# Patient Record
Sex: FEMALE | Race: WHITE | NOT HISPANIC OR LATINO | Employment: OTHER | URBAN - METROPOLITAN AREA
[De-identification: names, ages, dates, MRNs, and addresses within clinical notes are randomized per-mention and may not be internally consistent; named-entity substitution may affect disease eponyms.]

---

## 2017-01-05 ENCOUNTER — OFFICE VISIT (OUTPATIENT)
Dept: FAMILY MEDICINE CLINIC | Facility: CLINIC | Age: 77
End: 2017-01-05

## 2017-01-05 VITALS
DIASTOLIC BLOOD PRESSURE: 60 MMHG | TEMPERATURE: 97.6 F | HEIGHT: 65 IN | WEIGHT: 170 LBS | SYSTOLIC BLOOD PRESSURE: 120 MMHG | OXYGEN SATURATION: 100 % | HEART RATE: 79 BPM | BODY MASS INDEX: 28.32 KG/M2 | RESPIRATION RATE: 16 BRPM

## 2017-01-05 DIAGNOSIS — D63.1 ANEMIA IN STAGE 3 CHRONIC KIDNEY DISEASE (HCC): ICD-10-CM

## 2017-01-05 DIAGNOSIS — N18.30 ANEMIA IN STAGE 3 CHRONIC KIDNEY DISEASE (HCC): ICD-10-CM

## 2017-01-05 DIAGNOSIS — D69.6 THROMBOCYTOPENIA (HCC): ICD-10-CM

## 2017-01-05 DIAGNOSIS — I25.810 CORONARY ARTERY DISEASE INVOLVING CORONARY BYPASS GRAFT OF NATIVE HEART WITHOUT ANGINA PECTORIS: Primary | ICD-10-CM

## 2017-01-05 PROCEDURE — 99214 OFFICE O/P EST MOD 30 MIN: CPT | Performed by: INTERNAL MEDICINE

## 2017-01-05 PROCEDURE — G0008 ADMIN INFLUENZA VIRUS VAC: HCPCS | Performed by: INTERNAL MEDICINE

## 2017-01-05 PROCEDURE — 90662 IIV NO PRSV INCREASED AG IM: CPT | Performed by: INTERNAL MEDICINE

## 2017-01-05 NOTE — MR AVS SNAPSHOT
Janel Mahoney   1/5/2017 1:00 PM   Office Visit    Dept Phone:  [phone]   Encounter #:  [id number]    Provider:  Ariel Matute MD   Department:  [facility]                Your Full Care Plan              Your Updated Medication List          This list is accurate as of: 1/5/17  2:22 PM.  Always use your most recent med list.                ACETAMINOPHEN PO       aspirin 81 MG tablet       calcitonin (salmon) 200 UNIT/ACT nasal spray   Commonly known as:  MIACALCIN   1 spray into each nostril daily.       calcitriol 0.25 MCG capsule   Commonly known as:  ROCALTROL   TAKE 1 CAPSULE BY MOUTH EVERY OTHER DAY.       carvedilol 25 MG tablet   Commonly known as:  COREG   TAKE 1 TABLET TWICE DAILY       epoetin adele 97444 UNIT/ML injection   Commonly known as:  EPOGEN,PROCRIT       ezetimibe 10 MG tablet   Commonly known as:  ZETIA       FERROUS SULFATE PO       furosemide 40 MG tablet   Commonly known as:  LASIX   TAKE 1 AND 1/2 TABLETS EVERY MORNING  AND  1  TABLET EVERY EVENING       HYDROcodone-acetaminophen 7.5-325 MG per tablet   Commonly known as:  NORCO   Take 1 tablet by mouth every 6 (six) hours as needed for moderate pain (4-6).       Magnesium 100 MG capsule       multivitamin capsule       ramipril 5 MG capsule   Commonly known as:  ALTACE       simvastatin 40 MG tablet   Commonly known as:  ZOCOR   TAKE 1 TABLET EVERY DAY (NEED MD APPOINTMENT)       traMADol 50 MG tablet   Commonly known as:  ULTRAM       VITAMIN B12 PO       Vitamin D 2000 UNITS tablet       WARFARIN SODIUM PO       zolpidem 10 MG tablet   Commonly known as:  AMBIEN   TAKE 1 TABLET AT BEDTIME               We Performed the Following     Pneumococcal Conjugate Vaccine 13-Valent All       You Were Diagnosed With        Codes Comments    Coronary artery disease involving coronary bypass graft of native heart without angina pectoris    -  Primary ICD-10-CM:  I25.810  ICD-9-CM: 414.05     Anemia in stage 3 chronic kidney disease     ICD-10-CM: N18.3, D63.1  ICD-9-CM: 285.21, 585.3     Thrombocytopenia     ICD-10-CM: D69.6  ICD-9-CM: 287.5       Instructions     None    Patient Instructions History      Upcoming Appointments     Visit Type Date Time Department    OFFICE VISIT 2017  1:00 PM [facility]    LAB 2017  2:00 PM [facility] ONC CBC LAB [facility]    INJECTION 2017  2:30 PM [facility] OP INFU CBC [facility]    PACEMAKER ICD - REMOTE 3/16/2017 12:[facility]    FOLLOW UP 1 UNIT 3/21/2017  3:00 PM [facility] ONC CBC [facility]    LAB 3/21/2017  2:30 PM [facility] ONC CBC LAB [facility]    INJECTION 3/21/2017  3:30 PM [facility] OP INFU CBC [facility]    LAB 2017 11:00 AM [facility]    OFFICE VISIT 2017  1:00 PM [facility]    FOLLOW UP 2017 11:[facility] AM [facility]    PACEMAKER ICD - IN OFFICE 2017 12:00 PM [facility] [place]    FOLLOW UP SLEEP CLINIC 2017 10:45 AM [facility] SLEEP LAB      [employer] Signup     [facility] [employer] allows you to send messages to your doctor, view your test results, renew your prescriptions, schedule appointments, and more. To sign up, go to [employer] and click on the Sign Up Now link in the New User? box. Enter your [employer] Activation Code exactly as it appears below along with the last four digits of your Social Security Number and your Date of Birth () to complete the sign-up process. If you do not sign up before the expiration date, you must request a new code.    [employer] Activation Code: [email or web address]  Expires: 1/10/2017 11:19 AM    If you have questions, you can email [email or web address] or call [phone] to talk to our [employer] staff. Remember, [employer] is NOT to be used for urgent needs. For medical emergencies, dial 911.               Other Info from Your Visit           Your Appointments     2017  2:00 PM EST   Lab with LAB CHAIR 1 [facility]  [facility]    [address]  [facility] [address]   [phone]            Jan 17, 2017  2:30 PM EST   Injection with INJECTION CHAIR [facility] INFUSION CBC ([place])    [address]  [place] [facility] [address]   [phone]            Mar 16, 2017 12:00 AM EDT   PACEMAKER - REMOTE with SYDNEY PURVIS   [facility] CARDIOLOGY (--)    [address]  [place] [facility] [address] [phone]            Mar 21, 2017  2:30 PM EDT   Lab with LAB CHAIR 6 [facility] ONCOLOGY CBC LAB ([place])    [address]  [place] [address]   [phone]            Mar 21, 2017  3:00 PM EDT   FOLLOW UP with Edward Vasquez MD   [facility] CBC GROUP: CONSULTANTS IN BLOOD DISORDERS AND CANCER (CBC [place])    [address]  [place] [address]   [phone]              Allergies     Dofetilide      Pt states not allergic      Reason for Visit     Annual Exam     Results go over last labs      Vital Signs     Blood Pressure Pulse Temperature Respirations Height Weight    120/60 (BP Location: Left arm, Patient Position: Sitting, Cuff Size: Adult) 79 97.6 °F (36.4 °C) (Oral) 16 65\" (165.1 cm) 170 lb (77.1 kg)    Oxygen Saturation Body Mass Index Smoking Status             100% 28.29 kg/m2 Former Smoker         Problems and Diagnoses Noted     Anemia in stage 3 chronic kidney disease    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

## 2017-01-05 NOTE — PROGRESS NOTES
Subjective   Janel Mahoney is a 76 y.o. female.     History of Present Illness   Patient was seen today for follow-up for coronary disease.  She's had no chest pain over the last several months his controlled medication.  She went over her lab work today blood sugars 105, , creatinine 1.4, triglycerides 64, HDL 71, LDL 79.  Patient has chronic normal flares been taking over-the-counter iron and ferritin levels 298.  She was advised to quit iron and follow-up with her nephrologist.    Much of this encounter note is an electronic transcription/translation of spoken language to printed text.  The electronic translation of spoken language may permit erroneous, or at times, nonsensical words or phrases to be inadvertently transcribed.  Although I have reviewed the note for such errors, some may still exist.  The following portions of the patient's history were reviewed and updated as appropriate: allergies, current medications, past family history, past medical history, past social history, past surgical history and problem list.    Review of Systems   Constitutional: Negative for fatigue and fever.   HENT: Positive for congestion. Negative for trouble swallowing.    Eyes: Negative for discharge and visual disturbance.   Respiratory: Negative for choking and shortness of breath.    Cardiovascular: Negative for chest pain and palpitations.   Gastrointestinal: Negative for abdominal pain and blood in stool.   Endocrine: Negative.    Genitourinary: Negative for genital sores and hematuria.   Musculoskeletal: Negative for gait problem and joint swelling.   Skin: Negative for color change, pallor, rash and wound.   Allergic/Immunologic: Positive for environmental allergies. Negative for immunocompromised state.   Neurological: Negative for facial asymmetry and speech difficulty.   Psychiatric/Behavioral: Negative for hallucinations and suicidal ideas.       Objective   Physical Exam   Constitutional: She is oriented  to person, place, and time. She appears well-developed and well-nourished.   HENT:   Head: Normocephalic.   Eyes: Conjunctivae are normal. Pupils are equal, round, and reactive to light.   Neck: Normal range of motion. Neck supple.   Cardiovascular: Normal rate, regular rhythm and normal heart sounds.    Pulmonary/Chest: Effort normal and breath sounds normal.   Abdominal: Soft. Bowel sounds are normal.   Musculoskeletal: Normal range of motion.   Neurological: She is alert and oriented to person, place, and time.   Skin: Skin is warm and dry.   Psychiatric: She has a normal mood and affect. Her behavior is normal. Judgment and thought content normal.   Nursing note and vitals reviewed.      Assessment/Plan   Problems Addressed this Visit        Cardiovascular and Mediastinum    Coronary artery disease involving coronary bypass graft of native heart without angina pectoris - Primary       Genitourinary    Anemia in stage 3 chronic kidney disease       Hematopoietic and Hemostatic    Thrombocytopenia

## 2017-01-06 ENCOUNTER — HOSPITAL ENCOUNTER (OUTPATIENT)
Dept: CARDIOLOGY | Facility: HOSPITAL | Age: 77
Setting detail: RECURRING SERIES
Discharge: HOME | End: 2017-01-06

## 2017-01-06 PROCEDURE — 85610 PROTHROMBIN TIME: CPT | Performed by: INTERNAL MEDICINE

## 2017-01-06 PROCEDURE — 36416 COLLJ CAPILLARY BLOOD SPEC: CPT | Performed by: INTERNAL MEDICINE

## 2017-01-17 ENCOUNTER — LAB (OUTPATIENT)
Dept: LAB | Facility: HOSPITAL | Age: 77
End: 2017-01-17

## 2017-01-17 ENCOUNTER — INFUSION (OUTPATIENT)
Dept: ONCOLOGY | Facility: HOSPITAL | Age: 77
End: 2017-01-17

## 2017-01-17 DIAGNOSIS — N18.30 ANEMIA IN STAGE 3 CHRONIC KIDNEY DISEASE (HCC): ICD-10-CM

## 2017-01-17 DIAGNOSIS — D63.1 ANEMIA IN STAGE 3 CHRONIC KIDNEY DISEASE (HCC): ICD-10-CM

## 2017-01-17 DIAGNOSIS — D69.6 THROMBOCYTOPENIA (HCC): ICD-10-CM

## 2017-01-17 LAB
BASOPHILS # BLD AUTO: 0.03 10*3/MM3 (ref 0–0.1)
BASOPHILS NFR BLD AUTO: 0.6 % (ref 0–1.1)
DEPRECATED RDW RBC AUTO: 49.6 FL (ref 37–49)
EOSINOPHIL # BLD AUTO: 0.2 10*3/MM3 (ref 0–0.36)
EOSINOPHIL NFR BLD AUTO: 4.3 % (ref 1–5)
ERYTHROCYTE [DISTWIDTH] IN BLOOD BY AUTOMATED COUNT: 14.9 % (ref 11.7–14.5)
HCT VFR BLD AUTO: 33.7 % (ref 34–45)
HGB BLD-MCNC: 10.9 G/DL (ref 11.5–14.9)
IMM GRANULOCYTES # BLD: 0.03 10*3/MM3 (ref 0–0.03)
IMM GRANULOCYTES NFR BLD: 0.6 % (ref 0–0.5)
LYMPHOCYTES # BLD AUTO: 1.14 10*3/MM3 (ref 1–3.5)
LYMPHOCYTES NFR BLD AUTO: 24.6 % (ref 20–49)
MCH RBC QN AUTO: 29.5 PG (ref 27–33)
MCHC RBC AUTO-ENTMCNC: 32.3 G/DL (ref 32–35)
MCV RBC AUTO: 91.3 FL (ref 83–97)
MONOCYTES # BLD AUTO: 0.33 10*3/MM3 (ref 0.25–0.8)
MONOCYTES NFR BLD AUTO: 7.1 % (ref 4–12)
NEUTROPHILS # BLD AUTO: 2.9 10*3/MM3 (ref 1.5–7)
NEUTROPHILS NFR BLD AUTO: 62.8 % (ref 39–75)
NRBC BLD MANUAL-RTO: 0 /100 WBC (ref 0–0)
PLATELET # BLD AUTO: 118 10*3/MM3 (ref 150–375)
PMV BLD AUTO: 10.4 FL (ref 8.9–12.1)
RBC # BLD AUTO: 3.69 10*6/MM3 (ref 3.9–5)
WBC NRBC COR # BLD: 4.63 10*3/MM3 (ref 4–10)

## 2017-01-17 PROCEDURE — 85025 COMPLETE CBC W/AUTO DIFF WBC: CPT

## 2017-01-17 PROCEDURE — 36416 COLLJ CAPILLARY BLOOD SPEC: CPT

## 2017-01-17 NOTE — PROGRESS NOTES
Reviewed labs.  Patients HGB is 10.9.  Pt states that Dr. Matute had instructed her to stop her iron tabs, as her ferritin level is >200,   Pt states she is taking one daily.  Pt states she is not comfortable doing this as she will require EPO shots again.      Reviewed with dr Vasquez patient's ferritin level and iron panel.  Per dr vasquez, iron sat is only 24%, therefore patient is not iron overloaded and the ferritin level could be elevated due to acute phase reactant.  Per dr Vasquez, patient should continue on iron once daily.     Reviewed with patient, she repeated instructions correctly.

## 2017-01-31 ENCOUNTER — HOSPITAL ENCOUNTER (OUTPATIENT)
Dept: CARDIOLOGY | Facility: HOSPITAL | Age: 77
Setting detail: RECURRING SERIES
Discharge: HOME | End: 2017-01-31

## 2017-01-31 PROCEDURE — 85610 PROTHROMBIN TIME: CPT | Performed by: INTERNAL MEDICINE

## 2017-01-31 PROCEDURE — 36416 COLLJ CAPILLARY BLOOD SPEC: CPT | Performed by: INTERNAL MEDICINE

## 2017-02-14 ENCOUNTER — HOSPITAL ENCOUNTER (OUTPATIENT)
Dept: CARDIOLOGY | Facility: HOSPITAL | Age: 77
Setting detail: RECURRING SERIES
Discharge: HOME | End: 2017-02-14

## 2017-02-14 PROCEDURE — 36416 COLLJ CAPILLARY BLOOD SPEC: CPT

## 2017-02-14 PROCEDURE — 85610 PROTHROMBIN TIME: CPT

## 2017-02-27 RX ORDER — CALCITRIOL 0.25 UG/1
CAPSULE, LIQUID FILLED ORAL
Qty: 45 CAPSULE | Refills: 1 | Status: SHIPPED | OUTPATIENT
Start: 2017-02-27 | End: 2017-08-23 | Stop reason: SDUPTHER

## 2017-02-28 ENCOUNTER — HOSPITAL ENCOUNTER (OUTPATIENT)
Dept: CARDIOLOGY | Facility: HOSPITAL | Age: 77
Setting detail: RECURRING SERIES
Discharge: HOME | End: 2017-02-28

## 2017-02-28 PROCEDURE — 36416 COLLJ CAPILLARY BLOOD SPEC: CPT

## 2017-02-28 PROCEDURE — 85610 PROTHROMBIN TIME: CPT

## 2017-03-14 ENCOUNTER — HOSPITAL ENCOUNTER (OUTPATIENT)
Dept: CARDIOLOGY | Facility: HOSPITAL | Age: 77
Setting detail: RECURRING SERIES
Discharge: HOME | End: 2017-03-14

## 2017-03-14 PROCEDURE — 85610 PROTHROMBIN TIME: CPT

## 2017-03-14 PROCEDURE — 36416 COLLJ CAPILLARY BLOOD SPEC: CPT

## 2017-03-16 ENCOUNTER — CLINICAL SUPPORT NO REQUIREMENTS (OUTPATIENT)
Dept: CARDIOLOGY | Facility: CLINIC | Age: 77
End: 2017-03-16

## 2017-03-16 DIAGNOSIS — I42.9 CARDIOMYOPATHY (HCC): Primary | ICD-10-CM

## 2017-03-16 PROCEDURE — 93296 REM INTERROG EVL PM/IDS: CPT | Performed by: INTERNAL MEDICINE

## 2017-03-16 PROCEDURE — 93295 DEV INTERROG REMOTE 1/2/MLT: CPT | Performed by: INTERNAL MEDICINE

## 2017-03-21 ENCOUNTER — APPOINTMENT (OUTPATIENT)
Dept: ONCOLOGY | Facility: HOSPITAL | Age: 77
End: 2017-03-21

## 2017-03-21 ENCOUNTER — OFFICE VISIT (OUTPATIENT)
Dept: ONCOLOGY | Facility: CLINIC | Age: 77
End: 2017-03-21

## 2017-03-21 ENCOUNTER — LAB (OUTPATIENT)
Dept: LAB | Facility: HOSPITAL | Age: 77
End: 2017-03-21

## 2017-03-21 VITALS
TEMPERATURE: 98 F | RESPIRATION RATE: 14 BRPM | HEIGHT: 63 IN | OXYGEN SATURATION: 97 % | HEART RATE: 73 BPM | DIASTOLIC BLOOD PRESSURE: 72 MMHG | WEIGHT: 174.4 LBS | BODY MASS INDEX: 30.9 KG/M2 | SYSTOLIC BLOOD PRESSURE: 124 MMHG

## 2017-03-21 DIAGNOSIS — D63.1 ANEMIA IN STAGE 3 CHRONIC KIDNEY DISEASE (HCC): Primary | ICD-10-CM

## 2017-03-21 DIAGNOSIS — D69.6 THROMBOCYTOPENIA (HCC): ICD-10-CM

## 2017-03-21 DIAGNOSIS — D63.1 ANEMIA IN STAGE 3 CHRONIC KIDNEY DISEASE (HCC): ICD-10-CM

## 2017-03-21 DIAGNOSIS — N18.30 ANEMIA IN STAGE 3 CHRONIC KIDNEY DISEASE (HCC): ICD-10-CM

## 2017-03-21 DIAGNOSIS — E53.8 B12 DEFICIENCY: ICD-10-CM

## 2017-03-21 DIAGNOSIS — N18.30 ANEMIA IN STAGE 3 CHRONIC KIDNEY DISEASE (HCC): Primary | ICD-10-CM

## 2017-03-21 LAB
BASOPHILS # BLD AUTO: 0.01 10*3/MM3 (ref 0–0.1)
BASOPHILS NFR BLD AUTO: 0.2 % (ref 0–1.1)
DEPRECATED RDW RBC AUTO: 47.6 FL (ref 37–49)
EOSINOPHIL # BLD AUTO: 0.14 10*3/MM3 (ref 0–0.36)
EOSINOPHIL NFR BLD AUTO: 2.3 % (ref 1–5)
ERYTHROCYTE [DISTWIDTH] IN BLOOD BY AUTOMATED COUNT: 14.2 % (ref 11.7–14.5)
HCT VFR BLD AUTO: 33.9 % (ref 34–45)
HGB BLD-MCNC: 11.3 G/DL (ref 11.5–14.9)
IMM GRANULOCYTES # BLD: 0.03 10*3/MM3 (ref 0–0.03)
IMM GRANULOCYTES NFR BLD: 0.5 % (ref 0–0.5)
LYMPHOCYTES # BLD AUTO: 1.14 10*3/MM3 (ref 1–3.5)
LYMPHOCYTES NFR BLD AUTO: 19.1 % (ref 20–49)
MCH RBC QN AUTO: 30.3 PG (ref 27–33)
MCHC RBC AUTO-ENTMCNC: 33.3 G/DL (ref 32–35)
MCV RBC AUTO: 90.9 FL (ref 83–97)
MONOCYTES # BLD AUTO: 0.39 10*3/MM3 (ref 0.25–0.8)
MONOCYTES NFR BLD AUTO: 6.5 % (ref 4–12)
NEUTROPHILS # BLD AUTO: 4.25 10*3/MM3 (ref 1.5–7)
NEUTROPHILS NFR BLD AUTO: 71.4 % (ref 39–75)
NRBC BLD MANUAL-RTO: 0 /100 WBC (ref 0–0)
PLATELET # BLD AUTO: 112 10*3/MM3 (ref 150–375)
PMV BLD AUTO: 10.3 FL (ref 8.9–12.1)
RBC # BLD AUTO: 3.73 10*6/MM3 (ref 3.9–5)
WBC NRBC COR # BLD: 5.96 10*3/MM3 (ref 4–10)

## 2017-03-21 PROCEDURE — 99213 OFFICE O/P EST LOW 20 MIN: CPT | Performed by: INTERNAL MEDICINE

## 2017-03-21 PROCEDURE — 85025 COMPLETE CBC W/AUTO DIFF WBC: CPT

## 2017-03-21 PROCEDURE — 36416 COLLJ CAPILLARY BLOOD SPEC: CPT

## 2017-03-21 NOTE — PROGRESS NOTES
Subjective   CHIEF COMPLAINT:    1. Anemia secondary to chronic kidney disease.   2. Vitamin B12 deficiency.   3. Thrombocytopenia    HISTORY OF PRESENT ILLNESS:     Janel Mahoney is a 76 y.o.  patient with anemia of chronic kidney disease.  She was previously on Procrit monthly but hemoglobin stayed above 10 for the past 6 months and she did not need Procrit since the last dose that she received on 4/19/16 at 4000 units.    Patient states that she has increased her fluid intake over the past few months.  She saw her nephrologist recently but was told that her kidney function has slightly worsened.  She is not reporting any fatigue.  She is not noticing blood in the stool or urine.      Past Medical History   Diagnosis Date   • Anemia    • Atrial fibrillation    • Carotid artery stenosis    • Chronic coronary artery disease      moderate to severe LV dysfunction.   • GERD (gastroesophageal reflux disease)    • Hyperlipidemia    • Hypertension    • Leukopenia    • Obesity    • Osteoarthritis    • Peptic ulcer    • Premature ventricular contractions    • Renal insufficiency syndrome    • Scoliosis    • Stroke syndrome    • Thrombocytopenia    • Ventricular tachycardia    • Vitamin B12 deficiency        Past Surgical History   Procedure Laterality Date   • Coronary artery bypass graft       single graft to the PDA   • Cardiac catheterization       Showed severe mitral insufficiency and borderline coronary artery disease   • Cardiac catheterization       Showed an ejection fraction of 35%. She had occlusive disease of the right posterior LV branch and no other significant disease, treated medically.   • Thromboendarterectomy Right      carotid thromboendarterectomy    • Av node ablation     • Cardioversion       multiple electrocardioversions.   • Hemorrhoidectomy     • Hysterectomy     • Cardiac defibrillator placement       Biventricular   • Total knee arthroplasty Left    • Mitral valve replacement  01/2010     #31  Epic porcine valve.   • Tonsillectomy     • Coronary stent placement         Cancer-related family history includes Breast cancer in her mother; Cancer in her mother.    SCHEDULED MEDS:    Current Outpatient Prescriptions:   •  ACETAMINOPHEN PO, Senior choice, Disp: , Rfl:   •  aspirin 81 MG tablet, Take 1 tablet by mouth daily., Disp: , Rfl:   •  calcitonin, salmon, (MIACALCIN) 200 UNIT/ACT nasal spray, 1 spray into each nostril daily., Disp: 3 each, Rfl: 1  •  calcitriol (ROCALTROL) 0.25 MCG capsule, TAKE 1 CAPSULE EVERY OTHER DAY (Patient taking differently: take 1 capsule daily), Disp: 45 capsule, Rfl: 1  •  carvedilol (COREG) 25 MG tablet, TAKE 1 TABLET TWICE DAILY, Disp: 180 tablet, Rfl: 3  •  Cholecalciferol (VITAMIN D) 2000 UNITS tablet, Take 1 tablet by mouth daily., Disp: , Rfl:   •  Cyanocobalamin (VITAMIN B12 PO), Take 1 tablet by mouth daily. As directed, Disp: , Rfl:   •  epoetin adele (EPOGEN,PROCRIT) 10373 UNIT/ML injection, as needed. Procrit 12187 UNIT/ML Injection Solution; Patient Sig: Procrit 76102 UNIT/ML Injection Solution INJECT SUBCUTANEOUSLY AS DIRECTED.; 0; 01-Apr-2014; Active, Disp: , Rfl:   •  ezetimibe (ZETIA) 10 MG tablet, Take 1 tablet by mouth daily., Disp: , Rfl:   •  FERROUS SULFATE PO, daily. Take as directed  , Disp: , Rfl:   •  furosemide (LASIX) 40 MG tablet, TAKE 1 AND 1/2 TABLETS EVERY MORNING  AND  1  TABLET EVERY EVENING, Disp: 225 tablet, Rfl: 3  •  HYDROcodone-acetaminophen (NORCO) 7.5-325 MG per tablet, Take 1 tablet by mouth every 6 (six) hours as needed for moderate pain (4-6)., Disp: 30 tablet, Rfl: 0  •  Multiple Vitamin (MULTIVITAMIN) capsule, Take 1 capsule by mouth daily., Disp: , Rfl:   •  ramipril (ALTACE) 5 MG capsule, Take 1 capsule by mouth daily., Disp: , Rfl:   •  simvastatin (ZOCOR) 40 MG tablet, TAKE 1 TABLET EVERY DAY (NEED MD APPOINTMENT) (Patient taking differently: TAKE 1/2 TABLET EVERY DAY (NEED MD APPOINTMENT)), Disp: 90 tablet, Rfl: 1  •  traMADol  "(ULTRAM) 50 MG tablet, Take  by mouth. 2 tablets every morning, 1 tablet nightly, Disp: , Rfl:   •  WARFARIN SODIUM PO, 2 mg daily., Disp: , Rfl:   •  zolpidem (AMBIEN) 10 MG tablet, TAKE 1 TABLET AT BEDTIME, Disp: 90 tablet, Rfl: 1  •  Magnesium 100 MG capsule, Take  by mouth Daily., Disp: , Rfl:     REVIEW OF SYSTEMS:  GENERAL:  No fever or chills. No weight change.  No fatigue.   SKIN:  History of cellulitis of the right leg.  HEME/LYMPH: Anemia.  RESPIRATORY:  No cough, shortness of breath, hemoptysis or wheezing.  CVS:  No chest pain, palpitations or lower extremity swelling.  GI:  No abdominal pain, nausea, vomiting, constipation, diarrhea, melena or hematochezia.  :  No dysuria, hematuria or increased frequency.       Objective   VITAL SIGNS:  Vitals:    03/21/17 1527   BP: 124/72   Pulse: 73   Resp: 14   Temp: 98 °F (36.7 °C)   TempSrc: Oral   SpO2: 97%   Weight: 174 lb 6.4 oz (79.1 kg)   Height: 62.99\" (160 cm)  Comment: new height   PainSc: 0-No pain       Wt Readings from Last 3 Encounters:   03/21/17 174 lb 6.4 oz (79.1 kg)   01/05/17 170 lb (77.1 kg)   12/09/16 173 lb (78.5 kg)       PHYSICAL EXAMINATION:  GENERAL:  The patient appears in good general condition, not in acute distress.  SKIN:  No skin rashes or lesions. No ecchymosis or petechiae.  HEAD:  Normocephalic.  EYES:  No jaundice or pallor.   CHEST:  Lungs clear to auscultation. No added sounds.  CARDIAC:  Normal S1 & S2. No murmurs.  EXTREMITIES:  Chronic hyperpigmentation of the legs with changes related to the area of cellulitis.     .  RESULT REVIEW:       Results from last 7 days  Lab Units 03/21/17  1433   WBC 10*3/mm3 5.96   NEUTROS ABS 10*3/mm3 4.25   HEMOGLOBIN g/dL 11.3*   HEMATOCRIT % 33.9*   PLATELETS 10*3/mm3 112*           Assessment/Plan   1.  Anemia of chronic kidney disease, stage 3.  The patient was on Procrit monthly at 4,000 units until April 2016.  Hemoglobin has stayed above 10 since that time and she did not need to " start back on Procrit.  Since hemoglobin has stayed in the 10 to 11 range, I would increase the interval between the lab tests in 3 months.     2.  Thrombocytopenia likely secondary to B12 deficiency. She is on B12 orally once daily. Platelet count is stable.     We will repeat a CBC with iron studies, methylmalonic acid level in 3 months and see in follow-up in 6 months with a CBC.        Edward Vasquez MD  03/21/17

## 2017-03-30 ENCOUNTER — TELEPHONE (OUTPATIENT)
Dept: FAMILY MEDICINE CLINIC | Facility: CLINIC | Age: 77
End: 2017-03-30

## 2017-03-30 RX ORDER — HYDROCODONE BITARTRATE AND ACETAMINOPHEN 7.5; 325 MG/1; MG/1
1 TABLET ORAL EVERY 6 HOURS PRN
Qty: 30 TABLET | Refills: 0 | Status: SHIPPED | OUTPATIENT
Start: 2017-03-30 | End: 2017-07-19 | Stop reason: SDUPTHER

## 2017-04-11 ENCOUNTER — HOSPITAL ENCOUNTER (OUTPATIENT)
Dept: CARDIOLOGY | Facility: HOSPITAL | Age: 77
Setting detail: RECURRING SERIES
Discharge: HOME | End: 2017-04-11

## 2017-04-11 PROCEDURE — 36416 COLLJ CAPILLARY BLOOD SPEC: CPT

## 2017-04-11 PROCEDURE — 85610 PROTHROMBIN TIME: CPT

## 2017-04-11 RX ORDER — WARFARIN SODIUM 1 MG/1
TABLET ORAL
Qty: 180 TABLET | Refills: 3 | Status: SHIPPED | OUTPATIENT
Start: 2017-04-11 | End: 2017-09-29 | Stop reason: HOSPADM

## 2017-04-28 DIAGNOSIS — E78.5 DYSLIPIDEMIA: Primary | ICD-10-CM

## 2017-04-28 DIAGNOSIS — R79.89 LOW VITAMIN D LEVEL: ICD-10-CM

## 2017-05-02 ENCOUNTER — OFFICE VISIT (OUTPATIENT)
Dept: FAMILY MEDICINE CLINIC | Facility: CLINIC | Age: 77
End: 2017-05-02

## 2017-05-02 VITALS
HEART RATE: 76 BPM | DIASTOLIC BLOOD PRESSURE: 80 MMHG | OXYGEN SATURATION: 97 % | HEIGHT: 63 IN | SYSTOLIC BLOOD PRESSURE: 144 MMHG | TEMPERATURE: 97.5 F | WEIGHT: 168 LBS | BODY MASS INDEX: 29.77 KG/M2

## 2017-05-02 DIAGNOSIS — J06.9 ACUTE URI: Primary | ICD-10-CM

## 2017-05-02 DIAGNOSIS — Z13.9 SCREENING: ICD-10-CM

## 2017-05-02 DIAGNOSIS — Z12.31 ENCOUNTER FOR SCREENING MAMMOGRAM FOR MALIGNANT NEOPLASM OF BREAST: ICD-10-CM

## 2017-05-02 DIAGNOSIS — R79.89 LOW VITAMIN D LEVEL: ICD-10-CM

## 2017-05-02 DIAGNOSIS — E78.5 DYSLIPIDEMIA: ICD-10-CM

## 2017-05-02 LAB
25(OH)D3 SERPL-MCNC: 39.8 NG/ML (ref 30–100)
ALBUMIN SERPL-MCNC: 4 G/DL (ref 3.5–5.2)
ALBUMIN/GLOB SERPL: 1.3 G/DL
ALP SERPL-CCNC: 46 U/L (ref 39–117)
ALT SERPL W P-5'-P-CCNC: 15 U/L (ref 1–33)
ANION GAP SERPL CALCULATED.3IONS-SCNC: 13.8 MMOL/L
AST SERPL-CCNC: 23 U/L (ref 1–32)
BILIRUB SERPL-MCNC: 0.6 MG/DL (ref 0.1–1.2)
BUN BLD-MCNC: 47 MG/DL (ref 8–23)
BUN/CREAT SERPL: 29.9 (ref 7–25)
CALCIUM SPEC-SCNC: 10 MG/DL (ref 8.6–10.5)
CHLORIDE SERPL-SCNC: 101 MMOL/L (ref 98–107)
CHOLEST SERPL-MCNC: 139 MG/DL (ref 0–200)
CO2 SERPL-SCNC: 28.2 MMOL/L (ref 22–29)
CREAT BLD-MCNC: 1.57 MG/DL (ref 0.57–1)
ERYTHROCYTE [DISTWIDTH] IN BLOOD BY AUTOMATED COUNT: 14.2 % (ref 4.5–15)
GFR SERPL CREATININE-BSD FRML MDRD: 32 ML/MIN/1.73
GLOBULIN UR ELPH-MCNC: 3.1 GM/DL
GLUCOSE BLD-MCNC: 98 MG/DL (ref 65–99)
HCT VFR BLD AUTO: 34.6 % (ref 31–42)
HDLC SERPL-MCNC: 54 MG/DL (ref 40–60)
HGB BLD-MCNC: 11 G/DL (ref 12–18)
LDLC SERPL CALC-MCNC: 65 MG/DL (ref 0–100)
LDLC/HDLC SERPL: 1.2 {RATIO}
LYMPHOCYTES # BLD AUTO: 0.8 10*3/MM3 (ref 1.2–3.4)
LYMPHOCYTES NFR BLD AUTO: 14.7 % (ref 21–51)
MCH RBC QN AUTO: 28.3 PG (ref 26.1–33.1)
MCHC RBC AUTO-ENTMCNC: 31.8 G/DL (ref 33–37)
MCV RBC AUTO: 89 FL (ref 80–99)
MONOCYTES # BLD AUTO: 0.3 10*3/MM3 (ref 0.1–0.6)
MONOCYTES NFR BLD AUTO: 6.1 % (ref 2–9)
NEUTROPHILS # BLD AUTO: 4.3 10*3/MM3 (ref 1.4–6.5)
NEUTROPHILS NFR BLD AUTO: 79.2 % (ref 42–75)
PLATELET # BLD AUTO: 140 10*3/MM3 (ref 150–450)
PMV BLD AUTO: 7.2 FL (ref 7.1–10.5)
POTASSIUM BLD-SCNC: 4.7 MMOL/L (ref 3.5–5.2)
PROT SERPL-MCNC: 7.1 G/DL (ref 6–8.5)
RBC # BLD AUTO: 3.88 10*6/MM3 (ref 4–6)
SODIUM BLD-SCNC: 143 MMOL/L (ref 136–145)
TRIGL SERPL-MCNC: 102 MG/DL (ref 0–150)
VLDLC SERPL-MCNC: 20.4 MG/DL (ref 5–40)
WBC NRBC COR # BLD: 5.4 10*3/MM3 (ref 4.5–10)

## 2017-05-02 PROCEDURE — 80053 COMPREHEN METABOLIC PANEL: CPT | Performed by: INTERNAL MEDICINE

## 2017-05-02 PROCEDURE — 99213 OFFICE O/P EST LOW 20 MIN: CPT | Performed by: INTERNAL MEDICINE

## 2017-05-02 PROCEDURE — 80061 LIPID PANEL: CPT | Performed by: INTERNAL MEDICINE

## 2017-05-02 PROCEDURE — 85025 COMPLETE CBC W/AUTO DIFF WBC: CPT | Performed by: INTERNAL MEDICINE

## 2017-05-02 PROCEDURE — 82306 VITAMIN D 25 HYDROXY: CPT | Performed by: INTERNAL MEDICINE

## 2017-05-02 RX ORDER — AZITHROMYCIN 250 MG/1
TABLET, FILM COATED ORAL
Qty: 6 TABLET | Refills: 0 | Status: SHIPPED | OUTPATIENT
Start: 2017-05-02 | End: 2017-06-09

## 2017-05-02 RX ORDER — TRAMADOL HYDROCHLORIDE 50 MG/1
50 TABLET ORAL EVERY 6 HOURS PRN
COMMUNITY
End: 2017-05-04 | Stop reason: SDUPTHER

## 2017-05-02 RX ORDER — PROMETHAZINE HYDROCHLORIDE AND CODEINE PHOSPHATE 6.25; 1 MG/5ML; MG/5ML
5 SYRUP ORAL EVERY 4 HOURS PRN
Qty: 118 ML | Refills: 0 | Status: SHIPPED | OUTPATIENT
Start: 2017-05-02 | End: 2017-06-09

## 2017-05-04 ENCOUNTER — TELEPHONE (OUTPATIENT)
Dept: FAMILY MEDICINE CLINIC | Facility: CLINIC | Age: 77
End: 2017-05-04

## 2017-05-04 RX ORDER — TRAMADOL HYDROCHLORIDE 50 MG/1
TABLET ORAL
Qty: 360 TABLET | Refills: 1 | Status: SHIPPED | OUTPATIENT
Start: 2017-05-04 | End: 2018-01-05 | Stop reason: SDUPTHER

## 2017-05-04 RX ORDER — CARVEDILOL 25 MG/1
TABLET ORAL
Qty: 180 TABLET | Refills: 3 | Status: SHIPPED | OUTPATIENT
Start: 2017-05-04 | End: 2018-01-11 | Stop reason: SDUPTHER

## 2017-05-09 ENCOUNTER — HOSPITAL ENCOUNTER (OUTPATIENT)
Dept: CARDIOLOGY | Facility: HOSPITAL | Age: 77
Setting detail: RECURRING SERIES
Discharge: HOME | End: 2017-05-09

## 2017-05-09 ENCOUNTER — HOSPITAL ENCOUNTER (OUTPATIENT)
Dept: MAMMOGRAPHY | Facility: HOSPITAL | Age: 77
Discharge: HOME | End: 2017-05-09
Admitting: INTERNAL MEDICINE

## 2017-05-09 DIAGNOSIS — Z13.9 SCREENING: ICD-10-CM

## 2017-05-09 DIAGNOSIS — Z12.31 ENCOUNTER FOR SCREENING MAMMOGRAM FOR MALIGNANT NEOPLASM OF BREAST: ICD-10-CM

## 2017-05-09 PROCEDURE — G0202 SCR MAMMO BI INCL CAD: HCPCS

## 2017-05-09 PROCEDURE — 36416 COLLJ CAPILLARY BLOOD SPEC: CPT

## 2017-05-09 PROCEDURE — 85610 PROTHROMBIN TIME: CPT

## 2017-05-15 RX ORDER — ZOLPIDEM TARTRATE 10 MG/1
TABLET ORAL
Qty: 90 TABLET | Refills: 1 | Status: SHIPPED | OUTPATIENT
Start: 2017-05-15 | End: 2017-11-22 | Stop reason: SDUPTHER

## 2017-05-16 ENCOUNTER — TELEPHONE (OUTPATIENT)
Dept: FAMILY MEDICINE CLINIC | Facility: CLINIC | Age: 77
End: 2017-05-16

## 2017-05-16 ENCOUNTER — HOSPITAL ENCOUNTER (OUTPATIENT)
Dept: CARDIOLOGY | Facility: HOSPITAL | Age: 77
Setting detail: RECURRING SERIES
Discharge: HOME | End: 2017-05-16

## 2017-05-16 PROCEDURE — 36416 COLLJ CAPILLARY BLOOD SPEC: CPT

## 2017-05-16 PROCEDURE — 85610 PROTHROMBIN TIME: CPT

## 2017-05-16 RX ORDER — SIMVASTATIN 40 MG
TABLET ORAL
Qty: 90 TABLET | Refills: 1 | Status: SHIPPED | OUTPATIENT
Start: 2017-05-16 | End: 2017-10-03 | Stop reason: SDUPTHER

## 2017-05-23 ENCOUNTER — HOSPITAL ENCOUNTER (OUTPATIENT)
Dept: CARDIOLOGY | Facility: HOSPITAL | Age: 77
Setting detail: RECURRING SERIES
Discharge: HOME | End: 2017-05-23

## 2017-05-23 PROCEDURE — 36416 COLLJ CAPILLARY BLOOD SPEC: CPT

## 2017-05-23 PROCEDURE — 85610 PROTHROMBIN TIME: CPT

## 2017-05-31 RX ORDER — FUROSEMIDE 40 MG/1
TABLET ORAL
Qty: 225 TABLET | Refills: 3 | Status: SHIPPED | OUTPATIENT
Start: 2017-05-31 | End: 2018-01-11 | Stop reason: SDUPTHER

## 2017-06-06 ENCOUNTER — HOSPITAL ENCOUNTER (OUTPATIENT)
Dept: CARDIOLOGY | Facility: HOSPITAL | Age: 77
Setting detail: RECURRING SERIES
Discharge: HOME | End: 2017-06-06

## 2017-06-06 PROCEDURE — 85610 PROTHROMBIN TIME: CPT

## 2017-06-06 PROCEDURE — 36416 COLLJ CAPILLARY BLOOD SPEC: CPT

## 2017-06-09 ENCOUNTER — RESEARCH ENCOUNTER (OUTPATIENT)
Dept: CARDIOLOGY | Facility: CLINIC | Age: 77
End: 2017-06-09

## 2017-06-09 ENCOUNTER — CLINICAL SUPPORT NO REQUIREMENTS (OUTPATIENT)
Dept: CARDIOLOGY | Facility: CLINIC | Age: 77
End: 2017-06-09

## 2017-06-09 ENCOUNTER — OFFICE VISIT (OUTPATIENT)
Dept: CARDIOLOGY | Facility: CLINIC | Age: 77
End: 2017-06-09

## 2017-06-09 VITALS
DIASTOLIC BLOOD PRESSURE: 78 MMHG | BODY MASS INDEX: 27.82 KG/M2 | WEIGHT: 167 LBS | SYSTOLIC BLOOD PRESSURE: 126 MMHG | HEIGHT: 65 IN | HEART RATE: 63 BPM

## 2017-06-09 DIAGNOSIS — I25.810 CORONARY ARTERY DISEASE INVOLVING CORONARY BYPASS GRAFT OF NATIVE HEART WITHOUT ANGINA PECTORIS: ICD-10-CM

## 2017-06-09 DIAGNOSIS — I48.20 CHRONIC ATRIAL FIBRILLATION (HCC): ICD-10-CM

## 2017-06-09 DIAGNOSIS — I77.9 BILATERAL CAROTID ARTERY DISEASE (HCC): ICD-10-CM

## 2017-06-09 DIAGNOSIS — Z95.2 S/P MVR (MITRAL VALVE REPLACEMENT): Primary | ICD-10-CM

## 2017-06-09 DIAGNOSIS — I42.9 CARDIOMYOPATHY (HCC): ICD-10-CM

## 2017-06-09 DIAGNOSIS — I42.9 CARDIOMYOPATHY (HCC): Primary | ICD-10-CM

## 2017-06-09 PROCEDURE — 93283 PRGRMG EVAL IMPLANTABLE DFB: CPT | Performed by: INTERNAL MEDICINE

## 2017-06-09 PROCEDURE — 99214 OFFICE O/P EST MOD 30 MIN: CPT | Performed by: INTERNAL MEDICINE

## 2017-06-09 PROCEDURE — 93000 ELECTROCARDIOGRAM COMPLETE: CPT | Performed by: INTERNAL MEDICINE

## 2017-06-09 NOTE — PROGRESS NOTES
Date of Office Visit: 17  Encounter Provider: Nik Galarza MD  Place of Service: [facility] CARDIOLOGY  Patient Name: Janel Mahoney  :1940      Chief Complaint   Patient presents with   • Coronary Artery Disease   • Congestive Heart Failure   • Atrial Fibrillation   • Cardiac Valve Problem     History of Present Illness  HPI Comments:  The patient is a pleasant, 76-year-old white female with history of severe ischemic heart  disease, mild disease of the left anterior descending, angioplasty, and stent placement of  the right coronary artery. She also had premature ventricular contractions and severe  vascular disease, and left ventricular ejection fraction of 50%. In 2007, she had  an acute infarct. Catheterization showed a left ventricular ejection fraction of 35%. She  had occlusive disease of the right posterior left ventricular branch and no other  significant disease. She was treated medically. She had a transesophageal echocardiogram  that confirmed a left ventricular ejection fraction of 40% and just moderate mitral  insufficiency. She had atrial fibrillation and had electrocardioversion back to sinus  rhythm in May 2007 and then presented in atrial fibrillation and was admitted in 2007 and placed on Tikosyn. We titrated it up to 500 mcg daily but developed marked QT  prolongation even on 250 mcg twice daily. We then discussed ablation versus warfarin and  rate control. We opted for warfarin and rate control. We continued to have symptoms and  went to Dr. Harish Iverson in [place]. She had atrial fibrillation and flutter with  pulmonary vein isolation. She went back into atrial fibrillation and was started on  sotalol. She converted back to sinus rhythm. She then went back into atrial fibrillation.  Again, ultimately, she had a repeat catheterization that showed severe mitral  insufficiency, borderline coronary disease, and in 2010  had mitral valve  replacement with a #31 Epic porcine valve and single bypass graft to the posterior  descending artery. She continued to have episodes of rapid atrial fibrillation and left  ventricular dysfunction. She underwent AV node ablation and upgrade of her device to a  bi-V.   She then presented in 09/2013 with complaints of a lot of fatigue, tiredness and weakness.   As part of the evaluation she had an echocardiogram which showed her left ventricular  ejection fraction to be 45%, moderate pulmonary hypertension at 62 mmHg.  A perfusion  stress test showed no evidence of ischemia and she had a sleep study which was positive so  she was started on CPAP.  She had some volume overload and did much better on twice a day diuretic.   She was in the hospital around August 2016 with multiple compression fractures of her back.    She comes in for follow-up.  Again her biggest problem is her back and no scoliosis.  When she walks it would tend to limit her achieve and fixed with aches her out of breath is her back pain.  She's had some issues with her balance but hasn't had any further falling.  No stroke type symptoms.  No blood in her stool or black tarry stools.  No chest pain or pressure.  No orthopnea or PND.  Overall there has not been any dramatic change in her symptoms.  Her  is helpful at home and her daughter helps with her housework and laundry.    Coronary Artery Disease   Symptoms include shortness of breath. Pertinent negatives include no dizziness, muscle weakness or weight gain. Her past medical history is significant for CHF and past myocardial infarction.   Congestive Heart Failure   Associated symptoms include shortness of breath. Pertinent negatives include no abdominal pain, muscle weakness or nocturia. Her past medical history is significant for CAD.   Atrial Fibrillation   Symptoms include shortness of breath. Symptoms are negative for dizziness and weakness. Past medical history  includes atrial fibrillation, AICD, CAD and CHF.         Past Medical History:   Diagnosis Date   • Anemia    • Atrial fibrillation    • Carotid artery stenosis    • Chronic coronary artery disease     moderate to severe LV dysfunction.   • GERD (gastroesophageal reflux disease)    • Hyperlipidemia    • Hypertension    • Leukopenia    • Obesity    • Osteoarthritis    • Peptic ulcer    • Premature ventricular contractions    • Renal insufficiency syndrome    • Scoliosis    • Stroke syndrome    • Thrombocytopenia    • Ventricular tachycardia    • Vitamin B12 deficiency          Past Surgical History:   Procedure Laterality Date   • AV NODE ABLATION     • BREAST BIOPSY     • CARDIAC CATHETERIZATION      Showed severe mitral insufficiency and borderline coronary artery disease   • CARDIAC CATHETERIZATION      Showed an ejection fraction of 35%. She had occlusive disease of the right posterior LV branch and no other significant disease, treated medically.   • CARDIAC DEFIBRILLATOR PLACEMENT      Biventricular   • CARDIOVERSION      multiple electrocardioversions.   • CORONARY ARTERY BYPASS GRAFT      single graft to the PDA   • CORONARY STENT PLACEMENT     • HEMORRHOIDECTOMY     • HYSTERECTOMY     • MITRAL VALVE REPLACEMENT  01/2010    #31 Epic porcine valve.   • THROMBOENDARTERECTOMY Right     carotid thromboendarterectomy    • TONSILLECTOMY     • TOTAL KNEE ARTHROPLASTY Left          Current Outpatient Prescriptions on File Prior to Visit   Medication Sig Dispense Refill   • ACETAMINOPHEN PO Senior choice     • aspirin 81 MG tablet Take 1 tablet by mouth daily.     • calcitonin, salmon, (MIACALCIN) 200 UNIT/ACT nasal spray 1 spray into each nostril daily. 3 each 1   • calcitriol (ROCALTROL) 0.25 MCG capsule TAKE 1 CAPSULE EVERY OTHER DAY (Patient taking differently: take 1 capsule daily) 45 capsule 1   • carvedilol (COREG) 25 MG tablet TAKE 1 TABLET TWICE DAILY 180 tablet 3   • Cholecalciferol (VITAMIN D) 2000 UNITS  tablet Take 1 tablet by mouth daily.     • Cyanocobalamin (VITAMIN B12 PO) Take 1 tablet by mouth daily. As directed     • epoetin adele (EPOGEN,PROCRIT) 23297 UNIT/ML injection as needed. Procrit 43676 UNIT/ML Injection Solution; Patient Sig: Procrit 02377 UNIT/ML Injection Solution INJECT SUBCUTANEOUSLY AS DIRECTED.; 0; 01-Apr-2014; Active     • ezetimibe (ZETIA) 10 MG tablet Take 1 tablet by mouth daily.     • FERROUS SULFATE PO daily. Take as directed       • furosemide (LASIX) 40 MG tablet TAKE 1 AND 1/2 TABLETS EVERY MORNING AND 1 TABLET EVERY EVENING 225 tablet 3   • HYDROcodone-acetaminophen (NORCO) 7.5-325 MG per tablet Take 1 tablet by mouth Every 6 (Six) Hours As Needed for Moderate Pain (4-6). 30 tablet 0   • Multiple Vitamin (MULTIVITAMIN) capsule Take 1 capsule by mouth daily.     • ramipril (ALTACE) 5 MG capsule Take 1 capsule by mouth daily.     • traMADol (ULTRAM) 50 MG tablet TAKE 2 TABLETS EVERY MORNING  AND TAKE 2 TABLETS AT BEDTIME 360 tablet 1   • warfarin (COUMADIN) 1 MG tablet TAKE 1 TO 2 TABLETS EVERY DAY AS DIRECTED 180 tablet 3   • WARFARIN SODIUM PO 2 mg daily.     • zolpidem (AMBIEN) 10 MG tablet TAKE 1 TABLET AT BEDTIME 90 tablet 1   • simvastatin (ZOCOR) 40 MG tablet TAKE 1 TABLET EVERY DAY (NEED MD APPOINTMENT) (Patient taking differently: Take 1/2 tablet daily) 90 tablet 1   • [DISCONTINUED] azithromycin (ZITHROMAX Z-EVONNE) 250 MG tablet Take 2 tablets the first day, then 1 tablet daily for 4 days. 6 tablet 0   • [DISCONTINUED] Magnesium 100 MG capsule Take  by mouth Daily.     • [DISCONTINUED] promethazine-codeine (PHENERGAN with CODEINE) 6.25-10 MG/5ML syrup Take 5 mL by mouth Every 4 (Four) Hours As Needed for Cough. 118 mL 0     No current facility-administered medications on file prior to visit.          Social History     Social History   • Marital status:      Spouse name: Russ   • Number of children: N/A   • Years of education: N/A     Occupational History   • [employer] Retired     Social History Main Topics   • Smoking status: Former Smoker     Types: Cigarettes   • Smokeless tobacco: Not on file   • Alcohol use Yes      Comment: rare   • Drug use: No   • Sexual activity: Not on file     Other Topics Concern   • Not on file     Social History Narrative       Family History   Problem Relation Age of Onset   • Cancer Mother    • Breast cancer Mother    • Heart disease Mother    • Hypertension Mother    • Coronary artery disease Father    • Stroke Father    • Heart disease Father    • Hypertension Father    • Other Daughter      thiamin deficiency    • Hypertension Son          Review of Systems   Constitution: Negative for decreased appetite, diaphoresis, fever, weakness, malaise/fatigue, weight gain and weight loss.   HENT: Positive for hearing loss. Negative for congestion, headaches, nosebleeds and tinnitus.    Eyes: Negative for blurred vision, double vision, vision loss in left eye, vision loss in right eye and visual disturbance.   Cardiovascular: Negative for orthopnea.        As noted in HPI   Respiratory: Positive for shortness of breath.         As noted HPI   Endocrine: Negative for cold intolerance and heat intolerance.   Hematologic/Lymphatic: Negative for bleeding problem. Does not bruise/bleed easily.   Skin: Negative for color change, flushing, itching and rash.   Musculoskeletal: Negative for arthritis, back pain, joint pain, joint swelling, muscle weakness and myalgias.   Gastrointestinal: Positive for diarrhea. Negative for bloating, abdominal pain, constipation, dysphagia, heartburn, hematemesis, hematochezia, melena, nausea and vomiting.   Genitourinary: Negative for bladder incontinence, dysuria, frequency, nocturia and urgency.   Neurological: Negative for dizziness, focal weakness, light-headedness, loss of balance, numbness, paresthesias and vertigo.   Psychiatric/Behavioral: Negative for depression, memory loss and substance abuse.  "      Procedures      ECG 12 Lead  Date/Time: 6/9/2017 11:30 AM  Performed by: RISHI LAWRENCE  Authorized by: RISHI LAWRENCE   Comparison: compared with previous ECG   Similar to previous ECG  Rhythm: atrial fibrillation  Rhythm comments: Ventricular paced                 Objective:    /78 (BP Location: Left arm)  Pulse 63  Ht 65\" (165.1 cm)  Wt 167 lb (75.8 kg)  BMI 27.79 kg/m2       Physical Exam  Physical Exam   Constitutional: She is oriented to person, place, and time. She appears well-developed and well-nourished. No distress.   HENT:   Head: Normocephalic.   Eyes: Conjunctivae are normal. Pupils are equal, round, and reactive to light. No scleral icterus.   Neck: Normal carotid pulses, no hepatojugular reflux and no JVD present. Carotid bruit is not present. No tracheal deviation, no edema and no erythema present. No thyromegaly present.   Cardiovascular: Normal rate, regular rhythm, S1 normal, S2 normal and intact distal pulses.   No extrasystoles are present. PMI is not displaced.  Exam reveals no distant heart sounds and no friction rub.    Murmur heard.   Systolic murmur is present with a grade of 2/6  at the lower left sternal border  Pulses:       Carotid pulses are 2+ on the right side, and 2+ on the left side.       Radial pulses are 2+ on the right side, and 2+ on the left side.        Femoral pulses are 2+ on the right side, and 2+ on the left side.       Dorsalis pedis pulses are 2+ on the right side, and 2+ on the left side.        Posterior tibial pulses are 2+ on the right side, and 2+ on the left side.   Pulmonary/Chest: Effort normal and breath sounds normal. No respiratory distress. She has no decreased breath sounds. She has no wheezes. She has no rhonchi. She has no rales. She exhibits no tenderness.   Abdominal: Soft. Bowel sounds are normal. She exhibits no distension and no mass. There is no hepatosplenomegaly. There is no tenderness. There is no rebound and no guarding. "   Musculoskeletal: She exhibits edema (1 + bilateral pretibial with chronic discoloration.). She exhibits no tenderness or deformity.   Neurological: She is alert and oriented to person, place, and time.   Skin: Skin is warm and dry. No rash noted. She is not diaphoretic. No cyanosis or erythema. No pallor. Nails show no clubbing.   Psychiatric: She has a normal mood and affect. Her speech is normal and behavior is normal. Judgment and thought content normal.           Assessment:   1. This is a 76-year-old female with history of severe ischemic heart disease, previous  angioplasty of the right coronary artery, and then inferior infarct in February 2007.  Ultimately, she had single-vessel bypass grafting to the posterior descending artery at  the time of her mitral valve replacement, moderate left ventricular dysfunction.  Echocardiogram in 11/15 showed left ventricular ejection fraction of 36%.   Coronary Artery Disease  Assessment  • The patient has no angina    Plan  • Lifestyle modifications discussed include adhering to a heart healthy diet, avoidance of tobacco products, maintenance of a healthy weight, medication compliance, regular exercise and regular monitoring of cholesterol and blood pressure    Subjective - Objective  • There is a history of past MI  • There is a history of previous coronary artery bypass graft  • There has been a previous POBA  • Current antiplatelet therapy includes aspirin 81 mg      Heart Failure  Assessment  • NYHA class II - There is slight limitation of physical activity. The patient is comfortable at rest, but physical activity results in fatigue, palpitations or shortness of breath.  • ACE inhibitor prescribed  • Beta blocker prescribed  • Diuretics prescribed  • Left ventricular function is moderately reduced by qualitative assessment    Plan  • The patient has received heart failure education on the following topics: dietary sodium restriction, minimizing or avoiding NSAID  use, symptom management, physical activity and weight monitoring  • The heart failure care plan was discussed with the patient today including: continuing the current program  •  The patient has been counseled about ICD or CRT-D implantation    Subjective/Objective  • The patient reports dyspnea    • Physical exam findings negative for rales and elevated JVP.  • The patient has an ICD implant  • The patient has a CRT-D implant  • The patient has a CRT implant       Now doing well. She is to return for a follow up in six months.      2. History of mitral insufficiency status post mitral valve replacement with a tissue valve as above. Echocardiogram today showed left ventricular ejection fraction of approximate 40% with segmental wall motion abnormality. Normal functioning prosthetic mitral valve moderate tricuspid insufficiency with moderate pulmonary hypertension. She's to continue the same will see us in follow-up in 6 months.  3. Atrial fibrillation/sick sinus syndrome. Failed multiple cardioversions, multiple medications. Failed pulmonary vein isolation. Now status post AV node ablation and BiV  Defibrillator.  Atrial Fibrillation and Atrial Flutter  Assessment  • The patient has permanent atrial fibrillation  • This is valvular in etiology  • The patient's CHADS2-VASc score is 6  • A [id number]-JCAh score of 2 or more is considered a high risk for a thromboembolic event  • Warfarin prescribed    Plan  • Continue in atrial fibrillation with rate control  • Continue warfarin for antithrombotic therapy, bleeding issues discussed  • Continue beta blocker for rate control    Subjective - Objective  • The patient underwent cardioversion   • The patient had atrial fibrillation ablation   • The patient had a recurrence of atrial fibrillation since ablation           4. Cerebral vascular disease status post carotid endarterectomy.  Follow up carotid ultrasound today showed mild disease on the right moderate on the left  continue follow periodic carotid ultrasounds.   5. Hypertension. Blood pressure adequately controlled.  6. Renal insufficiency. Followed by nephrology, Dr. Cruz.  7. Hyperlipidemia, on therapy. Followed in your office.  8. Nonsustained ventricular tachycardia. treated with antitachycardia pacing. Asymptomatic.   Will continue the same if she has increased episodes will need to treat.  9. Pulmonary hypertension. This is most likely secondary to sleep apnea and atrial fibrillation.  Echocardiogram today unchanged RV systolic pressure of 55 mm or mercury.  10. Sleep Apnea. Now on CPAP.    11. Compression fracture of her spine and scoliosis. And seems to be limiting her fair amount. But tolerating it.          Plan:

## 2017-06-13 ENCOUNTER — CLINICAL SUPPORT (OUTPATIENT)
Dept: ONCOLOGY | Facility: HOSPITAL | Age: 77
End: 2017-06-13

## 2017-06-13 ENCOUNTER — LAB (OUTPATIENT)
Dept: LAB | Facility: HOSPITAL | Age: 77
End: 2017-06-13

## 2017-06-13 DIAGNOSIS — D69.6 THROMBOCYTOPENIA (HCC): ICD-10-CM

## 2017-06-13 DIAGNOSIS — N18.30 ANEMIA IN STAGE 3 CHRONIC KIDNEY DISEASE (HCC): ICD-10-CM

## 2017-06-13 DIAGNOSIS — D63.1 ANEMIA IN STAGE 3 CHRONIC KIDNEY DISEASE (HCC): ICD-10-CM

## 2017-06-13 DIAGNOSIS — E53.8 B12 DEFICIENCY: ICD-10-CM

## 2017-06-13 LAB
BASOPHILS # BLD AUTO: 0.02 10*3/MM3 (ref 0–0.1)
BASOPHILS NFR BLD AUTO: 0.4 % (ref 0–1.1)
DEPRECATED RDW RBC AUTO: 48.9 FL (ref 37–49)
EOSINOPHIL # BLD AUTO: 0.22 10*3/MM3 (ref 0–0.36)
EOSINOPHIL NFR BLD AUTO: 4.8 % (ref 1–5)
ERYTHROCYTE [DISTWIDTH] IN BLOOD BY AUTOMATED COUNT: 14 % (ref 11.7–14.5)
FERRITIN SERPL-MCNC: 246.9 NG/ML
HCT VFR BLD AUTO: 34.8 % (ref 34–45)
HGB BLD-MCNC: 11.1 G/DL (ref 11.5–14.9)
IMM GRANULOCYTES # BLD: 0.01 10*3/MM3 (ref 0–0.03)
IMM GRANULOCYTES NFR BLD: 0.2 % (ref 0–0.5)
IRON 24H UR-MRATE: 88 MCG/DL (ref 37–145)
IRON SATN MFR SERPL: 27 % (ref 14–48)
LYMPHOCYTES # BLD AUTO: 1.05 10*3/MM3 (ref 1–3.5)
LYMPHOCYTES NFR BLD AUTO: 23 % (ref 20–49)
MCH RBC QN AUTO: 30.2 PG (ref 27–33)
MCHC RBC AUTO-ENTMCNC: 31.9 G/DL (ref 32–35)
MCV RBC AUTO: 94.8 FL (ref 83–97)
MONOCYTES # BLD AUTO: 0.3 10*3/MM3 (ref 0.25–0.8)
MONOCYTES NFR BLD AUTO: 6.6 % (ref 4–12)
NEUTROPHILS # BLD AUTO: 2.97 10*3/MM3 (ref 1.5–7)
NEUTROPHILS NFR BLD AUTO: 65 % (ref 39–75)
NRBC BLD MANUAL-RTO: 0 /100 WBC (ref 0–0)
PLATELET # BLD AUTO: 119 10*3/MM3 (ref 150–375)
PMV BLD AUTO: 9.2 FL (ref 8.9–12.1)
RBC # BLD AUTO: 3.67 10*6/MM3 (ref 3.9–5)
TIBC SERPL-MCNC: 328 MCG/DL (ref 249–505)
TRANSFERRIN SERPL-MCNC: 234 MG/DL (ref 200–360)
WBC NRBC COR # BLD: 4.57 10*3/MM3 (ref 4–10)

## 2017-06-13 PROCEDURE — 84466 ASSAY OF TRANSFERRIN: CPT

## 2017-06-13 PROCEDURE — 85025 COMPLETE CBC W/AUTO DIFF WBC: CPT

## 2017-06-13 PROCEDURE — 36415 COLL VENOUS BLD VENIPUNCTURE: CPT

## 2017-06-13 PROCEDURE — 82728 ASSAY OF FERRITIN: CPT

## 2017-06-13 PROCEDURE — 83540 ASSAY OF IRON: CPT

## 2017-06-13 NOTE — RESEARCH
[facility]  [address] [place] [address]  [phone]  Patient Information:  Janel Mahoney  : 1940    Research Note:  PSR Study    HPI:  NORMA  [id number]  [id number]    NORMA was in the office today,   for her regularly scheduled device check. KIRT Billy of the pacemaker department completed her device interrogation. There were no lead events or new health issues to report..  An electrical worksheet was completed for the PSR registry and entered into the database.   We will see NORMA at her next scheduled device check.  In the meantime, she will call us with any questions or concerns.          Lizy Díaz RN  Research RN Coordinator

## 2017-06-13 NOTE — PROGRESS NOTES
CBC reviewed with patient.  Hgb 11.1.  Patient has no c/o and no questions or concerns.  Will see MD in Sept.  V/u to call if she needs anything prior to next visit.

## 2017-06-15 LAB — METHYLMALONATE SERPL-SCNC: 431 NMOL/L (ref 0–378)

## 2017-06-27 ENCOUNTER — HOSPITAL ENCOUNTER (OUTPATIENT)
Dept: CARDIOLOGY | Facility: HOSPITAL | Age: 77
Setting detail: RECURRING SERIES
Discharge: HOME | End: 2017-06-27

## 2017-06-27 PROCEDURE — 36416 COLLJ CAPILLARY BLOOD SPEC: CPT

## 2017-06-27 PROCEDURE — 85610 PROTHROMBIN TIME: CPT

## 2017-07-05 ENCOUNTER — HOSPITAL ENCOUNTER (OUTPATIENT)
Dept: CARDIOLOGY | Facility: HOSPITAL | Age: 77
Setting detail: RECURRING SERIES
Discharge: HOME | End: 2017-07-05

## 2017-07-05 PROCEDURE — 85610 PROTHROMBIN TIME: CPT

## 2017-07-05 PROCEDURE — 36416 COLLJ CAPILLARY BLOOD SPEC: CPT

## 2017-07-19 ENCOUNTER — TELEPHONE (OUTPATIENT)
Dept: FAMILY MEDICINE CLINIC | Facility: CLINIC | Age: 77
End: 2017-07-19

## 2017-07-19 ENCOUNTER — HOSPITAL ENCOUNTER (OUTPATIENT)
Dept: CARDIOLOGY | Facility: HOSPITAL | Age: 77
Setting detail: RECURRING SERIES
Discharge: HOME | End: 2017-07-19

## 2017-07-19 PROCEDURE — 36416 COLLJ CAPILLARY BLOOD SPEC: CPT

## 2017-07-19 PROCEDURE — 85610 PROTHROMBIN TIME: CPT

## 2017-07-19 RX ORDER — HYDROCODONE BITARTRATE AND ACETAMINOPHEN 7.5; 325 MG/1; MG/1
1 TABLET ORAL EVERY 6 HOURS PRN
Qty: 30 TABLET | Refills: 0 | Status: SHIPPED | OUTPATIENT
Start: 2017-07-19 | End: 2017-09-26 | Stop reason: SDUPTHER

## 2017-08-02 ENCOUNTER — HOSPITAL ENCOUNTER (OUTPATIENT)
Dept: CARDIOLOGY | Facility: HOSPITAL | Age: 77
Setting detail: RECURRING SERIES
Discharge: HOME | End: 2017-08-02

## 2017-08-02 PROCEDURE — 85610 PROTHROMBIN TIME: CPT

## 2017-08-02 PROCEDURE — 36416 COLLJ CAPILLARY BLOOD SPEC: CPT

## 2017-08-23 RX ORDER — CALCITRIOL 0.25 UG/1
CAPSULE, LIQUID FILLED ORAL
Qty: 45 CAPSULE | Refills: 1 | Status: SHIPPED | OUTPATIENT
Start: 2017-08-23 | End: 2018-01-02 | Stop reason: SDUPTHER

## 2017-08-30 ENCOUNTER — HOSPITAL ENCOUNTER (OUTPATIENT)
Dept: CARDIOLOGY | Facility: HOSPITAL | Age: 77
Setting detail: RECURRING SERIES
Discharge: HOME | End: 2017-08-30

## 2017-08-30 PROCEDURE — 85610 PROTHROMBIN TIME: CPT

## 2017-08-30 PROCEDURE — 36416 COLLJ CAPILLARY BLOOD SPEC: CPT

## 2017-09-05 ENCOUNTER — APPOINTMENT (OUTPATIENT)
Dept: LAB | Facility: HOSPITAL | Age: 77
End: 2017-09-05

## 2017-09-05 ENCOUNTER — APPOINTMENT (OUTPATIENT)
Dept: ONCOLOGY | Facility: CLINIC | Age: 77
End: 2017-09-05

## 2017-09-06 ENCOUNTER — OFFICE VISIT (OUTPATIENT)
Dept: FAMILY MEDICINE CLINIC | Facility: CLINIC | Age: 77
End: 2017-09-06

## 2017-09-06 VITALS
SYSTOLIC BLOOD PRESSURE: 120 MMHG | TEMPERATURE: 98.8 F | HEART RATE: 64 BPM | RESPIRATION RATE: 15 BRPM | WEIGHT: 161 LBS | BODY MASS INDEX: 26.82 KG/M2 | DIASTOLIC BLOOD PRESSURE: 70 MMHG | HEIGHT: 65 IN | OXYGEN SATURATION: 99 %

## 2017-09-06 DIAGNOSIS — M81.0 OSTEOPOROSIS: ICD-10-CM

## 2017-09-06 DIAGNOSIS — K22.2 ESOPHAGEAL STRICTURE: ICD-10-CM

## 2017-09-06 DIAGNOSIS — E55.9 VITAMIN D DEFICIENCY: ICD-10-CM

## 2017-09-06 DIAGNOSIS — R22.1 NODULE OF NECK: ICD-10-CM

## 2017-09-06 DIAGNOSIS — I48.20 CHRONIC ATRIAL FIBRILLATION (HCC): ICD-10-CM

## 2017-09-06 DIAGNOSIS — I25.810 CORONARY ARTERY DISEASE INVOLVING CORONARY BYPASS GRAFT OF NATIVE HEART WITHOUT ANGINA PECTORIS: ICD-10-CM

## 2017-09-06 DIAGNOSIS — M25.519 CHRONIC SHOULDER PAIN, UNSPECIFIED LATERALITY: ICD-10-CM

## 2017-09-06 DIAGNOSIS — R22.31 NODULE OF FINGER OF RIGHT HAND: ICD-10-CM

## 2017-09-06 DIAGNOSIS — G89.29 CHRONIC SHOULDER PAIN, UNSPECIFIED LATERALITY: ICD-10-CM

## 2017-09-06 DIAGNOSIS — Z00.00 MEDICARE ANNUAL WELLNESS VISIT, INITIAL: Primary | ICD-10-CM

## 2017-09-06 PROCEDURE — G0438 PPPS, INITIAL VISIT: HCPCS | Performed by: INTERNAL MEDICINE

## 2017-09-06 PROCEDURE — 99214 OFFICE O/P EST MOD 30 MIN: CPT | Performed by: INTERNAL MEDICINE

## 2017-09-06 PROCEDURE — 73030 X-RAY EXAM OF SHOULDER: CPT | Performed by: INTERNAL MEDICINE

## 2017-09-06 NOTE — PROGRESS NOTES
Subjective   Janel Mahoney is a 76 y.o. female.     History of Present Illness   Patient was here for Medicare wellness exam.  He also had a nodule in her neck and on her right leg ultrasounds were obtained she will follow-up after ultrasound.  He also has esophageal strictures and will contact her GI doctor for possible dilation.  Coronary artery disease been stable last several months.  Regular follow-up with her cardiologist.  Her Coumadin levels are monitored by her cardiologist and her atrial fibrillation is also been well-controlled with medication.  Over the last several days she is been stretching her left shoulder and note is moderate to severe pain radiating down her arm.  The pain is controlled with medication and x-ray indicated severe degenerative disease in the joint.  Patient at this time does not want see a orthopedic surgeon.  She also does not want to go to physical therapy.    X-Ray  Interpretation report in house X-rays that I personally viewed    Relevant Clinical Issues/Diagnoses/Indications:  Shoulder pain shoulder x-ray        Clinical Findings:  Severe degenerative changes          Comparative Data:  No previous x-ray          Date of Previous X-ray:  Change on current X-ray    Much of this encounter note is an electronic transcription/translation of spoken language to printed text.  The electronic translation of spoken language may permit erroneous, or at times, nonsensical words or phrases to be inadvertently transcribed.  Although I have reviewed the note for such errors, some may still exist.    The following portions of the patient's history were reviewed and updated as appropriate: allergies, current medications, past family history, past medical history, past social history, past surgical history and problem list.    Review of Systems   Constitutional: Negative for fatigue and fever.   HENT: Positive for congestion. Negative for trouble swallowing.    Eyes: Negative for discharge  and visual disturbance.   Respiratory: Negative for choking and shortness of breath.    Cardiovascular: Negative for chest pain and palpitations.   Gastrointestinal: Negative for abdominal pain and blood in stool.   Endocrine: Negative.    Genitourinary: Negative for genital sores and hematuria.   Musculoskeletal: Negative for gait problem and joint swelling.        Left shoulder pain   Skin: Negative for color change, pallor, rash and wound.   Allergic/Immunologic: Positive for environmental allergies. Negative for immunocompromised state.   Neurological: Negative for facial asymmetry and speech difficulty.   Psychiatric/Behavioral: Negative for hallucinations and suicidal ideas.       Objective   Physical Exam   Constitutional: She is oriented to person, place, and time. She appears well-developed and well-nourished.   HENT:   Head: Normocephalic.   Eyes: Conjunctivae are normal. Pupils are equal, round, and reactive to light.   Neck: Normal range of motion. Neck supple.   Cardiovascular: Normal rate and normal heart sounds.  Exam reveals no gallop and no friction rub.    No murmur heard.  Pulmonary/Chest: Effort normal and breath sounds normal. No respiratory distress. She has no wheezes. She has no rales. She exhibits no tenderness.   Abdominal: Soft. Bowel sounds are normal.   Musculoskeletal: She exhibits edema, tenderness and deformity.   Severe pain flexion extension of left shoulder   Neurological: She is alert and oriented to person, place, and time.   Skin: Skin is warm and dry.   Psychiatric: She has a normal mood and affect. Her behavior is normal. Judgment and thought content normal.   Nursing note and vitals reviewed.      Assessment/Plan   Problems Addressed this Visit        Cardiovascular and Mediastinum    Coronary artery disease involving coronary bypass graft of native heart without angina pectoris    Relevant Orders    CBC & Differential    Comprehensive Metabolic Panel    Lipid Panel     Vitamin D 25 Hydroxy    Chronic atrial fibrillation    Relevant Orders    CBC & Differential    Comprehensive Metabolic Panel    Lipid Panel    Vitamin D 25 Hydroxy       Digestive    Esophageal stricture    Relevant Orders    CBC & Differential    Comprehensive Metabolic Panel    Lipid Panel    Vitamin D 25 Hydroxy       Musculoskeletal and Integument    Osteoporosis    Relevant Orders    DEXA Bone Density Axial      Other Visit Diagnoses     Medicare annual wellness visit, initial    -  Primary    Nodule of neck        Relevant Orders    US Head Neck Soft Tissue    CBC & Differential    Comprehensive Metabolic Panel    Lipid Panel    Vitamin D 25 Hydroxy    Chronic shoulder pain, unspecified laterality        Relevant Orders    XR Shoulder 2+ View Left (Completed)    CBC & Differential    Comprehensive Metabolic Panel    Lipid Panel    Vitamin D 25 Hydroxy    Vitamin D deficiency         Relevant Orders    Vitamin D 25 Hydroxy    Nodule of finger of right hand        Relevant Orders    US nonvascular extremity limited

## 2017-09-06 NOTE — PATIENT INSTRUCTIONS
Medicare Wellness  Personal Prevention Plan of Service     Date of Office Visit:  2017  Encounter Provider:  Ariel Matute MD  Place of Service:  [facility]  Patient Name: Janel Mahoney  :  1940    As part of the Medicare Wellness portion of your visit today, we are providing you with this personalized preventive plan of services (PPPS). This plan is based upon recommendations of the [place] Preventive Services Task Force (USPSTF) and the Advisory Committee on Immunization Practices (ACIP).    This lists the preventive care services that should be considered, and provides dates of when you are due. Items listed as completed are up-to-date and do not require any further intervention.    Health Maintenance   Topic Date Due   • MEDICARE ANNUAL WELLNESS  2016   • INFLUENZA VACCINE  2017   • PNEUMOCOCCAL VACCINES (65+ LOW/MEDIUM RISK) (2 of 2 - PPSV23) 2018   • LIPID PANEL  2018   • DXA SCAN  2019   • MAMMOGRAM  2019   • COLONOSCOPY  2027   • TDAP/TD VACCINES (2 - Td) 2027   • ZOSTER VACCINE  Completed       Orders Placed This Encounter   Procedures   • US Head Neck Soft Tissue     Standing Status:   Future     Standing Expiration Date:   2018     Order Specific Question:   Reason for Exam:     Answer:   neck nodule   • XR Shoulder 2+ View Left     Order Specific Question:   Reason for Exam:     Answer:   shoulder pain   • DEXA Bone Density Axial     Standing Status:   Future     Standing Expiration Date:   2018     Order Specific Question:   Reason for Exam:     Answer:   osteoporsis   • Comprehensive Metabolic Panel     Standing Status:   Future   • Lipid Panel     Standing Status:   Future   • Vitamin D 25 Hydroxy     Standing Status:   Future   • CBC & Differential     Standing Status:   Future     Order Specific Question:   Manual Differential     Answer:   No       No Follow-up on  file.

## 2017-09-06 NOTE — PROGRESS NOTES
QUICK REFERENCE INFORMATION:  The ABCs of the Annual Wellness Visit    Initial Medicare Wellness Visit    HEALTH RISK ASSESSMENT    1940    Recent Hospitalizations:  Recently treated at the following:  [facility].        Current Medical Providers:  Patient Care Team:  Ariel Matute MD as PCP - General (Internal Medicine)  Ariel Matute MD as PCP - Claims Attributed  Edward Vasquez MD as Consulting Physician (Hematology and Oncology)  Milagros Cruz MD as Referring Physician (Nephrology)        Smoking Status:  History   Smoking Status   • Former Smoker   • Types: Cigarettes   Smokeless Tobacco   • Not on file       Alcohol Consumption:  History   Alcohol Use   • Yes     Comment: rare       Depression Screen:   PHQ-2/PHQ-9 Depression Screening 9/6/2017   Little interest or pleasure in doing things 0   Feeling down, depressed, or hopeless 0   Trouble falling or staying asleep, or sleeping too much 0   Feeling tired or having little energy 0   Poor appetite or overeating 0   Feeling bad about yourself - or that you are a failure or have let yourself or your family down 0   Trouble concentrating on things, such as reading the newspaper or watching television 0   Moving or speaking so slowly that other people could have noticed. Or the opposite - being so fidgety or restless that you have been moving around a lot more than usual 0   Thoughts that you would be better off dead, or of hurting yourself in some way 0   Total Score 0   If you checked off any problems, how difficult have these problems made it for you to do your work, take care of things at home, or get along with other people? Not difficult at all       Health Habits and Functional and Cognitive Screening:  Functional & Cognitive Status 9/6/2017   Do you have difficulty preparing food and eating? No   Do you have difficulty bathing yourself? No   Do you have difficulty getting dressed? No   Do you have difficulty using  the toilet? No   Do you have difficulty moving around from place to place? No   In the past year have you fallen or experienced a near fall? No   Do you need help using the phone?  No   Are you deaf or do you have serious difficulty hearing?  No   Do you need help with transportation? No   Do you need help shopping? No   Do you need help preparing meals?  No   Do you need help with housework?  No   Do you need help with laundry? No   Do you need help taking your medications? No   Do you need help managing money? No   Do you have difficulty concentrating, remembering or making decisions? No       Health Habits  Current Diet: Well Balanced Diet  Dental Exam: Up to date  Eye Exam: Up to date  Exercise (times per week): 0 times per week  Current Exercise Activities Include: None          Does the patient have evidence of cognitive impairment? Yes    Asiprin use counseling: Start ASA 81 mg daily       Recent Lab Results:    Visual Acuity:  No exam data present    Age-appropriate Screening Schedule:  Refer to the list below for future screening recommendations based on patient's age, sex and/or medical conditions. Orders for these recommended tests are listed in the plan section. The patient has been provided with a written plan.    Health Maintenance   Topic Date Due   • INFLUENZA VACCINE  08/01/2017   • PNEUMOCOCCAL VACCINES (65+ LOW/MEDIUM RISK) (2 of 2 - PPSV23) 05/02/2018   • LIPID PANEL  05/02/2018   • DXA SCAN  01/05/2019   • MAMMOGRAM  05/09/2019   • COLONOSCOPY  05/02/2027   • TDAP/TD VACCINES (2 - Td) 05/02/2027   • ZOSTER VACCINE  Completed        Subjective   History of Present Illness    Janel Mahoney is a 76 y.o. female who presents for an Annual Wellness Visit.    The following portions of the patient's history were reviewed and updated as appropriate: allergies, current medications, past family history, past medical history, past social history, past surgical history and problem list.    Outpatient  Medications Prior to Visit   Medication Sig Dispense Refill   • ACETAMINOPHEN PO Senior choice     • aspirin 81 MG tablet Take 1 tablet by mouth daily.     • calcitonin, salmon, (MIACALCIN) 200 UNIT/ACT nasal spray 1 spray into each nostril daily. 3 each 1   • calcitriol (ROCALTROL) 0.25 MCG capsule TAKE 1 CAPSULE EVERY OTHER DAY 45 capsule 1   • carvedilol (COREG) 25 MG tablet TAKE 1 TABLET TWICE DAILY 180 tablet 3   • Cholecalciferol (VITAMIN D) 2000 UNITS tablet Take 1 tablet by mouth daily.     • Cyanocobalamin (VITAMIN B12 PO) Take 1 tablet by mouth daily. As directed     • epoetin adele (EPOGEN,PROCRIT) 77796 UNIT/ML injection as needed. Procrit 30582 UNIT/ML Injection Solution; Patient Sig: Procrit 80958 UNIT/ML Injection Solution INJECT SUBCUTANEOUSLY AS DIRECTED.; 0; 01-Apr-2014; Active     • ezetimibe (ZETIA) 10 MG tablet Take 1 tablet by mouth daily.     • FERROUS SULFATE PO daily. Take as directed       • furosemide (LASIX) 40 MG tablet TAKE 1 AND 1/2 TABLETS EVERY MORNING AND 1 TABLET EVERY EVENING 225 tablet 3   • HYDROcodone-acetaminophen (NORCO) 7.5-325 MG per tablet Take 1 tablet by mouth Every 6 (Six) Hours As Needed for Moderate Pain . Chronic pain medicine for low back pain 30 tablet 0   • Multiple Vitamin (MULTIVITAMIN) capsule Take 1 capsule by mouth daily.     • ramipril (ALTACE) 5 MG capsule Take 1 capsule by mouth daily.     • simvastatin (ZOCOR) 40 MG tablet TAKE 1 TABLET EVERY DAY (NEED MD APPOINTMENT) (Patient taking differently: Take 1/2 tablet daily) 90 tablet 1   • traMADol (ULTRAM) 50 MG tablet TAKE 2 TABLETS EVERY MORNING  AND TAKE 2 TABLETS AT BEDTIME 360 tablet 1   • warfarin (COUMADIN) 1 MG tablet TAKE 1 TO 2 TABLETS EVERY DAY AS DIRECTED 180 tablet 3   • WARFARIN SODIUM PO 2 mg daily.     • zolpidem (AMBIEN) 10 MG tablet TAKE 1 TABLET AT BEDTIME 90 tablet 1     No facility-administered medications prior to visit.        Patient Active Problem List   Diagnosis   • Anemia in stage 3  "chronic kidney disease   • Thrombocytopenia   • B12 deficiency   • Coronary artery disease involving coronary bypass graft of native heart without angina pectoris   • S/P MVR (mitral valve replacement)   • Chronic atrial fibrillation   • Bilateral carotid artery disease   • Intractable low back pain   • Long-term (current) use of anticoagulants   • Low back pain   • Osteoporosis   • Murmur, heart   • GEORGINA on CPAP - Dr Cardona   • Hypersomnia due to medical condition - GEORGINA   • Esophageal stricture       Advance Care Planning:  has an advance directive - a copy HAS NOT been provided. Have asked the patient to send this to us to add to record.    Identification of Risk Factors:  Risk factors include: NA.    Review of Systems    Compared to one year ago, the patient feels her physical health is worse.  Compared to one year ago, the patient feels her mental health is the same.    Objective     Physical Exam    Vitals:    09/06/17 1304   BP: 120/70   BP Location: Left arm   Patient Position: Sitting   Pulse: 64   Resp: 15   Temp: 98.8 °F (37.1 °C)   TempSrc: Oral   SpO2: 99%   Weight: 161 lb (73 kg)   Height: 65\" (165.1 cm)   PainSc: 10-Worst pain ever   PainLoc: Foot       Body mass index is 26.79 kg/(m^2).  Discussed the patient's BMI with her. The BMI is in the acceptable range.    Assessment/Plan   Patient Self-Management and Personalized Health Advice  The patient has been provided with information about: NA and preventive services including:   · NA.    Visit Diagnoses:    ICD-10-CM ICD-9-CM   1. Coronary artery disease involving coronary bypass graft of native heart without angina pectoris I25.810 414.05   2. Chronic atrial fibrillation I48.2 427.31   3. Esophageal stricture K22.2 530.3   4. Nodule of neck R22.1 784.2   5. Chronic shoulder pain, unspecified laterality M25.519 719.41    G89.29 338.29   6. Vitamin D deficiency  E55.9 268.9   7. Osteoporosis M81.0 733.00       Orders Placed This Encounter   Procedures "   • US Head Neck Soft Tissue     Standing Status:   Future     Standing Expiration Date:   9/6/2018     Order Specific Question:   Reason for Exam:     Answer:   neck nodule   • XR Shoulder 2+ View Left     Order Specific Question:   Reason for Exam:     Answer:   shoulder pain   • DEXA Bone Density Axial     Standing Status:   Future     Standing Expiration Date:   9/6/2018     Order Specific Question:   Reason for Exam:     Answer:   osteoporsis   • Comprehensive Metabolic Panel     Standing Status:   Future   • Lipid Panel     Standing Status:   Future   • Vitamin D 25 Hydroxy     Standing Status:   Future   • CBC & Differential     Standing Status:   Future     Order Specific Question:   Manual Differential     Answer:   No       Outpatient Encounter Prescriptions as of 9/6/2017   Medication Sig Dispense Refill   • ACETAMINOPHEN PO Senior choice     • aspirin 81 MG tablet Take 1 tablet by mouth daily.     • calcitonin, salmon, (MIACALCIN) 200 UNIT/ACT nasal spray 1 spray into each nostril daily. 3 each 1   • calcitriol (ROCALTROL) 0.25 MCG capsule TAKE 1 CAPSULE EVERY OTHER DAY 45 capsule 1   • carvedilol (COREG) 25 MG tablet TAKE 1 TABLET TWICE DAILY 180 tablet 3   • Cholecalciferol (VITAMIN D) 2000 UNITS tablet Take 1 tablet by mouth daily.     • Cyanocobalamin (VITAMIN B12 PO) Take 1 tablet by mouth daily. As directed     • epoetin adele (EPOGEN,PROCRIT) 45640 UNIT/ML injection as needed. Procrit 82378 UNIT/ML Injection Solution; Patient Sig: Procrit 18207 UNIT/ML Injection Solution INJECT SUBCUTANEOUSLY AS DIRECTED.; 0; 01-Apr-2014; Active     • ezetimibe (ZETIA) 10 MG tablet Take 1 tablet by mouth daily.     • FERROUS SULFATE PO daily. Take as directed       • furosemide (LASIX) 40 MG tablet TAKE 1 AND 1/2 TABLETS EVERY MORNING AND 1 TABLET EVERY EVENING 225 tablet 3   • HYDROcodone-acetaminophen (NORCO) 7.5-325 MG per tablet Take 1 tablet by mouth Every 6 (Six) Hours As Needed for Moderate Pain . Chronic  pain medicine for low back pain 30 tablet 0   • Multiple Vitamin (MULTIVITAMIN) capsule Take 1 capsule by mouth daily.     • ramipril (ALTACE) 5 MG capsule Take 1 capsule by mouth daily.     • simvastatin (ZOCOR) 40 MG tablet TAKE 1 TABLET EVERY DAY (NEED MD APPOINTMENT) (Patient taking differently: Take 1/2 tablet daily) 90 tablet 1   • traMADol (ULTRAM) 50 MG tablet TAKE 2 TABLETS EVERY MORNING  AND TAKE 2 TABLETS AT BEDTIME 360 tablet 1   • warfarin (COUMADIN) 1 MG tablet TAKE 1 TO 2 TABLETS EVERY DAY AS DIRECTED 180 tablet 3   • WARFARIN SODIUM PO 2 mg daily.     • zolpidem (AMBIEN) 10 MG tablet TAKE 1 TABLET AT BEDTIME 90 tablet 1     No facility-administered encounter medications on file as of 9/6/2017.        Reviewed use of high risk medication in the elderly: not applicable  Reviewed for potential of harmful drug interactions in the elderly: not applicable    Follow Up:  No Follow-up on file.     An After Visit Summary and PPPS with all of these plans were given to the patient.

## 2017-09-07 ENCOUNTER — HOSPITAL ENCOUNTER (OUTPATIENT)
Dept: SLEEP MEDICINE | Facility: HOSPITAL | Age: 77
Discharge: HOME | End: 2017-09-07
Admitting: INTERNAL MEDICINE

## 2017-09-07 PROCEDURE — G0463 HOSPITAL OUTPT CLINIC VISIT: HCPCS

## 2017-09-07 PROCEDURE — 99213 OFFICE O/P EST LOW 20 MIN: CPT | Performed by: INTERNAL MEDICINE

## 2017-09-09 NOTE — PROGRESS NOTES
[facility] Note       Patient Care Team:  Ariel Matute MD as PCP - General (Internal Medicine)  Ariel Matute MD as PCP - Claims Attributed  Edward Vasquez MD as Consulting Physician (Hematology and Oncology)  Milagros Cruz MD as Referring Physician (Nephrology)  Anthony Cardona MD as Consulting Physician (Sleep Medicine)    Chief Complaint:  GEORGINA     Interval History:   The patient was last seen by me in September 2016.  She has had several issues.  Recently she lost teeth.  She also been having trouble with her feet and her foot Dr. recently put her on antibiotics which have helped her quite a bit.    She goes to bed around 2 AM and awakens between 8 and 9 AM.  She stays in bed during that entire time.  [place] Sleepiness Scale is normal at 5.    Review of Systems:  Recorded on the Sleep Questionnaire.  Unremarkable except for that listed above.    Social History:  She does not smoke cigarettes.  No alcohol.  2 sodas a day.    Allergies:  Reviewed.     Medication Review:  Her list was reviewed.  She takes Ambien 10 mg nightly.    Vital Signs:  Height 63 inches and weight 169 and she is obese with a body mass index of 30.    Physical Exam:    Constitutional:  Well developed white female and appears in no apparent distress.  She is using a walker presently.  Awake & oriented times 3.  Normal mood with normal recent and remote memory and normal judgement.  Eyes:  Conjunctivae normal.  Oropharynx:  moist mucous membranes without exudate and a large tongue and class III-IV MP airway and posterior pharyngeal region not well seen.      Results Review:  DME is [facility] and she uses a fullface mask.  Downloads between March 11 and September 6, 2017 compliances 79% and average usage is 4 hours and 6 minutes and average AHI is mildly abnormal at 6.9 without leak and average auto CPAP pressure is 10 and her auto CPAP is 6-16.       Impression:   Obstructive sleep apnea nearly  adequately treated with auto titrating CPAP with borderline except for usage and compliance and no complaints of hypersomnolence.      Plan:  Good sleep hygiene measures should be maintained.  Weight loss would be beneficial in this patient who is obese by BMI.  The patient is benefiting from the treatment being provided.     The patient will continue auto CPAP.  Her auto CPAP pressures will be changed to 7-16.  A new prescription sent to her DME for all needed supplies.    The patient will call for any problems and will follow up in one year.      Anthony Cardona MD  09/09/17  8:16 AM

## 2017-09-13 DIAGNOSIS — M85.80 OSTEOPENIA: Primary | ICD-10-CM

## 2017-09-13 DIAGNOSIS — M85.9 DISORDER OF BONE DENSITY AND STRUCTURE, UNSPECIFIED: ICD-10-CM

## 2017-09-20 ENCOUNTER — HOSPITAL ENCOUNTER (OUTPATIENT)
Dept: ULTRASOUND IMAGING | Facility: HOSPITAL | Age: 77
Discharge: HOME | End: 2017-09-20
Admitting: INTERNAL MEDICINE

## 2017-09-20 ENCOUNTER — HOSPITAL ENCOUNTER (OUTPATIENT)
Dept: BONE DENSITY | Facility: HOSPITAL | Age: 77
Discharge: HOME | End: 2017-09-20

## 2017-09-20 DIAGNOSIS — R22.1 NODULE OF NECK: ICD-10-CM

## 2017-09-20 DIAGNOSIS — M81.0 OSTEOPOROSIS: ICD-10-CM

## 2017-09-20 DIAGNOSIS — M85.9 DISORDER OF BONE DENSITY AND STRUCTURE, UNSPECIFIED: ICD-10-CM

## 2017-09-20 DIAGNOSIS — M85.80 OSTEOPENIA: ICD-10-CM

## 2017-09-20 PROCEDURE — 77080 DXA BONE DENSITY AXIAL: CPT

## 2017-09-20 PROCEDURE — 76536 US EXAM OF HEAD AND NECK: CPT

## 2017-09-26 ENCOUNTER — HOSPITAL ENCOUNTER (INPATIENT)
Facility: HOSPITAL | Age: 77
LOS: 3 days | Discharge: HOME | End: 2017-09-29
Attending: INTERNAL MEDICINE | Admitting: INTERNAL MEDICINE

## 2017-09-26 ENCOUNTER — TELEPHONE (OUTPATIENT)
Dept: CARDIOLOGY | Facility: CLINIC | Age: 77
End: 2017-09-26

## 2017-09-26 ENCOUNTER — LAB (OUTPATIENT)
Dept: LAB | Facility: HOSPITAL | Age: 77
End: 2017-09-26

## 2017-09-26 ENCOUNTER — TELEPHONE (OUTPATIENT)
Dept: FAMILY MEDICINE CLINIC | Facility: CLINIC | Age: 77
End: 2017-09-26

## 2017-09-26 ENCOUNTER — OFFICE VISIT (OUTPATIENT)
Dept: ONCOLOGY | Facility: CLINIC | Age: 77
End: 2017-09-26

## 2017-09-26 VITALS
HEART RATE: 74 BPM | RESPIRATION RATE: 18 BRPM | WEIGHT: 168.8 LBS | OXYGEN SATURATION: 100 % | SYSTOLIC BLOOD PRESSURE: 142 MMHG | BODY MASS INDEX: 28.12 KG/M2 | DIASTOLIC BLOOD PRESSURE: 84 MMHG | HEIGHT: 65 IN | TEMPERATURE: 98 F

## 2017-09-26 DIAGNOSIS — D69.6 THROMBOCYTOPENIA (HCC): ICD-10-CM

## 2017-09-26 DIAGNOSIS — D62 ACUTE BLOOD LOSS ANEMIA: ICD-10-CM

## 2017-09-26 DIAGNOSIS — N18.30 ANEMIA IN STAGE 3 CHRONIC KIDNEY DISEASE (HCC): ICD-10-CM

## 2017-09-26 DIAGNOSIS — R13.10 DYSPHAGIA, UNSPECIFIED TYPE: Primary | ICD-10-CM

## 2017-09-26 DIAGNOSIS — D62 ACUTE BLOOD LOSS ANEMIA: Primary | ICD-10-CM

## 2017-09-26 DIAGNOSIS — I65.29 STENOSIS OF CAROTID ARTERY, UNSPECIFIED LATERALITY: Primary | ICD-10-CM

## 2017-09-26 DIAGNOSIS — D63.1 ANEMIA IN STAGE 3 CHRONIC KIDNEY DISEASE (HCC): ICD-10-CM

## 2017-09-26 LAB
ABO GROUP BLD: NORMAL
ABO GROUP BLD: NORMAL
ALBUMIN SERPL-MCNC: 3.4 G/DL (ref 3.5–5.2)
ALBUMIN/GLOB SERPL: 1.4 G/DL
ALP SERPL-CCNC: 35 U/L (ref 39–117)
ALT SERPL W P-5'-P-CCNC: 15 U/L (ref 1–33)
ANION GAP SERPL CALCULATED.3IONS-SCNC: 12 MMOL/L
APTT PPP: 56.1 SECONDS (ref 22.7–35.4)
AST SERPL-CCNC: 18 U/L (ref 1–32)
BASOPHILS # BLD AUTO: 0.02 10*3/MM3 (ref 0–0.1)
BASOPHILS NFR BLD AUTO: 0.2 % (ref 0–1.1)
BILIRUB SERPL-MCNC: 0.2 MG/DL (ref 0.1–1.2)
BLD GP AB SCN SERPL QL: NEGATIVE
BUN BLD-MCNC: 91 MG/DL (ref 8–23)
BUN/CREAT SERPL: 48.1 (ref 7–25)
CALCIUM SPEC-SCNC: 9.4 MG/DL (ref 8.6–10.5)
CHLORIDE SERPL-SCNC: 101 MMOL/L (ref 98–107)
CO2 SERPL-SCNC: 27 MMOL/L (ref 22–29)
CREAT BLD-MCNC: 1.89 MG/DL (ref 0.57–1)
DEPRECATED RDW RBC AUTO: 52.3 FL (ref 37–49)
EOSINOPHIL # BLD AUTO: 0.18 10*3/MM3 (ref 0–0.36)
EOSINOPHIL NFR BLD AUTO: 1.9 % (ref 1–5)
ERYTHROCYTE [DISTWIDTH] IN BLOOD BY AUTOMATED COUNT: 15.2 % (ref 11.7–14.5)
FERRITIN SERPL-MCNC: 259.4 NG/ML (ref 13–150)
GFR SERPL CREATININE-BSD FRML MDRD: 26 ML/MIN/1.73
GLOBULIN UR ELPH-MCNC: 2.5 GM/DL
GLUCOSE BLD-MCNC: 104 MG/DL (ref 65–99)
HCT VFR BLD AUTO: 18.4 % (ref 34–45)
HGB BLD-MCNC: 5.5 G/DL (ref 11.5–14.9)
IMM GRANULOCYTES # BLD: 0.12 10*3/MM3 (ref 0–0.03)
IMM GRANULOCYTES NFR BLD: 1.3 % (ref 0–0.5)
INR PPP: 7.73 (ref 0.9–1.1)
IRON 24H UR-MRATE: 58 MCG/DL (ref 37–145)
IRON SATN MFR SERPL: 18 % (ref 20–50)
LYMPHOCYTES # BLD AUTO: 1.07 10*3/MM3 (ref 1–3.5)
LYMPHOCYTES NFR BLD AUTO: 11.3 % (ref 20–49)
MCH RBC QN AUTO: 30.4 PG (ref 27–33)
MCHC RBC AUTO-ENTMCNC: 29.9 G/DL (ref 32–35)
MCV RBC AUTO: 101.7 FL (ref 83–97)
MONOCYTES # BLD AUTO: 0.3 10*3/MM3 (ref 0.25–0.8)
MONOCYTES NFR BLD AUTO: 3.2 % (ref 4–12)
NEUTROPHILS # BLD AUTO: 7.82 10*3/MM3 (ref 1.5–7)
NEUTROPHILS NFR BLD AUTO: 82.1 % (ref 39–75)
NRBC BLD MANUAL-RTO: 0 /100 WBC (ref 0–0)
PLATELET # BLD AUTO: 179 10*3/MM3 (ref 150–375)
PMV BLD AUTO: 9.3 FL (ref 8.9–12.1)
POTASSIUM BLD-SCNC: 4.9 MMOL/L (ref 3.5–5.2)
PROT SERPL-MCNC: 5.9 G/DL (ref 6–8.5)
PROTHROMBIN TIME: 63.3 SECONDS (ref 11.7–14.2)
RBC # BLD AUTO: 1.81 10*6/MM3 (ref 3.9–5)
RH BLD: POSITIVE
RH BLD: POSITIVE
SODIUM BLD-SCNC: 140 MMOL/L (ref 136–145)
TIBC SERPL-MCNC: 317 MCG/DL (ref 298–536)
TRANSFERRIN SERPL-MCNC: 213 MG/DL (ref 200–360)
WBC NRBC COR # BLD: 9.51 10*3/MM3 (ref 4–10)

## 2017-09-26 PROCEDURE — 82728 ASSAY OF FERRITIN: CPT | Performed by: INTERNAL MEDICINE

## 2017-09-26 PROCEDURE — 36415 COLL VENOUS BLD VENIPUNCTURE: CPT | Performed by: INTERNAL MEDICINE

## 2017-09-26 PROCEDURE — 86900 BLOOD TYPING SEROLOGIC ABO: CPT | Performed by: INTERNAL MEDICINE

## 2017-09-26 PROCEDURE — 80053 COMPREHEN METABOLIC PANEL: CPT | Performed by: INTERNAL MEDICINE

## 2017-09-26 PROCEDURE — 83540 ASSAY OF IRON: CPT | Performed by: INTERNAL MEDICINE

## 2017-09-26 PROCEDURE — P9016 RBC LEUKOCYTES REDUCED: HCPCS

## 2017-09-26 PROCEDURE — 86900 BLOOD TYPING SEROLOGIC ABO: CPT

## 2017-09-26 PROCEDURE — 86901 BLOOD TYPING SEROLOGIC RH(D): CPT | Performed by: INTERNAL MEDICINE

## 2017-09-26 PROCEDURE — 25010000002 VITAMIN K1 PER 1 MG: Performed by: INTERNAL MEDICINE

## 2017-09-26 PROCEDURE — 86850 RBC ANTIBODY SCREEN: CPT | Performed by: INTERNAL MEDICINE

## 2017-09-26 PROCEDURE — 85610 PROTHROMBIN TIME: CPT | Performed by: INTERNAL MEDICINE

## 2017-09-26 PROCEDURE — 86920 COMPATIBILITY TEST SPIN: CPT

## 2017-09-26 PROCEDURE — 36430 TRANSFUSION BLD/BLD COMPNT: CPT

## 2017-09-26 PROCEDURE — 85025 COMPLETE CBC W/AUTO DIFF WBC: CPT

## 2017-09-26 PROCEDURE — 86923 COMPATIBILITY TEST ELECTRIC: CPT

## 2017-09-26 PROCEDURE — 85730 THROMBOPLASTIN TIME PARTIAL: CPT | Performed by: INTERNAL MEDICINE

## 2017-09-26 PROCEDURE — 86901 BLOOD TYPING SEROLOGIC RH(D): CPT

## 2017-09-26 PROCEDURE — 99223 1ST HOSP IP/OBS HIGH 75: CPT | Performed by: INTERNAL MEDICINE

## 2017-09-26 PROCEDURE — 99222 1ST HOSP IP/OBS MODERATE 55: CPT | Performed by: INTERNAL MEDICINE

## 2017-09-26 PROCEDURE — 84466 ASSAY OF TRANSFERRIN: CPT | Performed by: INTERNAL MEDICINE

## 2017-09-26 RX ORDER — ACETAMINOPHEN 325 MG/1
650 TABLET ORAL EVERY 4 HOURS PRN
Status: DISCONTINUED | OUTPATIENT
Start: 2017-09-26 | End: 2017-09-29 | Stop reason: HOSPADM

## 2017-09-26 RX ORDER — PANTOPRAZOLE SODIUM 40 MG/1
40 TABLET, DELAYED RELEASE ORAL
Status: DISCONTINUED | OUTPATIENT
Start: 2017-09-27 | End: 2017-09-26

## 2017-09-26 RX ORDER — ATORVASTATIN CALCIUM 20 MG/1
20 TABLET, FILM COATED ORAL DAILY
Status: DISCONTINUED | OUTPATIENT
Start: 2017-09-26 | End: 2017-09-29 | Stop reason: HOSPADM

## 2017-09-26 RX ORDER — TRAMADOL HYDROCHLORIDE 50 MG/1
100 TABLET ORAL 2 TIMES DAILY
COMMUNITY
End: 2017-09-29 | Stop reason: HOSPADM

## 2017-09-26 RX ORDER — HYDROCODONE BITARTRATE AND ACETAMINOPHEN 7.5; 325 MG/1; MG/1
1 TABLET ORAL EVERY 6 HOURS PRN
Status: DISCONTINUED | OUTPATIENT
Start: 2017-09-26 | End: 2017-09-27 | Stop reason: SDUPTHER

## 2017-09-26 RX ORDER — SIMVASTATIN 20 MG
20 TABLET ORAL DAILY
COMMUNITY
End: 2017-09-29 | Stop reason: HOSPADM

## 2017-09-26 RX ORDER — HYDROCODONE BITARTRATE AND ACETAMINOPHEN 7.5; 325 MG/1; MG/1
1 TABLET ORAL EVERY 6 HOURS PRN
Qty: 30 TABLET | Refills: 0 | Status: SHIPPED | OUTPATIENT
Start: 2017-09-26 | End: 2017-11-27 | Stop reason: SDUPTHER

## 2017-09-26 RX ORDER — FUROSEMIDE 40 MG/1
60 TABLET ORAL DAILY
COMMUNITY
End: 2017-09-29 | Stop reason: HOSPADM

## 2017-09-26 RX ORDER — CARVEDILOL 25 MG/1
25 TABLET ORAL 2 TIMES DAILY WITH MEALS
Status: DISCONTINUED | OUTPATIENT
Start: 2017-09-26 | End: 2017-09-29 | Stop reason: HOSPADM

## 2017-09-26 RX ORDER — PANTOPRAZOLE SODIUM 40 MG/1
40 TABLET, DELAYED RELEASE ORAL
Status: DISCONTINUED | OUTPATIENT
Start: 2017-09-27 | End: 2017-09-27

## 2017-09-26 RX ORDER — SODIUM CHLORIDE 9 MG/ML
9 INJECTION, SOLUTION INTRAVENOUS CONTINUOUS
Status: DISCONTINUED | OUTPATIENT
Start: 2017-09-26 | End: 2017-09-29 | Stop reason: HOSPADM

## 2017-09-26 RX ORDER — ZOLPIDEM TARTRATE 5 MG/1
5 TABLET ORAL NIGHTLY PRN
Status: DISCONTINUED | OUTPATIENT
Start: 2017-09-26 | End: 2017-09-29 | Stop reason: HOSPADM

## 2017-09-26 RX ORDER — FUROSEMIDE 40 MG/1
40 TABLET ORAL
COMMUNITY
End: 2017-09-29 | Stop reason: HOSPADM

## 2017-09-26 RX ORDER — ACETAMINOPHEN 325 MG/1
650 TABLET ORAL ONCE
Status: DISCONTINUED | OUTPATIENT
Start: 2017-09-26 | End: 2017-09-29

## 2017-09-26 RX ORDER — DICLOFENAC POTASSIUM 50 MG/1
50 TABLET, FILM COATED ORAL 2 TIMES DAILY
COMMUNITY
Start: 2017-09-13 | End: 2017-09-29 | Stop reason: HOSPADM

## 2017-09-26 RX ORDER — DIPHENHYDRAMINE HCL 25 MG
25 CAPSULE ORAL ONCE
Status: DISCONTINUED | OUTPATIENT
Start: 2017-09-26 | End: 2017-09-29

## 2017-09-26 RX ADMIN — SODIUM CHLORIDE 125 ML/HR: 9 INJECTION, SOLUTION INTRAVENOUS at 17:21

## 2017-09-26 RX ADMIN — PHYTONADIONE 5 MG: 10 INJECTION, EMULSION INTRAMUSCULAR; INTRAVENOUS; SUBCUTANEOUS at 20:28

## 2017-09-26 NOTE — TELEPHONE ENCOUNTER
Patient called to report she had a bone density test on 9/17/17 and an ultrasound on 9/6/17.  Dr. Matute ordered the testing and results are in EPIC. He told her she needs to see a vascular surgeon.  She is scared and crying on the phone.  She states she has to talk with you before she will do anything.    Patient can be reached at [phone]  Or on her 's cell [phone].   / GHASSAN

## 2017-09-26 NOTE — TELEPHONE ENCOUNTER
DR LAWRENCE LOOKED AT HER U/S RESULTS AND SAID THAT SHE DID NOT NEED TO SEE A VASCULAR DR. SHE HAS AN APPOINTMENT IN DEC TO FOLLOW UP WITH HIM.

## 2017-09-27 ENCOUNTER — EPISODE CHANGES (OUTPATIENT)
Dept: CASE MANAGEMENT | Facility: OTHER | Age: 77
End: 2017-09-27

## 2017-09-27 PROBLEM — R13.10 DYSPHAGIA: Status: ACTIVE | Noted: 2017-09-26

## 2017-09-27 LAB
ALBUMIN SERPL-MCNC: 3 G/DL (ref 3.5–5.2)
ALBUMIN/GLOB SERPL: 1.4 G/DL
ALP SERPL-CCNC: 31 U/L (ref 39–117)
ALT SERPL W P-5'-P-CCNC: 14 U/L (ref 1–33)
ANION GAP SERPL CALCULATED.3IONS-SCNC: 12.3 MMOL/L
AST SERPL-CCNC: 18 U/L (ref 1–32)
BASOPHILS # BLD AUTO: 0.02 10*3/MM3 (ref 0–0.2)
BASOPHILS NFR BLD AUTO: 0.3 % (ref 0–1.5)
BILIRUB SERPL-MCNC: 0.7 MG/DL (ref 0.1–1.2)
BUN BLD-MCNC: 85 MG/DL (ref 8–23)
BUN/CREAT SERPL: 51.8 (ref 7–25)
CALCIUM SPEC-SCNC: 8.8 MG/DL (ref 8.6–10.5)
CHLORIDE SERPL-SCNC: 106 MMOL/L (ref 98–107)
CO2 SERPL-SCNC: 25.7 MMOL/L (ref 22–29)
CREAT BLD-MCNC: 1.64 MG/DL (ref 0.57–1)
DEPRECATED RDW RBC AUTO: 49.7 FL (ref 37–54)
EOSINOPHIL # BLD AUTO: 0.27 10*3/MM3 (ref 0–0.7)
EOSINOPHIL NFR BLD AUTO: 3.6 % (ref 0.3–6.2)
ERYTHROCYTE [DISTWIDTH] IN BLOOD BY AUTOMATED COUNT: 15.2 % (ref 11.7–13)
GFR SERPL CREATININE-BSD FRML MDRD: 30 ML/MIN/1.73
GLOBULIN UR ELPH-MCNC: 2.2 GM/DL
GLUCOSE BLD-MCNC: 122 MG/DL (ref 65–99)
HCT VFR BLD AUTO: 22.3 % (ref 35.6–45.5)
HCT VFR BLD AUTO: 24.6 % (ref 35.6–45.5)
HEMOCCULT STL QL: POSITIVE
HGB BLD-MCNC: 7.2 G/DL (ref 11.9–15.5)
HGB BLD-MCNC: 8.1 G/DL (ref 11.9–15.5)
IMM GRANULOCYTES # BLD: 0.06 10*3/MM3 (ref 0–0.03)
IMM GRANULOCYTES NFR BLD: 0.8 % (ref 0–0.5)
INR PPP: 2 (ref 0.9–1.1)
LYMPHOCYTES # BLD AUTO: 1 10*3/MM3 (ref 0.9–4.8)
LYMPHOCYTES NFR BLD AUTO: 13.4 % (ref 19.6–45.3)
MCH RBC QN AUTO: 30.8 PG (ref 26.9–32)
MCHC RBC AUTO-ENTMCNC: 32.3 G/DL (ref 32.4–36.3)
MCV RBC AUTO: 95.3 FL (ref 80.5–98.2)
MONOCYTES # BLD AUTO: 0.34 10*3/MM3 (ref 0.2–1.2)
MONOCYTES NFR BLD AUTO: 4.6 % (ref 5–12)
NEUTROPHILS # BLD AUTO: 5.76 10*3/MM3 (ref 1.9–8.1)
NEUTROPHILS NFR BLD AUTO: 77.3 % (ref 42.7–76)
PLATELET # BLD AUTO: 133 10*3/MM3 (ref 140–500)
PMV BLD AUTO: 9.6 FL (ref 6–12)
POTASSIUM BLD-SCNC: 4.3 MMOL/L (ref 3.5–5.2)
PROT SERPL-MCNC: 5.2 G/DL (ref 6–8.5)
PROTHROMBIN TIME: 22 SECONDS (ref 11.7–14.2)
RBC # BLD AUTO: 2.34 10*6/MM3 (ref 3.9–5.2)
SODIUM BLD-SCNC: 144 MMOL/L (ref 136–145)
WBC NRBC COR # BLD: 7.45 10*3/MM3 (ref 4.5–10.7)

## 2017-09-27 PROCEDURE — 82272 OCCULT BLD FECES 1-3 TESTS: CPT | Performed by: INTERNAL MEDICINE

## 2017-09-27 PROCEDURE — 86900 BLOOD TYPING SEROLOGIC ABO: CPT

## 2017-09-27 PROCEDURE — 25010000002 VITAMIN K1 PER 1 MG: Performed by: INTERNAL MEDICINE

## 2017-09-27 PROCEDURE — P9016 RBC LEUKOCYTES REDUCED: HCPCS

## 2017-09-27 PROCEDURE — 80053 COMPREHEN METABOLIC PANEL: CPT | Performed by: INTERNAL MEDICINE

## 2017-09-27 PROCEDURE — 93010 ELECTROCARDIOGRAM REPORT: CPT | Performed by: INTERNAL MEDICINE

## 2017-09-27 PROCEDURE — 99223 1ST HOSP IP/OBS HIGH 75: CPT | Performed by: INTERNAL MEDICINE

## 2017-09-27 PROCEDURE — 85014 HEMATOCRIT: CPT | Performed by: INTERNAL MEDICINE

## 2017-09-27 PROCEDURE — 85610 PROTHROMBIN TIME: CPT | Performed by: INTERNAL MEDICINE

## 2017-09-27 PROCEDURE — 85018 HEMOGLOBIN: CPT | Performed by: INTERNAL MEDICINE

## 2017-09-27 PROCEDURE — 85025 COMPLETE CBC W/AUTO DIFF WBC: CPT | Performed by: INTERNAL MEDICINE

## 2017-09-27 PROCEDURE — 36430 TRANSFUSION BLD/BLD COMPNT: CPT

## 2017-09-27 PROCEDURE — 93005 ELECTROCARDIOGRAM TRACING: CPT | Performed by: INTERNAL MEDICINE

## 2017-09-27 PROCEDURE — 99232 SBSQ HOSP IP/OBS MODERATE 35: CPT | Performed by: INTERNAL MEDICINE

## 2017-09-27 RX ORDER — POLYETHYLENE GLYCOL 3350, SODIUM CHLORIDE, POTASSIUM CHLORIDE, SODIUM BICARBONATE, AND SODIUM SULFATE 240; 5.84; 2.98; 6.72; 22.72 G/4L; G/4L; G/4L; G/4L; G/4L
2000 POWDER, FOR SOLUTION ORAL EVERY 8 HOURS SCHEDULED
Status: COMPLETED | OUTPATIENT
Start: 2017-09-27 | End: 2017-09-28

## 2017-09-27 RX ORDER — POLYETHYLENE GLYCOL 3350, SODIUM CHLORIDE, POTASSIUM CHLORIDE, SODIUM BICARBONATE, AND SODIUM SULFATE 240; 5.84; 2.98; 6.72; 22.72 G/4L; G/4L; G/4L; G/4L; G/4L
4000 POWDER, FOR SOLUTION ORAL EVERY 8 HOURS SCHEDULED
Status: DISCONTINUED | OUTPATIENT
Start: 2017-09-27 | End: 2017-09-27 | Stop reason: SDUPTHER

## 2017-09-27 RX ORDER — HYDROCODONE BITARTRATE AND ACETAMINOPHEN 7.5; 325 MG/1; MG/1
1 TABLET ORAL EVERY 6 HOURS PRN
Status: DISCONTINUED | OUTPATIENT
Start: 2017-09-27 | End: 2017-09-29 | Stop reason: HOSPADM

## 2017-09-27 RX ORDER — PANTOPRAZOLE SODIUM 40 MG/10ML
40 INJECTION, POWDER, LYOPHILIZED, FOR SOLUTION INTRAVENOUS EVERY 12 HOURS SCHEDULED
Status: DISCONTINUED | OUTPATIENT
Start: 2017-09-27 | End: 2017-09-29 | Stop reason: HOSPADM

## 2017-09-27 RX ORDER — TRAMADOL HYDROCHLORIDE 50 MG/1
100 TABLET ORAL EVERY 12 HOURS PRN
Status: DISCONTINUED | OUTPATIENT
Start: 2017-09-27 | End: 2017-09-29 | Stop reason: HOSPADM

## 2017-09-27 RX ORDER — TRAMADOL HYDROCHLORIDE 50 MG/1
50 TABLET ORAL EVERY 6 HOURS PRN
Status: DISCONTINUED | OUTPATIENT
Start: 2017-09-27 | End: 2017-09-27 | Stop reason: DRUGHIGH

## 2017-09-27 RX ADMIN — POLYETHYLENE GLYCOL 3350, SODIUM CHLORIDE, POTASSIUM CHLORIDE, SODIUM BICARBONATE, AND SODIUM SULFATE 2000 ML: 240; 5.84; 2.98; 6.72; 22.72 POWDER, FOR SOLUTION ORAL at 21:39

## 2017-09-27 RX ADMIN — PANTOPRAZOLE SODIUM 40 MG: 40 INJECTION, POWDER, FOR SOLUTION INTRAVENOUS at 23:56

## 2017-09-27 RX ADMIN — HYDROCODONE BITARTRATE AND ACETAMINOPHEN 1 TABLET: 7.5; 325 TABLET ORAL at 09:16

## 2017-09-27 RX ADMIN — CARVEDILOL 25 MG: 25 TABLET, FILM COATED ORAL at 08:23

## 2017-09-27 RX ADMIN — ATORVASTATIN CALCIUM 20 MG: 20 TABLET, FILM COATED ORAL at 08:23

## 2017-09-27 RX ADMIN — PANTOPRAZOLE SODIUM 40 MG: 40 INJECTION, POWDER, FOR SOLUTION INTRAVENOUS at 13:43

## 2017-09-27 RX ADMIN — CARVEDILOL 25 MG: 25 TABLET, FILM COATED ORAL at 17:43

## 2017-09-27 RX ADMIN — ACETAMINOPHEN 650 MG: 325 TABLET ORAL at 22:29

## 2017-09-27 RX ADMIN — PANTOPRAZOLE SODIUM 40 MG: 40 TABLET, DELAYED RELEASE ORAL at 05:43

## 2017-09-27 RX ADMIN — PHYTONADIONE 2.5 MG: 10 INJECTION, EMULSION INTRAMUSCULAR; INTRAVENOUS; SUBCUTANEOUS at 11:26

## 2017-09-27 RX ADMIN — SODIUM CHLORIDE 125 ML/HR: 9 INJECTION, SOLUTION INTRAVENOUS at 17:48

## 2017-09-28 ENCOUNTER — ANESTHESIA (OUTPATIENT)
Dept: GASTROENTEROLOGY | Facility: HOSPITAL | Age: 77
End: 2017-09-28

## 2017-09-28 ENCOUNTER — ANESTHESIA EVENT (OUTPATIENT)
Dept: GASTROENTEROLOGY | Facility: HOSPITAL | Age: 77
End: 2017-09-28

## 2017-09-28 LAB
ANION GAP SERPL CALCULATED.3IONS-SCNC: 14.1 MMOL/L
BASOPHILS # BLD AUTO: 0.02 10*3/MM3 (ref 0–0.2)
BASOPHILS NFR BLD AUTO: 0.2 % (ref 0–1.5)
BUN BLD-MCNC: 56 MG/DL (ref 8–23)
BUN/CREAT SERPL: 40.3 (ref 7–25)
CALCIUM SPEC-SCNC: 8.9 MG/DL (ref 8.6–10.5)
CHLORIDE SERPL-SCNC: 108 MMOL/L (ref 98–107)
CO2 SERPL-SCNC: 21.9 MMOL/L (ref 22–29)
CREAT BLD-MCNC: 1.39 MG/DL (ref 0.57–1)
DEPRECATED RDW RBC AUTO: 53.2 FL (ref 37–54)
EOSINOPHIL # BLD AUTO: 0.38 10*3/MM3 (ref 0–0.7)
EOSINOPHIL NFR BLD AUTO: 4.1 % (ref 0.3–6.2)
ERYTHROCYTE [DISTWIDTH] IN BLOOD BY AUTOMATED COUNT: 16.2 % (ref 11.7–13)
GFR SERPL CREATININE-BSD FRML MDRD: 37 ML/MIN/1.73
GLUCOSE BLD-MCNC: 107 MG/DL (ref 65–99)
HCT VFR BLD AUTO: 24.6 % (ref 35.6–45.5)
HCT VFR BLD AUTO: 25.1 % (ref 35.6–45.5)
HCT VFR BLD AUTO: 26.8 % (ref 35.6–45.5)
HGB BLD-MCNC: 8.1 G/DL (ref 11.9–15.5)
HGB BLD-MCNC: 8.1 G/DL (ref 11.9–15.5)
HGB BLD-MCNC: 8.6 G/DL (ref 11.9–15.5)
IMM GRANULOCYTES # BLD: 0.07 10*3/MM3 (ref 0–0.03)
IMM GRANULOCYTES NFR BLD: 0.8 % (ref 0–0.5)
INR PPP: 1.38 (ref 0.9–1.1)
LYMPHOCYTES # BLD AUTO: 1.14 10*3/MM3 (ref 0.9–4.8)
LYMPHOCYTES NFR BLD AUTO: 12.4 % (ref 19.6–45.3)
MCH RBC QN AUTO: 30.3 PG (ref 26.9–32)
MCHC RBC AUTO-ENTMCNC: 32.1 G/DL (ref 32.4–36.3)
MCV RBC AUTO: 94.4 FL (ref 80.5–98.2)
MONOCYTES # BLD AUTO: 0.36 10*3/MM3 (ref 0.2–1.2)
MONOCYTES NFR BLD AUTO: 3.9 % (ref 5–12)
NEUTROPHILS # BLD AUTO: 7.2 10*3/MM3 (ref 1.9–8.1)
NEUTROPHILS NFR BLD AUTO: 78.6 % (ref 42.7–76)
PLATELET # BLD AUTO: 141 10*3/MM3 (ref 140–500)
PMV BLD AUTO: 9.4 FL (ref 6–12)
POTASSIUM BLD-SCNC: 4.7 MMOL/L (ref 3.5–5.2)
PROTHROMBIN TIME: 16.5 SECONDS (ref 11.7–14.2)
RBC # BLD AUTO: 2.84 10*6/MM3 (ref 3.9–5.2)
SODIUM BLD-SCNC: 144 MMOL/L (ref 136–145)
WBC NRBC COR # BLD: 9.17 10*3/MM3 (ref 4.5–10.7)

## 2017-09-28 PROCEDURE — 25010000002 PROPOFOL 10 MG/ML EMULSION: Performed by: ANESTHESIOLOGY

## 2017-09-28 PROCEDURE — 80048 BASIC METABOLIC PNL TOTAL CA: CPT | Performed by: NURSE PRACTITIONER

## 2017-09-28 PROCEDURE — 85025 COMPLETE CBC W/AUTO DIFF WBC: CPT | Performed by: NURSE PRACTITIONER

## 2017-09-28 PROCEDURE — 0DJ08ZZ INSPECTION OF UPPER INTESTINAL TRACT, VIA NATURAL OR ARTIFICIAL OPENING ENDOSCOPIC: ICD-10-PCS | Performed by: INTERNAL MEDICINE

## 2017-09-28 PROCEDURE — 0DJD8ZZ INSPECTION OF LOWER INTESTINAL TRACT, VIA NATURAL OR ARTIFICIAL OPENING ENDOSCOPIC: ICD-10-PCS | Performed by: INTERNAL MEDICINE

## 2017-09-28 PROCEDURE — 43235 EGD DIAGNOSTIC BRUSH WASH: CPT | Performed by: INTERNAL MEDICINE

## 2017-09-28 PROCEDURE — 99232 SBSQ HOSP IP/OBS MODERATE 35: CPT | Performed by: INTERNAL MEDICINE

## 2017-09-28 PROCEDURE — 85014 HEMATOCRIT: CPT | Performed by: INTERNAL MEDICINE

## 2017-09-28 PROCEDURE — 85610 PROTHROMBIN TIME: CPT | Performed by: INTERNAL MEDICINE

## 2017-09-28 PROCEDURE — 45378 DIAGNOSTIC COLONOSCOPY: CPT | Performed by: INTERNAL MEDICINE

## 2017-09-28 PROCEDURE — 85018 HEMOGLOBIN: CPT | Performed by: INTERNAL MEDICINE

## 2017-09-28 PROCEDURE — 99233 SBSQ HOSP IP/OBS HIGH 50: CPT | Performed by: INTERNAL MEDICINE

## 2017-09-28 RX ORDER — LIDOCAINE HYDROCHLORIDE 20 MG/ML
INJECTION, SOLUTION INFILTRATION; PERINEURAL AS NEEDED
Status: DISCONTINUED | OUTPATIENT
Start: 2017-09-28 | End: 2017-09-28 | Stop reason: SURG

## 2017-09-28 RX ORDER — SODIUM CHLORIDE, SODIUM LACTATE, POTASSIUM CHLORIDE, CALCIUM CHLORIDE 600; 310; 30; 20 MG/100ML; MG/100ML; MG/100ML; MG/100ML
30 INJECTION, SOLUTION INTRAVENOUS CONTINUOUS PRN
Status: DISCONTINUED | OUTPATIENT
Start: 2017-09-28 | End: 2017-09-29 | Stop reason: HOSPADM

## 2017-09-28 RX ORDER — PROPOFOL 10 MG/ML
VIAL (ML) INTRAVENOUS CONTINUOUS PRN
Status: DISCONTINUED | OUTPATIENT
Start: 2017-09-28 | End: 2017-09-28 | Stop reason: SURG

## 2017-09-28 RX ORDER — PROPOFOL 10 MG/ML
VIAL (ML) INTRAVENOUS AS NEEDED
Status: DISCONTINUED | OUTPATIENT
Start: 2017-09-28 | End: 2017-09-28 | Stop reason: SURG

## 2017-09-28 RX ORDER — RAMIPRIL 5 MG/1
5 CAPSULE ORAL
Status: DISCONTINUED | OUTPATIENT
Start: 2017-09-28 | End: 2017-09-29 | Stop reason: HOSPADM

## 2017-09-28 RX ADMIN — RAMIPRIL 5 MG: 5 CAPSULE ORAL at 10:55

## 2017-09-28 RX ADMIN — PROPOFOL 50 MG: 10 INJECTION, EMULSION INTRAVENOUS at 15:35

## 2017-09-28 RX ADMIN — HYDROCODONE BITARTRATE AND ACETAMINOPHEN 1 TABLET: 7.5; 325 TABLET ORAL at 16:50

## 2017-09-28 RX ADMIN — HYDROCODONE BITARTRATE AND ACETAMINOPHEN 1 TABLET: 7.5; 325 TABLET ORAL at 08:45

## 2017-09-28 RX ADMIN — PROPOFOL 125 MCG/KG/MIN: 10 INJECTION, EMULSION INTRAVENOUS at 15:29

## 2017-09-28 RX ADMIN — SODIUM CHLORIDE, POTASSIUM CHLORIDE, SODIUM LACTATE AND CALCIUM CHLORIDE 30 ML/HR: 600; 310; 30; 20 INJECTION, SOLUTION INTRAVENOUS at 14:13

## 2017-09-28 RX ADMIN — LIDOCAINE HYDROCHLORIDE 50 MG: 20 INJECTION, SOLUTION INFILTRATION; PERINEURAL at 15:27

## 2017-09-28 RX ADMIN — POLYETHYLENE GLYCOL 3350, SODIUM CHLORIDE, POTASSIUM CHLORIDE, SODIUM BICARBONATE, AND SODIUM SULFATE 2000 ML: 240; 5.84; 2.98; 6.72; 22.72 POWDER, FOR SOLUTION ORAL at 06:36

## 2017-09-28 RX ADMIN — CARVEDILOL 25 MG: 25 TABLET, FILM COATED ORAL at 08:45

## 2017-09-28 RX ADMIN — CARVEDILOL 25 MG: 25 TABLET, FILM COATED ORAL at 20:42

## 2017-09-28 RX ADMIN — PANTOPRAZOLE SODIUM 40 MG: 40 INJECTION, POWDER, FOR SOLUTION INTRAVENOUS at 20:42

## 2017-09-28 RX ADMIN — PANTOPRAZOLE SODIUM 40 MG: 40 INJECTION, POWDER, FOR SOLUTION INTRAVENOUS at 08:45

## 2017-09-28 RX ADMIN — PROPOFOL 75 MG: 10 INJECTION, EMULSION INTRAVENOUS at 15:29

## 2017-09-28 NOTE — ANESTHESIA PREPROCEDURE EVALUATION
Anesthesia Evaluation     Patient summary reviewed          Airway   Mallampati: II  no difficulty expected  Dental      Pulmonary    (+) sleep apnea,   Cardiovascular     Rhythm: irregular    (+) hypertension, CAD, CABG, dysrhythmias Atrial Fib,       Neuro/Psych  (+) CVA,    GI/Hepatic/Renal/Endo    (+)  PUD,     Musculoskeletal     Abdominal    Substance History      OB/GYN          Other                                        Anesthesia Plan    ASA 3     MAC     Anesthetic plan and risks discussed with patient.

## 2017-09-28 NOTE — ANESTHESIA POSTPROCEDURE EVALUATION
"Patient: Janel Mahoney    Procedure Summary     Date Anesthesia Start Anesthesia Stop Room / Location    09/28/17 1523 1601  [facility] /  [facility]       Procedure Diagnosis Surgeon Provider    ESOPHAGOGASTRODUODENOSCOPY  (N/A Esophagus); COLONOSCOPY TO CECUM (N/A ) Acute blood loss anemia; Dysphagia, unspecified type  (Acute blood loss anemia [D62]; Dysphagia, unspecified type [R13.10]) MD Isaiah Hernandez MD          Anesthesia Type: MAC  Last vitals  BP   131/44 (09/28/17 1600)    Temp   36.8 °C (98.3 °F) (09/28/17 1556)    Pulse   59 (09/28/17 1600)   Resp   14 (09/28/17 1600)    SpO2   100 % (09/28/17 1600)      Post Anesthesia Care and Evaluation    Patient location during evaluation: PACU  Patient participation: complete - patient participated  Level of consciousness: awake and alert  Pain management: adequate  Airway patency: patent  Anesthetic complications: No anesthetic complications    Cardiovascular status: acceptable  Respiratory status: acceptable  Hydration status: acceptable    Comments: /44 (BP Location: Right arm, Patient Position: Lying)  Pulse 59  Temp 36.8 °C (98.3 °F) (Oral)   Resp 14  Ht 65\" (165.1 cm)  Wt 168 lb (76.2 kg)  SpO2 100%  BMI 27.96 kg/m2      "

## 2017-09-29 VITALS
HEIGHT: 65 IN | DIASTOLIC BLOOD PRESSURE: 60 MMHG | BODY MASS INDEX: 27.99 KG/M2 | OXYGEN SATURATION: 99 % | TEMPERATURE: 97.7 F | HEART RATE: 59 BPM | WEIGHT: 168 LBS | RESPIRATION RATE: 18 BRPM | SYSTOLIC BLOOD PRESSURE: 166 MMHG

## 2017-09-29 LAB
HCT VFR BLD AUTO: 24.8 % (ref 35.6–45.5)
HCT VFR BLD AUTO: 25.9 % (ref 35.6–45.5)
HGB BLD-MCNC: 7.8 G/DL (ref 11.9–15.5)
HGB BLD-MCNC: 8.2 G/DL (ref 11.9–15.5)

## 2017-09-29 PROCEDURE — 99233 SBSQ HOSP IP/OBS HIGH 50: CPT | Performed by: INTERNAL MEDICINE

## 2017-09-29 PROCEDURE — 99232 SBSQ HOSP IP/OBS MODERATE 35: CPT | Performed by: INTERNAL MEDICINE

## 2017-09-29 PROCEDURE — 90661 CCIIV3 VAC ABX FR 0.5 ML IM: CPT | Performed by: INTERNAL MEDICINE

## 2017-09-29 PROCEDURE — G0008 ADMIN INFLUENZA VIRUS VAC: HCPCS | Performed by: INTERNAL MEDICINE

## 2017-09-29 PROCEDURE — 25010000002 INFLUENZA VAC SUBUNIT QUAD 0.5 ML SUSPENSION PREFILLED SYRINGE: Performed by: INTERNAL MEDICINE

## 2017-09-29 PROCEDURE — 99238 HOSP IP/OBS DSCHRG MGMT 30/<: CPT | Performed by: INTERNAL MEDICINE

## 2017-09-29 PROCEDURE — 85014 HEMATOCRIT: CPT | Performed by: INTERNAL MEDICINE

## 2017-09-29 PROCEDURE — 85018 HEMOGLOBIN: CPT | Performed by: INTERNAL MEDICINE

## 2017-09-29 RX ORDER — PANTOPRAZOLE SODIUM 40 MG/1
40 TABLET, DELAYED RELEASE ORAL DAILY
Qty: 30 TABLET | Refills: 1 | Status: SHIPPED | OUTPATIENT
Start: 2017-09-29 | End: 2017-09-29 | Stop reason: SDUPTHER

## 2017-09-29 RX ORDER — PANTOPRAZOLE SODIUM 40 MG/1
40 TABLET, DELAYED RELEASE ORAL 2 TIMES DAILY
Qty: 60 TABLET | Refills: 1 | Status: SHIPPED | OUTPATIENT
Start: 2017-09-29 | End: 2017-12-11 | Stop reason: SDUPTHER

## 2017-09-29 RX ADMIN — A/SINGAPORE/GP1908/2015 IVR-180 (H1N1) (AN A/MICHIGAN/45/2015-LIKE VIRUS), A/SINGAPORE/GP2050/2015 (H3N2) (AN A/HONG KONG/4801/2014 - LIKE VIRUS), B/UTAH/9/2014 (A B/PHUKET/3073/2013-LIKE VIRUS), B/HONG KONG/259/2010 (A B/BRISBANE/60/08-LIKE VIRUS) 0.5 ML: 15; 15; 15; 15 INJECTION, SUSPENSION INTRAMUSCULAR at 12:51

## 2017-09-29 RX ADMIN — CARVEDILOL 25 MG: 25 TABLET, FILM COATED ORAL at 09:44

## 2017-09-29 RX ADMIN — RAMIPRIL 5 MG: 5 CAPSULE ORAL at 09:44

## 2017-09-29 RX ADMIN — TRAMADOL HYDROCHLORIDE 100 MG: 50 TABLET, FILM COATED ORAL at 09:44

## 2017-09-29 RX ADMIN — ATORVASTATIN CALCIUM 20 MG: 20 TABLET, FILM COATED ORAL at 09:45

## 2017-09-30 LAB
ABO + RH BLD: NORMAL
BH BB BLOOD EXPIRATION DATE: NORMAL
BH BB BLOOD TYPE BARCODE: 5100
BH BB DISPENSE STATUS: NORMAL
BH BB PRODUCT CODE: NORMAL
BH BB UNIT NUMBER: NORMAL
CROSSMATCH INTERPRETATION: NORMAL
CROSSMATCH INTERPRETATION: NORMAL
UNIT  ABO: NORMAL
UNIT  RH: NORMAL

## 2017-10-02 ENCOUNTER — HOSPITAL ENCOUNTER (OUTPATIENT)
Dept: CARDIOLOGY | Facility: HOSPITAL | Age: 77
Setting detail: RECURRING SERIES
Discharge: HOME | End: 2017-10-02

## 2017-10-02 ENCOUNTER — OFFICE VISIT (OUTPATIENT)
Dept: ONCOLOGY | Facility: CLINIC | Age: 77
End: 2017-10-02

## 2017-10-02 ENCOUNTER — LAB (OUTPATIENT)
Dept: LAB | Facility: HOSPITAL | Age: 77
End: 2017-10-02

## 2017-10-02 VITALS
OXYGEN SATURATION: 100 % | RESPIRATION RATE: 18 BRPM | SYSTOLIC BLOOD PRESSURE: 158 MMHG | HEART RATE: 63 BPM | BODY MASS INDEX: 28.39 KG/M2 | TEMPERATURE: 98.3 F | HEIGHT: 65 IN | DIASTOLIC BLOOD PRESSURE: 72 MMHG | WEIGHT: 170.4 LBS

## 2017-10-02 DIAGNOSIS — D63.1 ANEMIA IN STAGE 3 CHRONIC KIDNEY DISEASE (HCC): ICD-10-CM

## 2017-10-02 DIAGNOSIS — D69.6 THROMBOCYTOPENIA (HCC): ICD-10-CM

## 2017-10-02 DIAGNOSIS — D63.1 ANEMIA IN STAGE 3 CHRONIC KIDNEY DISEASE (HCC): Primary | ICD-10-CM

## 2017-10-02 DIAGNOSIS — N18.30 ANEMIA IN STAGE 3 CHRONIC KIDNEY DISEASE (HCC): Primary | ICD-10-CM

## 2017-10-02 DIAGNOSIS — D50.9 IRON DEFICIENCY ANEMIA, UNSPECIFIED IRON DEFICIENCY ANEMIA TYPE: ICD-10-CM

## 2017-10-02 DIAGNOSIS — N18.30 ANEMIA IN STAGE 3 CHRONIC KIDNEY DISEASE (HCC): ICD-10-CM

## 2017-10-02 LAB
BASOPHILS # BLD AUTO: 0.02 10*3/MM3 (ref 0–0.1)
BASOPHILS NFR BLD AUTO: 0.3 % (ref 0–1.1)
DEPRECATED RDW RBC AUTO: 54.9 FL (ref 37–49)
EOSINOPHIL # BLD AUTO: 0.4 10*3/MM3 (ref 0–0.36)
EOSINOPHIL NFR BLD AUTO: 6.7 % (ref 1–5)
ERYTHROCYTE [DISTWIDTH] IN BLOOD BY AUTOMATED COUNT: 15.9 % (ref 11.7–14.5)
HCT VFR BLD AUTO: 27.8 % (ref 34–45)
HGB BLD-MCNC: 8.8 G/DL (ref 11.5–14.9)
IMM GRANULOCYTES # BLD: 0.04 10*3/MM3 (ref 0–0.03)
IMM GRANULOCYTES NFR BLD: 0.7 % (ref 0–0.5)
LYMPHOCYTES # BLD AUTO: 1 10*3/MM3 (ref 1–3.5)
LYMPHOCYTES NFR BLD AUTO: 16.7 % (ref 20–49)
MCH RBC QN AUTO: 30.7 PG (ref 27–33)
MCHC RBC AUTO-ENTMCNC: 31.7 G/DL (ref 32–35)
MCV RBC AUTO: 96.9 FL (ref 83–97)
MONOCYTES # BLD AUTO: 0.4 10*3/MM3 (ref 0.25–0.8)
MONOCYTES NFR BLD AUTO: 6.7 % (ref 4–12)
NEUTROPHILS # BLD AUTO: 4.12 10*3/MM3 (ref 1.5–7)
NEUTROPHILS NFR BLD AUTO: 68.9 % (ref 39–75)
NRBC BLD MANUAL-RTO: 0 /100 WBC (ref 0–0)
PLATELET # BLD AUTO: 136 10*3/MM3 (ref 150–375)
PMV BLD AUTO: 9.9 FL (ref 8.9–12.1)
RBC # BLD AUTO: 2.87 10*6/MM3 (ref 3.9–5)
WBC NRBC COR # BLD: 5.98 10*3/MM3 (ref 4–10)

## 2017-10-02 PROCEDURE — 85025 COMPLETE CBC W/AUTO DIFF WBC: CPT

## 2017-10-02 PROCEDURE — 99212-NC PR NO CHARGE CBC OFFICE OUTPATIENT VISIT 10 MINUTES: Performed by: NURSE PRACTITIONER

## 2017-10-02 PROCEDURE — 36415 COLL VENOUS BLD VENIPUNCTURE: CPT | Performed by: INTERNAL MEDICINE

## 2017-10-02 PROCEDURE — 36416 COLLJ CAPILLARY BLOOD SPEC: CPT

## 2017-10-02 PROCEDURE — 85610 PROTHROMBIN TIME: CPT

## 2017-10-02 NOTE — PROGRESS NOTES
Patient here for lab and RN review today after hospitalization for suspected GI bleed and iron deficiency anemia.  She has resumed oral ferrous sulfate and hemoglobin today has recovered to 8.8 from 8.2 on September 29, 2017.  She has been more active since she was discharged from the hospital and therefore is fatigued today.  I have encouraged her to rest and take frequent breaks in activity.  She denies any shortness of breath, chest pain, or associated dark, tarry stools.      She is followed by Dr. Galarza for management of anticoagulation.  She did mention that her INR today was 2.3 and she will have this rechecked in one week.  She has no other concerns today.  I have encouraged her to call our office with any issues prior to her next office visit on October 25, 2017, at which time repeat iron studies will be performed.  She will see Dr. Vasquez, thereafter.     Vitals:    10/02/17 1301   BP: 158/72   Pulse: 63   Resp: 18   Temp: 98.3 °F (36.8 °C)   SpO2: 100%     Lab Results   Component Value Date    WBC 5.98 10/02/2017    HGB 8.8 (L) 10/02/2017    HCT 27.8 (L) 10/02/2017    MCV 96.9 10/02/2017     (L) 10/02/2017     NICKY Keenan

## 2017-10-03 RX ORDER — SIMVASTATIN 40 MG
TABLET ORAL
Qty: 90 TABLET | Refills: 1 | Status: SHIPPED | OUTPATIENT
Start: 2017-10-03 | End: 2018-02-15 | Stop reason: SDUPTHER

## 2017-10-09 ENCOUNTER — HOSPITAL ENCOUNTER (OUTPATIENT)
Dept: CARDIOLOGY | Facility: HOSPITAL | Age: 77
Setting detail: RECURRING SERIES
Discharge: HOME | End: 2017-10-09

## 2017-10-09 PROCEDURE — 85610 PROTHROMBIN TIME: CPT

## 2017-10-09 PROCEDURE — 36416 COLLJ CAPILLARY BLOOD SPEC: CPT

## 2017-10-11 ENCOUNTER — HOSPITAL ENCOUNTER (OUTPATIENT)
Dept: CARDIOLOGY | Facility: HOSPITAL | Age: 77
Setting detail: RECURRING SERIES
Discharge: HOME | End: 2017-10-11

## 2017-10-11 PROCEDURE — 85610 PROTHROMBIN TIME: CPT

## 2017-10-11 PROCEDURE — 36416 COLLJ CAPILLARY BLOOD SPEC: CPT

## 2017-10-17 ENCOUNTER — TELEPHONE (OUTPATIENT)
Dept: ONCOLOGY | Facility: HOSPITAL | Age: 77
End: 2017-10-17

## 2017-10-17 ENCOUNTER — TELEPHONE (OUTPATIENT)
Dept: FAMILY MEDICINE CLINIC | Facility: CLINIC | Age: 77
End: 2017-10-17

## 2017-10-17 NOTE — TELEPHONE ENCOUNTER
Patient calling saying the pharmacy did not have her prescription for the protonix BID. This was sent on 9/29 and received by pharmacy. Contacted pharmacy. They said anything over one a day requires a PA, they will resend the PA request to our office.

## 2017-10-18 ENCOUNTER — HOSPITAL ENCOUNTER (OUTPATIENT)
Dept: CARDIOLOGY | Facility: HOSPITAL | Age: 77
Setting detail: RECURRING SERIES
Discharge: HOME | End: 2017-10-18

## 2017-10-18 PROCEDURE — 36416 COLLJ CAPILLARY BLOOD SPEC: CPT

## 2017-10-18 PROCEDURE — 85610 PROTHROMBIN TIME: CPT

## 2017-10-19 ENCOUNTER — CLINICAL SUPPORT NO REQUIREMENTS (OUTPATIENT)
Dept: CARDIOLOGY | Facility: CLINIC | Age: 77
End: 2017-10-19

## 2017-10-19 DIAGNOSIS — I25.5 ISCHEMIC CARDIOMYOPATHY: Primary | ICD-10-CM

## 2017-10-19 PROCEDURE — 93296 REM INTERROG EVL PM/IDS: CPT | Performed by: INTERNAL MEDICINE

## 2017-10-19 PROCEDURE — 93295 DEV INTERROG REMOTE 1/2/MLT: CPT | Performed by: INTERNAL MEDICINE

## 2017-10-20 ENCOUNTER — TELEPHONE (OUTPATIENT)
Dept: CARDIOLOGY | Facility: CLINIC | Age: 77
End: 2017-10-20

## 2017-10-20 NOTE — TELEPHONE ENCOUNTER
Her biv icd was checked remotely yesterday and she has had 4 treated vt episodes.  All were vt with atp x 1 (ramp) with return to af with biv pacing.  The vt episodes were on:  10/18  9/18  9/13  6/24  Optivol is elevated since mid September, but she denies any unusual symptoms.  She has an apt to see you with an icd check in December.

## 2017-10-23 ENCOUNTER — HOSPITAL ENCOUNTER (OUTPATIENT)
Dept: CARDIOLOGY | Facility: HOSPITAL | Age: 77
Setting detail: RECURRING SERIES
Discharge: HOME | End: 2017-10-23

## 2017-10-23 PROCEDURE — 85610 PROTHROMBIN TIME: CPT

## 2017-10-23 PROCEDURE — 36416 COLLJ CAPILLARY BLOOD SPEC: CPT

## 2017-10-23 NOTE — TELEPHONE ENCOUNTER
Ana with Dr. Denton's office ([facility])  Called to see if patient can have an MRI of lumbar spine with her biv device if not can she have an lumbar CT myelogram?  They are asking for clearance for this, can you please dictate a letter?  Thank you/ GHASSAN Perez's phone number is [phone]

## 2017-10-24 ENCOUNTER — TRANSCRIBE ORDERS (OUTPATIENT)
Dept: ADMINISTRATIVE | Facility: HOSPITAL | Age: 77
End: 2017-10-24

## 2017-10-24 DIAGNOSIS — M54.16 CHRONIC RADICULAR LUMBAR PAIN: Primary | ICD-10-CM

## 2017-10-24 DIAGNOSIS — G89.29 CHRONIC RADICULAR LUMBAR PAIN: Primary | ICD-10-CM

## 2017-10-24 NOTE — TELEPHONE ENCOUNTER
Ryan in the pacemaker dept confirmed patient could have a CT done but not an MRI.  I called Ana with Dr. Denton's office and informed her of this. She verbalized understanding. / GHASSAN

## 2017-10-25 ENCOUNTER — LAB (OUTPATIENT)
Dept: LAB | Facility: HOSPITAL | Age: 77
End: 2017-10-25

## 2017-10-25 ENCOUNTER — OFFICE VISIT (OUTPATIENT)
Dept: ONCOLOGY | Facility: CLINIC | Age: 77
End: 2017-10-25

## 2017-10-25 VITALS
TEMPERATURE: 98.1 F | WEIGHT: 164 LBS | HEART RATE: 79 BPM | OXYGEN SATURATION: 99 % | BODY MASS INDEX: 27.32 KG/M2 | RESPIRATION RATE: 16 BRPM | HEIGHT: 65 IN | SYSTOLIC BLOOD PRESSURE: 154 MMHG | DIASTOLIC BLOOD PRESSURE: 68 MMHG

## 2017-10-25 DIAGNOSIS — E53.8 B12 DEFICIENCY: ICD-10-CM

## 2017-10-25 DIAGNOSIS — D63.1 ANEMIA IN STAGE 3 CHRONIC KIDNEY DISEASE (HCC): ICD-10-CM

## 2017-10-25 DIAGNOSIS — N18.30 ANEMIA IN STAGE 3 CHRONIC KIDNEY DISEASE (HCC): ICD-10-CM

## 2017-10-25 DIAGNOSIS — D62 ACUTE BLOOD LOSS ANEMIA: ICD-10-CM

## 2017-10-25 DIAGNOSIS — N18.30 ANEMIA IN STAGE 3 CHRONIC KIDNEY DISEASE (HCC): Primary | ICD-10-CM

## 2017-10-25 DIAGNOSIS — D50.9 IRON DEFICIENCY ANEMIA, UNSPECIFIED IRON DEFICIENCY ANEMIA TYPE: ICD-10-CM

## 2017-10-25 DIAGNOSIS — D63.1 ANEMIA IN STAGE 3 CHRONIC KIDNEY DISEASE (HCC): Primary | ICD-10-CM

## 2017-10-25 DIAGNOSIS — D69.6 THROMBOCYTOPENIA (HCC): ICD-10-CM

## 2017-10-25 LAB
BASOPHILS # BLD AUTO: 0.03 10*3/MM3 (ref 0–0.1)
BASOPHILS NFR BLD AUTO: 0.7 % (ref 0–1.1)
DEPRECATED RDW RBC AUTO: 49.3 FL (ref 37–49)
EOSINOPHIL # BLD AUTO: 0.09 10*3/MM3 (ref 0–0.36)
EOSINOPHIL NFR BLD AUTO: 2.1 % (ref 1–5)
ERYTHROCYTE [DISTWIDTH] IN BLOOD BY AUTOMATED COUNT: 13.9 % (ref 11.7–14.5)
FERRITIN SERPL-MCNC: 330.8 NG/ML
HCT VFR BLD AUTO: 30.2 % (ref 34–45)
HGB BLD-MCNC: 9.3 G/DL (ref 11.5–14.9)
IMM GRANULOCYTES # BLD: 0.03 10*3/MM3 (ref 0–0.03)
IMM GRANULOCYTES NFR BLD: 0.7 % (ref 0–0.5)
IRON 24H UR-MRATE: 68 MCG/DL (ref 37–145)
IRON SATN MFR SERPL: 25 % (ref 14–48)
LYMPHOCYTES # BLD AUTO: 0.74 10*3/MM3 (ref 1–3.5)
LYMPHOCYTES NFR BLD AUTO: 17.6 % (ref 20–49)
MCH RBC QN AUTO: 30.1 PG (ref 27–33)
MCHC RBC AUTO-ENTMCNC: 30.8 G/DL (ref 32–35)
MCV RBC AUTO: 97.7 FL (ref 83–97)
MONOCYTES # BLD AUTO: 0.3 10*3/MM3 (ref 0.25–0.8)
MONOCYTES NFR BLD AUTO: 7.1 % (ref 4–12)
NEUTROPHILS # BLD AUTO: 3.01 10*3/MM3 (ref 1.5–7)
NEUTROPHILS NFR BLD AUTO: 71.8 % (ref 39–75)
NRBC BLD MANUAL-RTO: 0 /100 WBC (ref 0–0)
PLATELET # BLD AUTO: 202 10*3/MM3 (ref 150–375)
PMV BLD AUTO: 9.3 FL (ref 8.9–12.1)
RBC # BLD AUTO: 3.09 10*6/MM3 (ref 3.9–5)
TIBC SERPL-MCNC: 272 MCG/DL (ref 249–505)
TRANSFERRIN SERPL-MCNC: 194 MG/DL (ref 200–360)
WBC NRBC COR # BLD: 4.2 10*3/MM3 (ref 4–10)

## 2017-10-25 PROCEDURE — 83540 ASSAY OF IRON: CPT | Performed by: INTERNAL MEDICINE

## 2017-10-25 PROCEDURE — 36415 COLL VENOUS BLD VENIPUNCTURE: CPT | Performed by: INTERNAL MEDICINE

## 2017-10-25 PROCEDURE — 85025 COMPLETE CBC W/AUTO DIFF WBC: CPT | Performed by: INTERNAL MEDICINE

## 2017-10-25 PROCEDURE — 82728 ASSAY OF FERRITIN: CPT | Performed by: INTERNAL MEDICINE

## 2017-10-25 PROCEDURE — 84466 ASSAY OF TRANSFERRIN: CPT | Performed by: INTERNAL MEDICINE

## 2017-10-25 PROCEDURE — 99214 OFFICE O/P EST MOD 30 MIN: CPT | Performed by: INTERNAL MEDICINE

## 2017-10-25 RX ORDER — SILVER SULFADIAZINE 1 %
CREAM (GRAM) TOPICAL
COMMUNITY
Start: 2017-10-11 | End: 2017-12-12

## 2017-10-25 NOTE — PROGRESS NOTES
Subjective   CHIEF COMPLAINT:    1. Anemia secondary to chronic kidney disease.   2. Vitamin B12 deficiency.   3. Thrombocytopenia    HISTORY OF PRESENT ILLNESS:     Janel Mahoney is a 76 y.o.  patient with anemia of chronic kidney disease.  She was previously on Procrit monthly but hemoglobin stayed above 10 for the past 6 months and she did not need Procrit since the last dose that she received on 4/19/16 at 4000 units.    Patient was seen at our office on 9/26/17.  She was noted to have a significant drop in her hemoglobin down to 5.5.  She was hospitalized at [facility].  She was transfused with PRBCs.  She was seen by gastroenterology and had endoscopies done that revealed presence of blood and no ulcers.  She was taken off of the diclofenac.  It was not clear if she was supposed to stop her aspirin.  She continues to aspirin and continues to take Coumadin under the care of Dr. Galarza.    Patient denies abdominal pain.  She is feeling better with improvement in her energy.  She is taking oral iron once a day.  Stool color is black since she started oral iron.  No blood per rectum.      Past Medical History:   Diagnosis Date   • Anemia    • Atrial fibrillation    • Carotid artery stenosis    • Chronic coronary artery disease     moderate to severe LV dysfunction.   • GERD (gastroesophageal reflux disease)    • Hyperlipidemia    • Hypertension    • Leukopenia    • Obesity    • Osteoarthritis    • Peptic ulcer    • Premature ventricular contractions    • Renal insufficiency syndrome    • Scoliosis    • Stroke syndrome    • Thrombocytopenia    • Ventricular tachycardia    • Vitamin B12 deficiency        Past Surgical History:   Procedure Laterality Date   • AV NODE ABLATION     • BREAST BIOPSY     • CARDIAC CATHETERIZATION      Showed severe mitral insufficiency and borderline coronary artery disease   • CARDIAC CATHETERIZATION      Showed an ejection fraction of 35%. She had occlusive disease of  the right posterior LV branch and no other significant disease, treated medically.   • CARDIAC DEFIBRILLATOR PLACEMENT      Biventricular   • CARDIOVERSION      multiple electrocardioversions.   • COLONOSCOPY N/A 9/28/2017    Procedure: COLONOSCOPY TO CECUM;  Surgeon: Kevin Davis MD;  Location: [facility] ENDOSCOPY;  Service:    • CORONARY ARTERY BYPASS GRAFT      single graft to the PDA   • CORONARY STENT PLACEMENT     • ENDOSCOPY N/A 9/28/2017    Procedure: ESOPHAGOGASTRODUODENOSCOPY ;  Surgeon: Kevin Davis MD;  Location: [facility] ENDOSCOPY;  Service:    • HEMORRHOIDECTOMY     • HYSTERECTOMY     • MITRAL VALVE REPLACEMENT  01/2010    #31 Epic porcine valve.   • THROMBOENDARTERECTOMY Right     carotid thromboendarterectomy    • TONSILLECTOMY     • TOTAL KNEE ARTHROPLASTY Left        Cancer-related family history includes Breast cancer in her mother; Cancer in her mother.    SCHEDULED MEDS:    Current Outpatient Prescriptions:   •  ACETAMINOPHEN PO, Take 325 mg by mouth 4 (Four) Times a Day As Needed. Senior choice , Disp: , Rfl:   •  aspirin 81 MG tablet, Take 1 tablet by mouth daily., Disp: , Rfl:   •  calcitonin, salmon, (MIACALCIN) 200 UNIT/ACT nasal spray, 1 spray into each nostril daily., Disp: 3 each, Rfl: 1  •  calcitriol (ROCALTROL) 0.25 MCG capsule, TAKE 1 CAPSULE EVERY OTHER DAY (Patient taking differently: daily), Disp: 45 capsule, Rfl: 1  •  carvedilol (COREG) 25 MG tablet, TAKE 1 TABLET TWICE DAILY, Disp: 180 tablet, Rfl: 3  •  Cholecalciferol (VITAMIN D) 2000 UNITS tablet, Take 1 tablet by mouth daily., Disp: , Rfl:   •  Cyanocobalamin (VITAMIN B12 PO), Take 1 tablet by mouth daily. As directed, Disp: , Rfl:   •  epoetin adele (EPOGEN,PROCRIT) 55362 UNIT/ML injection, as needed. Procrit 93955 UNIT/ML Injection Solution; Patient Sig: Procrit [id number] UNIT/ML Injection Solution INJECT SUBCUTANEOUSLY AS DIRECTED.; 0; 01-Apr-2014; Active, Disp: , Rfl:   •  ezetimibe (ZETIA) 10 MG tablet, Take 1 tablet by  "mouth daily., Disp: , Rfl:   •  FERROUS SULFATE PO, Take 324 mg by mouth Daily. Take as directed  , Disp: , Rfl:   •  furosemide (LASIX) 40 MG tablet, TAKE 1 AND 1/2 TABLETS EVERY MORNING AND 1 TABLET EVERY EVENING, Disp: 225 tablet, Rfl: 3  •  HYDROcodone-acetaminophen (NORCO) 7.5-325 MG per tablet, Take 1 tablet by mouth Every 6 (Six) Hours As Needed for Moderate Pain . Chronic pain medicine for low back pain, Disp: 30 tablet, Rfl: 0  •  Multiple Vitamin (MULTIVITAMIN) capsule, Take 1 capsule by mouth daily., Disp: , Rfl:   •  pantoprazole (PROTONIX) 40 MG EC tablet, Take 1 tablet by mouth 2 (Two) Times a Day., Disp: 60 tablet, Rfl: 1  •  ramipril (ALTACE) 5 MG capsule, Take 1 capsule by mouth daily., Disp: , Rfl:   •  simvastatin (ZOCOR) 40 MG tablet, TAKE 1 TABLET EVERY DAY, Disp: 90 tablet, Rfl: 1  •  SSD 1 % cream, , Disp: , Rfl:   •  traMADol (ULTRAM) 50 MG tablet, TAKE 2 TABLETS EVERY MORNING  AND TAKE 2 TABLETS AT BEDTIME, Disp: 360 tablet, Rfl: 1  •  WARFARIN SODIUM PO, 2 mg Daily With Dinner., Disp: , Rfl:   •  zolpidem (AMBIEN) 10 MG tablet, TAKE 1 TABLET AT BEDTIME, Disp: 90 tablet, Rfl: 1    REVIEW OF SYSTEMS:  GENERAL:  Improvement in the fatigue.   SKIN:  No skin rashes.  HEME/LYMPH: Anemia improved.  RESPIRATORY:  No cough, shortness of breath, hemoptysis or wheezing.  CVS:  No chest pain, palpitations or lower extremity swelling.  GI:  See history of present illness.  No abdominal pain, nausea, vomiting, constipation, diarrhea, melena or hematochezia.      Objective   VITAL SIGNS:  Vitals:    10/25/17 1444   BP: 154/68   Pulse: 79   Resp: 16   Temp: 98.1 °F (36.7 °C)   TempSrc: Oral   SpO2: 99%   Weight: 164 lb (74.4 kg)   Height: 65\" (165.1 cm)   PainSc: 5  Comment: legs and feet       Wt Readings from Last 3 Encounters:   10/25/17 164 lb (74.4 kg)   10/02/17 170 lb 6.4 oz (77.3 kg)   09/28/17 168 lb (76.2 kg)       PHYSICAL EXAMINATION:  GENERAL:  The patient appears in good general condition, " not in acute distress.  SKIN:  No skin rashes or lesions. No ecchymosis or petechiae.  HEAD:  Normocephalic.  EYES:  No jaundice or pallor.   CHEST:  Lungs clear to auscultation. No added sounds.  CARDIAC:  Normal S1 & S2. No murmurs.  ABD: Soft and nontender.  No masses.  EXTREMITIES:  Chronic hyperpigmentation of the legs.     .  RESULT REVIEW:       Results from last 7 days  Lab Units 10/25/17  1405   WBC 10*3/mm3 4.20   NEUTROS ABS 10*3/mm3 3.01   HEMOGLOBIN g/dL 9.3*   HEMATOCRIT % 30.2*   PLATELETS 10*3/mm3 202       Results from last 7 days  Lab Units 10/25/17  1405   FERRITIN ng/mL 330.80   IRON mcg/dL 68   TIBC mcg/dL 272       Assessment/Plan   1.  Anemia of chronic kidney disease, stage 3.  The patient was on Procrit monthly at 4,000 units until April 2016.      2.  Acute blood loss anemia secondary to GI bleeding from multiple duodenal ulcers.  She was hospitalized on 9/26/17 with a hemoglobin of 5.5 and was transfused with 4 units of PRBCs.  Hemoglobin has been improving since that time and it is now 9.3.  She is on oral iron once a day.    Patient was taken off the diclofenac.  It is not clear that she is supposed to continue or stop her aspirin.  We will ask Dr. Galarza regarding this.    3.  Thrombocytopenia likely secondary to B12 deficiency. She is on B12 orally once daily. Platelet count is now normal.     PLAN:    1.  Continue oral iron once a day.    2.  Return monthly for CBC monitoring.  If hemoglobin stays below 10, we will restart Procrit 4,000 units monthly.    3.  Return for follow-up in 3 months with CBC and iron studies.        Edward Vasquez MD  10/25/17

## 2017-10-27 ENCOUNTER — TELEPHONE (OUTPATIENT)
Dept: FAMILY MEDICINE CLINIC | Facility: CLINIC | Age: 77
End: 2017-10-27

## 2017-10-30 ENCOUNTER — TELEPHONE (OUTPATIENT)
Dept: CARDIOLOGY | Facility: HOSPITAL | Age: 77
End: 2017-10-30

## 2017-10-30 ENCOUNTER — HOSPITAL ENCOUNTER (OUTPATIENT)
Dept: GENERAL RADIOLOGY | Facility: HOSPITAL | Age: 77
Discharge: HOME | End: 2017-10-30
Attending: ORTHOPAEDIC SURGERY

## 2017-10-30 ENCOUNTER — HOSPITAL ENCOUNTER (OUTPATIENT)
Dept: CARDIOLOGY | Facility: HOSPITAL | Age: 77
Setting detail: RECURRING SERIES
Discharge: HOME | End: 2017-10-30

## 2017-10-30 ENCOUNTER — HOSPITAL ENCOUNTER (OUTPATIENT)
Dept: CT IMAGING | Facility: HOSPITAL | Age: 77
Discharge: HOME | End: 2017-10-30
Attending: ORTHOPAEDIC SURGERY

## 2017-10-30 VITALS
BODY MASS INDEX: 29.06 KG/M2 | HEART RATE: 75 BPM | OXYGEN SATURATION: 100 % | RESPIRATION RATE: 18 BRPM | SYSTOLIC BLOOD PRESSURE: 134 MMHG | WEIGHT: 164 LBS | HEIGHT: 63 IN | TEMPERATURE: 97.1 F | DIASTOLIC BLOOD PRESSURE: 61 MMHG

## 2017-10-30 DIAGNOSIS — G89.29 CHRONIC RADICULAR LUMBAR PAIN: ICD-10-CM

## 2017-10-30 DIAGNOSIS — M54.16 CHRONIC RADICULAR LUMBAR PAIN: ICD-10-CM

## 2017-10-30 LAB
INR PPP: 2.8 (ref 0.8–1.2)
PROTHROMBIN TIME: 32.4 SECONDS (ref 12.8–15.2)

## 2017-10-30 PROCEDURE — 85610 PROTHROMBIN TIME: CPT

## 2017-10-30 PROCEDURE — 36416 COLLJ CAPILLARY BLOOD SPEC: CPT

## 2017-10-30 RX ORDER — LIDOCAINE HYDROCHLORIDE 10 MG/ML
10 INJECTION, SOLUTION INFILTRATION; PERINEURAL ONCE
Status: DISCONTINUED | OUTPATIENT
Start: 2017-10-30 | End: 2017-10-30

## 2017-10-30 RX ORDER — ALENDRONATE SODIUM 70 MG/1
70 TABLET ORAL
Qty: 4 TABLET | Refills: 3 | Status: SHIPPED | OUTPATIENT
Start: 2017-10-30 | End: 2017-10-31 | Stop reason: SDUPTHER

## 2017-10-30 NOTE — TELEPHONE ENCOUNTER
Fosamax was sent in to replace the calcitonin.  Take the Fosamax 30 minutes before being set up for 30 minutes with 8 ounces water

## 2017-10-30 NOTE — TELEPHONE ENCOUNTER
Pt informed of Bone Density results. Pt wanted to know does she need to continue to take the Calcitonin,[place] (Miacalcin) or can you give her something else because her insurance doesn't pay for it and it's $100 something for a 3 mos supply. Please advise

## 2017-10-30 NOTE — DISCHARGE INSTRUCTIONS
EDUCATION /DISCHARGE INSTRUCTIONS:  A myelogram is a special radiology procedure of the spinal cord, spinal nerves and other related structures.  You will be awake during the examination.  An area of your lower back will be cleansed with an antiseptic solution.  The physician will inject a numbing medication in your lower back.  While your back is numb, a needle will be placed in the lower back area.  A small amount of spinal fluid may be withdrawn and sent to the lab if ordered by your physician. While the needle is in the back, an injection of a contrast material (xray dye) will be given through the needle.  The contrast material will allow the physician to see the spinal cord and spinal nerves.  Once injected, the needle will be removed and a band aid will be placed over the injection site.  The table will be tilted during the process to allow the contrast material to flow to particular areas in the spine.  Following the injection and xrays, you will be taken to the CT scan where more pictures will be taken. After the procedure is finished, the contrast material will be absorbed by your body and eliminated through your kidneys.  The radiologist will study and interpret your myelogram and send the results to your physician.    Procedure risks of a myelogram include, but are not limited to:  *  Bleeding   *  seizure  *  Infection   *  Headache, possibly severe requiring  *  Contrast reaction      a blood patch  *  Nerve or cord injury  *  Paralysis and death    Benefits of the procedure:  *  Best examination for delineating pathology related to spinal cord compression from a disc and/or nerve root compression    Alternatives to the procedure:  MRI - a non invasive procedure requiring intravenous contrast injection.  Cannot be done on patients with certain pacemakers or metal in the body.  MRI risks include possible reaction to the contrast material, movement of metal located in the body.  Benefit to MRI:   Non-invasive and usually painless procedure.  THIS EDUCATION INFORMATION WAS REVIEWED PRIOR TO THE PROCEDURE AND CONSENT. Patient initials __________________Time_________________    Important information following your myelogram:  *  Lie down with your head elevated no more than 2 pillows high today & tonight  *  Tomorrow, lie down with 1 pillow all day and all night  *  Sit up to eat meals and use the bathroom, otherwise, lie down  *  Do not drive for 48 hours following a myelogram  *  You may remove the bandage and shower in the morning  *  Increase your fluids for the next 24 hours.  Caffeinated drinks are encouraged.     Resume taking your blood thinner or Aspirin on _____________________________    Resume taking your diabetic oral medication on_____________________________    Resume taking antipsychotics/antidepressant on ____________________________    If you have problems with a headache that is not relieved with rest and medication, please call the Radiology Triage Nurse desk  [phone]

## 2017-10-31 ENCOUNTER — TRANSCRIBE ORDERS (OUTPATIENT)
Dept: ADMINISTRATIVE | Facility: HOSPITAL | Age: 77
End: 2017-10-31

## 2017-10-31 DIAGNOSIS — G89.29 CHRONIC RADICULAR LUMBAR PAIN: Primary | ICD-10-CM

## 2017-10-31 DIAGNOSIS — M54.16 CHRONIC RADICULAR LUMBAR PAIN: Primary | ICD-10-CM

## 2017-10-31 RX ORDER — ALENDRONATE SODIUM 70 MG/1
70 TABLET ORAL
Qty: 12 TABLET | Refills: 3 | Status: SHIPPED | OUTPATIENT
Start: 2017-10-31 | End: 2018-07-27 | Stop reason: SDUPTHER

## 2017-11-01 ENCOUNTER — HOSPITAL ENCOUNTER (OUTPATIENT)
Dept: CARDIOLOGY | Facility: HOSPITAL | Age: 77
Setting detail: RECURRING SERIES
Discharge: HOME | End: 2017-11-01

## 2017-11-01 ENCOUNTER — HOSPITAL ENCOUNTER (OUTPATIENT)
Dept: GENERAL RADIOLOGY | Facility: HOSPITAL | Age: 77
Discharge: HOME | End: 2017-11-01
Attending: ORTHOPAEDIC SURGERY

## 2017-11-01 ENCOUNTER — HOSPITAL ENCOUNTER (OUTPATIENT)
Dept: CT IMAGING | Facility: HOSPITAL | Age: 77
Discharge: HOME | End: 2017-11-01
Attending: ORTHOPAEDIC SURGERY

## 2017-11-01 ENCOUNTER — HOSPITAL ENCOUNTER (OUTPATIENT)
Dept: GENERAL RADIOLOGY | Facility: HOSPITAL | Age: 77
End: 2017-11-01
Attending: ORTHOPAEDIC SURGERY

## 2017-11-01 DIAGNOSIS — M54.16 CHRONIC RADICULAR LUMBAR PAIN: ICD-10-CM

## 2017-11-01 DIAGNOSIS — G89.29 CHRONIC RADICULAR LUMBAR PAIN: ICD-10-CM

## 2017-11-01 LAB
INR PPP: 1.9 (ref 0.8–1.2)
PROTHROMBIN TIME: 22.3 SECONDS (ref 12.8–15.2)

## 2017-11-01 PROCEDURE — 85610 PROTHROMBIN TIME: CPT

## 2017-11-01 PROCEDURE — 36416 COLLJ CAPILLARY BLOOD SPEC: CPT

## 2017-11-01 RX ORDER — LIDOCAINE HYDROCHLORIDE 10 MG/ML
10 INJECTION, SOLUTION INFILTRATION; PERINEURAL ONCE
Status: DISCONTINUED | OUTPATIENT
Start: 2017-11-01 | End: 2017-11-01

## 2017-11-03 ENCOUNTER — HOSPITAL ENCOUNTER (OUTPATIENT)
Dept: GENERAL RADIOLOGY | Facility: HOSPITAL | Age: 77
Discharge: HOME | End: 2017-11-03
Attending: ORTHOPAEDIC SURGERY

## 2017-11-03 ENCOUNTER — HOSPITAL ENCOUNTER (OUTPATIENT)
Dept: CT IMAGING | Facility: HOSPITAL | Age: 77
Discharge: HOME | End: 2017-11-03
Attending: ORTHOPAEDIC SURGERY | Admitting: ORTHOPAEDIC SURGERY

## 2017-11-03 VITALS
SYSTOLIC BLOOD PRESSURE: 152 MMHG | RESPIRATION RATE: 16 BRPM | HEART RATE: 61 BPM | HEIGHT: 63 IN | TEMPERATURE: 97.1 F | WEIGHT: 161 LBS | DIASTOLIC BLOOD PRESSURE: 61 MMHG | BODY MASS INDEX: 28.53 KG/M2 | OXYGEN SATURATION: 99 %

## 2017-11-03 DIAGNOSIS — G89.29 CHRONIC RADICULAR LUMBAR PAIN: ICD-10-CM

## 2017-11-03 DIAGNOSIS — M54.16 CHRONIC RADICULAR LUMBAR PAIN: ICD-10-CM

## 2017-11-03 LAB
INR PPP: 1.4 (ref 0.8–1.2)
PROTHROMBIN TIME: 16.8 SECONDS (ref 12.8–15.2)

## 2017-11-03 PROCEDURE — 72240 MYELOGRAPHY NECK SPINE: CPT

## 2017-11-03 PROCEDURE — 0 IOPAMIDOL 41 % SOLUTION: Performed by: RADIOLOGY

## 2017-11-03 PROCEDURE — 72131 CT LUMBAR SPINE W/O DYE: CPT

## 2017-11-03 RX ORDER — LIDOCAINE HYDROCHLORIDE 10 MG/ML
10 INJECTION, SOLUTION INFILTRATION; PERINEURAL ONCE
Status: COMPLETED | OUTPATIENT
Start: 2017-11-03 | End: 2017-11-03

## 2017-11-03 RX ADMIN — LIDOCAINE HYDROCHLORIDE 10 ML: 10 INJECTION, SOLUTION INFILTRATION; PERINEURAL at 13:29

## 2017-11-03 RX ADMIN — IOPAMIDOL 20 ML: 408 INJECTION, SOLUTION INTRATHECAL at 13:30

## 2017-11-03 NOTE — DISCHARGE INSTRUCTIONS
EDUCATION /DISCHARGE INSTRUCTIONS:  A myelogram is a special radiology procedure of the spinal cord, spinal nerves and other related structures.  You will be awake during the examination.  An area of your lower back will be cleansed with an antiseptic solution.  The physician will inject a numbing medication in your lower back.  While your back is numb, a needle will be placed in the lower back area.  A small amount of spinal fluid may be withdrawn and sent to the lab if ordered by your physician. While the needle is in the back, an injection of a contrast material (xray dye) will be given through the needle.  The contrast material will allow the physician to see the spinal cord and spinal nerves.  Once injected, the needle will be removed and a band aid will be placed over the injection site.  The table will be tilted during the process to allow the contrast material to flow to particular areas in the spine.  Following the injection and xrays, you will be taken to the CT scan where more pictures will be taken. After the procedure is finished, the contrast material will be absorbed by your body and eliminated through your kidneys.  The radiologist will study and interpret your myelogram and send the results to your physician.    Procedure risks of a myelogram include, but are not limited to:  *  Bleeding   *  seizure  *  Infection   *  Headache, possibly severe requiring  *  Contrast reaction      a blood patch  *  Nerve or cord injury  *  Paralysis and death    Benefits of the procedure:  *  Best examination for delineating pathology related to spinal cord compression from a disc and/or nerve root compression    Alternatives to the procedure:  MRI - a non invasive procedure requiring intravenous contrast injection.  Cannot be done on patients with certain pacemakers or metal in the body.  MRI risks include possible reaction to the contrast material, movement of metal located in the body.  Benefit to MRI:   Non-invasive and usually painless procedure.  THIS EDUCATION INFORMATION WAS REVIEWED PRIOR TO THE PROCEDURE AND CONSENT. Patient initials __________________Time  1240    Important information following your myelogram:  *  Lie down with your head elevated no more than 2 pillows high today & tonight  *  Tomorrow, lie down with 1 pillow all day and all night  *  Sit up to eat meals and use the bathroom, otherwise, lie down  *  Do not drive for 48 hours following a myelogram  *  You may remove the bandage and shower in the morning  *  Increase your fluids for the next 24 hours.  Caffeinated drinks are encouraged.     Resume taking your Coumadin on 11/3/17 per the ordering physician      If you have problems with a headache that is not relieved with rest and medication, please call the Radiology Triage Nurse desk  [phone]

## 2017-11-10 ENCOUNTER — HOSPITAL ENCOUNTER (OUTPATIENT)
Dept: CARDIOLOGY | Facility: HOSPITAL | Age: 77
Setting detail: RECURRING SERIES
Discharge: HOME | End: 2017-11-10

## 2017-11-10 ENCOUNTER — TELEPHONE (OUTPATIENT)
Dept: CARDIOLOGY | Facility: HOSPITAL | Age: 77
End: 2017-11-10

## 2017-11-10 PROCEDURE — 85610 PROTHROMBIN TIME: CPT

## 2017-11-10 PROCEDURE — 36416 COLLJ CAPILLARY BLOOD SPEC: CPT

## 2017-11-17 ENCOUNTER — HOSPITAL ENCOUNTER (OUTPATIENT)
Dept: CARDIOLOGY | Facility: HOSPITAL | Age: 77
Setting detail: RECURRING SERIES
Discharge: HOME | End: 2017-11-17

## 2017-11-17 PROCEDURE — 36416 COLLJ CAPILLARY BLOOD SPEC: CPT

## 2017-11-17 PROCEDURE — 85610 PROTHROMBIN TIME: CPT

## 2017-11-22 ENCOUNTER — TELEPHONE (OUTPATIENT)
Dept: FAMILY MEDICINE CLINIC | Facility: CLINIC | Age: 77
End: 2017-11-22

## 2017-11-22 ENCOUNTER — APPOINTMENT (OUTPATIENT)
Dept: ONCOLOGY | Facility: HOSPITAL | Age: 77
End: 2017-11-22

## 2017-11-22 ENCOUNTER — INFUSION (OUTPATIENT)
Dept: ONCOLOGY | Facility: HOSPITAL | Age: 77
End: 2017-11-22

## 2017-11-22 ENCOUNTER — HOSPITAL ENCOUNTER (OUTPATIENT)
Dept: CARDIOLOGY | Facility: HOSPITAL | Age: 77
Setting detail: RECURRING SERIES
Discharge: HOME | End: 2017-11-22

## 2017-11-22 ENCOUNTER — LAB (OUTPATIENT)
Dept: LAB | Facility: HOSPITAL | Age: 77
End: 2017-11-22

## 2017-11-22 ENCOUNTER — APPOINTMENT (OUTPATIENT)
Dept: LAB | Facility: HOSPITAL | Age: 77
End: 2017-11-22

## 2017-11-22 DIAGNOSIS — N18.30 ANEMIA IN STAGE 3 CHRONIC KIDNEY DISEASE (HCC): Primary | ICD-10-CM

## 2017-11-22 DIAGNOSIS — N18.30 ANEMIA IN STAGE 3 CHRONIC KIDNEY DISEASE (HCC): ICD-10-CM

## 2017-11-22 DIAGNOSIS — D69.6 THROMBOCYTOPENIA (HCC): ICD-10-CM

## 2017-11-22 DIAGNOSIS — D63.1 ANEMIA IN STAGE 3 CHRONIC KIDNEY DISEASE (HCC): ICD-10-CM

## 2017-11-22 DIAGNOSIS — E53.8 B12 DEFICIENCY: ICD-10-CM

## 2017-11-22 DIAGNOSIS — D63.1 ANEMIA IN STAGE 3 CHRONIC KIDNEY DISEASE (HCC): Primary | ICD-10-CM

## 2017-11-22 LAB
BASOPHILS # BLD AUTO: 0.04 10*3/MM3 (ref 0–0.1)
BASOPHILS NFR BLD AUTO: 0.5 % (ref 0–1.1)
DEPRECATED RDW RBC AUTO: 49.2 FL (ref 37–49)
EOSINOPHIL # BLD AUTO: 0.09 10*3/MM3 (ref 0–0.36)
EOSINOPHIL NFR BLD AUTO: 1 % (ref 1–5)
ERYTHROCYTE [DISTWIDTH] IN BLOOD BY AUTOMATED COUNT: 14.4 % (ref 11.7–14.5)
HCT VFR BLD AUTO: 30 % (ref 34–45)
HGB BLD-MCNC: 9.4 G/DL (ref 11.5–14.9)
IMM GRANULOCYTES # BLD: 0.09 10*3/MM3 (ref 0–0.03)
IMM GRANULOCYTES NFR BLD: 1 % (ref 0–0.5)
LYMPHOCYTES # BLD AUTO: 0.58 10*3/MM3 (ref 1–3.5)
LYMPHOCYTES NFR BLD AUTO: 6.6 % (ref 20–49)
MCH RBC QN AUTO: 29.2 PG (ref 27–33)
MCHC RBC AUTO-ENTMCNC: 31.3 G/DL (ref 32–35)
MCV RBC AUTO: 93.2 FL (ref 83–97)
MONOCYTES # BLD AUTO: 0.62 10*3/MM3 (ref 0.25–0.8)
MONOCYTES NFR BLD AUTO: 7 % (ref 4–12)
NEUTROPHILS # BLD AUTO: 7.42 10*3/MM3 (ref 1.5–7)
NEUTROPHILS NFR BLD AUTO: 83.9 % (ref 39–75)
NRBC BLD MANUAL-RTO: 0.7 /100 WBC (ref 0–0)
PLATELET # BLD AUTO: 102 10*3/MM3 (ref 150–375)
PMV BLD AUTO: 11.1 FL (ref 8.9–12.1)
RBC # BLD AUTO: 3.22 10*6/MM3 (ref 3.9–5)
WBC NRBC COR # BLD: 8.84 10*3/MM3 (ref 4–10)

## 2017-11-22 PROCEDURE — 85025 COMPLETE CBC W/AUTO DIFF WBC: CPT | Performed by: INTERNAL MEDICINE

## 2017-11-22 PROCEDURE — 96372 THER/PROPH/DIAG INJ SC/IM: CPT

## 2017-11-22 PROCEDURE — 63510000001 EPOETIN ALFA PER 1000 UNITS: Performed by: INTERNAL MEDICINE

## 2017-11-22 PROCEDURE — 85610 PROTHROMBIN TIME: CPT

## 2017-11-22 PROCEDURE — 36416 COLLJ CAPILLARY BLOOD SPEC: CPT

## 2017-11-22 PROCEDURE — 36416 COLLJ CAPILLARY BLOOD SPEC: CPT | Performed by: INTERNAL MEDICINE

## 2017-11-22 RX ADMIN — ERYTHROPOIETIN 4000 UNITS: 20000 INJECTION, SOLUTION INTRAVENOUS; SUBCUTANEOUS at 12:22

## 2017-11-27 ENCOUNTER — TELEPHONE (OUTPATIENT)
Dept: FAMILY MEDICINE CLINIC | Facility: CLINIC | Age: 77
End: 2017-11-27

## 2017-11-27 RX ORDER — HYDROCODONE BITARTRATE AND ACETAMINOPHEN 7.5; 325 MG/1; MG/1
1 TABLET ORAL EVERY 6 HOURS PRN
Qty: 30 TABLET | Refills: 0 | Status: ON HOLD | OUTPATIENT
Start: 2017-11-27 | End: 2017-12-27

## 2017-11-27 RX ORDER — ZOLPIDEM TARTRATE 10 MG/1
10 TABLET ORAL NIGHTLY PRN
Qty: 90 TABLET | Refills: 1 | Status: SHIPPED | OUTPATIENT
Start: 2017-11-27 | End: 2018-06-20 | Stop reason: SDUPTHER

## 2017-11-27 RX ORDER — ZOLPIDEM TARTRATE 10 MG/1
TABLET ORAL
Qty: 90 TABLET | Refills: 1 | Status: SHIPPED | OUTPATIENT
Start: 2017-11-27 | End: 2017-11-27 | Stop reason: SDUPTHER

## 2017-11-28 ENCOUNTER — TELEPHONE (OUTPATIENT)
Dept: FAMILY MEDICINE CLINIC | Facility: CLINIC | Age: 77
End: 2017-11-28

## 2017-11-29 ENCOUNTER — HOSPITAL ENCOUNTER (OUTPATIENT)
Dept: CARDIOLOGY | Facility: HOSPITAL | Age: 77
Setting detail: RECURRING SERIES
Discharge: HOME | End: 2017-11-29

## 2017-11-29 PROCEDURE — 36416 COLLJ CAPILLARY BLOOD SPEC: CPT

## 2017-11-29 PROCEDURE — 85610 PROTHROMBIN TIME: CPT

## 2017-12-06 ENCOUNTER — HOSPITAL ENCOUNTER (OUTPATIENT)
Dept: CARDIOLOGY | Facility: HOSPITAL | Age: 77
Setting detail: RECURRING SERIES
Discharge: HOME | End: 2017-12-06

## 2017-12-06 PROCEDURE — 36416 COLLJ CAPILLARY BLOOD SPEC: CPT

## 2017-12-06 PROCEDURE — 85610 PROTHROMBIN TIME: CPT

## 2017-12-11 RX ORDER — PANTOPRAZOLE SODIUM 40 MG/1
TABLET, DELAYED RELEASE ORAL
Qty: 60 TABLET | Refills: 0 | Status: SHIPPED | OUTPATIENT
Start: 2017-12-11 | End: 2018-01-22 | Stop reason: SDUPTHER

## 2017-12-12 ENCOUNTER — TELEPHONE (OUTPATIENT)
Dept: CARDIOLOGY | Facility: CLINIC | Age: 77
End: 2017-12-12

## 2017-12-12 ENCOUNTER — OFFICE VISIT (OUTPATIENT)
Dept: CARDIOLOGY | Facility: CLINIC | Age: 77
End: 2017-12-12

## 2017-12-12 ENCOUNTER — HOSPITAL ENCOUNTER (OUTPATIENT)
Dept: CARDIOLOGY | Facility: HOSPITAL | Age: 77
Setting detail: RECURRING SERIES
Discharge: HOME | End: 2017-12-12

## 2017-12-12 ENCOUNTER — CLINICAL SUPPORT NO REQUIREMENTS (OUTPATIENT)
Dept: CARDIOLOGY | Facility: CLINIC | Age: 77
End: 2017-12-12

## 2017-12-12 VITALS
HEART RATE: 77 BPM | HEIGHT: 65 IN | SYSTOLIC BLOOD PRESSURE: 120 MMHG | BODY MASS INDEX: 27.07 KG/M2 | DIASTOLIC BLOOD PRESSURE: 70 MMHG | WEIGHT: 162.5 LBS

## 2017-12-12 DIAGNOSIS — I42.9 CARDIOMYOPATHY, UNSPECIFIED TYPE (HCC): Primary | ICD-10-CM

## 2017-12-12 DIAGNOSIS — I25.5 ISCHEMIC CARDIOMYOPATHY: Primary | ICD-10-CM

## 2017-12-12 DIAGNOSIS — Z95.0 PACEMAKER: ICD-10-CM

## 2017-12-12 DIAGNOSIS — I25.810 CORONARY ARTERY DISEASE INVOLVING CORONARY BYPASS GRAFT OF NATIVE HEART WITHOUT ANGINA PECTORIS: ICD-10-CM

## 2017-12-12 DIAGNOSIS — Z95.2 S/P MVR (MITRAL VALVE REPLACEMENT): ICD-10-CM

## 2017-12-12 DIAGNOSIS — I77.9 BILATERAL CAROTID ARTERY DISEASE (HCC): ICD-10-CM

## 2017-12-12 DIAGNOSIS — I48.20 CHRONIC ATRIAL FIBRILLATION (HCC): ICD-10-CM

## 2017-12-12 PROCEDURE — 93000 ELECTROCARDIOGRAM COMPLETE: CPT | Performed by: INTERNAL MEDICINE

## 2017-12-12 PROCEDURE — 99214 OFFICE O/P EST MOD 30 MIN: CPT | Performed by: INTERNAL MEDICINE

## 2017-12-12 PROCEDURE — 93283 PRGRMG EVAL IMPLANTABLE DFB: CPT | Performed by: INTERNAL MEDICINE

## 2017-12-12 PROCEDURE — 85610 PROTHROMBIN TIME: CPT

## 2017-12-12 PROCEDURE — 93290 INTERROG DEV EVAL ICPMS IP: CPT | Performed by: INTERNAL MEDICINE

## 2017-12-12 PROCEDURE — 36416 COLLJ CAPILLARY BLOOD SPEC: CPT

## 2017-12-12 NOTE — TELEPHONE ENCOUNTER
CRT-D check today. Patient had 2 VT episodes treated with ATP x1. 10/22 and 11/29. This is not new for the patient. See task from 10/20.

## 2017-12-12 NOTE — PROGRESS NOTES
Date of Office Visit: 17  Encounter Provider: Nik Galarza MD  Place of Service: [facility] CARDIOLOGY  Patient Name: Janel Mahoney  :1940      Chief Complaint   Patient presents with   • Coronary Artery Disease   • Atrial Fibrillation   • Congestive Heart Failure     History of Present Illness  HPI Comments:  The patient is a pleasant, 77-year-old white female with history of severe ischemic heart  disease, mild disease of the left anterior descending, angioplasty, and stent placement of  the right coronary artery. She also had premature ventricular contractions and severe  vascular disease, and left ventricular ejection fraction of 50%. In 2007, she had  an acute infarct. Catheterization showed a left ventricular ejection fraction of 35%. She  had occlusive disease of the right posterior left ventricular branch and no other  significant disease. She was treated medically. She had a transesophageal echocardiogram  that confirmed a left ventricular ejection fraction of 40% and just moderate mitral  insufficiency. She had atrial fibrillation and had electrocardioversion back to sinus  rhythm in May 2007 and then presented in atrial fibrillation and was admitted in 2007 and placed on Tikosyn. We titrated it up to 500 mcg daily but developed marked QT  prolongation even on 250 mcg twice daily. We then discussed ablation versus warfarin and  rate control. We opted for warfarin and rate control. We continued to have symptoms and  went to Dr. Harish Iverson in [place]. She had atrial fibrillation and flutter with  pulmonary vein isolation. She went back into atrial fibrillation and was started on  sotalol. She converted back to sinus rhythm. She then went back into atrial fibrillation.  Again, ultimately, she had a repeat catheterization that showed severe mitral  insufficiency, borderline coronary disease, and in 2010 had mitral  valve  replacement with a #31 Epic porcine valve and single bypass graft to the posterior  descending artery. She continued to have episodes of rapid atrial fibrillation and left  ventricular dysfunction. She underwent AV node ablation and upgrade of her device to a  bi-V.   She then presented in 09/2013 with complaints of a lot of fatigue, tiredness and weakness.   As part of the evaluation she had an echocardiogram which showed her left ventricular  ejection fraction to be 45%, moderate pulmonary hypertension at 62 mmHg.  A perfusion  stress test showed no evidence of ischemia and she had a sleep study which was positive so  she was started on CPAP.  She had some volume overload and did much better on twice a day diuretic.   She was in the hospital around August 2016 with multiple compression fractures of her back.    She then presented to the hospital in September 2017 with GI bleeding was found to have esophageal ulcers but her INR was also supratherapeutic.  There was treated resolved her hemoglobin stabilized she now comes in for follow-up she's been doing reasonably well.  From a heart standpoint she's had no chest pain or pressure.  She does get short of breath going up steps and walking any incline is worse but she also has severe back pain which makes it difficult for her to walk she denies orthopnea or PND.  She has chronic black stools due to iron therapy but that's been unchanged denies any stroke type symptoms.  Denies any falling spells.  Denies any orthopnea or PND.    Coronary Artery Disease   Symptoms include shortness of breath. Pertinent negatives include no dizziness, muscle weakness or weight gain. Her past medical history is significant for CHF and past myocardial infarction.   Atrial Fibrillation   Symptoms include shortness of breath. Symptoms are negative for dizziness and weakness. Past medical history includes atrial fibrillation, AICD, CAD and CHF.   Congestive Heart Failure   Associated  symptoms include shortness of breath. Pertinent negatives include no abdominal pain, muscle weakness or nocturia. Her past medical history is significant for CAD.         Past Medical History:   Diagnosis Date   • Anemia    • Atrial fibrillation    • Carotid artery stenosis    • Chronic back pain    • Chronic coronary artery disease     moderate to severe LV dysfunction.   • GERD (gastroesophageal reflux disease)    • [place] (hard of hearing)     wears hearing aids   • Hyperlipidemia    • Hypertension    • Leukopenia    • Obesity    • Osteoarthritis    • Peptic ulcer    • Premature ventricular contractions    • Renal insufficiency syndrome    • Scoliosis    • Stroke syndrome    • Thrombocytopenia    • Ventricular tachycardia    • Vitamin B12 deficiency          Past Surgical History:   Procedure Laterality Date   • AV NODE ABLATION     • BREAST BIOPSY     • CARDIAC CATHETERIZATION      Showed severe mitral insufficiency and borderline coronary artery disease   • CARDIAC CATHETERIZATION      Showed an ejection fraction of 35%. She had occlusive disease of the right posterior LV branch and no other significant disease, treated medically.   • CARDIAC DEFIBRILLATOR PLACEMENT      Biventricular   • CARDIOVERSION      multiple electrocardioversions.   • COLONOSCOPY N/A 9/28/2017    Procedure: COLONOSCOPY TO CECUM;  Surgeon: Kevin Davis MD;  Location: [facility] ENDOSCOPY;  Service:    • CORONARY ARTERY BYPASS GRAFT      single graft to the PDA   • CORONARY STENT PLACEMENT     • ENDOSCOPY N/A 9/28/2017    Procedure: ESOPHAGOGASTRODUODENOSCOPY ;  Surgeon: Kevin Davis MD;  Location: [facility] ENDOSCOPY;  Service:    • HEMORRHOIDECTOMY     • HYSTERECTOMY     • MITRAL VALVE REPLACEMENT  01/2010    #31 Epic porcine valve.   • THROMBOENDARTERECTOMY Right     carotid thromboendarterectomy    • TONSILLECTOMY     • TOTAL KNEE ARTHROPLASTY Left          Current Outpatient Prescriptions on File Prior to Visit   Medication Sig Dispense  Refill   • ACETAMINOPHEN PO Take 325 mg by mouth 4 (Four) Times a Day As Needed. Senior choice      • alendronate (FOSAMAX) 70 MG tablet Take 1 tablet by mouth Every 7 (Seven) Days. Set up for 30 minutes drink 8 ounces of water and do not eat 30 minutes 12 tablet 3   • aspirin 81 MG tablet Take 1 tablet by mouth daily.     • calcitriol (ROCALTROL) 0.25 MCG capsule TAKE 1 CAPSULE EVERY OTHER DAY (Patient taking differently: night) 45 capsule 1   • carvedilol (COREG) 25 MG tablet TAKE 1 TABLET TWICE DAILY 180 tablet 3   • Cholecalciferol (VITAMIN D) 2000 UNITS tablet Take 1 tablet by mouth daily.     • Cyanocobalamin (VITAMIN B12 PO) Take 1 tablet by mouth daily. As directed     • epoetin adele (EPOGEN,PROCRIT) 13716 UNIT/ML injection as needed. Procrit 44755 UNIT/ML Injection Solution; Patient Sig: Procrit 27292 UNIT/ML Injection Solution INJECT SUBCUTANEOUSLY AS DIRECTED.; 0; 01-Apr-2014; Active     • FERROUS SULFATE PO Take 324 mg by mouth Daily. Take as directed       • furosemide (LASIX) 40 MG tablet TAKE 1 AND 1/2 TABLETS EVERY MORNING AND 1 TABLET EVERY EVENING (Patient taking differently: 1.5 bid) 225 tablet 3   • HYDROcodone-acetaminophen (NORCO) 7.5-325 MG per tablet Take 1 tablet by mouth Every 6 (Six) Hours As Needed for Moderate Pain . Chronic pain medicine for low back pain 30 tablet 0   • Multiple Vitamin (MULTIVITAMIN) capsule Take 1 capsule by mouth daily.     • pantoprazole (PROTONIX) 40 MG EC tablet TAKE ONE TABLET BY MOUTH TWICE A DAY 60 tablet 0   • ramipril (ALTACE) 5 MG capsule Take 1 capsule by mouth daily.     • simvastatin (ZOCOR) 40 MG tablet TAKE 1 TABLET EVERY DAY (Patient taking differently: TAKE 1/2 TABLET EVERY DAY) 90 tablet 1   • traMADol (ULTRAM) 50 MG tablet TAKE 2 TABLETS EVERY MORNING  AND TAKE 2 TABLETS AT BEDTIME 360 tablet 1   • WARFARIN SODIUM PO 2 mg Daily With Dinner.     • zolpidem (AMBIEN) 10 MG tablet Take 1 tablet by mouth At Night As Needed for Sleep. 90 tablet 1   •  [DISCONTINUED] SSD 1 % cream        No current facility-administered medications on file prior to visit.          Social History     Social History   • Marital status:      Spouse name: Russ   • Number of children: N/A   • Years of education: N/A     Occupational History   • [employer] Retired     Social History Main Topics   • Smoking status: Former Smoker     Types: Cigarettes   • Smokeless tobacco: Never Used   • Alcohol use Yes      Comment: rare   • Drug use: No   • Sexual activity: Defer     Other Topics Concern   • Not on file     Social History Narrative       Family History   Problem Relation Age of Onset   • Cancer Mother    • Breast cancer Mother    • Heart disease Mother    • Hypertension Mother    • Coronary artery disease Father    • Stroke Father    • Heart disease Father    • Hypertension Father    • Other Daughter      thiamin deficiency    • Hypertension Son          Review of Systems   Constitution: Positive for malaise/fatigue. Negative for decreased appetite, diaphoresis, fever, weakness, weight gain and weight loss.   HENT: Positive for hearing loss. Negative for congestion, nosebleeds and tinnitus.    Eyes: Negative for blurred vision, double vision, vision loss in left eye, vision loss in right eye and visual disturbance.   Cardiovascular: Negative for orthopnea.        As noted in HPI   Respiratory: Positive for shortness of breath.         As noted HPI   Endocrine: Negative for cold intolerance and heat intolerance.   Hematologic/Lymphatic: Negative for bleeding problem. Does not bruise/bleed easily.   Skin: Negative for color change, flushing, itching and rash.   Musculoskeletal: Negative for arthritis, back pain, joint pain, joint swelling, muscle weakness and myalgias.   Gastrointestinal: Positive for diarrhea. Negative for bloating, abdominal pain, constipation, dysphagia, heartburn, hematemesis, hematochezia, melena, nausea and vomiting.   Genitourinary: Negative for  "bladder incontinence, dysuria, frequency, nocturia and urgency.   Neurological: Negative for dizziness, focal weakness, headaches, light-headedness, loss of balance, numbness, paresthesias and vertigo.   Psychiatric/Behavioral: Negative for depression, memory loss and substance abuse.       Procedures      ECG 12 Lead  Date/Time: 12/12/2017 12:20 PM  Performed by: RISHI LAWRENCE  Authorized by: RISHI LAWRENCE   Comparison: compared with previous ECG   Similar to previous ECG  Rhythm: atrial fibrillation  Rhythm comments: Ventricular paced  Rate: normal               Objective:    /70 (BP Location: Right arm)  Pulse 77  Ht 165.1 cm (65\")  Wt 73.7 kg (162 lb 8 oz)  BMI 27.04 kg/m2       Physical Exam  Physical Exam   Constitutional: She is oriented to person, place, and time. She appears well-developed and well-nourished. No distress.   HENT:   Head: Normocephalic.   Eyes: Conjunctivae are normal. Pupils are equal, round, and reactive to light. No scleral icterus.   Neck: Normal carotid pulses, no hepatojugular reflux and no JVD present. Carotid bruit is not present. No tracheal deviation, no edema and no erythema present. No thyromegaly present.   Cardiovascular: Normal rate, regular rhythm, S1 normal, S2 normal, normal heart sounds and intact distal pulses.   No extrasystoles are present. PMI is not displaced.  Exam reveals no distant heart sounds and no friction rub.    No murmur heard.  Pulses:       Carotid pulses are 2+ on the right side with bruit, and 2+ on the left side with bruit.       Radial pulses are 2+ on the right side, and 2+ on the left side.        Femoral pulses are 2+ on the right side, and 2+ on the left side.       Dorsalis pedis pulses are 2+ on the right side, and 2+ on the left side.        Posterior tibial pulses are 2+ on the right side, and 2+ on the left side.   Pulmonary/Chest: Effort normal and breath sounds normal. No respiratory distress. She has no decreased breath " sounds. She has no wheezes. She has no rhonchi. She has no rales. She exhibits no tenderness.   Abdominal: Soft. Bowel sounds are normal. She exhibits no distension and no mass. There is no hepatosplenomegaly. There is no tenderness. There is no rebound and no guarding.   Musculoskeletal: She exhibits edema (1+ bilateral tibial chronic discoloration). She exhibits no tenderness or deformity.   Neurological: She is alert and oriented to person, place, and time.   Skin: Skin is warm and dry. No rash noted. She is not diaphoretic. No cyanosis or erythema. No pallor. Nails show no clubbing.   Psychiatric: She has a normal mood and affect. Her speech is normal and behavior is normal. Judgment and thought content normal.           Assessment:   1. This is a 77-year-old female with history of severe ischemic heart disease, previous angioplasty of the right coronary artery, and then inferior infarct in February 2007.  Ultimately, she had single-vessel bypass grafting to the posterior descending artery at the time of her mitral valve replacement, moderate left ventricular dysfunction.  Echocardiogram in 11/15 showed left ventricular ejection fraction of 36%.   Coronary Artery Disease  Assessment  • The patient has no angina    Plan  • Lifestyle modifications discussed include adhering to a heart healthy diet, avoidance of tobacco products, maintenance of a healthy weight, medication compliance, regular exercise and regular monitoring of cholesterol and blood pressure    Subjective - Objective  • There is a history of past MI  • There is a history of previous coronary artery bypass graft  • There has been a previous POBA  • Current antiplatelet therapy includes aspirin 81 mg    Heart Failure  Assessment  • NYHA class II - There is slight limitation of physical activity. The patient is comfortable at rest, but physical activity results in fatigue, palpitations or shortness of breath.  • ACE inhibitor prescribed  • Beta blocker  prescribed  • Diuretics prescribed  • Left ventricular function is moderately reduced by qualitative assessment    Plan  • The patient has received heart failure education on the following topics: dietary sodium restriction, minimizing or avoiding NSAID use, symptom management, physical activity and weight monitoring  • The heart failure care plan was discussed with the patient today including: continuing the current program  •  The patient has been counseled about ICD or CRT-D implantation    Subjective/Objective  • The patient reports dyspnea    • Physical exam findings negative for rales and elevated JVP.  • The patient has an ICD implant  • The patient has a CRT-D implant  • The patient has a CRT implant       Now doing well. She is to return for a follow up in six months.      2. History of mitral insufficiency status post mitral valve replacement with a tissue valve as above. Echocardiogram today showed left ventricular ejection fraction of approximate 40% with segmental wall motion abnormality. Normal functioning prosthetic mitral valve moderate tricuspid insufficiency with moderate pulmonary hypertension. She's to continue the same will see us in follow-up in 6 months.  3. Atrial fibrillation/sick sinus syndrome. Failed multiple cardioversions, multiple medications. Failed pulmonary vein isolation. Now status post AV node ablation and BiV  Defibrillator.  Atrial Fibrillation and Atrial Flutter  Assessment  • The patient has permanent atrial fibrillation  • This is valvular in etiology  • The patient's CHADS2-VASc score is 6  • A [id number] score of 2 or more is considered a high risk for a thromboembolic event  • Warfarin prescribed    Plan  • Continue in atrial fibrillation with rate control  • Continue warfarin for antithrombotic therapy, bleeding issues discussed  • Continue beta blocker for rate control    Subjective - Objective  • The patient underwent cardioversion   • The patient had atrial  fibrillation ablation   • The patient had a recurrence of atrial fibrillation since ablation           4. Cerebral vascular disease status post carotid endarterectomy.  Follow up carotid ultrasound 6/17 showed mild disease on the right moderate on the left continue follow periodic carotid ultrasounds.   5. Hypertension. Blood pressure adequately controlled.  6. Renal insufficiency. Followed by nephrology, Dr. Cruz.  7. Hyperlipidemia, on therapy. Followed in your office.  8. Nonsustained ventricular tachycardia. treated with antitachycardia pacing. Asymptomatic.   Will continue the same if she has increased episodes will need to treat.  9. Pulmonary hypertension. This is most likely secondary to sleep apnea and atrial fibrillation.  Echocardiogram today unchanged RV systolic pressure of 55 mm or mercury.  10. Sleep Apnea. Now on CPAP.    11. Compression fracture of her spine and scoliosis.          Plan:

## 2017-12-20 ENCOUNTER — LAB (OUTPATIENT)
Dept: LAB | Facility: HOSPITAL | Age: 77
End: 2017-12-20

## 2017-12-20 ENCOUNTER — TELEPHONE (OUTPATIENT)
Dept: ONCOLOGY | Facility: CLINIC | Age: 77
End: 2017-12-20

## 2017-12-20 ENCOUNTER — HOSPITAL ENCOUNTER (OUTPATIENT)
Dept: CARDIOLOGY | Facility: HOSPITAL | Age: 77
Setting detail: RECURRING SERIES
Discharge: HOME | End: 2017-12-20

## 2017-12-20 ENCOUNTER — INFUSION (OUTPATIENT)
Dept: ONCOLOGY | Facility: HOSPITAL | Age: 77
End: 2017-12-20

## 2017-12-20 DIAGNOSIS — D63.1 ANEMIA IN STAGE 3 CHRONIC KIDNEY DISEASE (HCC): Primary | ICD-10-CM

## 2017-12-20 DIAGNOSIS — N18.30 ANEMIA IN STAGE 3 CHRONIC KIDNEY DISEASE (HCC): ICD-10-CM

## 2017-12-20 DIAGNOSIS — E53.8 B12 DEFICIENCY: ICD-10-CM

## 2017-12-20 DIAGNOSIS — D69.6 THROMBOCYTOPENIA (HCC): ICD-10-CM

## 2017-12-20 DIAGNOSIS — D63.1 ANEMIA IN STAGE 3 CHRONIC KIDNEY DISEASE (HCC): ICD-10-CM

## 2017-12-20 DIAGNOSIS — N18.30 ANEMIA IN STAGE 3 CHRONIC KIDNEY DISEASE (HCC): Primary | ICD-10-CM

## 2017-12-20 LAB
BASOPHILS # BLD AUTO: 0.01 10*3/MM3 (ref 0–0.1)
BASOPHILS NFR BLD AUTO: 0.2 % (ref 0–1.1)
DEPRECATED RDW RBC AUTO: 47.7 FL (ref 37–49)
EOSINOPHIL # BLD AUTO: 0.06 10*3/MM3 (ref 0–0.36)
EOSINOPHIL NFR BLD AUTO: 0.9 % (ref 1–5)
ERYTHROCYTE [DISTWIDTH] IN BLOOD BY AUTOMATED COUNT: 14.4 % (ref 11.7–14.5)
HCT VFR BLD AUTO: 27.7 % (ref 34–45)
HGB BLD-MCNC: 8.7 G/DL (ref 11.5–14.9)
IMM GRANULOCYTES # BLD: 0.03 10*3/MM3 (ref 0–0.03)
IMM GRANULOCYTES NFR BLD: 0.5 % (ref 0–0.5)
LYMPHOCYTES # BLD AUTO: 0.5 10*3/MM3 (ref 1–3.5)
LYMPHOCYTES NFR BLD AUTO: 7.7 % (ref 20–49)
MCH RBC QN AUTO: 28.5 PG (ref 27–33)
MCHC RBC AUTO-ENTMCNC: 31.4 G/DL (ref 32–35)
MCV RBC AUTO: 90.8 FL (ref 83–97)
MONOCYTES # BLD AUTO: 0.57 10*3/MM3 (ref 0.25–0.8)
MONOCYTES NFR BLD AUTO: 8.8 % (ref 4–12)
NEUTROPHILS # BLD AUTO: 5.3 10*3/MM3 (ref 1.5–7)
NEUTROPHILS NFR BLD AUTO: 81.9 % (ref 39–75)
NRBC BLD MANUAL-RTO: 0 /100 WBC (ref 0–0)
PLATELET # BLD AUTO: 145 10*3/MM3 (ref 150–375)
PMV BLD AUTO: 10.3 FL (ref 8.9–12.1)
RBC # BLD AUTO: 3.05 10*6/MM3 (ref 3.9–5)
WBC NRBC COR # BLD: 6.47 10*3/MM3 (ref 4–10)

## 2017-12-20 PROCEDURE — 85025 COMPLETE CBC W/AUTO DIFF WBC: CPT | Performed by: INTERNAL MEDICINE

## 2017-12-20 PROCEDURE — 85610 PROTHROMBIN TIME: CPT

## 2017-12-20 PROCEDURE — 96372 THER/PROPH/DIAG INJ SC/IM: CPT

## 2017-12-20 PROCEDURE — 63510000001 EPOETIN ALFA PER 1000 UNITS: Performed by: INTERNAL MEDICINE

## 2017-12-20 PROCEDURE — 36416 COLLJ CAPILLARY BLOOD SPEC: CPT

## 2017-12-20 PROCEDURE — 36416 COLLJ CAPILLARY BLOOD SPEC: CPT | Performed by: INTERNAL MEDICINE

## 2017-12-20 RX ADMIN — ERYTHROPOIETIN 4000 UNITS: 20000 INJECTION, SOLUTION INTRAVENOUS; SUBCUTANEOUS at 12:59

## 2017-12-20 NOTE — TELEPHONE ENCOUNTER
----- Message from Edward Vasquez MD sent at 12/20/2017  1:50 PM EST -----  The patient's hemoglobin has decreased on today's CBC.  Please schedule her to return in 2 weeks for CBC, ferritin, iron panel and Procrit.    Thank you

## 2017-12-22 ENCOUNTER — TELEPHONE (OUTPATIENT)
Dept: FAMILY MEDICINE CLINIC | Facility: CLINIC | Age: 77
End: 2017-12-22

## 2017-12-22 NOTE — TELEPHONE ENCOUNTER
WOULD LIKE KAMARI TO CALL BACK, [facility] WIFE IS IN SEVER BACK AND STOMACH PAIN AND NO APPT. AVAILABLE UNTIL JAN. 10TH.

## 2017-12-24 ENCOUNTER — APPOINTMENT (OUTPATIENT)
Dept: GENERAL RADIOLOGY | Facility: HOSPITAL | Age: 77
End: 2017-12-24

## 2017-12-24 ENCOUNTER — APPOINTMENT (OUTPATIENT)
Dept: CT IMAGING | Facility: HOSPITAL | Age: 77
End: 2017-12-24

## 2017-12-24 ENCOUNTER — HOSPITAL ENCOUNTER (OUTPATIENT)
Facility: HOSPITAL | Age: 77
Setting detail: OBSERVATION
Discharge: HOME HEALTH | End: 2017-12-27
Attending: EMERGENCY MEDICINE | Admitting: HOSPITALIST

## 2017-12-24 DIAGNOSIS — R79.1 SUPRATHERAPEUTIC INR: ICD-10-CM

## 2017-12-24 DIAGNOSIS — S42.292A OTHER CLOSED DISPLACED FRACTURE OF PROXIMAL END OF LEFT HUMERUS, INITIAL ENCOUNTER: Primary | ICD-10-CM

## 2017-12-24 DIAGNOSIS — R26.89 DECREASED MOBILITY: ICD-10-CM

## 2017-12-24 DIAGNOSIS — S09.90XA INJURY OF HEAD, INITIAL ENCOUNTER: ICD-10-CM

## 2017-12-24 DIAGNOSIS — R79.1 ELEVATED INR: ICD-10-CM

## 2017-12-24 DIAGNOSIS — Z91.81 RISK FOR FALLS: ICD-10-CM

## 2017-12-24 PROBLEM — S42.202A CLOSED FRACTURE OF LEFT PROXIMAL HUMERUS: Status: ACTIVE | Noted: 2017-12-24

## 2017-12-24 PROBLEM — I50.42 CHRONIC COMBINED SYSTOLIC AND DIASTOLIC CONGESTIVE HEART FAILURE: Status: ACTIVE | Noted: 2017-12-24

## 2017-12-24 PROBLEM — M80.00XA OSTEOPOROSIS WITH PATHOLOGICAL FRACTURE: Status: ACTIVE | Noted: 2017-12-24

## 2017-12-24 PROBLEM — I10 HYPERTENSION: Status: ACTIVE | Noted: 2017-12-24

## 2017-12-24 LAB
ALBUMIN SERPL-MCNC: 3.3 G/DL (ref 3.5–5.2)
ALBUMIN/GLOB SERPL: 0.9 G/DL
ALP SERPL-CCNC: 72 U/L (ref 39–117)
ALT SERPL W P-5'-P-CCNC: 56 U/L (ref 1–33)
ANION GAP SERPL CALCULATED.3IONS-SCNC: 14.1 MMOL/L
AST SERPL-CCNC: 69 U/L (ref 1–32)
BASOPHILS # BLD AUTO: 0.01 10*3/MM3 (ref 0–0.2)
BASOPHILS NFR BLD AUTO: 0.2 % (ref 0–1.5)
BILIRUB SERPL-MCNC: 0.4 MG/DL (ref 0.1–1.2)
BUN BLD-MCNC: 35 MG/DL (ref 8–23)
BUN/CREAT SERPL: 25.2 (ref 7–25)
CALCIUM SPEC-SCNC: 9.4 MG/DL (ref 8.6–10.5)
CHLORIDE SERPL-SCNC: 101 MMOL/L (ref 98–107)
CO2 SERPL-SCNC: 28.9 MMOL/L (ref 22–29)
CREAT BLD-MCNC: 1.39 MG/DL (ref 0.57–1)
DEPRECATED RDW RBC AUTO: 50.3 FL (ref 37–54)
EOSINOPHIL # BLD AUTO: 0.18 10*3/MM3 (ref 0–0.7)
EOSINOPHIL NFR BLD AUTO: 3.4 % (ref 0.3–6.2)
ERYTHROCYTE [DISTWIDTH] IN BLOOD BY AUTOMATED COUNT: 14.7 % (ref 11.7–13)
GFR SERPL CREATININE-BSD FRML MDRD: 37 ML/MIN/1.73
GLOBULIN UR ELPH-MCNC: 3.5 GM/DL
GLUCOSE BLD-MCNC: 108 MG/DL (ref 65–99)
HCT VFR BLD AUTO: 30.1 % (ref 35.6–45.5)
HGB BLD-MCNC: 9.2 G/DL (ref 11.9–15.5)
IMM GRANULOCYTES # BLD: 0.02 10*3/MM3 (ref 0–0.03)
IMM GRANULOCYTES NFR BLD: 0.4 % (ref 0–0.5)
INR PPP: 4.44 (ref 0.9–1.1)
LYMPHOCYTES # BLD AUTO: 0.73 10*3/MM3 (ref 0.9–4.8)
LYMPHOCYTES NFR BLD AUTO: 13.6 % (ref 19.6–45.3)
MCH RBC QN AUTO: 28.8 PG (ref 26.9–32)
MCHC RBC AUTO-ENTMCNC: 30.6 G/DL (ref 32.4–36.3)
MCV RBC AUTO: 94.1 FL (ref 80.5–98.2)
MONOCYTES # BLD AUTO: 0.59 10*3/MM3 (ref 0.2–1.2)
MONOCYTES NFR BLD AUTO: 11 % (ref 5–12)
NEUTROPHILS # BLD AUTO: 3.84 10*3/MM3 (ref 1.9–8.1)
NEUTROPHILS NFR BLD AUTO: 71.4 % (ref 42.7–76)
PLATELET # BLD AUTO: 228 10*3/MM3 (ref 140–500)
PMV BLD AUTO: 9.9 FL (ref 6–12)
POTASSIUM BLD-SCNC: 3.5 MMOL/L (ref 3.5–5.2)
PROT SERPL-MCNC: 6.8 G/DL (ref 6–8.5)
PROTHROMBIN TIME: 41 SECONDS (ref 11.7–14.2)
RBC # BLD AUTO: 3.2 10*6/MM3 (ref 3.9–5.2)
SODIUM BLD-SCNC: 144 MMOL/L (ref 136–145)
WBC NRBC COR # BLD: 5.37 10*3/MM3 (ref 4.5–10.7)

## 2017-12-24 PROCEDURE — 25010000002 MORPHINE PER 10 MG: Performed by: EMERGENCY MEDICINE

## 2017-12-24 PROCEDURE — 85610 PROTHROMBIN TIME: CPT | Performed by: PHYSICIAN ASSISTANT

## 2017-12-24 PROCEDURE — 70450 CT HEAD/BRAIN W/O DYE: CPT

## 2017-12-24 PROCEDURE — 85025 COMPLETE CBC W/AUTO DIFF WBC: CPT | Performed by: PHYSICIAN ASSISTANT

## 2017-12-24 PROCEDURE — G0378 HOSPITAL OBSERVATION PER HR: HCPCS

## 2017-12-24 PROCEDURE — 80053 COMPREHEN METABOLIC PANEL: CPT | Performed by: PHYSICIAN ASSISTANT

## 2017-12-24 PROCEDURE — 96374 THER/PROPH/DIAG INJ IV PUSH: CPT

## 2017-12-24 PROCEDURE — 73030 X-RAY EXAM OF SHOULDER: CPT

## 2017-12-24 PROCEDURE — 96376 TX/PRO/DX INJ SAME DRUG ADON: CPT

## 2017-12-24 PROCEDURE — 99284 EMERGENCY DEPT VISIT MOD MDM: CPT

## 2017-12-24 PROCEDURE — 72125 CT NECK SPINE W/O DYE: CPT

## 2017-12-24 PROCEDURE — 25010000002 MORPHINE PER 10 MG: Performed by: HOSPITALIST

## 2017-12-24 RX ORDER — ONDANSETRON 4 MG/1
4 TABLET, FILM COATED ORAL EVERY 6 HOURS PRN
Status: DISCONTINUED | OUTPATIENT
Start: 2017-12-24 | End: 2017-12-27 | Stop reason: HOSPADM

## 2017-12-24 RX ORDER — MORPHINE SULFATE 2 MG/ML
2 INJECTION, SOLUTION INTRAMUSCULAR; INTRAVENOUS ONCE
Status: COMPLETED | OUTPATIENT
Start: 2017-12-24 | End: 2017-12-24

## 2017-12-24 RX ORDER — MORPHINE SULFATE 2 MG/ML
2 INJECTION, SOLUTION INTRAMUSCULAR; INTRAVENOUS EVERY 4 HOURS PRN
Status: DISCONTINUED | OUTPATIENT
Start: 2017-12-24 | End: 2017-12-27 | Stop reason: HOSPADM

## 2017-12-24 RX ORDER — OXYCODONE HYDROCHLORIDE AND ACETAMINOPHEN 5; 325 MG/1; MG/1
1 TABLET ORAL ONCE
Status: COMPLETED | OUTPATIENT
Start: 2017-12-24 | End: 2017-12-24

## 2017-12-24 RX ORDER — SENNA AND DOCUSATE SODIUM 50; 8.6 MG/1; MG/1
2 TABLET, FILM COATED ORAL NIGHTLY
Status: DISCONTINUED | OUTPATIENT
Start: 2017-12-24 | End: 2017-12-27 | Stop reason: HOSPADM

## 2017-12-24 RX ORDER — ONDANSETRON 4 MG/1
4 TABLET, ORALLY DISINTEGRATING ORAL ONCE
Status: DISCONTINUED | OUTPATIENT
Start: 2017-12-24 | End: 2017-12-24

## 2017-12-24 RX ORDER — ONDANSETRON 4 MG/1
4 TABLET, ORALLY DISINTEGRATING ORAL ONCE
Status: COMPLETED | OUTPATIENT
Start: 2017-12-24 | End: 2017-12-24

## 2017-12-24 RX ORDER — ACETAMINOPHEN 325 MG/1
650 TABLET ORAL EVERY 6 HOURS PRN
Status: DISCONTINUED | OUTPATIENT
Start: 2017-12-24 | End: 2017-12-27 | Stop reason: HOSPADM

## 2017-12-24 RX ORDER — ONDANSETRON 4 MG/1
4 TABLET, ORALLY DISINTEGRATING ORAL EVERY 6 HOURS PRN
Status: DISCONTINUED | OUTPATIENT
Start: 2017-12-24 | End: 2017-12-27 | Stop reason: HOSPADM

## 2017-12-24 RX ORDER — ONDANSETRON 2 MG/ML
4 INJECTION INTRAMUSCULAR; INTRAVENOUS EVERY 6 HOURS PRN
Status: DISCONTINUED | OUTPATIENT
Start: 2017-12-24 | End: 2017-12-27 | Stop reason: HOSPADM

## 2017-12-24 RX ORDER — OXYCODONE HYDROCHLORIDE AND ACETAMINOPHEN 5; 325 MG/1; MG/1
2 TABLET ORAL EVERY 4 HOURS PRN
Status: DISCONTINUED | OUTPATIENT
Start: 2017-12-24 | End: 2017-12-27 | Stop reason: HOSPADM

## 2017-12-24 RX ORDER — OXYCODONE HYDROCHLORIDE AND ACETAMINOPHEN 5; 325 MG/1; MG/1
1 TABLET ORAL ONCE
Status: DISCONTINUED | OUTPATIENT
Start: 2017-12-24 | End: 2017-12-24

## 2017-12-24 RX ORDER — OXYCODONE HYDROCHLORIDE AND ACETAMINOPHEN 5; 325 MG/1; MG/1
1 TABLET ORAL EVERY 4 HOURS PRN
Status: DISCONTINUED | OUTPATIENT
Start: 2017-12-24 | End: 2017-12-26

## 2017-12-24 RX ADMIN — OXYCODONE HYDROCHLORIDE AND ACETAMINOPHEN 2 TABLET: 5; 325 TABLET ORAL at 23:20

## 2017-12-24 RX ADMIN — OXYCODONE HYDROCHLORIDE AND ACETAMINOPHEN 2 TABLET: 5; 325 TABLET ORAL at 19:06

## 2017-12-24 RX ADMIN — MORPHINE SULFATE 2 MG: 2 INJECTION, SOLUTION INTRAMUSCULAR; INTRAVENOUS at 15:01

## 2017-12-24 RX ADMIN — ONDANSETRON 4 MG: 4 TABLET, ORALLY DISINTEGRATING ORAL at 04:47

## 2017-12-24 RX ADMIN — OXYCODONE HYDROCHLORIDE AND ACETAMINOPHEN 1 TABLET: 5; 325 TABLET ORAL at 04:47

## 2017-12-24 RX ADMIN — MORPHINE SULFATE 2 MG: 2 INJECTION, SOLUTION INTRAMUSCULAR; INTRAVENOUS at 05:57

## 2017-12-25 LAB
25(OH)D3 SERPL-MCNC: 36.2 NG/ML (ref 30–100)
ALBUMIN SERPL-MCNC: 2.9 G/DL (ref 3.5–5.2)
ALBUMIN/GLOB SERPL: 0.9 G/DL
ALP SERPL-CCNC: 63 U/L (ref 39–117)
ALT SERPL W P-5'-P-CCNC: 40 U/L (ref 1–33)
ANION GAP SERPL CALCULATED.3IONS-SCNC: 12.6 MMOL/L
AST SERPL-CCNC: 33 U/L (ref 1–32)
BILIRUB SERPL-MCNC: 0.4 MG/DL (ref 0.1–1.2)
BUN BLD-MCNC: 32 MG/DL (ref 8–23)
BUN/CREAT SERPL: 23.7 (ref 7–25)
CALCIUM SPEC-SCNC: 9.3 MG/DL (ref 8.6–10.5)
CHLORIDE SERPL-SCNC: 101 MMOL/L (ref 98–107)
CO2 SERPL-SCNC: 29.4 MMOL/L (ref 22–29)
CREAT BLD-MCNC: 1.35 MG/DL (ref 0.57–1)
DEPRECATED RDW RBC AUTO: 50.5 FL (ref 37–54)
ERYTHROCYTE [DISTWIDTH] IN BLOOD BY AUTOMATED COUNT: 14.5 % (ref 11.7–13)
GFR SERPL CREATININE-BSD FRML MDRD: 38 ML/MIN/1.73
GLOBULIN UR ELPH-MCNC: 3.3 GM/DL
GLUCOSE BLD-MCNC: 101 MG/DL (ref 65–99)
HCT VFR BLD AUTO: 28 % (ref 35.6–45.5)
HGB BLD-MCNC: 8.4 G/DL (ref 11.9–15.5)
INR PPP: 5.37 (ref 0.9–1.1)
MCH RBC QN AUTO: 28.5 PG (ref 26.9–32)
MCHC RBC AUTO-ENTMCNC: 30 G/DL (ref 32.4–36.3)
MCV RBC AUTO: 94.9 FL (ref 80.5–98.2)
PLATELET # BLD AUTO: 210 10*3/MM3 (ref 140–500)
PMV BLD AUTO: 9.6 FL (ref 6–12)
POTASSIUM BLD-SCNC: 3.7 MMOL/L (ref 3.5–5.2)
PROT SERPL-MCNC: 6.2 G/DL (ref 6–8.5)
PROTHROMBIN TIME: 47.6 SECONDS (ref 11.7–14.2)
RBC # BLD AUTO: 2.95 10*6/MM3 (ref 3.9–5.2)
SODIUM BLD-SCNC: 143 MMOL/L (ref 136–145)
WBC NRBC COR # BLD: 5.57 10*3/MM3 (ref 4.5–10.7)

## 2017-12-25 PROCEDURE — G0378 HOSPITAL OBSERVATION PER HR: HCPCS

## 2017-12-25 PROCEDURE — 82306 VITAMIN D 25 HYDROXY: CPT | Performed by: HOSPITALIST

## 2017-12-25 PROCEDURE — 85610 PROTHROMBIN TIME: CPT | Performed by: HOSPITALIST

## 2017-12-25 PROCEDURE — 25010000002 MORPHINE PER 10 MG: Performed by: HOSPITALIST

## 2017-12-25 PROCEDURE — 85027 COMPLETE CBC AUTOMATED: CPT | Performed by: HOSPITALIST

## 2017-12-25 PROCEDURE — 96376 TX/PRO/DX INJ SAME DRUG ADON: CPT

## 2017-12-25 PROCEDURE — 80053 COMPREHEN METABOLIC PANEL: CPT | Performed by: HOSPITALIST

## 2017-12-25 RX ORDER — CARVEDILOL 25 MG/1
25 TABLET ORAL EVERY 12 HOURS SCHEDULED
Status: DISCONTINUED | OUTPATIENT
Start: 2017-12-25 | End: 2017-12-27 | Stop reason: HOSPADM

## 2017-12-25 RX ORDER — PANTOPRAZOLE SODIUM 40 MG/1
40 TABLET, DELAYED RELEASE ORAL EVERY MORNING
Status: DISCONTINUED | OUTPATIENT
Start: 2017-12-25 | End: 2017-12-27 | Stop reason: HOSPADM

## 2017-12-25 RX ORDER — ZOLPIDEM TARTRATE 5 MG/1
5 TABLET ORAL NIGHTLY PRN
Status: DISCONTINUED | OUTPATIENT
Start: 2017-12-25 | End: 2017-12-27 | Stop reason: HOSPADM

## 2017-12-25 RX ORDER — CALCITRIOL 0.25 UG/1
0.25 CAPSULE, LIQUID FILLED ORAL EVERY OTHER DAY
Status: DISCONTINUED | OUTPATIENT
Start: 2017-12-25 | End: 2017-12-27 | Stop reason: HOSPADM

## 2017-12-25 RX ORDER — RAMIPRIL 5 MG/1
5 CAPSULE ORAL DAILY
Status: DISCONTINUED | OUTPATIENT
Start: 2017-12-25 | End: 2017-12-27 | Stop reason: HOSPADM

## 2017-12-25 RX ORDER — ATORVASTATIN CALCIUM 20 MG/1
20 TABLET, FILM COATED ORAL DAILY
Status: DISCONTINUED | OUTPATIENT
Start: 2017-12-25 | End: 2017-12-27 | Stop reason: HOSPADM

## 2017-12-25 RX ORDER — ASPIRIN 81 MG/1
81 TABLET ORAL DAILY
Status: DISCONTINUED | OUTPATIENT
Start: 2017-12-25 | End: 2017-12-27 | Stop reason: HOSPADM

## 2017-12-25 RX ADMIN — OXYCODONE HYDROCHLORIDE AND ACETAMINOPHEN 2 TABLET: 5; 325 TABLET ORAL at 21:47

## 2017-12-25 RX ADMIN — ASPIRIN 81 MG: 81 TABLET ORAL at 13:14

## 2017-12-25 RX ADMIN — CARVEDILOL 25 MG: 25 TABLET, FILM COATED ORAL at 13:13

## 2017-12-25 RX ADMIN — RAMIPRIL 5 MG: 5 CAPSULE ORAL at 13:12

## 2017-12-25 RX ADMIN — DOCUSATE SODIUM -SENNOSIDES 2 TABLET: 50; 8.6 TABLET, COATED ORAL at 21:47

## 2017-12-25 RX ADMIN — OXYCODONE HYDROCHLORIDE AND ACETAMINOPHEN 2 TABLET: 5; 325 TABLET ORAL at 17:45

## 2017-12-25 RX ADMIN — OXYCODONE HYDROCHLORIDE AND ACETAMINOPHEN 2 TABLET: 5; 325 TABLET ORAL at 07:21

## 2017-12-25 RX ADMIN — ATORVASTATIN CALCIUM 20 MG: 20 TABLET, FILM COATED ORAL at 13:14

## 2017-12-25 RX ADMIN — OXYCODONE HYDROCHLORIDE AND ACETAMINOPHEN 2 TABLET: 5; 325 TABLET ORAL at 03:20

## 2017-12-25 RX ADMIN — OXYCODONE HYDROCHLORIDE AND ACETAMINOPHEN 2 TABLET: 5; 325 TABLET ORAL at 12:03

## 2017-12-25 RX ADMIN — FUROSEMIDE 60 MG: 40 TABLET ORAL at 13:13

## 2017-12-25 RX ADMIN — PANTOPRAZOLE SODIUM 40 MG: 40 TABLET, DELAYED RELEASE ORAL at 13:15

## 2017-12-25 RX ADMIN — MORPHINE SULFATE 2 MG: 2 INJECTION, SOLUTION INTRAMUSCULAR; INTRAVENOUS at 02:27

## 2017-12-25 RX ADMIN — CARVEDILOL 25 MG: 25 TABLET, FILM COATED ORAL at 21:47

## 2017-12-26 ENCOUNTER — DOCUMENTATION (OUTPATIENT)
Dept: ORTHOPEDIC SURGERY | Facility: CLINIC | Age: 77
End: 2017-12-26

## 2017-12-26 LAB
INR PPP: 5.51 (ref 0.9–1.1)
PROTHROMBIN TIME: 48.6 SECONDS (ref 11.7–14.2)

## 2017-12-26 PROCEDURE — G8979 MOBILITY GOAL STATUS: HCPCS

## 2017-12-26 PROCEDURE — G8978 MOBILITY CURRENT STATUS: HCPCS

## 2017-12-26 PROCEDURE — 85610 PROTHROMBIN TIME: CPT | Performed by: HOSPITALIST

## 2017-12-26 PROCEDURE — 25010000002 VITAMIN K1 1 MG/1 ML SOLUTION: Performed by: INTERNAL MEDICINE

## 2017-12-26 PROCEDURE — G0378 HOSPITAL OBSERVATION PER HR: HCPCS

## 2017-12-26 PROCEDURE — 97162 PT EVAL MOD COMPLEX 30 MIN: CPT

## 2017-12-26 RX ORDER — IBUPROFEN 200 MG
TABLET ORAL DAILY
Status: DISCONTINUED | OUTPATIENT
Start: 2017-12-26 | End: 2017-12-27 | Stop reason: HOSPADM

## 2017-12-26 RX ORDER — PHYTONADIONE 2 MG/ML
5 INJECTION, EMULSION INTRAMUSCULAR; INTRAVENOUS; SUBCUTANEOUS ONCE
Status: COMPLETED | OUTPATIENT
Start: 2017-12-26 | End: 2017-12-26

## 2017-12-26 RX ORDER — OXYCODONE HYDROCHLORIDE AND ACETAMINOPHEN 5; 325 MG/1; MG/1
1 TABLET ORAL EVERY 4 HOURS PRN
Status: DISCONTINUED | OUTPATIENT
Start: 2017-12-24 | End: 2017-12-27 | Stop reason: HOSPADM

## 2017-12-26 RX ADMIN — ONDANSETRON 4 MG: 4 TABLET, ORALLY DISINTEGRATING ORAL at 08:37

## 2017-12-26 RX ADMIN — PHYTONADIONE 5 MG: 1 INJECTION, EMULSION INTRAMUSCULAR; INTRAVENOUS; SUBCUTANEOUS at 22:45

## 2017-12-26 RX ADMIN — OXYCODONE HYDROCHLORIDE AND ACETAMINOPHEN 2 TABLET: 5; 325 TABLET ORAL at 16:39

## 2017-12-26 RX ADMIN — DOCUSATE SODIUM -SENNOSIDES 2 TABLET: 50; 8.6 TABLET, COATED ORAL at 22:41

## 2017-12-26 RX ADMIN — CARVEDILOL 25 MG: 25 TABLET, FILM COATED ORAL at 10:24

## 2017-12-26 RX ADMIN — POLYETHYLENE GLYCOL (3350) 17 G: 17 POWDER, FOR SOLUTION ORAL at 10:32

## 2017-12-26 RX ADMIN — OXYCODONE HYDROCHLORIDE AND ACETAMINOPHEN 2 TABLET: 5; 325 TABLET ORAL at 12:43

## 2017-12-26 RX ADMIN — RAMIPRIL 5 MG: 5 CAPSULE ORAL at 10:24

## 2017-12-26 RX ADMIN — PANTOPRAZOLE SODIUM 40 MG: 40 TABLET, DELAYED RELEASE ORAL at 03:13

## 2017-12-26 RX ADMIN — FUROSEMIDE 60 MG: 40 TABLET ORAL at 10:24

## 2017-12-26 RX ADMIN — OXYCODONE HYDROCHLORIDE AND ACETAMINOPHEN 2 TABLET: 5; 325 TABLET ORAL at 03:13

## 2017-12-26 RX ADMIN — OXYCODONE HYDROCHLORIDE AND ACETAMINOPHEN 2 TABLET: 5; 325 TABLET ORAL at 22:40

## 2017-12-26 RX ADMIN — ATORVASTATIN CALCIUM 20 MG: 20 TABLET, FILM COATED ORAL at 22:40

## 2017-12-26 RX ADMIN — OXYCODONE HYDROCHLORIDE AND ACETAMINOPHEN 2 TABLET: 5; 325 TABLET ORAL at 08:44

## 2017-12-26 RX ADMIN — CARVEDILOL 25 MG: 25 TABLET, FILM COATED ORAL at 22:40

## 2017-12-26 RX ADMIN — BACITRACIN ZINC NEOMYCIN SULFATE POLYMYXIN B SULFATE: 400; 3.5; 5 OINTMENT TOPICAL at 16:43

## 2017-12-26 RX ADMIN — ASPIRIN 81 MG: 81 TABLET ORAL at 10:24

## 2017-12-26 NOTE — PROGRESS NOTES
Orthopedic Consult      Patient: Janel Mahoney    Date of Admission: No admission date for patient encounter.    YOB: 1940    Medical Record Number: [id number]    Attending Physician: Estelita att. providers found  Consulting Physician:  Kennedy Austin M.D.      Chief Complaints: Fall with left proximal humerus fracture and pain on the shoulder with any attempted movement      History of Present Illness: 77 y.o. female [facility] [place] to services of the hospitalist with acute onset of pain following an injury. I was consulted for further evaluation and treatment. Onset of symptoms was sudden in onset after a fall.  Symptoms are associated with pain in the shoulder associated with bruising and difficulty in abducting the arm.  Symptoms are aggravated by any attempted movement at the shoulder.   Symptoms improve with using his sling for the upper extremity and resting the arm.     Allergies   Allergen Reactions   • Diclofenac      She had adverse effects from the medications that required hospitalization. Pt got stomach ulcers from this medication prescribed by Dr. Arango - pediatrist.   • Dofetilide      Pt states not allergic.        Medications:   Home Medications:    (Not in a hospital admission)    Current Medications:  Scheduled Meds:  Continuous Infusions:  No current facility-administered medications for this visit.   PRN Meds:.       Past Medical History:   Diagnosis Date   • Anemia    • Atrial fibrillation    • Carotid artery stenosis    • Chronic back pain    • Chronic coronary artery disease     moderate to severe LV dysfunction.   • GERD (gastroesophageal reflux disease)    • [place] (hard of hearing)     wears hearing aids   • Hyperlipidemia    • Hypertension    • Leukopenia    • Obesity    • Osteoarthritis    • Peptic ulcer    • Premature ventricular contractions    • Renal insufficiency syndrome    • Scoliosis    • Stroke syndrome    • Thrombocytopenia    • Ventricular tachycardia     • Vitamin B12 deficiency         Past Surgical History:   Procedure Laterality Date   • AV NODE ABLATION     • BREAST BIOPSY     • CARDIAC CATHETERIZATION      Showed severe mitral insufficiency and borderline coronary artery disease   • CARDIAC CATHETERIZATION      Showed an ejection fraction of 35%. She had occlusive disease of the right posterior LV branch and no other significant disease, treated medically.   • CARDIAC DEFIBRILLATOR PLACEMENT      Biventricular   • CARDIOVERSION      multiple electrocardioversions.   • COLONOSCOPY N/A 9/28/2017    Procedure: COLONOSCOPY TO CECUM;  Surgeon: Kevin Davis MD;  Location: [facility] ENDOSCOPY;  Service:    • CORONARY ARTERY BYPASS GRAFT      single graft to the PDA   • CORONARY STENT PLACEMENT     • ENDOSCOPY N/A 9/28/2017    Procedure: ESOPHAGOGASTRODUODENOSCOPY ;  Surgeon: Kevin Davis MD;  Location: [facility] ENDOSCOPY;  Service:    • HEMORRHOIDECTOMY     • HYSTERECTOMY     • MITRAL VALVE REPLACEMENT  01/2010    #31 Epic porcine valve.   • THROMBOENDARTERECTOMY Right     carotid thromboendarterectomy    • TONSILLECTOMY     • TOTAL KNEE ARTHROPLASTY Left         Social History     Occupational History   • [employer] Retired     Social History Main Topics   • Smoking status: Former Smoker     Types: Cigarettes   • Smokeless tobacco: Never Used   • Alcohol use Yes      Comment: rare   • Drug use: No   • Sexual activity: Defer    Social History     Social History Narrative        Family History   Problem Relation Age of Onset   • Cancer Mother    • Breast cancer Mother    • Heart disease Mother    • Hypertension Mother    • Coronary artery disease Father    • Stroke Father    • Heart disease Father    • Hypertension Father    • Other Daughter      thiamin deficiency    • Hypertension Son          Review of Systems:   HEENT: Patient denies any headaches, vision changes, change in hearing, or tinnitus, Patient denies any rhinorrhea,epistaxis, sinus pain, mouth or  dental problems, sore throat or hoarseness, or dysphagia  Pulmonary: Patient denies any cough, congestion, SOA, or wheezing  Cardiovascular: Patient denies any chest pain, dyspnea, palpitations, weakness, intolerance of exercise, varicosities, swelling of extremities, known murmur  Gastrointestinal:  Patient denies nausea, vomiting, diarrhea, constipation, loss  of appetite, change in appetite, dysphagia, gas, heartburn, melena, change in bowel habits, use of laxatives or other drugs to alter the function of the gastrointestinal tract.  Genital/Urinary: Patient denies dysuria, change in color of urine, change in frequency of urination, pain with urgency, incontinence, retention, or nocturia.  Musculoskeletal: Patient denies increased warmth; redness; or swelling of joints; limitation of function; deformity; crepitation: pain in a joint or an extremity, the neck, or the back, especially with movement.  Neurological: Patient denies dizziness, tremor, ataxia, difficulty in speaking, change in speech, paresthesia, loss of sensation, seizures, syncope, changes in memory.  Endocrine system: Patient denies tremors, palpitations, intolerance of heat or cold, polyuria, polydipsia, polyphagia, diaphoresis, exophthalmos, or goiter.  Psychological: Patient denies thoughts/plans or harming self or other; depression,  insomnia, night terrors, jaqueline, memory loss, disorientation.  Skin: Patient denies any bruising, rashes, discoloration, pruritus, wounds, ulcers, decubiti, changes in the hair or nails  Hematopoietic: Patient denies history of spontaneous or excessive bleeding, epistaxis, hematuria, melena, fatigue, enlarged or tender lymph nodes, pallor, history of anemia.    Physical Exam: 77 y.o. female  There were no vitals filed for this visit.  General Appearance:    Alert, cooperative, in no acute distress                      Head:    Normocephalic, without obvious abnormality, atraumatic   Eyes:            Lids and lashes  normal, conjunctivae and sclerae normal, no   icterus, no pallor, corneas clear, PERRLA   Ears:    Ears appear intact with no abnormalities noted   Throat:   No oral lesions, no thrush, oral mucosa moist   Neck:   No adenopathy, supple, trachea midline, no thyromegaly, no   carotid bruit, no JVD   Back:     No kyphosis present, no scoliosis present, no skin lesions,      erythema or scars, no tenderness to percussion or                   palpation,   range of motion normal   Lungs:     Clear to auscultation,respirations regular, even and                  unlabored    Heart:    Regular rhythm and normal rate, normal S1 and S2, no            murmur, no gallop, no rub, no click   Chest Wall:    No abnormalities observed   Abdomen:     Normal bowel sounds, no masses, no organomegaly, soft        non-tender, non-distended, no guarding, no rebound                tenderness   Rectal:     Deferred   Extremities:   Tenderness noted at The anterior aspect of the left shoulder.  Moves all extremities well, no       edema, no cyanosis, no redness   Pulses:   Pulses palpable and equal bilaterally   Skin:   No bleeding, bruising or rash   Lymph nodes:   No palpable adenopathy   Neurologic:   Cranial nerves 2 - 12 grossly intact, sensation intact, DTR       present and equal bilaterally   left shoulder. The patient has extreme amounts of tenderness over the greater tuberosity of the humerus. There is some tenderness over the lesser tuberosity as well. External rotation is extremely limited and painful. Rotator cuff function is inhibited because of pain and tenderness at the insertion of the rotator cuff on the greater tuberosity of the humerus. Axillary nerve function is well preserved. Biceps function is intact. Radial artey pulses are palpable. Apprehension sign is negative. Both active and passive ranges of motion are extremely limited and painful for the patient. There is no clinical deformity. There does appear to be some  abnormal mobility at the fracture site. The pain level is 5.        Diagnostic Tests:  Admission on 12/24/2017   Component Date Value Ref Range Status   • Glucose 12/24/2017 108* 65 - 99 mg/dL Final   • BUN 12/24/2017 35* 8 - 23 mg/dL Final   • Creatinine 12/24/2017 1.39* 0.57 - 1.00 mg/dL Final   • Sodium 12/24/2017 144  136 - 145 mmol/L Final   • Potassium 12/24/2017 3.5  3.5 - 5.2 mmol/L Final   • Chloride 12/24/2017 101  98 - 107 mmol/L Final   • CO2 12/24/2017 28.9  22.0 - 29.0 mmol/L Final   • Calcium 12/24/2017 9.4  8.6 - 10.5 mg/dL Final   • Total Protein 12/24/2017 6.8  6.0 - 8.5 g/dL Final   • Albumin 12/24/2017 3.30* 3.50 - 5.20 g/dL Final   • ALT (SGPT) 12/24/2017 56* 1 - 33 U/L Final   • AST (SGOT) 12/24/2017 69* 1 - 32 U/L Final   • Alkaline Phosphatase 12/24/2017 72  39 - 117 U/L Final   • Total Bilirubin 12/24/2017 0.4  0.1 - 1.2 mg/dL Final   • eGFR Non  Amer 12/24/2017 37* >60 mL/min/1.73 Final   • Globulin 12/24/2017 3.5  gm/dL Final   • A/G Ratio 12/24/2017 0.9  g/dL Final   • BUN/Creatinine Ratio 12/24/2017 25.2* 7.0 - 25.0 Final   • Anion Gap 12/24/2017 14.1  mmol/L Final   • Protime 12/24/2017 41.0* 11.7 - 14.2 Seconds Final   • INR 12/24/2017 4.44* 0.90 - 1.10 Final   • WBC 12/24/2017 5.37  4.50 - 10.70 10*3/mm3 Final   • RBC 12/24/2017 3.20* 3.90 - 5.20 10*6/mm3 Final   • Hemoglobin 12/24/2017 9.2* 11.9 - 15.5 g/dL Final   • Hematocrit 12/24/2017 30.1* 35.6 - 45.5 % Final   • MCV 12/24/2017 94.1  80.5 - 98.2 fL Final   • MCH 12/24/2017 28.8  26.9 - 32.0 pg Final   • MCHC 12/24/2017 30.6* 32.4 - 36.3 g/dL Final   • RDW 12/24/2017 14.7* 11.7 - 13.0 % Final   • RDW-SD 12/24/2017 50.3  37.0 - 54.0 fl Final   • MPV 12/24/2017 9.9  6.0 - 12.0 fL Final   • Platelets 12/24/2017 228  140 - 500 10*3/mm3 Final   • Neutrophil % 12/24/2017 71.4  42.7 - 76.0 % Final   • Lymphocyte % 12/24/2017 13.6* 19.6 - 45.3 % Final   • Monocyte % 12/24/2017 11.0  5.0 - 12.0 % Final   • Eosinophil % 12/24/2017  3.4  0.3 - 6.2 % Final   • Basophil % 12/24/2017 0.2  0.0 - 1.5 % Final   • Immature Grans % 12/24/2017 0.4  0.0 - 0.5 % Final   • Neutrophils, Absolute 12/24/2017 3.84  1.90 - 8.10 10*3/mm3 Final   • Lymphocytes, Absolute 12/24/2017 0.73* 0.90 - 4.80 10*3/mm3 Final   • Monocytes, Absolute 12/24/2017 0.59  0.20 - 1.20 10*3/mm3 Final   • Eosinophils, Absolute 12/24/2017 0.18  0.00 - 0.70 10*3/mm3 Final   • Basophils, Absolute 12/24/2017 0.01  0.00 - 0.20 10*3/mm3 Final   • Immature Grans, Absolute 12/24/2017 0.02  0.00 - 0.03 10*3/mm3 Final   • WBC 12/25/2017 5.57  4.50 - 10.70 10*3/mm3 Final   • RBC 12/25/2017 2.95* 3.90 - 5.20 10*6/mm3 Final   • Hemoglobin 12/25/2017 8.4* 11.9 - 15.5 g/dL Final   • Hematocrit 12/25/2017 28.0* 35.6 - 45.5 % Final   • MCV 12/25/2017 94.9  80.5 - 98.2 fL Final   • MCH 12/25/2017 28.5  26.9 - 32.0 pg Final   • MCHC 12/25/2017 30.0* 32.4 - 36.3 g/dL Final   • RDW 12/25/2017 14.5* 11.7 - 13.0 % Final   • RDW-SD 12/25/2017 50.5  37.0 - 54.0 fl Final   • MPV 12/25/2017 9.6  6.0 - 12.0 fL Final   • Platelets 12/25/2017 210  140 - 500 10*3/mm3 Final   • Protime 12/25/2017 47.6* 11.7 - 14.2 Seconds Final   • INR 12/25/2017 5.37* 0.90 - 1.10 Final   • 25 Hydroxy, Vitamin D 12/25/2017 36.2  30.0 - 100.0 ng/ml Final   • Glucose 12/25/2017 101* 65 - 99 mg/dL Final   • BUN 12/25/2017 32* 8 - 23 mg/dL Final   • Creatinine 12/25/2017 1.35* 0.57 - 1.00 mg/dL Final   • Sodium 12/25/2017 143  136 - 145 mmol/L Final   • Potassium 12/25/2017 3.7  3.5 - 5.2 mmol/L Final   • Chloride 12/25/2017 101  98 - 107 mmol/L Final   • CO2 12/25/2017 29.4* 22.0 - 29.0 mmol/L Final   • Calcium 12/25/2017 9.3  8.6 - 10.5 mg/dL Final   • Total Protein 12/25/2017 6.2  6.0 - 8.5 g/dL Final   • Albumin 12/25/2017 2.90* 3.50 - 5.20 g/dL Final   • ALT (SGPT) 12/25/2017 40* 1 - 33 U/L Final   • AST (SGOT) 12/25/2017 33* 1 - 32 U/L Final   • Alkaline Phosphatase 12/25/2017 63  39 - 117 U/L Final   • Total Bilirubin 12/25/2017  0.4  0.1 - 1.2 mg/dL Final   • eGFR Non  Amer 12/25/2017 38* >60 mL/min/1.73 Final   • Globulin 12/25/2017 3.3  gm/dL Final   • A/G Ratio 12/25/2017 0.9  g/dL Final   • BUN/Creatinine Ratio 12/25/2017 23.7  7.0 - 25.0 Final   • Anion Gap 12/25/2017 12.6  mmol/L Final   • Protime 12/26/2017 48.6* 11.7 - 14.2 Seconds Final   • INR 12/26/2017 5.51* 0.90 - 1.10 Final     Xr Shoulder 2+ View Left    Result Date: 12/24/2017  Narrative: THREE-VIEW LEFT SHOULDER  HISTORY: LEFT Shoulder Injury.  FINDINGS: Three views of the left shoulder were submitted. There is a slightly obliquely oriented fracture of the proximal humerus at the level of the surgical neck. The primary distal fracture fragment is displaced approximately 30% medially and 50% anteriorly. The humeral head articulates normally with the glenoid process of the scapula without dislocation. There is mild impaction at the fracture site. Arthritic changes are present along the articular surfaces of the glenohumeral joint. There are several small subchondral cysts present. Soft tissues appear unremarkable.      Impression: 1. Proximal humerus fracture without dislocation.  This report was finalized on 12/24/2017 5:10 AM by Hoang Marshall MD.      Ct Head Without Contrast    Result Date: 12/24/2017  Narrative: CRANIAL CT SCAN WITHOUT CONTRAST  CLINICAL HISTORY: Trauma/fall  COMPARISON: None.  TECHNIQUE: Radiation dose reduction techniques were utilized, including automated exposure control and exposure modulation based on body size. Multiple axial images of the head were obtained without contrast.  FINDINGS:  There are no abnormal areas of increased density or mass effect. There is diffuse cortical atrophy. There are mild scattered areas of decreased density in the white matter likely related to chronic ischemic gliotic changes.   Ventricles, sulci, and cisterns appear normal. Bone window images are unremarkable.         Impression: 1. No acute intracranial  abnormality.         This study was performed with techniques to keep radiation doses as low as reasonably achievable (ALARA). Individualized dose reduction techniques using automated exposure control or adjustment of mA and/or kV according to the patient size were employed.  This report was finalized on 12/24/2017 5:52 AM by Hoang Marshall MD.      Ct Cervical Spine Without Contrast    Result Date: 12/24/2017  Narrative: NONCONTRAST CT SCAN CERVICAL SPINE  CLINICAL HISTORY: Trauma  COMPARISON: None.  TECHNIQUE: Radiation dose reduction techniques were utilized, including automated exposure control and exposure modulation based on body size. Axial noncontrast images of the cervical spine were obtained without contrast. Sagittal reformatted images were supplemented.  FINDINGS:  No acute vertebral fracture identified on the axial series. There is cervicothoracic levoscoliosis on the coronal reformatted images.  The vertebrae are well-maintained in height on the sagittal reformatted images. There is 2-3 mm of anterolisthesis at C4-5. There is 2 mm of anterolisthesis at C5-6 and C6-7 also. There is 2-3 mm of anterolisthesis at C7-T1.  No significant compression deformity or retropulsion.  There is a 6 mm sclerotic lesion in the C6 vertebral body just to the right of midline. A small bone island is favored, particularly if there is no malignancy history. (If there is pain specific to this level or any malignancy history which may indicate alternate etiology, bone scan or MRI may be indicated.) The facets are well aligned. Advanced multilevel facet arthropathy is present throughout.  Multilevel degenerative changes are present. No significant central canal narrowing appreciated by noncontrast technique. Multilevel facet arthropathy contributes to multilevel foraminal stenosis.           Impression: 1. No acute cervical spine fracture. 2. 6 mm sclerotic lesion of the C6 vertebra as discussed  This report was finalized on  12/24/2017 5:50 AM by Hoang Marshall MD.        Assessment:  Patient Active Problem List   Diagnosis   • Anemia in stage 3 chronic kidney disease   • Thrombocytopenia   • B12 deficiency   • Coronary artery disease involving coronary bypass graft of native heart without angina pectoris   • S/P MVR (mitral valve replacement)   • Chronic atrial fibrillation   • Bilateral carotid artery disease   • Intractable low back pain   • Long-term (current) use of anticoagulants   • Low back pain   • Osteoporosis   • Murmur, heart   • GEORGINA on auto CPAP - Dr Cardona   • Hypersomnia due to medical condition - GEORGINA   • Esophageal stricture   • Acute blood loss anemia   • Dysphagia   • Closed fracture of left proximal humerus   • Hypertension   • Supratherapeutic INR   • Chronic combined systolic and diastolic congestive heart failure   • Osteoporosis with pathological fracture           Plan:  The patient voiced understanding of the risks, benefits, and alternative forms of treatment that were discussed and the patient consents to proceed with Nonoperative care of the left proximal humerus fracture.   Sling to the left upper extremity.    Ice to the shoulder.    Tablet Norco 5/325 mg tab 1 by mouth every 6-8 hours when necessary pain.    Sleep in a recliner for comfort.    Immobilize the proximal humerus for 3 weeks.    Follow-up in my office in 3 weeks.    At that point we will repeat x-rays and make sure that the fracture is healing adequately.    She might need physical therapy at that point.    If her fracture fails to heal or she develops avascular necrosis or posterior medical arthrosis then we will proceed with a reverse total shoulder arthroplasty and the patient understand and accepts that.    Discharge Plan: today to home and home health      Date: 12/26/2017    Kennedy Austin MD      Time: Critical care 25 min     DICTATED UTILIZING DRAGON DICTATION

## 2017-12-27 ENCOUNTER — EPISODE CHANGES (OUTPATIENT)
Dept: CASE MANAGEMENT | Facility: OTHER | Age: 77
End: 2017-12-27

## 2017-12-27 VITALS
OXYGEN SATURATION: 98 % | HEIGHT: 66 IN | RESPIRATION RATE: 16 BRPM | BODY MASS INDEX: 27.48 KG/M2 | WEIGHT: 171 LBS | DIASTOLIC BLOOD PRESSURE: 68 MMHG | SYSTOLIC BLOOD PRESSURE: 142 MMHG | HEART RATE: 60 BPM | TEMPERATURE: 97.6 F

## 2017-12-27 LAB
INR PPP: 3.64 (ref 0.9–1.1)
PROTHROMBIN TIME: 35.1 SECONDS (ref 11.7–14.2)

## 2017-12-27 PROCEDURE — G0378 HOSPITAL OBSERVATION PER HR: HCPCS

## 2017-12-27 PROCEDURE — 85610 PROTHROMBIN TIME: CPT | Performed by: HOSPITALIST

## 2017-12-27 RX ORDER — SENNA AND DOCUSATE SODIUM 50; 8.6 MG/1; MG/1
2 TABLET, FILM COATED ORAL NIGHTLY
Start: 2017-12-27 | End: 2018-06-12

## 2017-12-27 RX ORDER — HYDROCODONE BITARTRATE AND ACETAMINOPHEN 7.5; 325 MG/1; MG/1
1 TABLET ORAL EVERY 6 HOURS PRN
Qty: 60 TABLET | Refills: 0 | Status: SHIPPED | OUTPATIENT
Start: 2017-12-27 | End: 2018-01-30 | Stop reason: SDUPTHER

## 2017-12-27 RX ORDER — IBUPROFEN 200 MG
TABLET ORAL DAILY
Qty: 3.5 G | Refills: 0 | Status: SHIPPED | OUTPATIENT
Start: 2017-12-28 | End: 2018-01-19 | Stop reason: HOSPADM

## 2017-12-27 RX ADMIN — CALCITRIOL 0.25 MCG: 0.25 CAPSULE, LIQUID FILLED ORAL at 08:13

## 2017-12-27 RX ADMIN — OXYCODONE HYDROCHLORIDE AND ACETAMINOPHEN 2 TABLET: 5; 325 TABLET ORAL at 08:14

## 2017-12-27 RX ADMIN — OXYCODONE HYDROCHLORIDE AND ACETAMINOPHEN 2 TABLET: 5; 325 TABLET ORAL at 03:27

## 2017-12-27 RX ADMIN — FUROSEMIDE 60 MG: 40 TABLET ORAL at 08:13

## 2017-12-27 RX ADMIN — BACITRACIN ZINC NEOMYCIN SULFATE POLYMYXIN B SULFATE: 400; 3.5; 5 OINTMENT TOPICAL at 08:15

## 2017-12-27 RX ADMIN — OXYCODONE HYDROCHLORIDE AND ACETAMINOPHEN 2 TABLET: 5; 325 TABLET ORAL at 12:30

## 2017-12-27 RX ADMIN — PANTOPRAZOLE SODIUM 40 MG: 40 TABLET, DELAYED RELEASE ORAL at 07:26

## 2017-12-27 RX ADMIN — ASPIRIN 81 MG: 81 TABLET ORAL at 08:14

## 2017-12-27 RX ADMIN — CARVEDILOL 25 MG: 25 TABLET, FILM COATED ORAL at 08:14

## 2017-12-27 RX ADMIN — RAMIPRIL 5 MG: 5 CAPSULE ORAL at 08:14

## 2017-12-29 ENCOUNTER — TELEPHONE (OUTPATIENT)
Dept: FAMILY MEDICINE CLINIC | Facility: CLINIC | Age: 77
End: 2017-12-29

## 2017-12-29 NOTE — TELEPHONE ENCOUNTER
Kimberly from [facility] called with patients Pt/INR which was 1.3 she said patient just got out of the hospital and has been on hold for her coumadin and does not know for how long she has been on hold.

## 2018-01-02 ENCOUNTER — HOSPITAL ENCOUNTER (OUTPATIENT)
Dept: CARDIOLOGY | Facility: HOSPITAL | Age: 78
Setting detail: RECURRING SERIES
Discharge: HOME | End: 2018-01-02

## 2018-01-02 ENCOUNTER — TELEPHONE (OUTPATIENT)
Dept: ONCOLOGY | Facility: HOSPITAL | Age: 78
End: 2018-01-02

## 2018-01-02 PROCEDURE — 85610 PROTHROMBIN TIME: CPT

## 2018-01-02 PROCEDURE — 36416 COLLJ CAPILLARY BLOOD SPEC: CPT

## 2018-01-02 NOTE — TELEPHONE ENCOUNTER
[facility] calling to see if they can draw patients labs for her prior to visit on the 17th.  Orders given for cbc, ferritin, iron panel.

## 2018-01-03 ENCOUNTER — APPOINTMENT (OUTPATIENT)
Dept: ONCOLOGY | Facility: HOSPITAL | Age: 78
End: 2018-01-03

## 2018-01-03 ENCOUNTER — TELEPHONE (OUTPATIENT)
Dept: ORTHOPEDIC SURGERY | Facility: CLINIC | Age: 78
End: 2018-01-03

## 2018-01-03 ENCOUNTER — APPOINTMENT (OUTPATIENT)
Dept: LAB | Facility: HOSPITAL | Age: 78
End: 2018-01-03

## 2018-01-03 RX ORDER — CALCITRIOL 0.25 UG/1
CAPSULE, LIQUID FILLED ORAL
Qty: 45 CAPSULE | Refills: 1 | Status: SHIPPED | OUTPATIENT
Start: 2018-01-03 | End: 2018-01-11 | Stop reason: SDUPTHER

## 2018-01-03 NOTE — TELEPHONE ENCOUNTER
Called to ask if patient should be doing pendulums or elbow ROM. However, new onset today of some swelling in the right arm and hand with soreness of 1st finger. Pain level resting of 6-7/10 and 10/10 with touch. No warmth or redness in extremity. Please advise.

## 2018-01-03 NOTE — TELEPHONE ENCOUNTER
I would recommend limiting her range of motion at this point.  She has a very significant fracture of the proximal humerus and actively moving it at this early stage would be a reason for pain.

## 2018-01-03 NOTE — TELEPHONE ENCOUNTER
Called and informed (Helen) with home health of reply from [facility]. Advised to call with any worsening symptoms.

## 2018-01-05 ENCOUNTER — TELEPHONE (OUTPATIENT)
Dept: FAMILY MEDICINE CLINIC | Facility: CLINIC | Age: 78
End: 2018-01-05

## 2018-01-05 RX ORDER — TRAMADOL HYDROCHLORIDE 50 MG/1
50 TABLET ORAL EVERY 8 HOURS PRN
Qty: 90 TABLET | Refills: 1 | Status: SHIPPED | OUTPATIENT
Start: 2018-01-05 | End: 2018-05-01 | Stop reason: SDUPTHER

## 2018-01-05 NOTE — TELEPHONE ENCOUNTER
Pt said fell and broke her shoulder and its hard for her to get in and out she wants to know if she has to come in for office visit?    Called in tramadol to [facility], Made an appt for next wed

## 2018-01-05 NOTE — TELEPHONE ENCOUNTER
PATIENT IS HAVING A LOT OF PAIN. PATIENT IS TAKING HYDROCODONE 7.5 MG EVERY 6 HOURS ITS NOT CONTROLLING HER PAIN. PAIN ISN'T HER  FRACTURED   ARM  PAIN IS IN HER BACK. PATIENT HAD BEEN TAKING TRAMADOL BEFORE AND THAT HAD WORKED.     PATIENT IS TAKING AMBIEN AT NIGHT. IS ONLY SLEEPING ABOUT 3 HOURS CAN'T GET COMFORTABLE WITH THE PAIN TO SLEEP

## 2018-01-10 ENCOUNTER — OFFICE VISIT (OUTPATIENT)
Dept: FAMILY MEDICINE CLINIC | Facility: CLINIC | Age: 78
End: 2018-01-10

## 2018-01-10 ENCOUNTER — HOSPITAL ENCOUNTER (OUTPATIENT)
Facility: HOSPITAL | Age: 78
Setting detail: SURGERY ADMIT
End: 2018-01-10
Attending: ORTHOPAEDIC SURGERY | Admitting: ORTHOPAEDIC SURGERY

## 2018-01-10 ENCOUNTER — PREP FOR SURGERY (OUTPATIENT)
Dept: OTHER | Facility: HOSPITAL | Age: 78
End: 2018-01-10

## 2018-01-10 VITALS
HEIGHT: 66 IN | OXYGEN SATURATION: 99 % | SYSTOLIC BLOOD PRESSURE: 120 MMHG | DIASTOLIC BLOOD PRESSURE: 66 MMHG | HEART RATE: 62 BPM | TEMPERATURE: 98.7 F

## 2018-01-10 DIAGNOSIS — M80.00XS OSTEOPOROSIS WITH PATHOLOGICAL FRACTURE, SEQUELA: Primary | ICD-10-CM

## 2018-01-10 DIAGNOSIS — K22.2 ESOPHAGEAL STRICTURE: ICD-10-CM

## 2018-01-10 DIAGNOSIS — S42.292A CLOSED 3-PART FRACTURE OF PROXIMAL END OF LEFT HUMERUS, INITIAL ENCOUNTER: Primary | ICD-10-CM

## 2018-01-10 PROCEDURE — 99213 OFFICE O/P EST LOW 20 MIN: CPT | Performed by: INTERNAL MEDICINE

## 2018-01-10 RX ORDER — WARFARIN SODIUM 1 MG/1
1 TABLET ORAL
COMMUNITY
Start: 2017-11-17 | End: 2018-01-23 | Stop reason: SDUPTHER

## 2018-01-11 ENCOUNTER — HOSPITAL ENCOUNTER (OUTPATIENT)
Dept: GENERAL RADIOLOGY | Facility: HOSPITAL | Age: 78
Discharge: HOME | End: 2018-01-11
Admitting: ORTHOPAEDIC SURGERY

## 2018-01-11 ENCOUNTER — APPOINTMENT (OUTPATIENT)
Dept: PREADMISSION TESTING | Facility: HOSPITAL | Age: 78
End: 2018-01-11

## 2018-01-11 ENCOUNTER — HOSPITAL ENCOUNTER (OUTPATIENT)
Dept: GENERAL RADIOLOGY | Facility: HOSPITAL | Age: 78
Discharge: HOME | End: 2018-01-11

## 2018-01-11 VITALS
DIASTOLIC BLOOD PRESSURE: 72 MMHG | WEIGHT: 160 LBS | SYSTOLIC BLOOD PRESSURE: 126 MMHG | TEMPERATURE: 97.3 F | OXYGEN SATURATION: 97 % | HEART RATE: 61 BPM | RESPIRATION RATE: 16 BRPM | BODY MASS INDEX: 27.31 KG/M2 | HEIGHT: 64 IN

## 2018-01-11 LAB
ABO GROUP BLD: NORMAL
ALBUMIN SERPL-MCNC: 3.5 G/DL (ref 3.5–5.2)
ALBUMIN/GLOB SERPL: 1 G/DL
ALP SERPL-CCNC: 81 U/L (ref 39–117)
ALT SERPL W P-5'-P-CCNC: 12 U/L (ref 1–33)
ANION GAP SERPL CALCULATED.3IONS-SCNC: 11.2 MMOL/L
APTT PPP: 45.1 SECONDS (ref 22.7–35.4)
AST SERPL-CCNC: 15 U/L (ref 1–32)
BASOPHILS # BLD AUTO: 0.01 10*3/MM3 (ref 0–0.2)
BASOPHILS NFR BLD AUTO: 0.2 % (ref 0–1.5)
BILIRUB SERPL-MCNC: 0.5 MG/DL (ref 0.1–1.2)
BLD GP AB SCN SERPL QL: NEGATIVE
BUN BLD-MCNC: 41 MG/DL (ref 8–23)
BUN/CREAT SERPL: 24 (ref 7–25)
CALCIUM SPEC-SCNC: 9.4 MG/DL (ref 8.6–10.5)
CHLORIDE SERPL-SCNC: 100 MMOL/L (ref 98–107)
CO2 SERPL-SCNC: 31.8 MMOL/L (ref 22–29)
CREAT BLD-MCNC: 1.71 MG/DL (ref 0.57–1)
DEPRECATED RDW RBC AUTO: 55.1 FL (ref 37–54)
EOSINOPHIL # BLD AUTO: 0.35 10*3/MM3 (ref 0–0.7)
EOSINOPHIL NFR BLD AUTO: 7.5 % (ref 0.3–6.2)
ERYTHROCYTE [DISTWIDTH] IN BLOOD BY AUTOMATED COUNT: 15.8 % (ref 11.7–13)
GFR SERPL CREATININE-BSD FRML MDRD: 29 ML/MIN/1.73
GLOBULIN UR ELPH-MCNC: 3.4 GM/DL
GLUCOSE BLD-MCNC: 101 MG/DL (ref 65–99)
HCT VFR BLD AUTO: 28.9 % (ref 35.6–45.5)
HGB BLD-MCNC: 8.4 G/DL (ref 11.9–15.5)
IMM GRANULOCYTES # BLD: 0 10*3/MM3 (ref 0–0.03)
IMM GRANULOCYTES NFR BLD: 0 % (ref 0–0.5)
INR PPP: 2.94 (ref 0.9–1.1)
LYMPHOCYTES # BLD AUTO: 0.76 10*3/MM3 (ref 0.9–4.8)
LYMPHOCYTES NFR BLD AUTO: 16.4 % (ref 19.6–45.3)
MCH RBC QN AUTO: 27.6 PG (ref 26.9–32)
MCHC RBC AUTO-ENTMCNC: 29.1 G/DL (ref 32.4–36.3)
MCV RBC AUTO: 95.1 FL (ref 80.5–98.2)
MONOCYTES # BLD AUTO: 0.39 10*3/MM3 (ref 0.2–1.2)
MONOCYTES NFR BLD AUTO: 8.4 % (ref 5–12)
NEUTROPHILS # BLD AUTO: 3.13 10*3/MM3 (ref 1.9–8.1)
NEUTROPHILS NFR BLD AUTO: 67.5 % (ref 42.7–76)
PLATELET # BLD AUTO: 179 10*3/MM3 (ref 140–500)
PMV BLD AUTO: 10.2 FL (ref 6–12)
POTASSIUM BLD-SCNC: 4.3 MMOL/L (ref 3.5–5.2)
PROT SERPL-MCNC: 6.9 G/DL (ref 6–8.5)
PROTHROMBIN TIME: 29.7 SECONDS (ref 11.7–14.2)
RBC # BLD AUTO: 3.04 10*6/MM3 (ref 3.9–5.2)
RH BLD: POSITIVE
SODIUM BLD-SCNC: 143 MMOL/L (ref 136–145)
WBC NRBC COR # BLD: 4.64 10*3/MM3 (ref 4.5–10.7)

## 2018-01-11 PROCEDURE — 86901 BLOOD TYPING SEROLOGIC RH(D): CPT | Performed by: ORTHOPAEDIC SURGERY

## 2018-01-11 PROCEDURE — 80053 COMPREHEN METABOLIC PANEL: CPT | Performed by: ORTHOPAEDIC SURGERY

## 2018-01-11 PROCEDURE — 36415 COLL VENOUS BLD VENIPUNCTURE: CPT

## 2018-01-11 PROCEDURE — 85730 THROMBOPLASTIN TIME PARTIAL: CPT | Performed by: ORTHOPAEDIC SURGERY

## 2018-01-11 PROCEDURE — 71046 X-RAY EXAM CHEST 2 VIEWS: CPT

## 2018-01-11 PROCEDURE — 73030 X-RAY EXAM OF SHOULDER: CPT

## 2018-01-11 PROCEDURE — 85025 COMPLETE CBC W/AUTO DIFF WBC: CPT | Performed by: ORTHOPAEDIC SURGERY

## 2018-01-11 PROCEDURE — 86850 RBC ANTIBODY SCREEN: CPT | Performed by: ORTHOPAEDIC SURGERY

## 2018-01-11 PROCEDURE — 86900 BLOOD TYPING SEROLOGIC ABO: CPT | Performed by: ORTHOPAEDIC SURGERY

## 2018-01-11 PROCEDURE — 85610 PROTHROMBIN TIME: CPT | Performed by: ORTHOPAEDIC SURGERY

## 2018-01-11 RX ORDER — SIMVASTATIN 40 MG
40 TABLET ORAL NIGHTLY
COMMUNITY
End: 2018-01-19 | Stop reason: HOSPADM

## 2018-01-11 RX ORDER — PANTOPRAZOLE SODIUM 40 MG/1
40 TABLET, DELAYED RELEASE ORAL 2 TIMES DAILY
COMMUNITY
End: 2018-03-19 | Stop reason: SDUPTHER

## 2018-01-11 RX ORDER — FUROSEMIDE 40 MG/1
40 TABLET ORAL 2 TIMES DAILY
COMMUNITY
End: 2018-01-19 | Stop reason: HOSPADM

## 2018-01-11 RX ORDER — CHLORHEXIDINE GLUCONATE 500 MG/1
CLOTH TOPICAL
COMMUNITY
End: 2018-03-19

## 2018-01-11 RX ORDER — CARVEDILOL 25 MG/1
25 TABLET ORAL 2 TIMES DAILY WITH MEALS
COMMUNITY
End: 2018-02-15 | Stop reason: SDUPTHER

## 2018-01-11 RX ORDER — CALCITRIOL 0.25 UG/1
0.25 CAPSULE, LIQUID FILLED ORAL DAILY
COMMUNITY
End: 2018-03-26 | Stop reason: SDUPTHER

## 2018-01-11 NOTE — PROGRESS NOTES
Subjective   Janel Mahoney is a 77 y.o. female.     History of Present Illness   Patient was seen for a preoperative evaluation for his shoulder surgery.  She has a fractured left humerus and should be stable for repair    Much of this encounter note is an electronic transcription/translation of spoken language to printed text.  The electronic translation of spoken language may permit erroneous, or at times, nonsensical words or phrases to be inadvertently transcribed.  Although I have reviewed the note for such errors, some may still exist...  The following portions of the patient's history were reviewed and updated as appropriate: allergies, current medications, past family history, past medical history, past social history, past surgical history and problem list.    Review of Systems   Constitutional: Negative for fatigue and fever.   HENT: Positive for congestion. Negative for trouble swallowing.    Eyes: Negative for discharge and visual disturbance.   Respiratory: Negative for choking and shortness of breath.    Cardiovascular: Negative for chest pain and palpitations.   Gastrointestinal: Negative for abdominal pain and blood in stool.   Endocrine: Negative.    Genitourinary: Negative for genital sores and hematuria.   Musculoskeletal: Negative for gait problem and joint swelling.        Left shoulder pain   Skin: Negative for color change, pallor, rash and wound.   Allergic/Immunologic: Positive for environmental allergies. Negative for immunocompromised state.   Neurological: Negative for facial asymmetry and speech difficulty.   Psychiatric/Behavioral: Negative for hallucinations and suicidal ideas.       Objective   Physical Exam   Constitutional: She is oriented to person, place, and time. She appears well-developed and well-nourished.   HENT:   Head: Normocephalic.   Eyes: Conjunctivae are normal. Pupils are equal, round, and reactive to light.   Neck: Normal range of motion. Neck supple.    Cardiovascular: Normal rate, regular rhythm and normal heart sounds.  Exam reveals no gallop and no friction rub.    No murmur heard.  Pulmonary/Chest: Effort normal and breath sounds normal. No respiratory distress. She has no wheezes. She has no rales. She exhibits no tenderness.   Abdominal: Soft. Bowel sounds are normal.   Musculoskeletal: She exhibits edema, tenderness and deformity.   Neurological: She is alert and oriented to person, place, and time.   Skin: Skin is warm and dry.   Psychiatric: She has a normal mood and affect. Her behavior is normal. Judgment and thought content normal.   Nursing note and vitals reviewed.      Assessment/Plan   Problems Addressed this Visit        Cardiovascular and Mediastinum    S/P MVR (mitral valve replacement) - Primary    Chronic combined systolic and diastolic congestive heart failure       Digestive    Esophageal stricture

## 2018-01-11 NOTE — DISCHARGE INSTRUCTIONS
Take the following medications the morning of surgery with a small sip of water:    CARVEDILOL, PROTONIX, PAIN MED  General Instructions:  • Do not eat solid food after midnight the night before surgery.  • You may drink clear liquids day of surgery but must stop at least one hour before your hospital arrival time.  • It is beneficial for you to have a clear drink that contains carbohydrates the day of surgery.  We suggest a 12 to 20 ounce bottle of Gatorade or Powerade for non-diabetic patients or a 12 to 20 ounce bottle of G2 or Powerade Zero for diabetic patients. (Pediatric patients, are not advised to drink a 12 to 20 ounce carbohydrate drink)    Clear liquids are liquids you can see through.  Nothing red in color.     Plain water                               Sports drinks  Sodas                                   Gelatin (Jell-O)  Fruit juices without pulp such as white grape juice and apple juice  Popsicles that contain no fruit or yogurt  Tea or coffee (no cream or milk added)  Gatorade / Powerade  G2 / Powerade Zero    • Infants may have breast milk up to four hours before surgery.  • Infants drinking formula may drink formula up to six hours before surgery.   • Patients who avoid smoking, chewing tobacco and alcohol for 4 weeks prior to surgery have a reduced risk of post-operative complications.  Quit smoking as many days before surgery as you can.  • Do not smoke, use chewing tobacco or drink alcohol the day of surgery.   • If applicable bring your C-PAP/ BI-PAP machine.  • Bring any papers given to you in the doctor’s office.  • Wear clean comfortable clothes and socks.  • Do not wear contact lenses or make-up.  Bring a case for your glasses.   • Bring crutches or walker if applicable.  • Remove all piercings.  Leave jewelry and any other valuables at home.  • Hair extensions with metal clips must be removed prior to surgery.  • The Pre-Admission Testing nurse will instruct you to bring medications if  unable to obtain an accurate list in Pre-Admission Testing.        If you were given a blood bank ID arm band remember to bring it with you the day of surgery.    Preventing a Surgical Site Infection:  • For 2 to 3 days before surgery, avoid shaving with a razor because the razor can irritate skin and make it easier to develop an infection.  • The night prior to surgery sleep in a clean bed with clean clothing.  Do not allow pets to sleep with you.  • Shower on the morning of surgery using a fresh bar of anti-bacterial soap (such as Dial) and clean washcloth.  Dry with a clean towel and dress in clean clothing.  • Ask your surgeon if you will be receiving antibiotics prior to surgery.  • Make sure you, your family, and all healthcare providers clean their hands with soap and water or an alcohol based hand  before caring for you or your wound.    Day of surgery:  Upon arrival, a Pre-op nurse and Anesthesiologist will review your health history, obtain vital signs, and answer questions you may have.  The only belongings needed at this time will be your home medications and if applicable your C-PAP/BI-PAP machine.  If you are staying overnight your family can leave the rest of your belongings in the car and bring them to your room later.  A Pre-op nurse will start an IV and you may receive medication in preparation for surgery, including something to help you relax.  Your family will be able to see you in the Pre-op area.  While you are in surgery your family should notify the waiting room  if they leave the waiting room area and provide a contact phone number.    Please be aware that surgery does come with discomfort.  We want to make every effort to control your discomfort so please discuss any uncontrolled symptoms with your nurse.   Your doctor will most likely have prescribed pain medications.      If you are going home after surgery you will receive individualized written care instructions  before being discharged.  A responsible adult must drive you to and from the hospital on the day of your surgery and stay with you for 24 hours.    If you are staying overnight following surgery, you will be transported to your hospital room following the recovery period.  [facility] has all private rooms.    If you have any questions please call Pre-Admission Testing at [phone].  Deductibles and co-payments are collected on the day of service. Please be prepared to pay the required co-pay, deductible or deposit on the day of service as defined by your plan.    2% CHLORAHEXIDINE GLUCONATE* CLOTH  Preparing or “prepping” skin before surgery can reduce the risk of infection at the surgical site. To make the process easier, [facility] has chosen disposable cloths moistened with a rinse-free, 2% Chlorhexidine Gluconate (CHG) antiseptic solution. The steps below outline the prepping process and should be carefully followed.        Use the prep cloth on the area that is circled in the diagram             Directions Night before Surgery  1) Shower using a fresh bar of anti-bacterial soap (such as Dial) and clean washcloth.  Use a clean towel to completely dry your skin.  2) Do not use any lotions, oils or creams on your skin.  3) Open the package and remove 1 cloth, wipe your skin for 30 seconds in a circular motion.  Allow to dry for 3 minutes.  4) Repeat #3 with second cloth.  5) Do not touch your eyes, ears, or mouth with the prep cloth.  6) Allow the wet prep solution to air dry.  7) Discard the prep cloth and wash your hands with soap and water.   8) Dress in clean bed clothes and sleep on fresh clean bed sheets.   9) You may experience some temporary itching after the prep.    Directions Day of Surgery  1) Repeat steps 1,2,3,4,5,6,7, and 9.   2) Dress in clean clothes before coming to the hospital.    BACTROBAN NASAL OINTMENT  There are many germs normally in your nose. Bactroban is an  ointment that will help reduce these germs. Please follow these instructions for Bactroban use:    ____Two days before surgery in the evening Date________    ____The day before surgery in the morning  Date________    ____The day before surgery in the evening              Date________    ____The day of surgery in the morning    Date________    **Squirt ½ package of Bactroban Ointment onto a cotton applicator and apply to inside of 1st nostril.  Squirt the remaining Bactroban and apply to the inside of the other nostril.    .

## 2018-01-12 ENCOUNTER — EPISODE CHANGES (OUTPATIENT)
Dept: CASE MANAGEMENT | Facility: OTHER | Age: 78
End: 2018-01-12

## 2018-01-12 ENCOUNTER — TELEPHONE (OUTPATIENT)
Dept: CARDIOLOGY | Facility: CLINIC | Age: 78
End: 2018-01-12

## 2018-01-12 LAB
BILIRUB UR QL STRIP: NEGATIVE
CLARITY UR: ABNORMAL
COLOR UR: YELLOW
GLUCOSE UR STRIP-MCNC: NEGATIVE MG/DL
HGB UR QL STRIP.AUTO: NEGATIVE
KETONES UR QL STRIP: NEGATIVE
LEUKOCYTE ESTERASE UR QL STRIP.AUTO: NEGATIVE
NITRITE UR QL STRIP: NEGATIVE
PH UR STRIP.AUTO: 6 [PH] (ref 5–8)
PROT UR QL STRIP: NEGATIVE
SP GR UR STRIP: 1.01 (ref 1–1.03)
UROBILINOGEN UR QL STRIP: ABNORMAL

## 2018-01-12 PROCEDURE — 81003 URINALYSIS AUTO W/O SCOPE: CPT | Performed by: ORTHOPAEDIC SURGERY

## 2018-01-12 NOTE — TELEPHONE ENCOUNTER
Patient's  Russ called to report patient fell and broke her shoulder.  He asks if patient can be cleared for surgery by Dr. Torres on 1/16/18.  Please advise re holding Coumadin.  Thank you/ GHASSAN

## 2018-01-15 ENCOUNTER — HOSPITAL ENCOUNTER (INPATIENT)
Facility: HOSPITAL | Age: 78
LOS: 4 days | Discharge: HOME HEALTH | End: 2018-01-19
Attending: HOSPITALIST | Admitting: ORTHOPAEDIC SURGERY

## 2018-01-15 ENCOUNTER — APPOINTMENT (OUTPATIENT)
Dept: GENERAL RADIOLOGY | Facility: HOSPITAL | Age: 78
End: 2018-01-15
Attending: HOSPITALIST

## 2018-01-15 DIAGNOSIS — S42.292A CLOSED 3-PART FRACTURE OF PROXIMAL END OF LEFT HUMERUS, INITIAL ENCOUNTER: ICD-10-CM

## 2018-01-15 LAB
ABO GROUP BLD: NORMAL
BASOPHILS # BLD AUTO: 0.02 10*3/MM3 (ref 0–0.2)
BASOPHILS NFR BLD AUTO: 0.4 % (ref 0–1.5)
BLD GP AB SCN SERPL QL: NEGATIVE
DEPRECATED RDW RBC AUTO: 55.7 FL (ref 37–54)
EOSINOPHIL # BLD AUTO: 0.21 10*3/MM3 (ref 0–0.7)
EOSINOPHIL NFR BLD AUTO: 4.5 % (ref 0.3–6.2)
ERYTHROCYTE [DISTWIDTH] IN BLOOD BY AUTOMATED COUNT: 16.4 % (ref 11.7–13)
HCT VFR BLD AUTO: 28.4 % (ref 35.6–45.5)
HGB BLD-MCNC: 8.6 G/DL (ref 11.9–15.5)
IMM GRANULOCYTES # BLD: 0 10*3/MM3 (ref 0–0.03)
IMM GRANULOCYTES NFR BLD: 0 % (ref 0–0.5)
LYMPHOCYTES # BLD AUTO: 0.84 10*3/MM3 (ref 0.9–4.8)
LYMPHOCYTES NFR BLD AUTO: 18.1 % (ref 19.6–45.3)
MCH RBC QN AUTO: 28.4 PG (ref 26.9–32)
MCHC RBC AUTO-ENTMCNC: 30.3 G/DL (ref 32.4–36.3)
MCV RBC AUTO: 93.7 FL (ref 80.5–98.2)
MONOCYTES # BLD AUTO: 0.3 10*3/MM3 (ref 0.2–1.2)
MONOCYTES NFR BLD AUTO: 6.5 % (ref 5–12)
NEUTROPHILS # BLD AUTO: 3.27 10*3/MM3 (ref 1.9–8.1)
NEUTROPHILS NFR BLD AUTO: 70.5 % (ref 42.7–76)
PLATELET # BLD AUTO: 163 10*3/MM3 (ref 140–500)
PMV BLD AUTO: 9.8 FL (ref 6–12)
RBC # BLD AUTO: 3.03 10*6/MM3 (ref 3.9–5.2)
RH BLD: POSITIVE
WBC NRBC COR # BLD: 4.64 10*3/MM3 (ref 4.5–10.7)

## 2018-01-15 PROCEDURE — 93005 ELECTROCARDIOGRAM TRACING: CPT | Performed by: HOSPITALIST

## 2018-01-15 PROCEDURE — 36430 TRANSFUSION BLD/BLD COMPNT: CPT

## 2018-01-15 PROCEDURE — 99223 1ST HOSP IP/OBS HIGH 75: CPT | Performed by: INTERNAL MEDICINE

## 2018-01-15 PROCEDURE — 85025 COMPLETE CBC W/AUTO DIFF WBC: CPT | Performed by: HOSPITALIST

## 2018-01-15 PROCEDURE — 86923 COMPATIBILITY TEST ELECTRIC: CPT

## 2018-01-15 PROCEDURE — 86900 BLOOD TYPING SEROLOGIC ABO: CPT

## 2018-01-15 PROCEDURE — 86900 BLOOD TYPING SEROLOGIC ABO: CPT | Performed by: HOSPITALIST

## 2018-01-15 PROCEDURE — 86901 BLOOD TYPING SEROLOGIC RH(D): CPT | Performed by: HOSPITALIST

## 2018-01-15 PROCEDURE — P9016 RBC LEUKOCYTES REDUCED: HCPCS

## 2018-01-15 PROCEDURE — 93010 ELECTROCARDIOGRAM REPORT: CPT | Performed by: INTERNAL MEDICINE

## 2018-01-15 PROCEDURE — 86850 RBC ANTIBODY SCREEN: CPT | Performed by: HOSPITALIST

## 2018-01-15 PROCEDURE — 71045 X-RAY EXAM CHEST 1 VIEW: CPT

## 2018-01-15 RX ORDER — FERROUS SULFATE 325(65) MG
324 TABLET ORAL DAILY
Status: DISCONTINUED | OUTPATIENT
Start: 2018-01-16 | End: 2018-01-19 | Stop reason: HOSPADM

## 2018-01-15 RX ORDER — CHOLECALCIFEROL (VITAMIN D3) 125 MCG
1000 CAPSULE ORAL DAILY
Status: DISCONTINUED | OUTPATIENT
Start: 2018-01-16 | End: 2018-01-19 | Stop reason: HOSPADM

## 2018-01-15 RX ORDER — CALCITRIOL 0.25 UG/1
0.25 CAPSULE, LIQUID FILLED ORAL EVERY OTHER DAY
Status: DISCONTINUED | OUTPATIENT
Start: 2018-01-17 | End: 2018-01-19 | Stop reason: HOSPADM

## 2018-01-15 RX ORDER — ACETAMINOPHEN 500 MG
1000 TABLET ORAL ONCE
Status: CANCELLED | OUTPATIENT
Start: 2018-01-16 | End: 2018-01-15

## 2018-01-15 RX ORDER — ONDANSETRON 2 MG/ML
4 INJECTION INTRAMUSCULAR; INTRAVENOUS EVERY 4 HOURS PRN
Status: DISCONTINUED | OUTPATIENT
Start: 2018-01-15 | End: 2018-01-19

## 2018-01-15 RX ORDER — SENNA AND DOCUSATE SODIUM 50; 8.6 MG/1; MG/1
2 TABLET, FILM COATED ORAL NIGHTLY
Status: DISCONTINUED | OUTPATIENT
Start: 2018-01-15 | End: 2018-01-19 | Stop reason: HOSPADM

## 2018-01-15 RX ORDER — CARVEDILOL 25 MG/1
25 TABLET ORAL 2 TIMES DAILY WITH MEALS
Status: DISCONTINUED | OUTPATIENT
Start: 2018-01-15 | End: 2018-01-19 | Stop reason: HOSPADM

## 2018-01-15 RX ORDER — RAMIPRIL 5 MG/1
5 CAPSULE ORAL DAILY
Status: DISCONTINUED | OUTPATIENT
Start: 2018-01-16 | End: 2018-01-19 | Stop reason: HOSPADM

## 2018-01-15 RX ORDER — PANTOPRAZOLE SODIUM 40 MG/1
40 TABLET, DELAYED RELEASE ORAL
Status: DISCONTINUED | OUTPATIENT
Start: 2018-01-16 | End: 2018-01-15

## 2018-01-15 RX ORDER — HYDROCODONE BITARTRATE AND ACETAMINOPHEN 7.5; 325 MG/1; MG/1
1 TABLET ORAL EVERY 6 HOURS PRN
Status: DISCONTINUED | OUTPATIENT
Start: 2018-01-15 | End: 2018-01-17

## 2018-01-15 RX ORDER — ASPIRIN 81 MG/1
81 TABLET, CHEWABLE ORAL DAILY
Status: DISCONTINUED | OUTPATIENT
Start: 2018-01-16 | End: 2018-01-19 | Stop reason: HOSPADM

## 2018-01-15 RX ORDER — ATORVASTATIN CALCIUM 10 MG/1
10 TABLET, FILM COATED ORAL NIGHTLY
Status: DISCONTINUED | OUTPATIENT
Start: 2018-01-15 | End: 2018-01-19 | Stop reason: HOSPADM

## 2018-01-15 RX ORDER — MELATONIN
1000 DAILY
Status: DISCONTINUED | OUTPATIENT
Start: 2018-01-16 | End: 2018-01-19 | Stop reason: HOSPADM

## 2018-01-15 RX ORDER — ZOLPIDEM TARTRATE 5 MG/1
5 TABLET ORAL NIGHTLY PRN
Status: DISCONTINUED | OUTPATIENT
Start: 2018-01-15 | End: 2018-01-19

## 2018-01-15 RX ORDER — FUROSEMIDE 40 MG/1
40 TABLET ORAL DAILY
Status: DISCONTINUED | OUTPATIENT
Start: 2018-01-15 | End: 2018-01-18

## 2018-01-15 RX ORDER — PANTOPRAZOLE SODIUM 40 MG/1
40 TABLET, DELAYED RELEASE ORAL
Status: DISCONTINUED | OUTPATIENT
Start: 2018-01-15 | End: 2018-01-19 | Stop reason: HOSPADM

## 2018-01-15 RX ORDER — DIPHENOXYLATE HYDROCHLORIDE AND ATROPINE SULFATE 2.5; .025 MG/1; MG/1
1 TABLET ORAL DAILY
Status: DISCONTINUED | OUTPATIENT
Start: 2018-01-16 | End: 2018-01-19 | Stop reason: HOSPADM

## 2018-01-15 RX ADMIN — ATORVASTATIN CALCIUM 10 MG: 10 TABLET, FILM COATED ORAL at 22:12

## 2018-01-15 RX ADMIN — HYDROCODONE BITARTRATE AND ACETAMINOPHEN 1 TABLET: 7.5; 325 TABLET ORAL at 22:13

## 2018-01-15 RX ADMIN — ZOLPIDEM TARTRATE 5 MG: 5 TABLET ORAL at 22:13

## 2018-01-15 RX ADMIN — CARVEDILOL 25 MG: 25 TABLET, FILM COATED ORAL at 17:52

## 2018-01-15 RX ADMIN — PANTOPRAZOLE SODIUM 40 MG: 40 TABLET, DELAYED RELEASE ORAL at 17:52

## 2018-01-15 NOTE — CONSULTS
Subjective     REASON FOR CONSULTATION:  1.  Multifactorial anemia with anemia of chronic kidney disease, iron deficiency in the past, and GI bleeding in the past on Coumadin.  2.  Recent fall with fracture of the left shoulder.  Patient scheduled for left shoulder replacement surgery 1/16/2018.  Provide an opinion on any further workup or treatment                             REQUESTING PHYSICIAN:  Addison Choi MD     RECORDS OBTAINED:  Records of the patients history including those obtained from the referring provider were reviewed and summarized in detail.    HISTORY OF PRESENT ILLNESS:  The patient is a 77 y.o. year old female who is here for an opinion about the above issue.  She has been followed in our office in the past by Dr. Vasquez for multifactorial anemia.  As noted above, she has required Procrit in the past for anemia of chronic kidney disease with low erythropoietin and also has been iron deficient in the past.  She takes Coumadin chronically due to heart disease and is also had GI bleeding in the past.    She had a fall on [place] and fractured her left shoulder.  She initially was undergoing nonoperative management but due to continued pain and lack of improvement with nonoperative management she now is scheduled to undergo a left shoulder replacement surgery on 1/16/2018.  Her last dose of Coumadin was on Friday, 1/12/2018.  Her Coumadin is usually managed by her cardiology office.    Her last hemoglobin prior to admission was on 1/11/2018 with a hemoglobin of 8.4.  She currently is scheduled for transfusion of 2 units packed red blood cells preoperatively.  She does not appear to be in any acute distress and we certainly agree with the plans for transfusion prior to surgery.    History of Present Illness     Past Medical History:   Diagnosis Date   • Anemia    • Atrial fibrillation    • Bruises easily    • Carotid artery stenosis    • Chronic back pain    • Chronic coronary artery  disease     moderate to severe LV dysfunction.   • GERD (gastroesophageal reflux disease)    • [place] (hard of hearing)     wears hearing aids   • Hyperlipidemia    • Hypertension    • Leukopenia    • Obesity    • Osteoarthritis    • Peptic ulcer    • Premature ventricular contractions    • Renal insufficiency syndrome    • Scoliosis    • Shoulder fracture, left    • Stroke syndrome    • Thrombocytopenia    • Ventricular tachycardia    • Vitamin B12 deficiency         Past Surgical History:   Procedure Laterality Date   • AV NODE ABLATION     • BREAST BIOPSY     • CARDIAC CATHETERIZATION      Showed severe mitral insufficiency and borderline coronary artery disease   • CARDIAC CATHETERIZATION      Showed an ejection fraction of 35%. She had occlusive disease of the right posterior LV branch and no other significant disease, treated medically.   • CARDIAC DEFIBRILLATOR PLACEMENT      Biventricular   • CARDIOVERSION      multiple electrocardioversions.   • COLONOSCOPY N/A 9/28/2017    Procedure: COLONOSCOPY TO CECUM;  Surgeon: Kevin Davis MD;  Location: [facility] ENDOSCOPY;  Service:    • CORONARY ARTERY BYPASS GRAFT      single graft to the PDA   • CORONARY STENT PLACEMENT     • ENDOSCOPY N/A 9/28/2017    Procedure: ESOPHAGOGASTRODUODENOSCOPY ;  Surgeon: Kevin Davis MD;  Location: [facility] ENDOSCOPY;  Service:    • HEMORRHOIDECTOMY     • HYSTERECTOMY     • MITRAL VALVE REPLACEMENT  01/2010    #31 Epic porcine valve.   • THROMBOENDARTERECTOMY Right     carotid thromboendarterectomy    • TONSILLECTOMY     • TOTAL KNEE ARTHROPLASTY Left         No current facility-administered medications on file prior to encounter.      Current Outpatient Prescriptions on File Prior to Encounter   Medication Sig Dispense Refill   • alendronate (FOSAMAX) 70 MG tablet Take 1 tablet by mouth Every 7 (Seven) Days. Set up for 30 minutes drink 8 ounces of water and do not eat 30 minutes 12 tablet 3   • calcitriol (ROCALTROL) 0.25 MCG capsule  Take 0.25 mcg by mouth Every Other Day.     • carvedilol (COREG) 25 MG tablet Take 25 mg by mouth 2 (Two) Times a Day With Meals.     • Chlorhexidine Gluconate Cloth 2 % pads Apply  topically. PER MD INSTRUCTIONS     • Cholecalciferol (VITAMIN D) 2000 UNITS tablet Take 1 tablet by mouth daily.     • Cyanocobalamin (VITAMIN B12 PO) Take 1 tablet by mouth daily. As directed     • FERROUS SULFATE PO Take 324 mg by mouth Daily. Take as directed       • furosemide (LASIX) 40 MG tablet Take 40 mg by mouth 2 (Two) Times a Day. Patient takes 1.5 tabs every morning and 1 tab every evening     • HYDROcodone-acetaminophen (NORCO) 7.5-325 MG per tablet Take 1 tablet by mouth Every 6 (Six) Hours As Needed for Moderate Pain . Chronic pain medicine for low back pain 60 tablet 0   • Multiple Vitamin (MULTIVITAMIN) capsule Take 1 capsule by mouth daily.     • mupirocin (BACTROBAN) 2 % nasal ointment into each nostril. PER MD ORDERS     • neomycin-bacitracin-polymyxin (NEOSPORIN) 5-400-5000 ointment Apply  topically Daily. Apply to abrasion on left lower back (Patient taking differently: Apply 1 application topically Daily. Apply to abrasion on left lower back) 3.5 g 0   • pantoprazole (PROTONIX) 40 MG EC tablet TAKE ONE TABLET BY MOUTH TWICE A DAY 60 tablet 0   • pantoprazole (PROTONIX) 40 MG EC tablet Take 40 mg by mouth 2 (Two) Times a Day.     • ramipril (ALTACE) 5 MG capsule Take 5 mg by mouth Daily.     • simvastatin (ZOCOR) 40 MG tablet TAKE 1 TABLET EVERY DAY (Patient taking differently: TAKE 1/2 TABLET EVERY DAY) 90 tablet 1   • traMADol (ULTRAM) 50 MG tablet Take 1 tablet by mouth Every 8 (Eight) Hours As Needed for Moderate Pain . Chronic pain meds for low back 90 tablet 1   • zolpidem (AMBIEN) 10 MG tablet Take 1 tablet by mouth At Night As Needed for Sleep. 90 tablet 1   • ACETAMINOPHEN PO Take 325 mg by mouth 4 (Four) Times a Day As Needed. Senior choice      • aspirin 81 MG tablet Take 81 mg by mouth Daily.     •  epoetin adele (EPOGEN,PROCRIT) 49325 UNIT/ML injection as needed. Procrit 13620 UNIT/ML Injection Solution; Patient Sig: Procrit 56752 UNIT/ML Injection Solution INJECT SUBCUTANEOUSLY AS DIRECTED.; 0; 01-Apr-2014; Active     • polyethylene glycol (MIRALAX) pack packet Take 17 g by mouth Daily. 30 each 0   • sennosides-docusate sodium (SENOKOT-S) 8.6-50 MG tablet Take 2 tablets by mouth Every Night.     • simvastatin (ZOCOR) 40 MG tablet Take 40 mg by mouth Every Night. TAKES 1/2 TAB DAILY     • warfarin (COUMADIN) 1 MG tablet Take 1 mg by mouth Daily. EXCEPT Friday AND TAKES 2 MG ON Friday PATIENT TO CALL DR LAWRENCE ABOUT STOPPING WARFARIN          ALLERGIES:    Allergies   Allergen Reactions   • Diclofenac      She had adverse effects from the medications that required hospitalization. Pt got stomach ulcers from this medication prescribed by Dr. Arango - pediatrist.   • Dofetilide      Pt states not allergic.         Social History     Social History   • Marital status:      Spouse name: Russ   • Number of children: N/A   • Years of education: N/A     Occupational History   • [employer] Retired     Social History Main Topics   • Smoking status: Former Smoker     Packs/day: 1.00     Years: 50.00     Types: Cigarettes     Quit date: 1/11/2009   • Smokeless tobacco: Never Used   • Alcohol use Yes      Comment: rare   • Drug use: No   • Sexual activity: Defer     Other Topics Concern   • Not on file     Social History Narrative        Family History   Problem Relation Age of Onset   • Cancer Mother    • Breast cancer Mother    • Heart disease Mother    • Hypertension Mother    • Coronary artery disease Father    • Stroke Father    • Heart disease Father    • Hypertension Father    • Other Daughter      thiamin deficiency    • Hypertension Son    • Malig Hyperthermia Neg Hx         Review of Systems   HENT: Positive for hearing loss.    Cardiovascular: Positive for palpitations.   Musculoskeletal: Positive  for back pain.        Left shoulder pain and immobility.  Left arm is currently in a sling immobilizer.        Objective     Vitals:    01/15/18 1241 01/15/18 1500   BP: 147/60 141/62   BP Location: Right arm Right arm   Patient Position: Lying Lying   Pulse: 59 70   Resp: 16 16   Temp: 97 °F (36.1 °C) 97.4 °F (36.3 °C)   TempSrc: Oral Oral   SpO2: 98% 97%     Current Status 10/25/2017   ECOG score 1       Physical Exam   Constitutional: She is oriented to person, place, and time. She appears well-developed and well-nourished. No distress.   HENT:   Head: Normocephalic.   Eyes: Conjunctivae and EOM are normal. Pupils are equal, round, and reactive to light. No scleral icterus.   Neck: Normal range of motion. Neck supple. No JVD present. No thyromegaly present.   Cardiovascular: Normal rate.  An irregular rhythm present. Exam reveals no gallop and no friction rub.    No murmur heard.  Pacemaker defibrillator in the left anterior chest wall   Pulmonary/Chest: Effort normal and breath sounds normal. She has no wheezes. She has no rales.   Abdominal: Soft. She exhibits no distension and no mass. There is no tenderness.   Musculoskeletal: Normal range of motion. She exhibits no edema or deformity.   Lymphadenopathy:     She has no cervical adenopathy.   Neurological: She is alert and oriented to person, place, and time. She has normal reflexes. No cranial nerve deficit.   Skin: Skin is warm and dry. No rash noted. No erythema.   Purpura and ecchymoses on the extremities.   Psychiatric: She has a normal mood and affect. Her behavior is normal. Judgment normal.         RECENT LABS:  Hematology No results found for: WBC, RBC, HGB, HCT, PLT     Lab Results   Component Value Date    WBC 4.64 01/11/2018    HGB 8.4 (L) 01/11/2018    HCT 28.9 (L) 01/11/2018    MCV 95.1 01/11/2018     01/11/2018     Lab Results   Component Value Date    INR 2.94 (H) 01/11/2018    INR 3.64 (H) 12/27/2017    INR 5.51 (C) 12/26/2017     PROTIME 29.7 (H) 01/11/2018    PROTIME 35.1 (H) 12/27/2017    PROTIME 48.6 (C) 12/26/2017     Lab Results   Component Value Date    GLUCOSE 101 (H) 01/11/2018    CALCIUM 9.4 01/11/2018     01/11/2018    K 4.3 01/11/2018    CO2 31.8 (H) 01/11/2018     01/11/2018    BUN 41 (H) 01/11/2018    CREATININE 1.71 (H) 01/11/2018    EGFRIFAFRI  08/25/2016      Comment:      <15 Indicative of kidney failure.    EGFRIFNONA 29 (L) 01/11/2018    BCR 24.0 01/11/2018    ANIONGAP 11.2 01/11/2018       TWO-VIEW LEFT SHOULDER 1/11/2018      HISTORY: Recent fracture. Follow-up evaluation.      There is a fracture through the neck of the humerus with medial  displacement of the humeral shaft relative to the humeral head and this  appearance is similar to the study of 12/24/2017.      TWO-VIEW CHEST  1/11/2018      HISTORY: Preop for shoulder surgery. Hypertension. Anemia.      There is moderate cardiomegaly with a pacemaker in place and this is  unchanged from 01/26/2010. The lungs are clear except for a calcified  granuloma in the upper left lung. No acute abnormality is seen.    Assessment/Plan     1.  Multifactorial anemia with a component of anemia of chronic kidney disease with low Erythropoietin requiring Procrit in the past.  Patient also has past history of iron deficiency and GI blood loss anemia on Coumadin.  As noted above, her hemoglobin was around 8.4 g/dL last week and she is scheduled to receive 2 units packed red blood cells today in preparation for surgery tomorrow on the left shoulder.  2.  Traumatic fracture of the left shoulder.  Patient is scheduled for left shoulder replacement surgery 1/16/2018.  3.  Chronic Coumadin anticoagulation due to heart disease.  Her last dose of Coumadin was Friday, 1/12/2018.    Recommendations  1.  We certainly agree with plans to transfuse 2 units packed red blood cells prior to surgery.  2.  We will follow along postoperatively and help manage her hematologic issues in the  postop setting.  We also will need to schedule a follow-up appointment at [facility] office with Dr. Vasquez upon discharge.  She had originally been scheduled to see him for routine follow-up on 1/17/2018.  3.  We will defer to cardiology regarding the need to resume full dose anticoagulation for her heart disease postoperatively.  4.  She can resume her oral iron supplementation once she is postop and resumes her by mouth meds.

## 2018-01-15 NOTE — CONSULTS
LE is a 77 year old right-hand dominant female who has been admitted for surgery for  fracture in the left shoulder which has been present for 3 weeks.  The injury occurred on 12/24/2017.  She fell at home.  Subsequently she presented to [facility] emergency room.  She was evaluated with x-rays.  Dr. Austin was on call and the patient was advised nonoperative management.  The patient has seen our office in the past for her back.  The patient called our office and made this appointment.  The patient is on pain meds(HYDROCODONE-ACETAMINOPHEN 7.5-325 MG ORAL TABS (HYDROCODONE-ACETAMINOPHEN)).  The patient states that she is having a sharp pain which she rates at a 8 on a scale from 1-10.  The pain occurs intermittently.  The pain occurs on the right side only.  The patient states that activity and elevating makes it worse.  The patient also had worsening pain since that she has been home.  She was having some home health.  She is accompanied by her  to the office visit.  She has a history of defibrillator and is on Coumadin.  Her cardiologist is .        Review of Systems:  Positive for: Easy Bruisability, Joint Pain, Skin Problems and Urinary Retention.     Patient denies: Abdominal Pain, Bleeding, Chest Pain, Convulsions/Seizure, Decreased Motion, Depression, Difficulty Swallowing, Emotional Disturbances, Eyes or Vision Problems, Fecal Incontinence, Fever/Chills, Headaches, Increased Thirst, Increased Hunger, Insomnia, Nausea/Vomiting, Night Sweats, Poor Balance, Persistent Cough, Rash, Shortness of Breath, Shortness of Breath While Lying down and Weakness.  Allergies:  Diclofenac potassium (diclofenac potassium tabs) (severe)  Medications:  fosamax 70 mg oral tablet (alendronate sodium) 1 time per week  alendronate sodium 70 mg oral tablet (alendronate sodium) q 7 days  protonix pack (pantoprazole sodium pack)   ferrous gluconate 324 (37.5 fe) mg oral tabs (ferrous gluconate)   aspirin 81  mg oral tabs (aspirin)   b-12 tabs (cyanocobalamin tabs)   senior tabs oral tabs (multiple vitamins-minerals)   vitamin d3 2000 unit oral caps (cholecalciferol)   hydrocodone-acetaminophen 7.5-325 mg oral tabs (hydrocodone-acetaminophen)   calcitriol caps (calcitriol caps) every other day  zolpidem tartrate 10 mg oral tabs (zolpidem tartrate)   simvastatin 40 mg oral tabs (simvastatin)   carvedilol 25 mg oral tabs (carvedilol)   tramadol hcl 50 mg oral tabs (tramadol hcl)   furosemide 40 mg oral tabs (furosemide)   ramipril 5 mg oral caps (ramipril)   warfarin sodium 1 mg oral tabs (warfarin sodium) 1 qd, friday take two pills  Patient History of:  SLEEP APNEA/CPAP/BIPAP  CAB  GERD  INTESTINAL ULCERS  HIGH CHOLESTEROL  BLOOD CLOTS/EMBOLISM - NEGATIVE  PACEMAKER/DEFIBRILLATOR  HEART DISEASE -  HEART ATTACK  Surgical History:  left Total Knee Arthroplasty-[CPT-09803]   Heart Bypass-[CPT-88062]   HEMMROIDECTOMY-   Tonsillectomy*-[CPT-55546]   Hysterectomy-Total-[CPT-70893]   Known Family History of:  stroke-father  stroke-mother  cancer-mother  Past medical, social, family histories and ROS reviewed today with the patient and changes documented in the chart (01/10/2018).  PCP Dr. SHANE SAHU, RAYA      Physical Exam  Height:  64 in.    Weight:  160 lbs.     BMI:  27.56  Pulse:  60  Blood pressure:  124 / 76 mm Hg  Mental/HEENT/Cardio/Skin  The patient's general appearance is well nourished, well hydrated, no acute distress.  Orientation is alert and oriented x 3.  The patient's mood is normal.  Pulmonary exam shows normal air exchange, no labored breathing, or shortness of breath.  A skin exam shows normal temperature and color in the area of examination.       Left Shoulder  Skin is normal.  Left shoulder active ROM today was not done secondary to severe pain.  Shoulder strength was not assessed today.  Pulses are normal.  Normal sensation.  Capillary refill is normal.          Imaging/Diagnostic Studies  X-rays of  the Left Shoulder [AP;Axillary;Y] were ordered and reviewed today.       X-rays of the left shoulder show fracture of the greater tuberosity and surgical neck.  With  displacement  Impression  Left humerus 3 part fracture of surgical neck ([id number])     Plan  The patient has reached the point of disability and failed nonoperative management.  She has a displaced proximal humerus fracture.  The patient is indicated for a reverse left shoulder replacement . Options and alternatives have been discussed in detail with the patient and her .  The likely Risks and benefits of the procedure including but not limited to infection, DVT, pulmonary embolism, leg length discrepancy, recurrent dislocation, periprosthetic fractures, possibility of injury to nerves or vessels have been discussed in detail.   Despite the risks involved the patient would like to proceed.  Surgery is being scheduled for a reverse left shoulder arthroplasty by   at [facility] on January 16, 2018.  She has been cleared to the operating room with risk by  Dr. Galarza,  cardiologist.  She has been holding her Coumadin prior to surgery.    Her hemoglobin on pre op lab work was 8.4 , she has been ordered two units of PRBC for transfusion.   Patient does not use tobacco.       We discussed the benefits of surgical intervention, as well as alternative treatments.  Potential surgical risks and complications include but are not limited to DVT, infection, neurovascular injury, fracture, implant wear, failure, possible need for revision surgery, loss of motion, dislocation, limb length changes.  Sufficient opportunity was given to discuss the condition and treatment plan with the doctor, and all questions were answered for the patient.  Nonsurgical measures such as injections, medications, or physical therapy may not offer significant relief to this patient.  The discussion lasted 30 minutes.       Janel should follow up with SHANNA SEAMAN  DREA SAHU post op.

## 2018-01-15 NOTE — H&P
History and physical    Primary care physician  Dr. Matute    Chief complaint  Left shoulder pain    History of present illness  77-year-old white female with very complex past medical history who fell on the December 24 support from left proximal humerus fracture of the medical problems hypertension congestive heart failure, osteoarthritis osteoporosis obstructive sleep apnea chronic atrial fibrillation on anticoagulation coronary artery disease status post mitral valve replacement chronic anemia required multiple blood transfusion chronic kidney disease who was directly admitted to our service for blood transfusion and then have surgery scheduled on the left humerus with orthopedic surgery.  Patient has no new complaints and has been hurting at this fracture site and very weak.  Denies any recent headache dizziness chest pain shortness of breath abdominal pain nausea vomiting diarrhea patient did get multiple blood transfusion few months ago.       Past Medical History:   Diagnosis Date   • Anemia     • Atrial fibrillation     • Carotid artery stenosis     • Chronic back pain     • Chronic coronary artery disease       moderate to severe LV dysfunction.   • GERD (gastroesophageal reflux disease)     • [place] (hard of hearing)       wears hearing aids   • Hyperlipidemia     • Hypertension     • Leukopenia     • Obesity     • Osteoarthritis     • Peptic ulcer     • Premature ventricular contractions     • Renal insufficiency syndrome     • Scoliosis     • Stroke syndrome     • Thrombocytopenia     • Ventricular tachycardia     • Vitamin B12 deficiency            Surgical History          Past Surgical History:   Procedure Laterality Date   • AV NODE ABLATION       • BREAST BIOPSY       • CARDIAC CATHETERIZATION         Showed severe mitral insufficiency and borderline coronary artery disease   • CARDIAC CATHETERIZATION         Showed an ejection fraction of 35%. She had occlusive disease of the right posterior LV  branch and no other significant disease, treated medically.   • CARDIAC DEFIBRILLATOR PLACEMENT         Biventricular   • CARDIOVERSION         multiple electrocardioversions.   • COLONOSCOPY N/A 9/28/2017     Procedure: COLONOSCOPY TO CECUM;  Surgeon: Kevin Davis MD;  Location: [facility] ENDOSCOPY;  Service:    • CORONARY ARTERY BYPASS GRAFT         single graft to the PDA   • CORONARY STENT PLACEMENT       • ENDOSCOPY N/A 9/28/2017     Procedure: ESOPHAGOGASTRODUODENOSCOPY ;  Surgeon: Kevin Davis MD;  Location: [facility] ENDOSCOPY;  Service:    • HEMORRHOIDECTOMY       • HYSTERECTOMY       • MITRAL VALVE REPLACEMENT   01/2010     #31 Epic porcine valve.   • THROMBOENDARTERECTOMY Right       carotid thromboendarterectomy    • TONSILLECTOMY       • TOTAL KNEE ARTHROPLASTY Left                  Family History   Problem Relation Age of Onset   • Cancer Mother     • Breast cancer Mother     • Heart disease Mother     • Hypertension Mother     • Coronary artery disease Father     • Stroke Father     • Heart disease Father     • Hypertension Father     • Other Daughter         thiamin deficiency    • Hypertension Son                Social History   Substance Use Topics   • Smoking status: Former Smoker       Types: Cigarettes   • Smokeless tobacco: Never Used   • Alcohol use Yes          Comment: rare       Prescriptions Prior to Admission     Allergies:          Allergies   Allergen Reactions   • Diclofenac         She had adverse effects from the medications that required hospitalization. Pt got stomach ulcers from this medication prescribed by Dr. Arango - pediatrist.   • Dofetilide         Pt states not allergic.        Home medications reviewed    Review of Systems   Constitutional: Negative for chills and fever.   HENT: Negative.    Eyes: Negative.    Respiratory: Negative.    Cardiovascular: Positive for leg swelling. Negative for chest pain (occasionally).   Gastrointestinal: Positive for constipation and  diarrhea.        Black stools due to iron   Endocrine: Negative.    Genitourinary: Negative.    Musculoskeletal: Positive for arthralgias, back pain (chronic), gait problem (uses walker), neck pain and neck stiffness.   Skin: Negative.    Neurological: Positive for weakness. Negative for dizziness and light-headedness.   Hematological: Negative.    Psychiatric/Behavioral: Negative.      Physical Exam Blood pressure 147/60, pulse 59, temperature 97 °F (36.1 °C), temperature source Oral, resp. rate 16, SpO2 98 %.    Constitutional: She is oriented to person, place, and time. She appears well-developed and well-nourished. No distress.   Head: Normocephalic and atraumatic.   Eyes: Conjunctivae and EOM are normal. No scleral icterus.   Neck: Normal range of motion. Neck supple. No tracheal deviation present.   Cardiovascular: Normal rate.  An irregularly irregular rhythm present.   Pulmonary/Chest: Effort normal. No respiratory distress. She has decreased breath sounds. She has no wheezes. She has no rales.   Abdominal: Soft. Bowel sounds are normal. She exhibits no distension and no mass. There is no tenderness.   Musculoskeletal: She exhibits deformity (LUE in sling). She exhibits no edema.   Neurological: She is alert and oriented to person, place, and time. No cranial nerve deficit.   Skin: Skin is warm and dry. Ecchymosis (scattered) noted. No rash noted. No erythema.   Psychiatric: She has a normal mood and affect. Her behavior is normal. Judgment and thought content normal.      LABS  Lab Results (last 24 hours)     ** No results found for the last 24 hours. **        Imaging Results (last 24 hours)     ** No results found for the last 24 hours. **          Current Facility-Administered Medications:   •  [START ON 1/16/2018] aspirin chewable tablet 81 mg, 81 mg, Oral, Daily, Addison Choi MD  •  atorvastatin (LIPITOR) tablet 10 mg, 10 mg, Oral, Nightly, Addison Choi MD  •  [START ON 1/17/2018] calcitriol  (ROCALTROL) capsule 0.25 mcg, 0.25 mcg, Oral, Every Other Day, Addison Choi MD  •  carvedilol (COREG) tablet 25 mg, 25 mg, Oral, BID With Meals, Addison Choi MD  •  [START ON 1/16/2018] cholecalciferol (VITAMIN D3) tablet 1,000 Units, 1,000 Units, Oral, Daily, Addison Choi MD  •  [START ON 1/16/2018] ferrous sulfate tablet 324 mg, 324 mg, Oral, Daily, Addison Choi MD  •  furosemide (LASIX) tablet 40 mg, 40 mg, Oral, Daily, Addison Choi MD  •  HYDROcodone-acetaminophen (NORCO) 7.5-325 MG per tablet 1 tablet, 1 tablet, Oral, Q6H PRN, Addison Choi MD  •  [START ON 1/16/2018] multivitamin (THERAGRAN) tablet 1 tablet, 1 tablet, Oral, Daily, Addison Choi MD  •  [START ON 1/16/2018] pantoprazole (PROTONIX) EC tablet 40 mg, 40 mg, Oral, Q AM, Addison Choi MD  •  polyethylene glycol 3350 powder (packet), 17 g, Oral, Daily, Addison Choi MD  •  [START ON 1/16/2018] ramipril (ALTACE) capsule 5 mg, 5 mg, Oral, Daily, Addison Choi MD  •  sennosides-docusate sodium (SENOKOT-S) 8.6-50 MG tablet 2 tablet, 2 tablet, Oral, Nightly, Addison Choi MD  •  [START ON 1/16/2018] vitamin B-12 (CYANOCOBALAMIN) tablet 1,000 mcg, 1,000 mcg, Oral, Daily, Addison Choi MD  •  zolpidem (AMBIEN) tablet 5 mg, 5 mg, Oral, Nightly PRN, Addison Choi MD     ASSESSMENT  Chronic anemia required blood transfusion  Closed fracture of left proximal humerus after fall on December 24   Hypertension  Congestive heart failure combined both systolic diastolic  Chronic atrial fibrillation on anticoagulation  Status post mitral valve replacement  Chronic kidney disease stage III  Obstructive sleep apnea  Osteoarthritis  Osteoporosis  Gastroesophageal reflux disease  Coronary artery disease status post CABG    PLAN  Admit  Transfuse 2 units packed RBC  Hold Coumadin  Continue home medication except Coumadin  Cardiology consult for surgery clearance  Patient wants to see her hematology for blood transfusion  Stress ulcer DVT prophylaxis  Check labs chest x-ray  EKG  Supportive care  Follow closely further recommendation according to hospital course    LAURA DYE MD

## 2018-01-15 NOTE — PROGRESS NOTES
Please see Dr Galarza's letter under the letter tab in Epic for his assessment of her cardiac risk.

## 2018-01-16 ENCOUNTER — ANESTHESIA (OUTPATIENT)
Dept: PERIOP | Facility: HOSPITAL | Age: 78
End: 2018-01-16

## 2018-01-16 ENCOUNTER — APPOINTMENT (OUTPATIENT)
Dept: GENERAL RADIOLOGY | Facility: HOSPITAL | Age: 78
End: 2018-01-16

## 2018-01-16 ENCOUNTER — ANESTHESIA EVENT (OUTPATIENT)
Dept: PERIOP | Facility: HOSPITAL | Age: 78
End: 2018-01-16

## 2018-01-16 PROBLEM — S42.209G: Status: ACTIVE | Noted: 2018-01-16

## 2018-01-16 LAB
ABO + RH BLD: NORMAL
ABO + RH BLD: NORMAL
ALBUMIN SERPL-MCNC: 3.2 G/DL (ref 3.5–5.2)
ALBUMIN/GLOB SERPL: 1 G/DL
ALP SERPL-CCNC: 75 U/L (ref 39–117)
ALT SERPL W P-5'-P-CCNC: 7 U/L (ref 1–33)
ANION GAP SERPL CALCULATED.3IONS-SCNC: 13 MMOL/L
AST SERPL-CCNC: 15 U/L (ref 1–32)
BH BB BLOOD EXPIRATION DATE: NORMAL
BH BB BLOOD EXPIRATION DATE: NORMAL
BH BB BLOOD TYPE BARCODE: 5100
BH BB BLOOD TYPE BARCODE: 5100
BH BB DISPENSE STATUS: NORMAL
BH BB DISPENSE STATUS: NORMAL
BH BB PRODUCT CODE: NORMAL
BH BB PRODUCT CODE: NORMAL
BH BB UNIT NUMBER: NORMAL
BH BB UNIT NUMBER: NORMAL
BILIRUB SERPL-MCNC: 0.8 MG/DL (ref 0.1–1.2)
BUN BLD-MCNC: 33 MG/DL (ref 8–23)
BUN/CREAT SERPL: 22 (ref 7–25)
CALCIUM SPEC-SCNC: 9 MG/DL (ref 8.6–10.5)
CHLORIDE SERPL-SCNC: 101 MMOL/L (ref 98–107)
CHOLEST SERPL-MCNC: 126 MG/DL (ref 0–200)
CO2 SERPL-SCNC: 28 MMOL/L (ref 22–29)
CREAT BLD-MCNC: 1.5 MG/DL (ref 0.57–1)
GFR SERPL CREATININE-BSD FRML MDRD: 34 ML/MIN/1.73
GLOBULIN UR ELPH-MCNC: 3.1 GM/DL
GLUCOSE BLD-MCNC: 78 MG/DL (ref 65–99)
HBA1C MFR BLD: 5.2 % (ref 4.8–5.6)
HCT VFR BLD AUTO: 33.2 % (ref 35.6–45.5)
HDLC SERPL-MCNC: 43 MG/DL (ref 40–60)
HEMOCCULT STL QL: NEGATIVE
HGB BLD-MCNC: 10.4 G/DL (ref 11.9–15.5)
INR PPP: 1.96 (ref 0.9–1.1)
INR PPP: 2.03 (ref 0.9–1.1)
LDLC SERPL CALC-MCNC: 55 MG/DL (ref 0–100)
LDLC/HDLC SERPL: 1.29 {RATIO}
NT-PROBNP SERPL-MCNC: 4671 PG/ML (ref 0–1800)
POTASSIUM BLD-SCNC: 3.5 MMOL/L (ref 3.5–5.2)
PROT SERPL-MCNC: 6.3 G/DL (ref 6–8.5)
PROTHROMBIN TIME: 21.7 SECONDS (ref 11.7–14.2)
PROTHROMBIN TIME: 22.3 SECONDS (ref 11.7–14.2)
SODIUM BLD-SCNC: 142 MMOL/L (ref 136–145)
TRIGL SERPL-MCNC: 138 MG/DL (ref 0–150)
TSH SERPL DL<=0.05 MIU/L-ACNC: 1.11 MIU/ML (ref 0.27–4.2)
UNIT  ABO: NORMAL
UNIT  ABO: NORMAL
UNIT  RH: NORMAL
UNIT  RH: NORMAL
VLDLC SERPL-MCNC: 27.6 MG/DL (ref 5–40)

## 2018-01-16 PROCEDURE — 25010000002 MIDAZOLAM PER 1 MG: Performed by: ANESTHESIOLOGY

## 2018-01-16 PROCEDURE — 25010000002 METHYLPREDNISOLONE PER 125 MG: Performed by: ANESTHESIOLOGY

## 2018-01-16 PROCEDURE — C1713 ANCHOR/SCREW BN/BN,TIS/BN: HCPCS | Performed by: ORTHOPAEDIC SURGERY

## 2018-01-16 PROCEDURE — 85014 HEMATOCRIT: CPT | Performed by: ORTHOPAEDIC SURGERY

## 2018-01-16 PROCEDURE — 0RRK00Z REPLACEMENT OF LEFT SHOULDER JOINT WITH REVERSE BALL AND SOCKET SYNTHETIC SUBSTITUTE, OPEN APPROACH: ICD-10-PCS | Performed by: ORTHOPAEDIC SURGERY

## 2018-01-16 PROCEDURE — 25010000002 DEXAMETHASONE PER 1 MG: Performed by: NURSE ANESTHETIST, CERTIFIED REGISTERED

## 2018-01-16 PROCEDURE — 85610 PROTHROMBIN TIME: CPT | Performed by: HOSPITALIST

## 2018-01-16 PROCEDURE — 86927 PLASMA FRESH FROZEN: CPT

## 2018-01-16 PROCEDURE — 83880 ASSAY OF NATRIURETIC PEPTIDE: CPT | Performed by: HOSPITALIST

## 2018-01-16 PROCEDURE — 85018 HEMOGLOBIN: CPT | Performed by: ORTHOPAEDIC SURGERY

## 2018-01-16 PROCEDURE — 25010000002 ONDANSETRON PER 1 MG: Performed by: NURSE ANESTHETIST, CERTIFIED REGISTERED

## 2018-01-16 PROCEDURE — 25010000002 PHENYLEPHRINE PER 1 ML: Performed by: NURSE ANESTHETIST, CERTIFIED REGISTERED

## 2018-01-16 PROCEDURE — 82272 OCCULT BLD FECES 1-3 TESTS: CPT | Performed by: HOSPITALIST

## 2018-01-16 PROCEDURE — 80061 LIPID PANEL: CPT | Performed by: HOSPITALIST

## 2018-01-16 PROCEDURE — 83036 HEMOGLOBIN GLYCOSYLATED A1C: CPT | Performed by: HOSPITALIST

## 2018-01-16 PROCEDURE — 85610 PROTHROMBIN TIME: CPT | Performed by: ORTHOPAEDIC SURGERY

## 2018-01-16 PROCEDURE — 36430 TRANSFUSION BLD/BLD COMPNT: CPT

## 2018-01-16 PROCEDURE — 99223 1ST HOSP IP/OBS HIGH 75: CPT | Performed by: INTERNAL MEDICINE

## 2018-01-16 PROCEDURE — 25010000002 PROPOFOL 10 MG/ML EMULSION: Performed by: NURSE ANESTHETIST, CERTIFIED REGISTERED

## 2018-01-16 PROCEDURE — 25010000002 MEPIVACAINE PF 2 % SOLUTION 20 ML VIAL: Performed by: ANESTHESIOLOGY

## 2018-01-16 PROCEDURE — 25010000002 FENTANYL CITRATE (PF) 100 MCG/2ML SOLUTION: Performed by: ANESTHESIOLOGY

## 2018-01-16 PROCEDURE — 73020 X-RAY EXAM OF SHOULDER: CPT

## 2018-01-16 PROCEDURE — P9017 PLASMA 1 DONOR FRZ W/IN 8 HR: HCPCS

## 2018-01-16 PROCEDURE — 25010000002 CEFAZOLIN PER 500 MG: Performed by: ORTHOPAEDIC SURGERY

## 2018-01-16 PROCEDURE — 84443 ASSAY THYROID STIM HORMONE: CPT | Performed by: HOSPITALIST

## 2018-01-16 PROCEDURE — 80053 COMPREHEN METABOLIC PANEL: CPT | Performed by: HOSPITALIST

## 2018-01-16 PROCEDURE — 25010000002 ROPIVACAINE PER 1 MG: Performed by: ANESTHESIOLOGY

## 2018-01-16 PROCEDURE — C1776 JOINT DEVICE (IMPLANTABLE): HCPCS | Performed by: ORTHOPAEDIC SURGERY

## 2018-01-16 DEVICE — TRY HUM STD COBLT 44MM: Type: IMPLANTABLE DEVICE | Site: SHOULDER | Status: FUNCTIONAL

## 2018-01-16 DEVICE — BEAR HUM COMPRNSV ARCOMXL STD 44 36: Type: IMPLANTABLE DEVICE | Site: SHOULDER | Status: FUNCTIONAL

## 2018-01-16 DEVICE — SCRW FIX LK HEX 4.75X15MM: Type: IMPLANTABLE DEVICE | Site: SHOULDER | Status: FUNCTIONAL

## 2018-01-16 DEVICE — GLENOSPHERE VERSA DIAL FXD OFFST36 PLS3: Type: IMPLANTABLE DEVICE | Site: SHOULDER | Status: FUNCTIONAL

## 2018-01-16 DEVICE — PLUG IM CANAL 8TO10MM SM: Type: IMPLANTABLE DEVICE | Site: SHOULDER | Status: FUNCTIONAL

## 2018-01-16 DEVICE — SCRW FIX LK HEX 4.75X30MM: Type: IMPLANTABLE DEVICE | Site: SHOULDER | Status: FUNCTIONAL

## 2018-01-16 DEVICE — CMT BONE SIMPLEX/P TMYCIN FDOS SGL: Type: IMPLANTABLE DEVICE | Site: SHOULDER | Status: FUNCTIONAL

## 2018-01-16 DEVICE — BASEPLT GLEN COMPR MINI W TPR ADAPTR 25: Type: IMPLANTABLE DEVICE | Site: SHOULDER | Status: FUNCTIONAL

## 2018-01-16 DEVICE — SCRW FIX LK HEX 4.75X25MM: Type: IMPLANTABLE DEVICE | Site: SHOULDER | Status: FUNCTIONAL

## 2018-01-16 DEVICE — IMPLANTABLE DEVICE: Type: IMPLANTABLE DEVICE | Site: SHOULDER | Status: FUNCTIONAL

## 2018-01-16 DEVICE — SCRW COMPRNSV CNTRL 6.5X30MM REUS: Type: IMPLANTABLE DEVICE | Site: SHOULDER | Status: FUNCTIONAL

## 2018-01-16 DEVICE — STEM SHLDR PPS 8X122MM: Type: IMPLANTABLE DEVICE | Site: SHOULDER | Status: FUNCTIONAL

## 2018-01-16 DEVICE — PIN STNMAN 3.2MM 9IN: Type: IMPLANTABLE DEVICE | Site: SHOULDER | Status: FUNCTIONAL

## 2018-01-16 RX ORDER — PROMETHAZINE HYDROCHLORIDE 25 MG/ML
12.5 INJECTION, SOLUTION INTRAMUSCULAR; INTRAVENOUS ONCE AS NEEDED
Status: DISCONTINUED | OUTPATIENT
Start: 2018-01-16 | End: 2018-01-16 | Stop reason: SDUPTHER

## 2018-01-16 RX ORDER — FENTANYL CITRATE 50 UG/ML
50 INJECTION, SOLUTION INTRAMUSCULAR; INTRAVENOUS
Status: DISCONTINUED | OUTPATIENT
Start: 2018-01-16 | End: 2018-01-16 | Stop reason: HOSPADM

## 2018-01-16 RX ORDER — PROMETHAZINE HYDROCHLORIDE 25 MG/1
12.5 TABLET ORAL ONCE AS NEEDED
Status: DISCONTINUED | OUTPATIENT
Start: 2018-01-16 | End: 2018-01-16 | Stop reason: HOSPADM

## 2018-01-16 RX ORDER — DIPHENHYDRAMINE HYDROCHLORIDE 50 MG/ML
12.5 INJECTION INTRAMUSCULAR; INTRAVENOUS
Status: DISCONTINUED | OUTPATIENT
Start: 2018-01-16 | End: 2018-01-16 | Stop reason: SDUPTHER

## 2018-01-16 RX ORDER — PROPOFOL 10 MG/ML
VIAL (ML) INTRAVENOUS AS NEEDED
Status: DISCONTINUED | OUTPATIENT
Start: 2018-01-16 | End: 2018-01-16 | Stop reason: SURG

## 2018-01-16 RX ORDER — HYDROMORPHONE HYDROCHLORIDE 1 MG/ML
0.5 INJECTION, SOLUTION INTRAMUSCULAR; INTRAVENOUS; SUBCUTANEOUS
Status: DISCONTINUED | OUTPATIENT
Start: 2018-01-16 | End: 2018-01-19

## 2018-01-16 RX ORDER — BISACODYL 5 MG/1
10 TABLET, DELAYED RELEASE ORAL DAILY PRN
Status: DISCONTINUED | OUTPATIENT
Start: 2018-01-16 | End: 2018-01-19

## 2018-01-16 RX ORDER — LIDOCAINE HYDROCHLORIDE 20 MG/ML
INJECTION, SOLUTION INFILTRATION; PERINEURAL AS NEEDED
Status: DISCONTINUED | OUTPATIENT
Start: 2018-01-16 | End: 2018-01-16 | Stop reason: SURG

## 2018-01-16 RX ORDER — HYDROCODONE BITARTRATE AND ACETAMINOPHEN 7.5; 325 MG/1; MG/1
1 TABLET ORAL ONCE AS NEEDED
Status: DISCONTINUED | OUTPATIENT
Start: 2018-01-16 | End: 2018-01-16 | Stop reason: HOSPADM

## 2018-01-16 RX ORDER — FLUMAZENIL 0.1 MG/ML
0.2 INJECTION INTRAVENOUS AS NEEDED
Status: DISCONTINUED | OUTPATIENT
Start: 2018-01-16 | End: 2018-01-16 | Stop reason: SDUPTHER

## 2018-01-16 RX ORDER — FAMOTIDINE 10 MG/ML
20 INJECTION, SOLUTION INTRAVENOUS ONCE
Status: COMPLETED | OUTPATIENT
Start: 2018-01-16 | End: 2018-01-16

## 2018-01-16 RX ORDER — MIDAZOLAM HYDROCHLORIDE 1 MG/ML
1 INJECTION INTRAMUSCULAR; INTRAVENOUS
Status: DISCONTINUED | OUTPATIENT
Start: 2018-01-16 | End: 2018-01-16 | Stop reason: HOSPADM

## 2018-01-16 RX ORDER — DOCUSATE SODIUM 100 MG/1
100 CAPSULE, LIQUID FILLED ORAL 2 TIMES DAILY PRN
Status: DISCONTINUED | OUTPATIENT
Start: 2018-01-16 | End: 2018-01-19

## 2018-01-16 RX ORDER — LIDOCAINE HYDROCHLORIDE 40 MG/ML
SOLUTION TOPICAL AS NEEDED
Status: DISCONTINUED | OUTPATIENT
Start: 2018-01-16 | End: 2018-01-16 | Stop reason: SURG

## 2018-01-16 RX ORDER — WARFARIN SODIUM 1 MG/1
1 TABLET ORAL
Status: COMPLETED | OUTPATIENT
Start: 2018-01-16 | End: 2018-01-16

## 2018-01-16 RX ORDER — ROCURONIUM BROMIDE 10 MG/ML
INJECTION, SOLUTION INTRAVENOUS AS NEEDED
Status: DISCONTINUED | OUTPATIENT
Start: 2018-01-16 | End: 2018-01-16 | Stop reason: SURG

## 2018-01-16 RX ORDER — METHYLPREDNISOLONE SODIUM SUCCINATE 125 MG/2ML
INJECTION, POWDER, LYOPHILIZED, FOR SOLUTION INTRAMUSCULAR; INTRAVENOUS AS NEEDED
Status: DISCONTINUED | OUTPATIENT
Start: 2018-01-16 | End: 2018-01-16 | Stop reason: SURG

## 2018-01-16 RX ORDER — ONDANSETRON 2 MG/ML
INJECTION INTRAMUSCULAR; INTRAVENOUS AS NEEDED
Status: DISCONTINUED | OUTPATIENT
Start: 2018-01-16 | End: 2018-01-16 | Stop reason: SURG

## 2018-01-16 RX ORDER — EPHEDRINE SULFATE 50 MG/ML
5 INJECTION, SOLUTION INTRAVENOUS ONCE AS NEEDED
Status: DISCONTINUED | OUTPATIENT
Start: 2018-01-16 | End: 2018-01-16 | Stop reason: HOSPADM

## 2018-01-16 RX ORDER — OXYCODONE AND ACETAMINOPHEN 7.5; 325 MG/1; MG/1
1 TABLET ORAL ONCE AS NEEDED
Status: DISCONTINUED | OUTPATIENT
Start: 2018-01-16 | End: 2018-01-16 | Stop reason: HOSPADM

## 2018-01-16 RX ORDER — HYDRALAZINE HYDROCHLORIDE 20 MG/ML
5 INJECTION INTRAMUSCULAR; INTRAVENOUS
Status: DISCONTINUED | OUTPATIENT
Start: 2018-01-16 | End: 2018-01-16 | Stop reason: HOSPADM

## 2018-01-16 RX ORDER — FLUMAZENIL 0.1 MG/ML
0.2 INJECTION INTRAVENOUS AS NEEDED
Status: DISCONTINUED | OUTPATIENT
Start: 2018-01-16 | End: 2018-01-16 | Stop reason: HOSPADM

## 2018-01-16 RX ORDER — LABETALOL HYDROCHLORIDE 5 MG/ML
5 INJECTION, SOLUTION INTRAVENOUS
Status: DISCONTINUED | OUTPATIENT
Start: 2018-01-16 | End: 2018-01-16 | Stop reason: HOSPADM

## 2018-01-16 RX ORDER — LIDOCAINE HYDROCHLORIDE 10 MG/ML
0.5 INJECTION, SOLUTION EPIDURAL; INFILTRATION; INTRACAUDAL; PERINEURAL ONCE AS NEEDED
Status: DISCONTINUED | OUTPATIENT
Start: 2018-01-16 | End: 2018-01-16 | Stop reason: HOSPADM

## 2018-01-16 RX ORDER — PROMETHAZINE HYDROCHLORIDE 25 MG/ML
12.5 INJECTION, SOLUTION INTRAMUSCULAR; INTRAVENOUS ONCE AS NEEDED
Status: DISCONTINUED | OUTPATIENT
Start: 2018-01-16 | End: 2018-01-16 | Stop reason: HOSPADM

## 2018-01-16 RX ORDER — SODIUM CHLORIDE, SODIUM LACTATE, POTASSIUM CHLORIDE, CALCIUM CHLORIDE 600; 310; 30; 20 MG/100ML; MG/100ML; MG/100ML; MG/100ML
9 INJECTION, SOLUTION INTRAVENOUS CONTINUOUS
Status: DISCONTINUED | OUTPATIENT
Start: 2018-01-16 | End: 2018-01-17

## 2018-01-16 RX ORDER — ONDANSETRON 2 MG/ML
4 INJECTION INTRAMUSCULAR; INTRAVENOUS ONCE AS NEEDED
Status: DISCONTINUED | OUTPATIENT
Start: 2018-01-16 | End: 2018-01-16 | Stop reason: SDUPTHER

## 2018-01-16 RX ORDER — SODIUM CHLORIDE, SODIUM LACTATE, POTASSIUM CHLORIDE, CALCIUM CHLORIDE 600; 310; 30; 20 MG/100ML; MG/100ML; MG/100ML; MG/100ML
9 INJECTION, SOLUTION INTRAVENOUS CONTINUOUS
Status: DISCONTINUED | OUTPATIENT
Start: 2018-01-16 | End: 2018-01-19

## 2018-01-16 RX ORDER — HYDROCODONE BITARTRATE AND ACETAMINOPHEN 7.5; 325 MG/1; MG/1
1 TABLET ORAL ONCE AS NEEDED
Status: DISCONTINUED | OUTPATIENT
Start: 2018-01-16 | End: 2018-01-16 | Stop reason: SDUPTHER

## 2018-01-16 RX ORDER — PROMETHAZINE HYDROCHLORIDE 25 MG/1
25 SUPPOSITORY RECTAL ONCE AS NEEDED
Status: DISCONTINUED | OUTPATIENT
Start: 2018-01-16 | End: 2018-01-16 | Stop reason: SDUPTHER

## 2018-01-16 RX ORDER — SODIUM CHLORIDE 0.9 % (FLUSH) 0.9 %
1-10 SYRINGE (ML) INJECTION AS NEEDED
Status: DISCONTINUED | OUTPATIENT
Start: 2018-01-16 | End: 2018-01-16 | Stop reason: HOSPADM

## 2018-01-16 RX ORDER — NALOXONE HCL 0.4 MG/ML
0.2 VIAL (ML) INJECTION AS NEEDED
Status: DISCONTINUED | OUTPATIENT
Start: 2018-01-16 | End: 2018-01-16 | Stop reason: SDUPTHER

## 2018-01-16 RX ORDER — PROMETHAZINE HYDROCHLORIDE 25 MG/1
25 TABLET ORAL ONCE AS NEEDED
Status: DISCONTINUED | OUTPATIENT
Start: 2018-01-16 | End: 2018-01-16 | Stop reason: SDUPTHER

## 2018-01-16 RX ORDER — MAGNESIUM HYDROXIDE 1200 MG/15ML
LIQUID ORAL AS NEEDED
Status: DISCONTINUED | OUTPATIENT
Start: 2018-01-16 | End: 2018-01-16 | Stop reason: HOSPADM

## 2018-01-16 RX ORDER — ACETAMINOPHEN 500 MG
1000 TABLET ORAL ONCE
Status: COMPLETED | OUTPATIENT
Start: 2018-01-16 | End: 2018-01-16

## 2018-01-16 RX ORDER — ONDANSETRON 2 MG/ML
4 INJECTION INTRAMUSCULAR; INTRAVENOUS ONCE AS NEEDED
Status: DISCONTINUED | OUTPATIENT
Start: 2018-01-16 | End: 2018-01-16 | Stop reason: HOSPADM

## 2018-01-16 RX ORDER — PROMETHAZINE HYDROCHLORIDE 25 MG/1
25 SUPPOSITORY RECTAL ONCE AS NEEDED
Status: DISCONTINUED | OUTPATIENT
Start: 2018-01-16 | End: 2018-01-16 | Stop reason: HOSPADM

## 2018-01-16 RX ORDER — MIDAZOLAM HYDROCHLORIDE 1 MG/ML
2 INJECTION INTRAMUSCULAR; INTRAVENOUS
Status: DISCONTINUED | OUTPATIENT
Start: 2018-01-16 | End: 2018-01-16 | Stop reason: HOSPADM

## 2018-01-16 RX ORDER — FENTANYL CITRATE 50 UG/ML
50 INJECTION, SOLUTION INTRAMUSCULAR; INTRAVENOUS
Status: DISCONTINUED | OUTPATIENT
Start: 2018-01-16 | End: 2018-01-16 | Stop reason: SDUPTHER

## 2018-01-16 RX ORDER — HYDRALAZINE HYDROCHLORIDE 20 MG/ML
5 INJECTION INTRAMUSCULAR; INTRAVENOUS
Status: DISCONTINUED | OUTPATIENT
Start: 2018-01-16 | End: 2018-01-16 | Stop reason: SDUPTHER

## 2018-01-16 RX ORDER — PROMETHAZINE HYDROCHLORIDE 25 MG/1
25 TABLET ORAL ONCE AS NEEDED
Status: DISCONTINUED | OUTPATIENT
Start: 2018-01-16 | End: 2018-01-16 | Stop reason: HOSPADM

## 2018-01-16 RX ORDER — HYDROMORPHONE HCL 110MG/55ML
0.5 PATIENT CONTROLLED ANALGESIA SYRINGE INTRAVENOUS
Status: DISCONTINUED | OUTPATIENT
Start: 2018-01-16 | End: 2018-01-16 | Stop reason: HOSPADM

## 2018-01-16 RX ORDER — DEXAMETHASONE SODIUM PHOSPHATE 10 MG/ML
INJECTION INTRAMUSCULAR; INTRAVENOUS AS NEEDED
Status: DISCONTINUED | OUTPATIENT
Start: 2018-01-16 | End: 2018-01-16 | Stop reason: SURG

## 2018-01-16 RX ORDER — NALOXONE HCL 0.4 MG/ML
0.1 VIAL (ML) INJECTION
Status: DISCONTINUED | OUTPATIENT
Start: 2018-01-16 | End: 2018-01-19

## 2018-01-16 RX ORDER — EPHEDRINE SULFATE 50 MG/ML
INJECTION, SOLUTION INTRAVENOUS AS NEEDED
Status: DISCONTINUED | OUTPATIENT
Start: 2018-01-16 | End: 2018-01-16 | Stop reason: SURG

## 2018-01-16 RX ORDER — NALOXONE HCL 0.4 MG/ML
0.2 VIAL (ML) INJECTION AS NEEDED
Status: DISCONTINUED | OUTPATIENT
Start: 2018-01-16 | End: 2018-01-16 | Stop reason: HOSPADM

## 2018-01-16 RX ORDER — SODIUM CHLORIDE 9 MG/ML
100 INJECTION, SOLUTION INTRAVENOUS CONTINUOUS
Status: DISCONTINUED | OUTPATIENT
Start: 2018-01-16 | End: 2018-01-17

## 2018-01-16 RX ORDER — PROMETHAZINE HYDROCHLORIDE 25 MG/1
12.5 TABLET ORAL ONCE AS NEEDED
Status: DISCONTINUED | OUTPATIENT
Start: 2018-01-16 | End: 2018-01-16 | Stop reason: SDUPTHER

## 2018-01-16 RX ORDER — ROPIVACAINE HYDROCHLORIDE 5 MG/ML
INJECTION, SOLUTION EPIDURAL; INFILTRATION; PERINEURAL AS NEEDED
Status: DISCONTINUED | OUTPATIENT
Start: 2018-01-16 | End: 2018-01-16 | Stop reason: SURG

## 2018-01-16 RX ORDER — LABETALOL HYDROCHLORIDE 5 MG/ML
5 INJECTION, SOLUTION INTRAVENOUS
Status: DISCONTINUED | OUTPATIENT
Start: 2018-01-16 | End: 2018-01-16 | Stop reason: SDUPTHER

## 2018-01-16 RX ORDER — EPHEDRINE SULFATE 50 MG/ML
5 INJECTION, SOLUTION INTRAVENOUS ONCE AS NEEDED
Status: DISCONTINUED | OUTPATIENT
Start: 2018-01-16 | End: 2018-01-16 | Stop reason: SDUPTHER

## 2018-01-16 RX ORDER — OXYCODONE AND ACETAMINOPHEN 7.5; 325 MG/1; MG/1
1 TABLET ORAL ONCE AS NEEDED
Status: DISCONTINUED | OUTPATIENT
Start: 2018-01-16 | End: 2018-01-16 | Stop reason: SDUPTHER

## 2018-01-16 RX ORDER — HYDROMORPHONE HCL 110MG/55ML
0.5 PATIENT CONTROLLED ANALGESIA SYRINGE INTRAVENOUS
Status: DISCONTINUED | OUTPATIENT
Start: 2018-01-16 | End: 2018-01-16 | Stop reason: SDUPTHER

## 2018-01-16 RX ORDER — DIPHENHYDRAMINE HYDROCHLORIDE 50 MG/ML
12.5 INJECTION INTRAMUSCULAR; INTRAVENOUS
Status: DISCONTINUED | OUTPATIENT
Start: 2018-01-16 | End: 2018-01-16 | Stop reason: HOSPADM

## 2018-01-16 RX ADMIN — WARFARIN SODIUM 1 MG: 1 TABLET ORAL at 22:02

## 2018-01-16 RX ADMIN — ROPIVACAINE HYDROCHLORIDE 20 ML: 5 INJECTION, SOLUTION EPIDURAL; INFILTRATION; PERINEURAL at 12:20

## 2018-01-16 RX ADMIN — LIDOCAINE HYDROCHLORIDE 1 EACH: 40 SPRAY LARYNGEAL; TRANSTRACHEAL at 12:54

## 2018-01-16 RX ADMIN — FAMOTIDINE 20 MG: 10 INJECTION, SOLUTION INTRAVENOUS at 12:27

## 2018-01-16 RX ADMIN — ACETAMINOPHEN 1000 MG: 500 TABLET ORAL at 11:58

## 2018-01-16 RX ADMIN — DEXAMETHASONE SODIUM PHOSPHATE 8 MG: 10 INJECTION INTRAMUSCULAR; INTRAVENOUS at 14:49

## 2018-01-16 RX ADMIN — Medication 2 MG: at 12:25

## 2018-01-16 RX ADMIN — PANTOPRAZOLE SODIUM 40 MG: 40 TABLET, DELAYED RELEASE ORAL at 17:36

## 2018-01-16 RX ADMIN — CARVEDILOL 25 MG: 25 TABLET, FILM COATED ORAL at 08:28

## 2018-01-16 RX ADMIN — SODIUM CHLORIDE, POTASSIUM CHLORIDE, SODIUM LACTATE AND CALCIUM CHLORIDE: 600; 310; 30; 20 INJECTION, SOLUTION INTRAVENOUS at 14:00

## 2018-01-16 RX ADMIN — MEPIVACAINE HYDROCHLORIDE 10 ML: 20 INJECTION, SOLUTION EPIDURAL; INFILTRATION at 12:20

## 2018-01-16 RX ADMIN — PHENYLEPHRINE HYDROCHLORIDE 100 MCG: 10 INJECTION INTRAVENOUS at 14:00

## 2018-01-16 RX ADMIN — FENTANYL CITRATE 50 MCG: 50 INJECTION INTRAMUSCULAR; INTRAVENOUS at 12:25

## 2018-01-16 RX ADMIN — PROPOFOL 100 MG: 10 INJECTION, EMULSION INTRAVENOUS at 12:52

## 2018-01-16 RX ADMIN — ONDANSETRON 4 MG: 2 INJECTION INTRAMUSCULAR; INTRAVENOUS at 14:46

## 2018-01-16 RX ADMIN — FAMOTIDINE 20 MG: 10 INJECTION, SOLUTION INTRAVENOUS at 12:26

## 2018-01-16 RX ADMIN — CEFAZOLIN SODIUM 2 G: 10 INJECTION, POWDER, FOR SOLUTION INTRAVENOUS at 22:02

## 2018-01-16 RX ADMIN — CARVEDILOL 25 MG: 25 TABLET, FILM COATED ORAL at 17:36

## 2018-01-16 RX ADMIN — METHYLPREDNISOLONE SODIUM SUCCINATE 40 MG: 125 INJECTION, POWDER, FOR SOLUTION INTRAMUSCULAR; INTRAVENOUS at 12:20

## 2018-01-16 RX ADMIN — ROCURONIUM BROMIDE 20 MG: 10 INJECTION INTRAVENOUS at 12:52

## 2018-01-16 RX ADMIN — SODIUM CHLORIDE, POTASSIUM CHLORIDE, SODIUM LACTATE AND CALCIUM CHLORIDE: 600; 310; 30; 20 INJECTION, SOLUTION INTRAVENOUS at 12:41

## 2018-01-16 RX ADMIN — PHENYLEPHRINE HYDROCHLORIDE 200 MCG: 10 INJECTION INTRAVENOUS at 13:30

## 2018-01-16 RX ADMIN — PHENYLEPHRINE HYDROCHLORIDE 100 MCG: 10 INJECTION INTRAVENOUS at 13:19

## 2018-01-16 RX ADMIN — EPHEDRINE SULFATE 10 MG: 50 INJECTION INTRAMUSCULAR; INTRAVENOUS; SUBCUTANEOUS at 14:20

## 2018-01-16 RX ADMIN — FUROSEMIDE 40 MG: 40 TABLET ORAL at 16:46

## 2018-01-16 RX ADMIN — EPHEDRINE SULFATE 10 MG: 50 INJECTION INTRAMUSCULAR; INTRAVENOUS; SUBCUTANEOUS at 14:00

## 2018-01-16 RX ADMIN — LIDOCAINE HYDROCHLORIDE 60 MG: 20 INJECTION, SOLUTION INFILTRATION; PERINEURAL at 12:52

## 2018-01-16 RX ADMIN — PHENYLEPHRINE HYDROCHLORIDE 200 MCG: 10 INJECTION INTRAVENOUS at 13:42

## 2018-01-16 RX ADMIN — RAMIPRIL 5 MG: 5 CAPSULE ORAL at 16:46

## 2018-01-16 RX ADMIN — EPHEDRINE SULFATE 10 MG: 50 INJECTION INTRAMUSCULAR; INTRAVENOUS; SUBCUTANEOUS at 13:51

## 2018-01-16 RX ADMIN — CEFAZOLIN SODIUM 2 G: 1 INJECTION, POWDER, FOR SOLUTION INTRAMUSCULAR; INTRAVENOUS at 13:00

## 2018-01-16 RX ADMIN — ATORVASTATIN CALCIUM 10 MG: 10 TABLET, FILM COATED ORAL at 22:02

## 2018-01-16 NOTE — ANESTHESIA POSTPROCEDURE EVALUATION
"Patient: Janel Mahoney    Procedure Summary     Date Anesthesia Start Anesthesia Stop Room / Location    01/16/18 1241 1508  [facility] / [facility]       Procedure Diagnosis Surgeon Provider    TOTAL SHOULDER REVERSE ARTHROPLASTY (Left Shoulder) Closed 3-part fracture of proximal end of left humerus, initial encounter  (Closed 3-part fracture of proximal end of left humerus, initial encounter [S42.292A]) MD Kahlil Hernandez MD          Anesthesia Type: general, regional  Last vitals  BP   162/69 (01/16/18 1545)   Temp   36.5 °C (97.7 °F) (01/16/18 1506)   Pulse   59 (01/16/18 1545)   Resp   16 (01/16/18 1545)     SpO2   100 % (01/16/18 1545)     Post Anesthesia Care and Evaluation    Patient location during evaluation: PACU  Patient participation: complete - patient participated  Level of consciousness: awake and alert  Pain score: 1  Pain management: adequate  Airway patency: patent  Anesthetic complications: No anesthetic complications  PONV Status: none  Cardiovascular status: acceptable  Respiratory status: acceptable  Hydration status: acceptable    Comments: /69  Pulse 59  Temp 36.5 °C (97.7 °F) (Oral)   Resp 16  Ht 162.6 cm (64\")  Wt 72.6 kg (160 lb)  SpO2 100%  BMI 27.46 kg/m2        "

## 2018-01-16 NOTE — CONSULTS
Date of Hospital Visit:18  Encounter Provider: Nik Galarza MD  Place of Service: [facility] CARDIOLOGY  Patient Name: Janel Mahoney  :1940  Referral Provider: Addison Choi MD    Chief complaint: Afib/CAD    History of Present Illness:   The patient is a pleasant, 77-year-old white female with history of severe ischemic heart  disease, mild disease of the left anterior descending, angioplasty, and stent placement of  the right coronary artery. She also had premature ventricular contractions and severe  vascular disease, and left ventricular ejection fraction of 50%. In 2007, she had  an acute infarct. Catheterization showed a left ventricular ejection fraction of 35%. She  had occlusive disease of the right posterior left ventricular branch and no other  significant disease. She was treated medically. She had a transesophageal echocardiogram  that confirmed a left ventricular ejection fraction of 40% and just moderate mitral  insufficiency. She had atrial fibrillation and had electrocardioversion back to sinus  rhythm in May 2007 and then presented in atrial fibrillation and was admitted in 2007 and placed on Tikosyn. We titrated it up to 500 mcg daily but developed marked QT  prolongation even on 250 mcg twice daily. We then discussed ablation versus warfarin and  rate control. We opted for warfarin and rate control. We continued to have symptoms and  went to Dr. Harish Iverson in [place]. She had atrial fibrillation and flutter with  pulmonary vein isolation. She went back into atrial fibrillation and was started on  sotalol. She converted back to sinus rhythm. She then went back into atrial fibrillation.  Again, ultimately, she had a repeat catheterization that showed severe mitral  insufficiency, borderline coronary disease, and in 2010 had mitral valve  replacement with a #31 Epic porcine valve and single bypass graft to the  posterior  descending artery. She continued to have episodes of rapid atrial fibrillation and left  ventricular dysfunction. She underwent AV node ablation and upgrade of her device to a  bi-V.   She then presented in 09/2013 with complaints of a lot of fatigue, tiredness and weakness.   As part of the evaluation she had an echocardiogram which showed her left ventricular  ejection fraction to be 45%, moderate pulmonary hypertension at 62 mmHg.  A perfusion  stress test showed no evidence of ischemia and she had a sleep study which was positive so  she was started on CPAP.  She had some volume overload and did much better on twice a day diuretic.   She was in the hospital around August 2016 with multiple compression fractures of her back.    She then presented to the hospital in September 2017 with GI bleeding was found to have esophageal ulcers but her INR was also supratherapeutic.  There was treated resolved her hemoglobin stabilized  She then apparently fell and hurt her left shoulder now requiring surgery.  In she's done well from a heart standpoint she's had no chest pain or pressure.  No real shortness of breath orthopnea or PND and no palpitations near syncope or syncope.        Past Medical History:   Diagnosis Date   • Anemia    • Atrial fibrillation    • Bruises easily    • Carotid artery stenosis    • Chronic back pain    • Chronic coronary artery disease     moderate to severe LV dysfunction.   • GERD (gastroesophageal reflux disease)    • [place] (hard of hearing)     wears hearing aids   • Hyperlipidemia    • Hypertension    • Leukopenia    • Obesity    • Osteoarthritis    • Peptic ulcer    • Premature ventricular contractions    • Renal insufficiency syndrome    • Scoliosis    • Shoulder fracture, left    • Stroke syndrome    • Thrombocytopenia    • Ventricular tachycardia    • Vitamin B12 deficiency        Past Surgical History:   Procedure Laterality Date   • AV NODE ABLATION     • BREAST BIOPSY     •  CARDIAC CATHETERIZATION      Showed severe mitral insufficiency and borderline coronary artery disease   • CARDIAC CATHETERIZATION      Showed an ejection fraction of 35%. She had occlusive disease of the right posterior LV branch and no other significant disease, treated medically.   • CARDIAC DEFIBRILLATOR PLACEMENT      Biventricular   • CARDIOVERSION      multiple electrocardioversions.   • COLONOSCOPY N/A 9/28/2017    Procedure: COLONOSCOPY TO CECUM;  Surgeon: Kevin Davis MD;  Location: [facility] ENDOSCOPY;  Service:    • CORONARY ARTERY BYPASS GRAFT      single graft to the PDA   • CORONARY STENT PLACEMENT     • ENDOSCOPY N/A 9/28/2017    Procedure: ESOPHAGOGASTRODUODENOSCOPY ;  Surgeon: Kevin Davis MD;  Location: [facility] ENDOSCOPY;  Service:    • HEMORRHOIDECTOMY     • HYSTERECTOMY     • MITRAL VALVE REPLACEMENT  01/2010    #31 Epic porcine valve.   • THROMBOENDARTERECTOMY Right     carotid thromboendarterectomy    • TONSILLECTOMY     • TOTAL KNEE ARTHROPLASTY Left        No current facility-administered medications on file prior to encounter.      Current Outpatient Prescriptions on File Prior to Encounter   Medication Sig Dispense Refill   • alendronate (FOSAMAX) 70 MG tablet Take 1 tablet by mouth Every 7 (Seven) Days. Set up for 30 minutes drink 8 ounces of water and do not eat 30 minutes 12 tablet 3   • calcitriol (ROCALTROL) 0.25 MCG capsule Take 0.25 mcg by mouth Every Other Day.     • carvedilol (COREG) 25 MG tablet Take 25 mg by mouth 2 (Two) Times a Day With Meals.     • Chlorhexidine Gluconate Cloth 2 % pads Apply  topically. PER MD INSTRUCTIONS     • Cholecalciferol (VITAMIN D) 2000 UNITS tablet Take 1 tablet by mouth daily.     • Cyanocobalamin (VITAMIN B12 PO) Take 1 tablet by mouth daily. As directed     • FERROUS SULFATE PO Take 324 mg by mouth Daily. Take as directed       • furosemide (LASIX) 40 MG tablet Take 40 mg by mouth 2 (Two) Times a Day. Patient takes 1.5 tabs every morning and 1  tab every evening     • HYDROcodone-acetaminophen (NORCO) 7.5-325 MG per tablet Take 1 tablet by mouth Every 6 (Six) Hours As Needed for Moderate Pain . Chronic pain medicine for low back pain 60 tablet 0   • Multiple Vitamin (MULTIVITAMIN) capsule Take 1 capsule by mouth daily.     • mupirocin (BACTROBAN) 2 % nasal ointment into each nostril. PER MD ORDERS     • neomycin-bacitracin-polymyxin (NEOSPORIN) 5-400-5000 ointment Apply  topically Daily. Apply to abrasion on left lower back (Patient taking differently: Apply 1 application topically Daily. Apply to abrasion on left lower back) 3.5 g 0   • pantoprazole (PROTONIX) 40 MG EC tablet TAKE ONE TABLET BY MOUTH TWICE A DAY 60 tablet 0   • pantoprazole (PROTONIX) 40 MG EC tablet Take 40 mg by mouth 2 (Two) Times a Day.     • ramipril (ALTACE) 5 MG capsule Take 5 mg by mouth Daily.     • simvastatin (ZOCOR) 40 MG tablet TAKE 1 TABLET EVERY DAY (Patient taking differently: TAKE 1/2 TABLET EVERY DAY) 90 tablet 1   • traMADol (ULTRAM) 50 MG tablet Take 1 tablet by mouth Every 8 (Eight) Hours As Needed for Moderate Pain . Chronic pain meds for low back 90 tablet 1   • zolpidem (AMBIEN) 10 MG tablet Take 1 tablet by mouth At Night As Needed for Sleep. 90 tablet 1   • ACETAMINOPHEN PO Take 325 mg by mouth 4 (Four) Times a Day As Needed. Senior choice      • aspirin 81 MG tablet Take 81 mg by mouth Daily.     • epoetin adele (EPOGEN,PROCRIT) 12869 UNIT/ML injection as needed. Procrit 16414 UNIT/ML Injection Solution; Patient Sig: Procrit 20166 UNIT/ML Injection Solution INJECT SUBCUTANEOUSLY AS DIRECTED.; 0; 01-Apr-2014; Active     • polyethylene glycol (MIRALAX) pack packet Take 17 g by mouth Daily. 30 each 0   • sennosides-docusate sodium (SENOKOT-S) 8.6-50 MG tablet Take 2 tablets by mouth Every Night.     • simvastatin (ZOCOR) 40 MG tablet Take 40 mg by mouth Every Night. TAKES 1/2 TAB DAILY     • warfarin (COUMADIN) 1 MG tablet Take 1 mg by mouth Daily. EXCEPT Friday AND  "TAKES 2 MG ON Friday PATIENT TO CALL DR LAWRENCE ABOUT STOPPING WARFARIN         Social History     Social History   • Marital status:      Spouse name: Russ   • Number of children: N/A   • Years of education: N/A     Occupational History   • [employer] Retired     Social History Main Topics   • Smoking status: Former Smoker     Packs/day: 1.00     Years: 50.00     Types: Cigarettes     Quit date: 1/11/2009   • Smokeless tobacco: Never Used   • Alcohol use Yes      Comment: rare   • Drug use: No   • Sexual activity: Defer     Other Topics Concern   • Not on file     Social History Narrative       Family History   Problem Relation Age of Onset   • Cancer Mother    • Breast cancer Mother    • Heart disease Mother    • Hypertension Mother    • Coronary artery disease Father    • Stroke Father    • Heart disease Father    • Hypertension Father    • Other Daughter      thiamin deficiency    • Hypertension Son    • Malig Hyperthermia Neg Hx        REVIEW OF SYSTEMS:   All ROS was performed and is Negative except as outlined in HPI    Objective:     Vitals:    01/15/18 2336 01/16/18 0005 01/16/18 0031 01/16/18 0430   BP: 140/69 158/75 139/66 156/70   BP Location:  Right arm Right arm    Patient Position:  Lying Lying    Pulse: 60 73 65 62   Resp: 16 16 16 16   Temp: 97 °F (36.1 °C) 96.4 °F (35.8 °C) 97 °F (36.1 °C) 97.2 °F (36.2 °C)   TempSrc:  Oral Oral Oral   SpO2: 97% 97% 99% 97%   Weight:       Height:         Body mass index is 27.46 kg/(m^2).  Flowsheet Rows         First Filed Value    Admission Height  162.6 cm (64\") Documented at 01/15/2018 2038    Admission Weight  72.6 kg (160 lb) Documented at 01/15/2018 2038          Physical Exam   Physical Exam   Constitutional: She is oriented to person, place, and time. She appears well-developed and well-nourished. No distress.   HENT:   Head: Normocephalic.   Eyes: Conjunctivae are normal. Pupils are equal, round, and reactive to light. No scleral icterus. "   Neck: Normal carotid pulses, no hepatojugular reflux and no JVD present. Carotid bruit is not present. No tracheal deviation, no edema and no erythema present. No thyromegaly present.   Cardiovascular: Normal rate, regular rhythm, S1 normal, S2 normal and intact distal pulses.   No extrasystoles are present. PMI is not displaced.  Exam reveals no gallop, no distant heart sounds and no friction rub.    Murmur heard.   Systolic murmur is present with a grade of 2/6  at the lower left sternal border  Pulses:       Carotid pulses are 2+ on the right side, and 2+ on the left side with bruit.       Radial pulses are 2+ on the right side, and 2+ on the left side.        Femoral pulses are 2+ on the right side, and 2+ on the left side.       Dorsalis pedis pulses are 2+ on the right side, and 2+ on the left side.        Posterior tibial pulses are 2+ on the right side, and 2+ on the left side.   Pulmonary/Chest: Effort normal and breath sounds normal. No respiratory distress. She has no decreased breath sounds. She has no wheezes. She has no rhonchi. She has no rales. She exhibits no tenderness.   Abdominal: Soft. Bowel sounds are normal. She exhibits no distension and no mass. There is no hepatosplenomegaly. There is no tenderness. There is no rebound and no guarding.   Musculoskeletal: She exhibits no edema, tenderness or deformity.   Neurological: She is alert and oriented to person, place, and time.   Skin: Skin is warm and dry. No rash noted. She is not diaphoretic. No cyanosis or erythema. No pallor. Nails show no clubbing.   Psychiatric: She has a normal mood and affect. Her speech is normal and behavior is normal. Judgment and thought content normal.       Lab Review:                  Results from last 7 days  Lab Units 01/16/18  0412   SODIUM mmol/L 142   POTASSIUM mmol/L 3.5   CHLORIDE mmol/L 101   CO2 mmol/L 28.0   BUN mg/dL 33*   CREATININE mg/dL 1.50*   GLUCOSE mg/dL 78   CALCIUM mg/dL 9.0           Results  from last 7 days  Lab Units 01/16/18  0412 01/15/18  1608   WBC 10*3/mm3  --  4.64   HEMOGLOBIN g/dL 10.4* 8.6*   HEMATOCRIT % 33.2* 28.4*   PLATELETS 10*3/mm3  --  163       Results from last 7 days  Lab Units 01/16/18  0412 01/11/18  1642   INR  2.03* 2.94*   APTT seconds  --  45.1*       Results from last 7 days  Lab Units 01/16/18  0412   CHOLESTEROL mg/dL 126           Results from last 7 days  Lab Units 01/16/18  0412   CHOLESTEROL mg/dL 126   TRIGLYCERIDES mg/dL 138   HDL CHOL mg/dL 43     @LABRCNT(bnp)@    I personally viewed and interpreted the patient's EKG/Telemetry data    Assessment:   1.  Need for left shoulder surgery.  She is Srikanth's revised high risk.  None of those modifiable.  No further cardiovascular evaluation or treatment needed.  She will need to be bridged on either heparin or Lovenox postoperatively until her INR is therapeutic.  2.  Coronary artery disease.  Previous and depressive-like artery and inferior infarct and ultimate single-vessel bypass grafting the posterior descending artery at the time of her mitral replacement echocardiogram 2016 left ventricular ejection fraction of 40% with inferior inferolateral wall akinesis.  No angina or heart failure continue home therapy.  3.  History of mitral insufficiency status post mitral replacement with tissue valve.  Echo cardiac and 2016 valve functioning normally moderate tricuspid insufficiency with moderate pulmonary hypertension.  4.  Atrial fibrillation/sick sinus syndrome failed multiple cardioversions multiple medications failed pulmonary vein isolation then status post AV node ablation and by V defibrillator.  As above high risk for embolic event with a chads 2 Vascor of 6 need to be transitioned and bridged back to warfarin.  5. Cerebral vascular disease status post carotid endarterectomy.  Carotid ultrasound June 2017 mild disease on the right moderate on the left.  6.  Hypertension follow blood pressure continue all medication  7.   Renal insufficiency.  8.  Hyperlipidemia.  Continue home therapy  9.  History of nonsustained ventricular tachycardia asymptomatic continue same.  10.  History of sleep apnea on CPAP.          Plan:

## 2018-01-16 NOTE — ANESTHESIA PROCEDURE NOTES
Airway  Urgency: elective    Airway not difficult    General Information and Staff    Patient location during procedure: OR  Anesthesiologist: CORA MARINO  CRNA: MASOOD CANCHOLA    Indications and Patient Condition  Indications for airway management: airway protection    Preoxygenated: yes  MILS maintained throughout  Mask difficulty assessment: 1 - vent by mask    Final Airway Details  Final airway type: endotracheal airway      Successful airway: ETT  Cuffed: yes   Successful intubation technique: direct laryngoscopy  Endotracheal tube insertion site: oral  Blade: Peralta  Blade size: #2  ETT size: 7.0 mm  Cormack-Lehane Classification: grade I - full view of glottis  Placement verified by: chest auscultation and capnometry   Cuff volume (mL): 8  Measured from: lips  ETT to lips (cm): 21  Number of attempts at approach: 1    Additional Comments  Pt preoxygenated, SIVI, bag mask vent, ATETI, dentition as before

## 2018-01-16 NOTE — ANESTHESIA PREPROCEDURE EVALUATION
Anesthesia Evaluation     Patient summary reviewed and Nursing notes reviewed          Airway   Mallampati: II  Dental    (+) upper dentures and lower dentures    Pulmonary - negative pulmonary ROS   Cardiovascular     ECG reviewed  Rhythm: irregular    (+) pacemaker ICD, pacemaker, CABG,       Neuro/Psych- negative ROS  GI/Hepatic/Renal/Endo - negative ROS     Musculoskeletal (-) negative ROS    Abdominal    Substance History - negative use     OB/GYN negative ob/gyn ROS         Other                                                Anesthesia Plan    ASA 3     general and regional   (Left ISB x)  intravenous induction   Anesthetic plan and risks discussed with patient.

## 2018-01-16 NOTE — PLAN OF CARE
Problem: Patient Care Overview (Adult)  Goal: Plan of Care Review  Outcome: Ongoing (interventions implemented as appropriate)   01/15/18 1800   Coping/Psychosocial Response Interventions   Plan Of Care Reviewed With patient   Patient Care Overview   Progress progress toward functional goals as expected   Outcome Evaluation   Outcome Summary/Follow up Plan Admit for low HgB - 8.4. Sx with Dr. Quesada in am for LTRSA. recieving 2 units of PRBC today for surgery tomorrow. Had to get lab to draw new CBC due to time lapse between last CBC, requested by Blood Bank. Hgb came up to 8.6. surgery scheduled for 2 pm tomorrw.      Goal: Adult Individualization and Mutuality  Outcome: Ongoing (interventions implemented as appropriate)    Goal: Discharge Needs Assessment  Outcome: Ongoing (interventions implemented as appropriate)      Problem: Fall Risk (Adult)  Goal: Identify Related Risk Factors and Signs and Symptoms  Outcome: Ongoing (interventions implemented as appropriate)    Goal: Absence of Falls  Outcome: Ongoing (interventions implemented as appropriate)      Problem: Pain, Acute (Adult)  Goal: Identify Related Risk Factors and Signs and Symptoms  Outcome: Ongoing (interventions implemented as appropriate)    Goal: Acceptable Pain Control/Comfort Level  Outcome: Ongoing (interventions implemented as appropriate)

## 2018-01-16 NOTE — PROGRESS NOTES
Orthopedic Progress Note      Patient: Janel Mahoney  YOB: 1940     Date of Admission: 1/15/2018 12:28 PM Medical Record Number: [id number]     Attending Physician: Addison Choi MD    Planned Procedure:  Left prox humerus fracture (Awaiting Surgery)    Subjective : No new orthopaedic complaints     Pain Relief: some relief with present medication.     Systemic Complaints: No Complaints  Vitals:    01/16/18 0005 01/16/18 0031 01/16/18 0430 01/16/18 0700   BP: 158/75 139/66 156/70 153/69   BP Location: Right arm Right arm  Right arm   Patient Position: Lying Lying  Lying   Pulse: 73 65 62 60   Resp: 16 16 16 16   Temp: 96.4 °F (35.8 °C) 97 °F (36.1 °C) 97.2 °F (36.2 °C) 97.1 °F (36.2 °C)   TempSrc: Oral Oral Oral Oral   SpO2: 97% 99% 97% 98%   Weight:       Height:           Physical Exam: 77 y.o. female    General Appearance:       Alert, cooperative, in no acute distress                  Extremities:   Left shoulder swelling, pain          No clinical sign of DVT        Able to do good movements of digits    Pulses:   Pulses palpable and equal bilaterally           Diagnostic Tests:      Results from last 7 days  Lab Units 01/16/18  0412 01/15/18  1608 01/11/18  1642   WBC 10*3/mm3  --  4.64 4.64   HEMOGLOBIN g/dL 10.4* 8.6* 8.4*   HEMATOCRIT % 33.2* 28.4* 28.9*   PLATELETS 10*3/mm3  --  163 179       Results from last 7 days  Lab Units 01/16/18  0412 01/11/18  1642   SODIUM mmol/L 142 143   POTASSIUM mmol/L 3.5 4.3   CHLORIDE mmol/L 101 100   CO2 mmol/L 28.0 31.8*   BUN mg/dL 33* 41*   CREATININE mg/dL 1.50* 1.71*   GLUCOSE mg/dL 78 101*   CALCIUM mg/dL 9.0 9.4       Results from last 7 days  Lab Units 01/16/18  0412 01/11/18  1642   INR  2.03* 2.94*   APTT seconds  --  45.1*     No results found for: CRYSTAL]  No results found for: CULTURE]  No results found for: URICACID]  Xr Shoulder 2+ View Left    Result Date: 1/12/2018  Narrative: TWO-VIEW LEFT SHOULDER  HISTORY: Recent fracture. Follow-up  evaluation.  There is a fracture through the neck of the humerus with medial displacement of the humeral shaft relative to the humeral head and this appearance is similar to the study of 12/24/2017.  TWO-VIEW CHEST  HISTORY: Preop for shoulder surgery. Hypertension. Anemia.  There is moderate cardiomegaly with a pacemaker in place and this is unchanged from 01/26/2010. The lungs are clear except for a calcified granuloma in the upper left lung. No acute abnormality is seen.  This report was finalized on 1/12/2018 10:57 AM by Dr. Jesse Bautista MD.      Xr Shoulder 2+ View Left    Result Date: 12/24/2017  Narrative: THREE-VIEW LEFT SHOULDER  HISTORY: LEFT Shoulder Injury.  FINDINGS: Three views of the left shoulder were submitted. There is a slightly obliquely oriented fracture of the proximal humerus at the level of the surgical neck. The primary distal fracture fragment is displaced approximately 30% medially and 50% anteriorly. The humeral head articulates normally with the glenoid process of the scapula without dislocation. There is mild impaction at the fracture site. Arthritic changes are present along the articular surfaces of the glenohumeral joint. There are several small subchondral cysts present. Soft tissues appear unremarkable.      Impression: 1. Proximal humerus fracture without dislocation.  This report was finalized on 12/24/2017 5:10 AM by Hoang Marshall MD.      Ct Head Without Contrast    Result Date: 12/24/2017  Narrative: CRANIAL CT SCAN WITHOUT CONTRAST  CLINICAL HISTORY: Trauma/fall  COMPARISON: None.  TECHNIQUE: Radiation dose reduction techniques were utilized, including automated exposure control and exposure modulation based on body size. Multiple axial images of the head were obtained without contrast.  FINDINGS:  There are no abnormal areas of increased density or mass effect. There is diffuse cortical atrophy. There are mild scattered areas of decreased density in the white matter  likely related to chronic ischemic gliotic changes.   Ventricles, sulci, and cisterns appear normal. Bone window images are unremarkable.         Impression: 1. No acute intracranial abnormality.         This study was performed with techniques to keep radiation doses as low as reasonably achievable (ALARA). Individualized dose reduction techniques using automated exposure control or adjustment of mA and/or kV according to the patient size were employed.  This report was finalized on 12/24/2017 5:52 AM by Hoang Marshall MD.      Ct Cervical Spine Without Contrast    Result Date: 12/24/2017  Narrative: NONCONTRAST CT SCAN CERVICAL SPINE  CLINICAL HISTORY: Trauma  COMPARISON: None.  TECHNIQUE: Radiation dose reduction techniques were utilized, including automated exposure control and exposure modulation based on body size. Axial noncontrast images of the cervical spine were obtained without contrast. Sagittal reformatted images were supplemented.  FINDINGS:  No acute vertebral fracture identified on the axial series. There is cervicothoracic levoscoliosis on the coronal reformatted images.  The vertebrae are well-maintained in height on the sagittal reformatted images. There is 2-3 mm of anterolisthesis at C4-5. There is 2 mm of anterolisthesis at C5-6 and C6-7 also. There is 2-3 mm of anterolisthesis at C7-T1.  No significant compression deformity or retropulsion.  There is a 6 mm sclerotic lesion in the C6 vertebral body just to the right of midline. A small bone island is favored, particularly if there is no malignancy history. (If there is pain specific to this level or any malignancy history which may indicate alternate etiology, bone scan or MRI may be indicated.) The facets are well aligned. Advanced multilevel facet arthropathy is present throughout.  Multilevel degenerative changes are present. No significant central canal narrowing appreciated by noncontrast technique. Multilevel facet arthropathy  contributes to multilevel foraminal stenosis.           Impression: 1. No acute cervical spine fracture. 2. 6 mm sclerotic lesion of the C6 vertebra as discussed  This report was finalized on 12/24/2017 5:50 AM by Hoang Marshall MD.      Xr Chest 1 View    Result Date: 1/16/2018  Narrative: PORTABLE CHEST  HISTORY: Hypertension. Preoperative exam.  COMPARISON: PA and lateral chest 01/11/2018.  FINDINGS: Heart size is enlarged. Sternotomy wires, left subclavian cardiopacer/defibrillator are present. There are chronic increased interstitial markings without evidence for focal airspace disease or pulmonary edema or pleural effusion. The pacer/defibrillator device superimposes the left lung base. There is a calcified granuloma in the left midlung. A proximal humeral metaphyseal fracture is noted.      Impression: 1.  Cardiomegaly. No evidence for active disease in the chest. 2.  Proximal humeral metaphyseal fracture.  This report was finalized on 1/16/2018 8:51 AM by Dr. Ty Santoro MD.      Xr Chest Pa & Lateral    Result Date: 1/12/2018  Narrative: TWO-VIEW LEFT SHOULDER  HISTORY: Recent fracture. Follow-up evaluation.  There is a fracture through the neck of the humerus with medial displacement of the humeral shaft relative to the humeral head and this appearance is similar to the study of 12/24/2017.  TWO-VIEW CHEST  HISTORY: Preop for shoulder surgery. Hypertension. Anemia.  There is moderate cardiomegaly with a pacemaker in place and this is unchanged from 01/26/2010. The lungs are clear except for a calcified granuloma in the upper left lung. No acute abnormality is seen.  This report was finalized on 1/12/2018 10:57 AM by Dr. Jesse Bautista MD.          Current Medications:  Scheduled Meds:  aspirin 81 mg Oral Daily   atorvastatin 10 mg Oral Nightly   [START ON 1/17/2018] calcitriol 0.25 mcg Oral Every Other Day   carvedilol 25 mg Oral BID With Meals   cholecalciferol 1,000 Units Oral Daily   ferrous  sulfate 324 mg Oral Daily   furosemide 40 mg Oral Daily   multivitamin 1 tablet Oral Daily   pantoprazole 40 mg Oral BID AC   polyethylene glycol 17 g Oral Daily   ramipril 5 mg Oral Daily   sennosides-docusate sodium 2 tablet Oral Nightly   vitamin B-12 1,000 mcg Oral Daily     Continuous Infusions:   PRN Meds:.HYDROcodone-acetaminophen  •  ondansetron  •  zolpidem    Assessment:   Left prox humerus fracture (Awaiting Surgery)    Patient Active Problem List   Diagnosis   • Anemia in stage 3 chronic kidney disease   • Thrombocytopenia   • B12 deficiency   • Coronary artery disease involving coronary bypass graft of native heart without angina pectoris   • S/P MVR (mitral valve replacement)   • Chronic atrial fibrillation   • Bilateral carotid artery disease   • Intractable low back pain   • Long-term (current) use of anticoagulants   • Low back pain   • Osteoporosis   • Murmur, heart   • GEORGINA on auto CPAP - Dr Cardona   • Hypersomnia due to medical condition - GEORGINA   • Esophageal stricture   • Acute blood loss anemia   • Dysphagia   • Closed fracture of left proximal humerus   • Hypertension   • Supratherapeutic INR   • Chronic combined systolic and diastolic congestive heart failure   • Osteoporosis with pathological fracture   • Closed 3-part fracture of proximal end of left humerus       PLAN:   Hb better,   INR 2.0 today, will give one unit of FFP before surgery  To OR on call    Bianka Quesada MD    Date: 1/16/2018    Time: 9:32 AM

## 2018-01-16 NOTE — ANESTHESIA PROCEDURE NOTES
Peripheral Block    Patient location during procedure: pre-op  Start time: 1/16/2018 12:20 PM  Stop time: 1/16/2018 12:35 PM  Reason for block: at surgeon's request and post-op pain management  Performed by  Anesthesiologist: ADRIANA CORRAL  Preanesthetic Checklist  Completed: patient identified, site marked, surgical consent, pre-op evaluation, timeout performed, IV checked, risks and benefits discussed and monitors and equipment checked  Prep:  Sterile barriers:cap, gloves, gown, mask and sterile barriers  Prep: ChloraPrep  Patient monitoring: blood pressure monitoring, continuous pulse oximetry and EKG  Procedure  Sedation:yes    Guidance:ultrasound guided  ULTRASOUND INTERPRETATION.  Using ultrasound guidance a 21 G gauge needle was placed in close proximity to the brachial plexus nerve, at which point, under ultrasound guidance anesthetic was injected in the area of the nerve and spread of the anesthesia was seen on ultrasound in close proximity thereto.  There were no abnormalities seen on ultrasound; a digital image was taken; and the patient tolerated the procedure with no complications. Images:still images obtained    Laterality:left  Block Type:interscalene  Injection Technique:single-shot  Needle Type:echogenic  Needle Gauge:21 G    Medications  Depomedrol:40 mg  Local Injected:ropivacaine 0.5% and 2% Mepivacaine  Post Assessment  Injection Assessment: negative aspiration for heme, no paresthesia on injection and incremental injection  Patient Tolerance:comfortable throughout block  Complications:no

## 2018-01-16 NOTE — PROGRESS NOTES
"Daily progress note    Chief complaint  Doing same  No new complaints    History of present illness  77-year-old white female with very complex past medical history who fell on the December 24 support from left proximal humerus fracture of the medical problems hypertension congestive heart failure, osteoarthritis osteoporosis obstructive sleep apnea chronic atrial fibrillation on anticoagulation coronary artery disease status post mitral valve replacement chronic anemia required multiple blood transfusion chronic kidney disease who was directly admitted to our service for blood transfusion and then have surgery scheduled on the left humerus with orthopedic surgery.  Patient has no new complaints and has been hurting at this fracture site and very weak.  Denies any recent headache dizziness chest pain shortness of breath abdominal pain nausea vomiting diarrhea patient did get multiple blood transfusion few months ago.    Review of Systems   Constitutional: Negative for chills and fever.   HENT: Negative.    Eyes: Negative.    Respiratory: Negative.    Cardiovascular: Positive for leg swelling. Negative for chest pain (occasionally).   Gastrointestinal: Positive for constipation and diarrhea.        Black stools due to iron   Endocrine: Negative.    Genitourinary: Negative.    Musculoskeletal: Positive for arthralgias, back pain (chronic), gait problem (uses walker), neck pain and neck stiffness.   Skin: Negative.    Neurological: Positive for weakness. Negative for dizziness and light-headedness.   Hematological: Negative.    Psychiatric/Behavioral: Negative.      Physical Exam Blood pressure 163/71, pulse 60, temperature 98 °F (36.7 °C), temperature source Oral, resp. rate 16, height 162.6 cm (64\"), weight 72.6 kg (160 lb), SpO2 100 %.    Constitutional: She is oriented to person, place, and time. She appears well-developed and well-nourished. No distress.   Head: Normocephalic and atraumatic.   Eyes: Conjunctivae " and EOM are normal. No scleral icterus.   Neck: Normal range of motion. Neck supple. No tracheal deviation present.   Cardiovascular: Normal rate.  An irregularly irregular rhythm present.   Pulmonary/Chest: Effort normal. No respiratory distress. She has decreased breath sounds. She has no wheezes. She has no rales.   Abdominal: Soft. Bowel sounds are normal. She exhibits no distension and no mass. There is no tenderness.   Musculoskeletal: She exhibits deformity (LUE in sling). She exhibits no edema.   Neurological: She is alert and oriented to person, place, and time. No cranial nerve deficit.   Skin: Skin is warm and dry. Ecchymosis (scattered) noted. No rash noted. No erythema.   Psychiatric: She has a normal mood and affect. Her behavior is normal. Judgment and thought content normal.      LABS  Lab Results (last 24 hours)     Procedure Component Value Units Date/Time    CBC & Differential [[id number]] Collected:  01/15/18 1608    Specimen:  Blood Updated:  01/15/18 1640    Narrative:       The following orders were created for panel order CBC & Differential.  Procedure                               Abnormality         Status                     ---------                               -----------         ------                     CBC Auto Differential[[id number]]        Abnormal            Final result                 Please view results for these tests on the individual orders.    CBC Auto Differential [[id number]]  (Abnormal) Collected:  01/15/18 1608    Specimen:  Blood Updated:  01/15/18 1640     WBC 4.64 10*3/mm3      RBC 3.03 (L) 10*6/mm3      Hemoglobin 8.6 (L) g/dL      Hematocrit 28.4 (L) %      MCV 93.7 fL      MCH 28.4 pg      MCHC 30.3 (L) g/dL      RDW 16.4 (H) %      RDW-SD 55.7 (H) fl      MPV 9.8 fL      Platelets 163 10*3/mm3      Neutrophil % 70.5 %      Lymphocyte % 18.1 (L) %      Monocyte % 6.5 %      Eosinophil % 4.5 %      Basophil % 0.4 %      Immature Grans % 0.0 %      Neutrophils,  Absolute 3.27 10*3/mm3      Lymphocytes, Absolute 0.84 (L) 10*3/mm3      Monocytes, Absolute 0.30 10*3/mm3      Eosinophils, Absolute 0.21 10*3/mm3      Basophils, Absolute 0.02 10*3/mm3      Immature Grans, Absolute 0.00 10*3/mm3     Hemoglobin A1c [[id number]]  (Normal) Collected:  01/16/18 0412    Specimen:  Blood Updated:  01/16/18 0518     Hemoglobin A1C 5.20 %     Narrative:       Hemoglobin A1C Ranges:    Increased Risk for Diabetes  5.7% to 6.4%  Diabetes                     >= 6.5%  Diabetic Goal                < 7.0%    Protime-INR [[id number]]  (Abnormal) Collected:  01/16/18 0412    Specimen:  Blood Updated:  01/16/18 0520     Protime 22.3 (H) Seconds      INR 2.03 (H)    Comprehensive Metabolic Panel [[id number]]  (Abnormal) Collected:  01/16/18 0412    Specimen:  Blood Updated:  01/16/18 0528     Glucose 78 mg/dL      BUN 33 (H) mg/dL      Creatinine 1.50 (H) mg/dL      Sodium 142 mmol/L      Potassium 3.5 mmol/L      Chloride 101 mmol/L      CO2 28.0 mmol/L      Calcium 9.0 mg/dL      Total Protein 6.3 g/dL      Albumin 3.20 (L) g/dL      ALT (SGPT) 7 U/L      AST (SGOT) 15 U/L      Alkaline Phosphatase 75 U/L      Total Bilirubin 0.8 mg/dL      eGFR Non African Amer 34 (L) mL/min/1.73      Globulin 3.1 gm/dL      A/G Ratio 1.0 g/dL      BUN/Creatinine Ratio 22.0     Anion Gap 13.0 mmol/L     Narrative:       The MDRD GFR formula is only valid for adults with stable renal function between ages 18 and 70.    Lipid Panel [[id number]] Collected:  01/16/18 0412    Specimen:  Blood Updated:  01/16/18 0528     Total Cholesterol 126 mg/dL      Triglycerides 138 mg/dL      HDL Cholesterol 43 mg/dL      LDL Cholesterol  55 mg/dL      VLDL Cholesterol 27.6 mg/dL      LDL/HDL Ratio 1.29    Narrative:       Cholesterol Reference Ranges  (U.S. Department of Health and Human Services ATP III Classifications)    Desirable          <200 mg/dL  Borderline High    200-239 mg/dL  High Risk          >240  mg/dL      Triglyceride Reference Ranges  (U.S. Department of Health and Human Services ATP III Classifications)    Normal           <150 mg/dL  Borderline High  150-199 mg/dL  High             200-499 mg/dL  Very High        >500 mg/dL    HDL Reference Ranges  (U.S. Department of Health and Human Services ATP III Classifcations)    Low     <40 mg/dl (major risk factor for CHD)  High    >60 mg/dl ('negative' risk factor for CHD)        LDL Reference Ranges  (U.S. Department of Health and Human Services ATP III Classifcations)    Optimal          <100 mg/dL  Near Optimal     100-129 mg/dL  Borderline High  130-159 mg/dL  High             160-189 mg/dL  Very High        >189 mg/dL    TSH [[id number]]  (Normal) Collected:  01/16/18 0412    Specimen:  Blood Updated:  01/16/18 0541     TSH 1.110 mIU/mL     BNP [[id number]]  (Abnormal) Collected:  01/16/18 0412    Specimen:  Blood Updated:  01/16/18 0541     proBNP 4671.0 (H) pg/mL     Narrative:       Among patients with dyspnea, NT-proBNP is highly sensitive for the detection of acute congestive heart failure. In addition NT-proBNP of <300 pg/ml effectively rules out acute congestive heart failure with 99% negative predictive value.    Hemoglobin & Hematocrit, Blood [[id number]]  (Abnormal) Collected:  01/16/18 0412    Specimen:  Blood Updated:  01/16/18 0631     Hemoglobin 10.4 (L) g/dL      Hematocrit 33.2 (L) %     Occult Blood X 1, Stool - Stool, Per Rectum [[id number]]  (Normal) Collected:  01/16/18 1058    Specimen:  Stool from Per Rectum Updated:  01/16/18 1226     Fecal Occult Blood Negative    Protime-INR [[id number]]  (Abnormal) Collected:  01/16/18 1234    Specimen:  Blood Updated:  01/16/18 1256     Protime 21.7 (H) Seconds      INR 1.96 (H)        Imaging Results (last 24 hours)     Procedure Component Value Units Date/Time    XR Chest 1 View [[id number]] Collected:  01/15/18 1620     Updated:  01/16/18 0854    Narrative:       PORTABLE CHEST     HISTORY:  Hypertension. Preoperative exam.     COMPARISON: PA and lateral chest 01/11/2018.     FINDINGS: Heart size is enlarged. Sternotomy wires, left subclavian  cardiopacer/defibrillator are present. There are chronic increased  interstitial markings without evidence for focal airspace disease or  pulmonary edema or pleural effusion. The pacer/defibrillator device  superimposes the left lung base. There is a calcified granuloma in the  left midlung. A proximal humeral metaphyseal fracture is noted.       Impression:       1.  Cardiomegaly. No evidence for active disease in the chest.  2.  Proximal humeral metaphyseal fracture.     This report was finalized on 1/16/2018 8:51 AM by Dr. Ty Santoro MD.             Current Facility-Administered Medications:   •  [MAR Hold] aspirin chewable tablet 81 mg, 81 mg, Oral, Daily, Addison Choi MD  •  [MAR Hold] atorvastatin (LIPITOR) tablet 10 mg, 10 mg, Oral, Nightly, Addison Choi MD, 10 mg at 01/15/18 2212  •  [MAR Hold] calcitriol (ROCALTROL) capsule 0.25 mcg, 0.25 mcg, Oral, Every Other Day, Addison Choi MD  •  carvedilol (COREG) tablet 25 mg, 25 mg, Oral, BID With Meals, Addison Choi MD, 25 mg at 01/16/18 0828  •  ceFAZolin (ANCEF) in SWFI 2 g/20ml IV PUSH syringe, 2 g, Intravenous, Once, Tejas Panchal MD  •  ceFAZolin (ANCEF) in SWFI 2 g/20ml IV PUSH syringe, 2 g, Intravenous, Once, Bianka Quesada MD  •  [MAR Hold] cholecalciferol (VITAMIN D3) tablet 1,000 Units, 1,000 Units, Oral, Daily, Addison Choi MD  •  fentaNYL citrate (PF) (SUBLIMAZE) injection 50 mcg, 50 mcg, Intravenous, Q10 Min PRN, Aries Pappas MD  •  fentaNYL citrate (PF) (SUBLIMAZE) injection 50 mcg, 50 mcg, Intravenous, Q10 Min PRN, Aries Pappas MD, 50 mcg at 01/16/18 1225  •  [MAR Hold] ferrous sulfate tablet 324 mg, 324 mg, Oral, Daily, Addison Choi MD  •  [MAR Hold] furosemide (LASIX) tablet 40 mg, 40 mg, Oral, Daily, Addison Choi MD  •  [MAR Hold]  HYDROcodone-acetaminophen (NORCO) 7.5-325 MG per tablet 1 tablet, 1 tablet, Oral, Q6H PRN, Addison Choi MD, 1 tablet at 01/15/18 2213  •  lactated ringers infusion, 9 mL/hr, Intravenous, Continuous, Aries Pappas MD  •  lactated ringers infusion, 9 mL/hr, Intravenous, Continuous, Aries Pappas MD  •  lidocaine PF 1% (XYLOCAINE) injection 0.5 mL, 0.5 mL, Injection, Once PRN, Aries Pappas MD  •  lidocaine PF 1% (XYLOCAINE) injection 0.5 mL, 0.5 mL, Injection, Once PRN, Aries Pappas MD  •  midazolam (VERSED) injection 1 mg, 1 mg, Intravenous, Q5 Min PRN **OR** midazolam (VERSED) injection 2 mg, 2 mg, Intravenous, Q5 Min PRN, Aries Pappas MD  •  midazolam (VERSED) injection 1 mg, 1 mg, Intravenous, Q5 Min PRN **OR** midazolam (VERSED) injection 2 mg, 2 mg, Intravenous, Q5 Min PRN, Aries Pappas MD, 2 mg at 01/16/18 1225  •  [MAR Hold] multivitamin (THERAGRAN) tablet 1 tablet, 1 tablet, Oral, Daily, Addison Choi MD  •  [MAR Hold] ondansetron (ZOFRAN) injection 4 mg, 4 mg, Intravenous, Q4H PRN, Addison Choi MD  •  [MAR Hold] pantoprazole (PROTONIX) EC tablet 40 mg, 40 mg, Oral, BID AC, Addison Choi MD, 40 mg at 01/15/18 1752  •  [MAR Hold] polyethylene glycol 3350 powder (packet), 17 g, Oral, Daily, Addison Choi MD  •  ramipril (ALTACE) capsule 5 mg, 5 mg, Oral, Daily, Addison Choi MD  •  [MAR Hold] sennosides-docusate sodium (SENOKOT-S) 8.6-50 MG tablet 2 tablet, 2 tablet, Oral, Nightly, Addison Choi MD  •  sodium chloride (NS) irrigation solution, , , PRN, Bianka Quesada MD, 3,000 mL at 01/16/18 1423  •  sodium chloride 0.9 % flush 1-10 mL, 1-10 mL, Intravenous, PRN, Aries Pappas MD  •  sodium chloride 0.9 % flush 1-10 mL, 1-10 mL, Intravenous, PRN, Aries Pappas MD  •  sodium chloride 1,000 mL with bacitracin 50,000 Units irrigation, , , PRN, Bianka Quesada MD, 1,000 mL at 01/16/18 1422  •  [MAR Hold] vitamin B-12  (CYANOCOBALAMIN) tablet 1,000 mcg, 1,000 mcg, Oral, Daily, Addison Choi MD  •  [MAR Hold] zolpidem (AMBIEN) tablet 5 mg, 5 mg, Oral, Nightly PRN, Addison Choi MD, 5 mg at 01/15/18 2213    Facility-Administered Medications Ordered in Other Encounters:   •  dexamethasone (DECADRON) injection, , Intravenous, PRN, Vikki Bui CRNA, 8 mg at 01/16/18 1449  •  ePHEDrine injection, , Intravenous, PRN, Mary Burnette CRNA, 10 mg at 01/16/18 1420  •  lidocaine (LTA KIT) 4 % laryngotracheal solution, , , PRN, Mary Burnette CRNA, 1 each at 01/16/18 1254  •  lidocaine (XYLOCAINE) 2% injection, , Injection, PRN, Mary Burnette CRNA, 60 mg at 01/16/18 1252  •  mepivacaine PF (CARBOCAINE) 2 % injection, , , PRN, Aries Pappas MD, 10 mL at 01/16/18 1220  •  methylPREDNISolone sodium succinate (SOLU-Medrol) injection, , , PRN, Aries Pappas MD, 40 mg at 01/16/18 1220  •  ondansetron (ZOFRAN) injection, , Intravenous, PRN, Vikki Bui CRNA, 4 mg at 01/16/18 1446  •  phenylephrine (DAMON-SYNEPHRINE) injection, , Intravenous, PRN, Mary Burnette CRNA, 100 mcg at 01/16/18 1400  •  Propofol (DIPRIVAN) injection, , Intravenous, PRN, Mary Burnette CRNA, 100 mg at 01/16/18 1252  •  rocuronium (ZEMURON) injection, , Intravenous, PRN, Mary Burnette CRNA, 20 mg at 01/16/18 1252  •  ropivacaine (NAROPIN) 0.5 % injection, , , PRN, Aries Pappas MD, 20 mL at 01/16/18 1220     ASSESSMENT  Chronic anemia required blood transfusion  Closed fracture of left proximal humerus after fall on December 24   Hypertension  Congestive heart failure combined both systolic diastolic  Chronic atrial fibrillation on anticoagulation  Status post mitral valve replacement  Chronic kidney disease stage III  Obstructive sleep apnea  Osteoarthritis  Osteoporosis  Gastroesophageal reflux disease  Coronary artery disease status post CABG    PLAN  CPM  Transfuse PRN  Hold Coumadin and receiving  FFP  Going for surgery today  Continue home medication except Coumadin  Cardiology consult for surgery clearance  Stress ulcer DVT prophylaxis  Supportive care  Follow closely further recommendation according to hospital course    LAURA DYE MD

## 2018-01-16 NOTE — PLAN OF CARE
Problem: Patient Care Overview (Adult)  Goal: Plan of Care Review  Outcome: Ongoing (interventions implemented as appropriate)   01/16/18 1721   Coping/Psychosocial Response Interventions   Plan Of Care Reviewed With patient   Patient Care Overview   Progress improving   Outcome Evaluation   Outcome Summary/Follow up Plan POSTOP, NUMBNESS AND TINGLING IN L ARM, NVI, VSS- MILD ELEVATED BP- MEDICATED, PAIN CONTROLLED, DTV, DSG CDI     Goal: Adult Individualization and Mutuality  Outcome: Ongoing (interventions implemented as appropriate)    Goal: Discharge Needs Assessment  Outcome: Ongoing (interventions implemented as appropriate)      Problem: Fall Risk (Adult)  Goal: Absence of Falls  Outcome: Ongoing (interventions implemented as appropriate)      Problem: Pain, Acute (Adult)  Goal: Acceptable Pain Control/Comfort Level  Outcome: Ongoing (interventions implemented as appropriate)      Problem: Self-Care Deficit (Adult,Obstetrics,Pediatric)  Goal: Improved Ability to Perform BADL and IADL  Outcome: Ongoing (interventions implemented as appropriate)

## 2018-01-16 NOTE — BRIEF OP NOTE
TOTAL SHOULDER REVERSE ARTHROPLASTY  Progress Note    Janel Mahoney  1/15/2018 - 1/16/2018    Pre-op Diagnosis:   Closed 3-part fracture of proximal end of left humerus, initial encounter [S42.292A]       Post-Op Diagnosis Codes:     * Closed 3-part fracture of proximal end of left humerus, initial encounter [S42.292A]    Procedure/CPT® Codes:      Procedure(s):  TOTAL SHOULDER REVERSE ARTHROPLASTY    Surgeon(s):  Bianka Quesada MD    Anesthesia: General with Block    Staff:   Circulator: Tia Herrera RN; Milagros Joe RN  Scrub Person: Garcia Orozco  Vendor Representative: Andres Thomas  Assistant: Tejas Panchal MD    Estimated Blood Loss: 200 mL    Urine Voided: * No values recorded between 1/16/2018 12:41 PM and 1/16/2018  2:49 PM *    Specimens:                None      Drains:           Findings: see dict     Complications: otoniel Quesada MD     Date: 1/16/2018  Time: 2:49 PM

## 2018-01-17 ENCOUNTER — APPOINTMENT (OUTPATIENT)
Dept: ONCOLOGY | Facility: HOSPITAL | Age: 78
End: 2018-01-17

## 2018-01-17 ENCOUNTER — APPOINTMENT (OUTPATIENT)
Dept: LAB | Facility: HOSPITAL | Age: 78
End: 2018-01-17

## 2018-01-17 ENCOUNTER — APPOINTMENT (OUTPATIENT)
Dept: ONCOLOGY | Facility: CLINIC | Age: 78
End: 2018-01-17

## 2018-01-17 LAB
ABO + RH BLD: NORMAL
ANION GAP SERPL CALCULATED.3IONS-SCNC: 15.5 MMOL/L
BASOPHILS # BLD AUTO: 0 10*3/MM3 (ref 0–0.2)
BASOPHILS NFR BLD AUTO: 0 % (ref 0–1.5)
BH BB BLOOD EXPIRATION DATE: NORMAL
BH BB BLOOD TYPE BARCODE: 5100
BH BB DISPENSE STATUS: NORMAL
BH BB PRODUCT CODE: NORMAL
BH BB UNIT NUMBER: NORMAL
BUN BLD-MCNC: 32 MG/DL (ref 8–23)
BUN/CREAT SERPL: 22.5 (ref 7–25)
CALCIUM SPEC-SCNC: 9 MG/DL (ref 8.6–10.5)
CHLORIDE SERPL-SCNC: 97 MMOL/L (ref 98–107)
CO2 SERPL-SCNC: 25.5 MMOL/L (ref 22–29)
CREAT BLD-MCNC: 1.42 MG/DL (ref 0.57–1)
DEPRECATED RDW RBC AUTO: 55.2 FL (ref 37–54)
EOSINOPHIL # BLD AUTO: 0 10*3/MM3 (ref 0–0.7)
EOSINOPHIL NFR BLD AUTO: 0 % (ref 0.3–6.2)
ERYTHROCYTE [DISTWIDTH] IN BLOOD BY AUTOMATED COUNT: 16.1 % (ref 11.7–13)
GFR SERPL CREATININE-BSD FRML MDRD: 36 ML/MIN/1.73
GLUCOSE BLD-MCNC: 171 MG/DL (ref 65–99)
HCT VFR BLD AUTO: 30.3 % (ref 35.6–45.5)
HGB BLD-MCNC: 9.3 G/DL (ref 11.9–15.5)
IMM GRANULOCYTES # BLD: 0.02 10*3/MM3 (ref 0–0.03)
IMM GRANULOCYTES NFR BLD: 0.3 % (ref 0–0.5)
INR PPP: 2.16 (ref 0.9–1.1)
INR PPP: 2.18 (ref 0.9–1.1)
LYMPHOCYTES # BLD AUTO: 0.53 10*3/MM3 (ref 0.9–4.8)
LYMPHOCYTES NFR BLD AUTO: 6.9 % (ref 19.6–45.3)
MCH RBC QN AUTO: 29.1 PG (ref 26.9–32)
MCHC RBC AUTO-ENTMCNC: 30.7 G/DL (ref 32.4–36.3)
MCV RBC AUTO: 94.7 FL (ref 80.5–98.2)
MONOCYTES # BLD AUTO: 0.5 10*3/MM3 (ref 0.2–1.2)
MONOCYTES NFR BLD AUTO: 6.5 % (ref 5–12)
NEUTROPHILS # BLD AUTO: 6.63 10*3/MM3 (ref 1.9–8.1)
NEUTROPHILS NFR BLD AUTO: 86.3 % (ref 42.7–76)
PLATELET # BLD AUTO: 133 10*3/MM3 (ref 140–500)
PMV BLD AUTO: 10.3 FL (ref 6–12)
POTASSIUM BLD-SCNC: 3.9 MMOL/L (ref 3.5–5.2)
PROTHROMBIN TIME: 23.4 SECONDS (ref 11.7–14.2)
PROTHROMBIN TIME: 23.6 SECONDS (ref 11.7–14.2)
RBC # BLD AUTO: 3.2 10*6/MM3 (ref 3.9–5.2)
SODIUM BLD-SCNC: 138 MMOL/L (ref 136–145)
UNIT  ABO: NORMAL
UNIT  RH: NORMAL
WBC NRBC COR # BLD: 7.68 10*3/MM3 (ref 4.5–10.7)

## 2018-01-17 PROCEDURE — 25010000002 CEFAZOLIN PER 500 MG: Performed by: ORTHOPAEDIC SURGERY

## 2018-01-17 PROCEDURE — 99233 SBSQ HOSP IP/OBS HIGH 50: CPT | Performed by: INTERNAL MEDICINE

## 2018-01-17 PROCEDURE — 85025 COMPLETE CBC W/AUTO DIFF WBC: CPT | Performed by: HOSPITALIST

## 2018-01-17 PROCEDURE — 25010000002 HYDROMORPHONE PER 4 MG: Performed by: ORTHOPAEDIC SURGERY

## 2018-01-17 PROCEDURE — 99232 SBSQ HOSP IP/OBS MODERATE 35: CPT | Performed by: INTERNAL MEDICINE

## 2018-01-17 PROCEDURE — 85610 PROTHROMBIN TIME: CPT | Performed by: INTERNAL MEDICINE

## 2018-01-17 PROCEDURE — 94799 UNLISTED PULMONARY SVC/PX: CPT

## 2018-01-17 PROCEDURE — 85610 PROTHROMBIN TIME: CPT | Performed by: HOSPITALIST

## 2018-01-17 PROCEDURE — 80048 BASIC METABOLIC PNL TOTAL CA: CPT | Performed by: HOSPITALIST

## 2018-01-17 RX ORDER — HYDROCODONE BITARTRATE AND ACETAMINOPHEN 7.5; 325 MG/1; MG/1
1 TABLET ORAL EVERY 4 HOURS PRN
Status: DISCONTINUED | OUTPATIENT
Start: 2018-01-17 | End: 2018-01-17

## 2018-01-17 RX ORDER — WARFARIN SODIUM 1 MG/1
1 TABLET ORAL
Status: DISCONTINUED | OUTPATIENT
Start: 2018-01-17 | End: 2018-01-17

## 2018-01-17 RX ORDER — HYDROCODONE BITARTRATE AND ACETAMINOPHEN 7.5; 325 MG/1; MG/1
2 TABLET ORAL EVERY 4 HOURS PRN
Status: DISCONTINUED | OUTPATIENT
Start: 2018-01-17 | End: 2018-01-19

## 2018-01-17 RX ORDER — WARFARIN SODIUM 1 MG/1
1 TABLET ORAL
Status: COMPLETED | OUTPATIENT
Start: 2018-01-17 | End: 2018-01-17

## 2018-01-17 RX ORDER — HYDROCODONE BITARTRATE AND ACETAMINOPHEN 7.5; 325 MG/1; MG/1
1 TABLET ORAL EVERY 4 HOURS PRN
Status: DISCONTINUED | OUTPATIENT
Start: 2018-01-17 | End: 2018-01-19

## 2018-01-17 RX ADMIN — CARVEDILOL 25 MG: 25 TABLET, FILM COATED ORAL at 09:42

## 2018-01-17 RX ADMIN — HYDROCODONE BITARTRATE AND ACETAMINOPHEN 2 TABLET: 7.5; 325 TABLET ORAL at 21:20

## 2018-01-17 RX ADMIN — ATORVASTATIN CALCIUM 10 MG: 10 TABLET, FILM COATED ORAL at 21:20

## 2018-01-17 RX ADMIN — HYDROCODONE BITARTRATE AND ACETAMINOPHEN 1 TABLET: 7.5; 325 TABLET ORAL at 06:42

## 2018-01-17 RX ADMIN — CEFAZOLIN SODIUM 2 G: 10 INJECTION, POWDER, FOR SOLUTION INTRAVENOUS at 06:41

## 2018-01-17 RX ADMIN — ASPIRIN 81 MG: 81 TABLET, CHEWABLE ORAL at 09:42

## 2018-01-17 RX ADMIN — WARFARIN SODIUM 1 MG: 1 TABLET ORAL at 17:13

## 2018-01-17 RX ADMIN — HYDROMORPHONE HYDROCHLORIDE 0.5 MG: 1 INJECTION, SOLUTION INTRAMUSCULAR; INTRAVENOUS; SUBCUTANEOUS at 09:41

## 2018-01-17 RX ADMIN — VITAMIN D, TAB 1000IU (100/BT) 1000 UNITS: 25 TAB at 09:42

## 2018-01-17 RX ADMIN — HYDROCODONE BITARTRATE AND ACETAMINOPHEN 2 TABLET: 7.5; 325 TABLET ORAL at 17:06

## 2018-01-17 RX ADMIN — CALCITRIOL 0.25 MCG: 0.25 CAPSULE, LIQUID FILLED ORAL at 09:42

## 2018-01-17 RX ADMIN — HYDROCODONE BITARTRATE AND ACETAMINOPHEN 2 TABLET: 7.5; 325 TABLET ORAL at 12:56

## 2018-01-17 RX ADMIN — FUROSEMIDE 40 MG: 40 TABLET ORAL at 17:06

## 2018-01-17 RX ADMIN — PANTOPRAZOLE SODIUM 40 MG: 40 TABLET, DELAYED RELEASE ORAL at 06:41

## 2018-01-17 RX ADMIN — CARVEDILOL 25 MG: 25 TABLET, FILM COATED ORAL at 21:20

## 2018-01-17 RX ADMIN — PANTOPRAZOLE SODIUM 40 MG: 40 TABLET, DELAYED RELEASE ORAL at 17:06

## 2018-01-17 RX ADMIN — Medication 1 TABLET: at 09:42

## 2018-01-17 RX ADMIN — HYDROCODONE BITARTRATE AND ACETAMINOPHEN 1 TABLET: 7.5; 325 TABLET ORAL at 00:52

## 2018-01-17 RX ADMIN — CYANOCOBALAMIN TAB 500 MCG 1000 MCG: 500 TAB at 09:41

## 2018-01-17 RX ADMIN — FERROUS SULFATE TAB 325 MG (65 MG ELEMENTAL FE) 324 MG: 325 (65 FE) TAB at 09:42

## 2018-01-17 NOTE — PLAN OF CARE
Problem: Patient Care Overview (Adult)  Goal: Plan of Care Review   01/17/18 1115   Coping/Psychosocial Response Interventions   Plan Of Care Reviewed With patient;spouse   Outcome Evaluation   Outcome Summary/Follow up Plan Provided pt with written HEP and reviewed with pt and spouse. No questions re HEP. Encouraged pt to amb more with [facility] staff as pt has not been ambulating much since arriving in hospital. PTA, pt was indep amb.

## 2018-01-17 NOTE — SIGNIFICANT NOTE
01/17/18 1121   Rehab Treatment   Discipline occupational therapist   Rehab Evaluation   Evaluation Not Performed other (see comments)  (OT ordered for pendulum exercises, PT already addressed and gave HEP. Will sign off at this time.)   Pleas f/u with any changes or ADL needs.

## 2018-01-17 NOTE — SIGNIFICANT NOTE
01/17/18 1116   Rehab Treatment   Discipline physical therapist   Rehab Evaluation   Evaluation Not Performed (provided written HEP.  No indication for acute PT eval present.  Encouraged inc amb with ns staff.)

## 2018-01-17 NOTE — PLAN OF CARE
Problem: Patient Care Overview (Adult)  Goal: Plan of Care Review  Outcome: Ongoing (interventions implemented as appropriate)   01/17/18 0552   Coping/Psychosocial Response Interventions   Plan Of Care Reviewed With patient   Patient Care Overview   Progress improving   Outcome Evaluation   Outcome Summary/Follow up Plan Pt has been stable through the night. Sensation returning in operative extremity. Minimal pain with PO pain meds available. Voiding adequately per BSC. Sling in place. Education provided on HTN management with BP monitoring and meds.      Goal: Adult Individualization and Mutuality  Outcome: Ongoing (interventions implemented as appropriate)    Goal: Discharge Needs Assessment  Outcome: Ongoing (interventions implemented as appropriate)      Problem: Fall Risk (Adult)  Goal: Absence of Falls  Outcome: Ongoing (interventions implemented as appropriate)   01/17/18 0552   Fall Risk (Adult)   Absence of Falls achieves outcome       Problem: Orthopaedic Fracture (Adult)  Goal: Signs and Symptoms of Listed Potential Problems Will be Absent or Manageable (Orthopaedic Fracture)  Outcome: Ongoing (interventions implemented as appropriate)   01/17/18 0552   Orthopaedic Fracture   Problems Assessed (Orthopaedic Fracture) functional deficit/ self-care deficit;pain;neurovascular impairment/injury   Problems Present (Orthopaedic Fracture) functional deficit/ self-care deficit;pain       Problem: Self-Care Deficit (Adult,Obstetrics,Pediatric)  Goal: Improved Ability to Perform BADL and IADL  Outcome: Ongoing (interventions implemented as appropriate)   01/17/18 0552   Self-Care Deficit (Adult,Obstetrics,Pediatric)   Improved Ability to Perform BADL and IADL achieves outcome       Problem: Perioperative Period (Adult)  Goal: Signs and Symptoms of Listed Potential Problems Will be Absent or Manageable (Perioperative Period)  Outcome: Ongoing (interventions implemented as appropriate)   01/17/18 0552   Perioperative  Period   Problems Assessed (Perioperative Period) pain;wound complications;hypoxia/hypoxemia   Problems Present (Perioperative Period) pain

## 2018-01-17 NOTE — DISCHARGE INSTRUCTIONS
Discharge and Follow up Instructions:     Total Shoulder Replacement Discharge Instructions:    I. ACTIVITIES:  1. Exercises:  ? Complete exercise program as taught by the hospital physical therapist 2 times per day  ? Wear sling when in bed and when out of bed (except when doing exercises)  ? Exercise program will be advanced by the physical therapist  ? During the day be up ambulating every 2 hours (while awake) for short distances  ? Complete the ankle pump exercises at least 10 times per hour (while awake)  ? Elevate operative arm when in bed and for at least 30 minutes during the day.   ? Use cold packs 20-30 minutes approximately 5 times per day. This should be done before and after completing your exercises and at any time you are experiencing pain/ stiffness in your operative extremity.    2. Activities of Daily Living:  ? No tub baths, hot tubs, or swimming pools for 4 weeks  ? May shower and let water run over the incision on post-operative day #5 if no drainage. Do not scrub or rub the incision. Simply let the water run over the incision and pat dry.    II. Restrictions  ? No pushing, pulling, lifting, or weight bearing on operative extremity.  ? Continue to follow shoulder precautions as instructed by hospital physical therapist  ? Your surgeon will discuss with you when you will be able to drive again. Usual guidelines are you are to be off pain medications prior to driving.  ? First week stay inside on even terrain. May go up and down stairs one stair at a time utilizing the hand rail once cleared by physical therapy to do so.  ? After one week, you may venture outside (if cleared to do so by physical therapist).    III. Precautions:  ? Everyone that comes near you should wash their hands  ? No elective dental, genital-urinary, or colon procedures or surgical procedures for 12 weeks after surgery unless absolutely necessary.  ?  If dental work or surgical procedure is deemed absolutely necessary, you  will need to contact your surgeon as you will need to take antibiotics 1 hour prior to any dental work (including teeth cleanings).  ? Please discuss with your surgeon prophylactic antibiotics as the length of time this intervention will be necessary for you varies with each patient’s health history and situation.  ? Avoid sick people. If you must be around someone who is ill, they should wear a mask.  ? Avoid visits to the Emergency Room or Urgent Care. If you feel you need to go to the Emergency Room, please notify your Surgeon's office.  ? Stockings are to be worn for one week after surgery and are to be placed on in the morning and removed at night. Observe your skin when stocking is removed for any problems. Monitor the stockings to ensure that any swelling is not causing the stockings to become too tight. In this case, remove stockings immediately.      IV. INCISION CARE:  ? Wash your hands prior to dressing changes  ? Change the dressing as needed to keep incision clean and dry. Utilize dry gauze and paper tape. Avoid touching the side of the gauze that goes against the incision with your hands.  ? No creams or ointments to the incision  ? May remove dressing once the incision is free of drainage  ? Do not touch or pick at the incision  ? Check incision every day and notify surgeon immediately if any of the following signs or symptoms are noted:  o Increase in redness  o Increase in swelling around the incision and of the entire extremity  o Increase in pain  o Drainage oozing from the incision  o Pulling apart of the edges of the incision  o Increase in overall body temperature (greater than 100.5 degrees)  ? Your Staples will be removed between 10-14 days postoperation.  This may be done by either the home health nurse, rehabilitation nurse or during your return visit to Dr. Quesada's office.  You will then be instructed on how to care for the incision.  (Please call the office if your staples have not  been removed within 14 days after surgery).    V. Medications:     1. Stool Softeners: You will be at greater risk of constipation after surgery due to being less mobile and the pain medications.   ? Take stool softeners as instructed by your surgeon while on pain medications. Over the counter Colace 100 mg 1-2 capsules twice daily.   ? If stools become too loose or too frequent, please decreases the dosage or stop the stool softener.  ? If constipation occurs despite use of stool softeners, you are to continue the stool softeners and add a laxative (Milk of Magnesia 1 ounce daily as needed).  ? Dulcolax oral tabs or suppository, or a fleets enema can also be utilized for constipation and can be obtained over the counter.   ? If above interventions are unsuccessful in inducing bowel movements, please contact your surgeon's office / family physician's office.  ? Drink plenty of fluids, and eat fruits and vegetables during your recovery time    2. Pain Medications utilized after surgery are narcotics and the law requires that the following information be given to all patients that are prescribed narcotics:  ? CLASSIFICATION: Pain medications are called Opioids and are narcotics  ? LEGALITIES: It is illegal to share narcotics with others and to drive within 24 hours of taking narcotics  ? POTENTIAL SIDE EFFECTS: Potential side effects of opioids include: nausea, vomiting, itching, dizziness, drowsiness, dry mouth, constipation, and difficulty urinating.  ? POTENTIAL ADVERSE EFFECTS:   o Opioid tolerance can develop with use of pain medications and this simply means that it requires more and more of the medication to control pain; however, this is seen more in patients that use opioids for longer periods of time.  o Opioid dependence can develop with use of Opioids and this simply means that to stop the medication can cause withdrawal symptoms; however, this is seen with patients that use Opioids for longer periods of  time.  o Opioid addiction can develop with use of Opioids and the incidence of this is very unlikely in patients who take the medications as ordered and stop the medications as instructed.  o Opioid overdose can be dangerous, but is unlikely when the medication is taken as ordered and stopped when ordered. It is important not to mix opioids with alcohol or with and type of sedative such as Benadryl as this can lead to over sedation and respiratory difficulty.  ? DOSAGE:   o Pain medications will need to be taken consistently for the first week to decrease pain and promote adequate pain relief and participation in physical therapy.  o After the initial surgical pain begins to resolve, you may begin to decrease the pain medication. By the end of 6 weeks, you should be off of pain medications.  o Refills will not be given by the office during evening hours, on weekends, or after 6 weeks post-op.  o To seek refills on pain medications during the initial 6 week post-operative period, you must call the office 48 hours in advance to request the refill. The office will then notify you when to  the prescription. DO NOT wait until you are out of the medication to request a refill.    V. FOLLOW-UP VISITS:  ? You will need to follow up in the office with your surgeon in  2 weeks Feb 7, 2018. Please call this number [phone]  to schedule this appointment.  If you have any concerns or suspected complications prior to your follow up visit, please call your surgeons office. Do not wait until your appointment time if you suspect complications. These will need to be addressed in the office promptly.

## 2018-01-17 NOTE — PROGRESS NOTES
"Daily progress note    Chief complaint  Doing better  No new complaints    History of present illness  77-year-old white female with very complex past medical history who fell on the December 24 support from left proximal humerus fracture of the medical problems hypertension congestive heart failure, osteoarthritis osteoporosis obstructive sleep apnea chronic atrial fibrillation on anticoagulation coronary artery disease status post mitral valve replacement chronic anemia required multiple blood transfusion chronic kidney disease who was directly admitted to our service for blood transfusion and then have surgery scheduled on the left humerus with orthopedic surgery.  Patient has no new complaints and has been hurting at this fracture site and very weak.  Denies any recent headache dizziness chest pain shortness of breath abdominal pain nausea vomiting diarrhea patient did get multiple blood transfusion few months ago.    Review of Systems   Constitutional: Negative for chills and fever.   HENT: Negative.    Eyes: Negative.    Respiratory: Negative.    Cardiovascular: Positive for leg swelling. Negative for chest pain (occasionally).   Gastrointestinal: Positive for constipation and diarrhea.        Black stools due to iron   Endocrine: Negative.    Genitourinary: Negative.    Musculoskeletal: Positive for arthralgias, back pain (chronic), gait problem (uses walker), neck pain and neck stiffness.   Skin: Negative.    Neurological: Positive for weakness. Negative for dizziness and light-headedness.   Hematological: Negative.    Psychiatric/Behavioral: Negative.      Physical Exam Blood pressure 108/57, pulse 58, temperature 97.5 °F (36.4 °C), temperature source Oral, resp. rate 16, height 162.6 cm (64\"), weight 72.6 kg (160 lb), SpO2 97 %.    Constitutional: She is oriented to person, place, and time. She appears well-developed and well-nourished. No distress.   Head: Normocephalic and atraumatic.   Eyes: " Conjunctivae and EOM are normal. No scleral icterus.   Neck: Normal range of motion. Neck supple. No tracheal deviation present.   Cardiovascular: Normal rate.  An irregularly irregular rhythm present.   Pulmonary/Chest: Effort normal. No respiratory distress. She has decreased breath sounds. She has no wheezes. She has no rales.   Abdominal: Soft. Bowel sounds are normal. She exhibits no distension and no mass. There is no tenderness.   Musculoskeletal: She exhibits deformity (LUE in sling). She exhibits no edema.   Neurological: She is alert and oriented to person, place, and time. No cranial nerve deficit.   Skin: Skin is warm and dry. Ecchymosis (scattered) noted. No rash noted. No erythema.   Psychiatric: She has a normal mood and affect. Her behavior is normal. Judgment and thought content normal.      LABS  Lab Results (last 24 hours)     Procedure Component Value Units Date/Time    Protime-INR [[id number]]  (Abnormal) Collected:  01/17/18 0539    Specimen:  Blood Updated:  01/17/18 0613     Protime 23.4 (H) Seconds      INR 2.16 (H)    CBC & Differential [[id number]] Collected:  01/17/18 0539    Specimen:  Blood Updated:  01/17/18 0616    Narrative:       The following orders were created for panel order CBC & Differential.  Procedure                               Abnormality         Status                     ---------                               -----------         ------                     CBC Auto Differential[[id number]]        Abnormal            Final result                 Please view results for these tests on the individual orders.    CBC Auto Differential [[id number]]  (Abnormal) Collected:  01/17/18 0539    Specimen:  Blood Updated:  01/17/18 0616     WBC 7.68 10*3/mm3      RBC 3.20 (L) 10*6/mm3      Hemoglobin 9.3 (L) g/dL      Hematocrit 30.3 (L) %      MCV 94.7 fL      MCH 29.1 pg      MCHC 30.7 (L) g/dL      RDW 16.1 (H) %      RDW-SD 55.2 (H) fl      MPV 10.3 fL      Platelets 133 (L)  10*3/mm3      Neutrophil % 86.3 (H) %      Lymphocyte % 6.9 (L) %      Monocyte % 6.5 %      Eosinophil % 0.0 (L) %      Basophil % 0.0 %      Immature Grans % 0.3 %      Neutrophils, Absolute 6.63 10*3/mm3      Lymphocytes, Absolute 0.53 (L) 10*3/mm3      Monocytes, Absolute 0.50 10*3/mm3      Eosinophils, Absolute 0.00 10*3/mm3      Basophils, Absolute 0.00 10*3/mm3      Immature Grans, Absolute 0.02 10*3/mm3     Basic Metabolic Panel [[id number]]  (Abnormal) Collected:  01/17/18 0540    Specimen:  Blood Updated:  01/17/18 0624     Glucose 171 (H) mg/dL      BUN 32 (H) mg/dL      Creatinine 1.42 (H) mg/dL      Sodium 138 mmol/L      Potassium 3.9 mmol/L      Chloride 97 (L) mmol/L      CO2 25.5 mmol/L      Calcium 9.0 mg/dL      eGFR Non African Amer 36 (L) mL/min/1.73      BUN/Creatinine Ratio 22.5     Anion Gap 15.5 mmol/L     Narrative:       The MDRD GFR formula is only valid for adults with stable renal function between ages 18 and 70.    Protime-INR [[id number]]  (Abnormal) Collected:  01/17/18 0809    Specimen:  Blood Updated:  01/17/18 0907     Protime 23.6 (H) Seconds      INR 2.18 (H)        Imaging Results (last 24 hours)     Procedure Component Value Units Date/Time    XR Shoulder 1 View Left [[id number]] Collected:  01/16/18 1547     Updated:  01/16/18 1603    Narrative:       LEFT SHOULDER     HISTORY: Left shoulder arthroplasty.     FINDINGS:  Single AP view of the left shoulder demonstrates a reverse  left shoulder arthroplasty. Recent surgical changes include overlying  surgical skin staples and soft tissue gas. A left subclavian cardiac  pacing device is also evident.     This report was finalized on 1/16/2018 3:50 PM by Dr. Ty Santoro MD.             Current Facility-Administered Medications:   •  aspirin chewable tablet 81 mg, 81 mg, Oral, Daily, Addison Choi MD, 81 mg at 01/17/18 0942  •  atorvastatin (LIPITOR) tablet 10 mg, 10 mg, Oral, Nightly, Addison Choi MD, 10 mg at 01/16/18 4597  •   bisacodyl (DULCOLAX) EC tablet 10 mg, 10 mg, Oral, Daily PRN, Bianka Quesada MD  •  calcitriol (ROCALTROL) capsule 0.25 mcg, 0.25 mcg, Oral, Every Other Day, Addison Choi MD, 0.25 mcg at 01/17/18 0942  •  carvedilol (COREG) tablet 25 mg, 25 mg, Oral, BID With Meals, Addison Choi MD, 25 mg at 01/17/18 0942  •  cholecalciferol (VITAMIN D3) tablet 1,000 Units, 1,000 Units, Oral, Daily, Addison Choi MD, 1,000 Units at 01/17/18 0942  •  docusate sodium (COLACE) capsule 100 mg, 100 mg, Oral, BID PRN, Bianka Quesada MD  •  ferrous sulfate tablet 324 mg, 324 mg, Oral, Daily, Addison Choi MD, 324 mg at 01/17/18 0942  •  furosemide (LASIX) tablet 40 mg, 40 mg, Oral, Daily, Addison Choi MD, 40 mg at 01/16/18 1646  •  HYDROcodone-acetaminophen (NORCO) 7.5-325 MG per tablet 1 tablet, 1 tablet, Oral, Q4H PRN **OR** HYDROcodone-acetaminophen (NORCO) 7.5-325 MG per tablet 2 tablet, 2 tablet, Oral, Q4H PRN, Addison Choi MD, 2 tablet at 01/17/18 1256  •  HYDROmorphone (DILAUDID) injection 0.5 mg, 0.5 mg, Intravenous, Q2H PRN, 0.5 mg at 01/17/18 0941 **AND** naloxone (NARCAN) injection 0.1 mg, 0.1 mg, Intravenous, Q5 Min PRN, Bianka Quesada MD  •  lactated ringers infusion, 9 mL/hr, Intravenous, Continuous, Aries Pappas MD  •  lactated ringers infusion, 9 mL/hr, Intravenous, Continuous, Aries Pappas MD  •  multivitamin (THERAGRAN) tablet 1 tablet, 1 tablet, Oral, Daily, Addison Choi MD, 1 tablet at 01/17/18 0942  •  ondansetron (ZOFRAN) injection 4 mg, 4 mg, Intravenous, Q4H PRN, Addison Choi MD  •  pantoprazole (PROTONIX) EC tablet 40 mg, 40 mg, Oral, BID AC, Addison Choi MD, 40 mg at 01/17/18 0641  •  polyethylene glycol 3350 powder (packet), 17 g, Oral, Daily, Addison Choi MD  •  ramipril (ALTACE) capsule 5 mg, 5 mg, Oral, Daily, Addison Choi MD, 5 mg at 01/16/18 1646  •  sennosides-docusate sodium (SENOKOT-S) 8.6-50 MG tablet 2 tablet, 2 tablet, Oral, Nightly, Addison Choi MD  •   vitamin B-12 (CYANOCOBALAMIN) tablet 1,000 mcg, 1,000 mcg, Oral, Daily, Laura Choi MD, 1,000 mcg at 01/17/18 0941  •  warfarin (COUMADIN) tablet 1 mg, 1 mg, Oral, Daily, Nik Galarza MD  •  warfarin (COUMADIN) tablet 1 mg, 1 mg, Oral, Daily, Bianka Quesada MD  •  zolpidem (AMBIEN) tablet 5 mg, 5 mg, Oral, Nightly PRN, Laura Choi MD, 5 mg at 01/15/18 2215     ASSESSMENT  Chronic anemia required blood transfusion  Closed fracture of left proximal humerus  status post left shoulder reverse arthroplasty  Hypertension  Congestive heart failure combined both systolic diastolic  Chronic atrial fibrillation on anticoagulation  Status post mitral valve replacement  Chronic kidney disease stage III  Obstructive sleep apnea  Osteoarthritis  Osteoporosis  Gastroesophageal reflux disease  Coronary artery disease status post CABG    PLAN  CPM  Postop care  Transfuse PRN  Resume Coumadin  Continue home medication   Stress ulcer DVT prophylaxis  Supportive care  PT/OT  Follow closely further recommendation according to hospital course    LAURA CHOI MD

## 2018-01-17 NOTE — PROGRESS NOTES
Discharge Planning Assessment  [facility]     Patient Name: Janel Mahoney  MRN: [id number]  Today's Date: 1/17/2018    Admit Date: 1/15/2018          Discharge Needs Assessment       01/17/18 1713    Living Environment    Lives With spouse    Living Arrangements house    Home Accessibility no concerns    Stair Railings at Home outside, present at both sides    Type of Financial/Environmental Concern none    Transportation Available car;family or friend will provide    Living Environment    Quality Of Family Relationships involved;helpful;supportive    Discharge Needs Assessment    Concerns To Be Addressed no discharge needs identified;denies needs/concerns at this time    Readmission Within The Last 30 Days no previous admission in last 30 days    Equipment Currently Used at Home wheelchair    Equipment Needed After Discharge none    Discharge Facility/Level Of Care Needs home with home health            Discharge Plan       01/17/18 1715    Case Management/Social Work Plan    Additional Comments Facesheet and d/c plan verified. Pt is current w/ [facility], she plans to return home w/ her spouse and [facility]/ [facility] following.      01/17/18 1713    Case Management/Social Work Plan    Plan Home w/ spouse and [facility]         Discharge Placement     Facility/Agency Request Status Selected? Address Phone Number Fax Number    [facility] Accepted    [address] [address] [phone]        Amparo Le RN 1/17/2018 17:16    1/17/2018  Current w/ [facility] following.                            Demographic Summary     None            Functional Status       01/17/18 1713    Functional Status Current    Ambulation 3-->assistive equipment and person    Transferring 3-->assistive equipment and person    Toileting 3-->assistive equipment and person    Bathing 2-->assistive person    Dressing 2-->assistive person    Eating 2-->assistive person     Communication 0-->understands/communicates without difficulty    Swallowing (if score 2 or more for any item, consult Rehab Services) 0-->swallows foods/liquids without difficulty    Change in Functional Status Since Onset of Current Illness/Injury no    Functional Status Prior    Ambulation 3-->assistive equipment and person    Transferring 3-->assistive equipment and person    Toileting 3-->assistive equipment and person    Bathing 2-->assistive person    Dressing 2-->assistive person    Eating 2-->assistive person    Communication 0-->understands/communicates without difficulty    Swallowing 0-->swallows foods/liquids without difficulty    IADL    Medications independent    Meal Preparation assistive person    Housekeeping assistive equipment and person    Laundry assistive person    Shopping assistive equipment and person    Oral Care independent    Activity Tolerance    Usual Activity Tolerance fair    Current Activity Tolerance fair    Cognitive/Perceptual/Developmental    Current Mental Status/Cognitive Functioning no deficits noted    Recent Changes in Mental Status/Cognitive Functioning no changes            Psychosocial     None            Abuse/Neglect     None            Legal     None            Substance Abuse     None            Patient Forms       01/17/18 7609    Patient Forms    Provider Choice List Delivered    Delivered to Patient    Method of delivery In person          Amparo Le RN

## 2018-01-17 NOTE — PROGRESS NOTES
Orthopedic Progress Note      Patient: Janel Mahoney  YOB: 1940     Date of Admission: 1/15/2018 12:28 PM Medical Record Number: [id number]     Attending Physician: Addison Choi MD    Status Post:  Left  TOTAL SHOULDER REVERSE ARTHROPLASTY Post Operative Day Number: 1    Subjective : No new orthopaedic complaints     Pain Relief: some relief with present medication.     Systemic Complaints: No Complaints  Vitals:    01/16/18 1645 01/16/18 1900 01/16/18 2255 01/17/18 0250   BP: 169/68 151/67 132/68 152/65   BP Location: Right arm Right arm Right arm Right arm   Patient Position: Lying Lying Sitting Lying   Pulse: 60 59 60 60   Resp: 14 14 16 16   Temp:  97 °F (36.1 °C) 97.1 °F (36.2 °C) 97 °F (36.1 °C)   TempSrc:  Oral Oral Oral   SpO2: 96% 96% 96% 97%   Weight:       Height:           Physical Exam: 77 y.o. female    General Appearance:       Alert, cooperative, in no acute distress                  Extremities:    Dressing Clean, Dry and Intact         Incision healthy without signs or symptoms of infections         No clinical sign of DVT        Able to do good movements of digits    Pulses:   Pulses palpable and equal bilaterally           Diagnostic Tests:       Results from last 7 days  Lab Units 01/17/18  0539 01/16/18  0412 01/15/18  1608 01/11/18  1642   WBC 10*3/mm3 7.68  --  4.64 4.64   HEMOGLOBIN g/dL 9.3* 10.4* 8.6* 8.4*   HEMATOCRIT % 30.3* 33.2* 28.4* 28.9*   PLATELETS 10*3/mm3 133*  --  163 179       Results from last 7 days  Lab Units 01/17/18  0540 01/16/18  0412 01/11/18  1642   SODIUM mmol/L 138 142 143   POTASSIUM mmol/L 3.9 3.5 4.3   CHLORIDE mmol/L 97* 101 100   CO2 mmol/L 25.5 28.0 31.8*   BUN mg/dL 32* 33* 41*   CREATININE mg/dL 1.42* 1.50* 1.71*   GLUCOSE mg/dL 171* 78 101*   CALCIUM mg/dL 9.0 9.0 9.4       Results from last 7 days  Lab Units 01/17/18  0539 01/16/18  1234 01/16/18  0412 01/11/18  1642   INR  2.16* 1.96* 2.03* 2.94*   APTT seconds  --   --   --  45.1*     No  results found for: CRYSTAL]  No results found for: CULTURE]  No results found for: URICACID]  Xr Shoulder 1 View Left    Result Date: 1/16/2018  Narrative: LEFT SHOULDER  HISTORY: Left shoulder arthroplasty.  FINDINGS:  Single AP view of the left shoulder demonstrates a reverse left shoulder arthroplasty. Recent surgical changes include overlying surgical skin staples and soft tissue gas. A left subclavian cardiac pacing device is also evident.  This report was finalized on 1/16/2018 3:50 PM by Dr. Ty Santoro MD.      Xr Shoulder 2+ View Left    Result Date: 1/12/2018  Narrative: TWO-VIEW LEFT SHOULDER  HISTORY: Recent fracture. Follow-up evaluation.  There is a fracture through the neck of the humerus with medial displacement of the humeral shaft relative to the humeral head and this appearance is similar to the study of 12/24/2017.  TWO-VIEW CHEST  HISTORY: Preop for shoulder surgery. Hypertension. Anemia.  There is moderate cardiomegaly with a pacemaker in place and this is unchanged from 01/26/2010. The lungs are clear except for a calcified granuloma in the upper left lung. No acute abnormality is seen.  This report was finalized on 1/12/2018 10:57 AM by Dr. Jesse Bautista MD.      Xr Shoulder 2+ View Left    Result Date: 12/24/2017  Narrative: THREE-VIEW LEFT SHOULDER  HISTORY: LEFT Shoulder Injury.  FINDINGS: Three views of the left shoulder were submitted. There is a slightly obliquely oriented fracture of the proximal humerus at the level of the surgical neck. The primary distal fracture fragment is displaced approximately 30% medially and 50% anteriorly. The humeral head articulates normally with the glenoid process of the scapula without dislocation. There is mild impaction at the fracture site. Arthritic changes are present along the articular surfaces of the glenohumeral joint. There are several small subchondral cysts present. Soft tissues appear unremarkable.      Impression: 1. Proximal humerus  fracture without dislocation.  This report was finalized on 12/24/2017 5:10 AM by Hoang Marshall MD.      Ct Head Without Contrast    Result Date: 12/24/2017  Narrative: CRANIAL CT SCAN WITHOUT CONTRAST  CLINICAL HISTORY: Trauma/fall  COMPARISON: None.  TECHNIQUE: Radiation dose reduction techniques were utilized, including automated exposure control and exposure modulation based on body size. Multiple axial images of the head were obtained without contrast.  FINDINGS:  There are no abnormal areas of increased density or mass effect. There is diffuse cortical atrophy. There are mild scattered areas of decreased density in the white matter likely related to chronic ischemic gliotic changes.   Ventricles, sulci, and cisterns appear normal. Bone window images are unremarkable.         Impression: 1. No acute intracranial abnormality.         This study was performed with techniques to keep radiation doses as low as reasonably achievable (ALARA). Individualized dose reduction techniques using automated exposure control or adjustment of mA and/or kV according to the patient size were employed.  This report was finalized on 12/24/2017 5:52 AM by Hoang Marshall MD.      Ct Cervical Spine Without Contrast    Result Date: 12/24/2017  Narrative: NONCONTRAST CT SCAN CERVICAL SPINE  CLINICAL HISTORY: Trauma  COMPARISON: None.  TECHNIQUE: Radiation dose reduction techniques were utilized, including automated exposure control and exposure modulation based on body size. Axial noncontrast images of the cervical spine were obtained without contrast. Sagittal reformatted images were supplemented.  FINDINGS:  No acute vertebral fracture identified on the axial series. There is cervicothoracic levoscoliosis on the coronal reformatted images.  The vertebrae are well-maintained in height on the sagittal reformatted images. There is 2-3 mm of anterolisthesis at C4-5. There is 2 mm of anterolisthesis at C5-6 and C6-7 also. There is 2-3 mm  of anterolisthesis at C7-T1.  No significant compression deformity or retropulsion.  There is a 6 mm sclerotic lesion in the C6 vertebral body just to the right of midline. A small bone island is favored, particularly if there is no malignancy history. (If there is pain specific to this level or any malignancy history which may indicate alternate etiology, bone scan or MRI may be indicated.) The facets are well aligned. Advanced multilevel facet arthropathy is present throughout.  Multilevel degenerative changes are present. No significant central canal narrowing appreciated by noncontrast technique. Multilevel facet arthropathy contributes to multilevel foraminal stenosis.           Impression: 1. No acute cervical spine fracture. 2. 6 mm sclerotic lesion of the C6 vertebra as discussed  This report was finalized on 12/24/2017 5:50 AM by Hoang Marshall MD.      Xr Chest 1 View    Result Date: 1/16/2018  Narrative: PORTABLE CHEST  HISTORY: Hypertension. Preoperative exam.  COMPARISON: PA and lateral chest 01/11/2018.  FINDINGS: Heart size is enlarged. Sternotomy wires, left subclavian cardiopacer/defibrillator are present. There are chronic increased interstitial markings without evidence for focal airspace disease or pulmonary edema or pleural effusion. The pacer/defibrillator device superimposes the left lung base. There is a calcified granuloma in the left midlung. A proximal humeral metaphyseal fracture is noted.      Impression: 1.  Cardiomegaly. No evidence for active disease in the chest. 2.  Proximal humeral metaphyseal fracture.  This report was finalized on 1/16/2018 8:51 AM by Dr. Ty Santoro MD.      Xr Chest Pa & Lateral    Result Date: 1/12/2018  Narrative: TWO-VIEW LEFT SHOULDER  HISTORY: Recent fracture. Follow-up evaluation.  There is a fracture through the neck of the humerus with medial displacement of the humeral shaft relative to the humeral head and this appearance is similar to the study  of 12/24/2017.  TWO-VIEW CHEST  HISTORY: Preop for shoulder surgery. Hypertension. Anemia.  There is moderate cardiomegaly with a pacemaker in place and this is unchanged from 01/26/2010. The lungs are clear except for a calcified granuloma in the upper left lung. No acute abnormality is seen.  This report was finalized on 1/12/2018 10:57 AM by Dr. Jesse Bautista MD.          Current Medications:  Scheduled Meds:  aspirin 81 mg Oral Daily   atorvastatin 10 mg Oral Nightly   calcitriol 0.25 mcg Oral Every Other Day   carvedilol 25 mg Oral BID With Meals   cholecalciferol 1,000 Units Oral Daily   ferrous sulfate 324 mg Oral Daily   furosemide 40 mg Oral Daily   multivitamin 1 tablet Oral Daily   pantoprazole 40 mg Oral BID AC   polyethylene glycol 17 g Oral Daily   ramipril 5 mg Oral Daily   sennosides-docusate sodium 2 tablet Oral Nightly   vitamin B-12 1,000 mcg Oral Daily   warfarin 1 mg Oral Daily     Continuous Infusions:  lactated ringers 9 mL/hr   lactated ringers 9 mL/hr     PRN Meds:.bisacodyl  •  docusate sodium  •  HYDROcodone-acetaminophen  •  HYDROmorphone **AND** naloxone  •  ondansetron  •  zolpidem    Assessment: Status post  TOTAL SHOULDER REVERSE ARTHROPLASTY    Patient Active Problem List   Diagnosis   • Anemia in stage 3 chronic kidney disease   • Thrombocytopenia   • B12 deficiency   • Coronary artery disease involving coronary bypass graft of native heart without angina pectoris   • S/P MVR (mitral valve replacement)   • Chronic atrial fibrillation   • Bilateral carotid artery disease   • Intractable low back pain   • Long-term (current) use of anticoagulants   • Low back pain   • Osteoporosis   • Murmur, heart   • GEORGINA on auto CPAP - Dr Cardona   • Hypersomnia due to medical condition - GEORGINA   • Esophageal stricture   • Acute blood loss anemia   • Dysphagia   • Closed fracture of left proximal humerus   • Hypertension   • Supratherapeutic INR   • Chronic combined systolic and diastolic congestive  heart failure   • Osteoporosis with pathological fracture   • Closed 3-part fracture of proximal end of left humerus   • Closed fracture of proximal end of humerus with delayed healing       PLAN:   Continues current post-op course  Anticoagulation: INR 2.16, OK to resume home dose of coumadin from today  Dressing Change in am  Mobilize with PT as tolerated per protocol    Weight Bearing: NWB  Discharge Plan: OK to plan for discharge in  today /  tomorrow to home and home health  from orthopadic perspective.    Bianka Quesada MD    Date: 1/17/2018    Time: 7:52 AM

## 2018-01-17 NOTE — PROGRESS NOTES
Subjective   1.  Multifactorial anemia with anemia of chronic kidney disease, iron deficiency in the past, and GI bleeding in the past on Coumadin.  2.  Recent fall with fracture of the left shoulder.  Patient underwent left shoulder replacement surgery 1/16/2018.  HISTORY OF PRESENT ILLNESS:    The patient is a 77 y.o. year old female who  has been followed in our office in the past by Dr. Vasquez for multifactorial anemia.  As noted above, she has required Procrit in the past for anemia of chronic kidney disease with low erythropoietin and also has been iron deficient in the past.  She takes Coumadin chronically due to heart disease and is also had GI bleeding in the past.     She had a fall on [employer] and fractured her left shoulder.  She initially was undergoing nonoperative management but due to continued pain and lack of improvement with nonoperative management she required a left shoulder replacement surgery on 1/16/2018.    Prior to surgery, she received 2 units PRBCs on 1/15/2018. She also received 2U FFP prior to OR 1/16/2018.    Having appropriate post-op pain. No blood on surgical dressing.     History of Present Illness      Past Medical History, Past Surgical History, Social History, Family History have been reviewed and are without significant changes except as mentioned.    Review of Systems   A comprehensive 14 point review of systems was performed and was negative except as mentioned.    Medications:  The current medication list was reviewed in the EMR    ALLERGIES:    Allergies   Allergen Reactions   • Diclofenac      She had adverse effects from the medications that required hospitalization. Pt got stomach ulcers from this medication prescribed by Dr. Arango - pediatrist.   • Dofetilide      Pt states not allergic.        Objective      Vitals:    01/16/18 2255 01/17/18 0250 01/17/18 0811 01/17/18 0941   BP: 132/68 152/65 110/49 149/68   BP Location: Right arm Right arm Right arm     Patient Position: Sitting Lying Sitting    Pulse: 60 60 60 60   Resp: 16 16 16    Temp: 97.1 °F (36.2 °C) 97 °F (36.1 °C) 96.8 °F (36 °C)    TempSrc: Oral Oral Oral    SpO2: 96% 97% 96%    Weight:       Height:         Current Status 10/25/2017   ECOG score 1       Physical Exam      Cardiovascular: Normal rate.  An irregular rhythm present. Exam reveals no gallop and no friction rub.    No murmur heard.  Pacemaker defibrillator in the left anterior chest wall   Pulmonary/Chest: Effort normal and breath sounds normal. She has no wheezes. She has no rales.   Abdominal: Soft. She exhibits no distension and no mass. There is no tenderness.   Musculoskeletal: Normal range of motion. She exhibits no edema or deformity.   Purpura and ecchymoses on the extremities.   Psychiatric: She has a normal mood and affect. Her behavior is normal. Judgment normal.     RECENT LABS:  Hematology WBC   Date Value Ref Range Status   01/17/2018 7.68 4.50 - 10.70 10*3/mm3 Final     RBC   Date Value Ref Range Status   01/17/2018 3.20 (L) 3.90 - 5.20 10*6/mm3 Final     Hemoglobin   Date Value Ref Range Status   01/17/2018 9.3 (L) 11.9 - 15.5 g/dL Final     Hematocrit   Date Value Ref Range Status   01/17/2018 30.3 (L) 35.6 - 45.5 % Final     Platelets   Date Value Ref Range Status   01/17/2018 133 (L) 140 - 500 10*3/mm3 Final            Fecal Occult Blood Negative Negative       Lab Results   Component Value Date    INR 2.18 (H) 01/17/2018    INR 2.16 (H) 01/17/2018    INR 1.96 (H) 01/16/2018    PROTIME 23.6 (H) 01/17/2018    PROTIME 23.4 (H) 01/17/2018    PROTIME 21.7 (H) 01/16/2018         Assessment/Plan   1.  Multifactorial anemia with a component of anemia of chronic kidney disease with low erythropoietin requiring Procrit in the past.  Patient also has past history of iron deficiency and GI blood loss anemia on Coumadin.  As noted above, her hemoglobin was around 8.4 g/dL last week and she received 2 units packed red blood cells  1/15/2018  in preparation for surgery on the left shoulder.  2.  Traumatic fracture of the left shoulder.  Patient successfully underwent left shoulder replacement surgery 1/16/2018.  3.  Chronic Coumadin anticoagulation due to heart disease. She received FFP prior to surgery 1/16/2018.    Plan  1. Continue to monitor CBC and INR post op.  2. Patient has follow up appointment @ [facility] office with Dr Vasquez on 1/29/2018.                  1/17/2018      CC:

## 2018-01-17 NOTE — PLAN OF CARE
Problem: Patient Care Overview (Adult)  Goal: Plan of Care Review  Outcome: Ongoing (interventions implemented as appropriate)   01/17/18 1555   Coping/Psychosocial Response Interventions   Plan Of Care Reviewed With patient   Patient Care Overview   Progress improving   Outcome Evaluation   Outcome Summary/Follow up Plan pain tolerable on PO pain meds since increase dose, ambulates in room with 1 assist, VSS educated pt on bp monitoring r/t htn, pt verblized understanding. possible dc home or rehab tomorrow.       Problem: Fall Risk (Adult)  Goal: Absence of Falls  Outcome: Ongoing (interventions implemented as appropriate)      Problem: Pain, Acute (Adult)  Goal: Acceptable Pain Control/Comfort Level  Outcome: Ongoing (interventions implemented as appropriate)      Problem: Orthopaedic Fracture (Adult)  Goal: Signs and Symptoms of Listed Potential Problems Will be Absent or Manageable (Orthopaedic Fracture)  Outcome: Ongoing (interventions implemented as appropriate)      Problem: Self-Care Deficit (Adult,Obstetrics,Pediatric)  Goal: Improved Ability to Perform BADL and IADL  Outcome: Ongoing (interventions implemented as appropriate)      Problem: Perioperative Period (Adult)  Goal: Signs and Symptoms of Listed Potential Problems Will be Absent or Manageable (Perioperative Period)  Outcome: Ongoing (interventions implemented as appropriate)   01/17/18 1555   Perioperative Period   Problems Assessed (Perioperative Period) all   Problems Present (Perioperative Period) pain

## 2018-01-17 NOTE — PROGRESS NOTES
"CC: CAD/MVR    Interval History:   Complains of headache. No CP or dyspnea    Vital Signs  Temp:  [97 °F (36.1 °C)-98.2 °F (36.8 °C)] 97 °F (36.1 °C)  Heart Rate:  [59-69] 60  Resp:  [13-16] 16  BP: (132-179)/(62-73) 152/65    Intake/Output Summary (Last 24 hours) at 01/17/18 0700  Last data filed at 01/17/18 0044   Gross per 24 hour   Intake             2260 ml   Output             1400 ml   Net              860 ml     Flowsheet Rows         First Filed Value    Admission Height  162.6 cm (64\") Documented at 01/15/2018 2038    Admission Weight  72.6 kg (160 lb) Documented at 01/15/2018 2038          PHYSICAL EXAM:  General: No acute distress  Resp:NL Rate, unlabored, clear  CV:NL rate and rhythm, NL PMI, Nl S1 and S2, 2/6 sys Mumur, no gallop, no rub, No JVD. Normal pedal pulses  ABD:Nl sounds, no masses or tenderness, nondistended, no quarding or rebound  Neuro: alert,cooperative and oriented  Extr: No edema or cyanosis, moves all extremities      Results Review:      Results from last 7 days  Lab Units 01/17/18  0540   SODIUM mmol/L 138   POTASSIUM mmol/L 3.9   CHLORIDE mmol/L 97*   CO2 mmol/L 25.5   BUN mg/dL 32*   CREATININE mg/dL 1.42*   GLUCOSE mg/dL 171*   CALCIUM mg/dL 9.0           Results from last 7 days  Lab Units 01/17/18  0539   WBC 10*3/mm3 7.68   HEMOGLOBIN g/dL 9.3*   HEMATOCRIT % 30.3*   PLATELETS 10*3/mm3 133*       Results from last 7 days  Lab Units 01/17/18  0539 01/16/18  1234 01/16/18  0412 01/11/18  1642   INR  2.16* 1.96* 2.03* 2.94*   APTT seconds  --   --   --  45.1*       Results from last 7 days  Lab Units 01/16/18  0412   CHOLESTEROL mg/dL 126           Results from last 7 days  Lab Units 01/16/18  0412   CHOLESTEROL mg/dL 126   TRIGLYCERIDES mg/dL 138   HDL CHOL mg/dL 43     @LABRCNT(bnp)@  I reviewed the patient's new clinical results.  I personally viewed and interpreted the patient's EKG/Telemetry data        Medication Review:   Meds reviewed      lactated ringers 9 mL/hr "   lactated ringers 9 mL/hr       Assessment/Plan  1.  S/P leftshoulder reverse athroplasty.    2.  Coronary artery disease.  Previous and depressive-like artery and inferior infarct and ultimate single-vessel bypass grafting the posterior descending artery at the time of her mitral replacement echocardiogram 2016 left ventricular ejection fraction of 40% with inferior inferolateral wall akinesis.  No angina or heart failure continue home therapy. Decrease IV fluids.  3.  History of mitral insufficiency status post mitral replacement with tissue valve.  Echo cardiac and 2016 valve functioning normally moderate tricuspid insufficiency with moderate pulmonary hypertension.  4.  Atrial fibrillation/sick sinus syndrome failed multiple cardioversions multiple medications failed pulmonary vein isolation then status post AV node ablation and by V defibrillator.  As above high risk for embolic event with a chads 2 Vascor of 6 INR 2.1 continue warfarin  5. Cerebral vascular disease status post carotid endarterectomy.  Carotid ultrasound June 2017 mild disease on the right moderate on the left.  6.  Hypertension follow blood pressure continue all medication  7.  Renal insufficiency.  8.  Hyperlipidemia.  Continue home therapy  9.  History of nonsustained ventricular tachycardia asymptomatic continue same.  10.  History of sleep apnea on CPAP.        Nik Galarza MD  01/17/18  7:00 AM

## 2018-01-18 ENCOUNTER — TELEPHONE (OUTPATIENT)
Dept: FAMILY MEDICINE CLINIC | Facility: CLINIC | Age: 78
End: 2018-01-18

## 2018-01-18 LAB
ANION GAP SERPL CALCULATED.3IONS-SCNC: 13.6 MMOL/L
BUN BLD-MCNC: 29 MG/DL (ref 8–23)
BUN/CREAT SERPL: 18.5 (ref 7–25)
CALCIUM SPEC-SCNC: 8.8 MG/DL (ref 8.6–10.5)
CHLORIDE SERPL-SCNC: 99 MMOL/L (ref 98–107)
CO2 SERPL-SCNC: 27.4 MMOL/L (ref 22–29)
CREAT BLD-MCNC: 1.57 MG/DL (ref 0.57–1)
DEPRECATED RDW RBC AUTO: 57.9 FL (ref 37–54)
ERYTHROCYTE [DISTWIDTH] IN BLOOD BY AUTOMATED COUNT: 16.7 % (ref 11.7–13)
GFR SERPL CREATININE-BSD FRML MDRD: 32 ML/MIN/1.73
GLUCOSE BLD-MCNC: 97 MG/DL (ref 65–99)
HCT VFR BLD AUTO: 28.3 % (ref 35.6–45.5)
HGB BLD-MCNC: 8.7 G/DL (ref 11.9–15.5)
INR PPP: 2.62 (ref 0.9–1.1)
MCH RBC QN AUTO: 29.6 PG (ref 26.9–32)
MCHC RBC AUTO-ENTMCNC: 30.7 G/DL (ref 32.4–36.3)
MCV RBC AUTO: 96.3 FL (ref 80.5–98.2)
PLATELET # BLD AUTO: 112 10*3/MM3 (ref 140–500)
PMV BLD AUTO: 10.2 FL (ref 6–12)
POTASSIUM BLD-SCNC: 3.4 MMOL/L (ref 3.5–5.2)
PROTHROMBIN TIME: 27.1 SECONDS (ref 11.7–14.2)
RBC # BLD AUTO: 2.94 10*6/MM3 (ref 3.9–5.2)
SODIUM BLD-SCNC: 140 MMOL/L (ref 136–145)
WBC NRBC COR # BLD: 6.51 10*3/MM3 (ref 4.5–10.7)

## 2018-01-18 PROCEDURE — 99231 SBSQ HOSP IP/OBS SF/LOW 25: CPT | Performed by: INTERNAL MEDICINE

## 2018-01-18 PROCEDURE — 85027 COMPLETE CBC AUTOMATED: CPT | Performed by: INTERNAL MEDICINE

## 2018-01-18 PROCEDURE — 99232 SBSQ HOSP IP/OBS MODERATE 35: CPT | Performed by: INTERNAL MEDICINE

## 2018-01-18 PROCEDURE — 80048 BASIC METABOLIC PNL TOTAL CA: CPT | Performed by: HOSPITALIST

## 2018-01-18 PROCEDURE — 85610 PROTHROMBIN TIME: CPT | Performed by: INTERNAL MEDICINE

## 2018-01-18 RX ORDER — WARFARIN SODIUM 1 MG/1
1 TABLET ORAL
Status: DISCONTINUED | OUTPATIENT
Start: 2018-01-18 | End: 2018-01-18

## 2018-01-18 RX ORDER — POTASSIUM CHLORIDE 1.5 G/1.77G
20 POWDER, FOR SOLUTION ORAL ONCE
Status: COMPLETED | OUTPATIENT
Start: 2018-01-18 | End: 2018-01-18

## 2018-01-18 RX ORDER — FUROSEMIDE 20 MG/1
20 TABLET ORAL DAILY
Status: DISCONTINUED | OUTPATIENT
Start: 2018-01-19 | End: 2018-01-19 | Stop reason: HOSPADM

## 2018-01-18 RX ADMIN — HYDROCODONE BITARTRATE AND ACETAMINOPHEN 2 TABLET: 7.5; 325 TABLET ORAL at 20:44

## 2018-01-18 RX ADMIN — Medication 1 TABLET: at 07:36

## 2018-01-18 RX ADMIN — VITAMIN D, TAB 1000IU (100/BT) 1000 UNITS: 25 TAB at 07:36

## 2018-01-18 RX ADMIN — ASPIRIN 81 MG: 81 TABLET, CHEWABLE ORAL at 07:36

## 2018-01-18 RX ADMIN — CARVEDILOL 25 MG: 25 TABLET, FILM COATED ORAL at 17:02

## 2018-01-18 RX ADMIN — HYDROCODONE BITARTRATE AND ACETAMINOPHEN 2 TABLET: 7.5; 325 TABLET ORAL at 16:30

## 2018-01-18 RX ADMIN — HYDROCODONE BITARTRATE AND ACETAMINOPHEN 2 TABLET: 7.5; 325 TABLET ORAL at 01:33

## 2018-01-18 RX ADMIN — HYDROCODONE BITARTRATE AND ACETAMINOPHEN 2 TABLET: 7.5; 325 TABLET ORAL at 11:53

## 2018-01-18 RX ADMIN — ZOLPIDEM TARTRATE 5 MG: 5 TABLET ORAL at 01:33

## 2018-01-18 RX ADMIN — FERROUS SULFATE TAB 325 MG (65 MG ELEMENTAL FE) 324 MG: 325 (65 FE) TAB at 07:35

## 2018-01-18 RX ADMIN — ATORVASTATIN CALCIUM 10 MG: 10 TABLET, FILM COATED ORAL at 20:44

## 2018-01-18 RX ADMIN — CYANOCOBALAMIN TAB 500 MCG 1000 MCG: 500 TAB at 07:36

## 2018-01-18 RX ADMIN — PANTOPRAZOLE SODIUM 40 MG: 40 TABLET, DELAYED RELEASE ORAL at 17:02

## 2018-01-18 RX ADMIN — PANTOPRAZOLE SODIUM 40 MG: 40 TABLET, DELAYED RELEASE ORAL at 05:44

## 2018-01-18 RX ADMIN — POTASSIUM CHLORIDE 20 MEQ: 1.5 POWDER, FOR SOLUTION ORAL at 17:02

## 2018-01-18 RX ADMIN — HYDROCODONE BITARTRATE AND ACETAMINOPHEN 2 TABLET: 7.5; 325 TABLET ORAL at 05:44

## 2018-01-18 RX ADMIN — RAMIPRIL 5 MG: 5 CAPSULE ORAL at 07:36

## 2018-01-18 RX ADMIN — FUROSEMIDE 40 MG: 40 TABLET ORAL at 07:35

## 2018-01-18 RX ADMIN — CARVEDILOL 25 MG: 25 TABLET, FILM COATED ORAL at 07:35

## 2018-01-18 NOTE — SIGNIFICANT NOTE
01/18/18 1534   Rehab Treatment   Discipline physical therapist   Rehab Evaluation   Evaluation Not Performed (ambulated pt on unit CGA/HHA, pt at baseline mobility, has all equipment needs. spouse provides assist at home baseline, sling in place, adheres to NWB. re-ed on HEP, will sign off, KIRT wadsworth. )

## 2018-01-18 NOTE — PROGRESS NOTES
Subjective   1.  Multifactorial anemia with anemia of chronic kidney disease, iron deficiency in the past, and GI bleeding in the past on Coumadin.  2.  Recent fall with fracture of the left shoulder.  Patient underwent left shoulder replacement surgery 1/16/2018.  HISTORY OF PRESENT ILLNESS:    The patient is a 77 y.o. year old female who  has been followed in our office in the past by Dr. Vasquez for multifactorial anemia.  As noted above, she has required Procrit in the past for anemia of chronic kidney disease with low erythropoietin and also has been iron deficient in the past.  She takes Coumadin chronically due to heart disease and is also had GI bleeding in the past.     She had a fall on [employer] and fractured her left shoulder.  She initially was undergoing nonoperative management but due to continued pain and lack of improvement with nonoperative management she required a left shoulder replacement surgery on 1/16/2018.    Prior to surgery, she received 2 units PRBCs on 1/15/2018. She also received 2U FFP prior to OR 1/16/2018.    HGB down today at 8.7 but this is essentially her baseline.     History of Present Illness      Past Medical History, Past Surgical History, Social History, Family History have been reviewed and are without significant changes except as mentioned.    Review of Systems   A comprehensive 14 point review of systems was performed and was negative except as mentioned.    Medications:  The current medication list was reviewed in the EMR    ALLERGIES:    Allergies   Allergen Reactions   • Diclofenac      She had adverse effects from the medications that required hospitalization. Pt got stomach ulcers from this medication prescribed by Dr. Arango - pediatrist.   • Dofetilide      Pt states not allergic.        Objective      Vitals:    01/17/18 2311 01/18/18 0325 01/18/18 0725 01/18/18 1145   BP: 138/56 146/59 156/58 109/52   BP Location: Right arm Right arm Right arm Right arm    Patient Position: Lying Lying Lying Lying   Pulse: 89 60 60 60   Resp: 16 16     Temp: 97 °F (36.1 °C) 96.9 °F (36.1 °C) 98.1 °F (36.7 °C) 97.8 °F (36.6 °C)   TempSrc: Oral Oral Oral Oral   SpO2: 98% 99% 99% 97%   Weight:       Height:         Current Status 10/25/2017   ECOG score 1       Physical Exam      Cardiovascular: Normal rate.  An irregular rhythm present. Exam reveals no gallop and no friction rub.    No murmur heard.  Pacemaker defibrillator in the left anterior chest wall   Pulmonary/Chest: Effort normal and breath sounds normal. She has no wheezes. She has no rales.   Abdominal: Soft. She exhibits no distension and no mass. There is no tenderness.   Musculoskeletal: Normal range of motion. She exhibits no edema or deformity.   Purpura and ecchymoses on the extremities.   Psychiatric: She has a normal mood and affect. Her behavior is normal. Judgment normal.     RECENT LABS:  Hematology WBC   Date Value Ref Range Status   01/18/2018 6.51 4.50 - 10.70 10*3/mm3 Final     RBC   Date Value Ref Range Status   01/18/2018 2.94 (L) 3.90 - 5.20 10*6/mm3 Final     Hemoglobin   Date Value Ref Range Status   01/18/2018 8.7 (L) 11.9 - 15.5 g/dL Final     Hematocrit   Date Value Ref Range Status   01/18/2018 28.3 (L) 35.6 - 45.5 % Final     Platelets   Date Value Ref Range Status   01/18/2018 112 (L) 140 - 500 10*3/mm3 Final            Fecal Occult Blood Negative Negative       Lab Results   Component Value Date    INR 2.62 (H) 01/18/2018    INR 2.18 (H) 01/17/2018    INR 2.16 (H) 01/17/2018    PROTIME 27.1 (H) 01/18/2018    PROTIME 23.6 (H) 01/17/2018    PROTIME 23.4 (H) 01/17/2018         Assessment/Plan   1.  Multifactorial anemia with a component of anemia of chronic kidney disease with low erythropoietin requiring Procrit in the past.  Patient also has past history of iron deficiency and GI blood loss anemia on Coumadin.  As noted above, her hemoglobin was around 8.4 g/dL last week and she received 2 units  packed red blood cells 1/15/2018  in preparation for surgery on the left shoulder.  2.  Traumatic fracture of the left shoulder.  Patient successfully underwent left shoulder replacement surgery 1/16/2018.  3.  Chronic Coumadin anticoagulation due to heart disease. She received FFP prior to surgery 1/16/2018.    Plan  1. Patient has follow up appointment @ [facility] office with Dr Vasquez on 1/29/2018.  2.  INR followed and managed by cardiology.  3. Will sign off. Thanks                  1/18/2018      CC:

## 2018-01-18 NOTE — PROGRESS NOTES
"CC: CAD/MVR    Interval History:   States she feels better less pain.    Vital Signs  Temp:  [96.8 °F (36 °C)-97.5 °F (36.4 °C)] 96.9 °F (36.1 °C)  Heart Rate:  [58-89] 60  Resp:  [16-18] 16  BP: (108-149)/(49-68) 146/59    Intake/Output Summary (Last 24 hours) at 01/18/18 0705  Last data filed at 01/17/18 1229   Gross per 24 hour   Intake              480 ml   Output                0 ml   Net              480 ml     Flowsheet Rows         First Filed Value    Admission Height  162.6 cm (64\") Documented at 01/15/2018 2038    Admission Weight  72.6 kg (160 lb) Documented at 01/15/2018 2038          PHYSICAL EXAM:  General: No acute distress  Resp:NL Rate, unlabored, clear  CV:NL rate and rhythm, NL PMI, Nl S1 and S2, 2/6 sys Mumur, no gallop, no rub, No JVD. Normal pedal pulses  ABD:Nl sounds, no masses or tenderness, nondistended, no quarding or rebound  Neuro: alert,cooperative and oriented  Extr: No edema or cyanosis, moves all extremities    Results Review:      Results from last 7 days  Lab Units 01/18/18  0322   SODIUM mmol/L 140   POTASSIUM mmol/L 3.4*   CHLORIDE mmol/L 99   CO2 mmol/L 27.4   BUN mg/dL 29*   CREATININE mg/dL 1.57*   GLUCOSE mg/dL 97   CALCIUM mg/dL 8.8           Results from last 7 days  Lab Units 01/18/18  0322   WBC 10*3/mm3 6.51   HEMOGLOBIN g/dL 8.7*   HEMATOCRIT % 28.3*   PLATELETS 10*3/mm3 112*       Results from last 7 days  Lab Units 01/18/18  0317 01/17/18  0809 01/17/18  0539  01/11/18  1642   INR  2.62* 2.18* 2.16*  < > 2.94*   APTT seconds  --   --   --   --  45.1*   < > = values in this interval not displayed.    Results from last 7 days  Lab Units 01/16/18  0412   CHOLESTEROL mg/dL 126           Results from last 7 days  Lab Units 01/16/18  0412   CHOLESTEROL mg/dL 126   TRIGLYCERIDES mg/dL 138   HDL CHOL mg/dL 43     @LABRCNT(bnp)@  I reviewed the patient's new clinical results.  I personally viewed and interpreted the patient's EKG/Telemetry data        Medication Review: "   Meds reviewed      lactated ringers 9 mL/hr       Assessment/Plan  1.  S/P left shoulder reverse athroplasty.    2.  Coronary artery disease.  Previous and depressive-like artery and inferior infarct and ultimate single-vessel bypass grafting the posterior descending artery at the time of her mitral replacement echocardiogram 2016 left ventricular ejection fraction of 40% with inferior inferolateral wall akinesis.  No angina or heart failure continue home therapy.   3.  History of mitral insufficiency status post mitral replacement with tissue valve.  Echo cardiac and 2016 valve functioning normally moderate tricuspid insufficiency with moderate pulmonary hypertension.  4.  Atrial fibrillation/sick sinus syndrome failed multiple cardioversions multiple medications failed pulmonary vein isolation then status post AV node ablation and by V defibrillator.  As above high risk for embolic event with a chads 2 Vascor of 6 INR 2.6 continue warfarin  5. Cerebral vascular disease status post carotid endarterectomy.  Carotid ultrasound June 2017 mild disease on the right moderate on the left.  6.  Hypertension follow blood pressure continue all medication  7.  Renal insufficiency.  8.  Hyperlipidemia.  Continue home therapy  9.  History of nonsustained ventricular tachycardia asymptomatic continue same.  10.  History of sleep apnea on CPAP.    We will see when necessary.  She is to keep her regular problem with us as an outpatient.    Nik Galarza MD  01/18/18  7:05 AM

## 2018-01-18 NOTE — SIGNIFICANT NOTE
01/18/18 0746   Rehab Treatment   Discipline occupational therapist   Rehab Evaluation   Evaluation Not Performed other (see comments)  (OT has already signed off, see note from 1/17. Pt planning to d/c home with HH, PT has already issued pendulum HEP. Will sign off again.)

## 2018-01-18 NOTE — PROGRESS NOTES
Orthopedic Progress Note      Patient: Janel Mahoney  YOB: 1940     Date of Admission: 1/15/2018 12:28 PM Medical Record Number: [id number]     Attending Physician: Addison Choi MD    Status Post:  Left  TOTAL SHOULDER REVERSE ARTHROPLASTY Post Operative Day Number: 2    Subjective : No new orthopaedic complaints     Pain Relief: some relief with present medication.     Systemic Complaints: No Complaints  Vitals:    01/17/18 1900 01/17/18 2311 01/18/18 0325 01/18/18 0725   BP: 131/51 138/56 146/59 156/58   BP Location: Right arm Right arm Right arm Right arm   Patient Position: Lying Lying Lying Lying   Pulse: 61 89 60 60   Resp: 16 16 16    Temp: 97.1 °F (36.2 °C) 97 °F (36.1 °C) 96.9 °F (36.1 °C) 98.1 °F (36.7 °C)   TempSrc: Oral Oral Oral Oral   SpO2: 100% 98% 99% 99%   Weight:       Height:           Physical Exam: 77 y.o. female    General Appearance:       Alert, cooperative, in no acute distress                  Extremities:    Dressing Clean, Dry and Intact         Incision healthy without signs or symptoms of infections         No clinical sign of DVT        Able to do good movements of digits    Pulses:   Pulses palpable and equal bilaterally           Diagnostic Tests:       Results from last 7 days  Lab Units 01/18/18  0322 01/17/18  0539 01/16/18  0412 01/15/18  1608   WBC 10*3/mm3 6.51 7.68  --  4.64   HEMOGLOBIN g/dL 8.7* 9.3* 10.4* 8.6*   HEMATOCRIT % 28.3* 30.3* 33.2* 28.4*   PLATELETS 10*3/mm3 112* 133*  --  163       Results from last 7 days  Lab Units 01/18/18  0322 01/17/18  0540 01/16/18  0412   SODIUM mmol/L 140 138 142   POTASSIUM mmol/L 3.4* 3.9 3.5   CHLORIDE mmol/L 99 97* 101   CO2 mmol/L 27.4 25.5 28.0   BUN mg/dL 29* 32* 33*   CREATININE mg/dL 1.57* 1.42* 1.50*   GLUCOSE mg/dL 97 171* 78   CALCIUM mg/dL 8.8 9.0 9.0       Results from last 7 days  Lab Units 01/18/18  0317 01/17/18  0809 01/17/18  0539  01/11/18  1642   INR  2.62* 2.18* 2.16*  < > 2.94*   APTT seconds  --    --   --   --  45.1*   < > = values in this interval not displayed.  No results found for: CRYSTAL]  No results found for: CULTURE]  No results found for: URICACID]  Xr Shoulder 1 View Left    Result Date: 1/16/2018  Narrative: LEFT SHOULDER  HISTORY: Left shoulder arthroplasty.  FINDINGS:  Single AP view of the left shoulder demonstrates a reverse left shoulder arthroplasty. Recent surgical changes include overlying surgical skin staples and soft tissue gas. A left subclavian cardiac pacing device is also evident.  This report was finalized on 1/16/2018 3:50 PM by Dr. Ty Santoro MD.      Xr Shoulder 2+ View Left    Result Date: 1/12/2018  Narrative: TWO-VIEW LEFT SHOULDER  HISTORY: Recent fracture. Follow-up evaluation.  There is a fracture through the neck of the humerus with medial displacement of the humeral shaft relative to the humeral head and this appearance is similar to the study of 12/24/2017.  TWO-VIEW CHEST  HISTORY: Preop for shoulder surgery. Hypertension. Anemia.  There is moderate cardiomegaly with a pacemaker in place and this is unchanged from 01/26/2010. The lungs are clear except for a calcified granuloma in the upper left lung. No acute abnormality is seen.  This report was finalized on 1/12/2018 10:57 AM by Dr. Jesse Bautista MD.      Xr Shoulder 2+ View Left    Result Date: 12/24/2017  Narrative: THREE-VIEW LEFT SHOULDER  HISTORY: LEFT Shoulder Injury.  FINDINGS: Three views of the left shoulder were submitted. There is a slightly obliquely oriented fracture of the proximal humerus at the level of the surgical neck. The primary distal fracture fragment is displaced approximately 30% medially and 50% anteriorly. The humeral head articulates normally with the glenoid process of the scapula without dislocation. There is mild impaction at the fracture site. Arthritic changes are present along the articular surfaces of the glenohumeral joint. There are several small subchondral cysts  present. Soft tissues appear unremarkable.      Impression: 1. Proximal humerus fracture without dislocation.  This report was finalized on 12/24/2017 5:10 AM by Hoang Marshall MD.      Ct Head Without Contrast    Result Date: 12/24/2017  Narrative: CRANIAL CT SCAN WITHOUT CONTRAST  CLINICAL HISTORY: Trauma/fall  COMPARISON: None.  TECHNIQUE: Radiation dose reduction techniques were utilized, including automated exposure control and exposure modulation based on body size. Multiple axial images of the head were obtained without contrast.  FINDINGS:  There are no abnormal areas of increased density or mass effect. There is diffuse cortical atrophy. There are mild scattered areas of decreased density in the white matter likely related to chronic ischemic gliotic changes.   Ventricles, sulci, and cisterns appear normal. Bone window images are unremarkable.         Impression: 1. No acute intracranial abnormality.         This study was performed with techniques to keep radiation doses as low as reasonably achievable (ALARA). Individualized dose reduction techniques using automated exposure control or adjustment of mA and/or kV according to the patient size were employed.  This report was finalized on 12/24/2017 5:52 AM by Hoang Marshall MD.      Ct Cervical Spine Without Contrast    Result Date: 12/24/2017  Narrative: NONCONTRAST CT SCAN CERVICAL SPINE  CLINICAL HISTORY: Trauma  COMPARISON: None.  TECHNIQUE: Radiation dose reduction techniques were utilized, including automated exposure control and exposure modulation based on body size. Axial noncontrast images of the cervical spine were obtained without contrast. Sagittal reformatted images were supplemented.  FINDINGS:  No acute vertebral fracture identified on the axial series. There is cervicothoracic levoscoliosis on the coronal reformatted images.  The vertebrae are well-maintained in height on the sagittal reformatted images. There is 2-3 mm of anterolisthesis  at C4-5. There is 2 mm of anterolisthesis at C5-6 and C6-7 also. There is 2-3 mm of anterolisthesis at C7-T1.  No significant compression deformity or retropulsion.  There is a 6 mm sclerotic lesion in the C6 vertebral body just to the right of midline. A small bone island is favored, particularly if there is no malignancy history. (If there is pain specific to this level or any malignancy history which may indicate alternate etiology, bone scan or MRI may be indicated.) The facets are well aligned. Advanced multilevel facet arthropathy is present throughout.  Multilevel degenerative changes are present. No significant central canal narrowing appreciated by noncontrast technique. Multilevel facet arthropathy contributes to multilevel foraminal stenosis.           Impression: 1. No acute cervical spine fracture. 2. 6 mm sclerotic lesion of the C6 vertebra as discussed  This report was finalized on 12/24/2017 5:50 AM by Hoang Marshall MD.      Xr Chest 1 View    Result Date: 1/16/2018  Narrative: PORTABLE CHEST  HISTORY: Hypertension. Preoperative exam.  COMPARISON: PA and lateral chest 01/11/2018.  FINDINGS: Heart size is enlarged. Sternotomy wires, left subclavian cardiopacer/defibrillator are present. There are chronic increased interstitial markings without evidence for focal airspace disease or pulmonary edema or pleural effusion. The pacer/defibrillator device superimposes the left lung base. There is a calcified granuloma in the left midlung. A proximal humeral metaphyseal fracture is noted.      Impression: 1.  Cardiomegaly. No evidence for active disease in the chest. 2.  Proximal humeral metaphyseal fracture.  This report was finalized on 1/16/2018 8:51 AM by Dr. Ty Santoro MD.      Xr Chest Pa & Lateral    Result Date: 1/12/2018  Narrative: TWO-VIEW LEFT SHOULDER  HISTORY: Recent fracture. Follow-up evaluation.  There is a fracture through the neck of the humerus with medial displacement of the  humeral shaft relative to the humeral head and this appearance is similar to the study of 12/24/2017.  TWO-VIEW CHEST  HISTORY: Preop for shoulder surgery. Hypertension. Anemia.  There is moderate cardiomegaly with a pacemaker in place and this is unchanged from 01/26/2010. The lungs are clear except for a calcified granuloma in the upper left lung. No acute abnormality is seen.  This report was finalized on 1/12/2018 10:57 AM by Dr. Jesse Bautista MD.          Current Medications:  Scheduled Meds:    aspirin 81 mg Oral Daily   atorvastatin 10 mg Oral Nightly   calcitriol 0.25 mcg Oral Every Other Day   carvedilol 25 mg Oral BID With Meals   cholecalciferol 1,000 Units Oral Daily   ferrous sulfate 324 mg Oral Daily   furosemide 40 mg Oral Daily   multivitamin 1 tablet Oral Daily   pantoprazole 40 mg Oral BID AC   polyethylene glycol 17 g Oral Daily   ramipril 5 mg Oral Daily   sennosides-docusate sodium 2 tablet Oral Nightly   vitamin B-12 1,000 mcg Oral Daily     Continuous Infusions:    lactated ringers 9 mL/hr     PRN Meds:.bisacodyl  •  docusate sodium  •  HYDROcodone-acetaminophen **OR** HYDROcodone-acetaminophen  •  HYDROmorphone **AND** naloxone  •  ondansetron  •  zolpidem    Assessment: Status post  TOTAL SHOULDER REVERSE ARTHROPLASTY    Patient Active Problem List   Diagnosis   • Anemia in stage 3 chronic kidney disease   • Thrombocytopenia   • B12 deficiency   • Coronary artery disease involving coronary bypass graft of native heart without angina pectoris   • S/P MVR (mitral valve replacement)   • Chronic atrial fibrillation   • Bilateral carotid artery disease   • Intractable low back pain   • Long-term (current) use of anticoagulants   • Low back pain   • Osteoporosis   • Murmur, heart   • GEORGINA on auto CPAP - Dr Cardona   • Hypersomnia due to medical condition - GEORGINA   • Esophageal stricture   • Acute blood loss anemia   • Dysphagia   • Closed fracture of left proximal humerus   • Hypertension   •  Supratherapeutic INR   • Chronic combined systolic and diastolic congestive heart failure   • Osteoporosis with pathological fracture   • Closed 3-part fracture of proximal end of left humerus   • Closed fracture of proximal end of humerus with delayed healing       PLAN:   Continues current post-op course  Anticoagulation: INR 2.62  Dressing Change in am  Mobilize with PT as tolerated per protocol    Weight Bearing: NWB  Discharge Plan: OK to plan for discharge in  today  to home and home health  from orthopadic perspective.    Tejas Snyder MD    Date: 1/18/2018    Time: 11:19 AM

## 2018-01-18 NOTE — PLAN OF CARE
Problem: Patient Care Overview (Adult)  Goal: Plan of Care Review  Outcome: Ongoing (interventions implemented as appropriate)   01/18/18 0411   Coping/Psychosocial Response Interventions   Plan Of Care Reviewed With patient   Patient Care Overview   Progress improving   Outcome Evaluation   Outcome Summary/Follow up Plan Pt has been stable through the night. Voiding per BSC. Has generalized weakness, ambulating with staff assistance. Pain well controlled with PO pain meds. Sling in place with NWB restriction. Plan to d/c home with HH. Education provided on HTN management with BP monitoring.      Goal: Adult Individualization and Mutuality  Outcome: Ongoing (interventions implemented as appropriate)    Goal: Discharge Needs Assessment  Outcome: Ongoing (interventions implemented as appropriate)      Problem: Fall Risk (Adult)  Goal: Absence of Falls  Outcome: Ongoing (interventions implemented as appropriate)   01/18/18 0411   Fall Risk (Adult)   Absence of Falls achieves outcome       Problem: Pain, Acute (Adult)  Goal: Acceptable Pain Control/Comfort Level  Outcome: Ongoing (interventions implemented as appropriate)   01/18/18 0411   Pain, Acute (Adult)   Acceptable Pain Control/Comfort Level achieves outcome       Problem: Orthopaedic Fracture (Adult)  Goal: Signs and Symptoms of Listed Potential Problems Will be Absent or Manageable (Orthopaedic Fracture)  Outcome: Ongoing (interventions implemented as appropriate)   01/18/18 0411   Orthopaedic Fracture   Problems Assessed (Orthopaedic Fracture) functional deficit/ self-care deficit;pain;neurovascular impairment/injury   Problems Present (Orthopaedic Fracture) pain;functional deficit/ self-care deficit       Problem: Self-Care Deficit (Adult,Obstetrics,Pediatric)  Goal: Improved Ability to Perform BADL and IADL  Outcome: Ongoing (interventions implemented as appropriate)   01/18/18 0411   Self-Care Deficit (Adult,Obstetrics,Pediatric)   Improved Ability to  Perform BADL and IADL achieves outcome

## 2018-01-18 NOTE — PLAN OF CARE
Problem: Patient Care Overview (Adult)  Goal: Plan of Care Review  Outcome: Ongoing (interventions implemented as appropriate)   01/18/18 2959   Coping/Psychosocial Response Interventions   Plan Of Care Reviewed With patient   Patient Care Overview   Progress improving   Outcome Evaluation   Outcome Summary/Follow up Plan pain well controlled with PO pain medication. ambulates with assist x1. possible d/c tomorrow with home health. dressing c/d/i. VSS. voiding without dificulty. educated about BP monitoring.      Goal: Adult Individualization and Mutuality  Outcome: Ongoing (interventions implemented as appropriate)    Goal: Discharge Needs Assessment  Outcome: Ongoing (interventions implemented as appropriate)      Problem: Fall Risk (Adult)  Goal: Absence of Falls  Outcome: Ongoing (interventions implemented as appropriate)      Problem: Pain, Acute (Adult)  Goal: Acceptable Pain Control/Comfort Level  Outcome: Ongoing (interventions implemented as appropriate)      Problem: Orthopaedic Fracture (Adult)  Goal: Signs and Symptoms of Listed Potential Problems Will be Absent or Manageable (Orthopaedic Fracture)  Outcome: Ongoing (interventions implemented as appropriate)      Problem: Self-Care Deficit (Adult,Obstetrics,Pediatric)  Goal: Improved Ability to Perform BADL and IADL  Outcome: Ongoing (interventions implemented as appropriate)

## 2018-01-18 NOTE — SIGNIFICANT NOTE
01/18/18 1528   Rehab Treatment   Discipline occupational therapist   Rehab Evaluation   Evaluation Not Performed other (see comments)  (Spoke with RN, pt is at baseline with mobility and function at this time. Although pt is weaker pt is still safe with mobility and ADLs with assist from staff or spouse. For the third time, no skilled OT is warranted in the acute setting.)   Pendulum exercises have been issued. OT will sign off at this time.

## 2018-01-18 NOTE — PROGRESS NOTES
"Daily progress note    Chief complaint  Doing better  No new complaints    History of present illness  77-year-old white female with very complex past medical history who fell on the December 24 support from left proximal humerus fracture of the medical problems hypertension congestive heart failure, osteoarthritis osteoporosis obstructive sleep apnea chronic atrial fibrillation on anticoagulation coronary artery disease status post mitral valve replacement chronic anemia required multiple blood transfusion chronic kidney disease who was directly admitted to our service for blood transfusion and then have surgery scheduled on the left humerus with orthopedic surgery.  Patient has no new complaints and has been hurting at this fracture site and very weak.  Denies any recent headache dizziness chest pain shortness of breath abdominal pain nausea vomiting diarrhea patient did get multiple blood transfusion few months ago.    Review of Systems   Constitutional: Negative for chills and fever.   HENT: Negative.    Eyes: Negative.    Respiratory: Negative.    Cardiovascular: Positive for leg swelling. Negative for chest pain (occasionally).   Gastrointestinal: Positive for constipation and diarrhea.        Black stools due to iron   Endocrine: Negative.    Genitourinary: Negative.    Musculoskeletal: Positive for arthralgias, back pain (chronic), gait problem (uses walker), neck pain and neck stiffness.   Skin: Negative.    Neurological: Positive for weakness. Negative for dizziness and light-headedness.   Hematological: Negative.    Psychiatric/Behavioral: Negative.      Physical Exam Blood pressure 109/52, pulse 60, temperature 97.8 °F (36.6 °C), temperature source Oral, resp. rate 16, height 162.6 cm (64\"), weight 72.6 kg (160 lb), SpO2 97 %.    Constitutional: She is oriented to person, place, and time. She appears well-developed and well-nourished. No distress.   Head: Normocephalic and atraumatic.   Eyes: " Conjunctivae and EOM are normal. No scleral icterus.   Neck: Normal range of motion. Neck supple. No tracheal deviation present.   Cardiovascular: Normal rate.  An irregularly irregular rhythm present.   Pulmonary/Chest: Effort normal. No respiratory distress. She has decreased breath sounds. She has no wheezes. She has no rales.   Abdominal: Soft. Bowel sounds are normal. She exhibits no distension and no mass. There is no tenderness.   Musculoskeletal: She exhibits deformity (LUE in sling). She exhibits no edema.   Neurological: She is alert and oriented to person, place, and time. No cranial nerve deficit.   Skin: Skin is warm and dry. Ecchymosis (scattered) noted. No rash noted. No erythema.   Psychiatric: She has a normal mood and affect. Her behavior is normal. Judgment and thought content normal.      LABS  Lab Results (last 24 hours)     Procedure Component Value Units Date/Time    CBC (No Diff) [[id number]]  (Abnormal) Collected:  01/18/18 0322    Specimen:  Blood Updated:  01/18/18 0414     WBC 6.51 10*3/mm3      RBC 2.94 (L) 10*6/mm3      Hemoglobin 8.7 (L) g/dL      Hematocrit 28.3 (L) %      MCV 96.3 fL      MCH 29.6 pg      MCHC 30.7 (L) g/dL      RDW 16.7 (H) %      RDW-SD 57.9 (H) fl      MPV 10.2 fL      Platelets 112 (L) 10*3/mm3     Protime-INR [[id number]]  (Abnormal) Collected:  01/18/18 0317    Specimen:  Blood Updated:  01/18/18 0417     Protime 27.1 (H) Seconds      INR 2.62 (H)    Basic Metabolic Panel [[id number]]  (Abnormal) Collected:  01/18/18 0322    Specimen:  Blood Updated:  01/18/18 0457     Glucose 97 mg/dL      BUN 29 (H) mg/dL      Creatinine 1.57 (H) mg/dL      Sodium 140 mmol/L      Potassium 3.4 (L) mmol/L      Chloride 99 mmol/L      CO2 27.4 mmol/L      Calcium 8.8 mg/dL      eGFR Non African Amer 32 (L) mL/min/1.73      BUN/Creatinine Ratio 18.5     Anion Gap 13.6 mmol/L     Narrative:       The MDRD GFR formula is only valid for adults with stable renal function between ages  18 and 70.        Imaging Results (last 24 hours)     ** No results found for the last 24 hours. **          Current Facility-Administered Medications:   •  aspirin chewable tablet 81 mg, 81 mg, Oral, Daily, Addison Choi MD, 81 mg at 01/18/18 0736  •  atorvastatin (LIPITOR) tablet 10 mg, 10 mg, Oral, Nightly, Addison Choi MD, 10 mg at 01/17/18 2120  •  bisacodyl (DULCOLAX) EC tablet 10 mg, 10 mg, Oral, Daily PRN, Bianka Quesada MD  •  calcitriol (ROCALTROL) capsule 0.25 mcg, 0.25 mcg, Oral, Every Other Day, Addison Choi MD, 0.25 mcg at 01/17/18 0942  •  carvedilol (COREG) tablet 25 mg, 25 mg, Oral, BID With Meals, Addison Choi MD, 25 mg at 01/18/18 0735  •  cholecalciferol (VITAMIN D3) tablet 1,000 Units, 1,000 Units, Oral, Daily, Addison Choi MD, 1,000 Units at 01/18/18 0736  •  docusate sodium (COLACE) capsule 100 mg, 100 mg, Oral, BID PRN, Bianka Quesada MD  •  ferrous sulfate tablet 324 mg, 324 mg, Oral, Daily, Addison Choi MD, 324 mg at 01/18/18 0735  •  furosemide (LASIX) tablet 40 mg, 40 mg, Oral, Daily, Addison Choi MD, 40 mg at 01/18/18 0735  •  HYDROcodone-acetaminophen (NORCO) 7.5-325 MG per tablet 1 tablet, 1 tablet, Oral, Q4H PRN **OR** HYDROcodone-acetaminophen (NORCO) 7.5-325 MG per tablet 2 tablet, 2 tablet, Oral, Q4H PRN, Addison Choi MD, 2 tablet at 01/18/18 1153  •  HYDROmorphone (DILAUDID) injection 0.5 mg, 0.5 mg, Intravenous, Q2H PRN, 0.5 mg at 01/17/18 0941 **AND** naloxone (NARCAN) injection 0.1 mg, 0.1 mg, Intravenous, Q5 Min PRN, Bianka Quesada MD  •  lactated ringers infusion, 9 mL/hr, Intravenous, Continuous, Aries Pappas MD  •  multivitamin (THERAGRAN) tablet 1 tablet, 1 tablet, Oral, Daily, Addison Choi MD, 1 tablet at 01/18/18 0736  •  ondansetron (ZOFRAN) injection 4 mg, 4 mg, Intravenous, Q4H PRN, Addison Choi MD  •  pantoprazole (PROTONIX) EC tablet 40 mg, 40 mg, Oral, BID AC, Addison Choi MD, 40 mg at 01/18/18 7295  •  polyethylene glycol 3350  powder (packet), 17 g, Oral, Daily, Laura Choi MD  •  ramipril (ALTACE) capsule 5 mg, 5 mg, Oral, Daily, Laura Choi MD, 5 mg at 01/18/18 0736  •  sennosides-docusate sodium (SENOKOT-S) 8.6-50 MG tablet 2 tablet, 2 tablet, Oral, Nightly, Laura Choi MD  •  vitamin B-12 (CYANOCOBALAMIN) tablet 1,000 mcg, 1,000 mcg, Oral, Daily, Laura Choi MD, 1,000 mcg at 01/18/18 0736  •  zolpidem (AMBIEN) tablet 5 mg, 5 mg, Oral, Nightly PRN, Laura Choi MD, 5 mg at 01/18/18 0133     ASSESSMENT  Chronic anemia required blood transfusion  Closed fracture of left proximal humerus  status post left shoulder reverse arthroplasty  Hypertension  Congestive heart failure combined both systolic diastolic  Chronic atrial fibrillation on anticoagulation  Status post mitral valve replacement  Chronic kidney disease stage III  Obstructive sleep apnea  Osteoarthritis  Osteoporosis  Gastroesophageal reflux disease  Coronary artery disease status post CABG    PLAN  CPM  Postop care  Transfuse PRN  Resume Coumadin  Continue home medication   Stress ulcer DVT prophylaxis  Supportive care  PT/OT  Discharge in a.m. if okay with all    LAURA COHI MD

## 2018-01-18 NOTE — PROGRESS NOTES
Continued Stay Note  [facility]     Patient Name: Janel Mahoney  MRN: [id number]  Today's Date: 1/18/2018    Admit Date: 1/15/2018          Discharge Plan       01/18/18 2097    Case Management/Social Work Plan    Plan Home w/ spouse and [facility].     Patient/Family In Agreement With Plan yes    Additional Comments F/u w/ pt's spouse. Plan remains for pt to d/c home w/ the help of her spouse and Sal[facility] MURDOCK/MATHIEU following.               Discharge Codes     None        Expected Discharge Date and Time     Expected Discharge Date Expected Discharge Time    Jan 18, 2018             Amparo Le RN

## 2018-01-19 ENCOUNTER — EPISODE CHANGES (OUTPATIENT)
Dept: CASE MANAGEMENT | Facility: OTHER | Age: 78
End: 2018-01-19

## 2018-01-19 VITALS
BODY MASS INDEX: 27.31 KG/M2 | SYSTOLIC BLOOD PRESSURE: 111 MMHG | OXYGEN SATURATION: 98 % | DIASTOLIC BLOOD PRESSURE: 54 MMHG | WEIGHT: 160 LBS | HEIGHT: 64 IN | TEMPERATURE: 97.1 F | HEART RATE: 61 BPM | RESPIRATION RATE: 16 BRPM

## 2018-01-19 LAB
ANION GAP SERPL CALCULATED.3IONS-SCNC: 10.9 MMOL/L
BASOPHILS # BLD AUTO: 0.03 10*3/MM3 (ref 0–0.2)
BASOPHILS NFR BLD AUTO: 0.5 % (ref 0–1.5)
BUN BLD-MCNC: 33 MG/DL (ref 8–23)
BUN/CREAT SERPL: 20.5 (ref 7–25)
CALCIUM SPEC-SCNC: 8.3 MG/DL (ref 8.6–10.5)
CHLORIDE SERPL-SCNC: 99 MMOL/L (ref 98–107)
CO2 SERPL-SCNC: 27.1 MMOL/L (ref 22–29)
CREAT BLD-MCNC: 1.61 MG/DL (ref 0.57–1)
DEPRECATED RDW RBC AUTO: 59.3 FL (ref 37–54)
EOSINOPHIL # BLD AUTO: 0.23 10*3/MM3 (ref 0–0.7)
EOSINOPHIL NFR BLD AUTO: 3.7 % (ref 0.3–6.2)
ERYTHROCYTE [DISTWIDTH] IN BLOOD BY AUTOMATED COUNT: 16.7 % (ref 11.7–13)
FERRITIN SERPL-MCNC: 335.8 NG/ML (ref 13–150)
GFR SERPL CREATININE-BSD FRML MDRD: 31 ML/MIN/1.73
GLUCOSE BLD-MCNC: 108 MG/DL (ref 65–99)
HCT VFR BLD AUTO: 29.8 % (ref 35.6–45.5)
HGB BLD-MCNC: 8.9 G/DL (ref 11.9–15.5)
IMM GRANULOCYTES # BLD: 0.07 10*3/MM3 (ref 0–0.03)
IMM GRANULOCYTES NFR BLD: 1.1 % (ref 0–0.5)
INR PPP: 2.73 (ref 0.9–1.1)
IRON 24H UR-MRATE: 20 MCG/DL (ref 37–145)
IRON SATN MFR SERPL: 9 % (ref 20–50)
LYMPHOCYTES # BLD AUTO: 1.22 10*3/MM3 (ref 0.9–4.8)
LYMPHOCYTES NFR BLD AUTO: 19.5 % (ref 19.6–45.3)
MCH RBC QN AUTO: 29 PG (ref 26.9–32)
MCHC RBC AUTO-ENTMCNC: 29.9 G/DL (ref 32.4–36.3)
MCV RBC AUTO: 97.1 FL (ref 80.5–98.2)
MONOCYTES # BLD AUTO: 0.44 10*3/MM3 (ref 0.2–1.2)
MONOCYTES NFR BLD AUTO: 7 % (ref 5–12)
NEUTROPHILS # BLD AUTO: 4.28 10*3/MM3 (ref 1.9–8.1)
NEUTROPHILS NFR BLD AUTO: 68.2 % (ref 42.7–76)
NRBC BLD MANUAL-RTO: 0 /100 WBC (ref 0–0)
PLATELET # BLD AUTO: 121 10*3/MM3 (ref 140–500)
PMV BLD AUTO: 10.3 FL (ref 6–12)
POTASSIUM BLD-SCNC: 3.8 MMOL/L (ref 3.5–5.2)
PROTHROMBIN TIME: 28 SECONDS (ref 11.7–14.2)
RBC # BLD AUTO: 3.07 10*6/MM3 (ref 3.9–5.2)
SODIUM BLD-SCNC: 137 MMOL/L (ref 136–145)
TIBC SERPL-MCNC: 229 MCG/DL (ref 298–536)
TRANSFERRIN SERPL-MCNC: 154 MG/DL (ref 200–360)
WBC NRBC COR # BLD: 6.27 10*3/MM3 (ref 4.5–10.7)

## 2018-01-19 PROCEDURE — 83540 ASSAY OF IRON: CPT | Performed by: INTERNAL MEDICINE

## 2018-01-19 PROCEDURE — 85610 PROTHROMBIN TIME: CPT | Performed by: INTERNAL MEDICINE

## 2018-01-19 PROCEDURE — 82728 ASSAY OF FERRITIN: CPT | Performed by: INTERNAL MEDICINE

## 2018-01-19 PROCEDURE — 80048 BASIC METABOLIC PNL TOTAL CA: CPT | Performed by: HOSPITALIST

## 2018-01-19 PROCEDURE — 63510000001 EPOETIN ALFA PER 1000 UNITS: Performed by: INTERNAL MEDICINE

## 2018-01-19 PROCEDURE — 99231 SBSQ HOSP IP/OBS SF/LOW 25: CPT | Performed by: INTERNAL MEDICINE

## 2018-01-19 PROCEDURE — 85025 COMPLETE CBC W/AUTO DIFF WBC: CPT | Performed by: INTERNAL MEDICINE

## 2018-01-19 PROCEDURE — 84466 ASSAY OF TRANSFERRIN: CPT | Performed by: INTERNAL MEDICINE

## 2018-01-19 RX ORDER — FUROSEMIDE 20 MG/1
20 TABLET ORAL DAILY
Qty: 30 TABLET | Refills: 0 | Status: SHIPPED | OUTPATIENT
Start: 2018-01-20 | End: 2018-06-12 | Stop reason: DRUGHIGH

## 2018-01-19 RX ADMIN — VITAMIN D, TAB 1000IU (100/BT) 1000 UNITS: 25 TAB at 07:59

## 2018-01-19 RX ADMIN — CYANOCOBALAMIN TAB 500 MCG 1000 MCG: 500 TAB at 07:59

## 2018-01-19 RX ADMIN — Medication 1 TABLET: at 07:59

## 2018-01-19 RX ADMIN — ZOLPIDEM TARTRATE 5 MG: 5 TABLET ORAL at 00:45

## 2018-01-19 RX ADMIN — ERYTHROPOIETIN 20000 UNITS: 20000 INJECTION, SOLUTION INTRAVENOUS; SUBCUTANEOUS at 13:00

## 2018-01-19 RX ADMIN — HYDROCODONE BITARTRATE AND ACETAMINOPHEN 2 TABLET: 7.5; 325 TABLET ORAL at 06:31

## 2018-01-19 RX ADMIN — FERROUS SULFATE TAB 325 MG (65 MG ELEMENTAL FE) 324 MG: 325 (65 FE) TAB at 07:59

## 2018-01-19 RX ADMIN — ASPIRIN 81 MG: 81 TABLET, CHEWABLE ORAL at 08:00

## 2018-01-19 RX ADMIN — HYDROCODONE BITARTRATE AND ACETAMINOPHEN 2 TABLET: 7.5; 325 TABLET ORAL at 13:08

## 2018-01-19 RX ADMIN — PANTOPRAZOLE SODIUM 40 MG: 40 TABLET, DELAYED RELEASE ORAL at 06:31

## 2018-01-19 RX ADMIN — CALCITRIOL 0.25 MCG: 0.25 CAPSULE, LIQUID FILLED ORAL at 07:59

## 2018-01-19 RX ADMIN — HYDROCODONE BITARTRATE AND ACETAMINOPHEN 2 TABLET: 7.5; 325 TABLET ORAL at 00:45

## 2018-01-19 NOTE — DISCHARGE SUMMARY
Discharge summary    Date of admission 1/15/2018  Date of discharge 1/19/2018    Final diagnosis  Chronic anemia required blood transfusion  Closed fracture of left proximal humerus  status post left shoulder reverse arthroplasty  Hypertension  Congestive heart failure combined both systolic diastolic  Chronic atrial fibrillation on anticoagulation  Status post mitral valve replacement  Chronic kidney disease stage III  Obstructive sleep apnea  Osteoarthritis  Osteoporosis  Gastroesophageal reflux disease  Coronary artery disease status post CABG    Discharge medications    Current Facility-Administered Medications:   •  aspirin chewable tablet 81 mg, 81 mg, Oral, Daily, Addison Choi MD, 81 mg at 01/19/18 0800  •  atorvastatin (LIPITOR) tablet 10 mg, 10 mg, Oral, Nightly, Addison Choi MD, 10 mg at 01/18/18 2044  •  calcitriol (ROCALTROL) capsule 0.25 mcg, 0.25 mcg, Oral, Every Other Day, Addison Choi MD, 0.25 mcg at 01/19/18 0759  •  carvedilol (COREG) tablet 25 mg, 25 mg, Oral, BID With Meals, Addison Choi MD, Stopped at 01/19/18 0918  •  cholecalciferol (VITAMIN D3) tablet 1,000 Units, 1,000 Units, Oral, Daily, Addison Choi MD, 1,000 Units at 01/19/18 0759  •  ferrous sulfate tablet 324 mg, 324 mg, Oral, Daily, Addison Choi MD, 324 mg at 01/19/18 0759  •  furosemide (LASIX) tablet 20 mg, 20 mg, Oral, Daily, Addison Choi MD, Stopped at 01/19/18 0919  •  multivitamin (THERAGRAN) tablet 1 tablet, 1 tablet, Oral, Daily, Addison Choi MD, 1 tablet at 01/19/18 0759  •  pantoprazole (PROTONIX) EC tablet 40 mg, 40 mg, Oral, BID AC, Addison Choi MD, 40 mg at 01/19/18 0631  •  polyethylene glycol 3350 powder (packet), 17 g, Oral, Daily, Addison Choi MD  •  ramipril (ALTACE) capsule 5 mg, 5 mg, Oral, Daily, Addison Choi MD, Stopped at 01/19/18 0920  •  sennosides-docusate sodium (SENOKOT-S) 8.6-50 MG tablet 2 tablet, 2 tablet, Oral, Nightly, Addison Choi MD  •  vitamin B-12 (CYANOCOBALAMIN) tablet 1,000 mcg, 1,000 mcg, Oral,  Daily, Laura Choi MD, 1,000 mcg at 01/19/18 3564     Consult obtained  Cardiology  Orthopedic surgery  Hematology oncology    Procedures  Status post left shoulder reverse arthroplasty    Hospital course  77-year-old white female with very complex past medical history including chronic atrial fibrillation on anticoagulation and chronic anemia followed by hematology to pathology and cardiology directly admitted to our service for blood transfusion followed by orthopedic surgery as she has closed fracture of left proximal humerus.  Patient admitted received blood transfusion and after medical and cardiac clearance underwent surgery left shoulder reverse arthroplasty.  Patient anticoagulation was held and restarted after surgery.  Patient clear with cardiology and hematology oncology and orthopedic to be discharged home on above medication.  At the time of discharge her hemoglobin is 8.7 and her INR is 2.73.    Discharge diet regular    Activity as tolerated.  Nonweightbearing on left upper extremity    Medication as above    Follow-up with primary doctor in 1 week follow-up with cardiology hematology oncology and orthopedic surgery per the instruction and take medication as diet    LAURA CHOI MD

## 2018-01-19 NOTE — PROGRESS NOTES
Subjective   1.  Multifactorial anemia with anemia of chronic kidney disease, iron deficiency in the past, and GI bleeding in the past on Coumadin.  2.  Recent fall with fracture of the left shoulder.  Patient underwent left shoulder replacement surgery 1/16/2018.  HISTORY OF PRESENT ILLNESS:    The patient is a 77 y.o. year old female who  has been followed in our office in the past by Dr. Vasquez for multifactorial anemia.  As noted above, she has required Procrit in the past for anemia of chronic kidney disease with low erythropoietin and also has been iron deficient in the past.  She takes Coumadin chronically due to heart disease and is also had GI bleeding in the past.     She had a fall on [place] and fractured her left shoulder.  She initially was undergoing nonoperative management but due to continued pain and lack of improvement with nonoperative management she required a left shoulder replacement surgery on 1/16/2018.    Prior to surgery, she received 2 units PRBCs on 1/15/2018. She also received 2U FFP prior to OR 1/16/2018.    HGB stable today at 8.9 but this is essentially her baseline. Will order one dose of Procrit 20K today.     History of Present Illness      Past Medical History, Past Surgical History, Social History, Family History have been reviewed and are without significant changes except as mentioned.    Review of Systems   A comprehensive 14 point review of systems was performed and was negative except as mentioned.    Medications:  The current medication list was reviewed in the EMR    ALLERGIES:    Allergies   Allergen Reactions   • Diclofenac      She had adverse effects from the medications that required hospitalization. Pt got stomach ulcers from this medication prescribed by Dr. Arango - pediatrist.   • Dofetilide      Pt states not allergic.        Objective      Vitals:    01/18/18 2300 01/19/18 0300 01/19/18 0716 01/19/18 1115   BP: 130/59 144/67 123/56 111/54   BP  Location: Right arm Right arm Right arm Right arm   Patient Position: Lying Lying Lying Lying   Pulse: 58 59 60 61   Resp: 16 16 16 16   Temp: 97.1 °F (36.2 °C) 97.9 °F (36.6 °C) 99.7 °F (37.6 °C) 97.1 °F (36.2 °C)   TempSrc: Oral Oral Oral Oral   SpO2: 98% 99% 100% 98%   Weight:       Height:         Current Status 10/25/2017   ECOG score 1       Physical Exam      Cardiovascular: Normal rate.  An irregular rhythm present. Exam reveals no gallop and no friction rub.    No murmur heard.  Pacemaker defibrillator in the left anterior chest wall   Pulmonary/Chest: Effort normal and breath sounds normal. She has no wheezes. She has no rales.   Abdominal: Soft. She exhibits no distension and no mass. There is no tenderness.   Musculoskeletal: Normal range of motion. She exhibits no edema or deformity.   Purpura and ecchymoses on the extremities.   Psychiatric: She has a normal mood and affect. Her behavior is normal. Judgment normal.     RECENT LABS:  Hematology WBC   Date Value Ref Range Status   01/19/2018 6.27 4.50 - 10.70 10*3/mm3 Final     RBC   Date Value Ref Range Status   01/19/2018 3.07 (L) 3.90 - 5.20 10*6/mm3 Final     Hemoglobin   Date Value Ref Range Status   01/19/2018 8.9 (L) 11.9 - 15.5 g/dL Final     Hematocrit   Date Value Ref Range Status   01/19/2018 29.8 (L) 35.6 - 45.5 % Final     Platelets   Date Value Ref Range Status   01/19/2018 121 (L) 140 - 500 10*3/mm3 Final            Fecal Occult Blood Negative Negative       Lab Results   Component Value Date    INR 2.73 (H) 01/19/2018    INR 2.62 (H) 01/18/2018    INR 2.18 (H) 01/17/2018    PROTIME 28.0 (H) 01/19/2018    PROTIME 27.1 (H) 01/18/2018    PROTIME 23.6 (H) 01/17/2018         Assessment/Plan   1.  Multifactorial anemia with a component of anemia of chronic kidney disease with low erythropoietin requiring Procrit in the past.  Patient also has past history of iron deficiency and GI blood loss anemia on Coumadin.  As noted above, her hemoglobin  was around 8.4 g/dL last week and she received 2 units packed red blood cells 1/15/2018  in preparation for surgery on the left shoulder.  2.  Traumatic fracture of the left shoulder.  Patient successfully underwent left shoulder replacement surgery 1/16/2018.  3.  Chronic Coumadin anticoagulation due to heart disease. She received FFP prior to surgery 1/16/2018. Now back on coumadin with therapeutic INR.    Plan  1. Patient has follow up appointment @ [facility] office with Dr Vasquez on 1/29/2018.  2.  INR followed and managed by cardiology.  3.  Will give one dose PROCRIT  20,000U SQ 1/19/2018 1/19/2018      CC:

## 2018-01-19 NOTE — PROGRESS NOTES
"Daily progress note    Chief complaint  Doing better  No new complaints    History of present illness  77-year-old white female with very complex past medical history who fell on the December 24 support from left proximal humerus fracture of the medical problems hypertension congestive heart failure, osteoarthritis osteoporosis obstructive sleep apnea chronic atrial fibrillation on anticoagulation coronary artery disease status post mitral valve replacement chronic anemia required multiple blood transfusion chronic kidney disease who was directly admitted to our service for blood transfusion and then have surgery scheduled on the left humerus with orthopedic surgery.  Patient has no new complaints and has been hurting at this fracture site and very weak.  Denies any recent headache dizziness chest pain shortness of breath abdominal pain nausea vomiting diarrhea patient did get multiple blood transfusion few months ago.    Review of Systems   Constitutional: Negative for chills and fever.   HENT: Negative.    Eyes: Negative.    Respiratory: Negative.    Cardiovascular: Positive for leg swelling. Negative for chest pain (occasionally).   Gastrointestinal: Positive for constipation and diarrhea.        Black stools due to iron   Endocrine: Negative.    Genitourinary: Negative.    Musculoskeletal: Positive for arthralgias, back pain (chronic), gait problem (uses walker), neck pain and neck stiffness.   Skin: Negative.    Neurological: Positive for weakness. Negative for dizziness and light-headedness.   Hematological: Negative.    Psychiatric/Behavioral: Negative.      Physical Exam Blood pressure 111/54, pulse 61, temperature 97.1 °F (36.2 °C), temperature source Oral, resp. rate 16, height 162.6 cm (64\"), weight 72.6 kg (160 lb), SpO2 98 %.    Constitutional: She is oriented to person, place, and time. She appears well-developed and well-nourished. No distress.   Head: Normocephalic and atraumatic.   Eyes: " Conjunctivae and EOM are normal. No scleral icterus.   Neck: Normal range of motion. Neck supple. No tracheal deviation present.   Cardiovascular: Normal rate.  An irregularly irregular rhythm present.   Pulmonary/Chest: Effort normal. No respiratory distress. She has decreased breath sounds. She has no wheezes. She has no rales.   Abdominal: Soft. Bowel sounds are normal. She exhibits no distension and no mass. There is no tenderness.   Musculoskeletal: She exhibits deformity (LUE in sling). She exhibits no edema.   Neurological: She is alert and oriented to person, place, and time. No cranial nerve deficit.   Skin: Skin is warm and dry. Ecchymosis (scattered) noted. No rash noted. No erythema.   Psychiatric: She has a normal mood and affect. Her behavior is normal. Judgment and thought content normal.      LABS  Lab Results (last 24 hours)     Procedure Component Value Units Date/Time    Protime-INR [[id number]]  (Abnormal) Collected:  01/19/18 0407    Specimen:  Blood Updated:  01/19/18 0454     Protime 28.0 (H) Seconds      INR 2.73 (H)    Basic Metabolic Panel [[id number]]  (Abnormal) Collected:  01/19/18 0407    Specimen:  Blood Updated:  01/19/18 0456     Glucose 108 (H) mg/dL      BUN 33 (H) mg/dL      Creatinine 1.61 (H) mg/dL      Sodium 137 mmol/L      Potassium 3.8 mmol/L      Chloride 99 mmol/L      CO2 27.1 mmol/L      Calcium 8.3 (L) mg/dL      eGFR Non African Amer 31 (L) mL/min/1.73      BUN/Creatinine Ratio 20.5     Anion Gap 10.9 mmol/L     Narrative:       The MDRD GFR formula is only valid for adults with stable renal function between ages 18 and 70.    CBC & Differential [[id number]] Collected:  01/19/18 1135    Specimen:  Blood Updated:  01/19/18 1206    Narrative:       The following orders were created for panel order CBC & Differential.  Procedure                               Abnormality         Status                     ---------                               -----------         ------                      CBC Auto Differential[[id number]]        Abnormal            Final result                 Please view results for these tests on the individual orders.    CBC Auto Differential [[id number]]  (Abnormal) Collected:  01/19/18 1135    Specimen:  Blood Updated:  01/19/18 1206     WBC 6.27 10*3/mm3      RBC 3.07 (L) 10*6/mm3      Hemoglobin 8.9 (L) g/dL      Hematocrit 29.8 (L) %      MCV 97.1 fL      MCH 29.0 pg      MCHC 29.9 (L) g/dL      RDW 16.7 (H) %      RDW-SD 59.3 (H) fl      MPV 10.3 fL      Platelets 121 (L) 10*3/mm3      Neutrophil % 68.2 %      Lymphocyte % 19.5 (L) %      Monocyte % 7.0 %      Eosinophil % 3.7 %      Basophil % 0.5 %      Immature Grans % 1.1 (H) %      Neutrophils, Absolute 4.28 10*3/mm3      Lymphocytes, Absolute 1.22 10*3/mm3      Monocytes, Absolute 0.44 10*3/mm3      Eosinophils, Absolute 0.23 10*3/mm3      Basophils, Absolute 0.03 10*3/mm3      Immature Grans, Absolute 0.07 (H) 10*3/mm3      nRBC 0.0 /100 WBC     Ferritin [[id number]] Collected:  01/19/18 0407    Specimen:  Blood Updated:  01/19/18 1220    Iron Profile [[id number]]  (Abnormal) Collected:  01/19/18 0407    Specimen:  Blood Updated:  01/19/18 1437     Iron 20 (L) mcg/dL      Iron Saturation 9 (L) %      Transferrin 154 (L) mg/dL      TIBC 229 mcg/dL         Imaging Results (last 24 hours)     ** No results found for the last 24 hours. **          Current Facility-Administered Medications:   •  aspirin chewable tablet 81 mg, 81 mg, Oral, Daily, Addison Choi MD, 81 mg at 01/19/18 0800  •  atorvastatin (LIPITOR) tablet 10 mg, 10 mg, Oral, Nightly, Addison Choi MD, 10 mg at 01/18/18 2044  •  bisacodyl (DULCOLAX) EC tablet 10 mg, 10 mg, Oral, Daily PRN, Bianka Quesada MD  •  calcitriol (ROCALTROL) capsule 0.25 mcg, 0.25 mcg, Oral, Every Other Day, Addison Choi MD, 0.25 mcg at 01/19/18 1712  •  carvedilol (COREG) tablet 25 mg, 25 mg, Oral, BID With Meals, Addison Choi MD, Stopped at 01/19/18 2222  •   cholecalciferol (VITAMIN D3) tablet 1,000 Units, 1,000 Units, Oral, Daily, Addison Choi MD, 1,000 Units at 01/19/18 0759  •  docusate sodium (COLACE) capsule 100 mg, 100 mg, Oral, BID PRN, Bianka Quesada MD  •  epoetin adele (EPOGEN,PROCRIT) injection 20,000 Units, 20,000 Units, Subcutaneous, Once, Frankie Mendes MD  •  ferrous sulfate tablet 324 mg, 324 mg, Oral, Daily, Addison Choi MD, 324 mg at 01/19/18 0759  •  furosemide (LASIX) tablet 20 mg, 20 mg, Oral, Daily, Addison Choi MD, Stopped at 01/19/18 0919  •  HYDROcodone-acetaminophen (NORCO) 7.5-325 MG per tablet 1 tablet, 1 tablet, Oral, Q4H PRN **OR** HYDROcodone-acetaminophen (NORCO) 7.5-325 MG per tablet 2 tablet, 2 tablet, Oral, Q4H PRN, Addison Choi MD, 2 tablet at 01/19/18 1308  •  HYDROmorphone (DILAUDID) injection 0.5 mg, 0.5 mg, Intravenous, Q2H PRN, 0.5 mg at 01/17/18 0941 **AND** naloxone (NARCAN) injection 0.1 mg, 0.1 mg, Intravenous, Q5 Min PRN, Bianka Quesada MD  •  lactated ringers infusion, 9 mL/hr, Intravenous, Continuous, Aries Pappas MD  •  multivitamin (THERAGRAN) tablet 1 tablet, 1 tablet, Oral, Daily, Addison Choi MD, 1 tablet at 01/19/18 0759  •  ondansetron (ZOFRAN) injection 4 mg, 4 mg, Intravenous, Q4H PRN, Addison Choi MD  •  pantoprazole (PROTONIX) EC tablet 40 mg, 40 mg, Oral, BID AC, Addison Choi MD, 40 mg at 01/19/18 0631  •  polyethylene glycol 3350 powder (packet), 17 g, Oral, Daily, Addison Choi MD  •  ramipril (ALTACE) capsule 5 mg, 5 mg, Oral, Daily, Addison Choi MD, Stopped at 01/19/18 0920  •  sennosides-docusate sodium (SENOKOT-S) 8.6-50 MG tablet 2 tablet, 2 tablet, Oral, Nightly, Addison Choi MD  •  vitamin B-12 (CYANOCOBALAMIN) tablet 1,000 mcg, 1,000 mcg, Oral, Daily, Addison Choi MD, 1,000 mcg at 01/19/18 0759  •  zolpidem (AMBIEN) tablet 5 mg, 5 mg, Oral, Nightly PRN, Addison Choi MD, 5 mg at 01/19/18 0045     ASSESSMENT  Chronic anemia required blood transfusion  Closed fracture of left  proximal humerus  status post left shoulder reverse arthroplasty  Hypertension  Congestive heart failure combined both systolic diastolic  Chronic atrial fibrillation on anticoagulation  Status post mitral valve replacement  Chronic kidney disease stage III  Obstructive sleep apnea  Osteoarthritis  Osteoporosis  Gastroesophageal reflux disease  Coronary artery disease status post CABG    PLAN  Discharge home  Discharge summary dictated    LAURA DYE MD

## 2018-01-19 NOTE — PLAN OF CARE
Problem: Patient Care Overview (Adult)  Goal: Plan of Care Review  Outcome: Ongoing (interventions implemented as appropriate)   01/19/18 1443   Coping/Psychosocial Response Interventions   Plan Of Care Reviewed With patient   Patient Care Overview   Progress improving   Outcome Evaluation   Outcome Summary/Follow up Plan pain well controlled with PO pain medication. ambulates with assist x1. VSS. voiding without difficulty. d/c today with home health. educated about BP monitoring.      Goal: Adult Individualization and Mutuality  Outcome: Ongoing (interventions implemented as appropriate)    Goal: Discharge Needs Assessment  Outcome: Ongoing (interventions implemented as appropriate)      Problem: Fall Risk (Adult)  Goal: Absence of Falls  Outcome: Ongoing (interventions implemented as appropriate)      Problem: Pain, Acute (Adult)  Goal: Acceptable Pain Control/Comfort Level  Outcome: Ongoing (interventions implemented as appropriate)      Problem: Orthopaedic Fracture (Adult)  Goal: Signs and Symptoms of Listed Potential Problems Will be Absent or Manageable (Orthopaedic Fracture)  Outcome: Ongoing (interventions implemented as appropriate)      Problem: Self-Care Deficit (Adult,Obstetrics,Pediatric)  Goal: Improved Ability to Perform BADL and IADL  Outcome: Ongoing (interventions implemented as appropriate)

## 2018-01-19 NOTE — PLAN OF CARE
Problem: Patient Care Overview (Adult)  Goal: Plan of Care Review  Outcome: Ongoing (interventions implemented as appropriate)   01/19/18 0504   Coping/Psychosocial Response Interventions   Plan Of Care Reviewed With patient   Patient Care Overview   Progress improving   Outcome Evaluation   Outcome Summary/Follow up Plan HTN well controlled. pain well controlled. pt amb with assisst.       Problem: Fall Risk (Adult)  Goal: Absence of Falls  Outcome: Ongoing (interventions implemented as appropriate)   01/19/18 0504   Fall Risk (Adult)   Absence of Falls making progress toward outcome

## 2018-01-22 ENCOUNTER — TELEPHONE (OUTPATIENT)
Dept: FAMILY MEDICINE CLINIC | Facility: CLINIC | Age: 78
End: 2018-01-22

## 2018-01-22 RX ORDER — PANTOPRAZOLE SODIUM 40 MG/1
TABLET, DELAYED RELEASE ORAL
Qty: 60 TABLET | Refills: 4 | Status: SHIPPED | OUTPATIENT
Start: 2018-01-22 | End: 2018-03-19 | Stop reason: SDUPTHER

## 2018-01-23 RX ORDER — WARFARIN SODIUM 1 MG/1
TABLET ORAL
Qty: 180 TABLET | Refills: 3 | Status: SHIPPED | OUTPATIENT
Start: 2018-01-23 | End: 2018-11-19 | Stop reason: SDUPTHER

## 2018-01-25 ENCOUNTER — TELEPHONE (OUTPATIENT)
Dept: FAMILY MEDICINE CLINIC | Facility: CLINIC | Age: 78
End: 2018-01-25

## 2018-01-25 NOTE — TELEPHONE ENCOUNTER
PATIENT HAS A SORE ON HER LEFT FOREARM AND HAD AN ACE WRAP ON IT AND A PAD ON HER FOREARM. WANTS TO KNOW IF ANTIBIOTIC OINTMENT CAN BE USED SO PAD ISN'T STICKING TO SORE

## 2018-01-29 ENCOUNTER — INFUSION (OUTPATIENT)
Dept: ONCOLOGY | Facility: HOSPITAL | Age: 78
End: 2018-01-29

## 2018-01-29 ENCOUNTER — OFFICE VISIT (OUTPATIENT)
Dept: ONCOLOGY | Facility: CLINIC | Age: 78
End: 2018-01-29

## 2018-01-29 ENCOUNTER — ANTICOAGULATION VISIT (OUTPATIENT)
Dept: LAB | Facility: HOSPITAL | Age: 78
End: 2018-01-29

## 2018-01-29 VITALS
HEIGHT: 65 IN | HEART RATE: 63 BPM | WEIGHT: 154.6 LBS | OXYGEN SATURATION: 100 % | TEMPERATURE: 98.1 F | DIASTOLIC BLOOD PRESSURE: 74 MMHG | SYSTOLIC BLOOD PRESSURE: 164 MMHG | BODY MASS INDEX: 25.76 KG/M2 | RESPIRATION RATE: 16 BRPM

## 2018-01-29 DIAGNOSIS — N18.30 ANEMIA IN STAGE 3 CHRONIC KIDNEY DISEASE (HCC): Primary | ICD-10-CM

## 2018-01-29 DIAGNOSIS — E53.8 B12 DEFICIENCY: ICD-10-CM

## 2018-01-29 DIAGNOSIS — D63.1 ANEMIA IN STAGE 3 CHRONIC KIDNEY DISEASE (HCC): ICD-10-CM

## 2018-01-29 DIAGNOSIS — D62 ACUTE BLOOD LOSS ANEMIA: ICD-10-CM

## 2018-01-29 DIAGNOSIS — D69.6 THROMBOCYTOPENIA (HCC): Primary | ICD-10-CM

## 2018-01-29 DIAGNOSIS — D63.1 ANEMIA IN STAGE 3 CHRONIC KIDNEY DISEASE (HCC): Primary | ICD-10-CM

## 2018-01-29 DIAGNOSIS — N18.30 ANEMIA IN STAGE 3 CHRONIC KIDNEY DISEASE (HCC): ICD-10-CM

## 2018-01-29 DIAGNOSIS — D69.6 THROMBOCYTOPENIA (HCC): ICD-10-CM

## 2018-01-29 LAB
ALBUMIN SERPL-MCNC: 3.9 G/DL (ref 3.5–5.2)
ALBUMIN/GLOB SERPL: 1.3 G/DL (ref 1.1–2.4)
ALP SERPL-CCNC: 69 U/L (ref 38–116)
ALT SERPL W P-5'-P-CCNC: 9 U/L (ref 0–33)
ANION GAP SERPL CALCULATED.3IONS-SCNC: 11.9 MMOL/L
AST SERPL-CCNC: 18 U/L (ref 0–32)
BASOPHILS # BLD AUTO: 0.02 10*3/MM3 (ref 0–0.1)
BASOPHILS NFR BLD AUTO: 0.3 % (ref 0–1.1)
BILIRUB SERPL-MCNC: 0.7 MG/DL (ref 0.1–1.2)
BUN BLD-MCNC: 30 MG/DL (ref 6–20)
BUN/CREAT SERPL: 21.4 (ref 7.3–30)
CALCIUM SPEC-SCNC: 9.6 MG/DL (ref 8.5–10.2)
CHLORIDE SERPL-SCNC: 98 MMOL/L (ref 98–107)
CO2 SERPL-SCNC: 31.1 MMOL/L (ref 22–29)
CREAT BLD-MCNC: 1.4 MG/DL (ref 0.6–1.1)
DEPRECATED RDW RBC AUTO: 55.6 FL (ref 37–49)
EOSINOPHIL # BLD AUTO: 0.15 10*3/MM3 (ref 0–0.36)
EOSINOPHIL NFR BLD AUTO: 2.5 % (ref 1–5)
ERYTHROCYTE [DISTWIDTH] IN BLOOD BY AUTOMATED COUNT: 15.9 % (ref 11.7–14.5)
FERRITIN SERPL-MCNC: 313.6 NG/ML
GFR SERPL CREATININE-BSD FRML MDRD: 36 ML/MIN/1.73
GLOBULIN UR ELPH-MCNC: 2.9 GM/DL (ref 1.8–3.5)
GLUCOSE BLD-MCNC: 113 MG/DL (ref 74–124)
HCT VFR BLD AUTO: 30.6 % (ref 34–45)
HGB BLD-MCNC: 9.2 G/DL (ref 11.5–14.9)
IMM GRANULOCYTES # BLD: 0.03 10*3/MM3 (ref 0–0.03)
IMM GRANULOCYTES NFR BLD: 0.5 % (ref 0–0.5)
INR PPP: 1.9 (ref 0.9–1.1)
IRON 24H UR-MRATE: 56 MCG/DL (ref 37–145)
IRON SATN MFR SERPL: 20 % (ref 14–48)
LYMPHOCYTES # BLD AUTO: 0.63 10*3/MM3 (ref 1–3.5)
LYMPHOCYTES NFR BLD AUTO: 10.4 % (ref 20–49)
MCH RBC QN AUTO: 28.8 PG (ref 27–33)
MCHC RBC AUTO-ENTMCNC: 30.1 G/DL (ref 32–35)
MCV RBC AUTO: 95.9 FL (ref 83–97)
MONOCYTES # BLD AUTO: 0.26 10*3/MM3 (ref 0.25–0.8)
MONOCYTES NFR BLD AUTO: 4.3 % (ref 4–12)
NEUTROPHILS # BLD AUTO: 4.96 10*3/MM3 (ref 1.5–7)
NEUTROPHILS NFR BLD AUTO: 82 % (ref 39–75)
NRBC BLD MANUAL-RTO: 0 /100 WBC (ref 0–0)
PLATELET # BLD AUTO: 195 10*3/MM3 (ref 150–375)
PMV BLD AUTO: 8.6 FL (ref 8.9–12.1)
POTASSIUM BLD-SCNC: 4.1 MMOL/L (ref 3.5–4.7)
PROT SERPL-MCNC: 6.8 G/DL (ref 6.3–8)
PROTHROMBIN TIME: 22.3 SECONDS (ref 11–13.5)
RBC # BLD AUTO: 3.19 10*6/MM3 (ref 3.9–5)
SODIUM BLD-SCNC: 141 MMOL/L (ref 134–145)
TIBC SERPL-MCNC: 286 MCG/DL (ref 249–505)
TRANSFERRIN SERPL-MCNC: 204 MG/DL (ref 200–360)
WBC NRBC COR # BLD: 6.05 10*3/MM3 (ref 4–10)

## 2018-01-29 PROCEDURE — 85610 PROTHROMBIN TIME: CPT | Performed by: INTERNAL MEDICINE

## 2018-01-29 PROCEDURE — 80053 COMPREHEN METABOLIC PANEL: CPT

## 2018-01-29 PROCEDURE — 63510000001 EPOETIN ALFA PER 1000 UNITS: Performed by: INTERNAL MEDICINE

## 2018-01-29 PROCEDURE — 36415 COLL VENOUS BLD VENIPUNCTURE: CPT | Performed by: INTERNAL MEDICINE

## 2018-01-29 PROCEDURE — 85025 COMPLETE CBC W/AUTO DIFF WBC: CPT | Performed by: INTERNAL MEDICINE

## 2018-01-29 PROCEDURE — 84466 ASSAY OF TRANSFERRIN: CPT

## 2018-01-29 PROCEDURE — 99214 OFFICE O/P EST MOD 30 MIN: CPT | Performed by: INTERNAL MEDICINE

## 2018-01-29 PROCEDURE — 82728 ASSAY OF FERRITIN: CPT

## 2018-01-29 PROCEDURE — 96372 THER/PROPH/DIAG INJ SC/IM: CPT

## 2018-01-29 PROCEDURE — 83540 ASSAY OF IRON: CPT

## 2018-01-29 RX ADMIN — ERYTHROPOIETIN 20000 UNITS: 20000 INJECTION, SOLUTION INTRAVENOUS; SUBCUTANEOUS at 13:41

## 2018-01-29 NOTE — PROGRESS NOTES
Subjective   CHIEF COMPLAINT:    1. Anemia secondary to chronic kidney disease.   2. Vitamin B12 deficiency.   3. Thrombocytopenia    HISTORY OF PRESENT ILLNESS:     Janel Mahoney is a 77 y.o.  patient with anemia of chronic kidney disease.  She had a traumatic fracture of the left shoulder and was hospitalized at [facility] earlier this month.  She had a comminuted proximal humerus fracture with displacement.  She had to have her Coumadin reversed with FFP.  She had surgery with total shoulder reverse arthroplasty on 1/16/18.  She had 2 units packed blood cell transfusion.  She received Procrit 20,000 units on 1/19/18.  Hemoglobin at discharge on 1/19/18 was 8.7.    Patient reports swelling of the left upper extremity.  She has her left arm in a shoulder sling.  Pain in the left shoulder area is gradually improving.  She is undergoing physical therapy.  She is back on her Coumadin.  Visiting nurses are obtaining PT/INR and the results are being forwarded to her cardiologist.  Last reading was an INR of 1.9.      Past Medical History:   Diagnosis Date   • Anemia    • Atrial fibrillation    • Bruises easily    • Carotid artery stenosis    • Chronic back pain    • Chronic coronary artery disease     moderate to severe LV dysfunction.   • GERD (gastroesophageal reflux disease)    • [place] (hard of hearing)     wears hearing aids   • Hyperlipidemia    • Hypertension    • Leukopenia    • Obesity    • Osteoarthritis    • Peptic ulcer    • Premature ventricular contractions    • Renal insufficiency syndrome    • Scoliosis    • Shoulder fracture, left    • Stroke syndrome    • Thrombocytopenia    • Ventricular tachycardia    • Vitamin B12 deficiency        Past Surgical History:   Procedure Laterality Date   • AV NODE ABLATION     • BREAST BIOPSY     • CARDIAC CATHETERIZATION      Showed severe mitral insufficiency and borderline coronary artery disease   • CARDIAC CATHETERIZATION      Showed an ejection  fraction of 35%. She had occlusive disease of the right posterior LV branch and no other significant disease, treated medically.   • CARDIAC DEFIBRILLATOR PLACEMENT      Biventricular   • CARDIOVERSION      multiple electrocardioversions.   • COLONOSCOPY N/A 9/28/2017    Procedure: COLONOSCOPY TO CECUM;  Surgeon: Kevin Davis MD;  Location: [facility] ENDOSCOPY;  Service:    • CORONARY ARTERY BYPASS GRAFT      single graft to the PDA   • CORONARY STENT PLACEMENT     • ENDOSCOPY N/A 9/28/2017    Procedure: ESOPHAGOGASTRODUODENOSCOPY ;  Surgeon: Kevin Davis MD;  Location: [facility] ENDOSCOPY;  Service:    • HEMORRHOIDECTOMY     • HYSTERECTOMY     • MITRAL VALVE REPLACEMENT  01/2010    #31 Epic porcine valve.   • THROMBOENDARTERECTOMY Right     carotid thromboendarterectomy    • TONSILLECTOMY     • TOTAL KNEE ARTHROPLASTY Left    • TOTAL SHOULDER ARTHROPLASTY W/ DISTAL CLAVICLE EXCISION Left 1/16/2018    Procedure: TOTAL SHOULDER REVERSE ARTHROPLASTY;  Surgeon: Bianka Quesada MD;  Location: [facility] MAIN OR;  Service:        Cancer-related family history includes Breast cancer in her mother; Cancer in her mother.    SCHEDULED MEDS:    Current Outpatient Prescriptions:   •  ACETAMINOPHEN PO, Take 325 mg by mouth 4 (Four) Times a Day As Needed. Senior choice , Disp: , Rfl:   •  alendronate (FOSAMAX) 70 MG tablet, Take 1 tablet by mouth Every 7 (Seven) Days. Set up for 30 minutes drink 8 ounces of water and do not eat 30 minutes, Disp: 12 tablet, Rfl: 3  •  aspirin 81 MG tablet, Take 81 mg by mouth Daily., Disp: , Rfl:   •  calcitriol (ROCALTROL) 0.25 MCG capsule, Take 0.25 mcg by mouth Every Other Day., Disp: , Rfl:   •  carvedilol (COREG) 25 MG tablet, Take 25 mg by mouth 2 (Two) Times a Day With Meals., Disp: , Rfl:   •  Chlorhexidine Gluconate Cloth 2 % pads, Apply  topically. PER MD INSTRUCTIONS, Disp: , Rfl:   •  Cholecalciferol (VITAMIN D) 2000 UNITS tablet, Take 1 tablet by mouth daily., Disp: , Rfl:   •   Cyanocobalamin (VITAMIN B12 PO), Take 1 tablet by mouth daily. As directed, Disp: , Rfl:   •  epoetin adele (EPOGEN,PROCRIT) 80522 UNIT/ML injection, as needed. Procrit 17891 UNIT/ML Injection Solution; Patient Sig: Procrit 28876 UNIT/ML Injection Solution INJECT SUBCUTANEOUSLY AS DIRECTED.; 0; 01-Apr-2014; Active, Disp: , Rfl:   •  FERROUS SULFATE PO, Take 324 mg by mouth Daily. Take as directed  , Disp: , Rfl:   •  furosemide (LASIX) 20 MG tablet, Take 1 tablet by mouth Daily for 30 days., Disp: 30 tablet, Rfl: 0  •  HYDROcodone-acetaminophen (NORCO) 7.5-325 MG per tablet, Take 1 tablet by mouth Every 6 (Six) Hours As Needed for Moderate Pain . Chronic pain medicine for low back pain, Disp: 60 tablet, Rfl: 0  •  Multiple Vitamin (MULTIVITAMIN) capsule, Take 1 capsule by mouth daily., Disp: , Rfl:   •  mupirocin (BACTROBAN) 2 % nasal ointment, into each nostril. PER MD ORDERS, Disp: , Rfl:   •  pantoprazole (PROTONIX) 40 MG EC tablet, Take 40 mg by mouth 2 (Two) Times a Day., Disp: , Rfl:   •  pantoprazole (PROTONIX) 40 MG EC tablet, TAKE ONE TABLET BY MOUTH TWICE A DAY, Disp: 60 tablet, Rfl: 4  •  polyethylene glycol (MIRALAX) pack packet, Take 17 g by mouth Daily., Disp: 30 each, Rfl: 0  •  ramipril (ALTACE) 5 MG capsule, Take 5 mg by mouth Daily., Disp: , Rfl:   •  sennosides-docusate sodium (SENOKOT-S) 8.6-50 MG tablet, Take 2 tablets by mouth Every Night., Disp: , Rfl:   •  simvastatin (ZOCOR) 40 MG tablet, TAKE 1 TABLET EVERY DAY (Patient taking differently: TAKE 1/2 TABLET EVERY DAY), Disp: 90 tablet, Rfl: 1  •  traMADol (ULTRAM) 50 MG tablet, Take 1 tablet by mouth Every 8 (Eight) Hours As Needed for Moderate Pain . Chronic pain meds for low back, Disp: 90 tablet, Rfl: 1  •  warfarin (COUMADIN) 1 MG tablet, TAKE 1 TO 2 TABLETS EVERY DAY AS DIRECTED, Disp: 180 tablet, Rfl: 3  •  zolpidem (AMBIEN) 10 MG tablet, Take 1 tablet by mouth At Night As Needed for Sleep., Disp: 90 tablet, Rfl: 1    REVIEW OF  "SYSTEMS:  GENERAL:  Fatigue.   SKIN:  No skin rashes.  HEME/LYMPH: Anemia has worsened.  GI:  No hematochezia or melena  MUSCULOSKELETAL:  See history of present illness.      Objective   VITAL SIGNS:  Vitals:    01/29/18 1242   BP: 164/74   Pulse: 63   Resp: 16   Temp: 98.1 °F (36.7 °C)   TempSrc: Oral   SpO2: 100%   Weight: 70.1 kg (154 lb 9.6 oz)   Height: 165.1 cm (65\")   PainSc: 6  Comment: back and left shoulder       Wt Readings from Last 3 Encounters:   01/29/18 70.1 kg (154 lb 9.6 oz)   01/15/18 72.6 kg (160 lb)   01/11/18 72.6 kg (160 lb)       PHYSICAL EXAMINATION:  GENERAL:  The patient appears in good general condition, not in acute distress.  SKIN:  No skin rashes or lesions. No ecchymosis or petechiae.  HEAD:  Normocephalic.  EYES:  Pallor. No Jaundice.   EXTREMITIES:  Left arm is in the shoulders.  There is edema of the left forearm.    .  RESULT REVIEW:       Results from last 7 days  Lab Units 01/29/18  1217   WBC 10*3/mm3 6.05   NEUTROS ABS 10*3/mm3 4.96   HEMOGLOBIN g/dL 9.2*   HEMATOCRIT % 30.6*   PLATELETS 10*3/mm3 195     Lab Results   Component Value Date    FERRITIN 313.60 01/29/2018    FERRITIN 335.80 (H) 01/19/2018    FERRITIN 330.80 10/25/2017    IRON 56 01/29/2018    IRON 20 (L) 01/19/2018    IRON 68 10/25/2017    TIBC 286 01/29/2018    TIBC 229 01/19/2018    TIBC 272 10/25/2017     Lab Results   Component Value Date    FOLATE 12.21 08/04/2016     Lab Results   Component Value Date    ROZZDIFA05 2000 (H) 11/29/2016    YCOBMBSH55 1913 (H) 08/04/2016       Assessment/Plan   1.  Anemia of chronic kidney disease, stage 3.  The patient Is on Procrit weekly 20,000 units a week.  Patient is on oral iron once a day.  Her hemoglobin dropped after the recent traumatic fracture of the left shoulder.  She had surgery on 1/16/18.  This resulted in acute blood loss anemia.  Hemoglobin is up to 9.2 today    2.  Thrombocytopenia likely secondary to B12 deficiency, resolved.    3.  Swelling of the left " upper extremity.  Physical exam revealed edema and it does not show findings to suggest a DVT or phlebitis.  I asked the patient to elevate the left upper extremity.    PLAN:    1.  Continue Procrit 20,000 units weekly.  If hemoglobin increases to 10 g/dL or above, we will reduce the dose to 15,000 units weekly.    2.  Continue oral iron once a day.    3.  Return for follow-up in 2 months with CBC.         Edward Vasquez MD  01/29/18

## 2018-01-30 ENCOUNTER — TELEPHONE (OUTPATIENT)
Dept: FAMILY MEDICINE CLINIC | Facility: CLINIC | Age: 78
End: 2018-01-30

## 2018-01-30 RX ORDER — HYDROCODONE BITARTRATE AND ACETAMINOPHEN 7.5; 325 MG/1; MG/1
1 TABLET ORAL EVERY 6 HOURS PRN
Qty: 60 TABLET | Refills: 0 | Status: SHIPPED | OUTPATIENT
Start: 2018-01-30 | End: 2018-03-19

## 2018-01-31 ENCOUNTER — TELEPHONE (OUTPATIENT)
Dept: FAMILY MEDICINE CLINIC | Facility: CLINIC | Age: 78
End: 2018-01-31

## 2018-02-05 ENCOUNTER — INFUSION (OUTPATIENT)
Dept: ONCOLOGY | Facility: HOSPITAL | Age: 78
End: 2018-02-05

## 2018-02-05 ENCOUNTER — LAB (OUTPATIENT)
Dept: LAB | Facility: HOSPITAL | Age: 78
End: 2018-02-05

## 2018-02-05 VITALS
DIASTOLIC BLOOD PRESSURE: 74 MMHG | HEART RATE: 60 BPM | SYSTOLIC BLOOD PRESSURE: 167 MMHG | RESPIRATION RATE: 16 BRPM | OXYGEN SATURATION: 100 % | TEMPERATURE: 98.1 F

## 2018-02-05 DIAGNOSIS — D63.1 ANEMIA IN STAGE 3 CHRONIC KIDNEY DISEASE (HCC): Primary | ICD-10-CM

## 2018-02-05 DIAGNOSIS — N18.30 ANEMIA IN STAGE 3 CHRONIC KIDNEY DISEASE (HCC): Primary | ICD-10-CM

## 2018-02-05 DIAGNOSIS — D63.1 ANEMIA IN STAGE 3 CHRONIC KIDNEY DISEASE (HCC): ICD-10-CM

## 2018-02-05 DIAGNOSIS — N18.30 ANEMIA IN STAGE 3 CHRONIC KIDNEY DISEASE (HCC): ICD-10-CM

## 2018-02-05 LAB
DEPRECATED RDW RBC AUTO: 51.9 FL (ref 37–49)
ERYTHROCYTE [DISTWIDTH] IN BLOOD BY AUTOMATED COUNT: 15.6 % (ref 11.7–14.5)
HCT VFR BLD AUTO: 33 % (ref 34–45)
HGB BLD-MCNC: 10.5 G/DL (ref 11.5–14.9)
MCH RBC QN AUTO: 28.9 PG (ref 27–33)
MCHC RBC AUTO-ENTMCNC: 31.8 G/DL (ref 32–35)
MCV RBC AUTO: 90.9 FL (ref 83–97)
PLATELET # BLD AUTO: 123 10*3/MM3 (ref 150–375)
PMV BLD AUTO: 9.8 FL (ref 8.9–12.1)
RBC # BLD AUTO: 3.63 10*6/MM3 (ref 3.9–5)
WBC NRBC COR # BLD: 8.18 10*3/MM3 (ref 4–10)

## 2018-02-05 PROCEDURE — 63510000001 EPOETIN ALFA PER 1000 UNITS: Performed by: INTERNAL MEDICINE

## 2018-02-05 PROCEDURE — 85027 COMPLETE CBC AUTOMATED: CPT | Performed by: INTERNAL MEDICINE

## 2018-02-05 PROCEDURE — 96372 THER/PROPH/DIAG INJ SC/IM: CPT

## 2018-02-05 PROCEDURE — 36416 COLLJ CAPILLARY BLOOD SPEC: CPT | Performed by: INTERNAL MEDICINE

## 2018-02-05 RX ADMIN — ERYTHROPOIETIN 15000 UNITS: 20000 INJECTION, SOLUTION INTRAVENOUS; SUBCUTANEOUS at 12:09

## 2018-02-07 ENCOUNTER — EPISODE CHANGES (OUTPATIENT)
Dept: CASE MANAGEMENT | Facility: OTHER | Age: 78
End: 2018-02-07

## 2018-02-08 ENCOUNTER — LAB REQUISITION (OUTPATIENT)
Dept: LAB | Facility: HOSPITAL | Age: 78
End: 2018-02-08

## 2018-02-08 ENCOUNTER — PATIENT OUTREACH (OUTPATIENT)
Dept: CASE MANAGEMENT | Facility: OTHER | Age: 78
End: 2018-02-08

## 2018-02-08 ENCOUNTER — TELEPHONE (OUTPATIENT)
Dept: FAMILY MEDICINE CLINIC | Facility: CLINIC | Age: 78
End: 2018-02-08

## 2018-02-08 DIAGNOSIS — Z00.00 ENCOUNTER FOR GENERAL ADULT MEDICAL EXAMINATION WITHOUT ABNORMAL FINDINGS: ICD-10-CM

## 2018-02-08 LAB
BILIRUB UR QL STRIP: NEGATIVE
CLARITY UR: ABNORMAL
COLOR UR: ABNORMAL
GLUCOSE UR STRIP-MCNC: NEGATIVE MG/DL
HGB UR QL STRIP.AUTO: NEGATIVE
KETONES UR QL STRIP: NEGATIVE
LEUKOCYTE ESTERASE UR QL STRIP.AUTO: NEGATIVE
NITRITE UR QL STRIP: NEGATIVE
PH UR STRIP.AUTO: <=5 [PH] (ref 5–8)
PROT UR QL STRIP: NEGATIVE
SP GR UR STRIP: 1.01 (ref 1–1.03)
UROBILINOGEN UR QL STRIP: ABNORMAL

## 2018-02-08 PROCEDURE — 87077 CULTURE AEROBIC IDENTIFY: CPT | Performed by: INTERNAL MEDICINE

## 2018-02-08 PROCEDURE — 81003 URINALYSIS AUTO W/O SCOPE: CPT | Performed by: INTERNAL MEDICINE

## 2018-02-08 PROCEDURE — 87186 SC STD MICRODIL/AGAR DIL: CPT | Performed by: INTERNAL MEDICINE

## 2018-02-08 PROCEDURE — 87086 URINE CULTURE/COLONY COUNT: CPT | Performed by: INTERNAL MEDICINE

## 2018-02-08 NOTE — TELEPHONE ENCOUNTER
HOME HEALTH NURSE SAID PT  TOLD HER THAT WIFE HAS HAD INCREASED CONFUSION AND SHE COLLECTED A U\A  AND SHE WANTS TO KNOW IF SHE CAN DROP IT OFF AT THE LAB

## 2018-02-09 ENCOUNTER — PATIENT OUTREACH (OUTPATIENT)
Dept: CASE MANAGEMENT | Facility: OTHER | Age: 78
End: 2018-02-09

## 2018-02-09 NOTE — OUTREACH NOTE
Home Health Nurse assisting with admission to [facility] for skilled rehab.  Mr. Mahoney is expecting a call today from the facility regarding patient's acceptance.

## 2018-02-10 LAB
BACTERIA SPEC AEROBE CULT: ABNORMAL
BACTERIA SPEC AEROBE CULT: ABNORMAL

## 2018-02-12 ENCOUNTER — INFUSION (OUTPATIENT)
Dept: ONCOLOGY | Facility: HOSPITAL | Age: 78
End: 2018-02-12

## 2018-02-12 ENCOUNTER — LAB (OUTPATIENT)
Dept: LAB | Facility: HOSPITAL | Age: 78
End: 2018-02-12

## 2018-02-12 DIAGNOSIS — D63.1 ANEMIA IN STAGE 3 CHRONIC KIDNEY DISEASE (HCC): Primary | ICD-10-CM

## 2018-02-12 DIAGNOSIS — N18.30 ANEMIA IN STAGE 3 CHRONIC KIDNEY DISEASE (HCC): ICD-10-CM

## 2018-02-12 DIAGNOSIS — D63.1 ANEMIA IN STAGE 3 CHRONIC KIDNEY DISEASE (HCC): ICD-10-CM

## 2018-02-12 DIAGNOSIS — N18.30 ANEMIA IN STAGE 3 CHRONIC KIDNEY DISEASE (HCC): Primary | ICD-10-CM

## 2018-02-12 LAB
DEPRECATED RDW RBC AUTO: 51.3 FL (ref 37–49)
ERYTHROCYTE [DISTWIDTH] IN BLOOD BY AUTOMATED COUNT: 15.8 % (ref 11.7–14.5)
HCT VFR BLD AUTO: 29.8 % (ref 34–45)
HGB BLD-MCNC: 9.5 G/DL (ref 11.5–14.9)
MCH RBC QN AUTO: 28.4 PG (ref 27–33)
MCHC RBC AUTO-ENTMCNC: 31.9 G/DL (ref 32–35)
MCV RBC AUTO: 89.2 FL (ref 83–97)
PLATELET # BLD AUTO: 172 10*3/MM3 (ref 150–375)
PMV BLD AUTO: 10 FL (ref 8.9–12.1)
RBC # BLD AUTO: 3.34 10*6/MM3 (ref 3.9–5)
WBC NRBC COR # BLD: 5.21 10*3/MM3 (ref 4–10)

## 2018-02-12 PROCEDURE — 85027 COMPLETE CBC AUTOMATED: CPT | Performed by: INTERNAL MEDICINE

## 2018-02-12 PROCEDURE — 96372 THER/PROPH/DIAG INJ SC/IM: CPT

## 2018-02-12 PROCEDURE — 36416 COLLJ CAPILLARY BLOOD SPEC: CPT | Performed by: INTERNAL MEDICINE

## 2018-02-12 PROCEDURE — 63510000001 EPOETIN ALFA PER 1000 UNITS: Performed by: INTERNAL MEDICINE

## 2018-02-12 RX ADMIN — ERYTHROPOIETIN 15000 UNITS: 20000 INJECTION, SOLUTION INTRAVENOUS; SUBCUTANEOUS at 12:02

## 2018-02-13 ENCOUNTER — TELEPHONE (OUTPATIENT)
Dept: FAMILY MEDICINE CLINIC | Facility: CLINIC | Age: 78
End: 2018-02-13

## 2018-02-13 RX ORDER — NITROFURANTOIN MACROCRYSTALS 100 MG/1
CAPSULE ORAL
Qty: 14 CAPSULE | Refills: 0 | Status: SHIPPED | OUTPATIENT
Start: 2018-02-13 | End: 2018-06-12

## 2018-02-16 RX ORDER — CARVEDILOL 25 MG/1
TABLET ORAL
Qty: 180 TABLET | Refills: 3 | Status: SHIPPED | OUTPATIENT
Start: 2018-02-16 | End: 2018-11-23

## 2018-02-16 RX ORDER — SIMVASTATIN 40 MG
TABLET ORAL
Qty: 90 TABLET | Refills: 1 | Status: SHIPPED | OUTPATIENT
Start: 2018-02-16 | End: 2018-07-25 | Stop reason: SDUPTHER

## 2018-02-19 ENCOUNTER — LAB (OUTPATIENT)
Dept: LAB | Facility: HOSPITAL | Age: 78
End: 2018-02-19

## 2018-02-19 ENCOUNTER — INFUSION (OUTPATIENT)
Dept: ONCOLOGY | Facility: HOSPITAL | Age: 78
End: 2018-02-19

## 2018-02-19 VITALS — TEMPERATURE: 97.6 F

## 2018-02-19 DIAGNOSIS — D63.1 ANEMIA IN STAGE 3 CHRONIC KIDNEY DISEASE (HCC): ICD-10-CM

## 2018-02-19 DIAGNOSIS — N18.30 ANEMIA IN STAGE 3 CHRONIC KIDNEY DISEASE (HCC): Primary | ICD-10-CM

## 2018-02-19 DIAGNOSIS — N18.30 ANEMIA IN STAGE 3 CHRONIC KIDNEY DISEASE (HCC): ICD-10-CM

## 2018-02-19 DIAGNOSIS — D63.1 ANEMIA IN STAGE 3 CHRONIC KIDNEY DISEASE (HCC): Primary | ICD-10-CM

## 2018-02-19 LAB
DEPRECATED RDW RBC AUTO: 52.3 FL (ref 37–49)
ERYTHROCYTE [DISTWIDTH] IN BLOOD BY AUTOMATED COUNT: 15.8 % (ref 11.7–14.5)
HCT VFR BLD AUTO: 31.8 % (ref 34–45)
HGB BLD-MCNC: 9.9 G/DL (ref 11.5–14.9)
MCH RBC QN AUTO: 27.9 PG (ref 27–33)
MCHC RBC AUTO-ENTMCNC: 31.1 G/DL (ref 32–35)
MCV RBC AUTO: 89.6 FL (ref 83–97)
PLATELET # BLD AUTO: 203 10*3/MM3 (ref 150–375)
PMV BLD AUTO: 9.8 FL (ref 8.9–12.1)
RBC # BLD AUTO: 3.55 10*6/MM3 (ref 3.9–5)
WBC NRBC COR # BLD: 5.42 10*3/MM3 (ref 4–10)

## 2018-02-19 PROCEDURE — 96372 THER/PROPH/DIAG INJ SC/IM: CPT

## 2018-02-19 PROCEDURE — 85027 COMPLETE CBC AUTOMATED: CPT | Performed by: INTERNAL MEDICINE

## 2018-02-19 PROCEDURE — 36415 COLL VENOUS BLD VENIPUNCTURE: CPT | Performed by: INTERNAL MEDICINE

## 2018-02-19 PROCEDURE — 63510000001 EPOETIN ALFA PER 1000 UNITS: Performed by: INTERNAL MEDICINE

## 2018-02-19 RX ADMIN — ERYTHROPOIETIN 15000 UNITS: 20000 INJECTION, SOLUTION INTRAVENOUS; SUBCUTANEOUS at 11:35

## 2018-02-26 ENCOUNTER — INFUSION (OUTPATIENT)
Dept: ONCOLOGY | Facility: HOSPITAL | Age: 78
End: 2018-02-26

## 2018-02-26 ENCOUNTER — TELEPHONE (OUTPATIENT)
Dept: FAMILY MEDICINE CLINIC | Facility: CLINIC | Age: 78
End: 2018-02-26

## 2018-02-26 ENCOUNTER — LAB (OUTPATIENT)
Dept: LAB | Facility: HOSPITAL | Age: 78
End: 2018-02-26

## 2018-02-26 DIAGNOSIS — N18.30 ANEMIA IN STAGE 3 CHRONIC KIDNEY DISEASE (HCC): Primary | ICD-10-CM

## 2018-02-26 DIAGNOSIS — D63.1 ANEMIA IN STAGE 3 CHRONIC KIDNEY DISEASE (HCC): Primary | ICD-10-CM

## 2018-02-26 DIAGNOSIS — N18.30 ANEMIA IN STAGE 3 CHRONIC KIDNEY DISEASE (HCC): ICD-10-CM

## 2018-02-26 DIAGNOSIS — D63.1 ANEMIA IN STAGE 3 CHRONIC KIDNEY DISEASE (HCC): ICD-10-CM

## 2018-02-26 LAB
DEPRECATED RDW RBC AUTO: 57.6 FL (ref 37–49)
ERYTHROCYTE [DISTWIDTH] IN BLOOD BY AUTOMATED COUNT: 16.9 % (ref 11.7–14.5)
HCT VFR BLD AUTO: 29.5 % (ref 34–45)
HGB BLD-MCNC: 9.2 G/DL (ref 11.5–14.9)
MCH RBC QN AUTO: 28.9 PG (ref 27–33)
MCHC RBC AUTO-ENTMCNC: 31.2 G/DL (ref 32–35)
MCV RBC AUTO: 92.8 FL (ref 83–97)
PLATELET # BLD AUTO: 128 10*3/MM3 (ref 150–375)
PMV BLD AUTO: 10.6 FL (ref 8.9–12.1)
RBC # BLD AUTO: 3.18 10*6/MM3 (ref 3.9–5)
WBC NRBC COR # BLD: 4.87 10*3/MM3 (ref 4–10)

## 2018-02-26 PROCEDURE — 96372 THER/PROPH/DIAG INJ SC/IM: CPT | Performed by: NURSE PRACTITIONER

## 2018-02-26 PROCEDURE — 63510000001 EPOETIN ALFA PER 1000 UNITS: Performed by: NURSE PRACTITIONER

## 2018-02-26 PROCEDURE — 36416 COLLJ CAPILLARY BLOOD SPEC: CPT | Performed by: INTERNAL MEDICINE

## 2018-02-26 PROCEDURE — 85027 COMPLETE CBC AUTOMATED: CPT | Performed by: INTERNAL MEDICINE

## 2018-02-26 RX ADMIN — ERYTHROPOIETIN 15000 UNITS: 20000 INJECTION, SOLUTION INTRAVENOUS; SUBCUTANEOUS at 12:01

## 2018-03-05 ENCOUNTER — LAB (OUTPATIENT)
Dept: LAB | Facility: HOSPITAL | Age: 78
End: 2018-03-05

## 2018-03-05 ENCOUNTER — INFUSION (OUTPATIENT)
Dept: ONCOLOGY | Facility: HOSPITAL | Age: 78
End: 2018-03-05

## 2018-03-05 DIAGNOSIS — D63.1 ANEMIA IN STAGE 3 CHRONIC KIDNEY DISEASE (HCC): ICD-10-CM

## 2018-03-05 DIAGNOSIS — N18.30 ANEMIA IN STAGE 3 CHRONIC KIDNEY DISEASE (HCC): ICD-10-CM

## 2018-03-05 LAB
DEPRECATED RDW RBC AUTO: 57.9 FL (ref 37–49)
ERYTHROCYTE [DISTWIDTH] IN BLOOD BY AUTOMATED COUNT: 17.3 % (ref 11.7–14.5)
HCT VFR BLD AUTO: 35.8 % (ref 34–45)
HGB BLD-MCNC: 11.2 G/DL (ref 11.5–14.9)
MCH RBC QN AUTO: 28.5 PG (ref 27–33)
MCHC RBC AUTO-ENTMCNC: 31.3 G/DL (ref 32–35)
MCV RBC AUTO: 91.1 FL (ref 83–97)
PLATELET # BLD AUTO: 126 10*3/MM3 (ref 150–375)
PMV BLD AUTO: 10.5 FL (ref 8.9–12.1)
RBC # BLD AUTO: 3.93 10*6/MM3 (ref 3.9–5)
WBC NRBC COR # BLD: 4.32 10*3/MM3 (ref 4–10)

## 2018-03-05 PROCEDURE — 85027 COMPLETE CBC AUTOMATED: CPT | Performed by: INTERNAL MEDICINE

## 2018-03-05 PROCEDURE — 36416 COLLJ CAPILLARY BLOOD SPEC: CPT | Performed by: INTERNAL MEDICINE

## 2018-03-05 NOTE — PROGRESS NOTES
Hgb 11.2.  No procrit given today.  Plt 126K.  Denies any complaints or bleeding concerns, but pt has been below normal range with plt count.  Copy of labs given and pt/family member v/u.  She will return next week for CBC.   She is aware that hgb has to drop below 10 next week in order to resume procrit, since procrit was held today.

## 2018-03-12 ENCOUNTER — INFUSION (OUTPATIENT)
Dept: ONCOLOGY | Facility: HOSPITAL | Age: 78
End: 2018-03-12

## 2018-03-12 ENCOUNTER — LAB (OUTPATIENT)
Dept: LAB | Facility: HOSPITAL | Age: 78
End: 2018-03-12

## 2018-03-12 DIAGNOSIS — D63.1 ANEMIA IN STAGE 3 CHRONIC KIDNEY DISEASE (HCC): ICD-10-CM

## 2018-03-12 DIAGNOSIS — N18.30 ANEMIA IN STAGE 3 CHRONIC KIDNEY DISEASE (HCC): ICD-10-CM

## 2018-03-12 LAB
DEPRECATED RDW RBC AUTO: 55.8 FL (ref 37–49)
ERYTHROCYTE [DISTWIDTH] IN BLOOD BY AUTOMATED COUNT: 16.9 % (ref 11.7–14.5)
HCT VFR BLD AUTO: 36.9 % (ref 34–45)
HGB BLD-MCNC: 11.6 G/DL (ref 11.5–14.9)
MCH RBC QN AUTO: 28.2 PG (ref 27–33)
MCHC RBC AUTO-ENTMCNC: 31.4 G/DL (ref 32–35)
MCV RBC AUTO: 89.8 FL (ref 83–97)
PLATELET # BLD AUTO: 114 10*3/MM3 (ref 150–375)
PMV BLD AUTO: 10.7 FL (ref 8.9–12.1)
RBC # BLD AUTO: 4.11 10*6/MM3 (ref 3.9–5)
WBC NRBC COR # BLD: 3.94 10*3/MM3 (ref 4–10)

## 2018-03-12 PROCEDURE — 36415 COLL VENOUS BLD VENIPUNCTURE: CPT | Performed by: INTERNAL MEDICINE

## 2018-03-12 PROCEDURE — 85027 COMPLETE CBC AUTOMATED: CPT | Performed by: INTERNAL MEDICINE

## 2018-03-12 NOTE — PROGRESS NOTES
Reviewed cbc with patient. Hgb today is 11.6.   Procrit held today per guidelines.   Patient given copy of labs.     Lab Results   Component Value Date    WBC 3.94 (L) 03/12/2018    HGB 11.6 03/12/2018    HCT 36.9 03/12/2018    MCV 89.8 03/12/2018     (L) 03/12/2018

## 2018-03-15 ENCOUNTER — CLINICAL SUPPORT NO REQUIREMENTS (OUTPATIENT)
Dept: CARDIOLOGY | Facility: CLINIC | Age: 78
End: 2018-03-15

## 2018-03-15 ENCOUNTER — TELEPHONE (OUTPATIENT)
Dept: CARDIOLOGY | Facility: CLINIC | Age: 78
End: 2018-03-15

## 2018-03-15 DIAGNOSIS — I47.20 V-TACH (HCC): Primary | ICD-10-CM

## 2018-03-15 DIAGNOSIS — I25.5 ISCHEMIC CARDIOMYOPATHY: Primary | ICD-10-CM

## 2018-03-15 PROCEDURE — 93295 DEV INTERROG REMOTE 1/2/MLT: CPT | Performed by: INTERNAL MEDICINE

## 2018-03-15 PROCEDURE — 93296 REM INTERROG EVL PM/IDS: CPT | Performed by: INTERNAL MEDICINE

## 2018-03-15 NOTE — TELEPHONE ENCOUNTER
This a pt of Dr. Galarza's and device followed by Dr. Hwang.  Dr. Galarza is out, so I wanted to let you know that since she was checked last she has had 3 vt episodes successfully treated with atp x1.    The treated vt episodes were on 12/20, 3/1 and 3/4.  This is the 3rd icd check in a row that she has had treated vt.  She has an apt to see Dr. Galarza in June and we will check icd at that time.  I called pt, no answer, lm for her to call.

## 2018-03-16 ENCOUNTER — RESEARCH ENCOUNTER (OUTPATIENT)
Dept: CARDIOLOGY | Facility: CLINIC | Age: 78
End: 2018-03-16

## 2018-03-16 NOTE — RESEARCH
[facility]  [address] [place] [address]  [phone]  Patient Information:  Janel Mahoney  : 1940    Research Note:  PSR Study    HPI:  NORMA  [id number]  PSR/4195    Per notification of the electronic PSR system, ARIE has not had an assessment entered into the registry system since 17.  A medical record review was performed per study protocol and it was noted that she was in the clinic on 17 for her regularly scheduled device interrogation. There were no events to report in follow-up at this time.  An electrical worksheet was completed for this visit and entered into the EDC today.  We will see NORMA at her next scheduled device check.  In the meantime, she will call us, as needed, with any questions or concerns.          Lizy Díaz RN  Research RN Coordinator

## 2018-03-19 ENCOUNTER — CLINICAL SUPPORT (OUTPATIENT)
Dept: CARDIOLOGY | Facility: CLINIC | Age: 78
End: 2018-03-19

## 2018-03-19 ENCOUNTER — OFFICE VISIT (OUTPATIENT)
Dept: ONCOLOGY | Facility: CLINIC | Age: 78
End: 2018-03-19

## 2018-03-19 ENCOUNTER — APPOINTMENT (OUTPATIENT)
Dept: ONCOLOGY | Facility: CLINIC | Age: 78
End: 2018-03-19

## 2018-03-19 ENCOUNTER — LAB (OUTPATIENT)
Dept: LAB | Facility: HOSPITAL | Age: 78
End: 2018-03-19

## 2018-03-19 ENCOUNTER — APPOINTMENT (OUTPATIENT)
Dept: ONCOLOGY | Facility: HOSPITAL | Age: 78
End: 2018-03-19

## 2018-03-19 ENCOUNTER — APPOINTMENT (OUTPATIENT)
Dept: LAB | Facility: HOSPITAL | Age: 78
End: 2018-03-19

## 2018-03-19 ENCOUNTER — INFUSION (OUTPATIENT)
Dept: ONCOLOGY | Facility: HOSPITAL | Age: 78
End: 2018-03-19

## 2018-03-19 VITALS
OXYGEN SATURATION: 100 % | HEIGHT: 65 IN | HEART RATE: 61 BPM | TEMPERATURE: 99 F | BODY MASS INDEX: 24.76 KG/M2 | DIASTOLIC BLOOD PRESSURE: 74 MMHG | WEIGHT: 148.6 LBS | SYSTOLIC BLOOD PRESSURE: 142 MMHG | RESPIRATION RATE: 12 BRPM

## 2018-03-19 VITALS — SYSTOLIC BLOOD PRESSURE: 108 MMHG | DIASTOLIC BLOOD PRESSURE: 60 MMHG | HEART RATE: 74 BPM

## 2018-03-19 DIAGNOSIS — N18.30 ANEMIA IN STAGE 3 CHRONIC KIDNEY DISEASE (HCC): ICD-10-CM

## 2018-03-19 DIAGNOSIS — N18.30 ANEMIA IN STAGE 3 CHRONIC KIDNEY DISEASE (HCC): Primary | ICD-10-CM

## 2018-03-19 DIAGNOSIS — I47.20 V-TACH (HCC): ICD-10-CM

## 2018-03-19 DIAGNOSIS — D63.1 ANEMIA IN STAGE 3 CHRONIC KIDNEY DISEASE (HCC): ICD-10-CM

## 2018-03-19 DIAGNOSIS — D63.1 ANEMIA IN STAGE 3 CHRONIC KIDNEY DISEASE (HCC): Primary | ICD-10-CM

## 2018-03-19 DIAGNOSIS — E53.8 B12 DEFICIENCY: ICD-10-CM

## 2018-03-19 LAB
ANION GAP SERPL CALCULATED.3IONS-SCNC: 12.9 MMOL/L
BUN BLD-MCNC: 39 MG/DL (ref 8–23)
BUN/CREAT SERPL: 25.7 (ref 7–25)
CALCIUM SPEC-SCNC: 10 MG/DL (ref 8.6–10.5)
CHLORIDE SERPL-SCNC: 98 MMOL/L (ref 98–107)
CO2 SERPL-SCNC: 28.1 MMOL/L (ref 22–29)
CREAT BLD-MCNC: 1.52 MG/DL (ref 0.57–1)
DEPRECATED RDW RBC AUTO: 56.3 FL (ref 37–49)
ERYTHROCYTE [DISTWIDTH] IN BLOOD BY AUTOMATED COUNT: 16.4 % (ref 11.7–14.5)
GFR SERPL CREATININE-BSD FRML MDRD: 33 ML/MIN/1.73
GLUCOSE BLD-MCNC: 111 MG/DL (ref 65–99)
HCT VFR BLD AUTO: 36.7 % (ref 34–45)
HGB BLD-MCNC: 11 G/DL (ref 11.5–14.9)
MAGNESIUM SERPL-MCNC: 2.5 MG/DL (ref 1.6–2.4)
MCH RBC QN AUTO: 27.9 PG (ref 27–33)
MCHC RBC AUTO-ENTMCNC: 30 G/DL (ref 32–35)
MCV RBC AUTO: 93.1 FL (ref 83–97)
PLATELET # BLD AUTO: 136 10*3/MM3 (ref 150–375)
PMV BLD AUTO: 10.2 FL (ref 8.9–12.1)
POTASSIUM BLD-SCNC: 4.4 MMOL/L (ref 3.5–5.2)
RBC # BLD AUTO: 3.94 10*6/MM3 (ref 3.9–5)
SODIUM BLD-SCNC: 139 MMOL/L (ref 136–145)
WBC NRBC COR # BLD: 5.96 10*3/MM3 (ref 4–10)

## 2018-03-19 PROCEDURE — 80048 BASIC METABOLIC PNL TOTAL CA: CPT | Performed by: INTERNAL MEDICINE

## 2018-03-19 PROCEDURE — 85027 COMPLETE CBC AUTOMATED: CPT | Performed by: INTERNAL MEDICINE

## 2018-03-19 PROCEDURE — 83735 ASSAY OF MAGNESIUM: CPT

## 2018-03-19 PROCEDURE — 99213 OFFICE O/P EST LOW 20 MIN: CPT | Performed by: INTERNAL MEDICINE

## 2018-03-19 PROCEDURE — 93000 ELECTROCARDIOGRAM COMPLETE: CPT | Performed by: INTERNAL MEDICINE

## 2018-03-19 PROCEDURE — 36415 COLL VENOUS BLD VENIPUNCTURE: CPT | Performed by: INTERNAL MEDICINE

## 2018-03-19 RX ORDER — PANTOPRAZOLE SODIUM 40 MG/1
40 TABLET, DELAYED RELEASE ORAL 2 TIMES DAILY
Qty: 180 TABLET | Refills: 1 | Status: SHIPPED | OUTPATIENT
Start: 2018-03-19 | End: 2018-04-05 | Stop reason: SDUPTHER

## 2018-03-19 RX ORDER — FUROSEMIDE 40 MG/1
TABLET ORAL
Qty: 225 TABLET | Refills: 3 | Status: SHIPPED | OUTPATIENT
Start: 2018-03-19 | End: 2018-11-23

## 2018-03-19 NOTE — PROGRESS NOTES
Subjective   CHIEF COMPLAINT:      · Anemia secondary to chronic kidney disease    HISTORY OF PRESENT ILLNESS:     Janel Mahoney is a 77 y.o.  patient with anemia of chronic kidney disease.  She returns today for follow-up accompanied by her .  She is not having fatigue.  No problem with bleeding or bruising.  He continues to have some pain in the left shoulder.  Range of motion is gradually improving.      Past Medical History:   Diagnosis Date   • Anemia    • Atrial fibrillation    • Bruises easily    • Carotid artery stenosis    • Chronic back pain    • Chronic coronary artery disease     moderate to severe LV dysfunction.   • GERD (gastroesophageal reflux disease)    • [place] (hard of hearing)     wears hearing aids   • Hyperlipidemia    • Hypertension    • Leukopenia    • Obesity    • Osteoarthritis    • Peptic ulcer    • Premature ventricular contractions    • Renal insufficiency syndrome    • Scoliosis    • Shoulder fracture, left    • Stroke syndrome    • Thrombocytopenia    • Ventricular tachycardia    • Vitamin B12 deficiency        Past Surgical History:   Procedure Laterality Date   • AV NODE ABLATION     • BREAST BIOPSY     • CARDIAC CATHETERIZATION      Showed severe mitral insufficiency and borderline coronary artery disease   • CARDIAC CATHETERIZATION      Showed an ejection fraction of 35%. She had occlusive disease of the right posterior LV branch and no other significant disease, treated medically.   • CARDIAC DEFIBRILLATOR PLACEMENT      Biventricular   • CARDIOVERSION      multiple electrocardioversions.   • COLONOSCOPY N/A 9/28/2017    Procedure: COLONOSCOPY TO CECUM;  Surgeon: Kevin Davis MD;  Location: [facility] ENDOSCOPY;  Service:    • CORONARY ARTERY BYPASS GRAFT      single graft to the PDA   • CORONARY STENT PLACEMENT     • ENDOSCOPY N/A 9/28/2017    Procedure: ESOPHAGOGASTRODUODENOSCOPY ;  Surgeon: Kevin Davis MD;  Location: [facility] ENDOSCOPY;  Service:    • HEMORRHOIDECTOMY      • HYSTERECTOMY     • MITRAL VALVE REPLACEMENT  01/2010    #31 Epic porcine valve.   • THROMBOENDARTERECTOMY Right     carotid thromboendarterectomy    • TONSILLECTOMY     • TOTAL KNEE ARTHROPLASTY Left    • TOTAL SHOULDER ARTHROPLASTY W/ DISTAL CLAVICLE EXCISION Left 1/16/2018    Procedure: TOTAL SHOULDER REVERSE ARTHROPLASTY;  Surgeon: Bianka Quesada MD;  Location: [facility];  Service:        Cancer-related family history includes Breast cancer in her mother; Cancer in her mother.    SCHEDULED MEDS:    Current Outpatient Prescriptions:   •  ACETAMINOPHEN PO, Take 325 mg by mouth 4 (Four) Times a Day As Needed. Senior choice , Disp: , Rfl:   •  alendronate (FOSAMAX) 70 MG tablet, Take 1 tablet by mouth Every 7 (Seven) Days. Set up for 30 minutes drink 8 ounces of water and do not eat 30 minutes, Disp: 12 tablet, Rfl: 3  •  aspirin 81 MG tablet, Take 81 mg by mouth Daily., Disp: , Rfl:   •  calcitriol (ROCALTROL) 0.25 MCG capsule, Take 0.25 mcg by mouth Daily., Disp: , Rfl:   •  carvedilol (COREG) 25 MG tablet, TAKE 1 TABLET TWICE DAILY, Disp: 180 tablet, Rfl: 3  •  Cholecalciferol (VITAMIN D) 2000 UNITS tablet, Take 1 tablet by mouth daily., Disp: , Rfl:   •  Cyanocobalamin (VITAMIN B12 PO), Take 1 tablet by mouth daily. As directed, Disp: , Rfl:   •  epoetin adele (EPOGEN,PROCRIT) 05202 UNIT/ML injection, as needed. Procrit 95876 UNIT/ML Injection Solution; Patient Sig: Procrit 79729 UNIT/ML Injection Solution INJECT SUBCUTANEOUSLY AS DIRECTED.; 0; 01-Apr-2014; Active, Disp: , Rfl:   •  FERROUS SULFATE PO, Take 324 mg by mouth Daily. Take as directed  , Disp: , Rfl:   •  Multiple Vitamin (MULTIVITAMIN) capsule, Take 1 capsule by mouth daily., Disp: , Rfl:   •  mupirocin (BACTROBAN) 2 % nasal ointment, into each nostril. PER MD ORDERS, Disp: , Rfl:   •  nitrofurantoin (MACRODANTIN) 100 MG capsule, 1 by mouth twice a day 7 days, Disp: 14 capsule, Rfl: 0  •  pantoprazole (PROTONIX) 40 MG EC tablet, Take  "1 tablet by mouth 2 (Two) Times a Day., Disp: 180 tablet, Rfl: 1  •  polyethylene glycol (MIRALAX) pack packet, Take 17 g by mouth Daily., Disp: 30 each, Rfl: 0  •  ramipril (ALTACE) 5 MG capsule, Take 5 mg by mouth Daily., Disp: , Rfl:   •  sennosides-docusate sodium (SENOKOT-S) 8.6-50 MG tablet, Take 2 tablets by mouth Every Night., Disp: , Rfl:   •  simvastatin (ZOCOR) 40 MG tablet, TAKE 1 TABLET EVERY DAY, Disp: 90 tablet, Rfl: 1  •  traMADol (ULTRAM) 50 MG tablet, Take 1 tablet by mouth Every 8 (Eight) Hours As Needed for Moderate Pain . Chronic pain meds for low back, Disp: 90 tablet, Rfl: 1  •  warfarin (COUMADIN) 1 MG tablet, TAKE 1 TO 2 TABLETS EVERY DAY AS DIRECTED, Disp: 180 tablet, Rfl: 3  •  zolpidem (AMBIEN) 10 MG tablet, Take 1 tablet by mouth At Night As Needed for Sleep., Disp: 90 tablet, Rfl: 1  •  furosemide (LASIX) 20 MG tablet, Take 1 tablet by mouth Daily for 30 days., Disp: 30 tablet, Rfl: 0    REVIEW OF SYSTEMS:  GENERAL:   No Fatigue.   SKIN:  No skin rashes or lesions.  HEME/LYMPH: Anemia.   RESPIRATORY:  No Shortness of breath.   CVS:  No Chest pain.   GI:  No Abdominal pain. No Melena. No Hematochezia.  MUSCULOSKELETAL:  Left shoulder pain.      Objective   VITAL SIGNS:  Vitals:    03/19/18 1311   BP: 142/74   Pulse: 61   Resp: 12   Temp: 99 °F (37.2 °C)   TempSrc: Oral   SpO2: 100%   Weight: 67.4 kg (148 lb 9.6 oz)   Height: 165.1 cm (65\")   PainSc: 7  Comment: back and leg pain       Wt Readings from Last 3 Encounters:   03/19/18 67.4 kg (148 lb 9.6 oz)   01/29/18 70.1 kg (154 lb 9.6 oz)   01/15/18 72.6 kg (160 lb)       PHYSICAL EXAMINATION:  GENERAL:  The patient appears in good general condition, not in acute distress.  SKIN:  No skin rashes or lesions. No Ecchymosis or Petechiae.  EXTREMITIES:  No Edema.   NEUROLOGICAL:  No Focal neurological deficits.     .  RESULT REVIEW:       Results from last 7 days  Lab Units 03/19/18  1229   WBC 10*3/mm3 5.96   HEMOGLOBIN g/dL 11.0* "   HEMATOCRIT % 36.7   PLATELETS 10*3/mm3 136*     Lab Results   Component Value Date    FERRITIN 313.60 01/29/2018    FERRITIN 335.80 (H) 01/19/2018    FERRITIN 330.80 10/25/2017    IRON 56 01/29/2018    IRON 20 (L) 01/19/2018    IRON 68 10/25/2017    TIBC 286 01/29/2018    TIBC 229 01/19/2018    TIBC 272 10/25/2017     Lab Results   Component Value Date    FOLATE 12.21 08/04/2016     Lab Results   Component Value Date    MGEAFYMU44 2,000 (H) 11/29/2016    FERBIOFS12 1,913 (H) 08/04/2016       Assessment/Plan    1.  Anemia of chronic kidney disease, stage III.  She received Procrit 20,000 units weekly and the dose was reduced on 2/5/18 to 15,000 units weekly.  Hemoglobin improved to 11.2 on 3/5/18 and Procrit was held.  Hemoglobin is today at 11.      2.  Thrombocytopenia due to Vitamin B12 deficiency.  She is on vitamin B12 1000 µg daily.  Platelet count is mildly low.  She is asymptomatic.      PLAN:    1.  We will hold Procrit today.    2.  We will schedule the patient to return every 2 weeks for CBC and possible Procrit.  Procrit will be restarted when hemoglobin is below 10.  It will be given at 15,000 units every 2 weeks.    3.  We'll repeat iron studies in 12 weeks and see in follow-up in 14 weeks.      Edward Vasquez MD  03/19/18

## 2018-03-19 NOTE — PROGRESS NOTES
Procedure     ECG 12 Lead  Date/Time: 3/19/2018 11:47 AM  Performed by: RISHI LAWRENCE  Authorized by: RISHI LAWRENCE   Comparison: compared with previous ECG   Similar to previous ECG  Rhythm: atrial fibrillation  Rhythm comments: Ventricular paced

## 2018-03-22 ENCOUNTER — HOSPITAL ENCOUNTER (OUTPATIENT)
Dept: CARDIOLOGY | Facility: HOSPITAL | Age: 78
Setting detail: RECURRING SERIES
Discharge: HOME | End: 2018-03-22

## 2018-03-22 PROCEDURE — 85610 PROTHROMBIN TIME: CPT

## 2018-03-22 PROCEDURE — 36416 COLLJ CAPILLARY BLOOD SPEC: CPT

## 2018-03-26 RX ORDER — CALCITRIOL 0.25 UG/1
CAPSULE, LIQUID FILLED ORAL
Qty: 45 CAPSULE | Refills: 2 | Status: SHIPPED | OUTPATIENT
Start: 2018-03-26 | End: 2018-11-23

## 2018-03-28 ENCOUNTER — TELEPHONE (OUTPATIENT)
Dept: FAMILY MEDICINE CLINIC | Facility: CLINIC | Age: 78
End: 2018-03-28

## 2018-03-28 DIAGNOSIS — M54.40 BILATERAL LOW BACK PAIN WITH SCIATICA, SCIATICA LATERALITY UNSPECIFIED, UNSPECIFIED CHRONICITY: Primary | ICD-10-CM

## 2018-04-05 ENCOUNTER — LAB (OUTPATIENT)
Dept: LAB | Facility: HOSPITAL | Age: 78
End: 2018-04-05

## 2018-04-05 ENCOUNTER — HOSPITAL ENCOUNTER (OUTPATIENT)
Dept: CARDIOLOGY | Facility: HOSPITAL | Age: 78
Setting detail: RECURRING SERIES
Discharge: HOME | End: 2018-04-05

## 2018-04-05 ENCOUNTER — INFUSION (OUTPATIENT)
Dept: ONCOLOGY | Facility: HOSPITAL | Age: 78
End: 2018-04-05

## 2018-04-05 DIAGNOSIS — N18.30 ANEMIA IN STAGE 3 CHRONIC KIDNEY DISEASE (HCC): ICD-10-CM

## 2018-04-05 DIAGNOSIS — D63.1 ANEMIA IN STAGE 3 CHRONIC KIDNEY DISEASE (HCC): ICD-10-CM

## 2018-04-05 LAB
DEPRECATED RDW RBC AUTO: 53.1 FL (ref 37–49)
ERYTHROCYTE [DISTWIDTH] IN BLOOD BY AUTOMATED COUNT: 16.2 % (ref 11.7–14.5)
HCT VFR BLD AUTO: 32 % (ref 34–45)
HGB BLD-MCNC: 10.1 G/DL (ref 11.5–14.9)
MCH RBC QN AUTO: 28.1 PG (ref 27–33)
MCHC RBC AUTO-ENTMCNC: 31.6 G/DL (ref 32–35)
MCV RBC AUTO: 88.9 FL (ref 83–97)
PLATELET # BLD AUTO: 106 10*3/MM3 (ref 150–375)
PMV BLD AUTO: 10.2 FL (ref 8.9–12.1)
RBC # BLD AUTO: 3.6 10*6/MM3 (ref 3.9–5)
WBC NRBC COR # BLD: 4.7 10*3/MM3 (ref 4–10)

## 2018-04-05 PROCEDURE — 85610 PROTHROMBIN TIME: CPT

## 2018-04-05 PROCEDURE — 36415 COLL VENOUS BLD VENIPUNCTURE: CPT | Performed by: INTERNAL MEDICINE

## 2018-04-05 PROCEDURE — 36416 COLLJ CAPILLARY BLOOD SPEC: CPT

## 2018-04-05 PROCEDURE — 85027 COMPLETE CBC AUTOMATED: CPT | Performed by: INTERNAL MEDICINE

## 2018-04-05 RX ORDER — PANTOPRAZOLE SODIUM 40 MG/1
40 TABLET, DELAYED RELEASE ORAL 2 TIMES DAILY
Qty: 180 TABLET | Refills: 1 | Status: SHIPPED | OUTPATIENT
Start: 2018-04-05 | End: 2018-10-09 | Stop reason: SDUPTHER

## 2018-04-05 NOTE — PROGRESS NOTES
Hgb today is 10.1.  No procrit today per guidelines.   Patient given copy of labs.   Patient states that she has not received her protonx that Dr Vasquez ordered 2 weeks ago. Script  was resent.

## 2018-04-09 ENCOUNTER — HOSPITAL ENCOUNTER (OUTPATIENT)
Dept: CARDIOLOGY | Facility: HOSPITAL | Age: 78
Setting detail: RECURRING SERIES
Discharge: HOME | End: 2018-04-09

## 2018-04-09 PROCEDURE — 36416 COLLJ CAPILLARY BLOOD SPEC: CPT

## 2018-04-09 PROCEDURE — 85610 PROTHROMBIN TIME: CPT

## 2018-04-16 ENCOUNTER — HOSPITAL ENCOUNTER (OUTPATIENT)
Dept: CARDIOLOGY | Facility: HOSPITAL | Age: 78
Setting detail: RECURRING SERIES
Discharge: HOME | End: 2018-04-16

## 2018-04-16 PROCEDURE — 36416 COLLJ CAPILLARY BLOOD SPEC: CPT

## 2018-04-16 PROCEDURE — 85610 PROTHROMBIN TIME: CPT

## 2018-04-19 ENCOUNTER — INFUSION (OUTPATIENT)
Dept: ONCOLOGY | Facility: HOSPITAL | Age: 78
End: 2018-04-19

## 2018-04-19 ENCOUNTER — LAB (OUTPATIENT)
Dept: LAB | Facility: HOSPITAL | Age: 78
End: 2018-04-19

## 2018-04-19 DIAGNOSIS — N18.30 ANEMIA IN STAGE 3 CHRONIC KIDNEY DISEASE (HCC): ICD-10-CM

## 2018-04-19 DIAGNOSIS — N18.30 ANEMIA IN STAGE 3 CHRONIC KIDNEY DISEASE (HCC): Primary | ICD-10-CM

## 2018-04-19 DIAGNOSIS — D63.1 ANEMIA IN STAGE 3 CHRONIC KIDNEY DISEASE (HCC): ICD-10-CM

## 2018-04-19 DIAGNOSIS — E53.8 B12 DEFICIENCY: ICD-10-CM

## 2018-04-19 DIAGNOSIS — D63.1 ANEMIA IN STAGE 3 CHRONIC KIDNEY DISEASE (HCC): Primary | ICD-10-CM

## 2018-04-19 LAB
DEPRECATED RDW RBC AUTO: 56.5 FL (ref 37–49)
ERYTHROCYTE [DISTWIDTH] IN BLOOD BY AUTOMATED COUNT: 16.7 % (ref 11.7–14.5)
FERRITIN SERPL-MCNC: 245.7 NG/ML
HCT VFR BLD AUTO: 32.6 % (ref 34–45)
HGB BLD-MCNC: 9.8 G/DL (ref 11.5–14.9)
IRON 24H UR-MRATE: 81 MCG/DL (ref 37–145)
IRON SATN MFR SERPL: 28 % (ref 14–48)
MCH RBC QN AUTO: 27.8 PG (ref 27–33)
MCHC RBC AUTO-ENTMCNC: 30.1 G/DL (ref 32–35)
MCV RBC AUTO: 92.4 FL (ref 83–97)
PLATELET # BLD AUTO: 114 10*3/MM3 (ref 150–375)
PMV BLD AUTO: 9.7 FL (ref 8.9–12.1)
RBC # BLD AUTO: 3.53 10*6/MM3 (ref 3.9–5)
TIBC SERPL-MCNC: 287 MCG/DL (ref 249–505)
TRANSFERRIN SERPL-MCNC: 205 MG/DL (ref 200–360)
WBC NRBC COR # BLD: 3.83 10*3/MM3 (ref 4–10)

## 2018-04-19 PROCEDURE — 82728 ASSAY OF FERRITIN: CPT

## 2018-04-19 PROCEDURE — 63510000001 EPOETIN ALFA PER 1000 UNITS: Performed by: NURSE PRACTITIONER

## 2018-04-19 PROCEDURE — 96372 THER/PROPH/DIAG INJ SC/IM: CPT | Performed by: NURSE PRACTITIONER

## 2018-04-19 PROCEDURE — 83540 ASSAY OF IRON: CPT

## 2018-04-19 PROCEDURE — 84466 ASSAY OF TRANSFERRIN: CPT

## 2018-04-19 PROCEDURE — 36415 COLL VENOUS BLD VENIPUNCTURE: CPT | Performed by: INTERNAL MEDICINE

## 2018-04-19 PROCEDURE — 85027 COMPLETE CBC AUTOMATED: CPT | Performed by: INTERNAL MEDICINE

## 2018-04-19 RX ADMIN — ERYTHROPOIETIN 11000 UNITS: 20000 INJECTION, SOLUTION INTRAVENOUS; SUBCUTANEOUS at 12:59

## 2018-04-19 NOTE — PROGRESS NOTES
HGb today is 9.8.   Procrit was held last visit.   Per guidelines, dose decreased today by 25%.   Patient received 86546 units today.

## 2018-04-23 ENCOUNTER — HOSPITAL ENCOUNTER (OUTPATIENT)
Dept: CARDIOLOGY | Facility: HOSPITAL | Age: 78
Setting detail: RECURRING SERIES
Discharge: HOME | End: 2018-04-23

## 2018-04-23 PROCEDURE — 36416 COLLJ CAPILLARY BLOOD SPEC: CPT

## 2018-04-23 PROCEDURE — 85610 PROTHROMBIN TIME: CPT

## 2018-05-01 ENCOUNTER — TRANSCRIBE ORDERS (OUTPATIENT)
Dept: ADMINISTRATIVE | Facility: HOSPITAL | Age: 78
End: 2018-05-01

## 2018-05-01 DIAGNOSIS — Z12.39 SCREENING BREAST EXAMINATION: Primary | ICD-10-CM

## 2018-05-01 RX ORDER — TRAMADOL HYDROCHLORIDE 50 MG/1
TABLET ORAL
Qty: 360 TABLET | Refills: 1 | Status: SHIPPED | OUTPATIENT
Start: 2018-05-01 | End: 2018-11-23

## 2018-05-03 ENCOUNTER — INFUSION (OUTPATIENT)
Dept: ONCOLOGY | Facility: HOSPITAL | Age: 78
End: 2018-05-03

## 2018-05-03 ENCOUNTER — LAB (OUTPATIENT)
Dept: LAB | Facility: HOSPITAL | Age: 78
End: 2018-05-03
Attending: INTERNAL MEDICINE

## 2018-05-03 DIAGNOSIS — D63.1 ANEMIA IN STAGE 3 CHRONIC KIDNEY DISEASE (HCC): Primary | ICD-10-CM

## 2018-05-03 DIAGNOSIS — N18.30 ANEMIA IN STAGE 3 CHRONIC KIDNEY DISEASE (HCC): Primary | ICD-10-CM

## 2018-05-03 DIAGNOSIS — D63.1 ANEMIA IN STAGE 3 CHRONIC KIDNEY DISEASE (HCC): ICD-10-CM

## 2018-05-03 DIAGNOSIS — N18.30 ANEMIA IN STAGE 3 CHRONIC KIDNEY DISEASE (HCC): ICD-10-CM

## 2018-05-03 LAB
DEPRECATED RDW RBC AUTO: 56 FL (ref 37–49)
ERYTHROCYTE [DISTWIDTH] IN BLOOD BY AUTOMATED COUNT: 16.9 % (ref 11.7–14.5)
HCT VFR BLD AUTO: 31.3 % (ref 34–45)
HGB BLD-MCNC: 9.8 G/DL (ref 11.5–14.9)
MCH RBC QN AUTO: 28.2 PG (ref 27–33)
MCHC RBC AUTO-ENTMCNC: 31.3 G/DL (ref 32–35)
MCV RBC AUTO: 90.2 FL (ref 83–97)
PLATELET # BLD AUTO: 106 10*3/MM3 (ref 150–375)
PMV BLD AUTO: 10.2 FL (ref 8.9–12.1)
RBC # BLD AUTO: 3.47 10*6/MM3 (ref 3.9–5)
WBC NRBC COR # BLD: 4.62 10*3/MM3 (ref 4–10)

## 2018-05-03 PROCEDURE — 85027 COMPLETE CBC AUTOMATED: CPT

## 2018-05-03 PROCEDURE — 96372 THER/PROPH/DIAG INJ SC/IM: CPT | Performed by: NURSE PRACTITIONER

## 2018-05-03 PROCEDURE — 63510000001 EPOETIN ALFA PER 1000 UNITS: Performed by: NURSE PRACTITIONER

## 2018-05-03 PROCEDURE — 36415 COLL VENOUS BLD VENIPUNCTURE: CPT | Performed by: INTERNAL MEDICINE

## 2018-05-03 RX ADMIN — ERYTHROPOIETIN 11000 UNITS: 20000 INJECTION, SOLUTION INTRAVENOUS; SUBCUTANEOUS at 12:28

## 2018-05-07 ENCOUNTER — TELEPHONE (OUTPATIENT)
Dept: CARDIOLOGY | Facility: CLINIC | Age: 78
End: 2018-05-07

## 2018-05-07 ENCOUNTER — HOSPITAL ENCOUNTER (OUTPATIENT)
Dept: CARDIOLOGY | Facility: HOSPITAL | Age: 78
Setting detail: RECURRING SERIES
Discharge: HOME | End: 2018-05-07

## 2018-05-07 PROCEDURE — 36416 COLLJ CAPILLARY BLOOD SPEC: CPT

## 2018-05-07 PROCEDURE — 85610 PROTHROMBIN TIME: CPT

## 2018-05-07 NOTE — TELEPHONE ENCOUNTER
Pt was in the office to get her INR and would like to know if she can get the Hepatitis A shot    Please Advise

## 2018-05-10 ENCOUNTER — APPOINTMENT (OUTPATIENT)
Dept: GENERAL RADIOLOGY | Facility: HOSPITAL | Age: 78
End: 2018-05-10

## 2018-05-10 ENCOUNTER — HOSPITAL ENCOUNTER (OUTPATIENT)
Dept: CARDIOLOGY | Facility: HOSPITAL | Age: 78
Setting detail: RECURRING SERIES
Discharge: HOME | End: 2018-05-10

## 2018-05-10 ENCOUNTER — APPOINTMENT (OUTPATIENT)
Dept: CT IMAGING | Facility: HOSPITAL | Age: 78
End: 2018-05-10

## 2018-05-10 ENCOUNTER — TELEPHONE (OUTPATIENT)
Dept: FAMILY MEDICINE CLINIC | Facility: CLINIC | Age: 78
End: 2018-05-10

## 2018-05-10 ENCOUNTER — HOSPITAL ENCOUNTER (EMERGENCY)
Facility: HOSPITAL | Age: 78
Discharge: HOME | End: 2018-05-11
Attending: EMERGENCY MEDICINE | Admitting: EMERGENCY MEDICINE

## 2018-05-10 DIAGNOSIS — R19.7 DIARRHEA, UNSPECIFIED TYPE: Primary | ICD-10-CM

## 2018-05-10 DIAGNOSIS — N28.9 KIDNEY LESION: ICD-10-CM

## 2018-05-10 DIAGNOSIS — K80.20 CALCULUS OF GALLBLADDER WITHOUT CHOLECYSTITIS WITHOUT OBSTRUCTION: ICD-10-CM

## 2018-05-10 LAB
ALBUMIN SERPL-MCNC: 3.7 G/DL (ref 3.5–5.2)
ALBUMIN/GLOB SERPL: 1.2 G/DL
ALP SERPL-CCNC: 46 U/L (ref 39–117)
ALT SERPL W P-5'-P-CCNC: 10 U/L (ref 1–33)
ANION GAP SERPL CALCULATED.3IONS-SCNC: 13 MMOL/L
AST SERPL-CCNC: 17 U/L (ref 1–32)
BACTERIA UR QL AUTO: NORMAL /HPF
BASOPHILS # BLD AUTO: 0.01 10*3/MM3 (ref 0–0.2)
BASOPHILS NFR BLD AUTO: 0.2 % (ref 0–1.5)
BILIRUB SERPL-MCNC: 0.7 MG/DL (ref 0.1–1.2)
BILIRUB UR QL STRIP: NEGATIVE
BUN BLD-MCNC: 35 MG/DL (ref 8–23)
BUN/CREAT SERPL: 25.9 (ref 7–25)
CALCIUM SPEC-SCNC: 9.6 MG/DL (ref 8.6–10.5)
CHLORIDE SERPL-SCNC: 106 MMOL/L (ref 98–107)
CLARITY UR: CLEAR
CO2 SERPL-SCNC: 29 MMOL/L (ref 22–29)
COLOR UR: YELLOW
CREAT BLD-MCNC: 1.35 MG/DL (ref 0.57–1)
DEPRECATED RDW RBC AUTO: 58.4 FL (ref 37–54)
EOSINOPHIL # BLD AUTO: 0.03 10*3/MM3 (ref 0–0.7)
EOSINOPHIL NFR BLD AUTO: 0.7 % (ref 0.3–6.2)
ERYTHROCYTE [DISTWIDTH] IN BLOOD BY AUTOMATED COUNT: 17.2 % (ref 11.7–13)
GFR SERPL CREATININE-BSD FRML MDRD: 38 ML/MIN/1.73
GLOBULIN UR ELPH-MCNC: 3.2 GM/DL
GLUCOSE BLD-MCNC: 104 MG/DL (ref 65–99)
GLUCOSE UR STRIP-MCNC: NEGATIVE MG/DL
HCT VFR BLD AUTO: 32.8 % (ref 35.6–45.5)
HGB BLD-MCNC: 9.9 G/DL (ref 11.9–15.5)
HGB UR QL STRIP.AUTO: ABNORMAL
HOLD SPECIMEN: NORMAL
HYALINE CASTS UR QL AUTO: NORMAL /LPF
IMM GRANULOCYTES # BLD: 0 10*3/MM3 (ref 0–0.03)
IMM GRANULOCYTES NFR BLD: 0 % (ref 0–0.5)
INR PPP: 2.54 (ref 0.9–1.1)
KETONES UR QL STRIP: NEGATIVE
LEUKOCYTE ESTERASE UR QL STRIP.AUTO: NEGATIVE
LYMPHOCYTES # BLD AUTO: 0.72 10*3/MM3 (ref 0.9–4.8)
LYMPHOCYTES NFR BLD AUTO: 16.3 % (ref 19.6–45.3)
MAGNESIUM SERPL-MCNC: 2.2 MG/DL (ref 1.6–2.4)
MCH RBC QN AUTO: 28 PG (ref 26.9–32)
MCHC RBC AUTO-ENTMCNC: 30.2 G/DL (ref 32.4–36.3)
MCV RBC AUTO: 92.9 FL (ref 80.5–98.2)
MONOCYTES # BLD AUTO: 0.3 10*3/MM3 (ref 0.2–1.2)
MONOCYTES NFR BLD AUTO: 6.8 % (ref 5–12)
NEUTROPHILS # BLD AUTO: 3.35 10*3/MM3 (ref 1.9–8.1)
NEUTROPHILS NFR BLD AUTO: 76 % (ref 42.7–76)
NITRITE UR QL STRIP: NEGATIVE
PH UR STRIP.AUTO: 5.5 [PH] (ref 5–8)
PLATELET # BLD AUTO: 140 10*3/MM3 (ref 140–500)
PMV BLD AUTO: 9.7 FL (ref 6–12)
POTASSIUM BLD-SCNC: 3.4 MMOL/L (ref 3.5–5.2)
PROT SERPL-MCNC: 6.9 G/DL (ref 6–8.5)
PROT UR QL STRIP: NEGATIVE
PROTHROMBIN TIME: 27 SECONDS (ref 11.7–14.2)
RBC # BLD AUTO: 3.53 10*6/MM3 (ref 3.9–5.2)
RBC # UR: NORMAL /HPF
REF LAB TEST METHOD: NORMAL
SODIUM BLD-SCNC: 148 MMOL/L (ref 136–145)
SP GR UR STRIP: 1.01 (ref 1–1.03)
SQUAMOUS #/AREA URNS HPF: NORMAL /HPF
TROPONIN T SERPL-MCNC: <0.01 NG/ML (ref 0–0.03)
UROBILINOGEN UR QL STRIP: ABNORMAL
WBC NRBC COR # BLD: 4.41 10*3/MM3 (ref 4.5–10.7)
WBC UR QL AUTO: NORMAL /HPF

## 2018-05-10 PROCEDURE — 80053 COMPREHEN METABOLIC PANEL: CPT

## 2018-05-10 PROCEDURE — 93005 ELECTROCARDIOGRAM TRACING: CPT | Performed by: EMERGENCY MEDICINE

## 2018-05-10 PROCEDURE — 74176 CT ABD & PELVIS W/O CONTRAST: CPT

## 2018-05-10 PROCEDURE — 99284 EMERGENCY DEPT VISIT MOD MDM: CPT

## 2018-05-10 PROCEDURE — 85610 PROTHROMBIN TIME: CPT

## 2018-05-10 PROCEDURE — 85025 COMPLETE CBC W/AUTO DIFF WBC: CPT

## 2018-05-10 PROCEDURE — 36416 COLLJ CAPILLARY BLOOD SPEC: CPT

## 2018-05-10 PROCEDURE — 83735 ASSAY OF MAGNESIUM: CPT

## 2018-05-10 PROCEDURE — 36415 COLL VENOUS BLD VENIPUNCTURE: CPT

## 2018-05-10 PROCEDURE — 81001 URINALYSIS AUTO W/SCOPE: CPT | Performed by: EMERGENCY MEDICINE

## 2018-05-10 PROCEDURE — 84484 ASSAY OF TROPONIN QUANT: CPT

## 2018-05-10 PROCEDURE — 93010 ELECTROCARDIOGRAM REPORT: CPT | Performed by: INTERNAL MEDICINE

## 2018-05-10 PROCEDURE — 96360 HYDRATION IV INFUSION INIT: CPT

## 2018-05-10 PROCEDURE — 71046 X-RAY EXAM CHEST 2 VIEWS: CPT

## 2018-05-10 RX ORDER — SODIUM CHLORIDE 0.9 % (FLUSH) 0.9 %
10 SYRINGE (ML) INJECTION AS NEEDED
Status: DISCONTINUED | OUTPATIENT
Start: 2018-05-10 | End: 2018-05-11 | Stop reason: HOSPADM

## 2018-05-10 RX ADMIN — SODIUM CHLORIDE 500 ML: 9 INJECTION, SOLUTION INTRAVENOUS at 21:25

## 2018-05-10 RX ADMIN — SODIUM CHLORIDE 500 ML: 9 INJECTION, SOLUTION INTRAVENOUS at 23:00

## 2018-05-10 NOTE — ED TRIAGE NOTES
Pt states that she has had increased weakness and fatigue that started yesterday. Pt complains of constant diarrhea since yesterday. States that her stool is black but that she takes iron and it is always black. Pt appears to be in NAD at this time, skin is PWD, and breathing is even and unlabored.

## 2018-05-10 NOTE — TELEPHONE ENCOUNTER
PT'S  CALLING IN REGARDS TO WIFE NEEDING BLOOD WORK DONE, BUT WANTED TO TALK TO KAMARI ABOUT WHAT THE REASONING IS

## 2018-05-11 VITALS
OXYGEN SATURATION: 97 % | DIASTOLIC BLOOD PRESSURE: 57 MMHG | RESPIRATION RATE: 18 BRPM | BODY MASS INDEX: 24.99 KG/M2 | TEMPERATURE: 97.4 F | SYSTOLIC BLOOD PRESSURE: 153 MMHG | HEART RATE: 65 BPM | HEIGHT: 65 IN | WEIGHT: 150 LBS

## 2018-05-11 NOTE — ED PROVIDER NOTES
The patient presents complaining of diarrhea that began yesterday. The pt states hse diarrhea has improved, but she now has fatigue. Pt reports blood on a vaginal pad prior to urinating. Pt denies pain, nausea, and vomiting.    Physical Exam:  The pt is resting comfortably, in no distress, and without gross neurological deficit.  Lungs/cardiovascular: The pt's heart is RRR without murmur. The pt's lungs are clear to auscultation.  Abdomen: The pt's abd is soft and nontender.    Discussed the plan to treat the pt with fluids to improve her symptoms. The pt understands and agrees with the plan.    MD ATTESTATION NOTE    The JF and I have discussed this patient's history, physical exam, and treatment plan.  I have reviewed the documentation and personally had a face to face interaction with the patient. I affirm the documentation and agree with the treatment and plan.  The attached note describes my personal findings.    Documentation assistance provided by foreign Peralta for Dr. Peralta. Information recorded by the scribe was done at my direction and has been verified and validated by me.                 Oral Peralta  05/10/18 9947       Cj Peralta MD  05/13/18 [id number]

## 2018-05-11 NOTE — DISCHARGE INSTRUCTIONS
Rest and drink plenty of fluids. Progress your diet slowly as tolerated.  Recheck with your PCP in 1-2 days.  Imodium may help, take it as needed.  Buy Aquaphor or another emollient barrier cream to apply to the irritated areas of your bottom.  Return to the ER for severe pain, intractable vomiting, fever >100.4, new or worsening symptoms, any concerns.

## 2018-05-11 NOTE — ED PROVIDER NOTES
EMERGENCY DEPARTMENT ENCOUNTER    Room Number:  04/04  Date seen:  5/10/2018  Time seen: 8:14 PM  PCP: Ariel Matute MD    HPI:  Chief complaint:DIarrhea  Context:Janel Mahoney is a 77 y.o. female who presents to the ED with c/o diarrhea (x8 episodes today, too numerous to count yesterday) that began yesterday. Denies abd pain, N/V, or fevers. Denies recent abx, recent travel, or illness exposure, does not drink well or cistern water. Patient has had worsening chronic generalized weakness that began after the diarrhea started. She has not had any diarrhea during her unfortunately extended ER waiting time.    Onset: gradual  Location:GI  Radiation: none  Duration: yesterday  Timing: constant  Character:diarrhea  Aggravating Factors: none  Alleviating Factors: none  Severity: moderate    ALLERGIES  Diclofenac and Dofetilide    PAST MEDICAL HISTORY  Active Ambulatory Problems     Diagnosis Date Noted   • Anemia in stage 3 chronic kidney disease 02/12/2016   • Thrombocytopenia 05/17/2016   • B12 deficiency 05/17/2016   • Coronary artery disease involving coronary bypass graft of native heart without angina pectoris 06/08/2016   • S/P MVR (mitral valve replacement) 06/08/2016   • Chronic atrial fibrillation 06/08/2016   • Bilateral carotid artery disease 06/08/2016   • Intractable low back pain 08/02/2016   • Long-term (current) use of anticoagulants 08/16/2016   • Low back pain 08/18/2016   • Osteoporosis 09/01/2016   • Murmur, heart 09/01/2016   • GEORGIAN on auto CPAP - Dr Cardona 09/08/2016   • Hypersomnia due to medical condition - GEORGINA 09/08/2016   • Esophageal stricture 09/06/2017   • Acute blood loss anemia 09/26/2017   • Dysphagia 09/26/2017   • Closed fracture of left proximal humerus 12/24/2017   • Hypertension 12/24/2017   • Supratherapeutic INR 12/24/2017   • Chronic combined systolic and diastolic congestive heart failure 12/24/2017   • Osteoporosis with pathological fracture 12/24/2017   • Closed 3-part  fracture of proximal end of left humerus 01/10/2018   • Closed fracture of proximal end of humerus with delayed healing 01/16/2018     Resolved Ambulatory Problems     Diagnosis Date Noted   • No Resolved Ambulatory Problems     Past Medical History:   Diagnosis Date   • Anemia    • Atrial fibrillation    • Bruises easily    • Carotid artery stenosis    • Chronic back pain    • Chronic coronary artery disease    • GERD (gastroesophageal reflux disease)    • Inupiat (hard of hearing)    • Hyperlipidemia    • Hypertension    • Leukopenia    • Obesity    • Osteoarthritis    • Peptic ulcer    • Premature ventricular contractions    • Renal insufficiency syndrome    • Scoliosis    • Shoulder fracture, left    • Stroke syndrome    • Thrombocytopenia    • Ventricular tachycardia    • Vitamin B12 deficiency        PAST SURGICAL HISTORY  Past Surgical History:   Procedure Laterality Date   • AV NODE ABLATION     • BREAST BIOPSY     • CARDIAC CATHETERIZATION      Showed severe mitral insufficiency and borderline coronary artery disease   • CARDIAC CATHETERIZATION      Showed an ejection fraction of 35%. She had occlusive disease of the right posterior LV branch and no other significant disease, treated medically.   • CARDIAC DEFIBRILLATOR PLACEMENT      Biventricular   • CARDIOVERSION      multiple electrocardioversions.   • COLONOSCOPY N/A 9/28/2017    Procedure: COLONOSCOPY TO CECUM;  Surgeon: Kevin Davis MD;  Location: [facility] ENDOSCOPY;  Service:    • CORONARY ARTERY BYPASS GRAFT      single graft to the PDA   • CORONARY STENT PLACEMENT     • ENDOSCOPY N/A 9/28/2017    Procedure: ESOPHAGOGASTRODUODENOSCOPY ;  Surgeon: Kevin Davis MD;  Location: [facility] ENDOSCOPY;  Service:    • HEMORRHOIDECTOMY     • HYSTERECTOMY     • MITRAL VALVE REPLACEMENT  01/2010    #31 Epic porcine valve.   • THROMBOENDARTERECTOMY Right     carotid thromboendarterectomy    • TONSILLECTOMY     • TOTAL KNEE ARTHROPLASTY Left    • TOTAL SHOULDER  ARTHROPLASTY W/ DISTAL CLAVICLE EXCISION Left 1/16/2018    Procedure: TOTAL SHOULDER REVERSE ARTHROPLASTY;  Surgeon: Bianka Quesada MD;  Location: [facility] MAIN OR;  Service:        FAMILY HISTORY  Family History   Problem Relation Age of Onset   • Cancer Mother    • Breast cancer Mother    • Heart disease Mother    • Hypertension Mother    • Coronary artery disease Father    • Stroke Father    • Heart disease Father    • Hypertension Father    • Other Daughter      thiamin deficiency    • Hypertension Son    • Malig Hyperthermia Neg Hx        SOCIAL HISTORY  Social History     Social History   • Marital status:      Spouse name: Russ   • Number of children: N/A   • Years of education: N/A     Occupational History   • [employer] Retired     Social History Main Topics   • Smoking status: Former Smoker     Packs/day: 1.00     Years: 50.00     Types: Cigarettes     Quit date: 1/11/2009   • Smokeless tobacco: Never Used   • Alcohol use Yes      Comment: rare   • Drug use: No   • Sexual activity: Defer     Other Topics Concern   • Not on file     Social History Narrative   • No narrative on file       REVIEW OF SYSTEMS  Review of Systems   Constitutional: Negative for chills and fever.   HENT: Negative.    Eyes: Negative.    Respiratory: Negative for shortness of breath.    Cardiovascular: Negative for chest pain.   Gastrointestinal: Positive for diarrhea. Negative for abdominal pain, nausea and vomiting.   Genitourinary: Negative.    Musculoskeletal: Negative.    Skin: Negative.    Neurological: Positive for weakness (generalized).   Psychiatric/Behavioral: Negative.        PHYSICAL EXAM  ED Triage Vitals [05/10/18 1432]   Temp Heart Rate Resp BP SpO2   98.1 °F (36.7 °C) 65 18 156/48 98 %      Temp src Heart Rate Source Patient Position BP Location FiO2 (%)   Oral Monitor Lying Right arm --     Physical Exam   Constitutional: She is oriented to person, place, and time and well-developed,  well-nourished, and in no distress.   HENT:   Head: Normocephalic and atraumatic.   Right Ear: External ear normal.   Left Ear: External ear normal.   Nose: Nose normal.   Mouth/Throat: Oropharynx is clear and moist.   Eyes: Conjunctivae are normal.   Neck: Normal range of motion.   Cardiovascular: Normal rate and regular rhythm.    Pulmonary/Chest: Effort normal and breath sounds normal.   Abdominal:   Normal bowel sounds  Soft  Mild LLQ abd tenderness  Nondistended  No rebound, guarding, or rigidity  No appreciable organomegaly  No CVA tenderness   Musculoskeletal: Normal range of motion.   Neurological: She is alert and oriented to person, place, and time.   Skin: Skin is warm and dry.   Psychiatric: Affect normal.   Nursing note and vitals reviewed.      LAB RESULTS  Recent Results (from the past 24 hour(s))   Comprehensive Metabolic Panel    Collection Time: 05/10/18  2:43 PM   Result Value Ref Range    Glucose 104 (H) 65 - 99 mg/dL    BUN 35 (H) 8 - 23 mg/dL    Creatinine 1.35 (H) 0.57 - 1.00 mg/dL    Sodium 148 (H) 136 - 145 mmol/L    Potassium 3.4 (L) 3.5 - 5.2 mmol/L    Chloride 106 98 - 107 mmol/L    CO2 29.0 22.0 - 29.0 mmol/L    Calcium 9.6 8.6 - 10.5 mg/dL    Total Protein 6.9 6.0 - 8.5 g/dL    Albumin 3.70 3.50 - 5.20 g/dL    ALT (SGPT) 10 1 - 33 U/L    AST (SGOT) 17 1 - 32 U/L    Alkaline Phosphatase 46 39 - 117 U/L    Total Bilirubin 0.7 0.1 - 1.2 mg/dL    eGFR Non African Amer 38 (L) >60 mL/min/1.73    Globulin 3.2 gm/dL    A/G Ratio 1.2 g/dL    BUN/Creatinine Ratio 25.9 (H) 7.0 - 25.0    Anion Gap 13.0 mmol/L   Troponin    Collection Time: 05/10/18  2:43 PM   Result Value Ref Range    Troponin T <0.010 0.000 - 0.030 ng/mL   Magnesium    Collection Time: 05/10/18  2:43 PM   Result Value Ref Range    Magnesium 2.2 1.6 - 2.4 mg/dL   Protime-INR    Collection Time: 05/10/18  2:43 PM   Result Value Ref Range    Protime 27.0 (H) 11.7 - 14.2 Seconds    INR 2.54 (H) 0.90 - 1.10   Gold Top - SST     Collection Time: 05/10/18  2:43 PM   Result Value Ref Range    Extra Tube Hold for add-ons.    CBC Auto Differential    Collection Time: 05/10/18  2:43 PM   Result Value Ref Range    WBC 4.41 (L) 4.50 - 10.70 10*3/mm3    RBC 3.53 (L) 3.90 - 5.20 10*6/mm3    Hemoglobin 9.9 (L) 11.9 - 15.5 g/dL    Hematocrit 32.8 (L) 35.6 - 45.5 %    MCV 92.9 80.5 - 98.2 fL    MCH 28.0 26.9 - 32.0 pg    MCHC 30.2 (L) 32.4 - 36.3 g/dL    RDW 17.2 (H) 11.7 - 13.0 %    RDW-SD 58.4 (H) 37.0 - 54.0 fl    MPV 9.7 6.0 - 12.0 fL    Platelets 140 140 - 500 10*3/mm3    Neutrophil % 76.0 42.7 - 76.0 %    Lymphocyte % 16.3 (L) 19.6 - 45.3 %    Monocyte % 6.8 5.0 - 12.0 %    Eosinophil % 0.7 0.3 - 6.2 %    Basophil % 0.2 0.0 - 1.5 %    Immature Grans % 0.0 0.0 - 0.5 %    Neutrophils, Absolute 3.35 1.90 - 8.10 10*3/mm3    Lymphocytes, Absolute 0.72 (L) 0.90 - 4.80 10*3/mm3    Monocytes, Absolute 0.30 0.20 - 1.20 10*3/mm3    Eosinophils, Absolute 0.03 0.00 - 0.70 10*3/mm3    Basophils, Absolute 0.01 0.00 - 0.20 10*3/mm3    Immature Grans, Absolute 0.00 0.00 - 0.03 10*3/mm3   Urinalysis With / Culture If Indicated - Urine, Clean Catch    Collection Time: 05/10/18 10:31 PM   Result Value Ref Range    Color, UA Yellow Yellow, Straw    Appearance, UA Clear Clear    pH, UA 5.5 5.0 - 8.0    Specific Gravity, UA 1.013 1.005 - 1.030    Glucose, UA Negative Negative    Ketones, UA Negative Negative    Bilirubin, UA Negative Negative    Blood, UA Moderate (2+) (A) Negative    Protein, UA Negative Negative    Leuk Esterase, UA Negative Negative    Nitrite, UA Negative Negative    Urobilinogen, UA 0.2 E.U./dL 0.2 - 1.0 E.U./dL   Urinalysis, Microscopic Only - Urine, Clean Catch    Collection Time: 05/10/18 10:31 PM   Result Value Ref Range    RBC, UA 0-2 None Seen, 0-2 /HPF    WBC, UA 0-2 None Seen, 0-2 /HPF    Bacteria, UA None Seen None Seen /HPF    Squamous Epithelial Cells, UA 0-2 None Seen, 0-2 /HPF    Hyaline Casts, UA 0-2 None Seen /LPF    Methodology  Automated Microscopy        I ordered the above labs and reviewed the results    RADIOLOGY  CT Abdomen Pelvis Without Contrast   Final Result   1.  Tiny bilateral pleural effusions.   2. Renal lesions as discussed, these will require follow-up.   3. Trace abdominal and pelvic ascites.   4. Uncomplicated cholelithiasis                   This study was performed with techniques to keep radiation doses as low   as reasonably achievable (ALARA). Individualized dose reduction   techniques using automated exposure control or adjustment of mA and/or   kV according to the patient size were employed.        This report was finalized on 5/10/2018 9:07 PM by Hoang Marshall M.D.          XR Chest 2 View   Final Result   No focal pulmonary consolidation. Cardiomegaly. Tortuous   aorta. Focal kyphosis at the upper lumbar spine appears more   conspicuous, as described above.       This report was finalized on 5/10/2018 3:41 PM by Dr. Nghia Cao M.D.              I ordered the above noted radiological studies and reviewed the images on the PACS system.     MEDICATIONS GIVEN IN ER  Medications   sodium chloride 0.9 % flush 10 mL (not administered)   sodium chloride 0.9 % bolus 500 mL (0 mL Intravenous Stopped 5/10/18 2214)   sodium chloride 0.9 % bolus 500 mL (0 mL Intravenous Stopped 5/10/18 2324)     PROCEDURES  Procedures      PROGRESS AND CONSULTS    Progress Notes:    ED Course     2020: I discussed lab workup, EKG, and Chest Xray findings with the patient and plan for abd CT to be performed and fluids to be given. Pt understands and agrees with the plan, all questions answered.    2125: Reviewed pt's history and workup with Dr. Cj Peralta.  After a bedside evaluation; Dr Peralta agrees with the plan of care    2250: Rechecked pt. I discussed lab results and CT findings with the patient and need for rectal exam at this time. Nurse at bedside during the exam. Patient was Hemoccult negative with black stool upon exam.  "Patient has finished 500cc fluids at this time. Additional fluids will be given in the ED. Patient has had no diarrhea in the ED. Discussed no indication for admission at this time. Pt understands and agrees with the plan, all questions answered.    0000: Rechecked pt. I discussed abd CT incidental findings including renal lesions and cholelithiasis. The acuity and census in the ER prolonged the patient's ER visit and despite that, she has had no bowel movements at all while here.  Hopefully that indicates this diarrheal illness has nearly passed. I discussed need for possible follow up imaging due to the incidentally noted renal lesions and need for follow up with PCP. Pt understands and agrees with the plan, all questions answered.    Disposition vitals:  BP (!) 185/79 (BP Location: Right arm, Patient Position: Lying)   Pulse 62   Temp 98.1 °F (36.7 °C) (Oral)   Resp 18   Ht 165.1 cm (65\")   Wt 68 kg (150 lb)   SpO2 98%   BMI 24.96 kg/m²       DIAGNOSIS  Final diagnoses:   Diarrhea, unspecified type   Kidney lesion   Calculus of gallbladder without cholecystitis without obstruction       FOLLOW UP   Ariel Matute MD  [address] [place], [place] [id number] [phone]    In 1 day      Documentation assistance provided by foreign [employer] for Melody Shah PA-C.  Information recorded by the scribmello was done at my direction and has been verified and validated by me.       Ellie Bullard  05/10/18 [id number]       Ellie Bullard  05/11/18 0003       FRNANIE Castillo  05/12/18 2023    "

## 2018-05-15 ENCOUNTER — TELEPHONE (OUTPATIENT)
Dept: SOCIAL WORK | Facility: HOSPITAL | Age: 78
End: 2018-05-15

## 2018-05-15 NOTE — TELEPHONE ENCOUNTER
Spoke with pt today in f/u and she states she is just fine. She also states that she has not made her f/u with her PCP but will do it soon. No other questions or concerns voiced by pt at this time. Jackie UMANZOR

## 2018-05-16 ENCOUNTER — HOSPITAL ENCOUNTER (OUTPATIENT)
Dept: MAMMOGRAPHY | Facility: HOSPITAL | Age: 78
Discharge: HOME | End: 2018-05-16
Admitting: INTERNAL MEDICINE

## 2018-05-16 DIAGNOSIS — Z12.39 SCREENING BREAST EXAMINATION: ICD-10-CM

## 2018-05-16 PROCEDURE — 77067 SCR MAMMO BI INCL CAD: CPT

## 2018-05-17 ENCOUNTER — INFUSION (OUTPATIENT)
Dept: ONCOLOGY | Facility: HOSPITAL | Age: 78
End: 2018-05-17

## 2018-05-17 ENCOUNTER — HOSPITAL ENCOUNTER (OUTPATIENT)
Dept: CARDIOLOGY | Facility: HOSPITAL | Age: 78
Setting detail: RECURRING SERIES
Discharge: HOME | End: 2018-05-17

## 2018-05-17 ENCOUNTER — LAB (OUTPATIENT)
Dept: LAB | Facility: HOSPITAL | Age: 78
End: 2018-05-17

## 2018-05-17 DIAGNOSIS — N18.30 ANEMIA IN STAGE 3 CHRONIC KIDNEY DISEASE (HCC): Primary | ICD-10-CM

## 2018-05-17 DIAGNOSIS — D63.1 ANEMIA IN STAGE 3 CHRONIC KIDNEY DISEASE (HCC): Primary | ICD-10-CM

## 2018-05-17 DIAGNOSIS — D63.1 ANEMIA IN STAGE 3 CHRONIC KIDNEY DISEASE (HCC): ICD-10-CM

## 2018-05-17 DIAGNOSIS — N18.30 ANEMIA IN STAGE 3 CHRONIC KIDNEY DISEASE (HCC): ICD-10-CM

## 2018-05-17 LAB
DEPRECATED RDW RBC AUTO: 53.7 FL (ref 37–49)
ERYTHROCYTE [DISTWIDTH] IN BLOOD BY AUTOMATED COUNT: 16.3 % (ref 11.7–14.5)
HCT VFR BLD AUTO: 31.3 % (ref 34–45)
HGB BLD-MCNC: 9.7 G/DL (ref 11.5–14.9)
MCH RBC QN AUTO: 27.9 PG (ref 27–33)
MCHC RBC AUTO-ENTMCNC: 31 G/DL (ref 32–35)
MCV RBC AUTO: 89.9 FL (ref 83–97)
PLATELET # BLD AUTO: 130 10*3/MM3 (ref 150–375)
PMV BLD AUTO: 9.9 FL (ref 8.9–12.1)
RBC # BLD AUTO: 3.48 10*6/MM3 (ref 3.9–5)
WBC NRBC COR # BLD: 4.13 10*3/MM3 (ref 4–10)

## 2018-05-17 PROCEDURE — 85027 COMPLETE CBC AUTOMATED: CPT | Performed by: INTERNAL MEDICINE

## 2018-05-17 PROCEDURE — 36415 COLL VENOUS BLD VENIPUNCTURE: CPT | Performed by: INTERNAL MEDICINE

## 2018-05-17 PROCEDURE — 63510000001 EPOETIN ALFA PER 1000 UNITS: Performed by: INTERNAL MEDICINE

## 2018-05-17 PROCEDURE — 96372 THER/PROPH/DIAG INJ SC/IM: CPT | Performed by: INTERNAL MEDICINE

## 2018-05-17 PROCEDURE — 85610 PROTHROMBIN TIME: CPT

## 2018-05-17 PROCEDURE — 36416 COLLJ CAPILLARY BLOOD SPEC: CPT

## 2018-05-17 RX ADMIN — ERYTHROPOIETIN 11000 UNITS: 20000 INJECTION, SOLUTION INTRAVENOUS; SUBCUTANEOUS at 12:51

## 2018-05-30 ENCOUNTER — LAB (OUTPATIENT)
Dept: LAB | Facility: HOSPITAL | Age: 78
End: 2018-05-30

## 2018-05-30 ENCOUNTER — HOSPITAL ENCOUNTER (OUTPATIENT)
Dept: CARDIOLOGY | Facility: HOSPITAL | Age: 78
Setting detail: RECURRING SERIES
Discharge: HOME | End: 2018-05-30

## 2018-05-30 ENCOUNTER — INFUSION (OUTPATIENT)
Dept: ONCOLOGY | Facility: HOSPITAL | Age: 78
End: 2018-05-30

## 2018-05-30 DIAGNOSIS — N18.30 ANEMIA IN STAGE 3 CHRONIC KIDNEY DISEASE (HCC): ICD-10-CM

## 2018-05-30 DIAGNOSIS — N18.30 ANEMIA IN STAGE 3 CHRONIC KIDNEY DISEASE (HCC): Primary | ICD-10-CM

## 2018-05-30 DIAGNOSIS — D63.1 ANEMIA IN STAGE 3 CHRONIC KIDNEY DISEASE (HCC): Primary | ICD-10-CM

## 2018-05-30 DIAGNOSIS — D63.1 ANEMIA IN STAGE 3 CHRONIC KIDNEY DISEASE (HCC): ICD-10-CM

## 2018-05-30 LAB
DEPRECATED RDW RBC AUTO: 53.9 FL (ref 37–49)
ERYTHROCYTE [DISTWIDTH] IN BLOOD BY AUTOMATED COUNT: 16.3 % (ref 11.7–14.5)
HCT VFR BLD AUTO: 33.9 % (ref 34–45)
HGB BLD-MCNC: 10.5 G/DL (ref 11.5–14.9)
MCH RBC QN AUTO: 28.1 PG (ref 27–33)
MCHC RBC AUTO-ENTMCNC: 31 G/DL (ref 32–35)
MCV RBC AUTO: 90.6 FL (ref 83–97)
PLATELET # BLD AUTO: 105 10*3/MM3 (ref 150–375)
PMV BLD AUTO: 10.1 FL (ref 8.9–12.1)
RBC # BLD AUTO: 3.74 10*6/MM3 (ref 3.9–5)
WBC NRBC COR # BLD: 4.23 10*3/MM3 (ref 4–10)

## 2018-05-30 PROCEDURE — 96372 THER/PROPH/DIAG INJ SC/IM: CPT

## 2018-05-30 PROCEDURE — 85610 PROTHROMBIN TIME: CPT

## 2018-05-30 PROCEDURE — 63510000001 EPOETIN ALFA PER 1000 UNITS: Performed by: INTERNAL MEDICINE

## 2018-05-30 PROCEDURE — 36416 COLLJ CAPILLARY BLOOD SPEC: CPT

## 2018-05-30 PROCEDURE — 36416 COLLJ CAPILLARY BLOOD SPEC: CPT | Performed by: INTERNAL MEDICINE

## 2018-05-30 PROCEDURE — 85027 COMPLETE CBC AUTOMATED: CPT | Performed by: INTERNAL MEDICINE

## 2018-05-30 RX ADMIN — ERYTHROPOIETIN 8000 UNITS: 20000 INJECTION, SOLUTION INTRAVENOUS; SUBCUTANEOUS at 12:11

## 2018-05-31 ENCOUNTER — APPOINTMENT (OUTPATIENT)
Dept: ONCOLOGY | Facility: HOSPITAL | Age: 78
End: 2018-05-31

## 2018-05-31 ENCOUNTER — APPOINTMENT (OUTPATIENT)
Dept: LAB | Facility: HOSPITAL | Age: 78
End: 2018-05-31

## 2018-06-12 ENCOUNTER — CLINICAL SUPPORT NO REQUIREMENTS (OUTPATIENT)
Dept: CARDIOLOGY | Facility: CLINIC | Age: 78
End: 2018-06-12

## 2018-06-12 ENCOUNTER — OFFICE VISIT (OUTPATIENT)
Dept: CARDIOLOGY | Facility: CLINIC | Age: 78
End: 2018-06-12

## 2018-06-12 ENCOUNTER — TELEPHONE (OUTPATIENT)
Dept: CARDIOLOGY | Facility: CLINIC | Age: 78
End: 2018-06-12

## 2018-06-12 ENCOUNTER — HOSPITAL ENCOUNTER (OUTPATIENT)
Dept: CARDIOLOGY | Facility: HOSPITAL | Age: 78
Setting detail: RECURRING SERIES
Discharge: HOME | End: 2018-06-12

## 2018-06-12 VITALS
HEART RATE: 64 BPM | HEIGHT: 65 IN | WEIGHT: 152.6 LBS | SYSTOLIC BLOOD PRESSURE: 128 MMHG | DIASTOLIC BLOOD PRESSURE: 72 MMHG | BODY MASS INDEX: 25.43 KG/M2

## 2018-06-12 DIAGNOSIS — Z95.2 S/P MVR (MITRAL VALVE REPLACEMENT): ICD-10-CM

## 2018-06-12 DIAGNOSIS — I48.20 CHRONIC ATRIAL FIBRILLATION (HCC): ICD-10-CM

## 2018-06-12 DIAGNOSIS — I25.810 CORONARY ARTERY DISEASE INVOLVING CORONARY BYPASS GRAFT OF NATIVE HEART WITHOUT ANGINA PECTORIS: ICD-10-CM

## 2018-06-12 DIAGNOSIS — I42.9 CARDIOMYOPATHY, UNSPECIFIED TYPE (HCC): Primary | ICD-10-CM

## 2018-06-12 DIAGNOSIS — I25.5 ISCHEMIC CARDIOMYOPATHY: Primary | ICD-10-CM

## 2018-06-12 DIAGNOSIS — I77.9 BILATERAL CAROTID ARTERY DISEASE (HCC): ICD-10-CM

## 2018-06-12 DIAGNOSIS — Z95.0 PACEMAKER: ICD-10-CM

## 2018-06-12 PROCEDURE — 93283 PRGRMG EVAL IMPLANTABLE DFB: CPT | Performed by: INTERNAL MEDICINE

## 2018-06-12 PROCEDURE — 36416 COLLJ CAPILLARY BLOOD SPEC: CPT

## 2018-06-12 PROCEDURE — 85610 PROTHROMBIN TIME: CPT

## 2018-06-12 PROCEDURE — 99214 OFFICE O/P EST MOD 30 MIN: CPT | Performed by: INTERNAL MEDICINE

## 2018-06-12 PROCEDURE — 93000 ELECTROCARDIOGRAM COMPLETE: CPT | Performed by: INTERNAL MEDICINE

## 2018-06-12 PROCEDURE — 93290 INTERROG DEV EVAL ICPMS IP: CPT | Performed by: INTERNAL MEDICINE

## 2018-06-12 NOTE — PROGRESS NOTES
Date of Office Visit: 18  Encounter Provider: Nik Galarza MD  Place of Service: [facility] CARDIOLOGY  Patient Name: Janel Mahoney  :1940  Referral Provider:No ref. provider found      Chief Complaint   Patient presents with   • Coronary Artery Disease   • Congestive Heart Failure   • Atrial Fibrillation     History of Present Illness   The patient is a pleasant, 77-year-old white female with history of severe ischemic heart  disease, mild disease of the left anterior descending, angioplasty, and stent placement of  the right coronary artery. She also had premature ventricular contractions and severe  vascular disease, and left ventricular ejection fraction of 50%. In 2007, she had  an acute infarct. Catheterization showed a left ventricular ejection fraction of 35%. She  had occlusive disease of the right posterior left ventricular branch and no other  significant disease. She was treated medically. She had a transesophageal echocardiogram  that confirmed a left ventricular ejection fraction of 40% and just moderate mitral  insufficiency. She had atrial fibrillation and had electrocardioversion back to sinus  rhythm in May 2007 and then presented in atrial fibrillation and was admitted in 2007 and placed on Tikosyn. We titrated it up to 500 mcg daily but developed marked QT  prolongation even on 250 mcg twice daily. We then discussed ablation versus warfarin and  rate control. We opted for warfarin and rate control. We continued to have symptoms and  went to Dr. Harish Iverson in [place]. She had atrial fibrillation and flutter with  pulmonary vein isolation. She went back into atrial fibrillation and was started on  sotalol. She converted back to sinus rhythm. She then went back into atrial fibrillation.  Again, ultimately, she had a repeat catheterization that showed severe mitral  insufficiency, borderline coronary disease, and in January  2010 had mitral valve  replacement with a #31 Epic porcine valve and single bypass graft to the posterior  descending artery. She continued to have episodes of rapid atrial fibrillation and left  ventricular dysfunction. She underwent AV node ablation and upgrade of her device to a  bi-V.   She then presented in 09/2013 with complaints of a lot of fatigue, tiredness and weakness.   As part of the evaluation she had an echocardiogram which showed her left ventricular  ejection fraction to be 45%, moderate pulmonary hypertension at 62 mmHg.  A perfusion  stress test showed no evidence of ischemia and she had a sleep study which was positive so  she was started on CPAP.  She had some volume overload and did much better on twice a day diuretic.   She was in the hospital around August 2016 with multiple compression fractures of her back.    She then presented to the hospital in September 2017 with GI bleeding was found to have esophageal ulcers but her INR was also supratherapeutic.  There was treated resolved her hemoglobin stabilized.   The end of December 2017 she fell broke her left shoulder in 1 January she was admitted was anemic was given a transfusion and then underwent shoulder repair with left shoulder reverse arthroplasty did reasonably well with that had no real cardiac complications.  She now comes in for follow-up.  She had Of That Shoulder While in [place] in the Day and Is Back in a Sling for A Few Days.  She does have shortness of breath with activity but is now walking with a walker with one arm, no othopnea or PND.  No chest pain or pressure.  No palpitations, near syncope or syncope. No stroke type symptoms like paralysis, paresthesia, abrupt vision loss and dysarthria. No bleeding like blood in the stool or dark stools.  No falling,      Coronary Artery Disease   Symptoms include shortness of breath. Pertinent negatives include no dizziness, muscle weakness or weight gain. Her past medical history is  significant for CHF and past myocardial infarction.   Atrial Fibrillation   Symptoms include shortness of breath. Symptoms are negative for dizziness and weakness. Past medical history includes atrial fibrillation, AICD, CAD and CHF.   Congestive Heart Failure   Associated symptoms include shortness of breath. Pertinent negatives include no abdominal pain, muscle weakness or nocturia. Her past medical history is significant for CAD.         Past Medical History:   Diagnosis Date   • Anemia    • Atrial fibrillation    • Bruises easily    • Carotid artery stenosis    • Chronic back pain    • Chronic coronary artery disease     moderate to severe LV dysfunction.   • GERD (gastroesophageal reflux disease)    • [place] (hard of hearing)     wears hearing aids   • Hyperlipidemia    • Hypertension    • Leukopenia    • Obesity    • Osteoarthritis    • Peptic ulcer    • Premature ventricular contractions    • Renal insufficiency syndrome    • Scoliosis    • Shoulder fracture, left    • Stroke syndrome    • Thrombocytopenia    • Ventricular tachycardia    • Vitamin B12 deficiency          Past Surgical History:   Procedure Laterality Date   • AV NODE ABLATION     • BREAST BIOPSY     • CARDIAC CATHETERIZATION      Showed severe mitral insufficiency and borderline coronary artery disease   • CARDIAC CATHETERIZATION      Showed an ejection fraction of 35%. She had occlusive disease of the right posterior LV branch and no other significant disease, treated medically.   • CARDIAC DEFIBRILLATOR PLACEMENT      Biventricular   • CARDIOVERSION      multiple electrocardioversions.   • COLONOSCOPY N/A 9/28/2017    Procedure: COLONOSCOPY TO CECUM;  Surgeon: Kevin Davis MD;  Location: [facility] ENDOSCOPY;  Service:    • CORONARY ARTERY BYPASS GRAFT      single graft to the PDA   • CORONARY STENT PLACEMENT     • ENDOSCOPY N/A 9/28/2017    Procedure: ESOPHAGOGASTRODUODENOSCOPY ;  Surgeon: Kevin Davis MD;  Location: [facility];   Service:    • HEMORRHOIDECTOMY     • HYSTERECTOMY     • MITRAL VALVE REPLACEMENT  01/2010    #31 Epic porcine valve.   • THROMBOENDARTERECTOMY Right     carotid thromboendarterectomy    • TONSILLECTOMY     • TOTAL KNEE ARTHROPLASTY Left    • TOTAL SHOULDER ARTHROPLASTY W/ DISTAL CLAVICLE EXCISION Left 1/16/2018    Procedure: TOTAL SHOULDER REVERSE ARTHROPLASTY;  Surgeon: Bianka Quesada MD;  Location: [facility];  Service:          Current Outpatient Prescriptions on File Prior to Visit   Medication Sig Dispense Refill   • ACETAMINOPHEN PO Take 325 mg by mouth 4 (Four) Times a Day As Needed. Senior choice      • alendronate (FOSAMAX) 70 MG tablet Take 1 tablet by mouth Every 7 (Seven) Days. Set up for 30 minutes drink 8 ounces of water and do not eat 30 minutes 12 tablet 3   • aspirin 81 MG tablet Take 81 mg by mouth Daily.     • calcitriol (ROCALTROL) 0.25 MCG capsule TAKE 1 CAPSULE EVERY OTHER DAY 45 capsule 2   • carvedilol (COREG) 25 MG tablet TAKE 1 TABLET TWICE DAILY 180 tablet 3   • Cholecalciferol (VITAMIN D) 2000 UNITS tablet Take 1 tablet by mouth daily.     • Cyanocobalamin (VITAMIN B12 PO) Take 1 tablet by mouth daily. As directed     • epoetin adele (EPOGEN,PROCRIT) 68635 UNIT/ML injection as needed. Procrit 09426 UNIT/ML Injection Solution; Patient Sig: Procrit 58026 UNIT/ML Injection Solution INJECT SUBCUTANEOUSLY AS DIRECTED.; 0; 01-Apr-2014; Active     • FERROUS SULFATE PO Take 324 mg by mouth Daily. Take as directed       • furosemide (LASIX) 40 MG tablet TAKE 1 AND 1/2 TABLETS EVERY MORNING AND 1 TABLET EVERY EVENING 225 tablet 3   • Multiple Vitamin (MULTIVITAMIN) capsule Take 1 capsule by mouth daily.     • mupirocin (BACTROBAN) 2 % nasal ointment into each nostril. PER MD ORDERS     • pantoprazole (PROTONIX) 40 MG EC tablet Take 1 tablet by mouth 2 (Two) Times a Day. 180 tablet 1   • ramipril (ALTACE) 5 MG capsule Take 5 mg by mouth Daily.     • simvastatin (ZOCOR) 40 MG tablet TAKE  1 TABLET EVERY DAY (Patient taking differently: TAKE 1/2 TABLET EVERY DAY) 90 tablet 1   • traMADol (ULTRAM) 50 MG tablet TAKE 2 TABLETS EVERY MORNING  AND TAKE 2 TABLETS AT BEDTIME 360 tablet 1   • warfarin (COUMADIN) 1 MG tablet TAKE 1 TO 2 TABLETS EVERY DAY AS DIRECTED 180 tablet 3   • zolpidem (AMBIEN) 10 MG tablet Take 1 tablet by mouth At Night As Needed for Sleep. 90 tablet 1   • [DISCONTINUED] furosemide (LASIX) 20 MG tablet Take 1 tablet by mouth Daily for 30 days. 30 tablet 0   • [DISCONTINUED] nitrofurantoin (MACRODANTIN) 100 MG capsule 1 by mouth twice a day 7 days 14 capsule 0   • [DISCONTINUED] polyethylene glycol (MIRALAX) pack packet Take 17 g by mouth Daily. 30 each 0   • [DISCONTINUED] sennosides-docusate sodium (SENOKOT-S) 8.6-50 MG tablet Take 2 tablets by mouth Every Night.       No current facility-administered medications on file prior to visit.          Social History     Social History   • Marital status:      Spouse name: Russ   • Number of children: N/A   • Years of education: N/A     Occupational History   • [employer] Retired     Social History Main Topics   • Smoking status: Former Smoker     Packs/day: 1.00     Years: 50.00     Types: Cigarettes     Quit date: 1/11/2009   • Smokeless tobacco: Never Used   • Alcohol use Yes      Comment: rare   • Drug use: No   • Sexual activity: Defer     Other Topics Concern   • Not on file     Social History Narrative   • No narrative on file       Family History   Problem Relation Age of Onset   • Cancer Mother    • Breast cancer Mother    • Heart disease Mother    • Hypertension Mother    • Coronary artery disease Father    • Stroke Father    • Heart disease Father    • Hypertension Father    • Other Daughter         thiamin deficiency    • Hypertension Son    • Malig Hyperthermia Neg Hx          Review of Systems   Constitution: Positive for malaise/fatigue. Negative for decreased appetite, diaphoresis, fever, weakness, weight gain  "and weight loss.   HENT: Positive for hearing loss. Negative for congestion, nosebleeds and tinnitus.    Eyes: Negative for blurred vision, double vision, vision loss in left eye, vision loss in right eye and visual disturbance.   Cardiovascular: Negative for orthopnea.        As noted in HPI   Respiratory: Positive for shortness of breath.         As noted HPI   Endocrine: Negative for cold intolerance and heat intolerance.   Hematologic/Lymphatic: Negative for bleeding problem. Does not bruise/bleed easily.   Skin: Negative for color change, flushing, itching and rash.   Musculoskeletal: Positive for joint pain. Negative for arthritis, back pain, joint swelling, muscle weakness and myalgias.   Gastrointestinal: Positive for diarrhea. Negative for bloating, abdominal pain, constipation, dysphagia, heartburn, hematemesis, hematochezia, melena, nausea and vomiting.   Genitourinary: Negative for bladder incontinence, dysuria, frequency, nocturia and urgency.   Neurological: Negative for dizziness, focal weakness, headaches, light-headedness, loss of balance, numbness, paresthesias and vertigo.   Psychiatric/Behavioral: Negative for depression, memory loss and substance abuse.       Procedures      ECG 12 Lead  Date/Time: 6/12/2018 12:12 PM  Performed by: RISHI LAWRENCE  Authorized by: RISHI LAWRENCE   Comparison: compared with previous ECG   Similar to previous ECG  Rhythm: atrial fibrillation  Rhythm comments: Ventricular paced                  Objective:    /72 (BP Location: Right arm)   Pulse 64   Ht 165.1 cm (65\")   Wt 69.2 kg (152 lb 9.6 oz)   BMI 25.39 kg/m²        Physical Exam  Physical Exam   Constitutional: She is oriented to person, place, and time. She appears well-developed and well-nourished. No distress.   HENT:   Head: Normocephalic.   Eyes: Conjunctivae are normal. Pupils are equal, round, and reactive to light. No scleral icterus.   Neck: Normal carotid pulses, no hepatojugular reflux " and no JVD present. Carotid bruit is not present. No tracheal deviation, no edema and no erythema present. No thyromegaly present.   Cardiovascular: Normal rate, regular rhythm, S1 normal, S2 normal and intact distal pulses.   No extrasystoles are present. PMI is not displaced.  Exam reveals no distant heart sounds and no friction rub.    Murmur heard.   Systolic murmur is present with a grade of 2/6  at the upper right sternal border  Pulses:       Carotid pulses are 2+ on the right side with bruit, and 2+ on the left side with bruit.       Radial pulses are 2+ on the right side, and 2+ on the left side.        Femoral pulses are 2+ on the right side, and 2+ on the left side.       Dorsalis pedis pulses are 2+ on the right side, and 2+ on the left side.        Posterior tibial pulses are 2+ on the right side, and 2+ on the left side.   Pulmonary/Chest: Effort normal and breath sounds normal. No respiratory distress. She has no decreased breath sounds. She has no wheezes. She has no rhonchi. She has no rales. She exhibits no tenderness.   Abdominal: Soft. Bowel sounds are normal. She exhibits no distension and no mass. There is no hepatosplenomegaly. There is no tenderness. There is no rebound and no guarding.   Musculoskeletal: She exhibits edema (1+ bilateral tibial  with chronic discoloration). She exhibits no tenderness or deformity.   Neurological: She is alert and oriented to person, place, and time.   Skin: Skin is warm and dry. No rash noted. She is not diaphoretic. No cyanosis or erythema. No pallor. Nails show no clubbing.   Psychiatric: She has a normal mood and affect. Her speech is normal and behavior is normal. Judgment and thought content normal.           Assessment:   1. This is a 77-year-old female with history of severe ischemic heart disease, previous angioplasty of the right coronary artery, and then inferior infarct in February 2007.  Ultimately, she had single-vessel bypass grafting to the  posterior descending artery at the time of her mitral valve replacement, moderate left ventricular dysfunction.  Echocardiogram in 11/15 showed left ventricular ejection fraction of 36%.   Coronary Artery Disease  Assessment  • The patient has no angina    Plan  • Lifestyle modifications discussed include adhering to a heart healthy diet, avoidance of tobacco products, maintenance of a healthy weight, medication compliance, regular exercise and regular monitoring of cholesterol and blood pressure    Subjective - Objective  • There is a history of past MI  • There is a history of previous coronary artery bypass graft  • There has been a previous POBA  • Current antiplatelet therapy includes aspirin 81 mg    Heart Failure  Assessment  • NYHA class II - There is slight limitation of physical activity. The patient is comfortable at rest, but physical activity results in fatigue, palpitations or shortness of breath.  • ACE inhibitor prescribed  • Beta blocker prescribed  • Diuretics prescribed  • Left ventricular function is moderately reduced by qualitative assessment    Plan  • The patient has received heart failure education on the following topics: dietary sodium restriction, minimizing or avoiding NSAID use, symptom management, physical activity and weight monitoring  • The heart failure care plan was discussed with the patient today including: continuing the current program  •  The patient has been counseled about ICD or CRT-D implantation    Subjective/Objective  • The patient reports dyspnea    • Physical exam findings negative for rales and elevated JVP.  • The patient has an ICD implant  • The patient has a CRT-D implant  • The patient has a CRT implant      She stable from this standpoint she's to continue the same will see us skin in follow-up in 6 months.      2. History of mitral insufficiency status post mitral valve replacement with a tissue valve as above. Echocardiogram today showed left ventricular  ejection fraction of approximate 40% with segmental wall motion abnormality. Normal functioning prosthetic mitral valve moderate tricuspid insufficiency with moderate pulmonary hypertension. She's to continue the same will see us in follow-up in 6 months.  Last echocardiogram was in December 2016 consider follow-up when she returns.  3. Atrial fibrillation/sick sinus syndrome. Failed multiple cardioversions, multiple medications. Failed pulmonary vein isolation. Now status post AV node ablation and BiV  Defibrillator.  Atrial Fibrillation and Atrial Flutter  Assessment  • The patient has permanent atrial fibrillation  • This is valvular in etiology  • The patient's CHADS2-VASc score is 6  • A [id number] score of 2 or more is considered a high risk for a thromboembolic event  • Warfarin prescribed    Plan  • Continue in atrial fibrillation with rate control  • Continue warfarin for antithrombotic therapy, bleeding issues discussed  • Continue beta blocker for rate control    Subjective - Objective  • The patient underwent cardioversion   • The patient had atrial fibrillation ablation   • The patient had a recurrence of atrial fibrillation since ablation       She is to get her INR checked today.         4. Cerebral vascular disease status post carotid endarterectomy.  Follow up carotid ultrasound 6/17 showed mild disease on the right moderate on the left continue follow periodic carotid ultrasounds.  Consider follow-up carotid ultrasound when she returns.  5. Hypertension. Blood pressure adequately controlled.  6. Renal insufficiency. Followed by nephrology, Dr. Cruz.  7. Hyperlipidemia, on therapy. Followed in your office.  8. Nonsustained ventricular tachycardia. treated with antitachycardia pacing. Asymptomatic. Will continue the same if she has increased episodes will need to treat.  9. Pulmonary hypertension. This is most likely secondary to sleep apnea and atrial fibrillation.  Echocardiogram today  unchanged RV systolic pressure of 55 mm or mercury.  10. Sleep Apnea. Now on CPAP.    11.  Status post left shoulder fracture and shoulder surgery slowly recovering.  12.  Anemia appears be relatively stable in fact may be improving.       Plan:

## 2018-06-14 ENCOUNTER — INFUSION (OUTPATIENT)
Dept: ONCOLOGY | Facility: HOSPITAL | Age: 78
End: 2018-06-14

## 2018-06-14 ENCOUNTER — LAB (OUTPATIENT)
Dept: LAB | Facility: HOSPITAL | Age: 78
End: 2018-06-14

## 2018-06-14 DIAGNOSIS — N18.30 ANEMIA IN STAGE 3 CHRONIC KIDNEY DISEASE (HCC): ICD-10-CM

## 2018-06-14 DIAGNOSIS — D63.1 ANEMIA IN STAGE 3 CHRONIC KIDNEY DISEASE (HCC): ICD-10-CM

## 2018-06-14 LAB
DEPRECATED RDW RBC AUTO: 50.3 FL (ref 37–49)
ERYTHROCYTE [DISTWIDTH] IN BLOOD BY AUTOMATED COUNT: 15.3 % (ref 11.7–14.5)
HCT VFR BLD AUTO: 34.5 % (ref 34–45)
HGB BLD-MCNC: 11 G/DL (ref 11.5–14.9)
MCH RBC QN AUTO: 28.7 PG (ref 27–33)
MCHC RBC AUTO-ENTMCNC: 31.9 G/DL (ref 32–35)
MCV RBC AUTO: 90.1 FL (ref 83–97)
PLATELET # BLD AUTO: 117 10*3/MM3 (ref 150–375)
PMV BLD AUTO: 10.1 FL (ref 8.9–12.1)
RBC # BLD AUTO: 3.83 10*6/MM3 (ref 3.9–5)
WBC NRBC COR # BLD: 4.5 10*3/MM3 (ref 4–10)

## 2018-06-14 PROCEDURE — 36416 COLLJ CAPILLARY BLOOD SPEC: CPT | Performed by: INTERNAL MEDICINE

## 2018-06-14 PROCEDURE — 85027 COMPLETE CBC AUTOMATED: CPT | Performed by: INTERNAL MEDICINE

## 2018-06-14 NOTE — PROGRESS NOTES
Reviewed CBC with patient.   Hgb today is 11.0.  No procrit today per guidelines.   Patient given copy of labs.   No concerns being voiced today.   Return appt in 2 weeks.

## 2018-06-20 RX ORDER — ZOLPIDEM TARTRATE 10 MG/1
10 TABLET ORAL NIGHTLY PRN
Qty: 90 TABLET | Refills: 1 | Status: ON HOLD | OUTPATIENT
Start: 2018-06-20 | End: 2019-02-21 | Stop reason: SDUPTHER

## 2018-06-20 RX ORDER — ZOLPIDEM TARTRATE 10 MG/1
TABLET ORAL
Qty: 90 TABLET | Refills: 1 | OUTPATIENT
Start: 2018-06-20

## 2018-06-22 DIAGNOSIS — D63.1 ANEMIA IN STAGE 3 CHRONIC KIDNEY DISEASE (HCC): Primary | ICD-10-CM

## 2018-06-22 DIAGNOSIS — N18.30 ANEMIA IN STAGE 3 CHRONIC KIDNEY DISEASE (HCC): Primary | ICD-10-CM

## 2018-06-26 ENCOUNTER — INFUSION (OUTPATIENT)
Dept: ONCOLOGY | Facility: HOSPITAL | Age: 78
End: 2018-06-26

## 2018-06-26 ENCOUNTER — LAB (OUTPATIENT)
Dept: LAB | Facility: HOSPITAL | Age: 78
End: 2018-06-26

## 2018-06-26 ENCOUNTER — OFFICE VISIT (OUTPATIENT)
Dept: ONCOLOGY | Facility: CLINIC | Age: 78
End: 2018-06-26

## 2018-06-26 VITALS
HEART RATE: 78 BPM | HEIGHT: 65 IN | BODY MASS INDEX: 25.52 KG/M2 | WEIGHT: 153.2 LBS | DIASTOLIC BLOOD PRESSURE: 78 MMHG | TEMPERATURE: 98.7 F | RESPIRATION RATE: 18 BRPM | OXYGEN SATURATION: 99 % | SYSTOLIC BLOOD PRESSURE: 136 MMHG

## 2018-06-26 DIAGNOSIS — D69.6 THROMBOCYTOPENIA (HCC): ICD-10-CM

## 2018-06-26 DIAGNOSIS — N18.30 ANEMIA IN STAGE 3 CHRONIC KIDNEY DISEASE (HCC): Primary | ICD-10-CM

## 2018-06-26 DIAGNOSIS — N18.30 ANEMIA IN STAGE 3 CHRONIC KIDNEY DISEASE (HCC): ICD-10-CM

## 2018-06-26 DIAGNOSIS — D63.1 ANEMIA IN STAGE 3 CHRONIC KIDNEY DISEASE (HCC): Primary | ICD-10-CM

## 2018-06-26 DIAGNOSIS — D63.1 ANEMIA IN STAGE 3 CHRONIC KIDNEY DISEASE (HCC): ICD-10-CM

## 2018-06-26 DIAGNOSIS — E53.8 B12 DEFICIENCY: ICD-10-CM

## 2018-06-26 LAB
BASOPHILS # BLD AUTO: 0.03 10*3/MM3 (ref 0–0.1)
BASOPHILS NFR BLD AUTO: 0.6 % (ref 0–1.1)
DEPRECATED RDW RBC AUTO: 51.7 FL (ref 37–49)
EOSINOPHIL # BLD AUTO: 0.14 10*3/MM3 (ref 0–0.36)
EOSINOPHIL NFR BLD AUTO: 3 % (ref 1–5)
ERYTHROCYTE [DISTWIDTH] IN BLOOD BY AUTOMATED COUNT: 14.9 % (ref 11.7–14.5)
FERRITIN SERPL-MCNC: 223 NG/ML
HCT VFR BLD AUTO: 32.1 % (ref 34–45)
HGB BLD-MCNC: 9.8 G/DL (ref 11.5–14.9)
IMM GRANULOCYTES # BLD: 0.02 10*3/MM3 (ref 0–0.03)
IMM GRANULOCYTES NFR BLD: 0.4 % (ref 0–0.5)
IRON 24H UR-MRATE: 73 MCG/DL (ref 37–145)
IRON SATN MFR SERPL: 25 % (ref 14–48)
LYMPHOCYTES # BLD AUTO: 1.17 10*3/MM3 (ref 1–3.5)
LYMPHOCYTES NFR BLD AUTO: 24.9 % (ref 20–49)
MCH RBC QN AUTO: 28.6 PG (ref 27–33)
MCHC RBC AUTO-ENTMCNC: 30.5 G/DL (ref 32–35)
MCV RBC AUTO: 93.6 FL (ref 83–97)
MONOCYTES # BLD AUTO: 0.38 10*3/MM3 (ref 0.25–0.8)
MONOCYTES NFR BLD AUTO: 8.1 % (ref 4–12)
NEUTROPHILS # BLD AUTO: 2.95 10*3/MM3 (ref 1.5–7)
NEUTROPHILS NFR BLD AUTO: 63 % (ref 39–75)
NRBC BLD MANUAL-RTO: 0 /100 WBC (ref 0–0)
PLATELET # BLD AUTO: 129 10*3/MM3 (ref 150–375)
PMV BLD AUTO: 9.2 FL (ref 8.9–12.1)
RBC # BLD AUTO: 3.43 10*6/MM3 (ref 3.9–5)
TIBC SERPL-MCNC: 291 MCG/DL (ref 249–505)
TRANSFERRIN SERPL-MCNC: 208 MG/DL (ref 200–360)
WBC NRBC COR # BLD: 4.69 10*3/MM3 (ref 4–10)

## 2018-06-26 PROCEDURE — 82728 ASSAY OF FERRITIN: CPT | Performed by: INTERNAL MEDICINE

## 2018-06-26 PROCEDURE — 83540 ASSAY OF IRON: CPT | Performed by: INTERNAL MEDICINE

## 2018-06-26 PROCEDURE — 63510000001 EPOETIN ALFA PER 1000 UNITS: Performed by: INTERNAL MEDICINE

## 2018-06-26 PROCEDURE — 84466 ASSAY OF TRANSFERRIN: CPT | Performed by: INTERNAL MEDICINE

## 2018-06-26 PROCEDURE — 36415 COLL VENOUS BLD VENIPUNCTURE: CPT | Performed by: INTERNAL MEDICINE

## 2018-06-26 PROCEDURE — 85025 COMPLETE CBC W/AUTO DIFF WBC: CPT | Performed by: INTERNAL MEDICINE

## 2018-06-26 PROCEDURE — 99214 OFFICE O/P EST MOD 30 MIN: CPT | Performed by: INTERNAL MEDICINE

## 2018-06-26 PROCEDURE — 96372 THER/PROPH/DIAG INJ SC/IM: CPT | Performed by: INTERNAL MEDICINE

## 2018-06-26 RX ADMIN — ERYTHROPOIETIN 6000 UNITS: 20000 INJECTION, SOLUTION INTRAVENOUS; SUBCUTANEOUS at 16:08

## 2018-06-26 NOTE — PROGRESS NOTES
Subjective      CHIEF COMPLAINT:      · Anemia secondary to chronic kidney disease    HISTORY OF PRESENT ILLNESS:     Janel Mahoney is a 77 y.o.  patient with anemia of chronic kidney disease.  She is receiving Procrit therapy.  She is reporting pain in the left shoulder.  She is not having fatigue.  No shortness breath.      Past Medical History:   Diagnosis Date   • Anemia    • Atrial fibrillation    • Bruises easily    • Carotid artery stenosis    • Chronic back pain    • Chronic coronary artery disease     moderate to severe LV dysfunction.   • GERD (gastroesophageal reflux disease)    • H/O cardiac murmur    • [place] (hard of hearing)     wears hearing aids   • Hyperlipidemia    • Hypertension    • Kyphoscoliosis    • Leukopenia    • Lumbar spondylosis    • Obesity    • GEORGINA (obstructive sleep apnea)    • Osteoarthritis    • Osteoporosis    • Peptic ulcer    • Premature ventricular contractions    • Renal insufficiency syndrome    • Scoliosis    • Shoulder fracture, left    • Stroke syndrome    • Thrombocytopenia    • Ventricular tachycardia    • Vitamin B12 deficiency        Past Surgical History:   Procedure Laterality Date   • AV NODE ABLATION     • BREAST BIOPSY     • CARDIAC CATHETERIZATION      Showed severe mitral insufficiency and borderline coronary artery disease   • CARDIAC CATHETERIZATION      Showed an ejection fraction of 35%. She had occlusive disease of the right posterior LV branch and no other significant disease, treated medically.   • CARDIAC DEFIBRILLATOR PLACEMENT      Biventricular   • CARDIAC VALVE REPLACEMENT  2009    Done with stent placement   • CARDIOVERSION      multiple electrocardioversions.   • CAROTID ARTERY ANGIOPLASTY Right    • COLONOSCOPY N/A 9/28/2017    Procedure: COLONOSCOPY TO CECUM;  Surgeon: Kevin Davis MD;  Location: [facility] ENDOSCOPY;  Service:    • CORONARY ANGIOPLASTY WITH STENT PLACEMENT  2009   • CORONARY ARTERY BYPASS GRAFT      single graft to the PDA   •  CORONARY STENT PLACEMENT     • ENDOSCOPY N/A 9/28/2017    Procedure: ESOPHAGOGASTRODUODENOSCOPY ;  Surgeon: Kevin Davis MD;  Location: [facility] ENDOSCOPY;  Service:    • HEMORRHOIDECTOMY     • HYSTERECTOMY     • MITRAL VALVE REPLACEMENT  01/2010    #31 Epic porcine valve.   • THROMBOENDARTERECTOMY Right     carotid thromboendarterectomy    • TONSILLECTOMY      age 32   • TOTAL KNEE ARTHROPLASTY Left    • TOTAL SHOULDER ARTHROPLASTY W/ DISTAL CLAVICLE EXCISION Left 1/16/2018    Procedure: TOTAL SHOULDER REVERSE ARTHROPLASTY;  Surgeon: Bianka Quesada MD;  Location: [facility] MAIN OR;  Service:        Cancer-related family history includes Breast cancer in her mother; Cancer in her mother.    SCHEDULED MEDS:    Current Outpatient Prescriptions:   •  ACETAMINOPHEN PO, Take 325 mg by mouth 4 (Four) Times a Day As Needed. Senior choice , Disp: , Rfl:   •  alendronate (FOSAMAX) 70 MG tablet, Take 1 tablet by mouth Every 7 (Seven) Days. Set up for 30 minutes drink 8 ounces of water and do not eat 30 minutes, Disp: 12 tablet, Rfl: 3  •  aspirin 81 MG tablet, Take 81 mg by mouth Daily., Disp: , Rfl:   •  calcitriol (ROCALTROL) 0.25 MCG capsule, TAKE 1 CAPSULE EVERY OTHER DAY, Disp: 45 capsule, Rfl: 2  •  carvedilol (COREG) 25 MG tablet, TAKE 1 TABLET TWICE DAILY, Disp: 180 tablet, Rfl: 3  •  Cholecalciferol (VITAMIN D) 2000 UNITS tablet, Take 1 tablet by mouth daily., Disp: , Rfl:   •  Cyanocobalamin (VITAMIN B12 PO), Take 1 tablet by mouth daily. As directed, Disp: , Rfl:   •  epoetin adele (EPOGEN,PROCRIT) 24821 UNIT/ML injection, as needed. Procrit 78712 UNIT/ML Injection Solution; Patient Sig: Procrit 72196 UNIT/ML Injection Solution INJECT SUBCUTANEOUSLY AS DIRECTED.; 0; 01-Apr-2014; Active, Disp: , Rfl:   •  FERROUS SULFATE PO, Take 324 mg by mouth Daily. Take as directed  , Disp: , Rfl:   •  furosemide (LASIX) 40 MG tablet, TAKE 1 AND 1/2 TABLETS EVERY MORNING AND 1 TABLET EVERY EVENING, Disp: 225 tablet, Rfl:  "3  •  Multiple Vitamin (MULTIVITAMIN) capsule, Take 1 capsule by mouth daily., Disp: , Rfl:   •  mupirocin (BACTROBAN) 2 % nasal ointment, into each nostril. PER MD ORDERS, Disp: , Rfl:   •  pantoprazole (PROTONIX) 40 MG EC tablet, Take 1 tablet by mouth 2 (Two) Times a Day., Disp: 180 tablet, Rfl: 1  •  ramipril (ALTACE) 5 MG capsule, Take 5 mg by mouth Daily., Disp: , Rfl:   •  simvastatin (ZOCOR) 40 MG tablet, TAKE 1 TABLET EVERY DAY (Patient taking differently: TAKE 1/2 TABLET EVERY DAY), Disp: 90 tablet, Rfl: 1  •  traMADol (ULTRAM) 50 MG tablet, TAKE 2 TABLETS EVERY MORNING  AND TAKE 2 TABLETS AT BEDTIME, Disp: 360 tablet, Rfl: 1  •  warfarin (COUMADIN) 1 MG tablet, TAKE 1 TO 2 TABLETS EVERY DAY AS DIRECTED, Disp: 180 tablet, Rfl: 3  •  zolpidem (AMBIEN) 10 MG tablet, Take 1 tablet by mouth At Night As Needed for Sleep. 3 months, Disp: 90 tablet, Rfl: 1  No current facility-administered medications for this visit.     Facility-Administered Medications Ordered in Other Visits:   •  epoetin adele (EPOGEN,PROCRIT) injection 6,000 Units, 6,000 Units, Subcutaneous, Once, Edward Vasquez MD    REVIEW OF SYSTEMS:  GENERAL:  No Fatigue.   SKIN:  No skin rashes or lesions.  HEME/LYMPH: Anemia. No Easy bruisability.   RESPIRATORY:  No Shortness of breath.    CVS:  No Chest pain. No Lower extremity swelling.    MUSCULOSKELETAL:  Left shoulder pain.        Objective   VITAL SIGNS:  Vitals:    06/26/18 1530   BP: 136/78   Pulse: 78   Resp: 18   Temp: 98.7 °F (37.1 °C)   TempSrc: Oral   SpO2: 99%   Weight: 69.5 kg (153 lb 3.2 oz)   Height: 165.1 cm (65\")   PainSc: 0-No pain       Wt Readings from Last 3 Encounters:   06/26/18 69.5 kg (153 lb 3.2 oz)   06/12/18 69.2 kg (152 lb 9.6 oz)   05/10/18 68 kg (150 lb)       PHYSICAL EXAMINATION:  GENERAL:  The patient appears in good general condition, not in acute distress.  SKIN:  No skin rashes or lesions. No Ecchymosis or Petechiae.  HEAD:  Normocephalic.  EYES:  No Pallor. No " Jaundice.   EXTREMITIES:  Decreased range of motion in the left upper extremity.  Trace leg Edema. No Calf tenderness. No Cyanosis.       RESULT REVIEW:       Results from last 7 days  Lab Units 06/26/18  1525   WBC 10*3/mm3 4.69   NEUTROS ABS 10*3/mm3 2.95   HEMOGLOBIN g/dL 9.8*   HEMATOCRIT % 32.1*   PLATELETS 10*3/mm3 129*     Lab Results   Component Value Date    FERRITIN 223.00 06/26/2018    FERRITIN 245.70 04/19/2018    FERRITIN 313.60 01/29/2018    IRON 73 06/26/2018    IRON 81 04/19/2018    IRON 56 01/29/2018    TIBC 291 06/26/2018    TIBC 287 04/19/2018    TIBC 286 01/29/2018     Lab Results   Component Value Date    FOLATE 12.21 08/04/2016     Lab Results   Component Value Date    KCWDLODO04 2,000 (H) 11/29/2016    LHEBYPGM02 1,913 (H) 08/04/2016       Assessment/Plan    1.  Anemia of chronic kidney disease, stage III.  She is on Procrit therapy.  The most recent dose was 8000 units every 2 weeks.  Hemoglobin increased from 10.5 on 5/30/2018 to 11.0 on 6/14/2018 but has decreased to 9.8 today.  Iron stores are adequate.  She is on oral iron once a day.      2.  Thrombocytopenia due to Vitamin B12 deficiency.  She is on vitamin B12 1000 mcg daily.  Platelet count stable      PLAN:    1.  Restart Procrit at 6000 units every 3 weeks.    2.  Continue oral iron once daily.    3.  Continue B12 1000 mcg daily.    4.  Return every 3 weeks for CBC and Procrit.  We will see in follow-up in 12 weeks.      Edward Vasquez MD  06/26/18

## 2018-06-28 ENCOUNTER — APPOINTMENT (OUTPATIENT)
Dept: LAB | Facility: HOSPITAL | Age: 78
End: 2018-06-28

## 2018-06-28 ENCOUNTER — APPOINTMENT (OUTPATIENT)
Dept: ONCOLOGY | Facility: HOSPITAL | Age: 78
End: 2018-06-28

## 2018-06-28 ENCOUNTER — APPOINTMENT (OUTPATIENT)
Dept: ONCOLOGY | Facility: CLINIC | Age: 78
End: 2018-06-28

## 2018-07-10 ENCOUNTER — TELEPHONE (OUTPATIENT)
Dept: CARDIOLOGY | Facility: CLINIC | Age: 78
End: 2018-07-10

## 2018-07-10 ENCOUNTER — HOSPITAL ENCOUNTER (OUTPATIENT)
Dept: CARDIOLOGY | Facility: HOSPITAL | Age: 78
Setting detail: RECURRING SERIES
Discharge: HOME | End: 2018-07-10

## 2018-07-10 PROCEDURE — 85610 PROTHROMBIN TIME: CPT

## 2018-07-10 PROCEDURE — 36416 COLLJ CAPILLARY BLOOD SPEC: CPT

## 2018-07-10 NOTE — TELEPHONE ENCOUNTER
Patient notified Dr. Galarza is OK with these changes but no others.  Patient verbalized understanding.  Medication list updated./ GHASSAN

## 2018-07-10 NOTE — TELEPHONE ENCOUNTER
Patient dropped off a note stating Dr. Milagros Cruz, Nephrologist ([phone]) instructed to make the following med changes.  Decrease Ramapril from 5 mg to 2.5 mg daily and increase  Calcitrol from 0.25 mg every other day to every day.      Patient's CRT drawn on 6/26/18 was 1.94.      Patient wants to be certain you are OK with these changes.     Please advise.       Patient's phone number is ([phone]) / GHASSAN

## 2018-07-17 ENCOUNTER — LAB (OUTPATIENT)
Dept: LAB | Facility: HOSPITAL | Age: 78
End: 2018-07-17

## 2018-07-17 ENCOUNTER — INFUSION (OUTPATIENT)
Dept: ONCOLOGY | Facility: HOSPITAL | Age: 78
End: 2018-07-17

## 2018-07-17 DIAGNOSIS — D63.1 ANEMIA IN STAGE 3 CHRONIC KIDNEY DISEASE (HCC): ICD-10-CM

## 2018-07-17 DIAGNOSIS — D63.1 ANEMIA IN STAGE 3 CHRONIC KIDNEY DISEASE (HCC): Primary | ICD-10-CM

## 2018-07-17 DIAGNOSIS — E53.8 B12 DEFICIENCY: ICD-10-CM

## 2018-07-17 DIAGNOSIS — N18.30 ANEMIA IN STAGE 3 CHRONIC KIDNEY DISEASE (HCC): Primary | ICD-10-CM

## 2018-07-17 DIAGNOSIS — N18.30 ANEMIA IN STAGE 3 CHRONIC KIDNEY DISEASE (HCC): ICD-10-CM

## 2018-07-17 DIAGNOSIS — D69.6 THROMBOCYTOPENIA (HCC): ICD-10-CM

## 2018-07-17 LAB
DEPRECATED RDW RBC AUTO: 49.2 FL (ref 37–49)
ERYTHROCYTE [DISTWIDTH] IN BLOOD BY AUTOMATED COUNT: 14.4 % (ref 11.7–14.5)
FERRITIN SERPL-MCNC: 201.5 NG/ML
HCT VFR BLD AUTO: 31.2 % (ref 34–45)
HGB BLD-MCNC: 9.8 G/DL (ref 11.5–14.9)
IRON 24H UR-MRATE: 85 MCG/DL (ref 37–145)
IRON SATN MFR SERPL: 28 % (ref 14–48)
MCH RBC QN AUTO: 29.3 PG (ref 27–33)
MCHC RBC AUTO-ENTMCNC: 31.4 G/DL (ref 32–35)
MCV RBC AUTO: 93.1 FL (ref 83–97)
PLATELET # BLD AUTO: 115 10*3/MM3 (ref 150–375)
PMV BLD AUTO: 10.4 FL (ref 8.9–12.1)
RBC # BLD AUTO: 3.35 10*6/MM3 (ref 3.9–5)
TIBC SERPL-MCNC: 307 MCG/DL (ref 249–505)
TRANSFERRIN SERPL-MCNC: 219 MG/DL (ref 200–360)
WBC NRBC COR # BLD: 4.29 10*3/MM3 (ref 4–10)

## 2018-07-17 PROCEDURE — 83540 ASSAY OF IRON: CPT | Performed by: INTERNAL MEDICINE

## 2018-07-17 PROCEDURE — 63510000001 EPOETIN ALFA PER 1000 UNITS: Performed by: INTERNAL MEDICINE

## 2018-07-17 PROCEDURE — 82728 ASSAY OF FERRITIN: CPT | Performed by: INTERNAL MEDICINE

## 2018-07-17 PROCEDURE — 96372 THER/PROPH/DIAG INJ SC/IM: CPT

## 2018-07-17 PROCEDURE — 36415 COLL VENOUS BLD VENIPUNCTURE: CPT | Performed by: INTERNAL MEDICINE

## 2018-07-17 PROCEDURE — 85027 COMPLETE CBC AUTOMATED: CPT | Performed by: INTERNAL MEDICINE

## 2018-07-17 PROCEDURE — 84466 ASSAY OF TRANSFERRIN: CPT | Performed by: INTERNAL MEDICINE

## 2018-07-17 RX ADMIN — ERYTHROPOIETIN 6000 UNITS: 20000 INJECTION, SOLUTION INTRAVENOUS; SUBCUTANEOUS at 14:08

## 2018-07-25 RX ORDER — SIMVASTATIN 40 MG
TABLET ORAL
Qty: 90 TABLET | Refills: 1 | Status: SHIPPED | OUTPATIENT
Start: 2018-07-25 | End: 2018-11-23

## 2018-07-27 RX ORDER — ALENDRONATE SODIUM 70 MG/1
TABLET ORAL
Qty: 12 TABLET | Refills: 3 | Status: SHIPPED | OUTPATIENT
Start: 2018-07-27 | End: 2018-11-23

## 2018-08-07 ENCOUNTER — HOSPITAL ENCOUNTER (OUTPATIENT)
Dept: CARDIOLOGY | Facility: HOSPITAL | Age: 78
Setting detail: RECURRING SERIES
Discharge: HOME | End: 2018-08-07

## 2018-08-07 ENCOUNTER — INFUSION (OUTPATIENT)
Dept: ONCOLOGY | Facility: HOSPITAL | Age: 78
End: 2018-08-07

## 2018-08-07 ENCOUNTER — LAB (OUTPATIENT)
Dept: LAB | Facility: HOSPITAL | Age: 78
End: 2018-08-07

## 2018-08-07 DIAGNOSIS — D63.1 ANEMIA IN STAGE 3 CHRONIC KIDNEY DISEASE (HCC): ICD-10-CM

## 2018-08-07 DIAGNOSIS — N18.30 ANEMIA IN STAGE 3 CHRONIC KIDNEY DISEASE (HCC): ICD-10-CM

## 2018-08-07 LAB
DEPRECATED RDW RBC AUTO: 47.9 FL (ref 37–49)
ERYTHROCYTE [DISTWIDTH] IN BLOOD BY AUTOMATED COUNT: 14.6 % (ref 11.7–14.5)
HCT VFR BLD AUTO: 33.1 % (ref 34–45)
HGB BLD-MCNC: 10.5 G/DL (ref 11.5–14.9)
MCH RBC QN AUTO: 28.6 PG (ref 27–33)
MCHC RBC AUTO-ENTMCNC: 31.7 G/DL (ref 32–35)
MCV RBC AUTO: 90.2 FL (ref 83–97)
PLATELET # BLD AUTO: 94 10*3/MM3 (ref 150–375)
PMV BLD AUTO: 10.7 FL (ref 8.9–12.1)
RBC # BLD AUTO: 3.67 10*6/MM3 (ref 3.9–5)
WBC NRBC COR # BLD: 23.73 10*3/MM3 (ref 4–10)

## 2018-08-07 PROCEDURE — 36416 COLLJ CAPILLARY BLOOD SPEC: CPT | Performed by: INTERNAL MEDICINE

## 2018-08-07 PROCEDURE — 85027 COMPLETE CBC AUTOMATED: CPT | Performed by: INTERNAL MEDICINE

## 2018-08-07 PROCEDURE — 85610 PROTHROMBIN TIME: CPT

## 2018-08-07 PROCEDURE — 36416 COLLJ CAPILLARY BLOOD SPEC: CPT

## 2018-08-07 NOTE — PROGRESS NOTES
Reviewed CBC with patient.   Hgb today is 10.5.   No procrit today per guidelines.  Patient has c/o of not feeling well.  states that she ate a hamburger last night at [employer] and she is nauseated and has been having some diarrhea.  Since we have not treated her here with anything to cause these symptoms, suggested that she call her PCP or go to immediate care.   v/u.  Given copy of labs    Lab Results   Component Value Date    WBC 23.73 (H) 08/07/2018    HGB 10.5 (L) 08/07/2018    HCT 33.1 (L) 08/07/2018    MCV 90.2 08/07/2018    PLT 94 (L) 08/07/2018

## 2018-08-09 ENCOUNTER — OFFICE VISIT (OUTPATIENT)
Dept: FAMILY MEDICINE CLINIC | Facility: CLINIC | Age: 78
End: 2018-08-09

## 2018-08-09 VITALS
HEIGHT: 65 IN | HEART RATE: 75 BPM | SYSTOLIC BLOOD PRESSURE: 122 MMHG | WEIGHT: 155 LBS | RESPIRATION RATE: 12 BRPM | OXYGEN SATURATION: 100 % | BODY MASS INDEX: 25.83 KG/M2 | TEMPERATURE: 98.3 F | DIASTOLIC BLOOD PRESSURE: 70 MMHG

## 2018-08-09 DIAGNOSIS — I25.810 CORONARY ARTERY DISEASE INVOLVING CORONARY BYPASS GRAFT OF NATIVE HEART WITHOUT ANGINA PECTORIS: ICD-10-CM

## 2018-08-09 DIAGNOSIS — I50.42 CHRONIC COMBINED SYSTOLIC AND DIASTOLIC CONGESTIVE HEART FAILURE (HCC): ICD-10-CM

## 2018-08-09 DIAGNOSIS — D69.6 THROMBOCYTOPENIA (HCC): ICD-10-CM

## 2018-08-09 DIAGNOSIS — R60.9 EDEMA, UNSPECIFIED TYPE: Primary | ICD-10-CM

## 2018-08-09 LAB
ERYTHROCYTE [DISTWIDTH] IN BLOOD BY AUTOMATED COUNT: 15.7 % (ref 4.5–15)
HCT VFR BLD AUTO: 29.7 % (ref 31–42)
HGB BLD-MCNC: 10 G/DL (ref 12–18)
LYMPHOCYTES # BLD AUTO: 0.7 10*3/MM3 (ref 1.2–3.4)
LYMPHOCYTES NFR BLD AUTO: 8.9 % (ref 21–51)
MCH RBC QN AUTO: 28.9 PG (ref 26.1–33.1)
MCHC RBC AUTO-ENTMCNC: 33.6 G/DL (ref 33–37)
MCV RBC AUTO: 86.1 FL (ref 80–99)
MONOCYTES # BLD AUTO: 0.3 10*3/MM3 (ref 0.1–0.6)
MONOCYTES NFR BLD AUTO: 3.5 % (ref 2–9)
NEUTROPHILS # BLD AUTO: 6.7 10*3/MM3 (ref 1.4–6.5)
NEUTROPHILS NFR BLD AUTO: 87.6 % (ref 42–75)
PLATELET # BLD AUTO: 133 10*3/MM3 (ref 150–450)
PMV BLD AUTO: 7.7 FL (ref 7.1–10.5)
RBC # BLD AUTO: 3.45 10*6/MM3 (ref 4–6)
WBC NRBC COR # BLD: 7.7 10*3/MM3 (ref 4.5–10)

## 2018-08-09 PROCEDURE — 99214 OFFICE O/P EST MOD 30 MIN: CPT | Performed by: INTERNAL MEDICINE

## 2018-08-09 PROCEDURE — 36415 COLL VENOUS BLD VENIPUNCTURE: CPT | Performed by: INTERNAL MEDICINE

## 2018-08-09 PROCEDURE — 85025 COMPLETE CBC W/AUTO DIFF WBC: CPT | Performed by: INTERNAL MEDICINE

## 2018-08-09 RX ORDER — HYDROCODONE BITARTRATE AND ACETAMINOPHEN 7.5; 325 MG/1; MG/1
1 TABLET ORAL EVERY 6 HOURS PRN
COMMUNITY
End: 2018-11-12 | Stop reason: SDUPTHER

## 2018-08-09 NOTE — PROGRESS NOTES
Subjective   Janel Mahoney is a 77 y.o. female.     History of Present Illness   Patient was seen for edema of lower extremities.  Patient's had prior venous Dopplers that were negative.  She was going to physical therapy and noticed fluid was getting on the therapy bench.  Her legs were swollen and she was referred back the office.  Patient does have marked swelling of the lower extremities due to venous insufficiency.  She was instructed to wear ALICIA stockings and elevate her legs.  Her coronary disease was stable this time congestive heart failure also was well controlled.  Patient did have lab work at her hematologist indicated a white count 25,000 platelet count of 90,000.  The lab was repeated white count was now 7000 with a platelet count of 133,000.  He was deemed the other lab was an error.    Dictated utilizing Dragon dictation. If there are questions or for further clarification, please contact me.   The following portions of the patient's history were reviewed and updated as appropriate: allergies, current medications, past family history, past medical history, past social history, past surgical history and problem list.    Review of Systems   Constitutional: Negative for fatigue and fever.   HENT: Positive for congestion. Negative for trouble swallowing.    Eyes: Negative for discharge and visual disturbance.   Respiratory: Negative for choking and shortness of breath.    Cardiovascular: Positive for leg swelling. Negative for chest pain and palpitations.   Gastrointestinal: Negative for abdominal pain and blood in stool.   Endocrine: Negative.    Genitourinary: Negative for genital sores and hematuria.   Musculoskeletal: Negative for gait problem and joint swelling.   Skin: Negative for color change, pallor, rash and wound.   Allergic/Immunologic: Positive for environmental allergies. Negative for immunocompromised state.   Neurological: Negative for facial asymmetry and speech difficulty.    Psychiatric/Behavioral: Negative for hallucinations and suicidal ideas.       Objective   Physical Exam   Constitutional: She is oriented to person, place, and time. She appears well-developed and well-nourished.   HENT:   Head: Normocephalic.   Eyes: Pupils are equal, round, and reactive to light. Conjunctivae are normal.   Neck: Normal range of motion. Neck supple.   Cardiovascular: Normal rate, regular rhythm and normal heart sounds.  Exam reveals no gallop and no friction rub.    No murmur heard.  Pulmonary/Chest: Effort normal and breath sounds normal. No respiratory distress. She has no wheezes. She has no rales. She exhibits no tenderness.   Abdominal: Soft. Bowel sounds are normal.   Musculoskeletal: Normal range of motion. She exhibits edema.   Neurological: She is alert and oriented to person, place, and time.   Skin: Skin is warm and dry.   Psychiatric: She has a normal mood and affect. Her behavior is normal. Judgment and thought content normal.   Nursing note and vitals reviewed.      Assessment/Plan   Problems Addressed this Visit        Cardiovascular and Mediastinum    Coronary artery disease involving coronary bypass graft of native heart without angina pectoris    Chronic combined systolic and diastolic congestive heart failure (CMS/HCC)       Hematopoietic and Hemostatic    Thrombocytopenia (CMS/HCC)    Relevant Orders    CBC & Differential (Completed)      Other Visit Diagnoses     Edema, unspecified type    -  Primary

## 2018-08-28 ENCOUNTER — LAB (OUTPATIENT)
Dept: LAB | Facility: HOSPITAL | Age: 78
End: 2018-08-28

## 2018-08-28 ENCOUNTER — INFUSION (OUTPATIENT)
Dept: ONCOLOGY | Facility: HOSPITAL | Age: 78
End: 2018-08-28

## 2018-08-28 DIAGNOSIS — D63.1 ANEMIA IN STAGE 3 CHRONIC KIDNEY DISEASE (HCC): Primary | ICD-10-CM

## 2018-08-28 DIAGNOSIS — D63.1 ANEMIA IN STAGE 3 CHRONIC KIDNEY DISEASE (HCC): ICD-10-CM

## 2018-08-28 DIAGNOSIS — N18.30 ANEMIA IN STAGE 3 CHRONIC KIDNEY DISEASE (HCC): Primary | ICD-10-CM

## 2018-08-28 DIAGNOSIS — N18.30 ANEMIA IN STAGE 3 CHRONIC KIDNEY DISEASE (HCC): ICD-10-CM

## 2018-08-28 LAB
DEPRECATED RDW RBC AUTO: 50.6 FL (ref 37–49)
ERYTHROCYTE [DISTWIDTH] IN BLOOD BY AUTOMATED COUNT: 15.3 % (ref 11.7–14.5)
HCT VFR BLD AUTO: 27.3 % (ref 34–45)
HGB BLD-MCNC: 8.4 G/DL (ref 11.5–14.9)
MCH RBC QN AUTO: 28.4 PG (ref 27–33)
MCHC RBC AUTO-ENTMCNC: 30.8 G/DL (ref 32–35)
MCV RBC AUTO: 92.2 FL (ref 83–97)
PLATELET # BLD AUTO: 112 10*3/MM3 (ref 150–375)
PMV BLD AUTO: 10.4 FL (ref 8.9–12.1)
RBC # BLD AUTO: 2.96 10*6/MM3 (ref 3.9–5)
WBC NRBC COR # BLD: 4.82 10*3/MM3 (ref 4–10)

## 2018-08-28 PROCEDURE — 85027 COMPLETE CBC AUTOMATED: CPT | Performed by: INTERNAL MEDICINE

## 2018-08-28 PROCEDURE — 36415 COLL VENOUS BLD VENIPUNCTURE: CPT | Performed by: INTERNAL MEDICINE

## 2018-08-28 PROCEDURE — 63510000001 EPOETIN ALFA PER 1000 UNITS: Performed by: INTERNAL MEDICINE

## 2018-08-28 PROCEDURE — 96372 THER/PROPH/DIAG INJ SC/IM: CPT

## 2018-08-28 RX ADMIN — ERYTHROPOIETIN 6000 UNITS: 20000 INJECTION, SOLUTION INTRAVENOUS; SUBCUTANEOUS at 13:49

## 2018-08-28 NOTE — PROGRESS NOTES
Hgb today is 8.4.  Patient denies any increase in fatigue, soa. Denies any h/a or lightheadedness.  Patient declines the need for transfusion at this time.  Patient states that she will call if any changes and come in early to check CBC.  Given copy of labs.

## 2018-09-04 ENCOUNTER — HOSPITAL ENCOUNTER (OUTPATIENT)
Dept: CARDIOLOGY | Facility: HOSPITAL | Age: 78
Setting detail: RECURRING SERIES
Discharge: HOME | End: 2018-09-04

## 2018-09-04 PROCEDURE — 36416 COLLJ CAPILLARY BLOOD SPEC: CPT

## 2018-09-04 PROCEDURE — 85610 PROTHROMBIN TIME: CPT

## 2018-09-06 ENCOUNTER — OFFICE VISIT (OUTPATIENT)
Dept: SLEEP MEDICINE | Facility: HOSPITAL | Age: 78
End: 2018-09-06
Attending: INTERNAL MEDICINE

## 2018-09-06 VITALS
SYSTOLIC BLOOD PRESSURE: 124 MMHG | WEIGHT: 149.6 LBS | DIASTOLIC BLOOD PRESSURE: 56 MMHG | HEIGHT: 60 IN | OXYGEN SATURATION: 99 % | BODY MASS INDEX: 29.37 KG/M2 | HEART RATE: 82 BPM

## 2018-09-06 DIAGNOSIS — G47.33 OSA ON CPAP: Primary | Chronic | ICD-10-CM

## 2018-09-06 DIAGNOSIS — Z99.89 OSA ON CPAP: Primary | Chronic | ICD-10-CM

## 2018-09-06 PROCEDURE — 99213 OFFICE O/P EST LOW 20 MIN: CPT | Performed by: INTERNAL MEDICINE

## 2018-09-06 PROCEDURE — G0463 HOSPITAL OUTPT CLINIC VISIT: HCPCS

## 2018-09-06 NOTE — PROGRESS NOTES
Pt fitted with [employer]erra DreamWear FFM size small frame, small cushion.  Pt is trying mask x1week.  Pt to return mask to sleep center after 7 days of use.  If appropriate fit, pt will have mask ordered. mariza

## 2018-09-06 NOTE — PROGRESS NOTES
"Follow Up Sleep Disorders Center Note     Chief Complaint:  GEORGINA     Primary Care Physician: Ariel Matute MD    Interval History:   The patient was last seen by me in September 2017.  She states she is stable and unchanged.  She does go to bed around 2 AM and awakens between 8 and 9 AM.  [place] Sleepiness Scale normal at 3.    The patient fell last Conner Rochelle fracturing her shoulder necessitating a replacement.    Review of Systems:  Recorded on the Sleep Questionnaire.  Unremarkable     Social History:    Social History     Social History   • Marital status:      Spouse name: Russ     Occupational History   • [employer] Retired     Social History Main Topics   • Smoking status: Former Smoker     Packs/day: 1.00     Years: 50.00     Types: Cigarettes     Quit date: 1/11/2009   • Smokeless tobacco: Never Used   • Alcohol use Yes      Comment: rare   • Drug use: No   • Sexual activity: Defer     Other Topics Concern   • Not on file       Allergies:  Diclofenac and Dofetilide     Medication Review:  Her list was reviewed.      Vital Signs:    Vitals:    09/06/18 1147   BP: 124/56   BP Location: Right arm   Patient Position: Sitting   Cuff Size: Large Adult   Pulse: 82   SpO2: 99%   Weight: 67.9 kg (149 lb 9.6 oz)   Height: 152.4 cm (60\")     Body mass index is 29.22 kg/m².    Physical Exam:    Constitutional:  Well developed white female and appears in no apparent distress.  Awake & oriented times 3.  Normal mood with normal recent and remote memory and normal judgement.  Eyes:  Conjunctivae normal.  Oropharynx:  moist mucous membranes without exudate and a large tongue and class III-IV MP airway      Results Review:  DME is [facility] and she uses a fullface mask.  No up-to-date downloads available.     Downloads between July 16 and September 10, 2018 obtained.  Compliance greater than 80%.  Average usage greater than 4 hours and 49 minutes.  AHI is normal at 5 without leak.  Average auto CPAP " pressure is 9.9 and her auto CPAP is 7-extreme.       Impression:   GEORGINA previously nearly adequately treated with auto titrating CPAP with good compliance and usage.  No up-to-date downloads presently available.  No complaints of hypersomnolence      Plan:  Good sleep hygiene measures should be maintained.  Weight loss would be beneficial in this patient who is borderline obese by BMI.  The patient is benefiting from the treatment being provided.     Patient will continue auto CPAP.  She was shown a Dreamwear fullface mask, small/small.  A new prescription will be sent to her DME.      The patient will call for any problems and will follow up in one year.      Anthony Cardona MD  Sleep Medicine  09/06/18  12:04 PM

## 2018-09-10 ENCOUNTER — TELEPHONE (OUTPATIENT)
Dept: SLEEP MEDICINE | Facility: HOSPITAL | Age: 78
End: 2018-09-10

## 2018-09-11 ENCOUNTER — TELEPHONE (OUTPATIENT)
Dept: SLEEP MEDICINE | Facility: HOSPITAL | Age: 78
End: 2018-09-11

## 2018-09-11 NOTE — TELEPHONE ENCOUNTER
Patient brought in sd card for download, and brought back dreamwear full face mask. She did not like mask, sending her to [facility] for mask fit

## 2018-09-13 ENCOUNTER — TELEPHONE (OUTPATIENT)
Dept: PHARMACY | Facility: HOSPITAL | Age: 78
End: 2018-09-13

## 2018-09-13 ENCOUNTER — TELEPHONE (OUTPATIENT)
Dept: CARDIOLOGY | Facility: CLINIC | Age: 78
End: 2018-09-13

## 2018-09-13 ENCOUNTER — ANTICOAGULATION VISIT (OUTPATIENT)
Dept: PHARMACY | Facility: HOSPITAL | Age: 78
End: 2018-09-13

## 2018-09-13 ENCOUNTER — OFFICE VISIT (OUTPATIENT)
Dept: FAMILY MEDICINE CLINIC | Facility: CLINIC | Age: 78
End: 2018-09-13

## 2018-09-13 VITALS
HEIGHT: 60 IN | TEMPERATURE: 98.6 F | SYSTOLIC BLOOD PRESSURE: 140 MMHG | BODY MASS INDEX: 31.02 KG/M2 | WEIGHT: 158 LBS | HEART RATE: 79 BPM | DIASTOLIC BLOOD PRESSURE: 60 MMHG | OXYGEN SATURATION: 100 % | RESPIRATION RATE: 12 BRPM

## 2018-09-13 DIAGNOSIS — I25.810 CORONARY ARTERY DISEASE INVOLVING CORONARY BYPASS GRAFT OF NATIVE HEART WITHOUT ANGINA PECTORIS: ICD-10-CM

## 2018-09-13 DIAGNOSIS — I50.42 CHRONIC COMBINED SYSTOLIC AND DIASTOLIC CONGESTIVE HEART FAILURE (HCC): Primary | ICD-10-CM

## 2018-09-13 DIAGNOSIS — I10 ESSENTIAL HYPERTENSION: ICD-10-CM

## 2018-09-13 DIAGNOSIS — I48.20 CHRONIC ATRIAL FIBRILLATION (HCC): ICD-10-CM

## 2018-09-13 LAB
ANION GAP SERPL CALCULATED.3IONS-SCNC: 12.6 MMOL/L
BUN BLD-MCNC: 38 MG/DL (ref 8–23)
BUN/CREAT SERPL: 20.3 (ref 7–25)
CALCIUM SPEC-SCNC: 9.3 MG/DL (ref 8.6–10.5)
CHLORIDE SERPL-SCNC: 101 MMOL/L (ref 98–107)
CO2 SERPL-SCNC: 26.4 MMOL/L (ref 22–29)
CREAT BLD-MCNC: 1.87 MG/DL (ref 0.57–1)
GFR SERPL CREATININE-BSD FRML MDRD: 26 ML/MIN/1.73
GLUCOSE BLD-MCNC: 93 MG/DL (ref 65–99)
INR PPP: 2.4 (ref 0.91–1.09)
NT-PROBNP SERPL-MCNC: 3217 PG/ML (ref 0–1800)
POTASSIUM BLD-SCNC: 4.7 MMOL/L (ref 3.5–5.2)
PROTHROMBIN TIME: 28.7 SECONDS (ref 10–13.8)
SODIUM BLD-SCNC: 140 MMOL/L (ref 136–145)

## 2018-09-13 PROCEDURE — 36415 COLL VENOUS BLD VENIPUNCTURE: CPT | Performed by: INTERNAL MEDICINE

## 2018-09-13 PROCEDURE — G0463 HOSPITAL OUTPT CLINIC VISIT: HCPCS | Performed by: PHARMACIST

## 2018-09-13 PROCEDURE — 99214 OFFICE O/P EST MOD 30 MIN: CPT | Performed by: INTERNAL MEDICINE

## 2018-09-13 PROCEDURE — 80048 BASIC METABOLIC PNL TOTAL CA: CPT | Performed by: INTERNAL MEDICINE

## 2018-09-13 PROCEDURE — 85610 PROTHROMBIN TIME: CPT

## 2018-09-13 PROCEDURE — 83880 ASSAY OF NATRIURETIC PEPTIDE: CPT | Performed by: INTERNAL MEDICINE

## 2018-09-13 PROCEDURE — 36416 COLLJ CAPILLARY BLOOD SPEC: CPT

## 2018-09-13 NOTE — PROGRESS NOTES
Subjective   Janel Mahoney is a 77 y.o. female.     History of Present Illness   Patient was seen for congestive heart failure.  She has gained 9 pounds in less than 7 days.  Patient was asked to take Lasix 40 mg 3 times a day 6 hours apart.  She was also informed she became short of breath worse immediately go to the hospital.  He did have a BNP I& BMP drawn today.  She does have coronary disease and regular sees a cardiologist.  Blood pressures been running 120s over 80s.  She is continue to monitor blood pressure and weight return to our clinic in 2 weeks.      Dictated utilizing Dragon dictation. If there are questions or for further clarification, please contact me.    The following portions of the patient's history were reviewed and updated as appropriate: allergies, current medications, past family history, past medical history, past social history, past surgical history and problem list.    Review of Systems   Constitutional: Positive for unexpected weight change. Negative for fatigue and fever.   HENT: Positive for congestion. Negative for trouble swallowing.    Eyes: Negative for discharge and visual disturbance.   Respiratory: Positive for shortness of breath. Negative for choking.    Cardiovascular: Positive for leg swelling. Negative for chest pain and palpitations.   Gastrointestinal: Negative for abdominal pain and blood in stool.   Endocrine: Negative.    Genitourinary: Negative for genital sores and hematuria.   Musculoskeletal: Negative for gait problem and joint swelling.   Skin: Negative for color change, pallor, rash and wound.   Allergic/Immunologic: Positive for environmental allergies. Negative for immunocompromised state.   Neurological: Negative for facial asymmetry and speech difficulty.   Psychiatric/Behavioral: Negative for hallucinations and suicidal ideas.       Objective   Physical Exam   Constitutional: She is oriented to person, place, and time. She appears well-developed and  well-nourished.   HENT:   Head: Normocephalic.   Eyes: Pupils are equal, round, and reactive to light. Conjunctivae are normal.   Neck: Normal range of motion. Neck supple.   Cardiovascular: Normal rate and regular rhythm.  Exam reveals no gallop and no friction rub.    Murmur heard.  Pulmonary/Chest: Effort normal. No respiratory distress. She has no wheezes. She has rales. She exhibits no tenderness.   Abdominal: Soft. Bowel sounds are normal.   Musculoskeletal: Normal range of motion. She exhibits edema.   Neurological: She is alert and oriented to person, place, and time.   Skin: Skin is warm and dry.   Psychiatric: She has a normal mood and affect. Her behavior is normal. Judgment and thought content normal.   Nursing note and vitals reviewed.      Assessment/Plan   Problems Addressed this Visit        Cardiovascular and Mediastinum    Coronary artery disease involving coronary bypass graft of native heart without angina pectoris    Hypertension    Chronic combined systolic and diastolic congestive heart failure (CMS/HCC) - Primary    Relevant Orders    Basic Metabolic Panel    proBNP

## 2018-09-13 NOTE — TELEPHONE ENCOUNTER
Called S/W daughter ok for her to be off warfarin 5 days before but needs Lovenox bridge after, needs BMP before starting that. .HERRERAI

## 2018-09-13 NOTE — TELEPHONE ENCOUNTER
[phone]    Pt called stating she is scheduled for a spinal epidural on 9/20 and they would like her to d'c her asa 81mg and her warfarin on the 15th, 5 days prior.  Please advise.  [facility] [place] [facility]

## 2018-09-13 NOTE — PROGRESS NOTES
Anticoagulation Clinic Progress Note  Anticoagulation Summary  As of 2018    INR goal:   2.0-3.0   TTR:   --   Today's INR:   2.4   Warfarin maintenance plan:   1 mg on Sun, e, Thu; 2 mg all other days   Weekly warfarin total:   11 mg   Plan last modified:   Roseanne Feng RPH (2018)   Next INR check:   2018   Priority:   High   Target end date:   Indefinite    Indications    Chronic atrial fibrillation (CMS/HCC) [I48.2]             Anticoagulation Episode Summary     INR check location:       Preferred lab:       Send INR reminders to:   [facility] CLIN[facility] [place]    Comments:         Anticoagulation Care Providers     Provider Role Specialty Phone number    Nik Galarza MD Referring Cardiology [phone]    Roseanne Feng RPH Responsible Pharmacy             Drug interactions: No changes in medications  Diet: Consistent    Clinic Interview:  Patient Findings     Positives:   Upcoming invasive procedure    Negatives:   Signs/symptoms of thrombosis, Signs/symptoms of bleeding,   Laboratory test error suspected, Change in health, Change in alcohol use,   Change in activity, Emergency department visit, Upcoming dental procedure,   Missed doses, Extra doses, Change in medications, Change in diet/appetite,   Hospital admission, Bruising, Other complaints    Comments:   Having epidural done on 18      Clinical Outcomes     Negatives:   Major bleeding event, Thromboembolic event,   Anticoagulation-related hospital admission, Anticoagulation-related ED   visit, Anticoagulation-related fatality    Comments:   Having epidural done on 18        Education:  Janel Mahoney is a new start in the Medication Management Clinic. We discussed the followin) Warfarin's indication, mechanism, and dosing  2) Enforced the importance of taking warfarin as instructed and at the same time every day, preferably in the evening so that we can make dose adjustments more easily following  subsequent clinic visits  3) What she should do about a missed dose; pts can take missed doses within about 12 hours of their usual scheduled dose, but she was instructed on the importance of not doubling up on doses unless told to do so by the [facility] Management Clinic  4) Explained possible side effects of warfarin therapy, including increased risk of bleeding, s/sx of bleeding and s/sx of any additional clots/PE/CVA.   5) Discussed monitoring of warfarin, the INR, goal INR range, and the frequency of monitoring  6) Reviewed drug/food/tobacco/EtOH interactions and provided written information covering these topics in more detail, explaining that green, leafy vegetables interact most heavily with warfarin  7) Instructed the pt not to take or discontinue any medications without informing her physician/pharmacist and reminded her to inform us of any dietary changes, as well  8) Explained that she would be coming into the clinic more frequently in these first few weeks of therapy as we try to adjust her dose and achieve a therapeutic INR x 2 consecutive readings. Once that is achieved, patient will follow up in clinic every 4 weeks, on average.    She stated no problems with transportation or scheduling clinic appts in this manner. she expressed understanding of the information provided and has no additional questions at this time.    Janel Mahoney was presented with a copy of the Patients Rights and Responsibilities. she expressed verbal consent and agreement to receive care in the [facility]ication Management Clinic under the current collaborative care agreement with [facility].       INR History:  Previous INR data from [facility] is recorded in [place] and scanned into the patient's chart in [facility]. These results have been analyzed and reviewed.      Plan:  1. INR is Therapeutic today- see above in Anticoagulation Summary.   Will instruct Janel Mahoney to Continue their warfarin regimen-  see above in Anticoagulation Summary.  2. Follow up in 1 week post epidural  3. Patient declines warfarin refills.  4. Verbal and written information provided. Patient expresses understanding and has no further questions at this time.    Roseanne Feng RP

## 2018-09-14 ENCOUNTER — CLINICAL SUPPORT NO REQUIREMENTS (OUTPATIENT)
Dept: CARDIOLOGY | Facility: CLINIC | Age: 78
End: 2018-09-14

## 2018-09-14 ENCOUNTER — TELEPHONE (OUTPATIENT)
Dept: CARDIOLOGY | Facility: CLINIC | Age: 78
End: 2018-09-14

## 2018-09-14 DIAGNOSIS — I25.5 ISCHEMIC CARDIOMYOPATHY: ICD-10-CM

## 2018-09-14 DIAGNOSIS — I50.42 CHRONIC COMBINED SYSTOLIC AND DIASTOLIC CONGESTIVE HEART FAILURE (HCC): Primary | ICD-10-CM

## 2018-09-14 PROCEDURE — 93295 DEV INTERROG REMOTE 1/2/MLT: CPT | Performed by: INTERNAL MEDICINE

## 2018-09-14 PROCEDURE — 93296 REM INTERROG EVL PM/IDS: CPT | Performed by: INTERNAL MEDICINE

## 2018-09-14 NOTE — TELEPHONE ENCOUNTER
D/w MI. Pt had a BMP yday. GFR-26. Change to Lovenox 1 mg/kg once a day only. S/w pt, advised. Current wgt 155 lbs    Orders signed & scanned-[facility]      Pharmacy [phone] Tried multiple x's to speak w/a pharmacy associate, message states leave prescription, or try back later. I asked them to call & confirm they received-[facility]

## 2018-09-14 NOTE — TELEPHONE ENCOUNTER
Called discussed risk of stroke. Will need Lovenox 1 mg/kg sub q bid to start day after epidural needs daily INR when therapeutic will stop loveonex.. Needs BMP now. XAVI

## 2018-09-14 NOTE — TELEPHONE ENCOUNTER
She had a treated vt on 8/15/18, successful atp (anti tachycardia pacing, no shock). That is the only event she has had since she saw you in June.  She has an apt to see you in Dec

## 2018-09-17 ENCOUNTER — TELEPHONE (OUTPATIENT)
Dept: SLEEP MEDICINE | Facility: HOSPITAL | Age: 78
End: 2018-09-17

## 2018-09-18 ENCOUNTER — INFUSION (OUTPATIENT)
Dept: ONCOLOGY | Facility: HOSPITAL | Age: 78
End: 2018-09-18

## 2018-09-18 ENCOUNTER — LAB (OUTPATIENT)
Dept: LAB | Facility: HOSPITAL | Age: 78
End: 2018-09-18

## 2018-09-18 ENCOUNTER — OFFICE VISIT (OUTPATIENT)
Dept: ONCOLOGY | Facility: CLINIC | Age: 78
End: 2018-09-18

## 2018-09-18 VITALS
TEMPERATURE: 98.6 F | HEART RATE: 84 BPM | HEIGHT: 65 IN | SYSTOLIC BLOOD PRESSURE: 134 MMHG | BODY MASS INDEX: 25.36 KG/M2 | OXYGEN SATURATION: 100 % | WEIGHT: 152.2 LBS | RESPIRATION RATE: 14 BRPM | DIASTOLIC BLOOD PRESSURE: 70 MMHG

## 2018-09-18 DIAGNOSIS — D63.1 ANEMIA ASSOCIATED WITH STAGE 3 CHRONIC RENAL FAILURE (HCC): Primary | ICD-10-CM

## 2018-09-18 DIAGNOSIS — D63.1 ANEMIA IN STAGE 3 CHRONIC KIDNEY DISEASE (HCC): Primary | ICD-10-CM

## 2018-09-18 DIAGNOSIS — D69.6 THROMBOCYTOPENIA (HCC): ICD-10-CM

## 2018-09-18 DIAGNOSIS — N18.30 ANEMIA ASSOCIATED WITH STAGE 3 CHRONIC RENAL FAILURE (HCC): Primary | ICD-10-CM

## 2018-09-18 DIAGNOSIS — N18.30 ANEMIA IN STAGE 3 CHRONIC KIDNEY DISEASE (HCC): Primary | ICD-10-CM

## 2018-09-18 LAB
ABO GROUP BLD: NORMAL
BLD GP AB SCN SERPL QL: NEGATIVE
DEPRECATED RDW RBC AUTO: 57.2 FL (ref 37–49)
ERYTHROCYTE [DISTWIDTH] IN BLOOD BY AUTOMATED COUNT: 15.9 % (ref 11.7–14.5)
FERRITIN SERPL-MCNC: 145.9 NG/ML
HCT VFR BLD AUTO: 25.6 % (ref 34–45)
HGB BLD-MCNC: 7.7 G/DL (ref 11.5–14.9)
IRON 24H UR-MRATE: 89 MCG/DL (ref 37–145)
IRON SATN MFR SERPL: 29 % (ref 14–48)
MCH RBC QN AUTO: 29.5 PG (ref 27–33)
MCHC RBC AUTO-ENTMCNC: 30.1 G/DL (ref 32–35)
MCV RBC AUTO: 98.1 FL (ref 83–97)
PLATELET # BLD AUTO: 119 10*3/MM3 (ref 150–375)
PMV BLD AUTO: 8.9 FL (ref 8.9–12.1)
RBC # BLD AUTO: 2.61 10*6/MM3 (ref 3.9–5)
RH BLD: POSITIVE
T&S EXPIRATION DATE: NORMAL
TIBC SERPL-MCNC: 307 MCG/DL (ref 249–505)
TRANSFERRIN SERPL-MCNC: 219 MG/DL (ref 200–360)
WBC NRBC COR # BLD: 3.97 10*3/MM3 (ref 4–10)

## 2018-09-18 PROCEDURE — 96372 THER/PROPH/DIAG INJ SC/IM: CPT | Performed by: INTERNAL MEDICINE

## 2018-09-18 PROCEDURE — 86923 COMPATIBILITY TEST ELECTRIC: CPT

## 2018-09-18 PROCEDURE — 86900 BLOOD TYPING SEROLOGIC ABO: CPT | Performed by: INTERNAL MEDICINE

## 2018-09-18 PROCEDURE — 84466 ASSAY OF TRANSFERRIN: CPT | Performed by: INTERNAL MEDICINE

## 2018-09-18 PROCEDURE — 82728 ASSAY OF FERRITIN: CPT | Performed by: INTERNAL MEDICINE

## 2018-09-18 PROCEDURE — 86901 BLOOD TYPING SEROLOGIC RH(D): CPT | Performed by: INTERNAL MEDICINE

## 2018-09-18 PROCEDURE — 99214 OFFICE O/P EST MOD 30 MIN: CPT | Performed by: INTERNAL MEDICINE

## 2018-09-18 PROCEDURE — 86850 RBC ANTIBODY SCREEN: CPT | Performed by: INTERNAL MEDICINE

## 2018-09-18 PROCEDURE — 63510000001 EPOETIN ALFA PER 1000 UNITS: Performed by: INTERNAL MEDICINE

## 2018-09-18 PROCEDURE — 85027 COMPLETE CBC AUTOMATED: CPT | Performed by: INTERNAL MEDICINE

## 2018-09-18 PROCEDURE — 36415 COLL VENOUS BLD VENIPUNCTURE: CPT | Performed by: INTERNAL MEDICINE

## 2018-09-18 PROCEDURE — 83540 ASSAY OF IRON: CPT | Performed by: INTERNAL MEDICINE

## 2018-09-18 RX ORDER — DIPHENHYDRAMINE HCL 25 MG
25 CAPSULE ORAL ONCE
Status: CANCELLED | OUTPATIENT
Start: 2018-09-18 | End: 2018-09-18

## 2018-09-18 RX ORDER — ACETAMINOPHEN 325 MG/1
650 TABLET ORAL ONCE
Status: CANCELLED | OUTPATIENT
Start: 2018-09-18 | End: 2018-09-18

## 2018-09-18 RX ORDER — SODIUM CHLORIDE 9 MG/ML
250 INJECTION, SOLUTION INTRAVENOUS AS NEEDED
Status: CANCELLED | OUTPATIENT
Start: 2018-09-18

## 2018-09-18 RX ADMIN — ERYTHROPOIETIN 8000 UNITS: 20000 INJECTION, SOLUTION INTRAVENOUS; SUBCUTANEOUS at 13:41

## 2018-09-18 NOTE — PROGRESS NOTES
Subjective     CHIEF COMPLAINT:      Chief Complaint   Patient presents with   • Follow-up     no concerns/back pain         HISTORY OF PRESENT ILLNESS:     Janel Mahoney is a 77 y.o. female patient who returns today for follow up on her anemia.  She is receiving Procrit at 6000 units.  Hemoglobin is down to 7.7 today but she is not noticing significant fatigue or shortness breath.  She is off of her Coumadin and aspirin so that she can have epidural injection on 9/20/2018.  She has instructions from her cardiologist about Lovenox bridging.    Patient is not noticing blood in the stool.  She states that the stool is black due to the oral iron.  No juvencio blood.  No abdominal pain.    Past Medical History:   Diagnosis Date   • Anemia    • Atrial fibrillation (CMS/HCC)    • Bruises easily    • Carotid artery stenosis    • Chronic back pain    • Chronic coronary artery disease     moderate to severe LV dysfunction.   • GERD (gastroesophageal reflux disease)    • H/O cardiac murmur    • [place]hard of hearing)     wears hearing aids   • Hyperlipidemia    • Hypertension    • Kyphoscoliosis    • Leukopenia    • Lumbar spondylosis    • Obesity    • GEORGINA (obstructive sleep apnea)    • Osteoarthritis    • Osteoporosis    • Peptic ulcer    • Premature ventricular contractions    • Renal insufficiency syndrome    • Scoliosis    • Shoulder fracture, left    • Stroke syndrome (CMS/HCC)    • Thrombocytopenia (CMS/HCC)    • Ventricular tachycardia (CMS/HCC)    • Vitamin B12 deficiency        Past Surgical History:   Procedure Laterality Date   • AV NODE ABLATION     • BREAST BIOPSY     • CARDIAC CATHETERIZATION      Showed severe mitral insufficiency and borderline coronary artery disease   • CARDIAC CATHETERIZATION      Showed an ejection fraction of 35%. She had occlusive disease of the right posterior LV branch and no other significant disease, treated medically.   • CARDIAC DEFIBRILLATOR PLACEMENT      Biventricular   • CARDIAC  VALVE REPLACEMENT  2009    Done with stent placement   • CARDIOVERSION      multiple electrocardioversions.   • CAROTID ARTERY ANGIOPLASTY Right    • COLONOSCOPY N/A 9/28/2017    Procedure: COLONOSCOPY TO CECUM;  Surgeon: Kevin Davis MD;  Location: [facility] END[facility];  Service:    • CORONARY ANGIOPLASTY WITH STENT PLACEMENT  2009   • CORONARY ARTERY BYPASS GRAFT      single graft to the PDA   • CORONARY STENT PLACEMENT     • ENDOSCOPY N/A 9/28/2017    Procedure: ESOPHAGOGASTRODUODENOSCOPY ;  Surgeon: Kevin Davis MD;  Location: [facility] END[facility]COPY;  Service:    • HEMORRHOIDECTOMY     • HYSTERECTOMY     • MITRAL VALVE REPLACEMENT  01/2010    #31 Epic porcine valve.   • THROMBOENDARTERECTOMY Right     carotid thromboendarterectomy    • TONSILLECTOMY      age 32   • TOTAL KNEE ARTHROPLASTY Left    • TOTAL SHOULDER ARTHROPLASTY W/ DISTAL CLAVICLE EXCISION Left 1/16/2018    Procedure: TOTAL SHOULDER REVERSE ARTHROPLASTY;  Surgeon: Bianka Quesada MD;  Location: [facility] OR;  Service:        Cancer-related family history includes Breast cancer in her mother; Cancer in her mother.  Social History   Substance Use Topics   • Smoking status: Former Smoker     Packs/day: 1.00     Years: 50.00     Types: Cigarettes     Quit date: 1/11/2009   • Smokeless tobacco: Never Used   • Alcohol use Yes      Comment: rare       MEDICATIONS:    Current Outpatient Prescriptions:   •  ACETAMINOPHEN PO, Take 325 mg by mouth 4 (Four) Times a Day As Needed. Senior choice , Disp: , Rfl:   •  alendronate (FOSAMAX) 70 MG tablet, TAKE 1 TABLET EVERY 7 (SEVEN) DAYS. STAY UPRIGHT FOR 30 MINUTES / DRINK 8 OUNCES OF WATER AND DO NOT EAT 30 MINUTES (Patient taking differently: TAKE 1 TABLET EVERY 14 DAYS. STAY UPRIGHT FOR 30 MINUTES / DRINK 8 OUNCES OF WATER AND DO NOT EAT 30 MINUTES), Disp: 12 tablet, Rfl: 3  •  calcitriol (ROCALTROL) 0.25 MCG capsule, TAKE 1 CAPSULE EVERY OTHER DAY (Patient taking differently: TAKE 1 CAPSULE EVERY  EVENING), Disp: 45 capsule, Rfl: 2  •  carvedilol (COREG) 25 MG tablet, TAKE 1 TABLET TWICE DAILY, Disp: 180 tablet, Rfl: 3  •  Cholecalciferol (VITAMIN D) 2000 UNITS tablet, Take 1 tablet by mouth daily., Disp: , Rfl:   •  Cyanocobalamin (VITAMIN B12 PO), Take 1 tablet by mouth daily. As directed, Disp: , Rfl:   •  epoetin adele (EPOGEN,PROCRIT) 84804 UNIT/ML injection, as needed. Procrit 95126 UNIT/ML Injection Solution; Patient Sig: Procrit 57607 UNIT/ML Injection Solution INJECT SUBCUTANEOUSLY AS DIRECTED.; 0; 01-Apr-2014; Active, Disp: , Rfl:   •  FERROUS SULFATE PO, Take 324 mg by mouth Daily. Take as directed  , Disp: , Rfl:   •  HYDROcodone-acetaminophen (NORCO) 7.5-325 MG per tablet, Take 1 tablet by mouth Every 6 (Six) Hours As Needed for Moderate Pain ., Disp: , Rfl:   •  Multiple Vitamin (MULTIVITAMIN) capsule, Take 1 capsule by mouth daily., Disp: , Rfl:   •  mupirocin (BACTROBAN) 2 % nasal ointment, into each nostril. PER MD ORDERS, Disp: , Rfl:   •  pantoprazole (PROTONIX) 40 MG EC tablet, Take 1 tablet by mouth 2 (Two) Times a Day., Disp: 180 tablet, Rfl: 1  •  ramipril (ALTACE) 2.5 MG capsule, Take 2.5 mg by mouth Daily., Disp: , Rfl:   •  simvastatin (ZOCOR) 40 MG tablet, TAKE 1 TABLET EVERY DAY, Disp: 90 tablet, Rfl: 1  •  traMADol (ULTRAM) 50 MG tablet, TAKE 2 TABLETS EVERY MORNING  AND TAKE 2 TABLETS AT BEDTIME, Disp: 360 tablet, Rfl: 1  •  zolpidem (AMBIEN) 10 MG tablet, Take 1 tablet by mouth At Night As Needed for Sleep. 3 months, Disp: 90 tablet, Rfl: 1  •  aspirin 81 MG tablet, Take 81 mg by mouth Daily., Disp: , Rfl:   •  furosemide (LASIX) 40 MG tablet, TAKE 1 AND 1/2 TABLETS EVERY MORNING AND 1 TABLET EVERY EVENING, Disp: 225 tablet, Rfl: 3  •  warfarin (COUMADIN) 1 MG tablet, TAKE 1 TO 2 TABLETS EVERY DAY AS DIRECTED, Disp: 180 tablet, Rfl: 3    ALLERGIES:  Allergies   Allergen Reactions   • Diclofenac      She had adverse effects from the medications that required hospitalization. Pt  "got stomach ulcers from this medication prescribed by Dr. Arango - pediatrist.   • Dofetilide      Pt states not allergic.        REVIEW OF SYSTEMS:  Review of Systems   Constitutional: Negative for chills, fever and unexpected weight change.   HENT: Negative for mouth sores, nosebleeds, sore throat and voice change.    Eyes: Negative for visual disturbance.   Respiratory: Negative for cough and shortness of breath.    Cardiovascular: Positive for leg swelling. Negative for chest pain.   Gastrointestinal: Negative for abdominal pain, blood in stool, constipation, diarrhea, nausea and vomiting.   Genitourinary: Negative for dysuria, frequency and hematuria.   Musculoskeletal: Positive for back pain. Negative for arthralgias and joint swelling.   Skin: Negative for rash.   Neurological: Negative for dizziness, numbness and headaches.   Hematological: Negative for adenopathy. Does not bruise/bleed easily.   Psychiatric/Behavioral: Negative for dysphoric mood. The patient is not nervous/anxious.          Objective   VITAL SIGNS:     Vitals:    09/18/18 1252   BP: 134/70   Pulse: 84   Resp: 14   Temp: 98.6 °F (37 °C)   TempSrc: Oral   SpO2: 100%   Weight: 69 kg (152 lb 3.2 oz)   Height: 165.1 cm (65\")   PainSc:   8   PainLoc: Back     Body mass index is 25.33 kg/m².     Wt Readings from Last 3 Encounters:   09/18/18 69 kg (152 lb 3.2 oz)   09/13/18 71.7 kg (158 lb)   09/06/18 67.9 kg (149 lb 9.6 oz)       PHYSICAL EXAMINATION:  GENERAL:  The patient appears in good general condition, not in acute distress.  SKIN: Warm and dry. No skin rashes, ecchymosis or petechiae.  HEAD:  Normocephalic.  EYES:  No Jaundice. Pallor. Pupils equal. EOMI.  NECK:  Supple with Good ROM. No Thyromegaly. No Masses.  LYMPHATICS:  No cervical or supraclavicular lymphadenopathy.  CHEST: Normal respiratory effort. Lungs clear to auscultation.   CARDIAC:  Normal S1 & S2.  Grade 2 systolic murmur.  ABDOMEN:  Soft. No tenderness.   EXTREMITIES:  No " arthritic changes. Trace edema and hyperpigmentation of the legs distally. No Calf tenderness.  NEUROLOGICAL:  No Focal neurological deficits.      DIAGNOSTIC DATA:       Results from last 7 days  Lab Units 09/18/18  1241   WBC 10*3/mm3 3.97*   HEMOGLOBIN g/dL 7.7*   HEMATOCRIT % 25.6*   PLATELETS 10*3/mm3 119*       Results from last 7 days  Lab Units 09/13/18  1148   SODIUM mmol/L 140   POTASSIUM mmol/L 4.7   CHLORIDE mmol/L 101   CO2 mmol/L 26.4   BUN mg/dL 38*   CREATININE mg/dL 1.87*   CALCIUM mg/dL 9.3   GLUCOSE mg/dL 93         Assessment/Plan   1.  Anemia of chronic kidney disease, stage III.  She is on Procrit. The last dose was 6,000 units on 8/28/18. Her iron stores are adequate. She is on oral iron once a day.  Hemoglobin has decreased to 7.7 today.  She is not having symptoms related to the anemia at this point.      2.  Thrombocytopenia due to Vitamin B12 deficiency.  She is on vitamin B12 1000 mcg daily.  Platelet count is staying in the 100,000 to 120,000.      PLAN:    1.  We will give Procrit today but we will increase the dose to 8000 units.    2.  Due to the patient's underlying cardiac disease, I recommended 1 unit PRBC transfusion to be given tomorrow.    3.  Continue oral iron once daily.    4.  Patient will return Every 2 weeks for CBC and Procrit.  I will see in follow-up in 12 weeks.  We will repeat iron studies at her return visit.        Edward Vasquez MD  09/18/18

## 2018-09-19 ENCOUNTER — HOSPITAL ENCOUNTER (OUTPATIENT)
Dept: INFUSION THERAPY | Facility: HOSPITAL | Age: 78
Discharge: HOME | End: 2018-09-19
Attending: INTERNAL MEDICINE | Admitting: INTERNAL MEDICINE

## 2018-09-19 VITALS
OXYGEN SATURATION: 98 % | RESPIRATION RATE: 16 BRPM | SYSTOLIC BLOOD PRESSURE: 154 MMHG | TEMPERATURE: 96.9 F | HEART RATE: 59 BPM | DIASTOLIC BLOOD PRESSURE: 67 MMHG

## 2018-09-19 DIAGNOSIS — D63.1 ANEMIA ASSOCIATED WITH STAGE 3 CHRONIC RENAL FAILURE (HCC): ICD-10-CM

## 2018-09-19 DIAGNOSIS — N18.30 ANEMIA ASSOCIATED WITH STAGE 3 CHRONIC RENAL FAILURE (HCC): ICD-10-CM

## 2018-09-19 PROCEDURE — 36430 TRANSFUSION BLD/BLD COMPNT: CPT

## 2018-09-19 PROCEDURE — A9270 NON-COVERED ITEM OR SERVICE: HCPCS | Performed by: INTERNAL MEDICINE

## 2018-09-19 PROCEDURE — P9016 RBC LEUKOCYTES REDUCED: HCPCS

## 2018-09-19 PROCEDURE — 63710000001 ACETAMINOPHEN 325 MG TABLET: Performed by: INTERNAL MEDICINE

## 2018-09-19 PROCEDURE — 63710000001 DIPHENHYDRAMINE PER 50 MG: Performed by: INTERNAL MEDICINE

## 2018-09-19 PROCEDURE — 86900 BLOOD TYPING SEROLOGIC ABO: CPT

## 2018-09-19 RX ORDER — DIPHENHYDRAMINE HCL 25 MG
25 CAPSULE ORAL ONCE
Status: COMPLETED | OUTPATIENT
Start: 2018-09-19 | End: 2018-09-19

## 2018-09-19 RX ORDER — SODIUM CHLORIDE 9 MG/ML
250 INJECTION, SOLUTION INTRAVENOUS AS NEEDED
Status: DISCONTINUED | OUTPATIENT
Start: 2018-09-19 | End: 2018-09-21 | Stop reason: HOSPADM

## 2018-09-19 RX ORDER — ACETAMINOPHEN 325 MG/1
650 TABLET ORAL ONCE
Status: COMPLETED | OUTPATIENT
Start: 2018-09-19 | End: 2018-09-19

## 2018-09-19 RX ADMIN — ACETAMINOPHEN 650 MG: 325 TABLET ORAL at 08:14

## 2018-09-19 RX ADMIN — DIPHENHYDRAMINE HYDROCHLORIDE 25 MG: 25 CAPSULE ORAL at 08:14

## 2018-09-19 NOTE — PATIENT INSTRUCTIONS
Blood Transfusion, Care After  This sheet gives you information about how to care for yourself after your procedure. Your doctor may also give you more specific instructions. If you have problems or questions, contact your doctor.  Follow these instructions at home:  · Take over-the-counter and prescription medicines only as told by your doctor.  · Go back to your normal activities as told by your doctor.  · Follow instructions from your doctor about how to take care of the area where an IV tube was put into your vein (insertion site). Make sure you:  ? Wash your hands with soap and water before you change your bandage (dressing). If there is no soap and water, use hand .  ? Change your bandage as told by your doctor.  · Check your IV insertion site every day for signs of infection. Check for:  ? More redness, swelling, or pain.  ? More fluid or blood.  ? Warmth.  ? Pus or a bad smell.  Contact a doctor if:  · You have more redness, swelling, or pain around the IV insertion site..  · You have more fluid or blood coming from the IV insertion site.  · Your IV insertion site feels warm to the touch.  · You have pus or a bad smell coming from the IV insertion site.  · Your pee (urine) turns pink, red, or brown.  · You feel weak after doing your normal activities.  Get help right away if:  · You have signs of a serious allergic or body defense (immune) system reaction, including:  ? Itchiness.  ? Hives.  ? Trouble breathing.  ? Anxiety.  ? Pain in your chest or lower back.  ? Fever, flushing, and chills.  ? Fast pulse.  ? Rash.  ? Watery poop (diarrhea).  ? Throwing up (vomiting).  ? Dark pee.  ? Serious headache.  ? Dizziness.  ? Stiff neck.  ? Yellow color in your face or the white parts of your eyes (jaundice).  Summary  · After a blood transfusion, return to your normal activities as told by your doctor.  · Every day, check for signs of infection where the IV tube was put into your vein.  · Some signs of  infection are warm skin, more redness and pain, more fluid or blood, and pus or a bad smell where the needle went in.  · Contact your doctor if you feel weak or have any unusual symptoms.  This information is not intended to replace advice given to you by your health care provider. Make sure you discuss any questions you have with your health care provider.  Document Released: 01/08/2016 Document Revised: 08/11/2017 Document Reviewed: 08/11/2017  [employer] Interactive Patient Education © 2017 [employer]

## 2018-09-19 NOTE — PROGRESS NOTES
Patient tolerated transfusion without complaints. VSS. Patient ambulatory with walker, D/C'd from ACU with spouse at 1154.

## 2018-09-20 LAB
ABO + RH BLD: NORMAL
BH BB BLOOD EXPIRATION DATE: NORMAL
BH BB BLOOD TYPE BARCODE: 5100
BH BB DISPENSE STATUS: NORMAL
BH BB PRODUCT CODE: NORMAL
BH BB UNIT NUMBER: NORMAL
UNIT  ABO: NORMAL
UNIT  RH: NORMAL

## 2018-09-21 ENCOUNTER — TELEPHONE (OUTPATIENT)
Dept: PHARMACY | Facility: HOSPITAL | Age: 78
End: 2018-09-21

## 2018-09-21 ENCOUNTER — ANTICOAGULATION VISIT (OUTPATIENT)
Dept: PHARMACY | Facility: HOSPITAL | Age: 78
End: 2018-09-21

## 2018-09-21 DIAGNOSIS — I48.20 CHRONIC ATRIAL FIBRILLATION (HCC): ICD-10-CM

## 2018-09-21 LAB
INR PPP: 1.2 (ref 0.91–1.09)
PROTHROMBIN TIME: 14.6 SECONDS (ref 10–13.8)

## 2018-09-21 PROCEDURE — 85610 PROTHROMBIN TIME: CPT

## 2018-09-21 PROCEDURE — G0463 HOSPITAL OUTPT CLINIC VISIT: HCPCS

## 2018-09-21 PROCEDURE — 36416 COLLJ CAPILLARY BLOOD SPEC: CPT

## 2018-09-21 NOTE — PROGRESS NOTES
Anticoagulation Clinic Progress Note    Anticoagulation Summary  As of 9/21/2018    INR goal:   2.0-3.0   TTR:   --   Today's INR:   1.2!   Warfarin maintenance plan:   1 mg on Sun, Tue, Thu; 2 mg all other days   Weekly warfarin total:   11 mg   Plan last modified:   Roseanne Feng RPH (9/13/2018)   Next INR check:   9/24/2018   Priority:   High   Target end date:   Indefinite    Indications    Chronic atrial fibrillation (CMS/HCC) [I48.2]             Anticoagulation Episode Summary     INR check location:       Preferred lab:       Send INR reminders to:   [facility] [place]    Comments:         Anticoagulation Care Providers     Provider Role Specialty Phone number    Nik Galarza MD Referring Cardiology [phone]    Roseanne Feng RPH Responsible Pharmacy           Drug interactions: has remained unchanged.  Diet: has remained unchanged.    Clinic Interview:      INR History:  Anticoagulation Monitoring 9/13/2018 9/21/2018   INR 2.4 1.2   INR Date 9/13/2018 9/21/2018   INR Goal 2.0-3.0 2.0-3.0   Trend - Same   Last Week Total 0 mg 3 mg   Next Week Total 6 mg 13 mg   Sun 1 mg 1 mg   Mon Hold (9/17); Otherwise 2 mg -   Tue Hold (9/18); Otherwise 1 mg -   Wed Hold (9/19); Otherwise 2 mg -   Thu 1 mg -   Fri 2 mg 4 mg (9/21)   Sat 2 mg 2 mg   Visit Report - -       Previous INR data from [facility] is recorded in [facility] and scanned into the patient's chart in [place]. These results have been analyzed and reviewed.      Plan:  1. INR is Subtherapeutic today- see above in Anticoagulation Summary.  Will instruct Janel Mahoney to take a booster dose of 4 mg today, then resume their warfarin regimen- see above in Anticoagulation Summary.  2. Follow up on Monday.  3. Patient declines warfarin refills.  4. Verbal and written information provided. Patient expresses understanding and has no further questions at this time.    Annette Lopez RPH   Yes

## 2018-09-24 ENCOUNTER — ANTICOAGULATION VISIT (OUTPATIENT)
Dept: PHARMACY | Facility: HOSPITAL | Age: 78
End: 2018-09-24

## 2018-09-24 DIAGNOSIS — I48.20 CHRONIC ATRIAL FIBRILLATION (HCC): ICD-10-CM

## 2018-09-24 LAB
INR PPP: 1.6 (ref 0.91–1.09)
PROTHROMBIN TIME: 19.3 SECONDS (ref 10–13.8)

## 2018-09-24 PROCEDURE — 36416 COLLJ CAPILLARY BLOOD SPEC: CPT

## 2018-09-24 PROCEDURE — 85610 PROTHROMBIN TIME: CPT

## 2018-09-24 NOTE — PROGRESS NOTES
Anticoagulation Clinic Progress Note    Anticoagulation Summary  As of 9/24/2018    INR goal:   2.0-3.0   TTR:   0.0 % (1 d)   Today's INR:   1.6!   Warfarin maintenance plan:   1 mg on Sun, Tue, Thu; 2 mg all other days   Weekly warfarin total:   11 mg   Plan last modified:   Roseanne Feng RPH (9/13/2018)   Next INR check:   9/25/2018   Priority:   High   Target end date:   Indefinite    Indications    Chronic atrial fibrillation (CMS/HCC) [I48.2]             Anticoagulation Episode Summary     INR check location:       Preferred lab:       Send INR reminders to:    [facility]    Comments:         Anticoagulation Care Providers     Provider Role Specialty Phone number    Nik Galarza MD Referring Cardiology [phone]    Roseanne Feng RPH Responsible Pharmacy           Drug interactions: has remained unchanged.  Diet: has remained unchanged.    Clinic Interview:  Patient Findings     Negatives:   Signs/symptoms of thrombosis, Signs/symptoms of bleeding,   Laboratory test error suspected, Change in health, Change in alcohol use,   Change in activity, Upcoming invasive procedure, Emergency department   visit, Upcoming dental procedure, Missed doses, Extra doses, Change in   medications, Change in diet/appetite, Hospital admission, Bruising, Other   complaints    Comments:   Epidural 10/4      Clinical Outcomes     Negatives:   Major bleeding event, Thromboembolic event,   Anticoagulation-related hospital admission, Anticoagulation-related ED   visit, Anticoagulation-related fatality    Comments:   Epidural 10/4        INR History:  Anticoagulation Monitoring 9/13/2018 9/21/2018 9/24/2018   INR 2.4 1.2 1.6   INR Date 9/13/2018 9/21/2018 9/24/2018   INR Goal 2.0-3.0 2.0-3.0 2.0-3.0   Trend - Same Same   Last Week Total 0 mg 3 mg 8 mg   Next Week Total 6 mg 13 mg 11 mg   Sun 1 mg 1 mg -   Mon Hold (9/17); Otherwise 2 mg - 2 mg   Tue Hold (9/18); Otherwise 1 mg - -   Wed Hold (9/19);  Otherwise 2 mg - -   Thu 1 mg - -   Fri 2 mg 4 mg (9/21) -   Sat 2 mg 2 mg -   Visit Report - - -   Some recent data might be hidden       Previous INR data from [facility] is recorded in [facility] and scanned into the patient's chart in [facility]. These results have been analyzed and reviewed.      Plan:  1. INR is Subtherapeutic today- see above in Anticoagulation Summary.  Will instruct Janel Mahoney to Continue their warfarin regimen- see above in Anticoagulation Summary.  2. Follow up tomorrow. Stop lovenox once inr in therapeutic range  3. Patient declines warfarin refills.  4. Verbal and written information provided. Patient expresses understanding and has no further questions at this time.    Annette Lopez RP

## 2018-09-25 ENCOUNTER — RESEARCH ENCOUNTER (OUTPATIENT)
Dept: CARDIOLOGY | Facility: CLINIC | Age: 78
End: 2018-09-25

## 2018-09-25 ENCOUNTER — ANTICOAGULATION VISIT (OUTPATIENT)
Dept: PHARMACY | Facility: HOSPITAL | Age: 78
End: 2018-09-25

## 2018-09-25 DIAGNOSIS — I48.20 CHRONIC ATRIAL FIBRILLATION (HCC): ICD-10-CM

## 2018-09-25 LAB
INR PPP: 1.7 (ref 0.91–1.09)
PROTHROMBIN TIME: 19.8 SECONDS (ref 10–13.8)

## 2018-09-25 PROCEDURE — 85610 PROTHROMBIN TIME: CPT

## 2018-09-25 PROCEDURE — 36416 COLLJ CAPILLARY BLOOD SPEC: CPT

## 2018-09-25 PROCEDURE — G0463 HOSPITAL OUTPT CLINIC VISIT: HCPCS | Performed by: PHARMACIST

## 2018-09-25 NOTE — PROGRESS NOTES
Anticoagulation Clinic Progress Note    Anticoagulation Summary  As of 9/25/2018    INR goal:   2.0-3.0   TTR:   0.0 % (2 d)   Today's INR:   1.7!   Warfarin maintenance plan:   1 mg on Sun, Tue, Thu; 2 mg all other days   Weekly warfarin total:   11 mg   Plan last modified:   Roseanne Feng RPH (9/13/2018)   Next INR check:   9/26/2018   Priority:   Critical   Target end date:   Indefinite    Indications    Chronic atrial fibrillation (CMS/HCC) [I48.2]             Anticoagulation Episode Summary     INR check location:       Preferred lab:       Send INR reminders to:   [facility]    Comments:         Anticoagulation Care Providers     Provider Role Specialty Phone number    Nik Galarza MD Referring Cardiology [phone]    Roseanne Feng RPH Responsible Pharmacy           Drug interactions: has remained unchanged.  Diet: has remained unchanged.    Clinic Interview:  Patient Findings     Positives:   Upcoming invasive procedure    Negatives:   Signs/symptoms of thrombosis, Signs/symptoms of bleeding,   Laboratory test error suspected, Change in health, Change in alcohol use,   Change in activity, Emergency department visit, Upcoming dental procedure,   Missed doses, Extra doses, Change in medications, Change in diet/appetite,   Hospital admission, Bruising, Other complaints    Comments:   Epidural 10/4- will hold 5 days      Clinical Outcomes     Negatives:   Major bleeding event, Thromboembolic event,   Anticoagulation-related hospital admission, Anticoagulation-related ED   visit, Anticoagulation-related fatality    Comments:   Epidural 10/4- will hold 5 days        INR History:  Anticoagulation Monitoring 9/21/2018 9/24/2018 9/25/2018   INR 1.2 1.6 1.7   INR Date 9/21/2018 9/24/2018 9/25/2018   INR Goal 2.0-3.0 2.0-3.0 2.0-3.0   Trend Same Same Same   Last Week Total 3 mg 8 mg 10 mg   Next Week Total 13 mg 11 mg 8 mg   Sun 1 mg - -   Mon - 2 mg -   Tue - - 3 mg (9/25)   Wed - -  -   Thu - - -   Fri 4 mg (9/21) - -   Sat 2 mg - -   Visit Report - - -   Some recent data might be hidden         Plan:  1. INR is Subtherapeutic today- see above in Anticoagulation Summary.  Will instruct Janel DORINA Covingtons to Increase their warfarin regimen- see above in Anticoagulation Summary. Gave booster of 3mg today.  2. Follow up in 1 days  3. Patient declines warfarin refills.  4. Verbal and written information provided. Patient expresses understanding and has no further questions at this time.    Roseanne Feng, [facility]

## 2018-09-25 NOTE — RESEARCH
[facility]  [facility] [place] [address]  [phone]  Patient Information:  Janel Mahoney  : 1940    Research Note:  PSR Study    HPI:  NORMA  [id number]  PSR/[id number]    NORMA completed her scheduled remote device check  On 18. Lisa Kaye RN of the pacemaker department completed her device interrogation. There were no lead or device events to report at this time.  An electrical worksheet was completed for the PSR registry and entered into the database.   We will see NORMA at her next scheduled device check.  In the meantime, she will call us with any questions or concerns.          Lizy Hook RN  Research RN Coordinator

## 2018-09-26 ENCOUNTER — ANTICOAGULATION VISIT (OUTPATIENT)
Dept: PHARMACY | Facility: HOSPITAL | Age: 78
End: 2018-09-26

## 2018-09-26 DIAGNOSIS — I48.20 CHRONIC ATRIAL FIBRILLATION (HCC): ICD-10-CM

## 2018-09-26 LAB
INR PPP: 1.7 (ref 0.91–1.09)
PROTHROMBIN TIME: 20.1 SECONDS (ref 10–13.8)

## 2018-09-26 PROCEDURE — G0463 HOSPITAL OUTPT CLINIC VISIT: HCPCS | Performed by: PHARMACIST

## 2018-09-26 PROCEDURE — 85610 PROTHROMBIN TIME: CPT

## 2018-09-26 PROCEDURE — 36416 COLLJ CAPILLARY BLOOD SPEC: CPT

## 2018-09-26 NOTE — PATIENT INSTRUCTIONS
Today (9/26) 3mg and lovenox injection  9/27 2mg  9/28 2mg  10/4 epidural- take 4mg  10/5 4mg and lovenox injection  10/6 2mg and lovenox injection  10/7 2mg and lovenox injection  10/8 come back to the clinic

## 2018-09-26 NOTE — PROGRESS NOTES
Anticoagulation Clinic Progress Note    Anticoagulation Summary  As of 9/26/2018    INR goal:   2.0-3.0   TTR:   0.0 % (3 d)   Today's INR:   1.7!   Warfarin maintenance plan:   1 mg on Sun, Tue, Thu; 2 mg all other days   Weekly warfarin total:   11 mg   Plan last modified:   Roseanne Feng RPH (9/13/2018)   Next INR check:   9/27/2018   Priority:   Critical   Target end date:   Indefinite    Indications    Chronic atrial fibrillation (CMS/HCC) [I48.2]             Anticoagulation Episode Summary     INR check location:       Preferred lab:       Send INR reminders to:    [facility]    Comments:         Anticoagulation Care Providers     Provider Role Specialty Phone number    Nik Galarza MD Referring Cardiology [phone]    Roseanne Feng RPH Responsible Pharmacy           Drug interactions: has remained unchanged.  Diet: has remained unchanged.    Clinic Interview:  Patient Findings     Positives:   Upcoming invasive procedure    Negatives:   Signs/symptoms of thrombosis, Signs/symptoms of bleeding,   Laboratory test error suspected, Change in health, Change in alcohol use,   Change in activity, Emergency department visit, Upcoming dental procedure,   Missed doses, Extra doses, Change in medications, Change in diet/appetite,   Hospital admission, Bruising, Other complaints    Comments:   10/4 pain epidural      Clinical Outcomes     Negatives:   Major bleeding event, Thromboembolic event,   Anticoagulation-related hospital admission, Anticoagulation-related ED   visit, Anticoagulation-related fatality    Comments:   10/4 pain epidural        INR History:  Anticoagulation Monitoring 9/24/2018 9/25/2018 9/26/2018   INR 1.6 1.7 1.7   INR Date 9/24/2018 9/25/2018 9/26/2018   INR Goal 2.0-3.0 2.0-3.0 2.0-3.0   Trend Same Same Same   Last Week Total 8 mg 10 mg 13 mg   Next Week Total 11 mg 8 mg 7 mg   Sun - - -   Mon 2 mg - -   Tue - 3 mg (9/25) -   Wed - - 3 mg (9/26)   Thu - - -   Fri  - - -   Sat - - -   Visit Report - - -   Some recent data might be hidden       Plan:  1. INR is Subtherapeutic today- see above in Anticoagulation Summary.  Will instruct Janel Covingtons to Increase their warfarin regimen- see above in Anticoagulation Summary. 3mg booster today and continue lovenox injections.  2. Follow up in 1 days  3. Patient declines warfarin refills.  4. Verbal and written information provided. Patient expresses understanding and has no further questions at this time.    Roseanne Feng, [facility]

## 2018-09-27 ENCOUNTER — OFFICE VISIT (OUTPATIENT)
Dept: FAMILY MEDICINE CLINIC | Facility: CLINIC | Age: 78
End: 2018-09-27

## 2018-09-27 ENCOUNTER — ANTICOAGULATION VISIT (OUTPATIENT)
Dept: PHARMACY | Facility: HOSPITAL | Age: 78
End: 2018-09-27

## 2018-09-27 VITALS
BODY MASS INDEX: 24.99 KG/M2 | DIASTOLIC BLOOD PRESSURE: 70 MMHG | WEIGHT: 150 LBS | SYSTOLIC BLOOD PRESSURE: 150 MMHG | OXYGEN SATURATION: 99 % | HEIGHT: 65 IN | TEMPERATURE: 98.8 F | HEART RATE: 82 BPM | RESPIRATION RATE: 16 BRPM

## 2018-09-27 DIAGNOSIS — I50.42 CHRONIC COMBINED SYSTOLIC AND DIASTOLIC CONGESTIVE HEART FAILURE (HCC): ICD-10-CM

## 2018-09-27 DIAGNOSIS — I48.20 CHRONIC ATRIAL FIBRILLATION (HCC): ICD-10-CM

## 2018-09-27 DIAGNOSIS — Z00.00 MEDICARE ANNUAL WELLNESS VISIT, SUBSEQUENT: Primary | ICD-10-CM

## 2018-09-27 DIAGNOSIS — I10 ESSENTIAL HYPERTENSION: ICD-10-CM

## 2018-09-27 LAB
INR PPP: 1.8 (ref 0.91–1.09)
PROTHROMBIN TIME: 22.1 SECONDS (ref 10–13.8)

## 2018-09-27 PROCEDURE — G0463 HOSPITAL OUTPT CLINIC VISIT: HCPCS | Performed by: PHARMACIST

## 2018-09-27 PROCEDURE — 99214 OFFICE O/P EST MOD 30 MIN: CPT | Performed by: INTERNAL MEDICINE

## 2018-09-27 PROCEDURE — 85610 PROTHROMBIN TIME: CPT

## 2018-09-27 PROCEDURE — 36416 COLLJ CAPILLARY BLOOD SPEC: CPT

## 2018-09-27 PROCEDURE — G0439 PPPS, SUBSEQ VISIT: HCPCS | Performed by: INTERNAL MEDICINE

## 2018-09-27 NOTE — PROGRESS NOTES
Subjective   Janel Mahoney is a 77 y.o. female.     History of Present Illness   Patient was seen for Medicare wellness exam.  Her blood pressures been running 120s over 80s.  Congestive heart failure is been stable with past several months.  He does have atrial fibrillation is being followed by cardiologist.    Dictated utilizing Dragon dictation. If there are questions or for further clarification, please contact me.   The following portions of the patient's history were reviewed and updated as appropriate: allergies, current medications, past family history, past medical history, past social history, past surgical history and problem list.    Review of Systems   Constitutional: Negative for fatigue and fever.   HENT: Positive for congestion. Negative for trouble swallowing.    Eyes: Negative for discharge and visual disturbance.   Respiratory: Negative for choking and shortness of breath.    Cardiovascular: Negative for chest pain and palpitations.   Gastrointestinal: Negative for abdominal pain and blood in stool.   Endocrine: Negative.    Genitourinary: Negative for genital sores and hematuria.   Musculoskeletal: Negative for gait problem and joint swelling.   Skin: Negative for color change, pallor, rash and wound.   Allergic/Immunologic: Positive for environmental allergies. Negative for immunocompromised state.   Neurological: Negative for facial asymmetry and speech difficulty.   Psychiatric/Behavioral: Negative for hallucinations and suicidal ideas.       Objective   Physical Exam   Constitutional: She is oriented to person, place, and time. She appears well-developed and well-nourished.   HENT:   Head: Normocephalic.   Eyes: Pupils are equal, round, and reactive to light. Conjunctivae are normal.   Neck: Normal range of motion. Neck supple.   Cardiovascular: Normal rate.  Exam reveals gallop. Exam reveals no friction rub.    No murmur heard.  Pulmonary/Chest: Effort normal and breath sounds normal. No  respiratory distress. She has no wheezes. She has no rales. She exhibits no tenderness.   Abdominal: Soft. Bowel sounds are normal.   Musculoskeletal: Normal range of motion.   Neurological: She is alert and oriented to person, place, and time.   Skin: Skin is warm and dry.   Psychiatric: She has a normal mood and affect. Her behavior is normal. Judgment and thought content normal.   Nursing note and vitals reviewed.      Assessment/Plan   Problems Addressed this Visit        Cardiovascular and Mediastinum    Chronic atrial fibrillation (CMS/HCC)    Hypertension    Chronic combined systolic and diastolic congestive heart failure (CMS/HCC)      Other Visit Diagnoses     Medicare annual wellness visit, subsequent    -  Primary

## 2018-09-27 NOTE — PROGRESS NOTES
QUICK REFERENCE INFORMATION:  The ABCs of the Annual Wellness Visit    Subsequent Medicare Wellness Visit    HEALTH RISK ASSESSMENT    1940    Recent Hospitalizations:  Recently treated at the following:  [facility].        Current Medical Providers:  Patient Care Team:  Ariel Matute MD as PCP - General (Internal Medicine)  Quoc Arango Jr., DPM as PCP - Claims Attributed  Edward Vasquez MD as Consulting Physician (Hematology and Oncology)  Milagros Cruz MD as Referring Physician (Nephrology)  Anthony Cardona MD as Consulting Physician (Sleep Medicine)  Gregorio Mendoza RN as Care Coordinator (Population Health)  Nik Galarza MD as Consulting Physician (Cardiology)  Roseanne Feng RPH as Pharmacist (Pharmacy)  Annette Lopez RPH as Pharmacist (Pharmacy)        Smoking Status:  History   Smoking Status   • Former Smoker   • Packs/day: 1.00   • Years: 50.00   • Types: Cigarettes   • Quit date: 1/11/2009   Smokeless Tobacco   • Never Used       Alcohol Consumption:  History   Alcohol Use   • Yes     Comment: rare       Depression Screen:   PHQ-2/PHQ-9 Depression Screening 9/27/2018   Little interest or pleasure in doing things 0   Feeling down, depressed, or hopeless 0   Trouble falling or staying asleep, or sleeping too much 0   Feeling tired or having little energy 0   Poor appetite or overeating 0   Feeling bad about yourself - or that you are a failure or have let yourself or your family down 0   Trouble concentrating on things, such as reading the newspaper or watching television 0   Moving or speaking so slowly that other people could have noticed. Or the opposite - being so fidgety or restless that you have been moving around a lot more than usual 0   Thoughts that you would be better off dead, or of hurting yourself in some way 0   Total Score 0   If you checked off any problems, how difficult have these problems made it for you to do your work, take  care of things at home, or get along with other people? Not difficult at all       Health Habits and Functional and Cognitive Screening:  Functional & Cognitive Status 9/6/2017   Do you have difficulty preparing food and eating? No   Do you have difficulty bathing yourself, getting dressed or grooming yourself? No   Do you have difficulty using the toilet? No   Do you have difficulty moving around from place to place? No   In the past year have you fallen or experienced a near fall? No   Current Diet Well Balanced Diet   Dental Exam Up to date   Eye Exam Up to date   Exercise (times per week) 0 times per week   Current Exercise Activities Include None   Do you need help using the phone?  No   Are you deaf or do you have serious difficulty hearing?  No   Do you need help with transportation? No   Do you need help shopping? No   Do you need help preparing meals?  No   Do you need help with housework?  No   Do you need help with laundry? No   Do you need help taking your medications? No   Do you need help managing money? No   Do you have difficulty concentrating, remembering or making decisions? No           Does the patient have evidence of cognitive impairment? Yes    Aspirin use counseling: Taking ASA appropriately as indicated      Recent Lab Results:  CMP:  Lab Results   Component Value Date    BUN 38 (H) 09/13/2018    CREATININE 1.87 (H) 09/13/2018    EGFRIFNONA 26 (L) 09/13/2018    EGFRIFAFRI  08/25/2016      Comment:      <15 Indicative of kidney failure.    BCR 20.3 09/13/2018     09/13/2018    K 4.7 09/13/2018    CO2 26.4 09/13/2018    CALCIUM 9.3 09/13/2018    ALBUMIN 3.70 05/10/2018    BILITOT 0.7 05/10/2018    ALKPHOS 46 05/10/2018    AST 17 05/10/2018    ALT 10 05/10/2018     Lipid Panel:  Lab Results   Component Value Date    CHOL 126 01/16/2018    TRIG 138 01/16/2018    HDL 43 01/16/2018    VLDL 27.6 01/16/2018    LDLHDL 1.29 01/16/2018     HbA1c:  Lab Results   Component Value Date    HGBA1C 5.20  01/16/2018       Visual Acuity:  No exam data present    Age-appropriate Screening Schedule:  Refer to the list below for future screening recommendations based on patient's age, sex and/or medical conditions. Orders for these recommended tests are listed in the plan section. The patient has been provided with a written plan.    Health Maintenance   Topic Date Due   • ZOSTER VACCINE (2 of 2) 03/02/2017   • LIPID PANEL  01/16/2019   • DXA SCAN  09/20/2019   • MAMMOGRAM  05/16/2020   • TDAP/TD VACCINES (2 - Td) 05/02/2027   • COLONOSCOPY  09/28/2027   • INFLUENZA VACCINE  Addressed   • PNEUMOCOCCAL VACCINES (65+ LOW/MEDIUM RISK)  Excluded        Subjective   History of Present Illness    Janel Mahoney is a 77 y.o. female who presents for an Subsequent Wellness Visit.    The following portions of the patient's history were reviewed and updated as appropriate: allergies, current medications, past family history, past medical history, past social history, past surgical history and problem list.    Outpatient Medications Prior to Visit   Medication Sig Dispense Refill   • ACETAMINOPHEN PO Take 325 mg by mouth 4 (Four) Times a Day As Needed. Senior choice      • alendronate (FOSAMAX) 70 MG tablet TAKE 1 TABLET EVERY 7 (SEVEN) DAYS. STAY UPRIGHT FOR 30 MINUTES / DRINK 8 OUNCES OF WATER AND DO NOT EAT 30 MINUTES (Patient taking differently: TAKE 1 TABLET EVERY 14 DAYS. STAY UPRIGHT FOR 30 MINUTES / DRINK 8 OUNCES OF WATER AND DO NOT EAT 30 MINUTES) 12 tablet 3   • aspirin 81 MG tablet Take 81 mg by mouth Daily.     • calcitriol (ROCALTROL) 0.25 MCG capsule TAKE 1 CAPSULE EVERY OTHER DAY (Patient taking differently: TAKE 1 CAPSULE EVERY EVENING) 45 capsule 2   • carvedilol (COREG) 25 MG tablet TAKE 1 TABLET TWICE DAILY 180 tablet 3   • Cholecalciferol (VITAMIN D) 2000 UNITS tablet Take 1 tablet by mouth daily.     • Cyanocobalamin (VITAMIN B12 PO) Take 1 tablet by mouth daily. As directed     • epoetin adele (EPOGEN,PROCRIT)  09355 UNIT/ML injection as needed. Procrit 83605 UNIT/ML Injection Solution; Patient Sig: Procrit 40882 UNIT/ML Injection Solution INJECT SUBCUTANEOUSLY AS DIRECTED.; 0; 01-Apr-2014; Active     • FERROUS SULFATE PO Take 324 mg by mouth Daily. Take as directed       • furosemide (LASIX) 40 MG tablet TAKE 1 AND 1/2 TABLETS EVERY MORNING AND 1 TABLET EVERY EVENING (Patient taking differently: Take every 6 hours/ TID) 225 tablet 3   • HYDROcodone-acetaminophen (NORCO) 7.5-325 MG per tablet Take 1 tablet by mouth Every 6 (Six) Hours As Needed for Moderate Pain .     • Multiple Vitamin (MULTIVITAMIN) capsule Take 1 capsule by mouth daily.     • mupirocin (BACTROBAN) 2 % nasal ointment into each nostril. PER MD ORDERS     • pantoprazole (PROTONIX) 40 MG EC tablet Take 1 tablet by mouth 2 (Two) Times a Day. 180 tablet 1   • ramipril (ALTACE) 2.5 MG capsule Take 2.5 mg by mouth Daily.     • simvastatin (ZOCOR) 40 MG tablet TAKE 1 TABLET EVERY DAY 90 tablet 1   • traMADol (ULTRAM) 50 MG tablet TAKE 2 TABLETS EVERY MORNING  AND TAKE 2 TABLETS AT BEDTIME 360 tablet 1   • warfarin (COUMADIN) 1 MG tablet TAKE 1 TO 2 TABLETS EVERY DAY AS DIRECTED 180 tablet 3   • zolpidem (AMBIEN) 10 MG tablet Take 1 tablet by mouth At Night As Needed for Sleep. 3 months 90 tablet 1     No facility-administered medications prior to visit.        Patient Active Problem List   Diagnosis   • Anemia in stage 3 chronic kidney disease   • Thrombocytopenia (CMS/HCC)   • B12 deficiency   • Coronary artery disease involving coronary bypass graft of native heart without angina pectoris   • S/P MVR (mitral valve replacement)   • Chronic atrial fibrillation (CMS/HCC)   • Bilateral carotid artery disease (CMS/HCC)   • Intractable low back pain   • Long-term (current) use of anticoagulants   • Low back pain   • Osteoporosis   • Murmur, heart   • GEORGINA on auto CPAP - Dr Cardona   • Hypersomnia due to medical condition - GEORGINA   • Esophageal stricture   • Acute  "blood loss anemia   • Dysphagia   • Closed fracture of left proximal humerus   • Hypertension   • Supratherapeutic INR   • Chronic combined systolic and diastolic congestive heart failure (CMS/HCC)   • Osteoporosis with pathological fracture   • Closed 3-part fracture of proximal end of left humerus   • Closed fracture of proximal end of humerus with delayed healing       Advance Care Planning:  has an advance directive - a copy HAS NOT been provided. Have asked the patient to send this to us to add to record.    Identification of Risk Factors:  Risk factors include: NA.    Review of Systems    Compared to one year ago, the patient feels her physical health is better.  Compared to one year ago, the patient feels her mental health is worse.    Objective     Physical Exam    Vitals:    09/27/18 1041   BP: 150/70   BP Location: Right arm   Patient Position: Sitting   Cuff Size: Adult   Pulse: 82   Resp: 16   Temp: 98.8 °F (37.1 °C)   TempSrc: Oral   SpO2: 99%   Weight: 68 kg (150 lb)   Height: 165.1 cm (65\")   PainSc: 0-No pain       Patient's Body mass index is 24.96 kg/m². BMI is within normal parameters. No follow-up required.      Assessment/Plan   Patient Self-Management and Personalized Health Advice  The patient has been provided with information about: diet and exercise and preventive services including:   · NA.    Visit Diagnoses:    ICD-10-CM ICD-9-CM   1. Chronic atrial fibrillation (CMS/HCC) I48.2 427.31   2. Chronic combined systolic and diastolic congestive heart failure (CMS/HCC) I50.42 428.42     428.0   3. Low back pain with sciatica, sciatica laterality unspecified, unspecified back pain laterality, unspecified chronicity M54.40 724.3       No orders of the defined types were placed in this encounter.      Outpatient Encounter Prescriptions as of 9/27/2018   Medication Sig Dispense Refill   • ACETAMINOPHEN PO Take 325 mg by mouth 4 (Four) Times a Day As Needed. Senior choice      • alendronate " (FOSAMAX) 70 MG tablet TAKE 1 TABLET EVERY 7 (SEVEN) DAYS. STAY UPRIGHT FOR 30 MINUTES / DRINK 8 OUNCES OF WATER AND DO NOT EAT 30 MINUTES (Patient taking differently: TAKE 1 TABLET EVERY 14 DAYS. STAY UPRIGHT FOR 30 MINUTES / DRINK 8 OUNCES OF WATER AND DO NOT EAT 30 MINUTES) 12 tablet 3   • aspirin 81 MG tablet Take 81 mg by mouth Daily.     • calcitriol (ROCALTROL) 0.25 MCG capsule TAKE 1 CAPSULE EVERY OTHER DAY (Patient taking differently: TAKE 1 CAPSULE EVERY EVENING) 45 capsule 2   • carvedilol (COREG) 25 MG tablet TAKE 1 TABLET TWICE DAILY 180 tablet 3   • Cholecalciferol (VITAMIN D) 2000 UNITS tablet Take 1 tablet by mouth daily.     • Cyanocobalamin (VITAMIN B12 PO) Take 1 tablet by mouth daily. As directed     • epoetin adele (EPOGEN,PROCRIT) 94632 UNIT/ML injection as needed. Procrit 71630 UNIT/ML Injection Solution; Patient Sig: Procrit 32172 UNIT/ML Injection Solution INJECT SUBCUTANEOUSLY AS DIRECTED.; 0; 01-Apr-2014; Active     • FERROUS SULFATE PO Take 324 mg by mouth Daily. Take as directed       • furosemide (LASIX) 40 MG tablet TAKE 1 AND 1/2 TABLETS EVERY MORNING AND 1 TABLET EVERY EVENING (Patient taking differently: Take every 6 hours/ TID) 225 tablet 3   • HYDROcodone-acetaminophen (NORCO) 7.5-325 MG per tablet Take 1 tablet by mouth Every 6 (Six) Hours As Needed for Moderate Pain .     • Multiple Vitamin (MULTIVITAMIN) capsule Take 1 capsule by mouth daily.     • mupirocin (BACTROBAN) 2 % nasal ointment into each nostril. PER MD ORDERS     • pantoprazole (PROTONIX) 40 MG EC tablet Take 1 tablet by mouth 2 (Two) Times a Day. 180 tablet 1   • ramipril (ALTACE) 2.5 MG capsule Take 2.5 mg by mouth Daily.     • simvastatin (ZOCOR) 40 MG tablet TAKE 1 TABLET EVERY DAY 90 tablet 1   • traMADol (ULTRAM) 50 MG tablet TAKE 2 TABLETS EVERY MORNING  AND TAKE 2 TABLETS AT BEDTIME 360 tablet 1   • warfarin (COUMADIN) 1 MG tablet TAKE 1 TO 2 TABLETS EVERY DAY AS DIRECTED 180 tablet 3   • zolpidem (AMBIEN) 10  MG tablet Take 1 tablet by mouth At Night As Needed for Sleep. 3 months 90 tablet 1     No facility-administered encounter medications on file as of 9/27/2018.        Reviewed use of high risk medication in the elderly: not applicable  Reviewed for potential of harmful drug interactions in the elderly: not applicable    Follow Up:  No Follow-up on file.     An After Visit Summary and PPPS with all of these plans were given to the patient.

## 2018-09-27 NOTE — PATIENT INSTRUCTIONS
Medicare Wellness  Personal Prevention Plan of Service     Date of Office Visit:  2018  Encounter Provider:  Ariel Matute MD  Place of Service:  [facility]  Patient Name: Janel Mahoney  :  1940    As part of the Medicare Wellness portion of your visit today, we are providing you with this personalized preventive plan of services (PPPS). This plan is based upon recommendations of the [place] Preventive Services Task Force (USPSTF) and the Advisory Committee on Immunization Practices (ACIP).    This lists the preventive care services that should be considered, and provides dates of when you are due. Items listed as completed are up-to-date and do not require any further intervention.    Health Maintenance   Topic Date Due   • LUNG CANCER SCREENING  10/28/1995   • ZOSTER VACCINE (2 of 2) 2017   • MEDICARE ANNUAL WELLNESS  2018   • LIPID PANEL  2019   • DXA SCAN  2019   • MAMMOGRAM  2020   • TDAP/TD VACCINES (2 - Td) 2027   • COLONOSCOPY  2027   • INFLUENZA VACCINE  Addressed   • PNEUMOCOCCAL VACCINES (65+ LOW/MEDIUM RISK)  Excluded       No orders of the defined types were placed in this encounter.      No Follow-up on file.

## 2018-09-27 NOTE — PROGRESS NOTES
Anticoagulation Clinic Progress Note    Anticoagulation Summary  As of 9/27/2018    INR goal:   2.0-3.0   TTR:   0.0 % (4 d)   Today's INR:   1.8!   Warfarin maintenance plan:   1 mg on Sun, Tue, Thu; 2 mg all other days   Weekly warfarin total:   11 mg   Plan last modified:   Roseanne Feng RPH (9/13/2018)   Next INR check:   10/8/2018   Priority:   Critical   Target end date:   Indefinite    Indications    Chronic atrial fibrillation (CMS/HCC) [I48.2]             Anticoagulation Episode Summary     INR check location:       Preferred lab:       Send INR reminders to:    [facility] [place]    Comments:         Anticoagulation Care Providers     Provider Role Specialty Phone number    Nik Galarza MD Referring Cardiology [phone]    Roseanne Feng RPH Responsible Pharmacy           Drug interactions: has remained unchanged.  Diet: has remained unchanged.    Clinic Interview:  Patient Findings     Positives:   Upcoming invasive procedure    Negatives:   Signs/symptoms of thrombosis, Signs/symptoms of bleeding,   Laboratory test error suspected, Change in health, Change in alcohol use,   Change in activity, Emergency department visit, Upcoming dental procedure,   Missed doses, Extra doses, Change in medications, Change in diet/appetite,   Hospital admission, Bruising, Other complaints    Comments:   Pain epidural on 10/4; will be holding 5 days prior and   bridging with lovenox after the procedure      Clinical Outcomes     Negatives:   Major bleeding event, Thromboembolic event,   Anticoagulation-related hospital admission, Anticoagulation-related ED   visit, Anticoagulation-related fatality    Comments:   Pain epidural on 10/4; will be holding 5 days prior and   bridging with lovenox after the procedure        INR History:  Anticoagulation Monitoring 9/25/2018 9/26/2018 9/27/2018   INR 1.7 1.7 1.8   INR Date 9/25/2018 9/26/2018 9/27/2018   INR Goal 2.0-3.0 2.0-3.0 2.0-3.0   Trend Same Same  Same   Last Week Total 10 mg 13 mg 16 mg   Next Week Total 8 mg 7 mg 5 mg   Sun - - Hold (9/30), 2 mg (10/7)   Mon - - Hold (10/1)   Tue 3 mg (9/25) - Hold (10/2)   Wed - 3 mg (9/26) Hold (10/3)   Thu - - 3 mg (9/27), 4 mg (10/4)   Fri - - 4 mg (10/5); Otherwise 2 mg   Sat - - Hold (9/29), 4 mg (10/6)   Visit Report - - -   Some recent data might be hidden       Plan:  1. INR is Subtherapeutic today- see above in Anticoagulation Summary.  Will instruct Janel Mahoney to Increase their warfarin regimen- see above in Anticoagulation Summary. Booster of 3 mg today, 2mg tomorrow, patient will then be holding for 5 days prior to her next epidural. Post epidural I have her taking booster doses, and bridging with Lovenox per Dr. Galarza, and will see her on Monday after her procedure.  2. Follow up in 3 days post procedure  3. Patient declines warfarin refills.  4. Verbal and written information provided. Patient expresses understanding and has no further questions at this time.    Roseanne Feng [facility]

## 2018-10-02 ENCOUNTER — LAB (OUTPATIENT)
Dept: LAB | Facility: HOSPITAL | Age: 78
End: 2018-10-02

## 2018-10-02 ENCOUNTER — INFUSION (OUTPATIENT)
Dept: ONCOLOGY | Facility: HOSPITAL | Age: 78
End: 2018-10-02

## 2018-10-02 DIAGNOSIS — D63.1 ANEMIA IN STAGE 3 CHRONIC KIDNEY DISEASE (HCC): ICD-10-CM

## 2018-10-02 DIAGNOSIS — N18.30 ANEMIA IN STAGE 3 CHRONIC KIDNEY DISEASE (HCC): ICD-10-CM

## 2018-10-02 LAB
DEPRECATED RDW RBC AUTO: 51.7 FL (ref 37–49)
ERYTHROCYTE [DISTWIDTH] IN BLOOD BY AUTOMATED COUNT: 14.9 % (ref 11.7–14.5)
HCT VFR BLD AUTO: 35 % (ref 34–45)
HGB BLD-MCNC: 10.8 G/DL (ref 11.5–14.9)
MCH RBC QN AUTO: 29.1 PG (ref 27–33)
MCHC RBC AUTO-ENTMCNC: 30.9 G/DL (ref 32–35)
MCV RBC AUTO: 94.3 FL (ref 83–97)
PLATELET # BLD AUTO: 129 10*3/MM3 (ref 150–375)
PMV BLD AUTO: 10.2 FL (ref 8.9–12.1)
RBC # BLD AUTO: 3.71 10*6/MM3 (ref 3.9–5)
WBC NRBC COR # BLD: 6.1 10*3/MM3 (ref 4–10)

## 2018-10-02 PROCEDURE — 85027 COMPLETE CBC AUTOMATED: CPT | Performed by: INTERNAL MEDICINE

## 2018-10-02 PROCEDURE — 36415 COLL VENOUS BLD VENIPUNCTURE: CPT | Performed by: INTERNAL MEDICINE

## 2018-10-08 ENCOUNTER — EPISODE CHANGES (OUTPATIENT)
Dept: CASE MANAGEMENT | Facility: OTHER | Age: 78
End: 2018-10-08

## 2018-10-08 ENCOUNTER — ANTICOAGULATION VISIT (OUTPATIENT)
Dept: PHARMACY | Facility: HOSPITAL | Age: 78
End: 2018-10-08

## 2018-10-08 DIAGNOSIS — I48.20 CHRONIC ATRIAL FIBRILLATION (HCC): ICD-10-CM

## 2018-10-08 LAB
INR PPP: 2.3 (ref 0.91–1.09)
PROTHROMBIN TIME: 27.3 SECONDS (ref 10–13.8)

## 2018-10-08 PROCEDURE — 85610 PROTHROMBIN TIME: CPT

## 2018-10-08 PROCEDURE — 36416 COLLJ CAPILLARY BLOOD SPEC: CPT

## 2018-10-08 PROCEDURE — G0463 HOSPITAL OUTPT CLINIC VISIT: HCPCS

## 2018-10-08 NOTE — PROGRESS NOTES
Anticoagulation Clinic Progress Note    Anticoagulation Summary  As of 10/8/2018    INR goal:   2.0-3.0   TTR:   42.5 % (2.1 wk)   Today's INR:   2.3   Warfarin maintenance plan:   1 mg on Sun, Tue, Thu; 2 mg all other days   Weekly warfarin total:   11 mg   No change documented:   Annette Lopez RPH   Plan last modified:   Roseanne Feng RPH (9/13/2018)   Next INR check:   10/15/2018   Priority:   Critical   Target end date:   Indefinite    Indications    Chronic atrial fibrillation (CMS/HCC) [I48.2]             Anticoagulation Episode Summary     INR check location:       Preferred lab:       [facility] [place]    Comments:         Anticoagulation Care Providers     Provider Role Specialty Phone number    Nik Galarza MD Referring Cardiology [phone]    Roseanne Feng RPH Responsible Pharmacy           Drug interactions: has remained unchanged.  Diet: has remained unchanged.    Clinic Interview:  Patient Findings     Negatives:   Signs/symptoms of thrombosis, Signs/symptoms of bleeding,   Laboratory test error suspected, Change in health, Change in alcohol use,   Change in activity, Upcoming invasive procedure, Emergency department   visit, Upcoming dental procedure, Missed doses, Extra doses, Change in   medications, Change in diet/appetite, Hospital admission, Bruising, Other   complaints      Clinical Outcomes     Negatives:   Major bleeding event, Thromboembolic event,   Anticoagulation-related hospital admission, Anticoagulation-related ED   visit, Anticoagulation-related fatality        INR History:  Anticoagulation Monitoring 9/26/2018 9/27/2018 10/8/2018   INR 1.7 1.8 2.3   INR Date 9/26/2018 9/27/2018 10/8/2018   INR Goal 2.0-3.0 2.0-3.0 2.0-3.0   Trend Same Same Same   Last Week Total 13 mg 16 mg 14 mg   Next Week Total 7 mg 5 mg 11 mg   Sun - Hold (9/30), 2 mg (10/7) 1 mg   Mon - Hold (10/1) 2 mg   Tue - Hold (10/2) 1 mg   Wed 3 mg (9/26) Hold (10/3) 2  mg   Thu - 3 mg (9/27), 4 mg (10/4) 1 mg   Fri - 4 mg (10/5); Otherwise 2 mg 2 mg   Sat - Hold (9/29), 4 mg (10/6) 2 mg   Visit Report - - -   Some recent data might be hidden     Previous INR data from [facility] is recorded in [facility] and scanned into the patient's chart in [facility]. These results have been analyzed and reviewed.      Plan:  1. INR is Therapeutic today- see above in Anticoagulation Summary.  Will instruct Janel Maohney to change their warfarin regimen- see above in Anticoagulation Summary. (11mg per week)  2. Follow up in 1 week  3. Patient declines warfarin refills.  4. Verbal and written information provided. Patient expresses understanding and has no further questions at this time.    Annette Lopez [facility]

## 2018-10-09 RX ORDER — PANTOPRAZOLE SODIUM 40 MG/1
TABLET, DELAYED RELEASE ORAL
Qty: 180 TABLET | Refills: 0 | Status: SHIPPED | OUTPATIENT
Start: 2018-10-09 | End: 2018-11-23

## 2018-10-15 ENCOUNTER — TELEPHONE (OUTPATIENT)
Dept: PHARMACY | Facility: HOSPITAL | Age: 78
End: 2018-10-15

## 2018-10-16 ENCOUNTER — ANTICOAGULATION VISIT (OUTPATIENT)
Dept: PHARMACY | Facility: HOSPITAL | Age: 78
End: 2018-10-16

## 2018-10-16 ENCOUNTER — LAB (OUTPATIENT)
Dept: LAB | Facility: HOSPITAL | Age: 78
End: 2018-10-16

## 2018-10-16 ENCOUNTER — INFUSION (OUTPATIENT)
Dept: ONCOLOGY | Facility: HOSPITAL | Age: 78
End: 2018-10-16

## 2018-10-16 DIAGNOSIS — D63.1 ANEMIA IN STAGE 3 CHRONIC KIDNEY DISEASE (HCC): ICD-10-CM

## 2018-10-16 DIAGNOSIS — I48.20 CHRONIC ATRIAL FIBRILLATION (HCC): ICD-10-CM

## 2018-10-16 DIAGNOSIS — N18.30 ANEMIA IN STAGE 3 CHRONIC KIDNEY DISEASE (HCC): ICD-10-CM

## 2018-10-16 LAB
DEPRECATED RDW RBC AUTO: 48.1 FL (ref 37–49)
ERYTHROCYTE [DISTWIDTH] IN BLOOD BY AUTOMATED COUNT: 14.5 % (ref 11.7–14.5)
HCT VFR BLD AUTO: 38.6 % (ref 34–45)
HGB BLD-MCNC: 12.4 G/DL (ref 11.5–14.9)
INR PPP: 1.4 (ref 0.91–1.09)
MCH RBC QN AUTO: 29.1 PG (ref 27–33)
MCHC RBC AUTO-ENTMCNC: 32.1 G/DL (ref 32–35)
MCV RBC AUTO: 90.6 FL (ref 83–97)
PLATELET # BLD AUTO: 100 10*3/MM3 (ref 150–375)
PMV BLD AUTO: 11 FL (ref 8.9–12.1)
PROTHROMBIN TIME: 16.6 SECONDS (ref 10–13.8)
RBC # BLD AUTO: 4.26 10*6/MM3 (ref 3.9–5)
WBC NRBC COR # BLD: 7.68 10*3/MM3 (ref 4–10)

## 2018-10-16 PROCEDURE — G0463 HOSPITAL OUTPT CLINIC VISIT: HCPCS

## 2018-10-16 PROCEDURE — 36416 COLLJ CAPILLARY BLOOD SPEC: CPT

## 2018-10-16 PROCEDURE — 85027 COMPLETE CBC AUTOMATED: CPT | Performed by: INTERNAL MEDICINE

## 2018-10-16 PROCEDURE — 36415 COLL VENOUS BLD VENIPUNCTURE: CPT | Performed by: INTERNAL MEDICINE

## 2018-10-16 PROCEDURE — 85610 PROTHROMBIN TIME: CPT

## 2018-10-16 NOTE — PROGRESS NOTES
Hgb today is 12.4.  No procrit today per guidelines.  Patient without questions or concerns at this time.   Given copy of labs.     Lab Results   Component Value Date    WBC 7.68 10/16/2018    HGB 12.4 10/16/2018    HCT 38.6 10/16/2018    MCV 90.6 10/16/2018     (L) 10/16/2018

## 2018-10-16 NOTE — PROGRESS NOTES
Anticoagulation Clinic Progress Note    Anticoagulation Summary  As of 10/16/2018    INR goal:   2.0-3.0   TTR:   39.4 % (3.3 wk)   Today's INR:   1.4!   Warfarin maintenance plan:   1 mg on Sun, Tue; 2 mg all other days   Weekly warfarin total:   12 mg   Plan last modified:   Caprice Carrasco [facility] (10/16/2018)   Next INR check:   10/23/2018   Priority:   Critical   Target end date:   Indefinite    Indications    Chronic atrial fibrillation (CMS/HCC) [I48.2]             Anticoagulation Episode Summary     INR check location:       Preferred lab:       Send INR reminders to:    [facility]    Comments:         Anticoagulation Care Providers     Provider Role Specialty Phone number    Nik Galarza MD Referring Cardiology [phone]    Roseanne Feng [facility] Responsible Pharmacy           Drug interactions: has remained unchanged.  Diet: has remained unchanged.    Clinic Interview:  Patient Findings     Negatives:   Signs/symptoms of thrombosis, Signs/symptoms of bleeding,   Laboratory test error suspected, Change in health, Change in alcohol use,   Change in activity, Upcoming invasive procedure, Emergency department   visit, Upcoming dental procedure, Missed doses, Extra doses, Change in   medications, Change in diet/appetite, Hospital admission, Bruising, Other   complaints      Clinical Outcomes     Negatives:   Major bleeding event, Thromboembolic event,   Anticoagulation-related hospital admission, Anticoagulation-related ED   visit, Anticoagulation-related fatality        INR History:  Anticoagulation Monitoring 9/27/2018 10/8/2018 10/16/2018   INR 1.8 2.3 1.4   INR Date 9/27/2018 10/8/2018 10/16/2018   INR Goal 2.0-3.0 2.0-3.0 2.0-3.0   Trend Same Same Up   Last Week Total 16 mg 14 mg 11 mg   Next Week Total 5 mg 11 mg 14 mg   Sun Hold (9/30), 2 mg (10/7) 1 mg 1 mg   Mon Hold (10/1) 2 mg 2 mg   Tue Hold (10/2) 1 mg 3 mg (10/16)   Wed Hold (10/3) 2 mg 2 mg   Thu 3 mg (9/27), 4 mg (10/4)  1 mg 2 mg   Fri 4 mg (10/5); Otherwise 2 mg 2 mg 2 mg   Sat Hold (9/29), 4 mg (10/6) 2 mg 2 mg   Visit Report - - -   Some recent data might be hidden       Previous INR data from [facility] is recorded in [facility] and scanned into the patient's chart in [facility]. These results have been analyzed and reviewed.      Plan:  1. INR is Subtherapeutic today- see above in Anticoagulation Summary.  Will instruct Janel Mahoney to Change their warfarin regimen- see above in Anticoagulation Summary.  2. Follow up in 1 weeks  3. Patient declines warfarin refills.  4. Verbal and written information provided. Patient expresses understanding and has no further questions at this time.    Caprice Carrasco [facility]

## 2018-10-23 ENCOUNTER — ANTICOAGULATION VISIT (OUTPATIENT)
Dept: PHARMACY | Facility: HOSPITAL | Age: 78
End: 2018-10-23

## 2018-10-23 DIAGNOSIS — I48.20 CHRONIC ATRIAL FIBRILLATION (HCC): ICD-10-CM

## 2018-10-23 LAB
INR PPP: 2.7 (ref 0.91–1.09)
PROTHROMBIN TIME: 32.6 SECONDS (ref 10–13.8)

## 2018-10-23 PROCEDURE — 36416 COLLJ CAPILLARY BLOOD SPEC: CPT

## 2018-10-23 PROCEDURE — 85610 PROTHROMBIN TIME: CPT

## 2018-10-23 NOTE — PROGRESS NOTES
Anticoagulation Clinic Progress Note    Anticoagulation Summary  As of 10/23/2018    INR goal:   2.0-3.0   TTR:   42.7 % (1 mo)   Today's INR:   2.7   Warfarin maintenance plan:   1 mg on Sun, Tue; 2 mg all other days   Weekly warfarin total:   12 mg   Plan last modified:   Caprice Carrasco [facility] (10/16/2018)   Next INR check:   11/6/2018   Priority:   High   Target end date:   Indefinite    Indications    Chronic atrial fibrillation (CMS/HCC) [I48.2]             Anticoagulation Episode Summary     INR check location:       Preferred lab:       Send INR reminders to:    [facility] CLINICAL POOL    Comments:         Anticoagulation Care Providers     Provider Role Specialty Phone number    Nik Galarza MD Referring Cardiology [phone]    Roseanne Feng [facility] Responsible Pharmacy           Drug interactions: has remained unchanged.  Diet: has remained unchanged.    Clinic Interview:  Patient Findings     Negatives:   Signs/symptoms of thrombosis, Signs/symptoms of bleeding,   Laboratory test error suspected, Change in health, Change in alcohol use,   Change in activity, Upcoming invasive procedure, Emergency department   visit, Upcoming dental procedure, Missed doses, Extra doses, Change in   medications, Change in diet/appetite, Hospital admission, Bruising, Other   complaints      Clinical Outcomes     Negatives:   Major bleeding event, Thromboembolic event,   Anticoagulation-related hospital admission, Anticoagulation-related ED   visit, Anticoagulation-related fatality        INR History:  Anticoagulation Monitoring 10/8/2018 10/16/2018 10/23/2018   INR 2.3 1.4 2.7   INR Date 10/8/2018 10/16/2018 10/23/2018   INR Goal 2.0-3.0 2.0-3.0 2.0-3.0   Trend Same Up Same   Last Week Total 14 mg 11 mg 14 mg   Next Week Total 11 mg 14 mg 12 mg   Sun 1 mg 1 mg 1 mg   Mon 2 mg 2 mg 2 mg   Tue 1 mg 3 mg (10/16) 1 mg   Wed 2 mg 2 mg 2 mg   Thu 1 mg 2 mg 2 mg   Fri 2 mg 2 mg 2 mg   Sat 2 mg 2 mg 2 mg   Visit Report  - - -   Some recent data might be hidden       Plan:  1. INR is therapeutic today- see above in Anticoagulation Summary.   Will instruct Janel Mahoney to continue their warfarin regimen- see above in Anticoagulation Summary.  2. Follow up in 2 weeks.  3. Patient declines warfarin refills.  4. Verbal and written information provided. Patient expresses understanding and has no further questions at this time.    Anna Marie Ndiaye

## 2018-10-25 ENCOUNTER — TELEPHONE (OUTPATIENT)
Dept: CARDIOLOGY | Facility: CLINIC | Age: 78
End: 2018-10-25

## 2018-10-30 ENCOUNTER — INFUSION (OUTPATIENT)
Dept: ONCOLOGY | Facility: HOSPITAL | Age: 78
End: 2018-10-30

## 2018-10-30 ENCOUNTER — LAB (OUTPATIENT)
Dept: LAB | Facility: HOSPITAL | Age: 78
End: 2018-10-30

## 2018-10-30 DIAGNOSIS — N18.30 ANEMIA IN STAGE 3 CHRONIC KIDNEY DISEASE (HCC): ICD-10-CM

## 2018-10-30 DIAGNOSIS — D63.1 ANEMIA IN STAGE 3 CHRONIC KIDNEY DISEASE (HCC): ICD-10-CM

## 2018-10-30 LAB
DEPRECATED RDW RBC AUTO: 49.1 FL (ref 37–49)
ERYTHROCYTE [DISTWIDTH] IN BLOOD BY AUTOMATED COUNT: 14.5 % (ref 11.7–14.5)
HCT VFR BLD AUTO: 31.5 % (ref 34–45)
HGB BLD-MCNC: 10 G/DL (ref 11.5–14.9)
MCH RBC QN AUTO: 29.3 PG (ref 27–33)
MCHC RBC AUTO-ENTMCNC: 31.7 G/DL (ref 32–35)
MCV RBC AUTO: 92.4 FL (ref 83–97)
PLATELET # BLD AUTO: 106 10*3/MM3 (ref 150–375)
PMV BLD AUTO: 10.5 FL (ref 8.9–12.1)
RBC # BLD AUTO: 3.41 10*6/MM3 (ref 3.9–5)
WBC NRBC COR # BLD: 5.52 10*3/MM3 (ref 4–10)

## 2018-10-30 PROCEDURE — 85027 COMPLETE CBC AUTOMATED: CPT | Performed by: INTERNAL MEDICINE

## 2018-10-30 PROCEDURE — 36415 COLL VENOUS BLD VENIPUNCTURE: CPT | Performed by: INTERNAL MEDICINE

## 2018-10-30 NOTE — PROGRESS NOTES
"Hbg today is 10.0.  No procrit today per guidelines.   Patient states that she is \"feeling good.\"  Given copy of labs.  "

## 2018-10-31 ENCOUNTER — ANTICOAGULATION VISIT (OUTPATIENT)
Dept: PHARMACY | Facility: HOSPITAL | Age: 78
End: 2018-10-31

## 2018-10-31 DIAGNOSIS — I48.20 CHRONIC ATRIAL FIBRILLATION (HCC): ICD-10-CM

## 2018-10-31 NOTE — PROGRESS NOTES
Anticoagulation Clinic Progress Note    Anticoagulation Summary  As of 10/31/2018    INR goal:   2.0-3.0   TTR:   42.7 % (1 mo)   Today's INR:   No new INR was available at the time of this encounter.   Warfarin maintenance plan:   1 mg on Sun, Tue; 2 mg all other days   Weekly warfarin total:   12 mg   Plan last modified:   Caprice Carrasco [facility] (10/16/2018)   Next INR check:   11/12/2018   Priority:   Critical   Target end date:   Indefinite    Indications    Chronic atrial fibrillation (CMS/HCC) [I48.2]             Anticoagulation Episode Summary     INR check location:       Preferred lab:       Send INR reminders to:    [facility] [address]    Comments:         Anticoagulation Care Providers     Provider Role Specialty Phone number    Nik Galarza MD Referring Cardiology [phone]    Roseanne Feng [facility] Responsible Pharmacy           Drug interactions: has remained unchanged.  Diet: has remained unchanged.    Clinic Interview:  Patient Findings     Positives:   Upcoming invasive procedure    Negatives:   Signs/symptoms of thrombosis, Signs/symptoms of bleeding,   Laboratory test error suspected, Change in health, Change in alcohol use,   Change in activity, Emergency department visit, Upcoming dental procedure,   Missed doses, Extra doses, Change in medications, Change in diet/appetite,   Hospital admission, Bruising, Other complaints    Comments:   11/8 back procedure; hold 7 days prior, hold aspirin 5 days   prior, start lovenox 5 days prior, then post restart lovenox and warfarin   4mg boosters      Clinical Outcomes     Negatives:   Major bleeding event, Thromboembolic event,   Anticoagulation-related hospital admission, Anticoagulation-related ED   visit, Anticoagulation-related fatality    Comments:   11/8 back procedure; hold 7 days prior, hold aspirin 5 days   prior, start lovenox 5 days prior, then post restart lovenox and warfarin   4mg boosters        INR History:  Anticoagulation  Monitoring 10/16/2018 10/23/2018 10/31/2018   INR 1.4 2.7 -   INR Date 10/16/2018 10/23/2018 -   INR Goal 2.0-3.0 2.0-3.0 2.0-3.0   Trend Up Same Same   Last Week Total 11 mg 14 mg 12 mg   Next Week Total 14 mg 12 mg 4 mg   Sun 1 mg 1 mg Hold (11/4), 4 mg (11/11)   Mon 2 mg 2 mg Hold (11/5)   Tue 3 mg (10/16) 1 mg Hold (11/6)   Wed 2 mg 2 mg Hold (11/7); Otherwise 2 mg   Thu 2 mg 2 mg Hold (11/8); Otherwise 2 mg   Fri 2 mg 2 mg Hold (11/2), 4 mg (11/9)   Sat 2 mg 2 mg Hold (11/3), 4 mg (11/10)   Visit Report - - -   Some recent data might be hidden       Plan:  1. Stop warfarin Thursday 11/1- 7 days prior to procedure  Stop Aspirin Saturday 11/3  Start Lovenox Saturday 11/3 every 12 hours  Stop Lovenox Wednesday morning 11/7 24 hours before your procedure  Procedure on Thursday 11/8  When instructed, restart warfarin 4mg daily and lovenox every 12 hours  Check INR 3 days later on Monday 11/12  2. Verbal and written information provided. Patient expresses understanding and has no further questions at this time.    Roseanne Feng [facility]

## 2018-10-31 NOTE — PATIENT INSTRUCTIONS
Stop warfarin Thursday 11/1- 7 days prior to procedure  Stop Aspirin Saturday 11/3  Start Lovenox Saturday 11/3 every 12 hours  Stop Lovenox Wednesday morning 11/7/18 24 hours before your procedure  Procedure on Thursday 11/8  When instructed, restart warfarin 4mg daily and lovenox every 12 hours  Check INR 3 days later on Monday 11/12

## 2018-11-01 ENCOUNTER — CLINICAL SUPPORT NO REQUIREMENTS (OUTPATIENT)
Dept: CARDIOLOGY | Facility: CLINIC | Age: 78
End: 2018-11-01

## 2018-11-01 ENCOUNTER — TELEPHONE (OUTPATIENT)
Dept: CARDIOLOGY | Facility: CLINIC | Age: 78
End: 2018-11-01

## 2018-11-01 DIAGNOSIS — I50.42 CHRONIC COMBINED SYSTOLIC AND DIASTOLIC CONGESTIVE HEART FAILURE (HCC): Primary | ICD-10-CM

## 2018-11-01 NOTE — TELEPHONE ENCOUNTER
Pt has been dosed on lovenox and instructed on warfarin hold. We have her scheduled to be seen 3 days post procedure. Thank you.

## 2018-11-01 NOTE — TELEPHONE ENCOUNTER
Yue, can you call pt and get her scheduled towards the end of November. She is slotted to have her lumbar spinal ablation 2 weeks after her epidural on 11/8.     Dr. Hwang, are you staying with [employer] or changing? Pt has a LV Starfix (4195) lead that is not MRI compatible. Her RA and RV lead are MRI compatible.

## 2018-11-01 NOTE — TELEPHONE ENCOUNTER
Pt's CRT-D device has reached RRT as of this morning. Normal device function noted. However, there were 2 VT episodes treated with ATP therapy on 9/16 and 10/29.     Pt has a LV Starfix (4195) lead that is not MRI compatible. Her RA and RV lead are MRI compatible.     Also, it is noted that pt is having an epidural block and then possible RFA procedure on her back?  We will need to be notified when RFA procedure is scheduled as rep may need to be present.       She is getting ready to modify her anticoagulation starting tomorrow, including lovenox bridge, for the said procedures.  Do you want to change out the device prior to the procedures or after?

## 2018-11-05 ENCOUNTER — ANTICOAGULATION VISIT (OUTPATIENT)
Dept: PHARMACY | Facility: HOSPITAL | Age: 78
End: 2018-11-05

## 2018-11-05 DIAGNOSIS — I48.20 CHRONIC ATRIAL FIBRILLATION (HCC): ICD-10-CM

## 2018-11-05 NOTE — PROGRESS NOTES
This visit is for documentation purposes only. Pt came into clinic confused on lovenox instructions. She has been taking lovenox once daily since Friday. Advised her to start lovenox Q12H starting this morning and then stop 24 hours before her procedure on Wednesday morning. There has been some confusion on whether her procedure is Thursday or Friday; she is verifying this. Post procedure we will begin warfarin 4mg daily and lovenox Q12H when instructed until her INR check on Monday. She verbally agreed and will call if any further questions or concerns.

## 2018-11-12 ENCOUNTER — ANTICOAGULATION VISIT (OUTPATIENT)
Dept: PHARMACY | Facility: HOSPITAL | Age: 78
End: 2018-11-12

## 2018-11-12 ENCOUNTER — OFFICE VISIT (OUTPATIENT)
Dept: ORTHOPEDIC SURGERY | Facility: CLINIC | Age: 78
End: 2018-11-12

## 2018-11-12 VITALS — WEIGHT: 146 LBS | HEIGHT: 64 IN | BODY MASS INDEX: 24.92 KG/M2 | TEMPERATURE: 97.9 F

## 2018-11-12 DIAGNOSIS — I42.0 DILATED CARDIOMYOPATHY (HCC): ICD-10-CM

## 2018-11-12 DIAGNOSIS — Z91.81 HISTORY OF FALL: Primary | ICD-10-CM

## 2018-11-12 DIAGNOSIS — I48.20 CHRONIC ATRIAL FIBRILLATION (HCC): ICD-10-CM

## 2018-11-12 DIAGNOSIS — S89.91XA INJURY OF RIGHT KNEE, INITIAL ENCOUNTER: ICD-10-CM

## 2018-11-12 DIAGNOSIS — I25.5 ISCHEMIC CARDIOMYOPATHY: Primary | ICD-10-CM

## 2018-11-12 DIAGNOSIS — Z96.652 S/P TKR (TOTAL KNEE REPLACEMENT) USING CEMENT, LEFT: ICD-10-CM

## 2018-11-12 DIAGNOSIS — S89.92XA KNEE INJURY, LEFT, INITIAL ENCOUNTER: ICD-10-CM

## 2018-11-12 LAB
INR PPP: 3.7 (ref 0.91–1.09)
PROTHROMBIN TIME: 44.8 SECONDS (ref 10–13.8)

## 2018-11-12 PROCEDURE — 73564 X-RAY EXAM KNEE 4 OR MORE: CPT | Performed by: ORTHOPAEDIC SURGERY

## 2018-11-12 PROCEDURE — G0463 HOSPITAL OUTPT CLINIC VISIT: HCPCS | Performed by: PHARMACIST

## 2018-11-12 PROCEDURE — 36416 COLLJ CAPILLARY BLOOD SPEC: CPT

## 2018-11-12 PROCEDURE — 85610 PROTHROMBIN TIME: CPT

## 2018-11-12 PROCEDURE — 99204 OFFICE O/P NEW MOD 45 MIN: CPT | Performed by: ORTHOPAEDIC SURGERY

## 2018-11-12 RX ORDER — HYDROCODONE BITARTRATE AND ACETAMINOPHEN 7.5; 325 MG/1; MG/1
1 TABLET ORAL EVERY 6 HOURS PRN
Qty: 40 TABLET | Refills: 0 | Status: SHIPPED | OUTPATIENT
Start: 2018-11-12 | End: 2018-12-13

## 2018-11-12 NOTE — PATIENT INSTRUCTIONS
Stop warfarin Monday 11/19- 7 days prior to procedure  Stop Aspirin Wednesday 11/21 - 5 days prior  Start Lovenox Wednesday 11/21 every 12 hours - 5 days prior  Stop Lovenox [place] morning 11/25/18 24 hours before your procedure  Procedure on Monday 11/26  When instructed, restart warfarin 4mg daily and lovenox every 12 hours  Check INR 3 days later on Thursday 11/29

## 2018-11-12 NOTE — PROGRESS NOTES
"Patient:  Janel Mahoney is a 78 y.o. female    Chief Complaint/ Reason for Visit:    Chief Complaint   Patient presents with   • Left Knee - Establish Care, Pain, Joint Swelling   • Right Knee - Establish Care, Pain, Joint Swelling   • Fall       HPI:  This pleasant lady presents today complaining of bilateral knee pain, to a fairly equal degree, since she stumbled and fell at home this past Thursday.  She has bruising on both legs especially the right leg would hard.  She had been at [facility] planning undergoing some sort of a spine testing procedure and said that her legs \"just didn't feel right\" even by the time she got home.  She typically walks with a walker, but lost her balance and her home and fell on the anterior aspects of her knees.  She says she is able to get up and walk using her walker.  Her pain seemed to escalate somewhat into Friday and then got a little bit worse even Saturday and seemed to stabilize Sunday.  The patient said she didn't want to go to the ER as she figured she would have to spend many hours there to be seen.  She called this morning and we worked her in promptly for evaluation.      PMH:    Past Medical History:   Diagnosis Date   • Anemia    • Atrial fibrillation (CMS/HCC)    • Bruises easily    • Carotid artery stenosis    • Chronic back pain    • Chronic coronary artery disease     moderate to severe LV dysfunction.   • GERD (gastroesophageal reflux disease)    • H/O cardiac murmur    • [place] (hard of hearing)     wears hearing aids   • Hyperlipidemia    • Hypertension    • Kyphoscoliosis    • Leukopenia    • Lumbar spondylosis    • Obesity    • GEORGINA (obstructive sleep apnea)    • Osteoarthritis    • Osteoporosis    • Peptic ulcer    • Premature ventricular contractions    • Renal insufficiency syndrome    • Scoliosis    • Shoulder fracture, left    • Stroke syndrome    • Thrombocytopenia (CMS/HCC)    • Ventricular tachycardia (CMS/HCC)    • Vitamin B12 deficiency  "       PSH:    Past Surgical History:   Procedure Laterality Date   • AV NODE ABLATION     • BREAST BIOPSY     • CARDIAC CATHETERIZATION      Showed severe mitral insufficiency and borderline coronary artery disease   • CARDIAC CATHETERIZATION      Showed an ejection fraction of 35%. She had occlusive disease of the right posterior LV branch and no other significant disease, treated medically.   • CARDIAC DEFIBRILLATOR PLACEMENT      Biventricular   • CARDIAC VALVE REPLACEMENT      Done with stent placement   • CARDIOVERSION      multiple electrocardioversions.   • CAROTID ARTERY ANGIOPLASTY Right    • CORONARY ANGIOPLASTY WITH STENT PLACEMENT     • CORONARY ARTERY BYPASS GRAFT      single graft to the PDA   • CORONARY STENT PLACEMENT     • HEMORRHOIDECTOMY     • HYSTERECTOMY     • MITRAL VALVE REPLACEMENT  2010    #31 Epic porcine valve.   • THROMBOENDARTERECTOMY Right     carotid thromboendarterectomy    • TONSILLECTOMY      age 32   • TOTAL KNEE ARTHROPLASTY Left        Social Hx:    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     Employer: RETIRED   Tobacco Use   • Smoking status: Former Smoker     Packs/day: 1.00     Years: 50.00     Pack years: 50.00     Types: Cigarettes     Last attempt to quit: 2009     Years since quittin.8   • Smokeless tobacco: Never Used   Substance and Sexual Activity   • Alcohol use: Yes     Comment: rare   • Drug use: No   • Sexual activity: Defer   Other Topics Concern   • Not on file   Social History Narrative    • Not on file       Family Hx:    Family History   Problem Relation Age of Onset   • Cancer Mother    • Breast cancer Mother    • Heart disease Mother    • Hypertension Mother    • Coronary artery disease Father    • Stroke Father    • Heart disease Father    • Hypertension Father    • Other Daughter         thiamin deficiency    • Hypertension Son    • Malig Hyperthermia Neg Hx        Meds:    Current Outpatient Medications:   •  ACETAMINOPHEN PO, Take 325 mg by mouth 4 (Four) Times a Day As Needed. Senior choice , Disp: , Rfl:   •  alendronate (FOSAMAX) 70 MG tablet, TAKE 1 TABLET EVERY 7 (SEVEN) DAYS. STAY UPRIGHT FOR 30 MINUTES / DRINK 8 OUNCES OF WATER AND DO NOT EAT 30 MINUTES (Patient taking differently: TAKE 1 TABLET EVERY 14 DAYS. STAY UPRIGHT FOR 30 MINUTES / DRINK 8 OUNCES OF WATER AND DO NOT EAT 30 MINUTES), Disp: 12 tablet, Rfl: 3  •  aspirin 81 MG tablet, Take 81 mg by mouth Daily., Disp: , Rfl:   •  calcitriol (ROCALTROL) 0.25 MCG capsule, TAKE 1 CAPSULE EVERY OTHER DAY (Patient taking differently: TAKE 1 CAPSULE EVERY EVENING), Disp: 45 capsule, Rfl: 2  •  carvedilol (COREG) 25 MG tablet, TAKE 1 TABLET TWICE DAILY, Disp: 180 tablet, Rfl: 3  •  Cholecalciferol (VITAMIN D) 2000 UNITS tablet, Take 1 tablet by mouth daily., Disp: , Rfl:   •  Cyanocobalamin (VITAMIN B12 PO), Take 1 tablet by mouth daily. As directed, Disp: , Rfl:   •  epoetin adele (EPOGEN,PROCRIT) 41755 UNIT/ML injection, as needed. Procrit [id number] UNIT/ML Injection Solution; Patient Sig: Procrit [id number] UNIT/ML Injection Solution INJECT SUBCUTANEOUSLY AS DIRECTED.; 0; 01-Apr-2014; Active, Disp: , Rfl:   •  FERROUS SULFATE PO, Take 324 mg by mouth Daily. Take as directed  , Disp: , Rfl:   •  furosemide (LASIX) 40 MG tablet, TAKE 1 AND 1/2 TABLETS EVERY MORNING AND 1 TABLET EVERY EVENING (Patient taking differently: Take every 6 hours/ TID), Disp: 225 tablet, Rfl: 3  •  HYDROcodone-acetaminophen (NORCO) 7.5-325 MG per tablet, Take 1 tablet  "by mouth Every 6 (Six) Hours As Needed for Moderate Pain ., Disp: 40 tablet, Rfl: 0  •  Multiple Vitamin (MULTIVITAMIN) capsule, Take 1 capsule by mouth daily., Disp: , Rfl:   •  mupirocin (BACTROBAN) 2 % nasal ointment, into each nostril. PER MD ORDERS, Disp: , Rfl:   •  pantoprazole (PROTONIX) 40 MG EC tablet, TAKE 1 TABLET TWICE DAILY, Disp: 180 tablet, Rfl: 0  •  ramipril (ALTACE) 2.5 MG capsule, Take 2.5 mg by mouth Daily., Disp: , Rfl:   •  simvastatin (ZOCOR) 40 MG tablet, TAKE 1 TABLET EVERY DAY, Disp: 90 tablet, Rfl: 1  •  traMADol (ULTRAM) 50 MG tablet, TAKE 2 TABLETS EVERY MORNING  AND TAKE 2 TABLETS AT BEDTIME, Disp: 360 tablet, Rfl: 1  •  warfarin (COUMADIN) 1 MG tablet, TAKE 1 TO 2 TABLETS EVERY DAY AS DIRECTED, Disp: 180 tablet, Rfl: 3  •  zolpidem (AMBIEN) 10 MG tablet, Take 1 tablet by mouth At Night As Needed for Sleep. 3 months, Disp: 90 tablet, Rfl: 1  •  enoxaparin (LOVENOX) 80 MG/0.8ML solution syringe, Inject 0.7 mL under the skin into the appropriate area as directed Every 12 (Twelve) Hours for 10 doses., Disp: 8 mL, Rfl: 1    Allergies:    Allergies   Allergen Reactions   • Diclofenac      She had adverse effects from the medications that required hospitalization. Pt got stomach ulcers from this medication prescribed by Dr. Arango - pediatrist.   • Dofetilide      Pt states not allergic.        ROS:  Review of Systems   Constitutional: Positive for activity change and fatigue. Negative for fever.   Musculoskeletal: Positive for arthralgias, back pain, gait problem, joint swelling and myalgias.   Neurological: Positive for weakness and numbness.   Hematological: Bruises/bleeds easily.   All other systems reviewed and are negative.      Vitals:    11/12/18 1143   Temp: 97.9 °F (36.6 °C)   TempSrc: Temporal   Weight: 66.2 kg (146 lb)   Height: 162.6 cm (64\")     Body mass index is 25.06 kg/m².    Physical Exam    The patient is awake, alert, and oriented ×3.  The patient is in no acute " distress.  Breathing is regular and unlabored with a respiratory rate of 12/m.  Extraocular movements and pupillary responses are symmetrically intact. Sclerae are anicteric.   Hearing is compromised, but I was able to communicate with the patient.  Her hearing aids are working somewhat well.  She is a very pleasant lady who is completely oriented.  Since of humor is intact.  Speech is within normal limits.  There is no jugular venous distention.    She has a contusion on the anterior aspect of the right knee and proximal tibia with a significant bruise noted.  She has a scrape with eschar and are midline right leg.  She has significant hyperpigmentation in both lower extremities consistent with chronic venous insufficiency.  However, her calves are both soft and nontender.  Distally, she has 1+ pedal pulses present symmetrically in both feet with a current heart rate of about 72 bpm.  Sensory exams grossly intact light touch.    Left knee: There is a well-healed anterior midline incision from previous total knee.  Active flexion and extension are intact though uncomfortable.  She has tenderness over the anterior aspect of the left knee but no cellulitis.  Mild swelling is noted.  There is no abnormal warmth.    Right knee: Patient has the bruising described above.  Active flexion and extension are intact.  There is tenderness all around the joint line and anteriorly.  There is no cellulitis or abnormal warmth.      Radiology: X-rays: A 4 view series of each knee was ordered and reviewed today to assess patient's complaints of bilateral knee pain after a fall she sustained at home last Thursday.  Comparison images were not available.  These images reveal well fixed Biomet left total knee with no sign of fracture, loosening, or other problems.  Right knee has moderate to advanced osteoarthritis primarily patellofemoral joint though there are degenerative changes in the form of osteophytes around all 3  compartments.          Assessment:     Diagnosis Plan   1. History of fall  XR Knee 4+ View Bilateral   2. Knee injury, left, initial encounter  XR Knee 4+ View Bilateral   3. Injury of right knee, initial encounter  XR Knee 4+ View Bilateral   4. S/P TKR (total knee replacement) using cement, left             Plan:  I discussed everything with the patient and her family.  I explained that I did not see any acute fractures, but did advise him that it was possible she had a small nondisplaced crack that just didn't show up.  Accordingly, I think we need to have her be cautious in her activities and use her walker for gait 100% of the time.  I want to see her back in 2 weeks for recheck and reexamination.  We may need to obtain new radiographs that she has not adequately improved.    The patient is chronically anticoagulated and therefore I do not think additional DVT prophylaxis would be safe.    The patient requested something stronger than her typical tramadol for the acute pain of injury.    Prescription medication management:    Narcotic counseling was performed in the usual fashion, and I emphasized the risks of narcotic use and the dangers. We discussed the potential for dependency and addiction, and we discussed things to avoid when taking these medications including specific activities to avoid, as well as avoidance of other sedating substances including alcohol or other medications.     A Dann report was checked and personally reviewed by me, and the patient confirmed that they had reviewed the house bill 1 warnings provided. Appropriate narcotic pain medication was issued.        Orders Placed This Encounter   Procedures   • XR Knee 4+ View Bilateral     Order Specific Question:   Reason for Exam:     Answer:   OPNC BILATERAL KNEES

## 2018-11-12 NOTE — PROGRESS NOTES
"Anticoagulation Clinic Progress Note    Anticoagulation Summary  As of 11/12/2018    INR goal:   2.0-3.0   TTR:   37.7 % (1.7 mo)   INR used for dosing:   3.7! (11/12/2018)   Warfarin maintenance plan:   1 mg on Sun, Tue; 2 mg all other days   Weekly warfarin total:   12 mg   Plan last modified:   Caprice Carrasco [facility] (10/16/2018)   Next INR check:   11/29/2018   Priority:   Critical   Target end date:   Indefinite    Indications    Chronic atrial fibrillation (CMS/HCC) [I48.2]             Anticoagulation Episode Summary     INR check location:       Preferred lab:       Send INR reminders to:    [facility] CLINICAL POOL    Comments:         Anticoagulation Care Providers     Provider Role Specialty Phone number    Nik Galarza MD Referring Cardiology [phone]    Roseanne Feng [facility] Responsible Pharmacy [phone]          Drug interactions: has remained unchanged.  Diet: has remained unchanged.    Clinic Interview:  Patient Findings     Positives:   Upcoming invasive procedure, Other complaints    Negatives:   Signs/symptoms of thrombosis, Signs/symptoms of bleeding,   Laboratory test error suspected, Change in health, Change in alcohol use,   Change in activity, Emergency department visit, Upcoming dental procedure,   Missed doses, Extra doses, Change in medications, Change in diet/appetite,   Hospital admission, Bruising    Comments:   Pt fell last Thursday after her procedure due to \"legs being   numb\" and states both of her legs are still very sore; she just met with   the orthopaedic surgeon regarding the fall and they said the xrays were   negative and they will continue to monitor. She is now scheduled for a   second back procedure on 11/26; hold 7 days prior, hold aspirin 5 days   prior, start lovenox 5 days prior, then post restart lovenox and warfarin   4mg boosters         Clinical Outcomes     Negatives:   Major bleeding event, Thromboembolic event,   Anticoagulation-related hospital " "admission, Anticoagulation-related ED   visit, Anticoagulation-related fatality    Comments:   Pt fell last Thursday after her procedure due to \"legs being   numb\" and states both of her legs are still very sore; she just met with   the orthopaedic surgeon regarding the fall and they said the xrays were   negative and they will continue to monitor. She is now scheduled for a   second back procedure on 11/26; hold 7 days prior, hold aspirin 5 days   prior, start lovenox 5 days prior, then post restart lovenox and warfarin   4mg boosters           INR History:  Anticoagulation Monitoring 10/31/2018 11/5/2018 11/12/2018   INR - - 3.7   INR Date - - 11/12/2018   INR Goal 2.0-3.0 2.0-3.0 2.0-3.0   Trend Same Same Same   Last Week Total 12 mg 7 mg 16 mg   Next Week Total 2 mg 16 mg 12 mg   Sun Hold (11/4), 4 mg (11/11) 4 mg (11/11) Hold (11/25); Otherwise 1 mg   Mon Hold (11/5) Hold (11/5) Hold (11/19), 4 mg (11/26); Otherwise 2 mg   Tue Hold (11/6) Hold (11/6) Hold (11/20), 4 mg (11/27); Otherwise 1 mg   Wed Hold (11/7); Otherwise 2 mg Hold (11/7) Hold (11/21), 4 mg (11/28); Otherwise 2 mg   Thu Hold (11/1), 4 mg (11/8) 4 mg (11/8) Hold (11/22); Otherwise 2 mg   Fri Hold (11/2), 4 mg (11/9) 4 mg (11/9) Hold (11/23); Otherwise 2 mg   Sat Hold (11/3), 4 mg (11/10) 4 mg (11/10) Hold (11/24); Otherwise 2 mg   Visit Report - - -   Some recent data might be hidden       Plan:  1. INR is Supratherapeutic today- see above in Anticoagulation Summary.  Will instruct Janel Mahoney to Change their warfarin regimen- see above in Anticoagulation Summary. Stop lovenox. Take warfarin as previously dosed for this week.  Stop warfarin Monday 11/19- 7 days prior to procedure  Stop Aspirin Wednesday 11/21 - 5 days prior  Start Lovenox Wednesday 11/21 every 12 hours - 5 days prior  Stop Lovenox [place] morning 11/25/18 24 hours before your procedure  Procedure on Monday 11/26  When instructed, restart warfarin 4mg daily and lovenox every 12 " hours  Check INR 3 days later on Thursday 11/29  2. Follow up in 3 days post procedure  3. Patient declines warfarin refills.  4. Verbal and written information provided. Patient expresses understanding and has no further questions at this time.    Roseanne Feng [facility]

## 2018-11-13 ENCOUNTER — LAB (OUTPATIENT)
Dept: LAB | Facility: HOSPITAL | Age: 78
End: 2018-11-13

## 2018-11-13 ENCOUNTER — INFUSION (OUTPATIENT)
Dept: ONCOLOGY | Facility: HOSPITAL | Age: 78
End: 2018-11-13

## 2018-11-13 DIAGNOSIS — D63.1 ANEMIA IN STAGE 3 CHRONIC KIDNEY DISEASE (HCC): ICD-10-CM

## 2018-11-13 DIAGNOSIS — N18.30 ANEMIA IN STAGE 3 CHRONIC KIDNEY DISEASE (HCC): ICD-10-CM

## 2018-11-13 DIAGNOSIS — N18.30 ANEMIA IN STAGE 3 CHRONIC KIDNEY DISEASE (HCC): Primary | ICD-10-CM

## 2018-11-13 DIAGNOSIS — D63.1 ANEMIA IN STAGE 3 CHRONIC KIDNEY DISEASE (HCC): Primary | ICD-10-CM

## 2018-11-13 PROBLEM — I25.5 ISCHEMIC CARDIOMYOPATHY: Status: ACTIVE | Noted: 2018-11-13

## 2018-11-13 LAB
DEPRECATED RDW RBC AUTO: 50 FL (ref 37–49)
ERYTHROCYTE [DISTWIDTH] IN BLOOD BY AUTOMATED COUNT: 14.7 % (ref 11.7–14.5)
HCT VFR BLD AUTO: 28 % (ref 34–45)
HGB BLD-MCNC: 8.8 G/DL (ref 11.5–14.9)
MCH RBC QN AUTO: 29.1 PG (ref 27–33)
MCHC RBC AUTO-ENTMCNC: 31.4 G/DL (ref 32–35)
MCV RBC AUTO: 92.7 FL (ref 83–97)
PLATELET # BLD AUTO: 125 10*3/MM3 (ref 150–375)
PMV BLD AUTO: 10.4 FL (ref 8.9–12.1)
RBC # BLD AUTO: 3.02 10*6/MM3 (ref 3.9–5)
WBC NRBC COR # BLD: 6.74 10*3/MM3 (ref 4–10)

## 2018-11-13 PROCEDURE — 96372 THER/PROPH/DIAG INJ SC/IM: CPT | Performed by: INTERNAL MEDICINE

## 2018-11-13 PROCEDURE — 63510000001 EPOETIN ALFA PER 1000 UNITS: Performed by: INTERNAL MEDICINE

## 2018-11-13 PROCEDURE — 85027 COMPLETE CBC AUTOMATED: CPT | Performed by: INTERNAL MEDICINE

## 2018-11-13 PROCEDURE — 36415 COLL VENOUS BLD VENIPUNCTURE: CPT | Performed by: INTERNAL MEDICINE

## 2018-11-13 RX ADMIN — ERYTHROPOIETIN 8000 UNITS: 20000 INJECTION, SOLUTION INTRAVENOUS; SUBCUTANEOUS at 12:25

## 2018-11-19 RX ORDER — WARFARIN SODIUM 1 MG/1
TABLET ORAL
Qty: 180 TABLET | Refills: 3 | Status: SHIPPED | OUTPATIENT
Start: 2018-11-19 | End: 2018-11-23

## 2018-11-23 ENCOUNTER — HOSPITAL ENCOUNTER (INPATIENT)
Facility: HOSPITAL | Age: 78
LOS: 7 days | Discharge: HOME HEALTH | End: 2018-11-30
Attending: EMERGENCY MEDICINE | Admitting: HOSPITALIST

## 2018-11-23 ENCOUNTER — APPOINTMENT (OUTPATIENT)
Dept: CT IMAGING | Facility: HOSPITAL | Age: 78
End: 2018-11-23

## 2018-11-23 DIAGNOSIS — D62 ACUTE BLOOD LOSS ANEMIA: ICD-10-CM

## 2018-11-23 DIAGNOSIS — T14.8XXA INTRAMUSCULAR HEMATOMA: ICD-10-CM

## 2018-11-23 DIAGNOSIS — R26.2 DIFFICULTY WALKING: ICD-10-CM

## 2018-11-23 DIAGNOSIS — I48.20 CHRONIC ATRIAL FIBRILLATION (HCC): ICD-10-CM

## 2018-11-23 DIAGNOSIS — Z79.01 ANTICOAGULATED: ICD-10-CM

## 2018-11-23 DIAGNOSIS — D62 ACUTE POSTHEMORRHAGIC ANEMIA: ICD-10-CM

## 2018-11-23 DIAGNOSIS — S20.211A HEMATOMA OF RIGHT CHEST WALL, INITIAL ENCOUNTER: Primary | ICD-10-CM

## 2018-11-23 PROBLEM — N18.30 CHRONIC KIDNEY DISEASE, STAGE 3 (HCC): Status: ACTIVE | Noted: 2018-11-23

## 2018-11-23 PROBLEM — D68.32 HEMORRHAGIC DISORDER DUE TO EXTRINSIC CIRCULATING ANTICOAGULANTS: Status: ACTIVE | Noted: 2018-11-23

## 2018-11-23 LAB
ABO GROUP BLD: NORMAL
ALBUMIN SERPL-MCNC: 2.9 G/DL (ref 3.5–5.2)
ALBUMIN/GLOB SERPL: 0.9 G/DL
ALP SERPL-CCNC: 56 U/L (ref 39–117)
ALT SERPL W P-5'-P-CCNC: 18 U/L (ref 1–33)
ANION GAP SERPL CALCULATED.3IONS-SCNC: 13 MMOL/L
AST SERPL-CCNC: 16 U/L (ref 1–32)
BASOPHILS # BLD AUTO: 0.02 10*3/MM3 (ref 0–0.2)
BASOPHILS NFR BLD AUTO: 0.3 % (ref 0–1.5)
BILIRUB SERPL-MCNC: 0.5 MG/DL (ref 0.1–1.2)
BLD GP AB SCN SERPL QL: NEGATIVE
BUN BLD-MCNC: 41 MG/DL (ref 8–23)
BUN/CREAT SERPL: 25.8 (ref 7–25)
CALCIUM SPEC-SCNC: 9 MG/DL (ref 8.6–10.5)
CHLORIDE SERPL-SCNC: 102 MMOL/L (ref 98–107)
CO2 SERPL-SCNC: 26 MMOL/L (ref 22–29)
CREAT BLD-MCNC: 1.59 MG/DL (ref 0.57–1)
DEPRECATED RDW RBC AUTO: 53.9 FL (ref 37–54)
EOSINOPHIL # BLD AUTO: 0.14 10*3/MM3 (ref 0–0.7)
EOSINOPHIL NFR BLD AUTO: 1.9 % (ref 0.3–6.2)
ERYTHROCYTE [DISTWIDTH] IN BLOOD BY AUTOMATED COUNT: 15.3 % (ref 11.7–13)
GFR SERPL CREATININE-BSD FRML MDRD: 31 ML/MIN/1.73
GLOBULIN UR ELPH-MCNC: 3.3 GM/DL
GLUCOSE BLD-MCNC: 126 MG/DL (ref 65–99)
HCT VFR BLD AUTO: 24.8 % (ref 35.6–45.5)
HGB BLD-MCNC: 7.4 G/DL (ref 11.9–15.5)
IMM GRANULOCYTES # BLD: 0.06 10*3/MM3 (ref 0–0.03)
IMM GRANULOCYTES NFR BLD: 0.8 % (ref 0–0.5)
INR PPP: 1.89 (ref 0.9–1.1)
LYMPHOCYTES # BLD AUTO: 0.83 10*3/MM3 (ref 0.9–4.8)
LYMPHOCYTES NFR BLD AUTO: 11.2 % (ref 19.6–45.3)
MCH RBC QN AUTO: 28.9 PG (ref 26.9–32)
MCHC RBC AUTO-ENTMCNC: 29.8 G/DL (ref 32.4–36.3)
MCV RBC AUTO: 96.9 FL (ref 80.5–98.2)
MONOCYTES # BLD AUTO: 0.47 10*3/MM3 (ref 0.2–1.2)
MONOCYTES NFR BLD AUTO: 6.4 % (ref 5–12)
NEUTROPHILS # BLD AUTO: 5.88 10*3/MM3 (ref 1.9–8.1)
NEUTROPHILS NFR BLD AUTO: 79.4 % (ref 42.7–76)
PLATELET # BLD AUTO: 251 10*3/MM3 (ref 140–500)
PMV BLD AUTO: 8.6 FL (ref 6–12)
POTASSIUM BLD-SCNC: 4 MMOL/L (ref 3.5–5.2)
PROT SERPL-MCNC: 6.2 G/DL (ref 6–8.5)
PROTHROMBIN TIME: 21.3 SECONDS (ref 11.7–14.2)
RBC # BLD AUTO: 2.56 10*6/MM3 (ref 3.9–5.2)
RH BLD: POSITIVE
SODIUM BLD-SCNC: 141 MMOL/L (ref 136–145)
T&S EXPIRATION DATE: NORMAL
TROPONIN T SERPL-MCNC: 0.02 NG/ML (ref 0–0.03)
WBC NRBC COR # BLD: 7.4 10*3/MM3 (ref 4.5–10.7)

## 2018-11-23 PROCEDURE — 25010000002 HYDROMORPHONE PER 4 MG: Performed by: INTERNAL MEDICINE

## 2018-11-23 PROCEDURE — 86923 COMPATIBILITY TEST ELECTRIC: CPT

## 2018-11-23 PROCEDURE — 85025 COMPLETE CBC W/AUTO DIFF WBC: CPT | Performed by: PHYSICIAN ASSISTANT

## 2018-11-23 PROCEDURE — 86850 RBC ANTIBODY SCREEN: CPT | Performed by: EMERGENCY MEDICINE

## 2018-11-23 PROCEDURE — 86900 BLOOD TYPING SEROLOGIC ABO: CPT

## 2018-11-23 PROCEDURE — P9016 RBC LEUKOCYTES REDUCED: HCPCS

## 2018-11-23 PROCEDURE — 71250 CT THORAX DX C-: CPT

## 2018-11-23 PROCEDURE — 85610 PROTHROMBIN TIME: CPT | Performed by: PHYSICIAN ASSISTANT

## 2018-11-23 PROCEDURE — 36430 TRANSFUSION BLD/BLD COMPNT: CPT

## 2018-11-23 PROCEDURE — 86901 BLOOD TYPING SEROLOGIC RH(D): CPT | Performed by: EMERGENCY MEDICINE

## 2018-11-23 PROCEDURE — 84484 ASSAY OF TROPONIN QUANT: CPT | Performed by: EMERGENCY MEDICINE

## 2018-11-23 PROCEDURE — 80053 COMPREHEN METABOLIC PANEL: CPT | Performed by: PHYSICIAN ASSISTANT

## 2018-11-23 PROCEDURE — 86900 BLOOD TYPING SEROLOGIC ABO: CPT | Performed by: EMERGENCY MEDICINE

## 2018-11-23 PROCEDURE — 99284 EMERGENCY DEPT VISIT MOD MDM: CPT

## 2018-11-23 RX ORDER — THIAMINE HCL 100 MG
2500 TABLET ORAL DAILY
COMMUNITY
End: 2019-11-12

## 2018-11-23 RX ORDER — FUROSEMIDE 40 MG/1
40 TABLET ORAL EVERY EVENING
Status: ON HOLD | COMMUNITY
End: 2018-11-30 | Stop reason: SDUPTHER

## 2018-11-23 RX ORDER — FERROUS GLUCONATE 324(37.5)
324 TABLET ORAL DAILY
COMMUNITY
End: 2018-12-11

## 2018-11-23 RX ORDER — SODIUM CHLORIDE 0.9 % (FLUSH) 0.9 %
10 SYRINGE (ML) INJECTION AS NEEDED
Status: DISCONTINUED | OUTPATIENT
Start: 2018-11-23 | End: 2018-11-26

## 2018-11-23 RX ORDER — ALENDRONATE SODIUM 70 MG/1
70 TABLET ORAL
COMMUNITY
End: 2019-04-22 | Stop reason: SDUPTHER

## 2018-11-23 RX ORDER — CARVEDILOL 25 MG/1
25 TABLET ORAL 2 TIMES DAILY WITH MEALS
COMMUNITY
End: 2018-12-24 | Stop reason: SDUPTHER

## 2018-11-23 RX ORDER — FERROUS SULFATE 325(65) MG
325 TABLET ORAL
Status: DISCONTINUED | OUTPATIENT
Start: 2018-11-24 | End: 2018-11-30 | Stop reason: HOSPADM

## 2018-11-23 RX ORDER — CARVEDILOL 25 MG/1
25 TABLET ORAL 2 TIMES DAILY WITH MEALS
Status: DISCONTINUED | OUTPATIENT
Start: 2018-11-23 | End: 2018-11-30 | Stop reason: HOSPADM

## 2018-11-23 RX ORDER — CALCITRIOL 0.25 UG/1
0.25 CAPSULE, LIQUID FILLED ORAL DAILY
Status: DISCONTINUED | OUTPATIENT
Start: 2018-11-23 | End: 2018-11-30 | Stop reason: HOSPADM

## 2018-11-23 RX ORDER — ATORVASTATIN CALCIUM 20 MG/1
20 TABLET, FILM COATED ORAL DAILY
Status: DISCONTINUED | OUTPATIENT
Start: 2018-11-23 | End: 2018-11-30 | Stop reason: HOSPADM

## 2018-11-23 RX ORDER — RAMIPRIL 2.5 MG/1
2.5 CAPSULE ORAL DAILY
Status: DISCONTINUED | OUTPATIENT
Start: 2018-11-23 | End: 2018-11-25

## 2018-11-23 RX ORDER — TRAMADOL HYDROCHLORIDE 50 MG/1
100 TABLET ORAL EVERY MORNING
COMMUNITY
End: 2018-12-31 | Stop reason: SDUPTHER

## 2018-11-23 RX ORDER — WARFARIN SODIUM 1 MG/1
1-2 TABLET ORAL DAILY
COMMUNITY
End: 2018-11-30 | Stop reason: HOSPADM

## 2018-11-23 RX ORDER — ACETAMINOPHEN 325 MG/1
650 TABLET ORAL EVERY 6 HOURS PRN
Status: DISCONTINUED | OUTPATIENT
Start: 2018-11-23 | End: 2018-11-30 | Stop reason: HOSPADM

## 2018-11-23 RX ORDER — ONDANSETRON 2 MG/ML
4 INJECTION INTRAMUSCULAR; INTRAVENOUS EVERY 4 HOURS PRN
Status: DISCONTINUED | OUTPATIENT
Start: 2018-11-23 | End: 2018-11-30 | Stop reason: HOSPADM

## 2018-11-23 RX ORDER — TRAMADOL HYDROCHLORIDE 50 MG/1
100 TABLET ORAL EVERY MORNING
Status: DISCONTINUED | OUTPATIENT
Start: 2018-11-24 | End: 2018-11-30 | Stop reason: HOSPADM

## 2018-11-23 RX ORDER — TRAMADOL HYDROCHLORIDE 50 MG/1
50 TABLET ORAL EVERY EVENING
Status: DISCONTINUED | OUTPATIENT
Start: 2018-11-23 | End: 2018-11-30 | Stop reason: HOSPADM

## 2018-11-23 RX ORDER — TRAMADOL HYDROCHLORIDE 50 MG/1
50 TABLET ORAL NIGHTLY PRN
COMMUNITY
End: 2019-02-22 | Stop reason: HOSPADM

## 2018-11-23 RX ORDER — HYDROMORPHONE HYDROCHLORIDE 1 MG/ML
0.5 INJECTION, SOLUTION INTRAMUSCULAR; INTRAVENOUS; SUBCUTANEOUS EVERY 4 HOURS PRN
Status: DISCONTINUED | OUTPATIENT
Start: 2018-11-23 | End: 2018-11-26

## 2018-11-23 RX ORDER — PANTOPRAZOLE SODIUM 40 MG/1
40 TABLET, DELAYED RELEASE ORAL 2 TIMES DAILY
COMMUNITY
End: 2018-12-10 | Stop reason: SDUPTHER

## 2018-11-23 RX ORDER — HYDROCODONE BITARTRATE AND ACETAMINOPHEN 7.5; 325 MG/1; MG/1
1 TABLET ORAL EVERY 6 HOURS PRN
Status: DISCONTINUED | OUTPATIENT
Start: 2018-11-23 | End: 2018-11-27

## 2018-11-23 RX ORDER — FUROSEMIDE 40 MG/1
40 TABLET ORAL EVERY EVENING
Status: DISCONTINUED | OUTPATIENT
Start: 2018-11-23 | End: 2018-11-25

## 2018-11-23 RX ORDER — CALCITRIOL 0.25 UG/1
0.25 CAPSULE, LIQUID FILLED ORAL 2 TIMES DAILY
COMMUNITY

## 2018-11-23 RX ORDER — SIMVASTATIN 40 MG
40 TABLET ORAL DAILY
COMMUNITY
End: 2018-12-03 | Stop reason: SDUPTHER

## 2018-11-23 RX ORDER — PANTOPRAZOLE SODIUM 40 MG/1
40 TABLET, DELAYED RELEASE ORAL 2 TIMES DAILY
Status: DISCONTINUED | OUTPATIENT
Start: 2018-11-23 | End: 2018-11-30 | Stop reason: HOSPADM

## 2018-11-23 RX ORDER — FUROSEMIDE 40 MG/1
60 TABLET ORAL EVERY MORNING
COMMUNITY
End: 2018-11-30 | Stop reason: HOSPADM

## 2018-11-23 RX ORDER — FERROUS GLUCONATE 324(37.5)
324 TABLET ORAL DAILY
Status: DISCONTINUED | OUTPATIENT
Start: 2018-11-23 | End: 2018-11-23 | Stop reason: CLARIF

## 2018-11-23 RX ORDER — NITROGLYCERIN 0.4 MG/1
0.4 TABLET SUBLINGUAL
Status: DISCONTINUED | OUTPATIENT
Start: 2018-11-23 | End: 2018-11-30 | Stop reason: HOSPADM

## 2018-11-23 RX ADMIN — TRAMADOL HYDROCHLORIDE 50 MG: 50 TABLET, FILM COATED ORAL at 20:34

## 2018-11-23 RX ADMIN — CALCITRIOL CAPSULES 0.25 MCG 0.25 MCG: 0.25 CAPSULE ORAL at 20:32

## 2018-11-23 RX ADMIN — PANTOPRAZOLE SODIUM 40 MG: 40 TABLET, DELAYED RELEASE ORAL at 20:32

## 2018-11-23 RX ADMIN — ATORVASTATIN CALCIUM 20 MG: 20 TABLET, FILM COATED ORAL at 20:33

## 2018-11-23 RX ADMIN — HYDROMORPHONE HYDROCHLORIDE 0.5 MG: 1 INJECTION, SOLUTION INTRAMUSCULAR; INTRAVENOUS; SUBCUTANEOUS at 21:25

## 2018-11-23 RX ADMIN — HYDROCODONE BITARTRATE AND ACETAMINOPHEN 1 TABLET: 7.5; 325 TABLET ORAL at 17:26

## 2018-11-24 LAB
ABO + RH BLD: NORMAL
ABO + RH BLD: NORMAL
ANION GAP SERPL CALCULATED.3IONS-SCNC: 12.8 MMOL/L
BH BB BLOOD EXPIRATION DATE: NORMAL
BH BB BLOOD EXPIRATION DATE: NORMAL
BH BB BLOOD TYPE BARCODE: 5100
BH BB BLOOD TYPE BARCODE: 5100
BH BB DISPENSE STATUS: NORMAL
BH BB DISPENSE STATUS: NORMAL
BH BB PRODUCT CODE: NORMAL
BH BB PRODUCT CODE: NORMAL
BH BB UNIT NUMBER: NORMAL
BH BB UNIT NUMBER: NORMAL
BUN BLD-MCNC: 40 MG/DL (ref 8–23)
BUN/CREAT SERPL: 29.4 (ref 7–25)
CALCIUM SPEC-SCNC: 8.5 MG/DL (ref 8.6–10.5)
CHLORIDE SERPL-SCNC: 104 MMOL/L (ref 98–107)
CO2 SERPL-SCNC: 24.2 MMOL/L (ref 22–29)
CREAT BLD-MCNC: 1.36 MG/DL (ref 0.57–1)
DEPRECATED RDW RBC AUTO: 50.9 FL (ref 37–54)
ERYTHROCYTE [DISTWIDTH] IN BLOOD BY AUTOMATED COUNT: 15.3 % (ref 11.7–13)
GFR SERPL CREATININE-BSD FRML MDRD: 38 ML/MIN/1.73
GLUCOSE BLD-MCNC: 145 MG/DL (ref 65–99)
HCT VFR BLD AUTO: 24.8 % (ref 35.6–45.5)
HCT VFR BLD AUTO: 27.3 % (ref 35.6–45.5)
HCT VFR BLD AUTO: 27.5 % (ref 35.6–45.5)
HGB BLD-MCNC: 7.7 G/DL (ref 11.9–15.5)
HGB BLD-MCNC: 8.3 G/DL (ref 11.9–15.5)
HGB BLD-MCNC: 8.7 G/DL (ref 11.9–15.5)
INR PPP: 1.51 (ref 0.9–1.1)
MCH RBC QN AUTO: 28.9 PG (ref 26.9–32)
MCHC RBC AUTO-ENTMCNC: 31.6 G/DL (ref 32.4–36.3)
MCV RBC AUTO: 91.4 FL (ref 80.5–98.2)
PLATELET # BLD AUTO: 249 10*3/MM3 (ref 140–500)
PMV BLD AUTO: 9 FL (ref 6–12)
POTASSIUM BLD-SCNC: 4.4 MMOL/L (ref 3.5–5.2)
PROTHROMBIN TIME: 17.9 SECONDS (ref 11.7–14.2)
RBC # BLD AUTO: 3.01 10*6/MM3 (ref 3.9–5.2)
SODIUM BLD-SCNC: 141 MMOL/L (ref 136–145)
UNIT  ABO: NORMAL
UNIT  ABO: NORMAL
UNIT  RH: NORMAL
UNIT  RH: NORMAL
WBC NRBC COR # BLD: 10.28 10*3/MM3 (ref 4.5–10.7)

## 2018-11-24 PROCEDURE — 36430 TRANSFUSION BLD/BLD COMPNT: CPT

## 2018-11-24 PROCEDURE — 85610 PROTHROMBIN TIME: CPT | Performed by: HOSPITALIST

## 2018-11-24 PROCEDURE — 85014 HEMATOCRIT: CPT | Performed by: SURGERY

## 2018-11-24 PROCEDURE — 99222 1ST HOSP IP/OBS MODERATE 55: CPT | Performed by: INTERNAL MEDICINE

## 2018-11-24 PROCEDURE — 85018 HEMOGLOBIN: CPT | Performed by: SURGERY

## 2018-11-24 PROCEDURE — 80048 BASIC METABOLIC PNL TOTAL CA: CPT | Performed by: HOSPITALIST

## 2018-11-24 PROCEDURE — 25010000002 HYDROMORPHONE PER 4 MG: Performed by: INTERNAL MEDICINE

## 2018-11-24 PROCEDURE — 85014 HEMATOCRIT: CPT | Performed by: HOSPITALIST

## 2018-11-24 PROCEDURE — 85018 HEMOGLOBIN: CPT | Performed by: HOSPITALIST

## 2018-11-24 PROCEDURE — P9016 RBC LEUKOCYTES REDUCED: HCPCS

## 2018-11-24 PROCEDURE — 97162 PT EVAL MOD COMPLEX 30 MIN: CPT

## 2018-11-24 PROCEDURE — 86900 BLOOD TYPING SEROLOGIC ABO: CPT

## 2018-11-24 PROCEDURE — 99222 1ST HOSP IP/OBS MODERATE 55: CPT | Performed by: SURGERY

## 2018-11-24 PROCEDURE — 85027 COMPLETE CBC AUTOMATED: CPT | Performed by: HOSPITALIST

## 2018-11-24 PROCEDURE — 97110 THERAPEUTIC EXERCISES: CPT

## 2018-11-24 RX ADMIN — TRAMADOL HYDROCHLORIDE 50 MG: 50 TABLET, FILM COATED ORAL at 17:35

## 2018-11-24 RX ADMIN — HYDROCODONE BITARTRATE AND ACETAMINOPHEN 1 TABLET: 7.5; 325 TABLET ORAL at 00:30

## 2018-11-24 RX ADMIN — TRAMADOL HYDROCHLORIDE 100 MG: 50 TABLET, FILM COATED ORAL at 05:46

## 2018-11-24 RX ADMIN — HYDROMORPHONE HYDROCHLORIDE 0.5 MG: 1 INJECTION, SOLUTION INTRAMUSCULAR; INTRAVENOUS; SUBCUTANEOUS at 21:44

## 2018-11-24 RX ADMIN — HYDROMORPHONE HYDROCHLORIDE 0.5 MG: 1 INJECTION, SOLUTION INTRAMUSCULAR; INTRAVENOUS; SUBCUTANEOUS at 01:42

## 2018-11-24 RX ADMIN — CARVEDILOL 25 MG: 25 TABLET, FILM COATED ORAL at 10:24

## 2018-11-24 RX ADMIN — FUROSEMIDE 40 MG: 40 TABLET ORAL at 17:34

## 2018-11-24 RX ADMIN — ATORVASTATIN CALCIUM 20 MG: 20 TABLET, FILM COATED ORAL at 10:24

## 2018-11-24 RX ADMIN — FERROUS SULFATE TAB 325 MG (65 MG ELEMENTAL FE) 325 MG: 325 (65 FE) TAB at 10:24

## 2018-11-24 RX ADMIN — FUROSEMIDE 60 MG: 20 TABLET ORAL at 05:45

## 2018-11-24 RX ADMIN — HYDROMORPHONE HYDROCHLORIDE 0.5 MG: 1 INJECTION, SOLUTION INTRAMUSCULAR; INTRAVENOUS; SUBCUTANEOUS at 05:46

## 2018-11-24 RX ADMIN — CARVEDILOL 25 MG: 25 TABLET, FILM COATED ORAL at 17:34

## 2018-11-24 RX ADMIN — CALCITRIOL CAPSULES 0.25 MCG 0.25 MCG: 0.25 CAPSULE ORAL at 10:24

## 2018-11-24 RX ADMIN — HYDROCODONE BITARTRATE AND ACETAMINOPHEN 1 TABLET: 7.5; 325 TABLET ORAL at 13:47

## 2018-11-24 RX ADMIN — PANTOPRAZOLE SODIUM 40 MG: 40 TABLET, DELAYED RELEASE ORAL at 10:30

## 2018-11-24 RX ADMIN — PANTOPRAZOLE SODIUM 40 MG: 40 TABLET, DELAYED RELEASE ORAL at 21:44

## 2018-11-24 RX ADMIN — HYDROMORPHONE HYDROCHLORIDE 0.5 MG: 1 INJECTION, SOLUTION INTRAMUSCULAR; INTRAVENOUS; SUBCUTANEOUS at 10:24

## 2018-11-24 RX ADMIN — RAMIPRIL 2.5 MG: 2.5 CAPSULE ORAL at 10:24

## 2018-11-25 PROBLEM — N17.9 ACUTE KIDNEY INJURY (HCC): Status: ACTIVE | Noted: 2018-11-25

## 2018-11-25 LAB
ANION GAP SERPL CALCULATED.3IONS-SCNC: 13.9 MMOL/L
BUN BLD-MCNC: 51 MG/DL (ref 8–23)
BUN/CREAT SERPL: 27.7 (ref 7–25)
CALCIUM SPEC-SCNC: 8.5 MG/DL (ref 8.6–10.5)
CHLORIDE SERPL-SCNC: 100 MMOL/L (ref 98–107)
CO2 SERPL-SCNC: 22.1 MMOL/L (ref 22–29)
CREAT BLD-MCNC: 1.84 MG/DL (ref 0.57–1)
DEPRECATED RDW RBC AUTO: 51.5 FL (ref 37–54)
ERYTHROCYTE [DISTWIDTH] IN BLOOD BY AUTOMATED COUNT: 15.4 % (ref 11.7–13)
GFR SERPL CREATININE-BSD FRML MDRD: 27 ML/MIN/1.73
GLUCOSE BLD-MCNC: 140 MG/DL (ref 65–99)
HCT VFR BLD AUTO: 22.1 % (ref 35.6–45.5)
HCT VFR BLD AUTO: 26.2 % (ref 35.6–45.5)
HGB BLD-MCNC: 7 G/DL (ref 11.9–15.5)
HGB BLD-MCNC: 8.3 G/DL (ref 11.9–15.5)
INR PPP: 1.6 (ref 0.9–1.1)
MCH RBC QN AUTO: 30 PG (ref 26.9–32)
MCHC RBC AUTO-ENTMCNC: 31.7 G/DL (ref 32.4–36.3)
MCV RBC AUTO: 94.8 FL (ref 80.5–98.2)
PLATELET # BLD AUTO: 203 10*3/MM3 (ref 140–500)
PMV BLD AUTO: 8.6 FL (ref 6–12)
POTASSIUM BLD-SCNC: 4.6 MMOL/L (ref 3.5–5.2)
PROTHROMBIN TIME: 18.8 SECONDS (ref 11.7–14.2)
RBC # BLD AUTO: 2.33 10*6/MM3 (ref 3.9–5.2)
SODIUM BLD-SCNC: 136 MMOL/L (ref 136–145)
WBC NRBC COR # BLD: 17.06 10*3/MM3 (ref 4.5–10.7)

## 2018-11-25 PROCEDURE — 97110 THERAPEUTIC EXERCISES: CPT

## 2018-11-25 PROCEDURE — 25010000002 ONDANSETRON PER 1 MG: Performed by: HOSPITALIST

## 2018-11-25 PROCEDURE — 85610 PROTHROMBIN TIME: CPT | Performed by: HOSPITALIST

## 2018-11-25 PROCEDURE — 85027 COMPLETE CBC AUTOMATED: CPT | Performed by: HOSPITALIST

## 2018-11-25 PROCEDURE — 85014 HEMATOCRIT: CPT | Performed by: SURGERY

## 2018-11-25 PROCEDURE — P9016 RBC LEUKOCYTES REDUCED: HCPCS

## 2018-11-25 PROCEDURE — 80048 BASIC METABOLIC PNL TOTAL CA: CPT | Performed by: HOSPITALIST

## 2018-11-25 PROCEDURE — 99231 SBSQ HOSP IP/OBS SF/LOW 25: CPT | Performed by: SURGERY

## 2018-11-25 PROCEDURE — 99232 SBSQ HOSP IP/OBS MODERATE 35: CPT | Performed by: INTERNAL MEDICINE

## 2018-11-25 PROCEDURE — 36430 TRANSFUSION BLD/BLD COMPNT: CPT

## 2018-11-25 PROCEDURE — 85018 HEMOGLOBIN: CPT | Performed by: SURGERY

## 2018-11-25 PROCEDURE — 86900 BLOOD TYPING SEROLOGIC ABO: CPT

## 2018-11-25 RX ORDER — SODIUM CHLORIDE 9 MG/ML
75 INJECTION, SOLUTION INTRAVENOUS CONTINUOUS
Status: DISCONTINUED | OUTPATIENT
Start: 2018-11-25 | End: 2018-11-27

## 2018-11-25 RX ADMIN — ATORVASTATIN CALCIUM 20 MG: 20 TABLET, FILM COATED ORAL at 09:37

## 2018-11-25 RX ADMIN — TRAMADOL HYDROCHLORIDE 100 MG: 50 TABLET, FILM COATED ORAL at 06:07

## 2018-11-25 RX ADMIN — ONDANSETRON 4 MG: 2 INJECTION INTRAMUSCULAR; INTRAVENOUS at 00:46

## 2018-11-25 RX ADMIN — PANTOPRAZOLE SODIUM 40 MG: 40 TABLET, DELAYED RELEASE ORAL at 20:43

## 2018-11-25 RX ADMIN — CARVEDILOL 25 MG: 25 TABLET, FILM COATED ORAL at 09:37

## 2018-11-25 RX ADMIN — SODIUM CHLORIDE 75 ML/HR: 9 INJECTION, SOLUTION INTRAVENOUS at 17:53

## 2018-11-25 RX ADMIN — RAMIPRIL 2.5 MG: 2.5 CAPSULE ORAL at 09:37

## 2018-11-25 RX ADMIN — CALCITRIOL CAPSULES 0.25 MCG 0.25 MCG: 0.25 CAPSULE ORAL at 09:37

## 2018-11-25 RX ADMIN — FUROSEMIDE 60 MG: 20 TABLET ORAL at 06:07

## 2018-11-25 RX ADMIN — PANTOPRAZOLE SODIUM 40 MG: 40 TABLET, DELAYED RELEASE ORAL at 09:37

## 2018-11-25 RX ADMIN — FERROUS SULFATE TAB 325 MG (65 MG ELEMENTAL FE) 325 MG: 325 (65 FE) TAB at 09:37

## 2018-11-25 RX ADMIN — CARVEDILOL 25 MG: 25 TABLET, FILM COATED ORAL at 17:53

## 2018-11-25 RX ADMIN — HYDROCODONE BITARTRATE AND ACETAMINOPHEN 1 TABLET: 7.5; 325 TABLET ORAL at 09:37

## 2018-11-25 RX ADMIN — TRAMADOL HYDROCHLORIDE 50 MG: 50 TABLET, FILM COATED ORAL at 17:52

## 2018-11-26 ENCOUNTER — APPOINTMENT (OUTPATIENT)
Dept: ULTRASOUND IMAGING | Facility: HOSPITAL | Age: 78
End: 2018-11-26
Attending: HOSPITALIST

## 2018-11-26 LAB
ABO + RH BLD: NORMAL
ANION GAP SERPL CALCULATED.3IONS-SCNC: 12.6 MMOL/L
ANION GAP SERPL CALCULATED.3IONS-SCNC: 13.9 MMOL/L
BH BB BLOOD EXPIRATION DATE: NORMAL
BH BB BLOOD TYPE BARCODE: 5100
BH BB DISPENSE STATUS: NORMAL
BH BB PRODUCT CODE: NORMAL
BH BB UNIT NUMBER: NORMAL
BILIRUB UR QL STRIP: NEGATIVE
BUN BLD-MCNC: 55 MG/DL (ref 8–23)
BUN BLD-MCNC: 57 MG/DL (ref 8–23)
BUN/CREAT SERPL: 27.1 (ref 7–25)
BUN/CREAT SERPL: 27.4 (ref 7–25)
CALCIUM SPEC-SCNC: 8.2 MG/DL (ref 8.6–10.5)
CALCIUM SPEC-SCNC: 8.3 MG/DL (ref 8.6–10.5)
CHLORIDE SERPL-SCNC: 100 MMOL/L (ref 98–107)
CHLORIDE SERPL-SCNC: 102 MMOL/L (ref 98–107)
CHLORIDE UR-SCNC: <20 MMOL/L
CLARITY UR: CLEAR
CO2 SERPL-SCNC: 20.1 MMOL/L (ref 22–29)
CO2 SERPL-SCNC: 22.4 MMOL/L (ref 22–29)
COLOR UR: YELLOW
CREAT BLD-MCNC: 2.03 MG/DL (ref 0.57–1)
CREAT BLD-MCNC: 2.08 MG/DL (ref 0.57–1)
CREAT UR-MCNC: 66.6 MG/DL
DEPRECATED RDW RBC AUTO: 50.5 FL (ref 37–54)
EOSINOPHIL SPEC QL MICRO: 0 % EOS/100 CELLS (ref 0–0)
ERYTHROCYTE [DISTWIDTH] IN BLOOD BY AUTOMATED COUNT: 15.5 % (ref 11.7–13)
GFR SERPL CREATININE-BSD FRML MDRD: 23 ML/MIN/1.73
GFR SERPL CREATININE-BSD FRML MDRD: 24 ML/MIN/1.73
GLUCOSE BLD-MCNC: 108 MG/DL (ref 65–99)
GLUCOSE BLD-MCNC: 109 MG/DL (ref 65–99)
GLUCOSE UR STRIP-MCNC: NEGATIVE MG/DL
HCT VFR BLD AUTO: 22.2 % (ref 35.6–45.5)
HCT VFR BLD AUTO: 22.6 % (ref 35.6–45.5)
HCT VFR BLD AUTO: 23.9 % (ref 35.6–45.5)
HGB BLD-MCNC: 7.3 G/DL (ref 11.9–15.5)
HGB BLD-MCNC: 7.5 G/DL (ref 11.9–15.5)
HGB BLD-MCNC: 7.6 G/DL (ref 11.9–15.5)
HGB UR QL STRIP.AUTO: NEGATIVE
KETONES UR QL STRIP: NEGATIVE
LEUKOCYTE ESTERASE UR QL STRIP.AUTO: NEGATIVE
MCH RBC QN AUTO: 30 PG (ref 26.9–32)
MCHC RBC AUTO-ENTMCNC: 33.2 G/DL (ref 32.4–36.3)
MCV RBC AUTO: 90.4 FL (ref 80.5–98.2)
NITRITE UR QL STRIP: NEGATIVE
OSMOLALITY UR: 358 MOSM/KG
PH UR STRIP.AUTO: <=5 [PH] (ref 5–8)
PLATELET # BLD AUTO: 161 10*3/MM3 (ref 140–500)
PMV BLD AUTO: 9.1 FL (ref 6–12)
POTASSIUM BLD-SCNC: 4.3 MMOL/L (ref 3.5–5.2)
POTASSIUM BLD-SCNC: 4.5 MMOL/L (ref 3.5–5.2)
PROT UR QL STRIP: NEGATIVE
RBC # BLD AUTO: 2.5 10*6/MM3 (ref 3.9–5.2)
SODIUM BLD-SCNC: 134 MMOL/L (ref 136–145)
SODIUM BLD-SCNC: 137 MMOL/L (ref 136–145)
SODIUM UR-SCNC: 22 MMOL/L
SP GR UR STRIP: 1.02 (ref 1–1.03)
UNIT  ABO: NORMAL
UNIT  RH: NORMAL
UROBILINOGEN UR QL STRIP: NORMAL
WBC NRBC COR # BLD: 11.14 10*3/MM3 (ref 4.5–10.7)

## 2018-11-26 PROCEDURE — 85014 HEMATOCRIT: CPT | Performed by: HOSPITALIST

## 2018-11-26 PROCEDURE — 85027 COMPLETE CBC AUTOMATED: CPT | Performed by: HOSPITALIST

## 2018-11-26 PROCEDURE — 87205 SMEAR GRAM STAIN: CPT | Performed by: HOSPITALIST

## 2018-11-26 PROCEDURE — 99232 SBSQ HOSP IP/OBS MODERATE 35: CPT | Performed by: NURSE PRACTITIONER

## 2018-11-26 PROCEDURE — 82570 ASSAY OF URINE CREATININE: CPT | Performed by: HOSPITALIST

## 2018-11-26 PROCEDURE — 76775 US EXAM ABDO BACK WALL LIM: CPT

## 2018-11-26 PROCEDURE — 83935 ASSAY OF URINE OSMOLALITY: CPT | Performed by: HOSPITALIST

## 2018-11-26 PROCEDURE — 97110 THERAPEUTIC EXERCISES: CPT

## 2018-11-26 PROCEDURE — 80048 BASIC METABOLIC PNL TOTAL CA: CPT | Performed by: HOSPITALIST

## 2018-11-26 PROCEDURE — 85018 HEMOGLOBIN: CPT | Performed by: HOSPITALIST

## 2018-11-26 PROCEDURE — 99231 SBSQ HOSP IP/OBS SF/LOW 25: CPT | Performed by: SURGERY

## 2018-11-26 PROCEDURE — 81003 URINALYSIS AUTO W/O SCOPE: CPT | Performed by: INTERNAL MEDICINE

## 2018-11-26 PROCEDURE — 85014 HEMATOCRIT: CPT | Performed by: SURGERY

## 2018-11-26 PROCEDURE — 85018 HEMOGLOBIN: CPT | Performed by: SURGERY

## 2018-11-26 PROCEDURE — 84300 ASSAY OF URINE SODIUM: CPT | Performed by: HOSPITALIST

## 2018-11-26 PROCEDURE — 82436 ASSAY OF URINE CHLORIDE: CPT | Performed by: HOSPITALIST

## 2018-11-26 RX ADMIN — SODIUM CHLORIDE 75 ML/HR: 9 INJECTION, SOLUTION INTRAVENOUS at 17:36

## 2018-11-26 RX ADMIN — CALCITRIOL CAPSULES 0.25 MCG 0.25 MCG: 0.25 CAPSULE ORAL at 08:46

## 2018-11-26 RX ADMIN — PANTOPRAZOLE SODIUM 40 MG: 40 TABLET, DELAYED RELEASE ORAL at 19:55

## 2018-11-26 RX ADMIN — ATORVASTATIN CALCIUM 20 MG: 20 TABLET, FILM COATED ORAL at 08:43

## 2018-11-26 RX ADMIN — TRAMADOL HYDROCHLORIDE 100 MG: 50 TABLET, FILM COATED ORAL at 08:42

## 2018-11-26 RX ADMIN — FERROUS SULFATE TAB 325 MG (65 MG ELEMENTAL FE) 325 MG: 325 (65 FE) TAB at 08:43

## 2018-11-26 RX ADMIN — CARVEDILOL 25 MG: 25 TABLET, FILM COATED ORAL at 17:19

## 2018-11-26 RX ADMIN — SODIUM CHLORIDE 500 ML: 9 INJECTION, SOLUTION INTRAVENOUS at 08:44

## 2018-11-26 RX ADMIN — CARVEDILOL 25 MG: 25 TABLET, FILM COATED ORAL at 08:43

## 2018-11-26 RX ADMIN — TRAMADOL HYDROCHLORIDE 50 MG: 50 TABLET, FILM COATED ORAL at 17:19

## 2018-11-26 RX ADMIN — PANTOPRAZOLE SODIUM 40 MG: 40 TABLET, DELAYED RELEASE ORAL at 08:42

## 2018-11-27 ENCOUNTER — ANESTHESIA (OUTPATIENT)
Dept: PERIOP | Facility: HOSPITAL | Age: 78
End: 2018-11-27

## 2018-11-27 ENCOUNTER — APPOINTMENT (OUTPATIENT)
Dept: ONCOLOGY | Facility: HOSPITAL | Age: 78
End: 2018-11-27

## 2018-11-27 ENCOUNTER — APPOINTMENT (OUTPATIENT)
Dept: LAB | Facility: HOSPITAL | Age: 78
End: 2018-11-27

## 2018-11-27 ENCOUNTER — ANESTHESIA EVENT (OUTPATIENT)
Dept: PERIOP | Facility: HOSPITAL | Age: 78
End: 2018-11-27

## 2018-11-27 LAB
ABO GROUP BLD: NORMAL
ALBUMIN SERPL-MCNC: 2.5 G/DL (ref 3.5–5.2)
ANION GAP SERPL CALCULATED.3IONS-SCNC: 10.9 MMOL/L
BLD GP AB SCN SERPL QL: NEGATIVE
BUN BLD-MCNC: 55 MG/DL (ref 8–23)
BUN/CREAT SERPL: 29.4 (ref 7–25)
CALCIUM SPEC-SCNC: 8.3 MG/DL (ref 8.6–10.5)
CHLORIDE SERPL-SCNC: 106 MMOL/L (ref 98–107)
CO2 SERPL-SCNC: 21.1 MMOL/L (ref 22–29)
CREAT BLD-MCNC: 1.87 MG/DL (ref 0.57–1)
DEPRECATED RDW RBC AUTO: 52 FL (ref 37–54)
ERYTHROCYTE [DISTWIDTH] IN BLOOD BY AUTOMATED COUNT: 16 % (ref 11.7–13)
GFR SERPL CREATININE-BSD FRML MDRD: 26 ML/MIN/1.73
GLUCOSE BLD-MCNC: 100 MG/DL (ref 65–99)
HCT VFR BLD AUTO: 20.3 % (ref 35.6–45.5)
HGB BLD-MCNC: 6.7 G/DL (ref 11.9–15.5)
MAGNESIUM SERPL-MCNC: 2.3 MG/DL (ref 1.6–2.4)
MCH RBC QN AUTO: 30.3 PG (ref 26.9–32)
MCHC RBC AUTO-ENTMCNC: 33 G/DL (ref 32.4–36.3)
MCV RBC AUTO: 91.9 FL (ref 80.5–98.2)
PHOSPHATE SERPL-MCNC: 3.6 MG/DL (ref 2.5–4.5)
PLATELET # BLD AUTO: 131 10*3/MM3 (ref 140–500)
PMV BLD AUTO: 9.2 FL (ref 6–12)
POTASSIUM BLD-SCNC: 4.6 MMOL/L (ref 3.5–5.2)
RBC # BLD AUTO: 2.21 10*6/MM3 (ref 3.9–5.2)
RH BLD: POSITIVE
SODIUM BLD-SCNC: 138 MMOL/L (ref 136–145)
T&S EXPIRATION DATE: NORMAL
URATE SERPL-MCNC: 11.3 MG/DL (ref 2.4–5.7)
WBC NRBC COR # BLD: 6.9 10*3/MM3 (ref 4.5–10.7)

## 2018-11-27 PROCEDURE — 99231 SBSQ HOSP IP/OBS SF/LOW 25: CPT | Performed by: SURGERY

## 2018-11-27 PROCEDURE — 25010000003 CEFAZOLIN IN DEXTROSE 2-4 GM/100ML-% SOLUTION: Performed by: SURGERY

## 2018-11-27 PROCEDURE — 86850 RBC ANTIBODY SCREEN: CPT | Performed by: NURSE PRACTITIONER

## 2018-11-27 PROCEDURE — 0KCH0ZZ EXTIRPATION OF MATTER FROM RIGHT THORAX MUSCLE, OPEN APPROACH: ICD-10-PCS | Performed by: SURGERY

## 2018-11-27 PROCEDURE — 99232 SBSQ HOSP IP/OBS MODERATE 35: CPT | Performed by: NURSE PRACTITIONER

## 2018-11-27 PROCEDURE — 85027 COMPLETE CBC AUTOMATED: CPT | Performed by: HOSPITALIST

## 2018-11-27 PROCEDURE — 36430 TRANSFUSION BLD/BLD COMPNT: CPT

## 2018-11-27 PROCEDURE — 84550 ASSAY OF BLOOD/URIC ACID: CPT | Performed by: INTERNAL MEDICINE

## 2018-11-27 PROCEDURE — 86900 BLOOD TYPING SEROLOGIC ABO: CPT | Performed by: NURSE PRACTITIONER

## 2018-11-27 PROCEDURE — 86901 BLOOD TYPING SEROLOGIC RH(D): CPT | Performed by: NURSE PRACTITIONER

## 2018-11-27 PROCEDURE — 83735 ASSAY OF MAGNESIUM: CPT | Performed by: INTERNAL MEDICINE

## 2018-11-27 PROCEDURE — 80069 RENAL FUNCTION PANEL: CPT | Performed by: INTERNAL MEDICINE

## 2018-11-27 PROCEDURE — 86923 COMPATIBILITY TEST ELECTRIC: CPT

## 2018-11-27 PROCEDURE — P9016 RBC LEUKOCYTES REDUCED: HCPCS

## 2018-11-27 PROCEDURE — 86900 BLOOD TYPING SEROLOGIC ABO: CPT

## 2018-11-27 PROCEDURE — 21501 I&D DP ABSC/HMTMA SFT TS NCK: CPT | Performed by: SURGERY

## 2018-11-27 PROCEDURE — 25010000002 PROPOFOL 10 MG/ML EMULSION: Performed by: NURSE ANESTHETIST, CERTIFIED REGISTERED

## 2018-11-27 DEVICE — SEALANT WND FIBRIN TISSEEL VAPOR/HEAT/PREFIL/SYR 10ML: Type: IMPLANTABLE DEVICE | Site: CHEST | Status: FUNCTIONAL

## 2018-11-27 RX ORDER — PROMETHAZINE HYDROCHLORIDE 25 MG/ML
12.5 INJECTION, SOLUTION INTRAMUSCULAR; INTRAVENOUS ONCE AS NEEDED
Status: DISCONTINUED | OUTPATIENT
Start: 2018-11-27 | End: 2018-11-27 | Stop reason: HOSPADM

## 2018-11-27 RX ORDER — EPHEDRINE SULFATE 50 MG/ML
5 INJECTION, SOLUTION INTRAVENOUS ONCE AS NEEDED
Status: DISCONTINUED | OUTPATIENT
Start: 2018-11-27 | End: 2018-11-27 | Stop reason: HOSPADM

## 2018-11-27 RX ORDER — MIDAZOLAM HYDROCHLORIDE 1 MG/ML
1 INJECTION INTRAMUSCULAR; INTRAVENOUS
Status: DISCONTINUED | OUTPATIENT
Start: 2018-11-27 | End: 2018-11-27 | Stop reason: HOSPADM

## 2018-11-27 RX ORDER — PROMETHAZINE HYDROCHLORIDE 25 MG/1
25 TABLET ORAL ONCE AS NEEDED
Status: DISCONTINUED | OUTPATIENT
Start: 2018-11-27 | End: 2018-11-27 | Stop reason: HOSPADM

## 2018-11-27 RX ORDER — LIDOCAINE HYDROCHLORIDE 10 MG/ML
0.5 INJECTION, SOLUTION EPIDURAL; INFILTRATION; INTRACAUDAL; PERINEURAL ONCE AS NEEDED
Status: DISCONTINUED | OUTPATIENT
Start: 2018-11-27 | End: 2018-11-27 | Stop reason: HOSPADM

## 2018-11-27 RX ORDER — DIPHENHYDRAMINE HCL 25 MG
25 CAPSULE ORAL EVERY 6 HOURS PRN
Status: DISCONTINUED | OUTPATIENT
Start: 2018-11-27 | End: 2018-11-27 | Stop reason: HOSPADM

## 2018-11-27 RX ORDER — ONDANSETRON 2 MG/ML
4 INJECTION INTRAMUSCULAR; INTRAVENOUS ONCE AS NEEDED
Status: DISCONTINUED | OUTPATIENT
Start: 2018-11-27 | End: 2018-11-27 | Stop reason: HOSPADM

## 2018-11-27 RX ORDER — LIDOCAINE HYDROCHLORIDE 20 MG/ML
INJECTION, SOLUTION INFILTRATION; PERINEURAL AS NEEDED
Status: DISCONTINUED | OUTPATIENT
Start: 2018-11-27 | End: 2018-11-27 | Stop reason: SURG

## 2018-11-27 RX ORDER — HYDROMORPHONE HYDROCHLORIDE 1 MG/ML
0.5 INJECTION, SOLUTION INTRAMUSCULAR; INTRAVENOUS; SUBCUTANEOUS
Status: DISCONTINUED | OUTPATIENT
Start: 2018-11-27 | End: 2018-11-27 | Stop reason: HOSPADM

## 2018-11-27 RX ORDER — FENTANYL CITRATE 50 UG/ML
50 INJECTION, SOLUTION INTRAMUSCULAR; INTRAVENOUS
Status: DISCONTINUED | OUTPATIENT
Start: 2018-11-27 | End: 2018-11-27 | Stop reason: HOSPADM

## 2018-11-27 RX ORDER — SODIUM CHLORIDE, SODIUM LACTATE, POTASSIUM CHLORIDE, CALCIUM CHLORIDE 600; 310; 30; 20 MG/100ML; MG/100ML; MG/100ML; MG/100ML
9 INJECTION, SOLUTION INTRAVENOUS CONTINUOUS
Status: DISCONTINUED | OUTPATIENT
Start: 2018-11-27 | End: 2018-11-27

## 2018-11-27 RX ORDER — FLUMAZENIL 0.1 MG/ML
0.2 INJECTION INTRAVENOUS AS NEEDED
Status: DISCONTINUED | OUTPATIENT
Start: 2018-11-27 | End: 2018-11-27 | Stop reason: HOSPADM

## 2018-11-27 RX ORDER — EPHEDRINE SULFATE 50 MG/ML
INJECTION, SOLUTION INTRAVENOUS AS NEEDED
Status: DISCONTINUED | OUTPATIENT
Start: 2018-11-27 | End: 2018-11-27 | Stop reason: SURG

## 2018-11-27 RX ORDER — NALOXONE HCL 0.4 MG/ML
0.2 VIAL (ML) INJECTION AS NEEDED
Status: DISCONTINUED | OUTPATIENT
Start: 2018-11-27 | End: 2018-11-27 | Stop reason: HOSPADM

## 2018-11-27 RX ORDER — PROMETHAZINE HYDROCHLORIDE 25 MG/1
12.5 TABLET ORAL ONCE AS NEEDED
Status: DISCONTINUED | OUTPATIENT
Start: 2018-11-27 | End: 2018-11-27 | Stop reason: HOSPADM

## 2018-11-27 RX ORDER — HYDROCODONE BITARTRATE AND ACETAMINOPHEN 5; 325 MG/1; MG/1
1 TABLET ORAL EVERY 4 HOURS PRN
Status: DISCONTINUED | OUTPATIENT
Start: 2018-11-27 | End: 2018-11-30 | Stop reason: HOSPADM

## 2018-11-27 RX ORDER — MIDAZOLAM HYDROCHLORIDE 1 MG/ML
2 INJECTION INTRAMUSCULAR; INTRAVENOUS
Status: DISCONTINUED | OUTPATIENT
Start: 2018-11-27 | End: 2018-11-27 | Stop reason: HOSPADM

## 2018-11-27 RX ORDER — OXYCODONE AND ACETAMINOPHEN 7.5; 325 MG/1; MG/1
1 TABLET ORAL ONCE AS NEEDED
Status: DISCONTINUED | OUTPATIENT
Start: 2018-11-27 | End: 2018-11-27 | Stop reason: HOSPADM

## 2018-11-27 RX ORDER — CEFAZOLIN SODIUM 2 G/100ML
2 INJECTION, SOLUTION INTRAVENOUS ONCE
Status: COMPLETED | OUTPATIENT
Start: 2018-11-27 | End: 2018-11-27

## 2018-11-27 RX ORDER — MAGNESIUM HYDROXIDE 1200 MG/15ML
LIQUID ORAL AS NEEDED
Status: DISCONTINUED | OUTPATIENT
Start: 2018-11-27 | End: 2018-11-27 | Stop reason: HOSPADM

## 2018-11-27 RX ORDER — BUPIVACAINE HYDROCHLORIDE AND EPINEPHRINE 5; 5 MG/ML; UG/ML
INJECTION, SOLUTION PERINEURAL AS NEEDED
Status: DISCONTINUED | OUTPATIENT
Start: 2018-11-27 | End: 2018-11-27 | Stop reason: HOSPADM

## 2018-11-27 RX ORDER — SODIUM CHLORIDE 0.9 % (FLUSH) 0.9 %
1-10 SYRINGE (ML) INJECTION AS NEEDED
Status: DISCONTINUED | OUTPATIENT
Start: 2018-11-27 | End: 2018-11-27 | Stop reason: HOSPADM

## 2018-11-27 RX ORDER — PROMETHAZINE HYDROCHLORIDE 25 MG/1
25 SUPPOSITORY RECTAL ONCE AS NEEDED
Status: DISCONTINUED | OUTPATIENT
Start: 2018-11-27 | End: 2018-11-27 | Stop reason: HOSPADM

## 2018-11-27 RX ORDER — HYDROCODONE BITARTRATE AND ACETAMINOPHEN 7.5; 325 MG/1; MG/1
1 TABLET ORAL ONCE AS NEEDED
Status: DISCONTINUED | OUTPATIENT
Start: 2018-11-27 | End: 2018-11-27 | Stop reason: HOSPADM

## 2018-11-27 RX ORDER — HYDROCODONE BITARTRATE AND ACETAMINOPHEN 5; 325 MG/1; MG/1
1 TABLET ORAL EVERY 6 HOURS PRN
Status: DISCONTINUED | OUTPATIENT
Start: 2018-11-27 | End: 2018-11-30 | Stop reason: HOSPADM

## 2018-11-27 RX ORDER — LABETALOL HYDROCHLORIDE 5 MG/ML
5 INJECTION, SOLUTION INTRAVENOUS
Status: DISCONTINUED | OUTPATIENT
Start: 2018-11-27 | End: 2018-11-27 | Stop reason: HOSPADM

## 2018-11-27 RX ORDER — FAMOTIDINE 10 MG/ML
20 INJECTION, SOLUTION INTRAVENOUS ONCE
Status: DISCONTINUED | OUTPATIENT
Start: 2018-11-27 | End: 2018-11-27

## 2018-11-27 RX ORDER — PROPOFOL 10 MG/ML
VIAL (ML) INTRAVENOUS AS NEEDED
Status: DISCONTINUED | OUTPATIENT
Start: 2018-11-27 | End: 2018-11-27 | Stop reason: SURG

## 2018-11-27 RX ADMIN — CARVEDILOL 25 MG: 25 TABLET, FILM COATED ORAL at 17:16

## 2018-11-27 RX ADMIN — FERROUS SULFATE TAB 325 MG (65 MG ELEMENTAL FE) 325 MG: 325 (65 FE) TAB at 08:26

## 2018-11-27 RX ADMIN — SODIUM CHLORIDE, POTASSIUM CHLORIDE, SODIUM LACTATE AND CALCIUM CHLORIDE: 600; 310; 30; 20 INJECTION, SOLUTION INTRAVENOUS at 10:56

## 2018-11-27 RX ADMIN — EPHEDRINE SULFATE 10 MG: 50 INJECTION INTRAMUSCULAR; INTRAVENOUS; SUBCUTANEOUS at 11:18

## 2018-11-27 RX ADMIN — SODIUM CHLORIDE, POTASSIUM CHLORIDE, SODIUM LACTATE AND CALCIUM CHLORIDE 9 ML/HR: 600; 310; 30; 20 INJECTION, SOLUTION INTRAVENOUS at 10:21

## 2018-11-27 RX ADMIN — SODIUM CHLORIDE 75 ML/HR: 9 INJECTION, SOLUTION INTRAVENOUS at 15:52

## 2018-11-27 RX ADMIN — PANTOPRAZOLE SODIUM 40 MG: 40 TABLET, DELAYED RELEASE ORAL at 21:00

## 2018-11-27 RX ADMIN — TRAMADOL HYDROCHLORIDE 50 MG: 50 TABLET, FILM COATED ORAL at 17:16

## 2018-11-27 RX ADMIN — LIDOCAINE HYDROCHLORIDE 100 MG: 20 INJECTION, SOLUTION INFILTRATION; PERINEURAL at 11:07

## 2018-11-27 RX ADMIN — PANTOPRAZOLE SODIUM 40 MG: 40 TABLET, DELAYED RELEASE ORAL at 08:26

## 2018-11-27 RX ADMIN — ATORVASTATIN CALCIUM 20 MG: 20 TABLET, FILM COATED ORAL at 08:26

## 2018-11-27 RX ADMIN — CARVEDILOL 25 MG: 25 TABLET, FILM COATED ORAL at 08:26

## 2018-11-27 RX ADMIN — CEFAZOLIN SODIUM 2 G: 2 INJECTION, SOLUTION INTRAVENOUS at 10:57

## 2018-11-27 RX ADMIN — PROPOFOL 150 MG: 10 INJECTION, EMULSION INTRAVENOUS at 11:07

## 2018-11-27 RX ADMIN — CALCITRIOL CAPSULES 0.25 MCG 0.25 MCG: 0.25 CAPSULE ORAL at 08:26

## 2018-11-27 NOTE — ANESTHESIA POSTPROCEDURE EVALUATION
Patient: Janel Mahoney    Procedure Summary     Date:  11/27/18 Room / Location:  [facility] OR [address] [place] [address] MAIN OR    Anesthesia Start:  1056 Anesthesia Stop:  1211    Procedure:  EVACUATION OF RIGHT CHEST WALL HEMATOMA (Right ) Diagnosis:      Surgeon:  Juvencio Rodriguez MD Provider:  Rafael Chisholm MD    Anesthesia Type:  general ASA Status:  4          Anesthesia Type: general  Last vitals  BP   130/53 (11/27/18 1345)   Temp   36.5 °C (97.7 °F) (11/27/18 1310)   Pulse   60 (11/27/18 1345)   Resp   16 (11/27/18 1345)     SpO2   100 % (11/27/18 1345)     Post Anesthesia Care and Evaluation    Patient location during evaluation: bedside  Patient participation: complete - patient participated  Level of consciousness: awake  Pain management: adequate  Airway patency: patent  Anesthetic complications: No anesthetic complications    Cardiovascular status: acceptable  Respiratory status: acceptable  Hydration status: acceptable

## 2018-11-27 NOTE — ANESTHESIA PREPROCEDURE EVALUATION
Anesthesia Evaluation     NPO Solid Status: > 8 hours             Airway   Mallampati: II  TM distance: >3 FB  Neck ROM: full  No difficulty expected  Dental    (+) partials    Pulmonary - normal exam   (+) sleep apnea,   Cardiovascular     Rhythm: irregular    (+) pacemaker ICD, hypertension, valvular problems/murmurs, CAD, CABG, dysrhythmias Atrial Fib, Tachycardia, CHF, PVD, hyperlipidemia,  carotid artery disease    ROS comment: MVR  AICD  Mod to severe LV dysfunction    Neuro/Psych  (+) CVA,     GI/Hepatic/Renal/Endo    (+)  GERD, PUD,  renal disease CRI,     Musculoskeletal     (+) back pain,   Abdominal    Substance History      OB/GYN          Other   (+) arthritis                   Anesthesia Plan    ASA 4     general     intravenous induction   Anesthetic plan, all risks, benefits, and alternatives have been provided, discussed and informed consent has been obtained with: patient.

## 2018-11-27 NOTE — ANESTHESIA PROCEDURE NOTES
ANESTHESIA INTUBATION  Urgency: elective    Date/Time: 11/27/2018 11:08 AM  Airway not difficult    General Information and Staff    Patient location during procedure: OR  Anesthesiologist: Rafael Chisholm MD  CRNA: Archana Melendez CRNA    Indications and Patient Condition  Indications for airway management: airway protection    Preoxygenated: yes  MILS not maintained throughout  Mask difficulty assessment: 0 - not attempted    Final Airway Details  Final airway type: supraglottic airway      Successful airway: unique  Size 4    Number of attempts at approach: 1    Additional Comments  LMA inserted with ease, assist to SV

## 2018-11-28 LAB
ABO + RH BLD: NORMAL
ABO + RH BLD: NORMAL
ALBUMIN SERPL-MCNC: 2.6 G/DL (ref 3.5–5.2)
ANION GAP SERPL CALCULATED.3IONS-SCNC: 12.4 MMOL/L
BH BB BLOOD EXPIRATION DATE: NORMAL
BH BB BLOOD EXPIRATION DATE: NORMAL
BH BB BLOOD TYPE BARCODE: 5100
BH BB BLOOD TYPE BARCODE: 5100
BH BB DISPENSE STATUS: NORMAL
BH BB DISPENSE STATUS: NORMAL
BH BB PRODUCT CODE: NORMAL
BH BB PRODUCT CODE: NORMAL
BH BB UNIT NUMBER: NORMAL
BH BB UNIT NUMBER: NORMAL
BUN BLD-MCNC: 47 MG/DL (ref 8–23)
BUN/CREAT SERPL: 31.5 (ref 7–25)
CALCIUM SPEC-SCNC: 8.3 MG/DL (ref 8.6–10.5)
CHLORIDE SERPL-SCNC: 109 MMOL/L (ref 98–107)
CO2 SERPL-SCNC: 20.6 MMOL/L (ref 22–29)
CREAT BLD-MCNC: 1.49 MG/DL (ref 0.57–1)
DEPRECATED RDW RBC AUTO: 56.4 FL (ref 37–54)
ERYTHROCYTE [DISTWIDTH] IN BLOOD BY AUTOMATED COUNT: 16.4 % (ref 11.7–13)
GFR SERPL CREATININE-BSD FRML MDRD: 34 ML/MIN/1.73
GLUCOSE BLD-MCNC: 88 MG/DL (ref 65–99)
HCT VFR BLD AUTO: 27.7 % (ref 35.6–45.5)
HGB BLD-MCNC: 8.6 G/DL (ref 11.9–15.5)
MCH RBC QN AUTO: 30.4 PG (ref 26.9–32)
MCHC RBC AUTO-ENTMCNC: 31 G/DL (ref 32.4–36.3)
MCV RBC AUTO: 97.9 FL (ref 80.5–98.2)
PHOSPHATE SERPL-MCNC: 3.2 MG/DL (ref 2.5–4.5)
PLATELET # BLD AUTO: 132 10*3/MM3 (ref 140–500)
PMV BLD AUTO: 8.9 FL (ref 6–12)
POTASSIUM BLD-SCNC: 4.4 MMOL/L (ref 3.5–5.2)
RBC # BLD AUTO: 2.83 10*6/MM3 (ref 3.9–5.2)
SODIUM BLD-SCNC: 142 MMOL/L (ref 136–145)
UNIT  ABO: NORMAL
UNIT  ABO: NORMAL
UNIT  RH: NORMAL
UNIT  RH: NORMAL
WBC NRBC COR # BLD: 6.77 10*3/MM3 (ref 4.5–10.7)

## 2018-11-28 PROCEDURE — 99233 SBSQ HOSP IP/OBS HIGH 50: CPT | Performed by: INTERNAL MEDICINE

## 2018-11-28 PROCEDURE — 85027 COMPLETE CBC AUTOMATED: CPT | Performed by: SURGERY

## 2018-11-28 PROCEDURE — 97110 THERAPEUTIC EXERCISES: CPT

## 2018-11-28 PROCEDURE — 80069 RENAL FUNCTION PANEL: CPT | Performed by: SURGERY

## 2018-11-28 RX ORDER — FUROSEMIDE 40 MG/1
40 TABLET ORAL
Status: DISCONTINUED | OUTPATIENT
Start: 2018-11-28 | End: 2018-11-30 | Stop reason: HOSPADM

## 2018-11-28 RX ORDER — FUROSEMIDE 40 MG/1
40 TABLET ORAL DAILY
Status: DISCONTINUED | OUTPATIENT
Start: 2018-11-28 | End: 2018-11-28

## 2018-11-28 RX ORDER — DOCUSATE SODIUM 100 MG/1
100 CAPSULE, LIQUID FILLED ORAL DAILY
Status: DISCONTINUED | OUTPATIENT
Start: 2018-11-28 | End: 2018-11-30 | Stop reason: HOSPADM

## 2018-11-28 RX ORDER — RAMIPRIL 2.5 MG/1
2.5 CAPSULE ORAL
Status: DISCONTINUED | OUTPATIENT
Start: 2018-11-28 | End: 2018-11-28

## 2018-11-28 RX ADMIN — CARVEDILOL 25 MG: 25 TABLET, FILM COATED ORAL at 08:33

## 2018-11-28 RX ADMIN — TRAMADOL HYDROCHLORIDE 100 MG: 50 TABLET, FILM COATED ORAL at 06:36

## 2018-11-28 RX ADMIN — CALCITRIOL CAPSULES 0.25 MCG 0.25 MCG: 0.25 CAPSULE ORAL at 08:34

## 2018-11-28 RX ADMIN — FUROSEMIDE 40 MG: 40 TABLET ORAL at 11:54

## 2018-11-28 RX ADMIN — ATORVASTATIN CALCIUM 20 MG: 20 TABLET, FILM COATED ORAL at 08:33

## 2018-11-28 RX ADMIN — FUROSEMIDE 40 MG: 40 TABLET ORAL at 17:18

## 2018-11-28 RX ADMIN — TRAMADOL HYDROCHLORIDE 50 MG: 50 TABLET, FILM COATED ORAL at 17:19

## 2018-11-28 RX ADMIN — FERROUS SULFATE TAB 325 MG (65 MG ELEMENTAL FE) 325 MG: 325 (65 FE) TAB at 08:33

## 2018-11-28 RX ADMIN — PANTOPRAZOLE SODIUM 40 MG: 40 TABLET, DELAYED RELEASE ORAL at 08:33

## 2018-11-28 RX ADMIN — CARVEDILOL 25 MG: 25 TABLET, FILM COATED ORAL at 17:17

## 2018-11-28 RX ADMIN — PANTOPRAZOLE SODIUM 40 MG: 40 TABLET, DELAYED RELEASE ORAL at 20:32

## 2018-11-28 RX ADMIN — HYDROCODONE BITARTRATE AND ACETAMINOPHEN 1 TABLET: 5; 325 TABLET ORAL at 11:55

## 2018-11-29 ENCOUNTER — APPOINTMENT (OUTPATIENT)
Dept: CARDIOLOGY | Facility: HOSPITAL | Age: 78
End: 2018-11-29
Attending: INTERNAL MEDICINE

## 2018-11-29 LAB
ANION GAP SERPL CALCULATED.3IONS-SCNC: 13.4 MMOL/L
BH CV UPPER VENOUS LEFT INTERNAL JUGULAR AUGMENT: NORMAL
BH CV UPPER VENOUS LEFT INTERNAL JUGULAR COMPETENT: NORMAL
BH CV UPPER VENOUS LEFT INTERNAL JUGULAR COMPRESS: NORMAL
BH CV UPPER VENOUS LEFT INTERNAL JUGULAR PHASIC: NORMAL
BH CV UPPER VENOUS LEFT INTERNAL JUGULAR SPONT: NORMAL
BH CV UPPER VENOUS LEFT SUBCLAVIAN AUGMENT: NORMAL
BH CV UPPER VENOUS LEFT SUBCLAVIAN COMPETENT: NORMAL
BH CV UPPER VENOUS LEFT SUBCLAVIAN COMPRESS: NORMAL
BH CV UPPER VENOUS LEFT SUBCLAVIAN PHASIC: NORMAL
BH CV UPPER VENOUS LEFT SUBCLAVIAN SPONT: NORMAL
BH CV UPPER VENOUS RIGHT AXILLARY AUGMENT: NORMAL
BH CV UPPER VENOUS RIGHT AXILLARY COMPETENT: NORMAL
BH CV UPPER VENOUS RIGHT AXILLARY COMPRESS: NORMAL
BH CV UPPER VENOUS RIGHT AXILLARY PHASIC: NORMAL
BH CV UPPER VENOUS RIGHT AXILLARY SPONT: NORMAL
BH CV UPPER VENOUS RIGHT BASILIC FOREARM COMPRESS: NORMAL
BH CV UPPER VENOUS RIGHT BASILIC UPPER COMPRESS: NORMAL
BH CV UPPER VENOUS RIGHT BRACHIAL COMPRESS: NORMAL
BH CV UPPER VENOUS RIGHT CEPHALIC FOREARM COMPRESS: NORMAL
BH CV UPPER VENOUS RIGHT CEPHALIC UPPER COMPRESS: NORMAL
BH CV UPPER VENOUS RIGHT INTERNAL JUGULAR AUGMENT: NORMAL
BH CV UPPER VENOUS RIGHT INTERNAL JUGULAR COMPETENT: NORMAL
BH CV UPPER VENOUS RIGHT INTERNAL JUGULAR COMPRESS: NORMAL
BH CV UPPER VENOUS RIGHT INTERNAL JUGULAR PHASIC: NORMAL
BH CV UPPER VENOUS RIGHT INTERNAL JUGULAR SPONT: NORMAL
BH CV UPPER VENOUS RIGHT RADIAL COMPRESS: NORMAL
BH CV UPPER VENOUS RIGHT SUBCLAVIAN AUGMENT: NORMAL
BH CV UPPER VENOUS RIGHT SUBCLAVIAN COMPETENT: NORMAL
BH CV UPPER VENOUS RIGHT SUBCLAVIAN COMPRESS: NORMAL
BH CV UPPER VENOUS RIGHT SUBCLAVIAN PHASIC: NORMAL
BH CV UPPER VENOUS RIGHT SUBCLAVIAN SPONT: NORMAL
BH CV UPPER VENOUS RIGHT ULNAR COMPRESS: NORMAL
BUN BLD-MCNC: 47 MG/DL (ref 8–23)
BUN/CREAT SERPL: 37.6 (ref 7–25)
CALCIUM SPEC-SCNC: 8.5 MG/DL (ref 8.6–10.5)
CHLORIDE SERPL-SCNC: 106 MMOL/L (ref 98–107)
CO2 SERPL-SCNC: 21.6 MMOL/L (ref 22–29)
CREAT BLD-MCNC: 1.25 MG/DL (ref 0.57–1)
DEPRECATED RDW RBC AUTO: 58.1 FL (ref 37–54)
ERYTHROCYTE [DISTWIDTH] IN BLOOD BY AUTOMATED COUNT: 16.6 % (ref 11.7–13)
GFR SERPL CREATININE-BSD FRML MDRD: 41 ML/MIN/1.73
GLUCOSE BLD-MCNC: 108 MG/DL (ref 65–99)
HCT VFR BLD AUTO: 30 % (ref 35.6–45.5)
HGB BLD-MCNC: 8.9 G/DL (ref 11.9–15.5)
INR PPP: 1.35 (ref 0.9–1.1)
MCH RBC QN AUTO: 29.9 PG (ref 26.9–32)
MCHC RBC AUTO-ENTMCNC: 29.7 G/DL (ref 32.4–36.3)
MCV RBC AUTO: 100.7 FL (ref 80.5–98.2)
PLATELET # BLD AUTO: 140 10*3/MM3 (ref 140–500)
PLATELET # BLD AUTO: 98 10*3/MM3 (ref 140–500)
PLATELETS.RETICULATED NFR BLD AUTO: 1.6 % (ref 0.9–6.5)
PMV BLD AUTO: 9.6 FL (ref 6–12)
POTASSIUM BLD-SCNC: 4.3 MMOL/L (ref 3.5–5.2)
PROTHROMBIN TIME: 16.5 SECONDS (ref 11.7–14.2)
RBC # BLD AUTO: 2.98 10*6/MM3 (ref 3.9–5.2)
SODIUM BLD-SCNC: 141 MMOL/L (ref 136–145)
WBC NRBC COR # BLD: 7.15 10*3/MM3 (ref 4.5–10.7)

## 2018-11-29 PROCEDURE — 93971 EXTREMITY STUDY: CPT

## 2018-11-29 PROCEDURE — 25010000002 ONDANSETRON PER 1 MG: Performed by: HOSPITALIST

## 2018-11-29 PROCEDURE — 99231 SBSQ HOSP IP/OBS SF/LOW 25: CPT | Performed by: NURSE PRACTITIONER

## 2018-11-29 PROCEDURE — 85055 RETICULATED PLATELET ASSAY: CPT | Performed by: INTERNAL MEDICINE

## 2018-11-29 PROCEDURE — 85027 COMPLETE CBC AUTOMATED: CPT | Performed by: HOSPITALIST

## 2018-11-29 PROCEDURE — 80048 BASIC METABOLIC PNL TOTAL CA: CPT | Performed by: HOSPITALIST

## 2018-11-29 PROCEDURE — 85610 PROTHROMBIN TIME: CPT | Performed by: HOSPITALIST

## 2018-11-29 RX ADMIN — PANTOPRAZOLE SODIUM 40 MG: 40 TABLET, DELAYED RELEASE ORAL at 20:51

## 2018-11-29 RX ADMIN — CALCITRIOL CAPSULES 0.25 MCG 0.25 MCG: 0.25 CAPSULE ORAL at 10:52

## 2018-11-29 RX ADMIN — FUROSEMIDE 40 MG: 40 TABLET ORAL at 08:27

## 2018-11-29 RX ADMIN — HYDROCODONE BITARTRATE AND ACETAMINOPHEN 1 TABLET: 5; 325 TABLET ORAL at 01:44

## 2018-11-29 RX ADMIN — PANTOPRAZOLE SODIUM 40 MG: 40 TABLET, DELAYED RELEASE ORAL at 08:27

## 2018-11-29 RX ADMIN — ONDANSETRON 4 MG: 2 INJECTION INTRAMUSCULAR; INTRAVENOUS at 18:28

## 2018-11-29 RX ADMIN — ATORVASTATIN CALCIUM 20 MG: 20 TABLET, FILM COATED ORAL at 08:27

## 2018-11-29 RX ADMIN — CARVEDILOL 25 MG: 25 TABLET, FILM COATED ORAL at 18:58

## 2018-11-29 RX ADMIN — FERROUS SULFATE TAB 325 MG (65 MG ELEMENTAL FE) 325 MG: 325 (65 FE) TAB at 08:27

## 2018-11-29 RX ADMIN — FUROSEMIDE 40 MG: 40 TABLET ORAL at 18:58

## 2018-11-29 RX ADMIN — CARVEDILOL 25 MG: 25 TABLET, FILM COATED ORAL at 08:27

## 2018-11-29 RX ADMIN — TRAMADOL HYDROCHLORIDE 100 MG: 50 TABLET, FILM COATED ORAL at 08:37

## 2018-11-29 RX ADMIN — TRAMADOL HYDROCHLORIDE 50 MG: 50 TABLET, FILM COATED ORAL at 18:58

## 2018-11-30 ENCOUNTER — TELEPHONE (OUTPATIENT)
Dept: SURGERY | Facility: CLINIC | Age: 78
End: 2018-11-30

## 2018-11-30 ENCOUNTER — TELEPHONE (OUTPATIENT)
Dept: FAMILY MEDICINE CLINIC | Facility: CLINIC | Age: 78
End: 2018-11-30

## 2018-11-30 VITALS
TEMPERATURE: 97.6 F | DIASTOLIC BLOOD PRESSURE: 54 MMHG | SYSTOLIC BLOOD PRESSURE: 143 MMHG | RESPIRATION RATE: 20 BRPM | BODY MASS INDEX: 25.83 KG/M2 | HEIGHT: 65 IN | WEIGHT: 155 LBS | OXYGEN SATURATION: 96 % | HEART RATE: 73 BPM

## 2018-11-30 LAB
ALBUMIN SERPL-MCNC: 2.8 G/DL (ref 3.5–5.2)
ANION GAP SERPL CALCULATED.3IONS-SCNC: 10.2 MMOL/L
BASOPHILS # BLD AUTO: 0.01 10*3/MM3 (ref 0–0.2)
BASOPHILS NFR BLD AUTO: 0.1 % (ref 0–1.5)
BUN BLD-MCNC: 44 MG/DL (ref 8–23)
BUN/CREAT SERPL: 36.4 (ref 7–25)
CALCIUM SPEC-SCNC: 8.6 MG/DL (ref 8.6–10.5)
CHLORIDE SERPL-SCNC: 105 MMOL/L (ref 98–107)
CO2 SERPL-SCNC: 23.8 MMOL/L (ref 22–29)
CREAT BLD-MCNC: 1.21 MG/DL (ref 0.57–1)
DEPRECATED RDW RBC AUTO: 55.6 FL (ref 37–54)
EOSINOPHIL # BLD AUTO: 0.06 10*3/MM3 (ref 0–0.7)
EOSINOPHIL NFR BLD AUTO: 0.9 % (ref 0.3–6.2)
ERYTHROCYTE [DISTWIDTH] IN BLOOD BY AUTOMATED COUNT: 16.2 % (ref 11.7–13)
GFR SERPL CREATININE-BSD FRML MDRD: 43 ML/MIN/1.73
GLUCOSE BLD-MCNC: 120 MG/DL (ref 65–99)
HCT VFR BLD AUTO: 27.8 % (ref 35.6–45.5)
HGB BLD-MCNC: 8.5 G/DL (ref 11.9–15.5)
IMM GRANULOCYTES # BLD: 0.05 10*3/MM3 (ref 0–0.03)
IMM GRANULOCYTES NFR BLD: 0.7 % (ref 0–0.5)
INR PPP: 1.33 (ref 0.9–1.1)
LYMPHOCYTES # BLD AUTO: 0.68 10*3/MM3 (ref 0.9–4.8)
LYMPHOCYTES NFR BLD AUTO: 10.1 % (ref 19.6–45.3)
MCH RBC QN AUTO: 29.8 PG (ref 26.9–32)
MCHC RBC AUTO-ENTMCNC: 30.6 G/DL (ref 32.4–36.3)
MCV RBC AUTO: 97.5 FL (ref 80.5–98.2)
MONOCYTES # BLD AUTO: 0.63 10*3/MM3 (ref 0.2–1.2)
MONOCYTES NFR BLD AUTO: 9.3 % (ref 5–12)
NEUTROPHILS # BLD AUTO: 5.33 10*3/MM3 (ref 1.9–8.1)
NEUTROPHILS NFR BLD AUTO: 78.9 % (ref 42.7–76)
PHOSPHATE SERPL-MCNC: 2.6 MG/DL (ref 2.5–4.5)
PLATELET # BLD AUTO: 130 10*3/MM3 (ref 140–500)
PMV BLD AUTO: 9.1 FL (ref 6–12)
POTASSIUM BLD-SCNC: 3.9 MMOL/L (ref 3.5–5.2)
PROTHROMBIN TIME: 16.2 SECONDS (ref 11.7–14.2)
RBC # BLD AUTO: 2.85 10*6/MM3 (ref 3.9–5.2)
SODIUM BLD-SCNC: 139 MMOL/L (ref 136–145)
WBC NRBC COR # BLD: 6.76 10*3/MM3 (ref 4.5–10.7)

## 2018-11-30 PROCEDURE — 80069 RENAL FUNCTION PANEL: CPT | Performed by: INTERNAL MEDICINE

## 2018-11-30 PROCEDURE — 85025 COMPLETE CBC W/AUTO DIFF WBC: CPT | Performed by: INTERNAL MEDICINE

## 2018-11-30 PROCEDURE — 97110 THERAPEUTIC EXERCISES: CPT

## 2018-11-30 PROCEDURE — 85610 PROTHROMBIN TIME: CPT | Performed by: NURSE PRACTITIONER

## 2018-11-30 PROCEDURE — 99232 SBSQ HOSP IP/OBS MODERATE 35: CPT | Performed by: NURSE PRACTITIONER

## 2018-11-30 RX ORDER — WARFARIN SODIUM 2 MG/1
2 TABLET ORAL NIGHTLY
Qty: 30 TABLET | Refills: 0 | Status: SHIPPED | OUTPATIENT
Start: 2018-11-30 | End: 2019-02-22 | Stop reason: HOSPADM

## 2018-11-30 RX ORDER — FERROUS SULFATE 325(65) MG
325 TABLET ORAL
Qty: 30 TABLET | Refills: 0 | Status: SHIPPED | OUTPATIENT
Start: 2018-12-01 | End: 2019-02-22 | Stop reason: HOSPADM

## 2018-11-30 RX ORDER — FUROSEMIDE 40 MG/1
40 TABLET ORAL 2 TIMES DAILY
Start: 2018-11-30 | End: 2019-01-09 | Stop reason: SDUPTHER

## 2018-11-30 RX ORDER — WARFARIN SODIUM 2 MG/1
2 TABLET ORAL
Status: DISCONTINUED | OUTPATIENT
Start: 2018-11-30 | End: 2018-11-30 | Stop reason: HOSPADM

## 2018-11-30 RX ADMIN — CALCITRIOL CAPSULES 0.25 MCG 0.25 MCG: 0.25 CAPSULE ORAL at 08:16

## 2018-11-30 RX ADMIN — HYDROCODONE BITARTRATE AND ACETAMINOPHEN 1 TABLET: 5; 325 TABLET ORAL at 03:58

## 2018-11-30 RX ADMIN — TRAMADOL HYDROCHLORIDE 100 MG: 50 TABLET, FILM COATED ORAL at 06:06

## 2018-11-30 RX ADMIN — ATORVASTATIN CALCIUM 20 MG: 20 TABLET, FILM COATED ORAL at 08:16

## 2018-11-30 RX ADMIN — DOCUSATE SODIUM 100 MG: 100 CAPSULE, LIQUID FILLED ORAL at 08:16

## 2018-11-30 RX ADMIN — FERROUS SULFATE TAB 325 MG (65 MG ELEMENTAL FE) 325 MG: 325 (65 FE) TAB at 08:16

## 2018-11-30 RX ADMIN — FUROSEMIDE 40 MG: 40 TABLET ORAL at 08:16

## 2018-11-30 RX ADMIN — PANTOPRAZOLE SODIUM 40 MG: 40 TABLET, DELAYED RELEASE ORAL at 08:16

## 2018-11-30 RX ADMIN — CARVEDILOL 25 MG: 25 TABLET, FILM COATED ORAL at 08:17

## 2018-12-01 ENCOUNTER — READMISSION MANAGEMENT (OUTPATIENT)
Dept: CALL CENTER | Facility: HOSPITAL | Age: 78
End: 2018-12-01

## 2018-12-01 NOTE — OUTREACH NOTE
Prep Survey      Responses   Facility patient discharged from?  [facility]   Is patient eligible?  Yes   Discharge diagnosis  Intramuscular hematoma right pectoralis:  s/p evacuation w [place] Marc drain.     Does the patient have one of the following disease processes/diagnoses(primary or secondary)?  General Surgery   Does the patient have Home health ordered?  Yes   What is the Home health agency?   [facility]   Is there a DME ordered?  No   Comments regarding appointments  Needs one week f/u scheduled.    Prep survey completed?  Yes          Pamella Calero RN

## 2018-12-02 ENCOUNTER — LAB REQUISITION (OUTPATIENT)
Dept: LAB | Facility: HOSPITAL | Age: 78
End: 2018-12-02

## 2018-12-02 DIAGNOSIS — Z79.01 LONG TERM CURRENT USE OF ANTICOAGULANT: ICD-10-CM

## 2018-12-02 LAB
INR PPP: 1.33 (ref 0.9–1.1)
PROTHROMBIN TIME: 16.2 SECONDS (ref 11.7–14.2)

## 2018-12-02 PROCEDURE — 85610 PROTHROMBIN TIME: CPT | Performed by: INTERNAL MEDICINE

## 2018-12-03 ENCOUNTER — ANTICOAGULATION VISIT (OUTPATIENT)
Dept: PHARMACY | Facility: HOSPITAL | Age: 78
End: 2018-12-03

## 2018-12-03 ENCOUNTER — TELEPHONE (OUTPATIENT)
Dept: PHARMACY | Facility: HOSPITAL | Age: 78
End: 2018-12-03

## 2018-12-03 DIAGNOSIS — I48.20 CHRONIC ATRIAL FIBRILLATION (HCC): ICD-10-CM

## 2018-12-03 RX ORDER — SIMVASTATIN 40 MG
TABLET ORAL
Qty: 90 TABLET | Refills: 1 | Status: SHIPPED | OUTPATIENT
Start: 2018-12-03 | End: 2019-11-12

## 2018-12-03 NOTE — PROGRESS NOTES
Anticoagulation Clinic Progress Note    Anticoagulation Summary  As of 12/3/2018    INR goal:   2.0-3.0   TTR:   40.8 % (2 mo)   INR used for dosin.33! (2018)   Warfarin maintenance plan:   1 mg on Sun, Tue; 2 mg all other days   Weekly warfarin total:   12 mg   Plan last modified:   Caprice Carrasco RP (10/16/2018)   Next INR check:   2018   Priority:   Critical   Target end date:   Indefinite    Indications    Chronic atrial fibrillation (CMS/HCC) [I48.2]             Anticoagulation Episode Summary     INR check location:       Preferred lab:       Send INR reminders to:    [facility] CLINICAL [place]    Comments:         Anticoagulation Care Providers     Provider Role Specialty Phone number    Nik Galarza MD Referring Cardiology [phone]    Roseanne Feng RP Responsible Pharmacy [phone]            INR History:  Anticoagulation Monitoring 2018 2018 12/3/2018   INR - 3.7 1.33   INR Date - 2018   INR Goal 2.0-3.0 2.0-3.0 2.0-3.0   Trend Same Same Same   Last Week Total 7 mg 16 mg 6 mg   Next Week Total 16 mg 12 mg 17 mg   Sun 4 mg () Hold (); Otherwise 1 mg -   Mon Hold () Hold (), 4 mg (); Otherwise 2 mg 4 mg (12/3)   Tue Hold () Hold (), 4 mg (); Otherwise 1 mg 4 mg ()   Wed Hold () Hold (), 4 mg (); Otherwise 2 mg 2 mg   Thu 4 mg () Hold (); Otherwise 2 mg -   Fri 4 mg () Hold (); Otherwise 2 mg -   Sat 4 mg (11/10) Hold (); Otherwise 2 mg -   Visit Report - - -   Some recent data might be hidden       Plan:  1. Contacted home health and instructed them to give 4mg today, 4mg tomorrow, 2mg Wednesday, recheck Thursday    Roseanne eFng [facility]

## 2018-12-03 NOTE — TELEPHONE ENCOUNTER
Prefer booster dose and rechecked INR. Surgeon did not want Lovenox bridge prior to discharge. Thank you

## 2018-12-04 ENCOUNTER — READMISSION MANAGEMENT (OUTPATIENT)
Dept: CALL CENTER | Facility: HOSPITAL | Age: 78
End: 2018-12-04

## 2018-12-04 NOTE — OUTREACH NOTE
General Surgery Week 1 Survey      Responses   Facility patient discharged from?  [facility]   Does the patient have one of the following disease processes/diagnoses(primary or secondary)?  General Surgery   Is there a successful TCM telephone encounter documented?  No   Week 1 attempt successful?  Yes   Call start time  1052   Call end time  1057   Discharge diagnosis  Intramuscular hematoma right pectoralis:  s/p evacuation w [place] Marc drain.     Meds reviewed with patient/caregiver?  Yes   Is the patient having any side effects they believe may be caused by any medication additions or changes?  No   Does the patient have all medications related to this admission filled (includes all antibiotics, pain medications, etc.)  Yes   Is the patient taking all medications as directed (includes completed medication regime)?  Yes   Does the patient have a follow up appointment scheduled with their surgeon?  Yes   Has the patient kept scheduled appointments due by today?  N/A   What is the Home health agency?   [facility]   Has home health visited the patient within 72 hours of discharge?  Yes   Psychosocial issues?  No   Did the patient receive a copy of their discharge instructions?  Yes   Nursing interventions  Reviewed instructions with patient   What is the patient's perception of their health status since discharge?  Improving   Is the patient /caregiver able to teach back basic post-op care?  Take showers only when approved by MD-sponge bathe until then, Keep incision areas clean,dry and protected, No tub bath, swimming, or hot tub until instructed by MD [Has drain as well]   Is the patient/caregiver able to teach back steps to recovery at home?  Set small, achievable goals for return to baseline health, Eat a well-balance diet   Is the patient/caregiver able to teach back the hierarchy of who to call/visit for symptoms/problems? PCP, Specialist, Home health nurse, Urgent Care, ED, 911  Yes   Week 1 call completed?  Yes           Caprice Leija RN

## 2018-12-06 ENCOUNTER — TELEPHONE (OUTPATIENT)
Dept: PHARMACY | Facility: HOSPITAL | Age: 78
End: 2018-12-06

## 2018-12-06 ENCOUNTER — ANTICOAGULATION VISIT (OUTPATIENT)
Dept: PHARMACY | Facility: HOSPITAL | Age: 78
End: 2018-12-06

## 2018-12-06 DIAGNOSIS — I48.20 CHRONIC ATRIAL FIBRILLATION (HCC): ICD-10-CM

## 2018-12-06 LAB — INR PPP: 3.7

## 2018-12-06 NOTE — PROGRESS NOTES
Anticoagulation Clinic Progress Note    Anticoagulation Summary  As of 12/6/2018    INR goal:   2.0-3.0   TTR:   40.9 % (2.2 mo)   INR used for dosing:   3.70! (12/6/2018)   Warfarin maintenance plan:   1 mg on Sun, Tue; 2 mg all other days   Weekly warfarin total:   12 mg   Plan last modified:   Caprice Carrasco [facility] (10/16/2018)   Next INR check:   12/10/2018   Priority:   Critical   Target end date:   Indefinite    Indications    Chronic atrial fibrillation (CMS/HCC) [I48.2]             Anticoagulation Episode Summary     INR check location:       Preferred lab:       [facility]ers [facility]:    [facility]    Comments:         Anticoagulation Care Providers     Provider Role Specialty Phone number    Nik Galarza MD Referring Cardiology [phone]    Roseanne Feng RP Responsible Pharmacy [phone]            INR History:  Anticoagulation Monitoring 11/12/2018 12/3/2018 12/6/2018   INR 3.7 1.33 3.70   INR Date 11/12/2018 12/2/2018 12/6/2018   INR Goal 2.0-3.0 2.0-3.0 2.0-3.0   Trend Same Same Same   Last Week Total 16 mg 6 mg 16 mg   Next Week Total 12 mg 17 mg 10 mg   Sun Hold (11/25); Otherwise 1 mg - 1 mg   Mon Hold (11/19), 4 mg (11/26); Otherwise 2 mg 4 mg (12/3) -   Tue Hold (11/20), 4 mg (11/27); Otherwise 1 mg 4 mg (12/4) -   Wed Hold (11/21), 4 mg (11/28); Otherwise 2 mg 2 mg -   Thu Hold (11/22); Otherwise 2 mg - 1 mg (12/6)   Fri Hold (11/23); Otherwise 2 mg - 1 mg (12/7)   Sat Hold (11/24); Otherwise 2 mg - 2 mg   Visit Report - - -   Some recent data might be hidden       Plan:  1. Contacted home health and instructed them to decrease to 1mg daily except 2mg Saturday, recheck Monday    Roseanne Feng [facility]

## 2018-12-07 ENCOUNTER — TELEPHONE (OUTPATIENT)
Dept: FAMILY MEDICINE CLINIC | Facility: CLINIC | Age: 78
End: 2018-12-07

## 2018-12-07 NOTE — TELEPHONE ENCOUNTER
Melissa with home health called and said they had an order for a cbc, but pt had declined because she said cbc group was going to do it but she said she didn't have it done. They will be there tues. And will do it if the pt wants to. Ok'd it.

## 2018-12-10 ENCOUNTER — LAB REQUISITION (OUTPATIENT)
Dept: LAB | Facility: HOSPITAL | Age: 78
End: 2018-12-10

## 2018-12-10 DIAGNOSIS — M79.81 NONTRAUMATIC HEMATOMA OF SOFT TISSUE: ICD-10-CM

## 2018-12-10 LAB
BASOPHILS # BLD AUTO: 0.01 10*3/MM3 (ref 0–0.2)
BASOPHILS NFR BLD AUTO: 0.2 % (ref 0–1.5)
DEPRECATED RDW RBC AUTO: 58 FL (ref 37–54)
EOSINOPHIL # BLD AUTO: 0.2 10*3/MM3 (ref 0–0.7)
EOSINOPHIL NFR BLD AUTO: 3.6 % (ref 0.3–6.2)
ERYTHROCYTE [DISTWIDTH] IN BLOOD BY AUTOMATED COUNT: 16 % (ref 11.7–13)
HCT VFR BLD AUTO: 31.7 % (ref 35.6–45.5)
HGB BLD-MCNC: 9.3 G/DL (ref 11.9–15.5)
IMM GRANULOCYTES # BLD: 0.05 10*3/MM3 (ref 0–0.03)
IMM GRANULOCYTES NFR BLD: 0.9 % (ref 0–0.5)
INR PPP: 2.67 (ref 0.9–1.1)
LYMPHOCYTES # BLD AUTO: 0.71 10*3/MM3 (ref 0.9–4.8)
LYMPHOCYTES NFR BLD AUTO: 12.8 % (ref 19.6–45.3)
MCH RBC QN AUTO: 29.2 PG (ref 26.9–32)
MCHC RBC AUTO-ENTMCNC: 29.3 G/DL (ref 32.4–36.3)
MCV RBC AUTO: 99.4 FL (ref 80.5–98.2)
MONOCYTES # BLD AUTO: 0.34 10*3/MM3 (ref 0.2–1.2)
MONOCYTES NFR BLD AUTO: 6.1 % (ref 5–12)
NEUTROPHILS # BLD AUTO: 4.22 10*3/MM3 (ref 1.9–8.1)
NEUTROPHILS NFR BLD AUTO: 76.4 % (ref 42.7–76)
PLATELET # BLD AUTO: 188 10*3/MM3 (ref 140–500)
PMV BLD AUTO: 9.3 FL (ref 6–12)
PROTHROMBIN TIME: 28 SECONDS (ref 11.7–14.2)
RBC # BLD AUTO: 3.19 10*6/MM3 (ref 3.9–5.2)
WBC NRBC COR # BLD: 5.53 10*3/MM3 (ref 4.5–10.7)

## 2018-12-10 PROCEDURE — 85025 COMPLETE CBC W/AUTO DIFF WBC: CPT

## 2018-12-10 PROCEDURE — 85610 PROTHROMBIN TIME: CPT

## 2018-12-11 ENCOUNTER — INFUSION (OUTPATIENT)
Dept: ONCOLOGY | Facility: HOSPITAL | Age: 78
End: 2018-12-11

## 2018-12-11 ENCOUNTER — LAB (OUTPATIENT)
Dept: LAB | Facility: HOSPITAL | Age: 78
End: 2018-12-11

## 2018-12-11 ENCOUNTER — ANTICOAGULATION VISIT (OUTPATIENT)
Dept: PHARMACY | Facility: HOSPITAL | Age: 78
End: 2018-12-11

## 2018-12-11 ENCOUNTER — OFFICE VISIT (OUTPATIENT)
Dept: ONCOLOGY | Facility: CLINIC | Age: 78
End: 2018-12-11

## 2018-12-11 VITALS
TEMPERATURE: 98.3 F | DIASTOLIC BLOOD PRESSURE: 40 MMHG | HEART RATE: 87 BPM | RESPIRATION RATE: 16 BRPM | HEIGHT: 65 IN | SYSTOLIC BLOOD PRESSURE: 110 MMHG | WEIGHT: 146.6 LBS | OXYGEN SATURATION: 95 % | BODY MASS INDEX: 24.43 KG/M2

## 2018-12-11 DIAGNOSIS — I48.20 CHRONIC ATRIAL FIBRILLATION (HCC): ICD-10-CM

## 2018-12-11 DIAGNOSIS — N18.30 ANEMIA IN STAGE 3 CHRONIC KIDNEY DISEASE (HCC): Primary | ICD-10-CM

## 2018-12-11 DIAGNOSIS — D63.1 ANEMIA IN STAGE 3 CHRONIC KIDNEY DISEASE (HCC): Primary | ICD-10-CM

## 2018-12-11 DIAGNOSIS — D69.6 THROMBOCYTOPENIA (HCC): ICD-10-CM

## 2018-12-11 DIAGNOSIS — D63.1 ANEMIA IN STAGE 3 CHRONIC KIDNEY DISEASE (HCC): ICD-10-CM

## 2018-12-11 DIAGNOSIS — N18.30 ANEMIA IN STAGE 3 CHRONIC KIDNEY DISEASE (HCC): ICD-10-CM

## 2018-12-11 LAB
DEPRECATED RDW RBC AUTO: 54.8 FL (ref 37–49)
ERYTHROCYTE [DISTWIDTH] IN BLOOD BY AUTOMATED COUNT: 15.7 % (ref 11.7–14.5)
FERRITIN SERPL-MCNC: 387.4 NG/ML
HCT VFR BLD AUTO: 30.7 % (ref 34–45)
HGB BLD-MCNC: 9.5 G/DL (ref 11.5–14.9)
IRON 24H UR-MRATE: 110 MCG/DL (ref 37–145)
IRON SATN MFR SERPL: 40 % (ref 14–48)
MCH RBC QN AUTO: 30.2 PG (ref 27–33)
MCHC RBC AUTO-ENTMCNC: 30.9 G/DL (ref 32–35)
MCV RBC AUTO: 97.5 FL (ref 83–97)
PLATELET # BLD AUTO: 157 10*3/MM3 (ref 150–375)
PMV BLD AUTO: 8.5 FL (ref 8.9–12.1)
RBC # BLD AUTO: 3.15 10*6/MM3 (ref 3.9–5)
TIBC SERPL-MCNC: 274 MCG/DL (ref 249–505)
TRANSFERRIN SERPL-MCNC: 196 MG/DL (ref 200–360)
WBC NRBC COR # BLD: 4.72 10*3/MM3 (ref 4–10)

## 2018-12-11 PROCEDURE — 96372 THER/PROPH/DIAG INJ SC/IM: CPT | Performed by: INTERNAL MEDICINE

## 2018-12-11 PROCEDURE — 82728 ASSAY OF FERRITIN: CPT | Performed by: INTERNAL MEDICINE

## 2018-12-11 PROCEDURE — 36415 COLL VENOUS BLD VENIPUNCTURE: CPT | Performed by: INTERNAL MEDICINE

## 2018-12-11 PROCEDURE — 85027 COMPLETE CBC AUTOMATED: CPT | Performed by: INTERNAL MEDICINE

## 2018-12-11 PROCEDURE — 99213 OFFICE O/P EST LOW 20 MIN: CPT | Performed by: INTERNAL MEDICINE

## 2018-12-11 PROCEDURE — 83540 ASSAY OF IRON: CPT | Performed by: INTERNAL MEDICINE

## 2018-12-11 PROCEDURE — 63510000001 EPOETIN ALFA PER 1000 UNITS: Performed by: INTERNAL MEDICINE

## 2018-12-11 PROCEDURE — 84466 ASSAY OF TRANSFERRIN: CPT | Performed by: INTERNAL MEDICINE

## 2018-12-11 RX ORDER — PANTOPRAZOLE SODIUM 40 MG/1
TABLET, DELAYED RELEASE ORAL
Qty: 180 TABLET | Refills: 0 | Status: SHIPPED | OUTPATIENT
Start: 2018-12-11 | End: 2019-02-11 | Stop reason: SDUPTHER

## 2018-12-11 RX ADMIN — ERYTHROPOIETIN 8000 UNITS: 20000 INJECTION, SOLUTION INTRAVENOUS; SUBCUTANEOUS at 12:34

## 2018-12-11 NOTE — PROGRESS NOTES
Subjective     CHIEF COMPLAINT:      Chief Complaint   Patient presents with   • Follow-up     general concerns       HISTORY OF PRESENT ILLNESS:     Janel Mahoney is a 78 y.o. female patient who returns today for follow up on her anemia. She was hospitalized at [facility] with a right chest wall hematoma. It developed spontaneously but rapidly increased in size. It was evacuated by Dr. Rodriguez. No recurrence. She still has the drain in place.        REVIEW OF SYSTEMS:  Review of Systems   Constitutional: Positive for fatigue. Negative for chills, fever and unexpected weight change.   HENT: Negative for mouth sores, nosebleeds, sore throat and voice change.    Eyes: Negative for visual disturbance.   Respiratory: Negative for cough and shortness of breath.    Cardiovascular: Negative for chest pain and leg swelling.   Gastrointestinal: Negative for abdominal pain, blood in stool, constipation, diarrhea, nausea and vomiting.   Genitourinary: Negative for dysuria, frequency and hematuria.   Musculoskeletal: Positive for back pain. Negative for arthralgias and joint swelling.   Skin: Negative for rash.   Neurological: Positive for weakness and light-headedness. Negative for dizziness, numbness and headaches.   Hematological: Negative for adenopathy. Bruises/bleeds easily.   Psychiatric/Behavioral: Negative for dysphoric mood. The patient is not nervous/anxious.      I verified the ROS obtained by the MA.        Past Medical History:   Diagnosis Date   • Anemia    • Atrial fibrillation (CMS/HCC)    • Bruises easily    • Carotid artery stenosis    • Chronic back pain    • Chronic coronary artery disease     moderate to severe LV dysfunction.   • GERD (gastroesophageal reflux disease)    • H/O cardiac murmur    • [place] (hard of hearing)     wears hearing aids   • Hyperlipidemia    • Hypertension    • Kyphoscoliosis    • Leukopenia    • Lumbar spondylosis    • Obesity    • GEORGINA (obstructive sleep apnea)    •  Osteoarthritis    • Osteoporosis    • Peptic ulcer    • Premature ventricular contractions    • Renal insufficiency syndrome    • Scoliosis    • Shoulder fracture, left    • Stroke syndrome    • Thrombocytopenia (CMS/HCC)    • Ventricular tachycardia (CMS/HCC)    • Vitamin B12 deficiency        Past Surgical History:   Procedure Laterality Date   • AV NODE ABLATION     • BREAST BIOPSY     • CARDIAC CATHETERIZATION      Showed severe mitral insufficiency and borderline coronary artery disease   • CARDIAC CATHETERIZATION      Showed an ejection fraction of 35%. She had occlusive disease of the right posterior LV branch and no other significant disease, treated medically.   • CARDIAC DEFIBRILLATOR PLACEMENT      Biventricular   • CARDIAC VALVE REPLACEMENT      Done with stent placement   • CARDIOVERSION      multiple electrocardioversions.   • CAROTID ARTERY ANGIOPLASTY Right    • CORONARY ANGIOPLASTY WITH STENT PLACEMENT     • CORONARY ARTERY BYPASS GRAFT      single graft to the PDA   • CORONARY STENT PLACEMENT     • HEMORRHOIDECTOMY     • HYSTERECTOMY     • MITRAL VALVE REPLACEMENT  2010    #31 Epic porcine valve.   • THROMBOENDARTERECTOMY Right     carotid thromboendarterectomy    • TONSILLECTOMY      age 32   • TOTAL KNEE ARTHROPLASTY Left        Cancer-related family history includes Breast cancer in her mother; Cancer in her mother.  Social History     Tobacco Use   • Smoking status: Former Smoker     Packs/day: 1.00     Years: 50.00     Pack years: 50.00     Types: Cigarettes     Last attempt to quit: 2009     Years since quittin.9   • Smokeless tobacco: Never Used   Substance Use Topics   • Alcohol use: Yes     Comment: rare       MEDICATIONS:    Current Outpatient Medications:   •  alendronate (FOSAMAX) 70 MG tablet, Take 70 mg by mouth Every 14 (Fourteen) Days., Disp: , Rfl:   •  aspirin 81 MG tablet, Take 81 mg by mouth Daily., Disp: , Rfl:   •  calcitriol (ROCALTROL) 0.25 MCG capsule,  Take 0.25 mcg by mouth Daily., Disp: , Rfl:   •  carvedilol (COREG) 25 MG tablet, Take 25 mg by mouth 2 (Two) Times a Day With Meals., Disp: , Rfl:   •  epoetin adele (EPOGEN,PROCRIT) 76077 UNIT/ML injection, Inject 10,000 Units under the skin into the appropriate area as directed As Needed., Disp: , Rfl:   •  ferrous sulfate 325 (65 FE) MG tablet, Take 1 tablet by mouth Daily With Breakfast., Disp: 30 tablet, Rfl: 0  •  furosemide (LASIX) 40 MG tablet, Take 1 tablet by mouth 2 (Two) Times a Day., Disp: , Rfl:   •  HYDROcodone-acetaminophen (NORCO) 7.5-325 MG per tablet, Take 1 tablet by mouth Every 6 (Six) Hours As Needed for Moderate Pain ., Disp: 40 tablet, Rfl: 0  •  Multiple Vitamins-Minerals (SENIOR MULTIVITAMIN PLUS) tablet, Take 1 tablet by mouth Daily., Disp: , Rfl:   •  pantoprazole (PROTONIX) 40 MG EC tablet, TAKE 1 TABLET TWICE DAILY, Disp: 180 tablet, Rfl: 0  •  ramipril (ALTACE) 2.5 MG capsule, Take 2.5 mg by mouth Daily., Disp: , Rfl:   •  simvastatin (ZOCOR) 40 MG tablet, TAKE 1 TABLET EVERY DAY, Disp: 90 tablet, Rfl: 1  •  traMADol (ULTRAM) 50 MG tablet, Take 100 mg by mouth Every Morning., Disp: , Rfl:   •  traMADol (ULTRAM) 50 MG tablet, Take 50 mg by mouth Every Evening., Disp: , Rfl:   •  vitamin B-12 (CYANOCOBALAMIN) 2500 MCG sublingual tablet tablet, Place 2,500 mcg under the tongue Daily., Disp: , Rfl:   •  warfarin (COUMADIN) 2 MG tablet, Take 1 tablet by mouth Every Night. (Patient taking differently: Take 2 mg by mouth Every Night. tablet), Disp: 30 tablet, Rfl: 0  •  zolpidem (AMBIEN) 10 MG tablet, Take 1 tablet by mouth At Night As Needed for Sleep. 3 months, Disp: 90 tablet, Rfl: 1  •  Cholecalciferol (VITAMIN D) 2000 UNITS tablet, Take 1 tablet by mouth daily., Disp: , Rfl:   •  ferrous gluconate 324 (37.5 Fe) MG tablet tablet, Take 324 mg by mouth Daily., Disp: , Rfl:     ALLERGIES:  Allergies   Allergen Reactions   • Diclofenac      She had adverse effects from the medications that  "required hospitalization. Pt got stomach ulcers from this medication prescribed by Dr. Arango - pediatrist.   • Dofetilide      Pt states not allergic.          Objective   VITAL SIGNS:     Vitals:    12/11/18 1158   BP: 110/40   Pulse: 87   Resp: 16   Temp: 98.3 °F (36.8 °C)   TempSrc: Oral   SpO2: 95%   Weight: 66.5 kg (146 lb 9.6 oz)   Height: 165.1 cm (65\")   PainSc:   5   PainLoc: Back     Body mass index is 24.4 kg/m².     Wt Readings from Last 3 Encounters:   12/11/18 66.5 kg (146 lb 9.6 oz)   11/30/18 70.3 kg (155 lb)   11/12/18 66.2 kg (146 lb)       PHYSICAL EXAMINATION:  GENERAL:  The patient appears weak general condition, not in acute distress.  SKIN: Warm and dry. No skin rashes, ecchymosis or petechiae.  HEAD:  Normocephalic.  EYES:  No Jaundice. Pallor. Pupils equal. EOMI.  CHEST: Normal respiratory effort. Chest wall with no residual hematoma.   CARDIAC:  Trace edema.  EXTREMITIES:  No clubbing. No joint swelling in the hands. No Calf tenderness.  NEUROLOGICAL:  No Focal neurological deficits.         DIAGNOSTIC DATA:     Results from last 7 days   Lab Units  12/11/18   1143  12/10/18   1530   WBC 10*3/mm3  4.72  5.53   NEUTROS ABS 10*3/mm3   --   4.22   HEMOGLOBIN g/dL  9.5*  9.3*   HEMATOCRIT %  30.7*  31.7*   PLATELETS 10*3/mm3  157  188     Results from last 7 days   Lab Units  12/11/18   1143   FERRITIN ng/mL  387.40   IRON mcg/dL  110   TIBC mcg/dL  274         Assessment/Plan   1.  Anemia of chronic kidney disease, stage III.  She responded to Procrit but has been off of Procrit due to recent hospital stay for a hematoma.  Iron stores are adequate.      2.  Thrombocytopenia due to Vitamin B12 deficiency.  Platelet count is normal today at 157,000.  She is on anticoagulation with Coumadin is being managed by her cardiologist.      PLAN:    1.  We will give Procrit 8,000 units today.  We will schedule her to return every 2 weeks for CBC and Procrit.     2.  I'll see in follow-up in 3 months " with iron studies done 2 weeks before her return visit.            Edward Vasquez MD  12/11/18

## 2018-12-11 NOTE — PROGRESS NOTES
Anticoagulation Clinic Progress Note    Anticoagulation Summary  As of 2018    INR goal:   2.0-3.0   TTR:   40.3 % (2.3 mo)   INR used for dosin.67 (12/10/2018)   Warfarin maintenance plan:   1 mg on Sun, Tue; 2 mg all other days   Weekly warfarin total:   12 mg   Plan last modified:   Caprice Carrasco RPH (10/16/2018)   Next INR check:   2018   Priority:   Critical   Target end date:   Indefinite    Indications    Chronic atrial fibrillation (CMS/HCC) [I48.2]             Anticoagulation Episode Summary     INR check location:       Preferred lab:       [facility]    [facility]    Comments:         Anticoagulation Care Providers     Provider Role Specialty Phone number    Nik Galarza MD Referring Cardiology [phone]    Roseanne Feng RP Responsible Pharmacy [phone]            INR History:  Anticoagulation Monitoring 12/3/2018 2018 2018   INR 1.33 3.70 2.67   INR Date 2018 2018 12/10/2018   INR Goal 2.0-3.0 2.0-3.0 2.0-3.0   Trend Same Same Same   Last Week Total 6 mg 16 mg 13 mg   Next Week Total 17 mg 10 mg 12 mg   Sun - 1 mg -   Mon 4 mg (12/3) - -   Tue 4 mg () - 1 mg   Wed 2 mg - 2 mg   Thu - 1 mg () 2 mg   Fri - 1 mg () -   Sat - 2 mg -   Visit Report - - -   Some recent data might be hidden       Plan:  1. Contacted home health and instructed them to give 1mg today, 2mg Wed/Thurs, recheck Friday    Roseanne Feng RPH

## 2018-12-12 ENCOUNTER — READMISSION MANAGEMENT (OUTPATIENT)
Dept: CALL CENTER | Facility: HOSPITAL | Age: 78
End: 2018-12-12

## 2018-12-12 NOTE — OUTREACH NOTE
General Surgery Week 2 Survey      Responses   Facility patient discharged from?  [facility]   Does the patient have one of the following disease processes/diagnoses(primary or secondary)?  General Surgery   Week 2 attempt successful?  Yes   Call start time  1537   Call end time  1538   Discharge diagnosis  Intramuscular hematoma right pectoralis:  s/p evacuation w [place] Marc drain.     Meds reviewed with patient/caregiver?  Yes   Is the patient having any side effects they believe may be caused by any medication additions or changes?  No   Does the patient have all medications related to this admission filled (includes all antibiotics, pain medications, etc.)  Yes   Is the patient taking all medications as directed (includes completed medication regime)?  Yes   Does the patient have a follow up appointment scheduled with their surgeon?  Yes   Has the patient kept scheduled appointments due by today?  N/A   What is the Home health agency?   [facility]   Has home health visited the patient within 72 hours of discharge?  Yes   Psychosocial issues?  No   Did the patient receive a copy of their discharge instructions?  Yes   Nursing interventions  Reviewed instructions with patient   What is the patient's perception of their health status since discharge?  Improving   Nursing interventions  Nurse provided patient education   Is the patient /caregiver able to teach back basic post-op care?  Take showers only when approved by MD-sponge bathe until then, Keep incision areas clean,dry and protected, No tub bath, swimming, or hot tub until instructed by MD   Is the patient/caregiver able to teach back signs and symptoms of incisional infection?  Increased redness, swelling or pain at the incisonal site, Increased drainage or bleeding, Incisional warmth, Pus or odor from incision, Fever   Is the patient/caregiver able to teach back steps to recovery at home?  Set small, achievable goals for return to baseline health, Eat a  well-balance diet   Is the patient/caregiver able to teach back the hierarchy of who to call/visit for symptoms/problems? PCP, Specialist, Home health nurse, Urgent Care, ED, 911  Yes   Week 2 call completed?  Yes          Telma Copeland RN

## 2018-12-13 ENCOUNTER — OFFICE VISIT (OUTPATIENT)
Dept: SURGERY | Facility: CLINIC | Age: 78
End: 2018-12-13

## 2018-12-13 VITALS — HEART RATE: 65 BPM | OXYGEN SATURATION: 95 %

## 2018-12-13 DIAGNOSIS — Z09 FOLLOW UP: Primary | ICD-10-CM

## 2018-12-13 PROCEDURE — 99024 POSTOP FOLLOW-UP VISIT: CPT | Performed by: SURGERY

## 2018-12-13 NOTE — PROGRESS NOTES
POSTOPERATIVE VISIT    Evacuation of large right chest wall intramuscular hematoma 11/27/2018    Office visit: Incision is healing well and BRANDIE drain is putting out minimal serous fluid.  Drain removed.  Staples removed.  Follow-up as needed.

## 2018-12-14 ENCOUNTER — ANTICOAGULATION VISIT (OUTPATIENT)
Dept: PHARMACY | Facility: HOSPITAL | Age: 78
End: 2018-12-14

## 2018-12-14 DIAGNOSIS — I48.20 CHRONIC ATRIAL FIBRILLATION (HCC): ICD-10-CM

## 2018-12-14 LAB — INR PPP: 2.2

## 2018-12-14 NOTE — PROGRESS NOTES
Anticoagulation Clinic Progress Note    Anticoagulation Summary  As of 2018    INR goal:   2.0-3.0   TTR:   43.0 % (2.4 mo)   INR used for dosin.20 (2018)   Warfarin maintenance plan:   1 mg on Sun, Tue; 2 mg all other days   Weekly warfarin total:   12 mg   Plan last modified:   Caprice Carrasco [facility] (10/16/2018)   Next INR check:   2018   Priority:   Critical   Target end date:   Indefinite    Indications    Chronic atrial fibrillation (CMS/HCC) [I48.2]             Anticoagulation Episode Summary     INR check location:       Preferred lab:       Send INR reminders to:    [facility]    Comments:         Anticoagulation Care Providers     Provider Role Specialty Phone number    Nik Galarza MD Referring Cardiology [phone]    Roseanne Feng [facility] Responsible Pharmacy [phone]          Drug interactions: has remained unchanged.  Diet: has remained unchanged.    Clinic Interview:  No pertinent clinical findings have been reported.    INR History:  Anticoagulation Monitoring 2018   INR 3.70 2.67 2.20   INR Date 2018 12/10/2018 2018   INR Goal 2.0-3.0 2.0-3.0 2.0-3.0   Trend Same Same Same   Last Week Total 16 mg 13 mg 11 mg   Next Week Total 10 mg 12 mg 12 mg   Sun 1 mg - 1 mg   Mon - - -   Tue - 1 mg -   Wed - 2 mg -   Thu 1 mg () 2 mg -   Fri 1 mg () - 2 mg   Sat 2 mg - 2 mg   Visit Report - - -   Some recent data might be hidden       Plan:  1. INR is Therapeutic today- see above in Anticoagulation Summary.   Will instruct Nurse Prerna/  Janel Mahoney to Continue their warfarin regimen- see above in Anticoagulation Summary.  2. Follow up on Monday  3. Pt has agreed to only be called if INR out of range. They have been instructed to call if any changes in medications, doses, concerns, etc. Patient expresses understanding and has no further questions at this time.    Annette Lopez [facility]

## 2018-12-17 LAB — INR PPP: 2.5

## 2018-12-18 ENCOUNTER — OFFICE VISIT (OUTPATIENT)
Dept: CARDIOLOGY | Facility: CLINIC | Age: 78
End: 2018-12-18

## 2018-12-18 ENCOUNTER — CLINICAL SUPPORT NO REQUIREMENTS (OUTPATIENT)
Dept: CARDIOLOGY | Facility: CLINIC | Age: 78
End: 2018-12-18

## 2018-12-18 ENCOUNTER — ANTICOAGULATION VISIT (OUTPATIENT)
Dept: PHARMACY | Facility: HOSPITAL | Age: 78
End: 2018-12-18

## 2018-12-18 VITALS
HEIGHT: 65 IN | DIASTOLIC BLOOD PRESSURE: 72 MMHG | SYSTOLIC BLOOD PRESSURE: 120 MMHG | BODY MASS INDEX: 24.69 KG/M2 | HEART RATE: 64 BPM | WEIGHT: 148.2 LBS

## 2018-12-18 DIAGNOSIS — I47.20 V-TACH (HCC): ICD-10-CM

## 2018-12-18 DIAGNOSIS — I50.42 CHRONIC COMBINED SYSTOLIC AND DIASTOLIC CONGESTIVE HEART FAILURE (HCC): ICD-10-CM

## 2018-12-18 DIAGNOSIS — I48.20 CHRONIC ATRIAL FIBRILLATION (HCC): ICD-10-CM

## 2018-12-18 DIAGNOSIS — I25.5 ISCHEMIC CARDIOMYOPATHY: Primary | ICD-10-CM

## 2018-12-18 DIAGNOSIS — I25.810 CORONARY ARTERY DISEASE INVOLVING CORONARY BYPASS GRAFT OF NATIVE HEART WITHOUT ANGINA PECTORIS: ICD-10-CM

## 2018-12-18 DIAGNOSIS — Z95.2 S/P MVR (MITRAL VALVE REPLACEMENT): ICD-10-CM

## 2018-12-18 PROCEDURE — 99214 OFFICE O/P EST MOD 30 MIN: CPT | Performed by: INTERNAL MEDICINE

## 2018-12-18 PROCEDURE — 93000 ELECTROCARDIOGRAM COMPLETE: CPT | Performed by: INTERNAL MEDICINE

## 2018-12-18 RX ORDER — HEPATITIS A VACCINE 1440 [IU]/ML
INJECTION, SUSPENSION INTRAMUSCULAR
COMMUNITY
Start: 2018-12-13 | End: 2019-11-12

## 2018-12-18 NOTE — PROGRESS NOTES
Date of Office Visit: 18  Encounter Provider: Nik Galarza MD  Place of Service: [facility] CARDIOLOGY  Patient Name: Janel Mahoney  :1940  Referral Provider:No ref. provider found      Chief Complaint   Patient presents with   • Atrial Fibrillation   • Coronary Artery Disease   • Congestive Heart Failure     History of Present Illness   The patient is a pleasant, 77-year-old white female with history of severe ischemic heart  disease, mild disease of the left anterior descending, angioplasty, and stent placement of  the right coronary artery. She also had premature ventricular contractions and severe  vascular disease, and left ventricular ejection fraction of 50%. In 2007, she had  an acute infarct. Catheterization showed a left ventricular ejection fraction of 35%. She  had occlusive disease of the right posterior left ventricular branch and no other  significant disease. She was treated medically. She had a transesophageal echocardiogram  that confirmed a left ventricular ejection fraction of 40% and just moderate mitral  insufficiency. She had atrial fibrillation and had electrocardioversion back to sinus  rhythm in May 2007 and then presented in atrial fibrillation and was admitted in 2007 and placed on Tikosyn. We titrated it up to 500 mcg daily but developed marked QT  prolongation even on 250 mcg twice daily. We then discussed ablation versus warfarin and  rate control. We opted for warfarin and rate control. We continued to have symptoms and  went to Dr. Harish Iverson in [place]. She had atrial fibrillation and flutter with  pulmonary vein isolation. She went back into atrial fibrillation and was started on  sotalol. She converted back to sinus rhythm. She then went back into atrial fibrillation.  Again, ultimately, she had a repeat catheterization that showed severe mitral  insufficiency, borderline coronary disease, and in January  2010 had mitral valve  replacement with a #31 Epic porcine valve and single bypass graft to the posterior  descending artery. She continued to have episodes of rapid atrial fibrillation and left  ventricular dysfunction. She underwent AV node ablation and upgrade of her device to a  bi-V.   She then presented in 09/2013 with complaints of a lot of fatigue, tiredness and weakness.   As part of the evaluation she had an echocardiogram which showed her left ventricular  ejection fraction to be 45%, moderate pulmonary hypertension at 62 mmHg.  A perfusion  stress test showed no evidence of ischemia and she had a sleep study which was positive so  she was started on CPAP.  She had some volume overload and did much better on twice a day diuretic.   She was in the hospital around August 2016 with multiple compression fractures of her back.    She then presented to the hospital in September 2017 with GI bleeding was found to have esophageal ulcers but her INR was also supratherapeutic.  There was treated resolved her hemoglobin stabilized.   She then most recently was in the hospital end of November 2018 with a spontaneous hematoma of her right pectoral muscle and had to be surgically drained and didn't appear to be traumatic however about 2-3 weeks prior to that she had stumbled but did not hit her chest she said since then she's actually done well she has some fatigue but no shortness of breath no orthopnea or PND no real chest pain or pressure.  No palpitations near syncope or syncope.  She is gradually getting stronger.  Unfortunate she's still having a lot of back problems and needs a couple more injections.  She needs replacement of her bi-V generator.  Her  is can indeed carotid endarterectomy        Coronary Artery Disease   Pertinent negatives include no dizziness, muscle weakness or weight gain. Her past medical history is significant for CHF and past myocardial infarction.   Atrial Fibrillation   Symptoms  are negative for dizziness and weakness. Past medical history includes atrial fibrillation, AICD, CAD and CHF.   Congestive Heart Failure   Pertinent negatives include no abdominal pain, muscle weakness or nocturia. Her past medical history is significant for CAD.         Past Medical History:   Diagnosis Date   • Acute kidney injury (CMS/HCC)    • Anemia    • Atrial fibrillation (CMS/HCC)    • Bruises easily    • Carotid artery stenosis    • Chronic back pain    • Chronic combined systolic and diastolic congestive heart failure (CMS/HCC)    • Chronic coronary artery disease     moderate to severe LV dysfunction.   • Chronic kidney disease, stage 3 (CMS/HCC)    • Dysphagia    • GERD (gastroesophageal reflux disease)    • H/O cardiac murmur    • [place] (hard of hearing)     wears hearing aids   • Hyperlipidemia    • Hypertension    • Hypotension    • Ischemic cardiomyopathy    • Kyphoscoliosis    • Leukopenia    • Lumbar spondylosis    • Obesity    • GEORGINA (obstructive sleep apnea)    • Osteoarthritis    • Osteoporosis    • Peptic ulcer    • Premature ventricular contractions    • Renal insufficiency syndrome    • Scoliosis    • Shoulder fracture, left    • Stroke syndrome    • Thrombocytopenia (CMS/HCC)    • Ventricular tachycardia (CMS/HCC)    • Vitamin B12 deficiency          Past Surgical History:   Procedure Laterality Date   • AV NODE ABLATION     • BREAST BIOPSY     • CARDIAC CATHETERIZATION      Showed severe mitral insufficiency and borderline coronary artery disease   • CARDIAC CATHETERIZATION      Showed an ejection fraction of 35%. She had occlusive disease of the right posterior LV branch and no other significant disease, treated medically.   • CARDIAC DEFIBRILLATOR PLACEMENT      Biventricular   • CARDIAC VALVE REPLACEMENT  2009    Done with stent placement   • CARDIOVERSION      multiple electrocardioversions.   • CAROTID ARTERY ANGIOPLASTY Right    • CORONARY ANGIOPLASTY WITH STENT PLACEMENT  2009   •  CORONARY ARTERY BYPASS GRAFT      single graft to the PDA   • CORONARY STENT PLACEMENT     • HEMORRHOIDECTOMY     • HYSTERECTOMY     • MITRAL VALVE REPLACEMENT  01/2010    #31 Epic porcine valve.   • THROMBOENDARTERECTOMY Right     carotid thromboendarterectomy    • TONSILLECTOMY      age 32   • TOTAL KNEE ARTHROPLASTY Left          Current Outpatient Medications on File Prior to Visit   Medication Sig Dispense Refill   • alendronate (FOSAMAX) 70 MG tablet Take 70 mg by mouth Every 14 (Fourteen) Days.     • aspirin 81 MG tablet Take 81 mg by mouth Daily.     • calcitriol (ROCALTROL) 0.25 MCG capsule Take 0.25 mcg by mouth Daily.     • carvedilol (COREG) 25 MG tablet Take 25 mg by mouth 2 (Two) Times a Day With Meals.     • Cholecalciferol (VITAMIN D) 2000 UNITS tablet Take 1 tablet by mouth daily.     • epoetin adele (EPOGEN,PROCRIT) 79196 UNIT/ML injection Inject 10,000 Units under the skin into the appropriate area as directed As Needed.     • ferrous sulfate 325 (65 FE) MG tablet Take 1 tablet by mouth Daily With Breakfast. 30 tablet 0   • furosemide (LASIX) 40 MG tablet Take 1 tablet by mouth 2 (Two) Times a Day.     • HAVRIX 1440 EL U/ML vaccine      • Multiple Vitamins-Minerals (SENIOR MULTIVITAMIN PLUS) tablet Take 1 tablet by mouth Daily.     • pantoprazole (PROTONIX) 40 MG EC tablet TAKE 1 TABLET TWICE DAILY 180 tablet 0   • ramipril (ALTACE) 2.5 MG capsule Take 2.5 mg by mouth Daily.     • simvastatin (ZOCOR) 40 MG tablet TAKE 1 TABLET EVERY DAY (Patient taking differently: pt reports taking 1/2 tablet nightly) 90 tablet 1   • traMADol (ULTRAM) 50 MG tablet Take 100 mg by mouth Every Morning.     • traMADol (ULTRAM) 50 MG tablet Take 50 mg by mouth At Night As Needed.     • vitamin B-12 (CYANOCOBALAMIN) 2500 MCG sublingual tablet tablet Place 2,500 mcg under the tongue Daily.     • warfarin (COUMADIN) 2 MG tablet Take 1 tablet by mouth Every Night. (Patient taking differently: Take 2 mg by mouth Every  Night. tablet) 30 tablet 0   • zolpidem (AMBIEN) 10 MG tablet Take 1 tablet by mouth At Night As Needed for Sleep. 3 months 90 tablet 1     No current facility-administered medications on file prior to visit.          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     Employer: RETIRED   Tobacco Use   • Smoking status: Former Smoker     Packs/day: 1.00     Years: 50.00     Pack years: 50.00     Types: Cigarettes     Last attempt to quit: 2009     Years since quittin.9   • Smokeless tobacco: Never Used   Substance and Sexual Activity   • Alcohol use: Yes     Comment: rare   • Drug use: No   • Sexual activity: Defer   Other Topics Concern   • Not on file   Social History Narrative   • Not on file       Family History   Problem Relation Age of Onset   • Cancer Mother    • Breast cancer Mother    • Heart disease Mother    • Hypertension Mother    • Coronary artery disease Father    • Stroke Father    • Heart disease Father    • Hypertension Father    • Other Daughter         thiamin deficiency    • Hypertension Son    • Malig Hyperthermia Neg Hx          Review of Systems   Constitution: Negative for decreased appetite, diaphoresis, fever, weakness, malaise/fatigue, weight gain and weight loss.   HENT: Negative for congestion, hearing loss, nosebleeds and tinnitus.    Eyes: Negative for blurred vision, double vision, vision loss in left eye, vision loss in right eye and visual disturbance.   Cardiovascular:        As noted in HPI   Respiratory:        As noted  "HPI   Endocrine: Negative for cold intolerance and heat intolerance.   Hematologic/Lymphatic: Negative for bleeding problem. Does not bruise/bleed easily.   Skin: Negative for color change, flushing, itching and rash.   Musculoskeletal: Negative for arthritis, back pain, joint pain, joint swelling, muscle weakness and myalgias.   Gastrointestinal: Negative for bloating, abdominal pain, constipation, diarrhea, dysphagia, heartburn, hematemesis, hematochezia, melena, nausea and vomiting.   Genitourinary: Negative for bladder incontinence, dysuria, frequency, nocturia and urgency.   Neurological: Negative for dizziness, focal weakness, headaches, light-headedness, loss of balance, numbness, paresthesias and vertigo.   Psychiatric/Behavioral: Negative for depression, memory loss and substance abuse.       Procedures      ECG 12 Lead  Date/Time: 12/18/2018 11:13 AM  Performed by: Nik Galarza MD  Authorized by: Nik Galarza MD   Comparison: compared with previous ECG   Similar to previous ECG  Rhythm: atrial fibrillation  Rhythm comments: Ventricular paced.                  Objective:    /72 (BP Location: Right arm)   Pulse 64   Ht 165.1 cm (65\")   Wt 67.2 kg (148 lb 3.2 oz)   BMI 24.66 kg/m²        Physical Exam  Physical Exam   Constitutional: She is oriented to person, place, and time. She appears well-developed and well-nourished. No distress.   HENT:   Head: Normocephalic.   Eyes: Conjunctivae are normal. Pupils are equal, round, and reactive to light. No scleral icterus.   Neck: Normal carotid pulses, no hepatojugular reflux and no JVD present. Carotid bruit is not present. No tracheal deviation, no edema and no erythema present. No thyromegaly present.   Cardiovascular: Normal rate, regular rhythm, S1 normal, S2 normal and intact distal pulses.  No extrasystoles are present. PMI is not displaced. Exam reveals no gallop, no distant heart sounds and no friction rub.   Murmur heard.   Systolic " murmur is present with a grade of 2/6 at the upper right sternal border.  Pulses:       Carotid pulses are 2+ on the right side with bruit, and 2+ on the left side with bruit.       Radial pulses are 2+ on the right side, and 2+ on the left side.        Femoral pulses are 2+ on the right side, and 2+ on the left side.       Dorsalis pedis pulses are 2+ on the right side, and 2+ on the left side.        Posterior tibial pulses are 2+ on the right side, and 2+ on the left side.   Pulmonary/Chest: Effort normal and breath sounds normal. No respiratory distress. She has no decreased breath sounds. She has no wheezes. She has no rhonchi. She has no rales. She exhibits no tenderness.   Abdominal: Soft. Bowel sounds are normal. She exhibits no distension and no mass. There is no hepatosplenomegaly. There is no tenderness. There is no rebound and no guarding.   Musculoskeletal: She exhibits no edema, tenderness or deformity.   Neurological: She is alert and oriented to person, place, and time.   Skin: Skin is warm and dry. No rash noted. She is not diaphoretic. No cyanosis or erythema. No pallor. Nails show no clubbing.   Psychiatric: She has a normal mood and affect. Her speech is normal and behavior is normal. Judgment and thought content normal.           Assessment:   1. This is a 78 -year-old female with history of severe ischemic heart disease, previous angioplasty of the right coronary artery, and then inferior infarct in February 2007.  Ultimately, she had single-vessel bypass grafting to the posterior descending artery at the time of her mitral valve replacement, moderate left ventricular dysfunction.  Echocardiogram in 11/15 showed left ventricular ejection fraction of 36%.   Coronary Artery Disease  Assessment  • The patient has no angina     Plan  • Lifestyle modifications discussed include adhering to a heart healthy diet, avoidance of tobacco products, maintenance of a healthy weight, medication compliance,  regular exercise and regular monitoring of cholesterol and blood pressure     Subjective - Objective  • There is a history of past MI  • There is a history of previous coronary artery bypass graft  • There has been a previous POBA  • Current antiplatelet therapy includes aspirin 81 mg   Heart Failure  Assessment  • NYHA class II - There is slight limitation of physical activity. The patient is comfortable at rest, but physical activity results in fatigue, palpitations or shortness of breath.  • ACE inhibitor prescribed  • Beta blocker prescribed  • Diuretics prescribed  • Left ventricular function is moderately reduced by qualitative assessment     Plan  • The patient has received heart failure education on the following topics: dietary sodium restriction, minimizing or avoiding NSAID use, symptom management, physical activity and weight monitoring  • The heart failure care plan was discussed with the patient today including: continuing the current program  •  The patient has been counseled about ICD or CRT-D implantation     Subjective/Objective  • The patient reports dyspnea    • Physical exam findings negative for rales and elevated JVP.  • The patient has an ICD implant  • The patient has a CRT-D implant  • The patient has a CRT implant   She stable from this standpoint she's to continue the same will see us skin in follow-up in 6 months.  She is at end of battery life needs generator change.      2. History of mitral insufficiency status post mitral valve replacement with a tissue valve as above. Echocardiogram today showed left ventricular ejection fraction of approximate 40% with segmental wall motion abnormality. Normal functioning prosthetic mitral valve moderate tricuspid insufficiency with moderate pulmonary hypertension. She's to continue the same will see us in follow-up in 6 months.  Last echocardiogram was in December 2016.  She will need a follow-up echocardiogram consider scheduling that when she  returns in 6 months.  3. Atrial fibrillation/sick sinus syndrome. Failed multiple cardioversions, multiple medications. Failed pulmonary vein isolation. Now status post AV node ablation and BiV  Defibrillator.  Atrial Fibrillation and Atrial Flutter  Assessment  • The patient has permanent atrial fibrillation  • This is valvular in etiology  • The patient's CHADS2-VASc score is 6  • A [id number] score of 2 or more is considered a high risk for a thromboembolic event  • Warfarin prescribed     Plan  • Continue in atrial fibrillation with rate control  • Continue warfarin for antithrombotic therapy, bleeding issues discussed  • Continue beta blocker for rate control     Subjective - Objective  • The patient underwent cardioversion   • The patient had atrial fibrillation ablation   • The patient had a recurrence of atrial fibrillation since ablation   Stable to continue the same.         4. Cerebral vascular disease status post carotid endarterectomy.  Follow up carotid ultrasound 6/17 showed mild disease on the right moderate on the left continue follow periodic carotid ultrasounds.  Consider follow-up carotid ultrasound when she returns.  5. Hypertension. Blood pressure adequately controlled.  6. Renal insufficiency. Followed by nephrology, Dr. Cruz.  7. Hyperlipidemia, on therapy. Followed by PCP.  8. Nonsustained ventricular tachycardia. treated with antitachycardia pacing. Asymptomatic. Will continue the same if she has increased episodes will need to treat.  9. Pulmonary hypertension. This is most likely secondary to sleep apnea and atrial fibrillation.  Echocardiogram today unchanged RV systolic pressure of 55 mm or mercury.  Reevaluate this at the time of her follow-up echocardiogram.  10. Sleep Apnea. Now on CPAP.    11.  Anemia appears be relatively stable in fact may be improving.  She continues follow-up with hematology.  12.  Need for back injections.  This is no different than before she'll need to be  bridged with Lovenox as she did before.         Plan:

## 2018-12-18 NOTE — PROGRESS NOTES
Anticoagulation Clinic Progress Note    Anticoagulation Summary  As of 2018    INR goal:   2.0-3.0   TTR:   45.3 % (2.5 mo)   INR used for dosin.50 (2018)   Warfarin maintenance plan:   1 mg on Sun, Tue; 2 mg all other days   Weekly warfarin total:   12 mg   No change documented:   Beau Krishnan RPH   Plan last modified:   Caprice Carrasco RPH (10/16/2018)   Next INR check:   2018   Priority:   Critical   Target end date:   Indefinite    Indications    Chronic atrial fibrillation (CMS/HCC) [I48.2]             Anticoagulation Episode Summary     INR check location:       Preferred lab:       Send INR reminders to:    [facility]    Comments:         Anticoagulation Care Providers     Provider Role Specialty Phone number    Nik Galarza MD Referring Cardiology [phone]          INR History:  Anticoagulation Monitoring 2018   INR 2.67 2.20 2.50   INR Date 12/10/2018 2018 2018   INR Goal 2.0-3.0 2.0-3.0 2.0-3.0   Trend Same Same Same   Last Week Total 13 mg 11 mg 12 mg   Next Week Total 12 mg 12 mg 12 mg   Sun - 1 mg -   Mon - - -   Tue 1 mg - 1 mg   Wed 2 mg - 2 mg   Thu 2 mg - 2 mg   Fri - 2 mg -   Sat - 2 mg -   Visit Report - - -   Some recent data might be hidden       Plan:  1. Faxed home health and instructed them to continue same dose and recheck INR on .    Beau Krishnan RPH

## 2018-12-19 ENCOUNTER — READMISSION MANAGEMENT (OUTPATIENT)
Dept: CALL CENTER | Facility: HOSPITAL | Age: 78
End: 2018-12-19

## 2018-12-19 NOTE — OUTREACH NOTE
General Surgery Week 3 Survey      Responses   Facility patient discharged from?  [facility]   Does the patient have one of the following disease processes/diagnoses(primary or secondary)?  General Surgery   Week 3 attempt successful?  Yes   Call start time  1716   Call end time  1718   Discharge diagnosis  Intramuscular hematoma right pectoralis:  s/p evacuation w [place] Marc drain.     Meds reviewed with patient/caregiver?  Yes   Is the patient having any side effects they believe may be caused by any medication additions or changes?  No   Does the patient have all medications related to this admission filled (includes all antibiotics, pain medications, etc.)  Yes   Is the patient taking all medications as directed (includes completed medication regime)?  Yes   Does the patient have a follow up appointment scheduled with their surgeon?  Yes   Has the patient kept scheduled appointments due by today?  N/A   Did the patient receive a copy of their discharge instructions?  Yes   Nursing interventions  Reviewed instructions with patient   What is the patient's perception of their health status since discharge?  Improving   Nursing interventions  Nurse provided patient education   Is the patient /caregiver able to teach back basic post-op care?  Take showers only when approved by MD-sponge bathe until then, Keep incision areas clean,dry and protected, No tub bath, swimming, or hot tub until instructed by MD   Is the patient/caregiver able to teach back signs and symptoms of incisional infection?  Increased redness, swelling or pain at the incisonal site, Increased drainage or bleeding, Incisional warmth, Pus or odor from incision, Fever   Is the patient/caregiver able to teach back steps to recovery at home?  Set small, achievable goals for return to baseline health, Eat a well-balance diet   Is the patient/caregiver able to teach back the hierarchy of who to call/visit for symptoms/problems? PCP, Specialist, Home  health nurse, Urgent Care, ED, 911  Yes   Week 3 call completed?  Yes          Marlen Veras RN

## 2018-12-21 ENCOUNTER — ANTICOAGULATION VISIT (OUTPATIENT)
Dept: PHARMACY | Facility: HOSPITAL | Age: 78
End: 2018-12-21

## 2018-12-21 DIAGNOSIS — I48.20 CHRONIC ATRIAL FIBRILLATION (HCC): ICD-10-CM

## 2018-12-21 LAB — INR PPP: 2.6

## 2018-12-21 NOTE — PROGRESS NOTES
Anticoagulation Clinic Progress Note    Anticoagulation Summary  As of 2018    INR goal:   2.0-3.0   TTR:   48.0 % (2.7 mo)   INR used for dosin.60 (2018)   Warfarin maintenance plan:   1 mg on Sun, Tue; 2 mg all other days   Weekly warfarin total:   12 mg   No change documented:   Beau Krishnan RPH   Plan last modified:   Caprice Carrasco RPH (10/16/2018)   Next INR check:   2018   Priority:   Critical   Target end date:   Indefinite    Indications    Chronic atrial fibrillation (CMS/HCC) [I48.2]             Anticoagulation Episode Summary     INR check location:       Preferred lab:       Send INR reminders to:    [facility] [place]    Comments:         Anticoagulation Care Providers     Provider Role Specialty Phone number    Nik Galarza MD Referring Cardiology [phone]          Drug interactions: has remained unchanged.  Diet: has remained unchanged.    Clinic Interview:  No pertinent clinical findings have been reported.    INR History:  Anticoagulation Monitoring 2018   INR 2.20 2.50 2.60   INR Date 2018   INR Goal 2.0-3.0 2.0-3.0 2.0-3.0   Trend Same Same Same   Last Week Total 11 mg 12 mg 12 mg   Next Week Total 12 mg 12 mg 12 mg   Sun 1 mg - 1 mg   Mon - - 2 mg   Tue - 1 mg 1 mg   Wed - 2 mg 2 mg   Thu - 2 mg 2 mg   Fri 2 mg - 2 mg   Sat 2 mg - 2 mg   Visit Report - - -   Some recent data might be hidden       Plan:  1. INR is Therapeutic today- see above in Anticoagulation Summary.   Will instruct Janel Mahoney to Continue their warfarin regimen- see above in Anticoagulation Summary.  2. Follow up in 1 week  3. Pt will be coming to clinic next week    Beau Krishnan RPH

## 2018-12-26 RX ORDER — CARVEDILOL 25 MG/1
TABLET ORAL
Qty: 180 TABLET | Refills: 3 | Status: SHIPPED | OUTPATIENT
Start: 2018-12-26 | End: 2019-11-12

## 2018-12-27 ENCOUNTER — INFUSION (OUTPATIENT)
Dept: ONCOLOGY | Facility: HOSPITAL | Age: 78
End: 2018-12-27

## 2018-12-27 ENCOUNTER — LAB (OUTPATIENT)
Dept: LAB | Facility: HOSPITAL | Age: 78
End: 2018-12-27

## 2018-12-27 ENCOUNTER — READMISSION MANAGEMENT (OUTPATIENT)
Dept: CALL CENTER | Facility: HOSPITAL | Age: 78
End: 2018-12-27

## 2018-12-27 DIAGNOSIS — N18.30 ANEMIA IN STAGE 3 CHRONIC KIDNEY DISEASE (HCC): ICD-10-CM

## 2018-12-27 DIAGNOSIS — D63.1 ANEMIA IN STAGE 3 CHRONIC KIDNEY DISEASE (HCC): ICD-10-CM

## 2018-12-27 LAB
DEPRECATED RDW RBC AUTO: 55.5 FL (ref 37–49)
ERYTHROCYTE [DISTWIDTH] IN BLOOD BY AUTOMATED COUNT: 15.7 % (ref 11.7–14.5)
HCT VFR BLD AUTO: 32 % (ref 34–45)
HGB BLD-MCNC: 10.1 G/DL (ref 11.5–14.9)
MCH RBC QN AUTO: 30.2 PG (ref 27–33)
MCHC RBC AUTO-ENTMCNC: 31.6 G/DL (ref 32–35)
MCV RBC AUTO: 95.8 FL (ref 83–97)
PLATELET # BLD AUTO: 90 10*3/MM3 (ref 150–375)
PMV BLD AUTO: 10.1 FL (ref 8.9–12.1)
RBC # BLD AUTO: 3.34 10*6/MM3 (ref 3.9–5)
WBC NRBC COR # BLD: 6.16 10*3/MM3 (ref 4–10)

## 2018-12-27 PROCEDURE — 36415 COLL VENOUS BLD VENIPUNCTURE: CPT | Performed by: INTERNAL MEDICINE

## 2018-12-27 PROCEDURE — 85027 COMPLETE CBC AUTOMATED: CPT | Performed by: INTERNAL MEDICINE

## 2018-12-27 NOTE — OUTREACH NOTE
General Surgery Week 4 Survey      Responses   Facility patient discharged from?  [facility]   Does the patient have one of the following disease processes/diagnoses(primary or secondary)?  General Surgery   Week 4 attempt successful?  Yes   Call start time  1720   Call end time  1723   Discharge diagnosis  Intramuscular hematoma right pectoralis:  s/p evacuation w [place] Marc drain.     Is the patient taking all medications as directed (includes completed medication regime)?  Yes   Has the patient kept scheduled appointments due by today?  Yes   Is the patient still receiving Home Health Services?  No   Home health comments  HH has been completed   Psychosocial issues?  No   What is the patient's perception of their health status since discharge?  Improving   Nursing interventions  Nurse provided patient education   Is the patient/caregiver able to teach back steps to recovery at home?  Eat a well-balance diet, Set small, achievable goals for return to baseline health, Rest and rebuild strength, gradually increase activity   Is the patient/caregiver able to teach back the hierarchy of who to call/visit for symptoms/problems? PCP, Specialist, Home health nurse, Urgent Care, ED, 911  Yes   Week 4 call completed?  Yes          Milagros Gagnon RN

## 2018-12-28 ENCOUNTER — ANTICOAGULATION VISIT (OUTPATIENT)
Dept: PHARMACY | Facility: HOSPITAL | Age: 78
End: 2018-12-28

## 2018-12-28 DIAGNOSIS — I48.20 CHRONIC ATRIAL FIBRILLATION (HCC): ICD-10-CM

## 2018-12-28 LAB
INR PPP: 3.1 (ref 0.91–1.09)
PROTHROMBIN TIME: 37.3 SECONDS (ref 10–13.8)

## 2018-12-28 PROCEDURE — 36416 COLLJ CAPILLARY BLOOD SPEC: CPT

## 2018-12-28 PROCEDURE — 85610 PROTHROMBIN TIME: CPT

## 2018-12-28 NOTE — PROGRESS NOTES
Anticoagulation Clinic Progress Note    Anticoagulation Summary  As of 12/28/2018    INR goal:   2.0-3.0   TTR:   50.8 % (2.9 mo)   INR used for dosing:   3.1! (12/28/2018)   Warfarin maintenance plan:   1 mg on Sun, Tue; 2 mg all other days   Weekly warfarin total:   12 mg   No change documented:   Taylor Shepherd   Plan last modified:   Caprice Carrasco, [facility] (10/16/2018)   Next INR check:   1/11/2019   Priority:   High   Target end date:   Indefinite    Indications    Chronic atrial fibrillation (CMS/HCC) [I48.2]             Anticoagulation Episode Summary     INR check location:       Preferred lab:       Send INR reminders to:   [facility] [place]    Comments:         Anticoagulation Care Providers     Provider Role Specialty Phone number    Nik aGlarza MD Referring Cardiology [phone]          Clinic Interview:      INR History:  Anticoagulation Monitoring 12/18/2018 12/21/2018 12/28/2018   INR 2.50 2.60 3.1   INR Date 12/17/2018 12/21/2018 12/28/2018   INR Goal 2.0-3.0 2.0-3.0 2.0-3.0   Trend Same Same Same   Last Week Total 12 mg 12 mg 12 mg   Next Week Total 12 mg 12 mg 12 mg   Sun - 1 mg 1 mg   Mon - 2 mg 2 mg   Tue 1 mg 1 mg 1 mg   Wed 2 mg 2 mg 2 mg   Thu 2 mg 2 mg 2 mg   Fri - 2 mg 2 mg   Sat - 2 mg 2 mg   Visit Report - - -   Some recent data might be hidden       Plan:  1. INR is therapeutic today- see above in Anticoagulation Summary.   Will instruct Janel Mahoney to continue their warfarin regimen- see above in Anticoagulation Summary.  2. Follow up in 4 weeks.  3. Patient declines warfarin refills.  4. Verbal and written information provided. Patient expresses understanding and has no further questions at this time.    Taylor Shepherd

## 2018-12-30 ENCOUNTER — APPOINTMENT (OUTPATIENT)
Dept: GENERAL RADIOLOGY | Facility: HOSPITAL | Age: 78
End: 2018-12-30

## 2018-12-30 ENCOUNTER — HOSPITAL ENCOUNTER (EMERGENCY)
Facility: HOSPITAL | Age: 78
Discharge: HOME | End: 2018-12-30
Attending: EMERGENCY MEDICINE | Admitting: EMERGENCY MEDICINE

## 2018-12-30 VITALS
WEIGHT: 148 LBS | TEMPERATURE: 98.8 F | OXYGEN SATURATION: 96 % | DIASTOLIC BLOOD PRESSURE: 65 MMHG | BODY MASS INDEX: 24.66 KG/M2 | HEIGHT: 65 IN | SYSTOLIC BLOOD PRESSURE: 170 MMHG | RESPIRATION RATE: 20 BRPM | HEART RATE: 70 BPM

## 2018-12-30 DIAGNOSIS — M19.032 ARTHRITIS OF LEFT WRIST: Primary | ICD-10-CM

## 2018-12-30 DIAGNOSIS — M19.071 ARTHRITIS OF RIGHT FOOT: ICD-10-CM

## 2018-12-30 LAB
ALBUMIN SERPL-MCNC: 3.3 G/DL (ref 3.5–5.2)
ALBUMIN/GLOB SERPL: 1 G/DL
ALP SERPL-CCNC: 52 U/L (ref 39–117)
ALT SERPL W P-5'-P-CCNC: 7 U/L (ref 1–33)
ANION GAP SERPL CALCULATED.3IONS-SCNC: 13.4 MMOL/L
AST SERPL-CCNC: 14 U/L (ref 1–32)
BASOPHILS # BLD AUTO: 0.01 10*3/MM3 (ref 0–0.2)
BASOPHILS NFR BLD AUTO: 0.2 % (ref 0–1.5)
BILIRUB SERPL-MCNC: 0.4 MG/DL (ref 0.1–1.2)
BUN BLD-MCNC: 44 MG/DL (ref 8–23)
BUN/CREAT SERPL: 27.3 (ref 7–25)
CALCIUM SPEC-SCNC: 9.4 MG/DL (ref 8.6–10.5)
CHLORIDE SERPL-SCNC: 101 MMOL/L (ref 98–107)
CO2 SERPL-SCNC: 27.6 MMOL/L (ref 22–29)
CREAT BLD-MCNC: 1.61 MG/DL (ref 0.57–1)
CRP SERPL-MCNC: 1.01 MG/DL (ref 0–0.5)
DEPRECATED RDW RBC AUTO: 54.9 FL (ref 37–54)
EOSINOPHIL # BLD AUTO: 0.1 10*3/MM3 (ref 0–0.7)
EOSINOPHIL NFR BLD AUTO: 1.7 % (ref 0.3–6.2)
ERYTHROCYTE [DISTWIDTH] IN BLOOD BY AUTOMATED COUNT: 15.9 % (ref 11.7–13)
ERYTHROCYTE [SEDIMENTATION RATE] IN BLOOD: 75 MM/HR (ref 0–30)
GFR SERPL CREATININE-BSD FRML MDRD: 31 ML/MIN/1.73
GLOBULIN UR ELPH-MCNC: 3.2 GM/DL
GLUCOSE BLD-MCNC: 108 MG/DL (ref 65–99)
HCT VFR BLD AUTO: 29.2 % (ref 35.6–45.5)
HGB BLD-MCNC: 9.1 G/DL (ref 11.9–15.5)
IMM GRANULOCYTES # BLD AUTO: 0.01 10*3/MM3 (ref 0–0.03)
IMM GRANULOCYTES NFR BLD AUTO: 0.2 % (ref 0–0.5)
INR PPP: 2.88 (ref 0.9–1.1)
LYMPHOCYTES # BLD AUTO: 0.65 10*3/MM3 (ref 0.9–4.8)
LYMPHOCYTES NFR BLD AUTO: 10.9 % (ref 19.6–45.3)
MCH RBC QN AUTO: 29.6 PG (ref 26.9–32)
MCHC RBC AUTO-ENTMCNC: 31.2 G/DL (ref 32.4–36.3)
MCV RBC AUTO: 95.1 FL (ref 80.5–98.2)
MONOCYTES # BLD AUTO: 0.54 10*3/MM3 (ref 0.2–1.2)
MONOCYTES NFR BLD AUTO: 9 % (ref 5–12)
NEUTROPHILS # BLD AUTO: 4.68 10*3/MM3 (ref 1.9–8.1)
NEUTROPHILS NFR BLD AUTO: 78.2 % (ref 42.7–76)
PLATELET # BLD AUTO: 110 10*3/MM3 (ref 140–500)
PMV BLD AUTO: 10.6 FL (ref 6–12)
POTASSIUM BLD-SCNC: 3.9 MMOL/L (ref 3.5–5.2)
PROT SERPL-MCNC: 6.5 G/DL (ref 6–8.5)
PROTHROMBIN TIME: 29.7 SECONDS (ref 11.7–14.2)
RBC # BLD AUTO: 3.07 10*6/MM3 (ref 3.9–5.2)
SODIUM BLD-SCNC: 142 MMOL/L (ref 136–145)
URATE SERPL-MCNC: 10.5 MG/DL (ref 2.4–5.7)
WBC NRBC COR # BLD: 5.98 10*3/MM3 (ref 4.5–10.7)

## 2018-12-30 PROCEDURE — 85025 COMPLETE CBC W/AUTO DIFF WBC: CPT | Performed by: PHYSICIAN ASSISTANT

## 2018-12-30 PROCEDURE — 86140 C-REACTIVE PROTEIN: CPT | Performed by: PHYSICIAN ASSISTANT

## 2018-12-30 PROCEDURE — 73630 X-RAY EXAM OF FOOT: CPT

## 2018-12-30 PROCEDURE — 85652 RBC SED RATE AUTOMATED: CPT | Performed by: PHYSICIAN ASSISTANT

## 2018-12-30 PROCEDURE — 85610 PROTHROMBIN TIME: CPT | Performed by: PHYSICIAN ASSISTANT

## 2018-12-30 PROCEDURE — 73110 X-RAY EXAM OF WRIST: CPT

## 2018-12-30 PROCEDURE — 80053 COMPREHEN METABOLIC PANEL: CPT | Performed by: PHYSICIAN ASSISTANT

## 2018-12-30 PROCEDURE — 84550 ASSAY OF BLOOD/URIC ACID: CPT | Performed by: PHYSICIAN ASSISTANT

## 2018-12-30 PROCEDURE — 99283 EMERGENCY DEPT VISIT LOW MDM: CPT

## 2018-12-30 RX ORDER — CEPHALEXIN 500 MG/1
500 CAPSULE ORAL 3 TIMES DAILY
Qty: 30 CAPSULE | Refills: 0 | Status: SHIPPED | OUTPATIENT
Start: 2018-12-30 | End: 2019-02-22 | Stop reason: HOSPADM

## 2018-12-30 RX ORDER — PREDNISONE 20 MG/1
20 TABLET ORAL 2 TIMES DAILY
Qty: 10 TABLET | Refills: 0 | Status: SHIPPED | OUTPATIENT
Start: 2018-12-30 | End: 2019-02-22 | Stop reason: HOSPADM

## 2019-01-02 ENCOUNTER — LAB (OUTPATIENT)
Dept: LAB | Facility: HOSPITAL | Age: 79
End: 2019-01-02

## 2019-01-02 ENCOUNTER — TRANSCRIBE ORDERS (OUTPATIENT)
Dept: CARDIOLOGY | Facility: CLINIC | Age: 79
End: 2019-01-02

## 2019-01-02 ENCOUNTER — TELEPHONE (OUTPATIENT)
Dept: CARDIOLOGY | Facility: CLINIC | Age: 79
End: 2019-01-02

## 2019-01-02 DIAGNOSIS — Z01.810 PRE-OPERATIVE CARDIOVASCULAR EXAMINATION: ICD-10-CM

## 2019-01-02 DIAGNOSIS — Z13.6 SCREENING FOR ISCHEMIC HEART DISEASE: ICD-10-CM

## 2019-01-02 DIAGNOSIS — I42.9 CARDIOMYOPATHY, UNSPECIFIED TYPE (HCC): ICD-10-CM

## 2019-01-02 DIAGNOSIS — Z01.810 PRE-OPERATIVE CARDIOVASCULAR EXAMINATION: Primary | ICD-10-CM

## 2019-01-02 LAB
ANION GAP SERPL CALCULATED.3IONS-SCNC: 12.4 MMOL/L
BASOPHILS # BLD AUTO: 0.01 10*3/MM3 (ref 0–0.2)
BASOPHILS NFR BLD AUTO: 0.1 % (ref 0–1.5)
BUN BLD-MCNC: 54 MG/DL (ref 8–23)
BUN/CREAT SERPL: 34 (ref 7–25)
CALCIUM SPEC-SCNC: 9.4 MG/DL (ref 8.6–10.5)
CHLORIDE SERPL-SCNC: 102 MMOL/L (ref 98–107)
CO2 SERPL-SCNC: 28.6 MMOL/L (ref 22–29)
CREAT BLD-MCNC: 1.59 MG/DL (ref 0.57–1)
DEPRECATED RDW RBC AUTO: 55.3 FL (ref 37–54)
EOSINOPHIL # BLD AUTO: 0.03 10*3/MM3 (ref 0–0.7)
EOSINOPHIL NFR BLD AUTO: 0.4 % (ref 0.3–6.2)
ERYTHROCYTE [DISTWIDTH] IN BLOOD BY AUTOMATED COUNT: 15.8 % (ref 11.7–13)
GFR SERPL CREATININE-BSD FRML MDRD: 31 ML/MIN/1.73
GLUCOSE BLD-MCNC: 93 MG/DL (ref 65–99)
HCT VFR BLD AUTO: 30.3 % (ref 35.6–45.5)
HGB BLD-MCNC: 9.5 G/DL (ref 11.9–15.5)
IMM GRANULOCYTES # BLD AUTO: 0.01 10*3/MM3 (ref 0–0.03)
IMM GRANULOCYTES NFR BLD AUTO: 0.1 % (ref 0–0.5)
LYMPHOCYTES # BLD AUTO: 0.53 10*3/MM3 (ref 0.9–4.8)
LYMPHOCYTES NFR BLD AUTO: 7.3 % (ref 19.6–45.3)
MCH RBC QN AUTO: 30 PG (ref 26.9–32)
MCHC RBC AUTO-ENTMCNC: 31.4 G/DL (ref 32.4–36.3)
MCV RBC AUTO: 95.6 FL (ref 80.5–98.2)
MONOCYTES # BLD AUTO: 0.19 10*3/MM3 (ref 0.2–1.2)
MONOCYTES NFR BLD AUTO: 2.6 % (ref 5–12)
NEUTROPHILS # BLD AUTO: 6.47 10*3/MM3 (ref 1.9–8.1)
NEUTROPHILS NFR BLD AUTO: 89.6 % (ref 42.7–76)
PLATELET # BLD AUTO: 147 10*3/MM3 (ref 140–500)
PMV BLD AUTO: 9.8 FL (ref 6–12)
POTASSIUM BLD-SCNC: 4.6 MMOL/L (ref 3.5–5.2)
RBC # BLD AUTO: 3.17 10*6/MM3 (ref 3.9–5.2)
SODIUM BLD-SCNC: 143 MMOL/L (ref 136–145)
WBC NRBC COR # BLD: 7.23 10*3/MM3 (ref 4.5–10.7)

## 2019-01-02 PROCEDURE — 85025 COMPLETE CBC W/AUTO DIFF WBC: CPT

## 2019-01-02 PROCEDURE — 80048 BASIC METABOLIC PNL TOTAL CA: CPT

## 2019-01-02 PROCEDURE — 36415 COLL VENOUS BLD VENIPUNCTURE: CPT

## 2019-01-02 RX ORDER — TRAMADOL HYDROCHLORIDE 50 MG/1
TABLET ORAL
Qty: 120 TABLET | Refills: 5 | Status: SHIPPED | OUTPATIENT
Start: 2019-01-02 | End: 2019-02-22 | Stop reason: HOSPADM

## 2019-01-02 NOTE — TELEPHONE ENCOUNTER
Janel Mahoney  Female, 78 y.o., 1940  PCP:   Ariel Matute MD  Language:   English  Need Interp:   No  Last Weight:   67.1 kg (148 lb)  Phone:   H: [phone]  Allergies  Diclofenac  Dofetilide  Health Maintenance:   Due  FYI:   None  Primary Ins.:   MEDICARE  MRN:   [id number]  MyChart:   Pending  [address]   [address] [place] [address] [facility] [address] - [phone]  - [phone] FX [[id number]] [facility] - [address] - [phone] [facility] [phone] FX [[id number]] [facility] [[id number]]  Preferred Lab:   None  Next Appt with Me:   06/19/2019  Next Appt Date by Dept:   01/08/2019        Contains abnormal data Basic Metabolic Panel   Order: [id number]   Status:  Final result   Visible to patient:  No (Not Released) Dx:  Pre-operative cardiovascular examinat...    Ref Range & Units 13:42 3d ago   Glucose 65 - 99 mg/dL 93  108 Abnormally high     BUN 8 - 23 mg/dL 54 Abnormally high   44 Abnormally high     Creatinine 0.57 - 1.00 mg/dL 1.59 Abnormally high   1.61 Abnormally high     Sodium 136 - 145 mmol/L 143  142    Potassium 3.5 - 5.2 mmol/L 4.6  3.9    Chloride 98 - 107 mmol/L 102  101    CO2 22.0 - 29.0 mmol/L 28.6  27.6    Calcium 8.6 - 10.5 mg/dL 9.4  9.4    eGFR Non African Amer >60 mL/min/1.73 31 Abnormally low   31 Abnormally low     BUN/Creatinine Ratio 7.0 - 25.0 34.0 Abnormally high   27.3 Abnormally high     Anion Gap mmol/L 12.4  13.4    Resulting Agency   [facility]  [facility]      Narrative   Performed by: [facility]   The MDRD GFR formula is only valid for adults with stable renal function between ages 18 and 70.      Specimen Collected: 01/02/19 13:42 Last Resulted: 01/02/19 14:32         Lab Flowsheet       Order Details       View Encounter       Lab and Collection Details       Routing       Result History               Previously Reviewed Results   Status of  Other Orders     Completed     CBC & Differential Abnormal 01/02/19                Routing History     Priority Sent On From To Message Type    1/2/2019  2:14 PM Lab, Background User Nik Galarza MD Results

## 2019-01-04 ENCOUNTER — HOSPITAL ENCOUNTER (OUTPATIENT)
Facility: HOSPITAL | Age: 79
Setting detail: HOSPITAL OUTPATIENT SURGERY
Discharge: HOME | End: 2019-01-04
Attending: INTERNAL MEDICINE | Admitting: INTERNAL MEDICINE

## 2019-01-04 VITALS
OXYGEN SATURATION: 99 % | TEMPERATURE: 97.8 F | BODY MASS INDEX: 25.49 KG/M2 | SYSTOLIC BLOOD PRESSURE: 146 MMHG | DIASTOLIC BLOOD PRESSURE: 61 MMHG | RESPIRATION RATE: 16 BRPM | HEIGHT: 65 IN | WEIGHT: 153 LBS | HEART RATE: 60 BPM

## 2019-01-04 DIAGNOSIS — I25.5 ISCHEMIC CARDIOMYOPATHY: ICD-10-CM

## 2019-01-04 LAB
INR PPP: 1.8 (ref 0.8–1.2)
INR PPP: 1.8 (ref 0.9–1.1)
PROTHROMBIN TIME: 21 SECONDS
PROTHROMBIN TIME: 21 SECONDS (ref 12.8–15.2)

## 2019-01-04 PROCEDURE — 25010000002 FENTANYL CITRATE (PF) 100 MCG/2ML SOLUTION: Performed by: INTERNAL MEDICINE

## 2019-01-04 PROCEDURE — 33264 RMVL & RPLCMT DFB GEN MLT LD: CPT | Performed by: INTERNAL MEDICINE

## 2019-01-04 PROCEDURE — 85610 PROTHROMBIN TIME: CPT

## 2019-01-04 PROCEDURE — C1882 AICD, OTHER THAN SING/DUAL: HCPCS | Performed by: INTERNAL MEDICINE

## 2019-01-04 PROCEDURE — 99152 MOD SED SAME PHYS/QHP 5/>YRS: CPT | Performed by: INTERNAL MEDICINE

## 2019-01-04 PROCEDURE — 25010000003 CEFAZOLIN 1-4 GM/50ML-% SOLUTION: Performed by: INTERNAL MEDICINE

## 2019-01-04 PROCEDURE — 25010000002 MIDAZOLAM PER 1 MG: Performed by: INTERNAL MEDICINE

## 2019-01-04 DEVICE — CARDIAC RESYNCHRONIZATION THERAPY DEFIBRILLATOR
Type: IMPLANTABLE DEVICE | Status: FUNCTIONAL
Brand: DYNAGEN™ CRT-D

## 2019-01-04 RX ORDER — MIDAZOLAM HYDROCHLORIDE 1 MG/ML
INJECTION INTRAMUSCULAR; INTRAVENOUS AS NEEDED
Status: DISCONTINUED | OUTPATIENT
Start: 2019-01-04 | End: 2019-01-04 | Stop reason: HOSPADM

## 2019-01-04 RX ORDER — SODIUM CHLORIDE 0.9 % (FLUSH) 0.9 %
3 SYRINGE (ML) INJECTION EVERY 12 HOURS SCHEDULED
Status: DISCONTINUED | OUTPATIENT
Start: 2019-01-04 | End: 2019-01-04 | Stop reason: HOSPADM

## 2019-01-04 RX ORDER — LIDOCAINE HYDROCHLORIDE 10 MG/ML
INJECTION, SOLUTION INFILTRATION; PERINEURAL AS NEEDED
Status: DISCONTINUED | OUTPATIENT
Start: 2019-01-04 | End: 2019-01-04 | Stop reason: HOSPADM

## 2019-01-04 RX ORDER — CEFAZOLIN SODIUM 1 G/50ML
INJECTION, SOLUTION INTRAVENOUS CONTINUOUS PRN
Status: COMPLETED | OUTPATIENT
Start: 2019-01-04 | End: 2019-01-04

## 2019-01-04 RX ORDER — SODIUM CHLORIDE 9 MG/ML
75 INJECTION, SOLUTION INTRAVENOUS CONTINUOUS
Status: DISCONTINUED | OUTPATIENT
Start: 2019-01-04 | End: 2019-01-04 | Stop reason: HOSPADM

## 2019-01-04 RX ORDER — FENTANYL CITRATE 50 UG/ML
INJECTION, SOLUTION INTRAMUSCULAR; INTRAVENOUS AS NEEDED
Status: DISCONTINUED | OUTPATIENT
Start: 2019-01-04 | End: 2019-01-04 | Stop reason: HOSPADM

## 2019-01-04 RX ORDER — SODIUM CHLORIDE 0.9 % (FLUSH) 0.9 %
3-10 SYRINGE (ML) INJECTION AS NEEDED
Status: DISCONTINUED | OUTPATIENT
Start: 2019-01-04 | End: 2019-01-04 | Stop reason: HOSPADM

## 2019-01-04 RX ORDER — LIDOCAINE HYDROCHLORIDE 10 MG/ML
0.1 INJECTION, SOLUTION EPIDURAL; INFILTRATION; INTRACAUDAL; PERINEURAL ONCE AS NEEDED
Status: DISCONTINUED | OUTPATIENT
Start: 2019-01-04 | End: 2019-01-04 | Stop reason: HOSPADM

## 2019-01-04 RX ADMIN — SODIUM CHLORIDE 75 ML/HR: 9 INJECTION, SOLUTION INTRAVENOUS at 08:12

## 2019-01-04 NOTE — DISCHARGE INSTRUCTIONS
Have your INR checked on Friday.    You may take tramadol or tylenol for pain.        Outpatient Surgery Guidelines, Adult  Outpatient procedures are those for which the person having the procedure is allowed to go home the same day as the procedure. Various procedures are done on an outpatient basis. You should follow some general guidelines if you will be having an outpatient procedure.  AFTER THE PROCEDURE  After surgery, you will be taken to a recovery area, where your progress will be monitored. If there are no complications, you will be allowed to go home when you are awake, stable, and taking fluids well. You may have numbness around the surgical site. Healing will take some time. You will have tenderness at the surgical site and may have some swelling and bruising. You may also have some nausea.  HOME CARE INSTRUCTIONS  · Do not drive for 24 hours, or as directed by your health care provider. Do not drive while taking prescription pain medicines.  · Do not drink alcohol for 24 hours.  · Do not make important decisions or sign legal documents for 24 hours.  · You may resume a normal diet and activities as directed.  · Do not lift anything heavier than 10 pounds (4.5 kg) or play contact sports until your health care provider says it is okay.  · Change your bandages (dressings) as directed.  · Only take over-the-counter or prescription medicines as directed by your health care provider.  · Follow up with your health care provider as directed.  SEEK MEDICAL CARE IF:  · You have increased bleeding (more than a small spot) from the surgical site.  · You have redness, swelling, or increasing pain in the wound.  · You see pus coming from the wound.  · You have a fever.  · You notice a bad smell coming from the wound or dressing.  · You feel lightheaded or faint.  · You develop a rash.  · You have trouble breathing.  · You develop allergies.  MAKE SURE YOU:  · Understand these instructions.  · Will watch your  condition.  · Will get help right away if you are not doing well or get worse.     This information is not intended to replace advice given to you by your health care provider. Make sure you discuss any questions you have with your health care provider.     Document Released: 09/12/2002 Document Revised: 05/03/2016 Document Reviewed: 05/22/2014

## 2019-01-08 ENCOUNTER — INFUSION (OUTPATIENT)
Dept: ONCOLOGY | Facility: HOSPITAL | Age: 79
End: 2019-01-08

## 2019-01-08 ENCOUNTER — LAB (OUTPATIENT)
Dept: LAB | Facility: HOSPITAL | Age: 79
End: 2019-01-08

## 2019-01-08 DIAGNOSIS — N18.30 ANEMIA IN STAGE 3 CHRONIC KIDNEY DISEASE (HCC): ICD-10-CM

## 2019-01-08 DIAGNOSIS — D63.1 ANEMIA IN STAGE 3 CHRONIC KIDNEY DISEASE (HCC): Primary | ICD-10-CM

## 2019-01-08 DIAGNOSIS — N18.30 ANEMIA IN STAGE 3 CHRONIC KIDNEY DISEASE (HCC): Primary | ICD-10-CM

## 2019-01-08 DIAGNOSIS — D63.1 ANEMIA IN STAGE 3 CHRONIC KIDNEY DISEASE (HCC): ICD-10-CM

## 2019-01-08 LAB
DEPRECATED RDW RBC AUTO: 54.1 FL (ref 37–49)
ERYTHROCYTE [DISTWIDTH] IN BLOOD BY AUTOMATED COUNT: 15.7 % (ref 11.7–14.5)
HCT VFR BLD AUTO: 31.2 % (ref 34–45)
HGB BLD-MCNC: 9.9 G/DL (ref 11.5–14.9)
MCH RBC QN AUTO: 30.2 PG (ref 27–33)
MCHC RBC AUTO-ENTMCNC: 31.7 G/DL (ref 32–35)
MCV RBC AUTO: 95.1 FL (ref 83–97)
PLATELET # BLD AUTO: 176 10*3/MM3 (ref 150–375)
PMV BLD AUTO: 9.8 FL (ref 8.9–12.1)
RBC # BLD AUTO: 3.28 10*6/MM3 (ref 3.9–5)
WBC NRBC COR # BLD: 7.97 10*3/MM3 (ref 4–10)

## 2019-01-08 PROCEDURE — 96372 THER/PROPH/DIAG INJ SC/IM: CPT

## 2019-01-08 PROCEDURE — 36415 COLL VENOUS BLD VENIPUNCTURE: CPT | Performed by: INTERNAL MEDICINE

## 2019-01-08 PROCEDURE — 63510000001 EPOETIN ALFA PER 1000 UNITS: Performed by: INTERNAL MEDICINE

## 2019-01-08 PROCEDURE — 85027 COMPLETE CBC AUTOMATED: CPT | Performed by: INTERNAL MEDICINE

## 2019-01-08 RX ADMIN — ERYTHROPOIETIN 6000 UNITS: 20000 INJECTION, SOLUTION INTRAVENOUS; SUBCUTANEOUS at 11:39

## 2019-01-09 RX ORDER — FUROSEMIDE 40 MG/1
TABLET ORAL
Qty: 225 TABLET | Refills: 3 | Status: SHIPPED | OUTPATIENT
Start: 2019-01-09 | End: 2019-02-13

## 2019-01-11 ENCOUNTER — ANTICOAGULATION VISIT (OUTPATIENT)
Dept: PHARMACY | Facility: HOSPITAL | Age: 79
End: 2019-01-11

## 2019-01-11 DIAGNOSIS — I48.20 CHRONIC ATRIAL FIBRILLATION (HCC): ICD-10-CM

## 2019-01-11 LAB
INR PPP: 1.3 (ref 0.91–1.09)
PROTHROMBIN TIME: 15 SECONDS (ref 10–13.8)

## 2019-01-11 PROCEDURE — 36416 COLLJ CAPILLARY BLOOD SPEC: CPT

## 2019-01-11 PROCEDURE — 85610 PROTHROMBIN TIME: CPT

## 2019-01-11 PROCEDURE — G0463 HOSPITAL OUTPT CLINIC VISIT: HCPCS

## 2019-01-11 NOTE — PROGRESS NOTES
Anticoagulation Clinic Progress Note    Anticoagulation Summary  As of 2019    INR goal:   2.0-3.0   TTR:   48.7 % (3.4 mo)   INR used for dosin.3! (2019)   Warfarin maintenance plan:   1 mg on Mon, Thu; 2 mg all other days   Weekly warfarin total:   12 mg   Plan last modified:   Shanna Major RPH (2019)   Next INR check:   2019   Priority:   High   Target end date:   Indefinite    Indications    Chronic atrial fibrillation (CMS/HCC) [I48.2]             Anticoagulation Episode Summary     INR check location:       Preferred lab:       Send INR reminders to:    [facility] [place]    Comments:         Anticoagulation Care Providers     Provider Role Specialty Phone number    Nik Galarza MD Referring Cardiology [phone]          Clinic Interview:      INR History:  Anticoagulation Monitoring 2018   INR 2.60 3.1 1.3   INR Date 2018   INR Goal 2.0-3.0 2.0-3.0 2.0-3.0   Trend Same Same Same   Last Week Total 12 mg 12 mg 8 mg   Next Week Total 12 mg 12 mg 13 mg   Sun 1 mg 1 mg 2 mg   Mon 2 mg 2 mg 1 mg   Tue 1 mg 1 mg 2 mg   Wed 2 mg 2 mg 2 mg   Thu 2 mg 2 mg -   Fri 2 mg 2 mg 3 mg ()   Sat 2 mg 2 mg 2 mg   Visit Report - - -   Some recent data might be hidden       Plan:  1. INR is Subtherapeutic today- see above in Anticoagulation Summary.  Resuming after hold for procedure.  Will instruct Janel Mahoney to Continue their warfarin regimen- see above in Anticoagulation Summary.  Take a booster dose of 3mg today only.    2. Follow up in 1 week  3. Patient declines warfarin refills.  4. Verbal and written information provided. Patient expresses understanding and has no further questions at this time.    Shanna Major RPH

## 2019-01-13 ENCOUNTER — CLINICAL SUPPORT NO REQUIREMENTS (OUTPATIENT)
Dept: CARDIOLOGY | Facility: CLINIC | Age: 79
End: 2019-01-13

## 2019-01-13 DIAGNOSIS — I25.5 ISCHEMIC CARDIOMYOPATHY: Primary | ICD-10-CM

## 2019-01-14 ENCOUNTER — TELEPHONE (OUTPATIENT)
Dept: CARDIOLOGY | Facility: CLINIC | Age: 79
End: 2019-01-14

## 2019-01-14 DIAGNOSIS — I47.20 V-TACH (HCC): Primary | ICD-10-CM

## 2019-01-14 NOTE — TELEPHONE ENCOUNTER
Pt had vt with successful atp on 1/12/19 at 1229.  There are an additional 7 nst vt episodes, 2 with egms are 15 and 22 beats.  All this since her gen change on 1/4/19.  She is coming in tomorrow for her incision check following gen change.  I have not called her yet to see if she was symptomatic.

## 2019-01-14 NOTE — TELEPHONE ENCOUNTER
Pt returned my call and she denies any symptoms of arrhythmia.  I let her know that Dr. Galarza ordered labs to be drawn tomorrow and she can go either before or after her apt with us.

## 2019-01-14 NOTE — TELEPHONE ENCOUNTER
I called pt, and lm with her  for her to call me.  She is still asleep.  I made note on tomorrow's appointment that she needs bmp and mag level drawn today.

## 2019-01-15 ENCOUNTER — LAB (OUTPATIENT)
Dept: LAB | Facility: HOSPITAL | Age: 79
End: 2019-01-15

## 2019-01-15 ENCOUNTER — CLINICAL SUPPORT NO REQUIREMENTS (OUTPATIENT)
Dept: CARDIOLOGY | Facility: CLINIC | Age: 79
End: 2019-01-15

## 2019-01-15 ENCOUNTER — TELEPHONE (OUTPATIENT)
Dept: CARDIOLOGY | Facility: CLINIC | Age: 79
End: 2019-01-15

## 2019-01-15 DIAGNOSIS — I42.9 CARDIOMYOPATHY, UNSPECIFIED TYPE (HCC): Primary | ICD-10-CM

## 2019-01-15 DIAGNOSIS — I47.20 V-TACH (HCC): ICD-10-CM

## 2019-01-15 LAB
ANION GAP SERPL CALCULATED.3IONS-SCNC: 12.4 MMOL/L
BUN BLD-MCNC: 53 MG/DL (ref 8–23)
BUN/CREAT SERPL: 25.6 (ref 7–25)
CALCIUM SPEC-SCNC: 9.3 MG/DL (ref 8.6–10.5)
CHLORIDE SERPL-SCNC: 103 MMOL/L (ref 98–107)
CO2 SERPL-SCNC: 28.6 MMOL/L (ref 22–29)
CREAT BLD-MCNC: 2.07 MG/DL (ref 0.57–1)
GFR SERPL CREATININE-BSD FRML MDRD: 23 ML/MIN/1.73
GLUCOSE BLD-MCNC: 90 MG/DL (ref 65–99)
MAGNESIUM SERPL-MCNC: 2.2 MG/DL (ref 1.6–2.4)
POTASSIUM BLD-SCNC: 4.5 MMOL/L (ref 3.5–5.2)
SODIUM BLD-SCNC: 144 MMOL/L (ref 136–145)

## 2019-01-15 PROCEDURE — 93283 PRGRMG EVAL IMPLANTABLE DFB: CPT | Performed by: INTERNAL MEDICINE

## 2019-01-15 PROCEDURE — 83735 ASSAY OF MAGNESIUM: CPT

## 2019-01-15 PROCEDURE — 80048 BASIC METABOLIC PNL TOTAL CA: CPT | Performed by: INTERNAL MEDICINE

## 2019-01-15 PROCEDURE — 36415 COLL VENOUS BLD VENIPUNCTURE: CPT | Performed by: INTERNAL MEDICINE

## 2019-01-15 NOTE — TELEPHONE ENCOUNTER
Ref Range & Units 10:19 13d ago   Glucose 65 - 99 mg/dL 90  93    BUN 8 - 23 mg/dL 53 Abnormally high   54 Abnormally high     Creatinine 0.57 - 1.00 mg/dL 2.07 Abnormally high   1.59 Abnormally high     Sodium 136 - 145 mmol/L 144  143    Potassium 3.5 - 5.2 mmol/L 4.5  4.6    Chloride 98 - 107 mmol/L 103  102    CO2 22.0 - 29.0 mmol/L 28.6  28.6    Calcium 8.6 - 10.5 mg/dL 9.3  9.4    eGFR Non African Amer >60 mL/min/1.73 23 Abnormally low   31 Abnormally low     BUN/Creatinine Ratio 7.0 - 25.0 25.6 Abnormally high   34.0 Abnormally high     Anion Gap mmol/L 12.4  12.4      Called renal fx yuliet estrada

## 2019-01-17 ENCOUNTER — ANTICOAGULATION VISIT (OUTPATIENT)
Dept: PHARMACY | Facility: HOSPITAL | Age: 79
End: 2019-01-17

## 2019-01-17 DIAGNOSIS — I48.20 CHRONIC ATRIAL FIBRILLATION (HCC): ICD-10-CM

## 2019-01-17 LAB
INR PPP: 2.5 (ref 0.91–1.09)
PROTHROMBIN TIME: 30.1 SECONDS (ref 10–13.8)

## 2019-01-17 PROCEDURE — 85610 PROTHROMBIN TIME: CPT

## 2019-01-17 PROCEDURE — 36416 COLLJ CAPILLARY BLOOD SPEC: CPT

## 2019-01-17 NOTE — PROGRESS NOTES
Anticoagulation Clinic Progress Note    Anticoagulation Summary  As of 2019    INR goal:   2.0-3.0   TTR:   48.3 % (3.6 mo)   INR used for dosin.5 (2019)   Warfarin maintenance plan:   1 mg on Mon, Thu; 2 mg all other days   Weekly warfarin total:   12 mg   No change documented:   Lee Ann Diamond   Plan last modified:   Shanna Major RP (2019)   Next INR check:   2019   Priority:   High   Target end date:   Indefinite    Indications    Chronic atrial fibrillation (CMS/[facility]) [I48.2]             Anticoagulation Episode Summary     INR check location:       Preferred lab:       Send INR reminders to:    [facility]    Comments:         Anticoagulation Care Providers     Provider Role Specialty Phone number    Nik Galarza MD Referring Cardiology [phone]          Clinic Interview:  Patient Findings     Negatives:   Signs/symptoms of thrombosis, Signs/symptoms of bleeding,   Laboratory test error suspected, Change in health, Change in alcohol use,   Change in activity, Upcoming invasive procedure, Emergency department   visit, Upcoming dental procedure, Missed doses, Extra doses, Change in   medications, Change in diet/appetite, Hospital admission, Bruising, Other   complaints      Clinical Outcomes     Negatives:   Major bleeding event, Thromboembolic event,   Anticoagulation-related hospital admission, Anticoagulation-related ED   visit, Anticoagulation-related fatality        INR History:  Anticoagulation Monitoring 2018   INR 3.1 1.3 2.5   INR Date 2018   INR Goal 2.0-3.0 2.0-3.0 2.0-3.0   Trend Same Same Same   Last Week Total 12 mg 8 mg 14 mg   Next Week Total 12 mg 13 mg 12 mg   Sun 1 mg 2 mg 2 mg   Mon 2 mg 1 mg 1 mg   Tue 1 mg 2 mg 2 mg   Wed 2 mg 2 mg 2 mg   Thu 2 mg - 1 mg   Fri 2 mg 3 mg () 2 mg   Sat 2 mg 2 mg 2 mg   Visit Report - - -   Some recent data might be hidden       Plan:  1. INR  is therapeutic today- see above in Anticoagulation Summary.   Will instruct Janel DORINA Mahoney to continue their warfarin regimen- see above in Anticoagulation Summary.  2. Follow up in 2 weeks.  3. Patient declines warfarin refills.  4. Verbal and written information provided. Patient expresses understanding and has no further questions at this time.    Lee Ann Diamond

## 2019-01-22 ENCOUNTER — LAB (OUTPATIENT)
Dept: LAB | Facility: HOSPITAL | Age: 79
End: 2019-01-22

## 2019-01-22 ENCOUNTER — INFUSION (OUTPATIENT)
Dept: ONCOLOGY | Facility: HOSPITAL | Age: 79
End: 2019-01-22

## 2019-01-22 VITALS — WEIGHT: 159.6 LBS | BODY MASS INDEX: 26.56 KG/M2

## 2019-01-22 DIAGNOSIS — N18.30 ANEMIA IN STAGE 3 CHRONIC KIDNEY DISEASE (HCC): Primary | ICD-10-CM

## 2019-01-22 DIAGNOSIS — N18.30 ANEMIA IN STAGE 3 CHRONIC KIDNEY DISEASE (HCC): ICD-10-CM

## 2019-01-22 DIAGNOSIS — D63.1 ANEMIA IN STAGE 3 CHRONIC KIDNEY DISEASE (HCC): ICD-10-CM

## 2019-01-22 DIAGNOSIS — D63.1 ANEMIA IN STAGE 3 CHRONIC KIDNEY DISEASE (HCC): Primary | ICD-10-CM

## 2019-01-22 LAB
DEPRECATED RDW RBC AUTO: 53.1 FL (ref 37–49)
ERYTHROCYTE [DISTWIDTH] IN BLOOD BY AUTOMATED COUNT: 15 % (ref 11.7–14.5)
HCT VFR BLD AUTO: 30.5 % (ref 34–45)
HGB BLD-MCNC: 9.8 G/DL (ref 11.5–14.9)
MCH RBC QN AUTO: 30.8 PG (ref 27–33)
MCHC RBC AUTO-ENTMCNC: 32.1 G/DL (ref 32–35)
MCV RBC AUTO: 95.9 FL (ref 83–97)
PLATELET # BLD AUTO: 117 10*3/MM3 (ref 150–375)
PMV BLD AUTO: 9.8 FL (ref 8.9–12.1)
RBC # BLD AUTO: 3.18 10*6/MM3 (ref 3.9–5)
WBC NRBC COR # BLD: 6.15 10*3/MM3 (ref 4–10)

## 2019-01-22 PROCEDURE — 96372 THER/PROPH/DIAG INJ SC/IM: CPT

## 2019-01-22 PROCEDURE — 85027 COMPLETE CBC AUTOMATED: CPT | Performed by: INTERNAL MEDICINE

## 2019-01-22 PROCEDURE — 63510000001 EPOETIN ALFA PER 1000 UNITS: Performed by: INTERNAL MEDICINE

## 2019-01-22 PROCEDURE — 36415 COLL VENOUS BLD VENIPUNCTURE: CPT | Performed by: INTERNAL MEDICINE

## 2019-01-22 RX ADMIN — ERYTHROPOIETIN 6000 UNITS: 20000 INJECTION, SOLUTION INTRAVENOUS; SUBCUTANEOUS at 11:57

## 2019-01-31 ENCOUNTER — ANTICOAGULATION VISIT (OUTPATIENT)
Dept: PHARMACY | Facility: HOSPITAL | Age: 79
End: 2019-01-31

## 2019-01-31 DIAGNOSIS — I48.20 CHRONIC ATRIAL FIBRILLATION (HCC): ICD-10-CM

## 2019-01-31 LAB
INR PPP: 3.1 (ref 0.91–1.09)
PROTHROMBIN TIME: 37.7 SECONDS (ref 10–13.8)

## 2019-01-31 PROCEDURE — 36416 COLLJ CAPILLARY BLOOD SPEC: CPT

## 2019-01-31 PROCEDURE — 85610 PROTHROMBIN TIME: CPT

## 2019-01-31 NOTE — PROGRESS NOTES
Anticoagulation Clinic Progress Note    Anticoagulation Summary  As of 1/31/2019    INR goal:   2.0-3.0   TTR:   52.3 % (4 mo)   INR used for dosing:   3.1! (1/31/2019)   Warfarin maintenance plan:   1 mg on Mon, Thu; 2 mg all other days   Weekly warfarin total:   12 mg   No change documented:   Beau Krishnan RPH   Plan last modified:   Shanna Major RPH (1/11/2019)   Next INR check:   2/28/2019   Priority:   High   Target end date:   Indefinite    Indications    Chronic atrial fibrillation (CMS/HCC) [I48.2]             Anticoagulation Episode Summary     INR check location:       Preferred lab:       Send INR reminders to:    [facility]    Comments:         Anticoagulation Care Providers     Provider Role Specialty Phone number    Nik Galarza MD Referring Cardiology [phone]          Clinic Interview:  Patient Findings     Negatives:   Signs/symptoms of thrombosis, Signs/symptoms of bleeding,   Laboratory test error suspected, Change in health, Change in alcohol use,   Change in activity, Upcoming invasive procedure, Emergency department   visit, Upcoming dental procedure, Missed doses, Extra doses, Change in   medications, Change in diet/appetite, Hospital admission, Bruising, Other   complaints      Clinical Outcomes     Negatives:   Major bleeding event, Thromboembolic event,   Anticoagulation-related hospital admission, Anticoagulation-related ED   visit, Anticoagulation-related fatality        INR History:  Anticoagulation Monitoring 1/11/2019 1/17/2019 1/31/2019   INR 1.3 2.5 3.1   INR Date 1/11/2019 1/17/2019 1/31/2019   INR Goal 2.0-3.0 2.0-3.0 2.0-3.0   Trend Same Same Same   Last Week Total 8 mg 14 mg 12 mg   Next Week Total 13 mg 12 mg 12 mg   Sun 2 mg 2 mg 2 mg   Mon 1 mg 1 mg 1 mg   Tue 2 mg 2 mg 2 mg   Wed 2 mg 2 mg 2 mg   Thu - 1 mg 1 mg   Fri 3 mg (1/11) 2 mg 2 mg   Sat 2 mg 2 mg 2 mg   Visit Report - - -   Some recent data might be hidden       Plan:  1. INR is  therapeutic today- see above in Anticoagulation Summary.   Will instruct Janel DORINA Mahoney to continue their warfarin regimen- see above in Anticoagulation Summary.  2. Follow up in 4 weeks.  3. Patient declines warfarin refills.  4. Verbal and written information provided. Patient expresses understanding and has no further questions at this time.    Lee Ann Diamond

## 2019-02-05 ENCOUNTER — INFUSION (OUTPATIENT)
Dept: ONCOLOGY | Facility: HOSPITAL | Age: 79
End: 2019-02-05

## 2019-02-05 ENCOUNTER — LAB (OUTPATIENT)
Dept: LAB | Facility: HOSPITAL | Age: 79
End: 2019-02-05

## 2019-02-05 DIAGNOSIS — N18.30 ANEMIA IN STAGE 3 CHRONIC KIDNEY DISEASE (HCC): Primary | ICD-10-CM

## 2019-02-05 DIAGNOSIS — E53.8 B12 DEFICIENCY: Primary | ICD-10-CM

## 2019-02-05 DIAGNOSIS — D63.1 ANEMIA IN STAGE 3 CHRONIC KIDNEY DISEASE (HCC): Primary | ICD-10-CM

## 2019-02-05 LAB
BASOPHILS # BLD AUTO: 0.02 10*3/MM3 (ref 0–0.1)
BASOPHILS NFR BLD AUTO: 0.3 % (ref 0–1.1)
DEPRECATED RDW RBC AUTO: 50.1 FL (ref 37–49)
EOSINOPHIL # BLD AUTO: 0.09 10*3/MM3 (ref 0–0.36)
EOSINOPHIL NFR BLD AUTO: 1.6 % (ref 1–5)
ERYTHROCYTE [DISTWIDTH] IN BLOOD BY AUTOMATED COUNT: 14.4 % (ref 11.7–14.5)
HCT VFR BLD AUTO: 30.9 % (ref 34–45)
HGB BLD-MCNC: 9.6 G/DL (ref 11.5–14.9)
IMM GRANULOCYTES # BLD AUTO: 0.18 10*3/MM3 (ref 0–0.03)
IMM GRANULOCYTES NFR BLD AUTO: 3.1 % (ref 0–0.5)
LYMPHOCYTES # BLD AUTO: 0.83 10*3/MM3 (ref 1–3.5)
LYMPHOCYTES NFR BLD AUTO: 14.3 % (ref 20–49)
MCH RBC QN AUTO: 29.6 PG (ref 27–33)
MCHC RBC AUTO-ENTMCNC: 31.1 G/DL (ref 32–35)
MCV RBC AUTO: 95.4 FL (ref 83–97)
MONOCYTES # BLD AUTO: 0.52 10*3/MM3 (ref 0.25–0.8)
MONOCYTES NFR BLD AUTO: 9 % (ref 4–12)
NEUTROPHILS # BLD AUTO: 4.16 10*3/MM3 (ref 1.5–7)
NEUTROPHILS NFR BLD AUTO: 71.7 % (ref 39–75)
NRBC BLD AUTO-RTO: 0.3 /100 WBC (ref 0–0)
PLATELET # BLD AUTO: 115 10*3/MM3 (ref 150–375)
PMV BLD AUTO: 10.4 FL (ref 8.9–12.1)
RBC # BLD AUTO: 3.24 10*6/MM3 (ref 3.9–5)
WBC NRBC COR # BLD: 5.8 10*3/MM3 (ref 4–10)

## 2019-02-05 PROCEDURE — 63510000001 EPOETIN ALFA PER 1000 UNITS: Performed by: INTERNAL MEDICINE

## 2019-02-05 PROCEDURE — 96372 THER/PROPH/DIAG INJ SC/IM: CPT | Performed by: INTERNAL MEDICINE

## 2019-02-05 PROCEDURE — 36415 COLL VENOUS BLD VENIPUNCTURE: CPT | Performed by: INTERNAL MEDICINE

## 2019-02-05 PROCEDURE — 85025 COMPLETE CBC W/AUTO DIFF WBC: CPT | Performed by: INTERNAL MEDICINE

## 2019-02-05 RX ADMIN — ERYTHROPOIETIN 6000 UNITS: 20000 INJECTION, SOLUTION INTRAVENOUS; SUBCUTANEOUS at 11:48

## 2019-02-05 NOTE — PROGRESS NOTES
Patient here for procrit.  C/o increased swelling in her feet.   Patient states that cardiologist took her off of her ramipril, then swelling got worse.  Encouraged patient to call cardiologist for f/u.  Patient v/u.

## 2019-02-07 ENCOUNTER — TELEPHONE (OUTPATIENT)
Dept: CARDIOLOGY | Facility: CLINIC | Age: 79
End: 2019-02-07

## 2019-02-07 NOTE — TELEPHONE ENCOUNTER
Patient's  (Russ) called to report Dr. Cruz, Nephrologist had decreased Ramipril from 5 mg to 2.5mg then had her to stop it all together 2 weeks ago.  Since stopping the medication, patient's feet and legs are swelling.  Russ would like to discuss this with you.      Please call him at [phone]. / GHASSAN

## 2019-02-11 RX ORDER — PANTOPRAZOLE SODIUM 40 MG/1
TABLET, DELAYED RELEASE ORAL
Qty: 180 TABLET | Refills: 0 | Status: SHIPPED | OUTPATIENT
Start: 2019-02-11 | End: 2019-04-15 | Stop reason: SDUPTHER

## 2019-02-13 ENCOUNTER — HOSPITAL ENCOUNTER (OUTPATIENT)
Dept: CARDIOLOGY | Facility: HOSPITAL | Age: 79
Discharge: HOME | End: 2019-02-13
Admitting: NURSE PRACTITIONER

## 2019-02-13 ENCOUNTER — OFFICE VISIT (OUTPATIENT)
Dept: CARDIOLOGY | Facility: CLINIC | Age: 79
End: 2019-02-13

## 2019-02-13 VITALS
BODY MASS INDEX: 26.56 KG/M2 | HEIGHT: 65 IN | HEART RATE: 136 BPM | SYSTOLIC BLOOD PRESSURE: 138 MMHG | DIASTOLIC BLOOD PRESSURE: 60 MMHG

## 2019-02-13 DIAGNOSIS — Z95.0 PACEMAKER: ICD-10-CM

## 2019-02-13 DIAGNOSIS — Z95.2 S/P MVR (MITRAL VALVE REPLACEMENT): ICD-10-CM

## 2019-02-13 DIAGNOSIS — I50.43 ACUTE ON CHRONIC COMBINED SYSTOLIC AND DIASTOLIC CONGESTIVE HEART FAILURE (HCC): Primary | ICD-10-CM

## 2019-02-13 DIAGNOSIS — R60.0 BILATERAL LOWER EXTREMITY EDEMA: ICD-10-CM

## 2019-02-13 DIAGNOSIS — I48.20 CHRONIC ATRIAL FIBRILLATION (HCC): ICD-10-CM

## 2019-02-13 DIAGNOSIS — Z51.81 THERAPEUTIC DRUG MONITORING: ICD-10-CM

## 2019-02-13 DIAGNOSIS — I50.43 ACUTE ON CHRONIC COMBINED SYSTOLIC AND DIASTOLIC CONGESTIVE HEART FAILURE (HCC): ICD-10-CM

## 2019-02-13 DIAGNOSIS — I25.5 ISCHEMIC CARDIOMYOPATHY: ICD-10-CM

## 2019-02-13 LAB
ANION GAP SERPL CALCULATED.3IONS-SCNC: 15.7 MMOL/L
BUN BLD-MCNC: 65 MG/DL (ref 8–23)
BUN/CREAT SERPL: 39.6 (ref 7–25)
CALCIUM SPEC-SCNC: 9.6 MG/DL (ref 8.6–10.5)
CHLORIDE SERPL-SCNC: 102 MMOL/L (ref 98–107)
CO2 SERPL-SCNC: 25.3 MMOL/L (ref 22–29)
CREAT BLD-MCNC: 1.64 MG/DL (ref 0.57–1)
GFR SERPL CREATININE-BSD FRML MDRD: 30 ML/MIN/1.73
GLUCOSE BLD-MCNC: 111 MG/DL (ref 65–99)
POTASSIUM BLD-SCNC: 3.9 MMOL/L (ref 3.5–5.2)
SODIUM BLD-SCNC: 143 MMOL/L (ref 136–145)

## 2019-02-13 PROCEDURE — 96374 THER/PROPH/DIAG INJ IV PUSH: CPT

## 2019-02-13 PROCEDURE — 25010000002 FUROSEMIDE PER 20 MG: Performed by: NURSE PRACTITIONER

## 2019-02-13 PROCEDURE — 99214 OFFICE O/P EST MOD 30 MIN: CPT | Performed by: NURSE PRACTITIONER

## 2019-02-13 PROCEDURE — 36415 COLL VENOUS BLD VENIPUNCTURE: CPT

## 2019-02-13 PROCEDURE — 80048 BASIC METABOLIC PNL TOTAL CA: CPT | Performed by: NURSE PRACTITIONER

## 2019-02-13 PROCEDURE — 93000 ELECTROCARDIOGRAM COMPLETE: CPT | Performed by: NURSE PRACTITIONER

## 2019-02-13 RX ORDER — FUROSEMIDE 10 MG/ML
80 INJECTION INTRAMUSCULAR; INTRAVENOUS ONCE
Status: COMPLETED | OUTPATIENT
Start: 2019-02-13 | End: 2019-02-13

## 2019-02-13 RX ORDER — BUMETANIDE 2 MG/1
2 TABLET ORAL 2 TIMES DAILY
Qty: 60 TABLET | Refills: 11 | Status: SHIPPED | OUTPATIENT
Start: 2019-02-13 | End: 2019-02-13 | Stop reason: SDUPTHER

## 2019-02-13 RX ORDER — BUMETANIDE 2 MG/1
2 TABLET ORAL 2 TIMES DAILY
Qty: 60 TABLET | Refills: 11 | Status: SHIPPED | OUTPATIENT
Start: 2019-02-13 | End: 2019-02-22 | Stop reason: HOSPADM

## 2019-02-13 RX ADMIN — FUROSEMIDE 80 MG: 10 INJECTION, SOLUTION INTRAMUSCULAR; INTRAVENOUS at 14:32

## 2019-02-13 NOTE — PROGRESS NOTES
Date of Office Visit: 2019  Encounter Provider: NICKY Roman  Place of Service: [facility] CARDIOLOGY  Patient Name: Janel Mahoney  :1940    Chief Complaint   Patient presents with   • Congestive Heart Failure   • Edema   • Atrial Fibrillation   • Follow-up   :     HPI: Janel Mahoney is a 78 y.o. female is a patient of Dr. Galarza. I am seeing her today and have reviewed her record.     Her past medical history is significant of coronary artery disease status post coronary artery bypass grafting, mitral insufficiency status post mitral valve replacement with porcine valve, atrial fibrillation, ischemic cardiomyopathy, chronic systolic congestive heart failure, nonsustained ventricular tachycardia status post biventricular ICD.    She has history of mild disease of the left anterior descending and then angioplasty and stent placement of the right coronary artery.  She later was found to have premature ventricular contractions as well as severe vascular disease.  In 2007 she had an acute infarct catheterization showed left ventricular ejection fraction reduced at 35%.  She had occlusive disease of the right posterior left ventricular branch and no other significant disease.  She was treated medically.  She had a JASON which confirmed left ventricular ejection fraction of 40% and moderate mitral insufficiency.  She had atrial fibrillation and electro cardioversion back to sinus rhythm in May 2007 and then presented with recurrent atrial fibrillation in 2007 and was placed on Tikosyn.  This was titrated up to 500 mcg  but she developed marked QT prolongation even on 250 mcg twice daily.  Then opted for warfarin and rate control however she continued to have symptoms and went to Dr. Harish Iverson in [place] to have pulmonary vein isolation.  She then had recurrent A. fib and was placed on sotalol converted then went back into atrial fibrillation.   She had a repeat catheterization in January 2010 which showed severe mitral insufficiency,  and she ultimately had mitral valve replacement with a #31 epic porcine valve and a single bypass graft to the posterior descending artery.  She continued to have episodes of rapid atrial fibrillation and left ventricular dysfunction.  She ultimately underwent AV node ablation and had upgrade of her device to a biventricular.  She presented in September 2013 with fatigue, tiredness and weakness.  Echocardiogram showed left ventricular ejection fraction to be 45%, moderate pulmonary hypertension at 62..  She had a perfusion stress test which showed no evidence of ischemia.  She had a positive sleep study and was started on CPAP.  She also had some volume overload improved with twice a day diuretic.  In August 2016, she presented with multiple compression fractures of her back.  In September 2017 she had some GI bleeding and was found to have esophageal ulcers but her INR was supratherapeutic.  The ulcers were treated and her hemoglobin stabilized.  She was readmitted in November 2018 with spontaneous hematoma of her right pectoral muscle which had to be surgically drained.  She had a fall after stumbling 2-3 weeks prior but did not recall hitting her chest.  She had been on a Lovenox bridge around that time for her back injections. She was due for biventricular generator replacement and had that completed 1/4/2019.    Patient presents today for reevaluation.  She called and reported that her nephrologist had decreased her ramipril and then stopped it altogether 2 weeks prior.  She then was having issues with lower extremity edema and was advised to get back on that on 2/7/2019. Today she reports that lower extremity edema has persisted and she has some pain in the bilateral feet.  She also reports increased shortness of breath and some mild orthopnea.  She denies chest pain tightness pressure, palpitation, dizziness,  lightheadedness, near-syncope, or syncope.  She stumbled last week but her  caught her and prevented her from falling.  Since then she has had some increased left wrist pain and increased left ankle pain.  She is able to move both joints but has some concern about the pain.  She has been taking Lasix 80 mg in the morning and 40 mg at night.      Allergies   Allergen Reactions   • Diclofenac      She had adverse effects from the medications that required hospitalization. Pt got stomach ulcers from this medication prescribed by Dr. Arango - pediatrist.   • Dofetilide      Pt states not allergic.        Past Medical History:   Diagnosis Date   • Acute kidney injury (CMS/HCC)    • Anemia    • Atrial fibrillation (CMS/HCC)    • Bruises easily    • Carotid artery stenosis    • Chronic back pain    • Chronic combined systolic and diastolic congestive heart failure (CMS/HCC)    • Chronic coronary artery disease     moderate to severe LV dysfunction.   • Chronic kidney disease, stage 3 (CMS/HCC)    • Dysphagia    • GERD (gastroesophageal reflux disease)    • H/O cardiac murmur    • [place] (hard of hearing)     wears hearing aids   • Hyperlipidemia    • Hypertension    • Hypotension    • Ischemic cardiomyopathy    • Kyphoscoliosis    • Leukopenia    • Lumbar spondylosis    • Obesity    • GEORGINA (obstructive sleep apnea)    • Osteoarthritis    • Osteoporosis    • Peptic ulcer    • Premature ventricular contractions    • Renal insufficiency syndrome    • Scoliosis    • Shoulder fracture, left    • Stroke syndrome    • Thrombocytopenia (CMS/HCC)    • Ventricular tachycardia (CMS/HCC)    • Vitamin B12 deficiency        Past Surgical History:   Procedure Laterality Date   • AV NODE ABLATION     • BREAST BIOPSY     • CARDIAC CATHETERIZATION      Showed severe mitral insufficiency and borderline coronary artery disease   • CARDIAC CATHETERIZATION      Showed an ejection fraction of 35%. She had occlusive disease of the right  posterior LV branch and no other significant disease, treated medically.   • CARDIAC DEFIBRILLATOR PLACEMENT      Biventricular   • CARDIAC ELECTROPHYSIOLOGY PROCEDURE N/A 1/4/2019    Procedure: GENERATOR CHANGE BI-V ICD   [place];  Surgeon: James Hwang MD;  Location: [facility] CATH INVASIVE LOCATION;  Service: Cardiology   • CARDIAC VALVE REPLACEMENT  2009    Done with stent placement   • CARDIOVERSION      multiple electrocardioversions.   • CAROTID ARTERY ANGIOPLASTY Right    • COLONOSCOPY N/A 9/28/2017    Procedure: COLONOSCOPY TO CECUM;  Surgeon: Kevin Davis MD;  Location: [facility] ENDOSCOPY;  Service:    • CORONARY ANGIOPLASTY WITH STENT PLACEMENT  2009   • CORONARY ARTERY BYPASS GRAFT      single graft to the PDA   • CORONARY STENT PLACEMENT     • ENDOSCOPY N/A 9/28/2017    Procedure: ESOPHAGOGASTRODUODENOSCOPY ;  Surgeon: Kevin Davis MD;  Location: [facility] ENDOSCOPY;  Service:    • HEMORRHOIDECTOMY     • HYSTERECTOMY     • INCISION AND DRAINAGE TRUNK Right 11/27/2018    Procedure: EVACUATION OF RIGHT CHEST WALL HEMATOMA;  Surgeon: Juvencio Rodriguez MD;  Location: [facility] OR;  Service: General   • MITRAL VALVE REPLACEMENT  01/2010    #31 Epic porcine valve.   • THROMBOENDARTERECTOMY Right     carotid thromboendarterectomy    • TONSILLECTOMY      age 32   • TOTAL KNEE ARTHROPLASTY Left    • TOTAL SHOULDER ARTHROPLASTY W/ DISTAL CLAVICLE EXCISION Left 1/16/2018    Procedure: TOTAL SHOULDER REVERSE ARTHROPLASTY;  Surgeon: Bianka Quesada MD;  Location: [facility] OR;  Service:          Family and social history reviewed.     Review of Systems   Constitution: Positive for malaise/fatigue.   Cardiovascular: Positive for dyspnea on exertion, leg swelling and orthopnea.   Respiratory: Positive for shortness of breath.    Musculoskeletal: Positive for joint pain.   Neurological: Positive for loss of balance.     All other systems were reviewed and are negative          Objective:     Vitals:     "02/13/19 1321   BP: 138/60   BP Location: Right arm   Patient Position: Sitting   Pulse: (!) 136   Height: 165.1 cm (65\")     Body mass index is 26.56 kg/m².    PHYSICAL EXAM:  Physical Exam   Constitutional: She is oriented to person, place, and time. She appears well-developed and well-nourished. No distress.   HENT:   Head: Normocephalic.   Eyes: Conjunctivae are normal.   Neck: Normal range of motion. No JVD present.   Cardiovascular: Normal rate, regular rhythm and intact distal pulses.   Murmur heard.  Pulses:       Carotid pulses are 2+ on the right side, and 2+ on the left side.       Radial pulses are 2+ on the right side, and 2+ on the left side.        Posterior tibial pulses are 2+ on the right side, and 2+ on the left side.   Pulmonary/Chest: Effort normal and breath sounds normal. No respiratory distress. She has no wheezes. She has no rhonchi. She has no rales. She exhibits no tenderness.   Abdominal: Soft. Bowel sounds are normal. She exhibits no distension.   Musculoskeletal: Normal range of motion. She exhibits edema (2+ L >R).   Neurological: She is alert and oriented to person, place, and time.   Skin: Skin is warm, dry and intact. No rash noted. She is not diaphoretic. No cyanosis.   Psychiatric: She has a normal mood and affect. Her behavior is normal. Judgment and thought content normal.         ECG 12 Lead  Date/Time: 2/13/2019 1:32 PM  Performed by: Francesca Manning APRN  Authorized by: Francesca Manning APRN   Comparison: compared with previous ECG from 12/18/2018  Similar to previous ECG  Rhythm: paced  Rate: normal    Clinical impression: abnormal EKG            Current Outpatient Medications   Medication Sig Dispense Refill   • alendronate (FOSAMAX) 70 MG tablet Take 70 mg by mouth Every 14 (Fourteen) Days.     • aspirin 81 MG tablet Take 81 mg by mouth Daily.     • calcitriol (ROCALTROL) 0.25 MCG capsule Take 0.25 mcg by mouth Daily.     • carvedilol (COREG) 25 MG tablet TAKE 1 TABLET TWICE " DAILY 180 tablet 3   • cephalexin (KEFLEX) 500 MG capsule Take 1 capsule by mouth 3 (Three) Times a Day. 30 capsule 0   • Cholecalciferol (VITAMIN D) 2000 UNITS tablet Take 1 tablet by mouth daily.     • epoetin adele (EPOGEN,PROCRIT) 09848 UNIT/ML injection Inject 10,000 Units under the skin into the appropriate area as directed As Needed.     • ferrous sulfate 325 (65 FE) MG tablet Take 1 tablet by mouth Daily With Breakfast. 30 tablet 0   • HAVRIX 1440 EL U/ML vaccine      • Multiple Vitamins-Minerals (SENIOR MULTIVITAMIN PLUS) tablet Take 1 tablet by mouth Daily.     • pantoprazole (PROTONIX) 40 MG EC tablet TAKE 1 TABLET TWICE DAILY 180 tablet 0   • predniSONE (DELTASONE) 20 MG tablet Take 1 tablet by mouth 2 (Two) Times a Day. 10 tablet 0   • ramipril (ALTACE) 2.5 MG capsule Take 5 mg by mouth Daily.     • simvastatin (ZOCOR) 40 MG tablet TAKE 1 TABLET EVERY DAY (Patient taking differently: pt reports taking 1/2 tablet nightly) 90 tablet 1   • traMADol (ULTRAM) 50 MG tablet Take 50 mg by mouth At Night As Needed.     • traMADol (ULTRAM) 50 MG tablet TAKE 2 TABLETS EVERY MORNING  AND TAKE 2 TABLETS AT BEDTIME 120 tablet 5   • vitamin B-12 (CYANOCOBALAMIN) 2500 MCG sublingual tablet tablet Place 2,500 mcg under the tongue Daily.     • warfarin (COUMADIN) 2 MG tablet Take 1 tablet by mouth Every Night. (Patient taking differently: Take 2 mg by mouth Every Night. tablet) 30 tablet 0   • zolpidem (AMBIEN) 10 MG tablet Take 1 tablet by mouth At Night As Needed for Sleep. 3 months 90 tablet 1   • bumetanide (BUMEX) 2 MG tablet Take 1 tablet by mouth 2 (Two) Times a Day. 60 tablet 11     No current facility-administered medications for this visit.      Assessment:       Diagnosis Plan   1. Acute on chronic combined systolic and diastolic congestive heart failure (CMS/HCC)  furosemide (LASIX) injection 80 mg    Basic Metabolic Panel    Basic Metabolic Panel   2. Therapeutic drug monitoring  Basic Metabolic Panel   3.  Ischemic cardiomyopathy     4. Chronic atrial fibrillation (CMS/HCC)     5. S/P MVR (mitral valve replacement)     6. Pacemaker          Orders Placed This Encounter   Procedures   • Basic Metabolic Panel   • Basic Metabolic Panel     Standing Status:   Future     Number of Occurrences:   1     Standing Expiration Date:   2/14/2020   • ECG 12 Lead     This order was created via procedure documentation         Plan:         1.  Coronary artery disease with history of mild disease of the LAD and angioplasty stent placement of the right coronary artery then single bypass graft January 2010 to the posterior descending artery.  Coronary Artery Disease  Assessment  • The patient has no angina    Plan  • Lifestyle modifications discussed include adhering to a heart healthy diet, avoidance of tobacco products, maintenance of a healthy weight, medication compliance, regular exercise and regular monitoring of cholesterol and blood pressure    Subjective - Objective  • There is a history of past MI  • There is a history of previous coronary artery bypass graft  • There has been a previous POBA  • Current antiplatelet therapy includes aspirin 81 mg        2.  History of mitral insufficiency status post mitral valve replacement with porcine valve  3.  Ischemic cardiomyopathy, see concha  4.  Chronic systolic and diastolic congestive heart failure   last ejection fraction mildly decreased at 41-45% January 2019.  I do suspect acute volume overload due to stop of ACE inhibitor x 3 weeks.  She will have a BMP drawn today and receive 80 mg IV Lasix.  Will use this opportunity to try her on Bumex 2 mg twice daily and stop home Lasix.  She is to return on Monday for follow-up BMP.  I will reassess her symptoms and if she is not improved we will consider admission to the hospital.  Heart Failure  Assessment  • NYHA class III-A - There is limitation of physical activity. The patient is comfortable at rest, but ordinary activity causes  fatigue, palpitations or shortness of breath.  • ACE inhibitor prescribed  • Beta blocker prescribed  • Diuretics prescribed  • The most recent ejection fraction is 43%  • Left ventricular function is mildly reduced by qualitative assessment  • The left ventricle was last assessed on 1/9/2019    Plan  • The patient has received heart failure education on the following topics: dietary sodium restriction, medication instructions, minimizing or avoiding NSAID use, physical activity and weight monitoring    Subjective/Objective  • The patient reports dyspnea and orthopnea    • Physical exam findings negative for rales and elevated JVP.      5.  Chronic atrial fibrillation then electrocardioversion to sinus rhythm May 2007 and readmitted in August with a recurrent episode placed on Tikosyn then marked QT prolongation on 250 mics twice daily.  Treated with warfarin then pulmonary vein isolation placed on sotalol recurrent A. fib and AV node ablation status post permanent pacemaker upgraded to biV ICD with generator upgrade 01/2019  Atrial Fibrillation and Atrial Flutter  Assessment  • The patient has permanent atrial fibrillation  • This is valvular in etiology  • The patient's CHADS2-VASc score is 6  • A [id number] score of 2 or more is considered a high risk for a thromboembolic event  • Warfarin prescribed    Plan  • Continue in atrial fibrillation with rate control  • Continue warfarin for antithrombotic therapy, bleeding issues discussed  • Continue beta blocker for rate control    Subjective - Objective  • The patient underwent cardioversion   • The patient had atrial fibrillation ablation   • The patient had a recurrence of atrial fibrillation since ablation         6.  Hypertension stable continue the same  7. Hyperlipidemia on simvastatin 40 mg  8. Obstructive sleep apnea on CPAP  9.  History of nonsustained ventricular tachycardia status post ICD with generator upgrade in January 2019  10.    11.Spinal disease  with history of compression fractures in serial back injection.  She requires Lovenox bridge for these  12.  History of anemia she follows with hematology  13.  History of renal insufficiency followed by nephrology with Dr. Cruz  14.  Left wrist pain also left ankle pain.  This has been worse since her near fall last week.  She is able to move both joints.  I advised if these do not improve in the next few weeks to follow-up with PCP for imaging.      Plan of care reviewed with Dr. Galarza  Follow up to be determined on Monday once her labs and symptoms are re-assessed.            It has been a pleasure to participate in this patient's care.      Thank you,  NICKY Roman      **Dianna Disclaimer:**  Much of this encounter note is an electronic transcription/translation of spoken language to printed text. The electronic translation of spoken language may permit erroneous, or at times, nonsensical words or phrases to be inadvertently transcribed. Although I have reviewed the note for such errors, some may still exist.

## 2019-02-15 ENCOUNTER — APPOINTMENT (OUTPATIENT)
Dept: CARDIOLOGY | Facility: HOSPITAL | Age: 79
End: 2019-02-15

## 2019-02-15 ENCOUNTER — HOSPITAL ENCOUNTER (INPATIENT)
Facility: HOSPITAL | Age: 79
LOS: 7 days | Discharge: NURSING, REHAB OR LONG-TERM CARE | End: 2019-02-22
Attending: EMERGENCY MEDICINE | Admitting: HOSPITALIST

## 2019-02-15 ENCOUNTER — APPOINTMENT (OUTPATIENT)
Dept: GENERAL RADIOLOGY | Facility: HOSPITAL | Age: 79
End: 2019-02-15

## 2019-02-15 DIAGNOSIS — N18.9 CHRONIC RENAL FAILURE, UNSPECIFIED CKD STAGE: ICD-10-CM

## 2019-02-15 DIAGNOSIS — I25.5 ISCHEMIC CARDIOMYOPATHY: ICD-10-CM

## 2019-02-15 DIAGNOSIS — D64.9 CHRONIC ANEMIA: ICD-10-CM

## 2019-02-15 DIAGNOSIS — D68.9 COAGULOPATHY (HCC): ICD-10-CM

## 2019-02-15 DIAGNOSIS — R53.1 GENERALIZED WEAKNESS: ICD-10-CM

## 2019-02-15 DIAGNOSIS — R60.9 PERIPHERAL EDEMA: Primary | ICD-10-CM

## 2019-02-15 DIAGNOSIS — S40.022A TRAUMATIC HEMATOMA OF LEFT UPPER ARM, INITIAL ENCOUNTER: ICD-10-CM

## 2019-02-15 LAB
ALBUMIN SERPL-MCNC: 3.2 G/DL (ref 3.5–5.2)
ALBUMIN/GLOB SERPL: 0.9 G/DL
ALP SERPL-CCNC: 112 U/L (ref 39–117)
ALT SERPL W P-5'-P-CCNC: 78 U/L (ref 1–33)
ANION GAP SERPL CALCULATED.3IONS-SCNC: 15 MMOL/L
APTT PPP: 79.1 SECONDS (ref 22.7–35.4)
AST SERPL-CCNC: 71 U/L (ref 1–32)
BACTERIA UR QL AUTO: NORMAL /HPF
BASOPHILS # BLD AUTO: 0.01 10*3/MM3 (ref 0–0.2)
BASOPHILS NFR BLD AUTO: 0.1 % (ref 0–1.5)
BH CV LOWER VASCULAR LEFT COMMON FEMORAL AUGMENT: NORMAL
BH CV LOWER VASCULAR LEFT COMMON FEMORAL COMPETENT: NORMAL
BH CV LOWER VASCULAR LEFT COMMON FEMORAL COMPRESS: NORMAL
BH CV LOWER VASCULAR LEFT COMMON FEMORAL PHASIC: NORMAL
BH CV LOWER VASCULAR LEFT COMMON FEMORAL SPONT: NORMAL
BH CV LOWER VASCULAR LEFT DISTAL FEMORAL COMPRESS: NORMAL
BH CV LOWER VASCULAR LEFT GASTRONEMIUS COMPRESS: NORMAL
BH CV LOWER VASCULAR LEFT GREATER SAPH AK COMPRESS: NORMAL
BH CV LOWER VASCULAR LEFT GREATER SAPH BK COMPRESS: NORMAL
BH CV LOWER VASCULAR LEFT LESSER SAPH COMPRESS: NORMAL
BH CV LOWER VASCULAR LEFT MID FEMORAL AUGMENT: NORMAL
BH CV LOWER VASCULAR LEFT MID FEMORAL COMPETENT: NORMAL
BH CV LOWER VASCULAR LEFT MID FEMORAL COMPRESS: NORMAL
BH CV LOWER VASCULAR LEFT MID FEMORAL PHASIC: NORMAL
BH CV LOWER VASCULAR LEFT MID FEMORAL SPONT: NORMAL
BH CV LOWER VASCULAR LEFT PERONEAL COMPRESS: NORMAL
BH CV LOWER VASCULAR LEFT POPLITEAL AUGMENT: NORMAL
BH CV LOWER VASCULAR LEFT POPLITEAL COMPETENT: NORMAL
BH CV LOWER VASCULAR LEFT POPLITEAL COMPRESS: NORMAL
BH CV LOWER VASCULAR LEFT POPLITEAL PHASIC: NORMAL
BH CV LOWER VASCULAR LEFT POPLITEAL SPONT: NORMAL
BH CV LOWER VASCULAR LEFT POSTERIOR TIBIAL COMPRESS: NORMAL
BH CV LOWER VASCULAR LEFT PROXIMAL FEMORAL COMPRESS: NORMAL
BH CV LOWER VASCULAR LEFT SAPHENOFEMORAL JUNCTION AUGMENT: NORMAL
BH CV LOWER VASCULAR LEFT SAPHENOFEMORAL JUNCTION COMPETENT: NORMAL
BH CV LOWER VASCULAR LEFT SAPHENOFEMORAL JUNCTION COMPRESS: NORMAL
BH CV LOWER VASCULAR LEFT SAPHENOFEMORAL JUNCTION PHASIC: NORMAL
BH CV LOWER VASCULAR LEFT SAPHENOFEMORAL JUNCTION SPONT: NORMAL
BH CV LOWER VASCULAR RIGHT COMMON FEMORAL AUGMENT: NORMAL
BH CV LOWER VASCULAR RIGHT COMMON FEMORAL COMPETENT: NORMAL
BH CV LOWER VASCULAR RIGHT COMMON FEMORAL COMPRESS: NORMAL
BH CV LOWER VASCULAR RIGHT COMMON FEMORAL PHASIC: NORMAL
BH CV LOWER VASCULAR RIGHT COMMON FEMORAL SPONT: NORMAL
BH CV LOWER VASCULAR RIGHT DISTAL FEMORAL COMPRESS: NORMAL
BH CV LOWER VASCULAR RIGHT GASTRONEMIUS COMPRESS: NORMAL
BH CV LOWER VASCULAR RIGHT GREATER SAPH AK COMPRESS: NORMAL
BH CV LOWER VASCULAR RIGHT GREATER SAPH BK COMPRESS: NORMAL
BH CV LOWER VASCULAR RIGHT LESSER SAPH COMPRESS: NORMAL
BH CV LOWER VASCULAR RIGHT MID FEMORAL AUGMENT: NORMAL
BH CV LOWER VASCULAR RIGHT MID FEMORAL COMPETENT: NORMAL
BH CV LOWER VASCULAR RIGHT MID FEMORAL COMPRESS: NORMAL
BH CV LOWER VASCULAR RIGHT MID FEMORAL PHASIC: NORMAL
BH CV LOWER VASCULAR RIGHT MID FEMORAL SPONT: NORMAL
BH CV LOWER VASCULAR RIGHT PERONEAL COMPRESS: NORMAL
BH CV LOWER VASCULAR RIGHT POPLITEAL AUGMENT: NORMAL
BH CV LOWER VASCULAR RIGHT POPLITEAL COMPETENT: NORMAL
BH CV LOWER VASCULAR RIGHT POPLITEAL COMPRESS: NORMAL
BH CV LOWER VASCULAR RIGHT POPLITEAL PHASIC: NORMAL
BH CV LOWER VASCULAR RIGHT POPLITEAL SPONT: NORMAL
BH CV LOWER VASCULAR RIGHT POSTERIOR TIBIAL COMPRESS: NORMAL
BH CV LOWER VASCULAR RIGHT PROXIMAL FEMORAL COMPRESS: NORMAL
BH CV LOWER VASCULAR RIGHT SAPHENOFEMORAL JUNCTION AUGMENT: NORMAL
BH CV LOWER VASCULAR RIGHT SAPHENOFEMORAL JUNCTION COMPETENT: NORMAL
BH CV LOWER VASCULAR RIGHT SAPHENOFEMORAL JUNCTION COMPRESS: NORMAL
BH CV LOWER VASCULAR RIGHT SAPHENOFEMORAL JUNCTION PHASIC: NORMAL
BH CV LOWER VASCULAR RIGHT SAPHENOFEMORAL JUNCTION SPONT: NORMAL
BH CV UPPER VENOUS LEFT AXILLARY AUGMENT: NORMAL
BH CV UPPER VENOUS LEFT AXILLARY COMPETENT: NORMAL
BH CV UPPER VENOUS LEFT AXILLARY COMPRESS: NORMAL
BH CV UPPER VENOUS LEFT AXILLARY PHASIC: NORMAL
BH CV UPPER VENOUS LEFT AXILLARY SPONT: NORMAL
BH CV UPPER VENOUS LEFT BASILIC FOREARM COMPRESS: NORMAL
BH CV UPPER VENOUS LEFT BASILIC UPPER COMPRESS: NORMAL
BH CV UPPER VENOUS LEFT BRACHIAL COMPRESS: NORMAL
BH CV UPPER VENOUS LEFT CEPHALIC FOREARM COMPRESS: NORMAL
BH CV UPPER VENOUS LEFT CEPHALIC UPPER COMPRESS: NORMAL
BH CV UPPER VENOUS LEFT INTERNAL JUGULAR AUGMENT: NORMAL
BH CV UPPER VENOUS LEFT INTERNAL JUGULAR COMPETENT: NORMAL
BH CV UPPER VENOUS LEFT INTERNAL JUGULAR COMPRESS: NORMAL
BH CV UPPER VENOUS LEFT INTERNAL JUGULAR PHASIC: NORMAL
BH CV UPPER VENOUS LEFT INTERNAL JUGULAR SPONT: NORMAL
BH CV UPPER VENOUS LEFT RADIAL COMPRESS: NORMAL
BH CV UPPER VENOUS LEFT SUBCLAVIAN AUGMENT: NORMAL
BH CV UPPER VENOUS LEFT SUBCLAVIAN COMPETENT: NORMAL
BH CV UPPER VENOUS LEFT SUBCLAVIAN COMPRESS: NORMAL
BH CV UPPER VENOUS LEFT SUBCLAVIAN PHASIC: NORMAL
BH CV UPPER VENOUS LEFT SUBCLAVIAN SPONT: NORMAL
BH CV UPPER VENOUS LEFT ULNAR COMPRESS: NORMAL
BH CV UPPER VENOUS RIGHT INTERNAL JUGULAR AUGMENT: NORMAL
BH CV UPPER VENOUS RIGHT INTERNAL JUGULAR COMPETENT: NORMAL
BH CV UPPER VENOUS RIGHT INTERNAL JUGULAR COMPRESS: NORMAL
BH CV UPPER VENOUS RIGHT INTERNAL JUGULAR PHASIC: NORMAL
BH CV UPPER VENOUS RIGHT INTERNAL JUGULAR SPONT: NORMAL
BH CV UPPER VENOUS RIGHT SUBCLAVIAN AUGMENT: NORMAL
BH CV UPPER VENOUS RIGHT SUBCLAVIAN COMPETENT: NORMAL
BH CV UPPER VENOUS RIGHT SUBCLAVIAN COMPRESS: NORMAL
BH CV UPPER VENOUS RIGHT SUBCLAVIAN PHASIC: NORMAL
BH CV UPPER VENOUS RIGHT SUBCLAVIAN SPONT: NORMAL
BILIRUB SERPL-MCNC: 0.7 MG/DL (ref 0.1–1.2)
BILIRUB UR QL STRIP: NEGATIVE
BUN BLD-MCNC: 63 MG/DL (ref 8–23)
BUN/CREAT SERPL: 37.5 (ref 7–25)
CALCIUM SPEC-SCNC: 9.3 MG/DL (ref 8.6–10.5)
CHLORIDE SERPL-SCNC: 98 MMOL/L (ref 98–107)
CLARITY UR: CLEAR
CO2 SERPL-SCNC: 26 MMOL/L (ref 22–29)
COLOR UR: YELLOW
CREAT BLD-MCNC: 1.68 MG/DL (ref 0.57–1)
DEPRECATED RDW RBC AUTO: 49.9 FL (ref 37–54)
EOSINOPHIL # BLD AUTO: 0.01 10*3/MM3 (ref 0–0.4)
EOSINOPHIL NFR BLD AUTO: 0.1 % (ref 0.3–6.2)
ERYTHROCYTE [DISTWIDTH] IN BLOOD BY AUTOMATED COUNT: 14.5 % (ref 12.3–15.4)
GFR SERPL CREATININE-BSD FRML MDRD: 29 ML/MIN/1.73
GLOBULIN UR ELPH-MCNC: 3.6 GM/DL
GLUCOSE BLD-MCNC: 128 MG/DL (ref 65–99)
GLUCOSE UR STRIP-MCNC: NEGATIVE MG/DL
HCT VFR BLD AUTO: 29.9 % (ref 34–46.6)
HGB BLD-MCNC: 9.2 G/DL (ref 12–15.9)
HGB UR QL STRIP.AUTO: ABNORMAL
HYALINE CASTS UR QL AUTO: NORMAL /LPF
IMM GRANULOCYTES # BLD AUTO: 0.07 10*3/MM3 (ref 0–0.05)
IMM GRANULOCYTES NFR BLD AUTO: 0.6 % (ref 0–0.5)
INR PPP: 9.97 (ref 0.9–1.1)
KETONES UR QL STRIP: NEGATIVE
LEUKOCYTE ESTERASE UR QL STRIP.AUTO: NEGATIVE
LYMPHOCYTES # BLD AUTO: 0.27 10*3/MM3 (ref 0.7–3.1)
LYMPHOCYTES NFR BLD AUTO: 2.4 % (ref 19.6–45.3)
MCH RBC QN AUTO: 29 PG (ref 26.6–33)
MCHC RBC AUTO-ENTMCNC: 30.8 G/DL (ref 31.5–35.7)
MCV RBC AUTO: 94.3 FL (ref 79–97)
MONOCYTES # BLD AUTO: 0.64 10*3/MM3 (ref 0.1–0.9)
MONOCYTES NFR BLD AUTO: 5.6 % (ref 5–12)
NEUTROPHILS # BLD AUTO: 10.34 10*3/MM3 (ref 1.4–7)
NEUTROPHILS NFR BLD AUTO: 91.2 % (ref 42.7–76)
NITRITE UR QL STRIP: NEGATIVE
NRBC BLD AUTO-RTO: 0 /100 WBC (ref 0–0)
NT-PROBNP SERPL-MCNC: 9545 PG/ML (ref 0–1800)
PH UR STRIP.AUTO: <=5 [PH] (ref 5–8)
PLATELET # BLD AUTO: 210 10*3/MM3 (ref 140–450)
PMV BLD AUTO: 10.3 FL (ref 6–12)
POTASSIUM BLD-SCNC: 4 MMOL/L (ref 3.5–5.2)
PROT SERPL-MCNC: 6.8 G/DL (ref 6–8.5)
PROT UR QL STRIP: NEGATIVE
PROTHROMBIN TIME: 78.4 SECONDS (ref 11.7–14.2)
RBC # BLD AUTO: 3.17 10*6/MM3 (ref 3.77–5.28)
RBC # UR: NORMAL /HPF
REF LAB TEST METHOD: NORMAL
SODIUM BLD-SCNC: 139 MMOL/L (ref 136–145)
SP GR UR STRIP: 1.02 (ref 1–1.03)
SQUAMOUS #/AREA URNS HPF: NORMAL /HPF
TROPONIN T SERPL-MCNC: 0.02 NG/ML (ref 0–0.03)
UROBILINOGEN UR QL STRIP: ABNORMAL
WBC NRBC COR # BLD: 11.34 10*3/MM3 (ref 3.4–10.8)
WBC UR QL AUTO: NORMAL /HPF

## 2019-02-15 PROCEDURE — 99222 1ST HOSP IP/OBS MODERATE 55: CPT | Performed by: INTERNAL MEDICINE

## 2019-02-15 PROCEDURE — 93971 EXTREMITY STUDY: CPT

## 2019-02-15 PROCEDURE — 85610 PROTHROMBIN TIME: CPT | Performed by: NURSE PRACTITIONER

## 2019-02-15 PROCEDURE — 25010000002 MORPHINE PER 10 MG: Performed by: NURSE PRACTITIONER

## 2019-02-15 PROCEDURE — 93970 EXTREMITY STUDY: CPT

## 2019-02-15 PROCEDURE — 93005 ELECTROCARDIOGRAM TRACING: CPT | Performed by: NURSE PRACTITIONER

## 2019-02-15 PROCEDURE — 99285 EMERGENCY DEPT VISIT HI MDM: CPT

## 2019-02-15 PROCEDURE — 25010000002 ONDANSETRON PER 1 MG: Performed by: NURSE PRACTITIONER

## 2019-02-15 PROCEDURE — 83880 ASSAY OF NATRIURETIC PEPTIDE: CPT | Performed by: NURSE PRACTITIONER

## 2019-02-15 PROCEDURE — 71046 X-RAY EXAM CHEST 2 VIEWS: CPT

## 2019-02-15 PROCEDURE — 84484 ASSAY OF TROPONIN QUANT: CPT | Performed by: NURSE PRACTITIONER

## 2019-02-15 PROCEDURE — 80053 COMPREHEN METABOLIC PANEL: CPT | Performed by: NURSE PRACTITIONER

## 2019-02-15 PROCEDURE — 85025 COMPLETE CBC W/AUTO DIFF WBC: CPT | Performed by: NURSE PRACTITIONER

## 2019-02-15 PROCEDURE — 81001 URINALYSIS AUTO W/SCOPE: CPT | Performed by: NURSE PRACTITIONER

## 2019-02-15 PROCEDURE — 93010 ELECTROCARDIOGRAM REPORT: CPT | Performed by: INTERNAL MEDICINE

## 2019-02-15 PROCEDURE — 85730 THROMBOPLASTIN TIME PARTIAL: CPT | Performed by: NURSE PRACTITIONER

## 2019-02-15 RX ORDER — BUMETANIDE 0.25 MG/ML
1 INJECTION INTRAMUSCULAR; INTRAVENOUS ONCE
Status: COMPLETED | OUTPATIENT
Start: 2019-02-15 | End: 2019-02-15

## 2019-02-15 RX ORDER — PANTOPRAZOLE SODIUM 40 MG/1
40 TABLET, DELAYED RELEASE ORAL
Status: DISCONTINUED | OUTPATIENT
Start: 2019-02-15 | End: 2019-02-22 | Stop reason: HOSPADM

## 2019-02-15 RX ORDER — SODIUM CHLORIDE 0.9 % (FLUSH) 0.9 %
3-10 SYRINGE (ML) INJECTION AS NEEDED
Status: DISCONTINUED | OUTPATIENT
Start: 2019-02-15 | End: 2019-02-22 | Stop reason: HOSPADM

## 2019-02-15 RX ORDER — RAMIPRIL 5 MG/1
5 CAPSULE ORAL DAILY
Status: DISCONTINUED | OUTPATIENT
Start: 2019-02-16 | End: 2019-02-22 | Stop reason: HOSPADM

## 2019-02-15 RX ORDER — ONDANSETRON 4 MG/1
4 TABLET, ORALLY DISINTEGRATING ORAL EVERY 6 HOURS PRN
Status: DISCONTINUED | OUTPATIENT
Start: 2019-02-15 | End: 2019-02-22 | Stop reason: HOSPADM

## 2019-02-15 RX ORDER — BUMETANIDE 0.25 MG/ML
2 INJECTION INTRAMUSCULAR; INTRAVENOUS EVERY 12 HOURS
Status: DISCONTINUED | OUTPATIENT
Start: 2019-02-15 | End: 2019-02-17

## 2019-02-15 RX ORDER — TRAMADOL HYDROCHLORIDE 50 MG/1
50 TABLET ORAL EVERY 6 HOURS PRN
Status: DISCONTINUED | OUTPATIENT
Start: 2019-02-15 | End: 2019-02-15

## 2019-02-15 RX ORDER — SODIUM CHLORIDE 0.9 % (FLUSH) 0.9 %
10 SYRINGE (ML) INJECTION AS NEEDED
Status: DISCONTINUED | OUTPATIENT
Start: 2019-02-15 | End: 2019-02-22 | Stop reason: HOSPADM

## 2019-02-15 RX ORDER — CARVEDILOL 25 MG/1
25 TABLET ORAL 2 TIMES DAILY
Status: DISCONTINUED | OUTPATIENT
Start: 2019-02-15 | End: 2019-02-22 | Stop reason: HOSPADM

## 2019-02-15 RX ORDER — ASPIRIN 81 MG/1
81 TABLET ORAL DAILY
Status: DISCONTINUED | OUTPATIENT
Start: 2019-02-16 | End: 2019-02-22 | Stop reason: HOSPADM

## 2019-02-15 RX ORDER — SODIUM CHLORIDE 0.9 % (FLUSH) 0.9 %
3 SYRINGE (ML) INJECTION EVERY 12 HOURS SCHEDULED
Status: DISCONTINUED | OUTPATIENT
Start: 2019-02-15 | End: 2019-02-22 | Stop reason: HOSPADM

## 2019-02-15 RX ORDER — CALCITRIOL 0.25 UG/1
0.25 CAPSULE, LIQUID FILLED ORAL DAILY
Status: DISCONTINUED | OUTPATIENT
Start: 2019-02-15 | End: 2019-02-22 | Stop reason: HOSPADM

## 2019-02-15 RX ORDER — ONDANSETRON 2 MG/ML
4 INJECTION INTRAMUSCULAR; INTRAVENOUS EVERY 6 HOURS PRN
Status: DISCONTINUED | OUTPATIENT
Start: 2019-02-15 | End: 2019-02-22 | Stop reason: HOSPADM

## 2019-02-15 RX ORDER — ATORVASTATIN CALCIUM 20 MG/1
20 TABLET, FILM COATED ORAL DAILY
Status: DISCONTINUED | OUTPATIENT
Start: 2019-02-15 | End: 2019-02-22 | Stop reason: HOSPADM

## 2019-02-15 RX ORDER — ONDANSETRON 2 MG/ML
4 INJECTION INTRAMUSCULAR; INTRAVENOUS ONCE
Status: COMPLETED | OUTPATIENT
Start: 2019-02-15 | End: 2019-02-15

## 2019-02-15 RX ORDER — MORPHINE SULFATE 2 MG/ML
4 INJECTION, SOLUTION INTRAMUSCULAR; INTRAVENOUS ONCE
Status: COMPLETED | OUTPATIENT
Start: 2019-02-15 | End: 2019-02-15

## 2019-02-15 RX ORDER — ACETAMINOPHEN 325 MG/1
650 TABLET ORAL EVERY 4 HOURS PRN
Status: DISCONTINUED | OUTPATIENT
Start: 2019-02-15 | End: 2019-02-22 | Stop reason: HOSPADM

## 2019-02-15 RX ORDER — ONDANSETRON 4 MG/1
4 TABLET, FILM COATED ORAL EVERY 6 HOURS PRN
Status: DISCONTINUED | OUTPATIENT
Start: 2019-02-15 | End: 2019-02-22 | Stop reason: HOSPADM

## 2019-02-15 RX ORDER — CHOLECALCIFEROL (VITAMIN D3) 125 MCG
1000 CAPSULE ORAL DAILY
Status: DISCONTINUED | OUTPATIENT
Start: 2019-02-15 | End: 2019-02-22 | Stop reason: HOSPADM

## 2019-02-15 RX ORDER — TRAMADOL HYDROCHLORIDE 50 MG/1
50 TABLET ORAL EVERY 12 HOURS PRN
Status: DISCONTINUED | OUTPATIENT
Start: 2019-02-15 | End: 2019-02-22 | Stop reason: HOSPADM

## 2019-02-15 RX ORDER — ZOLPIDEM TARTRATE 5 MG/1
10 TABLET ORAL NIGHTLY PRN
Status: DISCONTINUED | OUTPATIENT
Start: 2019-02-15 | End: 2019-02-22 | Stop reason: HOSPADM

## 2019-02-15 RX ADMIN — SODIUM CHLORIDE, PRESERVATIVE FREE 3 ML: 5 INJECTION INTRAVENOUS at 20:27

## 2019-02-15 RX ADMIN — Medication 1000 MCG: at 20:27

## 2019-02-15 RX ADMIN — MORPHINE SULFATE 4 MG: 2 INJECTION, SOLUTION INTRAMUSCULAR; INTRAVENOUS at 11:26

## 2019-02-15 RX ADMIN — ATORVASTATIN CALCIUM 20 MG: 20 TABLET, FILM COATED ORAL at 20:27

## 2019-02-15 RX ADMIN — CARVEDILOL 25 MG: 25 TABLET, FILM COATED ORAL at 20:27

## 2019-02-15 RX ADMIN — CALCITRIOL CAPSULES 0.25 MCG 0.25 MCG: 0.25 CAPSULE ORAL at 20:27

## 2019-02-15 RX ADMIN — TRAMADOL HYDROCHLORIDE 50 MG: 50 TABLET ORAL at 22:54

## 2019-02-15 RX ADMIN — BUMETANIDE 1 MG: 0.25 INJECTION INTRAMUSCULAR; INTRAVENOUS at 15:10

## 2019-02-15 RX ADMIN — ONDANSETRON HYDROCHLORIDE 4 MG: 2 SOLUTION INTRAMUSCULAR; INTRAVENOUS at 11:27

## 2019-02-15 RX ADMIN — BUMETANIDE 2 MG: 0.25 INJECTION INTRAMUSCULAR; INTRAVENOUS at 18:43

## 2019-02-15 NOTE — ED PROVIDER NOTES
EMERGENCY DEPARTMENT ENCOUNTER    CHIEF COMPLAINT  Chief Complaint: bilateral lower extremity pain  History given by:patient  History limited by:none  Time Seen: 1021  Room Number: 17/17  PMD: Ariel Matute MD      HPI:  Pt is a 78 y.o. female who presents with BLE pain that started 2 weeks ago, but worsened acutely earlier this morning. Per pt's family, pt went to see her Cardiologist 2 days ago and was given a dose of IV Lasix. Pt's lasix was then switched to bumex. Patient also complains of mild SOA, chronic BLE swelling, pain and swelling in the hands and arms bilaterally, generalized myalgias, and decreased urination. Patient denies CP. Pt is currently taking coumadin for hx of a-fib.   Past Medical History of A Fib, HTN, Anemia, CHF, Renal insufficiency    Duration: 2 weeks  Timing: constant  Location:BLE  Radiation:none  Quality:BLE pain  Intensity/Severity:moderate  Progression: worsening  Associated Symptoms: mild SOA, chronic BLE swelling, pain and swelling in the hands and arms bilaterally, generalized myalgias, and decreased urination  Aggravating Factors:none  Alleviating Factors:none  Previous Episodes:none  Treatment before arrival:none    PAST MEDICAL HISTORY  Active Ambulatory Problems     Diagnosis Date Noted   • Anemia in stage 3 chronic kidney disease (CMS/Formerly [facility]) 02/12/2016   • Thrombocytopenia (CMS/Formerly [facility]) 05/17/2016   • B12 deficiency 05/17/2016   • Coronary artery disease involving coronary bypass graft of native heart without angina pectoris 06/08/2016   • S/P MVR (mitral valve replacement) 06/08/2016   • Chronic atrial fibrillation (CMS/Formerly [facility]) 06/08/2016   • Bilateral carotid artery disease (CMS/Formerly [facility]) 06/08/2016   • Intractable low back pain 08/02/2016   • Long-term (current) use of anticoagulants 08/16/2016   • Low back pain 08/18/2016   • Osteoporosis 09/01/2016   • Murmur, heart 09/01/2016   • GEORGINA on auto CPAP - Dr Cardona 09/08/2016   • Hypersomnia due to medical condition - GEORGINA  09/08/2016   • Esophageal stricture 09/06/2017   • Acute blood loss anemia 09/26/2017   • Dysphagia 09/26/2017   • Closed fracture of left proximal humerus 12/24/2017   • Hypertension 12/24/2017   • Supratherapeutic INR 12/24/2017   • Chronic combined systolic and diastolic congestive heart failure (CMS/HCC) 12/24/2017   • Osteoporosis with pathological fracture 12/24/2017   • Closed 3-part fracture of proximal end of left humerus 01/10/2018   • Closed fracture of proximal end of humerus with delayed healing 01/16/2018   • Injury of right knee 11/12/2018   • Knee injury, left, initial encounter 11/12/2018   • History of fall 11/12/2018   • S/P TKR (total knee replacement) using cement, left 11/12/2018   • Ischemic cardiomyopathy 11/13/2018   • Intramuscular hematoma right pecotralis 11/23/2018   • Hemorrhagic disorder due to extrinsic circulating anticoagulants (CMS/HCC) 11/23/2018   • Acute posthemorrhagic anemia 11/23/2018   • Chronic kidney disease, stage 3 (CMS/HCC) 11/23/2018   • Acute kidney injury (CMS/HCC) 11/25/2018     Resolved Ambulatory Problems     Diagnosis Date Noted   • No Resolved Ambulatory Problems     Past Medical History:   Diagnosis Date   • Acute kidney injury (CMS/HCC)    • Anemia    • Atrial fibrillation (CMS/HCC)    • Bruises easily    • Carotid artery stenosis    • Chronic back pain    • Chronic combined systolic and diastolic congestive heart failure (CMS/HCC)    • Chronic coronary artery disease    • Chronic kidney disease, stage 3 (CMS/HCC)    • Dysphagia    • GERD (gastroesophageal reflux disease)    • H/O cardiac murmur    • [place] (hard of hearing)    • Hyperlipidemia    • Hypertension    • Hypotension    • Ischemic cardiomyopathy    • Kyphoscoliosis    • Leukopenia    • Lumbar spondylosis    • Obesity    • GEORGINA (obstructive sleep apnea)    • Osteoarthritis    • Osteoporosis    • Peptic ulcer    • Premature ventricular contractions    • Renal insufficiency syndrome    • Scoliosis    •  Shoulder fracture, left    • Stroke syndrome    • Thrombocytopenia (CMS/HCC)    • Ventricular tachycardia (CMS/HCC)    • Vitamin B12 deficiency        PAST SURGICAL HISTORY  Past Surgical History:   Procedure Laterality Date   • AV NODE ABLATION     • BREAST BIOPSY     • CARDIAC CATHETERIZATION      Showed severe mitral insufficiency and borderline coronary artery disease   • CARDIAC CATHETERIZATION      Showed an ejection fraction of 35%. She had occlusive disease of the right posterior LV branch and no other significant disease, treated medically.   • CARDIAC DEFIBRILLATOR PLACEMENT      Biventricular   • CARDIAC ELECTROPHYSIOLOGY PROCEDURE N/A 1/4/2019    Procedure: GENERATOR CHANGE BI-V ICD   [place];  Surgeon: James Hwang MD;  Location: [facility] CATH INVASIVE LOCATION;  Service: Cardiology   • CARDIAC VALVE REPLACEMENT  2009    Done with stent placement   • CARDIOVERSION      multiple electrocardioversions.   • CAROTID ARTERY ANGIOPLASTY Right    • COLONOSCOPY N/A 9/28/2017    Procedure: COLONOSCOPY TO CECUM;  Surgeon: Kevin Davis MD;  Location: [facility] ENDOSCOPY;  Service:    • CORONARY ANGIOPLASTY WITH STENT PLACEMENT  2009   • CORONARY ARTERY BYPASS GRAFT      single graft to the PDA   • CORONARY STENT PLACEMENT     • ENDOSCOPY N/A 9/28/2017    Procedure: ESOPHAGOGASTRODUODENOSCOPY ;  Surgeon: Kevin Davis MD;  Location: [facility] ENDOSCOPY;  Service:    • HEMORRHOIDECTOMY     • HYSTERECTOMY     • INCISION AND DRAINAGE TRUNK Right 11/27/2018    Procedure: EVACUATION OF RIGHT CHEST WALL HEMATOMA;  Surgeon: Juvencio Rodriguez MD;  Location: [facility] OR;  Service: General   • MITRAL VALVE REPLACEMENT  01/2010    #31 Epic porcine valve.   • THROMBOENDARTERECTOMY Right     carotid thromboendarterectomy    • TONSILLECTOMY      age 32   • TOTAL KNEE ARTHROPLASTY Left    • TOTAL SHOULDER ARTHROPLASTY W/ DISTAL CLAVICLE EXCISION Left 1/16/2018    Procedure: TOTAL SHOULDER REVERSE ARTHROPLASTY;  Surgeon:  Bianka Quesada MD;  Location: [facility] OR;  Service:        FAMILY HISTORY  Family History   Problem Relation Age of Onset   • Cancer Mother    • Breast cancer Mother    • Heart disease Mother    • Hypertension Mother    • Coronary artery disease Father    • Stroke Father    • Heart disease Father    • Hypertension Father    • Other Daughter         thiamin deficiency    • Hypertension Son    • Malig Hyperthermia Neg Hx        SOCIAL HISTORY  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     Employer: RETIRED   Tobacco Use   • Smoking status: Former Smoker     Packs/day: 1.00     Years: 50.00     Pack years: 50.00     Types: Cigarettes     Last attempt to quit: 1/11/2009     Years since quitting: 10.1   • Smokeless tobacco: Never Used   Substance and Sexual Activity   • Alcohol use: Yes     Comment: rare   • Drug use: No   • Sexual activity: Defer   Other Topics Concern   • Not on file   Social History Narrative   • Not on file         ALLERGIES  Diclofenac and Dofetilide    REVIEW OF SYSTEMS  Review of Systems   Constitutional: Negative for chills and fever.   HENT: Negative for sore throat.    Respiratory: Positive for shortness of breath ( mild).    Cardiovascular: Positive for leg swelling ( BLE, chronic). Negative for chest pain.   Gastrointestinal: Negative for nausea and vomiting.   Genitourinary: Positive for decreased urine volume. Negative for dysuria.   Musculoskeletal: Positive for myalgias ( BLE and BUE, generalized). Negative for back pain.    Skin: Negative for rash.   Neurological: Negative for dizziness.   Psychiatric/Behavioral: The patient is not nervous/anxious.        PHYSICAL EXAM  ED Triage Vitals [02/15/19 1005]   Temp Heart Rate Resp BP SpO2   98.8 °F (37.1 °C) 72 16 149/80 96 %       Physical Exam   Constitutional: She is well-developed, well-nourished, and in no distress.   Patient [place]   HENT:   Head: Normocephalic.   Mouth/Throat: Mucous membranes are normal.   Eyes: No scleral icterus.   Neck: Normal range of motion.   Cardiovascular: Normal rate, regular rhythm and normal heart sounds.   Pulmonary/Chest: Effort normal and breath sounds normal.   Abdominal: Soft. Normal appearance and bowel sounds are normal. There is no tenderness.   Musculoskeletal: Normal range of motion.   Left hand and arm swelling and tenderness  Bilateral lower legs 2+ pitting edema.   Neurological: She is alert.   Skin: Skin is warm and dry.   Psychiatric: Mood and affect normal.   Nursing note and vitals reviewed.      LAB RESULTS  Recent Results (from the past 24 hour(s))   Comprehensive Metabolic Panel    Collection Time: 02/15/19 10:41 AM   Result Value Ref Range    Glucose 128 (H) 65 - 99 mg/dL    BUN 63 (H) 8 - 23 mg/dL    Creatinine 1.68 (H) 0.57 - 1.00 mg/dL    Sodium 139 136 - 145 mmol/L    Potassium 4.0 3.5 - 5.2 mmol/L    Chloride 98 98 - 107 mmol/L    CO2 26.0 22.0 - 29.0 mmol/L    Calcium 9.3 8.6 - 10.5 mg/dL    Total Protein 6.8 6.0 - 8.5 g/dL    Albumin 3.20 (L) 3.50 - 5.20 g/dL    ALT (SGPT) 78 (H) 1 - 33 U/L    AST (SGOT) 71 (H) 1 - 32 U/L    Alkaline Phosphatase 112 39 - 117 U/L    Total Bilirubin 0.7 0.1 - 1.2 mg/dL    eGFR Non African Amer 29 (L) >60 mL/min/1.73    Globulin 3.6 gm/dL    A/G Ratio 0.9 g/dL    BUN/Creatinine Ratio 37.5 (H) 7.0 - 25.0    Anion Gap 15.0 mmol/L   Troponin    Collection Time: 02/15/19 10:41 AM   Result Value Ref Range    Troponin T 0.019 0.000 - 0.030 ng/mL   BNP    Collection Time: 02/15/19 10:41 AM   Result Value  Ref Range    proBNP 9,545.0 (H) 0.0-1,800.0 pg/mL   CBC Auto Differential    Collection Time: 02/15/19 10:41 AM   Result Value Ref Range    WBC 11.34 (H) 3.40 - 10.80 10*3/mm3    RBC 3.17 (L) 3.77 - 5.28 10*6/mm3    Hemoglobin 9.2 (L) 12.0 - 15.9 g/dL    Hematocrit 29.9 (L) 34.0 - 46.6 %    MCV 94.3 79.0 - 97.0 fL    MCH 29.0 26.6 - 33.0 pg    MCHC 30.8 (L) 31.5 - 35.7 g/dL    RDW 14.5 12.3 - 15.4 %    RDW-SD 49.9 37.0 - 54.0 fl    MPV 10.3 6.0 - 12.0 fL    Platelets 210 140 - 450 10*3/mm3    Neutrophil % 91.2 (H) 42.7 - 76.0 %    Lymphocyte % 2.4 (L) 19.6 - 45.3 %    Monocyte % 5.6 5.0 - 12.0 %    Eosinophil % 0.1 (L) 0.3 - 6.2 %    Basophil % 0.1 0.0 - 1.5 %    Immature Grans % 0.6 (H) 0.0 - 0.5 %    Neutrophils, Absolute 10.34 (H) 1.40 - 7.00 10*3/mm3    Lymphocytes, Absolute 0.27 (L) 0.70 - 3.10 10*3/mm3    Monocytes, Absolute 0.64 0.10 - 0.90 10*3/mm3    Eosinophils, Absolute 0.01 0.00 - 0.40 10*3/mm3    Basophils, Absolute 0.01 0.00 - 0.20 10*3/mm3    Immature Grans, Absolute 0.07 (H) 0.00 - 0.05 10*3/mm3    nRBC 0.0 0.0 - 0.0 /100 WBC   Urinalysis With Microscopic If Indicated (No Culture) - Urine, Clean Catch    Collection Time: 02/15/19 11:33 AM   Result Value Ref Range    Color, UA Yellow Yellow, Straw    Appearance, UA Clear Clear    pH, UA <=5.0 5.0 - 8.0    Specific Gravity, UA 1.017 1.005 - 1.030    Glucose, UA Negative Negative    Ketones, UA Negative Negative    Bilirubin, UA Negative Negative    Blood, UA Small (1+) (A) Negative    Protein, UA Negative Negative    Leuk Esterase, UA Negative Negative    Nitrite, UA Negative Negative    Urobilinogen, UA 0.2 E.U./dL 0.2 - 1.0 E.U./dL   Urinalysis, Microscopic Only - Urine, Clean Catch    Collection Time: 02/15/19 11:33 AM   Result Value Ref Range    RBC, UA 0-2 None Seen, 0-2 /HPF    WBC, UA 0-2 None Seen, 0-2 /HPF    Bacteria, UA None Seen None Seen /HPF    Squamous Epithelial Cells, UA 0-2 None Seen, 0-2 /HPF    Hyaline Casts, UA 0-2 None Seen /LPF  "   Methodology Automated Microscopy        I ordered the above labs and reviewed the results.    RADIOLOGY  XR Chest 2 View   Preliminary Result   Cardiomegaly. No interval change or evidence for active   disease in the chest.              I ordered the above noted radiological studies and reviewed the images on the PACS system.    PROGRESS AND CONSULTS    1207 Pt's care turned over to Dr. Lovelace.     MEDICATIONS GIVEN IN ER  Medications   sodium chloride 0.9 % flush 10 mL (not administered)   morphine injection 4 mg (4 mg Intravenous Given 2/15/19 1126)   ondansetron (ZOFRAN) injection 4 mg (4 mg Intravenous Given 2/15/19 1127)       /54   Pulse 60   Temp 98.8 °F (37.1 °C) (Tympanic)   Resp 16   Ht 162.6 cm (64\")   Wt 72.4 kg (159 lb 9.6 oz)   SpO2 98%   BMI 27.40 kg/m²       I personally reviewed the past medical history, past surgical history, social history, family history, current medications and allergies as they appear in this chart.  The scribe's note accurately reflects the work and decisions made by me.     Documentation assistance provided by foreign Doe for GUNJAN Valentino on 2/15/2019 at 12:10 PM. Information recorded by the scribe was done at my direction and has been verified and validated by me.          Woodrow Doe  02/15/19 1211       Tash Mckenzie APRN  02/15/19 1253    "

## 2019-02-15 NOTE — ED NOTES
EMS reports the pt has bilateral lower extremity pitting edema, family called 911 because the patient would not walk d/t pain.     Ebony Sanders, RN  02/15/19 0305

## 2019-02-15 NOTE — H&P
HISTORY AND PHYSICAL   [facility]        Patient Identification:  Name: Janel Mahoney  Age: 78 y.o.  Sex: female  :  1940  MRN: [id number]                     Primary Care Physician: Ariel Matute MD    Chief Complaint:  Swelling and diffuse pain    History of Present Illness:       The patient is a 78-year-old white female with history of atrial fibrillation, ischemic cardiomyopathy, coronary artery disease, GERD, hard of hearing, hypertension, hyperlipidemia, obstructive sleep apnea, osteoporosis and history of CHF who was admitted with about a week history of increased edema in the lower extremities.  She has been a little short of breath more than usual.  She denies having any chest pain.  She has not had any nausea or vomiting.  She is seen her cardiologist and they gave her some additional diuretics but she was not  improving any.  The patient was evaluated in the ER and appeared to be fluid overloaded and was given some IV diuretics and is being admitted for further evaluation and treatment.    Past Medical History:  Past Medical History:   Diagnosis Date   • Acute kidney injury (CMS/HCC)    • Anemia    • Atrial fibrillation (CMS/HCC)    • Bruises easily    • Carotid artery stenosis    • Chronic back pain    • Chronic combined systolic and diastolic congestive heart failure (CMS/HCC)    • Chronic coronary artery disease     moderate to severe LV dysfunction.   • Chronic kidney disease, stage 3 (CMS/HCC)    • Dysphagia    • GERD (gastroesophageal reflux disease)    • H/O cardiac murmur    • [place] (hard of hearing)     wears hearing aids   • Hyperlipidemia    • Hypertension    • Hypotension    • Ischemic cardiomyopathy    • Kyphoscoliosis    • Leukopenia    • Lumbar spondylosis    • Obesity    • GEORGINA (obstructive sleep apnea)    • Osteoarthritis    • Osteoporosis    • Peptic ulcer    • Premature ventricular contractions    • Renal insufficiency syndrome    • Scoliosis    • Shoulder  fracture, left    • Stroke syndrome    • Thrombocytopenia (CMS/HCC)    • Ventricular tachycardia (CMS/HCC)    • Vitamin B12 deficiency      Past Surgical History:  Past Surgical History:   Procedure Laterality Date   • AV NODE ABLATION     • BREAST BIOPSY     • CARDIAC CATHETERIZATION      Showed severe mitral insufficiency and borderline coronary artery disease   • CARDIAC CATHETERIZATION      Showed an ejection fraction of 35%. She had occlusive disease of the right posterior LV branch and no other significant disease, treated medically.   • CARDIAC DEFIBRILLATOR PLACEMENT      Biventricular   • CARDIAC ELECTROPHYSIOLOGY PROCEDURE N/A 1/4/2019    Procedure: GENERATOR CHANGE BI-V ICD   [place];  Surgeon: James Hwang MD;  Location: [facility] CATH INVASIVE LOCATION;  Service: Cardiology   • CARDIAC VALVE REPLACEMENT  2009    Done with stent placement   • CARDIOVERSION      multiple electrocardioversions.   • CAROTID ARTERY ANGIOPLASTY Right    • COLONOSCOPY N/A 9/28/2017    Procedure: COLONOSCOPY TO CECUM;  Surgeon: Kevin Davis MD;  Location: [facility] ENDOSCOPY;  Service:    • CORONARY ANGIOPLASTY WITH STENT PLACEMENT  2009   • CORONARY ARTERY BYPASS GRAFT      single graft to the PDA   • CORONARY STENT PLACEMENT     • ENDOSCOPY N/A 9/28/2017    Procedure: ESOPHAGOGASTRODUODENOSCOPY ;  Surgeon: Kevin Davis MD;  Location: [facility] ENDOSCOPY;  Service:    • HEMORRHOIDECTOMY     • HYSTERECTOMY     • INCISION AND DRAINAGE TRUNK Right 11/27/2018    Procedure: EVACUATION OF RIGHT CHEST WALL HEMATOMA;  Surgeon: Juvencio Rodriguez MD;  Location: [facility] OR;  Service: General   • MITRAL VALVE REPLACEMENT  01/2010    #31 Epic porcine valve.   • THROMBOENDARTERECTOMY Right     carotid thromboendarterectomy    • TONSILLECTOMY      age 32   • TOTAL KNEE ARTHROPLASTY Left    • TOTAL SHOULDER ARTHROPLASTY W/ DISTAL CLAVICLE EXCISION Left 1/16/2018    Procedure: TOTAL SHOULDER REVERSE ARTHROPLASTY;  Surgeon: Bianka  JURGEN Quesada MD;  Location: [facility] OR;  Service:       Home Meds:    (Not in a hospital admission)  Current meds    Current Facility-Administered Medications:   •  [COMPLETED] Insert peripheral IV, , , Once **AND** sodium chloride 0.9 % flush 10 mL, 10 mL, Intravenous, PRN, Tash Mckenzie, APRN    Current Outpatient Medications:   •  alendronate (FOSAMAX) 70 MG tablet, Take 70 mg by mouth Every 14 (Fourteen) Days., Disp: , Rfl:   •  aspirin 81 MG tablet, Take 81 mg by mouth Daily., Disp: , Rfl:   •  bumetanide (BUMEX) 2 MG tablet, Take 1 tablet by mouth 2 (Two) Times a Day., Disp: 60 tablet, Rfl: 11  •  calcitriol (ROCALTROL) 0.25 MCG capsule, Take 0.25 mcg by mouth Daily., Disp: , Rfl:   •  carvedilol (COREG) 25 MG tablet, TAKE 1 TABLET TWICE DAILY, Disp: 180 tablet, Rfl: 3  •  cephalexin (KEFLEX) 500 MG capsule, Take 1 capsule by mouth 3 (Three) Times a Day., Disp: 30 capsule, Rfl: 0  •  Cholecalciferol (VITAMIN D) 2000 UNITS tablet, Take 1 tablet by mouth daily., Disp: , Rfl:   •  epoetin [place] (EPOGEN,PROCRIT) 55459 UNIT/ML injection, Inject 10,000 Units under the skin into the appropriate area as directed As Needed., Disp: , Rfl:   •  ferrous sulfate 325 (65 FE) MG tablet, Take 1 tablet by mouth Daily With Breakfast., Disp: 30 tablet, Rfl: 0  •  HAVRIX 1440 EL U/ML vaccine, , Disp: , Rfl:   •  Multiple Vitamins-Minerals (SENIOR MULTIVITAMIN PLUS) tablet, Take 1 tablet by mouth Daily., Disp: , Rfl:   •  pantoprazole (PROTONIX) 40 MG EC tablet, TAKE 1 TABLET TWICE DAILY, Disp: 180 tablet, Rfl: 0  •  predniSONE (DELTASONE) 20 MG tablet, Take 1 tablet by mouth 2 (Two) Times a Day., Disp: 10 tablet, Rfl: 0  •  ramipril (ALTACE) 2.5 MG capsule, Take 5 mg by mouth Daily., Disp: , Rfl:   •  simvastatin (ZOCOR) 40 MG tablet, TAKE 1 TABLET EVERY DAY (Patient taking differently: pt reports taking 1/2 tablet nightly), Disp: 90 tablet, Rfl: 1  •  traMADol (ULTRAM) 50 MG tablet, Take 50 mg by mouth At Night As  Needed., Disp: , Rfl:   •  traMADol (ULTRAM) 50 MG tablet, TAKE 2 TABLETS EVERY MORNING  AND TAKE 2 TABLETS AT BEDTIME, Disp: 120 tablet, Rfl: 5  •  vitamin B-12 (CYANOCOBALAMIN) 2500 MCG sublingual tablet tablet, Place 2,500 mcg under the tongue Daily., Disp: , Rfl:   •  warfarin (COUMADIN) 2 MG tablet, Take 1 tablet by mouth Every Night. (Patient taking differently: Take 2 mg by mouth Every Night. tablet), Disp: 30 tablet, Rfl: 0  •  zolpidem (AMBIEN) 10 MG tablet, Take 1 tablet by mouth At Night As Needed for Sleep. 3 months, Disp: 90 tablet, Rfl: 1  Allergies:  Allergies   Allergen Reactions   • Diclofenac      She had adverse effects from the medications that required hospitalization. Pt got stomach ulcers from this medication prescribed by Dr. Arango - pediatrist.   • Dofetilide      Pt states not allergic.      Immunizations:  Immunization History   Administered Date(s) Administered   • Flu Vaccine High Dose PF 65YR+ 01/05/2017   • Influenza Quad Vaccine (Inpatient) 09/29/2017     Social History:   Social History     Social History Narrative   • Not on file     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     Employer: RETIRED   Tobacco Use   • Smoking status: Former Smoker     Packs/day: 1.00     Years: 50.00     Pack years: 50.00     Types: Cigarettes     Last attempt to quit: 1/11/2009     Years since quitting: 10.1   • Smokeless tobacco: Never Used   Substance and Sexual Activity   • Alcohol use: Yes     Comment: rare   • Drug use: No   • Sexual  "activity: Defer   Other Topics Concern   • Not on file   Social History Narrative   • Not on file       Family History:  Family History   Problem Relation Age of Onset   • Cancer Mother    • Breast cancer Mother    • Heart disease Mother    • Hypertension Mother    • Coronary artery disease Father    • Stroke Father    • Heart disease Father    • Hypertension Father    • Other Daughter         thiamin deficiency    • Hypertension Son    • Malig Hyperthermia Neg Hx         Review of Systems  See history of present illness and past medical history.  Patient denies headache, dizziness, syncope, falls, trauma, change in vision, change in hearing, change in taste, changes in weight, changes in appetite, focal weakness, numbness, or paresthesia.  Patient denies chest pain, palpitations,  orthopnea, PND, cough, sinus pressure, rhinorrhea, epistaxis, hemoptysis, nausea, vomiting, hematemesis, diarrhea, constipation or hematchezia.  Denies cold or heat intolerance, polydipsia, polyuria, polyphagia. Denies hematuria, pyuria, dysuria, hesitancy, frequency or urgency.   Denies fever, chills, sweats, night sweats.  Denies missing any routine medications. Remainder of ROS is negative.    Objective:  tMax 24 hrs: Temp (24hrs), Av.8 °F (37.1 °C), Min:98.8 °F (37.1 °C), Max:98.8 °F (37.1 °C)    Vitals Ranges:   Temp:  [98.8 °F (37.1 °C)] 98.8 °F (37.1 °C)  Heart Rate:  [60-72] 60  Resp:  [16] 16  BP: (133-165)/(48-94) 150/94      Exam:  /94   Pulse 60   Temp 98.8 °F (37.1 °C) (Tympanic)   Resp 16   Ht 162.6 cm (64\")   Wt 72.4 kg (159 lb 9.6 oz)   SpO2 98%   BMI 27.40 kg/m²     General Appearance:    Alert, cooperative, no distress, appears stated age   Head:    Normocephalic, without obvious abnormality, atraumatic   Eyes:    PERRL, conjunctiva/corneas clear, EOM's intact, both eyes   Ears:    Normal external ear canals, both ears   Nose:   Nares normal, septum midline, mucosa normal, no drainage    or sinus " tenderness   Throat:   Lips, mucosa, and tongue normal   Neck:   Supple, symmetrical, trachea midline, no adenopathy;     thyroid:  no enlargement/tenderness/nodules; no carotid    bruit or JVD   Back:     Symmetric, no curvature, ROM normal, no CVA tenderness   Lungs:    Crackles bilaterally, respirations unlabored   Chest Wall:    No tenderness or deformity    Heart:    Regular rate and rhythm, S1 and S2 normal, no murmur, rub   or gallop   Abdomen:     Soft, non-tender, bowel sounds active all four quadrants,     no masses, no hepatomegaly, no splenomegaly   Extremities:   Extremities normal, atraumatic, no cyanosis, leg edema with venous stasis changes   Pulses:   2+ and symmetric all extremities   Skin:   Skin color, texture, turgor normal, no rashes or lesions   Lymph nodes:   Cervical, supraclavicular, and axillary nodes normal   Neurologic:   CNII-XII intact, normal strength, sensation intact throughout      .    Data Review:  Lab Results (last 72 hours)     Procedure Component Value Units Date/Time    Protime-INR [[id number]]  (Abnormal) Collected:  02/15/19 1143    Specimen:  Blood Updated:  02/15/19 1217     Protime 78.4 Seconds      INR 9.97    aPTT [[id number]]  (Abnormal) Collected:  02/15/19 1143    Specimen:  Blood Updated:  02/15/19 1217     PTT 79.1 seconds     Urinalysis, Microscopic Only - Urine, Clean Catch [[id number]] Collected:  02/15/19 1133    Specimen:  Urine, Clean Catch Updated:  02/15/19 1156     RBC, UA 0-2 /HPF      WBC, UA 0-2 /HPF      Bacteria, UA None Seen /HPF      Squamous Epithelial Cells, UA 0-2 /HPF      Hyaline Casts, UA 0-2 /LPF      Methodology Automated Microscopy    Urinalysis With Microscopic If Indicated (No Culture) - Urine, Clean Catch [[id number]]  (Abnormal) Collected:  02/15/19 1133    Specimen:  Urine, Clean Catch Updated:  02/15/19 1156     Color, UA Yellow     Appearance, UA Clear     pH, UA <=5.0     Specific Gravity, UA 1.017     Glucose, UA Negative     Ketones,  UA Negative     Bilirubin, UA Negative     Blood, UA Small (1+)     Protein, UA Negative     Leuk Esterase, UA Negative     Nitrite, UA Negative     Urobilinogen, UA 0.2 E.U./dL    Comprehensive Metabolic Panel [[id number]]  (Abnormal) Collected:  02/15/19 1041    Specimen:  Blood Updated:  02/15/19 1126     Glucose 128 mg/dL      BUN 63 mg/dL      Creatinine 1.68 mg/dL      Sodium 139 mmol/L      Potassium 4.0 mmol/L      Chloride 98 mmol/L      CO2 26.0 mmol/L      Calcium 9.3 mg/dL      Total Protein 6.8 g/dL      Albumin 3.20 g/dL      ALT (SGPT) 78 U/L      AST (SGOT) 71 U/L      Alkaline Phosphatase 112 U/L      Total Bilirubin 0.7 mg/dL      eGFR Non African Amer 29 mL/min/1.73      Globulin 3.6 gm/dL      A/G Ratio 0.9 g/dL      BUN/Creatinine Ratio 37.5     Anion Gap 15.0 mmol/L     Narrative:       The MDRD GFR formula is only valid for adults with stable renal function between ages 18 and 70.    Troponin [[id number]]  (Normal) Collected:  02/15/19 1041    Specimen:  Blood Updated:  02/15/19 1126     Troponin T 0.019 ng/mL     Narrative:       Troponin T Reference Range:  <= 0.03 ng/mL-   Negative for AMI  >0.03 ng/mL-     Abnormal for myocardial necrosis.  Clinicians would have to utilize clinical acumen, EKG, Troponin and serial changes to determine if it is an Acute Myocardial Infarction or myocardial injury due to an underlying chronic condition.     BNP [[id number]]  (Abnormal) Collected:  02/15/19 1041    Specimen:  Blood Updated:  02/15/19 1124     proBNP 9,545.0 pg/mL     Narrative:       Among patients with dyspnea, NT-proBNP is highly sensitive for the detection of acute congestive heart failure. In addition NT-proBNP of <300 pg/ml effectively rules out acute congestive heart failure with 99% negative predictive value.    CBC & Differential [[id number]] Collected:  02/15/19 1041    Specimen:  Blood Updated:  02/15/19 1109    Narrative:       The following orders were created for panel order CBC &  Differential.  Procedure                               Abnormality         Status                     ---------                               -----------         ------                     CBC Auto Differential[[id number]]        Abnormal            Final result                 Please view results for these tests on the individual orders.    CBC Auto Differential [[id number]]  (Abnormal) Collected:  02/15/19 1041    Specimen:  Blood Updated:  02/15/19 1109     WBC 11.34 10*3/mm3      RBC 3.17 10*6/mm3      Hemoglobin 9.2 g/dL      Hematocrit 29.9 %      MCV 94.3 fL      MCH 29.0 pg      MCHC 30.8 g/dL      RDW 14.5 %      RDW-SD 49.9 fl      MPV 10.3 fL      Platelets 210 10*3/mm3      Neutrophil % 91.2 %      Lymphocyte % 2.4 %      Monocyte % 5.6 %      Eosinophil % 0.1 %      Basophil % 0.1 %      Immature Grans % 0.6 %      Neutrophils, Absolute 10.34 10*3/mm3      Lymphocytes, Absolute 0.27 10*3/mm3      Monocytes, Absolute 0.64 10*3/mm3      Eosinophils, Absolute 0.01 10*3/mm3      Basophils, Absolute 0.01 10*3/mm3      Immature Grans, Absolute 0.07 10*3/mm3      nRBC 0.0 /100 WBC                    Imaging Results (all)     Procedure Component Value Units Date/Time    XR Chest 2 View [[id number]] Collected:  02/15/19 1207     Updated:  02/15/19 1221    Narrative:       AP AND LATERAL CHEST     HISTORY: 78-year-old with weakness, and arm and leg swelling.      COMPARISON: Two-view chest 05/10/2018.      FINDINGS: Left subclavian cardiac pacer/defibrillator, sternotomy wires,  reversed left shoulder arthroplasty, and cardiac monitoring leads are  present. The heart size is enlarged and the cardiomediastinal silhouette  does not appear changed. There is a calcified granuloma in the left  lower lobe. Lungs appear clear of focal airspace disease. There is no  evidence for pulmonary edema or pleural effusion or significant change  compared to previous imaging. There is advanced osteoarthritis of the  right shoulder.        Impression:       Cardiomegaly. No interval change or evidence for active  disease in the chest.     This report was finalized on 2/15/2019 12:18 PM by Dr. Ty Santoro M.D.           Past Medical History:   Diagnosis Date   • Acute kidney injury (CMS/HCC)    • Anemia    • Atrial fibrillation (CMS/HCC)    • Bruises easily    • Carotid artery stenosis    • Chronic back pain    • Chronic combined systolic and diastolic congestive heart failure (CMS/HCC)    • Chronic coronary artery disease     moderate to severe LV dysfunction.   • Chronic kidney disease, stage 3 (CMS/HCC)    • Dysphagia    • GERD (gastroesophageal reflux disease)    • H/O cardiac murmur    • [place] (hard of hearing)     wears hearing aids   • Hyperlipidemia    • Hypertension    • Hypotension    • Ischemic cardiomyopathy    • Kyphoscoliosis    • Leukopenia    • Lumbar spondylosis    • Obesity    • GEORGINA (obstructive sleep apnea)    • Osteoarthritis    • Osteoporosis    • Peptic ulcer    • Premature ventricular contractions    • Renal insufficiency syndrome    • Scoliosis    • Shoulder fracture, left    • Stroke syndrome    • Thrombocytopenia (CMS/HCC)    • Ventricular tachycardia (CMS/HCC)    • Vitamin B12 deficiency        Assessment:  Active Hospital Problems    Diagnosis Date Noted   • **Peripheral edema [R60.9] 02/15/2019   • Chronic kidney disease, stage 3 (CMS/HCC) [N18.3] 11/23/2018   • Ischemic cardiomyopathy [I25.5] 11/13/2018   • Hypertension [I10] 12/24/2017   • Supratherapeutic INR [R79.1] 12/24/2017   • Esophageal stricture [K22.2] 09/06/2017   • GEORGINA on auto CPAP - Dr Cardona [G47.33, Z99.89] 09/08/2016   • Long-term (current) use of anticoagulants [Z79.01] 08/16/2016   • Chronic atrial fibrillation (CMS/HCC) [I48.2] 06/08/2016   • S/P MVR (mitral valve replacement) [Z95.2] 06/08/2016   • Bilateral carotid artery disease (CMS/HCC) [I77.9] 06/08/2016   • Coronary artery disease involving coronary bypass graft of native heart without  angina pectoris [I25.810] 06/08/2016   • B12 deficiency [E53.8] 05/17/2016   • Anemia in stage 3 chronic kidney disease (CMS/HCC) [N18.3, D63.1] 02/12/2016      Resolved Hospital Problems   No resolved problems to display.       Plan:  The patient's admitted to the hospital and will continue with some IV diuretics and consult cardiology for further evaluation treatment.    Christiano Philip MD  2/15/2019  3:21 PM

## 2019-02-15 NOTE — ED PROVIDER NOTES
EMERGENCY DEPARTMENT ENCOUNTER    Room number:  17/17  Date Seen:  2/15/2019  Time of transfer:1100  PCP:  Ariel Matute MD     Medical Record Review:  MEDICAL RECORD REVIEW:  I reviewed Pt's visit with cardiology from 2/13/19. See office notes below:     HPI: Janel Mahoney is a 78 y.o. female is a patient of Dr. Galarza. I am seeing her today and have reviewed her record.      Her past medical history is significant of coronary artery disease status post coronary artery bypass grafting, mitral insufficiency status post mitral valve replacement with porcine valve, atrial fibrillation, ischemic cardiomyopathy, chronic systolic congestive heart failure, nonsustained ventricular tachycardia status post biventricular ICD.    She has history of mild disease of the left anterior descending and then angioplasty and stent placement of the right coronary artery.  She later was found to have premature ventricular contractions as well as severe vascular disease.  In February 2007 she had an acute infarct catheterization showed left ventricular ejection fraction reduced at 35%.  She had occlusive disease of the right posterior left ventricular branch and no other significant disease.  She was treated medically.  She had a JASON which confirmed left ventricular ejection fraction of 40% and moderate mitral insufficiency.  She had atrial fibrillation and electro cardioversion back to sinus rhythm in May 2007 and then presented with recurrent atrial fibrillation in August 2007 and was placed on Tikosyn.  This was titrated up to 500 mcg  but she developed marked QT prolongation even on 250 mcg twice daily.  Then opted for warfarin and rate control however she continued to have symptoms and went to Dr. Harish Iverson in [place] to have pulmonary vein isolation.  She then had recurrent A. fib and was placed on sotalol converted then went back into atrial fibrillation.  She had a repeat catheterization in January 2010 which  showed severe mitral insufficiency,  and she ultimately had mitral valve replacement with a #31 epic porcine valve and a single bypass graft to the posterior descending artery.  She continued to have episodes of rapid atrial fibrillation and left ventricular dysfunction.  She ultimately underwent AV node ablation and had upgrade of her device to a biventricular.  She presented in September 2013 with fatigue, tiredness and weakness.  Echocardiogram showed left ventricular ejection fraction to be 45%, moderate pulmonary hypertension at 62..  She had a perfusion stress test which showed no evidence of ischemia.  She had a positive sleep study and was started on CPAP.  She also had some volume overload improved with twice a day diuretic.  In August 2016, she presented with multiple compression fractures of her back.  In September 2017 she had some GI bleeding and was found to have esophageal ulcers but her INR was supratherapeutic.  The ulcers were treated and her hemoglobin stabilized.  She was readmitted in November 2018 with spontaneous hematoma of her right pectoral muscle which had to be surgically drained.  She had a fall after stumbling 2-3 weeks prior but did not recall hitting her chest.  She had been on a Lovenox bridge around that time for her back injections. She was due for biventricular generator replacement and had that completed 1/4/2019.    Patient presents today for reevaluation.  She called and reported that her nephrologist had decreased her ramipril and then stopped it altogether 2 weeks prior.  She then was having issues with lower extremity edema and was advised to get back on that on 2/7/2019. Today she reports that lower extremity edema has persisted and she has some pain in the bilateral feet.  She also reports increased shortness of breath and some mild orthopnea.  She denies chest pain tightness pressure, palpitation, dizziness, lightheadedness, near-syncope, or syncope.  She stumbled last  week but her  caught her and prevented her from falling.  Since then she has had some increased left wrist pain and increased left ankle pain.  She is able to move both joints but has some concern about the pain.  She has been taking Lasix 80 mg in the morning and 40 mg at night.    Heart Failure  Assessment  • NYHA class III-A - There is limitation of physical activity. The patient is comfortable at rest, but ordinary activity causes fatigue, palpitations or shortness of breath.  • ACE inhibitor prescribed  • Beta blocker prescribed  • Diuretics prescribed  • The most recent ejection fraction is 43%  • Left ventricular function is mildly reduced by qualitative assessment  • The left ventricle was last assessed on 1/9/2019    Laboratory Results:  Recent Results (from the past 24 hour(s))   Comprehensive Metabolic Panel    Collection Time: 02/15/19 10:41 AM   Result Value Ref Range    Glucose 128 (H) 65 - 99 mg/dL    BUN 63 (H) 8 - 23 mg/dL    Creatinine 1.68 (H) 0.57 - 1.00 mg/dL    Sodium 139 136 - 145 mmol/L    Potassium 4.0 3.5 - 5.2 mmol/L    Chloride 98 98 - 107 mmol/L    CO2 26.0 22.0 - 29.0 mmol/L    Calcium 9.3 8.6 - 10.5 mg/dL    Total Protein 6.8 6.0 - 8.5 g/dL    Albumin 3.20 (L) 3.50 - 5.20 g/dL    ALT (SGPT) 78 (H) 1 - 33 U/L    AST (SGOT) 71 (H) 1 - 32 U/L    Alkaline Phosphatase 112 39 - 117 U/L    Total Bilirubin 0.7 0.1 - 1.2 mg/dL    eGFR Non African Amer 29 (L) >60 mL/min/1.73    Globulin 3.6 gm/dL    A/G Ratio 0.9 g/dL    BUN/Creatinine Ratio 37.5 (H) 7.0 - 25.0    Anion Gap 15.0 mmol/L   Troponin    Collection Time: 02/15/19 10:41 AM   Result Value Ref Range    Troponin T 0.019 0.000 - 0.030 ng/mL   BNP    Collection Time: 02/15/19 10:41 AM   Result Value Ref Range    proBNP 9,545.0 (H) 0.0-1,800.0 pg/mL   CBC Auto Differential    Collection Time: 02/15/19 10:41 AM   Result Value Ref Range    WBC 11.34 (H) 3.40 - 10.80 10*3/mm3    RBC 3.17 (L) 3.77 - 5.28 10*6/mm3    Hemoglobin 9.2 (L)  12.0 - 15.9 g/dL    Hematocrit 29.9 (L) 34.0 - 46.6 %    MCV 94.3 79.0 - 97.0 fL    MCH 29.0 26.6 - 33.0 pg    MCHC 30.8 (L) 31.5 - 35.7 g/dL    RDW 14.5 12.3 - 15.4 %    RDW-SD 49.9 37.0 - 54.0 fl    MPV 10.3 6.0 - 12.0 fL    Platelets 210 140 - 450 10*3/mm3    Neutrophil % 91.2 (H) 42.7 - 76.0 %    Lymphocyte % 2.4 (L) 19.6 - 45.3 %    Monocyte % 5.6 5.0 - 12.0 %    Eosinophil % 0.1 (L) 0.3 - 6.2 %    Basophil % 0.1 0.0 - 1.5 %    Immature Grans % 0.6 (H) 0.0 - 0.5 %    Neutrophils, Absolute 10.34 (H) 1.40 - 7.00 10*3/mm3    Lymphocytes, Absolute 0.27 (L) 0.70 - 3.10 10*3/mm3    Monocytes, Absolute 0.64 0.10 - 0.90 10*3/mm3    Eosinophils, Absolute 0.01 0.00 - 0.40 10*3/mm3    Basophils, Absolute 0.01 0.00 - 0.20 10*3/mm3    Immature Grans, Absolute 0.07 (H) 0.00 - 0.05 10*3/mm3    nRBC 0.0 0.0 - 0.0 /100 WBC   Urinalysis With Microscopic If Indicated (No Culture) - Urine, Clean Catch    Collection Time: 02/15/19 11:33 AM   Result Value Ref Range    Color, UA Yellow Yellow, Straw    Appearance, UA Clear Clear    pH, UA <=5.0 5.0 - 8.0    Specific Gravity, UA 1.017 1.005 - 1.030    Glucose, UA Negative Negative    Ketones, UA Negative Negative    Bilirubin, UA Negative Negative    Blood, UA Small (1+) (A) Negative    Protein, UA Negative Negative    Leuk Esterase, UA Negative Negative    Nitrite, UA Negative Negative    Urobilinogen, UA 0.2 E.U./dL 0.2 - 1.0 E.U./dL   Urinalysis, Microscopic Only - Urine, Clean Catch    Collection Time: 02/15/19 11:33 AM   Result Value Ref Range    RBC, UA 0-2 None Seen, 0-2 /HPF    WBC, UA 0-2 None Seen, 0-2 /HPF    Bacteria, UA None Seen None Seen /HPF    Squamous Epithelial Cells, UA 0-2 None Seen, 0-2 /HPF    Hyaline Casts, UA 0-2 None Seen /LPF    Methodology Automated Microscopy    Protime-INR    Collection Time: 02/15/19 11:43 AM   Result Value Ref Range    Protime 78.4 (C) 11.7 - 14.2 Seconds    INR 9.97 (C) 0.90 - 1.10   aPTT    Collection Time: 02/15/19 11:43 AM   Result  Value Ref Range    PTT 79.1 (H) 22.7 - 35.4 seconds   Duplex Venous Upper Extremity LEFT    Collection Time: 02/15/19 12:49 PM   Result Value Ref Range    Right Internal Jugular Augment Y     Right Internal Jugular Competent Y     Right Internal Jugular Compress C     Right Internal Jugular Phasic Y     Right Internal Jugular Spont Y     Left Internal Jugular Augment Y     Left Internal Jugular Competent Y     Left Internal Jugular Compress C     Left Internal Jugular Phasic Y     Left Internal Jugular Spont Y     Right Subclavian Augment Y     Right Subclavian Competent Y     Right Subclavian Compress C     Right Subclavian Phasic Y     Right Subclavian Spont Y     Left Subclavian Augment Y     Left Subclavian Competent Y     Left Subclavian Compress C     Left Subclavian Phasic Y     Left Subclavian Spont Y     Left Axillary Augment Y     Left Axillary Competent Y     Left Axillary Compress C     Left Axillary Phasic Y     Left Axillary Spont Y     Left Brachial Compress C     Left Radial Compress C     Left Ulnar Compress C     Left Basilic Upper Compress C     Left Basilic Forearm Compress C     Left Cephalic Upper Compress C     Left Cephalic Forearm Compress C    Duplex Venous Lower Extremity - BILATERAL    Collection Time: 02/15/19  1:27 PM   Result Value Ref Range    Right Common Femoral Spont Y     Right Common Femoral Phasic Y     Right Common Femoral Augment Y     Right Common Femoral Competent Y     Right Common Femoral Compress C     Right Saphenofemoral Junction Spont Y     Right Saphenofemoral Junction Phasic Y     Right Saphenofemoral Junction Augment Y     Right Saphenofemoral Junction Competent Y     Right Saphenofemoral Junction Compress C     Right Proximal Femoral Compress C     Right Mid Femoral Spont Y     Right Mid Femoral Phasic Y     Right Mid Femoral Augment Y     Right Mid Femoral Competent Y     Right Mid Femoral Compress C     Right Distal Femoral Compress C     Right Popliteal Spont Y      Right Popliteal Phasic Y     Right Popliteal Augment Y     Right Popliteal Competent Y     Right Popliteal Compress C     Right Posterior Tibial Compress C     Right Peroneal Compress C     Right GastronemiusSoleal Compress C     Right Greater Saph AK Compress C     Right Greater Saph BK Compress C     Right Lesser Saph Compress C     Left Common Femoral Spont Y     Left Common Femoral Phasic Y     Left Common Femoral Augment Y     Left Common Femoral Competent Y     Left Common Femoral Compress C     Left Saphenofemoral Junction Spont Y     Left Saphenofemoral Junction Phasic Y     Left Saphenofemoral Junction Augment Y     Left Saphenofemoral Junction Competent Y     Left Saphenofemoral Junction Compress C     Left Proximal Femoral Compress C     Left Mid Femoral Spont Y     Left Mid Femoral Phasic Y     Left Mid Femoral Augment Y     Left Mid Femoral Competent Y     Left Mid Femoral Compress C     Left Distal Femoral Compress C     Left Popliteal Spont Y     Left Popliteal Phasic Y     Left Popliteal Augment Y     Left Popliteal Competent Y     Left Popliteal Compress C     Left Posterior Tibial Compress C     Left Peroneal Compress C     Left GastronemiusSoleal Compress C     Left Greater Saph AK Compress C     Left Greater Saph BK Compress C     Left Lesser Saph Compress C      I reviewed the above results.    Radiology:  XR Chest 2 View   Final Result   Cardiomegaly. No interval change or evidence for active   disease in the chest.       This report was finalized on 2/15/2019 12:18 PM by Dr. Ty Santoro M.D.            I reviewed the above results    Reviewed doppler which shows nothing acute. Independently viewed by me. Interpreted by radiologist. Discussed with US technician.      Medications ordered in ED:  Medications   sodium chloride 0.9 % flush 10 mL (not administered)   bumetanide (BUMEX) injection 1 mg (not administered)   morphine injection 4 mg (4 mg Intravenous Given 2/15/19 1126)    ondansetron (ZOFRAN) injection 4 mg (4 mg Intravenous Given 2/15/19 1127)       Progress and Consult Notes:  1100:  Patient care transferred from Tash Mckenzie pending labs and imaging results..    1410  Pt presents for pain and swelling in her legs bilaterally that began about two months ago and has been progressively worsening since then. Pt also complains of dyspnea on exertion. Pt was seen by her cardiologist two days ago and was given bumex. She was instructed to return for a follow up apt in 5 days and she would be admitted if needed. However, she did not feel like she could make it through the weekend so she presents today for further evaluation.     On exam, Pt has a soft systolic murmur. She has superficial bruising on both arms. There is swelling and ecchymosis consistent with hematoma or bruising on her left forearm. I can see where  Pt had a recent IV in left AC fossa. She has no color change or temperature change to her distal extremities. She has 2+ edema in BLE. There is hyperpigmentation secondary to chronic stasis changes in BLE.     1417  Placed call to [facility] [place] consult and [facility] for admission.     1425  Discussed Pt's case with Dr. Philip ([facility]) who agrees to admit Pt to telemetry for further evaluation and treatment.     1442  Rechecked Pt who is resting comfortably. Informed Pt that she will e admitted for further evaluation. Pt understands and agrees to all plans. All questions answered.     1508  Discussed Pt's case with Dr. Lovett ([facility] who agrees to consult with Pt.     EKG          EKG time: 1123  Rhythm/Rate: 60, paced rhythm  P waves and IN: ventricularly paced  QRS, axis: ventricularly paced   ST and T waves: ventricularly paced     Interpreted Contemporaneously by me, independently viewed  unchanged compared to prior on 5/18      Diagnosis:  Final diagnoses:   Peripheral edema   Coagulopathy (CMS/HCC)   Ischemic cardiomyopathy   Chronic renal failure, unspecified CKD stage   Chronic  anemia   Traumatic hematoma of left upper arm, initial encounter       Provider attestation:  I personally reviewed the past medical history, past surgical history, social history, family history, current medications, and allergies as they appear in the chart.        Rita Carvajal  02/15/19 1510       Marco Lovelace MD  02/15/19 [id number]

## 2019-02-15 NOTE — PROGRESS NOTES
HAYDE[facility] Venous doppler study complete. Preliminary negative for DVT and SVT called to [facility]. Informed putting in an order for BLEs as well

## 2019-02-15 NOTE — CONSULTS
Patient Name: Janel Mahoney  :1940  78 y.o.    Date of Admission: 2/15/2019  Encounter Provider: Milagros Lovett MD  Date of Encounter Visit: 02/15/19  Place of Service: [facility]  Referring Provider: Christiano Philip MD  Patient Care Team:  Ariel Matute MD as PCP - General (Internal Medicine)  Edward Vasquez MD as Consulting Physician (Hematology and Oncology)  Milagros Cruz MD as Referring Physician (Nephrology)  Anthony Cardona MD as Consulting Physician (Sleep Medicine)  Nik Galarza MD as Consulting Physician (Cardiology)  Shanna Major RPH as Pharmacist      Chief complaint:  edema    History of Present Illness:    This is a longstanding patient of Dr. Galarza. She just saw Francesca on 2019. At that time she was volume overloaded and Francesca gave her some IV Lasix in the office and switched her to oral Bumex, but unfortunately this did not really seem to help and she came to the emergency room today. She has a significant past medical history including coronary artery disease status post bypass, status post mitral valve replacement with porcine valve, permanent atrial fibrillation, ischemic cardiomyopathy, nonsustained ventriclar tachycardia status post biventricular ICD as well as numerous other medical problems. In 2018 she had a spontaneous hematoma of her right pectoral muscle which had to be surgically drained. In 2019, she had a new [employer] generator placed. Her last echo looks to be in 2016 which showed estimated ejection fraction of about 40%, severe left atrial enlargement, severe right atrial enlargement, aortic valve sclerosis, bioprosthetic mitral valve which was normal, moderate tricuspid regurgitation with a right ventricular systolic pressure of 45 to 55 mmHg.     She comes in today because of worsening edema. She was evaluated in the emergency room and her chest x-ray showed cardiomegaly  but no active disease. Upper and lower extremity Doppler did not show any thrombus. Laboratory data showed an INR of 9.97. BNP was elevated at 9545, troponin 0.19, BUN 63, creatinine 1.68, which is unchanged essentially from lab work she had on 02/13/2019. She is anemic with hemoglobin of 9.2 which is stable from earlier this month as well. She denies any chest pain. She denies palpitations. She denies shortness of breath. Her only complaint is the swelling and some pain that she is experiencing.       Past Medical History:   Diagnosis Date   • Acute kidney injury (CMS/HCC)    • Anemia    • Atrial fibrillation (CMS/HCC)    • Bruises easily    • Carotid artery stenosis    • Chronic back pain    • Chronic combined systolic and diastolic congestive heart failure (CMS/HCC)    • Chronic coronary artery disease     moderate to severe LV dysfunction.   • Chronic kidney disease, stage 3 (CMS/HCC)    • Dysphagia    • GERD (gastroesophageal reflux disease)    • H/O cardiac murmur    • [place] (hard of hearing)     wears hearing aids   • Hyperlipidemia    • Hypertension    • Hypotension    • Ischemic cardiomyopathy    • Kyphoscoliosis    • Leukopenia    • Lumbar spondylosis    • Obesity    • GEORGINA (obstructive sleep apnea)    • Osteoarthritis    • Osteoporosis    • Peptic ulcer    • Premature ventricular contractions    • Renal insufficiency syndrome    • Scoliosis    • Shoulder fracture, left    • Stroke syndrome    • Thrombocytopenia (CMS/HCC)    • Ventricular tachycardia (CMS/HCC)    • Vitamin B12 deficiency        Past Surgical History:   Procedure Laterality Date   • AV NODE ABLATION     • BREAST BIOPSY     • CARDIAC CATHETERIZATION      Showed severe mitral insufficiency and borderline coronary artery disease   • CARDIAC CATHETERIZATION      Showed an ejection fraction of 35%. She had occlusive disease of the right posterior LV branch and no other significant disease, treated medically.   • CARDIAC DEFIBRILLATOR PLACEMENT       Biventricular   • CARDIAC ELECTROPHYSIOLOGY PROCEDURE N/A 1/4/2019    Procedure: GENERATOR CHANGE BI-V ICD   [place];  Surgeon: James Hwang MD;  Location: [facility] C[facility]AS[facility];  Service: Cardiology   • CARDIAC VALVE REPLACEMENT  2009    Done with stent placement   • CARDIOVERSION      multiple electrocardioversions.   • CAROTID ARTERY ANGIOPLASTY Right    • COLONOSCOPY N/A 9/28/2017    Procedure: COLONOSCOPY TO CECUM;  Surgeon: Kevin Davis MD;  [facility] END[facility];  Service:    • CORONARY ANGIOPLASTY WITH STENT PLACEMENT  2009   • CORONARY ARTERY BYPASS GRAFT      single graft to the PDA   • CORONARY STENT PLACEMENT     • ENDOSCOPY N/A 9/28/2017    Procedure: ESOPHAGOGASTRODUODENOSCOPY ;  Surgeon: Kevin Davis MD;  [facility];  Service:    • HEMORRHOIDECTOMY     • HYSTERECTOMY     • INCISION AND DRAINAGE TRUNK Right 11/27/2018    Procedure: EVACUATION OF RIGHT CHEST WALL HEMATOMA;  Surgeon: Juvencio Rodriguez MD;  Location: [facility] MAIN OR;  Service: General   • MITRAL VALVE REPLACEMENT  01/2010    #31 Epic porcine valve.   • THROMBOENDARTERECTOMY Right     carotid thromboendarterectomy    • TONSILLECTOMY      age 32   • TOTAL KNEE ARTHROPLASTY Left    • TOTAL SHOULDER ARTHROPLASTY W/ DISTAL CLAVICLE EXCISION Left 1/16/2018    Procedure: TOTAL SHOULDER REVERSE ARTHROPLASTY;  Surgeon: Bianka Quesada MD;  Location: [facility] MAIN OR;  Service:          Prior to Admission medications    Medication Sig Start Date End Date Taking? Authorizing Provider   alendronate (FOSAMAX) 70 MG tablet Take 70 mg by mouth Every 14 (Fourteen) Days.    ProviderTejal MD   aspirin 81 MG tablet Take 81 mg by mouth Daily. 4/1/14   Tejal Pena MD   bumetanide (BUMEX) 2 MG tablet Take 1 tablet by mouth 2 (Two) Times a Day. 2/13/19   Francesca Manning APRN   calcitriol (ROCALTROL) 0.25 MCG capsule Take 0.25 mcg by mouth Daily.    Tejal Pena MD   carvedilol (COREG) 25  MG tablet TAKE 1 TABLET TWICE DAILY 12/26/18   Ariel Matute MD   cephalexin (KEFLEX) 500 MG capsule Take 1 capsule by mouth 3 (Three) Times a Day. 12/30/18   Angus Kerns PA   Cholecalciferol (VITAMIN D) 2000 UNITS tablet Take 1 tablet by mouth daily. 11/14/12   Tejal Pena MD   epoetin adele (EPOGEN,PROCRIT) 44531 UNIT/ML injection Inject 10,000 Units under the skin into the appropriate area as directed As Needed. 4/1/14   Tejal Pena MD   ferrous sulfate 325 (65 FE) MG tablet Take 1 tablet by mouth Daily With Breakfast. 12/1/18   Nasim Merlos MD   HAVRIX 1440 EL U/ML vaccine  12/13/18   Tejal Pena MD   Multiple Vitamins-Minerals (SENIOR MULTIVITAMIN PLUS) tablet Take 1 tablet by mouth Daily.    Tejal Pena MD   pantoprazole (PROTONIX) 40 MG EC tablet TAKE 1 TABLET TWICE DAILY 2/11/19   Edward Vasquez MD   predniSONE (DELTASONE) 20 MG tablet Take 1 tablet by mouth 2 (Two) Times a Day. 12/30/18   Angus Kerns PA   ramipril (ALTACE) 2.5 MG capsule Take 5 mg by mouth Daily.    Tejal Pena MD   simvastatin (ZOCOR) 40 MG tablet TAKE 1 TABLET EVERY DAY  Patient taking differently: pt reports taking 1/2 tablet nightly 12/3/18   Ariel Matute MD   traMADol (ULTRAM) 50 MG tablet Take 50 mg by mouth At Night As Needed.    Tejal Pena MD   traMADol (ULTRAM) 50 MG tablet TAKE 2 TABLETS EVERY MORNING  AND TAKE 2 TABLETS AT BEDTIME 1/2/19   Ariel Matute MD   vitamin B-12 (CYANOCOBALAMIN) 2500 MCG sublingual tablet tablet Place 2,500 mcg under the tongue Daily.    Tejal Pena MD   warfarin (COUMADIN) 2 MG tablet Take 1 tablet by mouth Every Night.  Patient taking differently: Take 2 mg by mouth Every Night. tablet 11/30/18   Nasim Merlos MD   zolpidem (AMBIEN) 10 MG tablet Take 1 tablet by mouth At Night As Needed for Sleep. 3 months 6/20/18   Ariel Matute MD       Allergies   Allergen Reactions   • Diclofenac       She had adverse effects from the medications that required hospitalization. Pt got stomach ulcers from this medication prescribed by Dr. Arango - pediatrist.   • Dofetilide      Pt states not allergic.        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     Employer: RETIRED   Tobacco Use   • Smoking status: Former Smoker     Packs/day: 1.00     Years: 50.00     Pack years: 50.00     Types: Cigarettes     Last attempt to quit: 1/11/2009     Years since quitting: 10.1   • Smokeless tobacco: Never Used   Substance and Sexual Activity   • Alcohol use: Yes     Comment: rare   • Drug use: No   • Sexual activity: Defer       Family History   Problem Relation Age of Onset   • Cancer Mother    • Breast cancer Mother    • Heart disease Mother    • Hypertension Mother    • Coronary artery disease Father    • Stroke Father    • Heart disease Father    • Hypertension Father    • Other Daughter         thiamin deficiency    • Hypertension Son    • Malig Hyperthermia Neg Hx        REVIEW OF SYSTEMS:   All other systems reviewed and negative.        Objective:     Vitals:    02/15/19 1424 02/15/19 1433 02/15/19 1503 02/15/19 1510   BP: 165/48 152/52 150/94 150/94   Pulse: 60 60 60 60   Resp:       Temp:       TempSrc:       SpO2: 98% 99% 98%    Weight:       Height:         Body mass index is 27.4 kg/m².  No intake or output data in the 24 hours ending 02/15/19 1539    Constitutional: Weak and frail appearing  HENT:   Head: Normocephalic and atraumatic. Head is without contusion.     Ears: Poor hearing  Nose: No nasal deformity. No epistaxis.   Eyes: Lids are normal. Right eye exhibits no exudate. Left eye exhibits no exudate.  Neck: No JVD present. Carotid bruit is not present. No tracheal deviation present. No  thyroid mass and no thyromegaly present.   Cardiovascular: Irregular.  3+ pitting edema both lower extremities.  Generalized edema throughout.  Abdominal: Soft. Normal appearance and bowel sounds are normal. There is no tenderness.   Musculoskeletal: No marked joint deformities.  Neurological: She is alert and oriented to person, place, and time. She has normal strength.   Skin: Skin is warm, dry and intact. No rash noted. Multiple bruises.  Psychiatric: She has a normal mood and affect. Her behavior is normal. Thought content normal.   Vitals reviewed      Lab Review:     Results from last 7 days   Lab Units 02/15/19  1041   SODIUM mmol/L 139   POTASSIUM mmol/L 4.0   CHLORIDE mmol/L 98   CO2 mmol/L 26.0   BUN mg/dL 63*   CREATININE mg/dL 1.68*   CALCIUM mg/dL 9.3   BILIRUBIN mg/dL 0.7   ALK PHOS U/L 112   ALT (SGPT) U/L 78*   AST (SGOT) U/L 71*   GLUCOSE mg/dL 128*     Results from last 7 days   Lab Units 02/15/19  1041   TROPONIN T ng/mL 0.019     Results from last 7 days   Lab Units 02/15/19  1041   WBC 10*3/mm3 11.34*   HEMOGLOBIN g/dL 9.2*   HEMATOCRIT % 29.9*   PLATELETS 10*3/mm3 210     Results from last 7 days   Lab Units 02/15/19  1143   INR  9.97*   APTT seconds 79.1*             I personally viewed and interpreted the patient's EKG/Telemetry data.        Assessment and Plan:       1.  Edema.  This looks to be right heart failure.  Her lungs sound pretty good and her chest x-ray does not show fluid in the lungs and she is not complaining of any symptoms that would make me think of pulmonary edema.  I suspect this is mostly coming from right heart failure.  I agree with diuresis as long as her kidneys will tolerate it.    2.  Coronary artery disease with a history of stent placement and then bypass surgery in 2010.  She does not have any anginal symptoms at this point in time.  Her troponin is negative.    3.  Permanent atrial fibrillation.  History of QT prolongation with Tikosyn.  She had a pulmonary vein  isolation.  She has been treated with sotalol in the past.  Eventually she had an AV node ablation and now has a permanent biventricular pacemaker which was upgraded in January 2019      4.  Supratherapeutic INR.  I agree with holding warfarin    5.  Chronic systolic and diastolic congestive heart failure.    6.  History of mitral valve replacement with a porcine valve.    7.  Systemic hypertension    8.  Hyperlipidemia    9.  Obstructive sleep apnea    10.  History of nonsustained ventricular tachycardia     11.  History of compression fractures    12.  History of anemia    13.  Chronic kidney disease      Milagros Lovett MD  02/15/19  3:39 PM

## 2019-02-16 LAB
ANION GAP SERPL CALCULATED.3IONS-SCNC: 17.1 MMOL/L
BASOPHILS # BLD AUTO: 0 10*3/MM3 (ref 0–0.2)
BASOPHILS NFR BLD AUTO: 0 % (ref 0–1.5)
BUN BLD-MCNC: 60 MG/DL (ref 8–23)
BUN/CREAT SERPL: 39 (ref 7–25)
CALCIUM SPEC-SCNC: 9.1 MG/DL (ref 8.6–10.5)
CHLORIDE SERPL-SCNC: 93 MMOL/L (ref 98–107)
CO2 SERPL-SCNC: 24.9 MMOL/L (ref 22–29)
CREAT BLD-MCNC: 1.54 MG/DL (ref 0.57–1)
DEPRECATED RDW RBC AUTO: 50.7 FL (ref 37–54)
EOSINOPHIL # BLD AUTO: 0.01 10*3/MM3 (ref 0–0.4)
EOSINOPHIL NFR BLD AUTO: 0.1 % (ref 0.3–6.2)
ERYTHROCYTE [DISTWIDTH] IN BLOOD BY AUTOMATED COUNT: 14.5 % (ref 12.3–15.4)
GFR SERPL CREATININE-BSD FRML MDRD: 33 ML/MIN/1.73
GLUCOSE BLD-MCNC: 99 MG/DL (ref 65–99)
GLUCOSE BLDC GLUCOMTR-MCNC: 109 MG/DL (ref 70–130)
GLUCOSE BLDC GLUCOMTR-MCNC: 131 MG/DL (ref 70–130)
GLUCOSE BLDC GLUCOMTR-MCNC: 152 MG/DL (ref 70–130)
HCT VFR BLD AUTO: 27.9 % (ref 34–46.6)
HGB BLD-MCNC: 8.4 G/DL (ref 12–15.9)
IMM GRANULOCYTES # BLD AUTO: 0.11 10*3/MM3 (ref 0–0.05)
IMM GRANULOCYTES NFR BLD AUTO: 1.1 % (ref 0–0.5)
INR PPP: >10 (ref 0.9–1.1)
LYMPHOCYTES # BLD AUTO: 0.35 10*3/MM3 (ref 0.7–3.1)
LYMPHOCYTES NFR BLD AUTO: 3.6 % (ref 19.6–45.3)
MCH RBC QN AUTO: 28.4 PG (ref 26.6–33)
MCHC RBC AUTO-ENTMCNC: 30.1 G/DL (ref 31.5–35.7)
MCV RBC AUTO: 94.3 FL (ref 79–97)
MONOCYTES # BLD AUTO: 0.68 10*3/MM3 (ref 0.1–0.9)
MONOCYTES NFR BLD AUTO: 6.9 % (ref 5–12)
NEUTROPHILS # BLD AUTO: 8.7 10*3/MM3 (ref 1.4–7)
NEUTROPHILS NFR BLD AUTO: 88.3 % (ref 42.7–76)
NRBC BLD AUTO-RTO: 0 /100 WBC (ref 0–0)
PLATELET # BLD AUTO: 202 10*3/MM3 (ref 140–450)
PMV BLD AUTO: 10.1 FL (ref 6–12)
POTASSIUM BLD-SCNC: 3.5 MMOL/L (ref 3.5–5.2)
PROTHROMBIN TIME: 83 SECONDS (ref 11.7–14.2)
RBC # BLD AUTO: 2.96 10*6/MM3 (ref 3.77–5.28)
SODIUM BLD-SCNC: 135 MMOL/L (ref 136–145)
WBC NRBC COR # BLD: 9.85 10*3/MM3 (ref 3.4–10.8)

## 2019-02-16 PROCEDURE — 85610 PROTHROMBIN TIME: CPT | Performed by: HOSPITALIST

## 2019-02-16 PROCEDURE — 25010000002 ONDANSETRON PER 1 MG: Performed by: HOSPITALIST

## 2019-02-16 PROCEDURE — 25010000002 VITAMIN K1 1 MG/1 ML SOLUTION: Performed by: HOSPITALIST

## 2019-02-16 PROCEDURE — 36415 COLL VENOUS BLD VENIPUNCTURE: CPT | Performed by: HOSPITALIST

## 2019-02-16 PROCEDURE — 82962 GLUCOSE BLOOD TEST: CPT

## 2019-02-16 PROCEDURE — 80048 BASIC METABOLIC PNL TOTAL CA: CPT | Performed by: HOSPITALIST

## 2019-02-16 PROCEDURE — 99231 SBSQ HOSP IP/OBS SF/LOW 25: CPT | Performed by: INTERNAL MEDICINE

## 2019-02-16 PROCEDURE — 85025 COMPLETE CBC W/AUTO DIFF WBC: CPT | Performed by: HOSPITALIST

## 2019-02-16 RX ORDER — PHYTONADIONE 2 MG/ML
5 INJECTION, EMULSION INTRAMUSCULAR; INTRAVENOUS; SUBCUTANEOUS ONCE
Status: COMPLETED | OUTPATIENT
Start: 2019-02-16 | End: 2019-02-16

## 2019-02-16 RX ORDER — HYDROCODONE BITARTRATE AND ACETAMINOPHEN 5; 325 MG/1; MG/1
1 TABLET ORAL EVERY 6 HOURS PRN
Status: DISCONTINUED | OUTPATIENT
Start: 2019-02-16 | End: 2019-02-22 | Stop reason: HOSPADM

## 2019-02-16 RX ADMIN — ONDANSETRON HYDROCHLORIDE 4 MG: 2 SOLUTION INTRAMUSCULAR; INTRAVENOUS at 03:46

## 2019-02-16 RX ADMIN — CARVEDILOL 25 MG: 25 TABLET, FILM COATED ORAL at 20:05

## 2019-02-16 RX ADMIN — RAMIPRIL 5 MG: 5 CAPSULE ORAL at 08:09

## 2019-02-16 RX ADMIN — PANTOPRAZOLE SODIUM 40 MG: 40 TABLET, DELAYED RELEASE ORAL at 18:02

## 2019-02-16 RX ADMIN — CALCITRIOL CAPSULES 0.25 MCG 0.25 MCG: 0.25 CAPSULE ORAL at 08:10

## 2019-02-16 RX ADMIN — BUMETANIDE 2 MG: 0.25 INJECTION INTRAMUSCULAR; INTRAVENOUS at 03:39

## 2019-02-16 RX ADMIN — Medication 1000 MCG: at 08:10

## 2019-02-16 RX ADMIN — PHYTONADIONE 5 MG: 1 INJECTION, EMULSION INTRAMUSCULAR; INTRAVENOUS; SUBCUTANEOUS at 10:26

## 2019-02-16 RX ADMIN — ATORVASTATIN CALCIUM 20 MG: 20 TABLET, FILM COATED ORAL at 08:10

## 2019-02-16 RX ADMIN — PANTOPRAZOLE SODIUM 40 MG: 40 TABLET, DELAYED RELEASE ORAL at 08:09

## 2019-02-16 RX ADMIN — HYDROCODONE BITARTRATE AND ACETAMINOPHEN 1 TABLET: 5; 325 TABLET ORAL at 20:04

## 2019-02-16 RX ADMIN — SODIUM CHLORIDE, PRESERVATIVE FREE 3 ML: 5 INJECTION INTRAVENOUS at 20:04

## 2019-02-16 RX ADMIN — CARVEDILOL 25 MG: 25 TABLET, FILM COATED ORAL at 08:10

## 2019-02-16 RX ADMIN — TRAMADOL HYDROCHLORIDE 50 MG: 50 TABLET ORAL at 10:26

## 2019-02-16 RX ADMIN — BUMETANIDE 2 MG: 0.25 INJECTION INTRAMUSCULAR; INTRAVENOUS at 15:58

## 2019-02-16 RX ADMIN — SODIUM CHLORIDE, PRESERVATIVE FREE 3 ML: 5 INJECTION INTRAVENOUS at 08:12

## 2019-02-16 RX ADMIN — HYDROCODONE BITARTRATE AND ACETAMINOPHEN 1 TABLET: 5; 325 TABLET ORAL at 12:20

## 2019-02-16 RX ADMIN — ASPIRIN 81 MG: 81 TABLET, DELAYED RELEASE ORAL at 08:10

## 2019-02-16 NOTE — PROGRESS NOTES
[facility]  Cardiology Progress Note    Patient Identification:  Name: Janel Mahoney  Age: 78 y.o.  Sex: female  :  1940  MRN: [id number]                 Follow Up / Chief Complaint: Edema    Interval History:  Admitted with shortness of breath as well as edema with right-sided failure     Subjective:  Feeling much better shortness of breath better    Objective:    Past Medical History:  Past Medical History:   Diagnosis Date   • Acute kidney injury (CMS/HCC)    • Anemia    • Atrial fibrillation (CMS/HCC)    • Bruises easily    • Carotid artery stenosis    • Chronic back pain    • Chronic combined systolic and diastolic congestive heart failure (CMS/HCC)    • Chronic coronary artery disease     moderate to severe LV dysfunction.   • Chronic kidney disease, stage 3 (CMS/HCC)    • Dysphagia    • GERD (gastroesophageal reflux disease)    • H/O cardiac murmur    • [place] (hard of hearing)     wears hearing aids   • Hyperlipidemia    • Hypertension    • Hypotension    • Ischemic cardiomyopathy    • Kyphoscoliosis    • Leukopenia    • Lumbar spondylosis    • Obesity    • GEORGINA (obstructive sleep apnea)    • Osteoarthritis    • Osteoporosis    • Peptic ulcer    • Premature ventricular contractions    • Renal insufficiency syndrome    • Scoliosis    • Shoulder fracture, left    • Stroke syndrome    • Thrombocytopenia (CMS/HCC)    • Ventricular tachycardia (CMS/HCC)    • Vitamin B12 deficiency      Past Surgical History:  Past Surgical History:   Procedure Laterality Date   • AV NODE ABLATION     • BREAST BIOPSY     • CARDIAC CATHETERIZATION      Showed severe mitral insufficiency and borderline coronary artery disease   • CARDIAC CATHETERIZATION      Showed an ejection fraction of 35%. She had occlusive disease of the right posterior LV branch and no other significant disease, treated medically.   • CARDIAC DEFIBRILLATOR PLACEMENT      Biventricular   • CARDIAC ELECTROPHYSIOLOGY PROCEDURE N/A 2019     Procedure: GENERATOR CHANGE BI-V ICD   [place];  Surgeon: James Hwang MD;  Location: [facility] CATH INVASIVE LOCATION;  Service: Cardiology   • CARDIAC VALVE REPLACEMENT  2009    Done with stent placement   • CARDIOVERSION      multiple electrocardioversions.   • CAROTID ARTERY ANGIOPLASTY Right    • COLONOSCOPY N/A 9/28/2017    Procedure: COLONOSCOPY TO CECUM;  Surgeon: Kevin Davis MD;  Location: [facility] ENDOSCOPY;  Service:    • CORONARY ANGIOPLASTY WITH STENT PLACEMENT  2009   • CORONARY ARTERY BYPASS GRAFT      single graft to the PDA   • CORONARY STENT PLACEMENT     • ENDOSCOPY N/A 9/28/2017    Procedure: ESOPHAGOGASTRODUODENOSCOPY ;  Surgeon: Kevin Davis MD;  Location: [facility] ENDOSCOPY;  Service:    • HEMORRHOIDECTOMY     • HYSTERECTOMY     • INCISION AND DRAINAGE TRUNK Right 11/27/2018    Procedure: EVACUATION OF RIGHT CHEST WALL HEMATOMA;  Surgeon: Juvencio Rodrigeuz MD;  Location: [facility] MAIN OR;  Service: General   • MITRAL VALVE REPLACEMENT  01/2010    #31 Epic porcine valve.   • THROMBOENDARTERECTOMY Right     carotid thromboendarterectomy    • TONSILLECTOMY      age 32   • TOTAL KNEE ARTHROPLASTY Left    • TOTAL SHOULDER ARTHROPLASTY W/ DISTAL CLAVICLE EXCISION Left 1/16/2018    Procedure: TOTAL SHOULDER REVERSE ARTHROPLASTY;  Surgeon: Bianka Quesada MD;  Location: [facility] OR;  Service:         Social History:   Social History     Tobacco Use   • Smoking status: Former Smoker     Packs/day: 1.00     Years: 50.00     Pack years: 50.00     Types: Cigarettes     Last attempt to quit: 1/11/2009     Years since quitting: 10.1   • Smokeless tobacco: Never Used   Substance Use Topics   • Alcohol use: Yes     Comment: rare      Family History:  Family History   Problem Relation Age of Onset   • Cancer Mother    • Breast cancer Mother    • Heart disease Mother    • Hypertension Mother    • Coronary artery disease Father    • Stroke Father    • Heart disease Father    • Hypertension  "Father    • Other Daughter         thiamin deficiency    • Hypertension Son    • Malig Hyperthermia Neg Hx           Allergies:  Allergies   Allergen Reactions   • Diclofenac      She had adverse effects from the medications that required hospitalization. Pt got stomach ulcers from this medication prescribed by Dr. Arango - pediatrist.   • Dofetilide Unknown (See Comments)     Pt states not allergic.      Scheduled Meds:    aspirin 81 mg Daily   atorvastatin 20 mg Daily   bumetanide 2 mg Q12H   calcitriol 0.25 mcg Daily   carvedilol 25 mg BID   pantoprazole 40 mg BID AC   ramipril 5 mg Daily   sodium chloride 3 mL Q12H   vitamin B-12 1,000 mcg Daily           INTAKE AND OUTPUT:    Intake/Output Summary (Last 24 hours) at 2/16/2019 1529  Last data filed at 2/16/2019 1353  Gross per 24 hour   Intake 680 ml   Output 3572 ml   Net -2892 ml       Review of Systems:   GI:  Cardiac: No chest pain  Pulmonary: Shortness of breath better    Constitutional:  Temp:  [98 °F (36.7 °C)-98.6 °F (37 °C)] 98 °F (36.7 °C)  Heart Rate:  [60-97] 61  Resp:  [18-20] 18  BP: (128-156)/(46-78) 128/78  /78 (BP Location: Right arm)   Pulse 61   Temp 98 °F (36.7 °C) (Oral)   Resp 18   Ht 162.6 cm (64\")   Wt 72.4 kg (159 lb 9.6 oz)   SpO2 98%   BMI 27.40 kg/m²   General appearance: No acute changes   Neck: Trachea midline; NECK, supple, no thyromegaly or lymphadenopathy   Lungs: Normal size and shape, normal breath sounds, equal distribution of air, no rales and rhonchi   CV: S1-S2 regular, no murmurs, no rub, no gallop   Abdomen: Soft, non-tender; no masses , no abnormal abdominal sounds   Extremities: No deformity , normal color , no peripheral edema   Skin: Normal temperature, turgor and texture; no rash, ulcers              Cardiographics  Telemetry:     ECG:     Echocardiogram:     Lab Review   Results from last 7 days   Lab Units 02/15/19  1041   TROPONIN T ng/mL 0.019         Results from last 7 days   Lab Units " "02/16/19  0625   SODIUM mmol/L 135*   POTASSIUM mmol/L 3.5   BUN mg/dL 60*   CREATININE mg/dL 1.54*   CALCIUM mg/dL 9.1     @[id number]@  Results from last 7 days   Lab Units 02/16/19  0625 02/15/19  1041   WBC 10*3/mm3 9.85 11.34*   HEMOGLOBIN g/dL 8.4* 9.2*   HEMATOCRIT % 27.9* 29.9*   PLATELETS 10*3/mm3 202 210     Results from last 7 days   Lab Units 02/16/19  0625 02/15/19  1143   INR  >10.00* 9.97*   APTT seconds  --  79.1*         Assessment:  1.  Edema.    Much better shortness of breath is much better, continue Bumex every 12 hours     2.  Coronary artery disease with a history of stent placement and then bypass surgery in 2010.    Denies chest pain      3.  Permanent atrial fibrillation.    Controlled       4.  Supratherapeutic INR.  INR extremely high continue to hold Coumadin            )2/16/2019  Popeye Noriega MD      [facility] Dragon/Transcription:   \"Dictated utilizing Dragon dictation\".     "

## 2019-02-16 NOTE — PROGRESS NOTES
"DAILY PROGRESS NOTE  [facility]    Patient Identification:  Name: Janel Mahoney  Age: 78 y.o.  Sex: female  :  1940  MRN: [id number]         Primary Care Physician: Ariel Matute MD    Subjective:  Interval History:She complains of pain. Swelling better.  Objective:    Scheduled Meds:  aspirin 81 mg Oral Daily   atorvastatin 20 mg Oral Daily   bumetanide 2 mg Intravenous Q12H   calcitriol 0.25 mcg Oral Daily   carvedilol 25 mg Oral BID   pantoprazole 40 mg Oral BID AC   ramipril 5 mg Oral Daily   sodium chloride 3 mL Intravenous Q12H   vitamin B-12 1,000 mcg Oral Daily     Continuous Infusions:     Vital signs in last 24 hours:  Temp:  [98 °F (36.7 °C)-98.6 °F (37 °C)] 98.2 °F (36.8 °C)  Heart Rate:  [60-97] 97  Resp:  [20] 20  BP: (128-165)/(46-94) 156/58    Intake/Output:    Intake/Output Summary (Last 24 hours) at 2019 1103  Last data filed at 2019 0400  Gross per 24 hour   Intake 480 ml   Output 2572 ml   Net -2092 ml       Exam:  /58 (BP Location: Right arm, Patient Position: Lying)   Pulse 97   Temp 98.2 °F (36.8 °C) (Oral)   Resp 20   Ht 162.6 cm (64\")   Wt 72.4 kg (159 lb 9.6 oz)   SpO2 99%   BMI 27.40 kg/m²     General Appearance:    Alert, cooperative, no distress   Head:    Normocephalic, without obvious abnormality, atraumatic   Eyes:       Throat:   Lips, tongue, gums normal   Neck:   Supple, symmetrical, trachea midline, no JVD   Lungs:     Clear to auscultation bilaterally, respirations unlabored   Chest Wall:    No tenderness or deformity    Heart:    Regular rate and rhythm, S1 and S2 normal, no murmur,no  Rub or gallop   Abdomen:     Soft, non-tender, bowel sounds active, no masses, no organomegaly    Extremities:   Extremities normal, atraumatic, no cyanosis or edema   Pulses:      Skin:   Skin is warm and dry,  no rashes or palpable lesions   Neurologic:   no focal deficits noted      Lab Results (last 72 hours)     Procedure Component Value " Units Date/Time    POC Glucose Once [[id number]]  (Normal) Collected:  02/16/19 0808    Specimen:  Blood Updated:  02/16/19 0809     Glucose 109 mg/dL     Protime-INR [[id number]]  (Abnormal) Collected:  02/16/19 0625    Specimen:  Blood Updated:  02/16/19 0739     Protime 83.0 Seconds      INR >10.00    Basic Metabolic Panel [[id number]]  (Abnormal) Collected:  02/16/19 0625    Specimen:  Blood Updated:  02/16/19 0730     Glucose 99 mg/dL      BUN 60 mg/dL      Creatinine 1.54 mg/dL      Sodium 135 mmol/L      Potassium 3.5 mmol/L      Chloride 93 mmol/L      CO2 24.9 mmol/L      Calcium 9.1 mg/dL      eGFR Non African Amer 33 mL/min/1.73      BUN/Creatinine Ratio 39.0     Anion Gap 17.1 mmol/L     Narrative:       The MDRD GFR formula is only valid for adults with stable renal function between ages 18 and 70.    CBC & Differential [[id number]] Collected:  02/16/19 0625    Specimen:  Blood Updated:  02/16/19 0709    Narrative:       The following orders were created for panel order CBC & Differential.  Procedure                               Abnormality         Status                     ---------                               -----------         ------                     CBC Auto Differential[[id number]]        Abnormal            Final result                 Please view results for these tests on the individual orders.    CBC Auto Differential [[id number]]  (Abnormal) Collected:  02/16/19 0625    Specimen:  Blood Updated:  02/16/19 0709     WBC 9.85 10*3/mm3      RBC 2.96 10*6/mm3      Hemoglobin 8.4 g/dL      Hematocrit 27.9 %      MCV 94.3 fL      MCH 28.4 pg      MCHC 30.1 g/dL      RDW 14.5 %      RDW-SD 50.7 fl      MPV 10.1 fL      Platelets 202 10*3/mm3      Neutrophil % 88.3 %      Lymphocyte % 3.6 %      Monocyte % 6.9 %      Eosinophil % 0.1 %      Basophil % 0.0 %      Immature Grans % 1.1 %      Neutrophils, Absolute 8.70 10*3/mm3      Lymphocytes, Absolute 0.35 10*3/mm3      Monocytes, Absolute 0.68  10*3/mm3      Eosinophils, Absolute 0.01 10*3/mm3      Basophils, Absolute 0.00 10*3/mm3      Immature Grans, Absolute 0.11 10*3/mm3      nRBC 0.0 /100 WBC     Protime-INR [[id number]]  (Abnormal) Collected:  02/15/19 1143    Specimen:  Blood Updated:  02/15/19 1217     Protime 78.4 Seconds      INR 9.97    aPTT [[id number]]  (Abnormal) Collected:  02/15/19 1143    Specimen:  Blood Updated:  02/15/19 1217     PTT 79.1 seconds     Urinalysis, Microscopic Only - Urine, Clean Catch [[id number]] Collected:  02/15/19 1133    Specimen:  Urine, Clean Catch Updated:  02/15/19 1156     RBC, UA 0-2 /HPF      WBC, UA 0-2 /HPF      Bacteria, UA None Seen /HPF      Squamous Epithelial Cells, UA 0-2 /HPF      Hyaline Casts, UA 0-2 /LPF      Methodology Automated Microscopy    Urinalysis With Microscopic If Indicated (No Culture) - Urine, Clean Catch [[id number]]  (Abnormal) Collected:  02/15/19 1133    Specimen:  Urine, Clean Catch Updated:  02/15/19 1156     Color, UA Yellow     Appearance, UA Clear     pH, UA <=5.0     Specific Gravity, UA 1.017     Glucose, UA Negative     Ketones, UA Negative     Bilirubin, UA Negative     Blood, UA Small (1+)     Protein, UA Negative     Leuk Esterase, UA Negative     Nitrite, UA Negative     Urobilinogen, UA 0.2 E.U./dL    Comprehensive Metabolic Panel [[id number]]  (Abnormal) Collected:  02/15/19 1041    Specimen:  Blood Updated:  02/15/19 1126     Glucose 128 mg/dL      BUN 63 mg/dL      Creatinine 1.68 mg/dL      Sodium 139 mmol/L      Potassium 4.0 mmol/L      Chloride 98 mmol/L      CO2 26.0 mmol/L      Calcium 9.3 mg/dL      Total Protein 6.8 g/dL      Albumin 3.20 g/dL      ALT (SGPT) 78 U/L      AST (SGOT) 71 U/L      Alkaline Phosphatase 112 U/L      Total Bilirubin 0.7 mg/dL      eGFR Non African Amer 29 mL/min/1.73      Globulin 3.6 gm/dL      A/G Ratio 0.9 g/dL      BUN/Creatinine Ratio 37.5     Anion Gap 15.0 mmol/L     Narrative:       The MDRD GFR formula is only valid for adults  with stable renal function between ages 18 and 70.    Troponin [[id number]]  (Normal) Collected:  02/15/19 1041    Specimen:  Blood Updated:  02/15/19 1126     Troponin T 0.019 ng/mL     Narrative:       Troponin T Reference Range:  <= 0.03 ng/mL-   Negative for AMI  >0.03 ng/mL-     Abnormal for myocardial necrosis.  Clinicians would have to utilize clinical acumen, EKG, Troponin and serial changes to determine if it is an Acute Myocardial Infarction or myocardial injury due to an underlying chronic condition.     BNP [[id number]]  (Abnormal) Collected:  02/15/19 1041    Specimen:  Blood Updated:  02/15/19 1124     proBNP 9,545.0 pg/mL     Narrative:       Among patients with dyspnea, NT-proBNP is highly sensitive for the detection of acute congestive heart failure. In addition NT-proBNP of <300 pg/ml effectively rules out acute congestive heart failure with 99% negative predictive value.    CBC & Differential [[id number]] Collected:  02/15/19 1041    Specimen:  Blood Updated:  02/15/19 1109    Narrative:       The following orders were created for panel order CBC & Differential.  Procedure                               Abnormality         Status                     ---------                               -----------         ------                     CBC Auto Differential[[id number]]        Abnormal            Final result                 Please view results for these tests on the individual orders.    CBC Auto Differential [[id number]]  (Abnormal) Collected:  02/15/19 1041    Specimen:  Blood Updated:  02/15/19 1109     WBC 11.34 10*3/mm3      RBC 3.17 10*6/mm3      Hemoglobin 9.2 g/dL      Hematocrit 29.9 %      MCV 94.3 fL      MCH 29.0 pg      MCHC 30.8 g/dL      RDW 14.5 %      RDW-SD 49.9 fl      MPV 10.3 fL      Platelets 210 10*3/mm3      Neutrophil % 91.2 %      Lymphocyte % 2.4 %      Monocyte % 5.6 %      Eosinophil % 0.1 %      Basophil % 0.1 %      Immature Grans % 0.6 %      Neutrophils, Absolute 10.34  10*3/mm3      Lymphocytes, Absolute 0.27 10*3/mm3      Monocytes, Absolute 0.64 10*3/mm3      Eosinophils, Absolute 0.01 10*3/mm3      Basophils, Absolute 0.01 10*3/mm3      Immature Grans, Absolute 0.07 10*3/mm3      nRBC 0.0 /100 WBC         Data Review:  Results from last 7 days   Lab Units 02/16/19  0625 02/15/19  1041 02/13/19  1410   SODIUM mmol/L 135* 139 143   POTASSIUM mmol/L 3.5 4.0 3.9   CHLORIDE mmol/L 93* 98 102   CO2 mmol/L 24.9 26.0 25.3   BUN mg/dL 60* 63* 65*   CREATININE mg/dL 1.54* 1.68* 1.64*   GLUCOSE mg/dL 99 128* 111*   CALCIUM mg/dL 9.1 9.3 9.6     Results from last 7 days   Lab Units 02/16/19  0625 02/15/19  1041   WBC 10*3/mm3 9.85 11.34*   HEMOGLOBIN g/dL 8.4* 9.2*   HEMATOCRIT % 27.9* 29.9*   PLATELETS 10*3/mm3 202 210             Lab Results   Lab Value Date/Time    TROPONINT 0.019 02/15/2019 1041    TROPONINT 0.021 11/23/2018 1337    TROPONINT <0.010 05/10/2018 1443         Results from last 7 days   Lab Units 02/15/19  1041   ALK PHOS U/L 112   BILIRUBIN mg/dL 0.7   ALT (SGPT) U/L 78*   AST (SGOT) U/L 71*             Glucose   Date/Time Value Ref Range Status   02/16/2019 0808 109 70 - 130 mg/dL Final     Results from last 7 days   Lab Units 02/16/19  0625 02/15/19  1143   INR  >10.00* 9.97*       Past Medical History:   Diagnosis Date   • Acute kidney injury (CMS/HCC)    • Anemia    • Atrial fibrillation (CMS/HCC)    • Bruises easily    • Carotid artery stenosis    • Chronic back pain    • Chronic combined systolic and diastolic congestive heart failure (CMS/HCC)    • Chronic coronary artery disease     moderate to severe LV dysfunction.   • Chronic kidney disease, stage 3 (CMS/HCC)    • Dysphagia    • GERD (gastroesophageal reflux disease)    • H/O cardiac murmur    • [place] (hard of hearing)     wears hearing aids   • Hyperlipidemia    • Hypertension    • Hypotension    • Ischemic cardiomyopathy    • Kyphoscoliosis    • Leukopenia    • Lumbar spondylosis    • Obesity    • GEORGINA  (obstructive sleep apnea)    • Osteoarthritis    • Osteoporosis    • Peptic ulcer    • Premature ventricular contractions    • Renal insufficiency syndrome    • Scoliosis    • Shoulder fracture, left    • Stroke syndrome    • Thrombocytopenia (CMS/HCC)    • Ventricular tachycardia (CMS/HCC)    • Vitamin B12 deficiency        Assessment:  Active Hospital Problems    Diagnosis Date Noted   • **Peripheral edema [R60.9] 02/15/2019   • Chronic kidney disease, stage 3 (CMS/HCC) [N18.3] 11/23/2018   • Ischemic cardiomyopathy [I25.5] 11/13/2018   • Hypertension [I10] 12/24/2017   • Supratherapeutic INR [R79.1] 12/24/2017   • Esophageal stricture [K22.2] 09/06/2017   • GEORGINA on auto CPAP - Dr Cardona [G47.33, Z99.89] 09/08/2016   • Long-term (current) use of anticoagulants [Z79.01] 08/16/2016   • Chronic atrial fibrillation (CMS/[facility]) [I48.2] 06/08/2016   • S/P MVR (mitral valve replacement) [Z95.2] 06/08/2016   • Bilateral carotid artery disease (CMS/[facility]) [I77.9] 06/08/2016   • Coronary artery disease involving coronary 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       Plan:  Continue diuresis.Cardiology consult noted. Holding coumadin and give Vit K    Christiano Philip MD  2/16/2019  11:03 AM

## 2019-02-16 NOTE — PLAN OF CARE
Problem: Patient Care Overview  Goal: Plan of Care Review  Outcome: Ongoing (interventions implemented as appropriate)   02/16/19 9791   Coping/Psychosocial   Plan of Care Reviewed With patient   Plan of Care Review   Progress improving   OTHER   Outcome Summary FC completed; IV bumex- continue diuresis; NORCO ordered and given; Pt not moving much in bed- assist x1/2; some moments of confusion- discussed with family- will continue to watch; INR still above 10- VIt K given.     Goal: Individualization and Mutuality  Outcome: Ongoing (interventions implemented as appropriate)    Goal: Discharge Needs Assessment  Outcome: Ongoing (interventions implemented as appropriate)    Goal: Interprofessional Rounds/Family Conf  Outcome: Ongoing (interventions implemented as appropriate)      Problem: Fall Risk (Adult)  Goal: Identify Related Risk Factors and Signs and Symptoms  Outcome: Ongoing (interventions implemented as appropriate)    Goal: Absence of Fall  Outcome: Ongoing (interventions implemented as appropriate)      Problem: Pain, Acute (Adult)  Goal: Identify Related Risk Factors and Signs and Symptoms  Outcome: Ongoing (interventions implemented as appropriate)    Goal: Acceptable Pain Control/Comfort Level  Outcome: Ongoing (interventions implemented as appropriate)      Problem: Fluid Volume Excess (Adult)  Goal: Identify Related Risk Factors and Signs and Symptoms  Outcome: Ongoing (interventions implemented as appropriate)    Goal: Optimal Fluid Balance  Outcome: Ongoing (interventions implemented as appropriate)

## 2019-02-16 NOTE — PLAN OF CARE
Problem: Patient Care Overview  Goal: Plan of Care Review  Outcome: Ongoing (interventions implemented as appropriate)   02/16/19 0532   Coping/Psychosocial   Plan of Care Reviewed With patient   Plan of Care Review   Progress improving   OTHER   Outcome Summary Patient began experiencing urinary retention after bumex started. Straight cath x1 and then placed an indwelling catheter per order. Patient now resting comfortably. VSS       Problem: Fall Risk (Adult)  Goal: Absence of Fall  Outcome: Ongoing (interventions implemented as appropriate)      Problem: Pain, Acute (Adult)  Goal: Acceptable Pain Control/Comfort Level  Outcome: Ongoing (interventions implemented as appropriate)      Problem: Fluid Volume Excess (Adult)  Goal: Optimal Fluid Balance  Outcome: Ongoing (interventions implemented as appropriate)

## 2019-02-17 LAB
ANION GAP SERPL CALCULATED.3IONS-SCNC: 15.4 MMOL/L
BASOPHILS # BLD AUTO: 0 10*3/MM3 (ref 0–0.2)
BASOPHILS NFR BLD AUTO: 0 % (ref 0–1.5)
BUN BLD-MCNC: 58 MG/DL (ref 8–23)
BUN/CREAT SERPL: 36.9 (ref 7–25)
CALCIUM SPEC-SCNC: 9 MG/DL (ref 8.6–10.5)
CHLORIDE SERPL-SCNC: 101 MMOL/L (ref 98–107)
CHROMATIN AB SERPL-ACNC: 14.9 IU/ML (ref 0–14)
CO2 SERPL-SCNC: 26.6 MMOL/L (ref 22–29)
CREAT BLD-MCNC: 1.57 MG/DL (ref 0.57–1)
DEPRECATED RDW RBC AUTO: 50.4 FL (ref 37–54)
EOSINOPHIL # BLD AUTO: 0.02 10*3/MM3 (ref 0–0.4)
EOSINOPHIL NFR BLD AUTO: 0.2 % (ref 0.3–6.2)
ERYTHROCYTE [DISTWIDTH] IN BLOOD BY AUTOMATED COUNT: 14.4 % (ref 12.3–15.4)
GFR SERPL CREATININE-BSD FRML MDRD: 32 ML/MIN/1.73
GLUCOSE BLD-MCNC: 107 MG/DL (ref 65–99)
HCT VFR BLD AUTO: 28.2 % (ref 34–46.6)
HGB BLD-MCNC: 8.4 G/DL (ref 12–15.9)
IMM GRANULOCYTES # BLD AUTO: 0.16 10*3/MM3 (ref 0–0.05)
IMM GRANULOCYTES NFR BLD AUTO: 1.7 % (ref 0–0.5)
INR PPP: 3.36 (ref 0.9–1.1)
LYMPHOCYTES # BLD AUTO: 0.39 10*3/MM3 (ref 0.7–3.1)
LYMPHOCYTES NFR BLD AUTO: 4.2 % (ref 19.6–45.3)
MCH RBC QN AUTO: 28.5 PG (ref 26.6–33)
MCHC RBC AUTO-ENTMCNC: 29.8 G/DL (ref 31.5–35.7)
MCV RBC AUTO: 95.6 FL (ref 79–97)
MONOCYTES # BLD AUTO: 0.62 10*3/MM3 (ref 0.1–0.9)
MONOCYTES NFR BLD AUTO: 6.7 % (ref 5–12)
NEUTROPHILS # BLD AUTO: 8.06 10*3/MM3 (ref 1.4–7)
NEUTROPHILS NFR BLD AUTO: 87.2 % (ref 42.7–76)
NRBC BLD AUTO-RTO: 0 /100 WBC (ref 0–0)
PLATELET # BLD AUTO: 233 10*3/MM3 (ref 140–450)
PMV BLD AUTO: 9.9 FL (ref 6–12)
POTASSIUM BLD-SCNC: 3.3 MMOL/L (ref 3.5–5.2)
PROTHROMBIN TIME: 33.5 SECONDS (ref 11.7–14.2)
RBC # BLD AUTO: 2.95 10*6/MM3 (ref 3.77–5.28)
SODIUM BLD-SCNC: 143 MMOL/L (ref 136–145)
WBC NRBC COR # BLD: 9.25 10*3/MM3 (ref 3.4–10.8)

## 2019-02-17 PROCEDURE — 36415 COLL VENOUS BLD VENIPUNCTURE: CPT | Performed by: HOSPITALIST

## 2019-02-17 PROCEDURE — 99232 SBSQ HOSP IP/OBS MODERATE 35: CPT | Performed by: INTERNAL MEDICINE

## 2019-02-17 PROCEDURE — 80048 BASIC METABOLIC PNL TOTAL CA: CPT | Performed by: HOSPITALIST

## 2019-02-17 PROCEDURE — 85025 COMPLETE CBC W/AUTO DIFF WBC: CPT | Performed by: HOSPITALIST

## 2019-02-17 PROCEDURE — 86431 RHEUMATOID FACTOR QUANT: CPT | Performed by: HOSPITALIST

## 2019-02-17 PROCEDURE — 93005 ELECTROCARDIOGRAM TRACING: CPT | Performed by: INTERNAL MEDICINE

## 2019-02-17 PROCEDURE — 85610 PROTHROMBIN TIME: CPT | Performed by: HOSPITALIST

## 2019-02-17 PROCEDURE — 63710000001 PREDNISONE PER 1 MG: Performed by: HOSPITALIST

## 2019-02-17 PROCEDURE — 93010 ELECTROCARDIOGRAM REPORT: CPT | Performed by: INTERNAL MEDICINE

## 2019-02-17 RX ORDER — PREDNISONE 20 MG/1
20 TABLET ORAL DAILY
Status: DISCONTINUED | OUTPATIENT
Start: 2019-02-17 | End: 2019-02-22 | Stop reason: HOSPADM

## 2019-02-17 RX ORDER — POLYETHYLENE GLYCOL 3350 17 G/17G
17 POWDER, FOR SOLUTION ORAL DAILY
Status: DISCONTINUED | OUTPATIENT
Start: 2019-02-17 | End: 2019-02-22 | Stop reason: HOSPADM

## 2019-02-17 RX ORDER — BUMETANIDE 2 MG/1
2 TABLET ORAL 2 TIMES DAILY
Status: DISCONTINUED | OUTPATIENT
Start: 2019-02-17 | End: 2019-02-21

## 2019-02-17 RX ADMIN — RAMIPRIL 5 MG: 5 CAPSULE ORAL at 09:15

## 2019-02-17 RX ADMIN — CALCITRIOL CAPSULES 0.25 MCG 0.25 MCG: 0.25 CAPSULE ORAL at 09:15

## 2019-02-17 RX ADMIN — BUMETANIDE 2 MG: 2 TABLET ORAL at 13:29

## 2019-02-17 RX ADMIN — PANTOPRAZOLE SODIUM 40 MG: 40 TABLET, DELAYED RELEASE ORAL at 18:09

## 2019-02-17 RX ADMIN — CARVEDILOL 25 MG: 25 TABLET, FILM COATED ORAL at 09:15

## 2019-02-17 RX ADMIN — CARVEDILOL 25 MG: 25 TABLET, FILM COATED ORAL at 21:25

## 2019-02-17 RX ADMIN — BUMETANIDE 2 MG: 0.25 INJECTION INTRAMUSCULAR; INTRAVENOUS at 02:59

## 2019-02-17 RX ADMIN — ATORVASTATIN CALCIUM 20 MG: 20 TABLET, FILM COATED ORAL at 09:15

## 2019-02-17 RX ADMIN — BUMETANIDE 2 MG: 2 TABLET ORAL at 21:25

## 2019-02-17 RX ADMIN — PREDNISONE 20 MG: 20 TABLET ORAL at 15:48

## 2019-02-17 RX ADMIN — Medication 1000 MCG: at 09:15

## 2019-02-17 RX ADMIN — SODIUM CHLORIDE, PRESERVATIVE FREE 3 ML: 5 INJECTION INTRAVENOUS at 09:15

## 2019-02-17 RX ADMIN — ASPIRIN 81 MG: 81 TABLET, DELAYED RELEASE ORAL at 09:15

## 2019-02-17 RX ADMIN — POLYETHYLENE GLYCOL 3350 17 G: 17 POWDER, FOR SOLUTION ORAL at 15:48

## 2019-02-17 RX ADMIN — HYDROCODONE BITARTRATE AND ACETAMINOPHEN 1 TABLET: 5; 325 TABLET ORAL at 21:26

## 2019-02-17 RX ADMIN — PANTOPRAZOLE SODIUM 40 MG: 40 TABLET, DELAYED RELEASE ORAL at 09:14

## 2019-02-17 RX ADMIN — TRAMADOL HYDROCHLORIDE 50 MG: 50 TABLET ORAL at 10:33

## 2019-02-17 RX ADMIN — SODIUM CHLORIDE, PRESERVATIVE FREE 3 ML: 5 INJECTION INTRAVENOUS at 21:25

## 2019-02-17 NOTE — PLAN OF CARE
Problem: Patient Care Overview  Goal: Plan of Care Review  Outcome: Ongoing (interventions implemented as appropriate)   02/17/19 4042   Coping/Psychosocial   Plan of Care Reviewed With patient   Plan of Care Review   Progress improving   OTHER   Outcome Summary Patient required pain medication once at the beginning of the shift, and has been sleeping very well since. She has required 2L O2 while sleeping. IV bumex continues, I/O monitored via f/c. Some periods of confusion, such as talking to herself. VSS       Problem: Fall Risk (Adult)  Goal: Absence of Fall  Outcome: Ongoing (interventions implemented as appropriate)      Problem: Pain, Acute (Adult)  Goal: Acceptable Pain Control/Comfort Level  Outcome: Ongoing (interventions implemented as appropriate)      Problem: Fluid Volume Excess (Adult)  Goal: Optimal Fluid Balance  Outcome: Ongoing (interventions implemented as appropriate)      Problem: Skin Injury Risk (Adult)  Goal: Skin Health and Integrity  Outcome: Outcome(s) achieved Date Met: 02/17/19

## 2019-02-17 NOTE — PROGRESS NOTES
"DAILY PROGRESS NOTE  [facility]    Patient Identification:  Name: Janel Mahoney  Age: 78 y.o.  Sex: female  :  1940  MRN: [id number]         Primary Care Physician: Ariel Matute MD    Subjective:  Interval History:She complains of pain. Swelling better.  Diffuse joint pain.  Objective:    Scheduled Meds:    aspirin 81 mg Oral Daily   atorvastatin 20 mg Oral Daily   bumetanide 2 mg Oral BID   calcitriol 0.25 mcg Oral Daily   carvedilol 25 mg Oral BID   pantoprazole 40 mg Oral BID AC   predniSONE 20 mg Oral Daily   ramipril 5 mg Oral Daily   sodium chloride 3 mL Intravenous Q12H   vitamin B-12 1,000 mcg Oral Daily     Continuous Infusions:     Vital signs in last 24 hours:  Temp:  [97.7 °F (36.5 °C)-98 °F (36.7 °C)] 97.7 °F (36.5 °C)  Heart Rate:  [60-63] 63  Resp:  [18] 18  BP: (128-156)/(48-78) 149/58    Intake/Output:    Intake/Output Summary (Last 24 hours) at 2019 1323  Last data filed at 2019 0452  Gross per 24 hour   Intake 200 ml   Output 2450 ml   Net -2250 ml       Exam:  /58 (BP Location: Right arm)   Pulse 63   Temp 97.7 °F (36.5 °C) (Oral)   Resp 18   Ht 162.6 cm (64\")   Wt 72.4 kg (159 lb 9.6 oz)   SpO2 99%   BMI 27.40 kg/m²     General Appearance:    Alert, cooperative, no distress   Head:    Normocephalic, without obvious abnormality, atraumatic   Eyes:       Throat:   Lips, tongue, gums normal   Neck:   Supple, symmetrical, trachea midline, no JVD   Lungs:     Clear to auscultation bilaterally, respirations unlabored   Chest Wall:    No tenderness or deformity    Heart:    Regular rate and rhythm, S1 and S2 normal, no murmur,no  Rub or gallop   Abdomen:     Soft, non-tender, bowel sounds active, no masses, no organomegaly    Extremities:   Extremities normal, atraumatic, no cyanosis or edema   Pulses:      Skin:   Skin is warm and dry,  no rashes or palpable lesions   Neurologic:   no focal deficits noted      Lab Results (last 72 hours)     " Procedure Component Value Units Date/Time    POC Glucose Once [[id number]]  (Normal) Collected:  02/16/19 0808    Specimen:  Blood Updated:  02/16/19 0809     Glucose 109 mg/dL     Protime-INR [[id number]]  (Abnormal) Collected:  02/16/19 0625    Specimen:  Blood Updated:  02/16/19 0739     Protime 83.0 Seconds      INR >10.00    Basic Metabolic Panel [[id number]]  (Abnormal) Collected:  02/16/19 0625    Specimen:  Blood Updated:  02/16/19 0730     Glucose 99 mg/dL      BUN 60 mg/dL      Creatinine 1.54 mg/dL      Sodium 135 mmol/L      Potassium 3.5 mmol/L      Chloride 93 mmol/L      CO2 24.9 mmol/L      Calcium 9.1 mg/dL      eGFR Non African Amer 33 mL/min/1.73      BUN/Creatinine Ratio 39.0     Anion Gap 17.1 mmol/L     Narrative:       The MDRD GFR formula is only valid for adults with stable renal function between ages 18 and 70.    CBC & Differential [[id number]] Collected:  02/16/19 0625    Specimen:  Blood Updated:  02/16/19 0709    Narrative:       The following orders were created for panel order CBC & Differential.  Procedure                               Abnormality         Status                     ---------                               -----------         ------                     CBC Auto Differential[[id number]]        Abnormal            Final result                 Please view results for these tests on the individual orders.    CBC Auto Differential [[id number]]  (Abnormal) Collected:  02/16/19 0625    Specimen:  Blood Updated:  02/16/19 0709     WBC 9.85 10*3/mm3      RBC 2.96 10*6/mm3      Hemoglobin 8.4 g/dL      Hematocrit 27.9 %      MCV 94.3 fL      MCH 28.4 pg      MCHC 30.1 g/dL      RDW 14.5 %      RDW-SD 50.7 fl      MPV 10.1 fL      Platelets 202 10*3/mm3      Neutrophil % 88.3 %      Lymphocyte % 3.6 %      Monocyte % 6.9 %      Eosinophil % 0.1 %      Basophil % 0.0 %      Immature Grans % 1.1 %      Neutrophils, Absolute 8.70 10*3/mm3      Lymphocytes, Absolute 0.35 10*3/mm3       Monocytes, Absolute 0.68 10*3/mm3      Eosinophils, Absolute 0.01 10*3/mm3      Basophils, Absolute 0.00 10*3/mm3      Immature Grans, Absolute 0.11 10*3/mm3      nRBC 0.0 /100 WBC     Protime-INR [[id number]]  (Abnormal) Collected:  02/15/19 1143    Specimen:  Blood Updated:  02/15/19 1217     Protime 78.4 Seconds      INR 9.97    aPTT [[id number]]  (Abnormal) Collected:  02/15/19 1143    Specimen:  Blood Updated:  02/15/19 1217     PTT 79.1 seconds     Urinalysis, Microscopic Only - Urine, Clean Catch [[id number]] Collected:  02/15/19 1133    Specimen:  Urine, Clean Catch Updated:  02/15/19 1156     RBC, UA 0-2 /HPF      WBC, UA 0-2 /HPF      Bacteria, UA None Seen /HPF      Squamous Epithelial Cells, UA 0-2 /HPF      Hyaline Casts, UA 0-2 /LPF      Methodology Automated Microscopy    Urinalysis With Microscopic If Indicated (No Culture) - Urine, Clean Catch [[id number]]  (Abnormal) Collected:  02/15/19 1133    Specimen:  Urine, Clean Catch Updated:  02/15/19 1156     Color, UA Yellow     Appearance, UA Clear     pH, UA <=5.0     Specific Gravity, UA 1.017     Glucose, UA Negative     Ketones, UA Negative     Bilirubin, UA Negative     Blood, UA Small (1+)     Protein, UA Negative     Leuk Esterase, UA Negative     Nitrite, UA Negative     Urobilinogen, UA 0.2 E.U./dL    Comprehensive Metabolic Panel [[id number]]  (Abnormal) Collected:  02/15/19 1041    Specimen:  Blood Updated:  02/15/19 1126     Glucose 128 mg/dL      BUN 63 mg/dL      Creatinine 1.68 mg/dL      Sodium 139 mmol/L      Potassium 4.0 mmol/L      Chloride 98 mmol/L      CO2 26.0 mmol/L      Calcium 9.3 mg/dL      Total Protein 6.8 g/dL      Albumin 3.20 g/dL      ALT (SGPT) 78 U/L      AST (SGOT) 71 U/L      Alkaline Phosphatase 112 U/L      Total Bilirubin 0.7 mg/dL      eGFR Non African Amer 29 mL/min/1.73      Globulin 3.6 gm/dL      A/G Ratio 0.9 g/dL      BUN/Creatinine Ratio 37.5     Anion Gap 15.0 mmol/L     Narrative:       The MDRD GFR formula  is only valid for adults with stable renal function between ages 18 and 70.    Troponin [[id number]]  (Normal) Collected:  02/15/19 1041    Specimen:  Blood Updated:  02/15/19 1126     Troponin T 0.019 ng/mL     Narrative:       Troponin T Reference Range:  <= 0.03 ng/mL-   Negative for AMI  >0.03 ng/mL-     Abnormal for myocardial necrosis.  Clinicians would have to utilize clinical acumen, EKG, Troponin and serial changes to determine if it is an Acute Myocardial Infarction or myocardial injury due to an underlying chronic condition.     BNP [[id number]]  (Abnormal) Collected:  02/15/19 1041    Specimen:  Blood Updated:  02/15/19 1124     proBNP 9,545.0 pg/mL     Narrative:       Among patients with dyspnea, NT-proBNP is highly sensitive for the detection of acute congestive heart failure. In addition NT-proBNP of <300 pg/ml effectively rules out acute congestive heart failure with 99% negative predictive value.    CBC & Differential [[id number]] Collected:  02/15/19 1041    Specimen:  Blood Updated:  02/15/19 1109    Narrative:       The following orders were created for panel order CBC & Differential.  Procedure                               Abnormality         Status                     ---------                               -----------         ------                     CBC Auto Differential[[id number]]        Abnormal            Final result                 Please view results for these tests on the individual orders.    CBC Auto Differential [[id number]]  (Abnormal) Collected:  02/15/19 1041    Specimen:  Blood Updated:  02/15/19 1109     WBC 11.34 10*3/mm3      RBC 3.17 10*6/mm3      Hemoglobin 9.2 g/dL      Hematocrit 29.9 %      MCV 94.3 fL      MCH 29.0 pg      MCHC 30.8 g/dL      RDW 14.5 %      RDW-SD 49.9 fl      MPV 10.3 fL      Platelets 210 10*3/mm3      Neutrophil % 91.2 %      Lymphocyte % 2.4 %      Monocyte % 5.6 %      Eosinophil % 0.1 %      Basophil % 0.1 %      Immature Grans % 0.6 %       Neutrophils, Absolute 10.34 10*3/mm3      Lymphocytes, Absolute 0.27 10*3/mm3      Monocytes, Absolute 0.64 10*3/mm3      Eosinophils, Absolute 0.01 10*3/mm3      Basophils, Absolute 0.01 10*3/mm3      Immature Grans, Absolute 0.07 10*3/mm3      nRBC 0.0 /100 WBC         Data Review:  Results from last 7 days   Lab Units 02/17/19  0535 02/16/19  0625 02/15/19  1041   SODIUM mmol/L 143 135* 139   POTASSIUM mmol/L 3.3* 3.5 4.0   CHLORIDE mmol/L 101 93* 98   CO2 mmol/L 26.6 24.9 26.0   BUN mg/dL 58* 60* 63*   CREATININE mg/dL 1.57* 1.54* 1.68*   GLUCOSE mg/dL 107* 99 128*   CALCIUM mg/dL 9.0 9.1 9.3     Results from last 7 days   Lab Units 02/17/19  0535 02/16/19  0625 02/15/19  1041   WBC 10*3/mm3 9.25 9.85 11.34*   HEMOGLOBIN g/dL 8.4* 8.4* 9.2*   HEMATOCRIT % 28.2* 27.9* 29.9*   PLATELETS 10*3/mm3 233 202 210             Lab Results   Lab Value Date/Time    TROPONINT 0.019 02/15/2019 1041    TROPONINT 0.021 11/23/2018 1337    TROPONINT <0.010 05/10/2018 1443         Results from last 7 days   Lab Units 02/15/19  1041   ALK PHOS U/L 112   BILIRUBIN mg/dL 0.7   ALT (SGPT) U/L 78*   AST (SGOT) U/L 71*             Glucose   Date/Time Value Ref Range Status   02/16/2019 1705 131 (H) 70 - 130 mg/dL Final   02/16/2019 1147 152 (H) 70 - 130 mg/dL Final   02/16/2019 0808 109 70 - 130 mg/dL Final     Results from last 7 days   Lab Units 02/17/19  0535 02/16/19  0625 02/15/19  1143   INR  3.36* >10.00* 9.97*       Past Medical History:   Diagnosis Date   • Acute kidney injury (CMS/HCC)    • Anemia    • Atrial fibrillation (CMS/HCC)    • Bruises easily    • Carotid artery stenosis    • Chronic back pain    • Chronic combined systolic and diastolic congestive heart failure (CMS/HCC)    • Chronic coronary artery disease     moderate to severe LV dysfunction.   • Chronic kidney disease, stage 3 (CMS/HCC)    • Dysphagia    • GERD (gastroesophageal reflux disease)    • H/O cardiac murmur    • [place] (hard of hearing)     wears hearing  aids   • Hyperlipidemia    • Hypertension    • Hypotension    • Ischemic cardiomyopathy    • Kyphoscoliosis    • Leukopenia    • Lumbar spondylosis    • Obesity    • GEORGINA (obstructive sleep apnea)    • Osteoarthritis    • Osteoporosis    • Peptic ulcer    • Premature ventricular contractions    • Renal insufficiency syndrome    • Scoliosis    • Shoulder fracture, left    • Stroke syndrome    • Thrombocytopenia (CMS/HCC)    • Ventricular tachycardia (CMS/HCC)    • Vitamin B12 deficiency        Assessment:  Active Hospital Problems    Diagnosis Date Noted   • **Peripheral edema [R60.9] 02/15/2019   • Chronic kidney disease, stage 3 (CMS/HCC) [N18.3] 11/23/2018   • Ischemic cardiomyopathy [I25.5] 11/13/2018   • Hypertension [I10] 12/24/2017   • Supratherapeutic INR [R79.1] 12/24/2017   • Esophageal stricture [K22.2] 09/06/2017   • GEORGINA on auto CPAP - Dr Cardona [G47.33, Z99.89] 09/08/2016   • Long-term (current) use of anticoagulants [Z79.01] 08/16/2016   • Chronic atrial fibrillation (CMS/[facility]) [I48.2] 06/08/2016   • S/P MVR (mitral valve replacement) [Z95.2] 06/08/2016   • Bilateral carotid artery disease (CMS/[facility]) [I77.9] 06/08/2016   • Coronary artery disease involving coronary 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       Plan:  Continue diuresis.Cardiology consult noted. Holding coumadin and give prednisone. Check rheumatoid factor Get PT eval.    Christiano Philip MD  2/17/2019  1:23 PM

## 2019-02-17 NOTE — PLAN OF CARE
Problem: Patient Care Overview  Goal: Plan of Care Review  Outcome: Ongoing (interventions implemented as appropriate)   02/17/19 4747   Coping/Psychosocial   Plan of Care Reviewed With patient   Plan of Care Review   Progress improving   OTHER   Outcome Summary Iv bumex changed to PO; FC care; miralax started with prednisone; VSS; Pt c/o BUE pain when moving; BLE edema slightly decreased.     Goal: Individualization and Mutuality  Outcome: Ongoing (interventions implemented as appropriate)    Goal: Discharge Needs Assessment  Outcome: Ongoing (interventions implemented as appropriate)    Goal: Interprofessional Rounds/Family Conf  Outcome: Ongoing (interventions implemented as appropriate)      Problem: Fall Risk (Adult)  Goal: Identify Related Risk Factors and Signs and Symptoms  Outcome: Ongoing (interventions implemented as appropriate)    Goal: Absence of Fall  Outcome: Ongoing (interventions implemented as appropriate)      Problem: Pain, Acute (Adult)  Goal: Identify Related Risk Factors and Signs and Symptoms  Outcome: Ongoing (interventions implemented as appropriate)    Goal: Acceptable Pain Control/Comfort Level  Outcome: Ongoing (interventions implemented as appropriate)      Problem: Fluid Volume Excess (Adult)  Goal: Identify Related Risk Factors and Signs and Symptoms  Outcome: Ongoing (interventions implemented as appropriate)    Goal: Optimal Fluid Balance  Outcome: Ongoing (interventions implemented as appropriate)      Problem: Skin Injury Risk (Adult)  Goal: Identify Related Risk Factors and Signs and Symptoms  Outcome: Ongoing (interventions implemented as appropriate)

## 2019-02-17 NOTE — PROGRESS NOTES
[facility]  Cardiology Progress Note    Patient Identification:  Name: Janel Mahoney  Age: 78 y.o.  Sex: female  :  1940  MRN: [id number]                 Follow Up / Chief Complaint: Edema    Interval History:  Admitted with shortness of breath as well as edema with right-sided failure     Subjective:  Feeling much better shortness of breath better    Objective:    Past Medical History:  Past Medical History:   Diagnosis Date   • Acute kidney injury (CMS/HCC)    • Anemia    • Atrial fibrillation (CMS/HCC)    • Bruises easily    • Carotid artery stenosis    • Chronic back pain    • Chronic combined systolic and diastolic congestive heart failure (CMS/HCC)    • Chronic coronary artery disease     moderate to severe LV dysfunction.   • Chronic kidney disease, stage 3 (CMS/HCC)    • Dysphagia    • GERD (gastroesophageal reflux disease)    • H/O cardiac murmur    • [place] (hard of hearing)     wears hearing aids   • Hyperlipidemia    • Hypertension    • Hypotension    • Ischemic cardiomyopathy    • Kyphoscoliosis    • Leukopenia    • Lumbar spondylosis    • Obesity    • GEORGINA (obstructive sleep apnea)    • Osteoarthritis    • Osteoporosis    • Peptic ulcer    • Premature ventricular contractions    • Renal insufficiency syndrome    • Scoliosis    • Shoulder fracture, left    • Stroke syndrome    • Thrombocytopenia (CMS/HCC)    • Ventricular tachycardia (CMS/HCC)    • Vitamin B12 deficiency      Past Surgical History:  Past Surgical History:   Procedure Laterality Date   • AV NODE ABLATION     • BREAST BIOPSY     • CARDIAC CATHETERIZATION      Showed severe mitral insufficiency and borderline coronary artery disease   • CARDIAC CATHETERIZATION      Showed an ejection fraction of 35%. She had occlusive disease of the right posterior LV branch and no other significant disease, treated medically.   • CARDIAC DEFIBRILLATOR PLACEMENT      Biventricular   • CARDIAC ELECTROPHYSIOLOGY PROCEDURE N/A 2019     Procedure: GENERATOR CHANGE BI-V ICD   [place];  Surgeon: James Hwang MD;  Location: [facility] CATH INVASIVE LOCATION;  Service: Cardiology   • CARDIAC VALVE REPLACEMENT  2009    Done with stent placement   • CARDIOVERSION      multiple electrocardioversions.   • CAROTID ARTERY ANGIOPLASTY Right    • COLONOSCOPY N/A 9/28/2017    Procedure: COLONOSCOPY TO CECUM;  Surgeon: Kevin Davis MD;  Location: [facility] ENDOSCOPY;  Service:    • CORONARY ANGIOPLASTY WITH STENT PLACEMENT  2009   • CORONARY ARTERY BYPASS GRAFT      single graft to the PDA   • CORONARY STENT PLACEMENT     • ENDOSCOPY N/A 9/28/2017    Procedure: ESOPHAGOGASTRODUODENOSCOPY ;  Surgeon: Kevin Davis MD;  Location: [facility] ENDOSCOPY;  Service:    • HEMORRHOIDECTOMY     • HYSTERECTOMY     • INCISION AND DRAINAGE TRUNK Right 11/27/2018    Procedure: EVACUATION OF RIGHT CHEST WALL HEMATOMA;  Surgeon: Juvencio Rodriguez MD;  Location: [facility] MAIN OR;  Service: General   • MITRAL VALVE REPLACEMENT  01/2010    #31 Epic porcine valve.   • THROMBOENDARTERECTOMY Right     carotid thromboendarterectomy    • TONSILLECTOMY      age 32   • TOTAL KNEE ARTHROPLASTY Left    • TOTAL SHOULDER ARTHROPLASTY W/ DISTAL CLAVICLE EXCISION Left 1/16/2018    Procedure: TOTAL SHOULDER REVERSE ARTHROPLASTY;  Surgeon: Bianka Quesada MD;  Location: [facility] OR;  Service:         Social History:   Social History     Tobacco Use   • Smoking status: Former Smoker     Packs/day: 1.00     Years: 50.00     Pack years: 50.00     Types: Cigarettes     Last attempt to quit: 1/11/2009     Years since quitting: 10.1   • Smokeless tobacco: Never Used   Substance Use Topics   • Alcohol use: Yes     Comment: rare      Family History:  Family History   Problem Relation Age of Onset   • Cancer Mother    • Breast cancer Mother    • Heart disease Mother    • Hypertension Mother    • Coronary artery disease Father    • Stroke Father    • Heart disease Father    • Hypertension  "Father    • Other Daughter         thiamin deficiency    • Hypertension Son    • Malig Hyperthermia Neg Hx           Allergies:  Allergies   Allergen Reactions   • Diclofenac      She had adverse effects from the medications that required hospitalization. Pt got stomach ulcers from this medication prescribed by Dr. Arango - pediatrist.   • Dofetilide Unknown (See Comments)     Pt states not allergic.      Scheduled Meds:    aspirin 81 mg Daily   atorvastatin 20 mg Daily   bumetanide 2 mg Q12H   calcitriol 0.25 mcg Daily   carvedilol 25 mg BID   pantoprazole 40 mg BID AC   ramipril 5 mg Daily   sodium chloride 3 mL Q12H   vitamin B-12 1,000 mcg Daily           INTAKE AND OUTPUT:    Intake/Output Summary (Last 24 hours) at 2/17/2019 0850  Last data filed at 2/17/2019 0452  Gross per 24 hour   Intake 200 ml   Output 2450 ml   Net -2250 ml       Review of Systems:   GI:  Cardiac: No chest pain  Pulmonary: Shortness of breath better    Constitutional:  Temp:  [97.7 °F (36.5 °C)-98 °F (36.7 °C)] 97.7 °F (36.5 °C)  Heart Rate:  [60-63] 63  Resp:  [18] 18  BP: (128-156)/(48-78) 149/58  /58 (BP Location: Right arm)   Pulse 63   Temp 97.7 °F (36.5 °C) (Oral)   Resp 18   Ht 162.6 cm (64\")   Wt 72.4 kg (159 lb 9.6 oz)   SpO2 99%   BMI 27.40 kg/m²   General appearance: No acute changes   Neck: Trachea midline; NECK, supple, no thyromegaly or lymphadenopathy   Lungs: Normal size and shape, normal breath sounds, equal distribution of air, no rales and rhonchi   CV: S1-S2 regular, no murmurs, no rub, no gallop   Abdomen: Soft, non-tender; no masses , no abnormal abdominal sounds   Extremities: No deformity , normal color , no peripheral edema   Skin: Normal temperature, turgor and texture; no rash, ulcers              Cardiographics  Telemetry:     ECG:     Echocardiogram:     Lab Review   Results from last 7 days   Lab Units 02/15/19  1041   TROPONIN T ng/mL 0.019         Results from last 7 days   Lab Units " "02/17/19  0535   SODIUM mmol/L 143   POTASSIUM mmol/L 3.3*   BUN mg/dL 58*   CREATININE mg/dL 1.57*   CALCIUM mg/dL 9.0     @[id number]@  Results from last 7 days   Lab Units 02/17/19  0535 02/16/19  0625 02/15/19  1041   WBC 10*3/mm3 9.25 9.85 11.34*   HEMOGLOBIN g/dL 8.4* 8.4* 9.2*   HEMATOCRIT % 28.2* 27.9* 29.9*   PLATELETS 10*3/mm3 233 202 210     Results from last 7 days   Lab Units 02/17/19  0535 02/16/19  0625 02/15/19  1143   INR  3.36* >10.00* 9.97*   APTT seconds  --   --  79.1*         Assessment:  1.  Edema.    Much better shortness of breath is much better, change Bumex to p.o.     2.  Coronary artery disease with a history of stent placement and then bypass surgery in 2010.    Denies chest pain      3.  Permanent atrial fibrillation.    Controlled       4.  Supratherapeutic INR.  INR is down to 3.36, may hold Coumadin 1 more day      EKG and BMP in the morning      )2/17/2019  Popeye Noriega MD      EMR [place]/Transcription:   \"Dictated utilizing Dragon dictation\".     "

## 2019-02-18 LAB
ANION GAP SERPL CALCULATED.3IONS-SCNC: 12 MMOL/L
BASOPHILS # BLD AUTO: 0.01 10*3/MM3 (ref 0–0.2)
BASOPHILS NFR BLD AUTO: 0.1 % (ref 0–1.5)
BUN BLD-MCNC: 52 MG/DL (ref 8–23)
BUN/CREAT SERPL: 38.8 (ref 7–25)
CALCIUM SPEC-SCNC: 8.9 MG/DL (ref 8.6–10.5)
CHLORIDE SERPL-SCNC: 101 MMOL/L (ref 98–107)
CO2 SERPL-SCNC: 29 MMOL/L (ref 22–29)
CREAT BLD-MCNC: 1.34 MG/DL (ref 0.57–1)
CRP SERPL-MCNC: 15 MG/DL (ref 0–0.5)
DEPRECATED RDW RBC AUTO: 49 FL (ref 37–54)
EOSINOPHIL # BLD AUTO: 0 10*3/MM3 (ref 0–0.4)
EOSINOPHIL NFR BLD AUTO: 0 % (ref 0.3–6.2)
ERYTHROCYTE [DISTWIDTH] IN BLOOD BY AUTOMATED COUNT: 14.2 % (ref 12.3–15.4)
ERYTHROCYTE [SEDIMENTATION RATE] IN BLOOD: >140 MM/HR (ref 0–30)
GFR SERPL CREATININE-BSD FRML MDRD: 38 ML/MIN/1.73
GLUCOSE BLD-MCNC: 144 MG/DL (ref 65–99)
HCT VFR BLD AUTO: 29.2 % (ref 34–46.6)
HGB BLD-MCNC: 8.8 G/DL (ref 12–15.9)
IMM GRANULOCYTES # BLD AUTO: 0.27 10*3/MM3 (ref 0–0.05)
IMM GRANULOCYTES NFR BLD AUTO: 2.4 % (ref 0–0.5)
INR PPP: 3.82 (ref 0.9–1.1)
LYMPHOCYTES # BLD AUTO: 0.46 10*3/MM3 (ref 0.7–3.1)
LYMPHOCYTES NFR BLD AUTO: 4 % (ref 19.6–45.3)
MCH RBC QN AUTO: 28.4 PG (ref 26.6–33)
MCHC RBC AUTO-ENTMCNC: 30.1 G/DL (ref 31.5–35.7)
MCV RBC AUTO: 94.2 FL (ref 79–97)
MONOCYTES # BLD AUTO: 0.3 10*3/MM3 (ref 0.1–0.9)
MONOCYTES NFR BLD AUTO: 2.6 % (ref 5–12)
NEUTROPHILS # BLD AUTO: 10.4 10*3/MM3 (ref 1.4–7)
NEUTROPHILS NFR BLD AUTO: 90.9 % (ref 42.7–76)
NRBC BLD AUTO-RTO: 0 /100 WBC (ref 0–0)
PLATELET # BLD AUTO: 261 10*3/MM3 (ref 140–450)
PMV BLD AUTO: 9.7 FL (ref 6–12)
POTASSIUM BLD-SCNC: 3.7 MMOL/L (ref 3.5–5.2)
PROTHROMBIN TIME: 37 SECONDS (ref 11.7–14.2)
RBC # BLD AUTO: 3.1 10*6/MM3 (ref 3.77–5.28)
SODIUM BLD-SCNC: 142 MMOL/L (ref 136–145)
WBC NRBC COR # BLD: 11.44 10*3/MM3 (ref 3.4–10.8)

## 2019-02-18 PROCEDURE — 99232 SBSQ HOSP IP/OBS MODERATE 35: CPT | Performed by: NURSE PRACTITIONER

## 2019-02-18 PROCEDURE — 80048 BASIC METABOLIC PNL TOTAL CA: CPT | Performed by: HOSPITALIST

## 2019-02-18 PROCEDURE — 86140 C-REACTIVE PROTEIN: CPT | Performed by: HOSPITALIST

## 2019-02-18 PROCEDURE — 85652 RBC SED RATE AUTOMATED: CPT | Performed by: HOSPITALIST

## 2019-02-18 PROCEDURE — 63710000001 PREDNISONE PER 1 MG: Performed by: HOSPITALIST

## 2019-02-18 PROCEDURE — 36415 COLL VENOUS BLD VENIPUNCTURE: CPT | Performed by: HOSPITALIST

## 2019-02-18 PROCEDURE — 85610 PROTHROMBIN TIME: CPT | Performed by: HOSPITALIST

## 2019-02-18 PROCEDURE — 85025 COMPLETE CBC W/AUTO DIFF WBC: CPT | Performed by: HOSPITALIST

## 2019-02-18 PROCEDURE — 97162 PT EVAL MOD COMPLEX 30 MIN: CPT

## 2019-02-18 RX ADMIN — SODIUM CHLORIDE, PRESERVATIVE FREE 3 ML: 5 INJECTION INTRAVENOUS at 10:06

## 2019-02-18 RX ADMIN — PANTOPRAZOLE SODIUM 40 MG: 40 TABLET, DELAYED RELEASE ORAL at 10:05

## 2019-02-18 RX ADMIN — BUMETANIDE 2 MG: 2 TABLET ORAL at 20:50

## 2019-02-18 RX ADMIN — ASPIRIN 81 MG: 81 TABLET, DELAYED RELEASE ORAL at 10:01

## 2019-02-18 RX ADMIN — CARVEDILOL 25 MG: 25 TABLET, FILM COATED ORAL at 10:01

## 2019-02-18 RX ADMIN — TRAMADOL HYDROCHLORIDE 50 MG: 50 TABLET ORAL at 20:48

## 2019-02-18 RX ADMIN — Medication 1000 MCG: at 10:01

## 2019-02-18 RX ADMIN — BUMETANIDE 2 MG: 2 TABLET ORAL at 10:01

## 2019-02-18 RX ADMIN — SODIUM CHLORIDE, PRESERVATIVE FREE 3 ML: 5 INJECTION INTRAVENOUS at 20:51

## 2019-02-18 RX ADMIN — ATORVASTATIN CALCIUM 20 MG: 20 TABLET, FILM COATED ORAL at 10:01

## 2019-02-18 RX ADMIN — RAMIPRIL 5 MG: 5 CAPSULE ORAL at 10:00

## 2019-02-18 RX ADMIN — CALCITRIOL CAPSULES 0.25 MCG 0.25 MCG: 0.25 CAPSULE ORAL at 10:00

## 2019-02-18 RX ADMIN — PREDNISONE 20 MG: 20 TABLET ORAL at 10:01

## 2019-02-18 RX ADMIN — HYDROCODONE BITARTRATE AND ACETAMINOPHEN 1 TABLET: 5; 325 TABLET ORAL at 15:00

## 2019-02-18 RX ADMIN — CARVEDILOL 25 MG: 25 TABLET, FILM COATED ORAL at 20:50

## 2019-02-18 RX ADMIN — PANTOPRAZOLE SODIUM 40 MG: 40 TABLET, DELAYED RELEASE ORAL at 18:05

## 2019-02-18 RX ADMIN — POLYETHYLENE GLYCOL 3350 17 G: 17 POWDER, FOR SOLUTION ORAL at 10:05

## 2019-02-18 NOTE — PROGRESS NOTES
Discharge Planning Assessment  [facility]     Patient Name: Janel Mahoney  MRN: [id number]  Today's Date: 2/18/2019    Admit Date: 2/15/2019    Discharge Needs Assessment     Row Name 02/18/19 0936       Living Environment    Lives With  spouse    Current Living Arrangements  home/apartment/condo    Primary Care Provided by  self    Provides Primary Care For  no one, unable/limited ability to care for self    Family Caregiver if Needed  spouse    Family Caregiver Names   Russ    Quality of Family Relationships  helpful    Able to Return to Prior Arrangements  yes       Resource/Environmental Concerns    Resource/Environmental Concerns  none       Transition Planning    Patient/Family Anticipates Transition to  home with family    Patient/Family Anticipated Services at Transition  -- May need HH or SNF - will follow progress    Transportation Anticipated  family or friend will provide       Discharge Needs Assessment    Readmission Within the Last 30 Days  no previous admission in last 30 days    Concerns to be Addressed  basic needs    Equipment Currently Used at Home  walker, rolling;grab bar;commode;bipap/cpap    Discharge Facility/Level of Care Needs  -- Will likely need HH or SNF - will follow progress    Offered/Gave Vendor List  yes        Discharge Plan     Row Name 02/18/19 8732       Plan    Plan  Return home with spouse    Patient/Family in Agreement with Plan  yes    Plan Comments  Spoke with patient and  Russ [phone] (HCS - AD in nprogress) at bedside.  Patient uses a walker, has grab bars in the shower and BSC.  She has a C-pap from [employer].  Last mask she received, has magnets which she cannot use with pacemaker/IC device.   will F/U with [facility].  Patient has used [facility] in the past and has been to SNF.  Talked about HH or SNF at [place] - patient will consider.  Given list of HH agencies, list of SNF by area and [facility].  Has not yet worked with PT.  CCP will follow.   BHumeniuk RN               Demographic Summary     Row Name 02/18/19 0935       General Information    Admission Type  inpatient    Arrived From  home    Referral Source  admission list    Reason for Consult  discharge planning    Preferred Language  English     Used During This Interaction  no        Functional Status     Row Name 02/18/19 0935       Functional Status    Usual Activity Tolerance  fair    Current Activity Tolerance  fair       Functional Status, IADL    Medications  independent    Meal Preparation  assistive person     Housekeeping  assistive person    Laundry  assistive person    Shopping  assistive person       Mental Status    General Appearance WDL  WDL       Mental Status Summary    Recent Changes in Mental Status/Cognitive Functioning  no changes            Patient Forms     Row Name 02/18/19 0929       Patient Forms    Provider Choice List  Delivered [facility].  List of SNF by area, list of HH agencies    Delivered to  Patient And     Method of delivery  In person            Becky S. Humeniuk, RN

## 2019-02-18 NOTE — PROGRESS NOTES
"CC: CHF    Interval History: Continues to diurese well was transitioned to PO Bumex yesterday. Swelling is down, states shortness of breath is  \"better\" and drifts back to sleep.      Vital Signs  Temp:  [97.7 °F (36.5 °C)-97.9 °F (36.6 °C)] 97.9 °F (36.6 °C)  Heart Rate:  [60-63] 60  Resp:  [16-18] 16  BP: (140-158)/(55-62) 144/60    Intake/Output Summary (Last 24 hours) at 2/18/2019 0749  Last data filed at 2/18/2019 0551  Gross per 24 hour   Intake 600 ml   Output 2250 ml   Net -1650 ml     Flowsheet Rows      First Filed Value   Admission Height  162.6 cm (64\") Documented at 02/15/2019 1005   Admission Weight  72.4 kg (159 lb 9.6 oz) Documented at 02/15/2019 1005          PHYSICAL EXAM:  General: No acute distress  Resp:NL Rate, unlabored, clear  CV:NL rate and paced rhythm, NL PMI, Nl S1 and S2, no Murmur, no gallop, no rub, No JVD. Normal pedal pulses  ABD:Nl sounds, no masses or tenderness, nondistended, no guarding or rebound  Neuro: alert,cooperative and oriented  Extr: 1-2+ ankle edema bilateral, no cyanosis, moves all extremities, venous stasis dermatitis bilateral      Results Review:    Results from last 7 days   Lab Units 02/18/19  0556   SODIUM mmol/L 142   POTASSIUM mmol/L 3.7   CHLORIDE mmol/L 101   CO2 mmol/L 29.0   BUN mg/dL 52*   CREATININE mg/dL 1.34*   GLUCOSE mg/dL 144*   CALCIUM mg/dL 8.9     Results from last 7 days   Lab Units 02/15/19  1041   TROPONIN T ng/mL 0.019     Results from last 7 days   Lab Units 02/18/19  0556   WBC 10*3/mm3 11.44*   HEMOGLOBIN g/dL 8.8*   HEMATOCRIT % 29.2*   PLATELETS 10*3/mm3 261     Results from last 7 days   Lab Units 02/18/19  0556 02/17/19  0535 02/16/19  0625 02/15/19  1143   INR  3.82* 3.36* >10.00* 9.97*   APTT seconds  --   --   --  79.1*                 I reviewed the patient's new clinical results.  I personally viewed and interpreted the patient's EKG/Telemetry- V paced         Medication Review:   Meds reviewed         Assessment/Plan  1. Acute on " chronic combined CHF EF 41-45 01/2019. ACE inhibitor was stopped x 3 weeks. She was in the office two days prior to admit and received IV lasix and was switched from lasix to Bumex PO. Failed that and required continued IV Bumex transitioned to PO Bumex 2/17/2019 seems to be tolerating   2.Chronic atrial fibrillation previous cardioversion, intolerant to Tikosyn then PVI on sotalol then AV node ablation s/p PPM upgraded to biV ICD with generator upgrade 01/2019.  3.Coronary artery disease with history of mild disease of the LAD and angioplasty stent placement of the right coronary artery then single bypass graft January 2010 to the posterior descending artery.  4. Ischemic CM  5.History of mitral insufficiency status post mitral valve replacement with porcine valve with normal function on echo 12/2016  6. History of renal disease follows with Dr. Cruz- follow trends  7. History of nonsustained ventricular tachycardia status post ICD with generator upgrade in January 2019  8. History of GEORGINA  9. HTN continue to follow  10.HLD continue home therapy  11. Supra therapeutic INR continue to hold INR for another day  12. Diffuse joint pain/Elevated inflammatory markers- sed rate and CRP per admitting. Given prednisone    -Noted elevated inflammatory markers on prednisone per admitting. Continue PO Bumex 2 mg BID. Hold Coumadin and follow INR.       NICKY Roman  02/18/19  7:49 AM

## 2019-02-18 NOTE — PLAN OF CARE
Problem: Patient Care Overview  Goal: Plan of Care Review   02/18/19 1122   Coping/Psychosocial   Plan of Care Reviewed With patient   OTHER   Outcome Summary Pt is a 77 yo female who presents from home with peripheral edema, acute on chronic CHF, history of afib and scoliosis. Upon exam, pt reports back pain and demonstrates generalized weakness, decreased endurance, and impaired balance limiting independence with mobility. Pt states he was independent with walker prior to admission, was using lift chair for transfers. Pt currently requiring max A for bed mobility, unable to perform sit to stand due to weakness and pain. Pt would benefit from continued PT to address above impairments and increase independence with functional mobility.

## 2019-02-18 NOTE — PROGRESS NOTES
"DAILY PROGRESS NOTE  [facility]    Patient Identification:  Name: Janel Mahoney  Age: 78 y.o.  Sex: female  :  1940  MRN: [id number]         Primary Care Physician: Ariel Matute MD    Subjective:  Interval History:She complains of pain. Swelling better.  Diffuse joint pain.  Objective:    Scheduled Meds:    aspirin 81 mg Oral Daily   atorvastatin 20 mg Oral Daily   bumetanide 2 mg Oral BID   calcitriol 0.25 mcg Oral Daily   carvedilol 25 mg Oral BID   pantoprazole 40 mg Oral BID AC   polyethylene glycol 17 g Oral Daily   predniSONE 20 mg Oral Daily   ramipril 5 mg Oral Daily   sodium chloride 3 mL Intravenous Q12H   vitamin B-12 1,000 mcg Oral Daily     Continuous Infusions:     Vital signs in last 24 hours:  Temp:  [97.7 °F (36.5 °C)-97.9 °F (36.6 °C)] 97.9 °F (36.6 °C)  Heart Rate:  [] 137  Resp:  [16] 16  BP: (140-158)/(55-62) 141/55    Intake/Output:    Intake/Output Summary (Last 24 hours) at 2019 1304  Last data filed at 2019 1100  Gross per 24 hour   Intake 240 ml   Output 3250 ml   Net -3010 ml       Exam:  /55   Pulse (!) 137   Temp 97.9 °F (36.6 °C) (Oral)   Resp 16   Ht 162.6 cm (64\")   Wt 72.4 kg (159 lb 9.6 oz)   SpO2 99%   BMI 27.40 kg/m²     General Appearance:    Alert, cooperative, no distress   Head:    Normocephalic, without obvious abnormality, atraumatic   Eyes:       Throat:   Lips, tongue, gums normal   Neck:   Supple, symmetrical, trachea midline, no JVD   Lungs:     Clear to auscultation bilaterally, respirations unlabored   Chest Wall:    No tenderness or deformity    Heart:    Regular rate and rhythm, S1 and S2 normal, no murmur,no  Rub or gallop   Abdomen:     Soft, non-tender, bowel sounds active, no masses, no organomegaly    Extremities:   Extremities normal, atraumatic, no cyanosis decreased leg edema   Pulses:      Skin:   Skin is warm and dry,  no rashes or palpable lesions   Neurologic:   no focal deficits noted      Lab " Results (last 72 hours)     Procedure Component Value Units Date/Time    POC Glucose Once [[id number]]  (Normal) Collected:  02/16/19 0808    Specimen:  Blood Updated:  02/16/19 0809     Glucose 109 mg/dL     Protime-INR [[id number]]  (Abnormal) Collected:  02/16/19 0625    Specimen:  Blood Updated:  02/16/19 0739     Protime 83.0 Seconds      INR >10.00    Basic Metabolic Panel [[id number]]  (Abnormal) Collected:  02/16/19 0625    Specimen:  Blood Updated:  02/16/19 0730     Glucose 99 mg/dL      BUN 60 mg/dL      Creatinine 1.54 mg/dL      Sodium 135 mmol/L      Potassium 3.5 mmol/L      Chloride 93 mmol/L      CO2 24.9 mmol/L      Calcium 9.1 mg/dL      eGFR Non African Amer 33 mL/min/1.73      BUN/Creatinine Ratio 39.0     Anion Gap 17.1 mmol/L     Narrative:       The MDRD GFR formula is only valid for adults with stable renal function between ages 18 and 70.    CBC & Differential [[id number]] Collected:  02/16/19 0625    Specimen:  Blood Updated:  02/16/19 0709    Narrative:       The following orders were created for panel order CBC & Differential.  Procedure                               Abnormality         Status                     ---------                               -----------         ------                     CBC Auto Differential[[id number]]        Abnormal            Final result                 Please view results for these tests on the individual orders.    CBC Auto Differential [[id number]]  (Abnormal) Collected:  02/16/19 0625    Specimen:  Blood Updated:  02/16/19 0709     WBC 9.85 10*3/mm3      RBC 2.96 10*6/mm3      Hemoglobin 8.4 g/dL      Hematocrit 27.9 %      MCV 94.3 fL      MCH 28.4 pg      MCHC 30.1 g/dL      RDW 14.5 %      RDW-SD 50.7 fl      MPV 10.1 fL      Platelets 202 10*3/mm3      Neutrophil % 88.3 %      Lymphocyte % 3.6 %      Monocyte % 6.9 %      Eosinophil % 0.1 %      Basophil % 0.0 %      Immature Grans % 1.1 %      Neutrophils, Absolute 8.70 10*3/mm3      Lymphocytes,  Absolute 0.35 10*3/mm3      Monocytes, Absolute 0.68 10*3/mm3      Eosinophils, Absolute 0.01 10*3/mm3      Basophils, Absolute 0.00 10*3/mm3      Immature Grans, Absolute 0.11 10*3/mm3      nRBC 0.0 /100 WBC     Protime-INR [[id number]]  (Abnormal) Collected:  02/15/19 1143    Specimen:  Blood Updated:  02/15/19 1217     Protime 78.4 Seconds      INR 9.97    aPTT [[id number]]  (Abnormal) Collected:  02/15/19 1143    Specimen:  Blood Updated:  02/15/19 1217     PTT 79.1 seconds     Urinalysis, Microscopic Only - Urine, Clean Catch [[id number]] Collected:  02/15/19 1133    Specimen:  Urine, Clean Catch Updated:  02/15/19 1156     RBC, UA 0-2 /HPF      WBC, UA 0-2 /HPF      Bacteria, UA None Seen /HPF      Squamous Epithelial Cells, UA 0-2 /HPF      Hyaline Casts, UA 0-2 /LPF      Methodology Automated Microscopy    Urinalysis With Microscopic If Indicated (No Culture) - Urine, Clean Catch [[id number]]  (Abnormal) Collected:  02/15/19 1133    Specimen:  Urine, Clean Catch Updated:  02/15/19 1156     Color, UA Yellow     Appearance, UA Clear     pH, UA <=5.0     Specific Gravity, UA 1.017     Glucose, UA Negative     Ketones, UA Negative     Bilirubin, UA Negative     Blood, UA Small (1+)     Protein, UA Negative     Leuk Esterase, UA Negative     Nitrite, UA Negative     Urobilinogen, UA 0.2 E.U./dL    Comprehensive Metabolic Panel [[id number]]  (Abnormal) Collected:  02/15/19 1041    Specimen:  Blood Updated:  02/15/19 1126     Glucose 128 mg/dL      BUN 63 mg/dL      Creatinine 1.68 mg/dL      Sodium 139 mmol/L      Potassium 4.0 mmol/L      Chloride 98 mmol/L      CO2 26.0 mmol/L      Calcium 9.3 mg/dL      Total Protein 6.8 g/dL      Albumin 3.20 g/dL      ALT (SGPT) 78 U/L      AST (SGOT) 71 U/L      Alkaline Phosphatase 112 U/L      Total Bilirubin 0.7 mg/dL      eGFR Non African Amer 29 mL/min/1.73      Globulin 3.6 gm/dL      A/G Ratio 0.9 g/dL      BUN/Creatinine Ratio 37.5     Anion Gap 15.0 mmol/L     Narrative:        The MDRD GFR formula is only valid for adults with stable renal function between ages 18 and 70.    Troponin [[id number]]  (Normal) Collected:  02/15/19 1041    Specimen:  Blood Updated:  02/15/19 1126     Troponin T 0.019 ng/mL     Narrative:       Troponin T Reference Range:  <= 0.03 ng/mL-   Negative for AMI  >0.03 ng/mL-     Abnormal for myocardial necrosis.  Clinicians would have to utilize clinical acumen, EKG, Troponin and serial changes to determine if it is an Acute Myocardial Infarction or myocardial injury due to an underlying chronic condition.     BNP [[id number]]  (Abnormal) Collected:  02/15/19 1041    Specimen:  Blood Updated:  02/15/19 1124     proBNP 9,545.0 pg/mL     Narrative:       Among patients with dyspnea, NT-proBNP is highly sensitive for the detection of acute congestive heart failure. In addition NT-proBNP of <300 pg/ml effectively rules out acute congestive heart failure with 99% negative predictive value.    CBC & Differential [[id number]] Collected:  02/15/19 1041    Specimen:  Blood Updated:  02/15/19 1109    Narrative:       The following orders were created for panel order CBC & Differential.  Procedure                               Abnormality         Status                     ---------                               -----------         ------                     CBC Auto Differential[[id number]]        Abnormal            Final result                 Please view results for these tests on the individual orders.    CBC Auto Differential [[id number]]  (Abnormal) Collected:  02/15/19 1041    Specimen:  Blood Updated:  02/15/19 1109     WBC 11.34 10*3/mm3      RBC 3.17 10*6/mm3      Hemoglobin 9.2 g/dL      Hematocrit 29.9 %      MCV 94.3 fL      MCH 29.0 pg      MCHC 30.8 g/dL      RDW 14.5 %      RDW-SD 49.9 fl      MPV 10.3 fL      Platelets 210 10*3/mm3      Neutrophil % 91.2 %      Lymphocyte % 2.4 %      Monocyte % 5.6 %      Eosinophil % 0.1 %      Basophil % 0.1 %       Immature Grans % 0.6 %      Neutrophils, Absolute 10.34 10*3/mm3      Lymphocytes, Absolute 0.27 10*3/mm3      Monocytes, Absolute 0.64 10*3/mm3      Eosinophils, Absolute 0.01 10*3/mm3      Basophils, Absolute 0.01 10*3/mm3      Immature Grans, Absolute 0.07 10*3/mm3      nRBC 0.0 /100 WBC         Data Review:  Results from last 7 days   Lab Units 02/18/19  0556 02/17/19  0535 02/16/19  0625   SODIUM mmol/L 142 143 135*   POTASSIUM mmol/L 3.7 3.3* 3.5   CHLORIDE mmol/L 101 101 93*   CO2 mmol/L 29.0 26.6 24.9   BUN mg/dL 52* 58* 60*   CREATININE mg/dL 1.34* 1.57* 1.54*   GLUCOSE mg/dL 144* 107* 99   CALCIUM mg/dL 8.9 9.0 9.1     Results from last 7 days   Lab Units 02/18/19  0556 02/17/19  0535 02/16/19  0625   WBC 10*3/mm3 11.44* 9.25 9.85   HEMOGLOBIN g/dL 8.8* 8.4* 8.4*   HEMATOCRIT % 29.2* 28.2* 27.9*   PLATELETS 10*3/mm3 261 233 202             Lab Results   Lab Value Date/Time    TROPONINT 0.019 02/15/2019 1041    TROPONINT 0.021 11/23/2018 1337    TROPONINT <0.010 05/10/2018 1443         Results from last 7 days   Lab Units 02/15/19  1041   ALK PHOS U/L 112   BILIRUBIN mg/dL 0.7   ALT (SGPT) U/L 78*   AST (SGOT) U/L 71*             Glucose   Date/Time Value Ref Range Status   02/16/2019 1705 131 (H) 70 - 130 mg/dL Final   02/16/2019 1147 152 (H) 70 - 130 mg/dL Final   02/16/2019 0808 109 70 - 130 mg/dL Final     Results from last 7 days   Lab Units 02/18/19  0556 02/17/19  0535 02/16/19  0625   INR  3.82* 3.36* >10.00*       Past Medical History:   Diagnosis Date   • Acute kidney injury (CMS/HCC)    • Anemia    • Atrial fibrillation (CMS/HCC)    • Bruises easily    • Carotid artery stenosis    • Chronic back pain    • Chronic combined systolic and diastolic congestive heart failure (CMS/HCC)    • Chronic coronary artery disease     moderate to severe LV dysfunction.   • Chronic kidney disease, stage 3 (CMS/HCC)    • Dysphagia    • GERD (gastroesophageal reflux disease)    • H/O cardiac murmur    • [place] (hard  of hearing)     wears hearing aids   • Hyperlipidemia    • Hypertension    • Hypotension    • Ischemic cardiomyopathy    • Kyphoscoliosis    • Leukopenia    • Lumbar spondylosis    • Obesity    • GEORGINA (obstructive sleep apnea)    • Osteoarthritis    • Osteoporosis    • Peptic ulcer    • Premature ventricular contractions    • Renal insufficiency syndrome    • Scoliosis    • Shoulder fracture, left    • Stroke syndrome    • Thrombocytopenia (CMS/HCC)    • Ventricular tachycardia (CMS/HCC)    • Vitamin B12 deficiency        Assessment:  Active Hospital Problems    Diagnosis Date Noted   • **Peripheral edema [R60.9] 02/15/2019   • Chronic kidney disease, stage 3 (CMS/HCC) [N18.3] 11/23/2018   • Ischemic cardiomyopathy [I25.5] 11/13/2018   • Hypertension [I10] 12/24/2017   • Supratherapeutic INR [R79.1] 12/24/2017   • Esophageal stricture [K22.2] 09/06/2017   • GEORGINA on auto CPAP - Dr Cardona [G47.33, Z99.89] 09/08/2016   • Long-term (current) use of anticoagulants [Z79.01] 08/16/2016   • Chronic atrial fibrillation (CMS/HCC) [I48.2] 06/08/2016   • S/P MVR (mitral valve replacement) [Z95.2] 06/08/2016   • Bilateral carotid artery disease (CMS/HCC) [I77.9] 06/08/2016   • Coronary artery disease involving coronary 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       Plan:  Continue diuresis.Cardiology consult noted. Holding coumadin and give prednisone. Check rheumatoid factor Get PT eval.  Will need SNU for rehab.    Christiano Philip MD  2/18/2019  1:04 PM

## 2019-02-18 NOTE — PLAN OF CARE
Problem: Patient Care Overview  Goal: Plan of Care Review  Outcome: Ongoing (interventions implemented as appropriate)   02/18/19 0322   Coping/Psychosocial   Plan of Care Reviewed With patient   Plan of Care Review   Progress improving   OTHER   Outcome Summary C/o BUE pain.Medicated appropriately with good response. FC care completed. Sleeoing well. VSS. Will continue to monitor.     Goal: Individualization and Mutuality  Outcome: Ongoing (interventions implemented as appropriate)    Goal: Discharge Needs Assessment  Outcome: Ongoing (interventions implemented as appropriate)      Problem: Fall Risk (Adult)  Goal: Identify Related Risk Factors and Signs and Symptoms  Outcome: Ongoing (interventions implemented as appropriate)    Goal: Absence of Fall  Outcome: Ongoing (interventions implemented as appropriate)      Problem: Pain, Acute (Adult)  Goal: Identify Related Risk Factors and Signs and Symptoms  Outcome: Ongoing (interventions implemented as appropriate)    Goal: Acceptable Pain Control/Comfort Level  Outcome: Ongoing (interventions implemented as appropriate)      Problem: Fluid Volume Excess (Adult)  Goal: Identify Related Risk Factors and Signs and Symptoms  Outcome: Ongoing (interventions implemented as appropriate)    Goal: Optimal Fluid Balance  Outcome: Ongoing (interventions implemented as appropriate)      Problem: Skin Injury Risk (Adult)  Goal: Identify Related Risk Factors and Signs and Symptoms  Outcome: Ongoing (interventions implemented as appropriate)

## 2019-02-19 ENCOUNTER — APPOINTMENT (OUTPATIENT)
Dept: LAB | Facility: HOSPITAL | Age: 79
End: 2019-02-19

## 2019-02-19 ENCOUNTER — APPOINTMENT (OUTPATIENT)
Dept: ONCOLOGY | Facility: HOSPITAL | Age: 79
End: 2019-02-19

## 2019-02-19 LAB
ALBUMIN SERPL-MCNC: 2.4 G/DL (ref 3.5–5.2)
ALP SERPL-CCNC: 99 U/L (ref 39–117)
ALT SERPL W P-5'-P-CCNC: 129 U/L (ref 1–33)
ANION GAP SERPL CALCULATED.3IONS-SCNC: 12.5 MMOL/L
AST SERPL-CCNC: 124 U/L (ref 1–32)
BASOPHILS # BLD AUTO: 0.01 10*3/MM3 (ref 0–0.2)
BASOPHILS NFR BLD AUTO: 0.1 % (ref 0–1.5)
BILIRUB CONJ SERPL-MCNC: <0.2 MG/DL (ref 0–0.3)
BILIRUB INDIRECT SERPL-MCNC: ABNORMAL MG/DL
BILIRUB SERPL-MCNC: 0.3 MG/DL (ref 0.1–1.2)
BUN BLD-MCNC: 50 MG/DL (ref 8–23)
BUN/CREAT SERPL: 43.5 (ref 7–25)
CALCIUM SPEC-SCNC: 8.8 MG/DL (ref 8.6–10.5)
CHLORIDE SERPL-SCNC: 100 MMOL/L (ref 98–107)
CO2 SERPL-SCNC: 29.5 MMOL/L (ref 22–29)
CREAT BLD-MCNC: 1.15 MG/DL (ref 0.57–1)
CRP SERPL-MCNC: 8.01 MG/DL (ref 0–0.5)
DEPRECATED RDW RBC AUTO: 48.2 FL (ref 37–54)
EOSINOPHIL # BLD AUTO: 0.02 10*3/MM3 (ref 0–0.4)
EOSINOPHIL NFR BLD AUTO: 0.2 % (ref 0.3–6.2)
ERYTHROCYTE [DISTWIDTH] IN BLOOD BY AUTOMATED COUNT: 14 % (ref 12.3–15.4)
ERYTHROCYTE [SEDIMENTATION RATE] IN BLOOD: >140 MM/HR (ref 0–30)
GFR SERPL CREATININE-BSD FRML MDRD: 46 ML/MIN/1.73
GLUCOSE BLD-MCNC: 120 MG/DL (ref 65–99)
HCT VFR BLD AUTO: 29.9 % (ref 34–46.6)
HGB BLD-MCNC: 9.4 G/DL (ref 12–15.9)
IMM GRANULOCYTES # BLD AUTO: 0.28 10*3/MM3 (ref 0–0.05)
IMM GRANULOCYTES NFR BLD AUTO: 2.7 % (ref 0–0.5)
INR PPP: 4.72 (ref 0.9–1.1)
LYMPHOCYTES # BLD AUTO: 0.64 10*3/MM3 (ref 0.7–3.1)
LYMPHOCYTES NFR BLD AUTO: 6.2 % (ref 19.6–45.3)
MCH RBC QN AUTO: 29.6 PG (ref 26.6–33)
MCHC RBC AUTO-ENTMCNC: 31.4 G/DL (ref 31.5–35.7)
MCV RBC AUTO: 94 FL (ref 79–97)
MONOCYTES # BLD AUTO: 0.42 10*3/MM3 (ref 0.1–0.9)
MONOCYTES NFR BLD AUTO: 4.1 % (ref 5–12)
NEUTROPHILS # BLD AUTO: 8.97 10*3/MM3 (ref 1.4–7)
NEUTROPHILS NFR BLD AUTO: 86.7 % (ref 42.7–76)
NRBC BLD AUTO-RTO: 0 /100 WBC (ref 0–0)
PLATELET # BLD AUTO: 276 10*3/MM3 (ref 140–450)
PMV BLD AUTO: 9.8 FL (ref 6–12)
POTASSIUM BLD-SCNC: 3.7 MMOL/L (ref 3.5–5.2)
PROT SERPL-MCNC: 5.8 G/DL (ref 6–8.5)
PROTHROMBIN TIME: 43.7 SECONDS (ref 11.7–14.2)
RBC # BLD AUTO: 3.18 10*6/MM3 (ref 3.77–5.28)
SODIUM BLD-SCNC: 142 MMOL/L (ref 136–145)
WBC NRBC COR # BLD: 10.34 10*3/MM3 (ref 3.4–10.8)

## 2019-02-19 PROCEDURE — 63710000001 PREDNISONE PER 1 MG: Performed by: HOSPITALIST

## 2019-02-19 PROCEDURE — 86140 C-REACTIVE PROTEIN: CPT | Performed by: HOSPITALIST

## 2019-02-19 PROCEDURE — 25010000002 VITAMIN K1 PER 1 MG: Performed by: NURSE PRACTITIONER

## 2019-02-19 PROCEDURE — 80076 HEPATIC FUNCTION PANEL: CPT | Performed by: NURSE PRACTITIONER

## 2019-02-19 PROCEDURE — 99232 SBSQ HOSP IP/OBS MODERATE 35: CPT | Performed by: NURSE PRACTITIONER

## 2019-02-19 PROCEDURE — 80048 BASIC METABOLIC PNL TOTAL CA: CPT | Performed by: HOSPITALIST

## 2019-02-19 PROCEDURE — 36415 COLL VENOUS BLD VENIPUNCTURE: CPT | Performed by: HOSPITALIST

## 2019-02-19 PROCEDURE — 85652 RBC SED RATE AUTOMATED: CPT | Performed by: HOSPITALIST

## 2019-02-19 PROCEDURE — 97110 THERAPEUTIC EXERCISES: CPT

## 2019-02-19 PROCEDURE — 85610 PROTHROMBIN TIME: CPT | Performed by: HOSPITALIST

## 2019-02-19 PROCEDURE — 85025 COMPLETE CBC W/AUTO DIFF WBC: CPT | Performed by: HOSPITALIST

## 2019-02-19 RX ADMIN — POLYETHYLENE GLYCOL 3350 17 G: 17 POWDER, FOR SOLUTION ORAL at 09:51

## 2019-02-19 RX ADMIN — Medication 1000 MCG: at 09:52

## 2019-02-19 RX ADMIN — SODIUM CHLORIDE, PRESERVATIVE FREE 3 ML: 5 INJECTION INTRAVENOUS at 09:52

## 2019-02-19 RX ADMIN — CARVEDILOL 25 MG: 25 TABLET, FILM COATED ORAL at 20:14

## 2019-02-19 RX ADMIN — HYDROCODONE BITARTRATE AND ACETAMINOPHEN 1 TABLET: 5; 325 TABLET ORAL at 20:24

## 2019-02-19 RX ADMIN — PREDNISONE 20 MG: 20 TABLET ORAL at 09:52

## 2019-02-19 RX ADMIN — CALCITRIOL CAPSULES 0.25 MCG 0.25 MCG: 0.25 CAPSULE ORAL at 09:52

## 2019-02-19 RX ADMIN — PANTOPRAZOLE SODIUM 40 MG: 40 TABLET, DELAYED RELEASE ORAL at 17:58

## 2019-02-19 RX ADMIN — PHYTONADIONE 5 MG: 10 INJECTION, EMULSION INTRAMUSCULAR; INTRAVENOUS; SUBCUTANEOUS at 12:19

## 2019-02-19 RX ADMIN — SODIUM CHLORIDE, PRESERVATIVE FREE 3 ML: 5 INJECTION INTRAVENOUS at 20:14

## 2019-02-19 RX ADMIN — BUMETANIDE 2 MG: 2 TABLET ORAL at 20:14

## 2019-02-19 RX ADMIN — RAMIPRIL 5 MG: 5 CAPSULE ORAL at 09:52

## 2019-02-19 RX ADMIN — ATORVASTATIN CALCIUM 20 MG: 20 TABLET, FILM COATED ORAL at 09:52

## 2019-02-19 RX ADMIN — HYDROCODONE BITARTRATE AND ACETAMINOPHEN 1 TABLET: 5; 325 TABLET ORAL at 06:33

## 2019-02-19 RX ADMIN — CARVEDILOL 25 MG: 25 TABLET, FILM COATED ORAL at 09:51

## 2019-02-19 RX ADMIN — ASPIRIN 81 MG: 81 TABLET, DELAYED RELEASE ORAL at 09:52

## 2019-02-19 RX ADMIN — BUMETANIDE 2 MG: 2 TABLET ORAL at 09:52

## 2019-02-19 RX ADMIN — PANTOPRAZOLE SODIUM 40 MG: 40 TABLET, DELAYED RELEASE ORAL at 09:52

## 2019-02-19 RX ADMIN — TRAMADOL HYDROCHLORIDE 50 MG: 50 TABLET ORAL at 10:08

## 2019-02-19 RX ADMIN — HYDROCODONE BITARTRATE AND ACETAMINOPHEN 1 TABLET: 5; 325 TABLET ORAL at 12:35

## 2019-02-19 NOTE — PROGRESS NOTES
"DAILY PROGRESS NOTE  [facility]    Patient Identification:  Name: Janel Mahoney  Age: 78 y.o.  Sex: female  :  1940  MRN: [id number]         Primary Care Physician: Ariel Matute MD    Subjective:  Interval History:She complains of pain. Swelling better.  Diffuse joint pain.  Objective:    Scheduled Meds:    aspirin 81 mg Oral Daily   atorvastatin 20 mg Oral Daily   bumetanide 2 mg Oral BID   calcitriol 0.25 mcg Oral Daily   carvedilol 25 mg Oral BID   pantoprazole 40 mg Oral BID AC   polyethylene glycol 17 g Oral Daily   predniSONE 20 mg Oral Daily   ramipril 5 mg Oral Daily   sodium chloride 3 mL Intravenous Q12H   vitamin B-12 1,000 mcg Oral Daily     Continuous Infusions:     Vital signs in last 24 hours:  Temp:  [97.9 °F (36.6 °C)-98 °F (36.7 °C)] 97.9 °F (36.6 °C)  Heart Rate:  [59-71] 71  Resp:  [16] 16  BP: (148-166)/(58-70) 166/66    Intake/Output:    Intake/Output Summary (Last 24 hours) at 2019 1603  Last data filed at 2019 1219  Gross per 24 hour   Intake 50 ml   Output 2350 ml   Net -2300 ml       Exam:  /66 (BP Location: Right arm)   Pulse 71   Temp 97.9 °F (36.6 °C) (Oral)   Resp 16   Ht 162.6 cm (64\")   Wt 70 kg (154 lb 4.8 oz)   SpO2 99%   BMI 26.49 kg/m²     General Appearance:    Alert, cooperative, no distress   Head:    Normocephalic, without obvious abnormality, atraumatic   Eyes:       Throat:   Lips, tongue, gums normal   Neck:   Supple, symmetrical, trachea midline, no JVD   Lungs:     Clear to auscultation bilaterally, respirations unlabored   Chest Wall:    No tenderness or deformity    Heart:    Regular rate and rhythm, S1 and S2 normal, no murmur,no  Rub or gallop   Abdomen:     Soft, non-tender, bowel sounds active, no masses, no organomegaly    Extremities:   Extremities normal, atraumatic, no cyanosis decreased leg edema   Pulses:      Skin:   Skin is warm and dry,  no rashes or palpable lesions   Neurologic:   no focal deficits " noted      Lab Results (last 72 hours)     Procedure Component Value Units Date/Time    POC Glucose Once [[id number]]  (Normal) Collected:  02/16/19 0808    Specimen:  Blood Updated:  02/16/19 0809     Glucose 109 mg/dL     Protime-INR [[id number]]  (Abnormal) Collected:  02/16/19 0625    Specimen:  Blood Updated:  02/16/19 0739     Protime 83.0 Seconds      INR >10.00    Basic Metabolic Panel [[id number]]  (Abnormal) Collected:  02/16/19 0625    Specimen:  Blood Updated:  02/16/19 0730     Glucose 99 mg/dL      BUN 60 mg/dL      Creatinine 1.54 mg/dL      Sodium 135 mmol/L      Potassium 3.5 mmol/L      Chloride 93 mmol/L      CO2 24.9 mmol/L      Calcium 9.1 mg/dL      eGFR Non African Amer 33 mL/min/1.73      BUN/Creatinine Ratio 39.0     Anion Gap 17.1 mmol/L     Narrative:       The MDRD GFR formula is only valid for adults with stable renal function between ages 18 and 70.    CBC & Differential [[id number]] Collected:  02/16/19 0625    Specimen:  Blood Updated:  02/16/19 0709    Narrative:       The following orders were created for panel order CBC & Differential.  Procedure                               Abnormality         Status                     ---------                               -----------         ------                     CBC Auto Differential[[id number]]        Abnormal            Final result                 Please view results for these tests on the individual orders.    CBC Auto Differential [[id number]]  (Abnormal) Collected:  02/16/19 0625    Specimen:  Blood Updated:  02/16/19 0709     WBC 9.85 10*3/mm3      RBC 2.96 10*6/mm3      Hemoglobin 8.4 g/dL      Hematocrit 27.9 %      MCV 94.3 fL      MCH 28.4 pg      MCHC 30.1 g/dL      RDW 14.5 %      RDW-SD 50.7 fl      MPV 10.1 fL      Platelets 202 10*3/mm3      Neutrophil % 88.3 %      Lymphocyte % 3.6 %      Monocyte % 6.9 %      Eosinophil % 0.1 %      Basophil % 0.0 %      Immature Grans % 1.1 %      Neutrophils, Absolute 8.70 10*3/mm3       Lymphocytes, Absolute 0.35 10*3/mm3      Monocytes, Absolute 0.68 10*3/mm3      Eosinophils, Absolute 0.01 10*3/mm3      Basophils, Absolute 0.00 10*3/mm3      Immature Grans, Absolute 0.11 10*3/mm3      nRBC 0.0 /100 WBC     Protime-INR [[id number]]  (Abnormal) Collected:  02/15/19 1143    Specimen:  Blood Updated:  02/15/19 1217     Protime 78.4 Seconds      INR 9.97    aPTT [[id number]]  (Abnormal) Collected:  02/15/19 1143    Specimen:  Blood Updated:  02/15/19 1217     PTT 79.1 seconds     Urinalysis, Microscopic Only - Urine, Clean Catch [[id number]] Collected:  02/15/19 1133    Specimen:  Urine, Clean Catch Updated:  02/15/19 1156     RBC, UA 0-2 /HPF      WBC, UA 0-2 /HPF      Bacteria, UA None Seen /HPF      Squamous Epithelial Cells, UA 0-2 /HPF      Hyaline Casts, UA 0-2 /LPF      Methodology Automated Microscopy    Urinalysis With Microscopic If Indicated (No Culture) - Urine, Clean Catch [[id number]]  (Abnormal) Collected:  02/15/19 1133    Specimen:  Urine, Clean Catch Updated:  02/15/19 1156     Color, UA Yellow     Appearance, UA Clear     pH, UA <=5.0     Specific Gravity, UA 1.017     Glucose, UA Negative     Ketones, UA Negative     Bilirubin, UA Negative     Blood, UA Small (1+)     Protein, UA Negative     Leuk Esterase, UA Negative     Nitrite, UA Negative     Urobilinogen, UA 0.2 E.U./dL    Comprehensive Metabolic Panel [[id number]]  (Abnormal) Collected:  02/15/19 1041    Specimen:  Blood Updated:  02/15/19 1126     Glucose 128 mg/dL      BUN 63 mg/dL      Creatinine 1.68 mg/dL      Sodium 139 mmol/L      Potassium 4.0 mmol/L      Chloride 98 mmol/L      CO2 26.0 mmol/L      Calcium 9.3 mg/dL      Total Protein 6.8 g/dL      Albumin 3.20 g/dL      ALT (SGPT) 78 U/L      AST (SGOT) 71 U/L      Alkaline Phosphatase 112 U/L      Total Bilirubin 0.7 mg/dL      eGFR Non African Amer 29 mL/min/1.73      Globulin 3.6 gm/dL      A/G Ratio 0.9 g/dL      BUN/Creatinine Ratio 37.5     Anion Gap 15.0 mmol/L      Narrative:       The MDRD GFR formula is only valid for adults with stable renal function between ages 18 and 70.    Troponin [[id number]]  (Normal) Collected:  02/15/19 1041    Specimen:  Blood Updated:  02/15/19 1126     Troponin T 0.019 ng/mL     Narrative:       Troponin T Reference Range:  <= 0.03 ng/mL-   Negative for AMI  >0.03 ng/mL-     Abnormal for myocardial necrosis.  Clinicians would have to utilize clinical acumen, EKG, Troponin and serial changes to determine if it is an Acute Myocardial Infarction or myocardial injury due to an underlying chronic condition.     BNP [[id number]]  (Abnormal) Collected:  02/15/19 1041    Specimen:  Blood Updated:  02/15/19 1124     proBNP 9,545.0 pg/mL     Narrative:       Among patients with dyspnea, NT-proBNP is highly sensitive for the detection of acute congestive heart failure. In addition NT-proBNP of <300 pg/ml effectively rules out acute congestive heart failure with 99% negative predictive value.    CBC & Differential [[id number]] Collected:  02/15/19 1041    Specimen:  Blood Updated:  02/15/19 1109    Narrative:       The following orders were created for panel order CBC & Differential.  Procedure                               Abnormality         Status                     ---------                               -----------         ------                     CBC Auto Differential[[id number]]        Abnormal            Final result                 Please view results for these tests on the individual orders.    CBC Auto Differential [[id number]]  (Abnormal) Collected:  02/15/19 1041    Specimen:  Blood Updated:  02/15/19 1109     WBC 11.34 10*3/mm3      RBC 3.17 10*6/mm3      Hemoglobin 9.2 g/dL      Hematocrit 29.9 %      MCV 94.3 fL      MCH 29.0 pg      MCHC 30.8 g/dL      RDW 14.5 %      RDW-SD 49.9 fl      MPV 10.3 fL      Platelets 210 10*3/mm3      Neutrophil % 91.2 %      Lymphocyte % 2.4 %      Monocyte % 5.6 %      Eosinophil % 0.1 %      Basophil % 0.1  %      Immature Grans % 0.6 %      Neutrophils, Absolute 10.34 10*3/mm3      Lymphocytes, Absolute 0.27 10*3/mm3      Monocytes, Absolute 0.64 10*3/mm3      Eosinophils, Absolute 0.01 10*3/mm3      Basophils, Absolute 0.01 10*3/mm3      Immature Grans, Absolute 0.07 10*3/mm3      nRBC 0.0 /100 WBC         Data Review:  Results from last 7 days   Lab Units 02/19/19  0607 02/18/19  0556 02/17/19  0535   SODIUM mmol/L 142 142 143   POTASSIUM mmol/L 3.7 3.7 3.3*   CHLORIDE mmol/L 100 101 101   CO2 mmol/L 29.5* 29.0 26.6   BUN mg/dL 50* 52* 58*   CREATININE mg/dL 1.15* 1.34* 1.57*   GLUCOSE mg/dL 120* 144* 107*   CALCIUM mg/dL 8.8 8.9 9.0     Results from last 7 days   Lab Units 02/19/19  0607 02/18/19  0556 02/17/19  0535   WBC 10*3/mm3 10.34 11.44* 9.25   HEMOGLOBIN g/dL 9.4* 8.8* 8.4*   HEMATOCRIT % 29.9* 29.2* 28.2*   PLATELETS 10*3/mm3 276 261 233             Lab Results   Lab Value Date/Time    TROPONINT 0.019 02/15/2019 1041    TROPONINT 0.021 11/23/2018 1337    TROPONINT <0.010 05/10/2018 1443         Results from last 7 days   Lab Units 02/19/19  0607 02/15/19  1041   ALK PHOS U/L 99 112   BILIRUBIN mg/dL 0.3 0.7   BILIRUBIN DIRECT mg/dL <0.2  --    ALT (SGPT) U/L 129* 78*   AST (SGOT) U/L 124* 71*             Glucose   Date/Time Value Ref Range Status   02/16/2019 1705 131 (H) 70 - 130 mg/dL Final     Results from last 7 days   Lab Units 02/19/19  0607 02/18/19  0556 02/17/19  0535   INR  4.72* 3.82* 3.36*       Past Medical History:   Diagnosis Date   • Acute kidney injury (CMS/HCC)    • Anemia    • Atrial fibrillation (CMS/HCC)    • Bruises easily    • Carotid artery stenosis    • Chronic back pain    • Chronic combined systolic and diastolic congestive heart failure (CMS/HCC)    • Chronic coronary artery disease     moderate to severe LV dysfunction.   • Chronic kidney disease, stage 3 (CMS/HCC)    • Dysphagia    • GERD (gastroesophageal reflux disease)    • H/O cardiac murmur    • Andreafski (hard of hearing)      wears hearing aids   • Hyperlipidemia    • Hypertension    • Hypotension    • Ischemic cardiomyopathy    • Kyphoscoliosis    • Leukopenia    • Lumbar spondylosis    • Obesity    • GEORGINA (obstructive sleep apnea)    • Osteoarthritis    • Osteoporosis    • Peptic ulcer    • Premature ventricular contractions    • Renal insufficiency syndrome    • Scoliosis    • Shoulder fracture, left    • Stroke syndrome    • Thrombocytopenia (CMS/HCC)    • Ventricular tachycardia (CMS/HCC)    • Vitamin B12 deficiency        Assessment:  Active Hospital Problems    Diagnosis Date Noted   • **Peripheral edema [R60.9] 02/15/2019   • Chronic kidney disease, stage 3 (CMS/HCC) [N18.3] 11/23/2018   • Ischemic cardiomyopathy [I25.5] 11/13/2018   • Hypertension [I10] 12/24/2017   • Supratherapeutic INR [R79.1] 12/24/2017   • Esophageal stricture [K22.2] 09/06/2017   • GEORGINA on auto CPAP - Dr Cardona [G47.33, Z99.89] 09/08/2016   • Long-term (current) use of anticoagulants [Z79.01] 08/16/2016   • Chronic atrial fibrillation (CMS/HCC) [I48.2] 06/08/2016   • S/P MVR (mitral valve replacement) [Z95.2] 06/08/2016   • Bilateral carotid artery disease (CMS/[facility]) [I77.9] 06/08/2016   • Coronary artery disease involving coronary 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       Plan:  Continue diuresis.Cardiology consult noted. Holding coumadin and continue prednisone. positive rheumatoid factor  And probably has inflammatory arthritis  Get PT eval.  Will need SNU for rehab.  Needs rheumatologist as outpatient. Check some anemia lab.    Christiano Philip MD  2/19/2019  4:03 PM

## 2019-02-19 NOTE — DISCHARGE PLACEMENT REQUEST
"Le Munoz (78 y.o. Female)     Date of Birth Social Security Number Address Home Phone MRN    1940  [id number] [facility] [id number]    [employer] Marital Status          [employer]        Admission Date Admission Type Admitting Provider Attending Provider Department, Room/Bed    2/15/19 Emergency Christiano Philip MD Beard, Lyle E, MD [address], [address]1    Discharge Date Discharge Disposition Discharge Destination                       Attending Provider:  Christiano Philip MD    Allergies:  Diclofenac, Dofetilide    Isolation:  None   Infection:  None   Code Status:  CPR    Ht:  162.6 cm (64\")   Wt:  70 kg (154 lb 4.8 oz)    Admission Cmt:  None   Principal Problem:  Peripheral edema [R60.9]                 Active Insurance as of 2/15/2019     Primary Coverage     Payor Plan Insurance Group Employer/Plan Group    MEDICARE MEDICARE A & B      Payor Plan Address Payor Plan Phone Number Payor Plan Fax Number Effective Dates    [address] [phone]  10/1/2005 - None Entered    [facility] [id number]       Subscriber Name Subscriber Birth Date Member ID       LE MUNOZ 1940 [id number]           Secondary Coverage     Payor Plan Insurance Group Employer/Plan Group    HUMANA HUMANA SUPPLEMENT [id number]     Payor Plan Address Payor Plan Phone Number Payor Plan Fax Number Effective Dates    [address] [address]   9/1/2013 - None Entered    [facility] [id number]       Subscriber Name Subscriber Birth Date Member ID       LE MUNOZ 1940 [id number]                 Emergency Contacts      (Rel.) Home Phone Work Phone Mobile Phone    Russ Munoz (Spouse) [phone] -- --              "

## 2019-02-19 NOTE — PLAN OF CARE
Problem: Patient Care Overview  Goal: Plan of Care Review  Outcome: Ongoing (interventions implemented as appropriate)   02/19/19 0428   Coping/Psychosocial   Plan of Care Reviewed With patient   Plan of Care Review   Progress improving   OTHER   Outcome Summary Pt. VS WNL. C/o generalized pain, relieved by pain meds. Pt. now on PO diuretics, diuresing well. Pt. with FC for retention, cath care provided. Pt. with two stage 1 Pressure injuries on left buttock, picture taken, cream applied, will consult wound. Pt. too weak to work with PT yesterday per dayshift RN. Turning pt. Q2h. Edema improved, generalized +2. Pt. resting comfortably at present, will continue to monitor closely.     Goal: Individualization and Mutuality  Outcome: Ongoing (interventions implemented as appropriate)    Goal: Discharge Needs Assessment  Outcome: Ongoing (interventions implemented as appropriate)      Problem: Fall Risk (Adult)  Goal: Identify Related Risk Factors and Signs and Symptoms  Outcome: Outcome(s) achieved Date Met: 02/19/19    Goal: Absence of Fall  Outcome: Ongoing (interventions implemented as appropriate)      Problem: Pain, Acute (Adult)  Goal: Identify Related Risk Factors and Signs and Symptoms  Outcome: Outcome(s) achieved Date Met: 02/19/19    Goal: Acceptable Pain Control/Comfort Level  Outcome: Ongoing (interventions implemented as appropriate)      Problem: Fluid Volume Excess (Adult)  Goal: Identify Related Risk Factors and Signs and Symptoms  Outcome: Outcome(s) achieved Date Met: 02/19/19    Goal: Optimal Fluid Balance  Outcome: Ongoing (interventions implemented as appropriate)      Problem: Skin Injury Risk (Adult)  Goal: Identify Related Risk Factors and Signs and Symptoms  Outcome: Outcome(s) achieved Date Met: 02/19/19    Goal: Skin Health and Integrity  Outcome: Ongoing (interventions implemented as appropriate)

## 2019-02-19 NOTE — THERAPY TREATMENT NOTE
Acute Care - Physical Therapy Treatment Note  [facility]     Patient Name: Janel Mahoney  : 1940  MRN: [id number]  Today's Date: 2019  Onset of Illness/Injury or Date of Surgery: 02/15/19     Referring Physician: Tamera    Admit Date: 2/15/2019    Visit Dx:    ICD-10-CM ICD-9-CM   1. Peripheral edema R60.9 782.3   2. Coagulopathy (CMS/HCC) D68.9 286.9   3. Ischemic cardiomyopathy I25.5 414.8   4. Chronic renal failure, unspecified CKD stage N18.9 585.9   5. Chronic anemia D64.9 285.9   6. Traumatic hematoma of left upper arm, initial encounter S40.022A 923.03   7. Generalized weakness R53.1 780.79     Patient Active Problem List   Diagnosis   • Anemia in stage 3 chronic kidney disease (CMS/HCC)   • Thrombocytopenia (CMS/HCC)   • B12 deficiency   • Coronary artery disease involving coronary bypass graft of native heart without angina pectoris   • S/P MVR (mitral valve replacement)   • Chronic atrial fibrillation (CMS/HCC)   • Bilateral carotid artery disease (CMS/HCC)   • Intractable low back pain   • Long-term (current) use of anticoagulants   • Low back pain   • Osteoporosis   • Murmur, heart   • GEORGINA on auto CPAP - Dr Cardona   • Hypersomnia due to medical condition - GEORGINA   • Esophageal stricture   • Acute blood loss anemia   • Dysphagia   • Closed fracture of left proximal humerus   • Hypertension   • Supratherapeutic INR   • Chronic combined systolic and diastolic congestive heart failure (CMS/HCC)   • Osteoporosis with pathological fracture   • Closed 3-part fracture of proximal end of left humerus   • Closed fracture of proximal end of humerus with delayed healing   • Injury of right knee   • Knee injury, left, initial encounter   • History of fall   • S/P TKR (total knee replacement) using cement, left   • Ischemic cardiomyopathy   • Intramuscular hematoma right pecotralis   • Hemorrhagic disorder due to extrinsic circulating anticoagulants (CMS/HCC)   • Acute posthemorrhagic anemia   •  Chronic kidney disease, stage 3 (CMS/HCC)   • Acute kidney injury (CMS/HCC)   • Peripheral edema       Therapy Treatment    Rehabilitation Treatment Summary     Row Name 02/19/19 1115             Treatment Time/Intention    Discipline  physical therapy assistant  -      Document Type  therapy note (daily note)  -SM      Subjective Information  complains of;weakness;fatigue;pain  -SM      Mode of Treatment  physical therapy  -SM      Patient Effort  good  -SM      Existing Precautions/Restrictions  fall  -SM      Recorded by [SM] Celestina Peralta PTA 02/19/19 1333      Row Name 02/19/19 1115             Cognitive Assessment/Intervention- PT/OT    Orientation Status (Cognition)  oriented x 4  -SM      Follows Commands (Cognition)  WNL  -SM      Personal Safety Interventions  fall prevention program maintained;gait belt;nonskid shoes/slippers when out of bed  -SM      Recorded by [SM] Celestina Peralta PTA 02/19/19 1333      Row Name 02/19/19 1115             Bed Mobility Assessment/Treatment    Bed Mobility Assessment/Treatment  supine-sit  -SM      Supine-Sit [place] (Bed Mobility)  moderate assist (50% patient effort)  -      Sit-Supine [place] (Bed Mobility)  not tested  -SM      Bed Mobility, Safety Issues  decreased use of arms for pushing/pulling;decreased use of legs for bridging/pushing  -      Assistive Device (Bed Mobility)  bed rails;head of bed elevated  -SM      Recorded by [SM] Celestina Peralta PTA 02/19/19 1333      Row Name 02/19/19 1115             Transfer Assessment/Treatment    Transfer Assessment/Treatment  sit-stand transfer;stand-sit transfer  -      Recorded by [SM] Celestina Peralta PTA 02/19/19 1333      Row Name 02/19/19 1115             Sit-Stand Transfer    Sit-Stand [place] (Transfers)  moderate assist (50% patient effort);2 person assist  -      Assistive Device (Sit-Stand Transfers)  -- HHA  -SM      Recorded by [SM] Celestina Peralta, [facility]  02/19/19 1333      Row Name 02/19/19 1115             Stand-Sit Transfer    Stand-Sit [place] (Transfers)  moderate assist (50% patient effort);2 person assist  -      Assistive Device (Stand-Sit Transfers)  -- HHA  -SM      Recorded by [] Celestina Peralta [facility] 02/19/19 1333      Row Name 02/19/19 1115             Gait/Stairs Assessment/Training    [place] Level (Gait)  minimum assist (75% patient effort);moderate assist (50% patient effort);2 person assist  -      Assistive Device (Gait)  -- HHA  -      Distance in Feet (Gait)  3  -SM      Pattern (Gait)  step-to  -SM      Deviations/Abnormal Patterns (Gait)  ofe decreased;festinating/shuffling;stride length decreased  -      Bilateral Gait Deviations  forward flexed posture  -      Comment (Gait/Stairs)  small steps taken from bed to chair  -SM      Recorded by [] Celestina Peralta [facility] 02/19/19 1333      Row Name 02/19/19 1115             Motor Skills Assessment/Interventions    Additional Documentation  Therapeutic Exercise (Group)  -SM      Recorded by [] Celestina Peralta [facility] 02/19/19 1333      Row Name 02/19/19 1115             Therapeutic Exercise    Lower Extremity (Therapeutic Exercise)  gluteal sets  -      Lower Extremity Range of Motion (Therapeutic Exercise)  ankle dorsiflexion/plantar flexion, bilateral  -      Exercise Type (Therapeutic Exercise)  AROM (active range of motion)  -      Position (Therapeutic Exercise)  seated  -      Sets/Reps (Therapeutic Exercise)  10 reps  -SM      Recorded by [] Celestina Peralta PTA 02/19/19 1333      Row Name 02/19/19 1115             Positioning and Restraints    Pre-Treatment Position  in bed  -SM      Post Treatment Position  chair  -SM      In Chair  reclined;call light within reach;encouraged to call for assist;exit alarm on;notified nsg  -SM      Recorded by [] Celestina Peralta [facility] 02/19/19 1333      Row Name 02/19/19 1115             Pain Scale:  Numbers Pre/Post-Treatment    Pain Scale: Numbers, Pretreatment  3/10  -SM      Pain Scale: Numbers, Post-Treatment  4/10  -SM      Pain Location  back  -SM      Pain Intervention(s)  Repositioned;Ambulation/increased activity;Rest  -      Recorded by [SM] Celestina Peralta PTA 02/19/19 1333      Row Name                Wound 02/19/19 0000 Left medial gluteal pressure injury    Wound - Properties Group Date first assessed: 02/19/19 [CW] Time first assessed: 0000 [CW] Present On Admission : no;picture taken [CW2] Side: Left [CW] Orientation: medial [CW] Location: gluteal [CW] Type: pressure injury [CW] Stage, Pressure Injury: Stage 1 [CW] Recorded by:  [CW] Terri Colunga RN 02/19/19 0107 [CW2] Terri Colunga RN 02/19/19 0110    Row Name                Wound 02/19/19 0000 lower gluteal pressure injury    Wound - Properties Group Date first assessed: 02/19/19 [CW] Time first assessed: 0000 [CW] Present On Admission : no;picture taken [CW] Orientation: lower [CW] Location: gluteal [CW] Type: pressure injury [CW] Stage, Pressure Injury: Stage 1 [CW] Recorded by:  [CW] Terri Colunga RN 02/19/19 0109      User Key  (r) = Recorded By, (t) = Taken By, (c) = Cosigned By    Initials Name Effective Dates Discipline    CW Terri Colunga RN 06/16/16 -  Nurse    SM Celestina Peralta PTA 03/07/18 -  PT          Wound 02/19/19 0000 Left medial gluteal pressure injury (Active)   Wound Image   2/19/2019 12:50 AM   Dressing Appearance open to air 2/19/2019 10:02 AM   Closure None 2/19/2019 10:02 AM   Base pink;red/granulating;clean;dry 2/19/2019 10:02 AM   Periwound intact 2/19/2019 12:50 AM   Periwound Temperature warm 2/19/2019 12:50 AM   Periwound Skin Turgor soft 2/19/2019 12:50 AM   Care, Wound cleansed with;soap and water;barrier applied 2/19/2019 12:50 AM   Dressing Care, Wound open to air 2/19/2019 10:02 AM   Periwound Care, Wound barrier ointment applied;dry periwound area maintained 2/19/2019 10:02  AM       Wound 02/19/19 0000 lower gluteal pressure injury (Active)   Wound Image   2/19/2019 12:50 AM   Dressing Appearance open to air 2/19/2019 10:02 AM   Closure None 2/19/2019 10:02 AM   Base pink;red/granulating;dry;clean 2/19/2019 10:02 AM   Periwound intact 2/19/2019 12:50 AM   Periwound Temperature warm 2/19/2019 12:50 AM   Periwound Skin Turgor soft 2/19/2019 12:50 AM   Drainage Amount none 2/19/2019 12:50 AM   Care, Wound cleansed with;soap and water;barrier applied 2/19/2019 12:50 AM   Dressing Care, Wound open to air 2/19/2019 10:02 AM   Periwound Care, Wound dry periwound area maintained 2/19/2019 10:02 AM           Physical Therapy Education     Title: PT OT SLP Therapies (Done)     Topic: Physical Therapy (Done)     Point: Mobility training (Done)     Learning Progress Summary           Patient Acceptance, E,D, VU,NR by  at 2/19/2019  1:34 PM    Acceptance, E,TB,D, NR by  at 2/18/2019 11:22 AM                   Point: Home exercise program (Done)     Learning Progress Summary           Patient Acceptance, E,D, VU,NR by  at 2/19/2019  1:34 PM                   Point: Body mechanics (Done)     Learning Progress Summary           Patient Acceptance, E,D, VU,NR by  at 2/19/2019  1:34 PM                   Point: Precautions (Done)     Learning Progress Summary           Patient Acceptance, E,D, VU,NR by  at 2/19/2019  1:34 PM                               User Key     Initials Effective Dates Name Provider Type Discipline     04/03/18 -  Theresa Hogan, PT Physical Therapist PT     03/07/18 -  Celestina Peralta, LEENA Physical Therapy Assistant PT                PT Recommendation and Plan     Plan of Care Reviewed With: patient  Progress: improving  Outcome Summary: Pt tolerated treatment well this date. Required mod A for bed mobility, then mod A x2 to stand and take small steps to chair ~3ft away. PT will continue to address functional mobility deficits as tolerated.  Outcome Measures      Row Name 02/19/19 1300 02/18/19 1100          How much help from another person do you currently need...    Turning from your back to your side while in flat bed without using bedrails?  2  -SM  2  -EE     Moving from lying on back to sitting on the side of a flat bed without bedrails?  2  -SM  2  -EE     Moving to and from a bed to a chair (including a wheelchair)?  2  -SM  1  -EE     Standing up from a chair using your arms (e.g., wheelchair, bedside chair)?  2  -SM  1  -EE     Climbing 3-5 steps with a railing?  1  -SM  1  -EE     To walk in hospital room?  2  -SM  1  -EE     AM-PAC 6 Clicks Score  11  -  8  -EE        Functional Assessment    Outcome Measure Options  AM-PAC 6 Clicks Basic Mobility (PT)  -SM  AM-PAC 6 Clicks Basic Mobility (PT)  -EE       User Key  (r) = Recorded By, (t) = Taken By, (c) = Cosigned By    Initials Name Provider Type    Theresa Rader, PT Physical Therapist     Celestina Peralta PTA Physical Therapy Assistant         Time Calculation:   PT Charges     Row Name 02/19/19 1336             Time Calculation    Start Time  1115  -      Stop Time  1132  -      Time Calculation (min)  17 min  -      PT Received On  02/19/19  -      PT - Next Appointment  02/20/19  -SM        User Key  (r) = Recorded By, (t) = Taken By, (c) = Cosigned By    Initials Name Provider Type    Celestina Ramirez PTA Physical Therapy Assistant        Therapy Suggested Charges     Code   Minutes Charges    None           Therapy Charges for Today     Code Description Service Date Service Provider Modifiers Qty    35999878319 HC PT THER PROC EA 15 MIN 2/19/2019 Celestina Peralta PTA GP 1    50153455625 HC PT THER SUPP EA 15 MIN 2/19/2019 Celetsina Peralta PTA GP 1          PT G-Codes  Outcome Measure Options: AM-PAC 6 Clicks Basic Mobility (PT)  AM-PAC 6 Clicks Score: 11    Celestina Peralta PTA  2/19/2019

## 2019-02-19 NOTE — PROGRESS NOTES
Continued Stay Note  [facility]     Patient Name: Janel Mahoney  MRN: [id number]  Today's Date: 2/19/2019    Admit Date: 2/15/2019    Discharge Plan     Row Name 02/19/19 1116       Plan    Plan  [facility] SNF    Patient/Family in Agreement with Plan  yes    Plan Comments  Spoke with patient and  at bedside.  Patient is aware that she will need SNF at [place].  She states she liked [place] but the drive is far for her .  Patient is interested in the [facility].  Spoke with Silvia and she will evaluate.  Packet started and in CCP office.  CCP will follow.  BH[place] RN Becky S. [place]eni[place], RN

## 2019-02-19 NOTE — PROGRESS NOTES
Adult Nutrition  Assessment/PES    Patient Name:  Janel Mahoney  YOB: 1940  MRN: [id number]  Admit Date:  2/15/2019    Assessment Date:  2/19/2019    Comments:  Nutrition assessment complete due to new PI on gluteal area. Eating well 75% most meals. Encouraged po. Will follow.     Reason for Assessment     Row Name 02/19/19 1146          Reason for Assessment    Reason For Assessment  identified at risk by screening criteria     Diagnosis  cardiac disease CHF, Afib, cardiomyopathy     Identified At Risk by Screening Criteria  large or nonhealing wound, burn or pressure injury         Anthropometrics     Row Name 02/19/19 1148 02/19/19 0500       Anthropometrics    Weight  --  70 kg (154 lb 4.8 oz)       Admit Weight    Admit Weight  72.4 kg (159 lb 9.8 oz)  --       Body Mass Index (BMI)    BMI Assessment  BMI 25-29.9: overweight  --        Labs/Tests/Procedures/Meds     Row Name 02/19/19 1149          Labs/Procedures/Meds    Lab Results Reviewed  reviewed, pertinent     Lab Results Comments  glu, BUN, Cr, crp, alt, ast, H/H        Diagnostic Tests/Procedures    Diagnostic Test/Procedure Reviewed  reviewed, pertinent        Medications    Pertinent Medications Reviewed  reviewed, pertinent     Pertinent Medications Comments  bumex, laxatives, steroids, B12         Physical Findings     Row Name 02/19/19 1150          Physical Findings    Overall Physical Appearance  overweight     Skin  edema;pressure injury bilat gluteal stage 1 PI         Estimated/Assessed Needs     Row Name 02/19/19 1151          Estimated/Assessed Needs    Additional Documentation  Calorie Requirements (Group)        Calorie Requirements    Estimated Calorie Requirement Comment  3924-0715 kcals (20-25/kg), 70-84g pro (1-1.2/kg), 1500cc fluid         Nutrition Prescription Ordered     Row Name 02/19/19 1152          Nutrition Prescription PO    Common Modifiers  Cardiac         Evaluation of Received Nutrient/Fluid Intake      Row Name 02/19/19 1152          PO Evaluation    Number of Meals  5     % PO Intake  50-75%         Problem/Interventions:  Problem 1     Row Name 02/19/19 1153          Nutrition Diagnoses Problem 1    Problem 1  Nutrition Appropriate for Condition at this Time     Signs/Symptoms (evidenced by)  PO Intake     Percent (%) intake recorded  70 %     Over number of meals  5         Intervention Goal     Row Name 02/19/19 1153          Intervention Goal    General  Maintain nutrition;Meet nutritional needs for age/condition;Reduce/improve symptoms;Disease management/therapy;Improved nutrition related lab(s)     PO  Tolerate PO;Maintain intake;PO intake (%)     PO Intake %  75 %     Weight  No significant weight loss         Nutrition Intervention     Row Name 02/19/19 1153          Nutrition Intervention    RD/Tech Action  Care plan reviewd;Encourage intake;Follow Tx progress;Interview for preference           Education/Evaluation     Row Name 02/19/19 1153          Education    Education  Will Instruct as appropriate        Monitor/Evaluation    Monitor  Per protocol;Weight;Skin status;I&O;PO intake;Symptoms;Pertinent labs           Electronically signed by:  Monik Harrison RD  02/19/19 11:55 AM

## 2019-02-19 NOTE — PLAN OF CARE
Problem: Patient Care Overview  Goal: Plan of Care Review  Outcome: Ongoing (interventions implemented as appropriate)   02/19/19 1634   Coping/Psychosocial   Plan of Care Reviewed With patient   Plan of Care Review   Progress improving   OTHER   Outcome Summary Pt has had multiple c/o of pain this shift and treated w/ PRN meds. No SOA noted. Tolerating PO meds w/ water. IV vitamin K given for INR 4.72. F/C in place. Q2 turns. Pt up to chair. VSS. Will continue to monitor.        Problem: Fall Risk (Adult)  Goal: Absence of Fall  Outcome: Ongoing (interventions implemented as appropriate)   02/19/19 1634   Fall Risk (Adult)   Absence of Fall making progress toward outcome       Problem: Pain, Acute (Adult)  Goal: Acceptable Pain Control/Comfort Level  Outcome: Ongoing (interventions implemented as appropriate)   02/19/19 1634   Pain, Acute (Adult)   Acceptable Pain Control/Comfort Level making progress toward outcome       Problem: Fluid Volume Excess (Adult)  Goal: Optimal Fluid Balance  Outcome: Ongoing (interventions implemented as appropriate)   02/19/19 1634   Fluid Volume Excess (Adult)   Optimal Fluid Balance making progress toward outcome       Problem: Skin Injury Risk (Adult)  Goal: Skin Health and Integrity  Outcome: Ongoing (interventions implemented as appropriate)   02/19/19 1634   Skin Injury Risk (Adult)   Skin Health and Integrity making progress toward outcome

## 2019-02-19 NOTE — PLAN OF CARE
Problem: Patient Care Overview  Goal: Plan of Care Review  Outcome: Ongoing (interventions implemented as appropriate)   02/19/19 3369   Coping/Psychosocial   Plan of Care Reviewed With patient   Plan of Care Review   Progress improving   OTHER   Outcome Summary Pt tolerated treatment well this date. Required mod A for bed mobility, then mod A x2 to stand and take small steps to chair ~3ft away. PT will continue to address functional mobility deficits as tolerated.

## 2019-02-19 NOTE — PROGRESS NOTES
"CC: CHF    Interval History: INR continues to rise. Diuresing well. Weight trending down. Creatinine improved. Narcotics is helping pain.       Vital Signs  Temp:  [97.9 °F (36.6 °C)-98.1 °F (36.7 °C)] 97.9 °F (36.6 °C)  Heart Rate:  [] 60  Resp:  [16] 16  BP: (141-165)/(55-70) 165/70    Intake/Output Summary (Last 24 hours) at 2/19/2019 0744  Last data filed at 2/19/2019 0300  Gross per 24 hour   Intake 480 ml   Output 2350 ml   Net -1870 ml     Flowsheet Rows      First Filed Value   Admission Height  162.6 cm (64\") Documented at 02/15/2019 1005   Admission Weight  72.4 kg (159 lb 9.6 oz) Documented at 02/15/2019 1005          PHYSICAL EXAM:  General: No acute distress  Resp:NL Rate, unlabored, clear  CV:NL rate and paced rhythm, NL PMI, Nl S1 and S2, no Murmur, no gallop, no rub, No JVD. Normal pedal pulses  ABD:Nl sounds, no masses or tenderness, nondistended, no guarding or rebound  Neuro: alert,cooperative and oriented  Extr: No edema or cyanosis, moves all extremities, stasis dermatitis BLE      Results Review:    Results from last 7 days   Lab Units 02/19/19  0607   SODIUM mmol/L 142   POTASSIUM mmol/L 3.7   CHLORIDE mmol/L 100   CO2 mmol/L 29.5*   BUN mg/dL 50*   CREATININE mg/dL 1.15*   GLUCOSE mg/dL 120*   CALCIUM mg/dL 8.8     Results from last 7 days   Lab Units 02/15/19  1041   TROPONIN T ng/mL 0.019     Results from last 7 days   Lab Units 02/19/19  0607   WBC 10*3/mm3 10.34   HEMOGLOBIN g/dL 9.4*   HEMATOCRIT % 29.9*   PLATELETS 10*3/mm3 276     Results from last 7 days   Lab Units 02/19/19  0607 02/18/19  0556 02/17/19  0535  02/15/19  1143   INR  4.72* 3.82* 3.36*   < > 9.97*   APTT seconds  --   --   --   --  79.1*    < > = values in this interval not displayed.                 I reviewed the patient's new clinical results.  I personally viewed and interpreted the patient's EKG/Telemetry data- paced        Medication Review:   Meds reviewed         Assessment/Plan  1. Acute on chronic " combined CHF EF 41-45 01/2019. ACE inhibitor was stopped x 3 weeks. She was in the office two days prior to admit and received IV lasix and was switched from lasix to Bumex PO. Failed that and required continued IV Bumex transitioned to PO Bumex 2/17/2019 seems to be tolerating   2.Chronic atrial fibrillation previous cardioversion, intolerant to Tikosyn then PVI on sotalol then AV node ablation s/p PPM upgraded to biV ICD with generator upgrade 01/2019. Currently holding coumadin for supra therapeutic INR  3.Coronary artery disease with history of mild disease of the LAD and angioplasty stent placement of the right coronary artery then single bypass graft January 2010 to the posterior descending artery.  4. Ischemic CM  5.History of mitral insufficiency status post mitral valve replacement with porcine valve with normal function on echo 12/2016  6. History of renal disease follows with Dr. Cruz- follow trends  7. History of nonsustained ventricular tachycardia status post ICD with generator upgrade in January 2019  8. History of GEORGINA  9. HTN continue to follow  10.HLD continue home therapy  11. Coagulapathy/ Supra therapeutic INR > 10 initially continue to hold INR for another day- will check  liver function panel  12. Diffuse joint pain/Elevated inflammatory markers- sed rate and CRP per admitting. On prednisone  13. Elevated transaminase on 2/15/19 s/p vitamin K 2/16/19. If liver enzymes remain elevated, she will need another dose Vitamin K    - Continue Bumex 2 mg BID PO. Hold coumadin. Await hepatic function panel to result as listed in #13. Also encouraged increased mobility and turning in bed to prevent progression of new pressure sores noted on RN progress note.     Liver enzymes more elevated. Will defer further workup to hospitalitis. Reviewed CT of abdomen from 05/2018. Will give 5 mg Vitamin K today.           NICKY Roman  02/19/19  7:44 AM

## 2019-02-20 PROBLEM — M19.90 INFLAMMATORY ARTHRITIS: Status: ACTIVE | Noted: 2019-02-20

## 2019-02-20 PROBLEM — I50.43 ACUTE ON CHRONIC COMBINED SYSTOLIC (CONGESTIVE) AND DIASTOLIC (CONGESTIVE) HEART FAILURE (HCC): Chronic | Status: ACTIVE | Noted: 2019-02-20

## 2019-02-20 PROBLEM — I25.10 CHRONIC CORONARY ARTERY DISEASE: Chronic | Status: ACTIVE | Noted: 2019-02-20

## 2019-02-20 LAB
ANION GAP SERPL CALCULATED.3IONS-SCNC: 12.8 MMOL/L
BASOPHILS # BLD AUTO: 0.01 10*3/MM3 (ref 0–0.2)
BASOPHILS NFR BLD AUTO: 0.1 % (ref 0–1.5)
BUN BLD-MCNC: 52 MG/DL (ref 8–23)
BUN/CREAT SERPL: 46 (ref 7–25)
CALCIUM SPEC-SCNC: 9.3 MG/DL (ref 8.6–10.5)
CHLORIDE SERPL-SCNC: 96 MMOL/L (ref 98–107)
CO2 SERPL-SCNC: 31.2 MMOL/L (ref 22–29)
CREAT BLD-MCNC: 1.13 MG/DL (ref 0.57–1)
CRP SERPL-MCNC: 4.78 MG/DL (ref 0–0.5)
DEPRECATED RDW RBC AUTO: 48.3 FL (ref 37–54)
EOSINOPHIL # BLD AUTO: 0.03 10*3/MM3 (ref 0–0.4)
EOSINOPHIL NFR BLD AUTO: 0.3 % (ref 0.3–6.2)
ERYTHROCYTE [DISTWIDTH] IN BLOOD BY AUTOMATED COUNT: 14 % (ref 12.3–15.4)
ERYTHROCYTE [SEDIMENTATION RATE] IN BLOOD: >140 MM/HR (ref 0–30)
FERRITIN SERPL-MCNC: 678 NG/ML (ref 13–150)
FOLATE SERPL-MCNC: 11.7 NG/ML (ref 4.78–24.2)
GFR SERPL CREATININE-BSD FRML MDRD: 47 ML/MIN/1.73
GLUCOSE BLD-MCNC: 101 MG/DL (ref 65–99)
HCT VFR BLD AUTO: 30.1 % (ref 34–46.6)
HGB BLD-MCNC: 9.2 G/DL (ref 12–15.9)
IMM GRANULOCYTES # BLD AUTO: 0.48 10*3/MM3 (ref 0–0.05)
IMM GRANULOCYTES NFR BLD AUTO: 4.3 % (ref 0–0.5)
INR PPP: 1.71 (ref 0.9–1.1)
IRON 24H UR-MRATE: 54 MCG/DL (ref 37–145)
IRON SATN MFR SERPL: 30 % (ref 20–50)
LYMPHOCYTES # BLD AUTO: 0.76 10*3/MM3 (ref 0.7–3.1)
LYMPHOCYTES NFR BLD AUTO: 6.8 % (ref 19.6–45.3)
MCH RBC QN AUTO: 28.7 PG (ref 26.6–33)
MCHC RBC AUTO-ENTMCNC: 30.6 G/DL (ref 31.5–35.7)
MCV RBC AUTO: 93.8 FL (ref 79–97)
MONOCYTES # BLD AUTO: 0.52 10*3/MM3 (ref 0.1–0.9)
MONOCYTES NFR BLD AUTO: 4.6 % (ref 5–12)
NEUTROPHILS # BLD AUTO: 9.39 10*3/MM3 (ref 1.4–7)
NEUTROPHILS NFR BLD AUTO: 83.9 % (ref 42.7–76)
NRBC BLD AUTO-RTO: 0 /100 WBC (ref 0–0)
PLATELET # BLD AUTO: 295 10*3/MM3 (ref 140–450)
PMV BLD AUTO: 9.7 FL (ref 6–12)
POTASSIUM BLD-SCNC: 3.7 MMOL/L (ref 3.5–5.2)
PROTHROMBIN TIME: 19.8 SECONDS (ref 11.7–14.2)
RBC # BLD AUTO: 3.21 10*6/MM3 (ref 3.77–5.28)
SODIUM BLD-SCNC: 140 MMOL/L (ref 136–145)
TIBC SERPL-MCNC: 182 MCG/DL
TRANSFERRIN SERPL-MCNC: 122 MG/DL (ref 200–360)
TSH SERPL DL<=0.05 MIU/L-ACNC: 1.65 MIU/ML (ref 0.27–4.2)
VIT B12 BLD-MCNC: >2000 PG/ML (ref 211–946)
WBC NRBC COR # BLD: 11.19 10*3/MM3 (ref 3.4–10.8)

## 2019-02-20 PROCEDURE — 99233 SBSQ HOSP IP/OBS HIGH 50: CPT | Performed by: INTERNAL MEDICINE

## 2019-02-20 PROCEDURE — 63710000001 PREDNISONE PER 1 MG: Performed by: HOSPITALIST

## 2019-02-20 PROCEDURE — 85652 RBC SED RATE AUTOMATED: CPT | Performed by: HOSPITALIST

## 2019-02-20 PROCEDURE — 83540 ASSAY OF IRON: CPT | Performed by: HOSPITALIST

## 2019-02-20 PROCEDURE — 82607 VITAMIN B-12: CPT | Performed by: HOSPITALIST

## 2019-02-20 PROCEDURE — 85610 PROTHROMBIN TIME: CPT | Performed by: HOSPITALIST

## 2019-02-20 PROCEDURE — 86140 C-REACTIVE PROTEIN: CPT | Performed by: HOSPITALIST

## 2019-02-20 PROCEDURE — 36415 COLL VENOUS BLD VENIPUNCTURE: CPT | Performed by: HOSPITALIST

## 2019-02-20 PROCEDURE — 82746 ASSAY OF FOLIC ACID SERUM: CPT | Performed by: HOSPITALIST

## 2019-02-20 PROCEDURE — 84466 ASSAY OF TRANSFERRIN: CPT | Performed by: HOSPITALIST

## 2019-02-20 PROCEDURE — 25010000002 ENOXAPARIN PER 10 MG: Performed by: INTERNAL MEDICINE

## 2019-02-20 PROCEDURE — 82728 ASSAY OF FERRITIN: CPT | Performed by: HOSPITALIST

## 2019-02-20 PROCEDURE — 85025 COMPLETE CBC W/AUTO DIFF WBC: CPT | Performed by: HOSPITALIST

## 2019-02-20 PROCEDURE — 97110 THERAPEUTIC EXERCISES: CPT

## 2019-02-20 PROCEDURE — 84443 ASSAY THYROID STIM HORMONE: CPT | Performed by: HOSPITALIST

## 2019-02-20 PROCEDURE — 80048 BASIC METABOLIC PNL TOTAL CA: CPT | Performed by: HOSPITALIST

## 2019-02-20 RX ADMIN — ATORVASTATIN CALCIUM 20 MG: 20 TABLET, FILM COATED ORAL at 10:18

## 2019-02-20 RX ADMIN — PREDNISONE 20 MG: 20 TABLET ORAL at 10:18

## 2019-02-20 RX ADMIN — BUMETANIDE 2 MG: 2 TABLET ORAL at 20:03

## 2019-02-20 RX ADMIN — SODIUM CHLORIDE, PRESERVATIVE FREE 3 ML: 5 INJECTION INTRAVENOUS at 10:24

## 2019-02-20 RX ADMIN — ENOXAPARIN SODIUM 70 MG: 80 INJECTION SUBCUTANEOUS at 10:22

## 2019-02-20 RX ADMIN — SODIUM CHLORIDE, PRESERVATIVE FREE 3 ML: 5 INJECTION INTRAVENOUS at 20:04

## 2019-02-20 RX ADMIN — POLYETHYLENE GLYCOL 3350 17 G: 17 POWDER, FOR SOLUTION ORAL at 10:17

## 2019-02-20 RX ADMIN — CARVEDILOL 25 MG: 25 TABLET, FILM COATED ORAL at 20:04

## 2019-02-20 RX ADMIN — BUMETANIDE 2 MG: 2 TABLET ORAL at 10:18

## 2019-02-20 RX ADMIN — ENOXAPARIN SODIUM 70 MG: 80 INJECTION SUBCUTANEOUS at 22:21

## 2019-02-20 RX ADMIN — HYDROCODONE BITARTRATE AND ACETAMINOPHEN 1 TABLET: 5; 325 TABLET ORAL at 23:31

## 2019-02-20 RX ADMIN — PANTOPRAZOLE SODIUM 40 MG: 40 TABLET, DELAYED RELEASE ORAL at 06:51

## 2019-02-20 RX ADMIN — CALCITRIOL CAPSULES 0.25 MCG 0.25 MCG: 0.25 CAPSULE ORAL at 10:17

## 2019-02-20 RX ADMIN — ASPIRIN 81 MG: 81 TABLET, DELAYED RELEASE ORAL at 10:18

## 2019-02-20 RX ADMIN — PANTOPRAZOLE SODIUM 40 MG: 40 TABLET, DELAYED RELEASE ORAL at 16:47

## 2019-02-20 RX ADMIN — CARVEDILOL 25 MG: 25 TABLET, FILM COATED ORAL at 10:18

## 2019-02-20 RX ADMIN — Medication 1000 MCG: at 10:18

## 2019-02-20 RX ADMIN — HYDROCODONE BITARTRATE AND ACETAMINOPHEN 1 TABLET: 5; 325 TABLET ORAL at 10:17

## 2019-02-20 RX ADMIN — RAMIPRIL 5 MG: 5 CAPSULE ORAL at 10:18

## 2019-02-20 NOTE — PROGRESS NOTES
"CC: CHF    Interval History:   Some better.  Output greater than input.    Vital Signs  Temp:  [97.7 °F (36.5 °C)-97.8 °F (36.6 °C)] 97.7 °F (36.5 °C)  Heart Rate:  [60-71] 60  Resp:  [16] 16  BP: (137-169)/(66-81) 169/74    Intake/Output Summary (Last 24 hours) at 2/20/2019 0713  Last data filed at 2/20/2019 0400  Gross per 24 hour   Intake 748 ml   Output 3000 ml   Net -2252 ml     Flowsheet Rows      First Filed Value   Admission Height  162.6 cm (64\") Documented at 02/15/2019 1005   Admission Weight  72.4 kg (159 lb 9.6 oz) Documented at 02/15/2019 1005          PHYSICAL EXAM:  General: No acute distress  Resp:NL Rate, unlabored, decreased bases  CV:NL rate and rhythm, NL PMI, Nl S1 and S2, 2/6 sys Murmur, no gallop, no rub, No JVD. Normal pedal pulses  ABD:Nl sounds, no masses or tenderness, nondistended, no guarding or rebound  Neuro: alert,cooperative and oriented  Extr: No edema or cyanosis, moves all extremities      Results Review:    Results from last 7 days   Lab Units 02/19/19  0607   SODIUM mmol/L 142   POTASSIUM mmol/L 3.7   CHLORIDE mmol/L 100   CO2 mmol/L 29.5*   BUN mg/dL 50*   CREATININE mg/dL 1.15*   GLUCOSE mg/dL 120*   CALCIUM mg/dL 8.8     Results from last 7 days   Lab Units 02/15/19  1041   TROPONIN T ng/mL 0.019     Results from last 7 days   Lab Units 02/19/19  0607   WBC 10*3/mm3 10.34   HEMOGLOBIN g/dL 9.4*   HEMATOCRIT % 29.9*   PLATELETS 10*3/mm3 276     Results from last 7 days   Lab Units 02/19/19  0607 02/18/19  0556 02/17/19  0535  02/15/19  1143   INR  4.72* 3.82* 3.36*   < > 9.97*   APTT seconds  --   --   --   --  79.1*    < > = values in this interval not displayed.                 I reviewed the patient's new clinical results.  I personally viewed and interpreted the patient's EKG/Telemetry data        Medication Review:   Meds reviewed         Assessment/Plan  1. 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 ACE inhibitor was stopped x 3 weeks.  Fluid status good await " today's blood work transition to p.o. diuretics.  2.Chronic atrial fibrillation previous cardioversion, intolerant to Tikosyn then PVI on sotalol then AV node ablation s/p PPM upgraded to biV ICD with generator upgrade 01/2019. Currently holding coumadin for supra therapeutic INR.  Be exacerbated by her steroids.  Awaiting today's INR.  3.Coronary artery disease with history of mild disease of the LAD and angioplasty stent placement of the right coronary artery then single bypass graft January 2010 to the posterior descending artery.  4. Ischemic CM  5.History of mitral insufficiency status post mitral valve replacement with porcine valve with normal function on echo 12/2016  6. History of renal disease follows with Dr. Saucedo OP.  7. History of nonsustained ventricular tachycardia status post ICD with generator upgrade in January 2019  8. History of GEORGINA  9. HTN continue to follow  10.HLD continue home therapy  11. Coagulapathy/ Supra therapeutic INR > 10 initially continue to hold INR for another day- will check  liver function panel  12. Diffuse joint pain/Elevated inflammatory markers- sed rate and CRP per admitting. On prednisone. Per hospitalists.  13. Elevated transaminase on 2/15/19 ? Etiology.            Nik Galarza MD  02/20/19  7:13 AM

## 2019-02-20 NOTE — PROGRESS NOTES
Continued Stay Note  [facility]     Patient Name: Janel Mahoney  MRN: [id number]  Today's Date: 2/20/2019    Admit Date: 2/15/2019    Discharge Plan     Row Name 02/20/19 1425       Plan    Plan  [facility]    Plan Comments  Spoke with Silvia and she confirms that [facility] can accept at [place].  Spouse updated by phone and he is agreeable.  Transfer packet in cubbie.  CCP will follow. Melody Olivera RN        Discharge Codes    No documentation.       Expected Discharge Date and Time     Expected Discharge Date Expected Discharge Time    Feb 22, 2019             Melody Olivera RN

## 2019-02-20 NOTE — PROGRESS NOTES
"   LOS: 5 days   Patient Care Team:  Ariel Matute MD as PCP - General (Internal Medicine)  Edward Vasquez MD as Consulting Physician (Hematology and Oncology)  Milagros Cruz MD as Referring Physician (Nephrology)  Anthony Cardona MD as Consulting Physician (Sleep Medicine)  Nik Galarza MD as Consulting Physician (Cardiology)  Shanna Major [facility] as Pharmacist    Chief Complaint: joint pains (mainly hands and feet)    Subjective     Feeling a little better today. No SOA or chest pain. No swelling. Voiding well. Tolerating diet. Pain in hands and feet is improving. No pain in hips, shoulders, neck.        Subjective:  Symptoms:  Improved.  She reports weakness.  No shortness of breath, malaise, cough, chest pain, headache, chest pressure, anorexia, diarrhea or anxiety.    Diet:  Adequate intake.  No nausea or vomiting.    Activity level: Impaired due to weakness.    Pain:  She complains of pain that is moderate.  She reports pain is improving.  Pain is well controlled.        History taken from: patient chart RN    Objective     Vital Signs  Temp:  [97.7 °F (36.5 °C)-98.3 °F (36.8 °C)] 98.3 °F (36.8 °C)  Heart Rate:  [60-68] 60  Resp:  [16] 16  BP: (137-171)/(66-81) 152/66    Objective:  General Appearance:  Comfortable and in no acute distress.    Vital signs: (most recent): Blood pressure 152/66, pulse 60, temperature 98.3 °F (36.8 °C), temperature source Oral, resp. rate 16, height 162.6 cm (64\"), weight 69.4 kg (152 lb 16 oz), SpO2 100 %.  Vital signs are normal.  No fever.    Output: Producing urine.    HEENT: Normal HEENT exam.    Lungs:  Normal effort and normal respiratory rate.  Breath sounds clear to auscultation.    Heart: Normal rate.  Regular rhythm.  Positive for murmur.    Abdomen: Abdomen is soft.  Bowel sounds are normal.   There is no abdominal tenderness.     Extremities: There is no dependent edema.    Pulses: Distal pulses are intact.    Neurological: Patient is " alert and oriented to person, place and time.    Pupils:  Pupils are equal, round, and reactive to light.    Skin:  Warm and dry.              Results Review:     I reviewed the patient's new clinical results.  I reviewed the patient's other test results and agree with the interpretation  I personally viewed and interpreted the patient's EKG/Telemetry data  Discussed with patient, RN, and CCP    Medication Review: reviewed    Assessment/Plan       Acute on chronic combined systolic (congestive) and diastolic (congestive) heart failure (CMS/HCC)    Anemia in stage 3 chronic kidney disease (CMS/HCC)    B12 deficiency    Coronary artery disease involving coronary bypass graft of native heart without angina pectoris    S/P MVR (porcine)    Chronic atrial fibrillation (CMS/HCC)    Bilateral carotid artery disease (CMS/HCC)    Long-term (current) use of anticoagulants    GEORGINA on auto CPAP - Dr Cardona    Esophageal stricture    Hypertension    Supratherapeutic INR    Ischemic cardiomyopathy    Chronic kidney disease, stage 3 (CMS/HCC)    Chronic coronary artery disease    Inflammatory arthritis          Plan:   (Diuresis per Card (po Bumex), voiding well, weight falling, Cr tolerating  Hgb stable  Continue B12  NSR at present, AC per Card, she is s/p IV Vit K per Card for persistently elevated INR, INR now 1.7, Lovenox started  VSS  No cardiac symptoms  Continue Prednisone, joint pain improving slowly, uncertain etiology, will need to f/u with Rheum as outpt  Arrangements made for [facility] upon discharge  ).       Jeancarlos Dobbins MD  02/20/19  5:50 PM    Time: 30min

## 2019-02-20 NOTE — PLAN OF CARE
Problem: Patient Care Overview  Goal: Plan of Care Review  Outcome: Ongoing (interventions implemented as appropriate)   02/20/19 0439   Coping/Psychosocial   Plan of Care Reviewed With patient   Plan of Care Review   Progress improving   OTHER   Outcome Summary Pt c/o pain once, Norco given. Pt on RA, resting well. Q2 turns. Has BM this shift, Galindo in place and catheter care given. VSS. Will continue to monitor.     Goal: Discharge Needs Assessment  Outcome: Ongoing (interventions implemented as appropriate)    Goal: Interprofessional Rounds/Family Conf  Outcome: Ongoing (interventions implemented as appropriate)

## 2019-02-20 NOTE — THERAPY TREATMENT NOTE
Acute Care - Physical Therapy Treatment Note  [facility]     Patient Name: Janel Mahoney  : 1940  MRN: [id number]  Today's Date: 2019  Onset of Illness/Injury or Date of Surgery: 02/15/19     Referring Physician: Tamera    Admit Date: 2/15/2019    Visit Dx:    ICD-10-CM ICD-9-CM   1. Peripheral edema R60.9 782.3   2. Coagulopathy (CMS/HCC) D68.9 286.9   3. Ischemic cardiomyopathy I25.5 414.8   4. Chronic renal failure, unspecified CKD stage N18.9 585.9   5. Chronic anemia D64.9 285.9   6. Traumatic hematoma of left upper arm, initial encounter S40.022A 923.03   7. Generalized weakness R53.1 780.79     Patient Active Problem List   Diagnosis   • Anemia in stage 3 chronic kidney disease (CMS/HCC)   • Thrombocytopenia (CMS/HCC)   • B12 deficiency   • Coronary artery disease involving coronary bypass graft of native heart without angina pectoris   • S/P MVR (mitral valve replacement)   • Chronic atrial fibrillation (CMS/HCC)   • Bilateral carotid artery disease (CMS/HCC)   • Intractable low back pain   • Long-term (current) use of anticoagulants   • Low back pain   • Osteoporosis   • Murmur, heart   • GEORGINA on auto CPAP - Dr Cardona   • Hypersomnia due to medical condition - GEORGINA   • Esophageal stricture   • Acute blood loss anemia   • Dysphagia   • Closed fracture of left proximal humerus   • Hypertension   • Supratherapeutic INR   • Chronic combined systolic and diastolic congestive heart failure (CMS/HCC)   • Osteoporosis with pathological fracture   • Closed 3-part fracture of proximal end of left humerus   • Closed fracture of proximal end of humerus with delayed healing   • Injury of right knee   • Knee injury, left, initial encounter   • History of fall   • S/P TKR (total knee replacement) using cement, left   • Ischemic cardiomyopathy   • Intramuscular hematoma right pecotralis   • Hemorrhagic disorder due to extrinsic circulating anticoagulants (CMS/HCC)   • Acute posthemorrhagic anemia   •  Chronic kidney disease, stage 3 (CMS/HCC)   • Acute kidney injury (CMS/HCC)   • Peripheral edema       Therapy Treatment    Rehabilitation Treatment Summary     Row Name 02/20/19 1337             Treatment Time/Intention    Discipline  physical therapist  -MS      Document Type  therapy note (daily note)  -MS      Subjective Information  complains of;weakness;fatigue;pain  -MS      Mode of Treatment  physical therapy;individual therapy  -MS      Patient Effort  good  -MS      Comment  Pt. reports back pain and bilateral feet pain this PM. Otherwise, pt. agreeable to work with P.T.  -MS      Existing Precautions/Restrictions  fall  (Significant)  Dec. skin integrity  -MS      Treatment Considerations/Comments  Precautions taken to help minimize/eliminate all friction/shearing forces to pt.'s skin during mobility.   (Significant)   -MS      Recorded by [MS] Hoang Angeles, PT 02/20/19 1340      Row Name 02/20/19 1337             Cognitive Assessment/Intervention- PT/OT    Orientation Status (Cognition)  oriented x 3  -MS      Follows Commands (Cognition)  WNL  -MS      Personal Safety Interventions  fall prevention program maintained;gait belt;nonskid shoes/slippers when out of bed;supervised activity  -MS      Recorded by [MS] Hoang Angeles, PT 02/20/19 1340      Row Name 02/20/19 1337             Bed Mobility Assessment/Treatment    Supine-Sit [place] (Bed Mobility)  minimum assist (75% patient effort);2 person assist  -MS      Sit-Supine [place] (Bed Mobility)  minimum assist (75% patient effort);2 person assist  -MS      Comment (Bed Mobility)  Once sitting EOB, pt. requires CGA x 1 for static sitting balance.  -MS      Recorded by [MS] Hoang Angeles, PT 02/20/19 1340      Row Name 02/20/19 1337             Sit-Stand Transfer    Sit-Stand [place] (Transfers)  minimum assist (75% patient effort);2 person assist  -MS      Assistive Device (Sit-Stand Transfers)  walker, front-wheeled  -MS       Recorded by [MS] Hoang Angeles, PT 02/20/19 1340      Row Name 02/20/19 1337             Stand-Sit Transfer    Stand-Sit [place] (Transfers)  minimum assist (75% patient effort);2 person assist  -MS      Assistive Device (Stand-Sit Transfers)  walker, front-wheeled  -MS      Recorded by [MS] Hoang Angeles, PT 02/20/19 1340      Row Name 02/20/19 1337             Gait/Stairs Assessment/Training    [place] Level (Gait)  minimum assist (75% patient effort)  -MS      Assistive Device (Gait)  walker, front-wheeled  -MS      Distance in Feet (Gait)  15 feet  -MS      Pattern (Gait)  step-through  -MS      Deviations/Abnormal Patterns (Gait)  stride length decreased;antalgic  -MS      Bilateral Gait Deviations  forward flexed posture  -MS      Comment (Gait/Stairs)  Verbal/tactile cues given for posture correction and Rwx guidance. Limited in gait distance due to back/bilateral feet pain.  -MS      Recorded by [MS] Hoang Angeles, PT 02/20/19 1340      Row Name 02/20/19 4149             Positioning and Restraints    Pre-Treatment Position  in bed  -MS      Post Treatment Position  bed  -MS      In Bed  notified nsg;supine;call light within reach;encouraged to call for assist;exit alarm on;RLE elevated;LLE elevated;R heel elevated;L heel elevated All lines intact.  -MS      Recorded by [MS] Hoang Angeles, PT 02/20/19 1340      Row Name 02/20/19 2087             Pain Scale: Numbers Pre/Post-Treatment    Pain Scale: Numbers, Pretreatment  5/10  -MS      Pain Scale: Numbers, Post-Treatment  5/10  -MS      Pain Location  back  -MS      Pain Intervention(s)  Medication (See MAR);Repositioned;Rest  -MS      Recorded by [MS] Hoang Angeles, PT 02/20/19 1340      Row Name                Wound 02/19/19 0000 Left medial gluteal pressure injury    Wound - Properties Group Date first assessed: 02/19/19 [CW] Time first assessed: 0000 [CW] Present On Admission : no;picture taken [CW2] Side: Left [CW]  Orientation: medial [CW] Location: gluteal [CW] Type: pressure injury [CW] Stage, Pressure Injury: Stage 1 [CW] Recorded by:  [CW] Terri Colunga RN 02/19/19 0107 [CW2] Terri Colunga RN 02/19/19 0110    Row Name                Wound 02/19/19 0000 lower gluteal pressure injury    Wound - Properties Group Date first assessed: 02/19/19 [CW] Time first assessed: 0000 [CW] Present On Admission : no;picture taken [CW] Orientation: lower [CW] Location: gluteal [CW] Type: pressure injury [CW] Stage, Pressure Injury: Stage 1 [CW] Recorded by:  [CW] Terri Colunga RN 02/19/19 0109      User Key  (r) = Recorded By, (t) = Taken By, (c) = Cosigned By    Initials Name Effective Dates Discipline    Hoang Gentile, PT 04/03/18 -  PT    CW Terri Colunga RN 06/16/16 -  Nurse          Wound 02/19/19 0000 Left medial gluteal pressure injury (Active)   Dressing Appearance open to air 2/20/2019 10:17 AM   Closure None 2/20/2019 10:17 AM   Base pink;red/granulating;clean;dry 2/20/2019 10:17 AM   Dressing Care, Wound open to air 2/20/2019 12:09 AM   Periwound Care, Wound barrier ointment applied 2/20/2019 12:09 AM       Wound 02/19/19 0000 lower gluteal pressure injury (Active)   Dressing Appearance open to air 2/20/2019 10:17 AM   Closure None 2/20/2019 10:17 AM   Base pink;red/granulating;dry;clean 2/20/2019 10:17 AM   Dressing Care, Wound open to air 2/20/2019 12:09 AM   Periwound Care, Wound barrier ointment applied 2/20/2019 12:09 AM           Physical Therapy Education     Title: PT OT SLP Therapies (Done)     Topic: Physical Therapy (Done)     Point: Mobility training (Done)     Learning Progress Summary           Patient Acceptance, E,D, VU,NR by MS at 2/20/2019  1:41 PM    Acceptance, E,D, VU,NR by MADHU at 2/19/2019  1:34 PM    Acceptance, E,TB,D, NR by EE at 2/18/2019 11:22 AM                   Point: Home exercise program (Done)     Learning Progress Summary           Patient Acceptance, E,MARINO, JANES,NR by MS at  2/20/2019  1:41 PM    Acceptance, E,D, VU,NR by  at 2/19/2019  1:34 PM                   Point: Body mechanics (Done)     Learning Progress Summary           Patient Acceptance, E,D, VU,NR by MS at 2/20/2019  1:41 PM    Acceptance, E,D, VU,NR by  at 2/19/2019  1:34 PM                   Point: Precautions (Done)     Learning Progress Summary           Patient Acceptance, E,D, VU,NR by MS at 2/20/2019  1:41 PM    Acceptance, E,D, VU,NR by  at 2/19/2019  1:34 PM                               User Key     Initials Effective Dates Name Provider Type Discipline    EE 04/03/18 -  Theresa Hogan, PT Physical Therapist PT    MS 04/03/18 -  Hoang Angeles, PT Physical Therapist PT     03/07/18 -  Celestina Peralta, LEENA Physical Therapy Assistant PT                PT Recommendation and Plan     Plan of Care Reviewed With: patient  Progress: improving  Outcome Summary: Improved tolerance to functional activity this day with an increase in gait distance and decreased assist required for overall functional mobility.   Outcome Measures     Row Name 02/20/19 1300 02/19/19 1300 02/18/19 1100       How much help from another person do you currently need...    Turning from your back to your side while in flat bed without using bedrails?  3  -MS  2  -SM  2  -EE    Moving from lying on back to sitting on the side of a flat bed without bedrails?  2  -MS  2  -SM  2  -EE    Moving to and from a bed to a chair (including a wheelchair)?  3  -MS  2  -SM  1  -EE    Standing up from a chair using your arms (e.g., wheelchair, bedside chair)?  2  -MS  2  -SM  1  -EE    Climbing 3-5 steps with a railing?  2  -MS  1  -SM  1  -EE    To walk in hospital room?  3  -MS  2  -SM  1  -EE    AM-PAC 6 Clicks Score  15  -MS  11  -SM  8  -EE       Functional Assessment    Outcome Measure Options  AM-PAC 6 Clicks Basic Mobility (PT)  -MS  AM-PAC 6 Clicks Basic Mobility (PT)  -SM  AM-PAC 6 Clicks Basic Mobility (PT)  -EE      User Key  (r) = Recorded  By, (t) = Taken By, (c) = Cosigned By    Initials Name Provider Type    Theresa Rader, PT Physical Therapist    Hoang Gentile, PT Physical Therapist    Celestina Ramirez, PTA Physical Therapy Assistant         Time Calculation:   PT Charges     Row Name 02/20/19 1342             Time Calculation    Start Time  1310  -MS      Stop Time  1327  -MS      Time Calculation (min)  17 min  -MS      PT Received On  02/20/19  -MS      PT - Next Appointment  02/21/19  -MS         Time Calculation- PT    Total Timed Code Minutes- PT  15 minute(s)  -MS        User Key  (r) = Recorded By, (t) = Taken By, (c) = Cosigned By    Initials Name Provider Type    Hoang Gentile, PT Physical Therapist        Therapy Suggested Charges     Code   Minutes Charges    None           Therapy Charges for Today     Code Description Service Date Service Provider Modifiers Qty    95248842968 HC PT THER PROC EA 15 MIN 2/20/2019 Hoang Angeles, PT GP 1    32215613297 HC PT THER SUPP EA 15 MIN 2/20/2019 Hoang Angeles, PT GP 1          PT G-Codes  Outcome Measure Options: AM-PAC 6 Clicks Basic Mobility (PT)  AM-PAC 6 Clicks Score: 15    Hoang Angeles, PT  2/20/2019

## 2019-02-20 NOTE — PLAN OF CARE
Problem: Patient Care Overview  Goal: Plan of Care Review   02/20/19 1341   Coping/Psychosocial   Plan of Care Reviewed With patient   Plan of Care Review   Progress improving   OTHER   Outcome Summary Improved tolerance to functional activity this day with an increase in gait distance and decreased assist required for overall functional mobility.

## 2019-02-20 NOTE — PLAN OF CARE
Problem: Patient Care Overview  Goal: Plan of Care Review  Outcome: Ongoing (interventions implemented as appropriate)   02/20/19 1806   Coping/Psychosocial   Plan of Care Reviewed With patient   Plan of Care Review   Progress improving   OTHER   Outcome Summary Pt has less frequently c/o of pain this shift. Still treated w/ PRN meds. INR down to 1.71. Lovenox given. Q2 turns. F/C removed and voiding trial in place. VSS. Will continue to monitor.        Problem: Fall Risk (Adult)  Goal: Absence of Fall  Outcome: Ongoing (interventions implemented as appropriate)   02/20/19 1806   Fall Risk (Adult)   Absence of Fall making progress toward outcome       Problem: Pain, Acute (Adult)  Goal: Acceptable Pain Control/Comfort Level  Outcome: Ongoing (interventions implemented as appropriate)   02/20/19 1806   Pain, Acute (Adult)   Acceptable Pain Control/Comfort Level making progress toward outcome       Problem: Fluid Volume Excess (Adult)  Goal: Optimal Fluid Balance  Outcome: Ongoing (interventions implemented as appropriate)   02/20/19 1806   Fluid Volume Excess (Adult)   Optimal Fluid Balance making progress toward outcome       Problem: Skin Injury Risk (Adult)  Goal: Skin Health and Integrity   02/20/19 1806   Skin Injury Risk (Adult)   Skin Health and Integrity making progress toward outcome

## 2019-02-21 LAB
ANION GAP SERPL CALCULATED.3IONS-SCNC: 11.3 MMOL/L
BASOPHILS # BLD AUTO: 0.01 10*3/MM3 (ref 0–0.2)
BASOPHILS NFR BLD AUTO: 0.1 % (ref 0–1.5)
BUN BLD-MCNC: 51 MG/DL (ref 8–23)
BUN/CREAT SERPL: 37.8 (ref 7–25)
CALCIUM SPEC-SCNC: 9.2 MG/DL (ref 8.6–10.5)
CHLORIDE SERPL-SCNC: 95 MMOL/L (ref 98–107)
CO2 SERPL-SCNC: 32.7 MMOL/L (ref 22–29)
CREAT BLD-MCNC: 1.35 MG/DL (ref 0.57–1)
DEPRECATED RDW RBC AUTO: 47.9 FL (ref 37–54)
EOSINOPHIL # BLD AUTO: 0.02 10*3/MM3 (ref 0–0.4)
EOSINOPHIL NFR BLD AUTO: 0.2 % (ref 0.3–6.2)
ERYTHROCYTE [DISTWIDTH] IN BLOOD BY AUTOMATED COUNT: 14.1 % (ref 12.3–15.4)
GFR SERPL CREATININE-BSD FRML MDRD: 38 ML/MIN/1.73
GLUCOSE BLD-MCNC: 95 MG/DL (ref 65–99)
HCT VFR BLD AUTO: 30.7 % (ref 34–46.6)
HGB BLD-MCNC: 9.4 G/DL (ref 12–15.9)
IMM GRANULOCYTES # BLD AUTO: 0.33 10*3/MM3 (ref 0–0.05)
IMM GRANULOCYTES NFR BLD AUTO: 3.1 % (ref 0–0.5)
INR PPP: 1.86 (ref 0.9–1.1)
LYMPHOCYTES # BLD AUTO: 0.8 10*3/MM3 (ref 0.7–3.1)
LYMPHOCYTES NFR BLD AUTO: 7.5 % (ref 19.6–45.3)
MCH RBC QN AUTO: 28.6 PG (ref 26.6–33)
MCHC RBC AUTO-ENTMCNC: 30.6 G/DL (ref 31.5–35.7)
MCV RBC AUTO: 93.3 FL (ref 79–97)
MONOCYTES # BLD AUTO: 0.42 10*3/MM3 (ref 0.1–0.9)
MONOCYTES NFR BLD AUTO: 3.9 % (ref 5–12)
NEUTROPHILS # BLD AUTO: 9.09 10*3/MM3 (ref 1.4–7)
NEUTROPHILS NFR BLD AUTO: 85.2 % (ref 42.7–76)
NRBC BLD AUTO-RTO: 0 /100 WBC (ref 0–0)
PLATELET # BLD AUTO: 296 10*3/MM3 (ref 140–450)
PMV BLD AUTO: 9.7 FL (ref 6–12)
POTASSIUM BLD-SCNC: 3.5 MMOL/L (ref 3.5–5.2)
PROTHROMBIN TIME: 21.1 SECONDS (ref 11.7–14.2)
RBC # BLD AUTO: 3.29 10*6/MM3 (ref 3.77–5.28)
SODIUM BLD-SCNC: 139 MMOL/L (ref 136–145)
WBC NRBC COR # BLD: 10.67 10*3/MM3 (ref 3.4–10.8)

## 2019-02-21 PROCEDURE — 99233 SBSQ HOSP IP/OBS HIGH 50: CPT | Performed by: INTERNAL MEDICINE

## 2019-02-21 PROCEDURE — 85025 COMPLETE CBC W/AUTO DIFF WBC: CPT | Performed by: HOSPITALIST

## 2019-02-21 PROCEDURE — 25010000002 ENOXAPARIN PER 10 MG: Performed by: INTERNAL MEDICINE

## 2019-02-21 PROCEDURE — 63710000001 PREDNISONE PER 1 MG: Performed by: HOSPITALIST

## 2019-02-21 PROCEDURE — 80048 BASIC METABOLIC PNL TOTAL CA: CPT | Performed by: HOSPITALIST

## 2019-02-21 PROCEDURE — 85610 PROTHROMBIN TIME: CPT | Performed by: HOSPITALIST

## 2019-02-21 PROCEDURE — 97110 THERAPEUTIC EXERCISES: CPT

## 2019-02-21 RX ORDER — WARFARIN SODIUM 2.5 MG/1
2.5 TABLET ORAL
Status: DISCONTINUED | OUTPATIENT
Start: 2019-02-21 | End: 2019-02-22 | Stop reason: HOSPADM

## 2019-02-21 RX ORDER — TRAMADOL HYDROCHLORIDE 50 MG/1
50 TABLET ORAL EVERY 12 HOURS PRN
Qty: 6 TABLET | Refills: 0 | Status: SHIPPED | OUTPATIENT
Start: 2019-02-21 | End: 2019-04-16 | Stop reason: SDUPTHER

## 2019-02-21 RX ORDER — ZOLPIDEM TARTRATE 10 MG/1
5 TABLET ORAL NIGHTLY PRN
Qty: 3 TABLET | Refills: 0 | Status: SHIPPED | OUTPATIENT
Start: 2019-02-21 | End: 2019-05-30 | Stop reason: SDUPTHER

## 2019-02-21 RX ORDER — WARFARIN SODIUM 2.5 MG/1
2.5 TABLET ORAL NIGHTLY
Start: 2019-02-21 | End: 2019-11-11

## 2019-02-21 RX ORDER — BUMETANIDE 1 MG/1
1 TABLET ORAL 2 TIMES DAILY
Start: 2019-02-21 | End: 2019-04-01 | Stop reason: DRUGHIGH

## 2019-02-21 RX ORDER — BUMETANIDE 1 MG/1
1 TABLET ORAL 2 TIMES DAILY
Status: DISCONTINUED | OUTPATIENT
Start: 2019-02-21 | End: 2019-02-22 | Stop reason: HOSPADM

## 2019-02-21 RX ORDER — PREDNISONE 20 MG/1
20 TABLET ORAL DAILY
Start: 2019-02-22 | End: 2019-09-03

## 2019-02-21 RX ADMIN — WARFARIN SODIUM 2.5 MG: 2.5 TABLET ORAL at 17:35

## 2019-02-21 RX ADMIN — HYDROCODONE BITARTRATE AND ACETAMINOPHEN 1 TABLET: 5; 325 TABLET ORAL at 14:55

## 2019-02-21 RX ADMIN — BUMETANIDE 1 MG: 2 TABLET ORAL at 22:10

## 2019-02-21 RX ADMIN — BUMETANIDE 1 MG: 2 TABLET ORAL at 09:07

## 2019-02-21 RX ADMIN — Medication 1000 MCG: at 09:07

## 2019-02-21 RX ADMIN — ATORVASTATIN CALCIUM 20 MG: 20 TABLET, FILM COATED ORAL at 09:07

## 2019-02-21 RX ADMIN — ENOXAPARIN SODIUM 70 MG: 80 INJECTION SUBCUTANEOUS at 10:44

## 2019-02-21 RX ADMIN — PANTOPRAZOLE SODIUM 40 MG: 40 TABLET, DELAYED RELEASE ORAL at 17:34

## 2019-02-21 RX ADMIN — ENOXAPARIN SODIUM 70 MG: 80 INJECTION SUBCUTANEOUS at 22:11

## 2019-02-21 RX ADMIN — POLYETHYLENE GLYCOL 3350 17 G: 17 POWDER, FOR SOLUTION ORAL at 09:07

## 2019-02-21 RX ADMIN — CALCITRIOL CAPSULES 0.25 MCG 0.25 MCG: 0.25 CAPSULE ORAL at 09:07

## 2019-02-21 RX ADMIN — TRAMADOL HYDROCHLORIDE 50 MG: 50 TABLET ORAL at 19:52

## 2019-02-21 RX ADMIN — CARVEDILOL 25 MG: 25 TABLET, FILM COATED ORAL at 09:07

## 2019-02-21 RX ADMIN — CARVEDILOL 25 MG: 25 TABLET, FILM COATED ORAL at 19:54

## 2019-02-21 RX ADMIN — PANTOPRAZOLE SODIUM 40 MG: 40 TABLET, DELAYED RELEASE ORAL at 06:45

## 2019-02-21 RX ADMIN — SODIUM CHLORIDE, PRESERVATIVE FREE 3 ML: 5 INJECTION INTRAVENOUS at 09:08

## 2019-02-21 RX ADMIN — TRAMADOL HYDROCHLORIDE 50 MG: 50 TABLET ORAL at 04:05

## 2019-02-21 RX ADMIN — ASPIRIN 81 MG: 81 TABLET, DELAYED RELEASE ORAL at 09:07

## 2019-02-21 RX ADMIN — PREDNISONE 20 MG: 20 TABLET ORAL at 09:07

## 2019-02-21 RX ADMIN — RAMIPRIL 5 MG: 5 CAPSULE ORAL at 09:07

## 2019-02-21 NOTE — PROGRESS NOTES
"CC: CHF    Interval History:   Feels better today.  Output much greater than input.    Vital Signs  Temp:  [97.5 °F (36.4 °C)-98.3 °F (36.8 °C)] 97.5 °F (36.4 °C)  Heart Rate:  [60-68] 68  Resp:  [16] 16  BP: (132-152)/(55-66) 152/63    Intake/Output Summary (Last 24 hours) at 2/21/2019 0801  Last data filed at 2/21/2019 0400  Gross per 24 hour   Intake --   Output 2600 ml   Net -2600 ml     Flowsheet Rows      First Filed Value   Admission Height  162.6 cm (64\") Documented at 02/15/2019 1005   Admission Weight  72.4 kg (159 lb 9.6 oz) Documented at 02/15/2019 1005          PHYSICAL EXAM:  General: No acute distress  Resp:NL Rate, unlabored, clear  CV:NL rate and rhythm, NL PMI, Nl S1 and S2, 2/6 sys Murmur, no gallop, no rub, No JVD. Normal pedal pulses  ABD:Nl sounds, no masses or tenderness, nondistended, no guarding or rebound  Neuro: alert,cooperative and oriented  Extr: No edema or cyanosis, moves all extremities          Results Review:    Results from last 7 days   Lab Units 02/21/19  0456   SODIUM mmol/L 139   POTASSIUM mmol/L 3.5   CHLORIDE mmol/L 95*   CO2 mmol/L 32.7*   BUN mg/dL 51*   CREATININE mg/dL 1.35*   GLUCOSE mg/dL 95   CALCIUM mg/dL 9.2     Results from last 7 days   Lab Units 02/15/19  1041   TROPONIN T ng/mL 0.019     Results from last 7 days   Lab Units 02/21/19  0456   WBC 10*3/mm3 10.67   HEMOGLOBIN g/dL 9.4*   HEMATOCRIT % 30.7*   PLATELETS 10*3/mm3 296     Results from last 7 days   Lab Units 02/21/19  0456 02/20/19  0702 02/19/19  0607  02/15/19  1143   INR  1.86* 1.71* 4.72*   < > 9.97*   APTT seconds  --   --   --   --  79.1*    < > = values in this interval not displayed.                 I reviewed the patient's new clinical results.  I personally viewed and interpreted the patient's EKG/Telemetry data        Medication Review:   Meds reviewed         Assessment/Plan  1. 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 ACE inhibitor was stopped x 3 weeks.   Renal function getting a " little worse.  Will decrease diuretic she is having great diuresis.  2.Chronic atrial fibrillation previous cardioversion, intolerant to Tikosyn then PVI on sotalol then AV node ablation s/p PPM upgraded to biV ICD with generator upgrade 01/2019.  Her low on Lovenox restarting warfarin.  DC when INR therapeutic.  3.Coronary artery disease with history of mild disease of the LAD and angioplasty stent placement of the right coronary artery then single bypass graft January 2010 to the posterior descending artery.  4. Ischemic CM  5.History of mitral insufficiency status post mitral valve replacement with porcine valve with normal function on echo 12/2016  6. History of renal disease follows with Dr. Saucedo OP.  7. History of nonsustained ventricular tachycardia status post ICD with generator upgrade in January 2019  8. History of GEORGINA  9. HTN continue to follow  10.HLD continue home therapy  11. Coagulapathy/ Supra therapeutic INR > 10 initially as above.  12. Diffuse joint pain/Elevated inflammatory markers- sed rate and CRP per admitting. On prednisone. Per hospitalists.  13. Elevated transaminase on 2/15/19 ? Etiology.            Nik Galarza MD  02/21/19  8:01 AM

## 2019-02-21 NOTE — PLAN OF CARE
Problem: Patient Care Overview  Goal: Plan of Care Review  Outcome: Ongoing (interventions implemented as appropriate)   02/21/19 2378   Coping/Psychosocial   Plan of Care Reviewed With patient   Plan of Care Review   Progress improving   OTHER   Outcome Summary pt to be DC to [facility] today; VSS; PT worked with PT; PO pain med given; INR slightly increased; assist x1/2.     Goal: Individualization and Mutuality  Outcome: Ongoing (interventions implemented as appropriate)    Goal: Discharge Needs Assessment  Outcome: Ongoing (interventions implemented as appropriate)    Goal: Interprofessional Rounds/Family Conf  Outcome: Ongoing (interventions implemented as appropriate)      Problem: Fall Risk (Adult)  Goal: Absence of Fall  Outcome: Ongoing (interventions implemented as appropriate)

## 2019-02-21 NOTE — PLAN OF CARE
Problem: Patient Care Overview  Goal: Plan of Care Review  Outcome: Outcome(s) achieved Date Met: 02/21/19 02/21/19 5155   Coping/Psychosocial   Plan of Care Reviewed With patient   Plan of Care Review   Progress improving   OTHER   Outcome Summary Pt tolerated treatment well this date. Increased gait distance to 35ft w/ Rw and min A x2. Limited d/t B feet pain. Plan to d/c to rehab tonight.

## 2019-02-21 NOTE — PLAN OF CARE
Problem: Patient Care Overview  Goal: Plan of Care Review  Outcome: Ongoing (interventions implemented as appropriate)   02/21/19 0313   Coping/Psychosocial   Plan of Care Reviewed With patient   Plan of Care Review   Progress improving   OTHER   Outcome Summary Pt c/o pain, Norco given x1. Produce urine output after removed Galindo. Resting well during shift. VSS. Will continue to monitor.     Goal: Discharge Needs Assessment  Outcome: Ongoing (interventions implemented as appropriate)    Goal: Interprofessional Rounds/Family Conf  Outcome: Ongoing (interventions implemented as appropriate)

## 2019-02-21 NOTE — DISCHARGE SUMMARY
Name: Janel Mahoney  Age: 78 y.o.  Sex: female  :  1940  MRN: [id number]         Primary Care Physician: Ariel Matute MD      Date of Admission:  2/15/2019  Date of Discharge:  2019      Chief Complaint:  Leg Swelling      Discharge Diagnosis:  Active Hospital Problems    Diagnosis Date Noted   • **Acute on chronic combined systolic (congestive) and diastolic (congestive) heart failure (CMS/HCC) [I50.43] 2019   • Chronic coronary artery disease [I25.10] 2019   • Inflammatory arthritis [M19.90] 2019   • Chronic kidney disease, stage 3 (CMS/HCC) [N18.3] 2018   • Ischemic cardiomyopathy [I25.5] 2018   • Hypertension [I10] 2017   • Supratherapeutic INR [R79.1] 2017   • Esophageal stricture [K22.2] 2017   • GEORGINA on auto CPAP - Dr Cardona [G47.33, Z99.89] 2016   • Long-term (current) use of anticoagulants [Z79.01] 2016   • Chronic atrial fibrillation (CMS/HCC) [I48.2] 2016   • S/P MVR (porcine) [Z95.2] 2016   • Bilateral carotid artery disease (CMS/HCC) [I77.9] 2016   • Coronary artery disease involving coronary bypass graft of native heart without angina pectoris [I25.810] 2016   • B12 deficiency [E53.8] 2016   • Anemia in stage 3 chronic kidney disease (CMS/HCC) [N18.3, D63.1] 2016      Resolved Hospital Problems   No resolved problems to display.       Secondary Diagnoses:  Past Medical History:   Diagnosis Date   • Acute kidney injury (CMS/HCC)    • Anemia    • Atrial fibrillation (CMS/HCC)    • Bruises easily    • Carotid artery stenosis    • Chronic back pain    • Chronic combined systolic and diastolic congestive heart failure (CMS/HCC)    • Chronic coronary artery disease     moderate to severe LV dysfunction.   • Chronic kidney disease, stage 3 (CMS/HCC)    • Dysphagia    • GERD (gastroesophageal reflux disease)    • H/O cardiac murmur    • [place] (hard of hearing)     wears hearing aids   •  Hyperlipidemia    • Hypertension    • Hypotension    • Ischemic cardiomyopathy    • Kyphoscoliosis    • Leukopenia    • Lumbar spondylosis    • Obesity    • GEORGINA (obstructive sleep apnea)    • Osteoarthritis    • Osteoporosis    • Peptic ulcer    • Premature ventricular contractions    • Renal insufficiency syndrome    • Scoliosis    • Shoulder fracture, left    • Stroke syndrome    • Thrombocytopenia (CMS/HCC)    • Ventricular tachycardia (CMS/HCC)    • Vitamin B12 deficiency          Consults:  Consult Orders (all) (From admission, onward)    Start     Ordered    02/18/19 1807  Inpatient Case Management  Consult  Once     Provider:  (Not yet assigned)    02/18/19 1806    02/15/19 1532  Inpatient Cardiology Consult  Once     Specialty:  Cardiology  Provider:  Nik Galarza MD    02/15/19 1532    02/15/19 1418  [facility] (on-call MD unless specified)  Once     Specialty:  Internal Medicine  Provider:  Christiano Philip MD    02/15/19 1417    02/15/19 1418  [facility] (on-call MD unless specified)  Once     Specialty:  Cardiology  Provider:  Milagros Lovett MD    02/15/19 1417        Procedures Performed:  none        Hospital Course:  Very pleasant 79yo woman with history of atrial fibrillation, ischemic cardiomyopathy, coronary artery disease, GERD, hard of hearing, hypertension, hyperlipidemia, obstructive sleep apnea, osteoporosis and history of combined CHF, who was admitted with about a week history of increased edema in the lower extremities.  She had been a little short of breath more than usual. She was suffering from volume overload. Cardiology was consulted. She was diuresed successfully. Weight is falling and she is voiding well. Dose of Bumex decreased today per Dr. Galarza as Cr has risen slightly. She had INR >10 on admission. No bleeding was evident. She was treated initially with oral Vit K and when INR remained elevated she was given a dose of IV Vit K per Cardiology. INR dropped to 1.7.  Coumadin was restarted. INR is 1.86 today. She is currently on Lovenox bridge per Cardiology until INR >2. This will need to be managed at facility by medical staff there. She is also suffering from an inflammatory arthritis of hands and feet of uncertain etiology. She is on oral prednisone. Symptoms are improving slowly. CRP is falling. ESR still elevated. She will need to remain on oral steroid until PCP can get her in to see a rheumatologist.             Physical Exam:  Temp:  [97.5 °F (36.4 °C)-98.3 °F (36.8 °C)] 98 °F (36.7 °C)  Heart Rate:  [60-80] 80  Resp:  [16] 16  BP: (132-161)/(55-66) 161/65  Body mass index is 27.06 kg/m².  Physical Exam  General Appearance:  Comfortable and in no acute distress.    Output: Producing urine.    HEENT: Normal HEENT exam.    Lungs:  Normal effort and normal respiratory rate.  Breath sounds clear to auscultation.    Heart: Normal rate.  Regular rhythm.  Positive for murmur.    Abdomen: Abdomen is soft.  Bowel sounds are normal.   There is no abdominal tenderness.     Extremities: There is no dependent edema.    Pulses: Distal pulses are intact.    Neurological: Patient is alert and oriented to person, place and time.    Pupils:  Pupils are equal, round, and reactive to light.    Skin:  Warm and dry.        Condition on Discharge:  Stable.    Discharge Disposition:   [facility]    Allergies:   Allergies   Allergen Reactions   • Diclofenac      She had adverse effects from the medications that required hospitalization. Pt got stomach ulcers from this medication prescribed by Dr. Arango - pediatrist.   • Dofetilide Unknown (See Comments)     Pt states not allergic.        Discharge Medications:      Discharge Medications      New Medications      Instructions Start Date   enoxaparin 80 MG/0.8ML solution syringe  Commonly known as:  LOVENOX   1 mg/kg, Subcutaneous, Every 12 Hours         Changes to Medications      Instructions Start Date   bumetanide 1 MG  tablet  Commonly known as:  BUMEX  What changed:    · medication strength  · how much to take   1 mg, Oral, 2 Times Daily      predniSONE 20 MG tablet  Commonly known as:  DELTASONE  What changed:  when to take this   20 mg, Oral, Daily      simvastatin 40 MG tablet  Commonly known as:  ZOCOR  What changed:    · how much to take  · how to take this  · when to take this   TAKE 1 TABLET EVERY DAY      traMADol 50 MG tablet  Commonly known as:  ULTRAM  What changed:    · when to take this  · reasons to take this  · Another medication with the same name was removed. Continue taking this medication, and follow the directions you see here.   50 mg, Oral, Every 12 Hours PRN      warfarin 2.5 MG tablet  Commonly known as:  COUMADIN  What changed:    · medication strength  · how much to take   2.5 mg, Oral, Nightly      zolpidem 10 MG tablet  Commonly known as:  AMBIEN  What changed:  how much to take   5 mg, Oral, Nightly PRN, 3 months         Continue These Medications      Instructions Start Date   aspirin 81 MG tablet   81 mg, Oral, Daily      calcitriol 0.25 MCG capsule  Commonly known as:  ROCALTROL   0.25 mcg, Oral, Daily      carvedilol 25 MG tablet  Commonly known as:  COREG   TAKE 1 TABLET TWICE DAILY      epoetin [place] 95208 UNIT/ML injection  Commonly known as:  EPOGEN,PROCRIT   10,000 Units, Subcutaneous, As Needed      FOSAMAX 70 MG tablet  Generic drug:  alendronate   70 mg, Oral, Every 14 Days      HAVRIX 1440 EL U/ML vaccine  Generic drug:  hepatitis A   No dose, route, or frequency recorded.      pantoprazole 40 MG EC tablet  Commonly known as:  PROTONIX   TAKE 1 TABLET TWICE DAILY      ramipril 2.5 MG capsule  Commonly known as:  ALTACE   5 mg, Oral, Daily      SENIOR MULTIVITAMIN PLUS tablet   1 tablet, Oral, Daily      vitamin B-12 2500 MCG sublingual tablet tablet  Commonly known as:  CYANOCOBALAMIN   2,500 mcg, Sublingual, Daily      Vitamin D 2000 units tablet   1 tablet, Oral, Daily         Stop These  Medications    cephalexin 500 MG capsule  Commonly known as:  KEFLEX     ferrous sulfate 325 (65 FE) MG tablet            Discharge Diet:     Activity at Discharge:     Follow-up Appointments:  Future Appointments   Date Time Provider Department Center   2/26/2019 11:00 AM Ariel Matute MD [facility] None   2/28/2019  1:00 PM ANTI COAG CLINIC [facility] None   3/5/2019 11:00 AM LAB CHAIR 5 [facility] LAB [facility]   3/5/2019 11:15 AM INJECTION CHAIR [facility] INFUS [facility]   3/18/2019 11:00 AM LAB CHAIR 1 [facility] LAB [facility]   3/18/2019 11:40 AM Edward Vasquez MD [facility] CBC Kristina   3/18/2019 12:00 PM INJECTION CHAIR [facility]RE [facility] INFUS KRE LAG   3/21/2019 10:30 AM LAB PC [facility] None   3/28/2019 10:30 AM Ariel Matute MD [facility] None   4/19/2019 12:00 AM YANIV HYATT, [facility] None   6/13/2019 12:00 PM Nik Galarza MD [facility] None   9/12/2019 10:15 AM Anthony Cardona MD [facility] None     Additional Instructions for the Follow-ups that You Need to Schedule     Discharge Follow-up with PCP   As directed       Currently Documented PCP:    Ariel Matute MD    PCP Phone Number:    [phone]     Follow Up Details:  Dr. Matute (PCP) in 1 week         Discharge Follow-up with Specified Provider: Dr. Galarza (Card); 2 Weeks   As directed      To:  Dr. Galarza (Card)    Follow Up:  2 Weeks         Discharge Follow-up with Specified Provider: Rheumatology (per PCP); 3 Weeks   As directed      To:  Rheumatology (per PCP)    Follow Up:  3 Weeks            Contact information for follow-up providers     Ariel Matute MD Follow up in 7 day(s).    Specialty:  Internal Medicine  Why:  Follow up on February 26th @ 11:00 AM.  Contact information:  [address] [place] [id number]  [phone]             Ariel Matute MD .    Specialty:  Internal Medicine  Why:  Dr. Matute (PCP) in 1 week  Contact information:  [address] [facility] [address]  [phone]                   Contact information for after-discharge care     Destination     [facility] .    Service:  Skilled Nursing  Contact information:  [address]   [place] [id number] [phone]                           Additional Instructions for the Follow-ups that You Need to Schedule     Discharge Follow-up with PCP   As directed       Currently Documented PCP:    Ariel Matute MD    PCP Phone Number:    [phone]     Follow Up Details:  Dr. Matute (PCP) in 1 week         Discharge Follow-up with Specified Provider: Dr. Galarza (Card); 2 Weeks   As directed      To:  Dr. Galarza (Card)    Follow Up:  2 Weeks         Discharge Follow-up with Specified Provider: Rheumatology (per PCP); 3 Weeks   As directed      To:  Rheumatology (per PCP)    Follow Up:  3 Weeks               Test Results Pending at Discharge:  None     Code status:   Code Status and Medical Interventions:   Ordered at: 02/15/19 1532     Level Of Support Discussed With:    Patient     Code Status:    CPR     Medical Interventions (Level of Support Prior to Arrest):    Full         Jeancarlos Dobbins MD  [facility]  02/21/19  2:04 PM      Time: greater than 30 minutes.

## 2019-02-21 NOTE — PROGRESS NOTES
Continued Stay Note  [facility]     Patient Name: Janel Mahoney  MRN: [id number]  Today's Date: 2/21/2019    Admit Date: 2/15/2019    Discharge Plan     Row Name 02/21/19 1450       Plan    Plan  [facility]    Patient/Family in Agreement with Plan  yes    Plan Comments  DC orders in EPIC.  Patient will go to skilled bed at [facility].  Spoke with patient and daughter at bedside and they are agreeable.  Ambulance disclaimer given.  [facility] scheduled for 10:00 p.m. with request for 1st available.  Packet to RN.  BHumeniuk RN       Expected Discharge Date and Time     Expected Discharge Date Expected Discharge Time    Feb 21, 2019             Becky S. [place], RN

## 2019-02-21 NOTE — THERAPY TREATMENT NOTE
Acute Care - Physical Therapy Treatment Note  [facility]     Patient Name: Janel Mahoney  : 1940  MRN: [id number]  Today's Date: 2019  Onset of Illness/Injury or Date of Surgery: 02/15/19     Referring Physician: Tamera    Admit Date: 2/15/2019    Visit Dx:    ICD-10-CM ICD-9-CM   1. Peripheral edema R60.9 782.3   2. Coagulopathy (CMS/HCC) D68.9 286.9   3. Ischemic cardiomyopathy I25.5 414.8   4. Chronic renal failure, unspecified CKD stage N18.9 585.9   5. Chronic anemia D64.9 285.9   6. Traumatic hematoma of left upper arm, initial encounter S40.022A 923.03   7. Generalized weakness R53.1 780.79     Patient Active Problem List   Diagnosis   • Anemia in stage 3 chronic kidney disease (CMS/HCC)   • Thrombocytopenia (CMS/HCC)   • B12 deficiency   • Coronary artery disease involving coronary bypass graft of native heart without angina pectoris   • S/P MVR (porcine)   • Chronic atrial fibrillation (CMS/HCC)   • Bilateral carotid artery disease (CMS/HCC)   • Intractable low back pain   • Long-term (current) use of anticoagulants   • Low back pain   • Osteoporosis   • Murmur, heart   • GEORGINA on auto CPAP - Dr Cardona   • Hypersomnia due to medical condition - GEORGINA   • Esophageal stricture   • Acute blood loss anemia   • Dysphagia   • Closed fracture of left proximal humerus   • Hypertension   • Supratherapeutic INR   • Chronic combined systolic and diastolic congestive heart failure (CMS/HCC)   • Osteoporosis with pathological fracture   • Closed 3-part fracture of proximal end of left humerus   • Closed fracture of proximal end of humerus with delayed healing   • Injury of right knee   • Knee injury, left, initial encounter   • History of fall   • S/P TKR (total knee replacement) using cement, left   • Ischemic cardiomyopathy   • Intramuscular hematoma right pecotralis   • Hemorrhagic disorder due to extrinsic circulating anticoagulants (CMS/HCC)   • Acute posthemorrhagic anemia   • Chronic kidney  disease, stage 3 (CMS/HCC)   • Acute kidney injury (CMS/HCC)   • Peripheral edema   • Acute on chronic combined systolic (congestive) and diastolic (congestive) heart failure (CMS/HCC)   • Chronic coronary artery disease   • Inflammatory arthritis       Therapy Treatment    Rehabilitation Treatment Summary     Row Name 02/21/19 1645             Treatment Time/Intention    Discipline  physical therapy assistant  -SM      Document Type  therapy note (daily note)  -SM      Subjective Information  complains of;weakness;fatigue;pain  -SM      Mode of Treatment  physical therapy  -SM      Patient Effort  good  -SM      Existing Precautions/Restrictions  fall  -SM      Recorded by [SM] Celestina Peralta PTA 02/21/19 1706      Row Name 02/21/19 1645             Cognitive Assessment/Intervention- PT/OT    Orientation Status (Cognition)  oriented x 4  -SM      Follows Commands (Cognition)  WNL  -SM      Personal Safety Interventions  fall prevention program maintained;gait belt;nonskid shoes/slippers when out of bed  -SM      Recorded by [SM] Celestina Peralta PTA 02/21/19 1706      Row Name 02/21/19 1645             Bed Mobility Assessment/Treatment    Bed Mobility Assessment/Treatment  sit-supine  -      Sit-Supine [place] (Bed Mobility)  moderate assist (50% patient effort)  -SM      Bed Mobility, Safety Issues  decreased use of arms for pushing/pulling;decreased use of legs for bridging/pushing  -SM      Assistive Device (Bed Mobility)  bed rails;head of bed elevated  -SM      Recorded by [SM] Celestina Peralta PTA 02/21/19 1706      Row Name 02/21/19 1645             Transfer Assessment/Treatment    Transfer Assessment/Treatment  sit-stand transfer;stand-sit transfer  -      Comment (Transfers)  3x  -SM      Recorded by [SM] Celestina Peralta PTA 02/21/19 1706      Row Name 02/21/19 1645             Sit-Stand Transfer    Sit-Stand [place] (Transfers)  minimum assist (75% patient effort);2 person  assist  -SM      Assistive Device (Sit-Stand Transfers)  walker, front-wheeled  -SM      Recorded by [SM] Celestina Peralta PTA 02/21/19 1706      Row Name 02/21/19 1645             Stand-Sit Transfer    Stand-Sit [place] (Transfers)  minimum assist (75% patient effort)  -SM      Assistive Device (Stand-Sit Transfers)  walker, front-wheeled  -SM      Recorded by [SM] Celestina Peralta PTA 02/21/19 1706      Row Name 02/21/19 1645             Gait/Stairs Assessment/Training    [place] Level (Gait)  minimum assist (75% patient effort);2 person assist  -SM      Assistive Device (Gait)  walker, front-wheeled  -SM      Distance in Feet (Gait)  35  -SM      Pattern (Gait)  step-through  -SM      Deviations/Abnormal Patterns (Gait)  ofe decreased;festinating/shuffling;stride length decreased  -SM      Bilateral Gait Deviations  forward flexed posture  -SM      Comment (Gait/Stairs)  limited d/t B feet pain  -SM      Recorded by [SM] Celestina Peralta PTA 02/21/19 1706      Row Name 02/21/19 1645             Positioning and Restraints    Pre-Treatment Position  in bed  -SM      Post Treatment Position  bed  -SM      In Bed  supine;call light within reach;encouraged to call for assist;with family/caregiver  -SM      Recorded by [SM] Celestina Peralta PTA 02/21/19 1706      Row Name 02/21/19 1645             Pain Scale: Numbers Pre/Post-Treatment    Pain Scale: Numbers, Pretreatment  1/10  -SM      Pain Scale: Numbers, Post-Treatment  4/10  -SM      Pain Location - Side  Bilateral  -SM      Pain Location  foot  -SM      Pain Intervention(s)  Repositioned;Ambulation/increased activity;Rest  -SM      Recorded by [SM] Celestina Peralta, PTA 02/21/19 1706      Row Name                Wound 02/19/19 0000 Left medial gluteal pressure injury    Wound - Properties Group Date first assessed: 02/19/19 [CW] Time first assessed: 0000 [CW] Present On Admission : no;picture taken [CW2] Side: Left [CW]  Orientation: medial [CW] Location: gluteal [CW] Type: pressure injury [CW] Stage, Pressure Injury: Stage 1 [CW] Recorded by:  [CW] Terri Colunga RN 02/19/19 0107 [CW2] Terri Colunga RN 02/19/19 0110    Row Name                Wound 02/19/19 0000 lower gluteal pressure injury    Wound - Properties Group Date first assessed: 02/19/19 [CW] Time first assessed: 0000 [CW] Present On Admission : no;picture taken [CW] Orientation: lower [CW] Location: gluteal [CW] Type: pressure injury [CW] Stage, Pressure Injury: Stage 1 [CW] Recorded by:  [CW] Terri Colunga RN 02/19/19 0109      User Key  (r) = Recorded By, (t) = Taken By, (c) = Cosigned By    Initials Name Effective Dates Discipline    CW Terri Colunga RN 06/16/16 -  Nurse    Celestina Ramirez PTA 03/07/18 -  PT          Wound 02/19/19 0000 Left medial gluteal pressure injury (Active)   Dressing Appearance dry;intact;open to air 2/21/2019  2:43 PM   Closure None 2/21/2019 12:06 AM       Wound 02/19/19 0000 lower gluteal pressure injury (Active)   Dressing Appearance dry;intact;open to air 2/21/2019  2:43 PM   Closure None 2/21/2019 12:06 AM           Physical Therapy Education     Title: PT OT SLP Therapies (Resolved)     Topic: Physical Therapy (Resolved)     Point: Mobility training (Resolved)     Learning Progress Summary           Patient Acceptance, E,TB, VU,NR by  at 2/21/2019  5:07 PM    Acceptance, E,D, VU,NR by MS at 2/20/2019  1:41 PM    Acceptance, E,D, VU,NR by MADHU at 2/19/2019  1:34 PM    Acceptance, E,TB,D, NR by  at 2/18/2019 11:22 AM                   Point: Home exercise program (Resolved)     Learning Progress Summary           Patient Acceptance, E,TB, VU,NR by MADHU at 2/21/2019  5:07 PM    Acceptance, E,D, VU,NR by MS at 2/20/2019  1:41 PM    Acceptance, E,D, VU,NR by  at 2/19/2019  1:34 PM                   Point: Body mechanics (Resolved)     Learning Progress Summary           Patient Acceptance, E,TB, VU,NR by SM at  2/21/2019  5:07 PM    Acceptance, E,D, VU,NR by MS at 2/20/2019  1:41 PM    Acceptance, E,D, VU,NR by  at 2/19/2019  1:34 PM                   Point: Precautions (Resolved)     Learning Progress Summary           Patient Acceptance, E,TB, VU,NR by  at 2/21/2019  5:07 PM    Acceptance, E,D, VU,NR by MS at 2/20/2019  1:41 PM    Acceptance, E,D, VU,NR by  at 2/19/2019  1:34 PM                               User Key     Initials Effective Dates Name Provider Type Discipline    EE 04/03/18 -  Theresa Hogan, PT Physical Therapist PT    MS 04/03/18 -  Hoang Angeles, PT Physical Therapist PT     03/07/18 -  Celestina Peralta, LEENA Physical Therapy Assistant PT                PT Recommendation and Plan     Plan of Care Reviewed With: patient  Progress: improving  Outcome Summary: Pt tolerated treatment well this date. Increased gait distance to 35ft w/ Rw and min A x2. Limited d/t B feet pain. Plan to d/c to rehab tonight.  Outcome Measures     Row Name 02/21/19 1700 02/20/19 1300 02/19/19 1300       How much help from another person do you currently need...    Turning from your back to your side while in flat bed without using bedrails?  3  -SM  3  -MS  2  -SM    Moving from lying on back to sitting on the side of a flat bed without bedrails?  2  -SM  2  -MS  2  -SM    Moving to and from a bed to a chair (including a wheelchair)?  3  -SM  3  -MS  2  -SM    Standing up from a chair using your arms (e.g., wheelchair, bedside chair)?  2  -SM  2  -MS  2  -SM    Climbing 3-5 steps with a railing?  2  -SM  2  -MS  1  -SM    To walk in hospital room?  3  -SM  3  -MS  2  -SM    AM-PAC 6 Clicks Score  15  -SM  15  -MS  11  -SM       Functional Assessment    Outcome Measure Options  AM-PAC 6 Clicks Basic Mobility (PT)  -SM  AM-PAC 6 Clicks Basic Mobility (PT)  -MS  AM-PAC 6 Clicks Basic Mobility (PT)  -      User Key  (r) = Recorded By, (t) = Taken By, (c) = Cosigned By    Initials Name Provider Type    MS Angeles,  Hoang NAVA, PT Physical Therapist     Celestina Peralta PTA Physical Therapy Assistant         Time Calculation:   PT Charges     Row Name 02/21/19 1708             Time Calculation    Start Time  1645  -      Stop Time  1702  -      Time Calculation (min)  17 min  -      PT Received On  02/21/19  -      PT - Next Appointment  02/22/19  -        User Key  (r) = Recorded By, (t) = Taken By, (c) = Cosigned By    Initials Name Provider Type    Celestina Ramirez PTA Physical Therapy Assistant        Therapy Suggested Charges     Code   Minutes Charges    None           Therapy Charges for Today     Code Description Service Date Service Provider Modifiers Qty    86855913080 HC PT THER PROC EA 15 MIN 2/21/2019 Celestina Peralta PTA GP 1    60867733048 HC PT THER SUPP EA 15 MIN 2/21/2019 Celestina Peralta, LEENA GP 1          PT G-Codes  Outcome Measure Options: AM-PAC 6 Clicks Basic Mobility (PT)  AM-PAC 6 Clicks Score: 15    Celestina Peralta PTA  2/21/2019

## 2019-02-22 ENCOUNTER — EPISODE CHANGES (OUTPATIENT)
Dept: CASE MANAGEMENT | Facility: OTHER | Age: 79
End: 2019-02-22

## 2019-02-22 VITALS
RESPIRATION RATE: 18 BRPM | TEMPERATURE: 97.8 F | BODY MASS INDEX: 26.91 KG/M2 | DIASTOLIC BLOOD PRESSURE: 79 MMHG | HEART RATE: 60 BPM | HEIGHT: 64 IN | WEIGHT: 157.63 LBS | OXYGEN SATURATION: 98 % | SYSTOLIC BLOOD PRESSURE: 157 MMHG

## 2019-02-22 NOTE — PROGRESS NOTES
Case Management Discharge Note    Final Note: Patient was DC'd to skilled bed at [facility] via [facility]    Destination - Selection Complete      Service Provider Request Status Selected Services Address Phone Number Fax Number    [facility] Selected Skilled Nursing [address] , [place] [address] [phone]        [facility]    Final Discharge Disposition Code: 03 - skilled nursing facility (SNF)

## 2019-02-22 NOTE — NURSING NOTE
Called [facility] as patient had not been picked up yet for discharge and per paperwork scheduled time was for 2130. Was notified by [facility] that they did not have her for scheduled pickup until 2200 and that they were running behind.

## 2019-02-22 NOTE — NURSING NOTE
[facility]ed [facility] requesting ETA on patient pickup for discharge. Was told that patient would be picked up as soon as another crew became available. Notified nurse that would be accepting patient at facility. Updated report given on patient. Will continue to monitor

## 2019-02-25 ENCOUNTER — TELEPHONE (OUTPATIENT)
Dept: PHARMACY | Facility: HOSPITAL | Age: 79
End: 2019-02-25

## 2019-02-27 ENCOUNTER — RESEARCH ENCOUNTER (OUTPATIENT)
Dept: CARDIOLOGY | Facility: CLINIC | Age: 79
End: 2019-02-27

## 2019-02-27 NOTE — RESEARCH
"Patient information  Janel Mahoney (\"BJS\")   1940    Research study: [employer] PSR  Patient study ID#  [id number]    On 19, MARYCHUY came to our cath lab for a generator change, and on 1/15/19 MARYCHUY had her device checked in our Rhythm Management department. I reported data related to these two events in the [employer] EDC system on 19 per study protocol. There were no other interim qualifying events to report since our last entry in the study database. We will continue to follow [facility] according to the study protocol.     Tiffany Kaye RN   Research Coordinator        "

## 2019-03-05 ENCOUNTER — APPOINTMENT (OUTPATIENT)
Dept: LAB | Facility: HOSPITAL | Age: 79
End: 2019-03-05

## 2019-03-05 ENCOUNTER — APPOINTMENT (OUTPATIENT)
Dept: ONCOLOGY | Facility: HOSPITAL | Age: 79
End: 2019-03-05

## 2019-03-11 ENCOUNTER — TELEPHONE (OUTPATIENT)
Dept: CARDIOLOGY | Facility: CLINIC | Age: 79
End: 2019-03-11

## 2019-03-11 ENCOUNTER — CLINICAL SUPPORT NO REQUIREMENTS (OUTPATIENT)
Dept: CARDIOLOGY | Facility: CLINIC | Age: 79
End: 2019-03-11

## 2019-03-11 DIAGNOSIS — I25.5 ISCHEMIC CARDIOMYOPATHY: Primary | ICD-10-CM

## 2019-03-11 NOTE — TELEPHONE ENCOUNTER
Remote alert report received from patient's CRT-D. Alert due to ATP x1 delivered. Episode was on 3/8 @ 5:12am, VT treated with ATP x1 successfully. There were 27 other episodes reported, either NST or VT, longest 36 beats. No other therapy delivered. Per  no symptoms. He states that she has been in rehab and just came home.

## 2019-03-18 ENCOUNTER — INFUSION (OUTPATIENT)
Dept: ONCOLOGY | Facility: HOSPITAL | Age: 79
End: 2019-03-18

## 2019-03-18 ENCOUNTER — LAB (OUTPATIENT)
Dept: LAB | Facility: HOSPITAL | Age: 79
End: 2019-03-18

## 2019-03-18 ENCOUNTER — OFFICE VISIT (OUTPATIENT)
Dept: ONCOLOGY | Facility: CLINIC | Age: 79
End: 2019-03-18

## 2019-03-18 ENCOUNTER — ANTICOAGULATION VISIT (OUTPATIENT)
Dept: PHARMACY | Facility: HOSPITAL | Age: 79
End: 2019-03-18

## 2019-03-18 VITALS
HEART RATE: 60 BPM | HEIGHT: 64 IN | SYSTOLIC BLOOD PRESSURE: 125 MMHG | OXYGEN SATURATION: 95 % | BODY MASS INDEX: 26.31 KG/M2 | RESPIRATION RATE: 16 BRPM | TEMPERATURE: 99.3 F | WEIGHT: 154.1 LBS | DIASTOLIC BLOOD PRESSURE: 46 MMHG

## 2019-03-18 DIAGNOSIS — D63.1 ANEMIA IN STAGE 3 CHRONIC KIDNEY DISEASE (HCC): Primary | ICD-10-CM

## 2019-03-18 DIAGNOSIS — N18.30 ANEMIA IN STAGE 3 CHRONIC KIDNEY DISEASE (HCC): ICD-10-CM

## 2019-03-18 DIAGNOSIS — D63.1 ANEMIA IN STAGE 3 CHRONIC KIDNEY DISEASE (HCC): ICD-10-CM

## 2019-03-18 DIAGNOSIS — N18.30 ANEMIA IN STAGE 3 CHRONIC KIDNEY DISEASE (HCC): Primary | ICD-10-CM

## 2019-03-18 DIAGNOSIS — E53.8 B12 DEFICIENCY: ICD-10-CM

## 2019-03-18 DIAGNOSIS — D69.6 THROMBOCYTOPENIA (HCC): Primary | ICD-10-CM

## 2019-03-18 DIAGNOSIS — I48.20 CHRONIC ATRIAL FIBRILLATION (HCC): ICD-10-CM

## 2019-03-18 DIAGNOSIS — D69.6 THROMBOCYTOPENIA (HCC): ICD-10-CM

## 2019-03-18 LAB
BASOPHILS # BLD AUTO: 0.01 10*3/MM3 (ref 0–0.2)
BASOPHILS NFR BLD AUTO: 0.1 % (ref 0–1.5)
DEPRECATED RDW RBC AUTO: 64.9 FL (ref 37–54)
EOSINOPHIL # BLD AUTO: 0.02 10*3/MM3 (ref 0–0.4)
EOSINOPHIL NFR BLD AUTO: 0.3 % (ref 0.3–6.2)
ERYTHROCYTE [DISTWIDTH] IN BLOOD BY AUTOMATED COUNT: 18.4 % (ref 12.3–15.4)
FERRITIN SERPL-MCNC: 358.4 NG/ML (ref 13–150)
HCT VFR BLD AUTO: 32.4 % (ref 34–46.6)
HGB BLD-MCNC: 9.8 G/DL (ref 12–15.9)
IMM GRANULOCYTES # BLD AUTO: 0.04 10*3/MM3 (ref 0–0.05)
IMM GRANULOCYTES NFR BLD AUTO: 0.6 % (ref 0–0.5)
INR PPP: 2.3 (ref 0.91–1.09)
IRON 24H UR-MRATE: 33 MCG/DL (ref 37–145)
IRON SATN MFR SERPL: 13 % (ref 14–48)
LYMPHOCYTES # BLD AUTO: 0.33 10*3/MM3 (ref 0.7–3.1)
LYMPHOCYTES NFR BLD AUTO: 4.7 % (ref 19.6–45.3)
MCH RBC QN AUTO: 29 PG (ref 26.6–33)
MCHC RBC AUTO-ENTMCNC: 30.2 G/DL (ref 31.5–35.7)
MCV RBC AUTO: 95.9 FL (ref 79–97)
MONOCYTES # BLD AUTO: 0.43 10*3/MM3 (ref 0.1–0.9)
MONOCYTES NFR BLD AUTO: 6.1 % (ref 5–12)
NEUTROPHILS # BLD AUTO: 6.25 10*3/MM3 (ref 1.4–7)
NEUTROPHILS NFR BLD AUTO: 88.2 % (ref 42.7–76)
NRBC BLD AUTO-RTO: 0 /100 WBC (ref 0–0)
PLATELET # BLD AUTO: 134 10*3/MM3 (ref 140–450)
PMV BLD AUTO: 9.6 FL (ref 6–12)
PROTHROMBIN TIME: 27.7 SECONDS (ref 10–13.8)
RBC # BLD AUTO: 3.38 10*6/MM3 (ref 3.77–5.28)
TIBC SERPL-MCNC: 252 MCG/DL (ref 249–505)
TRANSFERRIN SERPL-MCNC: 180 MG/DL (ref 200–360)
WBC NRBC COR # BLD: 7.08 10*3/MM3 (ref 3.4–10.8)

## 2019-03-18 PROCEDURE — 36416 COLLJ CAPILLARY BLOOD SPEC: CPT

## 2019-03-18 PROCEDURE — 85610 PROTHROMBIN TIME: CPT

## 2019-03-18 PROCEDURE — 63510000001 EPOETIN ALFA PER 1000 UNITS: Performed by: INTERNAL MEDICINE

## 2019-03-18 PROCEDURE — 83540 ASSAY OF IRON: CPT | Performed by: INTERNAL MEDICINE

## 2019-03-18 PROCEDURE — 36415 COLL VENOUS BLD VENIPUNCTURE: CPT | Performed by: INTERNAL MEDICINE

## 2019-03-18 PROCEDURE — 84466 ASSAY OF TRANSFERRIN: CPT | Performed by: INTERNAL MEDICINE

## 2019-03-18 PROCEDURE — 85025 COMPLETE CBC W/AUTO DIFF WBC: CPT | Performed by: INTERNAL MEDICINE

## 2019-03-18 PROCEDURE — 82728 ASSAY OF FERRITIN: CPT | Performed by: INTERNAL MEDICINE

## 2019-03-18 PROCEDURE — 99214 OFFICE O/P EST MOD 30 MIN: CPT | Performed by: INTERNAL MEDICINE

## 2019-03-18 PROCEDURE — 96372 THER/PROPH/DIAG INJ SC/IM: CPT

## 2019-03-18 RX ADMIN — ERYTHROPOIETIN 6000 UNITS: 10000 INJECTION, SOLUTION INTRAVENOUS; SUBCUTANEOUS at 12:40

## 2019-03-18 NOTE — PROGRESS NOTES
Anticoagulation Clinic Progress Note    Anticoagulation Summary  As of 3/18/2019    INR goal:   2.0-3.0   TTR:   46.6 % (4.5 mo)   INR used for dosin.3 (3/18/2019)   Warfarin maintenance plan:   1 mg on Mon, Thu; 2 mg all other days   Weekly warfarin total:   12 mg   No change documented:   Lee Ann Diamond   Plan last modified:   Shanna Major RPH (2019)   Next INR check:   3/25/2019   Priority:   Critical   Target end date:   Indefinite    Indications    Chronic atrial fibrillation (CMS/[facility]) [I48.2]             Anticoagulation Episode Summary     INR check location:       Preferred lab:       Send INR reminders to:    [facility]    Comments:         Anticoagulation Care Providers     Provider Role Specialty Phone number    Nik Galarza MD Referring Cardiology [phone]          Clinic Interview:  Patient Findings     Negatives:   Signs/symptoms of thrombosis, Signs/symptoms of bleeding,   Laboratory test error suspected, Change in health, Change in alcohol use,   Change in activity, Upcoming invasive procedure, Emergency department   visit, Upcoming dental procedure, Missed doses, Extra doses, Change in   medications, Change in diet/appetite, Hospital admission, Bruising, Other   complaints      Clinical Outcomes     Negatives:   Major bleeding event, Thromboembolic event,   Anticoagulation-related hospital admission, Anticoagulation-related ED   visit, Anticoagulation-related fatality        INR History:  Anticoagulation Monitoring 2019 2019 3/18/2019   INR 2.5 3.1 2.3   INR Date 2019 2019 3/18/2019   INR Goal 2.0-3.0 2.0-3.0 2.0-3.0   Trend Same Same Same   Last Week Total 14 mg 12 mg 12 mg   Next Week Total 12 mg 12 mg 12 mg   Sun 2 mg 2 mg 2 mg   Mon 1 mg 1 mg 1 mg   Tue 2 mg 2 mg 2 mg   Wed 2 mg 2 mg 2 mg   Thu 1 mg 1 mg 1 mg   Fri 2 mg 2 mg 2 mg   Sat 2 mg 2 mg 2 mg   Visit Report - - -   Some recent data might be hidden       Plan:  1. INR is  therapeutic today- see above in Anticoagulation Summary.   Will instruct Janel DORINA Covingtons to continue their warfarin regimen- see above in Anticoagulation Summary.  2. Follow up in 1 weeks.  3. Patient declines warfarin refills.  4. Verbal and written information provided. Patient expresses understanding and has no further questions at this time.    Lee Ann Diamond

## 2019-03-18 NOTE — PROGRESS NOTES
Subjective     CHIEF COMPLAINT:      Chief Complaint   Patient presents with   • Follow-up       HISTORY OF PRESENT ILLNESS:     Janel Mahoney is a 78 y.o. female patient who returns today for follow up on her anemia.  She was recently hospitalized at [facility] with CHF exacerbation.  She was having shortness of breath and lower extremity swelling.  Symptoms improved.  She was discharged to subacute rehab and received 1 dose of Procrit.    Patient is not having shortness of breath at present.  She reports problem with bruising.  No bleeding.        REVIEW OF SYSTEMS:  Review of Systems   Constitutional: Positive for fatigue. Negative for chills, fever and unexpected weight change.   HENT: Negative for mouth sores, nosebleeds, sore throat and voice change.    Eyes: Negative for visual disturbance.   Respiratory: Negative for cough and shortness of breath.    Cardiovascular: Negative for chest pain and leg swelling.   Gastrointestinal: Negative for abdominal pain, blood in stool, constipation, diarrhea, nausea and vomiting.   Genitourinary: Negative for dysuria, frequency and hematuria.   Musculoskeletal: Negative for arthralgias, back pain and joint swelling.   Skin: Negative for rash.   Neurological: Positive for weakness. Negative for dizziness, light-headedness, numbness and headaches.   Hematological: Negative for adenopathy. Bruises/bleeds easily.   Psychiatric/Behavioral: Negative for dysphoric mood. The patient is not nervous/anxious.      I verified the ROS obtained by the MA.        Past Medical History:   Diagnosis Date   • Acute kidney injury (CMS/HCC)    • Anemia    • Atrial fibrillation (CMS/HCC)    • Bruises easily    • Carotid artery stenosis    • Chronic back pain    • Chronic combined systolic and diastolic congestive heart failure (CMS/HCC)    • Chronic coronary artery disease     moderate to severe LV dysfunction.   • Chronic kidney disease, stage 3 (CMS/HCC)    • Dysphagia    •  GERD (gastroesophageal reflux disease)    • H/O cardiac murmur    • [place] (hard of hearing)     wears hearing aids   • Hyperlipidemia    • Hypertension    • Hypotension    • Ischemic cardiomyopathy    • Kyphoscoliosis    • Leukopenia    • Lumbar spondylosis    • Obesity    • GEORGINA (obstructive sleep apnea)    • Osteoarthritis    • Osteoporosis    • Peptic ulcer    • Premature ventricular contractions    • Renal insufficiency syndrome    • Scoliosis    • Shoulder fracture, left    • Stroke syndrome    • Thrombocytopenia (CMS/HCC)    • Ventricular tachycardia (CMS/HCC)    • Vitamin B12 deficiency        Past Surgical History:   Procedure Laterality Date   • AV NODE ABLATION     • BREAST BIOPSY     • CARDIAC CATHETERIZATION      Showed severe mitral insufficiency and borderline coronary artery disease   • CARDIAC CATHETERIZATION      Showed an ejection fraction of 35%. She had occlusive disease of the right posterior LV branch and no other significant disease, treated medically.   • CARDIAC DEFIBRILLATOR PLACEMENT      Biventricular   • CARDIAC ELECTROPHYSIOLOGY PROCEDURE N/A 1/4/2019    Procedure: GENERATOR CHANGE BI-V ICD   [place];  Surgeon: James Hwang MD;  Location: [facility] CATH INVASIVE LOCATION;  Service: Cardiology   • CARDIAC VALVE REPLACEMENT  2009    Done with stent placement   • CARDIOVERSION      multiple electrocardioversions.   • CAROTID ARTERY ANGIOPLASTY Right    • COLONOSCOPY N/A 9/28/2017    Procedure: COLONOSCOPY TO CECUM;  Surgeon: Kevin Davis MD;  Location: [facility] ENDOSCOPY;  Service:    • CORONARY ANGIOPLASTY WITH STENT PLACEMENT  2009   • CORONARY ARTERY BYPASS GRAFT      single graft to the PDA   • CORONARY STENT PLACEMENT     • ENDOSCOPY N/A 9/28/2017    Procedure: ESOPHAGOGASTRODUODENOSCOPY ;  Surgeon: Kevin Davis MD;  Location: [facility] ENDOSCOPY;  Service:    • HEMORRHOIDECTOMY     • HYSTERECTOMY     • INCISION AND DRAINAGE TRUNK Right 11/27/2018    Procedure: EVACUATION OF RIGHT  CHEST WALL HEMATOMA;  Surgeon: Juvencio Rodriguez MD;  Location: [facility];  Service: General   • MITRAL VALVE REPLACEMENT  01/2010    #31 Epic porcine valve.   • THROMBOENDARTERECTOMY Right     carotid thromboendarterectomy    • TONSILLECTOMY      age 32   • TOTAL KNEE ARTHROPLASTY Left    • TOTAL SHOULDER ARTHROPLASTY W/ DISTAL CLAVICLE EXCISION Left 1/16/2018    Procedure: TOTAL SHOULDER REVERSE ARTHROPLASTY;  Surgeon: Bianka Quesada MD;  Location: [facility];  Service:        Cancer-related family history includes Breast cancer in her mother; Cancer in her mother.  Social History     Tobacco Use   • Smoking status: Former Smoker     Packs/day: 1.00     Years: 50.00     Pack years: 50.00     Types: Cigarettes     Last attempt to quit: 1/11/2009     Years since quitting: 10.1   • Smokeless tobacco: Never Used   Substance Use Topics   • Alcohol use: Yes     Comment: rare       MEDICATIONS:    Current Outpatient Medications:   •  alendronate (FOSAMAX) 70 MG tablet, Take 70 mg by mouth Every 14 (Fourteen) Days., Disp: , Rfl:   •  aspirin 81 MG tablet, Take 81 mg by mouth Daily., Disp: , Rfl:   •  bumetanide (BUMEX) 1 MG tablet, Take 1 tablet by mouth 2 (Two) Times a Day., Disp: , Rfl:   •  calcitriol (ROCALTROL) 0.25 MCG capsule, Take 0.25 mcg by mouth Daily., Disp: , Rfl:   •  carvedilol (COREG) 25 MG tablet, TAKE 1 TABLET TWICE DAILY, Disp: 180 tablet, Rfl: 3  •  enoxaparin (LOVENOX) 80 MG/0.8ML solution syringe, Inject 0.7 mL under the skin into the appropriate area as directed Every 12 (Twelve) Hours., Disp: 22.4 mL, Rfl:   •  epoetin adele (EPOGEN,PROCRIT) 80103 UNIT/ML injection, Inject 10,000 Units under the skin into the appropriate area as directed As Needed., Disp: , Rfl:   •  HAVRIX 1440 EL U/ML vaccine, , Disp: , Rfl:   •  Multiple Vitamins-Minerals (SENIOR MULTIVITAMIN PLUS) tablet, Take 1 tablet by mouth Daily., Disp: , Rfl:   •  pantoprazole (PROTONIX) 40 MG EC tablet, TAKE 1 TABLET  "TWICE DAILY, Disp: 180 tablet, Rfl: 0  •  predniSONE (DELTASONE) 20 MG tablet, Take 1 tablet by mouth Daily., Disp: , Rfl:   •  ramipril (ALTACE) 2.5 MG capsule, Take 5 mg by mouth Daily., Disp: , Rfl:   •  simvastatin (ZOCOR) 40 MG tablet, TAKE 1 TABLET EVERY DAY (Patient taking differently: pt reports taking 1/2 tablet nightly), Disp: 90 tablet, Rfl: 1  •  traMADol (ULTRAM) 50 MG tablet, Take 1 tablet by mouth Every 12 (Twelve) Hours As Needed for Moderate Pain ., Disp: 6 tablet, Rfl: 0  •  vitamin B-12 (CYANOCOBALAMIN) 2500 MCG sublingual tablet tablet, Place 2,500 mcg under the tongue Daily., Disp: , Rfl:   •  warfarin (COUMADIN) 2.5 MG tablet, Take 1 tablet by mouth Every Night., Disp: , Rfl:   •  zolpidem (AMBIEN) 10 MG tablet, Take 0.5 tablets by mouth At Night As Needed for Sleep. 3 months, Disp: 3 tablet, Rfl: 0  •  Cholecalciferol (VITAMIN D) 2000 UNITS tablet, Take 1 tablet by mouth daily., Disp: , Rfl:     ALLERGIES:  Allergies   Allergen Reactions   • Diclofenac      She had adverse effects from the medications that required hospitalization. Pt got stomach ulcers from this medication prescribed by Dr. Arango - pediatrist.   • Dofetilide Unknown (See Comments)     Pt states not allergic.          Objective   VITAL SIGNS:     Vitals:    03/18/19 1154   BP: 125/46   Pulse: 60   Resp: 16   Temp: 99.3 °F (37.4 °C)   TempSrc: Oral   SpO2: 95%   Weight: 69.9 kg (154 lb 1.6 oz)   Height: 162.6 cm (64.02\")   PainSc:   8   PainLoc: Back     Body mass index is 26.44 kg/m².     Wt Readings from Last 3 Encounters:   03/18/19 69.9 kg (154 lb 1.6 oz)   02/21/19 71.5 kg (157 lb 10.1 oz)   01/22/19 72.4 kg (159 lb 9.6 oz)       PHYSICAL EXAMINATION:  GENERAL:  The patient appears in fair general condition, not in acute distress.  SKIN: Warm and dry. No skin rashes, ecchymosis or petechiae.  HEAD:  Normocephalic.  EYES:  No Jaundice. No Pallor.   NECK:  Supple with Good ROM. No Thyromegaly. No Masses.  LYMPHATICS:  No " cervical or supraclavicular lymphadenopathy.  CHEST: Normal respiratory effort. Lungs clear to auscultation.   CARDIAC:  Normal S1 & S2.  Ejection systolic murmur grade 2.  Trace edema.  ABDOMEN:  Soft. No tenderness. No Hepatomegaly. No Splenomegaly. No masses.  EXTREMITIES: Chronic hyperpigmentation of the distal aspect of the legs.         DIAGNOSTIC DATA:     Results from last 7 days   Lab Units 03/18/19  1144   WBC 10*3/mm3 7.08   NEUTROS ABS 10*3/mm3 6.25   HEMOGLOBIN g/dL 9.8*   HEMATOCRIT % 32.4*   PLATELETS 10*3/mm3 134*     Lab Results   Component Value Date    FERRITIN 678.00 (H) 02/20/2019    FERRITIN 387.40 12/11/2018    FERRITIN 145.90 09/18/2018    FERRITIN 201.50 07/17/2018    FERRITIN 223.00 06/26/2018    IRON 54 02/20/2019    IRON 110 12/11/2018    IRON 89 09/18/2018    IRON 85 07/17/2018    IRON 73 06/26/2018    TIBC 182 02/20/2019    TIBC 274 12/11/2018    TIBC 307 09/18/2018    TIBC 307 07/17/2018    TIBC 291 06/26/2018         Assessment/Plan   1.  Anemia of chronic kidney disease, stage III.  She is on Procrit.  The last dose she received at our office was 6000 units on 2/5/2019.  Hemoglobin improved and it is today at 9.8.  She has adequate iron stores.      2.  Thrombocytopenia likely due to Vitamin B12 deficiency.  Platelet count is mildly low.  She is having some bruising but this is attributed to her anticoagulation with Coumadin.      PLAN:    1.  We will give Procrit 6,000 units today.      2.  We will schedule her to return monthly for CBC and Procrit injection.    3.  We will repeat ferritin iron panel in 3 months and will see her in follow-up in 6 months with repeat ferritin iron panel.          Edward Vasquez MD  03/18/19

## 2019-03-19 ENCOUNTER — TELEPHONE (OUTPATIENT)
Dept: FAMILY MEDICINE CLINIC | Facility: CLINIC | Age: 79
End: 2019-03-19

## 2019-03-19 ENCOUNTER — EPISODE CHANGES (OUTPATIENT)
Dept: CASE MANAGEMENT | Facility: OTHER | Age: 79
End: 2019-03-19

## 2019-03-19 NOTE — TELEPHONE ENCOUNTER
CARLINE WITH [facility] CALLING FOR ORDERS, NEEDING NURSING AND POSSIBLE THERAPY EVAL WITH PHYSICAL THERAPY

## 2019-03-20 ENCOUNTER — TELEPHONE (OUTPATIENT)
Dept: FAMILY MEDICINE CLINIC | Facility: CLINIC | Age: 79
End: 2019-03-20

## 2019-03-21 ENCOUNTER — LAB (OUTPATIENT)
Dept: FAMILY MEDICINE CLINIC | Facility: CLINIC | Age: 79
End: 2019-03-21

## 2019-03-21 ENCOUNTER — TELEPHONE (OUTPATIENT)
Dept: FAMILY MEDICINE CLINIC | Facility: CLINIC | Age: 79
End: 2019-03-21

## 2019-03-21 DIAGNOSIS — I48.20 CHRONIC ATRIAL FIBRILLATION (HCC): ICD-10-CM

## 2019-03-21 DIAGNOSIS — I10 ESSENTIAL HYPERTENSION, BENIGN: Primary | ICD-10-CM

## 2019-03-21 LAB
ALBUMIN SERPL-MCNC: 3.7 G/DL (ref 3.5–5.2)
ALBUMIN/GLOB SERPL: 1.4 G/DL
ALP SERPL-CCNC: 47 U/L (ref 39–117)
ALT SERPL W P-5'-P-CCNC: 12 U/L (ref 1–33)
ANION GAP SERPL CALCULATED.3IONS-SCNC: 12.2 MMOL/L
AST SERPL-CCNC: 15 U/L (ref 1–32)
BILIRUB SERPL-MCNC: 0.5 MG/DL (ref 0.2–1.2)
BUN BLD-MCNC: 36 MG/DL (ref 8–23)
BUN/CREAT SERPL: 21.8 (ref 7–25)
CALCIUM SPEC-SCNC: 9 MG/DL (ref 8.6–10.5)
CHLORIDE SERPL-SCNC: 102 MMOL/L (ref 98–107)
CHOLEST SERPL-MCNC: 158 MG/DL (ref 0–200)
CO2 SERPL-SCNC: 28.8 MMOL/L (ref 22–29)
CREAT BLD-MCNC: 1.65 MG/DL (ref 0.57–1)
ERYTHROCYTE [DISTWIDTH] IN BLOOD BY AUTOMATED COUNT: 19.4 % (ref 12.3–15.4)
GFR SERPL CREATININE-BSD FRML MDRD: 30 ML/MIN/1.73
GLOBULIN UR ELPH-MCNC: 2.7 GM/DL
GLUCOSE BLD-MCNC: 99 MG/DL (ref 65–99)
HCT VFR BLD AUTO: 31.3 % (ref 34–46.6)
HDLC SERPL-MCNC: 62 MG/DL (ref 40–60)
HGB BLD-MCNC: 9.8 G/DL (ref 12–15.9)
LDLC SERPL CALC-MCNC: 73 MG/DL (ref 0–100)
LDLC/HDLC SERPL: 1.18 {RATIO}
LYMPHOCYTES # BLD AUTO: 0.8 10*3/MM3 (ref 0.7–3.1)
LYMPHOCYTES NFR BLD AUTO: 14.3 % (ref 19.6–45.3)
MCH RBC QN AUTO: 28.1 PG (ref 26.6–33)
MCHC RBC AUTO-ENTMCNC: 31.3 G/DL (ref 31.5–35.7)
MCV RBC AUTO: 89.7 FL (ref 79–97)
MONOCYTES # BLD AUTO: 0.4 10*3/MM3 (ref 0.1–0.9)
MONOCYTES NFR BLD AUTO: 6.4 % (ref 5–12)
NEUTROPHILS # BLD AUTO: 4.4 10*3/MM3 (ref 1.4–7)
NEUTROPHILS NFR BLD AUTO: 79.3 % (ref 42.7–76)
PLATELET # BLD AUTO: 167 10*3/MM3 (ref 140–450)
PMV BLD AUTO: 7.3 FL (ref 6–12)
POTASSIUM BLD-SCNC: 4.5 MMOL/L (ref 3.5–5.2)
PROT SERPL-MCNC: 6.4 G/DL (ref 6–8.5)
RBC # BLD AUTO: 3.49 10*6/MM3 (ref 3.77–5.28)
SODIUM BLD-SCNC: 143 MMOL/L (ref 136–145)
TRIGL SERPL-MCNC: 113 MG/DL (ref 0–150)
VLDLC SERPL-MCNC: 22.6 MG/DL (ref 5–40)
WBC NRBC COR # BLD: 5.6 10*3/MM3 (ref 3.4–10.8)

## 2019-03-21 PROCEDURE — 80061 LIPID PANEL: CPT | Performed by: INTERNAL MEDICINE

## 2019-03-21 PROCEDURE — 80053 COMPREHEN METABOLIC PANEL: CPT | Performed by: INTERNAL MEDICINE

## 2019-03-21 PROCEDURE — 36415 COLL VENOUS BLD VENIPUNCTURE: CPT | Performed by: INTERNAL MEDICINE

## 2019-03-21 NOTE — TELEPHONE ENCOUNTER
CBC sent to oncologist per Dr. Harrison on call. CBC charge cancelled at [facility]-duplicate test. Done 3/18/19 with Dr. Pillai.

## 2019-03-22 ENCOUNTER — CLINICAL SUPPORT NO REQUIREMENTS (OUTPATIENT)
Dept: CARDIOLOGY | Facility: CLINIC | Age: 79
End: 2019-03-22

## 2019-03-22 ENCOUNTER — TELEPHONE (OUTPATIENT)
Dept: CARDIOLOGY | Facility: CLINIC | Age: 79
End: 2019-03-22

## 2019-03-22 DIAGNOSIS — I25.5 ISCHEMIC CARDIOMYOPATHY: Primary | ICD-10-CM

## 2019-03-22 PROCEDURE — 93295 DEV INTERROG REMOTE 1/2/MLT: CPT | Performed by: INTERNAL MEDICINE

## 2019-03-22 PROCEDURE — 93296 REM INTERROG EVL PM/IDS: CPT | Performed by: INTERNAL MEDICINE

## 2019-03-22 NOTE — TELEPHONE ENCOUNTER
Dr. Galarza pt, crt-d followed by Dr. Hwang.    Alert transmission received for atp x1 that was delivered yesterday afternoon and was successful.  Since 3/11/19 remote she has had 1 treated vt and 4 nst vt episodes.  The 2 nst with egms were 10 and 11 beats each.  I called pt and she denied symptoms of arrhyhtmia.  She is doing exercised a few times a day, states she is going to therapy for weakness.  Her next apt is 6/13/19 with dr. Galarza and crt-d check.

## 2019-03-25 LAB — INR PPP: 4.4

## 2019-03-27 ENCOUNTER — PATIENT OUTREACH (OUTPATIENT)
Dept: CASE MANAGEMENT | Facility: OTHER | Age: 79
End: 2019-03-27

## 2019-03-27 NOTE — OUTREACH NOTE
Discussed with Flo, Intake, covering for Tanisha Caraballo today, that patient reported INR was performed on Monday, 3/25/19, per home health nurse and she is  awaiting result and directive.  Flo will investigate and CA contact information provided.

## 2019-03-27 NOTE — OUTREACH NOTE
Requested that I speak with her caregiver, Marlen, due to hearing difficulties on the phone.  Reports INR was performed on Monday, 3/25/19 and has not heard back from the home health nurse.  Current with [facility] for skilled nursing and physical threapy.  Informed would contact our HHA and have the nurse contact patient with results and if any changes advised from MD.  Denies any further needs for care advising services.

## 2019-03-28 ENCOUNTER — OFFICE VISIT (OUTPATIENT)
Dept: FAMILY MEDICINE CLINIC | Facility: CLINIC | Age: 79
End: 2019-03-28

## 2019-03-28 ENCOUNTER — ANTICOAGULATION VISIT (OUTPATIENT)
Dept: PHARMACY | Facility: HOSPITAL | Age: 79
End: 2019-03-28

## 2019-03-28 ENCOUNTER — PATIENT OUTREACH (OUTPATIENT)
Dept: CASE MANAGEMENT | Facility: OTHER | Age: 79
End: 2019-03-28

## 2019-03-28 VITALS
BODY MASS INDEX: 24.83 KG/M2 | SYSTOLIC BLOOD PRESSURE: 124 MMHG | DIASTOLIC BLOOD PRESSURE: 78 MMHG | HEIGHT: 65 IN | OXYGEN SATURATION: 100 % | TEMPERATURE: 97.8 F | WEIGHT: 149 LBS | HEART RATE: 59 BPM

## 2019-03-28 DIAGNOSIS — I50.43 ACUTE ON CHRONIC COMBINED SYSTOLIC (CONGESTIVE) AND DIASTOLIC (CONGESTIVE) HEART FAILURE (HCC): Primary | Chronic | ICD-10-CM

## 2019-03-28 DIAGNOSIS — I48.20 CHRONIC ATRIAL FIBRILLATION (HCC): ICD-10-CM

## 2019-03-28 DIAGNOSIS — I10 ESSENTIAL HYPERTENSION: ICD-10-CM

## 2019-03-28 DIAGNOSIS — M19.90 ARTHRITIS: ICD-10-CM

## 2019-03-28 LAB — INR PPP: 1.7

## 2019-03-28 PROCEDURE — 99214 OFFICE O/P EST MOD 30 MIN: CPT | Performed by: INTERNAL MEDICINE

## 2019-03-28 NOTE — PROGRESS NOTES
Subjective   Janel Mahoney is a 78 y.o. female.     History of Present Illness   She was seen for congestive heart failure.  Her weight was maintaining in 1/49.  Her blood pressures also in the 120s over 80s.  Regulated by her cardiologist.  There is been stable over the past several months.    Dictated utilizing Dragon dictation. If there are questions or for further clarification, please contact me.  The following portions of the patient's history were reviewed and updated as appropriate: allergies, current medications, past family history, past medical history, past social history, past surgical history and problem list.    Review of Systems   Constitutional: Negative for fatigue and fever.   HENT: Positive for congestion. Negative for trouble swallowing.    Eyes: Negative for discharge and visual disturbance.   Respiratory: Negative for choking and shortness of breath.    Cardiovascular: Negative for chest pain and palpitations.   Gastrointestinal: Negative for abdominal pain and blood in stool.   Endocrine: Negative.    Genitourinary: Negative for genital sores and hematuria.   Musculoskeletal: Negative for gait problem and joint swelling.   Skin: Negative for color change, pallor, rash and wound.   Allergic/Immunologic: Positive for environmental allergies. Negative for immunocompromised state.   Neurological: Negative for facial asymmetry and speech difficulty.   Psychiatric/Behavioral: Negative for hallucinations and suicidal ideas.       Objective   Physical Exam   Constitutional: She is oriented to person, place, and time. She appears well-developed and well-nourished.   HENT:   Head: Normocephalic.   Eyes: Conjunctivae are normal. Pupils are equal, round, and reactive to light.   Neck: Normal range of motion. Neck supple.   Cardiovascular: Normal rate, regular rhythm and normal heart sounds.   Pulmonary/Chest: Effort normal and breath sounds normal.   Abdominal: Soft. Bowel sounds are normal.    Musculoskeletal: Normal range of motion.   Neurological: She is alert and oriented to person, place, and time.   Skin: Skin is warm and dry.   Psychiatric: She has a normal mood and affect. Her behavior is normal. Judgment and thought content normal.   Nursing note and vitals reviewed.      Assessment/Plan   Problems Addressed this Visit        Cardiovascular and Mediastinum    Acute on chronic combined systolic (congestive) and diastolic (congestive) heart failure (CMS/HCC) - Primary (Chronic)    Chronic atrial fibrillation (CMS/HCC)    Hypertension      Other Visit Diagnoses     Arthritis        Relevant Orders    Ambulatory Referral to Rheumatology

## 2019-03-28 NOTE — PROGRESS NOTES
Anticoagulation Clinic Progress Note    Anticoagulation Summary  As of 3/28/2019    INR goal:   2.0-3.0   TTR:   45.8 % (4.9 mo)   INR used for dosin.70! (3/28/2019)   Warfarin maintenance plan:   1 mg every Mon, Thu; 2 mg all other days   Weekly warfarin total:   12 mg   Plan last modified:   Shanna Major RPH (2019)   Next INR check:   2019   Priority:   Critical   Target end date:   Indefinite    Indications    Chronic atrial fibrillation (CMS/HCC) [I48.2]             Anticoagulation Episode Summary     INR check location:       Preferred lab:       Send INR reminders to:    [facility]IN[facility] POOL    Comments:         Anticoagulation Care Providers     Provider Role Specialty Phone number    Nik Galarza MD Referring Cardiology [phone]        PERTINENT CLINICAL INFORMATION:  Ms Mahoney now with [facility], was in [facility] last week for CHF per nurse.  Previous INR was 4.4, patient held her Warfarin for 3 days.     INR History:  Anticoagulation Monitoring 2019 3/18/2019 3/28/2019   INR 3.1 2.3 1.70   INR Date 2019 3/18/2019 3/28/2019   INR Goal 2.0-3.0 2.0-3.0 2.0-3.0   Trend Same Same Same   Last Week Total 12 mg 12 mg 7 mg   Next Week Total 12 mg 12 mg 14 mg   Sun 2 mg 2 mg 2 mg   Mon 1 mg 1 mg -   Tue 2 mg 2 mg -   Wed 2 mg 2 mg -   Thu 1 mg 1 mg 3 mg (3/28)   Fri 2 mg 2 mg 2 mg   Sat 2 mg 2 mg 2 mg   Visit Report - - -   Some recent data might be hidden       Plan:  1. INR is Subtherapeutic today. Last INR was drawn on Monday 3/25 and was elevated at 4.4. Home Health called INR to [facility] after hours and was told to communicate with the [facility]. [facility] received the information on 3/28 and instructed HH to repeat the INR today.  Per Bri the patient was told to hold her Warfarin for one day, but the patient held for 3 days.  INR today is 1.7. Provided instructions to Bri with [facility] to give a booster dose  today of 3mg followed by usual 2mg daily. Repeat the INR on Monday. 4/1  2. Follow up in 3 days on 4/1      Caprice Carrasco [facility]

## 2019-03-29 ENCOUNTER — PATIENT OUTREACH (OUTPATIENT)
Dept: CASE MANAGEMENT | Facility: OTHER | Age: 79
End: 2019-03-29

## 2019-03-29 NOTE — OUTREACH NOTE
Review of Warfarin and instructions and adherent to regimen as advised with repeat INR due 4/1 by [facility] nurse.  Weight stable and this morning 148.4.  Denies shortness of air.  Monitoring caloric intake and diet as feel she gained weight while in SNF.  Agreeable to CA follow-up in several weeks as feels adequate support currently with home health services.

## 2019-04-01 ENCOUNTER — ANTICOAGULATION VISIT (OUTPATIENT)
Dept: PHARMACY | Facility: HOSPITAL | Age: 79
End: 2019-04-01

## 2019-04-01 DIAGNOSIS — I48.20 CHRONIC ATRIAL FIBRILLATION (HCC): ICD-10-CM

## 2019-04-01 LAB — INR PPP: 1.9

## 2019-04-01 RX ORDER — BUMETANIDE 2 MG/1
2 TABLET ORAL 2 TIMES DAILY
Qty: 180 TABLET | Refills: 1 | Status: SHIPPED | OUTPATIENT
Start: 2019-04-01 | End: 2019-08-12 | Stop reason: SDUPTHER

## 2019-04-01 NOTE — PROGRESS NOTES
Anticoagulation Clinic Progress Note    Anticoagulation Summary  As of 2019    INR goal:   2.0-3.0   TTR:   44.6 % (5 mo)   INR used for dosin.90! (2019)   Warfarin maintenance plan:   1 mg every Mon, Thu; 2 mg all other days   Weekly warfarin total:   12 mg   No change documented:   Vargas Butcher RPH   Plan last modified:   Shanna Major RPH (2019)   Next INR check:   2019   Priority:   Critical   Target end date:   Indefinite    Indications    Chronic atrial fibrillation (CMS/HCC) [I48.2]             Anticoagulation Episode Summary     INR check location:       Preferred lab:       Send INR reminders to:   [facility] CLINICAL POOL    Comments:         Anticoagulation Care Providers     Provider Role Specialty Phone number    Nik Galarza MD Referring Cardiology [phone]          INR History:  Anticoagulation Monitoring 3/18/2019 3/28/2019 2019   INR 2.3 1.70 1.90   INR Date 3/18/2019 3/28/2019 2019   INR Goal 2.0-3.0 2.0-3.0 2.0-3.0   Trend Same Same Same   Last Week Total 12 mg 7 mg 9 mg   Next Week Total 12 mg 14 mg 12 mg   Sun 2 mg 2 mg -   Mon 1 mg - 1 mg   Tue 2 mg - 2 mg   Wed 2 mg - 2 mg   Thu 1 mg 3 mg (3/28) -   Fri 2 mg 2 mg -   Sat 2 mg 2 mg -   Visit Report - - -   Some recent data might be hidden       Plan:  1. INR is Subtherapeutic today- see above in Anticoagulation Summary.   Provided instructions to Bri with [facility] to Continue their warfarin regimen- see above in Anticoagulation Summary.  2. Follow up in 3 days      Vargas Butcher RPH

## 2019-04-05 ENCOUNTER — TELEPHONE (OUTPATIENT)
Dept: FAMILY MEDICINE CLINIC | Facility: CLINIC | Age: 79
End: 2019-04-05

## 2019-04-05 ENCOUNTER — ANTICOAGULATION VISIT (OUTPATIENT)
Dept: PHARMACY | Facility: HOSPITAL | Age: 79
End: 2019-04-05

## 2019-04-05 DIAGNOSIS — I48.20 CHRONIC ATRIAL FIBRILLATION (HCC): ICD-10-CM

## 2019-04-05 LAB — INR PPP: 2.6

## 2019-04-05 NOTE — PROGRESS NOTES
Anticoagulation Clinic Progress Note    Anticoagulation Summary  As of 2019    INR goal:   2.0-3.0   TTR:   45.7 % (5.1 mo)   INR used for dosin.60 (2019)   Warfarin maintenance plan:   1 mg every Mon, Thu; 2 mg all other days   Weekly warfarin total:   12 mg   Plan last modified:   Shanna Major RPH (2019)   Next INR check:   2019   Priority:   Critical   Target end date:   Indefinite    Indications    Chronic atrial fibrillation (CMS/HCC) [I48.2]             Anticoagulation Episode Summary     INR check location:       Preferred lab:       [facility] [place]    Comments:         Anticoagulation Care Providers     Provider Role Specialty Phone number    Nik Galarza MD Referring Cardiology [phone]          INR History:  Anticoagulation Monitoring 3/28/2019 2019 2019   INR 1.70 1.90 2.60   INR Date 3/28/2019 2019 2019   INR Goal 2.0-3.0 2.0-3.0 2.0-3.0   Trend Same Same Same   Last Week Total 7 mg 9 mg 13 mg   Next Week Total 14 mg 12 mg 11 mg   Sun 2 mg - 2 mg   Mon - 1 mg 1 mg   Tue - 2 mg 2 mg   Wed - 2 mg 2 mg   Thu 3 mg (3/28) - 1 mg   Fri 2 mg - 1 mg ()   Sat 2 mg - 2 mg   Visit Report - - -   Some recent data might be hidden       Plan:  1. INR is Therapeutic today- see above in Anticoagulation Summary.   Provided instructions to Bri with [facility] to Continue their warfarin regimen- see above in Anticoagulation Summary.  2. Follow up in 1 week      Jany Arana [facility]

## 2019-04-05 NOTE — TELEPHONE ENCOUNTER
HAVING INCREASED SWELLING TO LEFT ARM AND HAND, PAIN IN  INDEX FINGER WARM TO TOUCH. DENIES HISTORY OF GOUT OR INJURY , LEANING TOWARDS CELLULITIS. PLEASE ADVISE .

## 2019-04-15 ENCOUNTER — INFUSION (OUTPATIENT)
Dept: ONCOLOGY | Facility: HOSPITAL | Age: 79
End: 2019-04-15

## 2019-04-15 ENCOUNTER — LAB (OUTPATIENT)
Dept: LAB | Facility: HOSPITAL | Age: 79
End: 2019-04-15

## 2019-04-15 DIAGNOSIS — N18.30 ANEMIA IN STAGE 3 CHRONIC KIDNEY DISEASE (HCC): Primary | ICD-10-CM

## 2019-04-15 DIAGNOSIS — D69.6 THROMBOCYTOPENIA (HCC): ICD-10-CM

## 2019-04-15 DIAGNOSIS — D63.1 ANEMIA IN STAGE 3 CHRONIC KIDNEY DISEASE (HCC): Primary | ICD-10-CM

## 2019-04-15 DIAGNOSIS — N18.30 ANEMIA IN STAGE 3 CHRONIC KIDNEY DISEASE (HCC): ICD-10-CM

## 2019-04-15 DIAGNOSIS — D63.1 ANEMIA IN STAGE 3 CHRONIC KIDNEY DISEASE (HCC): ICD-10-CM

## 2019-04-15 DIAGNOSIS — E53.8 B12 DEFICIENCY: ICD-10-CM

## 2019-04-15 LAB
BASOPHILS # BLD AUTO: 0.03 10*3/MM3 (ref 0–0.2)
BASOPHILS NFR BLD AUTO: 0.6 % (ref 0–1.5)
DEPRECATED RDW RBC AUTO: 56.7 FL (ref 37–54)
EOSINOPHIL # BLD AUTO: 0.11 10*3/MM3 (ref 0–0.4)
EOSINOPHIL NFR BLD AUTO: 2.1 % (ref 0.3–6.2)
ERYTHROCYTE [DISTWIDTH] IN BLOOD BY AUTOMATED COUNT: 16.6 % (ref 12.3–15.4)
HCT VFR BLD AUTO: 30.2 % (ref 34–46.6)
HGB BLD-MCNC: 9.7 G/DL (ref 12–15.9)
IMM GRANULOCYTES # BLD AUTO: 0.09 10*3/MM3 (ref 0–0.05)
IMM GRANULOCYTES NFR BLD AUTO: 1.7 % (ref 0–0.5)
LYMPHOCYTES # BLD AUTO: 0.84 10*3/MM3 (ref 0.7–3.1)
LYMPHOCYTES NFR BLD AUTO: 16.2 % (ref 19.6–45.3)
MCH RBC QN AUTO: 29.8 PG (ref 26.6–33)
MCHC RBC AUTO-ENTMCNC: 32.1 G/DL (ref 31.5–35.7)
MCV RBC AUTO: 92.9 FL (ref 79–97)
MONOCYTES # BLD AUTO: 0.51 10*3/MM3 (ref 0.1–0.9)
MONOCYTES NFR BLD AUTO: 9.9 % (ref 5–12)
NEUTROPHILS # BLD AUTO: 3.59 10*3/MM3 (ref 1.4–7)
NEUTROPHILS NFR BLD AUTO: 69.5 % (ref 42.7–76)
NRBC BLD AUTO-RTO: 0.4 /100 WBC (ref 0–0)
PLATELET # BLD AUTO: 137 10*3/MM3 (ref 140–450)
PMV BLD AUTO: 9.9 FL (ref 6–12)
RBC # BLD AUTO: 3.25 10*6/MM3 (ref 3.77–5.28)
WBC NRBC COR # BLD: 5.17 10*3/MM3 (ref 3.4–10.8)

## 2019-04-15 PROCEDURE — 63510000001 EPOETIN ALFA PER 1000 UNITS: Performed by: INTERNAL MEDICINE

## 2019-04-15 PROCEDURE — 85025 COMPLETE CBC W/AUTO DIFF WBC: CPT | Performed by: INTERNAL MEDICINE

## 2019-04-15 PROCEDURE — 25010000002 EPOETIN ALFA PER 1000 UNITS: Performed by: INTERNAL MEDICINE

## 2019-04-15 PROCEDURE — 36416 COLLJ CAPILLARY BLOOD SPEC: CPT | Performed by: INTERNAL MEDICINE

## 2019-04-15 PROCEDURE — 96372 THER/PROPH/DIAG INJ SC/IM: CPT

## 2019-04-15 RX ADMIN — ERYTHROPOIETIN 6000 UNITS: 20000 INJECTION, SOLUTION INTRAVENOUS; SUBCUTANEOUS at 12:18

## 2019-04-16 ENCOUNTER — ANTICOAGULATION VISIT (OUTPATIENT)
Dept: PHARMACY | Facility: HOSPITAL | Age: 79
End: 2019-04-16

## 2019-04-16 ENCOUNTER — TELEPHONE (OUTPATIENT)
Dept: FAMILY MEDICINE CLINIC | Facility: CLINIC | Age: 79
End: 2019-04-16

## 2019-04-16 DIAGNOSIS — I48.20 CHRONIC ATRIAL FIBRILLATION (HCC): ICD-10-CM

## 2019-04-16 LAB — INR PPP: 3.1

## 2019-04-16 RX ORDER — PANTOPRAZOLE SODIUM 40 MG/1
TABLET, DELAYED RELEASE ORAL
Qty: 180 TABLET | Refills: 0 | Status: SHIPPED | OUTPATIENT
Start: 2019-04-16 | End: 2019-06-17 | Stop reason: SDUPTHER

## 2019-04-16 RX ORDER — TRAMADOL HYDROCHLORIDE 50 MG/1
50 TABLET ORAL EVERY 12 HOURS PRN
Qty: 60 TABLET | Refills: 2 | Status: SHIPPED | OUTPATIENT
Start: 2019-04-16 | End: 2019-07-27 | Stop reason: SDUPTHER

## 2019-04-16 NOTE — PROGRESS NOTES
Anticoagulation Clinic Progress Note    Anticoagulation Summary  As of 4/16/2019    INR goal:   2.0-3.0   TTR:   48.0 % (5.5 mo)   INR used for dosing:   3.10! (4/16/2019)   Warfarin maintenance plan:   1 mg every Mon, Thu; 2 mg all other days   Weekly warfarin total:   12 mg   No change documented:   Jany Arana RPH   Plan last modified:   Shanna Major RPH (1/11/2019)   Next INR check:   4/23/2019   Priority:   Critical   Target end date:   Indefinite    Indications    Chronic atrial fibrillation (CMS/HCC) [I48.2]             Anticoagulation Episode Summary     INR check location:       Preferred lab:       Send INR reminders to:   [facility] CLINICAL [place]    Comments:         Anticoagulation Care Providers     Provider Role Specialty Phone number    Nik Galarza MD Referring Cardiology [phone]          INR History:  Anticoagulation Monitoring 4/1/2019 4/5/2019 4/16/2019   INR 1.90 2.60 3.10   INR Date 4/1/2019 4/5/2019 4/16/2019   INR Goal 2.0-3.0 2.0-3.0 2.0-3.0   Trend Same Same Same   Last Week Total 9 mg 13 mg 12 mg   Next Week Total 12 mg 11 mg 12 mg   Sun - 2 mg 2 mg   Mon 1 mg 1 mg 1 mg   Tue 2 mg 2 mg 2 mg   Wed 2 mg 2 mg 2 mg   Thu - 1 mg 1 mg   Fri - 1 mg (4/5) 2 mg   Sat - 2 mg 2 mg   Visit Report - - -   Some recent data might be hidden       Plan:  1. INR is Supratherapeutic today- see above in Anticoagulation Summary.   Provided instructions to Jackoe with [facility] to Continue their warfarin regimen- see above in Anticoagulation Summary.  2. Follow up in 1 week      Jany Arana RPH

## 2019-04-17 ENCOUNTER — PATIENT OUTREACH (OUTPATIENT)
Dept: CASE MANAGEMENT | Facility: OTHER | Age: 79
End: 2019-04-17

## 2019-04-17 NOTE — OUTREACH NOTE
Care Management Plan 4/17/2019   Lifestyle Goals Avoid respiratory irritants;Decrease falls risk;Eat a healthy diet;Fewer exacerbations;Less pain;Less shortness of air;Medication management;Record weight daily;Routine follow-up with doctor(s)   Barriers Pain   Self Management Check Weight Daily;Daily Journaling;Medication Adherence   Self Management -   Suggested Appointments -   Suggested Appointments -   Annual Wellness Visit:  Patient Has Completed   Specific Disease Process Teaching Heart Failure   Does patient have depression diagnosis? No   Advanced Directives: (No Data)   Advanced Directives: Spouse is desginated HCS   Discharged From: [facility]   Discharged to: Home   Discharge Date: 3/16/19   Discharge destination: Home Health   Agency: [facility]   Medication Adherence Medications understood   Medication Adherence -   Goal Progress -   Health Literacy Moderate     The main concerns and/or symptoms the patient would like to address are: Pain due to scoliosis, pain in knees utilizing two pound weights as instructed.     Education/instruction provided by Care Coordinator: Pain management regimen reviewed and plans to eliminate weights with exercise.  Discussed alternative lower extremities exercises.  HF management teaching, weight today 149.4 and reports weight fluctuates from 1-3 pounds depending on day's diuresis.  Has parameters for contacting cardiologist office.  Adherent to low sodium intake and heart healthy diet.  Reports [facility] nurse to visit one more time and will transition to outpatient anticoagulation clinic.  Review of Coumadin regimen and reports no changes with 3.1 INR, recent result.  Able to state results, dosing, and teaching of bleeding precautions. AWV completed, not active with my chart, spouse is designated healthcare surrogate.      Follow Up Outreach Due: As needed.     Gregorio Mendoza RN

## 2019-04-18 ENCOUNTER — TELEPHONE (OUTPATIENT)
Dept: CARDIOLOGY | Facility: CLINIC | Age: 79
End: 2019-04-18

## 2019-04-18 NOTE — TELEPHONE ENCOUNTER
Dr. Galarza's pt    We received a fax regarding Ms. Mahoney.  She is scheduled for surgery on her back with Dr. Spence on 5/6/19.  They would like to hold her Warfarin for 5 days prior.  I placed the fax in your inbox for your review.  Thanks--Rosemarie Perez# [phone], ext: [phone]  Fax# [phone]

## 2019-04-22 RX ORDER — ALENDRONATE SODIUM 70 MG/1
TABLET ORAL
Qty: 12 TABLET | Refills: 3 | Status: SHIPPED | OUTPATIENT
Start: 2019-04-22 | End: 2019-11-12

## 2019-04-23 ENCOUNTER — ANTICOAGULATION VISIT (OUTPATIENT)
Dept: PHARMACY | Facility: HOSPITAL | Age: 79
End: 2019-04-23

## 2019-04-23 DIAGNOSIS — I48.20 CHRONIC ATRIAL FIBRILLATION (HCC): ICD-10-CM

## 2019-04-23 LAB — INR PPP: 3.5

## 2019-04-23 NOTE — PROGRESS NOTES
Anticoagulation Clinic Progress Note    Anticoagulation Summary  As of 4/23/2019    INR goal:   2.0-3.0   TTR:   46.0 % (5.7 mo)   INR used for dosing:   3.50! (4/23/2019)   Warfarin maintenance plan:   1 mg every Sun, Tue, Thu; 2 mg all other days   Weekly warfarin total:   11 mg   Plan last modified:   Vargas Butcher [facility] (4/23/2019)   Next INR check:   5/7/2019   Priority:   Critical   Target end date:   Indefinite    Indications    Chronic atrial fibrillation (CMS/HCC) [I48.2]             Anticoagulation Episode Summary     INR check location:       Preferred lab:       Send INR reminders to:   [facility] CLINICAL POOL    Comments:         Anticoagulation Care Providers     Provider Role Specialty Phone number    Nik Galarza MD Referring Cardiology [phone]          Clinic Interview:  Patient Findings     Negatives:   Signs/symptoms of thrombosis, Signs/symptoms of bleeding,   Laboratory test error suspected, Change in health, Change in alcohol use,   Change in activity, Upcoming invasive procedure, Emergency department   visit, Upcoming dental procedure, Missed doses, Extra doses, Change in   medications, Change in diet/appetite, Hospital admission, Bruising, Other   complaints    Comments:   Discharged from [facility] today      Clinical Outcomes     Negatives:   Major bleeding event, Thromboembolic event,   Anticoagulation-related hospital admission, Anticoagulation-related ED   visit, Anticoagulation-related fatality    Comments:   Discharged from [facility] today        INR History:  Anticoagulation Monitoring 4/5/2019 4/16/2019 4/23/2019   INR 2.60 3.10 3.50   INR Date 4/5/2019 4/16/2019 4/23/2019   INR Goal 2.0-3.0 2.0-3.0 2.0-3.0   Trend Same Same Down   Last Week Total 13 mg 12 mg 12 mg   Next Week Total 11 mg 12 mg 11 mg   Sun 2 mg 2 mg 1 mg   Mon 1 mg 1 mg 2 mg   Tue 2 mg 2 mg 1 mg   Wed 2 mg 2 mg 2 mg   Thu 1 mg 1 mg 1 mg   Fri 1 mg (4/5) 2 mg 2 mg   Sat 2 mg 2 mg 2 mg   Visit Report - - -   Some  recent data might be hidden       Plan:  1. INR is Supratherapeutic today- see above in Anticoagulation Summary.   Will instruct Janel Mahoney to Change their warfarin regimen- see above in Anticoagulation Summary.  2. Follow up in 2 weeks in [facility]  3. They have been instructed to call if any changes in medications, doses, concerns, etc. Patient expresses understanding and has no further questions at this time.    Vargas Butcher RP

## 2019-05-07 ENCOUNTER — TELEPHONE (OUTPATIENT)
Dept: PHARMACY | Facility: HOSPITAL | Age: 79
End: 2019-05-07

## 2019-05-07 ENCOUNTER — ANTICOAGULATION VISIT (OUTPATIENT)
Dept: PHARMACY | Facility: HOSPITAL | Age: 79
End: 2019-05-07

## 2019-05-07 DIAGNOSIS — I48.20 CHRONIC ATRIAL FIBRILLATION (HCC): ICD-10-CM

## 2019-05-07 LAB
INR PPP: 1.3 (ref 0.91–1.09)
PROTHROMBIN TIME: 15.8 SECONDS (ref 10–13.8)

## 2019-05-07 PROCEDURE — G0463 HOSPITAL OUTPT CLINIC VISIT: HCPCS

## 2019-05-07 PROCEDURE — 85610 PROTHROMBIN TIME: CPT

## 2019-05-07 PROCEDURE — 36416 COLLJ CAPILLARY BLOOD SPEC: CPT

## 2019-05-07 NOTE — PATIENT INSTRUCTIONS
Initiate enoxaparin 70 mg every 24 hours until INR nears therapeutic range.   3 mg today and tomorrow (5/7-8).  1 mg 5/9.  Recheck INR 5/10.

## 2019-05-07 NOTE — PROGRESS NOTES
Anticoagulation Clinic Progress Note    Anticoagulation Summary  As of 2019    INR goal:   2.0-3.0   TTR:   46.0 % (6.2 mo)   INR used for dosin.3! (2019)   Warfarin maintenance plan:   1 mg every Sun, e, Thu; 2 mg all other days   Weekly warfarin total:   11 mg   Plan last modified:   Vargas Butcher RPH (2019)   Next INR check:   5/10/2019   Priority:   Critical   Target end date:   Indefinite    Indications    Chronic atrial fibrillation (CMS/HCC) [I48.2]             Anticoagulation Episode Summary     INR check location:       Preferred lab:       Send INR reminders to:    [facility]IN[facility]    Comments:         Anticoagulation Care Providers     Provider Role Specialty Phone number    Nik Galarza MD Referring Cardiology [phone]          Clinic Interview:  Patient Findings     Positives:   Upcoming invasive procedure, Missed doses    Negatives:   Signs/symptoms of thrombosis, Signs/symptoms of bleeding,   Laboratory test error suspected, Change in health, Change in alcohol use,   Change in activity, Emergency department visit, Upcoming dental procedure,   Extra doses, Change in medications, Change in diet/appetite, Hospital   admission, Bruising, Other complaints    Comments:   Procedure yesterday. Off warfarin since 5/3/19. Dr. Galarza   advised use enoxaparin following procedure today. Another procedure   scheduled in 2 wks (radio freq. Ablations).       Clinical Outcomes     Negatives:   Major bleeding event, Thromboembolic event,   Anticoagulation-related hospital admission, Anticoagulation-related ED   visit, Anticoagulation-related fatality    Comments:   Procedure yesterday. Off warfarin since 5/3/19. Dr. Galarza   advised use enoxaparin following procedure today. Another procedure   scheduled in 2 wks (radio freq. Ablations).         INR History:  Anticoagulation Monitoring 2019   INR 3.10 3.50 1.3   INR Date 2019    INR Goal 2.0-3.0 2.0-3.0 2.0-3.0   Trend Same Down Same   Last Week Total 12 mg 12 mg 4 mg   Next Week Total 12 mg 11 mg 14 mg   Sun 2 mg 1 mg -   Mon 1 mg 2 mg -   Tue 2 mg 1 mg 3 mg (5/7)   Wed 2 mg 2 mg 3 mg (5/8)   Thu 1 mg 1 mg 1 mg   Fri 2 mg 2 mg -   Sat 2 mg 2 mg -   Visit Report - - -   Some recent data might be hidden       Plan:  1. INR is Subtherapeutic today- see above in Anticoagulation Summary.  Will instruct Janel Mahoney to Change their warfarin regimen- see above in Anticoagulation Summary.  2. Initiate enoxaparin 70 mg q24h (Scr 1.65, 3/21/19; CrCl = 27 mL/min using adjusted body weight)  3. Follow up in 3 days  4. Patient declines warfarin refills.  5. Verbal and written information provided. Patient expresses understanding and has no further questions at this time.    Vargas Butcher [facility]

## 2019-05-08 ENCOUNTER — PATIENT OUTREACH (OUTPATIENT)
Dept: CASE MANAGEMENT | Facility: OTHER | Age: 79
End: 2019-05-08

## 2019-05-08 NOTE — OUTREACH NOTE
"Patient Outreach Note    Spoke briefly as currently resting.  Reports had a \"procedure\" on her back and was informed pain would intensify for about three days then subside.  Prefers a call at a later date.       Gregorio Mendoza RN    5/8/2019, 1:26 PM      "

## 2019-05-10 ENCOUNTER — ANTICOAGULATION VISIT (OUTPATIENT)
Dept: PHARMACY | Facility: HOSPITAL | Age: 79
End: 2019-05-10

## 2019-05-10 DIAGNOSIS — I48.20 CHRONIC ATRIAL FIBRILLATION (HCC): ICD-10-CM

## 2019-05-10 LAB
INR PPP: 1.6 (ref 0.91–1.09)
PROTHROMBIN TIME: 19.6 SECONDS (ref 10–13.8)

## 2019-05-10 PROCEDURE — G0463 HOSPITAL OUTPT CLINIC VISIT: HCPCS

## 2019-05-10 PROCEDURE — 36416 COLLJ CAPILLARY BLOOD SPEC: CPT

## 2019-05-10 PROCEDURE — 85610 PROTHROMBIN TIME: CPT

## 2019-05-10 NOTE — PROGRESS NOTES
Anticoagulation Clinic Progress Note    Anticoagulation Summary  As of 5/10/2019    INR goal:   2.0-3.0   TTR:   45.3 % (6.3 mo)   INR used for dosin.6! (5/10/2019)   Warfarin maintenance plan:   1 mg every Sun, Tue, Thu; 2 mg all other days   Weekly warfarin total:   11 mg   Plan last modified:   Vargas Butcher [facility] (2019)   Next INR check:   2019   Priority:   Critical   Target end date:   Indefinite    Indications    Chronic atrial fibrillation (CMS/HCC) [I48.2]             Anticoagulation Episode Summary     INR check location:       Preferred lab:       Send INR reminders to:    [facility]    Comments:         Anticoagulation Care Providers     Provider Role Specialty Phone number    Nik Galarza MD Referring Cardiology [phone]          Clinic Interview:  Patient Findings     Negatives:   Signs/symptoms of thrombosis, Signs/symptoms of bleeding,   Laboratory test error suspected, Change in health, Change in alcohol use,   Change in activity, Upcoming invasive procedure, Emergency department   visit, Upcoming dental procedure, Missed doses, Extra doses, Change in   medications, Change in diet/appetite, Hospital admission, Bruising, Other   complaints      Clinical Outcomes     Negatives:   Major bleeding event, Thromboembolic event,   Anticoagulation-related hospital admission, Anticoagulation-related ED   visit, Anticoagulation-related fatality        INR History:  Anticoagulation Monitoring 2019 2019 5/10/2019   INR 3.50 1.3 1.6   INR Date 2019 2019 5/10/2019   INR Goal 2.0-3.0 2.0-3.0 2.0-3.0   Trend Down Same Same   Last Week Total 12 mg 4 mg 7 mg   Next Week Total 11 mg 14 mg 12 mg   Sun 1 mg - 1 mg   Mon 2 mg - -   Tue 1 mg 3 mg () -   Wed 2 mg 3 mg () -   Thu 1 mg 1 mg -   Fri 2 mg - 3 mg (5/10)   Sat 2 mg - 2 mg   Visit Report - - -   Some recent data might be hidden       Plan:  1. INR is Subtherapeutic today- see above in  Anticoagulation Summary.  Will instruct Janel Mahoney to Change their warfarin regimen- see above in Anticoagulation Summary.  2. Follow up in 3 days  3. Patient declines warfarin refills.  4. Verbal and written information provided. Patient expresses understanding and has no further questions at this time.    Vargas Butcher [facility]

## 2019-05-10 NOTE — PATIENT INSTRUCTIONS
Extra 1 mg today (3 mg total), then resume 1 mg Sun/Tues/Thurs, 2 mg rest of wk. Continue enoxaparin 70 mg daily until INR recheck in 3 days (Mon, 5/13).

## 2019-05-13 ENCOUNTER — INFUSION (OUTPATIENT)
Dept: ONCOLOGY | Facility: HOSPITAL | Age: 79
End: 2019-05-13

## 2019-05-13 ENCOUNTER — LAB (OUTPATIENT)
Dept: LAB | Facility: HOSPITAL | Age: 79
End: 2019-05-13

## 2019-05-13 ENCOUNTER — ANTICOAGULATION VISIT (OUTPATIENT)
Dept: PHARMACY | Facility: HOSPITAL | Age: 79
End: 2019-05-13

## 2019-05-13 DIAGNOSIS — I48.20 CHRONIC ATRIAL FIBRILLATION (HCC): ICD-10-CM

## 2019-05-13 DIAGNOSIS — N18.30 ANEMIA IN STAGE 3 CHRONIC KIDNEY DISEASE (HCC): ICD-10-CM

## 2019-05-13 DIAGNOSIS — E53.8 B12 DEFICIENCY: ICD-10-CM

## 2019-05-13 DIAGNOSIS — D69.6 THROMBOCYTOPENIA (HCC): ICD-10-CM

## 2019-05-13 DIAGNOSIS — D63.1 ANEMIA IN STAGE 3 CHRONIC KIDNEY DISEASE (HCC): ICD-10-CM

## 2019-05-13 LAB
BASOPHILS # BLD AUTO: 0.02 10*3/MM3 (ref 0–0.2)
BASOPHILS NFR BLD AUTO: 0.5 % (ref 0–1.5)
DEPRECATED RDW RBC AUTO: 55.3 FL (ref 37–54)
EOSINOPHIL # BLD AUTO: 0.13 10*3/MM3 (ref 0–0.4)
EOSINOPHIL NFR BLD AUTO: 3.2 % (ref 0.3–6.2)
ERYTHROCYTE [DISTWIDTH] IN BLOOD BY AUTOMATED COUNT: 15.9 % (ref 12.3–15.4)
HCT VFR BLD AUTO: 33.8 % (ref 34–46.6)
HGB BLD-MCNC: 10.8 G/DL (ref 12–15.9)
IMM GRANULOCYTES # BLD AUTO: 0.02 10*3/MM3 (ref 0–0.05)
IMM GRANULOCYTES NFR BLD AUTO: 0.5 % (ref 0–0.5)
INR PPP: 2.2 (ref 0.91–1.09)
LYMPHOCYTES # BLD AUTO: 0.78 10*3/MM3 (ref 0.7–3.1)
LYMPHOCYTES NFR BLD AUTO: 19.2 % (ref 19.6–45.3)
MCH RBC QN AUTO: 30.1 PG (ref 26.6–33)
MCHC RBC AUTO-ENTMCNC: 32 G/DL (ref 31.5–35.7)
MCV RBC AUTO: 94.2 FL (ref 79–97)
MONOCYTES # BLD AUTO: 0.35 10*3/MM3 (ref 0.1–0.9)
MONOCYTES NFR BLD AUTO: 8.6 % (ref 5–12)
NEUTROPHILS # BLD AUTO: 2.76 10*3/MM3 (ref 1.7–7)
NEUTROPHILS NFR BLD AUTO: 68 % (ref 42.7–76)
NRBC BLD AUTO-RTO: 0 /100 WBC (ref 0–0.2)
PLATELET # BLD AUTO: 121 10*3/MM3 (ref 140–450)
PMV BLD AUTO: 10.1 FL (ref 6–12)
PROTHROMBIN TIME: 26.3 SECONDS (ref 10–13.8)
RBC # BLD AUTO: 3.59 10*6/MM3 (ref 3.77–5.28)
WBC NRBC COR # BLD: 4.06 10*3/MM3 (ref 3.4–10.8)

## 2019-05-13 PROCEDURE — 85025 COMPLETE CBC W/AUTO DIFF WBC: CPT | Performed by: INTERNAL MEDICINE

## 2019-05-13 PROCEDURE — G0463 HOSPITAL OUTPT CLINIC VISIT: HCPCS

## 2019-05-13 PROCEDURE — 36416 COLLJ CAPILLARY BLOOD SPEC: CPT | Performed by: INTERNAL MEDICINE

## 2019-05-13 PROCEDURE — 85610 PROTHROMBIN TIME: CPT

## 2019-05-13 NOTE — PROGRESS NOTES
HGB 10.8. Prt does not need procrit today. .  Copy of labs given to pt and f/u appt reviewed. Pt is instructed to call with concerns prior to next visit. Pt V/U.

## 2019-05-13 NOTE — PROGRESS NOTES
Anticoagulation Clinic Progress Note    Anticoagulation Summary  As of 2019    INR goal:   2.0-3.0   TTR:   45.1 % (6.4 mo)   INR used for dosin.2 (2019)   Warfarin maintenance plan:   1 mg every Sun, Tue, Thu; 2 mg all other days   Weekly warfarin total:   11 mg   No change documented:   Shanna Major RPH   Plan last modified:   Vargas Butcher RP (2019)   Next INR check:   2019   Priority:   Critical   Target end date:   Indefinite    Indications    Chronic atrial fibrillation (CMS/[facility]) [I48.2]             Anticoagulation Episode Summary     INR check location:       Preferred lab:       Send INR reminders to:    [facility]    Comments:         Anticoagulation Care Providers     Provider Role Specialty Phone number    Nik Galarza MD Referring Cardiology [phone]          Clinic Interview:  Patient Findings     Negatives:   Signs/symptoms of thrombosis, Signs/symptoms of bleeding,   Laboratory test error suspected, Change in health, Change in alcohol use,   Change in activity, Upcoming invasive procedure, Emergency department   visit, Upcoming dental procedure, Missed doses, Extra doses, Change in   medications, Change in diet/appetite, Hospital admission, Bruising, Other   complaints      Clinical Outcomes     Negatives:   Major bleeding event, Thromboembolic event,   Anticoagulation-related hospital admission, Anticoagulation-related ED   visit, Anticoagulation-related fatality        INR History:  Anticoagulation Monitoring 2019 5/10/2019 2019   INR 1.3 1.6 2.2   INR Date 2019 5/10/2019 2019   INR Goal 2.0-3.0 2.0-3.0 2.0-3.0   Trend Same Same Same   Last Week Total 4 mg 7 mg 13 mg   Next Week Total 14 mg 12 mg 11 mg   Sun - 1 mg 1 mg   Mon - - 2 mg   Tue 3 mg () - 1 mg   Wed 3 mg () - 2 mg   Thu 1 mg - 1 mg   Fri - 3 mg (5/10) 2 mg   Sat - 2 mg 2 mg   Visit Report - - -   Some recent data might be hidden       Plan:  1. INR is  Therapeutic today- see above in Anticoagulation Summary.  Will instruct Janel Mahoney to Continue their warfarin regimen- see above in Anticoagulation Summary.  Stop enoxaparin injections today.  2. Follow up in 1.5 weeks  3. Patient declines warfarin refills.  4. Verbal and written information provided. Patient expresses understanding and has no further questions at this time.    Shanna Major [facility]

## 2019-05-22 ENCOUNTER — ANTICOAGULATION VISIT (OUTPATIENT)
Dept: PHARMACY | Facility: HOSPITAL | Age: 79
End: 2019-05-22

## 2019-05-22 DIAGNOSIS — I48.20 CHRONIC ATRIAL FIBRILLATION (HCC): ICD-10-CM

## 2019-05-22 LAB
INR PPP: 2 (ref 0.91–1.09)
PROTHROMBIN TIME: 23.9 SECONDS (ref 10–13.8)

## 2019-05-22 PROCEDURE — 85610 PROTHROMBIN TIME: CPT

## 2019-05-22 PROCEDURE — 36416 COLLJ CAPILLARY BLOOD SPEC: CPT

## 2019-05-22 PROCEDURE — G0463 HOSPITAL OUTPT CLINIC VISIT: HCPCS

## 2019-05-22 NOTE — PROGRESS NOTES
Anticoagulation Clinic Progress Note    Anticoagulation Summary  As of 2019    INR goal:   2.0-3.0   TTR:   47.5 % (6.7 mo)   INR used for dosin.0 (2019)   Warfarin maintenance plan:   1 mg every Sun, Thu; 2 mg all other days   Weekly warfarin total:   12 mg   Plan last modified:   Vargas Butcher [facility] (2019)   Next INR check:   2019   Priority:   Critical   Target end date:   Indefinite    Indications    Chronic atrial fibrillation (CMS/HCC) [I48.2]             Anticoagulation Episode Summary     INR check location:       Preferred lab:       Send INR reminders to:    [facility]    Comments:         Anticoagulation Care Providers     Provider Role Specialty Phone number    Nik Galarza MD Referring Cardiology [phone]          Clinic Interview:  Patient Findings     Negatives:   Signs/symptoms of thrombosis, Signs/symptoms of bleeding,   Laboratory test error suspected, Change in health, Change in alcohol use,   Change in activity, Upcoming invasive procedure, Emergency department   visit, Upcoming dental procedure, Missed doses, Extra doses, Change in   medications, Change in diet/appetite, Hospital admission, Bruising, Other   complaints      Clinical Outcomes     Negatives:   Major bleeding event, Thromboembolic event,   Anticoagulation-related hospital admission, Anticoagulation-related ED   visit, Anticoagulation-related fatality        INR History:  Anticoagulation Monitoring 5/10/2019 2019 2019   INR 1.6 2.2 2.0   INR Date 5/10/2019 2019 2019   INR Goal 2.0-3.0 2.0-3.0 2.0-3.0   Trend Same Same Up   Last Week Total 7 mg 13 mg 11 mg   Next Week Total 12 mg 11 mg 12 mg   Sun 1 mg 1 mg 1 mg   Mon - 2 mg 2 mg   Tue - 1 mg 2 mg   Wed - 2 mg 2 mg   Thu - 1 mg 1 mg   Fri 3 mg (5/10) 2 mg 2 mg   Sat 2 mg 2 mg 2 mg   Visit Report - - -   Some recent data might be hidden       Plan:  1. INR is Therapeutic today- see above in Anticoagulation  Summary.  Will instruct Janel Mahoney to Increase their warfarin regimen- see above in Anticoagulation Summary.  2. Follow up in 2 weeks  3. Patient declines warfarin refills.  4. Verbal and written information provided. Patient expresses understanding and has no further questions at this time.    Vargas Butcher [facility]

## 2019-05-30 ENCOUNTER — TELEPHONE (OUTPATIENT)
Dept: FAMILY MEDICINE CLINIC | Facility: CLINIC | Age: 79
End: 2019-05-30

## 2019-05-30 RX ORDER — ZOLPIDEM TARTRATE 10 MG/1
5 TABLET ORAL NIGHTLY PRN
Qty: 90 TABLET | Refills: 0 | Status: SHIPPED | OUTPATIENT
Start: 2019-05-30 | End: 2019-08-13 | Stop reason: SDUPTHER

## 2019-06-05 ENCOUNTER — ANTICOAGULATION VISIT (OUTPATIENT)
Dept: PHARMACY | Facility: HOSPITAL | Age: 79
End: 2019-06-05

## 2019-06-05 DIAGNOSIS — I48.20 CHRONIC ATRIAL FIBRILLATION (HCC): ICD-10-CM

## 2019-06-05 LAB
INR PPP: 2.6 (ref 0.91–1.09)
PROTHROMBIN TIME: 31.3 SECONDS (ref 10–13.8)

## 2019-06-05 PROCEDURE — 36416 COLLJ CAPILLARY BLOOD SPEC: CPT

## 2019-06-05 PROCEDURE — 85610 PROTHROMBIN TIME: CPT

## 2019-06-05 NOTE — PROGRESS NOTES
Anticoagulation Clinic Progress Note    Anticoagulation Summary  As of 2019    INR goal:   2.0-3.0   TTR:   50.9 % (7.2 mo)   INR used for dosin.6 (2019)   Warfarin maintenance plan:   1 mg every Sun, Thu; 2 mg all other days   Weekly warfarin total:   12 mg   No change documented:   Taylor Shepherd   Plan last modified:   Vargas Butcher [facility] (2019)   Next INR check:   2019   Priority:   Critical   Target end date:   Indefinite    Indications    Chronic atrial fibrillation (CMS/HCC) [I48.2]             Anticoagulation Episode Summary     INR check location:       Preferred lab:       Send INR reminders to:    [facility]    Comments:         Anticoagulation Care Providers     Provider Role Specialty Phone number    Nik Galarza MD Referring Cardiology [phone]          Clinic Interview:  Patient Findings     Negatives:   Signs/symptoms of thrombosis, Signs/symptoms of bleeding,   Laboratory test error suspected, Change in health, Change in alcohol use,   Change in activity, Upcoming invasive procedure, Emergency department   visit, Upcoming dental procedure, Missed doses, Extra doses, Change in   medications, Change in diet/appetite, Hospital admission, Bruising, Other   complaints      Clinical Outcomes     Negatives:   Major bleeding event, Thromboembolic event,   Anticoagulation-related hospital admission, Anticoagulation-related ED   visit, Anticoagulation-related fatality        INR History:  Anticoagulation Monitoring 2019   INR 2.2 2.0 2.6   INR Date 2019   INR Goal 2.0-3.0 2.0-3.0 2.0-3.0   Trend Same Up Same   Last Week Total 13 mg 11 mg 12 mg   Next Week Total 11 mg 12 mg 12 mg   Sun 1 mg 1 mg 1 mg   Mon 2 mg 2 mg 2 mg   Tue 1 mg 2 mg 2 mg   Wed 2 mg 2 mg 2 mg   Thu 1 mg 1 mg 1 mg   Fri 2 mg 2 mg 2 mg   Sat 2 mg 2 mg 2 mg   Visit Report - - -   Some recent data might be hidden       Plan:  1. INR is  therapeutic today- see above in Anticoagulation Summary.   Will instruct Janel DORINA Covingtons to continue their warfarin regimen- see above in Anticoagulation Summary.  2. Follow up in 3 weeks.  3. Patient declines warfarin refills.  4. Verbal and written information provided. Patient expresses understanding and has no further questions at this time.    Taylor Shepherd

## 2019-06-10 ENCOUNTER — LAB (OUTPATIENT)
Dept: LAB | Facility: HOSPITAL | Age: 79
End: 2019-06-10

## 2019-06-10 ENCOUNTER — INFUSION (OUTPATIENT)
Dept: ONCOLOGY | Facility: HOSPITAL | Age: 79
End: 2019-06-10

## 2019-06-10 DIAGNOSIS — D63.1 ANEMIA IN STAGE 3 CHRONIC KIDNEY DISEASE (HCC): ICD-10-CM

## 2019-06-10 DIAGNOSIS — E53.8 B12 DEFICIENCY: ICD-10-CM

## 2019-06-10 DIAGNOSIS — D63.1 ANEMIA IN STAGE 3 CHRONIC KIDNEY DISEASE (HCC): Primary | ICD-10-CM

## 2019-06-10 DIAGNOSIS — D69.6 THROMBOCYTOPENIA (HCC): ICD-10-CM

## 2019-06-10 DIAGNOSIS — N18.30 ANEMIA IN STAGE 3 CHRONIC KIDNEY DISEASE (HCC): Primary | ICD-10-CM

## 2019-06-10 DIAGNOSIS — N18.30 ANEMIA IN STAGE 3 CHRONIC KIDNEY DISEASE (HCC): ICD-10-CM

## 2019-06-10 LAB
BASOPHILS # BLD AUTO: 0.01 10*3/MM3 (ref 0–0.2)
BASOPHILS NFR BLD AUTO: 0.2 % (ref 0–1.5)
DEPRECATED RDW RBC AUTO: 51.9 FL (ref 37–54)
EOSINOPHIL # BLD AUTO: 0.08 10*3/MM3 (ref 0–0.4)
EOSINOPHIL NFR BLD AUTO: 1.6 % (ref 0.3–6.2)
ERYTHROCYTE [DISTWIDTH] IN BLOOD BY AUTOMATED COUNT: 14.5 % (ref 12.3–15.4)
FERRITIN SERPL-MCNC: 367.7 NG/ML (ref 13–150)
HCT VFR BLD AUTO: 30.1 % (ref 34–46.6)
HGB BLD-MCNC: 9.3 G/DL (ref 12–15.9)
IMM GRANULOCYTES # BLD AUTO: 0.01 10*3/MM3 (ref 0–0.05)
IMM GRANULOCYTES NFR BLD AUTO: 0.2 % (ref 0–0.5)
IRON 24H UR-MRATE: 58 MCG/DL (ref 37–145)
IRON SATN MFR SERPL: 22 % (ref 14–48)
LYMPHOCYTES # BLD AUTO: 0.52 10*3/MM3 (ref 0.7–3.1)
LYMPHOCYTES NFR BLD AUTO: 10.6 % (ref 19.6–45.3)
MCH RBC QN AUTO: 30.3 PG (ref 26.6–33)
MCHC RBC AUTO-ENTMCNC: 30.9 G/DL (ref 31.5–35.7)
MCV RBC AUTO: 98 FL (ref 79–97)
MONOCYTES # BLD AUTO: 0.39 10*3/MM3 (ref 0.1–0.9)
MONOCYTES NFR BLD AUTO: 7.9 % (ref 5–12)
NEUTROPHILS # BLD AUTO: 3.9 10*3/MM3 (ref 1.7–7)
NEUTROPHILS NFR BLD AUTO: 79.5 % (ref 42.7–76)
NRBC BLD AUTO-RTO: 0 /100 WBC (ref 0–0.2)
PLATELET # BLD AUTO: 103 10*3/MM3 (ref 140–450)
PMV BLD AUTO: 9.3 FL (ref 6–12)
RBC # BLD AUTO: 3.07 10*6/MM3 (ref 3.77–5.28)
TIBC SERPL-MCNC: 263 MCG/DL (ref 249–505)
TRANSFERRIN SERPL-MCNC: 188 MG/DL (ref 200–360)
WBC NRBC COR # BLD: 4.91 10*3/MM3 (ref 3.4–10.8)

## 2019-06-10 PROCEDURE — 83540 ASSAY OF IRON: CPT

## 2019-06-10 PROCEDURE — 85025 COMPLETE CBC W/AUTO DIFF WBC: CPT

## 2019-06-10 PROCEDURE — 82728 ASSAY OF FERRITIN: CPT

## 2019-06-10 PROCEDURE — 25010000002 EPOETIN ALFA PER 1000 UNITS: Performed by: INTERNAL MEDICINE

## 2019-06-10 PROCEDURE — 36415 COLL VENOUS BLD VENIPUNCTURE: CPT

## 2019-06-10 PROCEDURE — 84466 ASSAY OF TRANSFERRIN: CPT

## 2019-06-10 PROCEDURE — 96372 THER/PROPH/DIAG INJ SC/IM: CPT

## 2019-06-10 RX ADMIN — ERYTHROPOIETIN 4000 UNITS: 20000 INJECTION, SOLUTION INTRAVENOUS; SUBCUTANEOUS at 12:36

## 2019-06-13 ENCOUNTER — OFFICE VISIT (OUTPATIENT)
Dept: CARDIOLOGY | Facility: CLINIC | Age: 79
End: 2019-06-13

## 2019-06-13 ENCOUNTER — CLINICAL SUPPORT NO REQUIREMENTS (OUTPATIENT)
Dept: CARDIOLOGY | Facility: CLINIC | Age: 79
End: 2019-06-13

## 2019-06-13 VITALS
HEART RATE: 60 BPM | BODY MASS INDEX: 27.97 KG/M2 | DIASTOLIC BLOOD PRESSURE: 50 MMHG | SYSTOLIC BLOOD PRESSURE: 110 MMHG | WEIGHT: 152 LBS | HEIGHT: 62 IN

## 2019-06-13 DIAGNOSIS — I65.23 BILATERAL CAROTID ARTERY STENOSIS: ICD-10-CM

## 2019-06-13 DIAGNOSIS — I47.20 V-TACH (HCC): ICD-10-CM

## 2019-06-13 DIAGNOSIS — Z95.0 PACEMAKER: ICD-10-CM

## 2019-06-13 DIAGNOSIS — I25.5 ISCHEMIC CARDIOMYOPATHY: ICD-10-CM

## 2019-06-13 DIAGNOSIS — I25.810 CORONARY ARTERY DISEASE INVOLVING CORONARY BYPASS GRAFT OF NATIVE HEART WITHOUT ANGINA PECTORIS: ICD-10-CM

## 2019-06-13 DIAGNOSIS — I50.9 CONGESTIVE HEART FAILURE, UNSPECIFIED HF CHRONICITY, UNSPECIFIED HEART FAILURE TYPE (HCC): Primary | ICD-10-CM

## 2019-06-13 DIAGNOSIS — I48.20 CHRONIC ATRIAL FIBRILLATION (HCC): ICD-10-CM

## 2019-06-13 DIAGNOSIS — Z95.2 S/P MVR (MITRAL VALVE REPLACEMENT): ICD-10-CM

## 2019-06-13 DIAGNOSIS — I25.5 ISCHEMIC CARDIOMYOPATHY: Primary | ICD-10-CM

## 2019-06-13 PROCEDURE — 93283 PRGRMG EVAL IMPLANTABLE DFB: CPT | Performed by: INTERNAL MEDICINE

## 2019-06-13 PROCEDURE — 99214 OFFICE O/P EST MOD 30 MIN: CPT | Performed by: INTERNAL MEDICINE

## 2019-06-13 PROCEDURE — 93000 ELECTROCARDIOGRAM COMPLETE: CPT | Performed by: INTERNAL MEDICINE

## 2019-06-13 RX ORDER — WARFARIN SODIUM 1 MG/1
1 TABLET ORAL DAILY
COMMUNITY
Start: 2019-05-10 | End: 2021-01-27 | Stop reason: SDUPTHER

## 2019-06-13 NOTE — PROGRESS NOTES
Date of Office Visit: 19  Encounter Provider: Nik Galarza MD  Place of Service: [facility] CARDIOLOGY  Patient Name: Janel Mahoney  :1940  Referral Provider:Referring, Self      No chief complaint on file.    History of Present Illness   The patient is a pleasant, 78-year-old white female with history of severe ischemic heart  disease, mild disease of the left anterior descending, angioplasty, and stent placement of  the right coronary artery. She also had premature ventricular contractions and severe  vascular disease, and left ventricular ejection fraction of 50%. In 2007, she had  an acute infarct. Catheterization showed a left ventricular ejection fraction of 35%. She  had occlusive disease of the right posterior left ventricular branch and no other  significant disease. She was treated medically. She had a transesophageal echocardiogram  that confirmed a left ventricular ejection fraction of 40% and just moderate mitral  insufficiency. She had atrial fibrillation and had electrocardioversion back to sinus  rhythm in May 2007 and then presented in atrial fibrillation and was admitted in 2007 and placed on Tikosyn. We titrated it up to 500 mcg daily but developed marked QT  prolongation even on 250 mcg twice daily. We then discussed ablation versus warfarin and  rate control. We opted for warfarin and rate control. We continued to have symptoms and  went to Dr. Harish Iverson in [place]. She had atrial fibrillation and flutter with  pulmonary vein isolation. She went back into atrial fibrillation and was started on  sotalol. She converted back to sinus rhythm. She then went back into atrial fibrillation.  Again, ultimately, she had a repeat catheterization that showed severe mitral  insufficiency, borderline coronary disease, and in 2010 had mitral valve  replacement with a #31 Epic porcine valve and single bypass graft to the  posterior  descending artery. She continued to have episodes of rapid atrial fibrillation and left  ventricular dysfunction. She underwent AV node ablation and upgrade of her device to a  bi-V.   She then presented in 09/2013 with complaints of a lot of fatigue, tiredness and weakness.   As part of the evaluation she had an echocardiogram which showed her left ventricular  ejection fraction to be 45%, moderate pulmonary hypertension at 62 mmHg.  A perfusion  stress test showed no evidence of ischemia and she had a sleep study which was positive so  she was started on CPAP.  She had some volume overload and did much better on twice a day diuretic.   She was in the hospital around August 2016 with multiple compression fractures of her back.    She then presented to the hospital in September 2017 with GI bleeding was found to have esophageal ulcers but her INR was also supratherapeutic.  There was treated resolved her hemoglobin stabilized.   She then was in the hospital end of November 2018 with a spontaneous hematoma of her right pectoral muscle and had to be surgically drained and didn't appear to be traumatic however about 2-3 weeks prior to that she had stumbled but did not hit her chest. She comes in for follow up. The patient denies chest pain, pressure and heaviness.  Have shortness of breath and fatigue but she is been through a lot this year feeling it might be a little bit better, no othopnea or PND. No palpitations, near syncope or syncope. No stroke type symptoms like paralysis, paresthesia, abrupt vision loss and dysarthria. No bleeding like blood in the stool or dark stools.       Coronary Artery Disease   Pertinent negatives include no dizziness, muscle weakness or weight gain. Her past medical history is significant for CHF and past myocardial infarction.   Atrial Fibrillation   Symptoms are negative for dizziness and weakness. Past medical history includes atrial fibrillation, AICD, CAD and CHF.    Congestive Heart Failure   Pertinent negatives include no abdominal pain, muscle weakness or nocturia. Her past medical history is significant for CAD.         Past Medical History:   Diagnosis Date   • Acute kidney injury (CMS/HCC)    • Anemia    • Atrial fibrillation (CMS/HCC)    • Bruises easily    • Carotid artery stenosis    • Chronic back pain    • Chronic combined systolic and diastolic congestive heart failure (CMS/HCC)    • Chronic coronary artery disease     moderate to severe LV dysfunction.   • Chronic kidney disease, stage 3 (CMS/HCC)    • Dysphagia    • GERD (gastroesophageal reflux disease)    • H/O cardiac murmur    • [place] (hard of hearing)     wears hearing aids   • Hyperlipidemia    • Hypertension    • Hypotension    • Ischemic cardiomyopathy    • Kyphoscoliosis    • Leukopenia    • Lumbar spondylosis    • Obesity    • GEORGINA (obstructive sleep apnea)    • Osteoarthritis    • Osteoporosis    • Peptic ulcer    • Premature ventricular contractions    • Renal insufficiency syndrome    • Scoliosis    • Shoulder fracture, left    • Stroke syndrome    • Thrombocytopenia (CMS/HCC)    • Ventricular tachycardia (CMS/HCC)    • Vitamin B12 deficiency          Past Surgical History:   Procedure Laterality Date   • AV NODE ABLATION     • BREAST BIOPSY     • CARDIAC CATHETERIZATION      Showed severe mitral insufficiency and borderline coronary artery disease   • CARDIAC CATHETERIZATION      Showed an ejection fraction of 35%. She had occlusive disease of the right posterior LV branch and no other significant disease, treated medically.   • CARDIAC DEFIBRILLATOR PLACEMENT      Biventricular   • CARDIAC ELECTROPHYSIOLOGY PROCEDURE N/A 1/4/2019    Procedure: GENERATOR CHANGE BI-V ICD   [place];  Surgeon: James Hwang MD;  Location: [facility] INVASIVE LOCATION;  Service: Cardiology   • CARDIAC VALVE REPLACEMENT  2009    Done with stent placement   • CARDIOVERSION      multiple electrocardioversions.   •  CAROTID ARTERY ANGIOPLASTY Right    • COLONOSCOPY N/A 9/28/2017    Procedure: COLONOSCOPY TO CECUM;  Surgeon: Kevin Davis MD;  Location: [facility] ENDOSCOPY;  Service:    • CORONARY ANGIOPLASTY WITH STENT PLACEMENT  2009   • CORONARY ARTERY BYPASS GRAFT      single graft to the PDA   • CORONARY STENT PLACEMENT     • ENDOSCOPY N/A 9/28/2017    Procedure: ESOPHAGOGASTRODUODENOSCOPY ;  Surgeon: Kevin Davis MD;  Location: [facility] ENDOSCOPY;  Service:    • HEMORRHOIDECTOMY     • HYSTERECTOMY     • INCISION AND DRAINAGE TRUNK Right 11/27/2018    Procedure: EVACUATION OF RIGHT CHEST WALL HEMATOMA;  Surgeon: Juvencio Rodriguez MD;  Location: [facility] MAIN OR;  Service: General   • MITRAL VALVE REPLACEMENT  01/2010    #31 Epic porcine valve.   • THROMBOENDARTERECTOMY Right     carotid thromboendarterectomy    • TONSILLECTOMY      age 32   • TOTAL KNEE ARTHROPLASTY Left    • TOTAL SHOULDER ARTHROPLASTY W/ DISTAL CLAVICLE EXCISION Left 1/16/2018    Procedure: TOTAL SHOULDER REVERSE ARTHROPLASTY;  Surgeon: Bianka Quesada MD;  Location: [facility] MAIN OR;  Service:          Current Outpatient Medications on File Prior to Visit   Medication Sig Dispense Refill   • alendronate (FOSAMAX) 70 MG tablet TAKE 1 TABLET EVERY 7 (SEVEN) DAYS. STAY UPRIGHT FOR 30 MINUTES / DRINK 8 OUNCES OF WATER AND DO NOT EAT 30 MINUTES 12 tablet 3   • aspirin 81 MG tablet Take 81 mg by mouth Daily.     • bumetanide (BUMEX) 2 MG tablet Take 1 tablet by mouth 2 (Two) Times a Day. 180 tablet 1   • calcitriol (ROCALTROL) 0.25 MCG capsule Take 0.25 mcg by mouth Daily.     • carvedilol (COREG) 25 MG tablet TAKE 1 TABLET TWICE DAILY 180 tablet 3   • Cholecalciferol (VITAMIN D) 2000 UNITS tablet Take 1 tablet by mouth daily.     • enoxaparin (LOVENOX) 80 MG/0.8ML solution syringe Inject 0.7 mL under the skin into the appropriate area as directed Daily. 4 mL 1   • epoetin adele (EPOGEN,PROCRIT) 12782 UNIT/ML injection Inject 10,000 Units under the skin  into the appropriate area as directed As Needed.     • HAVRIX 1440 EL U/ML vaccine      • Multiple Vitamins-Minerals (SENIOR MULTIVITAMIN PLUS) tablet Take 1 tablet by mouth Daily.     • pantoprazole (PROTONIX) 40 MG EC tablet TAKE 1 TABLET TWICE DAILY 180 tablet 0   • predniSONE (DELTASONE) 20 MG tablet Take 1 tablet by mouth Daily.     • ramipril (ALTACE) 2.5 MG capsule Take 5 mg by mouth Daily.     • simvastatin (ZOCOR) 40 MG tablet TAKE 1 TABLET EVERY DAY (Patient taking differently: pt reports taking 1/2 tablet nightly) 90 tablet 1   • traMADol (ULTRAM) 50 MG tablet Take 1 tablet by mouth Every 12 (Twelve) Hours As Needed for Moderate Pain . 60 tablet 2   • vitamin B-12 (CYANOCOBALAMIN) 2500 MCG sublingual tablet tablet Place 2,500 mcg under the tongue Daily.     • warfarin (COUMADIN) 2.5 MG tablet Take 1 tablet by mouth Every Night. (Patient taking differently: Take 1 mg by mouth Every Night.)     • zolpidem (AMBIEN) 10 MG tablet Take 0.5 tablets by mouth At Night As Needed for Sleep. 3 months 90 tablet 0     No current facility-administered medications on file prior to visit.          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     Employer: RETIRED   Tobacco Use   • Smoking status: Former Smoker     Packs/day: 1.00     Years: 50.00     Pack years: 50.00     Types: Cigarettes     Last attempt to quit: 1/11/2009     Years since quitting: 10.4   • Smokeless tobacco: Never Used   Substance and Sexual Activity   • Alcohol use: Yes     Comment: rare   • Drug use: No   • Sexual activity: Defer       Family History   Problem Relation Age of Onset   • Cancer Mother    • Breast cancer Mother    • Heart disease Mother    • Hypertension Mother    • Coronary artery disease Father    • Stroke Father    •  Heart disease Father    • Hypertension Father    • Other Daughter         thiamin deficiency    • Hypertension Son    • Lung disease Other    • Hypertension Other    • Malig Hyperthermia Neg Hx          Review of Systems   Constitution: Negative for decreased appetite, diaphoresis, fever, weakness, malaise/fatigue, weight gain and weight loss.   HENT: Positive for hearing loss. Negative for congestion, nosebleeds and tinnitus.    Eyes: Negative for blurred vision, double vision, vision loss in left eye, vision loss in right eye and visual disturbance.   Cardiovascular:        As noted in HPI   Respiratory:        As noted HPI   Endocrine: Negative for cold intolerance and heat intolerance.   Hematologic/Lymphatic: Negative for bleeding problem. Does not bruise/bleed easily.   Skin: Negative for color change, flushing, itching and rash.   Musculoskeletal: Positive for joint pain. Negative for arthritis, back pain, joint swelling, muscle weakness and myalgias.   Gastrointestinal: Negative for bloating, abdominal pain, constipation, diarrhea, dysphagia, heartburn, hematemesis, hematochezia, melena, nausea and vomiting.   Genitourinary: Negative for bladder incontinence, dysuria, frequency, nocturia and urgency.   Neurological: Negative for dizziness, focal weakness, headaches, light-headedness, loss of balance, numbness, paresthesias and vertigo.   Psychiatric/Behavioral: Negative for depression, memory loss and substance abuse.       Procedures      ECG 12 Lead  Date/Time: 6/13/2019 12:55 PM  Performed by: Nik Galarza MD  Authorized by: Nik Galarza MD   Comparison: compared with previous ECG   Similar to previous ECG  Rhythm: atrial fibrillation  Rhythm comments: Ventricular paced.                  Objective:    There were no vitals taken for this visit.       Physical Exam  Physical Exam   Constitutional: She is oriented to person, place, and time. She appears well-developed and well-nourished. No  distress.   HENT:   Head: Normocephalic.   Eyes: Conjunctivae are normal. Pupils are equal, round, and reactive to light. No scleral icterus.   Neck: Normal carotid pulses, no hepatojugular reflux and no JVD present. Carotid bruit is not present. No tracheal deviation, no edema and no erythema present. No thyromegaly present.   Cardiovascular: Normal rate, regular rhythm, S1 normal, S2 normal and intact distal pulses.  No extrasystoles are present. PMI is not displaced. Exam reveals no gallop, no distant heart sounds and no friction rub.   Murmur heard.   Systolic murmur is present with a grade of 2/6 at the upper right sternal border.  Pulses:       Carotid pulses are 2+ on the right side with bruit, and 2+ on the left side with bruit.       Radial pulses are 2+ on the right side, and 2+ on the left side.        Femoral pulses are 2+ on the right side, and 2+ on the left side.       Dorsalis pedis pulses are 2+ on the right side, and 2+ on the left side.        Posterior tibial pulses are 2+ on the right side, and 2+ on the left side.   Pulmonary/Chest: Effort normal and breath sounds normal. No respiratory distress. She has no decreased breath sounds. She has no wheezes. She has no rhonchi. She has no rales. She exhibits no tenderness.   Abdominal: Soft. Bowel sounds are normal. She exhibits no distension and no mass. There is no hepatosplenomegaly. There is no tenderness. There is no rebound and no guarding.   Musculoskeletal: She exhibits no edema, tenderness or deformity.   Neurological: She is alert and oriented to person, place, and time.   Skin: Skin is warm and dry. No rash noted. She is not diaphoretic. No cyanosis or erythema. No pallor. Nails show no clubbing.   Psychiatric: She has a normal mood and affect. Her speech is normal and behavior is normal. Judgment and thought content normal.           Assessment:   1. This is a 78 -year-old female with history of severe ischemic heart disease, previous  angioplasty of the right coronary artery, and then inferior infarct in February 2007.  Ultimately, she had single-vessel bypass grafting to the posterior descending artery at the time of her mitral valve replacement, moderate left ventricular dysfunction.  Echocardiogram in January 2019 reported ejection fraction of approximately 40%.  Coronary Artery Disease  Assessment  • The patient has no angina     Plan  • Lifestyle modifications discussed include adhering to a heart healthy diet, avoidance of tobacco products, maintenance of a healthy weight, medication compliance, regular exercise and regular monitoring of cholesterol and blood pressure     Subjective - Objective  • There is a history of past MI  • There is a history of previous coronary artery bypass graft  • There has been a previous POBA  • Current antiplatelet therapy includes aspirin 81 mg   Heart Failure  Assessment  • NYHA class II - There is slight limitation of physical activity. The patient is comfortable at rest, but physical activity results in fatigue, palpitations or shortness of breath.  • ACE inhibitor prescribed  • Beta blocker prescribed  • Diuretics prescribed  • Left ventricular function is moderately reduced by qualitative assessment     Plan  • The patient has received heart failure education on the following topics: dietary sodium restriction, minimizing or avoiding NSAID use, symptom management, physical activity and weight monitoring  • The heart failure care plan was discussed with the patient today including: continuing the current program  •  The patient has been counseled about ICD or CRT-D implantation     Subjective/Objective  • The patient reports dyspnea    • Physical exam findings negative for rales and elevated JVP.  • The patient has an ICD implant  • The patient has a CRT-D implant  • The patient has a CRT implant  She is probably at her baseline we will continue the same he will see his skin follow-up in 6 months and call  if problems.    2. History of mitral insufficiency status post mitral valve replacement with a tissue valve as above. Echocardiogram today showed left ventricular ejection fraction of approximate 40% with segmental wall motion abnormality. Normal functioning prosthetic mitral valve moderate tricuspid insufficiency with moderate pulmonary hypertension.  Echocardiogram January 2019 apparently unremarkable but I do not have that report will need to get a copy of it.  3. Atrial fibrillation/sick sinus syndrome. Failed multiple cardioversions, multiple medications. Failed pulmonary vein isolation. Now status post AV node ablation and BiV  Defibrillator.  Atrial Fibrillation and Atrial Flutter  Assessment  • The patient has permanent atrial fibrillation  • This is valvular in etiology  • The patient's CHADS2-VASc score is 6  • A [id number] score of 2 or more is considered a high risk for a thromboembolic event  • Warfarin prescribed     Plan  • Continue in atrial fibrillation with rate control  • Continue warfarin for antithrombotic therapy, bleeding issues discussed  • Continue beta blocker for rate control     Subjective - Objective  • The patient underwent cardioversion   • The patient had atrial fibrillation ablation   • The patient had a recurrence of atrial fibrillation since ablation   Stable to continue the same.         4. Cerebral vascular disease status post carotid endarterectomy.  Follow up carotid ultrasound 6/17 showed mild disease on the right moderate on the left continue follow periodic carotid ultrasounds.  We will obtain a follow-up carotid ultrasound when she returns.  5. Hypertension. Blood pressure adequately controlled.  6. Renal insufficiency. Followed by nephrology, Dr. Romero.  7. Hyperlipidemia, on therapy. Followed by PCP.  8. Nonsustained ventricular tachycardia.   9. Pulmonary hypertension. This is most likely secondary to sleep apnea and atrial fibrillation.   10. Sleep Apnea. Now on  CPAP.    11.  Anemia appears be relatively stable in fact may be improving.  She continues follow-up with hematology.          Plan:

## 2019-06-17 ENCOUNTER — PATIENT OUTREACH (OUTPATIENT)
Dept: CASE MANAGEMENT | Facility: OTHER | Age: 79
End: 2019-06-17

## 2019-06-17 ENCOUNTER — OFFICE VISIT (OUTPATIENT)
Dept: FAMILY MEDICINE CLINIC | Facility: CLINIC | Age: 79
End: 2019-06-17

## 2019-06-17 ENCOUNTER — HOSPITAL ENCOUNTER (EMERGENCY)
Facility: HOSPITAL | Age: 79
Discharge: LEFT BEFORE DISCHARGE | End: 2019-06-17

## 2019-06-17 VITALS
OXYGEN SATURATION: 98 % | WEIGHT: 147 LBS | DIASTOLIC BLOOD PRESSURE: 92 MMHG | BODY MASS INDEX: 26.05 KG/M2 | HEIGHT: 63 IN | HEART RATE: 60 BPM | TEMPERATURE: 98.2 F | SYSTOLIC BLOOD PRESSURE: 142 MMHG

## 2019-06-17 VITALS — RESPIRATION RATE: 16 BRPM | TEMPERATURE: 98.8 F | OXYGEN SATURATION: 100 % | HEART RATE: 61 BPM

## 2019-06-17 DIAGNOSIS — R19.7 DIARRHEA, UNSPECIFIED TYPE: Primary | ICD-10-CM

## 2019-06-17 DIAGNOSIS — R53.83 FATIGUE, UNSPECIFIED TYPE: ICD-10-CM

## 2019-06-17 LAB
ALBUMIN SERPL-MCNC: 3.6 G/DL (ref 3.5–5.2)
ALBUMIN/GLOB SERPL: 1.2 G/DL
ALP SERPL-CCNC: 44 U/L (ref 39–117)
ALT SERPL W P-5'-P-CCNC: 7 U/L (ref 1–33)
ANION GAP SERPL CALCULATED.3IONS-SCNC: 15.7 MMOL/L
AST SERPL-CCNC: 17 U/L (ref 1–32)
BILIRUB SERPL-MCNC: 0.4 MG/DL (ref 0.2–1.2)
BUN BLD-MCNC: 48 MG/DL (ref 8–23)
BUN/CREAT SERPL: 27.9 (ref 7–25)
CALCIUM SPEC-SCNC: 9.9 MG/DL (ref 8.6–10.5)
CHLORIDE SERPL-SCNC: 102 MMOL/L (ref 98–107)
CO2 SERPL-SCNC: 29.3 MMOL/L (ref 22–29)
CREAT BLD-MCNC: 1.72 MG/DL (ref 0.57–1)
ERYTHROCYTE [DISTWIDTH] IN BLOOD BY AUTOMATED COUNT: 14.6 % (ref 12.3–15.4)
GFR SERPL CREATININE-BSD FRML MDRD: 29 ML/MIN/1.73
GLOBULIN UR ELPH-MCNC: 2.9 GM/DL
GLUCOSE BLD-MCNC: 95 MG/DL (ref 65–99)
HCT VFR BLD AUTO: 29.1 % (ref 34–46.6)
HGB BLD-MCNC: 9.8 G/DL (ref 12–15.9)
LYMPHOCYTES # BLD AUTO: 0.9 10*3/MM3 (ref 0.7–3.1)
LYMPHOCYTES NFR BLD AUTO: 18.5 % (ref 19.6–45.3)
MCH RBC QN AUTO: 30 PG (ref 26.6–33)
MCHC RBC AUTO-ENTMCNC: 33.5 G/DL (ref 31.5–35.7)
MCV RBC AUTO: 89.5 FL (ref 79–97)
MONOCYTES # BLD AUTO: 0.2 10*3/MM3 (ref 0.1–0.9)
MONOCYTES NFR BLD AUTO: 4.8 % (ref 5–12)
NEUTROPHILS # BLD AUTO: 3.8 10*3/MM3 (ref 1.7–7)
NEUTROPHILS NFR BLD AUTO: 76.7 % (ref 42.7–76)
PLATELET # BLD AUTO: 182 10*3/MM3 (ref 140–450)
PMV BLD AUTO: 6.3 FL (ref 6–12)
POTASSIUM BLD-SCNC: 3.5 MMOL/L (ref 3.5–5.2)
PROT SERPL-MCNC: 6.5 G/DL (ref 6–8.5)
RBC # BLD AUTO: 3.25 10*6/MM3 (ref 3.77–5.28)
SODIUM BLD-SCNC: 147 MMOL/L (ref 136–145)
WBC NRBC COR # BLD: 4.9 10*3/MM3 (ref 3.4–10.8)

## 2019-06-17 PROCEDURE — 85025 COMPLETE CBC W/AUTO DIFF WBC: CPT | Performed by: NURSE PRACTITIONER

## 2019-06-17 PROCEDURE — 36415 COLL VENOUS BLD VENIPUNCTURE: CPT | Performed by: NURSE PRACTITIONER

## 2019-06-17 PROCEDURE — 99213 OFFICE O/P EST LOW 20 MIN: CPT | Performed by: NURSE PRACTITIONER

## 2019-06-17 PROCEDURE — 80053 COMPREHEN METABOLIC PANEL: CPT | Performed by: NURSE PRACTITIONER

## 2019-06-17 RX ORDER — RAMIPRIL 5 MG/1
5 CAPSULE ORAL DAILY
COMMUNITY
End: 2020-02-19 | Stop reason: SDUPTHER

## 2019-06-17 NOTE — PROGRESS NOTES
Subjective   Janel Mahoney is a 78 y.o. female.     History of Present Illness   C/o nausea, diarrhea, elevated BP, denies vomiting, states diarrhea started 3 days ago, states diarrhea improving. She is able to eat food. She denies fever, she denies abd pain. Unsure if hematochezia, states stool is usually dark, states last colonoscopy 1 year ago, normal. She denies family hx of crohns UC colon cancer. States she went to [facility] ER today but left d/t long wait times.   She states BP at home 156/66, this morning. Taking coreg 25mg bid, bumex 2mg bid, ramipril, 5mg hx of CHF, a fib, sees cardiology Dr. Galarza, saw last Friday. She had some palpitations today. She denies SOA, CP. She has pacemaker. Denies dizziness.       The following portions of the patient's history were reviewed and updated as appropriate: allergies, current medications, past family history, past medical history, past social history, past surgical history and problem list.    Review of Systems   Constitutional: Negative for chills, diaphoresis and fever.   Respiratory: Negative for cough and shortness of breath.    Cardiovascular: Negative for chest pain.   Gastrointestinal: Positive for diarrhea and nausea. Negative for abdominal pain and vomiting.   Musculoskeletal: Negative for arthralgias and myalgias.   Neurological: Negative for dizziness, light-headedness and headache.   All other systems reviewed and are negative.      Objective   Physical Exam   Constitutional: She is oriented to person, place, and time. She appears well-developed and well-nourished.   HENT:   Head: Normocephalic.   Eyes: Pupils are equal, round, and reactive to light.   Cardiovascular: Normal rate, regular rhythm and normal heart sounds.   No LE edema noted.    Pulmonary/Chest: Effort normal and breath sounds normal.   Abdominal: Soft. Bowel sounds are normal. She exhibits no distension. There is no hepatosplenomegaly. There is no tenderness. There is no rigidity and  no guarding.   Musculoskeletal: Normal range of motion.   Neurological: She is alert and oriented to person, place, and time.   Skin: Skin is warm and dry.   Psychiatric: She has a normal mood and affect. Her behavior is normal.   Nursing note and vitals reviewed.        Assessment/Plan   Janel was seen today for diarrhea.    Diagnoses and all orders for this visit:    Diarrhea, unspecified type  -     Clostridium Difficile EIA - Stool, Per Rectum; Future  -     Occult Blood, Fecal By Immunoassay - Stool, Per Rectum; Future  -     Giardia / Cryptosporidium Screen - Stool, Per Rectum; Future  -     Stool Culture - Stool, Per Rectum; Future  -     CBC & Differential  -     Comprehensive Metabolic Panel  -     CBC Auto Differential    Fatigue, unspecified type    Stool cultures ordered.   Cbc,cmp today.   Encouraged bland diet, advance as tolerated.   Deferred ER today, advised if any worsening symptoms, diarrhea, fatigue, dehydration to go to the ER.   F/u in office if symptoms persist 2-3 days.   Increase fluid intake, get plenty of rest.   Patient agrees with plan of care and understands instructions. Call if worsening symptoms or any problems or concerns.

## 2019-06-17 NOTE — PATIENT INSTRUCTIONS
Stool cultures ordered.   Cbc,cmp today.   Encouraged bland diet, advance as tolerated.   Deferred ER today, advised if any worsening symptoms, diarrhea, fatigue, dehydration to go to the ER.   F/u in office if symptoms persist 2-3 days.   Increase fluid intake, get plenty of rest.   Patient agrees with plan of care and understands instructions. Call if worsening symptoms or any problems or concerns.

## 2019-06-17 NOTE — OUTREACH NOTE
"Patient Outreach Note    States has had four episodes of diarrhea this morning, BP elevated(states 154) and \"a little short of air, because I just got dressed.\"  States Dr. Matute not in and really does not want to go to the ED because of \"waiting.\"  Discussed locations of Urgent Care Centers and she requested I discuss with .  Mr. Mahoney plans to take her to the ED as he reports she told him earlier that her\" heart was racing.\"  He plans to provide transportation for her to the ED.      Gregorio Mendoza RN    6/17/2019, 12:51 PM      "

## 2019-06-17 NOTE — OUTREACH NOTE
Patient Outreach Note    Daughter is aware patient has an appt scheduled this evening with ARNP and she has infomred her mother who is planning to go to appt.  Daughter was aware that patient had left the ED without being seen.    Gregorio Mendoza RN    6/17/2019, 4:24 PM

## 2019-06-17 NOTE — OUTREACH NOTE
Care Coordination Note    Informed of CA intervention today and chief complaints expressed by patient and her .  Aware will follow for CA services and that an appt was made by her/spouse to see primary care ARNP this evening which she had not shared with CA.      Gregorio Mendoza RN    6/17/2019, 4:03 PM

## 2019-06-18 RX ORDER — PANTOPRAZOLE SODIUM 40 MG/1
TABLET, DELAYED RELEASE ORAL
Qty: 180 TABLET | Refills: 1 | Status: SHIPPED | OUTPATIENT
Start: 2019-06-18 | End: 2019-11-12

## 2019-06-19 ENCOUNTER — LAB (OUTPATIENT)
Dept: FAMILY MEDICINE CLINIC | Facility: CLINIC | Age: 79
End: 2019-06-19

## 2019-06-19 DIAGNOSIS — R94.4 NONSPECIFIC ABNORMAL RESULTS OF KIDNEY FUNCTION STUDY: Primary | ICD-10-CM

## 2019-06-19 DIAGNOSIS — E87.0 SERUM SODIUM ELEVATED: ICD-10-CM

## 2019-06-19 LAB
ANION GAP SERPL CALCULATED.3IONS-SCNC: 13.2 MMOL/L
BUN BLD-MCNC: 46 MG/DL (ref 8–23)
BUN/CREAT SERPL: 24.7 (ref 7–25)
CALCIUM SPEC-SCNC: 9.5 MG/DL (ref 8.6–10.5)
CHLORIDE SERPL-SCNC: 97 MMOL/L (ref 98–107)
CO2 SERPL-SCNC: 29.8 MMOL/L (ref 22–29)
CREAT BLD-MCNC: 1.86 MG/DL (ref 0.57–1)
GFR SERPL CREATININE-BSD FRML MDRD: 26 ML/MIN/1.73
GLUCOSE BLD-MCNC: 108 MG/DL (ref 65–99)
POTASSIUM BLD-SCNC: 4 MMOL/L (ref 3.5–5.2)
SODIUM BLD-SCNC: 140 MMOL/L (ref 136–145)

## 2019-06-19 PROCEDURE — 36415 COLL VENOUS BLD VENIPUNCTURE: CPT | Performed by: NURSE PRACTITIONER

## 2019-06-19 PROCEDURE — 80048 BASIC METABOLIC PNL TOTAL CA: CPT | Performed by: NURSE PRACTITIONER

## 2019-06-25 ENCOUNTER — TELEPHONE (OUTPATIENT)
Dept: CARDIOLOGY | Facility: CLINIC | Age: 79
End: 2019-06-25

## 2019-06-25 NOTE — TELEPHONE ENCOUNTER
Okay, I did call pt. Spouse back and he stated he spent all morning trying to get the co-pay paid and finally did, so they are waiting on the medications now. He verbalized understanding of getting her INR rechecked the week she starts them.  /[place]

## 2019-06-25 NOTE — TELEPHONE ENCOUNTER
Spoke w/ pt.'s spouse Re: new medications given by Dr. Rishabh Flaherty for Gout management. The medications were Coicrys and Uloric. Pt.'s spouse informed me that they had strict orders per Dr. Galarza, to not take any new medications w/out making him aware first. Pt.'s spouse wants to know if it's okay for pt to take these medications.  /[place]

## 2019-06-26 ENCOUNTER — ANTICOAGULATION VISIT (OUTPATIENT)
Dept: PHARMACY | Facility: HOSPITAL | Age: 79
End: 2019-06-26

## 2019-06-26 ENCOUNTER — APPOINTMENT (OUTPATIENT)
Dept: LAB | Facility: HOSPITAL | Age: 79
End: 2019-06-26

## 2019-06-26 ENCOUNTER — PATIENT OUTREACH (OUTPATIENT)
Dept: CASE MANAGEMENT | Facility: OTHER | Age: 79
End: 2019-06-26

## 2019-06-26 DIAGNOSIS — I48.20 CHRONIC ATRIAL FIBRILLATION (HCC): ICD-10-CM

## 2019-06-26 LAB
INR PPP: 7.5 (ref 0.9–1.1)
INR PPP: >8 (ref 0.91–1.09)
PROTHROMBIN TIME: 63.8 SECONDS (ref 11.7–14.2)
PROTHROMBIN TIME: >96 SECONDS (ref 10–13.8)

## 2019-06-26 PROCEDURE — 36416 COLLJ CAPILLARY BLOOD SPEC: CPT

## 2019-06-26 PROCEDURE — 85610 PROTHROMBIN TIME: CPT

## 2019-06-26 PROCEDURE — G0463 HOSPITAL OUTPT CLINIC VISIT: HCPCS

## 2019-06-26 PROCEDURE — 36415 COLL VENOUS BLD VENIPUNCTURE: CPT

## 2019-06-26 NOTE — OUTREACH NOTE
"Patient Outreach Note    Spoke with patient's spouse and just returned from the hospital for an INR check.  INR, 8, and instructed to hold Coumadin today and tomorrow and to have INR rechecked on Friday, 6/28.  Reports adherent to Coumadin dosage, frequency, as prescribed and review of dietary intake and reports loves \"kale\" but \"strict\" adherence to \"watching what she eats.\"  Review of bleeding precautions.  States in the past INR has gotten as high as 10.  Noted most recent labs of H & H.  Discussed recent visit to ortho, Dr. Flaherty, for back pain and gout of right hand.  Awaiting delivery of medications from [facility] pharmacy as prescribed by Dr. Flaherty for gout treatment. States \"a lot of pain;\" swollen, and redness of right hand.   Received an injection at visit which Mr. Mahoney states, \"hasn't been of any benefit.\"   Suggested contacting [facility] for delivery information if not received today as was told would be \"express delivery,\" yesterday. Patient currently resting comfortably in recliner.    Gregorio Mendoza RN    6/26/2019, 3:06 PM      "

## 2019-06-26 NOTE — PROGRESS NOTES
Anticoagulation Clinic Progress Note    Anticoagulation Summary  As of 2019    INR goal:   2.0-3.0   TTR:   47.1 % (7.9 mo)   INR used for dosin.50! (2019)   Warfarin maintenance plan:   1 mg every Sun, Thu; 2 mg all other days   Weekly warfarin total:   12 mg   Plan last modified:   Vargas Butcher RPH (2019)   Next INR check:   2019   Priority:   Critical   Target end date:   Indefinite    Indications    Chronic atrial fibrillation (CMS/HCC) [I48.2]             Anticoagulation Episode Summary     INR check location:       Preferred lab:       Send INR reminders to:    [facility]    Comments:         Anticoagulation Care Providers     Provider Role Specialty Phone number    Nik Galarza MD Referring Cardiology [phone]          Clinic Interview:  Patient Findings     Positives:   Change in health, Change in medications    Negatives:   Signs/symptoms of thrombosis, Signs/symptoms of bleeding,   Laboratory test error suspected, Change in alcohol use, Change in   activity, Upcoming invasive procedure, Emergency department visit,   Upcoming dental procedure, Missed doses, Extra doses, Change in   diet/appetite, Hospital admission, Bruising, Other complaints    Comments:   Swelling in rt hand. Gout in foot; has not yet started gout   meds acutely (febuxostat, colchicine); neither expected to affect INR).   Increased pain. Received shot in back a couple days ago (steroid?).      Clinical Outcomes     Negatives:   Major bleeding event, Thromboembolic event,   Anticoagulation-related hospital admission, Anticoagulation-related ED   visit, Anticoagulation-related fatality    Comments:   Swelling in rt hand. Gout in foot; has not yet started gout   meds acutely (febuxostat, colchicine); neither expected to affect INR).   Increased pain. Received shot in back a couple days ago (steroid?).        INR History:  Anticoagulation Monitoring 2019   INR 2.0  2.6 7.50   INR Date 5/22/2019 6/5/2019 6/26/2019   INR Goal 2.0-3.0 2.0-3.0 2.0-3.0   Trend Up Same Same   Last Week Total 11 mg 12 mg 12 mg   Next Week Total 12 mg 12 mg 9 mg   Sun 1 mg 1 mg -   Mon 2 mg 2 mg -   Tue 2 mg 2 mg -   Wed 2 mg 2 mg Hold (6/26)   Thu 1 mg 1 mg Hold (6/27)   Fri 2 mg 2 mg -   Sat 2 mg 2 mg -   Visit Report - - -   Some recent data might be hidden       Plan:  1. INR is Supratherapeutic today (POC >8, venous INR 7.5)- see above in Anticoagulation Summary.  Will instruct Janel Mahoney to HOLD their warfarin regimen- see above in Anticoagulation Summary.  2. Follow up in 2 days  3. Patient declines warfarin refills.  4. Verbal and written information provided. Patient expresses understanding and has no further questions at this time.    Vargas Butcher [facility]

## 2019-06-27 ENCOUNTER — APPOINTMENT (OUTPATIENT)
Dept: GENERAL RADIOLOGY | Facility: HOSPITAL | Age: 79
End: 2019-06-27

## 2019-06-27 ENCOUNTER — HOSPITAL ENCOUNTER (EMERGENCY)
Facility: HOSPITAL | Age: 79
Discharge: HOME | End: 2019-06-27
Attending: EMERGENCY MEDICINE | Admitting: EMERGENCY MEDICINE

## 2019-06-27 VITALS
SYSTOLIC BLOOD PRESSURE: 105 MMHG | RESPIRATION RATE: 17 BRPM | OXYGEN SATURATION: 97 % | HEIGHT: 63 IN | HEART RATE: 60 BPM | TEMPERATURE: 98.9 F | BODY MASS INDEX: 27.78 KG/M2 | DIASTOLIC BLOOD PRESSURE: 51 MMHG | WEIGHT: 156.8 LBS

## 2019-06-27 DIAGNOSIS — N18.9 CHRONIC KIDNEY DISEASE, UNSPECIFIED CKD STAGE: ICD-10-CM

## 2019-06-27 DIAGNOSIS — D64.9 ANEMIA, UNSPECIFIED TYPE: ICD-10-CM

## 2019-06-27 DIAGNOSIS — M10.9 ACUTE GOUT OF MULTIPLE SITES, UNSPECIFIED CAUSE: Primary | ICD-10-CM

## 2019-06-27 LAB
ANION GAP SERPL CALCULATED.3IONS-SCNC: 15.9 MMOL/L (ref 5–15)
BASOPHILS # BLD AUTO: 0.01 10*3/MM3 (ref 0–0.2)
BASOPHILS NFR BLD AUTO: 0.1 % (ref 0–1.5)
BUN BLD-MCNC: 67 MG/DL (ref 8–23)
BUN/CREAT SERPL: 33.5 (ref 7–25)
CALCIUM SPEC-SCNC: 8.8 MG/DL (ref 8.6–10.5)
CHLORIDE SERPL-SCNC: 100 MMOL/L (ref 98–107)
CO2 SERPL-SCNC: 22.1 MMOL/L (ref 22–29)
CREAT BLD-MCNC: 2 MG/DL (ref 0.57–1)
DEPRECATED RDW RBC AUTO: 52 FL (ref 37–54)
EOSINOPHIL # BLD AUTO: 0.01 10*3/MM3 (ref 0–0.4)
EOSINOPHIL NFR BLD AUTO: 0.1 % (ref 0.3–6.2)
ERYTHROCYTE [DISTWIDTH] IN BLOOD BY AUTOMATED COUNT: 13.9 % (ref 12.3–15.4)
GFR SERPL CREATININE-BSD FRML MDRD: 24 ML/MIN/1.73
GLUCOSE BLD-MCNC: 104 MG/DL (ref 65–99)
HCT VFR BLD AUTO: 31.1 % (ref 34–46.6)
HGB BLD-MCNC: 8.8 G/DL (ref 12–15.9)
IMM GRANULOCYTES # BLD AUTO: 0.14 10*3/MM3 (ref 0–0.05)
IMM GRANULOCYTES NFR BLD AUTO: 1.4 % (ref 0–0.5)
LYMPHOCYTES # BLD AUTO: 0.37 10*3/MM3 (ref 0.7–3.1)
LYMPHOCYTES NFR BLD AUTO: 3.8 % (ref 19.6–45.3)
MCH RBC QN AUTO: 29 PG (ref 26.6–33)
MCHC RBC AUTO-ENTMCNC: 28.3 G/DL (ref 31.5–35.7)
MCV RBC AUTO: 102.6 FL (ref 79–97)
MONOCYTES # BLD AUTO: 0.79 10*3/MM3 (ref 0.1–0.9)
MONOCYTES NFR BLD AUTO: 8.1 % (ref 5–12)
NEUTROPHILS # BLD AUTO: 8.48 10*3/MM3 (ref 1.7–7)
NEUTROPHILS NFR BLD AUTO: 86.5 % (ref 42.7–76)
NRBC BLD AUTO-RTO: 0 /100 WBC (ref 0–0.2)
PLATELET # BLD AUTO: 181 10*3/MM3 (ref 140–450)
PMV BLD AUTO: 10.2 FL (ref 6–12)
POTASSIUM BLD-SCNC: 4.3 MMOL/L (ref 3.5–5.2)
RBC # BLD AUTO: 3.03 10*6/MM3 (ref 3.77–5.28)
SODIUM BLD-SCNC: 138 MMOL/L (ref 136–145)
WBC NRBC COR # BLD: 9.8 10*3/MM3 (ref 3.4–10.8)

## 2019-06-27 PROCEDURE — 80048 BASIC METABOLIC PNL TOTAL CA: CPT | Performed by: EMERGENCY MEDICINE

## 2019-06-27 PROCEDURE — 99284 EMERGENCY DEPT VISIT MOD MDM: CPT

## 2019-06-27 PROCEDURE — 73630 X-RAY EXAM OF FOOT: CPT

## 2019-06-27 PROCEDURE — 73130 X-RAY EXAM OF HAND: CPT

## 2019-06-27 PROCEDURE — 85025 COMPLETE CBC W/AUTO DIFF WBC: CPT | Performed by: EMERGENCY MEDICINE

## 2019-06-27 RX ORDER — HYDROCODONE BITARTRATE AND ACETAMINOPHEN 5; 325 MG/1; MG/1
1 TABLET ORAL ONCE
Status: COMPLETED | OUTPATIENT
Start: 2019-06-27 | End: 2019-06-27

## 2019-06-27 RX ADMIN — HYDROCODONE BITARTRATE AND ACETAMINOPHEN 1 TABLET: 5; 325 TABLET ORAL at 22:01

## 2019-06-28 ENCOUNTER — ANTICOAGULATION VISIT (OUTPATIENT)
Dept: PHARMACY | Facility: HOSPITAL | Age: 79
End: 2019-06-28

## 2019-06-28 ENCOUNTER — TELEPHONE (OUTPATIENT)
Dept: CARDIOLOGY | Facility: CLINIC | Age: 79
End: 2019-06-28

## 2019-06-28 ENCOUNTER — APPOINTMENT (OUTPATIENT)
Dept: LAB | Facility: HOSPITAL | Age: 79
End: 2019-06-28

## 2019-06-28 DIAGNOSIS — I48.20 CHRONIC ATRIAL FIBRILLATION (HCC): ICD-10-CM

## 2019-06-28 LAB
INR PPP: 8.2 (ref 0.9–1.1)
INR PPP: >8 (ref 0.91–1.09)
PROTHROMBIN TIME: 68.5 SECONDS (ref 11.7–14.2)
PROTHROMBIN TIME: >96 SECONDS (ref 10–13.8)

## 2019-06-28 PROCEDURE — 85610 PROTHROMBIN TIME: CPT

## 2019-06-28 PROCEDURE — G0463 HOSPITAL OUTPT CLINIC VISIT: HCPCS

## 2019-06-28 PROCEDURE — 36415 COLL VENOUS BLD VENIPUNCTURE: CPT

## 2019-06-28 PROCEDURE — 36416 COLLJ CAPILLARY BLOOD SPEC: CPT

## 2019-06-28 RX ORDER — PHYTONADIONE 5 MG/1
5 TABLET ORAL ONCE
Status: DISCONTINUED | OUTPATIENT
Start: 2019-06-28 | End: 2019-10-17

## 2019-06-28 NOTE — TELEPHONE ENCOUNTER
I spoke with Shanna in pharmacy and she will contact the patient come in for Vit K and will inform her to recheck her INR on Monday. / GHASSAN

## 2019-06-28 NOTE — PATIENT INSTRUCTIONS
Go to Lab for INR today.  If vitamin K is needed we will call you to come and pick it up before 4pm today.

## 2019-06-28 NOTE — TELEPHONE ENCOUNTER
Milly with [facility] called to report patient's INR today was 8.2.      Dr. Spence gave patient an injection for back pain on 6/24/19.  He instructed patient there was no need to hold Warfarin prior to injection.  INR on 6/26/19 was 7.5 patient instructed to HOLD her dose and recheck today.      Patient was seen in the ER yesterday for gout her HGB was 8.8.  10 days prior it was 9.8.      Shanna, pharmacist with  lab provided this information and asked what you would like them to do?  Please advise or call Shanna at [phone]./ GHASSAN

## 2019-06-28 NOTE — PROGRESS NOTES
Anticoagulation Clinic Progress Note    Anticoagulation Summary  As of 2019    INR goal:   2.0-3.0   TTR:   46.7 % (7.9 mo)   INR used for dosin.20! (2019)   Warfarin maintenance plan:   1 mg every Sun, Thu; 2 mg all other days   Weekly warfarin total:   12 mg   Plan last modified:   Vargas Butcher [facility] (2019)   Next INR check:   2019   Priority:   Critical   Target end date:   Indefinite    Indications    Chronic atrial fibrillation (CMS/HCC) [I48.2]             Anticoagulation Episode Summary     INR check location:       Preferred lab:       Send INR reminders to:    [facility]    Comments:         Anticoagulation Care Providers     Provider Role Specialty Phone number    Nik Galarza MD Referring Cardiology [phone]          Clinic Interview:      INR History:  Anticoagulation Monitoring 2019   INR 2.6 7.50 8.20   INR Date 2019   INR Goal 2.0-3.0 2.0-3.0 2.0-3.0   Trend Same Same Same   Last Week Total 12 mg 12 mg 9 mg   Next Week Total 12 mg 9 mg 7 mg   Sun 1 mg - Hold ()   Mon 2 mg - -   Tue 2 mg - -   Wed 2 mg Hold () -   Thu 1 mg Hold () -   Fri 2 mg - Hold ()   Sat 2 mg - Hold ()   Visit Report - - -   Some recent data might be hidden       Plan:  1. INR is Supratherapeutic today despite holding warfarin x 2 days- see above in Anticoagulation Summary.  Will instruct Janel Mahoney to continue to HOLD warfarin and take vitamin K po 5mg dose today Per Dr. Galarza/Marline-- see above in Anticoagulation Summary.  2. Follow up in 3 days--INR on Monday before resuming any warfarin.  3. Verbal and written information provided. Patient and spouse expresses understanding and has no further questions at this time. She will go to ED if any signs of bleeding noted.  4.  Russ Mahoney picked up phytonadione 5mg dose at our clinic today.    Shanna Major [facility]

## 2019-07-01 ENCOUNTER — ANTICOAGULATION VISIT (OUTPATIENT)
Dept: PHARMACY | Facility: HOSPITAL | Age: 79
End: 2019-07-01

## 2019-07-01 DIAGNOSIS — I48.20 CHRONIC ATRIAL FIBRILLATION (HCC): ICD-10-CM

## 2019-07-01 LAB
INR PPP: 2.5 (ref 0.91–1.09)
PROTHROMBIN TIME: 30.2 SECONDS (ref 10–13.8)

## 2019-07-01 PROCEDURE — G0463 HOSPITAL OUTPT CLINIC VISIT: HCPCS

## 2019-07-01 PROCEDURE — 36416 COLLJ CAPILLARY BLOOD SPEC: CPT

## 2019-07-01 PROCEDURE — 85610 PROTHROMBIN TIME: CPT

## 2019-07-01 NOTE — PROGRESS NOTES
Anticoagulation Clinic Progress Note    Anticoagulation Summary  As of 2019    INR goal:   2.0-3.0   TTR:   46.3 % (8 mo)   INR used for dosin.5 (2019)   Warfarin maintenance plan:   1 mg every Sun, Tue, Thu; 2 mg all other days   Weekly warfarin total:   11 mg   Plan last modified:   Shanna Major RPH (2019)   Next INR check:   2019   Priority:   Critical   Target end date:   Indefinite    Indications    Chronic atrial fibrillation (CMS/HCC) [I48.2]             Anticoagulation Episode Summary     INR check location:       Preferred lab:       Send INR reminders to:    [facility]    Comments:         Anticoagulation Care Providers     Provider Role Specialty Phone number    Nik Galarza MD Referring Cardiology [phone]          Clinic Interview:      INR History:  Anticoagulation Monitoring 2019   INR 7.50 8.20 2.5   INR Date 2019   INR Goal 2.0-3.0 2.0-3.0 2.0-3.0   Trend Same Same Down   Last Week Total 12 mg 9 mg 4 mg   Next Week Total 9 mg 7 mg 11 mg   Sun - Hold () -   Mon - - 2 mg   Tue - - 1 mg   Wed Hold () - 2 mg   Thu Hold () - 1 mg   Fri - Hold () -   Sat - Hold () -   Visit Report - - -   Some recent data might be hidden       Plan:  1. INR is Therapeutic today after holding 5 days and vitamin K 5mg PO given last Fri- see above in Anticoagulation Summary.  Will instruct Janel Mahoney to Change their warfarin regimen and restart warfarin today- see above in Anticoagulation Summary.  2. Follow up in 4 days  3. Patient declines warfarin refills.  4. Verbal and written information provided. Patient expresses understanding and has no further questions at this time.    Shanna Major RPH

## 2019-07-02 ENCOUNTER — PATIENT OUTREACH (OUTPATIENT)
Dept: CASE MANAGEMENT | Facility: OTHER | Age: 79
End: 2019-07-02

## 2019-07-02 NOTE — OUTREACH NOTE
"Patient Outreach Note    INR on 7/1/19, 2.5.  Review of medication assistance and administration.  Reports utilizes a med planner and medications set up for the week.  The patient, he, and his daughter, double check medications, dosage, and also keep a daily calender for reminders and changes to medications. Reviewed instructions for Coumadin dosing from 7/1 instructions and was able to read back correctly the proper dosing.  Reminder to check weekly planner with changes to the Coumadin dosing that proper dose is secured in the correct day.  Pain has subsided in feet and hands since receiving \"gout medication\" from [facility] pharmacy.    Has a lift chair and now independent with getting up and down from chair.  Remains weak and sedentary lifestyle.      Gregorio Mendoza RN    7/2/2019, 10:24 AM      "

## 2019-07-04 ENCOUNTER — CLINICAL SUPPORT NO REQUIREMENTS (OUTPATIENT)
Dept: CARDIOLOGY | Facility: CLINIC | Age: 79
End: 2019-07-04

## 2019-07-04 DIAGNOSIS — I42.9 CARDIOMYOPATHY, UNSPECIFIED TYPE (HCC): Primary | ICD-10-CM

## 2019-07-04 PROCEDURE — 93295 DEV INTERROG REMOTE 1/2/MLT: CPT | Performed by: INTERNAL MEDICINE

## 2019-07-04 PROCEDURE — 93296 REM INTERROG EVL PM/IDS: CPT | Performed by: INTERNAL MEDICINE

## 2019-07-05 ENCOUNTER — TELEPHONE (OUTPATIENT)
Dept: CARDIOLOGY | Facility: CLINIC | Age: 79
End: 2019-07-05

## 2019-07-05 ENCOUNTER — ANTICOAGULATION VISIT (OUTPATIENT)
Dept: PHARMACY | Facility: HOSPITAL | Age: 79
End: 2019-07-05

## 2019-07-05 DIAGNOSIS — I48.20 CHRONIC ATRIAL FIBRILLATION (HCC): ICD-10-CM

## 2019-07-05 LAB
INR PPP: 2.5 (ref 0.91–1.09)
PROTHROMBIN TIME: 29.7 SECONDS (ref 10–13.8)

## 2019-07-05 PROCEDURE — 36416 COLLJ CAPILLARY BLOOD SPEC: CPT

## 2019-07-05 PROCEDURE — 85610 PROTHROMBIN TIME: CPT

## 2019-07-05 NOTE — PROGRESS NOTES
Anticoagulation Clinic Progress Note    Anticoagulation Summary  As of 2019    INR goal:   2.0-3.0   TTR:   47.1 % (8.2 mo)   INR used for dosin.5 (2019)   Warfarin maintenance plan:   1 mg every Sun, Tue, Thu; 2 mg all other days   Weekly warfarin total:   11 mg   No change documented:   Taylor Shepherd   Plan last modified:   Shanna Major [facility] (2019)   Next INR check:   7/15/2019   Priority:   Critical   Target end date:   Indefinite    Indications    Chronic atrial fibrillation (CMS/[facility]) [I48.2]             Anticoagulation Episode Summary     INR check location:       Preferred lab:       Send INR reminders to:   [facility] [place]    Comments:         Anticoagulation Care Providers     Provider Role Specialty Phone number    Nik Galarza MD Referring Cardiology [phone]          Clinic Interview:      INR History:  Anticoagulation Monitoring 2019   INR 8.20 2.5 2.5   INR Date 2019   INR Goal 2.0-3.0 2.0-3.0 2.0-3.0   Trend Same Down Same   Last Week Total 9 mg 4 mg 6 mg   Next Week Total 7 mg 11 mg 11 mg   Sun Hold () - 1 mg   Mon - 2 mg 2 mg   Tue - 1 mg 1 mg   Wed - 2 mg 2 mg   Thu - 1 mg 1 mg   Fri Hold () - 2 mg   Sat Hold () - 2 mg   Visit Report - - -   Some recent data might be hidden       Plan:  1. INR is therapeutic today- see above in Anticoagulation Summary.   Will instruct Janel DORINA Denzel to continue their warfarin regimen- see above in Anticoagulation Summary.  2. Follow up in 2 weeks.  3. Patient declines warfarin refills.  4. Verbal and written information provided. Patient expresses understanding and has no further questions at this time.    Taylor Shepherd

## 2019-07-05 NOTE — TELEPHONE ENCOUNTER
remote alert transmission due ATP therapy.  Presenting egm, as of 7/4/19 @ 3:13am, AF bivpaced @ 60 with single pvcs.  Since last check on 6/13/19, 8 nonsustained VT and 1 VT treated with ATP, successful on 7/3/19 @ 16:18, 165bpm, total event lasted 29 sec.  CHB and BIVP=89%  Normal device function. Battery=8 years. Thanks. Humera

## 2019-07-08 ENCOUNTER — INFUSION (OUTPATIENT)
Dept: ONCOLOGY | Facility: HOSPITAL | Age: 79
End: 2019-07-08

## 2019-07-08 ENCOUNTER — ANTICOAGULATION VISIT (OUTPATIENT)
Dept: PHARMACY | Facility: HOSPITAL | Age: 79
End: 2019-07-08

## 2019-07-08 ENCOUNTER — LAB (OUTPATIENT)
Dept: LAB | Facility: HOSPITAL | Age: 79
End: 2019-07-08

## 2019-07-08 DIAGNOSIS — E53.8 B12 DEFICIENCY: ICD-10-CM

## 2019-07-08 DIAGNOSIS — I48.20 CHRONIC ATRIAL FIBRILLATION (HCC): ICD-10-CM

## 2019-07-08 DIAGNOSIS — N18.30 ANEMIA IN STAGE 3 CHRONIC KIDNEY DISEASE (HCC): ICD-10-CM

## 2019-07-08 DIAGNOSIS — D63.1 ANEMIA IN STAGE 3 CHRONIC KIDNEY DISEASE (HCC): ICD-10-CM

## 2019-07-08 DIAGNOSIS — D69.6 THROMBOCYTOPENIA (HCC): ICD-10-CM

## 2019-07-08 LAB
BASOPHILS # BLD AUTO: 0.03 10*3/MM3 (ref 0–0.2)
BASOPHILS NFR BLD AUTO: 0.4 % (ref 0–1.5)
DEPRECATED RDW RBC AUTO: 48.6 FL (ref 37–54)
EOSINOPHIL # BLD AUTO: 0.24 10*3/MM3 (ref 0–0.4)
EOSINOPHIL NFR BLD AUTO: 3 % (ref 0.3–6.2)
ERYTHROCYTE [DISTWIDTH] IN BLOOD BY AUTOMATED COUNT: 14.4 % (ref 12.3–15.4)
HCT VFR BLD AUTO: 32.6 % (ref 34–46.6)
HGB BLD-MCNC: 10.3 G/DL (ref 12–15.9)
IMM GRANULOCYTES # BLD AUTO: 0.1 10*3/MM3 (ref 0–0.05)
IMM GRANULOCYTES NFR BLD AUTO: 1.3 % (ref 0–0.5)
INR PPP: 2.5 (ref 0.9–1.1)
LYMPHOCYTES # BLD AUTO: 1.11 10*3/MM3 (ref 0.7–3.1)
LYMPHOCYTES NFR BLD AUTO: 14.1 % (ref 19.6–45.3)
MCH RBC QN AUTO: 29.7 PG (ref 26.6–33)
MCHC RBC AUTO-ENTMCNC: 31.6 G/DL (ref 31.5–35.7)
MCV RBC AUTO: 93.9 FL (ref 79–97)
MONOCYTES # BLD AUTO: 0.41 10*3/MM3 (ref 0.1–0.9)
MONOCYTES NFR BLD AUTO: 5.2 % (ref 5–12)
NEUTROPHILS # BLD AUTO: 6.01 10*3/MM3 (ref 1.7–7)
NEUTROPHILS NFR BLD AUTO: 76 % (ref 42.7–76)
NRBC BLD AUTO-RTO: 0 /100 WBC (ref 0–0.2)
PLATELET # BLD AUTO: 186 10*3/MM3 (ref 140–450)
PMV BLD AUTO: 10.1 FL (ref 6–12)
PROTHROMBIN TIME: 29.8 SECONDS (ref 11–13.5)
RBC # BLD AUTO: 3.47 10*6/MM3 (ref 3.77–5.28)
WBC NRBC COR # BLD: 7.9 10*3/MM3 (ref 3.4–10.8)

## 2019-07-08 PROCEDURE — 36415 COLL VENOUS BLD VENIPUNCTURE: CPT | Performed by: INTERNAL MEDICINE

## 2019-07-08 PROCEDURE — 85610 PROTHROMBIN TIME: CPT | Performed by: INTERNAL MEDICINE

## 2019-07-08 PROCEDURE — 85025 COMPLETE CBC W/AUTO DIFF WBC: CPT | Performed by: INTERNAL MEDICINE

## 2019-07-08 NOTE — PROGRESS NOTES
Anticoagulation Clinic Progress Note    Anticoagulation Summary  As of 2019    INR goal:   2.0-3.0   TTR:   47.8 % (8.3 mo)   INR used for dosin.50 (2019)   Warfarin maintenance plan:   1 mg every Sun, Tue, Thu; 2 mg all other days   Weekly warfarin total:   11 mg   Plan last modified:   Shanna Major RPH (2019)   Next INR check:   7/15/2019   Target end date:   Indefinite    Indications    Chronic atrial fibrillation (CMS/HCC) [I48.2]             Anticoagulation Episode Summary     INR check location:       Preferred lab:       Send INR reminders to:    [facility] [place]    Comments:         Anticoagulation Care Providers     Provider Role Specialty Phone number    Nik Galarza MD Referring Cardiology [phone]          Clinic Interview:      INR History:  Anticoagulation Monitoring 2019   INR 2.5 2.5 2.50   INR Date 2019   INR Goal 2.0-3.0 2.0-3.0 2.0-3.0   Trend Down Same Same   Last Week Total 4 mg 6 mg 11 mg   Next Week Total 11 mg 11 mg 11 mg   Sun - 1 mg 1 mg   Mon 2 mg - 2 mg   Tue 1 mg - 1 mg   Wed 2 mg - 2 mg   Thu 1 mg - 1 mg   Fri - 2 mg 2 mg   Sat - 2 mg 2 mg   Visit Report - - -   Some recent data might be hidden       Plan:  1. INR is Therapeutic today- see above in Anticoagulation Summary.   Will instruct Janel DORINA Denzel to Continue their warfarin regimen- see above in Anticoagulation Summary.  2. Follow up in 1 weeks  3. Spoke with pt on phone.They have been instructed to call if any changes in medications, doses, concerns, etc. Patient expresses understanding and has no further questions at this time.    Shanna Major RPH

## 2019-07-08 NOTE — PROGRESS NOTES
Hgb 10.3 today. Per protocol, no procrit today. Pt states she feels good, denies any complaints other than pain r/t gout but pt reports this is managed elsewhere and is improving. Copy of labs given and pt to return as scheduled, she v/u.    Lab Results   Component Value Date    WBC 7.90 07/08/2019    HGB 10.3 (L) 07/08/2019    HCT 32.6 (L) 07/08/2019    MCV 93.9 07/08/2019     07/08/2019

## 2019-07-11 ENCOUNTER — RESEARCH ENCOUNTER (OUTPATIENT)
Dept: CARDIOLOGY | Facility: CLINIC | Age: 79
End: 2019-07-11

## 2019-07-11 NOTE — RESEARCH
Research study: [employer] PSR  Subject initials: MARYCHUY  Subject study ID#: [id number]    MARYCHUY came to the [facility] office on 6/13/19 for routine interrogation of her CRT-D device. I entered data from that visit into the study database today. There were no device or lead events to report, nor other qualifying health events. We will continue to follow MARYCHUY per study protocol, which is approximately every 6 months for CRT devices.     Tiffany CALLESN RN  CV Research Coordinator

## 2019-07-15 ENCOUNTER — ANTICOAGULATION VISIT (OUTPATIENT)
Dept: PHARMACY | Facility: HOSPITAL | Age: 79
End: 2019-07-15

## 2019-07-15 DIAGNOSIS — I48.20 CHRONIC ATRIAL FIBRILLATION (HCC): ICD-10-CM

## 2019-07-15 LAB
INR PPP: 2.7 (ref 0.91–1.09)
PROTHROMBIN TIME: 32.6 SECONDS (ref 10–13.8)

## 2019-07-15 PROCEDURE — 85610 PROTHROMBIN TIME: CPT

## 2019-07-15 PROCEDURE — 36416 COLLJ CAPILLARY BLOOD SPEC: CPT

## 2019-07-15 NOTE — PROGRESS NOTES
Anticoagulation Clinic Progress Note    Anticoagulation Summary  As of 7/15/2019    INR goal:   2.0-3.0   TTR:   49.2 % (8.5 mo)   INR used for dosin.7 (7/15/2019)   Warfarin maintenance plan:   1 mg every Sun, Tue, Thu; 2 mg all other days   Weekly warfarin total:   11 mg   No change documented:   Lee Ann Diamond   Plan last modified:   Shanna Major RPH (2019)   Next INR check:   2019   Target end date:   Indefinite    Indications    Chronic atrial fibrillation (CMS/[facility]) [I48.2]             Anticoagulation Episode Summary     INR check location:       Preferred lab:       Send INR reminders to:    [facility]    Comments:         Anticoagulation Care Providers     Provider Role Specialty Phone number    Nik Galarza MD Referring Cardiology [phone]          Clinic Interview:  Patient Findings     Negatives:   Signs/symptoms of thrombosis, Signs/symptoms of bleeding,   Laboratory test error suspected, Change in health, Change in alcohol use,   Change in activity, Upcoming invasive procedure, Emergency department   visit, Upcoming dental procedure, Missed doses, Extra doses, Change in   medications, Change in diet/appetite, Hospital admission, Bruising, Other   complaints      Clinical Outcomes     Negatives:   Major bleeding event, Thromboembolic event,   Anticoagulation-related hospital admission, Anticoagulation-related ED   visit, Anticoagulation-related fatality        INR History:  Anticoagulation Monitoring 2019 2019 7/15/2019   INR 2.5 2.50 2.7   INR Date 2019 2019 7/15/2019   INR Goal 2.0-3.0 2.0-3.0 2.0-3.0   Trend Same Same Same   Last Week Total 6 mg 11 mg 11 mg   Next Week Total 11 mg 11 mg 11 mg   Sun 1 mg 1 mg 1 mg   Mon - 2 mg 2 mg   Tue - 1 mg 1 mg   Wed - 2 mg 2 mg   Thu - 1 mg 1 mg   Fri 2 mg 2 mg 2 mg   Sat 2 mg 2 mg 2 mg   Visit Report - - -   Some recent data might be hidden       Plan:  1. INR is therapeutic today- see above in  Anticoagulation Summary.   Will instruct Janel Covingtons to continue their warfarin regimen- see above in Anticoagulation Summary.  2. Follow up in 2 weeks.  3. Patient declines warfarin refills.  4. Verbal and written information provided. Patient expresses understanding and has no further questions at this time.    Lee Ann Diamond

## 2019-07-17 ENCOUNTER — CLINICAL SUPPORT NO REQUIREMENTS (OUTPATIENT)
Dept: CARDIOLOGY | Facility: CLINIC | Age: 79
End: 2019-07-17

## 2019-07-17 ENCOUNTER — TELEPHONE (OUTPATIENT)
Dept: CARDIOLOGY | Facility: CLINIC | Age: 79
End: 2019-07-17

## 2019-07-17 DIAGNOSIS — I42.9 CARDIOMYOPATHY, UNSPECIFIED TYPE (HCC): Primary | ICD-10-CM

## 2019-07-17 NOTE — TELEPHONE ENCOUNTER
remote transmission on bivicd, due to an episode of VT and successful ATP on 7/16/19 @ 21:42.  Since last remote and ATP on 7/4/19, 3 nonsustained VT, longest on 7/15/19, 25 beats @ 153bpm.  Presenting egm, at time of transmission, 7/17/19 @ 3am, AF and BIVP @ 60bpm. We will continue to monitor. Am

## 2019-07-24 ENCOUNTER — PATIENT OUTREACH (OUTPATIENT)
Dept: CASE MANAGEMENT | Facility: OTHER | Age: 79
End: 2019-07-24

## 2019-07-24 ENCOUNTER — EPISODE CHANGES (OUTPATIENT)
Dept: CASE MANAGEMENT | Facility: OTHER | Age: 79
End: 2019-07-24

## 2019-07-24 NOTE — OUTREACH NOTE
Patient Outreach Note    Patient has met care plan goals, all care gaps have been addressed, and patient has a strong sense of health self-management.  Discussed flu and pneumonia vaccines this evening and to discuss with Dr. Matute during Sept. Appt. The patient's annual wellness visit is scheduled. My chart has been declined. Her spouse is her designated healthcare surrogate.     Gregorio Mendoza RN    7/24/2019, 5:35 PM

## 2019-07-25 ENCOUNTER — LAB (OUTPATIENT)
Dept: LAB | Facility: HOSPITAL | Age: 79
End: 2019-07-25

## 2019-07-25 ENCOUNTER — TRANSCRIBE ORDERS (OUTPATIENT)
Dept: LAB | Facility: HOSPITAL | Age: 79
End: 2019-07-25

## 2019-07-25 ENCOUNTER — EPISODE CHANGES (OUTPATIENT)
Dept: CASE MANAGEMENT | Facility: OTHER | Age: 79
End: 2019-07-25

## 2019-07-25 DIAGNOSIS — Z79.899 NEED FOR PROPHYLACTIC CHEMOTHERAPY: ICD-10-CM

## 2019-07-25 DIAGNOSIS — M10.9 GOUT, UNSPECIFIED CAUSE, UNSPECIFIED CHRONICITY, UNSPECIFIED SITE: Primary | ICD-10-CM

## 2019-07-25 DIAGNOSIS — M10.9 GOUT, UNSPECIFIED CAUSE, UNSPECIFIED CHRONICITY, UNSPECIFIED SITE: ICD-10-CM

## 2019-07-25 LAB
ALBUMIN SERPL-MCNC: 3.8 G/DL (ref 3.5–5.2)
ALBUMIN/GLOB SERPL: 1.7 G/DL
ALP SERPL-CCNC: 57 U/L (ref 39–117)
ALT SERPL W P-5'-P-CCNC: 16 U/L (ref 1–33)
ANION GAP SERPL CALCULATED.3IONS-SCNC: 12 MMOL/L (ref 5–15)
AST SERPL-CCNC: 23 U/L (ref 1–32)
BASOPHILS # BLD AUTO: 0.02 10*3/MM3 (ref 0–0.2)
BASOPHILS NFR BLD AUTO: 0.5 % (ref 0–1.5)
BILIRUB SERPL-MCNC: 0.5 MG/DL (ref 0.2–1.2)
BUN BLD-MCNC: 56 MG/DL (ref 8–23)
BUN/CREAT SERPL: 26.7 (ref 7–25)
CALCIUM SPEC-SCNC: 9 MG/DL (ref 8.6–10.5)
CHLORIDE SERPL-SCNC: 98 MMOL/L (ref 98–107)
CO2 SERPL-SCNC: 30 MMOL/L (ref 22–29)
CREAT BLD-MCNC: 2.1 MG/DL (ref 0.57–1)
DEPRECATED RDW RBC AUTO: 52.5 FL (ref 37–54)
EOSINOPHIL # BLD AUTO: 0.21 10*3/MM3 (ref 0–0.4)
EOSINOPHIL NFR BLD AUTO: 5.1 % (ref 0.3–6.2)
ERYTHROCYTE [DISTWIDTH] IN BLOOD BY AUTOMATED COUNT: 15 % (ref 12.3–15.4)
GFR SERPL CREATININE-BSD FRML MDRD: 23 ML/MIN/1.73
GLOBULIN UR ELPH-MCNC: 2.3 GM/DL
GLUCOSE BLD-MCNC: 97 MG/DL (ref 65–99)
HCT VFR BLD AUTO: 33.5 % (ref 34–46.6)
HGB BLD-MCNC: 10.2 G/DL (ref 12–15.9)
IMM GRANULOCYTES # BLD AUTO: 0.01 10*3/MM3 (ref 0–0.05)
IMM GRANULOCYTES NFR BLD AUTO: 0.2 % (ref 0–0.5)
LYMPHOCYTES # BLD AUTO: 0.72 10*3/MM3 (ref 0.7–3.1)
LYMPHOCYTES NFR BLD AUTO: 17.6 % (ref 19.6–45.3)
MCH RBC QN AUTO: 29.2 PG (ref 26.6–33)
MCHC RBC AUTO-ENTMCNC: 30.4 G/DL (ref 31.5–35.7)
MCV RBC AUTO: 96 FL (ref 79–97)
MONOCYTES # BLD AUTO: 0.38 10*3/MM3 (ref 0.1–0.9)
MONOCYTES NFR BLD AUTO: 9.3 % (ref 5–12)
NEUTROPHILS # BLD AUTO: 2.74 10*3/MM3 (ref 1.7–7)
NEUTROPHILS NFR BLD AUTO: 67.3 % (ref 42.7–76)
NRBC BLD AUTO-RTO: 0 /100 WBC (ref 0–0.2)
PLATELET # BLD AUTO: 145 10*3/MM3 (ref 140–450)
PMV BLD AUTO: 10.9 FL (ref 6–12)
POTASSIUM BLD-SCNC: 4.5 MMOL/L (ref 3.5–5.2)
PROT SERPL-MCNC: 6.1 G/DL (ref 6–8.5)
RBC # BLD AUTO: 3.49 10*6/MM3 (ref 3.77–5.28)
SODIUM BLD-SCNC: 140 MMOL/L (ref 136–145)
URATE SERPL-MCNC: 7.5 MG/DL (ref 2.4–5.7)
WBC NRBC COR # BLD: 4.08 10*3/MM3 (ref 3.4–10.8)

## 2019-07-25 PROCEDURE — 80053 COMPREHEN METABOLIC PANEL: CPT

## 2019-07-25 PROCEDURE — 85025 COMPLETE CBC W/AUTO DIFF WBC: CPT

## 2019-07-25 PROCEDURE — 36415 COLL VENOUS BLD VENIPUNCTURE: CPT

## 2019-07-25 PROCEDURE — 84550 ASSAY OF BLOOD/URIC ACID: CPT

## 2019-07-29 RX ORDER — TRAMADOL HYDROCHLORIDE 50 MG/1
TABLET ORAL
Qty: 120 TABLET | Refills: 5 | Status: SHIPPED | OUTPATIENT
Start: 2019-07-29 | End: 2019-11-12

## 2019-07-30 ENCOUNTER — ANTICOAGULATION VISIT (OUTPATIENT)
Dept: PHARMACY | Facility: HOSPITAL | Age: 79
End: 2019-07-30

## 2019-07-30 DIAGNOSIS — I48.20 CHRONIC ATRIAL FIBRILLATION (HCC): ICD-10-CM

## 2019-07-30 LAB
INR PPP: 3.7 (ref 0.91–1.09)
PROTHROMBIN TIME: 44.2 SECONDS (ref 10–13.8)

## 2019-07-30 PROCEDURE — 85610 PROTHROMBIN TIME: CPT

## 2019-07-30 PROCEDURE — 36416 COLLJ CAPILLARY BLOOD SPEC: CPT

## 2019-07-30 PROCEDURE — G0463 HOSPITAL OUTPT CLINIC VISIT: HCPCS

## 2019-07-30 NOTE — PATIENT INSTRUCTIONS
Hold today's dose, then continue current regimen. [facility]

## 2019-08-02 ENCOUNTER — HOSPITAL ENCOUNTER (EMERGENCY)
Facility: HOSPITAL | Age: 79
Discharge: HOME | End: 2019-08-02
Attending: EMERGENCY MEDICINE | Admitting: EMERGENCY MEDICINE

## 2019-08-02 ENCOUNTER — PATIENT OUTREACH (OUTPATIENT)
Dept: CASE MANAGEMENT | Facility: OTHER | Age: 79
End: 2019-08-02

## 2019-08-02 VITALS
HEART RATE: 60 BPM | WEIGHT: 140 LBS | OXYGEN SATURATION: 93 % | BODY MASS INDEX: 23.9 KG/M2 | TEMPERATURE: 97.1 F | RESPIRATION RATE: 16 BRPM | HEIGHT: 64 IN | SYSTOLIC BLOOD PRESSURE: 144 MMHG | DIASTOLIC BLOOD PRESSURE: 60 MMHG

## 2019-08-02 DIAGNOSIS — S61.411A SKIN TEAR OF RIGHT HAND WITHOUT COMPLICATION, INITIAL ENCOUNTER: Primary | ICD-10-CM

## 2019-08-02 LAB
INR PPP: 2.69 (ref 0.9–1.1)
PROTHROMBIN TIME: 28.3 SECONDS (ref 11.7–14.2)

## 2019-08-02 PROCEDURE — 99283 EMERGENCY DEPT VISIT LOW MDM: CPT

## 2019-08-02 PROCEDURE — 85610 PROTHROMBIN TIME: CPT | Performed by: PHYSICIAN ASSISTANT

## 2019-08-02 NOTE — OUTREACH NOTE
Care Coordination Note      Chart review completed. Patient in ED today related to a skin tear bleed. CA followed in the past with various education. ED utilized as substitute for PCP at times per notes.     Bonita Zamora RN    8/2/2019, 10:57 AM

## 2019-08-02 NOTE — ED NOTES
Patient c/o right hand wound. Patient smashed right hand in refrigerator door this morning, sustained skin tear to posterior hand, uncontrolled bleeding. Patient on warfarin. Full ROM noted to right hand, skin p/w/d, radial pulses WDL.      Marcella Lynch RN  08/02/19 0917

## 2019-08-02 NOTE — ED PROVIDER NOTES
The JF and I have discussed this patient's history, physical exam, and treatment plan. I have reviewed the documentation and personally had a face to face interaction with the patient  I affirm the documentation and agree with the treatment and plan.  The following describes my personal findings.    The patient presents complaining of skin tear to the right hand that occurred last night after bumping her hand on a door at home. PT denies weakness/numbness, bony pain.    Limited physical exam:  Patient is nontoxic appearing, awake and alert  Back/extremities: steri stripped skin tear to dorsum of right hand with a mild amount of oozing but pt has good , sensation, ROM, and cap refill.     Plan: to discharge the pt. Pt understands and agrees with the plan. All questions have been answered.      Documentation assistance provided by foreign Marcano.  Information recorded by the scribe was done at my direction and has been verified and validated by me.          Dina Marcano  08/02/19 1137       Ania Rudd MD  08/03/19 2013

## 2019-08-02 NOTE — ED TRIAGE NOTES
Right top of hand laceration on refrigerator door last night states she is on warfarin and can not get hand to stop bleeding. Tetanus is up to date

## 2019-08-02 NOTE — ED PROVIDER NOTES
EMERGENCY DEPARTMENT ENCOUNTER    CHIEF COMPLAINT  Chief Complaint: bleeding from wound  History given by: patient  History limited by: none  Room Number: 04/04  PMD: Ariel Matute MD      HPI:  Pt is a 78 y.o. female who presents complaining of persistent bleeding from skin tear after she got her hand stuck last night while making dinner. She cleaned the wound with peroxide and put Neosporin on it w/ gauze, but wound continues to bleed. She is on coumadin for afib and at last check it was elevated (3.9). She denies other injuries or complaints. Last Tdap approximately one year ago.     PAST MEDICAL HISTORY  Active Ambulatory Problems     Diagnosis Date Noted   • Anemia in stage 3 chronic kidney disease (CMS/[facility]) 02/12/2016   • Thrombocytopenia (CMS/[facility]) 05/17/2016   • B12 deficiency 05/17/2016   • Coronary artery disease involving coronary bypass graft of native heart without angina pectoris 06/08/2016   • S/P MVR (porcine) 06/08/2016   • Chronic atrial fibrillation (CMS/[facility]) 06/08/2016   • Bilateral carotid artery disease (CMS/[facility]) 06/08/2016   • Intractable low back pain 08/02/2016   • Long-term (current) use of anticoagulants 08/16/2016   • Low back pain 08/18/2016   • Osteoporosis 09/01/2016   • Murmur, heart 09/01/2016   • GEORGINA on auto CPAP - Dr Cardona 09/08/2016   • Hypersomnia due to medical condition - GEORGINA 09/08/2016   • Esophageal stricture 09/06/2017   • Acute blood loss anemia 09/26/2017   • Dysphagia 09/26/2017   • Closed fracture of left proximal humerus 12/24/2017   • Hypertension 12/24/2017   • Supratherapeutic INR 12/24/2017   • Chronic combined systolic and diastolic congestive heart failure (CMS/[facility]) 12/24/2017   • Osteoporosis with pathological fracture 12/24/2017   • Closed 3-part fracture of proximal end of left humerus 01/10/2018   • Closed fracture of proximal end of humerus with delayed healing 01/16/2018   • Injury of right knee 11/12/2018   • Knee injury, left, initial encounter  11/12/2018   • History of fall 11/12/2018   • S/P TKR (total knee replacement) using cement, left 11/12/2018   • Ischemic cardiomyopathy 11/13/2018   • Intramuscular hematoma right pecotralis 11/23/2018   • Hemorrhagic disorder due to extrinsic circulating anticoagulants (CMS/[facility]) 11/23/2018   • Acute posthemorrhagic anemia 11/23/2018   • Chronic kidney disease, stage 3 (CMS/[facility]) 11/23/2018   • Acute kidney injury (CMS/[facility]) 11/25/2018   • Peripheral edema 02/15/2019   • Acute on chronic combined systolic (congestive) and diastolic (congestive) heart failure (CMS/[facility]) 02/20/2019   • Chronic coronary artery disease 02/20/2019   • Inflammatory arthritis 02/20/2019     Resolved Ambulatory Problems     Diagnosis Date Noted   • No Resolved Ambulatory Problems     Past Medical History:   Diagnosis Date   • Acute kidney injury (CMS/[facility])    • Anemia    • Atrial fibrillation (CMS/[facility])    • Bruises easily    • Carotid artery stenosis    • Chronic back pain    • Chronic combined systolic and diastolic congestive heart failure (CMS/[facility])    • Chronic coronary artery disease    • Chronic kidney disease, stage 3 (CMS/[facility])    • Dysphagia    • GERD (gastroesophageal reflux disease)    • H/O cardiac murmur    • [place] (hard of hearing)    • Hyperlipidemia    • Hypertension    • Hypotension    • Ischemic cardiomyopathy    • Kyphoscoliosis    • Leukopenia    • Lumbar spondylosis    • Obesity    • GEORGINA (obstructive sleep apnea)    • Osteoarthritis    • Osteoporosis    • Peptic ulcer    • Premature ventricular contractions    • Renal insufficiency syndrome    • Scoliosis    • Shoulder fracture, left    • Stroke syndrome    • Thrombocytopenia (CMS/[facility])    • Ventricular tachycardia (CMS/[facility])    • Vitamin B12 deficiency        PAST SURGICAL HISTORY  Past Surgical History:   Procedure Laterality Date   • AV NODE ABLATION     • BREAST BIOPSY     • CARDIAC CATHETERIZATION      Showed severe mitral insufficiency and borderline coronary artery  disease   • CARDIAC CATHETERIZATION      Showed an ejection fraction of 35%. She had occlusive disease of the right posterior LV branch and no other significant disease, treated medically.   • CARDIAC DEFIBRILLATOR PLACEMENT      Biventricular   • CARDIAC ELECTROPHYSIOLOGY PROCEDURE N/A 1/4/2019    Procedure: GENERATOR CHANGE BI-V ICD   [place];  Surgeon: James Hwang MD;  Location: [facility] CATH INVASIVE LOCATION;  Service: Cardiology   • CARDIAC VALVE REPLACEMENT  2009    Done with stent placement   • CARDIOVERSION      multiple electrocardioversions.   • CAROTID ARTERY ANGIOPLASTY Right    • COLONOSCOPY N/A 9/28/2017    Procedure: COLONOSCOPY TO CECUM;  Surgeon: Kevin Davis MD;  Location: [facility] ENDOSCOPY;  Service:    • CORONARY ANGIOPLASTY WITH STENT PLACEMENT  2009   • CORONARY ARTERY BYPASS GRAFT      single graft to the PDA   • CORONARY STENT PLACEMENT     • ENDOSCOPY N/A 9/28/2017    Procedure: ESOPHAGOGASTRODUODENOSCOPY ;  Surgeon: Kevin Davis MD;  Location: [facility] ENDOSCOPY;  Service:    • HEMORRHOIDECTOMY     • HYSTERECTOMY     • INCISION AND DRAINAGE TRUNK Right 11/27/2018    Procedure: EVACUATION OF RIGHT CHEST WALL HEMATOMA;  Surgeon: Juvencio Rodriguez MD;  Location: [facility] OR;  Service: General   • MITRAL VALVE REPLACEMENT  01/2010    #31 Epic porcine valve.   • THROMBOENDARTERECTOMY Right     carotid thromboendarterectomy    • TONSILLECTOMY      age 32   • TOTAL KNEE ARTHROPLASTY Left    • TOTAL SHOULDER ARTHROPLASTY W/ DISTAL CLAVICLE EXCISION Left 1/16/2018    Procedure: TOTAL SHOULDER REVERSE ARTHROPLASTY;  Surgeon: Bianka Quesada MD;  Location: [facility] OR;  Service:        FAMILY HISTORY  Family History   Problem Relation Age of Onset   • Cancer Mother    • Breast cancer Mother    • Heart disease Mother    • Hypertension Mother    • Coronary artery disease Father    • Stroke Father    • Heart disease Father    • Hypertension Father    • Other Daughter          thiamin deficiency    • Hypertension Son    • Lung disease Other    • Hypertension Other    • Malig Hyperthermia Neg Hx        SOCIAL HISTORY  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     Employer: RETIRED   Tobacco Use   • Smoking status: Former Smoker     Packs/day: 1.00     Years: 50.00     Pack years: 50.00     Types: Cigarettes     Last attempt to quit: 1/11/2009     Years since quitting: 10.5   • Smokeless tobacco: Never Used   Substance and Sexual Activity   • Alcohol use: Yes     Comment: rare   • Drug use: No   • Sexual activity: Defer       ALLERGIES  Diclofenac and Dofetilide    REVIEW OF SYSTEMS  Review of Systems   Constitutional: Negative for fever.   Respiratory: Negative for shortness of breath.    Cardiovascular: Negative for chest pain.   Skin: Positive for wound (skin tear dorsum of R hand).   Neurological: Negative for weakness and numbness.       PHYSICAL EXAM  ED Triage Vitals   Temp Heart Rate Resp BP SpO2   08/02/19 0944 08/02/19 0944 08/02/19 0943 08/02/19 0956 08/02/19 0944   97.1 °F (36.2 °C) 60 18 147/67 93 %      Temp src Heart Rate Source Patient Position BP Location FiO2 (%)   08/02/19 0944 -- 08/02/19 0956 -- --   Tympanic  Sitting         Physical Exam   Constitutional: She is oriented to person, place, and time. No distress.   Eyes: EOM are normal.   Neck: Normal range of motion.   Cardiovascular: Normal rate. An irregularly irregular rhythm present.   Pulmonary/Chest: Effort normal and breath sounds normal. No respiratory distress.   Neurological: She is alert and oriented to person, place, and time. She has normal sensation and normal strength.   Skin: Skin is warm and dry.   Skin tear to the dorsum of the R hand w/ mild blood oozing.   Psychiatric: Affect normal.    Nursing note and vitals reviewed.      LAB RESULTS  Lab Results (last 24 hours)     Procedure Component Value Units Date/Time    Protime-INR [[id number]]  (Abnormal) Collected:  08/02/19 1022    Specimen:  Blood Updated:  08/02/19 1046     Protime 28.3 Seconds      INR 2.69          I ordered the above labs and reviewed the results      PROCEDURES  Laceration Repair  Date/Time: 8/2/2019 10:47 AM  Performed by: Angus Kerns PA  Authorized by: Ania Rudd MD     Consent:     Consent obtained:  Verbal    Consent given by:  Patient  Anesthesia (see MAR for exact dosages):     Anesthesia method:  None  Laceration details:     Location:  Hand    Hand location:  R hand, dorsum    Length (cm):  3  Repair type:     Repair type:  Simple (Closed with SteriStrips)  Treatment:     Area cleansed with:  Hibiclens    Amount of cleaning:  Standard    Irrigation solution:  Sterile saline    Irrigation method:  Pressure wash  Skin repair:     Repair method:  Tissue adhesive  Post-procedure details:     Patient tolerance of procedure:  Tolerated well, no immediate complications            PROGRESS AND CONSULTS     1100  Discussed the pt with Dr. Rudd. After performing their own physical exam of the pt, they agreed with the plan of care.    1109  BP- 147/67 HR- 60 Temp- 97.1 °F (36.2 °C) (Tympanic) O2 sat- 93%  Rechecked the patient who is in NAD and is resting comfortably. Rechecked repair. Placed bandage over wound. Pt given wound care instructions and told the plan to d/c w/ her to f/u w/ PCP. Pt given return to ED instructions. Pt understands and agrees with the plan, all questions answered.    MEDICAL DECISION MAKING  Results were reviewed/discussed with the patient and they were also made aware of online access. Pt also made aware that some labs, such as cultures, will not be resulted during ER visit and follow up with PMD is necessary.     MDM  Number of Diagnoses or Management Options     Amount and/or Complexity of Data  Reviewed  Clinical lab tests: reviewed (INR 2.69)    Patient Progress  Patient progress: stable         DIAGNOSIS  Final diagnoses:   Skin tear of right hand without complication, initial encounter       DISPOSITION  DISCHARGE    Patient discharged in stable condition.    Reviewed implications of results, diagnosis, meds, responsibility to follow up, warning signs and symptoms of possible worsening, potential complications and reasons to return to ER.    Patient/Family voiced understanding of above instructions.    Discussed plan for discharge, as there is no emergent indication for admission. Patient referred to primary care provider for BP management due to today's BP. Pt/family is agreeable and understands need for follow up and repeat testing.  Pt is aware that discharge does not mean that nothing is wrong but it indicates no emergency is present that requires admission and they must continue care with follow-up as given below or physician of their choice.     FOLLOW-UP  Ariel Matute MD  [address] [place], [place] [id number] [phone]      As needed         Medication List      Changed    simvastatin 40 MG tablet  Commonly known as:  ZOCOR  TAKE 1 TABLET EVERY DAY  What changed:    how much to take  how to take this  when to take this     * warfarin 2.5 MG tablet  Commonly known as:  COUMADIN  Take 1 tablet by mouth Every Night.  What changed:  how much to take     * warfarin 1 MG tablet  Commonly known as:  COUMADIN  What changed:  Another medication with the same name was changed. Make   sure you understand how and when to take each.         * This list has 2 medication(s) that are the same as other medications   prescribed for you. Read the directions carefully, and ask your doctor or   other care provider to review them with you.              Latest Documented Vital Signs:  As of 11:07 AM  BP- 147/67 HR- 60 Temp- 97.1 °F (36.2 °C) (Tympanic) O2 sat- 93%    --  Documentation assistance provided by  foreign Zhu for Angus Kerns PA-C.  Information recorded by the scribe was done at my direction and has been verified and validated by me.     Marichuy Zhu  08/02/19 1111       Angus Kerns PA  08/02/19 1604

## 2019-08-05 ENCOUNTER — PATIENT OUTREACH (OUTPATIENT)
Dept: CASE MANAGEMENT | Facility: OTHER | Age: 79
End: 2019-08-05

## 2019-08-05 ENCOUNTER — INFUSION (OUTPATIENT)
Dept: ONCOLOGY | Facility: HOSPITAL | Age: 79
End: 2019-08-05

## 2019-08-05 ENCOUNTER — LAB (OUTPATIENT)
Dept: LAB | Facility: HOSPITAL | Age: 79
End: 2019-08-05

## 2019-08-05 DIAGNOSIS — D63.1 ANEMIA IN STAGE 3 CHRONIC KIDNEY DISEASE (HCC): ICD-10-CM

## 2019-08-05 DIAGNOSIS — N18.30 CHRONIC KIDNEY DISEASE, STAGE 3 (HCC): ICD-10-CM

## 2019-08-05 DIAGNOSIS — N18.30 ANEMIA IN STAGE 3 CHRONIC KIDNEY DISEASE (HCC): ICD-10-CM

## 2019-08-05 DIAGNOSIS — N17.9 ACUTE KIDNEY INJURY (HCC): Primary | ICD-10-CM

## 2019-08-05 DIAGNOSIS — D69.6 THROMBOCYTOPENIA (HCC): ICD-10-CM

## 2019-08-05 DIAGNOSIS — E53.8 B12 DEFICIENCY: ICD-10-CM

## 2019-08-05 LAB
BASOPHILS # BLD AUTO: 0.02 10*3/MM3 (ref 0–0.2)
BASOPHILS NFR BLD AUTO: 0.5 % (ref 0–1.5)
DEPRECATED RDW RBC AUTO: 54.3 FL (ref 37–54)
EOSINOPHIL # BLD AUTO: 0.14 10*3/MM3 (ref 0–0.4)
EOSINOPHIL NFR BLD AUTO: 3.6 % (ref 0.3–6.2)
ERYTHROCYTE [DISTWIDTH] IN BLOOD BY AUTOMATED COUNT: 14.9 % (ref 12.3–15.4)
FERRITIN SERPL-MCNC: 460 NG/ML (ref 13–150)
HCT VFR BLD AUTO: 31.9 % (ref 34–46.6)
HGB BLD-MCNC: 9.6 G/DL (ref 12–15.9)
IMM GRANULOCYTES # BLD AUTO: 0.02 10*3/MM3 (ref 0–0.05)
IMM GRANULOCYTES NFR BLD AUTO: 0.5 % (ref 0–0.5)
IRON 24H UR-MRATE: 67 MCG/DL (ref 37–145)
IRON SATN MFR SERPL: 24 % (ref 14–48)
LYMPHOCYTES # BLD AUTO: 0.66 10*3/MM3 (ref 0.7–3.1)
LYMPHOCYTES NFR BLD AUTO: 16.8 % (ref 19.6–45.3)
MCH RBC QN AUTO: 29.8 PG (ref 26.6–33)
MCHC RBC AUTO-ENTMCNC: 30.1 G/DL (ref 31.5–35.7)
MCV RBC AUTO: 99.1 FL (ref 79–97)
MONOCYTES # BLD AUTO: 0.29 10*3/MM3 (ref 0.1–0.9)
MONOCYTES NFR BLD AUTO: 7.4 % (ref 5–12)
NEUTROPHILS # BLD AUTO: 2.81 10*3/MM3 (ref 1.7–7)
NEUTROPHILS NFR BLD AUTO: 71.2 % (ref 42.7–76)
NRBC BLD AUTO-RTO: 0 /100 WBC (ref 0–0.2)
PLATELET # BLD AUTO: 128 10*3/MM3 (ref 140–450)
PMV BLD AUTO: 9.6 FL (ref 6–12)
RBC # BLD AUTO: 3.22 10*6/MM3 (ref 3.77–5.28)
TIBC SERPL-MCNC: 283 MCG/DL (ref 249–505)
TRANSFERRIN SERPL-MCNC: 202 MG/DL (ref 200–360)
WBC NRBC COR # BLD: 3.94 10*3/MM3 (ref 3.4–10.8)

## 2019-08-05 PROCEDURE — 84466 ASSAY OF TRANSFERRIN: CPT | Performed by: INTERNAL MEDICINE

## 2019-08-05 PROCEDURE — 36415 COLL VENOUS BLD VENIPUNCTURE: CPT | Performed by: INTERNAL MEDICINE

## 2019-08-05 PROCEDURE — 82728 ASSAY OF FERRITIN: CPT | Performed by: INTERNAL MEDICINE

## 2019-08-05 PROCEDURE — 83540 ASSAY OF IRON: CPT | Performed by: INTERNAL MEDICINE

## 2019-08-05 PROCEDURE — 85025 COMPLETE CBC W/AUTO DIFF WBC: CPT | Performed by: INTERNAL MEDICINE

## 2019-08-05 PROCEDURE — 96372 THER/PROPH/DIAG INJ SC/IM: CPT | Performed by: INTERNAL MEDICINE

## 2019-08-05 PROCEDURE — 25010000002 EPOETIN ALFA PER 1000 UNITS: Performed by: INTERNAL MEDICINE

## 2019-08-05 RX ADMIN — ERYTHROPOIETIN 3000 UNITS: 20000 INJECTION, SOLUTION INTRAVENOUS; SUBCUTANEOUS at 12:30

## 2019-08-05 NOTE — OUTREACH NOTE
Patient Outreach Note    Patient's phone answered by family member. Introduced self, explained CA role and provided contact information. Patient recently seen in ED for laceration to hand. Family member stated that the patient's hand is healing. Provided education to watch for s/s of infection such as redness, swelling, or increased pain to injury and to report these finding to the patient's PCP. Family member appreciative of call. Declines further need for CA follow up. Mychart pending. Advance directives filed. No care gaps at this time.     Bonita Zamora RN    8/5/2019, 12:43 PM

## 2019-08-08 ENCOUNTER — ANTICOAGULATION VISIT (OUTPATIENT)
Dept: PHARMACY | Facility: HOSPITAL | Age: 79
End: 2019-08-08

## 2019-08-08 DIAGNOSIS — I48.20 CHRONIC ATRIAL FIBRILLATION (HCC): ICD-10-CM

## 2019-08-08 LAB
INR PPP: 3.5 (ref 0.91–1.09)
PROTHROMBIN TIME: 41.4 SECONDS (ref 10–13.8)

## 2019-08-08 PROCEDURE — 36416 COLLJ CAPILLARY BLOOD SPEC: CPT

## 2019-08-08 PROCEDURE — G0463 HOSPITAL OUTPT CLINIC VISIT: HCPCS

## 2019-08-08 PROCEDURE — 85610 PROTHROMBIN TIME: CPT

## 2019-08-08 NOTE — PROGRESS NOTES
Anticoagulation Clinic Progress Note    Anticoagulation Summary  As of 8/8/2019    INR goal:   2.0-3.0   TTR:   47.8 % (9.3 mo)   INR used for dosing:   3.5! (8/8/2019)   Warfarin maintenance plan:   2 mg every Mon, Wed, Fri; 1 mg all other days   Weekly warfarin total:   10 mg   Plan last modified:   Vargas Butcher RPH (8/8/2019)   Next INR check:   8/22/2019   Target end date:   Indefinite    Indications    Chronic atrial fibrillation (CMS/HCC) [I48.2]             Anticoagulation Episode Summary     INR check location:       Preferred lab:       Send INR reminders to:   [facility]    Comments:         Anticoagulation Care Providers     Provider Role Specialty Phone number    Nik Galarza MD Referring Cardiology [phone]          Clinic Interview:  Patient Findings     Positives:   Change in medications    Negatives:   Signs/symptoms of thrombosis, Signs/symptoms of bleeding,   Laboratory test error suspected, Change in health, Change in alcohol use,   Change in activity, Upcoming invasive procedure, Emergency department   visit, Upcoming dental procedure, Missed doses, Extra doses, Change in   diet/appetite, Hospital admission, Bruising, Other complaints    Comments:   Reports steroid injection in back tomorrow.      Clinical Outcomes     Negatives:   Major bleeding event, Thromboembolic event,   Anticoagulation-related hospital admission, Anticoagulation-related ED   visit, Anticoagulation-related fatality    Comments:   Reports steroid injection in back tomorrow.        INR History:  Anticoagulation Monitoring 7/15/2019 7/30/2019 8/8/2019   INR 2.7 3.7 3.5   INR Date 7/15/2019 7/30/2019 8/8/2019   INR Goal 2.0-3.0 2.0-3.0 2.0-3.0   Trend Same Same Down   Last Week Total 11 mg 11 mg 11 mg   Next Week Total 11 mg 10 mg 9 mg   Sun 1 mg 1 mg 1 mg   Mon 2 mg 2 mg 2 mg   Tue 1 mg Hold (7/30); Otherwise 1 mg 1 mg   Wed 2 mg 2 mg 2 mg   Thu 1 mg 1 mg Hold (8/8); Otherwise 1 mg   Fri 2 mg 2 mg  2 mg   Sat 2 mg 2 mg 1 mg   Visit Report - - -   Some recent data might be hidden       Plan:  1. INR is Supratherapeutic today- see above in Anticoagulation Summary.  Will instruct Janel Mahoney to Change their warfarin regimen- see above in Anticoagulation Summary.  2. Follow up in 2 weeks  3. Patient declines warfarin refills.  4. Verbal and written information provided. Patient expresses understanding and has no further questions at this time.    Vargas Butcher RP

## 2019-08-12 ENCOUNTER — TELEPHONE (OUTPATIENT)
Dept: CARDIOLOGY | Facility: CLINIC | Age: 79
End: 2019-08-12

## 2019-08-12 ENCOUNTER — CLINICAL SUPPORT NO REQUIREMENTS (OUTPATIENT)
Dept: CARDIOLOGY | Facility: CLINIC | Age: 79
End: 2019-08-12

## 2019-08-12 DIAGNOSIS — I42.9 CARDIOMYOPATHY, UNSPECIFIED TYPE (HCC): Primary | ICD-10-CM

## 2019-08-12 RX ORDER — BUMETANIDE 2 MG/1
TABLET ORAL
Qty: 180 TABLET | Refills: 1 | Status: SHIPPED | OUTPATIENT
Start: 2019-08-12 | End: 2019-11-12

## 2019-08-12 NOTE — TELEPHONE ENCOUNTER
FYI-     Alert transmission due to ATP therapy received and reviewed. Successful ATP delivered on 8/11/19 @ 0847 due to an episode of VT. Total event lasted 21 secs, avg v-rate 181 bpm. Since last remote on 7/17/19, patient has also had 3 additional VT episodes without therapy, 25-31 beats, avg v-rates 153-176 bpm. He has also had 7 NST VT episodes, 2 available EGM's, 11-15 beats, avg v-rates 157-176 bpm. Patient denies symptoms with episodes.

## 2019-08-13 RX ORDER — ZOLPIDEM TARTRATE 10 MG/1
TABLET ORAL
Qty: 45 TABLET | Refills: 0 | Status: SHIPPED | OUTPATIENT
Start: 2019-08-13 | End: 2019-11-08 | Stop reason: SDUPTHER

## 2019-08-22 ENCOUNTER — TRANSCRIBE ORDERS (OUTPATIENT)
Dept: LAB | Facility: HOSPITAL | Age: 79
End: 2019-08-22

## 2019-08-22 ENCOUNTER — LAB (OUTPATIENT)
Dept: LAB | Facility: HOSPITAL | Age: 79
End: 2019-08-22

## 2019-08-22 ENCOUNTER — ANTICOAGULATION VISIT (OUTPATIENT)
Dept: PHARMACY | Facility: HOSPITAL | Age: 79
End: 2019-08-22

## 2019-08-22 DIAGNOSIS — M10.9 GOUT, UNSPECIFIED CAUSE, UNSPECIFIED CHRONICITY, UNSPECIFIED SITE: ICD-10-CM

## 2019-08-22 DIAGNOSIS — Z79.899 HIGH RISK MEDICATION USE: ICD-10-CM

## 2019-08-22 DIAGNOSIS — M10.9 GOUT, UNSPECIFIED CAUSE, UNSPECIFIED CHRONICITY, UNSPECIFIED SITE: Primary | ICD-10-CM

## 2019-08-22 DIAGNOSIS — I48.20 CHRONIC ATRIAL FIBRILLATION (HCC): ICD-10-CM

## 2019-08-22 LAB
ALBUMIN SERPL-MCNC: 3.8 G/DL (ref 3.5–5.2)
ALBUMIN/GLOB SERPL: 1.4 G/DL
ALP SERPL-CCNC: 50 U/L (ref 39–117)
ALT SERPL W P-5'-P-CCNC: 9 U/L (ref 1–33)
ANION GAP SERPL CALCULATED.3IONS-SCNC: 12.4 MMOL/L (ref 5–15)
AST SERPL-CCNC: 17 U/L (ref 1–32)
BASOPHILS # BLD AUTO: 0.02 10*3/MM3 (ref 0–0.2)
BASOPHILS NFR BLD AUTO: 0.3 % (ref 0–1.5)
BILIRUB SERPL-MCNC: 0.4 MG/DL (ref 0.2–1.2)
BUN BLD-MCNC: 73 MG/DL (ref 8–23)
BUN/CREAT SERPL: 34.4 (ref 7–25)
CALCIUM SPEC-SCNC: 9.4 MG/DL (ref 8.6–10.5)
CHLORIDE SERPL-SCNC: 98 MMOL/L (ref 98–107)
CO2 SERPL-SCNC: 29.6 MMOL/L (ref 22–29)
CREAT BLD-MCNC: 2.12 MG/DL (ref 0.57–1)
DEPRECATED RDW RBC AUTO: 54.4 FL (ref 37–54)
EOSINOPHIL # BLD AUTO: 0.2 10*3/MM3 (ref 0–0.4)
EOSINOPHIL NFR BLD AUTO: 2.6 % (ref 0.3–6.2)
ERYTHROCYTE [DISTWIDTH] IN BLOOD BY AUTOMATED COUNT: 15.5 % (ref 12.3–15.4)
GFR SERPL CREATININE-BSD FRML MDRD: 23 ML/MIN/1.73
GLOBULIN UR ELPH-MCNC: 2.7 GM/DL
GLUCOSE BLD-MCNC: 130 MG/DL (ref 65–99)
HCT VFR BLD AUTO: 33.7 % (ref 34–46.6)
HGB BLD-MCNC: 10.4 G/DL (ref 12–15.9)
IMM GRANULOCYTES # BLD AUTO: 0.03 10*3/MM3 (ref 0–0.05)
IMM GRANULOCYTES NFR BLD AUTO: 0.4 % (ref 0–0.5)
INR PPP: 2.4 (ref 0.91–1.09)
LYMPHOCYTES # BLD AUTO: 0.76 10*3/MM3 (ref 0.7–3.1)
LYMPHOCYTES NFR BLD AUTO: 10.1 % (ref 19.6–45.3)
MCH RBC QN AUTO: 30.1 PG (ref 26.6–33)
MCHC RBC AUTO-ENTMCNC: 30.9 G/DL (ref 31.5–35.7)
MCV RBC AUTO: 97.4 FL (ref 79–97)
MONOCYTES # BLD AUTO: 0.45 10*3/MM3 (ref 0.1–0.9)
MONOCYTES NFR BLD AUTO: 6 % (ref 5–12)
NEUTROPHILS # BLD AUTO: 6.1 10*3/MM3 (ref 1.7–7)
NEUTROPHILS NFR BLD AUTO: 80.6 % (ref 42.7–76)
NRBC BLD AUTO-RTO: 0 /100 WBC (ref 0–0.2)
PLATELET # BLD AUTO: 143 10*3/MM3 (ref 140–450)
PMV BLD AUTO: 10.3 FL (ref 6–12)
POTASSIUM BLD-SCNC: 4.2 MMOL/L (ref 3.5–5.2)
PROT SERPL-MCNC: 6.5 G/DL (ref 6–8.5)
PROTHROMBIN TIME: 29.3 SECONDS (ref 10–13.8)
RBC # BLD AUTO: 3.46 10*6/MM3 (ref 3.77–5.28)
SODIUM BLD-SCNC: 140 MMOL/L (ref 136–145)
URATE SERPL-MCNC: 5.8 MG/DL (ref 2.4–5.7)
WBC NRBC COR # BLD: 7.56 10*3/MM3 (ref 3.4–10.8)

## 2019-08-22 PROCEDURE — 85025 COMPLETE CBC W/AUTO DIFF WBC: CPT

## 2019-08-22 PROCEDURE — 84550 ASSAY OF BLOOD/URIC ACID: CPT

## 2019-08-22 PROCEDURE — 80053 COMPREHEN METABOLIC PANEL: CPT

## 2019-08-22 PROCEDURE — 36415 COLL VENOUS BLD VENIPUNCTURE: CPT

## 2019-08-22 PROCEDURE — 85610 PROTHROMBIN TIME: CPT

## 2019-08-22 PROCEDURE — 36416 COLLJ CAPILLARY BLOOD SPEC: CPT

## 2019-08-22 NOTE — PROGRESS NOTES
Anticoagulation Clinic Progress Note    Anticoagulation Summary  As of 2019    INR goal:   2.0-3.0   TTR:   48.1 % (9.8 mo)   INR used for dosin.4 (2019)   Warfarin maintenance plan:   2 mg every Mon, Wed, Fri; 1 mg all other days   Weekly warfarin total:   10 mg   No change documented:   Lee Ann Diamond   Plan last modified:   Vargas Butcher [facility] (2019)   Next INR check:   2019   Target end date:   Indefinite    Indications    Chronic atrial fibrillation (CMS/HCC) [I48.2]             Anticoagulation Episode Summary     INR check location:       Preferred lab:       Send INR reminders to:    [facility]    Comments:         Anticoagulation Care Providers     Provider Role Specialty Phone number    Nik Galarza MD Referring Cardiology [phone]          Clinic Interview:  Patient Findings     Negatives:   Signs/symptoms of thrombosis, Signs/symptoms of bleeding,   Laboratory test error suspected, Change in health, Change in alcohol use,   Change in activity, Upcoming invasive procedure, Emergency department   visit, Upcoming dental procedure, Missed doses, Extra doses, Change in   medications, Change in diet/appetite, Hospital admission, Bruising, Other   complaints      Clinical Outcomes     Negatives:   Major bleeding event, Thromboembolic event,   Anticoagulation-related hospital admission, Anticoagulation-related ED   visit, Anticoagulation-related fatality        INR History:  Anticoagulation Monitoring 2019   INR 3.7 3.5 2.4   INR Date 2019   INR Goal 2.0-3.0 2.0-3.0 2.0-3.0   Trend Same Down Same   Last Week Total 11 mg 11 mg 10 mg   Next Week Total 10 mg 9 mg 10 mg   Sun 1 mg 1 mg 1 mg   Mon 2 mg 2 mg 2 mg   Tue Hold (); Otherwise 1 mg 1 mg 1 mg   Wed 2 mg 2 mg 2 mg   Thu 1 mg Hold (); Otherwise 1 mg 1 mg   Fri 2 mg 2 mg 2 mg   Sat 2 mg 1 mg 1 mg   Visit Report - - -   Some recent data might be  hidden       Plan:  1. INR is therapeutic today- see above in Anticoagulation Summary.   Will instruct Janel Mahoney to continue their warfarin regimen- see above in Anticoagulation Summary.  2. Follow up in 2 weeks.  3. Patient declines warfarin refills.  4. Verbal and written information provided. Patient expresses understanding and has no further questions at this time.    Lee Ann Diamond

## 2019-08-27 ENCOUNTER — OFFICE VISIT (OUTPATIENT)
Dept: CARDIOLOGY | Facility: CLINIC | Age: 79
End: 2019-08-27

## 2019-08-27 ENCOUNTER — CLINICAL SUPPORT NO REQUIREMENTS (OUTPATIENT)
Dept: CARDIOLOGY | Facility: CLINIC | Age: 79
End: 2019-08-27

## 2019-08-27 VITALS
SYSTOLIC BLOOD PRESSURE: 124 MMHG | WEIGHT: 142 LBS | HEART RATE: 60 BPM | DIASTOLIC BLOOD PRESSURE: 54 MMHG | BODY MASS INDEX: 26.13 KG/M2 | HEIGHT: 62 IN

## 2019-08-27 DIAGNOSIS — I25.5 ISCHEMIC CARDIOMYOPATHY: Primary | ICD-10-CM

## 2019-08-27 DIAGNOSIS — I47.20 VENTRICULAR TACHYCARDIA (HCC): ICD-10-CM

## 2019-08-27 DIAGNOSIS — I48.20 CHRONIC ATRIAL FIBRILLATION (HCC): Primary | ICD-10-CM

## 2019-08-27 DIAGNOSIS — I50.43 ACUTE ON CHRONIC COMBINED SYSTOLIC (CONGESTIVE) AND DIASTOLIC (CONGESTIVE) HEART FAILURE (HCC): Chronic | ICD-10-CM

## 2019-08-27 PROCEDURE — 99204 OFFICE O/P NEW MOD 45 MIN: CPT | Performed by: INTERNAL MEDICINE

## 2019-08-27 PROCEDURE — 93283 PRGRMG EVAL IMPLANTABLE DFB: CPT | Performed by: INTERNAL MEDICINE

## 2019-08-27 PROCEDURE — 93000 ELECTROCARDIOGRAM COMPLETE: CPT | Performed by: INTERNAL MEDICINE

## 2019-08-27 RX ORDER — FEBUXOSTAT 40 MG/1
80 TABLET, FILM COATED ORAL DAILY
COMMUNITY
Start: 2019-07-29 | End: 2020-06-19 | Stop reason: ALTCHOICE

## 2019-08-27 RX ORDER — COLCHICINE 0.6 MG/1
0.6 TABLET, FILM COATED ORAL EVERY OTHER DAY
COMMUNITY
Start: 2019-06-21 | End: 2020-06-19 | Stop reason: ALTCHOICE

## 2019-08-27 RX ORDER — HYDROCODONE BITARTRATE AND ACETAMINOPHEN 5; 325 MG/1; MG/1
1 TABLET ORAL DAILY PRN
COMMUNITY
Start: 2019-08-07 | End: 2020-08-10

## 2019-08-27 NOTE — PROGRESS NOTES
Date of Office Visit: 2019  Encounter Provider: Gary Boucher MD  Place of Service: [facility] CARDIOLOGY  Patient Name: Janel Mahoney  :1940    Chief complaint  Evaluation of ventricular tachycardia    HPI: Janel Mahoney is a 78 y.o. female who presents today for evaluation of ventricular tachycardia.  I reviewed Dr. wells's notes, and summarize a history is following.  Patient has a history of an inferior infarct in .  She had some transient reduction in her ejection fraction at that time, but it seems to have recovered.  Subsequently she was found to have atrial fibrillation and rhythm control was attempted.  She was intolerant to Tikosyn due to QT prolongation.  Subsequently she underwent pulmonary vein isolation, but had recurrent atrial fibrillation was treated with sotalol.  She again recurred and ultimately had placement of a biventricular ICD and AV node ablation.  Following this she had placement of a bioprosthetic mitral valve.  She presents today as she has been noted to having some episodes of ventricular tachycardia on her ICD which has been successfully treated with ATP.  There is one episode that the ATP fail to terminate, but it broke before further therapy could be given.  Patient denies any symptoms of palpitations.  She is chronically quite frail and her main activity is light housework.  She walks with the aid of a walker.          Past Medical History:   Diagnosis Date   • Acute kidney injury (CMS/HCC)    • Anemia    • Atrial fibrillation (CMS/HCC)    • Bruises easily    • Carotid artery stenosis    • Chronic back pain    • Chronic combined systolic and diastolic congestive heart failure (CMS/HCC)    • Chronic coronary artery disease     moderate to severe LV dysfunction.   • Chronic kidney disease, stage 3 (CMS/HCC)    • Dysphagia    • GERD (gastroesophageal reflux disease)    • H/O cardiac murmur    • Samish (hard of hearing)     wears  hearing aids   • Hyperlipidemia    • Hypertension    • Hypotension    • Ischemic cardiomyopathy    • Kyphoscoliosis    • Leukopenia    • Lumbar spondylosis    • Obesity    • GEORGINA (obstructive sleep apnea)    • Osteoarthritis    • Osteoporosis    • Peptic ulcer    • Premature ventricular contractions    • Renal insufficiency syndrome    • Scoliosis    • Shoulder fracture, left    • Stroke syndrome    • Thrombocytopenia (CMS/HCC)    • Ventricular tachycardia (CMS/HCC)    • Vitamin B12 deficiency        Past Surgical History:   Procedure Laterality Date   • AV NODE ABLATION     • BREAST BIOPSY     • CARDIAC CATHETERIZATION      Showed severe mitral insufficiency and borderline coronary artery disease   • CARDIAC CATHETERIZATION      Showed an ejection fraction of 35%. She had occlusive disease of the right posterior LV branch and no other significant disease, treated medically.   • CARDIAC DEFIBRILLATOR PLACEMENT      Biventricular   • CARDIAC ELECTROPHYSIOLOGY PROCEDURE N/A 1/4/2019    Procedure: GENERATOR CHANGE BI-V ICD   [place];  Surgeon: James Hwang MD;  Location: [facility] CATH INVASIVE LOCATION;  Service: Cardiology   • CARDIAC VALVE REPLACEMENT  2009    Done with stent placement   • CARDIOVERSION      multiple electrocardioversions.   • CAROTID ARTERY ANGIOPLASTY Right    • COLONOSCOPY N/A 9/28/2017    Procedure: COLONOSCOPY TO CECUM;  Surgeon: Kevin Davis MD;  Location: [facility] ENDOSCOPY;  Service:    • CORONARY ANGIOPLASTY WITH STENT PLACEMENT  2009   • CORONARY ARTERY BYPASS GRAFT      single graft to the PDA   • CORONARY STENT PLACEMENT     • ENDOSCOPY N/A 9/28/2017    Procedure: ESOPHAGOGASTRODUODENOSCOPY ;  Surgeon: Kevin Davis MD;  Location: [facility] ENDOSCOPY;  Service:    • HEMORRHOIDECTOMY     • HYSTERECTOMY     • INCISION AND DRAINAGE TRUNK Right 11/27/2018    Procedure: EVACUATION OF RIGHT CHEST WALL HEMATOMA;  Surgeon: Juvencio Rodriguez MD;  Location: [facility] OR;  Service: General   •  MITRAL VALVE REPLACEMENT  01/2010    #31 Epic porcine valve.   • THROMBOENDARTERECTOMY Right     carotid thromboendarterectomy    • TONSILLECTOMY      age 32   • TOTAL KNEE ARTHROPLASTY Left    • TOTAL SHOULDER ARTHROPLASTY W/ DISTAL CLAVICLE EXCISION Left 1/16/2018    Procedure: TOTAL SHOULDER REVERSE ARTHROPLASTY;  Surgeon: Bianka Quesada MD;  Location: [facility] OR;  Service:        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     Employer: RETIRED   Tobacco Use   • Smoking status: Former Smoker     Packs/day: 1.00     Years: 50.00     Pack years: 50.00     Types: Cigarettes     Last attempt to quit: 1/11/2009     Years since quitting: 10.6   • Smokeless tobacco: Never Used   Substance and Sexual Activity   • Alcohol use: Yes     Comment: rare   • Drug use: No   • Sexual activity: Defer       Family History   Problem Relation Age of Onset   • Cancer Mother    • Breast cancer Mother    • Heart disease Mother    • Hypertension Mother    • Coronary artery disease Father    • Stroke Father    • Heart disease Father    • Hypertension Father    • Other Daughter         thiamin deficiency    • Hypertension Son    • Lung disease Other    • Hypertension Other    • Malig Hyperthermia Neg Hx        Review of Systems   Constitution: Positive for weakness and malaise/fatigue.   HENT: Negative.    Eyes: Negative.    Cardiovascular: Negative for chest pain, dyspnea on exertion, leg swelling and near-syncope.   Respiratory: Positive for shortness of breath. Negative for cough.    Endocrine: Negative.    Hematologic/Lymphatic: Negative.    Skin: Negative.    Musculoskeletal: Positive for joint pain, joint swelling, muscle weakness and myalgias.   Gastrointestinal: Negative.    Genitourinary: Negative.     Psychiatric/Behavioral: Negative.    Allergic/Immunologic: Negative.        Allergies   Allergen Reactions   • Diclofenac      She had adverse effects from the medications that required hospitalization. Pt got stomach ulcers from this medication prescribed by Dr. Arango - pediatrist.   • Dofetilide Unknown (See Comments)     Pt states not allergic.          Current Outpatient Medications:   •  alendronate (FOSAMAX) 70 MG tablet, TAKE 1 TABLET EVERY 7 (SEVEN) DAYS. STAY UPRIGHT FOR 30 MINUTES / DRINK 8 OUNCES OF WATER AND DO NOT EAT 30 MINUTES, Disp: 12 tablet, Rfl: 3  •  aspirin 81 MG tablet, Take 81 mg by mouth Daily., Disp: , Rfl:   •  bumetanide (BUMEX) 2 MG tablet, TAKE 1 TABLET TWICE DAILY. DOSE CHANGED , Disp: 180 tablet, Rfl: 1  •  calcitriol (ROCALTROL) 0.25 MCG capsule, Take 0.25 mcg by mouth Daily., Disp: , Rfl:   •  carvedilol (COREG) 25 MG tablet, TAKE 1 TABLET TWICE DAILY, Disp: 180 tablet, Rfl: 3  •  Cholecalciferol (VITAMIN D) 2000 UNITS tablet, Take 1 tablet by mouth daily., Disp: , Rfl:   •  COLCRYS 0.6 MG tablet, , Disp: , Rfl:   •  enoxaparin (LOVENOX) 80 MG/0.8ML solution syringe, Inject 0.7 mL under the skin into the appropriate area as directed Daily., Disp: 4 mL, Rfl: 1  •  epoetin adele (EPOGEN,PROCRIT) 80057 UNIT/ML injection, Inject 10,000 Units under the skin into the appropriate area as directed As Needed., Disp: , Rfl:   •  febuxostat (ULORIC) 40 MG tablet, , Disp: , Rfl:   •  HAVRIX 1440 EL U/ML vaccine, , Disp: , Rfl:   •  HYDROcodone-acetaminophen (NORCO) 5-325 MG per tablet, , Disp: , Rfl:   •  Multiple Vitamins-Minerals (SENIOR MULTIVITAMIN PLUS) tablet, Take 1 tablet by mouth Daily., Disp: , Rfl:   •  pantoprazole (PROTONIX) 40 MG EC tablet, TAKE 1 TABLET TWICE DAILY, Disp: 180 tablet, Rfl: 1  •  predniSONE (DELTASONE) 20 MG tablet, Take 1 tablet by mouth Daily., Disp: , Rfl:   •  ramipril (ALTACE) 5 MG capsule, Take 5 mg by mouth Daily., Disp: , Rfl:   •  simvastatin (ZOCOR) 40  "MG tablet, TAKE 1 TABLET EVERY DAY (Patient taking differently: pt reports taking 1/2 tablet nightly), Disp: 90 tablet, Rfl: 1  •  traMADol (ULTRAM) 50 MG tablet, TAKE 2 TABLETS EVERY MORNING  AND TAKE 2 TABLETS AT BEDTIME, Disp: 120 tablet, Rfl: 5  •  vitamin B-12 (CYANOCOBALAMIN) 2500 MCG sublingual tablet tablet, Place 2,500 mcg under the tongue Daily., Disp: , Rfl:   •  warfarin (COUMADIN) 1 MG tablet, , Disp: , Rfl:   •  warfarin (COUMADIN) 2.5 MG tablet, Take 1 tablet by mouth Every Night. (Patient taking differently: Take 1 mg by mouth Every Night.), Disp: , Rfl:   •  zolpidem (AMBIEN) 10 MG tablet, TAKE 1/2 TABLET AT NIGHT AS NEEDED FOR SLEEP, Disp: 45 tablet, Rfl: 0    Current Facility-Administered Medications:   •  phytonadione (MEPHYTON, VITAMIN K) tablet 5 mg, 5 mg, Oral, Once, Nik Galarza MD      Objective:     Vitals:    08/27/19 1304   BP: 124/54   BP Location: Right arm   Patient Position: Sitting   Cuff Size: Large Adult   Pulse: 60   Weight: 64.4 kg (142 lb)   Height: 157.5 cm (62\")     Body mass index is 25.97 kg/m².    PHYSICAL EXAM:    Physical Exam   Constitutional: She is oriented to person, place, and time.   Frail elderly female, walks with aid of a walker.  Barely able to raise up from chair.   HENT:   Head: Normocephalic and atraumatic.   Eyes: Right eye exhibits no discharge. Left eye exhibits no discharge. No scleral icterus.   Neck: No tracheal deviation present. No thyromegaly present.   Cardiovascular: Normal rate, regular rhythm and normal heart sounds.   Pulmonary/Chest: Effort normal and breath sounds normal.   Abdominal: Soft. Bowel sounds are normal.   Musculoskeletal: She exhibits edema. She exhibits no deformity.   There is notable swelling of the left knee when compared to the right.   Neurological: She is alert and oriented to person, place, and time.   Skin: Skin is warm and dry.   Extensive bruising and discoloration noted on extremities.  Chronic appearing sores " noted on lower extremities.   Psychiatric: She has a normal mood and affect. Her behavior is normal. Judgment and thought content normal.   Nursing note and vitals reviewed.          ECG 12 Lead  Date/Time: 8/27/2019 6:25 PM  Performed by: Gary Boucher MD  Authorized by: Gary Boucher MD   Comparison: compared with previous ECG from 6/13/2019  Rhythm: atrial fibrillation and paced        I reviewed the patient's device tracings.  They demonstrate a ventricular tachycardia approximately 150 bpm.  It one episode was successfully treated with ATP, the other episode self terminates.      Assessment:       Diagnosis Plan   1. Chronic atrial fibrillation (CMS/HCC)     2. Acute on chronic combined systolic (congestive) and diastolic (congestive) heart failure (CMS/HCC)     3. Ventricular tachycardia (CMS/HCC)            Plan:       1.  Ventricular tachycardia    The patient is not a candidate for ablation.  The most that we could offer would be treatment with amiodarone, however as the ATP is been successful at this time I would just continue current treatment.  If she begins to receive ICD shocks can consider addition of amiodarone.    2.  History of systolic cardiomyopathy    Stable continue current treatment    3.  Atrial fibrillation    Continue anticoagulation with warfarin.    As always, it has been a pleasure to participate in your patient's care.      Sincerely,         Gary Boucher MD

## 2019-09-03 ENCOUNTER — OFFICE VISIT (OUTPATIENT)
Dept: ONCOLOGY | Facility: CLINIC | Age: 79
End: 2019-09-03

## 2019-09-03 ENCOUNTER — INFUSION (OUTPATIENT)
Dept: ONCOLOGY | Facility: HOSPITAL | Age: 79
End: 2019-09-03

## 2019-09-03 ENCOUNTER — LAB (OUTPATIENT)
Dept: LAB | Facility: HOSPITAL | Age: 79
End: 2019-09-03

## 2019-09-03 VITALS
HEIGHT: 62 IN | SYSTOLIC BLOOD PRESSURE: 122 MMHG | WEIGHT: 141 LBS | BODY MASS INDEX: 25.95 KG/M2 | OXYGEN SATURATION: 99 % | TEMPERATURE: 98 F | RESPIRATION RATE: 14 BRPM | DIASTOLIC BLOOD PRESSURE: 49 MMHG | HEART RATE: 60 BPM

## 2019-09-03 DIAGNOSIS — N18.30 ANEMIA IN STAGE 3 CHRONIC KIDNEY DISEASE (HCC): ICD-10-CM

## 2019-09-03 DIAGNOSIS — E53.8 B12 DEFICIENCY: ICD-10-CM

## 2019-09-03 DIAGNOSIS — D69.6 THROMBOCYTOPENIA (HCC): ICD-10-CM

## 2019-09-03 DIAGNOSIS — D63.1 ANEMIA IN STAGE 3 CHRONIC KIDNEY DISEASE (HCC): Primary | ICD-10-CM

## 2019-09-03 DIAGNOSIS — D63.1 ANEMIA IN STAGE 3 CHRONIC KIDNEY DISEASE (HCC): ICD-10-CM

## 2019-09-03 DIAGNOSIS — N18.30 ANEMIA IN STAGE 3 CHRONIC KIDNEY DISEASE (HCC): Primary | ICD-10-CM

## 2019-09-03 LAB
BASOPHILS # BLD AUTO: 0.01 10*3/MM3 (ref 0–0.2)
BASOPHILS NFR BLD AUTO: 0.1 % (ref 0–1.5)
DEPRECATED RDW RBC AUTO: 55.2 FL (ref 37–54)
EOSINOPHIL # BLD AUTO: 0.16 10*3/MM3 (ref 0–0.4)
EOSINOPHIL NFR BLD AUTO: 2.2 % (ref 0.3–6.2)
ERYTHROCYTE [DISTWIDTH] IN BLOOD BY AUTOMATED COUNT: 15.6 % (ref 12.3–15.4)
HCT VFR BLD AUTO: 34.2 % (ref 34–46.6)
HGB BLD-MCNC: 10.9 G/DL (ref 12–15.9)
IMM GRANULOCYTES # BLD AUTO: 0.07 10*3/MM3 (ref 0–0.05)
IMM GRANULOCYTES NFR BLD AUTO: 1 % (ref 0–0.5)
LYMPHOCYTES # BLD AUTO: 0.94 10*3/MM3 (ref 0.7–3.1)
LYMPHOCYTES NFR BLD AUTO: 12.9 % (ref 19.6–45.3)
MCH RBC QN AUTO: 30.6 PG (ref 26.6–33)
MCHC RBC AUTO-ENTMCNC: 31.9 G/DL (ref 31.5–35.7)
MCV RBC AUTO: 96.1 FL (ref 79–97)
MONOCYTES # BLD AUTO: 0.57 10*3/MM3 (ref 0.1–0.9)
MONOCYTES NFR BLD AUTO: 7.8 % (ref 5–12)
NEUTROPHILS # BLD AUTO: 5.52 10*3/MM3 (ref 1.7–7)
NEUTROPHILS NFR BLD AUTO: 76 % (ref 42.7–76)
NRBC BLD AUTO-RTO: 0 /100 WBC (ref 0–0.2)
PLATELET # BLD AUTO: 132 10*3/MM3 (ref 140–450)
PMV BLD AUTO: 10.4 FL (ref 6–12)
RBC # BLD AUTO: 3.56 10*6/MM3 (ref 3.77–5.28)
WBC NRBC COR # BLD: 7.27 10*3/MM3 (ref 3.4–10.8)

## 2019-09-03 PROCEDURE — 99213 OFFICE O/P EST LOW 20 MIN: CPT | Performed by: INTERNAL MEDICINE

## 2019-09-03 PROCEDURE — 36416 COLLJ CAPILLARY BLOOD SPEC: CPT | Performed by: INTERNAL MEDICINE

## 2019-09-03 PROCEDURE — 85025 COMPLETE CBC W/AUTO DIFF WBC: CPT | Performed by: INTERNAL MEDICINE

## 2019-09-03 RX ORDER — FERROUS SULFATE 325(65) MG
325 TABLET ORAL EVERY OTHER DAY
Start: 2019-09-03 | End: 2021-08-10

## 2019-09-03 NOTE — PROGRESS NOTES
Subjective     CHIEF COMPLAINT:      Chief Complaint   Patient presents with   • Follow-up     no concerns       HISTORY OF PRESENT ILLNESS:     Janel Mahoney is a 78 y.o. female patient who returns today for follow up on her anemia. She is on procrit monthly.  She received a dose of 3000 units last month. She is reporting feeling less fatigued. She reports diarrhea. No blood in the stool but it is currently black due to the oral iron.     Patient states she is on Allopurinol daily and on Colchicine every other day for her gout which is affecting her feet and knees.     REVIEW OF SYSTEMS:  Review of Systems   Constitutional: Positive for fatigue. Negative for chills, fever and unexpected weight change.   HENT: Negative for mouth sores, nosebleeds, sore throat and voice change.    Eyes: Negative for visual disturbance.   Respiratory: Positive for cough and shortness of breath.    Cardiovascular: Negative for chest pain and leg swelling.   Gastrointestinal: Positive for diarrhea. Negative for abdominal pain, blood in stool, constipation, nausea and vomiting.   Endocrine: Positive for cold intolerance.   Genitourinary: Negative for dysuria, frequency and hematuria.   Musculoskeletal: Positive for back pain. Negative for arthralgias and joint swelling.   Skin: Negative for rash.   Neurological: Negative for dizziness, numbness and headaches.   Hematological: Negative for adenopathy. Bruises/bleeds easily.   Psychiatric/Behavioral: Negative for dysphoric mood. The patient is not nervous/anxious.      I verified the ROS obtained by the MA.        MEDICATIONS:    Current Outpatient Medications:   •  alendronate (FOSAMAX) 70 MG tablet, TAKE 1 TABLET EVERY 7 (SEVEN) DAYS. STAY UPRIGHT FOR 30 MINUTES / DRINK 8 OUNCES OF WATER AND DO NOT EAT 30 MINUTES, Disp: 12 tablet, Rfl: 3  •  aspirin 81 MG tablet, Take 81 mg by mouth Daily., Disp: , Rfl:   •  bumetanide (BUMEX) 2 MG tablet, TAKE 1 TABLET TWICE DAILY. DOSE CHANGED ,  Disp: 180 tablet, Rfl: 1  •  calcitriol (ROCALTROL) 0.25 MCG capsule, Take 0.25 mcg by mouth Daily., Disp: , Rfl:   •  carvedilol (COREG) 25 MG tablet, TAKE 1 TABLET TWICE DAILY, Disp: 180 tablet, Rfl: 3  •  Cholecalciferol (VITAMIN D) 2000 UNITS tablet, Take 1 tablet by mouth daily., Disp: , Rfl:   •  COLCRYS 0.6 MG tablet, , Disp: , Rfl:   •  enoxaparin (LOVENOX) 80 MG/0.8ML solution syringe, Inject 0.7 mL under the skin into the appropriate area as directed Daily., Disp: 4 mL, Rfl: 1  •  epoetin adele (EPOGEN,PROCRIT) 71338 UNIT/ML injection, Inject 10,000 Units under the skin into the appropriate area as directed As Needed., Disp: , Rfl:   •  febuxostat (ULORIC) 40 MG tablet, , Disp: , Rfl:   •  HAVRIX 1440 EL U/ML vaccine, , Disp: , Rfl:   •  HYDROcodone-acetaminophen (NORCO) 5-325 MG per tablet, , Disp: , Rfl:   •  Multiple Vitamins-Minerals (SENIOR MULTIVITAMIN PLUS) tablet, Take 1 tablet by mouth Daily., Disp: , Rfl:   •  pantoprazole (PROTONIX) 40 MG EC tablet, TAKE 1 TABLET TWICE DAILY, Disp: 180 tablet, Rfl: 1  •  ramipril (ALTACE) 5 MG capsule, Take 5 mg by mouth Daily., Disp: , Rfl:   •  simvastatin (ZOCOR) 40 MG tablet, TAKE 1 TABLET EVERY DAY (Patient taking differently: pt reports taking 1/2 tablet nightly), Disp: 90 tablet, Rfl: 1  •  traMADol (ULTRAM) 50 MG tablet, TAKE 2 TABLETS EVERY MORNING  AND TAKE 2 TABLETS AT BEDTIME, Disp: 120 tablet, Rfl: 5  •  vitamin B-12 (CYANOCOBALAMIN) 2500 MCG sublingual tablet tablet, Place 2,500 mcg under the tongue Daily., Disp: , Rfl:   •  warfarin (COUMADIN) 1 MG tablet, , Disp: , Rfl:   •  warfarin (COUMADIN) 2.5 MG tablet, Take 1 tablet by mouth Every Night. (Patient taking differently: Take 1 mg by mouth Every Night.), Disp: , Rfl:   •  zolpidem (AMBIEN) 10 MG tablet, TAKE 1/2 TABLET AT NIGHT AS NEEDED FOR SLEEP, Disp: 45 tablet, Rfl: 0    Current Facility-Administered Medications:   •  phytonadione (MEPHYTON, VITAMIN K) tablet 5 mg, 5 mg, Oral, Once,  "Nik Galarza MD    ALLERGIES:  Allergies   Allergen Reactions   • Diclofenac      She had adverse effects from the medications that required hospitalization. Pt got stomach ulcers from this medication prescribed by Dr. Arango - pediatrist.   • Dofetilide Unknown (See Comments)     Pt states not allergic.          Objective   VITAL SIGNS:     Vitals:    09/03/19 1129   BP: 122/49   Pulse: 60   Resp: 14   Temp: 98 °F (36.7 °C)   TempSrc: Oral   SpO2: 99%   Weight: 64 kg (141 lb)   Height: 157.5 cm (62.01\")   PainSc: 10-Worst pain ever   PainLoc: Back     Body mass index is 25.78 kg/m².     Wt Readings from Last 3 Encounters:   09/03/19 64 kg (141 lb)   08/27/19 64.4 kg (142 lb)   08/02/19 63.5 kg (140 lb)       PHYSICAL EXAMINATION:  GENERAL:  The patient appears in good general condition, not in acute distress.  SKIN: Warm and dry. No skin rashes. Old upper extremity ecchymosis.  HEAD:  Normocephalic.  EYES:  No Jaundice. No Pallor.   CHEST: Normal respiratory effort. Lungs clear to auscultation.   CARDIAC:  Normal S1 & S2. No murmur. No edema.  ABDOMEN:  Non-distended.  EXTREMITIES:  No clubbing. Knee swelling L>R. No Calf tenderness.  NEUROLOGICAL:  No Focal neurological deficits.         DIAGNOSTIC DATA:     Results from last 7 days   Lab Units 09/03/19  1105   WBC 10*3/mm3 7.27   NEUTROS ABS 10*3/mm3 5.52   HEMOGLOBIN g/dL 10.9*   HEMATOCRIT % 34.2   PLATELETS 10*3/mm3 132*         Assessment/Plan   1.  Anemia of chronic kidney disease, stage III.  She is on Procrit.  The last dose she received was 3000 units on 8/5/2019 for hemoglobin of 9.6. Hemoglobin responded and it is now at 10.9. She reports improvement in her fatigue.       2.  Thrombocytopenia likely due to Vitamin B12 deficiency.  Platelets are mildly low but stable. She has easy bruising but this is attributed to her coumadin.     PLAN:    1. Hold Procrit today.  2.  Reduced Ferrous sulfate to every other day (she will take on the days she takes " Colchicine).    3.  Return monthly for a CBC with possible Procrit.  4.  Repeat Ferritin and iron panel in 2 and 5 months. I will see her in follow up in 6 months.      Edward Vasquez MD  09/03/19

## 2019-09-05 ENCOUNTER — LAB (OUTPATIENT)
Dept: LAB | Facility: HOSPITAL | Age: 79
End: 2019-09-05

## 2019-09-05 ENCOUNTER — TRANSCRIBE ORDERS (OUTPATIENT)
Dept: ADMINISTRATIVE | Facility: HOSPITAL | Age: 79
End: 2019-09-05

## 2019-09-05 ENCOUNTER — TRANSCRIBE ORDERS (OUTPATIENT)
Dept: LAB | Facility: HOSPITAL | Age: 79
End: 2019-09-05

## 2019-09-05 DIAGNOSIS — M25.562 LEFT KNEE PAIN, UNSPECIFIED CHRONICITY: ICD-10-CM

## 2019-09-05 DIAGNOSIS — M25.562 LEFT KNEE PAIN, UNSPECIFIED CHRONICITY: Primary | ICD-10-CM

## 2019-09-05 DIAGNOSIS — M25.562 ACUTE PAIN OF LEFT KNEE: Primary | ICD-10-CM

## 2019-09-05 LAB
APPEARANCE FLD: ABNORMAL
BASOPHILS # BLD AUTO: 0.01 10*3/MM3 (ref 0–0.2)
BASOPHILS NFR BLD AUTO: 0.1 % (ref 0–1.5)
COLOR FLD: ABNORMAL
CRP SERPL-MCNC: 0.14 MG/DL (ref 0–0.5)
DEPRECATED RDW RBC AUTO: 56.2 FL (ref 37–54)
EOSINOPHIL # BLD AUTO: 0.31 10*3/MM3 (ref 0–0.4)
EOSINOPHIL NFR BLD AUTO: 4.6 % (ref 0.3–6.2)
ERYTHROCYTE [DISTWIDTH] IN BLOOD BY AUTOMATED COUNT: 15.9 % (ref 12.3–15.4)
ERYTHROCYTE [SEDIMENTATION RATE] IN BLOOD: 40 MM/HR (ref 0–30)
HCT VFR BLD AUTO: 34.9 % (ref 34–46.6)
HGB BLD-MCNC: 10.8 G/DL (ref 12–15.9)
IMM GRANULOCYTES # BLD AUTO: 0.05 10*3/MM3 (ref 0–0.05)
IMM GRANULOCYTES NFR BLD AUTO: 0.7 % (ref 0–0.5)
LYMPHOCYTES # BLD AUTO: 0.87 10*3/MM3 (ref 0.7–3.1)
LYMPHOCYTES NFR BLD AUTO: 12.8 % (ref 19.6–45.3)
LYMPHOCYTES NFR FLD MANUAL: 4 %
MCH RBC QN AUTO: 30.1 PG (ref 26.6–33)
MCHC RBC AUTO-ENTMCNC: 30.9 G/DL (ref 31.5–35.7)
MCV RBC AUTO: 97.2 FL (ref 79–97)
MONOCYTES # BLD AUTO: 0.46 10*3/MM3 (ref 0.1–0.9)
MONOCYTES NFR BLD AUTO: 6.8 % (ref 5–12)
MONOCYTES NFR FLD: 2 %
MONOS+MACROS NFR FLD: 92 %
NEUTROPHILS # BLD AUTO: 5.08 10*3/MM3 (ref 1.7–7)
NEUTROPHILS NFR BLD AUTO: 75 % (ref 42.7–76)
NEUTROPHILS NFR FLD MANUAL: 2 %
NRBC BLD AUTO-RTO: 0 /100 WBC (ref 0–0.2)
PLATELET # BLD AUTO: 163 10*3/MM3 (ref 140–450)
PMV BLD AUTO: 10.3 FL (ref 6–12)
RBC # BLD AUTO: 3.59 10*6/MM3 (ref 3.77–5.28)
RBC # FLD AUTO: ABNORMAL /MM3
WBC # FLD AUTO: 495 /MM3
WBC NRBC COR # BLD: 6.78 10*3/MM3 (ref 3.4–10.8)

## 2019-09-05 PROCEDURE — 87015 SPECIMEN INFECT AGNT CONCNTJ: CPT

## 2019-09-05 PROCEDURE — 86140 C-REACTIVE PROTEIN: CPT

## 2019-09-05 PROCEDURE — 36415 COLL VENOUS BLD VENIPUNCTURE: CPT

## 2019-09-05 PROCEDURE — 89051 BODY FLUID CELL COUNT: CPT

## 2019-09-05 PROCEDURE — 87205 SMEAR GRAM STAIN: CPT

## 2019-09-05 PROCEDURE — 85025 COMPLETE CBC W/AUTO DIFF WBC: CPT

## 2019-09-05 PROCEDURE — 85652 RBC SED RATE AUTOMATED: CPT

## 2019-09-05 PROCEDURE — 87070 CULTURE OTHR SPECIMN AEROBIC: CPT

## 2019-09-09 ENCOUNTER — TRANSCRIBE ORDERS (OUTPATIENT)
Dept: ADMINISTRATIVE | Facility: HOSPITAL | Age: 79
End: 2019-09-09

## 2019-09-09 DIAGNOSIS — Z12.31 VISIT FOR SCREENING MAMMOGRAM: Primary | ICD-10-CM

## 2019-09-10 LAB
BACTERIA FLD CULT: NORMAL
GRAM STN SPEC: NORMAL
GRAM STN SPEC: NORMAL

## 2019-09-12 ENCOUNTER — OFFICE VISIT (OUTPATIENT)
Dept: SLEEP MEDICINE | Facility: HOSPITAL | Age: 79
End: 2019-09-12
Attending: INTERNAL MEDICINE

## 2019-09-12 VITALS — HEIGHT: 64 IN | OXYGEN SATURATION: 97 % | HEART RATE: 60 BPM | WEIGHT: 144 LBS | BODY MASS INDEX: 24.59 KG/M2

## 2019-09-12 DIAGNOSIS — G47.33 OSA ON CPAP: Primary | Chronic | ICD-10-CM

## 2019-09-12 DIAGNOSIS — Z99.89 OSA ON CPAP: Primary | Chronic | ICD-10-CM

## 2019-09-12 PROCEDURE — 99213 OFFICE O/P EST LOW 20 MIN: CPT | Performed by: INTERNAL MEDICINE

## 2019-09-12 PROCEDURE — G0463 HOSPITAL OUTPT CLINIC VISIT: HCPCS

## 2019-09-12 NOTE — PROGRESS NOTES
"Follow Up [facility] Disorders Center Note     Chief Complaint:  GEORGINA     Primary Care Physician: Ariel Matute MD    Interval History:   I last saw the patient one year ago.  She states she is unchanged?  She has not really used her CPAP recently.  The patient has a biventricular ICD.  Her new mask has small magnets and help hold on the headgear.  She was afraid to use it with her biventricular ICD?    The patient goes to bed between 1 and 2 AM and awakens at 7:45 AM.  She will use the restroom.  [place] Sleepiness Scale is normal at 2    The patient does have degenerative arthritis and gout in her knees.  Her left knee has been replaced and she recently received an injection in her right knee.    Since I last saw the patient, she was hospitalized for a week and subsequently went to rehab.    Review of Systems:    A complete review of systems was done and all were negative with the exception of the above    Social History:    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     Employer: RETIRED   Tobacco Use   • Smoking status: Former Smoker     Packs/day: 1.00     Years: 50.00     Pack years: 50.00     Types: Cigarettes     Last attempt to quit: 1/11/2009     Years since quitting: 10.6   • Smokeless tobacco: Never Used   Substance and Sexual Activity   • Alcohol use: Yes     Comment: rare   • Drug use: No   • Sexual activity: Defer       Allergies:  Diclofenac and Dofetilide     Medication Review: Her list was reviewed.      Vital Signs:    Vitals:    09/12/19 0900   Pulse: 60   SpO2: 97%   Weight: 65.3 kg (144 lb)   Height: 162.6 cm (64\")     Body mass index is 24.72 kg/m².    Physical Exam:    Constitutional:  Well developed 78 y.o. female that appears in no apparent distress.  Awake & oriented times " 3.  Normal mood with normal recent and remote memory and normal judgement.  Eyes:  Conjunctivae normal.  Oropharynx:  moist mucous membranes without exudate and a large tongue and class III-4 MP airway     Results Review:  DME is [facility] and she uses a full facemask.  Downloads between 4/18 and 7/16/2019 the patient only used auto CPAP for 7 days.  Average usage is 3 hours and 12 minutes.  Average AHI is abnormal at 10 without a significant leak.  Average AutoCPAP pressure is 11.1 and her auto CPAP is 7-16.       Impression:   Obstructive sleep apnea presently not treated with auto CPAP due to fears that her small magnets on her full facemask would interfere with her biventricular ICD. .    Plan:  Good sleep hygiene measures should be maintained.      I reviewed all with the patient and her .  If the patient is wearing her mask properly, there should not be a problem.  I told her that the benefit of wearing her mask is to her heart.    The patient will call for any problems and will follow up in 4 months.      Anthony Cardona MD  Sleep Medicine  09/12/19  10:44 AM

## 2019-09-16 ENCOUNTER — HOSPITAL ENCOUNTER (OUTPATIENT)
Dept: NUCLEAR MEDICINE | Facility: HOSPITAL | Age: 79
Discharge: HOME | End: 2019-09-16

## 2019-09-16 DIAGNOSIS — M25.562 ACUTE PAIN OF LEFT KNEE: ICD-10-CM

## 2019-09-16 PROCEDURE — 0 TECHNETIUM MEDRONATE KIT: Performed by: ORTHOPAEDIC SURGERY

## 2019-09-16 PROCEDURE — A9503 TC99M MEDRONATE: HCPCS | Performed by: ORTHOPAEDIC SURGERY

## 2019-09-16 PROCEDURE — 78315 BONE IMAGING 3 PHASE: CPT

## 2019-09-16 RX ORDER — TC 99M MEDRONATE 20 MG/10ML
24 INJECTION, POWDER, LYOPHILIZED, FOR SOLUTION INTRAVENOUS
Status: COMPLETED | OUTPATIENT
Start: 2019-09-16 | End: 2019-09-16

## 2019-09-16 RX ADMIN — Medication 24 MILLICURIE: at 09:10

## 2019-09-17 ENCOUNTER — TELEPHONE (OUTPATIENT)
Dept: CARDIOLOGY | Facility: CLINIC | Age: 79
End: 2019-09-17

## 2019-09-17 ENCOUNTER — CLINICAL SUPPORT NO REQUIREMENTS (OUTPATIENT)
Dept: CARDIOLOGY | Facility: CLINIC | Age: 79
End: 2019-09-17

## 2019-09-17 ENCOUNTER — ANTICOAGULATION VISIT (OUTPATIENT)
Dept: PHARMACY | Facility: HOSPITAL | Age: 79
End: 2019-09-17

## 2019-09-17 DIAGNOSIS — I48.20 CHRONIC ATRIAL FIBRILLATION (HCC): ICD-10-CM

## 2019-09-17 DIAGNOSIS — I25.5 ISCHEMIC CARDIOMYOPATHY: Primary | ICD-10-CM

## 2019-09-17 LAB
INR PPP: 2.3 (ref 0.91–1.09)
PROTHROMBIN TIME: 27.3 SECONDS (ref 10–13.8)

## 2019-09-17 PROCEDURE — 36416 COLLJ CAPILLARY BLOOD SPEC: CPT

## 2019-09-17 PROCEDURE — 85610 PROTHROMBIN TIME: CPT

## 2019-09-17 NOTE — PROGRESS NOTES
Anticoagulation Clinic Progress Note    Anticoagulation Summary  As of 2019    INR goal:   2.0-3.0   TTR:   52.3 % (10.6 mo)   INR used for dosin.3 (2019)   Warfarin maintenance plan:   2 mg every Mon, Wed, Fri; 1 mg all other days   Weekly warfarin total:   10 mg   No change documented:   Anna Marie Ndiaye   Plan last modified:   Vargas Butcher RPH (2019)   Next INR check:   10/15/2019   Target end date:   Indefinite    Indications    Chronic atrial fibrillation (CMS/HCC) [I48.2]             Anticoagulation Episode Summary     INR check location:       Preferred lab:       Send INR reminders to:    [facility]    Comments:         Anticoagulation Care Providers     Provider Role Specialty Phone number    Nik Galarza MD Referring Cardiology [phone]          Clinic Interview:  Patient Findings     Negatives:   Signs/symptoms of thrombosis, Signs/symptoms of bleeding,   Laboratory test error suspected, Change in health, Change in alcohol use,   Change in activity, Upcoming invasive procedure, Emergency department   visit, Upcoming dental procedure, Missed doses, Extra doses, Change in   medications, Change in diet/appetite, Hospital admission, Bruising, Other   complaints      Clinical Outcomes     Negatives:   Major bleeding event, Thromboembolic event,   Anticoagulation-related hospital admission, Anticoagulation-related ED   visit, Anticoagulation-related fatality        INR History:  Anticoagulation Monitoring 2019   INR 3.5 2.4 2.3   INR Date 2019   INR Goal 2.0-3.0 2.0-3.0 2.0-3.0   Trend Down Same Same   Last Week Total 11 mg 10 mg 10 mg   Next Week Total 9 mg 10 mg 10 mg   Sun 1 mg 1 mg 1 mg   Mon 2 mg 2 mg 2 mg   Tue 1 mg 1 mg 1 mg   Wed 2 mg 2 mg 2 mg   Thu Hold (); Otherwise 1 mg 1 mg 1 mg   Fri 2 mg 2 mg 2 mg   Sat 1 mg 1 mg 1 mg   Visit Report - - -   Some recent data might be hidden       Plan:  1. INR is  therapeutic today- see above in Anticoagulation Summary.   Will instruct aJnel Mahoney to continue their warfarin regimen- see above in Anticoagulation Summary.  2. Follow up in 4 weeks.  3. Patient declines warfarin refills.  4. Verbal and written information provided. Patient expresses understanding and has no further questions at this time.    Anna Marie Ndiaye

## 2019-09-17 NOTE — TELEPHONE ENCOUNTER
Remote alert report received due to ATP x1 delivered to convert an episode of VT, 9/17 @ 2:30am. There have been 10 other NST/VT episodes since 8/27 clinic check that did not require therapy.

## 2019-09-24 ENCOUNTER — HOSPITAL ENCOUNTER (OUTPATIENT)
Dept: MAMMOGRAPHY | Facility: HOSPITAL | Age: 79
Discharge: HOME | End: 2019-09-24
Admitting: INTERNAL MEDICINE

## 2019-09-24 DIAGNOSIS — Z12.31 VISIT FOR SCREENING MAMMOGRAM: ICD-10-CM

## 2019-09-24 PROCEDURE — 77063 BREAST TOMOSYNTHESIS BI: CPT

## 2019-09-24 PROCEDURE — 77067 SCR MAMMO BI INCL CAD: CPT

## 2019-10-01 ENCOUNTER — LAB (OUTPATIENT)
Dept: LAB | Facility: HOSPITAL | Age: 79
End: 2019-10-01

## 2019-10-01 ENCOUNTER — INFUSION (OUTPATIENT)
Dept: ONCOLOGY | Facility: HOSPITAL | Age: 79
End: 2019-10-01

## 2019-10-01 DIAGNOSIS — E53.8 B12 DEFICIENCY: ICD-10-CM

## 2019-10-01 DIAGNOSIS — N18.30 ANEMIA IN STAGE 3 CHRONIC KIDNEY DISEASE (HCC): ICD-10-CM

## 2019-10-01 DIAGNOSIS — D63.1 ANEMIA IN STAGE 3 CHRONIC KIDNEY DISEASE (HCC): ICD-10-CM

## 2019-10-01 DIAGNOSIS — D69.6 THROMBOCYTOPENIA (HCC): ICD-10-CM

## 2019-10-01 LAB
BASOPHILS # BLD AUTO: 0.03 10*3/MM3 (ref 0–0.2)
BASOPHILS NFR BLD AUTO: 0.5 % (ref 0–1.5)
DEPRECATED RDW RBC AUTO: 54.6 FL (ref 37–54)
EOSINOPHIL # BLD AUTO: 0.2 10*3/MM3 (ref 0–0.4)
EOSINOPHIL NFR BLD AUTO: 3.7 % (ref 0.3–6.2)
ERYTHROCYTE [DISTWIDTH] IN BLOOD BY AUTOMATED COUNT: 15.6 % (ref 12.3–15.4)
HCT VFR BLD AUTO: 32.6 % (ref 34–46.6)
HGB BLD-MCNC: 10.3 G/DL (ref 12–15.9)
IMM GRANULOCYTES # BLD AUTO: 0.11 10*3/MM3 (ref 0–0.05)
IMM GRANULOCYTES NFR BLD AUTO: 2 % (ref 0–0.5)
LYMPHOCYTES # BLD AUTO: 0.97 10*3/MM3 (ref 0.7–3.1)
LYMPHOCYTES NFR BLD AUTO: 17.7 % (ref 19.6–45.3)
MCH RBC QN AUTO: 30.3 PG (ref 26.6–33)
MCHC RBC AUTO-ENTMCNC: 31.6 G/DL (ref 31.5–35.7)
MCV RBC AUTO: 95.9 FL (ref 79–97)
MONOCYTES # BLD AUTO: 0.48 10*3/MM3 (ref 0.1–0.9)
MONOCYTES NFR BLD AUTO: 8.8 % (ref 5–12)
NEUTROPHILS # BLD AUTO: 3.68 10*3/MM3 (ref 1.7–7)
NEUTROPHILS NFR BLD AUTO: 67.3 % (ref 42.7–76)
NRBC BLD AUTO-RTO: 0 /100 WBC (ref 0–0.2)
PLATELET # BLD AUTO: 158 10*3/MM3 (ref 140–450)
PMV BLD AUTO: 10.2 FL (ref 6–12)
RBC # BLD AUTO: 3.4 10*6/MM3 (ref 3.77–5.28)
WBC NRBC COR # BLD: 5.47 10*3/MM3 (ref 3.4–10.8)

## 2019-10-01 PROCEDURE — 85025 COMPLETE CBC W/AUTO DIFF WBC: CPT | Performed by: INTERNAL MEDICINE

## 2019-10-01 PROCEDURE — 36416 COLLJ CAPILLARY BLOOD SPEC: CPT | Performed by: INTERNAL MEDICINE

## 2019-10-15 ENCOUNTER — ANTICOAGULATION VISIT (OUTPATIENT)
Dept: PHARMACY | Facility: HOSPITAL | Age: 79
End: 2019-10-15

## 2019-10-15 DIAGNOSIS — I48.20 CHRONIC ATRIAL FIBRILLATION (HCC): ICD-10-CM

## 2019-10-15 LAB
INR PPP: 2.2 (ref 0.91–1.09)
PROTHROMBIN TIME: 26.4 SECONDS (ref 10–13.8)

## 2019-10-15 PROCEDURE — 36416 COLLJ CAPILLARY BLOOD SPEC: CPT

## 2019-10-15 PROCEDURE — 85610 PROTHROMBIN TIME: CPT

## 2019-10-15 NOTE — PROGRESS NOTES
Anticoagulation Clinic Progress Note    Anticoagulation Summary  As of 10/15/2019    INR goal:   2.0-3.0   TTR:   56.1 % (11.6 mo)   INR used for dosin.2 (10/15/2019)   Warfarin maintenance plan:   2 mg every Mon, Wed, Fri; 1 mg all other days   Weekly warfarin total:   10 mg   No change documented:   Anna Marie Ndiaye   Plan last modified:   Vargas Butcher RPH (2019)   Next INR check:   2019   Target end date:   Indefinite    Indications    Chronic atrial fibrillation [I48.20]             Anticoagulation Episode Summary     INR check location:       Preferred lab:       Send INR reminders to:    [facility]    Comments:         Anticoagulation Care Providers     Provider Role Specialty Phone number    Nik Galarza MD Referring Cardiology [phone]          Clinic Interview:  Patient Findings     Negatives:   Signs/symptoms of thrombosis, Signs/symptoms of bleeding,   Laboratory test error suspected, Change in health, Change in alcohol use,   Change in activity, Upcoming invasive procedure, Emergency department   visit, Upcoming dental procedure, Missed doses, Extra doses, Change in   medications, Change in diet/appetite, Hospital admission, Bruising, Other   complaints      Clinical Outcomes     Negatives:   Major bleeding event, Thromboembolic event,   Anticoagulation-related hospital admission, Anticoagulation-related ED   visit, Anticoagulation-related fatality        INR History:  Anticoagulation Monitoring 2019 2019 10/15/2019   INR 2.4 2.3 2.2   INR Date 2019 2019 10/15/2019   INR Goal 2.0-3.0 2.0-3.0 2.0-3.0   Trend Same Same Same   Last Week Total 10 mg 10 mg 10 mg   Next Week Total 10 mg 10 mg 10 mg   Sun 1 mg 1 mg 1 mg   Mon 2 mg 2 mg 2 mg   Tue 1 mg 1 mg 1 mg   Wed 2 mg 2 mg 2 mg   Thu 1 mg 1 mg 1 mg   Fri 2 mg 2 mg 2 mg   Sat 1 mg 1 mg 1 mg   Visit Report - - -   Some recent data might be hidden       Plan:  1. INR is therapeutic today- see  above in Anticoagulation Summary.   Will instruct Janel Mahoney to continue their warfarin regimen- see above in Anticoagulation Summary.  2. Follow up in 4 weeks.  3. Patient declines warfarin refills.  4. Verbal and written information provided. Patient expresses understanding and has no further questions at this time.    Anna Marie Ndiaye

## 2019-10-17 ENCOUNTER — CLINICAL SUPPORT NO REQUIREMENTS (OUTPATIENT)
Dept: CARDIOLOGY | Facility: CLINIC | Age: 79
End: 2019-10-17

## 2019-10-17 ENCOUNTER — OFFICE VISIT (OUTPATIENT)
Dept: FAMILY MEDICINE CLINIC | Facility: CLINIC | Age: 79
End: 2019-10-17

## 2019-10-17 ENCOUNTER — TELEPHONE (OUTPATIENT)
Dept: CARDIOLOGY | Facility: CLINIC | Age: 79
End: 2019-10-17

## 2019-10-17 VITALS
TEMPERATURE: 98.7 F | HEART RATE: 60 BPM | HEIGHT: 64 IN | DIASTOLIC BLOOD PRESSURE: 62 MMHG | BODY MASS INDEX: 24.89 KG/M2 | OXYGEN SATURATION: 98 % | WEIGHT: 145.8 LBS | SYSTOLIC BLOOD PRESSURE: 136 MMHG

## 2019-10-17 DIAGNOSIS — M25.562 PAIN IN BOTH KNEES, UNSPECIFIED CHRONICITY: Primary | ICD-10-CM

## 2019-10-17 DIAGNOSIS — I25.810 CORONARY ARTERY DISEASE INVOLVING CORONARY BYPASS GRAFT OF NATIVE HEART WITHOUT ANGINA PECTORIS: ICD-10-CM

## 2019-10-17 DIAGNOSIS — I48.20 CHRONIC ATRIAL FIBRILLATION (HCC): ICD-10-CM

## 2019-10-17 DIAGNOSIS — M25.561 PAIN IN BOTH KNEES, UNSPECIFIED CHRONICITY: Primary | ICD-10-CM

## 2019-10-17 DIAGNOSIS — E55.9 VITAMIN D DEFICIENCY, UNSPECIFIED: ICD-10-CM

## 2019-10-17 DIAGNOSIS — M54.40 BILATERAL LOW BACK PAIN WITH SCIATICA, SCIATICA LATERALITY UNSPECIFIED, UNSPECIFIED CHRONICITY: ICD-10-CM

## 2019-10-17 DIAGNOSIS — I42.9 CARDIOMYOPATHY, UNSPECIFIED TYPE (HCC): Primary | ICD-10-CM

## 2019-10-17 LAB
25(OH)D3 SERPL-MCNC: 59.8 NG/ML (ref 30–100)
ALBUMIN SERPL-MCNC: 4 G/DL (ref 3.5–5.2)
ALBUMIN/GLOB SERPL: 1.5 G/DL
ALP SERPL-CCNC: 50 U/L (ref 39–117)
ALT SERPL W P-5'-P-CCNC: 9 U/L (ref 1–33)
ANION GAP SERPL CALCULATED.3IONS-SCNC: 12.1 MMOL/L (ref 5–15)
AST SERPL-CCNC: 18 U/L (ref 1–32)
BILIRUB SERPL-MCNC: 0.5 MG/DL (ref 0.2–1.2)
BUN BLD-MCNC: 61 MG/DL (ref 8–23)
BUN/CREAT SERPL: 27.5 (ref 7–25)
CALCIUM SPEC-SCNC: 9.7 MG/DL (ref 8.6–10.5)
CHLORIDE SERPL-SCNC: 99 MMOL/L (ref 98–107)
CHOLEST SERPL-MCNC: 155 MG/DL (ref 0–200)
CO2 SERPL-SCNC: 31.9 MMOL/L (ref 22–29)
CREAT BLD-MCNC: 2.22 MG/DL (ref 0.57–1)
DEPRECATED RDW RBC AUTO: 47.1 FL (ref 37–54)
ERYTHROCYTE [DISTWIDTH] IN BLOOD BY AUTOMATED COUNT: 13.7 % (ref 12.3–15.4)
FERRITIN SERPL-MCNC: 439 NG/ML (ref 13–150)
GFR SERPL CREATININE-BSD FRML MDRD: 21 ML/MIN/1.73
GLOBULIN UR ELPH-MCNC: 2.7 GM/DL
GLUCOSE BLD-MCNC: 114 MG/DL (ref 65–99)
HCT VFR BLD AUTO: 31.2 % (ref 34–46.6)
HDLC SERPL-MCNC: 52 MG/DL (ref 40–60)
HGB BLD-MCNC: 10.1 G/DL (ref 12–15.9)
LDLC SERPL CALC-MCNC: 85 MG/DL (ref 0–100)
LDLC/HDLC SERPL: 1.63 {RATIO}
MCH RBC QN AUTO: 30.4 PG (ref 26.6–33)
MCHC RBC AUTO-ENTMCNC: 32.4 G/DL (ref 31.5–35.7)
MCV RBC AUTO: 94 FL (ref 79–97)
PLATELET # BLD AUTO: 132 10*3/MM3 (ref 140–450)
PMV BLD AUTO: 10.8 FL (ref 6–12)
POTASSIUM BLD-SCNC: 4.2 MMOL/L (ref 3.5–5.2)
PROT SERPL-MCNC: 6.7 G/DL (ref 6–8.5)
RBC # BLD AUTO: 3.32 10*6/MM3 (ref 3.77–5.28)
SODIUM BLD-SCNC: 143 MMOL/L (ref 136–145)
TRIGL SERPL-MCNC: 92 MG/DL (ref 0–150)
URATE SERPL-MCNC: 5.7 MG/DL (ref 2.4–5.7)
VLDLC SERPL-MCNC: 18.4 MG/DL (ref 5–40)
WBC NRBC COR # BLD: 4.48 10*3/MM3 (ref 3.4–10.8)

## 2019-10-17 PROCEDURE — 80053 COMPREHEN METABOLIC PANEL: CPT | Performed by: INTERNAL MEDICINE

## 2019-10-17 PROCEDURE — 85027 COMPLETE CBC AUTOMATED: CPT | Performed by: INTERNAL MEDICINE

## 2019-10-17 PROCEDURE — 93295 DEV INTERROG REMOTE 1/2/MLT: CPT | Performed by: INTERNAL MEDICINE

## 2019-10-17 PROCEDURE — 82306 VITAMIN D 25 HYDROXY: CPT | Performed by: INTERNAL MEDICINE

## 2019-10-17 PROCEDURE — 99214 OFFICE O/P EST MOD 30 MIN: CPT | Performed by: INTERNAL MEDICINE

## 2019-10-17 PROCEDURE — 82728 ASSAY OF FERRITIN: CPT | Performed by: INTERNAL MEDICINE

## 2019-10-17 PROCEDURE — 84550 ASSAY OF BLOOD/URIC ACID: CPT | Performed by: INTERNAL MEDICINE

## 2019-10-17 PROCEDURE — 36415 COLL VENOUS BLD VENIPUNCTURE: CPT | Performed by: INTERNAL MEDICINE

## 2019-10-17 PROCEDURE — 80061 LIPID PANEL: CPT | Performed by: INTERNAL MEDICINE

## 2019-10-17 PROCEDURE — 93296 REM INTERROG EVL PM/IDS: CPT | Performed by: INTERNAL MEDICINE

## 2019-10-17 NOTE — TELEPHONE ENCOUNTER
Alert remote received and reviewed. Patient received successful ATP therapy x 1, for VT episode that occurred on 10/16/19 @ 0739, avg v-rate 159 bpm. Since last remote on 9/17/19, there has been one additional VT episode on 10/7/19 @ 0141, 22 beats, avg v-rate 168 bpm. 5 NST VT episodes, 2 available EGM's to review, 10-12 beats, avg v-rate 173-184 bpm.I called patient with results, she denies symptoms with episodes.

## 2019-10-17 NOTE — PROGRESS NOTES
Subjective   Janel Mahoney is a 78 y.o. female.     History of Present Illness   Patient was seen for bilateral knee pain.  Patient has prosthetic left knee and is in severe pain.  Knee was swollen and she was advised to see her orthopedic as possible.  Patient also has a history of low back pain but is been stable over the past several months.  He does have coronary artery disease and atrial fibrillation being seen by cardiologist who monitors it.    Dictated utilizing Dragon dictation. If there are questions or for further clarification, please contact me.  The following portions of the patient's history were reviewed and updated as appropriate: allergies, current medications, past family history, past medical history, past social history, past surgical history and problem list.    Review of Systems   Constitutional: Negative for fatigue and fever.   HENT: Positive for congestion. Negative for trouble swallowing.    Eyes: Negative for discharge and visual disturbance.   Respiratory: Negative for choking and shortness of breath.    Cardiovascular: Negative for chest pain and palpitations.   Gastrointestinal: Negative for abdominal pain and blood in stool.   Endocrine: Negative.    Genitourinary: Negative for genital sores and hematuria.   Musculoskeletal: Positive for back pain. Negative for gait problem and joint swelling.        Knee pain   Skin: Negative for color change, pallor, rash and wound.   Allergic/Immunologic: Positive for environmental allergies. Negative for immunocompromised state.   Neurological: Negative for facial asymmetry and speech difficulty.   Psychiatric/Behavioral: Negative for hallucinations and suicidal ideas.       Objective   Physical Exam   Constitutional: She is oriented to person, place, and time. She appears well-developed and well-nourished.   HENT:   Head: Normocephalic.   Eyes: Conjunctivae are normal. Pupils are equal, round, and reactive to light.   Neck: Normal range of  motion. Neck supple.   Cardiovascular: Normal rate, regular rhythm and normal heart sounds.   Pulmonary/Chest: Effort normal and breath sounds normal.   Abdominal: Soft. Bowel sounds are normal.   Musculoskeletal: She exhibits edema, tenderness and deformity.   Neurological: She is alert and oriented to person, place, and time. She exhibits abnormal muscle tone. Coordination abnormal.   Skin: Skin is warm and dry.   Psychiatric: She has a normal mood and affect. Her behavior is normal. Judgment and thought content normal.   Nursing note and vitals reviewed.      Assessment/Plan  1 refer back to orthopedic surgeon #3 lab work  Janel was seen today for hypertension and congestive heart failure.    Diagnoses and all orders for this visit:    Pain in both knees, unspecified chronicity  -     Knee Sleeve    Chronic atrial fibrillation  -     CBC (No Diff)  -     Comprehensive Metabolic Panel  -     Uric Acid  -     Lipid Panel  -     Vitamin D 25 Hydroxy  -     Ferritin    Coronary artery disease involving coronary bypass graft of native heart without angina pectoris  -     CBC (No Diff)  -     Comprehensive Metabolic Panel  -     Uric Acid  -     Lipid Panel  -     Vitamin D 25 Hydroxy  -     Ferritin    Bilateral low back pain with sciatica, sciatica laterality unspecified, unspecified chronicity  -     CBC (No Diff)  -     Comprehensive Metabolic Panel  -     Uric Acid  -     Lipid Panel  -     Vitamin D 25 Hydroxy  -     Ferritin    Vitamin D deficiency, unspecified   -     Vitamin D 25 Hydroxy

## 2019-10-23 ENCOUNTER — TELEPHONE (OUTPATIENT)
Dept: CARDIOLOGY | Facility: CLINIC | Age: 79
End: 2019-10-23

## 2019-10-23 ENCOUNTER — CLINICAL SUPPORT NO REQUIREMENTS (OUTPATIENT)
Dept: CARDIOLOGY | Facility: CLINIC | Age: 79
End: 2019-10-23

## 2019-10-23 DIAGNOSIS — I47.20 VENTRICULAR TACHYCARDIA (HCC): Primary | ICD-10-CM

## 2019-10-29 ENCOUNTER — LAB (OUTPATIENT)
Dept: LAB | Facility: HOSPITAL | Age: 79
End: 2019-10-29

## 2019-10-29 ENCOUNTER — INFUSION (OUTPATIENT)
Dept: ONCOLOGY | Facility: HOSPITAL | Age: 79
End: 2019-10-29

## 2019-10-29 DIAGNOSIS — N18.30 CHRONIC KIDNEY DISEASE, STAGE 3 (HCC): Primary | ICD-10-CM

## 2019-10-29 DIAGNOSIS — D69.6 THROMBOCYTOPENIA (HCC): ICD-10-CM

## 2019-10-29 DIAGNOSIS — N18.30 ANEMIA IN STAGE 3 CHRONIC KIDNEY DISEASE (HCC): ICD-10-CM

## 2019-10-29 DIAGNOSIS — E53.8 B12 DEFICIENCY: ICD-10-CM

## 2019-10-29 DIAGNOSIS — D63.1 ANEMIA IN STAGE 3 CHRONIC KIDNEY DISEASE (HCC): ICD-10-CM

## 2019-10-29 LAB
BASOPHILS # BLD AUTO: 0.02 10*3/MM3 (ref 0–0.2)
BASOPHILS NFR BLD AUTO: 0.5 % (ref 0–1.5)
DEPRECATED RDW RBC AUTO: 51.6 FL (ref 37–54)
EOSINOPHIL # BLD AUTO: 0.12 10*3/MM3 (ref 0–0.4)
EOSINOPHIL NFR BLD AUTO: 2.9 % (ref 0.3–6.2)
ERYTHROCYTE [DISTWIDTH] IN BLOOD BY AUTOMATED COUNT: 14 % (ref 12.3–15.4)
FERRITIN SERPL-MCNC: 417.3 NG/ML (ref 13–150)
HCT VFR BLD AUTO: 31.3 % (ref 34–46.6)
HGB BLD-MCNC: 9.4 G/DL (ref 12–15.9)
IMM GRANULOCYTES # BLD AUTO: 0.01 10*3/MM3 (ref 0–0.05)
IMM GRANULOCYTES NFR BLD AUTO: 0.2 % (ref 0–0.5)
IRON 24H UR-MRATE: 52 MCG/DL (ref 37–145)
IRON SATN MFR SERPL: 18 % (ref 14–48)
LYMPHOCYTES # BLD AUTO: 0.66 10*3/MM3 (ref 0.7–3.1)
LYMPHOCYTES NFR BLD AUTO: 15.9 % (ref 19.6–45.3)
MCH RBC QN AUTO: 30.4 PG (ref 26.6–33)
MCHC RBC AUTO-ENTMCNC: 30 G/DL (ref 31.5–35.7)
MCV RBC AUTO: 101.3 FL (ref 79–97)
MONOCYTES # BLD AUTO: 0.4 10*3/MM3 (ref 0.1–0.9)
MONOCYTES NFR BLD AUTO: 9.6 % (ref 5–12)
NEUTROPHILS # BLD AUTO: 2.94 10*3/MM3 (ref 1.7–7)
NEUTROPHILS NFR BLD AUTO: 70.9 % (ref 42.7–76)
NRBC BLD AUTO-RTO: 0 /100 WBC (ref 0–0.2)
PLATELET # BLD AUTO: 108 10*3/MM3 (ref 140–450)
PMV BLD AUTO: 8.9 FL (ref 6–12)
RBC # BLD AUTO: 3.09 10*6/MM3 (ref 3.77–5.28)
TIBC SERPL-MCNC: 284 MCG/DL (ref 249–505)
TRANSFERRIN SERPL-MCNC: 203 MG/DL (ref 200–360)
WBC NRBC COR # BLD: 4.15 10*3/MM3 (ref 3.4–10.8)

## 2019-10-29 PROCEDURE — 96372 THER/PROPH/DIAG INJ SC/IM: CPT | Performed by: INTERNAL MEDICINE

## 2019-10-29 PROCEDURE — 82728 ASSAY OF FERRITIN: CPT

## 2019-10-29 PROCEDURE — 85025 COMPLETE CBC W/AUTO DIFF WBC: CPT

## 2019-10-29 PROCEDURE — 36415 COLL VENOUS BLD VENIPUNCTURE: CPT

## 2019-10-29 PROCEDURE — 83540 ASSAY OF IRON: CPT

## 2019-10-29 PROCEDURE — 25010000002 EPOETIN ALFA PER 1000 UNITS: Performed by: INTERNAL MEDICINE

## 2019-10-29 PROCEDURE — 84466 ASSAY OF TRANSFERRIN: CPT

## 2019-10-29 RX ADMIN — ERYTHROPOIETIN 3000 UNITS: 20000 INJECTION, SOLUTION INTRAVENOUS; SUBCUTANEOUS at 11:53

## 2019-11-01 ENCOUNTER — TELEPHONE (OUTPATIENT)
Dept: CARDIOLOGY | Facility: CLINIC | Age: 79
End: 2019-11-01

## 2019-11-01 NOTE — TELEPHONE ENCOUNTER
Pt called stating she is having left total knee replacement surgery with Dr. Juvencio Pressley on 11/19/19.     1.  WIll she need to be seen first?    2.  Recommendations on her Warfarin?      # [phone]/[facility]    Dr. Pressley fax# [phone]

## 2019-11-06 ENCOUNTER — APPOINTMENT (OUTPATIENT)
Dept: PREADMISSION TESTING | Facility: HOSPITAL | Age: 79
End: 2019-11-06

## 2019-11-09 RX ORDER — ZOLPIDEM TARTRATE 10 MG/1
TABLET ORAL
Qty: 45 TABLET | Refills: 0 | Status: SHIPPED | OUTPATIENT
Start: 2019-11-09 | End: 2019-11-12

## 2019-11-11 ENCOUNTER — OFFICE VISIT (OUTPATIENT)
Dept: CARDIOLOGY | Facility: CLINIC | Age: 79
End: 2019-11-11

## 2019-11-11 VITALS
DIASTOLIC BLOOD PRESSURE: 62 MMHG | WEIGHT: 147 LBS | SYSTOLIC BLOOD PRESSURE: 140 MMHG | BODY MASS INDEX: 25.1 KG/M2 | HEIGHT: 64 IN | HEART RATE: 60 BPM

## 2019-11-11 DIAGNOSIS — Z95.2 S/P MVR (MITRAL VALVE REPLACEMENT): ICD-10-CM

## 2019-11-11 DIAGNOSIS — Z01.810 PREOPERATIVE CARDIOVASCULAR EXAMINATION: ICD-10-CM

## 2019-11-11 DIAGNOSIS — I25.5 ISCHEMIC CARDIOMYOPATHY: ICD-10-CM

## 2019-11-11 DIAGNOSIS — Z95.0 PACEMAKER: ICD-10-CM

## 2019-11-11 DIAGNOSIS — I50.42 CHRONIC COMBINED SYSTOLIC AND DIASTOLIC CONGESTIVE HEART FAILURE (HCC): ICD-10-CM

## 2019-11-11 DIAGNOSIS — I48.20 CHRONIC ATRIAL FIBRILLATION (HCC): ICD-10-CM

## 2019-11-11 DIAGNOSIS — I65.23 BILATERAL CAROTID ARTERY STENOSIS: ICD-10-CM

## 2019-11-11 DIAGNOSIS — I47.20 VENTRICULAR TACHYCARDIA (HCC): Primary | ICD-10-CM

## 2019-11-11 DIAGNOSIS — I42.0 DILATED CARDIOMYOPATHY (HCC): ICD-10-CM

## 2019-11-11 PROCEDURE — 93000 ELECTROCARDIOGRAM COMPLETE: CPT | Performed by: NURSE PRACTITIONER

## 2019-11-11 PROCEDURE — 99214 OFFICE O/P EST MOD 30 MIN: CPT | Performed by: NURSE PRACTITIONER

## 2019-11-11 NOTE — PROGRESS NOTES
Date of Office Visit: 19  Encounter Provider: NICKY Roman  Place of Service: [facility] CARDIOLOGY  Patient Name: Janel Mahoney  :1940    Chief Complaint   Patient presents with   • Surgical Clearance   • Coronary Artery Disease   • Atrial Fibrillation   :     HPI: Janel Mahoney is a 79 y.o. female  with history of coronary artery disease status post coronary artery bypass grafting, mitral insufficiency status post mitral valve replacement with porcine valve, atrial fibrillation, ischemic cardiomyopathy, chronic systolic congestive heart failure, nonsustained ventricular tachycardia status post biventricular ICD.  She is followed by Dr. Galarza I will see her in follow-up today     She has history of mild disease of the left anterior descending and then angioplasty and stent placement of the right coronary artery.  She later was found to have premature ventricular contractions as well as severe vascular disease.  In 2007 she had an acute infarct catheterization showed left ventricular ejection fraction reduced at 35%.  She had occlusive disease of the right posterior left ventricular branch and no other significant disease.  She was treated medically.  She had a JASON which confirmed left ventricular ejection fraction of 40% and moderate mitral insufficiency.  She had atrial fibrillation and electro cardioversion back to sinus rhythm in May 2007 and then presented with recurrent atrial fibrillation in 2007 and was placed on Tikosyn.  This was titrated up to 500 mcg  but she developed marked QT prolongation even on 250 mcg twice daily.  Then opted for warfarin and rate control however she continued to have symptoms and went to Dr. Harish Iverson in [place] to have pulmonary vein isolation.  She then had recurrent A. fib and was placed on sotalol converted then went back into atrial fibrillation.  She had a repeat catheterization in 2010  which showed severe mitral insufficiency,  and she ultimately had mitral valve replacement with a #31 epic porcine valve and a single bypass graft to the posterior descending artery.  She continued to have episodes of rapid atrial fibrillation and left ventricular dysfunction.  She ultimately underwent AV node ablation and had upgrade of her device to a biventricular.  She presented in September 2013 with fatigue, tiredness and weakness.  Echocardiogram in 2016 showed left ventricular ejection fraction to be 45%, moderate pulmonary hypertension at 62..  She had a perfusion stress test which showed no evidence of ischemia.  She had a positive sleep study and was started on CPAP.  She also had some volume overload improved with twice a day diuretic.  In August 2016, she presented with multiple compression fractures of her back.  In September 2017 she had some GI bleeding and was found to have esophageal ulcers but her INR was supratherapeutic.  The ulcers were treated and her hemoglobin stabilized.  She was readmitted in November 2018 with spontaneous hematoma of her right pectoral muscle which had to be surgically drained.  She had a fall after stumbling 2-3 weeks prior but did not recall hitting her chest.  She had been on a Lovenox bridge around that time for her back injections. She was due for biventricular generator replacement and had that completed 1/4/2019.  In early February 2019 her nephrologist had decreased ramipril and then stopped it altogether then had issues with increased lower extremity edema and shortness of breath with orthopnea and she was admitted to the hospital received IV Lasix and was switched from Lasix to Bumex and she improved.    In August 2019 she was noted to have episodes of ventricular tachycardia on ICD which were successfully treated with ATP.  There is one episode that the ATP failed to terminate, but it broke just before further therapy could be given.  It was felt at that time  that ATP would be the treatment for those episodes.  In the future if needed we would consider amiodarone.  She presents today for preoperative risk assessment.  She had a hematoma of the left leg which was drained.  She is been having significant pain in the left knee and is to have a reoperation of that.  She denies chest pain tightness pressure, palpitation, dizziness, lightheadedness, near-syncope, or syncope.            Allergies   Allergen Reactions   • Diclofenac      She had adverse effects from the medications that required hospitalization. Pt got stomach ulcers from this medication prescribed by Dr. Arango - pediatrist.   • Dofetilide Unknown (See Comments)     Pt states not allergic.        Past Medical History:   Diagnosis Date   • Acute kidney injury (CMS/HCC)    • Anemia    • Atrial fibrillation (CMS/HCC)    • Bruises easily    • Carotid artery stenosis    • Chronic back pain    • Chronic combined systolic and diastolic congestive heart failure (CMS/HCC)    • Chronic coronary artery disease     moderate to severe LV dysfunction.   • Chronic kidney disease, stage 3 (CMS/HCC)    • Dysphagia    • GERD (gastroesophageal reflux disease)    • Gout    • H/O cardiac murmur    • [place] (hard of hearing)     wears hearing aids   • Hyperlipidemia    • Hypertension    • Hypotension    • Ischemic cardiomyopathy    • Kyphoscoliosis    • Leukopenia    • Lumbar spondylosis    • Obesity    • GEORGINA (obstructive sleep apnea)    • Osteoarthritis    • Osteoporosis    • Peptic ulcer    • Premature ventricular contractions    • Renal insufficiency syndrome    • Scoliosis    • Shoulder fracture, left    • Stroke syndrome    • Thrombocytopenia (CMS/HCC)    • Ventricular tachycardia (CMS/HCC)    • Vitamin B12 deficiency        Past Surgical History:   Procedure Laterality Date   • AV NODE ABLATION     • BREAST BIOPSY     • CARDIAC CATHETERIZATION      Showed severe mitral insufficiency and borderline coronary artery disease   •  CARDIAC CATHETERIZATION      Showed an ejection fraction of 35%. She had occlusive disease of the right posterior LV branch and no other significant disease, treated medically.   • CARDIAC DEFIBRILLATOR PLACEMENT      Biventricular   • CARDIAC ELECTROPHYSIOLOGY PROCEDURE N/A 1/4/2019    Procedure: GENERATOR CHANGE BI-V ICD   [place];  Surgeon: James Hwang MD;  Location: [facility] CATH INVASIVE LOCATION;  Service: Cardiology   • CARDIAC VALVE REPLACEMENT  2009    Done with stent placement   • CARDIOVERSION      multiple electrocardioversions.   • CAROTID ARTERY ANGIOPLASTY Right    • COLONOSCOPY N/A 9/28/2017    Procedure: COLONOSCOPY TO CECUM;  Surgeon: Kevin Davis MD;  Location: [facility] ENDOSCOPY;  Service:    • CORONARY ANGIOPLASTY WITH STENT PLACEMENT  2009   • CORONARY ARTERY BYPASS GRAFT      single graft to the PDA   • CORONARY STENT PLACEMENT     • ENDOSCOPY N/A 9/28/2017    Procedure: ESOPHAGOGASTRODUODENOSCOPY ;  Surgeon: Kevin Davis MD;  Location: [facility] ENDOSCOPY;  Service:    • HEMORRHOIDECTOMY     • HYSTERECTOMY     • INCISION AND DRAINAGE TRUNK Right 11/27/2018    Procedure: EVACUATION OF RIGHT CHEST WALL HEMATOMA;  Surgeon: Juvencio Rodriguez MD;  Location: [facility] OR;  Service: General   • MITRAL VALVE REPLACEMENT  01/2010    #31 Epic porcine valve.   • THROMBOENDARTERECTOMY Right     carotid thromboendarterectomy    • TONSILLECTOMY      age 32   • TOTAL KNEE ARTHROPLASTY Left    • TOTAL SHOULDER ARTHROPLASTY W/ DISTAL CLAVICLE EXCISION Left 1/16/2018    Procedure: TOTAL SHOULDER REVERSE ARTHROPLASTY;  Surgeon: Bianka Quesada MD;  Location: [facility] OR;  Service:          Family and social history reviewed.     Review of Systems   Cardiovascular: Positive for dyspnea on exertion.   Hematologic/Lymphatic: Bruises/bleeds easily.   Musculoskeletal: Positive for joint pain and stiffness.     All other systems were reviewed and are negative          Objective:     Vitals:     "11/11/19 1513   BP: 140/62   BP Location: Left arm   Patient Position: Sitting   Pulse: 60   Weight: 66.7 kg (147 lb)   Height: 162.6 cm (64\")     Body mass index is 25.23 kg/m².    PHYSICAL EXAM:  Physical Exam   Constitutional: She is oriented to person, place, and time. She appears well-developed and well-nourished. No distress.   HENT:   Head: Normocephalic.   Eyes: Conjunctivae are normal.   Neck: Normal range of motion. No JVD present.   Cardiovascular: Normal rate, regular rhythm and intact distal pulses.   Murmur heard.  Pulses:       Carotid pulses are 2+ on the right side, and 2+ on the left side.       Radial pulses are 2+ on the right side, and 2+ on the left side.        Posterior tibial pulses are 2+ on the right side, and 2+ on the left side.   Pulmonary/Chest: Effort normal and breath sounds normal. No respiratory distress. She has no wheezes. She has no rhonchi. She has no rales. She exhibits no tenderness.   Abdominal: Soft. Bowel sounds are normal. She exhibits no distension.   Musculoskeletal: Normal range of motion. She exhibits no edema.   Neurological: She is alert and oriented to person, place, and time.   Skin: Skin is warm, dry and intact. No rash noted. She is not diaphoretic. No cyanosis.   Psychiatric: She has a normal mood and affect. Her behavior is normal. Judgment and thought content normal.         ECG 12 Lead  Date/Time: 11/11/2019 4:58 PM  Performed by: Francesca Manning APRN  Authorized by: Francesca Manning APRN   Comparison: compared with previous ECG   Similar to previous ECG  Rhythm: paced  Rate: normal  Pacing: ventricular paced rhythm  Clinical impression: abnormal EKG            Current Outpatient Medications   Medication Sig Dispense Refill   • alendronate (FOSAMAX) 70 MG tablet TAKE 1 TABLET EVERY 7 (SEVEN) DAYS. STAY UPRIGHT FOR 30 MINUTES / DRINK 8 OUNCES OF WATER AND DO NOT EAT 30 MINUTES (Patient taking differently: TAKE 1 TABLET EVERY 14 DAYS. STAY UPRIGHT FOR 30 MINUTES " / DRINK 8 OUNCES OF WATER AND DO NOT EAT 30 MINUTES) 12 tablet 3   • aspirin 81 MG tablet Take 81 mg by mouth Daily.     • bumetanide (BUMEX) 2 MG tablet TAKE 1 TABLET TWICE DAILY. DOSE CHANGED  180 tablet 1   • calcitriol (ROCALTROL) 0.25 MCG capsule Take 0.25 mcg by mouth Daily.     • carvedilol (COREG) 25 MG tablet TAKE 1 TABLET TWICE DAILY 180 tablet 3   • Cholecalciferol (VITAMIN D) 2000 UNITS tablet Take 1 tablet by mouth daily.     • COLCRYS 0.6 MG tablet      • epoetin adele (EPOGEN,PROCRIT) 02878 UNIT/ML injection Inject 10,000 Units under the skin into the appropriate area as directed As Needed.     • febuxostat (ULORIC) 40 MG tablet      • ferrous sulfate 325 (65 FE) MG tablet Take 1 tablet by mouth Every Other Day.     • HAVRIX 1440 EL U/ML vaccine      • HYDROcodone-acetaminophen (NORCO) 5-325 MG per tablet Take 1 tablet by mouth Daily.     • Multiple Vitamins-Minerals (SENIOR MULTIVITAMIN PLUS) tablet Take 1 tablet by mouth Daily.     • pantoprazole (PROTONIX) 40 MG EC tablet TAKE 1 TABLET TWICE DAILY 180 tablet 1   • ramipril (ALTACE) 5 MG capsule Take 5 mg by mouth Daily.     • simvastatin (ZOCOR) 40 MG tablet TAKE 1 TABLET EVERY DAY (Patient taking differently: pt reports taking 1/2 tablet nightly) 90 tablet 1   • traMADol (ULTRAM) 50 MG tablet TAKE 2 TABLETS EVERY MORNING  AND TAKE 2 TABLETS AT BEDTIME 120 tablet 5   • vitamin B-12 (CYANOCOBALAMIN) 2500 MCG sublingual tablet tablet Place 2,500 mcg under the tongue Daily.     • warfarin (COUMADIN) 1 MG tablet      • zolpidem (AMBIEN) 10 MG tablet TAKE 1/2 TABLET AT NIGHT AS NEEDED FOR SLEEP  45 tablet 0   • enoxaparin (LOVENOX) 80 MG/0.8ML solution syringe Inject 0.7 mL under the skin into the appropriate area as directed Daily for 4 doses. 3.2 mL 0     No current facility-administered medications for this visit.      Assessment:       Diagnosis Plan   1. Ventricular tachycardia (CMS/HCC)     2. S/P MVR (mitral valve replacement)  enoxaparin  (LOVENOX) 80 MG/0.8ML solution syringe   3. Chronic atrial fibrillation  enoxaparin (LOVENOX) 80 MG/0.8ML solution syringe   4. Pacemaker     5. Ischemic cardiomyopathy     6. Dilated cardiomyopathy (CMS/HCC)     7. Chronic combined systolic and diastolic congestive heart failure (CMS/HCC)     8. Preoperative cardiovascular examination  enoxaparin (LOVENOX) 80 MG/0.8ML solution syringe   9. Bilateral carotid artery stenosis          Orders Placed This Encounter   Procedures   • ECG 12 Lead     This order was created via procedure documentation         Plan:   1.  79-year-old female with coronary artery disease status post stent to the right coronary artery, then inferior infarct and ultimately had single- vessel bypass graft January 2010 to the posterior descending artery at the time of her mitral valve replacement, moderate left ventricular dysfunction with last echocardiogram December 2016 ejection fraction of approximately 40%  Coronary Artery Disease  Assessment  • The patient has no angina    Plan  • Lifestyle modifications discussed include adhering to a heart healthy diet, avoidance of tobacco products, maintenance of a healthy weight, medication compliance, regular exercise and regular monitoring of cholesterol and blood pressure    Subjective - Objective  • There is a history of past MI  • There is a history of previous coronary artery bypass graft  • There has been a previous POBA  • Current antiplatelet therapy includes aspirin 81 mg        2.  History of mitral insufficiency status post mitral valve replacement with porcine valve. Last echo 12/2016  3.  Ischemic cardiomyopathy, see concha  4.  Chronic systolic and diastolic congestive heart failure, last EF 45% in December 2016. Appears euvolemic continue the same.  She is to have a repeat echo next month as scheduled  5.  Chronic atrial fibrillation then electrocardioversion to sinus rhythm May 2007 and readmitted in August with a recurrent episode placed  on Tikosyn then marked QT prolongation on 250 mics twice daily.  Treated with warfarin then pulmonary vein isolation placed on sotalol recurrent A. fib and AV node ablation status post permanent pacemaker upgraded to biV ICD with generator upgrade 01/2019  Atrial Fibrillation and Atrial Flutter  Assessment  • The patient has permanent atrial fibrillation  • This is valvular in etiology  • The patient's CHADS2-VASc score is 6  • A [id number] score of 2 or more is considered a high risk for a thromboembolic event  • Warfarin prescribed    Plan  • Continue in atrial fibrillation with rate control  • Continue warfarin for antithrombotic therapy, bleeding issues discussed  • Continue beta blocker for rate control    Subjective - Objective  • The patient underwent cardioversion   • The patient had atrial fibrillation ablation   • The patient had a recurrence of atrial fibrillation since ablation         6.  Hypertension stable continue the same  7. Hyperlipidemia on simvastatin   8. Obstructive sleep apnea on CPAP  9.  History of  ventricular tachycardia status post ICD with generator upgrade in January 2019 treated with ATP therapy following with Dr. Boucher  10.  Spinal disease with history of compression fractures in serial back injection.  She requires Lovenox bridge for these  11.  History of anemia she follows with hematology  12.  History of renal insufficiency followed by nephrology with Dr. Cruz  13.bilateral carotid stenosis sent left less than 50% December 2016 she will need repeat carotid duplex  14.Preoperative risk assessment she is Srikanth's revised high risk, none of those modifiable.  She will start warfarin for 5 days prior have INR checked after 2 days then start Lovenox injection 1 mg/kg daily due to renal function.  She is to resume warfarin immediately after surgery use Lovenox bridge until INR above 2.0.  She understands to have her INR checked 2 to 3 days after resuming warfarin.          It has  been a pleasure to participate in this patient's care.      Thank you,  NICKY Roman      **I used Dragon to dictate this note:**

## 2019-11-12 ENCOUNTER — APPOINTMENT (OUTPATIENT)
Dept: PREADMISSION TESTING | Facility: HOSPITAL | Age: 79
End: 2019-11-12

## 2019-11-12 ENCOUNTER — HOSPITAL ENCOUNTER (OUTPATIENT)
Dept: GENERAL RADIOLOGY | Facility: HOSPITAL | Age: 79
Discharge: HOME | End: 2019-11-12

## 2019-11-12 ENCOUNTER — ANTICOAGULATION VISIT (OUTPATIENT)
Dept: PHARMACY | Facility: HOSPITAL | Age: 79
End: 2019-11-12

## 2019-11-12 ENCOUNTER — HOSPITAL ENCOUNTER (OUTPATIENT)
Dept: GENERAL RADIOLOGY | Facility: HOSPITAL | Age: 79
Discharge: HOME | End: 2019-11-12
Admitting: ORTHOPAEDIC SURGERY

## 2019-11-12 ENCOUNTER — TELEPHONE (OUTPATIENT)
Dept: CARDIOLOGY | Facility: CLINIC | Age: 79
End: 2019-11-12

## 2019-11-12 VITALS
OXYGEN SATURATION: 100 % | HEART RATE: 60 BPM | DIASTOLIC BLOOD PRESSURE: 51 MMHG | RESPIRATION RATE: 16 BRPM | TEMPERATURE: 97.2 F | WEIGHT: 146.9 LBS | SYSTOLIC BLOOD PRESSURE: 137 MMHG | BODY MASS INDEX: 25.08 KG/M2 | HEIGHT: 64 IN

## 2019-11-12 DIAGNOSIS — I48.20 CHRONIC ATRIAL FIBRILLATION (HCC): ICD-10-CM

## 2019-11-12 LAB
ABO GROUP BLD: NORMAL
ALBUMIN SERPL-MCNC: 3.9 G/DL (ref 3.5–5.2)
ALBUMIN/GLOB SERPL: 1.4 G/DL
ALP SERPL-CCNC: 53 U/L (ref 39–117)
ALT SERPL W P-5'-P-CCNC: 9 U/L (ref 1–33)
ANION GAP SERPL CALCULATED.3IONS-SCNC: 11.8 MMOL/L (ref 5–15)
APTT PPP: 40.3 SECONDS (ref 22.7–35.4)
AST SERPL-CCNC: 15 U/L (ref 1–32)
BACTERIA UR QL AUTO: ABNORMAL /HPF
BASOPHILS # BLD AUTO: 0.02 10*3/MM3 (ref 0–0.2)
BASOPHILS NFR BLD AUTO: 0.5 % (ref 0–1.5)
BILIRUB SERPL-MCNC: 0.4 MG/DL (ref 0.2–1.2)
BILIRUB UR QL STRIP: NEGATIVE
BLD GP AB SCN SERPL QL: NEGATIVE
BUN BLD-MCNC: 51 MG/DL (ref 8–23)
BUN/CREAT SERPL: 25.4 (ref 7–25)
CALCIUM SPEC-SCNC: 9.8 MG/DL (ref 8.6–10.5)
CHLORIDE SERPL-SCNC: 95 MMOL/L (ref 98–107)
CLARITY UR: CLEAR
CO2 SERPL-SCNC: 30.2 MMOL/L (ref 22–29)
COLOR UR: YELLOW
CREAT BLD-MCNC: 2.01 MG/DL (ref 0.57–1)
DEPRECATED RDW RBC AUTO: 44.2 FL (ref 37–54)
EOSINOPHIL # BLD AUTO: 0.18 10*3/MM3 (ref 0–0.4)
EOSINOPHIL NFR BLD AUTO: 4.7 % (ref 0.3–6.2)
ERYTHROCYTE [DISTWIDTH] IN BLOOD BY AUTOMATED COUNT: 12.8 % (ref 12.3–15.4)
GFR SERPL CREATININE-BSD FRML MDRD: 24 ML/MIN/1.73
GLOBULIN UR ELPH-MCNC: 2.7 GM/DL
GLUCOSE BLD-MCNC: 90 MG/DL (ref 65–99)
GLUCOSE UR STRIP-MCNC: NEGATIVE MG/DL
HCT VFR BLD AUTO: 29.6 % (ref 34–46.6)
HGB BLD-MCNC: 9.5 G/DL (ref 12–15.9)
HGB UR QL STRIP.AUTO: NEGATIVE
HYALINE CASTS UR QL AUTO: ABNORMAL /LPF
IMM GRANULOCYTES # BLD AUTO: 0.02 10*3/MM3 (ref 0–0.05)
IMM GRANULOCYTES NFR BLD AUTO: 0.5 % (ref 0–0.5)
INR PPP: 2.17 (ref 0.9–1.1)
KETONES UR QL STRIP: NEGATIVE
LEUKOCYTE ESTERASE UR QL STRIP.AUTO: ABNORMAL
LYMPHOCYTES # BLD AUTO: 0.72 10*3/MM3 (ref 0.7–3.1)
LYMPHOCYTES NFR BLD AUTO: 18.8 % (ref 19.6–45.3)
MCH RBC QN AUTO: 30.7 PG (ref 26.6–33)
MCHC RBC AUTO-ENTMCNC: 32.1 G/DL (ref 31.5–35.7)
MCV RBC AUTO: 95.8 FL (ref 79–97)
MONOCYTES # BLD AUTO: 0.36 10*3/MM3 (ref 0.1–0.9)
MONOCYTES NFR BLD AUTO: 9.4 % (ref 5–12)
NEUTROPHILS # BLD AUTO: 2.52 10*3/MM3 (ref 1.7–7)
NEUTROPHILS NFR BLD AUTO: 66.1 % (ref 42.7–76)
NITRITE UR QL STRIP: NEGATIVE
NRBC BLD AUTO-RTO: 0 /100 WBC (ref 0–0.2)
PH UR STRIP.AUTO: 6 [PH] (ref 5–8)
PLATELET # BLD AUTO: 145 10*3/MM3 (ref 140–450)
PMV BLD AUTO: 9.6 FL (ref 6–12)
POTASSIUM BLD-SCNC: 4 MMOL/L (ref 3.5–5.2)
PROT SERPL-MCNC: 6.6 G/DL (ref 6–8.5)
PROT UR QL STRIP: NEGATIVE
PROTHROMBIN TIME: 23.9 SECONDS (ref 11.7–14.2)
RBC # BLD AUTO: 3.09 10*6/MM3 (ref 3.77–5.28)
RBC # UR: ABNORMAL /HPF
REF LAB TEST METHOD: ABNORMAL
RH BLD: POSITIVE
SODIUM BLD-SCNC: 137 MMOL/L (ref 136–145)
SP GR UR STRIP: 1.01 (ref 1–1.03)
SQUAMOUS #/AREA URNS HPF: ABNORMAL /HPF
T&S EXPIRATION DATE: NORMAL
UROBILINOGEN UR QL STRIP: ABNORMAL
WBC NRBC COR # BLD: 3.82 10*3/MM3 (ref 3.4–10.8)
WBC UR QL AUTO: ABNORMAL /HPF

## 2019-11-12 PROCEDURE — 71046 X-RAY EXAM CHEST 2 VIEWS: CPT

## 2019-11-12 PROCEDURE — 80053 COMPREHEN METABOLIC PANEL: CPT | Performed by: ORTHOPAEDIC SURGERY

## 2019-11-12 PROCEDURE — 85610 PROTHROMBIN TIME: CPT | Performed by: ORTHOPAEDIC SURGERY

## 2019-11-12 PROCEDURE — 81001 URINALYSIS AUTO W/SCOPE: CPT | Performed by: ORTHOPAEDIC SURGERY

## 2019-11-12 PROCEDURE — 85025 COMPLETE CBC W/AUTO DIFF WBC: CPT | Performed by: ORTHOPAEDIC SURGERY

## 2019-11-12 PROCEDURE — 36415 COLL VENOUS BLD VENIPUNCTURE: CPT

## 2019-11-12 PROCEDURE — A9270 NON-COVERED ITEM OR SERVICE: HCPCS | Performed by: ORTHOPAEDIC SURGERY

## 2019-11-12 PROCEDURE — 86901 BLOOD TYPING SEROLOGIC RH(D): CPT | Performed by: ORTHOPAEDIC SURGERY

## 2019-11-12 PROCEDURE — 86900 BLOOD TYPING SEROLOGIC ABO: CPT | Performed by: ORTHOPAEDIC SURGERY

## 2019-11-12 PROCEDURE — 85730 THROMBOPLASTIN TIME PARTIAL: CPT | Performed by: ORTHOPAEDIC SURGERY

## 2019-11-12 PROCEDURE — 86850 RBC ANTIBODY SCREEN: CPT | Performed by: ORTHOPAEDIC SURGERY

## 2019-11-12 PROCEDURE — 86923 COMPATIBILITY TEST ELECTRIC: CPT

## 2019-11-12 PROCEDURE — 73560 X-RAY EXAM OF KNEE 1 OR 2: CPT

## 2019-11-12 PROCEDURE — 63710000001 MUPIROCIN 2 % OINTMENT: Performed by: ORTHOPAEDIC SURGERY

## 2019-11-12 RX ORDER — CARVEDILOL 25 MG/1
25 TABLET ORAL 2 TIMES DAILY WITH MEALS
COMMUNITY
End: 2019-12-13 | Stop reason: SDUPTHER

## 2019-11-12 RX ORDER — ZOLPIDEM TARTRATE 10 MG/1
10 TABLET ORAL NIGHTLY PRN
COMMUNITY
End: 2020-02-19 | Stop reason: SDUPTHER

## 2019-11-12 RX ORDER — TRAMADOL HYDROCHLORIDE 50 MG/1
100 TABLET ORAL 2 TIMES DAILY
COMMUNITY
End: 2020-03-04 | Stop reason: SDUPTHER

## 2019-11-12 RX ORDER — LANOLIN ALCOHOL/MO/W.PET/CERES
1000 CREAM (GRAM) TOPICAL DAILY
COMMUNITY

## 2019-11-12 RX ORDER — SIMVASTATIN 20 MG
10 TABLET ORAL NIGHTLY
COMMUNITY
End: 2020-10-29 | Stop reason: SDUPTHER

## 2019-11-12 RX ORDER — PANTOPRAZOLE SODIUM 40 MG/1
40 TABLET, DELAYED RELEASE ORAL 2 TIMES DAILY
COMMUNITY
End: 2020-02-04

## 2019-11-12 RX ORDER — BUMETANIDE 2 MG/1
2 TABLET ORAL 2 TIMES DAILY
COMMUNITY
End: 2020-04-14

## 2019-11-12 RX ORDER — ALENDRONATE SODIUM 70 MG/1
70 TABLET ORAL
COMMUNITY
End: 2022-01-01

## 2019-11-12 ASSESSMENT — KOOS JR
KOOS JR SCORE: 44.905
KOOS JR SCORE: 17

## 2019-11-12 NOTE — TELEPHONE ENCOUNTER
Faxed surgery clearance letter created by NICKY Roman in Epic to Dr. Juvencio Pressley.  Fax# [phone]. Faxed confirmation received. / GHASSAN

## 2019-11-12 NOTE — PROGRESS NOTES
Anticoagulation Clinic Progress Note    Anticoagulation Summary  As of 11/12/2019    INR goal:   2.0-3.0   TTR:   56.1 % (11.6 mo)   INR used for dosing:   No new INR was available at the time of this encounter.   Warfarin maintenance plan:   2 mg every Mon, Wed, Fri; 1 mg all other days   Weekly warfarin total:   10 mg   Plan last modified:   [facility] (8/8/2019)   Next INR check:   11/15/2019   Target end date:   Indefinite    Indications    Chronic atrial fibrillation [I48.20]             Anticoagulation Episode Summary     INR check location:       Preferred lab:       Send INR reminders to:   [facility] CLINICAL POOL    Comments:         Anticoagulation Care Providers     Provider Role Specialty Phone number    Nik Galarza MD Referring Cardiology [phone]          Clinic Interview:      INR History:  Anticoagulation Monitoring 9/17/2019 10/15/2019 11/12/2019   INR 2.3 2.2 -   INR Date 9/17/2019 10/15/2019 -   INR Goal 2.0-3.0 2.0-3.0 2.0-3.0   Trend Same Same Same   Last Week Total 10 mg 10 mg 10 mg   Next Week Total 10 mg 10 mg 3 mg   Sun 1 mg 1 mg -   Mon 2 mg 2 mg -   Tue 1 mg 1 mg 1 mg   Wed 2 mg 2 mg 2 mg   Thu 1 mg 1 mg Hold (11/14)   Fri 2 mg 2 mg -   Sat 1 mg 1 mg -   Visit Report - - -   Some recent data might be hidden       Plan:  1. No INR today.  Will start holding warfarin on 11/14 for TKA on 11/19.  Scheduled INR on Fri 11/15 to determine when to start enoxaparin bridge.  4. Verbal and written information provided. Patient expresses understanding and has no further questions at this time.    Shanna Major [facility]

## 2019-11-12 NOTE — DISCHARGE INSTRUCTIONS
Take the following medications the morning of surgery with a small sip of water: CARVEDILOL AND PANTOPRAZOLE    ARRIVAL TIME 09:00        General Instructions:  • Do not eat solid food after midnight the night before surgery.  • You may drink clear liquids day of surgery but must stop at least one hour before your hospital arrival time.  • It is beneficial for you to have a clear drink that contains carbohydrates the day of surgery.  We suggest a 12 to 20 ounce bottle of Gatorade or Powerade for non-diabetic patients or a 12 to 20 ounce bottle of G2 or Powerade Zero for diabetic patients. (Pediatric patients, are not advised to drink a 12 to 20 ounce carbohydrate drink)    Clear liquids are liquids you can see through.  Nothing red in color.     Plain water                               Sports drinks  Sodas                                   Gelatin (Jell-O)  Fruit juices without pulp such as white grape juice and apple juice  Popsicles that contain no fruit or yogurt  Tea or coffee (no cream or milk added)  Gatorade / Powerade  G2 / Powerade Zero    • Infants may have breast milk up to four hours before surgery.  • Infants drinking formula may drink formula up to six hours before surgery.   • Patients who avoid smoking, chewing tobacco and alcohol for 4 weeks prior to surgery have a reduced risk of post-operative complications.  Quit smoking as many days before surgery as you can.  • Do not smoke, use chewing tobacco or drink alcohol the day of surgery.   • If applicable bring your C-PAP/ BI-PAP machine.  • Bring any papers given to you in the doctor’s office.  • Wear clean comfortable clothes.  • Do not wear contact lenses, false eyelashes or make-up.  Bring a case for your glasses.   • Bring crutches or walker if applicable.  • Remove all piercings.  Leave jewelry and any other valuables at home.  • Hair extensions with metal clips must be removed prior to surgery.  • The Pre-Admission Testing nurse will instruct  you to bring medications if unable to obtain an accurate list in Pre-Admission Testing.        If you were given a blood bank ID arm band remember to bring it with you the day of surgery.    Preventing a Surgical Site Infection:  • For 2 to 3 days before surgery, avoid shaving with a razor because the razor can irritate skin and make it easier to develop an infection.    • Any areas of open skin can increase the risk of a post-operative wound infection by allowing bacteria to enter and travel throughout the body.  Notify your surgeon if you have any skin wounds / rashes even if it is not near the expected surgical site.  The area will need assessed to determine if surgery should be delayed until it is healed.  • The night prior to surgery sleep in a clean bed with clean clothing.  Do not allow pets to sleep with you.  • Shower on the morning of surgery using a fresh bar of anti-bacterial soap (such as Dial) and clean washcloth.  Dry with a clean towel and dress in clean clothing.  • Ask your surgeon if you will be receiving antibiotics prior to surgery.  • Make sure you, your family, and all healthcare providers clean their hands with soap and water or an alcohol based hand  before caring for you or your wound.    Day of surgery:  Your arrival time is approximately two hours before your scheduled surgery time.  Upon arrival, a Pre-op nurse and Anesthesiologist will review your health history, obtain vital signs, and answer questions you may have.  The only belongings needed at this time will be a list of your home medications and if applicable your C-PAP/BI-PAP machine.  If you are staying overnight your family can leave the rest of your belongings in the car and bring them to your room later.  A Pre-op nurse will start an IV and you may receive medication in preparation for surgery, including something to help you relax.  Your family will be able to see you in the Pre-op area.  Two visitors at a time will  be allowed in the Pre-op room.  While you are in surgery your family should notify the waiting room  if they leave the waiting room area and provide a contact phone number.    Please be aware that surgery does come with discomfort.  We want to make every effort to control your discomfort so please discuss any uncontrolled symptoms with your nurse.   Your doctor will most likely have prescribed pain medications.      If you are going home after surgery you will receive individualized written care instructions before being discharged.  A responsible adult must drive you to and from the hospital on the day of your surgery and stay with you for 24 hours.    If you are staying overnight following surgery, you will be transported to your hospital room following the recovery period.  [facility] has all private rooms.    You have received a list of surgical assistants for your reference.  If you have any questions please call Pre-Admission Testing at [phone].  Deductibles and co-payments are collected on the day of service. Please be prepared to pay the required co-pay, deductible or deposit on the day of service as defined by your plan.    2% CHLORAHEXIDINE GLUCONATE* CLOTH  Preparing or “prepping” skin before surgery can reduce the risk of infection at the surgical site. To make the process easier, [facility] has chosen disposable cloths moistened with a rinse-free, 2% Chlorhexidine Gluconate (CHG) antiseptic solution. The steps below outline the prepping process and should be carefully followed.        Use the prep cloth on the area that is circled in the diagram             Directions Night before Surgery  1) Shower using a fresh bar of anti-bacterial soap (such as Dial) and clean washcloth.  Use a clean towel to completely dry your skin.  2) Do not use any lotions, oils or creams on your skin.  3) Open the package and remove 1 cloth, wipe your skin for 30 seconds in a circular  motion.  Allow to dry for 3 minutes.  4) Repeat #3 with second cloth.  5) Do not touch your eyes, ears, or mouth with the prep cloth.  6) Allow the wet prep solution to air dry.  7) Discard the prep cloth and wash your hands with soap and water.   8) Dress in clean bed clothes and sleep on fresh clean bed sheets.   9) You may experience some temporary itching after the prep.    Directions Day of Surgery  1) Repeat steps 1,2,3,4,5,6,7, and 9.   2) Dress in clean clothes before coming to the hospital.    BACTROBAN NASAL OINTMENT  There are many germs normally in your nose. Bactroban is an ointment that will help reduce these germs. Please follow these instructions for Bactroban use:      ____The day before surgery in the morning  Date___11/18_____    ____The day before surgery in the evening              Date___11/18_____    ____The day of surgery in the morning    Date____11/19____    **Squirt ½ package of Bactroban Ointment onto a cotton applicator and apply to inside of 1st nostril.  Squirt the remaining Bactroban and apply to the inside of the other nostril.

## 2019-11-15 ENCOUNTER — ANTICOAGULATION VISIT (OUTPATIENT)
Dept: PHARMACY | Facility: HOSPITAL | Age: 79
End: 2019-11-15

## 2019-11-15 DIAGNOSIS — I48.20 CHRONIC ATRIAL FIBRILLATION (HCC): ICD-10-CM

## 2019-11-15 LAB
INR PPP: 2.3 (ref 0.91–1.09)
PROTHROMBIN TIME: 27.1 SECONDS (ref 10–13.8)

## 2019-11-15 PROCEDURE — 85610 PROTHROMBIN TIME: CPT

## 2019-11-15 PROCEDURE — G0463 HOSPITAL OUTPT CLINIC VISIT: HCPCS

## 2019-11-15 PROCEDURE — 36416 COLLJ CAPILLARY BLOOD SPEC: CPT

## 2019-11-15 NOTE — PROGRESS NOTES
Anticoagulation Clinic Progress Note    Anticoagulation Summary  As of 11/15/2019    INR goal:   2.0-3.0   TTR:   59.7 % (1 y)   INR used for dosin.3 (11/15/2019)   Warfarin maintenance plan:   2 mg every Mon, Wed, Fri; 1 mg all other days   Weekly warfarin total:   10 mg   Plan last modified:   [facility] (2019)   Next INR check:   2019   Target end date:   Indefinite    Indications    Chronic atrial fibrillation [I48.20]             Anticoagulation Episode Summary     INR check location:       Preferred lab:       Send INR reminders to:   [facility] CLINICAL [place]    Comments:         Anticoagulation Care Providers     Provider Role Specialty Phone number    Nik Galarza MD Referring Cardiology [phone]          Clinic Interview:      INR History:  Anticoagulation Monitoring 10/15/2019 2019 11/15/2019   INR 2.2 - 2.3   INR Date 10/15/2019 - 11/15/2019   INR Goal 2.0-3.0 2.0-3.0 2.0-3.0   Trend Same Same Same   Last Week Total 10 mg 10 mg 9 mg   Next Week Total 10 mg 3 mg 5 mg   Sun 1 mg - Hold ()   Mon 2 mg - Hold ()   Tue 1 mg 1 mg 2 mg ()   Wed 2 mg 2 mg 2 mg   Thu 1 mg Hold () 1 mg   Fri 2 mg - Hold (11/15)   Sat 1 mg - Hold ()   Visit Report - - -   Some recent data might be hidden       Plan:  1. INR is Therapeutic today and patient was started on Bactrim yesterday- see above in Anticoagulation Summary.  Will instruct Janel DORINA Covingtons to continue to HOLD  their warfarin (5 day hold for TKA ) and start enoxaparin 70mg SQ daily on PM with last dose  PM.  2. Follow up in 3 day after restart of warf/enox post operatively.  3. Contacted Dr. JURGEN Pressley ll office to get Bactrim DS reduced to 1/2 tab twice daily based on patients renal function.  Bactrim also can increase INR so may slow INR fall with warf hold started yesterday.  4. Verbal and written information provided. Patient expresses understanding and has no further questions at  this time.    Shanna Major RP

## 2019-11-19 ENCOUNTER — HOSPITAL ENCOUNTER (INPATIENT)
Facility: HOSPITAL | Age: 79
LOS: 4 days | Discharge: NURSING, REHAB OR LONG-TERM CARE | End: 2019-11-23
Attending: ORTHOPAEDIC SURGERY | Admitting: ORTHOPAEDIC SURGERY

## 2019-11-19 ENCOUNTER — ANESTHESIA EVENT (OUTPATIENT)
Dept: PERIOP | Facility: HOSPITAL | Age: 79
End: 2019-11-19

## 2019-11-19 ENCOUNTER — ANESTHESIA (OUTPATIENT)
Dept: PERIOP | Facility: HOSPITAL | Age: 79
End: 2019-11-19

## 2019-11-19 ENCOUNTER — APPOINTMENT (OUTPATIENT)
Dept: GENERAL RADIOLOGY | Facility: HOSPITAL | Age: 79
End: 2019-11-19

## 2019-11-19 PROBLEM — Z96.659 S/P REVISION OF TOTAL KNEE: Status: ACTIVE | Noted: 2019-11-19

## 2019-11-19 LAB
INR PPP: 1.23 (ref 0.9–1.1)
INR PPP: 1.27 (ref 0.9–1.1)
PROTHROMBIN TIME: 15.2 SECONDS (ref 11.7–14.2)
PROTHROMBIN TIME: 15.6 SECONDS (ref 11.7–14.2)

## 2019-11-19 PROCEDURE — C1776 JOINT DEVICE (IMPLANTABLE): HCPCS | Performed by: ORTHOPAEDIC SURGERY

## 2019-11-19 PROCEDURE — 25010000002 HYDROMORPHONE PER 4 MG: Performed by: ANESTHESIOLOGY

## 2019-11-19 PROCEDURE — 0SRD069 REPLACEMENT OF LEFT KNEE JOINT WITH OXIDIZED ZIRCONIUM ON POLYETHYLENE SYNTHETIC SUBSTITUTE, CEMENTED, OPEN APPROACH: ICD-10-PCS | Performed by: ORTHOPAEDIC SURGERY

## 2019-11-19 PROCEDURE — 25010000002 PROPOFOL 10 MG/ML EMULSION: Performed by: ANESTHESIOLOGY

## 2019-11-19 PROCEDURE — C9290 INJ, BUPIVACAINE LIPOSOME: HCPCS | Performed by: ORTHOPAEDIC SURGERY

## 2019-11-19 PROCEDURE — 25010000002 FENTANYL CITRATE (PF) 100 MCG/2ML SOLUTION: Performed by: ANESTHESIOLOGY

## 2019-11-19 PROCEDURE — C1713 ANCHOR/SCREW BN/BN,TIS/BN: HCPCS | Performed by: ORTHOPAEDIC SURGERY

## 2019-11-19 PROCEDURE — 25010000003 CEFAZOLIN IN DEXTROSE 2-4 GM/100ML-% SOLUTION: Performed by: ORTHOPAEDIC SURGERY

## 2019-11-19 PROCEDURE — 25010000002 DEXAMETHASONE PER 1 MG: Performed by: ANESTHESIOLOGY

## 2019-11-19 PROCEDURE — 25010000002 ONDANSETRON PER 1 MG: Performed by: ANESTHESIOLOGY

## 2019-11-19 PROCEDURE — 25010000002 HYDROMORPHONE PER 4 MG

## 2019-11-19 PROCEDURE — 0SPD0JZ REMOVAL OF SYNTHETIC SUBSTITUTE FROM LEFT KNEE JOINT, OPEN APPROACH: ICD-10-PCS | Performed by: ORTHOPAEDIC SURGERY

## 2019-11-19 PROCEDURE — 25010000003 BUPIVACAINE LIPOSOME 1.3 % SUSPENSION 20 ML VIAL: Performed by: ORTHOPAEDIC SURGERY

## 2019-11-19 PROCEDURE — 25010000002 NEOSTIGMINE PER 0.5 MG: Performed by: ANESTHESIOLOGY

## 2019-11-19 PROCEDURE — 25010000002 VANCOMYCIN PER 500 MG: Performed by: ORTHOPAEDIC SURGERY

## 2019-11-19 PROCEDURE — 85610 PROTHROMBIN TIME: CPT | Performed by: ORTHOPAEDIC SURGERY

## 2019-11-19 PROCEDURE — 73560 X-RAY EXAM OF KNEE 1 OR 2: CPT

## 2019-11-19 PROCEDURE — 25010000002 HYDRALAZINE PER 20 MG: Performed by: ANESTHESIOLOGY

## 2019-11-19 DEVICE — LEGION PS HIGH FLEX XLPE SZ 3-4 13MM
Type: IMPLANTABLE DEVICE | Site: KNEE | Status: FUNCTIONAL
Brand: LEGION

## 2019-11-19 DEVICE — LEGION SCREW-ON DISTAL FEMORAL                                    WEDGE SIZE 4 10MM
Type: IMPLANTABLE DEVICE | Status: FUNCTIONAL
Brand: LEGION

## 2019-11-19 DEVICE — LEGION CEMENTED STEM 14MM X 160MM STRAIGHT
Type: IMPLANTABLE DEVICE | Status: FUNCTIONAL
Brand: LEGION

## 2019-11-19 DEVICE — CMT BONE PALACOS R HI/VISC 1X40: Type: IMPLANTABLE DEVICE | Status: FUNCTIONAL

## 2019-11-19 DEVICE — JOURNEY 7.5 ROUND RESURF PAT 32MM STANDARD
Type: IMPLANTABLE DEVICE | Status: FUNCTIONAL
Brand: JOURNEY

## 2019-11-19 DEVICE — LEGION  REV TIBIA BASEPLATE SZ 3 LEFT
Type: IMPLANTABLE DEVICE | Status: FUNCTIONAL
Brand: LEGION

## 2019-11-19 DEVICE — LEGION CEMENTED STEM 18MM X 160MM STRAIGHT
Type: IMPLANTABLE DEVICE | Status: FUNCTIONAL
Brand: LEGION

## 2019-11-19 DEVICE — LEGION OXINIUM CONSTRAINED FEMORAL                                    SIZE 4 LEFT
Type: IMPLANTABLE DEVICE | Site: KNEE | Status: FUNCTIONAL
Brand: LEGION

## 2019-11-19 RX ORDER — FLUMAZENIL 0.1 MG/ML
0.2 INJECTION INTRAVENOUS AS NEEDED
Status: DISCONTINUED | OUTPATIENT
Start: 2019-11-19 | End: 2019-11-19 | Stop reason: HOSPADM

## 2019-11-19 RX ORDER — DIPHENHYDRAMINE HYDROCHLORIDE 50 MG/ML
12.5 INJECTION INTRAMUSCULAR; INTRAVENOUS
Status: DISCONTINUED | OUTPATIENT
Start: 2019-11-19 | End: 2019-11-19 | Stop reason: HOSPADM

## 2019-11-19 RX ORDER — PROMETHAZINE HYDROCHLORIDE 25 MG/ML
6.25 INJECTION, SOLUTION INTRAMUSCULAR; INTRAVENOUS
Status: DISCONTINUED | OUTPATIENT
Start: 2019-11-19 | End: 2019-11-19 | Stop reason: HOSPADM

## 2019-11-19 RX ORDER — HYDROMORPHONE HYDROCHLORIDE 1 MG/ML
1 INJECTION, SOLUTION INTRAMUSCULAR; INTRAVENOUS; SUBCUTANEOUS EVERY 4 HOURS PRN
Status: DISCONTINUED | OUTPATIENT
Start: 2019-11-19 | End: 2019-11-23 | Stop reason: HOSPADM

## 2019-11-19 RX ORDER — RAMIPRIL 5 MG/1
5 CAPSULE ORAL DAILY
Status: DISCONTINUED | OUTPATIENT
Start: 2019-11-19 | End: 2019-11-23 | Stop reason: HOSPADM

## 2019-11-19 RX ORDER — FEBUXOSTAT 40 MG/1
40 TABLET, FILM COATED ORAL DAILY
Status: DISCONTINUED | OUTPATIENT
Start: 2019-11-19 | End: 2019-11-23 | Stop reason: HOSPADM

## 2019-11-19 RX ORDER — FENTANYL CITRATE 50 UG/ML
50 INJECTION, SOLUTION INTRAMUSCULAR; INTRAVENOUS
Status: DISCONTINUED | OUTPATIENT
Start: 2019-11-19 | End: 2019-11-19 | Stop reason: HOSPADM

## 2019-11-19 RX ORDER — SODIUM CHLORIDE 9 MG/ML
100 INJECTION, SOLUTION INTRAVENOUS CONTINUOUS
Status: DISCONTINUED | OUTPATIENT
Start: 2019-11-19 | End: 2019-11-23 | Stop reason: HOSPADM

## 2019-11-19 RX ORDER — ZOLPIDEM TARTRATE 5 MG/1
10 TABLET ORAL NIGHTLY PRN
Status: DISCONTINUED | OUTPATIENT
Start: 2019-11-19 | End: 2019-11-23 | Stop reason: HOSPADM

## 2019-11-19 RX ORDER — ACETAMINOPHEN 325 MG/1
650 TABLET ORAL ONCE AS NEEDED
Status: DISCONTINUED | OUTPATIENT
Start: 2019-11-19 | End: 2019-11-19 | Stop reason: HOSPADM

## 2019-11-19 RX ORDER — PROMETHAZINE HYDROCHLORIDE 25 MG/1
25 TABLET ORAL ONCE AS NEEDED
Status: DISCONTINUED | OUTPATIENT
Start: 2019-11-19 | End: 2019-11-19 | Stop reason: HOSPADM

## 2019-11-19 RX ORDER — DOCUSATE SODIUM 100 MG/1
100 CAPSULE, LIQUID FILLED ORAL 2 TIMES DAILY PRN
Status: DISCONTINUED | OUTPATIENT
Start: 2019-11-19 | End: 2019-11-23 | Stop reason: HOSPADM

## 2019-11-19 RX ORDER — PROMETHAZINE HYDROCHLORIDE 25 MG/ML
12.5 INJECTION, SOLUTION INTRAMUSCULAR; INTRAVENOUS ONCE AS NEEDED
Status: DISCONTINUED | OUTPATIENT
Start: 2019-11-19 | End: 2019-11-19 | Stop reason: HOSPADM

## 2019-11-19 RX ORDER — ONDANSETRON 4 MG/1
4 TABLET, FILM COATED ORAL EVERY 6 HOURS PRN
Status: DISCONTINUED | OUTPATIENT
Start: 2019-11-19 | End: 2019-11-23 | Stop reason: HOSPADM

## 2019-11-19 RX ORDER — DIPHENHYDRAMINE HCL 25 MG
50 CAPSULE ORAL EVERY 6 HOURS PRN
Status: DISCONTINUED | OUTPATIENT
Start: 2019-11-19 | End: 2019-11-23 | Stop reason: HOSPADM

## 2019-11-19 RX ORDER — HYDROCODONE BITARTRATE AND ACETAMINOPHEN 5; 325 MG/1; MG/1
1 TABLET ORAL EVERY 4 HOURS PRN
Status: DISCONTINUED | OUTPATIENT
Start: 2019-11-19 | End: 2019-11-23 | Stop reason: HOSPADM

## 2019-11-19 RX ORDER — CARVEDILOL 25 MG/1
25 TABLET ORAL 2 TIMES DAILY WITH MEALS
Status: DISCONTINUED | OUTPATIENT
Start: 2019-11-19 | End: 2019-11-23 | Stop reason: HOSPADM

## 2019-11-19 RX ORDER — SENNA AND DOCUSATE SODIUM 50; 8.6 MG/1; MG/1
2 TABLET, FILM COATED ORAL NIGHTLY
Status: DISCONTINUED | OUTPATIENT
Start: 2019-11-19 | End: 2019-11-23 | Stop reason: HOSPADM

## 2019-11-19 RX ORDER — FENTANYL CITRATE 50 UG/ML
INJECTION, SOLUTION INTRAMUSCULAR; INTRAVENOUS AS NEEDED
Status: DISCONTINUED | OUTPATIENT
Start: 2019-11-19 | End: 2019-11-19 | Stop reason: SURG

## 2019-11-19 RX ORDER — NALOXONE HCL 0.4 MG/ML
0.1 VIAL (ML) INJECTION
Status: DISCONTINUED | OUTPATIENT
Start: 2019-11-19 | End: 2019-11-23 | Stop reason: HOSPADM

## 2019-11-19 RX ORDER — SODIUM CHLORIDE, SODIUM LACTATE, POTASSIUM CHLORIDE, CALCIUM CHLORIDE 600; 310; 30; 20 MG/100ML; MG/100ML; MG/100ML; MG/100ML
9 INJECTION, SOLUTION INTRAVENOUS CONTINUOUS PRN
Status: DISCONTINUED | OUTPATIENT
Start: 2019-11-19 | End: 2019-11-19 | Stop reason: HOSPADM

## 2019-11-19 RX ORDER — SODIUM CHLORIDE 0.9 % (FLUSH) 0.9 %
3-10 SYRINGE (ML) INJECTION AS NEEDED
Status: DISCONTINUED | OUTPATIENT
Start: 2019-11-19 | End: 2019-11-19 | Stop reason: HOSPADM

## 2019-11-19 RX ORDER — COLCHICINE 0.6 MG/1
0.6 TABLET ORAL EVERY OTHER DAY
Status: DISCONTINUED | OUTPATIENT
Start: 2019-11-19 | End: 2019-11-23 | Stop reason: HOSPADM

## 2019-11-19 RX ORDER — DEXAMETHASONE SODIUM PHOSPHATE 10 MG/ML
INJECTION INTRAMUSCULAR; INTRAVENOUS AS NEEDED
Status: DISCONTINUED | OUTPATIENT
Start: 2019-11-19 | End: 2019-11-19 | Stop reason: SURG

## 2019-11-19 RX ORDER — UREA 10 %
1 LOTION (ML) TOPICAL NIGHTLY PRN
Status: DISCONTINUED | OUTPATIENT
Start: 2019-11-19 | End: 2019-11-23 | Stop reason: HOSPADM

## 2019-11-19 RX ORDER — NALOXONE HCL 0.4 MG/ML
0.2 VIAL (ML) INJECTION AS NEEDED
Status: DISCONTINUED | OUTPATIENT
Start: 2019-11-19 | End: 2019-11-19 | Stop reason: HOSPADM

## 2019-11-19 RX ORDER — PROMETHAZINE HYDROCHLORIDE 25 MG/1
25 SUPPOSITORY RECTAL ONCE AS NEEDED
Status: DISCONTINUED | OUTPATIENT
Start: 2019-11-19 | End: 2019-11-19 | Stop reason: HOSPADM

## 2019-11-19 RX ORDER — OXYCODONE AND ACETAMINOPHEN 7.5; 325 MG/1; MG/1
1 TABLET ORAL ONCE AS NEEDED
Status: DISCONTINUED | OUTPATIENT
Start: 2019-11-19 | End: 2019-11-19 | Stop reason: HOSPADM

## 2019-11-19 RX ORDER — FAMOTIDINE 40 MG/1
40 TABLET, FILM COATED ORAL DAILY
Status: DISCONTINUED | OUTPATIENT
Start: 2019-11-19 | End: 2019-11-20

## 2019-11-19 RX ORDER — ACETAMINOPHEN 650 MG/1
650 SUPPOSITORY RECTAL EVERY 8 HOURS
Status: DISCONTINUED | OUTPATIENT
Start: 2019-11-19 | End: 2019-11-23 | Stop reason: HOSPADM

## 2019-11-19 RX ORDER — EPHEDRINE SULFATE 50 MG/ML
5 INJECTION, SOLUTION INTRAVENOUS ONCE AS NEEDED
Status: DISCONTINUED | OUTPATIENT
Start: 2019-11-19 | End: 2019-11-19 | Stop reason: HOSPADM

## 2019-11-19 RX ORDER — ROCURONIUM BROMIDE 10 MG/ML
INJECTION, SOLUTION INTRAVENOUS AS NEEDED
Status: DISCONTINUED | OUTPATIENT
Start: 2019-11-19 | End: 2019-11-19 | Stop reason: SURG

## 2019-11-19 RX ORDER — CEFAZOLIN SODIUM 2 G/100ML
2 INJECTION, SOLUTION INTRAVENOUS EVERY 8 HOURS
Status: COMPLETED | OUTPATIENT
Start: 2019-11-19 | End: 2019-11-20

## 2019-11-19 RX ORDER — FERROUS SULFATE 325(65) MG
325 TABLET ORAL EVERY OTHER DAY
Status: DISCONTINUED | OUTPATIENT
Start: 2019-11-20 | End: 2019-11-23 | Stop reason: HOSPADM

## 2019-11-19 RX ORDER — GLYCOPYRROLATE 0.2 MG/ML
INJECTION INTRAMUSCULAR; INTRAVENOUS AS NEEDED
Status: DISCONTINUED | OUTPATIENT
Start: 2019-11-19 | End: 2019-11-19 | Stop reason: SURG

## 2019-11-19 RX ORDER — PROPOFOL 10 MG/ML
VIAL (ML) INTRAVENOUS AS NEEDED
Status: DISCONTINUED | OUTPATIENT
Start: 2019-11-19 | End: 2019-11-19 | Stop reason: SURG

## 2019-11-19 RX ORDER — WARFARIN SODIUM 5 MG/1
5 TABLET ORAL
Status: COMPLETED | OUTPATIENT
Start: 2019-11-19 | End: 2019-11-19

## 2019-11-19 RX ORDER — ASPIRIN 81 MG/1
81 TABLET ORAL DAILY
Status: DISCONTINUED | OUTPATIENT
Start: 2019-11-19 | End: 2019-11-23 | Stop reason: HOSPADM

## 2019-11-19 RX ORDER — BUMETANIDE 2 MG/1
2 TABLET ORAL 2 TIMES DAILY
Status: DISCONTINUED | OUTPATIENT
Start: 2019-11-19 | End: 2019-11-21

## 2019-11-19 RX ORDER — WOUND DRESSING ADHESIVE - LIQUID
LIQUID MISCELLANEOUS AS NEEDED
Status: DISCONTINUED | OUTPATIENT
Start: 2019-11-19 | End: 2019-11-19 | Stop reason: HOSPADM

## 2019-11-19 RX ORDER — HYDRALAZINE HYDROCHLORIDE 20 MG/ML
5 INJECTION INTRAMUSCULAR; INTRAVENOUS
Status: DISCONTINUED | OUTPATIENT
Start: 2019-11-19 | End: 2019-11-19 | Stop reason: HOSPADM

## 2019-11-19 RX ORDER — PANTOPRAZOLE SODIUM 40 MG/1
40 TABLET, DELAYED RELEASE ORAL 2 TIMES DAILY
Status: DISCONTINUED | OUTPATIENT
Start: 2019-11-19 | End: 2019-11-23 | Stop reason: HOSPADM

## 2019-11-19 RX ORDER — OXYCODONE HYDROCHLORIDE 5 MG/1
5 TABLET ORAL
Status: COMPLETED | OUTPATIENT
Start: 2019-11-19 | End: 2019-11-19

## 2019-11-19 RX ORDER — MIDAZOLAM HYDROCHLORIDE 1 MG/ML
0.5 INJECTION INTRAMUSCULAR; INTRAVENOUS
Status: DISCONTINUED | OUTPATIENT
Start: 2019-11-19 | End: 2019-11-19 | Stop reason: HOSPADM

## 2019-11-19 RX ORDER — ATORVASTATIN CALCIUM 10 MG/1
10 TABLET, FILM COATED ORAL DAILY
Status: DISCONTINUED | OUTPATIENT
Start: 2019-11-19 | End: 2019-11-23 | Stop reason: HOSPADM

## 2019-11-19 RX ORDER — LIDOCAINE HYDROCHLORIDE 20 MG/ML
INJECTION, SOLUTION INFILTRATION; PERINEURAL AS NEEDED
Status: DISCONTINUED | OUTPATIENT
Start: 2019-11-19 | End: 2019-11-19 | Stop reason: SURG

## 2019-11-19 RX ORDER — VANCOMYCIN HYDROCHLORIDE 1 G/200ML
1000 INJECTION, SOLUTION INTRAVENOUS
Status: COMPLETED | OUTPATIENT
Start: 2019-11-19 | End: 2019-11-19

## 2019-11-19 RX ORDER — ACETAMINOPHEN 500 MG
1000 TABLET ORAL EVERY 8 HOURS
Status: DISCONTINUED | OUTPATIENT
Start: 2019-11-19 | End: 2019-11-23 | Stop reason: HOSPADM

## 2019-11-19 RX ORDER — BISACODYL 10 MG
10 SUPPOSITORY, RECTAL RECTAL DAILY PRN
Status: DISCONTINUED | OUTPATIENT
Start: 2019-11-19 | End: 2019-11-23 | Stop reason: HOSPADM

## 2019-11-19 RX ORDER — DIPHENHYDRAMINE HCL 25 MG
25 CAPSULE ORAL
Status: DISCONTINUED | OUTPATIENT
Start: 2019-11-19 | End: 2019-11-19 | Stop reason: HOSPADM

## 2019-11-19 RX ORDER — CEFAZOLIN SODIUM 2 G/100ML
2 INJECTION, SOLUTION INTRAVENOUS ONCE
Status: DISCONTINUED | OUTPATIENT
Start: 2019-11-19 | End: 2019-11-19

## 2019-11-19 RX ORDER — LABETALOL HYDROCHLORIDE 5 MG/ML
5 INJECTION, SOLUTION INTRAVENOUS
Status: DISCONTINUED | OUTPATIENT
Start: 2019-11-19 | End: 2019-11-19 | Stop reason: HOSPADM

## 2019-11-19 RX ORDER — BUPIVACAINE HYDROCHLORIDE AND EPINEPHRINE 5; 5 MG/ML; UG/ML
INJECTION, SOLUTION EPIDURAL; INTRACAUDAL; PERINEURAL
Status: DISCONTINUED | OUTPATIENT
Start: 2019-11-19 | End: 2019-11-19 | Stop reason: SURG

## 2019-11-19 RX ORDER — HYDROMORPHONE HYDROCHLORIDE 1 MG/ML
0.5 INJECTION, SOLUTION INTRAMUSCULAR; INTRAVENOUS; SUBCUTANEOUS
Status: DISCONTINUED | OUTPATIENT
Start: 2019-11-19 | End: 2019-11-19 | Stop reason: HOSPADM

## 2019-11-19 RX ORDER — CEFAZOLIN SODIUM 2 G/100ML
2 INJECTION, SOLUTION INTRAVENOUS ONCE
Status: COMPLETED | OUTPATIENT
Start: 2019-11-19 | End: 2019-11-19

## 2019-11-19 RX ORDER — WARFARIN SODIUM 1 MG/1
1 TABLET ORAL DAILY
Status: DISCONTINUED | OUTPATIENT
Start: 2019-11-20 | End: 2019-11-19 | Stop reason: DRUGHIGH

## 2019-11-19 RX ORDER — HYDROMORPHONE HYDROCHLORIDE 1 MG/ML
INJECTION, SOLUTION INTRAMUSCULAR; INTRAVENOUS; SUBCUTANEOUS
Status: COMPLETED
Start: 2019-11-19 | End: 2019-11-19

## 2019-11-19 RX ORDER — SODIUM CHLORIDE 0.9 % (FLUSH) 0.9 %
3 SYRINGE (ML) INJECTION EVERY 12 HOURS SCHEDULED
Status: DISCONTINUED | OUTPATIENT
Start: 2019-11-19 | End: 2019-11-19 | Stop reason: HOSPADM

## 2019-11-19 RX ORDER — ONDANSETRON 2 MG/ML
4 INJECTION INTRAMUSCULAR; INTRAVENOUS ONCE AS NEEDED
Status: DISCONTINUED | OUTPATIENT
Start: 2019-11-19 | End: 2019-11-19 | Stop reason: HOSPADM

## 2019-11-19 RX ORDER — ONDANSETRON 2 MG/ML
4 INJECTION INTRAMUSCULAR; INTRAVENOUS EVERY 6 HOURS PRN
Status: DISCONTINUED | OUTPATIENT
Start: 2019-11-19 | End: 2019-11-23 | Stop reason: HOSPADM

## 2019-11-19 RX ORDER — MIDAZOLAM HYDROCHLORIDE 1 MG/ML
2 INJECTION INTRAMUSCULAR; INTRAVENOUS
Status: DISCONTINUED | OUTPATIENT
Start: 2019-11-19 | End: 2019-11-19 | Stop reason: HOSPADM

## 2019-11-19 RX ORDER — MAGNESIUM HYDROXIDE 1200 MG/15ML
LIQUID ORAL AS NEEDED
Status: DISCONTINUED | OUTPATIENT
Start: 2019-11-19 | End: 2019-11-19 | Stop reason: HOSPADM

## 2019-11-19 RX ORDER — ONDANSETRON 2 MG/ML
INJECTION INTRAMUSCULAR; INTRAVENOUS AS NEEDED
Status: DISCONTINUED | OUTPATIENT
Start: 2019-11-19 | End: 2019-11-19 | Stop reason: SURG

## 2019-11-19 RX ORDER — HYDROCODONE BITARTRATE AND ACETAMINOPHEN 5; 325 MG/1; MG/1
2 TABLET ORAL EVERY 4 HOURS PRN
Status: DISCONTINUED | OUTPATIENT
Start: 2019-11-19 | End: 2019-11-23 | Stop reason: HOSPADM

## 2019-11-19 RX ORDER — DIPHENHYDRAMINE HYDROCHLORIDE 50 MG/ML
25 INJECTION INTRAMUSCULAR; INTRAVENOUS EVERY 6 HOURS PRN
Status: DISCONTINUED | OUTPATIENT
Start: 2019-11-19 | End: 2019-11-23 | Stop reason: HOSPADM

## 2019-11-19 RX ORDER — HYDROCODONE BITARTRATE AND ACETAMINOPHEN 7.5; 325 MG/1; MG/1
1 TABLET ORAL ONCE AS NEEDED
Status: DISCONTINUED | OUTPATIENT
Start: 2019-11-19 | End: 2019-11-19 | Stop reason: HOSPADM

## 2019-11-19 RX ORDER — ACETAMINOPHEN 500 MG
1000 TABLET ORAL
Status: COMPLETED | OUTPATIENT
Start: 2019-11-19 | End: 2019-11-19

## 2019-11-19 RX ORDER — CALCITRIOL 0.25 UG/1
0.25 CAPSULE, LIQUID FILLED ORAL 2 TIMES DAILY
Status: DISCONTINUED | OUTPATIENT
Start: 2019-11-19 | End: 2019-11-23 | Stop reason: HOSPADM

## 2019-11-19 RX ADMIN — OXYCODONE HYDROCHLORIDE 5 MG: 5 TABLET ORAL at 11:19

## 2019-11-19 RX ADMIN — HYDROMORPHONE HYDROCHLORIDE 0.5 MG: 1 INJECTION, SOLUTION INTRAMUSCULAR; INTRAVENOUS; SUBCUTANEOUS at 15:56

## 2019-11-19 RX ADMIN — ROCURONIUM BROMIDE 50 MG: 10 INJECTION INTRAVENOUS at 13:50

## 2019-11-19 RX ADMIN — HYDROMORPHONE HYDROCHLORIDE 0.5 MG: 1 INJECTION, SOLUTION INTRAMUSCULAR; INTRAVENOUS; SUBCUTANEOUS at 16:13

## 2019-11-19 RX ADMIN — ASPIRIN 81 MG: 81 TABLET, COATED ORAL at 22:00

## 2019-11-19 RX ADMIN — ACETAMINOPHEN 1000 MG: 500 TABLET, FILM COATED ORAL at 11:19

## 2019-11-19 RX ADMIN — GLYCOPYRROLATE 0.6 MG: 0.2 INJECTION INTRAMUSCULAR; INTRAVENOUS at 15:10

## 2019-11-19 RX ADMIN — HYDROCODONE BITARTRATE AND ACETAMINOPHEN 1 TABLET: 5; 325 TABLET ORAL at 22:00

## 2019-11-19 RX ADMIN — SENNOSIDES AND DOCUSATE SODIUM 2 TABLET: 8.6; 5 TABLET ORAL at 21:59

## 2019-11-19 RX ADMIN — ONDANSETRON 4 MG: 2 INJECTION INTRAMUSCULAR; INTRAVENOUS at 15:05

## 2019-11-19 RX ADMIN — BUPIVACAINE HYDROCHLORIDE AND EPINEPHRINE BITARTRATE 30 ML: 5; .0091 INJECTION, SOLUTION EPIDURAL; INTRACAUDAL; PERINEURAL at 16:39

## 2019-11-19 RX ADMIN — RAMIPRIL 5 MG: 5 CAPSULE ORAL at 22:00

## 2019-11-19 RX ADMIN — COLCHICINE 0.6 MG: 0.6 TABLET, FILM COATED ORAL at 21:59

## 2019-11-19 RX ADMIN — FENTANYL CITRATE 50 MCG: 50 INJECTION, SOLUTION INTRAMUSCULAR; INTRAVENOUS at 15:50

## 2019-11-19 RX ADMIN — CEFAZOLIN SODIUM 2 G: 2 INJECTION, SOLUTION INTRAVENOUS at 22:01

## 2019-11-19 RX ADMIN — LABETALOL 20 MG/4 ML (5 MG/ML) INTRAVENOUS SYRINGE 5 MG: at 16:46

## 2019-11-19 RX ADMIN — PROPOFOL 50 MG: 10 INJECTION, EMULSION INTRAVENOUS at 14:20

## 2019-11-19 RX ADMIN — DEXAMETHASONE SODIUM PHOSPHATE 4 MG: 10 INJECTION INTRAMUSCULAR; INTRAVENOUS at 14:10

## 2019-11-19 RX ADMIN — HYDROMORPHONE HYDROCHLORIDE 0.5 MG: 1 INJECTION, SOLUTION INTRAMUSCULAR; INTRAVENOUS; SUBCUTANEOUS at 15:43

## 2019-11-19 RX ADMIN — LIDOCAINE HYDROCHLORIDE 100 MG: 20 INJECTION, SOLUTION INFILTRATION; PERINEURAL at 13:50

## 2019-11-19 RX ADMIN — SODIUM CHLORIDE 100 ML/HR: 9 INJECTION, SOLUTION INTRAVENOUS at 22:00

## 2019-11-19 RX ADMIN — VANCOMYCIN HYDROCHLORIDE 1000 MG: 1 INJECTION, SOLUTION INTRAVENOUS at 11:12

## 2019-11-19 RX ADMIN — CEFAZOLIN SODIUM 2 G: 2 INJECTION, SOLUTION INTRAVENOUS at 13:59

## 2019-11-19 RX ADMIN — CALCITRIOL 0.25 MCG: 0.25 CAPSULE ORAL at 22:00

## 2019-11-19 RX ADMIN — FAMOTIDINE 40 MG: 40 TABLET, FILM COATED ORAL at 22:00

## 2019-11-19 RX ADMIN — LABETALOL 20 MG/4 ML (5 MG/ML) INTRAVENOUS SYRINGE 5 MG: at 17:07

## 2019-11-19 RX ADMIN — ATORVASTATIN CALCIUM 10 MG: 10 TABLET, FILM COATED ORAL at 22:00

## 2019-11-19 RX ADMIN — WARFARIN SODIUM 5 MG: 5 TABLET ORAL at 21:59

## 2019-11-19 RX ADMIN — PROPOFOL 50 MG: 10 INJECTION, EMULSION INTRAVENOUS at 14:29

## 2019-11-19 RX ADMIN — FEBUXOSTAT 40 MG: 40 TABLET, FILM COATED ORAL at 22:00

## 2019-11-19 RX ADMIN — PROPOFOL 100 MG: 10 INJECTION, EMULSION INTRAVENOUS at 14:31

## 2019-11-19 RX ADMIN — FENTANYL CITRATE 50 MCG: 50 INJECTION, SOLUTION INTRAMUSCULAR; INTRAVENOUS at 16:02

## 2019-11-19 RX ADMIN — FENTANYL CITRATE 100 MCG: 50 INJECTION INTRAMUSCULAR; INTRAVENOUS at 14:25

## 2019-11-19 RX ADMIN — BUMETANIDE 2 MG: 2 TABLET ORAL at 22:00

## 2019-11-19 RX ADMIN — PROPOFOL 150 MG: 10 INJECTION, EMULSION INTRAVENOUS at 14:25

## 2019-11-19 RX ADMIN — SODIUM CHLORIDE, POTASSIUM CHLORIDE, SODIUM LACTATE AND CALCIUM CHLORIDE: 600; 310; 30; 20 INJECTION, SOLUTION INTRAVENOUS at 15:25

## 2019-11-19 RX ADMIN — CARVEDILOL 25 MG: 25 TABLET, FILM COATED ORAL at 22:00

## 2019-11-19 RX ADMIN — NEOSTIGMINE METHYLSULFATE 3 MG: 1 INJECTION INTRAMUSCULAR; INTRAVENOUS; SUBCUTANEOUS at 15:10

## 2019-11-19 RX ADMIN — PROPOFOL 200 MG: 10 INJECTION, EMULSION INTRAVENOUS at 13:51

## 2019-11-19 RX ADMIN — PANTOPRAZOLE SODIUM 40 MG: 40 TABLET, DELAYED RELEASE ORAL at 22:00

## 2019-11-19 RX ADMIN — HYDRALAZINE HYDROCHLORIDE 5 MG: 20 INJECTION INTRAMUSCULAR; INTRAVENOUS at 17:18

## 2019-11-19 NOTE — ANESTHESIA PREPROCEDURE EVALUATION
Anesthesia Evaluation     Patient summary reviewed                Airway   Mallampati: II  No difficulty expected  Dental      Pulmonary    (+) sleep apnea,   Cardiovascular     ECG reviewed  Rhythm: regular    (+) pacemaker pacemaker, hypertension, CAD, CABG, dysrhythmias Atrial Fib,       Neuro/Psych  GI/Hepatic/Renal/Endo    (+)   renal disease CRI,   (-)  obesity, morbid obesity    Musculoskeletal     Abdominal    Substance History      OB/GYN          Other                        Anesthesia Plan    ASA 3     general       Anesthetic plan, all risks, benefits, and alternatives have been provided, discussed and informed consent has been obtained with: patient.  Use of blood products discussed with patient .

## 2019-11-19 NOTE — ANESTHESIA PROCEDURE NOTES
Peripheral Block      Patient reassessed immediately prior to procedure    Patient location during procedure: holding area  Start time: 11/19/2019 4:30 PM  Stop time: 11/19/2019 4:39 PM  Reason for block: at surgeon's request and post-op pain management  Performed by  Anesthesiologist: Christiano Tapia MD  Preanesthetic Checklist  Completed: patient identified, site marked, surgical consent, pre-op evaluation, timeout performed, IV checked, risks and benefits discussed and monitors and equipment checked  Prep:  Sterile barriers:cap and gloves  Prep: ChloraPrep  Patient monitoring: blood pressure monitoring, continuous pulse oximetry and EKG  Procedure  Sedation:yes    Guidance:ultrasound guided  ULTRASOUND INTERPRETATION. Using ultrasound guidance a gauge needle was placed in close proximity to the femoral nerve, at which point, under ultrasound guidance anesthetic was injected in the area of the nerve and spread of the anesthesia was seen on ultrasound in close proximity thereto.  There were no abnormalities seen on ultrasound; a digital image was taken; and the patient tolerated the procedure with no complications. Images:still images obtained, printed/placed on chart    Laterality:left  Block Type:adductor canal block  Needle Gauge:21 G      Medications Used: bupivacaine-EPINEPHrine PF (MARCAINE w/EPI) 0.5% -[id number] injection, 30 mL      Post Assessment  Injection Assessment: negative aspiration for heme, no paresthesia on injection and incremental injection  Patient Tolerance:comfortable throughout block  Complications:no  Additional Notes  Pt unable to get comfortable post-op  Explained block for post op pain control pt in agreement

## 2019-11-19 NOTE — ANESTHESIA PROCEDURE NOTES
Airway  Urgency: elective    Date/Time: 11/19/2019 1:56 PM  End Time:11/19/2019 1:57 PM    General Information and Staff    Patient location during procedure: OR  Anesthesiologist: Ajay Ac MD    Indications and Patient Condition  Indications for airway management: airway protection    Preoxygenated: yes  MILS not maintained throughout      Final Airway Details  Final airway type: endotracheal airway      Successful airway: ETT  Cuffed: yes   Successful intubation technique: direct laryngoscopy  Endotracheal tube insertion site: oral  Blade: Peralta  Blade size: 2  ETT size (mm): 7.5  Cormack-Lehane Classification: grade I - full view of glottis  Placement verified by: chest auscultation and capnometry   Measured from: lips  ETT/EBT  to lips (cm): 21  Number of attempts at approach: 1

## 2019-11-19 NOTE — OP NOTE
Total Knee Revision Operative Note  Dr. JURGEN Pressley II  [phone]    PATIENT NAME: Janel Mahoney  MRN: [id number]  : 1940 AGE: 79 y.o. GENDER: female  DATE OF OPERATION: 2019  PREOPERATIVE DIAGNOSIS: Loose Implants: During workup this patient was found to have loose total knee implants.   POSTOPERATIVE DIAGNOSIS: Same  OPERATION PERFORMED: Left Total Knee Arthroplasty Revision  SURGEON: Juvencio Pressley MD  Circulator: Isabell Kennedy RN; Chas Monahan Jr., RN  Scrub Person: oTnny Luis  Vendor Representative: Jeancarlos Ty  Assistant: Karen Shea PA  ANESTHESIA: General  ASSISTANT: Karen Shea. This case would not have been possible without another set of skilled surgical hands for retraction, use of instrumentation, and general assistance.  This assistance was vital to the success of the case.   ESTIMATED BLOOD LOSS: 50cc  SPONGE AND NEEDLE COUNT: Correct  INDICATIONS: Loose Implants: This patient was noted to have a painful and loose total knee implant that failed conservative treatment. The patient was indicated for an elective revision total knee. A discussion of operative versus nonoperative treatment was had with the patient and they failed conservative management. They elected to undergo total knee arthroplasty revision. The risks of surgery were discussed and included the risk of anesthesia, infection, damage to neurovascular structures, implant loosening/failure, fracture, hardware prominence, continued pain, early failure, the need for further procedures, medical complications, and others. No guarantees were made. The patient wished to proceed with surgery and a surgical consent was signed.    COMPONENTS:   · Femur  · Legion Oxinium constrained femoral component size 4  · Kyle cemented straight stem 18 x 160 mm  · Augments:   · Distal: 10 mm medial and None lateral  · Posterior: None medial and None lateral  · Tibia  · Kyle revision tibial baseplate size 3    · Legion cemented stem straight 14 mm x 160 mm  · 13 mm Legion PS high flex articular insert  · Patella: Journey 32 mm standard resurfacing patellar component    PERTINENT FINDINGS: Loose patella, tibia, and femoral components    DETAILS OF PROCEDURE:  The patient was met in the preoperative area. The site was marked. The consent and H&P were reviewed. The patient was then wheeled back to the operative suite and transferred to the operative table. The patient underwent anesthesia. A tourniquet was placed on the upper thigh.  A bump was placed under the operative hip. The Peng baseplate was secured to the table. Surgical alcohol was used to thoroughly clean the entire operative extremity.     The leg was then prepped in the normal sterile fashion, which included Chloroprep, multiple layers of sterile drapes, and surgical space suits for the entire operative team. The Peng boot was applied to the foot after adequate padding. An Ioban dressing was applied to the knee after the surgical incision was marked.  New outer gloves were used by all sterile surgical team members after final draping. After a surgical timeout in which administration of preoperative antibiotics as well as 1g of tranexamic acid (if not contraindicated) and the surgical site were confirmed, the tourniquet was put up.     In flexion, a midline knee incision was utilized through the old scar from the prior surgery.  Dissection was carried down to the capsule.  Medial and lateral flaps were created to allow adequate exposure.  Next a medial parapatellar arthrotomy was made.  A complete synovectomy was performed to gain better access to the knee.  Excess scar tissue about the patella was removed.  The patella was mobilized adequately to allow access to the polyethylene insert.  The polyethylene insert was then extracted.    I began by extracting the femur.  This was done rather easily using an ACL saw.  The cement interface was so loose that  "it did not require much use of the saw before the femur was easily extracted with minimal bone loss.    I then turned my attention to the tibia.  This was also very easy to extract and after only a little bit of use with the sock, the tibia was grossly loose and extracted with no bone loss.    I then began preparing the tibia.  It was reamed and then fitted.  The femur was then reamed and fitted as well.  Anterior and posterior cuts were made and chamfer cuts as well.  The need for augments was noted.  The knee was then trialed.  While the implants were being open, I turned my attention to the patella.  There was clearly some ostial lysis to the lateral patella facet and the patella component did not seem well fixated.  I remove the patella component with the plan to revise it.    The tibia and femur were prepped using cement restrictors and a long nozzle pulse .  The cement for the tibia was mixed first and the component was cemented in place.  Then the femoral component cement was mixed in the component was mixed.  A trial polyethylene was used until the knee and fully harden.  I did cement the kneecap as well.    After all cement had hardened, I took the knee through range of motion examination using multiple polyethylene inserts.  The final poly-was then snapped into place and one final range of motion test showed excellent stability and range of motion.    The knee capsule was then closed with a running barbed suture as well as interrupted #1 Vicryl. The subcutaneous layer was closed with interrupted 2-0 Vicryl and finally the skin was closed.  A natalie wound VAC was then applied.     The patient was awoken from anesthesia, moved to the [facility] and taken to the recovery room in stable condition. Sponge and needle count were correct. There were no complications. Patient tolerated the procedure well.    R \"Leonel\" Huan ARIAS MD  Orthopaedic Surgery  [facility]  [phone]                  "

## 2019-11-19 NOTE — ANESTHESIA POSTPROCEDURE EVALUATION
"Patient: Janel Mahoney    Procedure Summary     Date:  11/19/19 Room / Location:  [facility] OR [address] [place] [address] MAIN OR    Anesthesia Start:  1346 Anesthesia Stop:  1540    Procedure:  TOTAL KNEE ARTHROPLASTY REVISION LEFT (Left Knee) Diagnosis:      Surgeon:  Juvencio Pressley II, MD Provider:  Ajay Ac MD    Anesthesia Type:  general ASA Status:  3          Anesthesia Type: general  Last vitals  BP   176/70 (11/19/19 1720)   Temp   36.5 °C (97.7 °F) (11/19/19 1720)   Pulse   60 (11/19/19 1720)   Resp   16 (11/19/19 1720)     SpO2   97 % (11/19/19 1720)     Post Anesthesia Care and Evaluation    Patient location during evaluation: PACU  Patient participation: complete - patient participated  Level of consciousness: awake  Pain management: adequate  Airway patency: patent  Anesthetic complications: No anesthetic complications    Cardiovascular status: acceptable  Respiratory status: acceptable  Hydration status: acceptable    Comments: /70   Pulse 60   Temp 36.5 °C (97.7 °F) (Oral)   Resp 16   Ht 157.5 cm (62\")   Wt 67.7 kg (149 lb 3.2 oz)   SpO2 97%   BMI 27.29 kg/m²       "

## 2019-11-19 NOTE — PROGRESS NOTES
Pharmacy Consult: Warfarin Dosing/ Monitoring    Janel Mahoney is a 79 y.o. female, estimated creatinine clearance is 20.5 mL/min (A) (by C-G formula based on SCr of 2.01 mg/dL (H)). weighing 67.7 kg (149 lb 3.2 oz).     has a past medical history of Acute kidney injury (CMS/HCC), Anemia, Atrial fibrillation (CMS/HCC), Bruises easily, Carotid artery stenosis, Chronic back pain, Chronic combined systolic and diastolic congestive heart failure (CMS/HCC), Chronic coronary artery disease, Chronic kidney disease, stage 3 (CMS/HCC), Dysphagia, GERD (gastroesophageal reflux disease), Gout, H/O cardiac murmur, Hematoma, [place] (hard of hearing), Hyperlipidemia, Hypertension, Hypotension, ICD (implantable cardioverter-defibrillator) in place, Ischemic cardiomyopathy, Kyphoscoliosis, Leukopenia, Lumbar spondylosis, Obesity, GEORGINA (obstructive sleep apnea), Osteoarthritis, Osteoporosis, Peptic ulcer, Premature ventricular contractions, Renal insufficiency syndrome, Scoliosis, Shoulder fracture, left, Stroke syndrome, Thrombocytopenia (CMS/HCC), Urine incontinence, Ventricular tachycardia (CMS/HCC), and Vitamin B12 deficiency.    Social History     Tobacco Use   • Smoking status: Former Smoker     Packs/day: 1.00     Years: 50.00     Pack years: 50.00     Types: Cigarettes     Last attempt to quit: 1/11/2009     Years since quitting: 10.8   • Smokeless tobacco: Never Used   • Tobacco comment: caffeine - soda   Substance Use Topics   • Alcohol use: Yes     Comment: rare   • Drug use: No       Results from last 7 days   Lab Units 11/19/19  1115 11/15/19  1423   INR  1.27* 2.3*           Invalid input(s): ERIS TOSCANO  Anticoagulation history: taking PTA per AC clinic (2 mg MWF 1 mg others)    Hospital Anticoagulation:  Consulting provider: Dr Huan ARIAS  Start date: 11/19  Indication: chronic afib  Target INR: 2-3  Expected duration: indefinite   Bridge Therapy: No                  Date 11/19            INR 1.27            Warfarin  dose               Potential drug interactions:     Relevant nutrition status:     Other: was held PTA for surgery    Education complete?/ Date:     Assessment/Plan:  Dose 5 mg today  Monitor INR   Follow up tomorrow    Pharmacy will continue to follow until discharge or discontinuation of warfarin.   Herrera Card RPH  11/19/2019

## 2019-11-20 LAB
ANION GAP SERPL CALCULATED.3IONS-SCNC: 13.3 MMOL/L (ref 5–15)
BUN BLD-MCNC: 60 MG/DL (ref 8–23)
BUN/CREAT SERPL: 20.8 (ref 7–25)
CALCIUM SPEC-SCNC: 8.9 MG/DL (ref 8.6–10.5)
CHLORIDE SERPL-SCNC: 102 MMOL/L (ref 98–107)
CO2 SERPL-SCNC: 26.7 MMOL/L (ref 22–29)
CREAT BLD-MCNC: 2.89 MG/DL (ref 0.57–1)
DEPRECATED RDW RBC AUTO: 43.7 FL (ref 37–54)
ERYTHROCYTE [DISTWIDTH] IN BLOOD BY AUTOMATED COUNT: 12.6 % (ref 12.3–15.4)
GFR SERPL CREATININE-BSD FRML MDRD: 16 ML/MIN/1.73
GLUCOSE BLD-MCNC: 131 MG/DL (ref 65–99)
HCT VFR BLD AUTO: 24.9 % (ref 34–46.6)
HGB BLD-MCNC: 8 G/DL (ref 12–15.9)
INR PPP: 1.21 (ref 0.9–1.1)
MCH RBC QN AUTO: 30.8 PG (ref 26.6–33)
MCHC RBC AUTO-ENTMCNC: 32.1 G/DL (ref 31.5–35.7)
MCV RBC AUTO: 95.8 FL (ref 79–97)
PLATELET # BLD AUTO: 106 10*3/MM3 (ref 140–450)
PMV BLD AUTO: 10.2 FL (ref 6–12)
POTASSIUM BLD-SCNC: 4.6 MMOL/L (ref 3.5–5.2)
PROTHROMBIN TIME: 15 SECONDS (ref 11.7–14.2)
RBC # BLD AUTO: 2.6 10*6/MM3 (ref 3.77–5.28)
SODIUM BLD-SCNC: 142 MMOL/L (ref 136–145)
WBC NRBC COR # BLD: 7.69 10*3/MM3 (ref 3.4–10.8)

## 2019-11-20 PROCEDURE — 80048 BASIC METABOLIC PNL TOTAL CA: CPT | Performed by: ORTHOPAEDIC SURGERY

## 2019-11-20 PROCEDURE — 97110 THERAPEUTIC EXERCISES: CPT

## 2019-11-20 PROCEDURE — 25010000003 CEFAZOLIN IN DEXTROSE 2-4 GM/100ML-% SOLUTION: Performed by: ORTHOPAEDIC SURGERY

## 2019-11-20 PROCEDURE — 85027 COMPLETE CBC AUTOMATED: CPT | Performed by: ORTHOPAEDIC SURGERY

## 2019-11-20 PROCEDURE — 97161 PT EVAL LOW COMPLEX 20 MIN: CPT

## 2019-11-20 PROCEDURE — 85610 PROTHROMBIN TIME: CPT | Performed by: ORTHOPAEDIC SURGERY

## 2019-11-20 PROCEDURE — 97150 GROUP THERAPEUTIC PROCEDURES: CPT

## 2019-11-20 RX ORDER — HYDROCODONE BITARTRATE AND ACETAMINOPHEN 5; 325 MG/1; MG/1
1 TABLET ORAL EVERY 4 HOURS PRN
Qty: 40 TABLET | Refills: 0 | Status: SHIPPED | OUTPATIENT
Start: 2019-11-20 | End: 2019-11-29

## 2019-11-20 RX ORDER — FAMOTIDINE 20 MG/1
20 TABLET, FILM COATED ORAL DAILY
Status: DISCONTINUED | OUTPATIENT
Start: 2019-11-21 | End: 2019-11-23 | Stop reason: HOSPADM

## 2019-11-20 RX ORDER — WARFARIN SODIUM 5 MG/1
5 TABLET ORAL
Status: DISCONTINUED | OUTPATIENT
Start: 2019-11-20 | End: 2019-11-21

## 2019-11-20 RX ADMIN — ASPIRIN 81 MG: 81 TABLET, COATED ORAL at 08:03

## 2019-11-20 RX ADMIN — CEFAZOLIN SODIUM 2 G: 2 INJECTION, SOLUTION INTRAVENOUS at 05:18

## 2019-11-20 RX ADMIN — HYDROCODONE BITARTRATE AND ACETAMINOPHEN 2 TABLET: 5; 325 TABLET ORAL at 04:21

## 2019-11-20 RX ADMIN — HYDROCODONE BITARTRATE AND ACETAMINOPHEN 2 TABLET: 5; 325 TABLET ORAL at 15:03

## 2019-11-20 RX ADMIN — BUMETANIDE 2 MG: 2 TABLET ORAL at 08:02

## 2019-11-20 RX ADMIN — SENNOSIDES AND DOCUSATE SODIUM 2 TABLET: 8.6; 5 TABLET ORAL at 21:44

## 2019-11-20 RX ADMIN — CALCITRIOL 0.25 MCG: 0.25 CAPSULE ORAL at 21:44

## 2019-11-20 RX ADMIN — ATORVASTATIN CALCIUM 10 MG: 10 TABLET, FILM COATED ORAL at 08:02

## 2019-11-20 RX ADMIN — CALCITRIOL 0.25 MCG: 0.25 CAPSULE ORAL at 08:02

## 2019-11-20 RX ADMIN — WARFARIN SODIUM 5 MG: 5 TABLET ORAL at 17:01

## 2019-11-20 RX ADMIN — FAMOTIDINE 40 MG: 40 TABLET, FILM COATED ORAL at 08:02

## 2019-11-20 RX ADMIN — HYDROCODONE BITARTRATE AND ACETAMINOPHEN 2 TABLET: 5; 325 TABLET ORAL at 10:22

## 2019-11-20 RX ADMIN — PANTOPRAZOLE SODIUM 40 MG: 40 TABLET, DELAYED RELEASE ORAL at 08:02

## 2019-11-20 RX ADMIN — FERROUS SULFATE TAB 325 MG (65 MG ELEMENTAL FE) 325 MG: 325 (65 FE) TAB at 08:02

## 2019-11-20 RX ADMIN — BUMETANIDE 2 MG: 2 TABLET ORAL at 21:44

## 2019-11-20 RX ADMIN — CARVEDILOL 25 MG: 25 TABLET, FILM COATED ORAL at 16:55

## 2019-11-20 RX ADMIN — CARVEDILOL 25 MG: 25 TABLET, FILM COATED ORAL at 08:02

## 2019-11-20 RX ADMIN — PANTOPRAZOLE SODIUM 40 MG: 40 TABLET, DELAYED RELEASE ORAL at 21:44

## 2019-11-20 RX ADMIN — HYDROCODONE BITARTRATE AND ACETAMINOPHEN 2 TABLET: 5; 325 TABLET ORAL at 21:44

## 2019-11-20 NOTE — CONSULTS
Patient Name:  Janel Mahoney  YOB: 1940  MRN:  [id number]  Date of Admission:  11/19/2019  Date of Consult:  11/20/2019  Patient Care Team:  Ariel Matute MD as PCP - General (Internal Medicine)  Edward Vasquez MD as Consulting Physician (Hematology and Oncology)  Milagros Cruz MD as Referring Physician (Nephrology)  nAthony Cardona MD as Consulting Physician (Sleep Medicine)  Nik Galarza MD as Consulting Physician (Cardiology)  Shanna Major MATHIEU as Pharmacist  Juvencio Alonzo II, MD as Consulting Physician (Orthopedic Surgery)    Consults  Evaluate status and make recommendations regarding treatment for hypertension and cardiac disease.  Subjective   History of Present Illness  Ms. Mahoney is a 79 y.o. female with a history of atrial fibrillation, CAD, HTN, CHF, HLD  and GEORGINA that has been admitted to [facility] following elective TOTAL KNEE ARTHROPLASTY REVISION LEFT.  She has been admitted to an orthopedic floor following surgery and we were asked to see and assist with her medical problems, specifically relating to her HTN and cardiac disease.  At the time of my visit she denies any chest pain, SOA, nausea, vomiting or diarrhea.  She has tolerated a diet.  She does complain of expected postoperative discomfort.       Past Medical History:   Diagnosis Date   • Acute kidney injury (CMS/HCC)    • Anemia    • Atrial fibrillation (CMS/HCC)    • Bruises easily    • Carotid artery stenosis    • Chronic back pain    • Chronic combined systolic and diastolic congestive heart failure (CMS/HCC)    • Chronic coronary artery disease     moderate to severe LV dysfunction.   • Chronic kidney disease, stage 3 (CMS/HCC)    • Dysphagia    • GERD (gastroesophageal reflux disease)    • Gout    • H/O cardiac murmur    • Hematoma     LEFT LEG; DR ALONZO AWARE   • [place] De[place] (hard of hearing)     wears hearing aids   • Hyperlipidemia    • Hypertension    •  Hypotension    • ICD (implantable cardioverter-defibrillator) in place    • Ischemic cardiomyopathy    • Kyphoscoliosis    • Leukopenia    • Lumbar spondylosis    • Obesity    • GEORGINA (obstructive sleep apnea)    • Osteoarthritis    • Osteoporosis    • Peptic ulcer    • Premature ventricular contractions    • Renal insufficiency syndrome    • Scoliosis    • Shoulder fracture, left    • Stroke syndrome    • Thrombocytopenia (CMS/HCC)    • Urine incontinence    • Ventricular tachycardia (CMS/HCC)    • Vitamin B12 deficiency      Past Surgical History:   Procedure Laterality Date   • AV NODE ABLATION     • BREAST BIOPSY     • CARDIAC CATHETERIZATION      Showed severe mitral insufficiency and borderline coronary artery disease   • CARDIAC CATHETERIZATION      Showed an ejection fraction of 35%. She had occlusive disease of the right posterior LV branch and no other significant disease, treated medically.   • CARDIAC DEFIBRILLATOR PLACEMENT      Biventricular   • CARDIAC ELECTROPHYSIOLOGY PROCEDURE N/A 1/4/2019    Procedure: GENERATOR CHANGE BI-V ICD   [place];  Surgeon: James Hwang MD;  Location: [facility] INVASIVE LOCATION;  Service: Cardiology   • CARDIAC VALVE REPLACEMENT  2009    Done with stent placement   • CARDIOVERSION      multiple electrocardioversions.   • CAROTID ARTERY ANGIOPLASTY Right    • COLONOSCOPY N/A 9/28/2017    Procedure: COLONOSCOPY TO CECUM;  Surgeon: Kevin Davis MD;  Location: [facility] ENDOSCOPY;  Service:    • CORONARY ANGIOPLASTY WITH STENT PLACEMENT  2009   • CORONARY ARTERY BYPASS GRAFT      single graft to the PDA   • CORONARY STENT PLACEMENT     • ENDOSCOPY N/A 9/28/2017    Procedure: ESOPHAGOGASTRODUODENOSCOPY ;  Surgeon: Kevin Davis MD;  Location: [facility] ENDOSCOPY;  Service:    • HEMORRHOIDECTOMY     • HYSTERECTOMY     • INCISION AND DRAINAGE TRUNK Right 11/27/2018    Procedure: EVACUATION OF RIGHT CHEST WALL HEMATOMA;  Surgeon: Juvencio Rodriguez MD;  Location: [facility]  OR;  Service: General   • MITRAL VALVE REPLACEMENT  01/2010    #31 Epic porcine valve.   • THROMBOENDARTERECTOMY Right     carotid thromboendarterectomy    • TONSILLECTOMY      age 32   • TOTAL KNEE ARTHROPLASTY Left    • TOTAL SHOULDER ARTHROPLASTY W/ DISTAL CLAVICLE EXCISION Left 1/16/2018    Procedure: TOTAL SHOULDER REVERSE ARTHROPLASTY;  Surgeon: Bianka Quesada MD;  Location: [facility] OR;  Service:      Family History   Problem Relation Age of Onset   • Cancer Mother    • Breast cancer Mother    • Heart disease Mother    • Hypertension Mother    • Stroke Mother    • Coronary artery disease Father    • Stroke Father    • Heart disease Father    • Hypertension Father    • Other Daughter         thiamin deficiency    • Hypertension Son    • Lung disease Other    • Hypertension Other    • Malig Hyperthermia Neg Hx      Social History     Tobacco Use   • Smoking status: Former Smoker     Packs/day: 1.00     Years: 50.00     Pack years: 50.00     Types: Cigarettes     Last attempt to quit: 1/11/2009     Years since quitting: 10.8   • Smokeless tobacco: Never Used   • Tobacco comment: caffeine - soda   Substance Use Topics   • Alcohol use: Yes     Comment: rare   • Drug use: No     Medications Prior to Admission   Medication Sig Dispense Refill Last Dose   • bumetanide (BUMEX) 2 MG tablet Take 2 mg by mouth 2 (Two) Times a Day.   11/18/2019 at 1500   • calcitriol (ROCALTROL) 0.25 MCG capsule Take 0.25 mcg by mouth 2 (Two) Times a Day.   11/18/2019 at 1800   • carvedilol (COREG) 25 MG tablet Take 25 mg by mouth 2 (Two) Times a Day With Meals.   11/19/2019 at 0730   • Chlorhexidine Gluconate (HIBICLENS EX) Apply 1 application topically Take As Directed. AS DIRECTED PREOP    11/19/2019 at 0815   • Cholecalciferol (VITAMIN D) 2000 UNITS tablet Take 2,000 Units by mouth Daily.   11/18/2019 at 0800   • febuxostat (ULORIC) 40 MG tablet Take 40 mg by mouth Daily.   11/18/2019 at 0800   • ferrous sulfate 325 (65  FE) MG tablet Take 1 tablet by mouth Every Other Day.   11/18/2019 at 0800   • Multiple Vitamins-Minerals (SENIOR MULTIVITAMIN PLUS) tablet Take 1 tablet by mouth Daily.   11/18/2019 at 1500   • mupirocin (BACTROBAN) 2 % ointment Apply 1 application topically to the appropriate area as directed Take As Directed. AS DIRECTED PREOP    11/19/2019 at 0820   • pantoprazole (PROTONIX) 40 MG EC tablet Take 40 mg by mouth 2 (Two) Times a Day.   11/19/2019 at 0730   • ramipril (ALTACE) 5 MG capsule Take 5 mg by mouth Daily.   11/18/2019 at 0800   • simvastatin (ZOCOR) 20 MG tablet Take 20 mg by mouth Every Night.   11/19/2019 at 0100   • traMADol (ULTRAM) 50 MG tablet Take 100 mg by mouth 2 (Two) Times a Day.   11/18/2019 at 0800   • vitamin B-12 (CYANOCOBALAMIN) 1000 MCG tablet Take 1,000 mcg by mouth Daily.   11/18/2019 at 0800   • zolpidem (AMBIEN) 10 MG tablet Take 10 mg by mouth At Night As Needed for Sleep.   11/19/2019 at 0100   • alendronate (FOSAMAX) 70 MG tablet Take 70 mg by mouth Every 14 (Fourteen) Days.   11/1/2019   • aspirin 81 MG tablet Take 81 mg by mouth Daily. PT CALLING TO SEE IF SHE HAS TO STOP PRIOR TO SURGERY   11/14/2019   • COLCRYS 0.6 MG tablet Take 0.6 mg by mouth Every Other Day.   Unknown at Unknown time   • epoetin adele (EPOGEN,PROCRIT) 98705 UNIT/ML injection Inject 10,000 Units under the skin into the appropriate area as directed As Needed.   11/5/2019   • HYDROcodone-acetaminophen (NORCO) 5-325 MG per tablet Take 1 tablet by mouth Daily As Needed.   11/16/2019   • warfarin (COUMADIN) 1 MG tablet Take 1 mg by mouth Daily. DOSES VARY WEEKLY BASED ON INR  DR LAWRENCE INSTRUCTED TO STOP 5 DAYS PRIOR TO SURGERY   11/13/2019     Allergies:  Diclofenac    Review of Systems   Constitutional: Negative for chills and fever.   HENT: Negative for congestion and dental problem.    Eyes: Negative for discharge and itching.   Respiratory: Negative for chest tightness and shortness of breath.     Cardiovascular: Negative for chest pain and palpitations.   Gastrointestinal: Negative for abdominal distention and nausea.   Endocrine: Negative.    Genitourinary: Negative for difficulty urinating and dysuria.   Musculoskeletal: Negative for arthralgias and gait problem.   Skin: Positive for wound (surgical). Negative for color change and pallor.   Allergic/Immunologic: Negative for environmental allergies and food allergies.   Neurological: Negative for dizziness and light-headedness.   Hematological: Negative for adenopathy. Does not bruise/bleed easily.   Psychiatric/Behavioral: Negative for agitation and behavioral problems.       Objective      Vital Signs  Temp:  [97 °F (36.1 °C)-98.3 °F (36.8 °C)] 97.2 °F (36.2 °C)  Heart Rate:  [59-63] 60  Resp:  [16-18] 16  BP: (127-197)/() 127/55  Body mass index is 27.29 kg/m².    Physical Exam   Constitutional: She is oriented to person, place, and time. She appears well-developed and well-nourished. No distress.   HENT:   Head: Normocephalic and atraumatic.   Eyes: Conjunctivae and EOM are normal.   Neck: Normal range of motion. Neck supple.   Cardiovascular: Normal rate. An irregularly irregular rhythm present.   v paced   Pulmonary/Chest: Effort normal and breath sounds normal. No respiratory distress.   Abdominal: Soft. Bowel sounds are normal. She exhibits no distension. There is no tenderness.   Musculoskeletal: She exhibits no edema.   limited ROM L knee, left lower extremity hematoma   Neurological: She is alert and oriented to person, place, and time.   Skin: Skin is warm and dry.   Psychiatric: She has a normal mood and affect. Her behavior is normal.   Nursing note and vitals reviewed.      Results Review:   I reviewed the patient's new clinical results.  I reviewed the patient's new imaging results and agree with the interpretation.  I reviewed the patient's other test results and agree with the interpretation  I personally viewed and interpreted  the patient's EKG/Telemetry data         Assessment/Plan     Active Hospital Problems    Diagnosis POA   • **S/P revision of total knee [Z96.659] Not Applicable   • Chronic coronary artery disease [I25.10] Yes     moderate to severe LV dysfunction.     • Acute on chronic combined systolic (congestive) and diastolic (congestive) heart failure (CMS/HCC) [I50.43] Yes   • Chronic kidney disease, stage 3 (CMS/HCC) [N18.3] Yes   • Ischemic cardiomyopathy [I25.5] Yes     Added automatically from request for surgery [id number]     • Hypertension [I10] Yes   • Chronic combined systolic and diastolic congestive heart failure (CMS/HCC) [I50.42] Yes   • Chronic atrial fibrillation [I48.20] Yes   • Anemia in stage 3 chronic kidney disease (CMS/HCC) [N18.3, D63.1] Yes       Ms. Mahoney is a 79 y.o. female who is s/p TOTAL KNEE ARTHROPLASTY REVISION LEFT.    HTN  - looks like she was pretty hypertensive after surgery but is stable at this time, continue home regimen with parameters     Afib  - rate controlled, continue coreg  - v-paced     CKD 3  - creatinine slightly above baseline, hold bumex today and repeat BMP in AM      Thank you very much for asking [facility] to be involved in this patient's care. We will follow along with you.      NICKY Gonsales  [facility]  11/20/19  9:21 AM

## 2019-11-20 NOTE — PLAN OF CARE
Problem: Patient Care Overview  Goal: Plan of Care Review  Outcome: Ongoing (interventions implemented as appropriate)   11/20/19 0058   Coping/Psychosocial   Plan of Care Reviewed With patient   OTHER   Outcome Summary Pt POD 1 L knee revison. VSS. Pain well controlled. Voiding well. Tomas drsg C/D/I. Educated on pain control. Plans for rehab tomorrow or Friday.     Goal: Individualization and Mutuality  Outcome: Ongoing (interventions implemented as appropriate)    Goal: Discharge Needs Assessment  Outcome: Ongoing (interventions implemented as appropriate)    Goal: Interprofessional Rounds/Family Conf  Outcome: Ongoing (interventions implemented as appropriate)      Problem: Pain, Chronic (Adult)  Goal: Acceptable Pain/Comfort Level and Functional Ability  Outcome: Ongoing (interventions implemented as appropriate)      Problem: Fall Risk (Adult)  Goal: Absence of Fall  Outcome: Ongoing (interventions implemented as appropriate)      Problem: Knee Arthroplasty (Total, Partial) (Adult)  Goal: Signs and Symptoms of Listed Potential Problems Will be Absent, Minimized or Managed (Knee Arthroplasty)  Outcome: Ongoing (interventions implemented as appropriate)    Goal: Anesthesia/Sedation Recovery  Outcome: Ongoing (interventions implemented as appropriate)

## 2019-11-20 NOTE — DISCHARGE INSTRUCTIONS
Total Knee  Discharge Instructions  Dr. JURGEN Ortiz” Jackson II  [phone]    • INCISION CARE  o Wash your hands prior to dressing changes  o SANDI Wound VAC: Postoperatively you had a SANDI Wound Vac placed on the incision. This was placed under sterile conditions in the operating room. It remains in place for 7 days postoperatively. After 7 days, the entire dressing must be removed, including all of the sticky adhesive. The dressing and battery pack provide gentle suction to the incision and provide several benefits over a traditional dressing:  - It maintains the sterile environment of the OR and reduces the risk of infection  - The suction removes unwanted buildup of blood/hematoma under the skin to reduce swelling  - The suction also promotes fresh blood supply to the skin and soft tissue to speed up healing  - The postoperative scar is reduced in size  - Showering is permitted immediately after surgery, but the battery pack must be protected or removed during the shower.   o After 7 days the SANDI Wound Vac is removed. If there is no drainage, no dressing is required. If there is some scant drainage a dry bandage can be applied and changed daily until seen in the office or until the drainage stops.   o No creams or ointments to the incisions until 4 weeks post op.  o Do not touch or pick at the incision  o Check incision every day and notify surgeon immediately if any of the following signs or symptoms are seen:  - Increase in redness  - Increase in swelling around the incision and of the entire extremity  - Increase in pain  - NEW drainage or oozing from the incision  - Pulling apart of the edges of the incision  - Increase in overall body temperature (greater than 100.4 degrees)  o Zip-Line: your incision was closed with a state of the art device.   - Is a non-invasive and easy to use wound closure device that replaces sutures, staples and glue for surgical incisions  - It minimizes scarring and eliminating  “railroad” marks that come with staples or sutures  - It makes removal as atraumatic as peeling off a bandage  - Can be removed at home or by a physical therapist or nurse at 10-14 days postoperatively    • ACTIVITIES  o Exercises:  - Physical therapy will begin immediately while in the hospital. Patients going to a nursing home will get therapy as part of their care at the SNU/SNF facility. Patients going home may also have a therapist come to the house to help them mobilize until they can safely get to an outpatient therapy facility.  - Elevate the affected leg most of the day during the first week post operatively. Caution must be taken to avoid pillow placement directly under the heel of the leg, as this can cause pressure ulcers even with a soft pillow. All pillows and blankets should be placed underneath of the thigh and calf so that the heel is free-floating.  - Use cold packs for 20-30 minutes approximately 5 times per day.  - You should perform the daily stretching and strengthening exercises as taught by the therapist as often as possible. This can be done many times a day.  - Full weight bearing is allowed after surgery. It will be sore/painful to put weight on the leg, but this will help the bone to heal and prevent complications such as pneumonia, bed sores and blood clots. Mobilization is vital to the recovery process.  o Activities of Daily Living:  - No tub baths, hot tubs, or swimming pools for 4 weeks.  - May shower and let water run over the incision immediately after surgery. The battery pack of the SANDI Wound Vac must be protected or removed while in the shower. After the SANDI is removed 7 days after surgery showering is permitted as long as there is no drainage from the wound.     • Restrictions  o Weight: It is ok to allow full weight bearing after surgery. Weight on the leg actually quickens the recovery process. While it will be sore/painful to put weight on the leg, it is safe to do so. Hip  replacement after hip fracture has a much slower recover process. It can take months to heal fully from a hip fracture and patients even make some slow benefits up to a year afterwards.   o Driving: Many patients have questions about when it is safe to return to driving. The answer is that this is extremely variable. It depends on the extent of the surgery, as well as how quickly you heal. Certainly left leg surgeries make returning to driving easier while right leg surgeries require more extensive rehabilitation before driving can be safe. Until you can press down on the brake hard, and are off of all narcotics, driving is not permitted. Your surgeon cannot “clear” you to return to driving, only you can make the decision when you feel it is safe.    • Medications  o Anticoagulants: After upper extremity surgery most patients do not require an anticoagulant unless you have another injury that will be keeping you from mobilizing. Lower extremity surgery typically does require use of an anticoagulation medicine.   - IF YOU HAD LOWER EXTREMITY SURGERY AND ARE NOT DISCHARGED HOME WITH ANY ANTICOAGULANT MEDICINE YOU SHOULD TAKE ASPIRIN 325mg DAILY FOR 30 DAYS POSTOPERATIVELY.  - If you are discharged home with an anticoagulant such as Aspirin, Xarelto, Eliquis, Coumadin, or Lovenox, follow these simple instructions:   - Notify surgeon immediately if any juvencio bleeding is noted in the urine, stool, emesis, or from the nose or the incision. Blood in the stool will often appear as black rather than red. Blood in urine may appear as pink. Blood in emesis may appear as brown/black like coffee grounds.  - You will need to apply pressure for longer periods of time to any cuts or abrasions to stop bleeding  - Avoid alcohol while taking anticoagulants  - Most anticoagulants are to be taken for 30 days postoperatively. After this time, you may stop using them unless instructed otherwise.   - If you were already taking an  anticoagulant (commonly Aspirin, Coumadin, or Plavix) you will likely be resuming your normal dose postoperatively and will be continuing that medication at the discretion of the prescribing physician.  o Stool Softeners: You will be at greater risk of constipation after surgery due to being less mobile and the pain medications.  - Take stool softeners as needed. Over the counter Colace 100 mg 1-2 capsules twice daily can be taken.  - If stools become too loose or too frequent, please decreases the dosage or stop the stool softener.  - If constipation occurs despite use of stool softeners, you are to continue the stool softeners and add a laxative (Milk of Magnesia 1 ounce daily as needed)  - Drink plenty of fluids, and eat fruits and vegetables during your recovery time. Getting up and mobilizing will help the bowels to recover their regular function, as will weaning off of all narcotics when the pain becomes tolerable.  o Pain Medications: Utilized after surgery are narcotics. This is some general information about these medications.  - CLASSIFICATION: Pain medications are called Opioids and are narcotics  - LEGALITIES: It is illegal to share narcotics with others  - DRIVING: it is illegal to drive while under the influence of narcotics. Doing so is a DUI.  - POTENTIAL SIDE EFFECTS: nausea, vomiting, itching, dizziness, drowsiness, dry mouth, constipation, and difficulty urinating.  - POTENTIAL ADVERSE EFFECTS:  - Opioid tolerance can develop with use of pain medications and this simply means that it requires more and more of the medication to control pain. However, this is seen more in patients that use opioids for longer periods of time.  - Opioid dependence can develop with use of Opioids. People with opioid dependence will experience withdrawal symptoms upon cessation of the medication.  - Opioid addiction can develop with use of Opioids. The incidence of this is very unlikely in patients who take the  medications as ordered and stop the medications as instructed.  - Opioid overdose can be dangerous, but is unlikely when the medication is taken as ordered and stopped when ordered. It is important not to mix opioids with alcohol as this can lead to over sedation and respiratory difficulty.  - DOSAGE:  - After the initial surgical pain begins to resolve, you may begin to decrease the pain medication. By the end of a few weeks, you should be off of pain medications.  - Refills will not be given by the office during evening hours, on weekends, or after 6 weeks post-op. You are responsible for weaning off of pain medication. You can increase the time between narcotic pills, taking one every 4 then 6 then 8 hours and so on.  - To seek refills on pain medications during the initial 6-week post-operative period, you must call the office to request the refill. The office will then notify you when to  the prescription. DO NOT wait until you are out of the medication to request a refill. Prescriptions will not be filled over the weekend and depending on the schedule, it may take a couple days for the prescription to be available. Someone will have to pick the prescription in person at the office.    • FOLLOW-UP VISITS  o You will need to follow up in the office with your surgeon in 3 weeks, or as instructed elsewhere in your discharge paperwork. Please call this number [phone] to schedule this appointment. If you are going to an SNF/SNU facility, they will arrange for you to follow up in the office.  o If you have any concerns or suspected complications prior to your follow up visit, please call the office. Do not wait until your appointment time if you suspect complications. These will need to be addressed in the office promptly.      Juvencio Pressley II, MD  Orthopaedic Surgery  [facility]

## 2019-11-20 NOTE — PROGRESS NOTES
Orthopaedic Surgery   Daily Progress Note  Dr. JURGEN Pressley II  [phone]  DEMOGRAPHICS:   · Janel Mahoney   · Age:79 y.o.   · MRN:[id number]  · Admitted: 11/19/2019    PROCEDURE: 1 Day Post-Op s/p Procedure(s):  TOTAL KNEE ARTHROPLASTY REVISION LEFT     HOSPITAL PROGRESS  · Patient Issues: No acute issues, did have pain postoperatively and had a block placed in PACU.  · Ambulation/Activity: Ambulating well with PT/Nursing.      VITALS:  Vitals:    11/19/19 1900 11/19/19 2300 11/20/19 0300 11/20/19 0712   BP: 149/62 154/69 146/58 127/55   BP Location: Left arm Left arm Right arm Right arm   Patient Position: Lying Lying Lying Lying   Pulse: 61 60 60 60   Resp: 18 16 16 16   Temp:  98.1 °F (36.7 °C) 97 °F (36.1 °C) 97.2 °F (36.2 °C)   TempSrc:    Oral   SpO2: 100% 96% 100% 100%   Weight:       Height:           PHYSICAL EXAM:  · LUNGS: Equal chest rise, no shortness of air  · CARDIOVASCULAR: brisk capillary refill intact  · WOUND: SANDI Wound Vac System working in good condition, minimal drainage  · EXTREMITY: Operative Leg  · Pulses: brisk capillary refill intact  · Sensation: Sensation intact to light touch to the saphenous/sural/tibial/deep peroneal/superficial peroneal nerves, and grossly throughout the extremity.  · Motor: 5/5 EHL/FHL/TA/GS motor complexes    LABS:   Lab Results   Component Value Date    HGB 8.0 (L) 11/20/2019     Lab Results   Component Value Date    WBC 7.69 11/20/2019     Lab Results   Component Value Date    GLUCOSE 131 (H) 11/20/2019    CALCIUM 8.9 11/20/2019     11/20/2019    K 4.6 11/20/2019    CO2 26.7 11/20/2019     11/20/2019    BUN 60 (H) 11/20/2019    CREATININE 2.89 (H) 11/20/2019    EGFRIFAFRI  08/25/2016      Comment:      <15 Indicative of kidney failure.    EGFRIFNONA 16 (L) 11/20/2019    BCR 20.8 11/20/2019    ANIONGAP 13.3 11/20/2019       ASSESSMENT: Patient is a 79 y.o. female who is 1 Day Post-Op s/p Procedure(s):  TOTAL KNEE ARTHROPLASTY REVISION LEFT  "    PLAN:   · Weight Bearing: Weight Bearing As Tolerated  · Labs: Above lab values review. Plan: Creatinine up slightly from baseline, management per medical team  · PT/OT: To Mobilize  · DVT PPX: Resume Home DVT PPX  · Post-Op Xray: TKA implants in good alignment.   · Follow-Up: In office at 3 weeks postop  · Dispo: Likely discharge tomorrow or Friday    R \"Leonel\" Huan ARIAS MD  Orthopaedic Surgery  [facility]  [phone] - [place] Office  [phone] - [place] Office      "

## 2019-11-20 NOTE — PLAN OF CARE
Problem: Patient Care Overview  Goal: Plan of Care Review  Outcome: Ongoing (interventions implemented as appropriate)   11/20/19 1040   Coping/Psychosocial   Plan of Care Reviewed With patient   OTHER   Outcome Summary pt presents s/p L TKRevision with post op weakness, impaired ROM and strength L knee, and impaired functional mobility, she will benefit from PT to address, pt was able to ambulate a short distance with rwx this morning with min assist, pain and fatigue limiting, PT will progress as tolerated, pt expresses desire to go to rehab due to concerns of 's inability to assist

## 2019-11-20 NOTE — PLAN OF CARE
Problem: Patient Care Overview  Goal: Plan of Care Review   11/20/19 1500   Coping/Psychosocial   Plan of Care Reviewed With patient   OTHER   Outcome Summary Improved tolerance to functional activity this PM with an increase in gait distance and progression of ther. ex. program.

## 2019-11-20 NOTE — PROGRESS NOTES
Continued Stay Note  [facility]     Patient Name: Janel Mahoney  MRN: [id number]  Today's Date: 11/20/2019    Admit Date: 11/19/2019    Discharge Plan     Row Name 11/20/19 1536       Plan    Plan Comments  Spoke with Silvia and Alma @ [facility] can accept on Friday.  Packet in cubbie. Melody Olivera RN    Row Name 11/20/19 1316       Plan    Plan  Referral to the [facility]    Patient/Family in Agreement with Plan  yes    Plan Comments  Checked IMM. Met with pt bedside. Confirmed facesheet correct. Explained role of CCP. Pt lives with her spouse and adult daughter in a single level house with 2 steps to enter. Pt is IADLs. Pt has walker, cane and bath chair. Pt has used [facility] in past and has been to [facility]. Pt confirms PCP and Pharmacy correct. Pt reports she will need SNF at d/c, referral to the [facility]/Silvia. CCP to follow….CRAIG Vogel        Discharge Codes    No documentation.             Melody Olivera RN

## 2019-11-20 NOTE — DISCHARGE PLACEMENT REQUEST
"Le Munoz (79 y.o. Female)     Date of Birth Social Security Number Address Home Phone MRN    1940  [id number] [address] [phone] [id number]    Jainism Marital Status          Zoroastrian        Admission Date Admission Type Admitting Provider Attending Provider Department, Room/Bed    11/19/19 Elective Juvencio Pressley II, MD Sweet, Richard Alexander II, MD [address], P784/1    Discharge Date Discharge Disposition Discharge Destination                       Attending Provider:  Juvencio Pressley II, MD    Allergies:  Diclofenac    Isolation:  None   Infection:  None   Code Status:  CPR    Ht:  157.5 cm (62\")   Wt:  67.7 kg (149 lb 3.2 oz)    Admission Cmt:  None   Principal Problem:  S/P revision of total knee [Z96.659]                 Active Insurance as of 11/19/2019     Primary Coverage     Payor Plan Insurance Group Employer/Plan Group    MEDICARE MEDICARE A & B      Payor Plan Address Payor Plan Phone Number Payor Plan Fax Number Effective Dates    [address] [phone]  10/1/2005 - None Entered    [facility] [id number]       Subscriber Name Subscriber Birth Date Member ID       LE MUNOZ 1940 [id number]           Secondary Coverage     Payor Plan Insurance Group Employer/Plan Group    HUMANA HUMANA SUPPLEMENT [id number]     Payor Plan Address Payor Plan Phone Number Payor Plan Fax Number Effective Dates    [address] [address]   9/1/2013 - None Entered    [facility] [address]       Subscriber Name Subscriber Birth Date Member ID       LE MUNOZ 1940 [id number]                 Emergency Contacts      (Rel.) Home Phone Work Phone Mobile Phone    Russ Munoz (Spouse) [phone] -- --            "

## 2019-11-20 NOTE — THERAPY TREATMENT NOTE
Patient Name: Janel Mahoney  : 1940    MRN: [id number]                              Today's Date: 2019       Admit Date: 2019    Visit Dx: No diagnosis found.  Patient Active Problem List   Diagnosis   • Anemia in stage 3 chronic kidney disease (CMS/HCC)   • Thrombocytopenia (CMS/HCC)   • B12 deficiency   • Coronary artery disease involving coronary bypass graft of native heart without angina pectoris   • S/P MVR (porcine)   • Chronic atrial fibrillation   • Bilateral carotid artery disease (CMS/HCC)   • Intractable low back pain   • Long-term (current) use of anticoagulants   • Low back pain   • Osteoporosis   • Murmur, heart   • GEORGINA on auto CPAP - Dr Cardona   • Hypersomnia due to medical condition - GEORGINA   • Esophageal stricture   • Acute blood loss anemia   • Dysphagia   • Closed fracture of left proximal humerus   • Hypertension   • Supratherapeutic INR   • Chronic combined systolic and diastolic congestive heart failure (CMS/HCC)   • Osteoporosis with pathological fracture   • Closed 3-part fracture of proximal end of left humerus   • Closed fracture of proximal end of humerus with delayed healing   • Injury of right knee   • Knee injury, left, initial encounter   • History of fall   • S/P TKR (total knee replacement) using cement, left   • Ischemic cardiomyopathy   • Intramuscular hematoma right pecotralis   • Hemorrhagic disorder due to extrinsic circulating anticoagulants (CMS/HCC)   • Acute posthemorrhagic anemia   • Chronic kidney disease, stage 3 (CMS/HCC)   • Acute kidney injury (CMS/HCC)   • Peripheral edema   • Acute on chronic combined systolic (congestive) and diastolic (congestive) heart failure (CMS/HCC)   • Chronic coronary artery disease   • Inflammatory arthritis   • Ventricular tachycardia (CMS/HCC)   • S/P revision of total knee     Past Medical History:   Diagnosis Date   • Acute kidney injury (CMS/HCC)    • Anemia    • Atrial fibrillation (CMS/HCC)    • Bruises easily     • Carotid artery stenosis    • Chronic back pain    • Chronic combined systolic and diastolic congestive heart failure (CMS/HCC)    • Chronic coronary artery disease     moderate to severe LV dysfunction.   • Chronic kidney disease, stage 3 (CMS/HCC)    • Dysphagia    • GERD (gastroesophageal reflux disease)    • Gout    • H/O cardiac murmur    • Hematoma     LEFT LEG; DR CHERI AWARE   • Andreafski (hard of hearing)     wears hearing aids   • Hyperlipidemia    • Hypertension    • Hypotension    • ICD (implantable cardioverter-defibrillator) in place    • Ischemic cardiomyopathy    • Kyphoscoliosis    • Leukopenia    • Lumbar spondylosis    • Obesity    • GEORGINA (obstructive sleep apnea)    • Osteoarthritis    • Osteoporosis    • Peptic ulcer    • Premature ventricular contractions    • Renal insufficiency syndrome    • Scoliosis    • Shoulder fracture, left    • Stroke syndrome    • Thrombocytopenia (CMS/HCC)    • Urine incontinence    • Ventricular tachycardia (CMS/HCC)    • Vitamin B12 deficiency      Past Surgical History:   Procedure Laterality Date   • AV NODE ABLATION     • BREAST BIOPSY     • CARDIAC CATHETERIZATION      Showed severe mitral insufficiency and borderline coronary artery disease   • CARDIAC CATHETERIZATION      Showed an ejection fraction of 35%. She had occlusive disease of the right posterior LV branch and no other significant disease, treated medically.   • CARDIAC DEFIBRILLATOR PLACEMENT      Biventricular   • CARDIAC ELECTROPHYSIOLOGY PROCEDURE N/A 1/4/2019    Procedure: GENERATOR CHANGE BI-V ICD   [place];  Surgeon: James Hwang MD;  Location: [facility] CATH INVASIVE LOCATION;  Service: Cardiology   • CARDIAC VALVE REPLACEMENT  2009    Done with stent placement   • CARDIOVERSION      multiple electrocardioversions.   • CAROTID ARTERY ANGIOPLASTY Right    • COLONOSCOPY N/A 9/28/2017    Procedure: COLONOSCOPY TO CECUM;  Surgeon: Kevin Davis MD;  Location: [facility] ENDOSCOPY;  Service:    •  CORONARY ANGIOPLASTY WITH STENT PLACEMENT  2009   • CORONARY ARTERY BYPASS GRAFT      single graft to the PDA   • CORONARY STENT PLACEMENT     • ENDOSCOPY N/A 9/28/2017    Procedure: ESOPHAGOGASTRODUODENOSCOPY ;  Surgeon: Kevin Davis MD;  Location: [facility] ENDOSCOPY;  Service:    • HEMORRHOIDECTOMY     • HYSTERECTOMY     • INCISION AND DRAINAGE TRUNK Right 11/27/2018    Procedure: EVACUATION OF RIGHT CHEST WALL HEMATOMA;  Surgeon: Juvencio Rodriguez MD;  Location: [facility] OR;  Service: General   • MITRAL VALVE REPLACEMENT  01/2010    #31 Epic porcine valve.   • THROMBOENDARTERECTOMY Right     carotid thromboendarterectomy    • TONSILLECTOMY      age 32   • TOTAL KNEE ARTHROPLASTY Left    • TOTAL KNEE ARTHROPLASTY REVISION Left 11/19/2019    Procedure: TOTAL KNEE ARTHROPLASTY REVISION LEFT;  Surgeon: Juvencio Pressley II, MD;  Location: [facility] OR;  Service: Orthopedics   • TOTAL SHOULDER ARTHROPLASTY W/ DISTAL CLAVICLE EXCISION Left 1/16/2018    Procedure: TOTAL SHOULDER REVERSE ARTHROPLASTY;  Surgeon: Bianka Quesada MD;  Location: [facility];  Service:      General Information     Row Name 11/20/19 1500 11/20/19 1034       PT Evaluation Time/Intention    Document Type  therapy note (daily note)  -MS  evaluation  -PC    Mode of Treatment  physical therapy;group therapy  -MS  physical therapy  -    Row Name 11/20/19 1034          General Information    Patient Profile Reviewed?  yes  -PC     Prior Level of Function  independent:  -PC     Existing Precautions/Restrictions  fall  -PC     Row Name 11/20/19 1034          Home Main Entrance    Number of Stairs, Main Entrance  four  -PC     Stair Railings, Main Entrance  railings safe and in good condition  -PC     Row Name 11/20/19 1034          Cognitive Assessment/Intervention- PT/OT    Orientation Status (Cognition)  oriented x 4  -PC     Cognitive Assessment/Intervention Comment  [place]  -PC     Row Name 11/20/19 1034          Safety  Issues, Functional Mobility    Impairments Affecting Function (Mobility)  pain  -PC       User Key  (r) = Recorded By, (t) = Taken By, (c) = Cosigned By    Initials Name Provider Type    PC Poonam Hoyos, PT Physical Therapist    Hoang Gentile, PT Physical Therapist        Mobility     Row Name 11/20/19 1500 11/20/19 1035       Bed Mobility Assessment/Treatment    Comment (Bed Mobility)  Up in chair  -MS  in chair  -PC    Row Name 11/20/19 1500 11/20/19 1035       Sit-Stand Transfer    Sit-Stand [place] (Transfers)  minimum assist (75% patient effort)  -MS  minimum assist (75% patient effort)  -PC    Assistive Device (Sit-Stand Transfers)  walker, front-wheeled  -MS  walker, front-wheeled  -PC    Row Name 11/20/19 1500 11/20/19 1035       Gait/Stairs Assessment/Training    [place] Level (Gait)  minimum assist (75% patient effort)  -MS  minimum assist (75% patient effort);verbal cues  -PC    Assistive Device (Gait)  walker, front-wheeled  -MS  walker, front-wheeled  -PC    Distance in Feet (Gait)  30 feet  -MS  --    Pattern (Gait)  step-to  -MS  step-to  -PC    Deviations/Abnormal Patterns (Gait)  antalgic;ofe decreased  -MS  antalgic  -PC    Bilateral Gait Deviations  --  forward flexed posture;heel strike decreased  -    Row Name 11/20/19 1035          Mobility Assessment/Intervention    Extremity Weight-bearing Status  left lower extremity  -PC     Left Lower Extremity (Weight-bearing Status)  weight-bearing as tolerated (WBAT)  -PC       User Key  (r) = Recorded By, (t) = Taken By, (c) = Cosigned By    Initials Name Provider Type    PC Poonam Hoyos, PT Physical Therapist    Hoang Gentile, PT Physical Therapist        Obj/Interventions     Row Name 11/20/19 1501 11/20/19 1036       General ROM    GENERAL ROM COMMENTS  Left knee AROM (8, 82)  -MS  WFL x L knee, pt has significant thoracic scoliosis, pt has some swelling around L knee and a hematoma on calf   -PC    Row Name  11/20/19 1036          MMT (Manual Muscle Testing)    General MMT Comments  WNL x L LE  -PC     Row Name 11/20/19 1501 11/20/19 1036       Therapeutic Exercise    Comment (Therapeutic Exercise)  Left TKR protocol x 15 reps completed  -MS  10 reps TKR ex  -PC    Row Name 11/20/19 1036          Sensory Assessment/Intervention    Sensory General Assessment  no sensation deficits identified  -PC       User Key  (r) = Recorded By, (t) = Taken By, (c) = Cosigned By    Initials Name Provider Type    PC Poonam Hoyos, PT Physical Therapist    MS Hoang Angeles BRANDY, PT Physical Therapist        Goals/Plan     Row Name 11/20/19 1039          Bed Mobility Goal 1 (PT)    Activity/Assistive Device (Bed Mobility Goal 1, PT)  sit to supine/supine to sit  -PC     [place] Level/Cues Needed (Bed Mobility Goal 1, PT)  supervision required  -PC     Time Frame (Bed Mobility Goal 1, PT)  3 days  -PC     Row Name 11/20/19 1039          Transfer Goal 1 (PT)    Activity/Assistive Device (Transfer Goal 1, PT)  sit-to-stand/stand-to-sit  -PC     [place] Level/Cues Needed (Transfer Goal 1, PT)  supervision required  -PC     Time Frame (Transfer Goal 1, PT)  3 days  -PC     Row Name 11/20/19 1039          Gait Training Goal 1 (PT)    Activity/Assistive Device (Gait Training Goal 1, PT)  gait (walking locomotion);assistive device use;walker, rolling  -PC     [place] Level (Gait Training Goal 1, PT)  supervision required  -PC     Time Frame (Gait Training Goal 1, PT)  3 days  -PC     Barriers (Gait Training Goal 1, PT)  75 ft  -PC     Row Name 11/20/19 1039          ROM Goal 1 (PT)    ROM Goal 1 (PT)  5-90  -PC     Time Frame (ROM Goal 1, PT)  2 - 3 days  -PC     Row Name 11/20/19 1039          Stairs Goal 1 (PT)    Activity/Assistive Device (Stairs Goal 1, PT)  ascending stairs;descending stairs  -PC     [place] Level/Cues Needed (Stairs Goal 1, PT)  contact guard assist  -PC     Number of Stairs (Stairs Goal 1, PT)  4  -PC      Time Frame (Stairs Goal 1, PT)  3 days  -PC       User Key  (r) = Recorded By, (t) = Taken By, (c) = Cosigned By    Initials Name Provider Type    PC Poonam Hoyos, PT Physical Therapist        Clinical Impression     Row Name 11/20/19 1038          Pain Assessment    Additional Documentation  Pain Scale: Numbers Pre/Post-Treatment (Group)  -PC     Row Name 11/20/19 1501 11/20/19 1038       Pain Scale: Numbers Pre/Post-Treatment    Pain Scale: Numbers, Pretreatment  3/10  -MS  10/10  -PC    Pain Scale: Numbers, Post-Treatment  3/10  -MS  10/10  -PC    Pain Location - Side  Left  -MS  Left  -PC    Pain Location  knee  -MS  knee  -PC    Pre/Post Treatment Pain Comment  --  pt reports pain with exercises and weight bearing  -PC    Pain Intervention(s)  --  Medication (See MAR);Repositioned;Cold applied  -PC    Row Name 11/20/19 1038          Plan of Care Review    Plan of Care Reviewed With  patient  -PC     Row Name 11/20/19 1038          Physical Therapy Clinical Impression    Criteria for Skilled Interventions Met (PT Clinical Impression)  yes;treatment indicated  -PC     Rehab Potential (PT Clinical Summary)  good, to achieve stated therapy goals  -PC     Row Name 11/20/19 1501 11/20/19 1038       Positioning and Restraints    Pre-Treatment Position  sitting in chair/recliner  -MS  sitting in chair/recliner  -PC    Post Treatment Position  chair  -MS  chair  -PC    In Chair  notified nsg;reclined;sitting;call light within reach;encouraged to call for assist;exit alarm on  -MS  reclined;call light within reach;encouraged to call for assist;exit alarm on  -PC      User Key  (r) = Recorded By, (t) = Taken By, (c) = Cosigned By    Initials Name Provider Type    PC Poonam Hoyos, PT Physical Therapist    Hoang Gentile, PT Physical Therapist        Outcome Measures     Row Name 11/20/19 1502 11/20/19 1040       How much help from another person do you currently need...    Turning from your back to your side  while in flat bed without using bedrails?  3  -MS  3  -PC    Moving from lying on back to sitting on the side of a flat bed without bedrails?  3  -MS  3  -PC    Moving to and from a bed to a chair (including a wheelchair)?  3  -MS  3  -PC    Standing up from a chair using your arms (e.g., wheelchair, bedside chair)?  3  -MS  3  -PC    Climbing 3-5 steps with a railing?  2  -MS  2  -PC    To walk in hospital room?  3  -MS  3  -PC    AM-PAC 6 Clicks Score (PT)  17  -MS  17  -PC    Row Name 11/20/19 1040          Functional Assessment    Outcome Measure Options  AM-PAC 6 Clicks Basic Mobility (PT)  -PC       User Key  (r) = Recorded By, (t) = Taken By, (c) = Cosigned By    Initials Name Provider Type    PC Poonam Hoyos, PT Physical Therapist    Hoang Gentile, PT Physical Therapist        Physical Therapy Education     Title: PT OT SLP Therapies (In Progress)     Topic: Physical Therapy (In Progress)     Point: Mobility training (In Progress)     Learning Progress Summary           Patient Acceptance, E,D, NR by PC at 11/20/2019 10:40 AM                   Point: Home exercise program (In Progress)     Learning Progress Summary           Patient Acceptance, E,D, NR by PC at 11/20/2019 10:40 AM                   Point: Body mechanics (In Progress)     Learning Progress Summary           Patient Acceptance, E,D, NR by PC at 11/20/2019 10:40 AM                   Point: Precautions (In Progress)     Learning Progress Summary           Patient Acceptance, E,D, NR by  at 11/20/2019 10:40 AM                               User Key     Initials Effective Dates Name Provider Type Discipline     04/03/18 -  Poonam Hoyos, PT Physical Therapist PT              PT Recommendation and Plan     Plan of Care Reviewed With: patient  Outcome Summary: Improved tolerance to functional activity this PM with an increase in gait distance and progression of ther. ex. program.      Time Calculation:   PT Charges     Row Name  11/20/19 1502 11/20/19 1043          Time Calculation    Start Time  1320  -MS  1004  -PC     Stop Time  1355  -MS  1024  -PC     Time Calculation (min)  35 min  -MS  20 min  -PC     PT Received On  11/20/19  -MS  11/20/19  -PC     PT - Next Appointment  11/21/19  -MS  11/20/19  -PC     PT Goal Re-Cert Due Date  --  11/23/19  -PC        Time Calculation- PT    Total Timed Code Minutes- PT  20 minute(s)  -MS  --       User Key  (r) = Recorded By, (t) = Taken By, (c) = Cosigned By    Initials Name Provider Type    PC Poonam Hoyos, PT Physical Therapist    MS Hoang Angeles, PT Physical Therapist        Therapy Charges for Today     Code Description Service Date Service Provider Modifiers Qty    23613464297 HC PT THER PROC EA 15 MIN 11/20/2019 Hoang Angeles, PT GP 1    17379608465 HC PT THER PROC GROUP 11/20/2019 Hoang Angeles, PT GP 1          PT G-Codes  Outcome Measure Options: AM-PAC 6 Clicks Basic Mobility (PT)  AM-PAC 6 Clicks Score (PT): 17    Hoang Angeles, PT  11/20/2019

## 2019-11-20 NOTE — PROGRESS NOTES
Discharge Planning Assessment  [facility]     Patient Name: Janel Mahoney  MRN: [id number]  Today's Date: 11/20/2019    Admit Date: 11/19/2019    Discharge Needs Assessment     Row Name 11/20/19 1314       Living Environment    Lives With  spouse;child(lisandro), adult    Current Living Arrangements  home/apartment/condo    Primary Care Provided by  self    Provides Primary Care For  no one    Family Caregiver if Needed  spouse;child(lisandro), adult    Quality of Family Relationships  involved;helpful    Able to Return to Prior Arrangements  yes       Resource/Environmental Concerns    Resource/Environmental Concerns  home accessibility    Home Accessibility Concerns  stairs to enter home       Transition Planning    Patient/Family Anticipates Transition to  inpatient rehabilitation facility    Transportation Anticipated  family or friend will provide       Discharge Needs Assessment    Readmission Within the Last 30 Days  no previous admission in last 30 days    Concerns to be Addressed  basic needs;discharge planning    Equipment Currently Used at Home  walker, rolling;cane, straight;bath bench    Discharge Facility/Level of Care Needs  home with home health    Provided post acute provider list?  Yes    Post Acute Provider Lists  Nursing Home    Post Acuite Provider List  Delivered    Delivered To  Patient    Method of Delivery  In person    Discharge Coordination/Progress  Referral to [facility]        Discharge Plan     Row Name 11/20/19 2784       Plan    Plan  Referral to the [facility]    Patient/Family in Agreement with Plan  yes    Plan Comments  Checked IMM. Met with pt bedside. Confirmed facesheet correct. Explained role of CCP. Pt lives with her spouse and adult daughter in a single level house with 2 steps to enter. Pt is IADLs. Pt has walker, cane and bath chair. Pt has used [facility] in past and has been to [facility]. Pt confirms PCP and Pharmacy correct. Pt reports she  will need SNF at d/c, referral to the [facility]. CCP to follow….CRAIG Vogel        Destination      Service Provider Request Status Selected Services Address Phone Number Fax Number    [facility] [place] Pending - Request Sent N/A [address] , [facility] [address] [phone]      Durable Medical Equipment      No service coordination in this encounter.      Dialysis/Infusion      No service coordination in this encounter.      Home Medical Care      No service coordination in this encounter.      Therapy      No service coordination in this encounter.      Community Resources      No service coordination in this encounter.          Demographic Summary     Row Name 11/20/19 1314       General Information    Admission Type  inpatient    Arrived From  home    Required Notices Provided  Important Message from Medicare    Reason for Consult  discharge planning    Preferred Language  English     Used During This Interaction  no       Contact Information    Permission Granted to Share Info With  facility ;family/designee        Functional Status    No documentation.       Psychosocial    No documentation.       Abuse/Neglect    No documentation.       Legal    No documentation.       Substance Abuse    No documentation.       Patient Forms    No documentation.           CARMEL Treadwell

## 2019-11-20 NOTE — THERAPY EVALUATION
Patient Name: Janel Mahoney  : 1940    MRN: [id number]                              Today's Date: 2019       Admit Date: 2019    Visit Dx: No diagnosis found.  Patient Active Problem List   Diagnosis   • Anemia in stage 3 chronic kidney disease (CMS/HCC)   • Thrombocytopenia (CMS/HCC)   • B12 deficiency   • Coronary artery disease involving coronary bypass graft of native heart without angina pectoris   • S/P MVR (porcine)   • Chronic atrial fibrillation   • Bilateral carotid artery disease (CMS/HCC)   • Intractable low back pain   • Long-term (current) use of anticoagulants   • Low back pain   • Osteoporosis   • Murmur, heart   • GEORGINA on auto CPAP - Dr Cardona   • Hypersomnia due to medical condition - GEORGINA   • Esophageal stricture   • Acute blood loss anemia   • Dysphagia   • Closed fracture of left proximal humerus   • Hypertension   • Supratherapeutic INR   • Chronic combined systolic and diastolic congestive heart failure (CMS/HCC)   • Osteoporosis with pathological fracture   • Closed 3-part fracture of proximal end of left humerus   • Closed fracture of proximal end of humerus with delayed healing   • Injury of right knee   • Knee injury, left, initial encounter   • History of fall   • S/P TKR (total knee replacement) using cement, left   • Ischemic cardiomyopathy   • Intramuscular hematoma right pecotralis   • Hemorrhagic disorder due to extrinsic circulating anticoagulants (CMS/HCC)   • Acute posthemorrhagic anemia   • Chronic kidney disease, stage 3 (CMS/HCC)   • Acute kidney injury (CMS/HCC)   • Peripheral edema   • Acute on chronic combined systolic (congestive) and diastolic (congestive) heart failure (CMS/HCC)   • Chronic coronary artery disease   • Inflammatory arthritis   • Ventricular tachycardia (CMS/HCC)   • S/P revision of total knee     Past Medical History:   Diagnosis Date   • Acute kidney injury (CMS/HCC)    • Anemia    • Atrial fibrillation (CMS/HCC)    • Bruises easily     • Carotid artery stenosis    • Chronic back pain    • Chronic combined systolic and diastolic congestive heart failure (CMS/HCC)    • Chronic coronary artery disease     moderate to severe LV dysfunction.   • Chronic kidney disease, stage 3 (CMS/HCC)    • Dysphagia    • GERD (gastroesophageal reflux disease)    • Gout    • H/O cardiac murmur    • Hematoma     LEFT LEG; DR CHERI AWARE   • [place] (hard of hearing)     wears hearing aids   • Hyperlipidemia    • Hypertension    • Hypotension    • ICD (implantable cardioverter-defibrillator) in place    • Ischemic cardiomyopathy    • Kyphoscoliosis    • Leukopenia    • Lumbar spondylosis    • Obesity    • GEORGINA (obstructive sleep apnea)    • Osteoarthritis    • Osteoporosis    • Peptic ulcer    • Premature ventricular contractions    • Renal insufficiency syndrome    • Scoliosis    • Shoulder fracture, left    • Stroke syndrome    • Thrombocytopenia (CMS/HCC)    • Urine incontinence    • Ventricular tachycardia (CMS/HCC)    • Vitamin B12 deficiency      Past Surgical History:   Procedure Laterality Date   • AV NODE ABLATION     • BREAST BIOPSY     • CARDIAC CATHETERIZATION      Showed severe mitral insufficiency and borderline coronary artery disease   • CARDIAC CATHETERIZATION      Showed an ejection fraction of 35%. She had occlusive disease of the right posterior LV branch and no other significant disease, treated medically.   • CARDIAC DEFIBRILLATOR PLACEMENT      Biventricular   • CARDIAC ELECTROPHYSIOLOGY PROCEDURE N/A 1/4/2019    Procedure: GENERATOR CHANGE BI-V ICD   [place];  Surgeon: James Hwang MD;  Location: [facility] CATH INVASIVE LOCATION;  Service: Cardiology   • CARDIAC VALVE REPLACEMENT  2009    Done with stent placement   • CARDIOVERSION      multiple electrocardioversions.   • CAROTID ARTERY ANGIOPLASTY Right    • COLONOSCOPY N/A 9/28/2017    Procedure: COLONOSCOPY TO CECUM;  Surgeon: Kevin Davis MD;  Location: [facility] ENDOSCOPY;  Service:    •  CORONARY ANGIOPLASTY WITH STENT PLACEMENT  2009   • CORONARY ARTERY BYPASS GRAFT      single graft to the PDA   • CORONARY STENT PLACEMENT     • ENDOSCOPY N/A 9/28/2017    Procedure: ESOPHAGOGASTRODUODENOSCOPY ;  Surgeon: Kevin Davis MD;  Location: [facility] ENDOSCOPY;  Service:    • HEMORRHOIDECTOMY     • HYSTERECTOMY     • INCISION AND DRAINAGE TRUNK Right 11/27/2018    Procedure: EVACUATION OF RIGHT CHEST WALL HEMATOMA;  Surgeon: Juvencio Rodriguez MD;  Location: [facility] MAIN OR;  Service: General   • MITRAL VALVE REPLACEMENT  01/2010    #31 Epic porcine valve.   • THROMBOENDARTERECTOMY Right     carotid thromboendarterectomy    • TONSILLECTOMY      age 32   • TOTAL KNEE ARTHROPLASTY Left    • TOTAL SHOULDER ARTHROPLASTY W/ DISTAL CLAVICLE EXCISION Left 1/16/2018    Procedure: TOTAL SHOULDER REVERSE ARTHROPLASTY;  Surgeon: Bianka Quesada MD;  Location: [facility] OR;  Service:      General Information     Row Name 11/20/19 1034          PT Evaluation Time/Intention    Document Type  evaluation  -PC     Mode of Treatment  physical therapy  -PC     Row Name 11/20/19 1034          General Information    Patient Profile Reviewed?  yes  -PC     Prior Level of Function  independent:  -PC     Existing Precautions/Restrictions  fall  -PC     Row Name 11/20/19 1034          Home Main Entrance    Number of Stairs, Main Entrance  four  -PC     Stair Railings, Main Entrance  railings safe and in good condition  -PC     Row Name 11/20/19 1034          Cognitive Assessment/Intervention- PT/OT    Orientation Status (Cognition)  oriented x 4  -PC     Cognitive Assessment/Intervention Comment  [place]  -PC     Row Name 11/20/19 1034          Safety Issues, Functional Mobility    Impairments Affecting Function (Mobility)  pain  -PC       User Key  (r) = Recorded By, (t) = Taken By, (c) = Cosigned By    Initials Name Provider Type    PC Poonam Hoyos, PT Physical Therapist        Mobility     Row Name 11/20/19 1030           Bed Mobility Assessment/Treatment    Comment (Bed Mobility)  in chair  -PC     Row Name 11/20/19 1035          Sit-Stand Transfer    Sit-Stand [place] (Transfers)  minimum assist (75% patient effort)  -PC     Assistive Device (Sit-Stand Transfers)  walker, front-wheeled  -PC     Row Name 11/20/19 1035          Gait/Stairs Assessment/Training    [place] Level (Gait)  minimum assist (75% patient effort);verbal cues  -PC     Assistive Device (Gait)  walker, front-wheeled  -PC     Pattern (Gait)  step-to  -PC     Deviations/Abnormal Patterns (Gait)  antalgic  -PC     Bilateral Gait Deviations  forward flexed posture;heel strike decreased  -PC     Row Name 11/20/19 1035          Mobility Assessment/Intervention    Extremity Weight-bearing Status  left lower extremity  -PC     Left Lower Extremity (Weight-bearing Status)  weight-bearing as tolerated (WBAT)  -PC       User Key  (r) = Recorded By, (t) = Taken By, (c) = Cosigned By    Initials Name Provider Type    PC Poonam Hyoos, PT Physical Therapist        Obj/Interventions     Row Name 11/20/19 1036          General ROM    GENERAL ROM COMMENTS  WFL x L knee, pt has significant thoracic scoliosis, pt has some swelling around L knee and a hematoma on calf   -PC     Row Name 11/20/19 1036          MMT (Manual Muscle Testing)    General MMT Comments  WNL x L LE  -PC     Row Name 11/20/19 1036          Therapeutic Exercise    Comment (Therapeutic Exercise)  10 reps TKR ex  -PC     Row Name 11/20/19 1036          Sensory Assessment/Intervention    Sensory General Assessment  no sensation deficits identified  -PC       User Key  (r) = Recorded By, (t) = Taken By, (c) = Cosigned By    Initials Name Provider Type    PC Poonam Hoyos, PT Physical Therapist        Goals/Plan     Row Name 11/20/19 1039          Bed Mobility Goal 1 (PT)    Activity/Assistive Device (Bed Mobility Goal 1, PT)  sit to supine/supine to sit  -PC     [place] Level/Cues Needed (Bed  Mobility Goal 1, PT)  supervision required  -PC     Time Frame (Bed Mobility Goal 1, PT)  3 days  -PC     Row Name 11/20/19 1039          Transfer Goal 1 (PT)    Activity/Assistive Device (Transfer Goal 1, PT)  sit-to-stand/stand-to-sit  -PC     [place] Level/Cues Needed (Transfer Goal 1, PT)  supervision required  -PC     Time Frame (Transfer Goal 1, PT)  3 days  -PC     Row Name 11/20/19 1039          Gait Training Goal 1 (PT)    Activity/Assistive Device (Gait Training Goal 1, PT)  gait (walking locomotion);assistive device use;walker, rolling  -PC     [place] Level (Gait Training Goal 1, PT)  supervision required  -PC     Time Frame (Gait Training Goal 1, PT)  3 days  -PC     Barriers (Gait Training Goal 1, PT)  75 ft  -PC     Row Name 11/20/19 1039          ROM Goal 1 (PT)    ROM Goal 1 (PT)  5-90  -PC     Time Frame (ROM Goal 1, PT)  2 - 3 days  -PC     Row Name 11/20/19 1039          Stairs Goal 1 (PT)    Activity/Assistive Device (Stairs Goal 1, PT)  ascending stairs;descending stairs  -PC     [place] Level/Cues Needed (Stairs Goal 1, PT)  contact guard assist  -PC     Number of Stairs (Stairs Goal 1, PT)  4  -PC     Time Frame (Stairs Goal 1, PT)  3 days  -PC       User Key  (r) = Recorded By, (t) = Taken By, (c) = Cosigned By    Initials Name Provider Type    PC Poonam Hoyos, PT Physical Therapist        Clinical Impression     Row Name 11/20/19 1038          Pain Assessment    Additional Documentation  Pain Scale: Numbers Pre/Post-Treatment (Group)  -PC     Row Name 11/20/19 1038          Pain Scale: Numbers Pre/Post-Treatment    Pain Scale: Numbers, Pretreatment  10/10  -PC     Pain Scale: Numbers, Post-Treatment  10/10  -PC     Pain Location - Side  Left  -PC     Pain Location  knee  -PC     Pre/Post Treatment Pain Comment  pt reports pain with exercises and weight bearing  -PC     Pain Intervention(s)  Medication (See MAR);Repositioned;Cold applied  -PC     Row Name 11/20/19 1038           Plan of Care Review    Plan of Care Reviewed With  patient  -PC     Row Name 11/20/19 1038          Physical Therapy Clinical Impression    Criteria for Skilled Interventions Met (PT Clinical Impression)  yes;treatment indicated  -PC     Rehab Potential (PT Clinical Summary)  good, to achieve stated therapy goals  -PC     Row Name 11/20/19 1038          Positioning and Restraints    Pre-Treatment Position  sitting in chair/recliner  -PC     Post Treatment Position  chair  -PC     In Chair  reclined;call light within reach;encouraged to call for assist;exit alarm on  -PC       User Key  (r) = Recorded By, (t) = Taken By, (c) = Cosigned By    Initials Name Provider Type    PC Poonam Hoyos, PT Physical Therapist        Outcome Measures     Row Name 11/20/19 1040          How much help from another person do you currently need...    Turning from your back to your side while in flat bed without using bedrails?  3  -PC     Moving from lying on back to sitting on the side of a flat bed without bedrails?  3  -PC     Moving to and from a bed to a chair (including a wheelchair)?  3  -PC     Standing up from a chair using your arms (e.g., wheelchair, bedside chair)?  3  -PC     Climbing 3-5 steps with a railing?  2  -PC     To walk in hospital room?  3  -PC     AM-PAC 6 Clicks Score (PT)  17  -PC     Row Name 11/20/19 1040          Functional Assessment    Outcome Measure Options  AM-PAC 6 Clicks Basic Mobility (PT)  -PC       User Key  (r) = Recorded By, (t) = Taken By, (c) = Cosigned By    Initials Name Provider Type    Poonam Gaines PT Physical Therapist        Physical Therapy Education     Title: PT OT SLP Therapies (In Progress)     Topic: Physical Therapy (In Progress)     Point: Mobility training (In Progress)     Learning Progress Summary           Patient Acceptance, E,D, NR by PC at 11/20/2019 10:40 AM                   Point: Home exercise program (In Progress)     Learning Progress Summary            Patient Acceptance, E,D, NR by  at 11/20/2019 10:40 AM                   Point: Body mechanics (In Progress)     Learning Progress Summary           Patient Acceptance, E,D, NR by  at 11/20/2019 10:40 AM                   Point: Precautions (In Progress)     Learning Progress Summary           Patient Acceptance, E,D, NR by  at 11/20/2019 10:40 AM                               User Key     Initials Effective Dates Name Provider Type [facility] 04/03/18 -  Poonam Hoyos PT Physical Therapist PT              PT Recommendation and Plan  Planned Therapy Interventions (PT Eval): bed mobility training, gait training, home exercise program, stair training, ROM (range of motion), transfer training, strengthening  Outcome Summary/Treatment Plan (PT)  Anticipated Discharge Disposition (PT): (depends on progress)  Plan of Care Reviewed With: patient  Outcome Summary: pt presents s/p L TKRevision with post op weakness, impaired ROM and strength L knee, and impaired functional mobility, she will benefit from PT to address, pt was able to ambulate a short distance with rwx this morning with min assist, pain and fatigue limiting, PT will progress as tolerated, pt expresses desire to go to rehab due to concerns of 's inability to assist     Time Calculation:   PT Charges     Row Name 11/20/19 1043             Time Calculation    Start Time  1004  -PC      Stop Time  1024  -PC      Time Calculation (min)  20 min  -PC      PT Received On  11/20/19  -PC      PT - Next Appointment  11/20/19  -      PT Goal Re-Cert Due Date  11/23/19  -        User Key  (r) = Recorded By, (t) = Taken By, (c) = Cosigned By    Initials Name Provider Type     Poonam Hoyos PT Physical Therapist        Therapy Charges for Today     Code Description Service Date Service Provider Modifiers Qty    14689076234 HC PT EVAL LOW COMPLEXITY 2 11/20/2019 Poonam Hoyos, PT GP 1    81801567750 HC PT THER PROC EA 15 MIN 11/20/2019  Poonam Hoyos, PT GP 1          PT G-Codes  Outcome Measure Options: AM-PAC 6 Clicks Basic Mobility (PT)  AM-PAC 6 Clicks Score (PT): 17    Poonam GMOEZ. Romain, PT  11/20/2019

## 2019-11-21 LAB
ANION GAP SERPL CALCULATED.3IONS-SCNC: 11.7 MMOL/L (ref 5–15)
BASOPHILS # BLD AUTO: 0.01 10*3/MM3 (ref 0–0.2)
BASOPHILS NFR BLD AUTO: 0.2 % (ref 0–1.5)
BUN BLD-MCNC: 54 MG/DL (ref 8–23)
BUN/CREAT SERPL: 19.1 (ref 7–25)
CALCIUM SPEC-SCNC: 8.8 MG/DL (ref 8.6–10.5)
CHLORIDE SERPL-SCNC: 102 MMOL/L (ref 98–107)
CO2 SERPL-SCNC: 27.3 MMOL/L (ref 22–29)
CREAT BLD-MCNC: 2.83 MG/DL (ref 0.57–1)
DEPRECATED RDW RBC AUTO: 44.2 FL (ref 37–54)
EOSINOPHIL # BLD AUTO: 0.1 10*3/MM3 (ref 0–0.4)
EOSINOPHIL NFR BLD AUTO: 1.9 % (ref 0.3–6.2)
ERYTHROCYTE [DISTWIDTH] IN BLOOD BY AUTOMATED COUNT: 12.9 % (ref 12.3–15.4)
GFR SERPL CREATININE-BSD FRML MDRD: 16 ML/MIN/1.73
GLUCOSE BLD-MCNC: 115 MG/DL (ref 65–99)
HCT VFR BLD AUTO: 23.5 % (ref 34–46.6)
HCT VFR BLD AUTO: 25.8 % (ref 34–46.6)
HGB BLD-MCNC: 7.7 G/DL (ref 12–15.9)
HGB BLD-MCNC: 8 G/DL (ref 12–15.9)
IMM GRANULOCYTES # BLD AUTO: 0.02 10*3/MM3 (ref 0–0.05)
IMM GRANULOCYTES NFR BLD AUTO: 0.4 % (ref 0–0.5)
INR PPP: 1.91 (ref 0.9–1.1)
LYMPHOCYTES # BLD AUTO: 0.53 10*3/MM3 (ref 0.7–3.1)
LYMPHOCYTES NFR BLD AUTO: 10.1 % (ref 19.6–45.3)
MCH RBC QN AUTO: 30.3 PG (ref 26.6–33)
MCHC RBC AUTO-ENTMCNC: 32.8 G/DL (ref 31.5–35.7)
MCV RBC AUTO: 92.5 FL (ref 79–97)
MONOCYTES # BLD AUTO: 0.5 10*3/MM3 (ref 0.1–0.9)
MONOCYTES NFR BLD AUTO: 9.6 % (ref 5–12)
NEUTROPHILS # BLD AUTO: 4.07 10*3/MM3 (ref 1.7–7)
NEUTROPHILS NFR BLD AUTO: 77.8 % (ref 42.7–76)
NRBC BLD AUTO-RTO: 0 /100 WBC (ref 0–0.2)
PLATELET # BLD AUTO: 91 10*3/MM3 (ref 140–450)
PMV BLD AUTO: 10.5 FL (ref 6–12)
POTASSIUM BLD-SCNC: 4.3 MMOL/L (ref 3.5–5.2)
PROTHROMBIN TIME: 21.6 SECONDS (ref 11.7–14.2)
RBC # BLD AUTO: 2.54 10*6/MM3 (ref 3.77–5.28)
SODIUM BLD-SCNC: 141 MMOL/L (ref 136–145)
WBC NRBC COR # BLD: 5.23 10*3/MM3 (ref 3.4–10.8)

## 2019-11-21 PROCEDURE — 80048 BASIC METABOLIC PNL TOTAL CA: CPT | Performed by: NURSE PRACTITIONER

## 2019-11-21 PROCEDURE — 97110 THERAPEUTIC EXERCISES: CPT

## 2019-11-21 PROCEDURE — 97150 GROUP THERAPEUTIC PROCEDURES: CPT

## 2019-11-21 PROCEDURE — 85014 HEMATOCRIT: CPT | Performed by: NURSE PRACTITIONER

## 2019-11-21 PROCEDURE — 85018 HEMOGLOBIN: CPT | Performed by: NURSE PRACTITIONER

## 2019-11-21 PROCEDURE — 92610 EVALUATE SWALLOWING FUNCTION: CPT | Performed by: SPEECH-LANGUAGE PATHOLOGIST

## 2019-11-21 PROCEDURE — 85610 PROTHROMBIN TIME: CPT | Performed by: ORTHOPAEDIC SURGERY

## 2019-11-21 PROCEDURE — 85025 COMPLETE CBC W/AUTO DIFF WBC: CPT | Performed by: NURSE PRACTITIONER

## 2019-11-21 RX ORDER — WARFARIN SODIUM 2 MG/1
2 TABLET ORAL
Status: DISCONTINUED | OUTPATIENT
Start: 2019-11-21 | End: 2019-11-22

## 2019-11-21 RX ADMIN — HYDROCODONE BITARTRATE AND ACETAMINOPHEN 2 TABLET: 5; 325 TABLET ORAL at 08:06

## 2019-11-21 RX ADMIN — CARVEDILOL 25 MG: 25 TABLET, FILM COATED ORAL at 17:47

## 2019-11-21 RX ADMIN — CARVEDILOL 25 MG: 25 TABLET, FILM COATED ORAL at 08:06

## 2019-11-21 RX ADMIN — FAMOTIDINE 20 MG: 20 TABLET, FILM COATED ORAL at 08:06

## 2019-11-21 RX ADMIN — HYDROCODONE BITARTRATE AND ACETAMINOPHEN 2 TABLET: 5; 325 TABLET ORAL at 03:06

## 2019-11-21 RX ADMIN — WARFARIN SODIUM 2 MG: 2 TABLET ORAL at 17:47

## 2019-11-21 RX ADMIN — PANTOPRAZOLE SODIUM 40 MG: 40 TABLET, DELAYED RELEASE ORAL at 20:50

## 2019-11-21 RX ADMIN — HYDROCODONE BITARTRATE AND ACETAMINOPHEN 2 TABLET: 5; 325 TABLET ORAL at 13:00

## 2019-11-21 RX ADMIN — PANTOPRAZOLE SODIUM 40 MG: 40 TABLET, DELAYED RELEASE ORAL at 08:06

## 2019-11-21 RX ADMIN — SENNOSIDES AND DOCUSATE SODIUM 2 TABLET: 8.6; 5 TABLET ORAL at 20:50

## 2019-11-21 RX ADMIN — HYDROCODONE BITARTRATE AND ACETAMINOPHEN 2 TABLET: 5; 325 TABLET ORAL at 17:47

## 2019-11-21 RX ADMIN — HYDROCODONE BITARTRATE AND ACETAMINOPHEN 2 TABLET: 5; 325 TABLET ORAL at 21:47

## 2019-11-21 RX ADMIN — COLCHICINE 0.6 MG: 0.6 TABLET, FILM COATED ORAL at 08:06

## 2019-11-21 RX ADMIN — BUMETANIDE 2 MG: 2 TABLET ORAL at 08:06

## 2019-11-21 RX ADMIN — CALCITRIOL 0.25 MCG: 0.25 CAPSULE ORAL at 08:06

## 2019-11-21 RX ADMIN — CALCITRIOL 0.25 MCG: 0.25 CAPSULE ORAL at 20:50

## 2019-11-21 RX ADMIN — ATORVASTATIN CALCIUM 10 MG: 10 TABLET, FILM COATED ORAL at 08:06

## 2019-11-21 RX ADMIN — FEBUXOSTAT 40 MG: 40 TABLET, FILM COATED ORAL at 08:06

## 2019-11-21 RX ADMIN — RAMIPRIL 5 MG: 5 CAPSULE ORAL at 08:06

## 2019-11-21 RX ADMIN — ASPIRIN 81 MG: 81 TABLET, COATED ORAL at 08:06

## 2019-11-21 NOTE — PLAN OF CARE
Problem: Patient Care Overview  Goal: Plan of Care Review  Outcome: Ongoing (interventions implemented as appropriate)   11/21/19 6118   OTHER   Outcome Summary Pt's pain is controlled with PRN pain meds. Pt went to physical therapy in gym. VSS. Will continue to monitor.      Goal: Individualization and Mutuality  Outcome: Ongoing (interventions implemented as appropriate)    Goal: Discharge Needs Assessment  Outcome: Ongoing (interventions implemented as appropriate)    Goal: Interprofessional Rounds/Family Conf  Outcome: Ongoing (interventions implemented as appropriate)      Problem: Fall Risk (Adult)  Goal: Identify Related Risk Factors and Signs and Symptoms  Outcome: Ongoing (interventions implemented as appropriate)    Goal: Absence of Fall  Outcome: Ongoing (interventions implemented as appropriate)      Problem: Knee Arthroplasty (Total, Partial) (Adult)  Goal: Signs and Symptoms of Listed Potential Problems Will be Absent, Minimized or Managed (Knee Arthroplasty)  Outcome: Ongoing (interventions implemented as appropriate)    Goal: Anesthesia/Sedation Recovery  Outcome: Ongoing (interventions implemented as appropriate)

## 2019-11-21 NOTE — PROGRESS NOTES
Name: Janel Mahoney ADMIT: 2019   : 1940  PCP: Ariel Matute MD    MRN: [id number] LOS: 2 days   AGE/SEX: 79 y.o. female  ROOM: [facility]     Subjective   Subjective   has complaints of postoperative pain, otherwise doing okay.    Review of Systems   Respiratory: Negative for chest tightness and shortness of breath.    Cardiovascular: Negative for chest pain and leg swelling.   Gastrointestinal: Negative for nausea and vomiting.        Objective   Objective   Vital Signs  Temp:  [97 °F (36.1 °C)-98.8 °F (37.1 °C)] 97.8 °F (36.6 °C)  Heart Rate:  [60-66] 64  Resp:  [16] 16  BP: (108-155)/(47-62) 142/47  SpO2:  [96 %-99 %] 97 %  on   ;   Device (Oxygen Therapy): room air  Body mass index is 27.29 kg/m².  Physical Exam   Constitutional: She is oriented to person, place, and time. She appears well-developed and well-nourished. No distress.   Cardiovascular: Normal rate.   v paced   Pulmonary/Chest: Effort normal and breath sounds normal. No respiratory distress.   Abdominal: Soft. Bowel sounds are normal. She exhibits no distension. There is no tenderness.   Musculoskeletal: She exhibits tenderness. She exhibits no edema.   limited ROM L knee   Neurological: She is alert and oriented to person, place, and time.   Skin: Skin is warm and dry.   Nursing note and vitals reviewed.      Results Review:       I reviewed the patient's new clinical results.  Results from last 7 days   Lab Units 19  0343 19  0448   WBC 10*3/mm3 5.23 7.69   HEMOGLOBIN g/dL 7.7* 8.0*   PLATELETS 10*3/mm3 91* 106*     Results from last 7 days   Lab Units 19  0343 19  0448   SODIUM mmol/L 141 142   POTASSIUM mmol/L 4.3 4.6   CHLORIDE mmol/L 102 102   CO2 mmol/L 27.3 26.7   BUN mg/dL 54* 60*   CREATININE mg/dL 2.83* 2.89*   GLUCOSE mg/dL 115* 131*   Estimated Creatinine Clearance: 14.5 mL/min (A) (by C-G formula based on SCr of 2.83 mg/dL (H)).    Results from last 7 days   Lab Units 19  7934  11/20/19  0448   CALCIUM mg/dL 8.8 8.9       No results found for: HGBA1C, POCGLU      acetaminophen 1,000 mg Oral Q8H   Or      acetaminophen 650 mg Rectal Q8H   aspirin 81 mg Oral Daily   atorvastatin 10 mg Oral Daily   calcitriol 0.25 mcg Oral BID   carvedilol 25 mg Oral BID With Meals   colchicine 0.6 mg Oral Every Other Day   famotidine 20 mg Oral Daily   febuxostat 40 mg Oral Daily   ferrous sulfate 325 mg Oral Every Other Day   pantoprazole 40 mg Oral BID   ramipril 5 mg Oral Daily   senna-docusate sodium 2 tablet Oral Nightly   warfarin 5 mg Oral Daily       Pharmacy to dose warfarin     sodium chloride 100 mL/hr Last Rate: 100 mL/hr (11/19/19 2200)   Diet Regular       Assessment/Plan     Active Hospital Problems    Diagnosis  POA   • **S/P revision of total knee [Z96.659]  Not Applicable   • Chronic coronary artery disease [I25.10]  Yes   • Acute on chronic combined systolic (congestive) and diastolic (congestive) heart failure (CMS/HCC) [I50.43]  Yes   • Chronic kidney disease, stage 3 (CMS/HCC) [N18.3]  Yes   • Ischemic cardiomyopathy [I25.5]  Yes   • Hypertension [I10]  Yes   • Chronic combined systolic and diastolic congestive heart failure (CMS/HCC) [I50.42]  Yes   • Chronic atrial fibrillation [I48.20]  Yes   • Anemia in stage 3 chronic kidney disease (CMS/HCC) [N18.3, D63.1]  Yes      Resolved Hospital Problems   No resolved problems to display.     HTN  - looks like she was pretty hypertensive after surgery but is stable at this time, continue home regimen with parameters     Afib  - rate controlled, continue coreg  - v-paced     CKD 3  - creatinine slightly above baseline, hold bumex and repeat BMP in AM    Postoperative anemia  - ortho following, will monitor and repeat hgb this afternoon  - if hgb worsens, will check occult stool      Thank you very much for asking [facility] to be involved in this patient's care. We will follow along with you.      NICKY Gonsales  [facility]  11/21/19  9:55 AM

## 2019-11-21 NOTE — PLAN OF CARE
Problem: Patient Care Overview  Goal: Plan of Care Review   11/21/19 1400   Coping/Psychosocial   Plan of Care Reviewed With patient   Plan of Care Review   Progress improving   OTHER   Outcome Summary Improved tolerance to functional activity this PM with an increase in gait distance and progression of ther. ex. program.

## 2019-11-21 NOTE — DISCHARGE SUMMARY
Orthopaedic Discharge Summary  Dr. SEAMAN “Leonel” Huan II  [phone]    NAME: Janel Mahoney PCP: Ariel Matute MD   :  MRN: [id number]  [id number] LOS:  ADMIT: 4 days  2019   AGE/SEX: 79 y.o. 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 Make sure you understand how and when to take each.   1 tablet, Oral, Daily PRN      HYDROcodone-acetaminophen 5-325 MG per tablet  Commonly known as:  NORCO  What changed:  You were already taking a medication with the same name, and this prescription was added. Make sure you understand how and when to take each.   1 tablet, Oral, Every 4 Hours PRN         Continue These Medications      Instructions Start Date   AMBIEN 10 MG tablet  Generic drug:  zolpidem   10 mg, Oral, Nightly PRN      aspirin 81 MG tablet   81 mg, Oral, Daily, PT CALLING TO SEE IF SHE HAS TO STOP PRIOR TO SURGERY      bumetanide 2 MG tablet  Commonly known as:  BUMEX   2 mg, Oral, 2 Times Daily      calcitriol 0.25 MCG capsule  Commonly known as:  ROCALTROL   0.25 mcg, Oral, 2 Times Daily      carvedilol 25 MG tablet  Commonly known as:  COREG   25 mg, Oral, 2 Times Daily With Meals      COLCRYS 0.6 MG tablet  Generic drug:  colchicine   0.6 mg, Oral, Every Other Day      epoetin [place]ele 12792 UNIT/ML injection  Commonly known as:  EPOGEN,PROCRIT   10,000 Units, Subcutaneous, As Needed      febuxostat 40 MG tablet  Commonly known as:  ULORIC   40 mg, Oral, Daily      ferrous sulfate 325 (65 FE) MG tablet   325 mg, Oral, Every Other Day      FOSAMAX 70 MG tablet  Generic drug:  alendronate   70 mg, Oral, Every 14 Days      pantoprazole 40 MG EC  tablet  Commonly known as:  PROTONIX   40 mg, Oral, 2 Times Daily      ramipril 5 MG capsule  Commonly known as:  ALTACE   5 mg, Oral, Daily      SENIOR MULTIVITAMIN PLUS tablet   1 tablet, Oral, Daily      simvastatin 20 MG tablet  Commonly known as:  ZOCOR   20 mg, Oral, Nightly      traMADol 50 MG tablet  Commonly known as:  ULTRAM   100 mg, Oral, 2 Times Daily      vitamin B-12 1000 MCG tablet  Commonly known as:  CYANOCOBALAMIN   1,000 mcg, Oral, Daily      Vitamin D 50 MCG (2000 UT) tablet   2,000 Units, Oral, Daily      warfarin 1 MG tablet  Commonly known as:  COUMADIN   1 mg, Oral, Daily, DOSES VARY WEEKLY BASED ON INR DR LAWRENCE INSTRUCTED TO STOP 5 DAYS PRIOR TO SURGERY         Stop These Medications    HIBICLENS EX     mupirocin 2 % ointment  Commonly known as:  BACTROBAN            · Vitals:     Vitals:    11/22/19 1932 11/22/19 2230 11/22/19 2339 11/23/19 0330   BP: 115/46 110/60 115/62 136/57   BP Location:   Left arm Right arm   Patient Position:   Lying Lying   Pulse: 60 60 60 59   Resp: 16 16 16 18   Temp: 99.9 °F (37.7 °C) 98.3 °F (36.8 °C) 99.1 °F (37.3 °C) 98 °F (36.7 °C)   TempSrc:  Oral Oral Oral   SpO2: 96% 97% 96%    Weight:       Height:           · Labs:      Admission on 11/19/2019   Component Date Value Ref Range Status   • Protime 11/19/2019 15.6* 11.7 - 14.2 Seconds Final   • INR 11/19/2019 1.27* 0.90 - 1.10 Final   • Protime 11/19/2019 15.2* 11.7 - 14.2 Seconds Final   • INR 11/19/2019 1.23* 0.90 - 1.10 Final   • Protime 11/20/2019 15.0* 11.7 - 14.2 Seconds Final   • INR 11/20/2019 1.21* 0.90 - 1.10 Final   • Glucose 11/20/2019 131* 65 - 99 mg/dL Final   • BUN 11/20/2019 60* 8 - 23 mg/dL Final   • Creatinine 11/20/2019 2.89* 0.57 - 1.00 mg/dL Final   • Sodium 11/20/2019 142  136 - 145 mmol/L Final   • Potassium 11/20/2019 4.6  3.5 - 5.2 mmol/L Final   • Chloride 11/20/2019 102  98 - 107 mmol/L Final   • CO2 11/20/2019 26.7  22.0 - 29.0 mmol/L Final   • Calcium 11/20/2019 8.9  8.6 -  10.5 mg/dL Final   • eGFR Non  Amer 11/20/2019 16* >60 mL/min/1.73 Final   • BUN/Creatinine Ratio 11/20/2019 20.8  7.0 - 25.0 Final   • Anion Gap 11/20/2019 13.3  5.0 - 15.0 mmol/L Final   • WBC 11/20/2019 7.69  3.40 - 10.80 10*3/mm3 Final   • RBC 11/20/2019 2.60* 3.77 - 5.28 10*6/mm3 Final   • Hemoglobin 11/20/2019 8.0* 12.0 - 15.9 g/dL Final   • Hematocrit 11/20/2019 24.9* 34.0 - 46.6 % Final   • MCV 11/20/2019 95.8  79.0 - 97.0 fL Final   • MCH 11/20/2019 30.8  26.6 - 33.0 pg Final   • MCHC 11/20/2019 32.1  31.5 - 35.7 g/dL Final   • RDW 11/20/2019 12.6  12.3 - 15.4 % Final   • RDW-SD 11/20/2019 43.7  37.0 - 54.0 fl Final   • MPV 11/20/2019 10.2  6.0 - 12.0 fL Final   • Platelets 11/20/2019 106* 140 - 450 10*3/mm3 Final   • Protime 11/21/2019 21.6* 11.7 - 14.2 Seconds Final   • INR 11/21/2019 1.91* 0.90 - 1.10 Final   • Glucose 11/21/2019 115* 65 - 99 mg/dL Final   • BUN 11/21/2019 54* 8 - 23 mg/dL Final   • Creatinine 11/21/2019 2.83* 0.57 - 1.00 mg/dL Final   • Sodium 11/21/2019 141  136 - 145 mmol/L Final   • Potassium 11/21/2019 4.3  3.5 - 5.2 mmol/L Final   • Chloride 11/21/2019 102  98 - 107 mmol/L Final   • CO2 11/21/2019 27.3  22.0 - 29.0 mmol/L Final   • Calcium 11/21/2019 8.8  8.6 - 10.5 mg/dL Final   • eGFR Non African Amer 11/21/2019 16* >60 mL/min/1.73 Final   • BUN/Creatinine Ratio 11/21/2019 19.1  7.0 - 25.0 Final   • Anion Gap 11/21/2019 11.7  5.0 - 15.0 mmol/L Final   • WBC 11/21/2019 5.23  3.40 - 10.80 10*3/mm3 Final   • RBC 11/21/2019 2.54* 3.77 - 5.28 10*6/mm3 Final   • Hemoglobin 11/21/2019 7.7* 12.0 - 15.9 g/dL Final   • Hematocrit 11/21/2019 23.5* 34.0 - 46.6 % Final   • MCV 11/21/2019 92.5  79.0 - 97.0 fL Final   • MCH 11/21/2019 30.3  26.6 - 33.0 pg Final   • MCHC 11/21/2019 32.8  31.5 - 35.7 g/dL Final   • RDW 11/21/2019 12.9  12.3 - 15.4 % Final   • RDW-SD 11/21/2019 44.2  37.0 - 54.0 fl Final   • MPV 11/21/2019 10.5  6.0 - 12.0 fL Final   • Platelets 11/21/2019 91* 140 - 450  10*3/mm3 Final   • Neutrophil % 11/21/2019 77.8* 42.7 - 76.0 % Final   • Lymphocyte % 11/21/2019 10.1* 19.6 - 45.3 % Final   • Monocyte % 11/21/2019 9.6  5.0 - 12.0 % Final   • Eosinophil % 11/21/2019 1.9  0.3 - 6.2 % Final   • Basophil % 11/21/2019 0.2  0.0 - 1.5 % Final   • Immature Grans % 11/21/2019 0.4  0.0 - 0.5 % Final   • Neutrophils, Absolute 11/21/2019 4.07  1.70 - 7.00 10*3/mm3 Final   • Lymphocytes, Absolute 11/21/2019 0.53* 0.70 - 3.10 10*3/mm3 Final   • Monocytes, Absolute 11/21/2019 0.50  0.10 - 0.90 10*3/mm3 Final   • Eosinophils, Absolute 11/21/2019 0.10  0.00 - 0.40 10*3/mm3 Final   • Basophils, Absolute 11/21/2019 0.01  0.00 - 0.20 10*3/mm3 Final   • Immature Grans, Absolute 11/21/2019 0.02  0.00 - 0.05 10*3/mm3 Final   • nRBC 11/21/2019 0.0  0.0 - 0.2 /100 WBC Final   • Hemoglobin 11/21/2019 8.0* 12.0 - 15.9 g/dL Final   • Hematocrit 11/21/2019 25.8* 34.0 - 46.6 % Final   • Protime 11/22/2019 31.7* 11.7 - 14.2 Seconds Final   • INR 11/22/2019 3.10* 0.90 - 1.10 Final   • Glucose 11/22/2019 93  65 - 99 mg/dL Final   • BUN 11/22/2019 59* 8 - 23 mg/dL Final   • Creatinine 11/22/2019 2.36* 0.57 - 1.00 mg/dL Final   • Sodium 11/22/2019 143  136 - 145 mmol/L Final   • Potassium 11/22/2019 4.3  3.5 - 5.2 mmol/L Final   • Chloride 11/22/2019 103  98 - 107 mmol/L Final   • CO2 11/22/2019 27.3  22.0 - 29.0 mmol/L Final   • Calcium 11/22/2019 8.9  8.6 - 10.5 mg/dL Final   • eGFR Non African Amer 11/22/2019 20* >60 mL/min/1.73 Final   • BUN/Creatinine Ratio 11/22/2019 25.0  7.0 - 25.0 Final   • Anion Gap 11/22/2019 12.7  5.0 - 15.0 mmol/L Final   • Hemoglobin 11/22/2019 7.4* 12.0 - 15.9 g/dL Final   • Hematocrit 11/22/2019 23.5* 34.0 - 46.6 % Final   • Product Code 11/22/2019 [id number]   Final   • Unit Number 11/22/2019 [id number]   Final   • UNIT  ABO 11/22/2019 O   Final   • UNIT  RH 11/22/2019 POS   Final   • Dispense Status 11/22/2019 IS   Final   • Blood Type 11/22/2019 OPOS   Final   • Blood  "Expiration Date 11/22/2019 [id number]   Final   • Blood Type Barcode 11/22/2019 5100   Final   • Protime 11/23/2019 30.7* 11.7 - 14.2 Seconds Final   • INR 11/23/2019 2.98* 0.90 - 1.10 Final   • Hemoglobin 11/23/2019 8.0* 12.0 - 15.9 g/dL Final   • Hematocrit 11/23/2019 25.4* 34.0 - 46.6 % Final        No results found.    · Hospital Course:   79 y.o. 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 Post-operatively the patient transferred to the floor where the patient underwent mobilization therapy. Opioids were titrated to achieve appropriate pain management to allow for participation in mobilization exercises. Vital signs and laboratory values are now within safe parameters for discharge. She did have problems with anemia and elevated creatinine but these were managed conservatively. The dressings and/or incision is intact without signs or symptoms of acute infection. Operative extremity neurovascular status remains intact as compared to the preoperative exam.  Postoperatively she did have problems with worsening of her chronic anemia.  1 unit of PRBCs was ordered and she responded well to this.  Appropriate education re: incision care, activity levels, medications, and follow up visits was completed and all questions were answered. The patient is now deemed stable for discharge.    SNF: The patient had a difficult time mobilizing sufficiently with physical therapy. Further rehabilitation was recommended in a SNF facility. Once a bed was available, and the patient was cleared, there were discharged to the SNF in good condition}.       R \"Leonel\" Huan ARIAS MD  Orthopaedic Surgery  [facility]  [phone]                                               "

## 2019-11-21 NOTE — PROGRESS NOTES
Orthopaedic Surgery   Daily Progress Note  Dr. JURGEN Pressley II  [phone]  DEMOGRAPHICS:   · Janel Mahoney   · Age:79 y.o.   · MRN:[id number]  · Admitted: 11/19/2019    PROCEDURE: 2 Days Post-Op s/p Procedure(s):  TOTAL KNEE ARTHROPLASTY REVISION LEFT     HOSPITAL PROGRESS  · Patient Issues: Increased pain last night, but otherwise doing well  · Ambulation/Activity: Ambulating well with PT/Nursing.      VITALS:  Vitals:    11/20/19 1900 11/20/19 2314 11/21/19 0300 11/21/19 0702   BP: 108/53 155/51 149/55 142/47   BP Location: Right arm Right arm Right arm Right arm   Patient Position: Lying Lying Lying Lying   Pulse: 60 60 60 64   Resp: 16 16 16 16   Temp: 98.8 °F (37.1 °C) 97.4 °F (36.3 °C) 97 °F (36.1 °C) 97.8 °F (36.6 °C)   TempSrc: Oral Oral Oral Oral   SpO2: 96% 97% 99% 97%   Weight:       Height:           PHYSICAL EXAM:  · LUNGS: Equal chest rise, no shortness of air  · CARDIOVASCULAR: brisk capillary refill intact  · WOUND: SANDI Wound Vac System working in good condition, minimal drainage  · EXTREMITY: Operative Leg  · Pulses: brisk capillary refill intact  · Sensation: Sensation intact to light touch to the saphenous/sural/tibial/deep peroneal/superficial peroneal nerves, and grossly throughout the extremity.  · Motor: 5/5 EHL/FHL/TA/GS motor complexes    LABS:   Lab Results   Component Value Date    HGB 7.7 (L) 11/21/2019     Lab Results   Component Value Date    WBC 5.23 11/21/2019     Lab Results   Component Value Date    GLUCOSE 115 (H) 11/21/2019    CALCIUM 8.8 11/21/2019     11/21/2019    K 4.3 11/21/2019    CO2 27.3 11/21/2019     11/21/2019    BUN 54 (H) 11/21/2019    CREATININE 2.83 (H) 11/21/2019    EGFRIFAFRI  08/25/2016      Comment:      <15 Indicative of kidney failure.    EGFRIFNONA 16 (L) 11/21/2019    BCR 19.1 11/21/2019    ANIONGAP 11.7 11/21/2019       ASSESSMENT: Patient is a 79 y.o. female who is 2 Days Post-Op s/p Procedure(s):  TOTAL KNEE ARTHROPLASTY REVISION  "LEFT     PLAN:   · Weight Bearing: Weight Bearing As Tolerated  · Labs: Above lab values review. Plan: Elevated creatinine being managed medically.  Hemoglobin low but stable.  · PT/OT: To Mobilize  · DVT PPX: Resume Home DVT PPX  · Post-Op Xray: TKA implants in good alignment.   · Follow-Up: In office at 3 weeks postop  · Dispo: Planning discharge Friday if hemoglobin and creatinine stable, otherwise she will have to remain here    R \"Leonel\" Huan ARIAS MD  Orthopaedic Surgery  [facility]  [phone] - [place] Office  [phone] - [place] Office      "

## 2019-11-21 NOTE — THERAPY TREATMENT NOTE
Patient Name: Janel Mahoney  : 1940    MRN: [id number]                              Today's Date: 2019       Admit Date: 2019    Visit Dx: No diagnosis found.  Patient Active Problem List   Diagnosis   • Anemia in stage 3 chronic kidney disease (CMS/HCC)   • Thrombocytopenia (CMS/HCC)   • B12 deficiency   • Coronary artery disease involving coronary bypass graft of native heart without angina pectoris   • S/P MVR (porcine)   • Chronic atrial fibrillation   • Bilateral carotid artery disease (CMS/HCC)   • Intractable low back pain   • Long-term (current) use of anticoagulants   • Low back pain   • Osteoporosis   • Murmur, heart   • GEORGINA on auto CPAP - Dr Cardona   • Hypersomnia due to medical condition - GEORGINA   • Esophageal stricture   • Acute blood loss anemia   • Dysphagia   • Closed fracture of left proximal humerus   • Hypertension   • Supratherapeutic INR   • Chronic combined systolic and diastolic congestive heart failure (CMS/HCC)   • Osteoporosis with pathological fracture   • Closed 3-part fracture of proximal end of left humerus   • Closed fracture of proximal end of humerus with delayed healing   • Injury of right knee   • Knee injury, left, initial encounter   • History of fall   • S/P TKR (total knee replacement) using cement, left   • Ischemic cardiomyopathy   • Intramuscular hematoma right pecotralis   • Hemorrhagic disorder due to extrinsic circulating anticoagulants (CMS/HCC)   • Acute posthemorrhagic anemia   • Chronic kidney disease, stage 3 (CMS/HCC)   • Acute kidney injury (CMS/HCC)   • Peripheral edema   • Acute on chronic combined systolic (congestive) and diastolic (congestive) heart failure (CMS/HCC)   • Chronic coronary artery disease   • Inflammatory arthritis   • Ventricular tachycardia (CMS/HCC)   • S/P revision of total knee     Past Medical History:   Diagnosis Date   • Acute kidney injury (CMS/HCC)    • Anemia    • Atrial fibrillation (CMS/HCC)    • Bruises easily     • Carotid artery stenosis    • Chronic back pain    • Chronic combined systolic and diastolic congestive heart failure (CMS/HCC)    • Chronic coronary artery disease     moderate to severe LV dysfunction.   • Chronic kidney disease, stage 3 (CMS/HCC)    • Dysphagia    • GERD (gastroesophageal reflux disease)    • Gout    • H/O cardiac murmur    • Hematoma     LEFT LEG; DR CHERI AWARE   • [place] (hard of hearing)     wears hearing aids   • Hyperlipidemia    • Hypertension    • Hypotension    • ICD (implantable cardioverter-defibrillator) in place    • Ischemic cardiomyopathy    • Kyphoscoliosis    • Leukopenia    • Lumbar spondylosis    • Obesity    • GEORGINA (obstructive sleep apnea)    • Osteoarthritis    • Osteoporosis    • Peptic ulcer    • Premature ventricular contractions    • Renal insufficiency syndrome    • Scoliosis    • Shoulder fracture, left    • Stroke syndrome    • Thrombocytopenia (CMS/HCC)    • Urine incontinence    • Ventricular tachycardia (CMS/HCC)    • Vitamin B12 deficiency      Past Surgical History:   Procedure Laterality Date   • AV NODE ABLATION     • BREAST BIOPSY     • CARDIAC CATHETERIZATION      Showed severe mitral insufficiency and borderline coronary artery disease   • CARDIAC CATHETERIZATION      Showed an ejection fraction of 35%. She had occlusive disease of the right posterior LV branch and no other significant disease, treated medically.   • CARDIAC DEFIBRILLATOR PLACEMENT      Biventricular   • CARDIAC ELECTROPHYSIOLOGY PROCEDURE N/A 1/4/2019    Procedure: GENERATOR CHANGE BI-V ICD   [place];  Surgeon: James Hwang MD;  Location: [facility] CATH INVASIVE LOCATION;  Service: Cardiology   • CARDIAC VALVE REPLACEMENT  2009    Done with stent placement   • CARDIOVERSION      multiple electrocardioversions.   • CAROTID ARTERY ANGIOPLASTY Right    • COLONOSCOPY N/A 9/28/2017    Procedure: COLONOSCOPY TO CECUM;  Surgeon: Kevin Davis MD;  Location: [facility] ENDOSCOPY;  Service:    •  CORONARY ANGIOPLASTY WITH STENT PLACEMENT  2009   • CORONARY ARTERY BYPASS GRAFT      single graft to the PDA   • CORONARY STENT PLACEMENT     • ENDOSCOPY N/A 9/28/2017    Procedure: ESOPHAGOGASTRODUODENOSCOPY ;  Surgeon: Kevin Davis MD;  Location: [facility] ENDOSCOPY;  Service:    • HEMORRHOIDECTOMY     • HYSTERECTOMY     • INCISION AND DRAINAGE TRUNK Right 11/27/2018    Procedure: EVACUATION OF RIGHT CHEST WALL HEMATOMA;  Surgeon: Juvencio Rodriguez MD;  Location: [facility] OR;  Service: General   • MITRAL VALVE REPLACEMENT  01/2010    #31 Epic porcine valve.   • THROMBOENDARTERECTOMY Right     carotid thromboendarterectomy    • TONSILLECTOMY      age 32   • TOTAL KNEE ARTHROPLASTY Left    • TOTAL KNEE ARTHROPLASTY REVISION Left 11/19/2019    Procedure: TOTAL KNEE ARTHROPLASTY REVISION LEFT;  Surgeon: Juvencio Pressley II, MD;  Location: [facility];  Service: Orthopedics   • TOTAL SHOULDER ARTHROPLASTY W/ DISTAL CLAVICLE EXCISION Left 1/16/2018    Procedure: TOTAL SHOULDER REVERSE ARTHROPLASTY;  Surgeon: Bianka Quesada MD;  Location: [facility];  Service:      General Information     Row Name 11/21/19 1359 11/21/19 1041       PT Evaluation Time/Intention    Document Type  therapy note (daily note)  -MS  therapy note (daily note)  -CS    Mode of Treatment  physical therapy;group therapy  -MS  physical therapy;group therapy  -    Row Name 11/21/19 1041          General Information    Patient Profile Reviewed?  yes  -CS     Existing Precautions/Restrictions  fall  -CS     Row Name 11/21/19 1041          Cognitive Assessment/Intervention- PT/OT    Orientation Status (Cognition)  oriented x 4  -CS     Row Name 11/21/19 1041          Safety Issues, Functional Mobility    Impairments Affecting Function (Mobility)  pain  -CS       User Key  (r) = Recorded By, (t) = Taken By, (c) = Cosigned By    Initials Name Provider Type    Hoang Gentile, PT Physical Therapist    CS Frankie Garvey,  PT Physical Therapist        Mobility     Row Name 11/21/19 1359          Bed Mobility Assessment/Treatment    Comment (Bed Mobility)  Up in chair  -MS     Row Name 11/21/19 1359 11/21/19 1041       Sit-Stand Transfer    Sit-Stand [place] (Transfers)  minimum assist (75% patient effort)  -MS  minimum assist (75% patient effort)  -CS    Assistive Device (Sit-Stand Transfers)  walker, front-wheeled  -MS  walker, front-wheeled  -CS    Row Name 11/21/19 1359 11/21/19 1041       Gait/Stairs Assessment/Training    [place] Level (Gait)  contact guard  -MS  --    Assistive Device (Gait)  walker, front-wheeled  -MS  walker, front-wheeled  -CS    Distance in Feet (Gait)  50 feet  -MS  45  -CS    Pattern (Gait)  step-through  -MS  step-to  -CS    Deviations/Abnormal Patterns (Gait)  antalgic;ofe decreased  -MS  antalgic;ofe decreased  -CS    Bilateral Gait Deviations  forward flexed posture  -MS  forward flexed posture;heel strike decreased  -CS    Row Name 11/21/19 1041          Mobility Assessment/Intervention    Extremity Weight-bearing Status  left lower extremity  -CS     Left Lower Extremity (Weight-bearing Status)  weight-bearing as tolerated (WBAT)  -CS       User Key  (r) = Recorded By, (t) = Taken By, (c) = Cosigned By    Initials Name Provider Type    MS BridgetteHoang, PT Physical Therapist    Frankie Muir, PT Physical Therapist        Obj/Interventions     Row Name 11/21/19 1359 11/21/19 1041       Therapeutic Exercise    Comment (Therapeutic Exercise)  Left TKR protocol x 25 reps completed  -MS  L TKA protocol x15  -CS      User Key  (r) = Recorded By, (t) = Taken By, (c) = Cosigned By    Initials Name Provider Type    Hoang Gentile, PT Physical Therapist    Frankie Muir, PT Physical Therapist        Goals/Plan    No documentation.       Clinical Impression     Row Name 11/21/19 1359 11/21/19 1042       Pain Scale: Numbers Pre/Post-Treatment    Pain Scale: Numbers,  Pretreatment  3/10  -MS  3/10  -CS    Pain Scale: Numbers, Post-Treatment  3/10  -MS  3/10  -CS    Pain Location - Side  Left  -MS  --    Pain Location  knee  -MS  knee  -CS    Pain Intervention(s)  --  Ambulation/increased activity;Repositioned  -CS    Row Name 11/21/19 1042          Plan of Care Review    Plan of Care Reviewed With  patient  -CS     Row Name 11/21/19 1359 11/21/19 1042       Positioning and Restraints    Pre-Treatment Position  sitting in chair/recliner  -MS  sitting in chair/recliner  -CS    Post Treatment Position  chair  -MS  chair  -CS    In Chair  notified nsg;reclined;sitting;call light within reach;encouraged to call for assist;exit alarm on  -MS  reclined;call light within reach;encouraged to call for assist;exit alarm on  -CS      User Key  (r) = Recorded By, (t) = Taken By, (c) = Cosigned By    Initials Name Provider Type    Hoang Gentile, PT Physical Therapist    Frankie Muir, PT Physical Therapist        Outcome Measures     Row Name 11/21/19 1400 11/21/19 1042       How much help from another person do you currently need...    Turning from your back to your side while in flat bed without using bedrails?  3  -MS  3  -CS    Moving from lying on back to sitting on the side of a flat bed without bedrails?  3  -MS  3  -CS    Moving to and from a bed to a chair (including a wheelchair)?  3  -MS  3  -CS    Standing up from a chair using your arms (e.g., wheelchair, bedside chair)?  3  -MS  3  -CS    Climbing 3-5 steps with a railing?  2  -MS  2  -CS    To walk in hospital room?  3  -MS  3  -CS    AM-PAC 6 Clicks Score (PT)  17  -MS  17  -CS    Row Name 11/21/19 1042          Functional Assessment    Outcome Measure Options  AM-PAC 6 Clicks Basic Mobility (PT)  -CS       User Key  (r) = Recorded By, (t) = Taken By, (c) = Cosigned By    Initials Name Provider Type    Hoang Gentile, PT Physical Therapist    CS Frankie Garvey, PT Physical Therapist        Physical Therapy  Education     Title: PT OT SLP Therapies (Resolved)     Topic: Physical Therapy (Resolved)     Point: Mobility training (Resolved)     Learning Progress Summary           Patient Acceptance, E,TB, VU,NR by  at 11/21/2019 10:42 AM    Acceptance, E,D, NR by  at 11/20/2019 10:40 AM                   Point: Home exercise program (Resolved)     Learning Progress Summary           Patient Acceptance, E,TB, VU,NR by  at 11/21/2019 10:42 AM    Acceptance, E,D, NR by PC at 11/20/2019 10:40 AM                   Point: Body mechanics (Resolved)     Learning Progress Summary           Patient Acceptance, E,TB, VU,NR by  at 11/21/2019 10:42 AM    Acceptance, E,D, NR by PC at 11/20/2019 10:40 AM                   Point: Precautions (Resolved)     Learning Progress Summary           Patient Acceptance, E,TB, VU,NR by  at 11/21/2019 10:42 AM    Acceptance, E,D, NR by  at 11/20/2019 10:40 AM                               User Key     Initials Effective Dates Name Provider Type Discipline     04/03/18 -  Poonam Hoyos, PT Physical Therapist PT     05/14/18 -  Frankie Garvey, PT Physical Therapist PT              PT Recommendation and Plan     Plan of Care Reviewed With: patient  Progress: improving  Outcome Summary: Improved tolerance to functional activity this PM with an increase in gait distance and progression of ther. ex. program.      Time Calculation:   PT Charges     Row Name 11/21/19 1400 11/21/19 1043          Time Calculation    Start Time  1308  -MS  0830  -     Stop Time  1328  -MS  0923  -     Time Calculation (min)  20 min  -MS  53 min  -     PT Received On  11/21/19  -MS  11/21/19  -     PT - Next Appointment  11/22/19  -MS  11/21/19  -        Time Calculation- PT    Total Timed Code Minutes- PT  15 minute(s)  -MS  --       User Key  (r) = Recorded By, (t) = Taken By, (c) = Cosigned By    Initials Name Provider Type    Hoang Gentile, PT Physical Therapist    CS Frankie Garvey, PT  Physical Therapist        Therapy Charges for Today     Code Description Service Date Service Provider Modifiers Qty    41592360249 HC PT THER PROC EA 15 MIN 11/20/2019 Hoang Angeles, PT GP 1    17460031451 HC PT THER PROC GROUP 11/20/2019 Hoang Angeles, PT GP 1    74384156357 HC PT THER PROC EA 15 MIN 11/21/2019 Hoang Angeles, PT GP 1    07104529647 HC PT THER PROC GROUP 11/21/2019 Hoang Angeles, PT GP 1          PT G-Codes  Outcome Measure Options: AM-PAC 6 Clicks Basic Mobility (PT)  AM-PAC 6 Clicks Score (PT): 17    Hoang Angeles, PT  11/21/2019

## 2019-11-21 NOTE — PROGRESS NOTES
Continued Stay Note  [facility]     Patient Name: Janel Mahoney  MRN: [id number]  Today's Date: 11/21/2019    Admit Date: 11/19/2019    Discharge Plan     Row Name 11/21/19 1310       Plan    Plan  [facility]    Provided post acute provider list?  Yes    Plan Comments  Bed available at [facility] on Friday.  Packet in cubby.  Spouse will transport. Melody Olivera RN        Discharge Codes    No documentation.             Melody Olivera RN

## 2019-11-21 NOTE — PROGRESS NOTES
Pharmacy Consult: Warfarin Dosing/ Monitoring    Janel Mahoney is a 79 y.o. female, estimated creatinine clearance is 14.5 mL/min (A) (by C-G formula based on SCr of 2.83 mg/dL (H)). weighing 67.7 kg (149 lb 3.2 oz).     has a past medical history of Acute kidney injury (CMS/HCC), Anemia, Atrial fibrillation (CMS/HCC), Bruises easily, Carotid artery stenosis, Chronic back pain, Chronic combined systolic and diastolic congestive heart failure (CMS/HCC), Chronic coronary artery disease, Chronic kidney disease, stage 3 (CMS/HCC), Dysphagia, GERD (gastroesophageal reflux disease), Gout, H/O cardiac murmur, Hematoma, [place] (hard of hearing), Hyperlipidemia, Hypertension, Hypotension, ICD (implantable cardioverter-defibrillator) in place, Ischemic cardiomyopathy, Kyphoscoliosis, Leukopenia, Lumbar spondylosis, Obesity, GEORGINA (obstructive sleep apnea), Osteoarthritis, Osteoporosis, Peptic ulcer, Premature ventricular contractions, Renal insufficiency syndrome, Scoliosis, Shoulder fracture, left, Stroke syndrome, Thrombocytopenia (CMS/HCC), Urine incontinence, Ventricular tachycardia (CMS/HCC), and Vitamin B12 deficiency.    Social History     Tobacco Use   • Smoking status: Former Smoker     Packs/day: 1.00     Years: 50.00     Pack years: 50.00     Types: Cigarettes     Last attempt to quit: 1/11/2009     Years since quitting: 10.8   • Smokeless tobacco: Never Used   • Tobacco comment: caffeine - soda   Substance Use Topics   • Alcohol use: Yes     Comment: rare   • Drug use: No       Results from last 7 days   Lab Units 11/21/19  0343 11/20/19  0448 11/19/19  1848 11/19/19  1115 11/15/19  1423   INR  1.91* 1.21* 1.23* 1.27* 2.3*   HEMOGLOBIN g/dL 7.7* 8.0*  --   --   --    HEMATOCRIT % 23.5* 24.9*  --   --   --    PLATELETS 10*3/mm3 91* 106*  --   --   --      Results from last 7 days   Lab Units 11/21/19  0343 11/20/19  0448   SODIUM mmol/L 141 142   POTASSIUM mmol/L 4.3 4.6   CHLORIDE mmol/L 102 102   CO2 mmol/L 27.3  26.7   BUN mg/dL 54* 60*   CREATININE mg/dL 2.83* 2.89*   CALCIUM mg/dL 8.8 8.9   GLUCOSE mg/dL 115* 131*     Anticoagulation history: Taking PTA per [facility] clinic (2 mg MWF 1 mg others). Per med rec, patient stopped 5 days prior to surgery.     Hospital Anticoagulation:  Consulting provider: Dr Huan ARIAS  Start date: 11/19  Indication: chronic afib  Target INR: 2-3  Expected duration: indefinite   Bridge Therapy: No                 Date 11/19 11/20 11/21          INR 1.27; 1.23 1.21 1.91          Warfarin dose 5 mg  5 mg             Potential drug interactions:   - ASA: May enhance the anticoagulant effect of warfarin. (home med)   - Simvastatin: May enhance the anticoagulant effect of warfarin. Home med- substituted to formulary agent atorvastatin while inpatient.    Relevant nutrition status: Regular diet, intake not charted.     Education complete?/ Date: TBD    Assessment/Plan:  1) Decrease warfarin to 2 mg PO daily based on INR trend.   2) INR ordered daily with AM labs.     Pharmacy will continue to follow until discharge or discontinuation of warfarin.     Nik Ty, PharmD, CLAUDE, BCPS  11/21/19 12:53 PM

## 2019-11-21 NOTE — THERAPY TREATMENT NOTE
Patient Name: Janel Mahoney  : 1940    MRN: [id number]                              Today's Date: 2019       Admit Date: 2019    Visit Dx: No diagnosis found.  Patient Active Problem List   Diagnosis   • Anemia in stage 3 chronic kidney disease (CMS/HCC)   • Thrombocytopenia (CMS/HCC)   • B12 deficiency   • Coronary artery disease involving coronary bypass graft of native heart without angina pectoris   • S/P MVR (porcine)   • Chronic atrial fibrillation   • Bilateral carotid artery disease (CMS/HCC)   • Intractable low back pain   • Long-term (current) use of anticoagulants   • Low back pain   • Osteoporosis   • Murmur, heart   • GEORGINA on auto CPAP - Dr Cardona   • Hypersomnia due to medical condition - GEORGINA   • Esophageal stricture   • Acute blood loss anemia   • Dysphagia   • Closed fracture of left proximal humerus   • Hypertension   • Supratherapeutic INR   • Chronic combined systolic and diastolic congestive heart failure (CMS/HCC)   • Osteoporosis with pathological fracture   • Closed 3-part fracture of proximal end of left humerus   • Closed fracture of proximal end of humerus with delayed healing   • Injury of right knee   • Knee injury, left, initial encounter   • History of fall   • S/P TKR (total knee replacement) using cement, left   • Ischemic cardiomyopathy   • Intramuscular hematoma right pecotralis   • Hemorrhagic disorder due to extrinsic circulating anticoagulants (CMS/HCC)   • Acute posthemorrhagic anemia   • Chronic kidney disease, stage 3 (CMS/HCC)   • Acute kidney injury (CMS/HCC)   • Peripheral edema   • Acute on chronic combined systolic (congestive) and diastolic (congestive) heart failure (CMS/HCC)   • Chronic coronary artery disease   • Inflammatory arthritis   • Ventricular tachycardia (CMS/HCC)   • S/P revision of total knee     Past Medical History:   Diagnosis Date   • Acute kidney injury (CMS/HCC)    • Anemia    • Atrial fibrillation (CMS/HCC)    • Bruises easily     • Carotid artery stenosis    • Chronic back pain    • Chronic combined systolic and diastolic congestive heart failure (CMS/HCC)    • Chronic coronary artery disease     moderate to severe LV dysfunction.   • Chronic kidney disease, stage 3 (CMS/HCC)    • Dysphagia    • GERD (gastroesophageal reflux disease)    • Gout    • H/O cardiac murmur    • Hematoma     LEFT LEG; DR CHERI AWARE   • [place] (hard of hearing)     wears hearing aids   • Hyperlipidemia    • Hypertension    • Hypotension    • ICD (implantable cardioverter-defibrillator) in place    • Ischemic cardiomyopathy    • Kyphoscoliosis    • Leukopenia    • Lumbar spondylosis    • Obesity    • GEORGINA (obstructive sleep apnea)    • Osteoarthritis    • Osteoporosis    • Peptic ulcer    • Premature ventricular contractions    • Renal insufficiency syndrome    • Scoliosis    • Shoulder fracture, left    • Stroke syndrome    • Thrombocytopenia (CMS/HCC)    • Urine incontinence    • Ventricular tachycardia (CMS/HCC)    • Vitamin B12 deficiency      Past Surgical History:   Procedure Laterality Date   • AV NODE ABLATION     • BREAST BIOPSY     • CARDIAC CATHETERIZATION      Showed severe mitral insufficiency and borderline coronary artery disease   • CARDIAC CATHETERIZATION      Showed an ejection fraction of 35%. She had occlusive disease of the right posterior LV branch and no other significant disease, treated medically.   • CARDIAC DEFIBRILLATOR PLACEMENT      Biventricular   • CARDIAC ELECTROPHYSIOLOGY PROCEDURE N/A 1/4/2019    Procedure: GENERATOR CHANGE BI-V ICD   [place];  Surgeon: James Hwang MD;  Location: [facility] CATH INVASIVE LOCATION;  Service: Cardiology   • CARDIAC VALVE REPLACEMENT  2009    Done with stent placement   • CARDIOVERSION      multiple electrocardioversions.   • CAROTID ARTERY ANGIOPLASTY Right    • COLONOSCOPY N/A 9/28/2017    Procedure: COLONOSCOPY TO CECUM;  Surgeon: Kevin Davis MD;  Location: [facility] ENDOSCOPY;  Service:    •  CORONARY ANGIOPLASTY WITH STENT PLACEMENT  2009   • CORONARY ARTERY BYPASS GRAFT      single graft to the PDA   • CORONARY STENT PLACEMENT     • ENDOSCOPY N/A 9/28/2017    Procedure: ESOPHAGOGASTRODUODENOSCOPY ;  Surgeon: Kevin Davis MD;  Location: [facility] ENDOSCOPY;  Service:    • HEMORRHOIDECTOMY     • HYSTERECTOMY     • INCISION AND DRAINAGE TRUNK Right 11/27/2018    Procedure: EVACUATION OF RIGHT CHEST WALL HEMATOMA;  Surgeon: Juvencio Rodriguez MD;  Location: [facility] OR;  Service: General   • MITRAL VALVE REPLACEMENT  01/2010    #31 Epic porcine valve.   • THROMBOENDARTERECTOMY Right     carotid thromboendarterectomy    • TONSILLECTOMY      age 32   • TOTAL KNEE ARTHROPLASTY Left    • TOTAL KNEE ARTHROPLASTY REVISION Left 11/19/2019    Procedure: TOTAL KNEE ARTHROPLASTY REVISION LEFT;  Surgeon: Juvencio Pressley II, MD;  Location: [facility];  Service: Orthopedics   • TOTAL SHOULDER ARTHROPLASTY W/ DISTAL CLAVICLE EXCISION Left 1/16/2018    Procedure: TOTAL SHOULDER REVERSE ARTHROPLASTY;  Surgeon: Bianka Quesada MD;  Location: [facility];  Service:      General Information     Row Name 11/21/19 1041          PT Evaluation Time/Intention    Document Type  therapy note (daily note)  -CS     Mode of Treatment  physical therapy;group therapy  -CS     Row Name 11/21/19 1041          General Information    Patient Profile Reviewed?  yes  -CS     Existing Precautions/Restrictions  fall  -CS     Row Name 11/21/19 1041          Cognitive Assessment/Intervention- PT/OT    Orientation Status (Cognition)  oriented x 4  -CS     Row Name 11/21/19 1041          Safety Issues, Functional Mobility    Impairments Affecting Function (Mobility)  pain  -CS       User Key  (r) = Recorded By, (t) = Taken By, (c) = Cosigned By    Initials Name Provider Type    CS Frankie Garvey, PT Physical Therapist        Mobility     Row Name 11/21/19 1041          Sit-Stand Transfer    Sit-Stand [place]  (Transfers)  minimum assist (75% patient effort)  -CS     Assistive Device (Sit-Stand Transfers)  walker, front-wheeled  -CS     Row Name 11/21/19 1041          Gait/Stairs Assessment/Training    Assistive Device (Gait)  walker, front-wheeled  -CS     Distance in Feet (Gait)  45  -CS     Pattern (Gait)  step-to  -CS     Deviations/Abnormal Patterns (Gait)  antalgic;ofe decreased  -CS     Bilateral Gait Deviations  forward flexed posture;heel strike decreased  -CS     Row Name 11/21/19 1041          Mobility Assessment/Intervention    Extremity Weight-bearing Status  left lower extremity  -CS     Left Lower Extremity (Weight-bearing Status)  weight-bearing as tolerated (WBAT)  -CS       User Key  (r) = Recorded By, (t) = Taken By, (c) = Cosigned By    Initials Name Provider Type    Frankie Muir, PT Physical Therapist        Obj/Interventions     Row Name 11/21/19 1041          Therapeutic Exercise    Comment (Therapeutic Exercise)  L TKA protocol x15  -CS       User Key  (r) = Recorded By, (t) = Taken By, (c) = Cosigned By    Initials Name Provider Type    Frankie Muir, PT Physical Therapist        Goals/Plan    No documentation.       Clinical Impression     Row Name 11/21/19 1042          Pain Scale: Numbers Pre/Post-Treatment    Pain Scale: Numbers, Pretreatment  3/10  -CS     Pain Scale: Numbers, Post-Treatment  3/10  -CS     Pain Location  knee  -CS     Pain Intervention(s)  Ambulation/increased activity;Repositioned  -CS     Row Name 11/21/19 1042          Plan of Care Review    Plan of Care Reviewed With  patient  -CS     [facility] Name 11/21/19 1042          Positioning and Restraints    Pre-Treatment Position  sitting in chair/recliner  -CS     Post Treatment Position  chair  -CS     In Chair  reclined;call light within reach;encouraged to call for assist;exit alarm on  -CS       User Key  (r) = Recorded By, (t) = Taken By, (c) = Cosigned By    Initials Name Provider Type    Frankie Muir,  PT Physical Therapist        Outcome Measures     Row Name 11/21/19 1042          How much help from another person do you currently need...    Turning from your back to your side while in flat bed without using bedrails?  3  -CS     Moving from lying on back to sitting on the side of a flat bed without bedrails?  3  -CS     Moving to and from a bed to a chair (including a wheelchair)?  3  -CS     Standing up from a chair using your arms (e.g., wheelchair, bedside chair)?  3  -CS     Climbing 3-5 steps with a railing?  2  -CS     To walk in hospital room?  3  -CS     AM-PAC 6 Clicks Score (PT)  17  -CS     Row Name 11/21/19 1042          Functional Assessment    Outcome Measure Options  AM-PAC 6 Clicks Basic Mobility (PT)  -CS       User Key  (r) = Recorded By, (t) = Taken By, (c) = Cosigned By    Initials Name Provider Type    Frankie Muir, PT Physical Therapist        Physical Therapy Education     Title: PT OT SLP Therapies (Done)     Topic: Physical Therapy (Done)     Point: Mobility training (Done)     Learning Progress Summary           Patient Acceptance, E,TB, VU,NR by  at 11/21/2019 10:42 AM    Acceptance, E,D, NR by  at 11/20/2019 10:40 AM                   Point: Home exercise program (Done)     Learning Progress Summary           Patient Acceptance, E,TB, VU,NR by  at 11/21/2019 10:42 AM    Acceptance, E,D, NR by PC at 11/20/2019 10:40 AM                   Point: Body mechanics (Done)     Learning Progress Summary           Patient Acceptance, E,TB, VU,NR by  at 11/21/2019 10:42 AM    Acceptance, E,D, NR by PC at 11/20/2019 10:40 AM                   Point: Precautions (Done)     Learning Progress Summary           Patient Acceptance, E,TB, VU,NR by  at 11/21/2019 10:42 AM    Acceptance, E,D, NR by PC at 11/20/2019 10:40 AM                               User Key     Initials Effective Dates Name Provider Type Discipline    PC 04/03/18 -  Poonam Hoyos PT Physical Therapist PT    MARSHA  05/14/18 -  Frankie Garvey, PT Physical Therapist PT              PT Recommendation and Plan     Outcome Summary/Treatment Plan (PT)  Anticipated Discharge Disposition (PT): skilled nursing facility  Plan of Care Reviewed With: patient  Outcome Summary: Pt did exercises, walked and tolerated fair mild-moderate complaints of pain. Anticipates d/c to snf.      Time Calculation:   PT Charges     Row Name 11/21/19 1043             Time Calculation    Start Time  0830  -CS      Stop Time  0923  -CS      Time Calculation (min)  53 min  -CS      PT Received On  11/21/19  -      PT - Next Appointment  11/21/19  -        User Key  (r) = Recorded By, (t) = Taken By, (c) = Cosigned By    Initials Name Provider Type     Frankie Garvey, PT Physical Therapist        Therapy Charges for Today     Code Description Service Date Service Provider Modifiers Qty    10820095219  PT THER PROC EA 15 MIN 11/21/2019 Frankie Garvey, PT GP 1    [id number] HC PT THER PROC GROUP 11/21/2019 Frankie Garvey, PT GP 1          PT G-Codes  Outcome Measure Options: AM-PAC 6 Clicks Basic Mobility (PT)  AM-PAC 6 Clicks Score (PT): 17    Frankie Garvey PT  11/21/2019

## 2019-11-21 NOTE — PLAN OF CARE
Problem: Patient Care Overview  Goal: Plan of Care Review   11/21/19 7088   Coping/Psychosocial   Plan of Care Reviewed With patient   OTHER   Outcome Summary SLP completed bedside swallow evaluation pt symptoms of food getting stuck sound esophageal related and advised pt to discuss further with MD. Discussed safe swallow and reflux precautions. Continue regular solids and thin liquids, meds whole with puree, and smal bites and sips, slow rate and chew thoroughly

## 2019-11-21 NOTE — PLAN OF CARE
Problem: Patient Care Overview  Goal: Plan of Care Review  Outcome: Ongoing (interventions implemented as appropriate)   11/21/19 1043   Coping/Psychosocial   Plan of Care Reviewed With patient   OTHER   Outcome Summary Pt did exercises, walked and tolerated fair mild-moderate complaints of pain. Anticipates d/c to snf.

## 2019-11-21 NOTE — THERAPY EVALUATION
Acute Care - Speech Language Pathology   Swallow Initial Evaluation [facility]     Patient Name: Janel Mahoney  : 1940  MRN: [id number]  Today's Date: 2019               Admit Date: 2019    Visit Dx:   No diagnosis found.  Patient Active Problem List   Diagnosis   • Anemia in stage 3 chronic kidney disease (CMS/HCC)   • Thrombocytopenia (CMS/HCC)   • B12 deficiency   • Coronary artery disease involving coronary bypass graft of native heart without angina pectoris   • S/P MVR (porcine)   • Chronic atrial fibrillation   • Bilateral carotid artery disease (CMS/HCC)   • Intractable low back pain   • Long-term (current) use of anticoagulants   • Low back pain   • Osteoporosis   • Murmur, heart   • GEORGINA on auto CPAP - Dr Cardona   • Hypersomnia due to medical condition - GEORGINA   • Esophageal stricture   • Acute blood loss anemia   • Dysphagia   • Closed fracture of left proximal humerus   • Hypertension   • Supratherapeutic INR   • Chronic combined systolic and diastolic congestive heart failure (CMS/HCC)   • Osteoporosis with pathological fracture   • Closed 3-part fracture of proximal end of left humerus   • Closed fracture of proximal end of humerus with delayed healing   • Injury of right knee   • Knee injury, left, initial encounter   • History of fall   • S/P TKR (total knee replacement) using cement, left   • Ischemic cardiomyopathy   • Intramuscular hematoma right pecotralis   • Hemorrhagic disorder due to extrinsic circulating anticoagulants (CMS/HCC)   • Acute posthemorrhagic anemia   • Chronic kidney disease, stage 3 (CMS/HCC)   • Acute kidney injury (CMS/HCC)   • Peripheral edema   • Acute on chronic combined systolic (congestive) and diastolic (congestive) heart failure (CMS/HCC)   • Chronic coronary artery disease   • Inflammatory arthritis   • Ventricular tachycardia (CMS/HCC)   • S/P revision of total knee     Past Medical History:   Diagnosis Date   • Acute kidney injury (CMS/HCC)     • Anemia    • Atrial fibrillation (CMS/HCC)    • Bruises easily    • Carotid artery stenosis    • Chronic back pain    • Chronic combined systolic and diastolic congestive heart failure (CMS/HCC)    • Chronic coronary artery disease     moderate to severe LV dysfunction.   • Chronic kidney disease, stage 3 (CMS/HCC)    • Dysphagia    • GERD (gastroesophageal reflux disease)    • Gout    • H/O cardiac murmur    • Hematoma     LEFT LEG; DR ALONZO AWARE   • [place] (hard of hearing)     wears hearing aids   • Hyperlipidemia    • Hypertension    • Hypotension    • ICD (implantable cardioverter-defibrillator) in place    • Ischemic cardiomyopathy    • Kyphoscoliosis    • Leukopenia    • Lumbar spondylosis    • Obesity    • GEORGINA (obstructive sleep apnea)    • Osteoarthritis    • Osteoporosis    • Peptic ulcer    • Premature ventricular contractions    • Renal insufficiency syndrome    • Scoliosis    • Shoulder fracture, left    • Stroke syndrome    • Thrombocytopenia (CMS/HCC)    • Urine incontinence    • Ventricular tachycardia (CMS/HCC)    • Vitamin B12 deficiency      Past Surgical History:   Procedure Laterality Date   • AV NODE ABLATION     • BREAST BIOPSY     • CARDIAC CATHETERIZATION      Showed severe mitral insufficiency and borderline coronary artery disease   • CARDIAC CATHETERIZATION      Showed an ejection fraction of 35%. She had occlusive disease of the right posterior LV branch and no other significant disease, treated medically.   • CARDIAC DEFIBRILLATOR PLACEMENT      Biventricular   • CARDIAC ELECTROPHYSIOLOGY PROCEDURE N/A 1/4/2019    Procedure: GENERATOR CHANGE BI-V ICD   [place];  Surgeon: James Hwang MD;  Location: [facility] INVASIVE LOCATION;  Service: Cardiology   • CARDIAC VALVE REPLACEMENT  2009    Done with stent placement   • CARDIOVERSION      multiple electrocardioversions.   • CAROTID ARTERY ANGIOPLASTY Right    • COLONOSCOPY N/A 9/28/2017    Procedure: COLONOSCOPY TO CECUM;  Surgeon:  "Kevin Davis MD;  Location: [facility] ENDOSCOPY;  Service:    • CORONARY ANGIOPLASTY WITH STENT PLACEMENT  2009   • CORONARY ARTERY BYPASS GRAFT      single graft to the PDA   • CORONARY STENT PLACEMENT     • ENDOSCOPY N/A 9/28/2017    Procedure: ESOPHAGOGASTRODUODENOSCOPY ;  Surgeon: Kevin Davis MD;  Location: [facility] ENDOSCOPY;  Service:    • HEMORRHOIDECTOMY     • HYSTERECTOMY     • INCISION AND DRAINAGE TRUNK Right 11/27/2018    Procedure: EVACUATION OF RIGHT CHEST WALL HEMATOMA;  Surgeon: Juvencio Rodriguez MD;  Location: [facility] MAIN OR;  Service: General   • MITRAL VALVE REPLACEMENT  01/2010    #31 Epic porcine valve.   • THROMBOENDARTERECTOMY Right     carotid thromboendarterectomy    • TONSILLECTOMY      age 32   • TOTAL KNEE ARTHROPLASTY Left    • TOTAL KNEE ARTHROPLASTY REVISION Left 11/19/2019    Procedure: TOTAL KNEE ARTHROPLASTY REVISION LEFT;  Surgeon: Juvencio Pressley II, MD;  Location: [facility] OR;  Service: Orthopedics   • TOTAL SHOULDER ARTHROPLASTY W/ DISTAL CLAVICLE EXCISION Left 1/16/2018    Procedure: TOTAL SHOULDER REVERSE ARTHROPLASTY;  Surgeon: Bianka Quesada MD;  Location: [facility] OR;  Service:         SWALLOW EVALUATION (last 72 hours)      SLP Adult Swallow Evaluation     Row Name 11/21/19 1500                   Rehab Evaluation    Document Type  evaluation  -KA        Subjective Information  no complaints  -KA        Patient Observations  alert;cooperative  -KA        Patient Effort  adequate  -KA        Symptoms Noted During/After Treatment  none  -KA           General Information    Patient Profile Reviewed  yes  -KA        Pertinent History Of Current Problem  s/p left knew revision, SLP consult due to pt c/o food \"getting stuck\" RN reported pt coughed on pills this morning  -KA        Current Method of Nutrition  regular textures;thin liquids  -KA        Precautions/Limitations, Vision  WFL;for purposes of eval  -KA        Precautions/Limitations, Hearing  " hearing impairment, bilaterally  -KA        Prior Level of Function-Communication  WFL  -KA        Prior Level of Function-Swallowing  no diet consistency restrictions;safe, efficient swallowing in all situations;esophageal concerns per pt  -KA        Plans/Goals Discussed with  patient  -KA        Barriers to Rehab  none identified  -KA        Patient's Goals for Discharge  return home  -KA           Pain Scale: Numbers Pre/Post-Treatment    Pain Scale: Numbers, Pretreatment  0/10 - no pain  -KA        Pain Scale: Numbers, Post-Treatment  0/10 - no pain  -KA           Oral Motor and Function    Dentition Assessment  natural, present and adequate;missing teeth  -KA        Secretion Management  WNL/WFL  -KA        Mucosal Quality  moist, healthy  -KA        Volitional Swallow  WFL  -KA        Volitional Cough  WFL  -KA           Oral Musculature and Cranial Nerve Assessment    Oral Motor General Assessment  WFL  -KA           General Eating/Swallowing Observations    Respiratory Support Currently in Use  room air  -KA        Eating/Swallowing Skills  self-fed  -KA        Positioning During Eating  upright in chair  -KA        Utensils Used  straw;cup  -KA        Consistencies Trialed  regular textures;thin liquids  -KA           Clinical Swallow Eval    Oral Prep Phase  WFL  -KA        Oral Transit  WFL  -KA        Oral Residue  WFL  -KA        Pharyngeal Phase  no overt signs/symptoms of pharyngeal impairment  -KA        Esophageal Phase  suspected esophageal impairment  -KA        Clinical Swallow Evaluation Summary  Patient demonstrated no overt s/s of pen/asp with thins via cup and straw and regular solids. Patient reports occasionally food gets stuck and it doesn't go anywhere, nothing helps including multiple swallows or liqiud wash and she has to sit and wait until it goes down. She reports she does not bring/regurgitate food back  up. She reports this mostly happens when she is eating in a hurry and takes big  bites. Patient also reports she also coughs up phlegm. Patient denies hx of PNA, and reports she has not discussed these symptoms with MDs. SLP discussed could be due to esophageal reasons and discuss with her MD for possible need for workup etc. Patient reports it does not happen often and she will just remember to slow down and take smaller bites  -           Clinical Impression    SLP Swallowing Diagnosis  functional oral phase;functional pharyngeal phase;esophageal dysfunction  -        Functional Impact  risk of aspiration/pneumonia  -        Swallow Criteria for Skilled Therapeutic Interventions Met  no problems identified which require skilled intervention  -           Recommendations    Therapy Frequency (Swallow)  evaluation only  -        SLP Diet Recommendation  regular textures;thin liquids  -        Recommended Precautions and Strategies  upright posture during/after eating;small bites of food and sips of liquid;other (see comments) reflux precautions   -KA        SLP Rec. for Method of Medication Administration  meds whole;with pudding or applesauce  -        Monitor for Signs of Aspiration  yes;notify SLP if any concerns  -KA        Anticipated Dischage Disposition  [facility] nursing facility  -          User Key  (r) = Recorded By, (t) = Taken By, (c) = Cosigned By    Initials Name Effective Dates    Edu Jackson MA,[facility]-SLP 06/08/18 -           EDUCATION  The patient has been educated in the following areas:   Dysphagia (Swallowing Impairment).    SLP Recommendation and Plan  SLP Swallowing Diagnosis: functional oral phase, functional pharyngeal phase, esophageal dysfunction  SLP Diet Recommendation: regular textures, thin liquids  Recommended Precautions and Strategies: upright posture during/after eating, small bites of food and sips of liquid, other (see comments)(reflux precautions )  SLP Rec. for Method of Medication Administration: meds whole, with pudding or applesauce      Monitor for Signs of Aspiration: yes, notify SLP if any concerns     Swallow Criteria for Skilled Therapeutic Interventions Met: no problems identified which require skilled intervention  Anticipated Dischage Disposition: skilled nursing facility     Therapy Frequency (Swallow): evaluation only          Plan of Care Reviewed With: patient  Outcome Summary: SLP completed bedside swallow evaluation pt symptoms of food getting stuck sound esophageal related and advised pt to discuss further with MD. Discussed safe swallow and reflux precautions. Continue regular solids and thin liquids, meds whole with puree, and smal bites and sips, slow rate and chew thoroughly         SLP Outcome Measures (last 72 hours)      SLP Outcome Measures     Row Name 11/21/19 1500             SLP Outcome Measures    Outcome Measure Used?  Adult NOMS  -         Adult FCM Scores    FCM Chosen  Swallowing  -      Swallowing FCM Score  6  -KA        User Key  (r) = Recorded By, (t) = Taken By, (c) = Cosigned By    Initials Name Effective Dates    Edu Jackson MA,[facility]-SLP 06/08/18 -            Time Calculation:   Time Calculation- SLP     Row Name 11/21/19 1512             Time Calculation- SLP    SLP Start Time  1414  -      SLP Received On  11/21/19  -        User Key  (r) = Recorded By, (t) = Taken By, (c) = Cosigned By    Initials Name Provider Type    Edu Jackson MA,[facility]-SLP Speech and Language Pathologist          Therapy Charges for Today     Code Description Service Date Service Provider Modifiers Qty    [id number]  ST EVAL ORAL PHARYNG SWALLOW 4 11/21/2019 Edu Johnson MA,[facility]-SLP GN 1               Edu Johnson MA,OMAR-SLP  11/21/2019

## 2019-11-21 NOTE — PLAN OF CARE
Problem: Patient Care Overview  Goal: Plan of Care Review  Outcome: Ongoing (interventions implemented as appropriate)   11/21/19 8466   Coping/Psychosocial   Plan of Care Reviewed With patient   Plan of Care Review   Progress improving   OTHER   Outcome Summary POD 1 LTK revision. VSS, pain controlled with prn norco and ice therapy. tolerates amb and transfers well with assist x1, walker, and gait belt. plans to d/c to rehab when ready. discussed BP monitoring r/t hx HTN and hypotension.      Goal: Individualization and Mutuality  Outcome: Ongoing (interventions implemented as appropriate)    Goal: Discharge Needs Assessment  Outcome: Ongoing (interventions implemented as appropriate)      Problem: Pain, Chronic (Adult)  Goal: Acceptable Pain/Comfort Level and Functional Ability  Outcome: Ongoing (interventions implemented as appropriate)      Problem: Fall Risk (Adult)  Goal: Absence of Fall  Outcome: Ongoing (interventions implemented as appropriate)      Problem: Knee Arthroplasty (Total, Partial) (Adult)  Goal: Signs and Symptoms of Listed Potential Problems Will be Absent, Minimized or Managed (Knee Arthroplasty)  Outcome: Ongoing (interventions implemented as appropriate)    Goal: Anesthesia/Sedation Recovery  Outcome: Ongoing (interventions implemented as appropriate)

## 2019-11-22 LAB
ANION GAP SERPL CALCULATED.3IONS-SCNC: 12.7 MMOL/L (ref 5–15)
BUN BLD-MCNC: 59 MG/DL (ref 8–23)
BUN/CREAT SERPL: 25 (ref 7–25)
CALCIUM SPEC-SCNC: 8.9 MG/DL (ref 8.6–10.5)
CHLORIDE SERPL-SCNC: 103 MMOL/L (ref 98–107)
CO2 SERPL-SCNC: 27.3 MMOL/L (ref 22–29)
CREAT BLD-MCNC: 2.36 MG/DL (ref 0.57–1)
GFR SERPL CREATININE-BSD FRML MDRD: 20 ML/MIN/1.73
GLUCOSE BLD-MCNC: 93 MG/DL (ref 65–99)
HCT VFR BLD AUTO: 23.5 % (ref 34–46.6)
HGB BLD-MCNC: 7.4 G/DL (ref 12–15.9)
INR PPP: 3.1 (ref 0.9–1.1)
POTASSIUM BLD-SCNC: 4.3 MMOL/L (ref 3.5–5.2)
PROTHROMBIN TIME: 31.7 SECONDS (ref 11.7–14.2)
SODIUM BLD-SCNC: 143 MMOL/L (ref 136–145)

## 2019-11-22 PROCEDURE — 97110 THERAPEUTIC EXERCISES: CPT

## 2019-11-22 PROCEDURE — 97150 GROUP THERAPEUTIC PROCEDURES: CPT

## 2019-11-22 PROCEDURE — P9016 RBC LEUKOCYTES REDUCED: HCPCS

## 2019-11-22 PROCEDURE — 85014 HEMATOCRIT: CPT | Performed by: ORTHOPAEDIC SURGERY

## 2019-11-22 PROCEDURE — 85610 PROTHROMBIN TIME: CPT | Performed by: ORTHOPAEDIC SURGERY

## 2019-11-22 PROCEDURE — 86900 BLOOD TYPING SEROLOGIC ABO: CPT

## 2019-11-22 PROCEDURE — 36430 TRANSFUSION BLD/BLD COMPNT: CPT

## 2019-11-22 PROCEDURE — 85018 HEMOGLOBIN: CPT | Performed by: ORTHOPAEDIC SURGERY

## 2019-11-22 PROCEDURE — 80048 BASIC METABOLIC PNL TOTAL CA: CPT | Performed by: NURSE PRACTITIONER

## 2019-11-22 RX ADMIN — HYDROCODONE BITARTRATE AND ACETAMINOPHEN 2 TABLET: 5; 325 TABLET ORAL at 22:49

## 2019-11-22 RX ADMIN — SENNOSIDES AND DOCUSATE SODIUM 2 TABLET: 8.6; 5 TABLET ORAL at 22:50

## 2019-11-22 RX ADMIN — PANTOPRAZOLE SODIUM 40 MG: 40 TABLET, DELAYED RELEASE ORAL at 22:50

## 2019-11-22 RX ADMIN — CARVEDILOL 25 MG: 25 TABLET, FILM COATED ORAL at 09:16

## 2019-11-22 RX ADMIN — PANTOPRAZOLE SODIUM 40 MG: 40 TABLET, DELAYED RELEASE ORAL at 09:15

## 2019-11-22 RX ADMIN — FERROUS SULFATE TAB 325 MG (65 MG ELEMENTAL FE) 325 MG: 325 (65 FE) TAB at 09:16

## 2019-11-22 RX ADMIN — CARVEDILOL 25 MG: 25 TABLET, FILM COATED ORAL at 20:02

## 2019-11-22 RX ADMIN — FEBUXOSTAT 40 MG: 40 TABLET, FILM COATED ORAL at 09:16

## 2019-11-22 RX ADMIN — HYDROCODONE BITARTRATE AND ACETAMINOPHEN 2 TABLET: 5; 325 TABLET ORAL at 18:52

## 2019-11-22 RX ADMIN — ASPIRIN 81 MG: 81 TABLET, COATED ORAL at 09:15

## 2019-11-22 RX ADMIN — HYDROCODONE BITARTRATE AND ACETAMINOPHEN 2 TABLET: 5; 325 TABLET ORAL at 09:15

## 2019-11-22 RX ADMIN — RAMIPRIL 5 MG: 5 CAPSULE ORAL at 09:15

## 2019-11-22 RX ADMIN — CALCITRIOL 0.25 MCG: 0.25 CAPSULE ORAL at 09:16

## 2019-11-22 RX ADMIN — HYDROCODONE BITARTRATE AND ACETAMINOPHEN 2 TABLET: 5; 325 TABLET ORAL at 14:16

## 2019-11-22 RX ADMIN — HYDROCODONE BITARTRATE AND ACETAMINOPHEN 2 TABLET: 5; 325 TABLET ORAL at 02:43

## 2019-11-22 RX ADMIN — ATORVASTATIN CALCIUM 10 MG: 10 TABLET, FILM COATED ORAL at 09:16

## 2019-11-22 NOTE — PROGRESS NOTES
Name: Janel Mahoney ADMIT: 2019   : 1940  PCP: Ariel Matute MD    MRN: [id number] LOS: 3 days   AGE/SEX: 79 y.o. female  ROOM: [facility]     Subjective   Subjective   no new complaints.    Review of Systems   Respiratory: Negative for chest tightness and shortness of breath.    Cardiovascular: Negative for chest pain and leg swelling.   Gastrointestinal: Negative for nausea and vomiting.        Objective   Objective   Vital Signs  Temp:  [97 °F (36.1 °C)-97.8 °F (36.6 °C)] 97.4 °F (36.3 °C)  Heart Rate:  [60-66] 60  Resp:  [14-18] 16  BP: (101-156)/(37-60) 128/59  SpO2:  [97 %-100 %] 97 %  on   ;   Device (Oxygen Therapy): room air  Body mass index is 27.29 kg/m².  Physical Exam   Constitutional: She is oriented to person, place, and time. She appears well-developed and well-nourished. No distress.   Cardiovascular: Normal rate.   v paced   Pulmonary/Chest: Effort normal and breath sounds normal. No respiratory distress.   Abdominal: Soft. Bowel sounds are normal. She exhibits no distension. There is no tenderness.   Musculoskeletal: She exhibits tenderness. She exhibits no edema.   limited ROM L knee   Neurological: She is alert and oriented to person, place, and time.   Skin: Skin is warm and dry.   Nursing note and vitals reviewed.      Results Review:       I reviewed the patient's new clinical results.  Results from last 7 days   Lab Units 19  1422 19  0343 19  0448   WBC 10*3/mm3  --  5.23 7.69   HEMOGLOBIN g/dL 8.0* 7.7* 8.0*   PLATELETS 10*3/mm3  --  91* 106*     Results from last 7 days   Lab Units 19  0440 19  0343 19  0448   SODIUM mmol/L 143 141 142   POTASSIUM mmol/L 4.3 4.3 4.6   CHLORIDE mmol/L 103 102 102   CO2 mmol/L 27.3 27.3 26.7   BUN mg/dL 59* 54* 60*   CREATININE mg/dL 2.36* 2.83* 2.89*   GLUCOSE mg/dL 93 115* 131*   Estimated Creatinine Clearance: 17.4 mL/min (A) (by C-G formula based on SCr of 2.36 mg/dL (H)).    Results from last 7  days   Lab Units 11/22/19  0440 11/21/19  0343 11/20/19  0448   CALCIUM mg/dL 8.9 8.8 8.9       No results found for: HGBA1C, POCGLU      acetaminophen 1,000 mg Oral Q8H   Or      acetaminophen 650 mg Rectal Q8H   aspirin 81 mg Oral Daily   atorvastatin 10 mg Oral Daily   calcitriol 0.25 mcg Oral BID   carvedilol 25 mg Oral BID With Meals   colchicine 0.6 mg Oral Every Other Day   famotidine 20 mg Oral Daily   febuxostat 40 mg Oral Daily   ferrous sulfate 325 mg Oral Every Other Day   pantoprazole 40 mg Oral BID   ramipril 5 mg Oral Daily   senna-docusate sodium 2 tablet Oral Nightly       Pharmacy to dose warfarin     sodium chloride 100 mL/hr Last Rate: 100 mL/hr (11/19/19 2200)   Diet Regular       Assessment/Plan     Active Hospital Problems    Diagnosis  POA   • **S/P revision of total knee [Z96.659]  Not Applicable   • Chronic coronary artery disease [I25.10]  Yes   • Acute on chronic combined systolic (congestive) and diastolic (congestive) heart failure (CMS/HCC) [I50.43]  Yes   • Chronic kidney disease, stage 3 (CMS/HCC) [N18.3]  Yes   • Ischemic cardiomyopathy [I25.5]  Yes   • Hypertension [I10]  Yes   • Chronic combined systolic and diastolic congestive heart failure (CMS/HCC) [I50.42]  Yes   • Chronic atrial fibrillation [I48.20]  Yes   • Anemia in stage 3 chronic kidney disease (CMS/HCC) [N18.3, D63.1]  Yes      Resolved Hospital Problems   No resolved problems to display.     HTN  - looks like she was pretty hypertensive after surgery but is stable at this time, continue home regimen with parameters     Afib  - rate controlled, continue coreg  - v-paced     CKD 3  - creatinine improving.  I would continue to hold the Bumex after discharge and follow-up with her primary care doctor in 1 week for repeat BMP.  At that time they can decide whether or not it is appropriate to restart the Bumex.  I have instructed her to call her PCP immediately if she develops worsening lower extremity edema or increased  SOA.     Postoperative anemia  - ortho following, will monitor and repeat hgb this afternoon  - hgb remaining stable.  I have instructed her to have a follow-up CBC with her PCP in 1 week.      Thank you very much for asking [facility] to be involved in this patient's care.  She is okay for discharge from a medicine standpoint.      NICKY Gonsales  [facility]  11/22/19  9:54 AM

## 2019-11-22 NOTE — PLAN OF CARE
Problem: Patient Care Overview  Goal: Plan of Care Review  Outcome: Ongoing (interventions implemented as appropriate)   11/22/19 0105   Coping/Psychosocial   Plan of Care Reviewed With patient   Plan of Care Review   Progress improving   OTHER   Outcome Summary vss, dsg c/d/i, neurovascular status wnl, voiding well, reports adequate pain control, educated on importance of monitoring b/p due to hx hypertension, possible discharge to rehab pending approval, will continue to monitor     Goal: Individualization and Mutuality  Outcome: Ongoing (interventions implemented as appropriate)    Goal: Discharge Needs Assessment  Outcome: Ongoing (interventions implemented as appropriate)    Goal: Interprofessional Rounds/Family Conf  Outcome: Ongoing (interventions implemented as appropriate)      Problem: Pain, Chronic (Adult)  Goal: Identify Related Risk Factors and Signs and Symptoms  Outcome: Ongoing (interventions implemented as appropriate)    Goal: Acceptable Pain/Comfort Level and Functional Ability  Outcome: Ongoing (interventions implemented as appropriate)      Problem: Fall Risk (Adult)  Goal: Identify Related Risk Factors and Signs and Symptoms  Outcome: Outcome(s) achieved Date Met: 11/22/19    Goal: Absence of Fall  Outcome: Ongoing (interventions implemented as appropriate)      Problem: Knee Arthroplasty (Total, Partial) (Adult)  Goal: Signs and Symptoms of Listed Potential Problems Will be Absent, Minimized or Managed (Knee Arthroplasty)  Outcome: Ongoing (interventions implemented as appropriate)    Goal: Anesthesia/Sedation Recovery  Outcome: Ongoing (interventions implemented as appropriate)

## 2019-11-22 NOTE — PROGRESS NOTES
Pharmacy Consult: Warfarin Dosing/ Monitoring    Janel Mahoney is a 79 y.o. female, estimated creatinine clearance is 17.4 mL/min (A) (by C-G formula based on SCr of 2.36 mg/dL (H)). weighing 67.7 kg (149 lb 3.2 oz).     has a past medical history of Acute kidney injury (CMS/HCC), Anemia, Atrial fibrillation (CMS/HCC), Bruises easily, Carotid artery stenosis, Chronic back pain, Chronic combined systolic and diastolic congestive heart failure (CMS/HCC), Chronic coronary artery disease, Chronic kidney disease, stage 3 (CMS/HCC), Dysphagia, GERD (gastroesophageal reflux disease), Gout, H/O cardiac murmur, Hematoma, [place] (hard of hearing), Hyperlipidemia, Hypertension, Hypotension, ICD (implantable cardioverter-defibrillator) in place, Ischemic cardiomyopathy, Kyphoscoliosis, Leukopenia, Lumbar spondylosis, Obesity, GEORGINA (obstructive sleep apnea), Osteoarthritis, Osteoporosis, Peptic ulcer, Premature ventricular contractions, Renal insufficiency syndrome, Scoliosis, Shoulder fracture, left, Stroke syndrome, Thrombocytopenia (CMS/HCC), Urine incontinence, Ventricular tachycardia (CMS/HCC), and Vitamin B12 deficiency.    Social History     Tobacco Use   • Smoking status: Former Smoker     Packs/day: 1.00     Years: 50.00     Pack years: 50.00     Types: Cigarettes     Last attempt to quit: 1/11/2009     Years since quitting: 10.8   • Smokeless tobacco: Never Used   • Tobacco comment: caffeine - soda   Substance Use Topics   • Alcohol use: Yes     Comment: rare   • Drug use: No     Results from last 7 days   Lab Units 11/22/19 0440 11/21/19  1422 11/21/19  0343 11/20/19  0448 11/19/19  1848 11/19/19  1115 11/15/19  1423   INR  3.10*  --  1.91* 1.21* 1.23* 1.27* 2.3*   HEMOGLOBIN g/dL  --  8.0* 7.7* 8.0*  --   --   --    HEMATOCRIT %  --  25.8* 23.5* 24.9*  --   --   --    PLATELETS 10*3/mm3  --   --  91* 106*  --   --   --      Results from last 7 days   Lab Units 11/22/19 0440 11/21/19  0343 11/20/19  0448   SODIUM  mmol/L 143 141 142   POTASSIUM mmol/L 4.3 4.3 4.6   CHLORIDE mmol/L 103 102 102   CO2 mmol/L 27.3 27.3 26.7   BUN mg/dL 59* 54* 60*   CREATININE mg/dL 2.36* 2.83* 2.89*   CALCIUM mg/dL 8.9 8.8 8.9   GLUCOSE mg/dL 93 115* 131*     Anticoagulation history: Taking PTA per [facility] (2 mg MWF 1 mg others). Per med rec, patient stopped 5 days prior to surgery.     Hospital Anticoagulation:  Consulting provider: Dr Huan ARIAS  Start date: 11/19  Indication: chronic afib  Target INR: 2-3  Expected duration: indefinite   Bridge Therapy: No                 Date 11/19 11/20 11/21 11/22         INR 1.27; 1.23 1.21 1.91 3.1         Warfarin dose 5 mg  5 mg 2 mg            Potential drug interactions:   - ASA: May enhance the anticoagulant effect of warfarin. (home med)   - Simvastatin: May enhance the anticoagulant effect of warfarin. Home med- substituted to formulary agent atorvastatin while inpatient.    Relevant nutrition status: Regular diet, intake not charted.     Education complete?/ Date: TBD    Assessment/Plan:  1) Hold warfarin dose today due to upward trend in INR.   2) Plan to restart warfarin when INR begins to trend down and approaches therapeutic range of 2-3.  3) INR ordered daily with AM labs.     Pharmacy will continue to follow until discharge or discontinuation of warfarin.     Nik Ty, MeaganD, CLAUDE, BCPS  11/22/19 10:11 AM

## 2019-11-22 NOTE — PLAN OF CARE
Problem: Patient Care Overview  Goal: Plan of Care Review   11/22/19 0915   Coping/Psychosocial   Plan of Care Reviewed With patient   Plan of Care Review   Progress improving   OTHER   Outcome Summary Improved tolerance to functional activity this AM with an increase in gait distance and progression of ther. ex. program. Pt. to discharge to SNF this date.

## 2019-11-22 NOTE — THERAPY DISCHARGE NOTE
Patient Name: Janel Mahoney  : 1940    MRN: [id number]                              Today's Date: 2019       Admit Date: 2019    Visit Dx: No diagnosis found.  Patient Active Problem List   Diagnosis   • Anemia in stage 3 chronic kidney disease (CMS/HCC)   • Thrombocytopenia (CMS/HCC)   • B12 deficiency   • Coronary artery disease involving coronary bypass graft of native heart without angina pectoris   • S/P MVR (porcine)   • Chronic atrial fibrillation   • Bilateral carotid artery disease (CMS/HCC)   • Intractable low back pain   • Long-term (current) use of anticoagulants   • Low back pain   • Osteoporosis   • Murmur, heart   • GEORGINA on auto CPAP - Dr Cardona   • Hypersomnia due to medical condition - GEORGINA   • Esophageal stricture   • Acute blood loss anemia   • Dysphagia   • Closed fracture of left proximal humerus   • Hypertension   • Supratherapeutic INR   • Chronic combined systolic and diastolic congestive heart failure (CMS/HCC)   • Osteoporosis with pathological fracture   • Closed 3-part fracture of proximal end of left humerus   • Closed fracture of proximal end of humerus with delayed healing   • Injury of right knee   • Knee injury, left, initial encounter   • History of fall   • S/P TKR (total knee replacement) using cement, left   • Ischemic cardiomyopathy   • Intramuscular hematoma right pecotralis   • Hemorrhagic disorder due to extrinsic circulating anticoagulants (CMS/HCC)   • Acute posthemorrhagic anemia   • Chronic kidney disease, stage 3 (CMS/HCC)   • Acute kidney injury (CMS/HCC)   • Peripheral edema   • Acute on chronic combined systolic (congestive) and diastolic (congestive) heart failure (CMS/HCC)   • Chronic coronary artery disease   • Inflammatory arthritis   • Ventricular tachycardia (CMS/HCC)   • S/P revision of total knee     Past Medical History:   Diagnosis Date   • Acute kidney injury (CMS/HCC)    • Anemia    • Atrial fibrillation (CMS/HCC)    • Bruises easily     • Carotid artery stenosis    • Chronic back pain    • Chronic combined systolic and diastolic congestive heart failure (CMS/HCC)    • Chronic coronary artery disease     moderate to severe LV dysfunction.   • Chronic kidney disease, stage 3 (CMS/HCC)    • Dysphagia    • GERD (gastroesophageal reflux disease)    • Gout    • H/O cardiac murmur    • Hematoma     LEFT LEG; DR CHERI AWARE   • [place] (hard of hearing)     wears hearing aids   • Hyperlipidemia    • Hypertension    • Hypotension    • ICD (implantable cardioverter-defibrillator) in place    • Ischemic cardiomyopathy    • Kyphoscoliosis    • Leukopenia    • Lumbar spondylosis    • Obesity    • GEORGINA (obstructive sleep apnea)    • Osteoarthritis    • Osteoporosis    • Peptic ulcer    • Premature ventricular contractions    • Renal insufficiency syndrome    • Scoliosis    • Shoulder fracture, left    • Stroke syndrome    • Thrombocytopenia (CMS/HCC)    • Urine incontinence    • Ventricular tachycardia (CMS/HCC)    • Vitamin B12 deficiency      Past Surgical History:   Procedure Laterality Date   • AV NODE ABLATION     • BREAST BIOPSY     • CARDIAC CATHETERIZATION      Showed severe mitral insufficiency and borderline coronary artery disease   • CARDIAC CATHETERIZATION      Showed an ejection fraction of 35%. She had occlusive disease of the right posterior LV branch and no other significant disease, treated medically.   • CARDIAC DEFIBRILLATOR PLACEMENT      Biventricular   • CARDIAC ELECTROPHYSIOLOGY PROCEDURE N/A 1/4/2019    Procedure: GENERATOR CHANGE BI-V ICD   [place];  Surgeon: James Hwang MD;  Location: [facility] CATH INVASIVE LOCATION;  Service: Cardiology   • CARDIAC VALVE REPLACEMENT  2009    Done with stent placement   • CARDIOVERSION      multiple electrocardioversions.   • CAROTID ARTERY ANGIOPLASTY Right    • COLONOSCOPY N/A 9/28/2017    Procedure: COLONOSCOPY TO CECUM;  Surgeon: Kevin Davis MD;  Location: [facility] ENDOSCOPY;  Service:    •  CORONARY ANGIOPLASTY WITH STENT PLACEMENT  2009   • CORONARY ARTERY BYPASS GRAFT      single graft to the PDA   • CORONARY STENT PLACEMENT     • ENDOSCOPY N/A 9/28/2017    Procedure: ESOPHAGOGASTRODUODENOSCOPY ;  Surgeon: Kevin Davis MD;  Location: [facility] ENDOSCOPY;  Service:    • HEMORRHOIDECTOMY     • HYSTERECTOMY     • INCISION AND DRAINAGE TRUNK Right 11/27/2018    Procedure: EVACUATION OF RIGHT CHEST WALL HEMATOMA;  Surgeon: Juvencio Rodriguez MD;  Location: [facility] OR;  Service: General   • MITRAL VALVE REPLACEMENT  01/2010    #31 Epic porcine valve.   • THROMBOENDARTERECTOMY Right     carotid thromboendarterectomy    • TONSILLECTOMY      age 32   • TOTAL KNEE ARTHROPLASTY Left    • TOTAL KNEE ARTHROPLASTY REVISION Left 11/19/2019    Procedure: TOTAL KNEE ARTHROPLASTY REVISION LEFT;  Surgeon: Juvencio Pressley II, MD;  Location: [facility] OR;  Service: Orthopedics   • TOTAL SHOULDER ARTHROPLASTY W/ DISTAL CLAVICLE EXCISION Left 1/16/2018    Procedure: TOTAL SHOULDER REVERSE ARTHROPLASTY;  Surgeon: Bianka Quesada MD;  Location: [facility];  Service:      General Information     Row Name 11/22/19 0912          PT Evaluation Time/Intention    Document Type  therapy note (daily note);discharge treatment  -MS     Mode of Treatment  physical therapy;group therapy  -MS       User Key  (r) = Recorded By, (t) = Taken By, (c) = Cosigned By    Initials Name Provider Type    MS Hoang Angeles BRANDY, PT Physical Therapist        Mobility     Row Name 11/22/19 0912          Bed Mobility Assessment/Treatment    Comment (Bed Mobility)  Up in chair  -MS     Row Name 11/22/19 0912          Sit-Stand Transfer    Sit-Stand [place] (Transfers)  contact guard  -MS     Assistive Device (Sit-Stand Transfers)  walker, front-wheeled  -MS     Row Name 11/22/19 0912          Gait/Stairs Assessment/Training    [place] Level (Gait)  contact guard  -MS     Assistive Device (Gait)  walker, front-wheeled   -MS     Distance in Feet (Gait)  60 feet  -MS     Pattern (Gait)  step-through  -MS     Deviations/Abnormal Patterns (Gait)  antalgic;ofe decreased  -MS       User Key  (r) = Recorded By, (t) = Taken By, (c) = Cosigned By    Initials Name Provider Type    Hoang Gentile, PT Physical Therapist        Obj/Interventions     Row Name 11/22/19 0913          General ROM    GENERAL ROM COMMENTS  Left knee AROM (8, 90)  -MS     Row Name 11/22/19 0913          Therapeutic Exercise    Comment (Therapeutic Exercise)  Left TKR protocol x 30 reps completed  -MS       User Key  (r) = Recorded By, (t) = Taken By, (c) = Cosigned By    Initials Name Provider Type    Hoang Gentile, PT Physical Therapist        Goals/Plan    No documentation.       Clinical Impression     Row Name 11/22/19 0914          Pain Scale: Numbers Pre/Post-Treatment    Pain Scale: Numbers, Pretreatment  3/10  -MS     Pain Scale: Numbers, Post-Treatment  3/10  -MS     Pain Location - Side  Left  -MS     Pain Location  knee  -MS     Row Name 11/22/19 0914          Positioning and Restraints    Pre-Treatment Position  sitting in chair/recliner  -MS     Post Treatment Position  chair  -MS     In Chair  notified nsg;reclined;sitting;call light within reach;encouraged to call for assist;exit alarm on  -MS       User Key  (r) = Recorded By, (t) = Taken By, (c) = Cosigned By    Initials Name Provider Type    Hoang Gentile, PT Physical Therapist        Outcome Measures     Row Name 11/22/19 0914          How much help from another person do you currently need...    Turning from your back to your side while in flat bed without using bedrails?  3  -MS     Moving from lying on back to sitting on the side of a flat bed without bedrails?  3  -MS     Moving to and from a bed to a chair (including a wheelchair)?  3  -MS     Standing up from a chair using your arms (e.g., wheelchair, bedside chair)?  3  -MS     Climbing 3-5 steps with a railing?  2   -MS     To walk in hospital room?  3  -MS     AM-PAC 6 Clicks Score (PT)  17  -MS       User Key  (r) = Recorded By, (t) = Taken By, (c) = Cosigned By    Initials Name Provider Type    MS Hoang Angeles, PT Physical Therapist        Physical Therapy Education     Title: PT OT SLP Therapies (Done)     Topic: Physical Therapy (Done)     Point: Mobility training (Done)     Learning Progress Summary           Patient Acceptance, E,D, VU,NR by MS at 11/22/2019  9:14 AM    Acceptance, E,TB, VU,NR by  at 11/21/2019 10:42 AM    Acceptance, E,D, NR by  at 11/20/2019 10:40 AM                   Point: Home exercise program (Done)     Learning Progress Summary           Patient Acceptance, E,D, VU,NR by MS at 11/22/2019  9:14 AM    Acceptance, E,TB, VU,NR by  at 11/21/2019 10:42 AM    Acceptance, E,D, NR by  at 11/20/2019 10:40 AM                   Point: Body mechanics (Done)     Learning Progress Summary           Patient Acceptance, E,D, VU,NR by MS at 11/22/2019  9:14 AM    Acceptance, E,TB, VU,NR by  at 11/21/2019 10:42 AM    Acceptance, E,D, NR by  at 11/20/2019 10:40 AM                   Point: Precautions (Done)     Learning Progress Summary           Patient Acceptance, E,D, VU,NR by MS at 11/22/2019  9:14 AM    Acceptance, E,TB, VU,NR by  at 11/21/2019 10:42 AM    Acceptance, E,D, NR by  at 11/20/2019 10:40 AM                               User Key     Initials Effective Dates Name Provider Type Discipline     04/03/18 -  Poonam Hoyos, PT Physical Therapist PT    MS 04/03/18 -  Hoang Angeles, PT Physical Therapist PT     05/14/18 -  Farnkie Garvey, PT Physical Therapist PT              PT Recommendation and Plan     Plan of Care Reviewed With: patient  Progress: improving  Outcome Summary: Improved tolerance to functional activity this AM with an increase in gait distance and progression of ther. ex. program. Pt. to discharge to SNF this date.      Time Calculation:   PT Charges     Row  Name 11/22/19 0915             Time Calculation    Start Time  0841  -MS      Stop Time  0907  -MS      Time Calculation (min)  26 min  -MS      PT Received On  11/22/19  -MS         Time Calculation- PT    Total Timed Code Minutes- PT  20 minute(s)  -MS        User Key  (r) = Recorded By, (t) = Taken By, (c) = Cosigned By    Initials Name Provider Type    MS Hoang Angeles, PT Physical Therapist        Therapy Charges for Today     Code Description Service Date Service Provider Modifiers Qty    34858619946 HC PT THER PROC EA 15 MIN 11/21/2019 Hoang Angeles, PT GP 1    09867000732 HC PT THER PROC GROUP 11/21/2019 Hoang Angeles, PT GP 1    90208605075 HC PT THER PROC EA 15 MIN 11/22/2019 Hoang Angeles, PT GP 1    93553042490 HC PT THER PROC GROUP 11/22/2019 Hoang Angeles, PT GP 1          PT G-Codes  Outcome Measure Options: AM-PAC 6 Clicks Basic Mobility (PT)  AM-PAC 6 Clicks Score (PT): 17    PT Discharge Summary  Reason for Discharge: Discharge from facility  Outcomes Achieved: Refer to plan of care for updates on goals achieved  Discharge Destination: SNF    Hoang Angeles, PT  11/22/2019

## 2019-11-23 VITALS
BODY MASS INDEX: 27.46 KG/M2 | RESPIRATION RATE: 18 BRPM | HEIGHT: 62 IN | TEMPERATURE: 98.5 F | HEART RATE: 60 BPM | SYSTOLIC BLOOD PRESSURE: 107 MMHG | WEIGHT: 149.2 LBS | DIASTOLIC BLOOD PRESSURE: 50 MMHG | OXYGEN SATURATION: 97 %

## 2019-11-23 LAB
ABO + RH BLD: NORMAL
BH BB BLOOD EXPIRATION DATE: NORMAL
BH BB BLOOD TYPE BARCODE: 5100
BH BB DISPENSE STATUS: NORMAL
BH BB PRODUCT CODE: NORMAL
BH BB UNIT NUMBER: NORMAL
HCT VFR BLD AUTO: 25.4 % (ref 34–46.6)
HGB BLD-MCNC: 8 G/DL (ref 12–15.9)
INR PPP: 2.98 (ref 0.9–1.1)
PROTHROMBIN TIME: 30.7 SECONDS (ref 11.7–14.2)
UNIT  ABO: NORMAL
UNIT  RH: NORMAL

## 2019-11-23 PROCEDURE — 85610 PROTHROMBIN TIME: CPT | Performed by: ORTHOPAEDIC SURGERY

## 2019-11-23 PROCEDURE — 85018 HEMOGLOBIN: CPT | Performed by: ORTHOPAEDIC SURGERY

## 2019-11-23 PROCEDURE — 85014 HEMATOCRIT: CPT | Performed by: ORTHOPAEDIC SURGERY

## 2019-11-23 PROCEDURE — 97110 THERAPEUTIC EXERCISES: CPT

## 2019-11-23 RX ADMIN — CARVEDILOL 25 MG: 25 TABLET, FILM COATED ORAL at 08:20

## 2019-11-23 RX ADMIN — HYDROCODONE BITARTRATE AND ACETAMINOPHEN 2 TABLET: 5; 325 TABLET ORAL at 12:23

## 2019-11-23 RX ADMIN — FEBUXOSTAT 40 MG: 40 TABLET, FILM COATED ORAL at 08:20

## 2019-11-23 RX ADMIN — ATORVASTATIN CALCIUM 10 MG: 10 TABLET, FILM COATED ORAL at 08:20

## 2019-11-23 RX ADMIN — HYDROCODONE BITARTRATE AND ACETAMINOPHEN 2 TABLET: 5; 325 TABLET ORAL at 03:36

## 2019-11-23 RX ADMIN — FAMOTIDINE 20 MG: 20 TABLET, FILM COATED ORAL at 08:20

## 2019-11-23 RX ADMIN — ASPIRIN 81 MG: 81 TABLET, COATED ORAL at 08:20

## 2019-11-23 RX ADMIN — COLCHICINE 0.6 MG: 0.6 TABLET, FILM COATED ORAL at 08:20

## 2019-11-23 RX ADMIN — CALCITRIOL 0.25 MCG: 0.25 CAPSULE ORAL at 01:58

## 2019-11-23 RX ADMIN — CALCITRIOL 0.25 MCG: 0.25 CAPSULE ORAL at 08:20

## 2019-11-23 RX ADMIN — HYDROCODONE BITARTRATE AND ACETAMINOPHEN 2 TABLET: 5; 325 TABLET ORAL at 08:22

## 2019-11-23 NOTE — PROGRESS NOTES
"Hemoglobin 8.0 this morning.  Patient feeling pretty good.  Did have some pain overnight.  Plan for discharge to rehab today, okay from my standpoint.    R \"Leonel\" Huan ARIAS MD  Orthopaedic Surgery  [facility]  [phone] - [place] Office  [phone] - [place] Office    "

## 2019-11-23 NOTE — PLAN OF CARE
Problem: Patient Care Overview  Goal: Plan of Care Review   11/23/19 0504   Coping/Psychosocial   Plan of Care Reviewed With patient   Plan of Care Review   Progress improving   OTHER   Outcome Summary 79/F POD-3 Right TKA. Pt ambulates / BRP with assist x1 and walker.. Pt voiding / no bm. Pt recieived 1 unit of blood. Labs this morning- Hemoglobin went from 7.4 to 8. Pain was controlled with PP pain meds. D/C plan is skilled nursing facility on Saturday. Pt is resting.      Goal: Individualization and Mutuality  Outcome: Ongoing (interventions implemented as appropriate)    Goal: Discharge Needs Assessment  Outcome: Ongoing (interventions implemented as appropriate)    Goal: Interprofessional Rounds/Family Conf  Outcome: Ongoing (interventions implemented as appropriate)      Problem: Pain, Chronic (Adult)  Goal: Identify Related Risk Factors and Signs and Symptoms  Outcome: Ongoing (interventions implemented as appropriate)    Goal: Acceptable Pain/Comfort Level and Functional Ability  Outcome: Ongoing (interventions implemented as appropriate)      Problem: Fall Risk (Adult)  Goal: Absence of Fall  Outcome: Outcome(s) achieved Date Met: 11/23/19      Problem: Knee Arthroplasty (Total, Partial) (Adult)  Goal: Signs and Symptoms of Listed Potential Problems Will be Absent, Minimized or Managed (Knee Arthroplasty)  Outcome: Ongoing (interventions implemented as appropriate)

## 2019-11-23 NOTE — PLAN OF CARE
Problem: Patient Care Overview  Goal: Plan of Care Review  Outcome: Ongoing (interventions implemented as appropriate)   11/23/19 1117   Coping/Psychosocial   Plan of Care Reviewed With patient   OTHER   Outcome Summary Pt walked in morales, did exercises and tolerated well mild complaints of pain. Anticipates d/c to snf this pm.

## 2019-11-23 NOTE — PLAN OF CARE
Problem: Patient Care Overview  Goal: Plan of Care Review  Outcome: Ongoing (interventions implemented as appropriate)   11/22/19 2009   Coping/Psychosocial   Plan of Care Reviewed With patient   Plan of Care Review   Progress improving   OTHER   Outcome Summary Patient is ambulating using walker and x1 assist. Vitals are stable and voiding function is intact. Pain is managed with po meds. Patient with hgb of 7.4 today, 1 unit of PRBC ordered and is infusing. Patient educated on anemia, blood products,bp monitoring and med management. Patient anticipates d/c to snf tomorrow.        Problem: Fall Risk (Adult)  Goal: Absence of Fall  Outcome: Ongoing (interventions implemented as appropriate)   11/22/19 2009   Fall Risk (Adult)   Absence of Fall achieves outcome       Problem: Knee Arthroplasty (Total, Partial) (Adult)  Goal: Signs and Symptoms of Listed Potential Problems Will be Absent, Minimized or Managed (Knee Arthroplasty)  Outcome: Ongoing (interventions implemented as appropriate)   11/22/19 2009   Goal/Outcome Evaluation   Problems Assessed (Knee Arthroplasty) all   Problems Present (Knee Arthroplasty) functional deficit;pain;bleeding/anemia;range of motion decreased     Goal: Anesthesia/Sedation Recovery  Outcome: Outcome(s) achieved Date Met: 11/22/19 11/22/19 2009   Goal/Outcome Evaluation   Anesthesia/Sedation Recovery recovered to baseline

## 2019-11-23 NOTE — THERAPY TREATMENT NOTE
Patient Name: Janel Mahoney  : 1940    MRN: [id number]                              Today's Date: 2019       Admit Date: 2019    Visit Dx: No diagnosis found.  Patient Active Problem List   Diagnosis   • Anemia in stage 3 chronic kidney disease (CMS/HCC)   • Thrombocytopenia (CMS/HCC)   • B12 deficiency   • Coronary artery disease involving coronary bypass graft of native heart without angina pectoris   • S/P MVR (porcine)   • Chronic atrial fibrillation   • Bilateral carotid artery disease (CMS/HCC)   • Intractable low back pain   • Long-term (current) use of anticoagulants   • Low back pain   • Osteoporosis   • Murmur, heart   • GEORGINA on auto CPAP - Dr Cardona   • Hypersomnia due to medical condition - GEORGINA   • Esophageal stricture   • Acute blood loss anemia   • Dysphagia   • Closed fracture of left proximal humerus   • Hypertension   • Supratherapeutic INR   • Chronic combined systolic and diastolic congestive heart failure (CMS/HCC)   • Osteoporosis with pathological fracture   • Closed 3-part fracture of proximal end of left humerus   • Closed fracture of proximal end of humerus with delayed healing   • Injury of right knee   • Knee injury, left, initial encounter   • History of fall   • S/P TKR (total knee replacement) using cement, left   • Ischemic cardiomyopathy   • Intramuscular hematoma right pecotralis   • Hemorrhagic disorder due to extrinsic circulating anticoagulants (CMS/HCC)   • Acute posthemorrhagic anemia   • Chronic kidney disease, stage 3 (CMS/HCC)   • Acute kidney injury (CMS/HCC)   • Peripheral edema   • Acute on chronic combined systolic (congestive) and diastolic (congestive) heart failure (CMS/HCC)   • Chronic coronary artery disease   • Inflammatory arthritis   • Ventricular tachycardia (CMS/HCC)   • S/P revision of total knee     Past Medical History:   Diagnosis Date   • Acute kidney injury (CMS/HCC)    • Anemia    • Atrial fibrillation (CMS/HCC)    • Bruises easily     • Carotid artery stenosis    • Chronic back pain    • Chronic combined systolic and diastolic congestive heart failure (CMS/HCC)    • Chronic coronary artery disease     moderate to severe LV dysfunction.   • Chronic kidney disease, stage 3 (CMS/HCC)    • Dysphagia    • GERD (gastroesophageal reflux disease)    • Gout    • H/O cardiac murmur    • Hematoma     LEFT LEG; DR CHERI AWARE   • [place] (hard of hearing)     wears hearing aids   • Hyperlipidemia    • Hypertension    • Hypotension    • ICD (implantable cardioverter-defibrillator) in place    • Ischemic cardiomyopathy    • Kyphoscoliosis    • Leukopenia    • Lumbar spondylosis    • Obesity    • GEORGINA (obstructive sleep apnea)    • Osteoarthritis    • Osteoporosis    • Peptic ulcer    • Premature ventricular contractions    • Renal insufficiency syndrome    • Scoliosis    • Shoulder fracture, left    • Stroke syndrome    • Thrombocytopenia (CMS/HCC)    • Urine incontinence    • Ventricular tachycardia (CMS/HCC)    • Vitamin B12 deficiency      Past Surgical History:   Procedure Laterality Date   • AV NODE ABLATION     • BREAST BIOPSY     • CARDIAC CATHETERIZATION      Showed severe mitral insufficiency and borderline coronary artery disease   • CARDIAC CATHETERIZATION      Showed an ejection fraction of 35%. She had occlusive disease of the right posterior LV branch and no other significant disease, treated medically.   • CARDIAC DEFIBRILLATOR PLACEMENT      Biventricular   • CARDIAC ELECTROPHYSIOLOGY PROCEDURE N/A 1/4/2019    Procedure: GENERATOR CHANGE BI-V ICD   [place];  Surgeon: James Hwang MD;  Location: [facility] CATH INVASIVE LOCATION;  Service: Cardiology   • CARDIAC VALVE REPLACEMENT  2009    Done with stent placement   • CARDIOVERSION      multiple electrocardioversions.   • CAROTID ARTERY ANGIOPLASTY Right    • COLONOSCOPY N/A 9/28/2017    Procedure: COLONOSCOPY TO CECUM;  Surgeon: Kevin Davis MD;  Location: [facility] ENDOSCOPY;  Service:    •  CORONARY ANGIOPLASTY WITH STENT PLACEMENT  2009   • CORONARY ARTERY BYPASS GRAFT      single graft to the PDA   • CORONARY STENT PLACEMENT     • ENDOSCOPY N/A 9/28/2017    Procedure: ESOPHAGOGASTRODUODENOSCOPY ;  Surgeon: Kevin Davis MD;  Location: [facility] ENDOSCOPY;  Service:    • HEMORRHOIDECTOMY     • HYSTERECTOMY     • INCISION AND DRAINAGE TRUNK Right 11/27/2018    Procedure: EVACUATION OF RIGHT CHEST WALL HEMATOMA;  Surgeon: Juvencio Rodriguez MD;  Location: [facility] OR;  Service: General   • MITRAL VALVE REPLACEMENT  01/2010    #31 Epic porcine valve.   • THROMBOENDARTERECTOMY Right     carotid thromboendarterectomy    • TONSILLECTOMY      age 32   • TOTAL KNEE ARTHROPLASTY Left    • TOTAL KNEE ARTHROPLASTY REVISION Left 11/19/2019    Procedure: TOTAL KNEE ARTHROPLASTY REVISION LEFT;  Surgeon: Juvencio Pressley II, MD;  Location: [facility];  Service: Orthopedics   • TOTAL SHOULDER ARTHROPLASTY W/ DISTAL CLAVICLE EXCISION Left 1/16/2018    Procedure: TOTAL SHOULDER REVERSE ARTHROPLASTY;  Surgeon: Bianka Quesada MD;  Location: [facility];  Service:      General Information     Row Name 11/23/19 1115          PT Evaluation Time/Intention    Document Type  therapy note (daily note)  -CS     Mode of Treatment  physical therapy  -CS     Row Name 11/23/19 1115          General Information    Patient Profile Reviewed?  yes  -CS     Row Name 11/23/19 1115          Cognitive Assessment/Intervention- PT/OT    Orientation Status (Cognition)  oriented x 4  -CS       User Key  (r) = Recorded By, (t) = Taken By, (c) = Cosigned By    Initials Name Provider Type    CS Frankie Garvey, PT Physical Therapist        Mobility     Row Name 11/23/19 1115          Sit-Stand Transfer    Sit-Stand [place] (Transfers)  contact guard  -CS     Assistive Device (Sit-Stand Transfers)  walker, front-wheeled  -CS     Row Name 11/23/19 1115          Gait/Stairs Assessment/Training    [place] Level  (Gait)  contact guard  -CS     Assistive Device (Gait)  walker, front-wheeled  -CS     Distance in Feet (Gait)  60  -CS     Pattern (Gait)  step-through  -CS     Deviations/Abnormal Patterns (Gait)  antalgic;ofe decreased  -CS     Bilateral Gait Deviations  forward flexed posture  -CS     Row Name 11/23/19 1115          Mobility Assessment/Intervention    Extremity Weight-bearing Status  left lower extremity  -CS     Left Lower Extremity (Weight-bearing Status)  weight-bearing as tolerated (WBAT)  -CS       User Key  (r) = Recorded By, (t) = Taken By, (c) = Cosigned By    Initials Name Provider Type    Frankie Muir, PT Physical Therapist        Obj/Interventions     Row Name 11/23/19 1116          Therapeutic Exercise    Comment (Therapeutic Exercise)  L TKA protocol x20  -CS       User Key  (r) = Recorded By, (t) = Taken By, (c) = Cosigned By    Initials Name Provider Type    Frankie Muir, PT Physical Therapist        Goals/Plan    No documentation.       Clinical Impression     Row Name 11/23/19 1116          Pain Assessment    Additional Documentation  Pain Scale: FACES Pre/Post-Treatment (Group)  -CS     Row Name 11/23/19 1116          Pain Scale: Numbers Pre/Post-Treatment    Pain Location - Side  Left  -CS     Pain Location - Orientation  incisional  -CS     Pain Location  knee  -CS     Pain Intervention(s)  Repositioned;Ambulation/increased activity  -CS     [facility] Name 11/23/19 1116          Pain Scale: FACES Pre/Post-Treatment    Pain: FACES Scale, Pretreatment  4-->hurts little more  -CS     Pain: FACES Scale, Post-Treatment  4-->hurts little more  -CS     Row Name 11/23/19 1116          Plan of Care Review    Plan of Care Reviewed With  patient  -CS     Row Name 11/23/19 1116          Positioning and Restraints    Pre-Treatment Position  sitting in chair/recliner  -CS     Post Treatment Position  chair  -CS     In Chair  reclined;call light within reach;encouraged to call for assist;exit  alarm on;with family/caregiver  -CS       User Key  (r) = Recorded By, (t) = Taken By, (c) = Cosigned By    Initials Name Provider Type    Frankie Muir, PT Physical Therapist        Outcome Measures     Row Name 11/23/19 1116          How much help from another person do you currently need...    Turning from your back to your side while in flat bed without using bedrails?  3  -CS     Moving from lying on back to sitting on the side of a flat bed without bedrails?  3  -CS     Moving to and from a bed to a chair (including a wheelchair)?  3  -CS     Standing up from a chair using your arms (e.g., wheelchair, bedside chair)?  3  -CS     Climbing 3-5 steps with a railing?  2  -CS     To walk in hospital room?  3  -CS     AM-PAC 6 Clicks Score (PT)  17  -CS     Row Name 11/23/19 1116          Functional Assessment    Outcome Measure Options  AM-PAC 6 Clicks Basic Mobility (PT)  -CS       User Key  (r) = Recorded By, (t) = Taken By, (c) = Cosigned By    Initials Name Provider Type    Frankie Muir, PT Physical Therapist        Physical Therapy Education     Title: PT OT SLP Therapies (Done)     Topic: Physical Therapy (Done)     Point: Mobility training (Done)     Learning Progress Summary           Patient Acceptance, E,TB, VU,NR by CS at 11/23/2019 11:16 AM    Acceptance, E,D, VU,NR by MS at 11/22/2019  9:14 AM    Acceptance, E,TB, VU,NR by CS at 11/21/2019 10:42 AM    Acceptance, E,D, NR by PC at 11/20/2019 10:40 AM                   Point: Home exercise program (Done)     Learning Progress Summary           Patient Acceptance, E,TB, VU,NR by CS at 11/23/2019 11:16 AM    Acceptance, E,D, VU,NR by MS at 11/22/2019  9:14 AM    Acceptance, E,TB, VU,NR by CS at 11/21/2019 10:42 AM    Acceptance, E,D, NR by PC at 11/20/2019 10:40 AM                   Point: Body mechanics (Done)     Learning Progress Summary           Patient Acceptance, E,TB, VU,NR by CS at 11/23/2019 11:16 AM    Acceptance, E,D, VU,NR by MS  at 11/22/2019  9:14 AM    Acceptance, E,TB, VU,NR by  at 11/21/2019 10:42 AM    Acceptance, E,D, NR by PC at 11/20/2019 10:40 AM                   Point: Precautions (Done)     Learning Progress Summary           Patient Acceptance, E,TB, VU,NR by  at 11/23/2019 11:16 AM    Acceptance, E,D, VU,NR by MS at 11/22/2019  9:14 AM    Acceptance, E,TB, VU,NR by  at 11/21/2019 10:42 AM    Acceptance, E,D, NR by PC at 11/20/2019 10:40 AM                               User Key     Initials Effective Dates Name Provider Type Discipline     04/03/18 -  Poonam Hoyos, PT Physical Therapist PT    MS 04/03/18 -  Hoang Angeles, PT Physical Therapist PT     05/14/18 -  Frankie Garvey, PT Physical Therapist PT              PT Recommendation and Plan     Outcome Summary/Treatment Plan (PT)  Anticipated Discharge Disposition (PT): skilled nursing facility  Plan of Care Reviewed With: patient  Outcome Summary: Pt walked in [place], did exercises and tolerated well mild complaints of pain. Anticipates d/c to snf this pm.      Time Calculation:   PT Charges     Row Name 11/23/19 1117             Time Calculation    Start Time  1045  -CS      Stop Time  1108  -      Time Calculation (min)  23 min  -      PT Received On  11/23/19  -      PT - Next Appointment  11/24/19  -        User Key  (r) = Recorded By, (t) = Taken By, (c) = Cosigned By    Initials Name Provider Type     Frankie Garvey, PT Physical Therapist        Therapy Charges for Today     Code Description Service Date Service Provider Modifiers Qty    [id number] HC PT THER PROC EA 15 MIN 11/23/2019 Frankie Garvey, PT GP 2          PT G-Codes  Outcome Measure Options: AM-PAC 6 Clicks Basic Mobility (PT)  AM-PAC 6 Clicks Score (PT): 17    Frankie Garvey PT  11/23/2019

## 2019-11-25 ENCOUNTER — EPISODE CHANGES (OUTPATIENT)
Dept: CASE MANAGEMENT | Facility: OTHER | Age: 79
End: 2019-11-25

## 2019-11-25 NOTE — PROGRESS NOTES
Case Management Discharge Note      Final Note: DC to skilled bed at [facility]. Melody Olivera RN    Provided post acute provider list?: Yes  Post Acute Provider Lists: Nursing Home  Post Acuite Provider List: Delivered  Delivered To: Patient  Method of Delivery: In person    Destination - Selection Complete      Service Provider Request Status Selected Services Address Phone Number Fax Number    [facility] [address] , [facility] [address] [phone]      Durable Medical Equipment      No service has been selected for the patient.      Dialysis/Infusion      No service has been selected for the patient.      Home Medical Care      No service has been selected for the patient.      Therapy      No service has been selected for the patient.      Community Resources      No service has been selected for the patient.             Final Discharge Disposition Code: 03 - skilled nursing facility (SNF)

## 2019-11-26 ENCOUNTER — APPOINTMENT (OUTPATIENT)
Dept: LAB | Facility: HOSPITAL | Age: 79
End: 2019-11-26

## 2019-11-26 ENCOUNTER — APPOINTMENT (OUTPATIENT)
Dept: ONCOLOGY | Facility: HOSPITAL | Age: 79
End: 2019-11-26

## 2019-12-05 ENCOUNTER — ANTICOAGULATION VISIT (OUTPATIENT)
Dept: PHARMACY | Facility: HOSPITAL | Age: 79
End: 2019-12-05

## 2019-12-05 DIAGNOSIS — I48.20 CHRONIC ATRIAL FIBRILLATION (HCC): ICD-10-CM

## 2019-12-05 LAB
INR PPP: 1.6 (ref 0.91–1.09)
PROTHROMBIN TIME: 19.4 SECONDS (ref 10–13.8)

## 2019-12-05 PROCEDURE — 85610 PROTHROMBIN TIME: CPT

## 2019-12-05 PROCEDURE — G0463 HOSPITAL OUTPT CLINIC VISIT: HCPCS

## 2019-12-05 PROCEDURE — 36416 COLLJ CAPILLARY BLOOD SPEC: CPT

## 2019-12-05 NOTE — PROGRESS NOTES
Anticoagulation Clinic Progress Note    Anticoagulation Summary  As of 2019    INR goal:   2.0-3.0   TTR:   59.7 % (1 y)   INR used for dosin.6! (2019)   Warfarin maintenance plan:   2 mg every Mon, Wed, Fri; 1 mg all other days   Weekly warfarin total:   10 mg   Plan last modified:   Vargas Butcher RPH (2019)   Next INR check:   2019   Priority:   Critical   Target end date:   Indefinite    Indications    Chronic atrial fibrillation [I48.20]             Anticoagulation Episode Summary     INR check location:       Preferred lab:       Send INR reminders to:    [facility]    Comments:         Anticoagulation Care Providers     Provider Role Specialty Phone number    Nik Galarza MD Referring Cardiology [phone]          Clinic Interview:  Patient Findings     Positives:   Change in health, Hospital admission, Other complaints    Negatives:   Signs/symptoms of thrombosis, Signs/symptoms of bleeding,   Laboratory test error suspected, Change in alcohol use, Change in   activity, Upcoming invasive procedure, Emergency department visit,   Upcoming dental procedure, Missed doses, Extra doses, Change in   medications, Change in diet/appetite, Bruising    Comments:   S/p TKA. Taking 1 mg daily per rehab discharge instructions.   Enoxaparin 70 mg q24hrs x 2 doses; conservative due to anemia and blood   transfusion in hospital 2 wks ago. Anemia and blood transfusion in   hospital 2 wks ago      Clinical Outcomes     Negatives:   Major bleeding event, Thromboembolic event,   Anticoagulation-related hospital admission, Anticoagulation-related ED   visit, Anticoagulation-related fatality    Comments:   S/p TKA. Taking 1 mg daily per rehab discharge instructions.   Enoxaparin 70 mg q24hrs x 2 doses; conservative due to anemia and blood   transfusion in hospital 2 wks ago. Anemia and blood transfusion in   hospital 2 wks ago        INR History:  Anticoagulation Monitoring  11/12/2019 11/15/2019 12/5/2019   INR - 2.3 1.6   INR Date - 11/15/2019 12/5/2019   INR Goal 2.0-3.0 2.0-3.0 2.0-3.0   Trend Same Same Same   Last Week Total 10 mg 9 mg 8 mg   Next Week Total 3 mg 5 mg 11 mg   Sun - Hold (11/17) 1 mg   Mon - Hold (11/18) -   Tue 1 mg 2 mg (11/19) -   Wed 2 mg 2 mg -   Thu Hold (11/14) 1 mg 2 mg (12/5)   Fri - Hold (11/15) 2 mg   Sat - Hold (11/16) 1 mg   Visit Report - - -   Some recent data might be hidden       Plan:  1. INR is Subtherapeutic today- see above in Anticoagulation Summary.  Will instruct Janel Mahoney to Change their warfarin regimen- see above in Anticoagulation Summary.  2. Start enoxaparin 70 mg q24 hrs x 2 days, then stop (finish supply)  3. Follow up in 4 days  4. Patient declines warfarin refills.  5. Verbal and written information provided. Patient expresses understanding and has no further questions at this time.    Vargas Butcher [facility]

## 2019-12-09 ENCOUNTER — ANTICOAGULATION VISIT (OUTPATIENT)
Dept: PHARMACY | Facility: HOSPITAL | Age: 79
End: 2019-12-09

## 2019-12-09 DIAGNOSIS — I48.20 CHRONIC ATRIAL FIBRILLATION (HCC): ICD-10-CM

## 2019-12-09 LAB
INR PPP: 1.9 (ref 0.91–1.09)
PROTHROMBIN TIME: 23 SECONDS (ref 10–13.8)

## 2019-12-09 PROCEDURE — 36416 COLLJ CAPILLARY BLOOD SPEC: CPT

## 2019-12-09 PROCEDURE — 85610 PROTHROMBIN TIME: CPT

## 2019-12-13 RX ORDER — CARVEDILOL 25 MG/1
TABLET ORAL
Qty: 180 TABLET | Refills: 0 | Status: SHIPPED | OUTPATIENT
Start: 2019-12-13 | End: 2020-02-17

## 2019-12-16 ENCOUNTER — ANTICOAGULATION VISIT (OUTPATIENT)
Dept: PHARMACY | Facility: HOSPITAL | Age: 79
End: 2019-12-16

## 2019-12-16 DIAGNOSIS — I48.20 CHRONIC ATRIAL FIBRILLATION (HCC): ICD-10-CM

## 2019-12-16 LAB
INR PPP: 2.5 (ref 0.91–1.09)
PROTHROMBIN TIME: 29.7 SECONDS (ref 10–13.8)

## 2019-12-16 PROCEDURE — 36416 COLLJ CAPILLARY BLOOD SPEC: CPT

## 2019-12-16 PROCEDURE — 85610 PROTHROMBIN TIME: CPT

## 2019-12-16 NOTE — PROGRESS NOTES
Anticoagulation Clinic Progress Note    Anticoagulation Summary  As of 2019    INR goal:   2.0-3.0   TTR:   59.6 % (1.1 y)   INR used for dosin.5 (2019)   Warfarin maintenance plan:   2 mg every Mon, Wed, Fri; 1 mg all other days   Weekly warfarin total:   10 mg   No change documented:   Vargas Butcher RPH   Plan last modified:   Vargas Butcher RPH (2019)   Next INR check:   2019   Priority:   Critical   Target end date:   Indefinite    Indications    Chronic atrial fibrillation [I48.20]             Anticoagulation Episode Summary     INR check location:       Preferred lab:       Send INR reminders to:    [facility]    Comments:         Anticoagulation Care Providers     Provider Role Specialty Phone number    Nik Galarza MD Referring Cardiology [phone]          Clinic Interview:  Patient Findings     Negatives:   Signs/symptoms of thrombosis, Signs/symptoms of bleeding,   Laboratory test error suspected, Change in health, Change in alcohol use,   Change in activity, Upcoming invasive procedure, Emergency department   visit, Upcoming dental procedure, Missed doses, Extra doses, Change in   medications, Change in diet/appetite, Hospital admission, Bruising, Other   complaints      Clinical Outcomes     Negatives:   Major bleeding event, Thromboembolic event,   Anticoagulation-related hospital admission, Anticoagulation-related ED   visit, Anticoagulation-related fatality        INR History:  Anticoagulation Monitoring 2019   INR 1.6 1.9 2.5   INR Date 2019   INR Goal 2.0-3.0 2.0-3.0 2.0-3.0   Trend Same Same Same   Last Week Total 8 mg 9 mg 10 mg   Next Week Total 11 mg 10 mg 10 mg   Sun 1 mg 1 mg 1 mg   Mon - 2 mg 2 mg   Tue - 1 mg 1 mg   Wed - 2 mg 2 mg   Thu 2 mg () 1 mg 1 mg   Fri 2 mg 2 mg 2 mg   Sat 1 mg 1 mg 1 mg   Visit Report - - -   Some recent data might be hidden       Plan:  1. INR is  Therapeutic today- see above in Anticoagulation Summary.  Will instruct Janel Mahoney to Continue their warfarin regimen- see above in Anticoagulation Summary.  2. Follow up in 2 weeks  3. Patient declines warfarin refills.  4. Verbal and written information provided. Patient expresses understanding and has no further questions at this time.    Vargas Butcher [facility]

## 2019-12-19 ENCOUNTER — OFFICE VISIT (OUTPATIENT)
Dept: CARDIOLOGY | Facility: CLINIC | Age: 79
End: 2019-12-19

## 2019-12-19 ENCOUNTER — HOSPITAL ENCOUNTER (OUTPATIENT)
Dept: CARDIOLOGY | Facility: HOSPITAL | Age: 79
Discharge: HOME | End: 2019-12-19
Admitting: INTERNAL MEDICINE

## 2019-12-19 ENCOUNTER — CLINICAL SUPPORT NO REQUIREMENTS (OUTPATIENT)
Dept: CARDIOLOGY | Facility: CLINIC | Age: 79
End: 2019-12-19

## 2019-12-19 VITALS
HEIGHT: 62 IN | DIASTOLIC BLOOD PRESSURE: 80 MMHG | BODY MASS INDEX: 26.09 KG/M2 | WEIGHT: 141.8 LBS | SYSTOLIC BLOOD PRESSURE: 140 MMHG | HEART RATE: 60 BPM

## 2019-12-19 DIAGNOSIS — I25.5 ISCHEMIC CARDIOMYOPATHY: ICD-10-CM

## 2019-12-19 DIAGNOSIS — Z95.2 S/P MVR (MITRAL VALVE REPLACEMENT): Primary | ICD-10-CM

## 2019-12-19 DIAGNOSIS — I65.23 BILATERAL CAROTID ARTERY STENOSIS: ICD-10-CM

## 2019-12-19 DIAGNOSIS — I25.5 ISCHEMIC CARDIOMYOPATHY: Primary | ICD-10-CM

## 2019-12-19 DIAGNOSIS — I48.20 CHRONIC ATRIAL FIBRILLATION (HCC): ICD-10-CM

## 2019-12-19 DIAGNOSIS — I50.9 CONGESTIVE HEART FAILURE, UNSPECIFIED HF CHRONICITY, UNSPECIFIED HEART FAILURE TYPE (HCC): ICD-10-CM

## 2019-12-19 DIAGNOSIS — I47.20 V-TACH (HCC): ICD-10-CM

## 2019-12-19 DIAGNOSIS — I25.810 CORONARY ARTERY DISEASE INVOLVING CORONARY BYPASS GRAFT OF NATIVE HEART WITHOUT ANGINA PECTORIS: ICD-10-CM

## 2019-12-19 LAB
BH CV XLRA MEAS LEFT DIST CCA EDV: 26.5 CM/SEC
BH CV XLRA MEAS LEFT DIST CCA PSV: 143 CM/SEC
BH CV XLRA MEAS LEFT DIST ICA EDV: -32.1 CM/SEC
BH CV XLRA MEAS LEFT DIST ICA PSV: -100 CM/SEC
BH CV XLRA MEAS LEFT ICA/CCA RATIO: 0.85
BH CV XLRA MEAS LEFT MID CCA EDV: 23.6 CM/SEC
BH CV XLRA MEAS LEFT MID CCA PSV: 133 CM/SEC
BH CV XLRA MEAS LEFT MID ICA EDV: -30.4 CM/SEC
BH CV XLRA MEAS LEFT MID ICA PSV: -108 CM/SEC
BH CV XLRA MEAS LEFT PROX CCA EDV: 24.6 CM/SEC
BH CV XLRA MEAS LEFT PROX CCA PSV: 93.3 CM/SEC
BH CV XLRA MEAS LEFT PROX ECA PSV: -146 CM/SEC
BH CV XLRA MEAS LEFT PROX ICA EDV: -29.5 CM/SEC
BH CV XLRA MEAS LEFT PROX ICA PSV: -121 CM/SEC
BH CV XLRA MEAS LEFT PROX SCLA PSV: 191 CM/SEC
BH CV XLRA MEAS LEFT VERTEBRAL A PSV: -127 CM/SEC
BH CV XLRA MEAS RIGHT DIST ICA EDV: -28.7 CM/SEC
BH CV XLRA MEAS RIGHT DIST ICA PSV: -84.8 CM/SEC
BH CV XLRA MEAS RIGHT ICA/CCA RATIO: 2.1
BH CV XLRA MEAS RIGHT MID CCA EDV: 13.5 CM/SEC
BH CV XLRA MEAS RIGHT MID CCA PSV: 67.5 CM/SEC
BH CV XLRA MEAS RIGHT MID ICA EDV: -31.3 CM/SEC
BH CV XLRA MEAS RIGHT MID ICA PSV: -116 CM/SEC
BH CV XLRA MEAS RIGHT PROX CCA EDV: 10.6 CM/SEC
BH CV XLRA MEAS RIGHT PROX CCA PSV: 61.6 CM/SEC
BH CV XLRA MEAS RIGHT PROX ECA PSV: -104 CM/SEC
BH CV XLRA MEAS RIGHT PROX ICA EDV: 30 CM/SEC
BH CV XLRA MEAS RIGHT PROX ICA PSV: 142 CM/SEC
BH CV XLRA MEAS RIGHT PROX SCLA PSV: 170 CM/SEC
BH CV XLRA MEAS RIGHT VERTEBRAL A PSV: -93.9 CM/SEC

## 2019-12-19 PROCEDURE — 93880 EXTRACRANIAL BILAT STUDY: CPT

## 2019-12-19 PROCEDURE — 93880 EXTRACRANIAL BILAT STUDY: CPT | Performed by: INTERNAL MEDICINE

## 2019-12-19 PROCEDURE — 99214 OFFICE O/P EST MOD 30 MIN: CPT | Performed by: INTERNAL MEDICINE

## 2019-12-19 PROCEDURE — 93000 ELECTROCARDIOGRAM COMPLETE: CPT | Performed by: INTERNAL MEDICINE

## 2019-12-19 PROCEDURE — 93283 PRGRMG EVAL IMPLANTABLE DFB: CPT | Performed by: INTERNAL MEDICINE

## 2019-12-19 NOTE — PROGRESS NOTES
Date of Office Visit: 19  Encounter Provider: Nik Galarza MD  Place of Service: [facility] CARDIOLOGY  Patient Name: Janel Mahoney  :1940  Referral Provider:No ref. provider found      Chief Complaint   Patient presents with   • Cardiomyopathy     History of Present Illness   The patient is a pleasant, 79-year-old white female with history of severe ischemic heart  disease, mild disease of the left anterior descending, angioplasty, and stent placement of  the right coronary artery. She also had premature ventricular contractions and severe  vascular disease, and left ventricular ejection fraction of 50%. In 2007, she had  an acute infarct. Catheterization showed a left ventricular ejection fraction of 35%. She  had occlusive disease of the right posterior left ventricular branch and no other  significant disease. She was treated medically. She had a transesophageal echocardiogram  that confirmed a left ventricular ejection fraction of 40% and just moderate mitral  insufficiency. She had atrial fibrillation and had electrocardioversion back to sinus  rhythm in May 2007 and then presented in atrial fibrillation and was admitted in 2007 and placed on Tikosyn. We titrated it up to 500 mcg daily but developed marked QT  prolongation even on 250 mcg twice daily. We then discussed ablation versus warfarin and  rate control. We opted for warfarin and rate control. We continued to have symptoms and  went to Dr. Harish Iverson in [place]. She had atrial fibrillation and flutter with  pulmonary vein isolation. She went back into atrial fibrillation and was started on  sotalol. She converted back to sinus rhythm. She then went back into atrial fibrillation.  Again, ultimately, she had a repeat catheterization that showed severe mitral  insufficiency, borderline coronary disease, and in 2010 had mitral valve  replacement with a #31 Epic porcine valve  and single bypass graft to the posterior  descending artery. She continued to have episodes of rapid atrial fibrillation and left  ventricular dysfunction. She underwent AV node ablation and upgrade of her device to a  bi-V.   She then presented in 09/2013 with complaints of a lot of fatigue, tiredness and weakness.   As part of the evaluation she had an echocardiogram which showed her left ventricular  ejection fraction to be 45%, moderate pulmonary hypertension at 62 mmHg.  A perfusion  stress test showed no evidence of ischemia and she had a sleep study which was positive so  she was started on CPAP.  She had some volume overload and did much better on twice a day diuretic.   She was in the hospital around August 2016 with multiple compression fractures of her back.    She then presented to the hospital in September 2017 with GI bleeding was found to have esophageal ulcers but her INR was also supratherapeutic.  There was treated resolved her hemoglobin stabilized.   She then was in the hospital end of November 2018 with a spontaneous hematoma of her right pectoral muscle and had to be surgically drained and didn't appear to be traumatic however about 2-3 weeks prior to that she had stumbled but did not hit her chest.   In early February 2019 her nephrologist had decreased ramipril and then stopped it altogether then had issues with increased lower extremity edema and shortness of breath with orthopnea and she was admitted to the hospital received IV Lasix and was switched from Lasix to Bumex and she improved.    In August 2019 she was noted to have episodes of ventricular tachycardia on ICD which were successfully treated with ATP.  There is one episode that the ATP failed to terminate, but it broke just before further therapy could be given. She was seen by our arrhythmia service that felt the only treatment would be amiodarone but that since she had responded antitachycardia pacing therapy to continue  that.  She now comes in for follow-up she is actually been doing well.  She had her knee replaced in November and did extremely well with that.  She has some shortness of breath with increased activity but is in physical therapy now has some fatigue but her last hemoglobin was 7.7 at hematology.  No chest pain or pressure no palpitations no near syncope or syncope no orthopnea or PND.    Coronary Artery Disease   Pertinent negatives include no dizziness, muscle weakness or weight gain. Her past medical history is significant for CHF and past myocardial infarction.   Atrial Fibrillation   Symptoms are negative for dizziness and weakness. Past medical history includes atrial fibrillation, AICD, CAD and CHF.   Congestive Heart Failure   Pertinent negatives include no abdominal pain, muscle weakness or nocturia. Her past medical history is significant for CAD.         Past Medical History:   Diagnosis Date   • Acute kidney injury (CMS/HCC)    • Anemia    • Atrial fibrillation (CMS/HCC)    • Bruises easily    • Carotid artery stenosis    • Chronic back pain    • Chronic combined systolic and diastolic congestive heart failure (CMS/HCC)    • Chronic coronary artery disease     moderate to severe LV dysfunction.   • Chronic kidney disease, stage 3 (CMS/HCC)    • Dysphagia    • GERD (gastroesophageal reflux disease)    • Gout    • H/O cardiac murmur    • Hematoma     LEFT LEG; DR ALONZO AWARE   • [place] (hard of hearing)     wears hearing aids   • Hyperlipidemia    • Hypertension    • Hypotension    • ICD (implantable cardioverter-defibrillator) in place    • Ischemic cardiomyopathy    • Kyphoscoliosis    • Leukopenia    • Lumbar spondylosis    • Obesity    • GEORGINA (obstructive sleep apnea)    • Osteoarthritis    • Osteoporosis    • Peptic ulcer    • Premature ventricular contractions    • Renal insufficiency syndrome    • Scoliosis    • Shoulder fracture, left    • Stroke syndrome    • Thrombocytopenia (CMS/HCC)    • Urine  incontinence    • Ventricular tachycardia (CMS/HCC)    • Vitamin B12 deficiency          Past Surgical History:   Procedure Laterality Date   • AV NODE ABLATION     • BREAST BIOPSY     • CARDIAC CATHETERIZATION      Showed severe mitral insufficiency and borderline coronary artery disease   • CARDIAC CATHETERIZATION      Showed an ejection fraction of 35%. She had occlusive disease of the right posterior LV branch and no other significant disease, treated medically.   • CARDIAC DEFIBRILLATOR PLACEMENT      Biventricular   • CARDIAC ELECTROPHYSIOLOGY PROCEDURE N/A 1/4/2019    Procedure: GENERATOR CHANGE BI-V ICD   [place];  Surgeon: James Hwang MD;  Location: [facility] CATH INVASIVE LOCATION;  Service: Cardiology   • CARDIAC VALVE REPLACEMENT  2009    Done with stent placement   • CARDIOVERSION      multiple electrocardioversions.   • CAROTID ARTERY ANGIOPLASTY Right    • COLONOSCOPY N/A 9/28/2017    Procedure: COLONOSCOPY TO CECUM;  Surgeon: Kevin Davis MD;  Location: [facility] ENDOSCOPY;  Service:    • CORONARY ANGIOPLASTY WITH STENT PLACEMENT  2009   • CORONARY ARTERY BYPASS GRAFT      single graft to the PDA   • CORONARY STENT PLACEMENT     • ENDOSCOPY N/A 9/28/2017    Procedure: ESOPHAGOGASTRODUODENOSCOPY ;  Surgeon: Kevin Davis MD;  Location: [facility] ENDOSCOPY;  Service:    • HEMORRHOIDECTOMY     • HYSTERECTOMY     • INCISION AND DRAINAGE TRUNK Right 11/27/2018    Procedure: EVACUATION OF RIGHT CHEST WALL HEMATOMA;  Surgeon: Juvencio Rodriguez MD;  Location: [facility] OR;  Service: General   • MITRAL VALVE REPLACEMENT  01/2010    #31 Epic porcine valve.   • THROMBOENDARTERECTOMY Right     carotid thromboendarterectomy    • TONSILLECTOMY      age 32   • TOTAL KNEE ARTHROPLASTY Left    • TOTAL KNEE ARTHROPLASTY REVISION Left 11/19/2019    Procedure: TOTAL KNEE ARTHROPLASTY REVISION LEFT;  Surgeon: Juvencio Pressley II, MD;  Location: [facility] OR;  Service: Orthopedics   • TOTAL SHOULDER  ARTHROPLASTY W/ DISTAL CLAVICLE EXCISION Left 1/16/2018    Procedure: TOTAL SHOULDER REVERSE ARTHROPLASTY;  Surgeon: Bianka Quesada MD;  Location: [facility];  Service:          Current Outpatient Medications on File Prior to Visit   Medication Sig Dispense Refill   • alendronate (FOSAMAX) 70 MG tablet Take 70 mg by mouth Every 14 (Fourteen) Days.     • aspirin 81 MG tablet Take 81 mg by mouth Daily. PT CALLING TO SEE IF SHE HAS TO STOP PRIOR TO SURGERY     • bumetanide (BUMEX) 2 MG tablet Take 2 mg by mouth 2 (Two) Times a Day.     • calcitriol (ROCALTROL) 0.25 MCG capsule Take 0.25 mcg by mouth 2 (Two) Times a Day.     • carvedilol (COREG) 25 MG tablet TAKE 1 TABLET TWICE DAILY 180 tablet 0   • Cholecalciferol (VITAMIN D) 2000 UNITS tablet Take 2,000 Units by mouth Daily.     • COLCRYS 0.6 MG tablet Take 0.6 mg by mouth Every Other Day.     • epoetin adele (EPOGEN,PROCRIT) 22279 UNIT/ML injection Inject 10,000 Units under the skin into the appropriate area as directed As Needed.     • febuxostat (ULORIC) 40 MG tablet Take 40 mg by mouth Daily.     • ferrous sulfate 325 (65 FE) MG tablet Take 1 tablet by mouth Every Other Day.     • HYDROcodone-acetaminophen (NORCO) 5-325 MG per tablet Take 1 tablet by mouth Daily As Needed.     • Multiple Vitamins-Minerals (SENIOR MULTIVITAMIN PLUS) tablet Take 1 tablet by mouth Daily.     • pantoprazole (PROTONIX) 40 MG EC tablet Take 40 mg by mouth 2 (Two) Times a Day.     • ramipril (ALTACE) 5 MG capsule Take 5 mg by mouth Daily.     • simvastatin (ZOCOR) 20 MG tablet Take 20 mg by mouth Every Night.     • traMADol (ULTRAM) 50 MG tablet Take 100 mg by mouth 2 (Two) Times a Day.     • vitamin B-12 (CYANOCOBALAMIN) 1000 MCG tablet Take 1,000 mcg by mouth Daily.     • warfarin (COUMADIN) 1 MG tablet Take 1 mg by mouth Daily. DOSES VARY WEEKLY BASED ON INR  DR LAWRENCE INSTRUCTED TO STOP 5 DAYS PRIOR TO SURGERY     • zolpidem (AMBIEN) 10 MG tablet Take 10 mg by mouth At  Night As Needed for Sleep.       No current facility-administered medications on file prior to visit.          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     Employer: RETIRED   Tobacco Use   • Smoking status: Former Smoker     Packs/day: 1.00     Years: 50.00     Pack years: 50.00     Types: Cigarettes     Last attempt to quit: 1/11/2009     Years since quitting: 10.9   • Smokeless tobacco: Never Used   • Tobacco comment: Daily caffeine - soda   Substance and Sexual Activity   • Alcohol use: Yes     Comment: rare   • Drug use: No       Family History   Problem Relation Age of Onset   • Cancer Mother    • Breast cancer Mother    • Heart disease Mother    • Hypertension Mother    • Stroke Mother    • Coronary artery disease Father    • Stroke Father    • Heart disease Father    • Hypertension Father    • Other Daughter         thiamin deficiency    • Hypertension Son    • Lung disease Other    • Hypertension Other    • Malig Hyperthermia Neg Hx          Review of Systems   Constitution: Positive for malaise/fatigue. Negative for decreased appetite, diaphoresis, fever, weight gain and weight loss.   HENT: Negative for congestion, hearing loss, nosebleeds and tinnitus.    Eyes: Negative for blurred vision, double vision, vision loss in left eye, vision loss in right eye and visual disturbance.   Cardiovascular:        As noted in HPI   Respiratory:        As noted HPI   Endocrine: Negative for cold intolerance and heat intolerance.   Hematologic/Lymphatic: Negative for bleeding problem. Does not bruise/bleed easily.   Skin: Negative for color change, flushing, itching and rash.   Musculoskeletal: Positive for joint pain. Negative for arthritis, back pain, joint swelling, muscle weakness and myalgias.  "  Gastrointestinal: Negative for bloating, abdominal pain, constipation, diarrhea, dysphagia, heartburn, hematemesis, hematochezia, melena, nausea and vomiting.   Genitourinary: Negative for bladder incontinence, dysuria, frequency, nocturia and urgency.   Neurological: Negative for dizziness, focal weakness, headaches, light-headedness, loss of balance, numbness, paresthesias, vertigo and weakness.   Psychiatric/Behavioral: Negative for depression, memory loss and substance abuse.       Procedures      ECG 12 Lead  Date/Time: 12/19/2019 1:20 PM  Performed by: Nik Galarza MD  Authorized by: Nik Galarza MD   Comparison: compared with previous ECG   Rhythm: atrial fibrillation  Rhythm comments: Ventricular paced.                  Objective:    /80   Pulse 60   Ht 157.5 cm (62\")   Wt 64.3 kg (141 lb 12.8 oz)   BMI 25.94 kg/m²        Physical Exam  Physical Exam   Constitutional: She is oriented to person, place, and time. She appears well-developed and well-nourished. No distress.   HENT:   Head: Normocephalic.   Eyes: Pupils are equal, round, and reactive to light. Conjunctivae are normal. No scleral icterus.   Neck: Normal carotid pulses, no hepatojugular reflux and no JVD present. Carotid bruit is not present. No tracheal deviation, no edema and no erythema present. No thyromegaly present.   Cardiovascular: Normal rate, regular rhythm, S1 normal, S2 normal and intact distal pulses.  No extrasystoles are present. PMI is not displaced. Exam reveals no gallop, no distant heart sounds and no friction rub.   Murmur heard.   Systolic murmur is present with a grade of 2/6 at the upper right sternal border.  Pulses:       Carotid pulses are 2+ on the right side with bruit, and 2+ on the left side with bruit.       Radial pulses are 2+ on the right side, and 2+ on the left side.        Femoral pulses are 2+ on the right side, and 2+ on the left side.       Dorsalis pedis pulses are 2+ on the right " side, and 2+ on the left side.        Posterior tibial pulses are 2+ on the right side, and 2+ on the left side.   Pulmonary/Chest: Effort normal and breath sounds normal. No respiratory distress. She has no decreased breath sounds. She has no wheezes. She has no rhonchi. She has no rales. She exhibits no tenderness.   Abdominal: Soft. Bowel sounds are normal. She exhibits no distension and no mass. There is no hepatosplenomegaly. There is no tenderness. There is no rebound and no guarding.   Musculoskeletal: She exhibits no edema, tenderness or deformity.   Neurological: She is alert and oriented to person, place, and time.   Skin: Skin is warm and dry. No rash noted. She is not diaphoretic. No cyanosis or erythema. No pallor. Nails show no clubbing.   Psychiatric: She has a normal mood and affect. Her speech is normal and behavior is normal. Judgment and thought content normal.           Assessment:   1. This is a 79 -year-old female with history of severe ischemic heart disease, previous angioplasty of the right coronary artery, and then inferior infarct in February 2007.  Ultimately, she had single-vessel bypass grafting to the posterior descending artery at the time of her mitral valve replacement, moderate left ventricular dysfunction.  Echocardiogram in January 2019 reported ejection fraction of approximately 40%.  Now doing well.  We will continue the same she will see our nurse practitioner in 6 months and me in a year and call if problems.  2. History of mitral insufficiency status post mitral valve replacement with a tissue valve as above. Echocardiogram today showed left ventricular ejection fraction of approximate 40% with segmental wall motion abnormality. Normal functioning prosthetic mitral valve moderate tricuspid insufficiency with moderate pulmonary hypertension.  Echocardiogram January 2019 apparently unremarkable.  Consider follow-up echocardiogram when she returns in a year.  3. Atrial  fibrillation/sick sinus syndrome. Failed multiple cardioversions, multiple medications. Failed pulmonary vein isolation. Now status post AV node ablation and BiV  Defibrillator.The patient's CHADS2-VASc score is 6.  Stable on warfarin.  4. Cerebral vascular disease status post carotid endarterectomy.  Follow up carotid ultrasound today shows moderate left common carotid stenosis and moderate right internal carotid artery stenosis relatively unchanged.  5. Hypertension. Blood pressure adequately controlled.  6. Renal insufficiency. Followed by nephrology, Dr. Romero.  7. Hyperlipidemia, on therapy. Followed by PCP.  8. Nonsustained ventricular tachycardia.   9. Pulmonary hypertension. This is most likely secondary to sleep apnea and atrial fibrillation.   10. Sleep Apnea. Now on CPAP.    11.  Anemia appears appears low recently got an injection and is following back up with hematology next week.       Plan:

## 2019-12-23 ENCOUNTER — INFUSION (OUTPATIENT)
Dept: ONCOLOGY | Facility: HOSPITAL | Age: 79
End: 2019-12-23

## 2019-12-23 ENCOUNTER — LAB (OUTPATIENT)
Dept: LAB | Facility: HOSPITAL | Age: 79
End: 2019-12-23

## 2019-12-23 DIAGNOSIS — N18.30 ANEMIA IN STAGE 3 CHRONIC KIDNEY DISEASE (HCC): ICD-10-CM

## 2019-12-23 DIAGNOSIS — D69.6 THROMBOCYTOPENIA (HCC): ICD-10-CM

## 2019-12-23 DIAGNOSIS — E53.8 B12 DEFICIENCY: ICD-10-CM

## 2019-12-23 DIAGNOSIS — D63.1 ANEMIA IN STAGE 3 CHRONIC KIDNEY DISEASE (HCC): ICD-10-CM

## 2019-12-23 LAB
BASOPHILS # BLD AUTO: 0.03 10*3/MM3 (ref 0–0.2)
BASOPHILS NFR BLD AUTO: 0.4 % (ref 0–1.5)
DEPRECATED RDW RBC AUTO: 48.6 FL (ref 37–54)
EOSINOPHIL # BLD AUTO: 0.23 10*3/MM3 (ref 0–0.4)
EOSINOPHIL NFR BLD AUTO: 3.3 % (ref 0.3–6.2)
ERYTHROCYTE [DISTWIDTH] IN BLOOD BY AUTOMATED COUNT: 13.9 % (ref 12.3–15.4)
HCT VFR BLD AUTO: 36.5 % (ref 34–46.6)
HGB BLD-MCNC: 11.5 G/DL (ref 12–15.9)
IMM GRANULOCYTES # BLD AUTO: 0.09 10*3/MM3 (ref 0–0.05)
IMM GRANULOCYTES NFR BLD AUTO: 1.3 % (ref 0–0.5)
LYMPHOCYTES # BLD AUTO: 1.26 10*3/MM3 (ref 0.7–3.1)
LYMPHOCYTES NFR BLD AUTO: 17.9 % (ref 19.6–45.3)
MCH RBC QN AUTO: 29.9 PG (ref 26.6–33)
MCHC RBC AUTO-ENTMCNC: 31.5 G/DL (ref 31.5–35.7)
MCV RBC AUTO: 95.1 FL (ref 79–97)
MONOCYTES # BLD AUTO: 0.46 10*3/MM3 (ref 0.1–0.9)
MONOCYTES NFR BLD AUTO: 6.5 % (ref 5–12)
NEUTROPHILS # BLD AUTO: 4.96 10*3/MM3 (ref 1.7–7)
NEUTROPHILS NFR BLD AUTO: 70.6 % (ref 42.7–76)
NRBC BLD AUTO-RTO: 0 /100 WBC (ref 0–0.2)
PLATELET # BLD AUTO: 138 10*3/MM3 (ref 140–450)
PMV BLD AUTO: 10.5 FL (ref 6–12)
RBC # BLD AUTO: 3.84 10*6/MM3 (ref 3.77–5.28)
WBC NRBC COR # BLD: 7.03 10*3/MM3 (ref 3.4–10.8)

## 2019-12-23 PROCEDURE — 36415 COLL VENOUS BLD VENIPUNCTURE: CPT

## 2019-12-23 PROCEDURE — 85025 COMPLETE CBC W/AUTO DIFF WBC: CPT

## 2019-12-23 NOTE — PROGRESS NOTES
Hgb today is 11.5.  No procrit today per guidelines.  Patient without questions or concerns at this time.   Given copy of labs.

## 2019-12-26 ENCOUNTER — RESEARCH ENCOUNTER (OUTPATIENT)
Dept: CARDIOLOGY | Facility: CLINIC | Age: 79
End: 2019-12-26

## 2019-12-26 NOTE — RESEARCH
Research study: [employer] PSR  Subject initials: MARYCHUY  Subject study ID#: [id number]     MARYCHUY came to the [facility] office on December 19, 2019 for routine interrogation of her CRT-D device and to see Dr Galarza. I entered data from that visit into the study database today. There were no device or lead events to report, nor other qualifying health events to report. We will continue to follow MARYCHUY per study protocol, which is approximately every 6 months for CRT devices.      Tiffany CALLESN RN  CV Research Coordinator

## 2019-12-30 ENCOUNTER — ANTICOAGULATION VISIT (OUTPATIENT)
Dept: PHARMACY | Facility: HOSPITAL | Age: 79
End: 2019-12-30

## 2019-12-30 DIAGNOSIS — I48.20 CHRONIC ATRIAL FIBRILLATION (HCC): ICD-10-CM

## 2019-12-30 LAB
INR PPP: 2.9 (ref 0.91–1.09)
PROTHROMBIN TIME: 34.8 SECONDS (ref 10–13.8)

## 2019-12-30 PROCEDURE — 36416 COLLJ CAPILLARY BLOOD SPEC: CPT

## 2019-12-30 PROCEDURE — 85610 PROTHROMBIN TIME: CPT

## 2019-12-30 NOTE — PROGRESS NOTES
Anticoagulation Clinic Progress Note    Anticoagulation Summary  As of 2019    INR goal:   2.0-3.0   TTR:   61.0 % (1.1 y)   INR used for dosin.9 (2019)   Warfarin maintenance plan:   2 mg every Mon, Wed, Fri; 1 mg all other days   Weekly warfarin total:   10 mg   No change documented:   Lee Ann Diamond   Plan last modified:   Vargas Butcher [facility] (2019)   Next INR check:   2020   Priority:   Critical   Target end date:   Indefinite    Indications    Chronic atrial fibrillation [I48.20]             Anticoagulation Episode Summary     INR check location:       Preferred lab:       Send INR reminders to:    [facility]    Comments:         Anticoagulation Care Providers     Provider Role Specialty Phone number    Nik Galarza MD Referring Cardiology [phone]          Clinic Interview:  Patient Findings     Negatives:   Signs/symptoms of thrombosis, Signs/symptoms of bleeding,   Laboratory test error suspected, Change in health, Change in alcohol use,   Change in activity, Upcoming invasive procedure, Emergency department   visit, Upcoming dental procedure, Missed doses, Extra doses, Change in   medications, Change in diet/appetite, Hospital admission, Bruising, Other   complaints      Clinical Outcomes     Negatives:   Major bleeding event, Thromboembolic event,   Anticoagulation-related hospital admission, Anticoagulation-related ED   visit, Anticoagulation-related fatality        INR History:  Anticoagulation Monitoring 2019   INR 1.9 2.5 2.9   INR Date 2019   INR Goal 2.0-3.0 2.0-3.0 2.0-3.0   Trend Same Same Same   Last Week Total 9 mg 10 mg 10 mg   Next Week Total 10 mg 10 mg 10 mg   Sun 1 mg 1 mg 1 mg   Mon 2 mg 2 mg 2 mg   Tue 1 mg 1 mg 1 mg   Wed 2 mg 2 mg 2 mg   Thu 1 mg 1 mg 1 mg   Fri 2 mg 2 mg 2 mg   Sat 1 mg 1 mg 1 mg   Visit Report - - -   Some recent data might be hidden       Plan:  1. INR  is therapeutic today- see above in Anticoagulation Summary.   Will instruct Janel DORINA Mahoney to continue their warfarin regimen- see above in Anticoagulation Summary.  2. Follow up in 2 weeks.  3. Patient declines warfarin refills.  4. Verbal and written information provided. Patient expresses understanding and has no further questions at this time.    Lee Ann Diamond

## 2020-01-08 ENCOUNTER — OFFICE VISIT (OUTPATIENT)
Dept: SLEEP MEDICINE | Facility: HOSPITAL | Age: 80
End: 2020-01-08

## 2020-01-08 ENCOUNTER — EPISODE CHANGES (OUTPATIENT)
Dept: CASE MANAGEMENT | Facility: OTHER | Age: 80
End: 2020-01-08

## 2020-01-08 VITALS — BODY MASS INDEX: 25.76 KG/M2 | HEIGHT: 62 IN | WEIGHT: 140 LBS

## 2020-01-08 DIAGNOSIS — G47.33 OSA ON CPAP: Primary | Chronic | ICD-10-CM

## 2020-01-08 DIAGNOSIS — Z99.89 OSA ON CPAP: Primary | Chronic | ICD-10-CM

## 2020-01-08 PROCEDURE — 99213 OFFICE O/P EST LOW 20 MIN: CPT | Performed by: INTERNAL MEDICINE

## 2020-01-08 PROCEDURE — G0463 HOSPITAL OUTPT CLINIC VISIT: HCPCS

## 2020-01-08 NOTE — PROGRESS NOTES
"Follow Up Sleep Disorders Center Note     Chief Complaint:  GEORGINA     Primary Care Physician: Ariel Matute MD    Interval History:   I last saw the patient in September 2019.  She continues to not be compliant with CPAP because of air blowing out into her eyes that wakes her up.  She goes to bed at 2 AM and awakens at 9 AM.  Atlantic Sleepiness Scale is normal at 3.    The patient had knee replacement 11/19/2019.    Review of Systems:    A complete review of systems was done and all were negative with the exception of the above    Social History:    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     Employer: RETIRED   Tobacco Use   • Smoking status: Former Smoker     Packs/day: 1.00     Years: 50.00     Pack years: 50.00     Types: Cigarettes     Last attempt to quit: 1/11/2009     Years since quitting: 10.9   • Smokeless tobacco: Never Used   • Tobacco comment: Daily caffeine - soda   Substance and Sexual Activity   • Alcohol use: Yes     Comment: rare   • Drug use: No       Allergies:  Diclofenac     Medication Review: Her list was reviewed.      Vital Signs:    Vitals:    01/08/20 1100   Weight: 63.5 kg (140 lb)   Height: 157.5 cm (62\")     Body mass index is 25.61 kg/m².    Physical Exam:    Constitutional:  Well developed 79 y.o. female that appears in no apparent distress.  Awake & oriented times 3.  Normal mood with normal recent and remote memory and normal judgement.  Eyes:  Conjunctivae normal.  Oropharynx:  moist mucous membranes without exudate and a large tongue and class III-4 Mallampati airway     Results Review:  DME is [facility].  No downloads available because she has not been using her CPAP.      Impression:   Mild obstructive sleep apnea, significant positional component, 30% of total " sleep time with snoring and arousals associated with snoring 15 events per hour, by overnight polysomnogram 12/17/2013 (weight 168 pounds) not adequately treated with auto CPAP because she is uncomfortable with her mask.     Plan:  Good sleep hygiene measures should be maintained.  Some weight loss would be beneficial in this patient who is overweight by BMI.  The patient is benefiting from the treatment being provided.     The sleep disorder staff did work with the patient and different interfaces.  The patient was given a medium F 30 interface.    I again warned her about taking Ambien without treating her obstructive sleep apnea.    The patient will call for any problems and will follow up in 1 month       Anthony Cardona MD  Sleep Medicine  01/08/20  11:42 AM

## 2020-01-09 ENCOUNTER — PATIENT OUTREACH (OUTPATIENT)
Dept: CASE MANAGEMENT | Facility: OTHER | Age: 80
End: 2020-01-09

## 2020-01-09 NOTE — OUTREACH NOTE
Care Coordination Assessment    Documented/Reviewed By:  Al Moore RN Date/time:  1/9/2020 11:35 AM   Assessment completed with:  patient  Enrolled in care management program:  No  Living arrangement:  spouse  Support system:  family, spouse  Type of residence:  private residence  Home care services:  Yes (Comment: [facility])  Equipment used at home:  walker, bedside commode, oxygen/respiratory treatment (Comment: Cpap)  Bed or wheelchair confined:  No  Inadequate nutrition:  No  Medication adherence problem:  No  Experiencing side effects from current medications:  No  History of fall(s) in last 6 months:  Yes  Difficulty keeping appointments:  No  Family aware of the patient's advance care planning wishes:  Yes  Bahai or spiritual beliefs that impact treatment:  No  Chronic pain:  Yes  Location of chronic pain:  Back  Chronic pain timing:  constant  Limitation of routine activities due to chronic pain:  moderate

## 2020-01-13 ENCOUNTER — ANTICOAGULATION VISIT (OUTPATIENT)
Dept: PHARMACY | Facility: HOSPITAL | Age: 80
End: 2020-01-13

## 2020-01-13 DIAGNOSIS — I48.20 CHRONIC ATRIAL FIBRILLATION (HCC): ICD-10-CM

## 2020-01-13 LAB
INR PPP: 1.9 (ref 0.91–1.09)
PROTHROMBIN TIME: 22.3 SECONDS (ref 10–13.8)

## 2020-01-13 PROCEDURE — 36416 COLLJ CAPILLARY BLOOD SPEC: CPT

## 2020-01-13 PROCEDURE — 85610 PROTHROMBIN TIME: CPT

## 2020-01-13 NOTE — PROGRESS NOTES
I have supervised and reviewed the notes, assessments, and/or procedures performed by our PharmD Candidate. The documented assessment and plan were developed cooperatively. I concur with the documentation of this patient encounter.    Vargas Butcher RP

## 2020-01-13 NOTE — PROGRESS NOTES
Anticoagulation Clinic Progress Note    Anticoagulation Summary  As of 2020    INR goal:   2.0-3.0   TTR:   61.9 % (1.1 y)   INR used for dosin.9! (2020)   Warfarin maintenance plan:   2 mg every Mon, Wed, Fri; 1 mg all other days   Weekly warfarin total:   10 mg   No change documented:   Danita Chavez, Pharmacy Intern   Plan last modified:   Vargas Butcher [facility] (2019)   Next INR check:   2020   Priority:   Critical   Target end date:   Indefinite    Indications    Chronic atrial fibrillation [I48.20]             Anticoagulation Episode Summary     INR check location:       Preferred lab:       Send INR reminders to:    [facility]    Comments:         Anticoagulation Care Providers     Provider Role Specialty Phone number    Nik Galarza MD Referring Cardiology [phone]          Clinic Interview:  Patient Findings     Positives:   Change in medications    Negatives:   Signs/symptoms of thrombosis, Signs/symptoms of bleeding,   Laboratory test error suspected, Change in health, Change in alcohol use,   Change in activity, Upcoming invasive procedure, Emergency department   visit, Upcoming dental procedure, Missed doses, Extra doses, Change in   diet/appetite, Hospital admission, Bruising, Other complaints    Comments:   Pt started taking Vitamin E supplement recommended by eye   doctor 1 week ago.      Clinical Outcomes     Negatives:   Major bleeding event, Thromboembolic event,   Anticoagulation-related hospital admission, Anticoagulation-related ED   visit, Anticoagulation-related fatality    Comments:   Pt started taking Vitamin E supplement recommended by eye   doctor 1 week ago.        INR History:  Anticoagulation Monitoring 2019   INR 2.5 2.9 1.9   INR Date 2019   INR Goal 2.0-3.0 2.0-3.0 2.0-3.0   Trend Same Same Same   Last Week Total 10 mg 10 mg 10 mg   Next Week Total 10 mg 10 mg 10 mg   Sun 1 mg 1  mg 1 mg   Mon 2 mg 2 mg 2 mg   Tue 1 mg 1 mg 1 mg   Wed 2 mg 2 mg 2 mg   Thu 1 mg 1 mg 1 mg   Fri 2 mg 2 mg 2 mg   Sat 1 mg 1 mg 1 mg   Visit Report - - -   Some recent data might be hidden       Plan:  1. INR is Subtherapeutic today- see above in Anticoagulation Summary.  Will instruct Janel Mahoney to Continue their warfarin regimen- see above in Anticoagulation Summary.  2. Follow up in 2 weeks  3. Patient declines warfarin refills.  4. Verbal and written information provided. Patient expresses understanding and has no further questions at this time.    Danita Chavez, Pharmacy Intern

## 2020-01-14 ENCOUNTER — EPISODE CHANGES (OUTPATIENT)
Dept: CASE MANAGEMENT | Facility: OTHER | Age: 80
End: 2020-01-14

## 2020-01-20 ENCOUNTER — TELEPHONE (OUTPATIENT)
Dept: ONCOLOGY | Facility: CLINIC | Age: 80
End: 2020-01-20

## 2020-01-21 ENCOUNTER — APPOINTMENT (OUTPATIENT)
Dept: ONCOLOGY | Facility: HOSPITAL | Age: 80
End: 2020-01-21

## 2020-01-21 ENCOUNTER — INFUSION (OUTPATIENT)
Dept: ONCOLOGY | Facility: HOSPITAL | Age: 80
End: 2020-01-21

## 2020-01-21 ENCOUNTER — LAB (OUTPATIENT)
Dept: LAB | Facility: HOSPITAL | Age: 80
End: 2020-01-21

## 2020-01-21 ENCOUNTER — APPOINTMENT (OUTPATIENT)
Dept: LAB | Facility: HOSPITAL | Age: 80
End: 2020-01-21

## 2020-01-21 VITALS — BODY MASS INDEX: 26.12 KG/M2 | WEIGHT: 142.8 LBS

## 2020-01-21 DIAGNOSIS — N18.30 ANEMIA IN STAGE 3 CHRONIC KIDNEY DISEASE (HCC): ICD-10-CM

## 2020-01-21 DIAGNOSIS — N18.30 ANEMIA IN STAGE 3 CHRONIC KIDNEY DISEASE (HCC): Primary | ICD-10-CM

## 2020-01-21 DIAGNOSIS — D63.1 ANEMIA IN STAGE 3 CHRONIC KIDNEY DISEASE (HCC): ICD-10-CM

## 2020-01-21 DIAGNOSIS — D63.1 ANEMIA IN STAGE 3 CHRONIC KIDNEY DISEASE (HCC): Primary | ICD-10-CM

## 2020-01-21 DIAGNOSIS — N18.30 CHRONIC KIDNEY DISEASE, STAGE 3 (HCC): Primary | ICD-10-CM

## 2020-01-21 LAB
BASOPHILS # BLD AUTO: 0.01 10*3/MM3 (ref 0–0.2)
BASOPHILS NFR BLD AUTO: 0.3 % (ref 0–1.5)
DEPRECATED RDW RBC AUTO: 51.2 FL (ref 37–54)
EOSINOPHIL # BLD AUTO: 0.06 10*3/MM3 (ref 0–0.4)
EOSINOPHIL NFR BLD AUTO: 1.5 % (ref 0.3–6.2)
ERYTHROCYTE [DISTWIDTH] IN BLOOD BY AUTOMATED COUNT: 14.3 % (ref 12.3–15.4)
FERRITIN SERPL-MCNC: 605.5 NG/ML (ref 13–150)
HCT VFR BLD AUTO: 31.8 % (ref 34–46.6)
HGB BLD-MCNC: 9.9 G/DL (ref 12–15.9)
IMM GRANULOCYTES # BLD AUTO: 0.01 10*3/MM3 (ref 0–0.05)
IMM GRANULOCYTES NFR BLD AUTO: 0.3 % (ref 0–0.5)
IRON 24H UR-MRATE: 76 MCG/DL (ref 37–145)
IRON SATN MFR SERPL: 25 % (ref 14–48)
LYMPHOCYTES # BLD AUTO: 0.64 10*3/MM3 (ref 0.7–3.1)
LYMPHOCYTES NFR BLD AUTO: 16.3 % (ref 19.6–45.3)
MCH RBC QN AUTO: 30.1 PG (ref 26.6–33)
MCHC RBC AUTO-ENTMCNC: 31.1 G/DL (ref 31.5–35.7)
MCV RBC AUTO: 96.7 FL (ref 79–97)
MONOCYTES # BLD AUTO: 0.32 10*3/MM3 (ref 0.1–0.9)
MONOCYTES NFR BLD AUTO: 8.1 % (ref 5–12)
NEUTROPHILS # BLD AUTO: 2.89 10*3/MM3 (ref 1.7–7)
NEUTROPHILS NFR BLD AUTO: 73.5 % (ref 42.7–76)
NRBC BLD AUTO-RTO: 0 /100 WBC (ref 0–0.2)
PLATELET # BLD AUTO: 130 10*3/MM3 (ref 140–450)
PMV BLD AUTO: 9.5 FL (ref 6–12)
RBC # BLD AUTO: 3.29 10*6/MM3 (ref 3.77–5.28)
TIBC SERPL-MCNC: 304 MCG/DL (ref 249–505)
TRANSFERRIN SERPL-MCNC: 217 MG/DL (ref 200–360)
WBC NRBC COR # BLD: 3.93 10*3/MM3 (ref 3.4–10.8)

## 2020-01-21 PROCEDURE — 82728 ASSAY OF FERRITIN: CPT

## 2020-01-21 PROCEDURE — 83540 ASSAY OF IRON: CPT

## 2020-01-21 PROCEDURE — 25010000002 EPOETIN ALFA PER 1000 UNITS: Performed by: INTERNAL MEDICINE

## 2020-01-21 PROCEDURE — 96372 THER/PROPH/DIAG INJ SC/IM: CPT | Performed by: INTERNAL MEDICINE

## 2020-01-21 PROCEDURE — 84466 ASSAY OF TRANSFERRIN: CPT

## 2020-01-21 PROCEDURE — 85025 COMPLETE CBC W/AUTO DIFF WBC: CPT

## 2020-01-21 PROCEDURE — 36415 COLL VENOUS BLD VENIPUNCTURE: CPT

## 2020-01-21 RX ADMIN — ERYTHROPOIETIN 5000 UNITS: 20000 INJECTION, SOLUTION INTRAVENOUS; SUBCUTANEOUS at 13:25

## 2020-01-27 ENCOUNTER — ANTICOAGULATION VISIT (OUTPATIENT)
Dept: PHARMACY | Facility: HOSPITAL | Age: 80
End: 2020-01-27

## 2020-01-27 DIAGNOSIS — I48.20 CHRONIC ATRIAL FIBRILLATION (HCC): ICD-10-CM

## 2020-01-27 LAB
INR PPP: 2.1 (ref 0.91–1.09)
PROTHROMBIN TIME: 24.9 SECONDS (ref 10–13.8)

## 2020-01-27 PROCEDURE — 36416 COLLJ CAPILLARY BLOOD SPEC: CPT

## 2020-01-27 PROCEDURE — 85610 PROTHROMBIN TIME: CPT

## 2020-01-27 NOTE — PROGRESS NOTES
Anticoagulation Clinic Progress Note    Anticoagulation Summary  As of 2020    INR goal:   2.0-3.0   TTR:   61.5 % (1.2 y)   INR used for dosin.1 (2020)   Warfarin maintenance plan:   2 mg every Mon, Wed, Fri; 1 mg all other days   Weekly warfarin total:   10 mg   No change documented:   Taylor Shepherd   Plan last modified:   Vargas Butcher [facility] (2019)   Next INR check:   2/10/2020   Priority:   Critical   Target end date:   Indefinite    Indications    Chronic atrial fibrillation [I48.20]             Anticoagulation Episode Summary     INR check location:       Preferred lab:       Send INR reminders to:   [facility] CLINICAL POOL    Comments:         Anticoagulation Care Providers     Provider Role Specialty Phone number    Nik Galarza MD Referring Cardiology [phone]          Clinic Interview:      INR History:  Anticoagulation Monitoring 2019   INR 2.9 1.9 2.1   INR Date 2019   INR Goal 2.0-3.0 2.0-3.0 2.0-3.0   Trend Same Same Same   Last Week Total 10 mg 10 mg 10 mg   Next Week Total 10 mg 10 mg 10 mg   Sun 1 mg 1 mg 1 mg   Mon 2 mg 2 mg 2 mg   Tue 1 mg 1 mg 1 mg   Wed 2 mg 2 mg 2 mg   Thu 1 mg 1 mg 1 mg   Fri 2 mg 2 mg 2 mg   Sat 1 mg 1 mg 1 mg   Visit Report - - -   Some recent data might be hidden       Plan:  1. INR is therapeutic today- see above in Anticoagulation Summary.   Will instruct Janel Covingtons to continue their warfarin regimen- see above in Anticoagulation Summary.  2. Follow up in 2 weeks.  3. Patient declines warfarin refills.  4. Verbal and written information provided. Patient expresses understanding and has no further questions at this time.    Taylor Shepherd

## 2020-01-29 NOTE — OUTREACH NOTE
Care Plan Note      Responses   Annual Wellness Visit:   Patient Will Schedule [Reminded. Pt declines assistance w/scheduling.]   Care Gaps Addressed  Other (See Comment) [DXA]   Does patient have depression diagnosis?  No   Advanced Directives:  Patient Has   Medication Adherence  Medications understood   Goal Progress  Goal(s) Met   Health Literacy  Good        The main concerns and/or symptoms the patient would like to address are: ongoing outpt PT since revision of L TKR in 11/19/19, following by SNF rehab. Lives with spouse, uses a walker mostly out of the home to help with back pain from scoliosis. Uses a Cpap at night, but was leaking air around the mask. Mask was adjusted at pulmonology office 1/8/19, has been cleaned, will assess outcome tonight. Pt follows a DOMONIQUE, warfarin diet. Pt voiced compliance with medications, no further needs or questions at present, appreciated the call.     Education/instruction provided by Care Coordinator:  Informed of care advisor role, contact number.     Follow Up Outreach Due:  As needed    Al Moore RN  Ambulatory     1/9/2020, 11:38 AM     [FreeTextEntry1] : Mr. Roberts is a 75-year-old man with tardive dyskinesias - holli buccal lingual dyskinesias as well as generalized dyskinesias. Benztropine is not treating his tardive dyskinesias and he is currently on no antipsychotic medication. I recommend stopping benztropine and monitoring for any change in the dyskinesias. I recommend a trial of tetrabenazine or deutetrabenazine to be titrated as in the “Chart Note” from today.  Deutetrabenazine would be the preferred medication because of the increased risk of depression with tetrabenazine.  I explained the diagnosis and treatment plan to the patient's sister and she understands and agrees.\par

## 2020-02-04 RX ORDER — PANTOPRAZOLE SODIUM 40 MG/1
TABLET, DELAYED RELEASE ORAL
Qty: 180 TABLET | Refills: 0 | Status: SHIPPED | OUTPATIENT
Start: 2020-02-04 | End: 2020-04-07

## 2020-02-10 ENCOUNTER — ANTICOAGULATION VISIT (OUTPATIENT)
Dept: PHARMACY | Facility: HOSPITAL | Age: 80
End: 2020-02-10

## 2020-02-10 DIAGNOSIS — I48.20 CHRONIC ATRIAL FIBRILLATION (HCC): ICD-10-CM

## 2020-02-10 LAB
INR PPP: 1.8 (ref 0.91–1.09)
PROTHROMBIN TIME: 21.4 SECONDS (ref 10–13.8)

## 2020-02-10 PROCEDURE — G0463 HOSPITAL OUTPT CLINIC VISIT: HCPCS

## 2020-02-10 PROCEDURE — 36416 COLLJ CAPILLARY BLOOD SPEC: CPT

## 2020-02-10 PROCEDURE — 85610 PROTHROMBIN TIME: CPT

## 2020-02-10 NOTE — PROGRESS NOTES
Anticoagulation Clinic Progress Note    Anticoagulation Summary  As of 2/10/2020    INR goal:   2.0-3.0   TTR:   60.6 % (1.2 y)   INR used for dosin.8! (2/10/2020)   Warfarin maintenance plan:   1 mg every Tue, Thu, Sat; 2 mg all other days   Weekly warfarin total:   11 mg   Plan last modified:   Ana Gandara RPH (2/10/2020)   Next INR check:   2020   Priority:   Critical   Target end date:   Indefinite    Indications    Chronic atrial fibrillation [I48.20]             Anticoagulation Episode Summary     INR check location:       Preferred lab:       Send INR reminders to:    [facility]    Comments:         Anticoagulation Care Providers     Provider Role Specialty Phone number    Nik Galarza MD Referring Cardiology [phone]          Clinic Interview:  Patient Findings     Negatives:   Signs/symptoms of thrombosis, Signs/symptoms of bleeding,   Laboratory test error suspected, Change in health, Change in alcohol use,   Change in activity, Upcoming invasive procedure, Emergency department   visit, Upcoming dental procedure, Missed doses, Extra doses, Change in   medications, Change in diet/appetite, Hospital admission, Bruising, Other   complaints      Clinical Outcomes     Negatives:   Major bleeding event, Thromboembolic event,   Anticoagulation-related hospital admission, Anticoagulation-related ED   visit, Anticoagulation-related fatality        INR History:  Anticoagulation Monitoring 2020 2020 2/10/2020   INR 1.9 2.1 1.8   INR Date 2020 2020 2/10/2020   INR Goal 2.0-3.0 2.0-3.0 2.0-3.0   Trend Same Same Up   Last Week Total 10 mg 10 mg 10 mg   Next Week Total 10 mg 10 mg 12 mg   Sun 1 mg 1 mg 2 mg   Mon 2 mg 2 mg 3 mg (2/10); Otherwise 2 mg   Tue 1 mg 1 mg 1 mg   Wed 2 mg 2 mg 2 mg   Thu 1 mg 1 mg 1 mg   Fri 2 mg 2 mg 2 mg   Sat 1 mg 1 mg 1 mg   Visit Report - - -   Some recent data might be hidden       Plan:  1. INR is Supratherapeutic today- see  above in Anticoagulation Summary.  Will instruct Janel Mahoney to Change their warfarin regimen- see above in Anticoagulation Summary. Boost today with 3 mg then increase weekly regimen by 10%.(1 mg every Tue, Thu, Sat; 2 mg all other days)  2. Follow up in 2 weeks  3. Patient declines warfarin refills.  4. Verbal and written information provided. Patient expresses understanding and has no further questions at this time.    Ana Gandara [facility]

## 2020-02-17 RX ORDER — CARVEDILOL 25 MG/1
TABLET ORAL
Qty: 180 TABLET | Refills: 0 | Status: SHIPPED | OUTPATIENT
Start: 2020-02-17 | End: 2020-04-20

## 2020-02-17 RX ORDER — ZOLPIDEM TARTRATE 10 MG/1
TABLET ORAL
Qty: 45 TABLET | Refills: 0 | OUTPATIENT
Start: 2020-02-17

## 2020-02-18 ENCOUNTER — APPOINTMENT (OUTPATIENT)
Dept: ONCOLOGY | Facility: HOSPITAL | Age: 80
End: 2020-02-18

## 2020-02-18 ENCOUNTER — APPOINTMENT (OUTPATIENT)
Dept: LAB | Facility: HOSPITAL | Age: 80
End: 2020-02-18

## 2020-02-19 ENCOUNTER — INFUSION (OUTPATIENT)
Dept: ONCOLOGY | Facility: HOSPITAL | Age: 80
End: 2020-02-19

## 2020-02-19 ENCOUNTER — TELEPHONE (OUTPATIENT)
Dept: FAMILY MEDICINE CLINIC | Facility: CLINIC | Age: 80
End: 2020-02-19

## 2020-02-19 ENCOUNTER — LAB (OUTPATIENT)
Dept: LAB | Facility: HOSPITAL | Age: 80
End: 2020-02-19

## 2020-02-19 ENCOUNTER — OFFICE VISIT (OUTPATIENT)
Dept: ONCOLOGY | Facility: CLINIC | Age: 80
End: 2020-02-19

## 2020-02-19 VITALS
RESPIRATION RATE: 16 BRPM | BODY MASS INDEX: 26.92 KG/M2 | TEMPERATURE: 98.3 F | DIASTOLIC BLOOD PRESSURE: 58 MMHG | WEIGHT: 146.3 LBS | OXYGEN SATURATION: 100 % | SYSTOLIC BLOOD PRESSURE: 114 MMHG | HEART RATE: 60 BPM | HEIGHT: 62 IN

## 2020-02-19 DIAGNOSIS — N18.30 CHRONIC KIDNEY DISEASE, STAGE 3 (HCC): Primary | ICD-10-CM

## 2020-02-19 DIAGNOSIS — N18.30 ANEMIA IN STAGE 3 CHRONIC KIDNEY DISEASE (HCC): ICD-10-CM

## 2020-02-19 DIAGNOSIS — N18.30 ANEMIA IN STAGE 3 CHRONIC KIDNEY DISEASE (HCC): Primary | ICD-10-CM

## 2020-02-19 DIAGNOSIS — E53.8 B12 DEFICIENCY: ICD-10-CM

## 2020-02-19 DIAGNOSIS — D63.1 ANEMIA IN STAGE 3 CHRONIC KIDNEY DISEASE (HCC): ICD-10-CM

## 2020-02-19 DIAGNOSIS — D69.6 THROMBOCYTOPENIA (HCC): ICD-10-CM

## 2020-02-19 DIAGNOSIS — D63.1 ANEMIA IN STAGE 3 CHRONIC KIDNEY DISEASE (HCC): Primary | ICD-10-CM

## 2020-02-19 LAB
BASOPHILS # BLD AUTO: 0.02 10*3/MM3 (ref 0–0.2)
BASOPHILS NFR BLD AUTO: 0.4 % (ref 0–1.5)
DEPRECATED RDW RBC AUTO: 53.8 FL (ref 37–54)
EOSINOPHIL # BLD AUTO: 0.11 10*3/MM3 (ref 0–0.4)
EOSINOPHIL NFR BLD AUTO: 2.4 % (ref 0.3–6.2)
ERYTHROCYTE [DISTWIDTH] IN BLOOD BY AUTOMATED COUNT: 14.6 % (ref 12.3–15.4)
FERRITIN SERPL-MCNC: 452.8 NG/ML (ref 13–150)
HCT VFR BLD AUTO: 30.7 % (ref 34–46.6)
HGB BLD-MCNC: 9.4 G/DL (ref 12–15.9)
IMM GRANULOCYTES # BLD AUTO: 0.01 10*3/MM3 (ref 0–0.05)
IMM GRANULOCYTES NFR BLD AUTO: 0.2 % (ref 0–0.5)
IRON 24H UR-MRATE: 64 MCG/DL (ref 37–145)
IRON SATN MFR SERPL: 22 % (ref 14–48)
LYMPHOCYTES # BLD AUTO: 0.69 10*3/MM3 (ref 0.7–3.1)
LYMPHOCYTES NFR BLD AUTO: 15.2 % (ref 19.6–45.3)
MCH RBC QN AUTO: 30.7 PG (ref 26.6–33)
MCHC RBC AUTO-ENTMCNC: 30.6 G/DL (ref 31.5–35.7)
MCV RBC AUTO: 100.3 FL (ref 79–97)
MONOCYTES # BLD AUTO: 0.34 10*3/MM3 (ref 0.1–0.9)
MONOCYTES NFR BLD AUTO: 7.5 % (ref 5–12)
NEUTROPHILS # BLD AUTO: 3.38 10*3/MM3 (ref 1.7–7)
NEUTROPHILS NFR BLD AUTO: 74.3 % (ref 42.7–76)
NRBC BLD AUTO-RTO: 0 /100 WBC (ref 0–0.2)
PLATELET # BLD AUTO: 123 10*3/MM3 (ref 140–450)
PMV BLD AUTO: 9.5 FL (ref 6–12)
RBC # BLD AUTO: 3.06 10*6/MM3 (ref 3.77–5.28)
TIBC SERPL-MCNC: 294 MCG/DL (ref 249–505)
TRANSFERRIN SERPL-MCNC: 210 MG/DL (ref 200–360)
WBC NRBC COR # BLD: 4.55 10*3/MM3 (ref 3.4–10.8)

## 2020-02-19 PROCEDURE — 82728 ASSAY OF FERRITIN: CPT

## 2020-02-19 PROCEDURE — 85025 COMPLETE CBC W/AUTO DIFF WBC: CPT

## 2020-02-19 PROCEDURE — 36415 COLL VENOUS BLD VENIPUNCTURE: CPT

## 2020-02-19 PROCEDURE — 84466 ASSAY OF TRANSFERRIN: CPT

## 2020-02-19 PROCEDURE — 96372 THER/PROPH/DIAG INJ SC/IM: CPT

## 2020-02-19 PROCEDURE — 25010000002 EPOETIN ALFA PER 1000 UNITS: Performed by: NURSE PRACTITIONER

## 2020-02-19 PROCEDURE — 99213 OFFICE O/P EST LOW 20 MIN: CPT | Performed by: NURSE PRACTITIONER

## 2020-02-19 PROCEDURE — 83540 ASSAY OF IRON: CPT

## 2020-02-19 RX ORDER — RAMIPRIL 5 MG/1
5 CAPSULE ORAL DAILY
Qty: 90 CAPSULE | Refills: 1 | Status: SHIPPED | OUTPATIENT
Start: 2020-02-19 | End: 2020-06-22

## 2020-02-19 RX ORDER — ZOLPIDEM TARTRATE 10 MG/1
10 TABLET ORAL NIGHTLY PRN
Qty: 30 TABLET | Refills: 3 | Status: SHIPPED | OUTPATIENT
Start: 2020-02-19 | End: 2020-03-09 | Stop reason: SDUPTHER

## 2020-02-19 RX ADMIN — ERYTHROPOIETIN 5000 UNITS: 20000 INJECTION, SOLUTION INTRAVENOUS; SUBCUTANEOUS at 15:53

## 2020-02-19 NOTE — PROGRESS NOTES
Subjective     CHIEF COMPLAINT:      Chief Complaint   Patient presents with   • Follow-up     monthly procrit.       HISTORY OF PRESENT ILLNESS:     Janel Mahoney is a 79 y.o. female above-mentioned history, who returns the office today for follow-up on her anemia.  She continues to receive Procrit monthly.  She is tolerating this well and notes slight improvement in her energy.  We also continue to monitor platelet count for slight thrombocytopenia.  She is without signs or symptoms of bleeding.  She does continue on warfarin managed by cardiology.  She has arthritic pain which is unchanged from her baseline.      I have reviewed the patient's medical history in detail and updated the computerized patient record.    REVIEW OF SYSTEMS:  Review of Systems   Constitutional: Positive for fatigue. Negative for chills, fever and unexpected weight change.   HENT: Negative for mouth sores, nosebleeds, sore throat and voice change.    Eyes: Negative for visual disturbance.   Respiratory: Negative for cough and shortness of breath.    Cardiovascular: Negative for chest pain and leg swelling.   Gastrointestinal: Negative for abdominal pain, blood in stool, constipation, diarrhea, nausea and vomiting.   Endocrine: Positive for cold intolerance.   Genitourinary: Negative for dysuria, frequency and hematuria.   Musculoskeletal: Positive for back pain. Negative for arthralgias and joint swelling.   Skin: Negative for rash.   Neurological: Negative for dizziness, numbness and headaches.   Hematological: Negative for adenopathy. Bruises/bleeds easily.   Psychiatric/Behavioral: Negative for dysphoric mood. The patient is not nervous/anxious.    Review of systems 02/19/2020  unchanged from previous office visit except as updated.      MEDICATIONS:    Current Outpatient Medications:   •  alendronate (FOSAMAX) 70 MG tablet, Take 70 mg by mouth Every 14 (Fourteen) Days., Disp: , Rfl:   •  aspirin 81 MG tablet, Take 81 mg by mouth  Daily. PT CALLING TO SEE IF SHE HAS TO STOP PRIOR TO SURGERY, Disp: , Rfl:   •  bumetanide (BUMEX) 2 MG tablet, Take 2 mg by mouth 2 (Two) Times a Day., Disp: , Rfl:   •  calcitriol (ROCALTROL) 0.25 MCG capsule, Take 0.25 mcg by mouth 2 (Two) Times a Day., Disp: , Rfl:   •  Carboxymethylcellulose Sodium (EYE DROPS OP), Apply  to eye(s) as directed by provider., Disp: , Rfl:   •  carvedilol (COREG) 25 MG tablet, TAKE 1 TABLET TWICE DAILY, Disp: 180 tablet, Rfl: 0  •  Cholecalciferol (VITAMIN D) 2000 UNITS tablet, Take 2,000 Units by mouth Daily., Disp: , Rfl:   •  COLCRYS 0.6 MG tablet, Take 0.6 mg by mouth Every Other Day., Disp: , Rfl:   •  epoetin adele (EPOGEN,PROCRIT) 99519 UNIT/ML injection, Inject 10,000 Units under the skin into the appropriate area as directed As Needed., Disp: , Rfl:   •  febuxostat (ULORIC) 40 MG tablet, Take 80 mg by mouth Daily., Disp: , Rfl:   •  ferrous sulfate 325 (65 FE) MG tablet, Take 1 tablet by mouth Every Other Day., Disp: , Rfl:   •  HYDROcodone-acetaminophen (NORCO) 5-325 MG per tablet, Take 1 tablet by mouth Daily As Needed., Disp: , Rfl:   •  Multiple Vitamins-Minerals (SENIOR MULTIVITAMIN PLUS) tablet, Take 1 tablet by mouth Daily., Disp: , Rfl:   •  pantoprazole (PROTONIX) 40 MG EC tablet, TAKE 1 TABLET TWICE DAILY, Disp: 180 tablet, Rfl: 0  •  simvastatin (ZOCOR) 20 MG tablet, Take 20 mg by mouth Every Night., Disp: , Rfl:   •  traMADol (ULTRAM) 50 MG tablet, Take 100 mg by mouth 2 (Two) Times a Day., Disp: , Rfl:   •  vitamin B-12 (CYANOCOBALAMIN) 1000 MCG tablet, Take 1,000 mcg by mouth Daily., Disp: , Rfl:   •  warfarin (COUMADIN) 1 MG tablet, Take 1 mg by mouth Daily. DOSES VARY WEEKLY BASED ON INR DR LAWRENCE INSTRUCTED TO STOP 5 DAYS PRIOR TO SURGERY, Disp: , Rfl:   •  zolpidem (AMBIEN) 10 MG tablet, Take 1 tablet by mouth At Night As Needed for Sleep., Disp: 30 tablet, Rfl: 3  •  ramipril (ALTACE) 5 MG capsule, Take 1 capsule by mouth Daily., Disp: 90 capsule, Rfl:  "1  No current facility-administered medications for this visit.     ALLERGIES:  Allergies   Allergen Reactions   • Diclofenac GI Bleeding     She had adverse effects from the medications that required hospitalization. Pt got stomach ulcers from this medication prescribed by Dr. Arango - pediatrist.         Objective   VITAL SIGNS:     Vitals:    02/19/20 1517   BP: 114/58   Pulse: 60   Resp: 16   Temp: 98.3 °F (36.8 °C)   SpO2: 100%   Weight: 66.4 kg (146 lb 4.8 oz)   Height: 157.5 cm (62.01\")   PainSc:   8   PainLoc: Generalized  Comment: back and left knee     Body mass index is 26.75 kg/m².     Wt Readings from Last 3 Encounters:   02/19/20 66.4 kg (146 lb 4.8 oz)   01/21/20 64.8 kg (142 lb 12.8 oz)   01/08/20 63.5 kg (140 lb)       PHYSICAL EXAMINATION:  GENERAL:  The patient appears in good general condition, not in acute distress.  Ambulating with a walker, seen today with her   SKIN: Warm and dry. No skin rashes. Old upper extremity ecchymosis.  HEAD:  Normocephalic.  EYES:  No Jaundice. No Pallor.   CHEST: Normal respiratory effort. Lungs clear to auscultation.   CARDIAC:  Normal S1 & S2. No murmur. No edema.  ABDOMEN:  Non-distended.  EXTREMITIES:  No clubbing. Knee swelling L>R. No Calf tenderness.  NEUROLOGICAL:  No Focal neurological deficits.  Physical exam 02/19/2020  unchanged from previous office visit except as updated.       DIAGNOSTIC DATA:     Results from last 7 days   Lab Units 02/19/20  1510   WBC 10*3/mm3 4.55   NEUTROS ABS 10*3/mm3 3.38   HEMOGLOBIN g/dL 9.4*   HEMATOCRIT % 30.7*   PLATELETS 10*3/mm3 123*         Assessment/Plan   1.  Anemia of chronic kidney disease, stage III.  She is on Procrit.  The last dose she received was 5000 units 1/21/2020 for hemoglobin of 9.9.  Hemoglobin is 9.4 today, she will proceed with Procrit and continue monthly.    2.  Thrombocytopenia likely due to Vitamin B12 deficiency.  Platelets are mildly low but stable. She has easy bruising but this is " attributed to her coumadin.  Platelet count 123,000 today.    PLAN:    1. Proceed with Procrit thousand units today.  2. Continue ferrous sulfate every other day  3. Return monthly for CBC and possible Procrit  4. MD follow-up with Dr. Vasquez in 3 months.      Itzel Murray, APRN  02/19/20

## 2020-02-25 ENCOUNTER — ANTICOAGULATION VISIT (OUTPATIENT)
Dept: PHARMACY | Facility: HOSPITAL | Age: 80
End: 2020-02-25

## 2020-02-25 DIAGNOSIS — I48.20 CHRONIC ATRIAL FIBRILLATION (HCC): ICD-10-CM

## 2020-02-25 LAB
INR PPP: 2.3 (ref 0.91–1.09)
PROTHROMBIN TIME: 27.3 SECONDS (ref 10–13.8)

## 2020-02-25 PROCEDURE — 36416 COLLJ CAPILLARY BLOOD SPEC: CPT

## 2020-02-25 PROCEDURE — 85610 PROTHROMBIN TIME: CPT

## 2020-02-25 NOTE — PROGRESS NOTES
Anticoagulation Clinic Progress Note    Anticoagulation Summary  As of 2020    INR goal:   2.0-3.0   TTR:   60.6 % (1.3 y)   INR used for dosin.3 (2020)   Warfarin maintenance plan:   1 mg every Tue, Thu, Sat; 2 mg all other days   Weekly warfarin total:   11 mg   No change documented:   Taylor Shepherd   Plan last modified:   Ana Gandara RPH (2/10/2020)   Next INR check:   3/10/2020   Priority:   Critical   Target end date:   Indefinite    Indications    Chronic atrial fibrillation [I48.20]             Anticoagulation Episode Summary     INR check location:       Preferred lab:       Send INR reminders to:    [facility]    Comments:         Anticoagulation Care Providers     Provider Role Specialty Phone number    Nik Galarza MD Referring Cardiology [phone]          Clinic Interview:  Patient Findings     Negatives:   Signs/symptoms of thrombosis, Signs/symptoms of bleeding,   Laboratory test error suspected, Change in health, Change in alcohol use,   Change in activity, Upcoming invasive procedure, Emergency department   visit, Upcoming dental procedure, Missed doses, Extra doses, Change in   medications, Change in diet/appetite, Hospital admission, Bruising, Other   complaints      Clinical Outcomes     Negatives:   Major bleeding event, Thromboembolic event,   Anticoagulation-related hospital admission, Anticoagulation-related ED   visit, Anticoagulation-related fatality        INR History:  Anticoagulation Monitoring 2020 2/10/2020 2020   INR 2.1 1.8 2.3   INR Date 2020 2/10/2020 2020   INR Goal 2.0-3.0 2.0-3.0 2.0-3.0   Trend Same Up Same   Last Week Total 10 mg 10 mg 11 mg   Next Week Total 10 mg 12 mg 11 mg   Sun 1 mg 2 mg 2 mg   Mon 2 mg 3 mg (2/10); Otherwise 2 mg 2 mg   Tue 1 mg 1 mg 1 mg   Wed 2 mg 2 mg 2 mg   Thu 1 mg 1 mg 1 mg   Fri 2 mg 2 mg 2 mg   Sat 1 mg 1 mg 1 mg   Visit Report - - -   Some recent data might be hidden        Plan:  1. INR is therapeutic today- see above in Anticoagulation Summary.   Will instruct Janel Mahoney to continue their warfarin regimen- see above in Anticoagulation Summary.  2. Follow up in 2 weeks.  3. Patient declines warfarin refills.  4. Verbal and written information provided. Patient expresses understanding and has no further questions at this time.    Taylor Shepherd

## 2020-02-26 ENCOUNTER — CLINICAL SUPPORT NO REQUIREMENTS (OUTPATIENT)
Dept: CARDIOLOGY | Facility: CLINIC | Age: 80
End: 2020-02-26

## 2020-02-26 ENCOUNTER — TELEPHONE (OUTPATIENT)
Dept: CARDIOLOGY | Facility: CLINIC | Age: 80
End: 2020-02-26

## 2020-02-26 DIAGNOSIS — I47.20 V-TACH (HCC): Primary | ICD-10-CM

## 2020-02-26 PROCEDURE — 93295 DEV INTERROG REMOTE 1/2/MLT: CPT | Performed by: INTERNAL MEDICINE

## 2020-02-26 PROCEDURE — 93296 REM INTERROG EVL PM/IDS: CPT | Performed by: INTERNAL MEDICINE

## 2020-02-26 NOTE — TELEPHONE ENCOUNTER
Alert transmission received on CRT-D. Alert is for patient received ATP to convert arrhythmia on 2/26/2020 @ 0250. EGM is VT, with 1 sequence of ATP delivered, successfully converting arrhythmia. I called patient with results, she denies symptoms with episode.

## 2020-03-03 ENCOUNTER — CLINICAL SUPPORT NO REQUIREMENTS (OUTPATIENT)
Dept: CARDIOLOGY | Facility: CLINIC | Age: 80
End: 2020-03-03

## 2020-03-03 ENCOUNTER — TELEPHONE (OUTPATIENT)
Dept: CARDIOLOGY | Facility: CLINIC | Age: 80
End: 2020-03-03

## 2020-03-03 DIAGNOSIS — I47.20 V-TACH (HCC): Primary | ICD-10-CM

## 2020-03-04 RX ORDER — TRAMADOL HYDROCHLORIDE 50 MG/1
100 TABLET ORAL 2 TIMES DAILY
Qty: 180 TABLET | Refills: 1 | Status: SHIPPED | OUTPATIENT
Start: 2020-03-04 | End: 2020-08-10 | Stop reason: SDUPTHER

## 2020-03-09 ENCOUNTER — TELEPHONE (OUTPATIENT)
Dept: FAMILY MEDICINE CLINIC | Facility: CLINIC | Age: 80
End: 2020-03-09

## 2020-03-09 ENCOUNTER — ANTICOAGULATION VISIT (OUTPATIENT)
Dept: PHARMACY | Facility: HOSPITAL | Age: 80
End: 2020-03-09

## 2020-03-09 DIAGNOSIS — I48.20 CHRONIC ATRIAL FIBRILLATION (HCC): ICD-10-CM

## 2020-03-09 LAB
INR PPP: 2 (ref 0.91–1.09)
PROTHROMBIN TIME: 24.5 SECONDS (ref 10–13.8)

## 2020-03-09 PROCEDURE — 85610 PROTHROMBIN TIME: CPT

## 2020-03-09 PROCEDURE — 36416 COLLJ CAPILLARY BLOOD SPEC: CPT

## 2020-03-09 RX ORDER — ZOLPIDEM TARTRATE 10 MG/1
10 TABLET ORAL NIGHTLY PRN
Qty: 90 TABLET | Refills: 1 | Status: SHIPPED | OUTPATIENT
Start: 2020-03-09 | End: 2020-09-08 | Stop reason: SDUPTHER

## 2020-03-09 RX ORDER — ZOLPIDEM TARTRATE 10 MG/1
10 TABLET ORAL NIGHTLY PRN
Qty: 90 TABLET | Refills: 1 | Status: CANCELLED | OUTPATIENT
Start: 2020-03-09

## 2020-03-09 NOTE — PROGRESS NOTES
Anticoagulation Clinic Progress Note    Anticoagulation Summary  As of 3/9/2020    INR goal:   2.0-3.0   TTR:   61.7 % (1.3 y)   INR used for dosin.0 (3/9/2020)   Warfarin maintenance plan:   1 mg every Tue, Thu, Sat; 2 mg all other days   Weekly warfarin total:   11 mg   No change documented:   Taylor Shepherd   Plan last modified:   Ana Gandara [facility] (2/10/2020)   Next INR check:   3/30/2020   Priority:   Critical   Target end date:   Indefinite    Indications    Chronic atrial fibrillation [I48.20]             Anticoagulation Episode Summary     INR check location:       Preferred lab:       [facility]ers [facility]    [facility]    Comments:         Anticoagulation Care Providers     Provider Role Specialty Phone number    Nik Galarza MD Referring Cardiology [phone]          Clinic Interview:      INR History:  Anticoagulation Monitoring 2/10/2020 2020 3/9/2020   INR 1.8 2.3 2.0   INR Date 2/10/2020 2020 3/9/2020   INR Goal 2.0-3.0 2.0-3.0 2.0-3.0   Trend Up Same Same   Last Week Total 10 mg 11 mg 11 mg   Next Week Total 12 mg 11 mg 11 mg   Sun 2 mg 2 mg 2 mg   Mon 3 mg (2/10); Otherwise 2 mg 2 mg 2 mg   Tue 1 mg 1 mg 1 mg   Wed 2 mg 2 mg 2 mg   Thu 1 mg 1 mg 1 mg   Fri 2 mg 2 mg 2 mg   Sat 1 mg 1 mg 1 mg   Visit Report - - -   Some recent data might be hidden       Plan:  1. INR is therapeutic today- see above in Anticoagulation Summary.   Will instruct Janel Mahoney to continue their warfarin regimen- see above in Anticoagulation Summary.  2. Follow up in 3 weeks.  3. Patient declines warfarin refills.  4. Verbal and written information provided. Patient expresses understanding and has no further questions at this time.    Taylor Shepherd

## 2020-03-09 NOTE — TELEPHONE ENCOUNTER
[facility] pharmacy sent refill request for Zolpidem 10mg be refilled. Patient last seen 10/17/19 needs contract and XENA Quigley is in chart.

## 2020-03-09 NOTE — TELEPHONE ENCOUNTER
Pts  called about her medication refill request from [facility] and would like a call back to discuss this as soon as possible. Please send a refill to [facility].         pts  Russ can be contacted at [phone] or [phone]

## 2020-03-17 ENCOUNTER — INFUSION (OUTPATIENT)
Dept: ONCOLOGY | Facility: HOSPITAL | Age: 80
End: 2020-03-17

## 2020-03-17 ENCOUNTER — LAB (OUTPATIENT)
Dept: LAB | Facility: HOSPITAL | Age: 80
End: 2020-03-17

## 2020-03-17 DIAGNOSIS — D69.6 THROMBOCYTOPENIA (HCC): ICD-10-CM

## 2020-03-17 DIAGNOSIS — N18.30 ANEMIA IN STAGE 3 CHRONIC KIDNEY DISEASE (HCC): ICD-10-CM

## 2020-03-17 DIAGNOSIS — E53.8 B12 DEFICIENCY: ICD-10-CM

## 2020-03-17 DIAGNOSIS — D63.1 ANEMIA IN STAGE 3 CHRONIC KIDNEY DISEASE (HCC): ICD-10-CM

## 2020-03-17 LAB
BASOPHILS # BLD AUTO: 0.02 10*3/MM3 (ref 0–0.2)
BASOPHILS NFR BLD AUTO: 0.4 % (ref 0–1.5)
DEPRECATED RDW RBC AUTO: 46.6 FL (ref 37–54)
EOSINOPHIL # BLD AUTO: 0.14 10*3/MM3 (ref 0–0.4)
EOSINOPHIL NFR BLD AUTO: 2.6 % (ref 0.3–6.2)
ERYTHROCYTE [DISTWIDTH] IN BLOOD BY AUTOMATED COUNT: 13.8 % (ref 12.3–15.4)
HCT VFR BLD AUTO: 33.8 % (ref 34–46.6)
HGB BLD-MCNC: 11 G/DL (ref 12–15.9)
IMM GRANULOCYTES # BLD AUTO: 0.04 10*3/MM3 (ref 0–0.05)
IMM GRANULOCYTES NFR BLD AUTO: 0.7 % (ref 0–0.5)
LYMPHOCYTES # BLD AUTO: 0.8 10*3/MM3 (ref 0.7–3.1)
LYMPHOCYTES NFR BLD AUTO: 15 % (ref 19.6–45.3)
MCH RBC QN AUTO: 30.6 PG (ref 26.6–33)
MCHC RBC AUTO-ENTMCNC: 32.5 G/DL (ref 31.5–35.7)
MCV RBC AUTO: 93.9 FL (ref 79–97)
MONOCYTES # BLD AUTO: 0.39 10*3/MM3 (ref 0.1–0.9)
MONOCYTES NFR BLD AUTO: 7.3 % (ref 5–12)
NEUTROPHILS # BLD AUTO: 3.95 10*3/MM3 (ref 1.7–7)
NEUTROPHILS NFR BLD AUTO: 74 % (ref 42.7–76)
NRBC BLD AUTO-RTO: 0 /100 WBC (ref 0–0.2)
PLATELET # BLD AUTO: 103 10*3/MM3 (ref 140–450)
PMV BLD AUTO: 10.1 FL (ref 6–12)
RBC # BLD AUTO: 3.6 10*6/MM3 (ref 3.77–5.28)
WBC NRBC COR # BLD: 5.34 10*3/MM3 (ref 3.4–10.8)

## 2020-03-17 PROCEDURE — G0463 HOSPITAL OUTPT CLINIC VISIT: HCPCS

## 2020-03-17 PROCEDURE — 36415 COLL VENOUS BLD VENIPUNCTURE: CPT

## 2020-03-17 PROCEDURE — 85025 COMPLETE CBC W/AUTO DIFF WBC: CPT

## 2020-03-17 NOTE — PROGRESS NOTES
Hgb today is 11.0.  No procrit today per guidelines.   Patient without questions or concerns at this time.   Given copy of labs.

## 2020-03-23 ENCOUNTER — CLINICAL SUPPORT NO REQUIREMENTS (OUTPATIENT)
Dept: CARDIOLOGY | Facility: CLINIC | Age: 80
End: 2020-03-23

## 2020-03-23 ENCOUNTER — TELEPHONE (OUTPATIENT)
Dept: CARDIOLOGY | Facility: CLINIC | Age: 80
End: 2020-03-23

## 2020-03-23 DIAGNOSIS — I25.5 ISCHEMIC CARDIOMYOPATHY: Primary | ICD-10-CM

## 2020-03-23 NOTE — TELEPHONE ENCOUNTER
Wouldn't she have to come for an ECG? She has multiple comorbidities. Could she wait to do this in months or do you think now?

## 2020-03-23 NOTE — TELEPHONE ENCOUNTER
I would start oral amiodarone. 400 mg BID x 1 week, then 200 mg daily.  I don't see where she has recent TSH, T4. Also would need to adjust her warfarin down.

## 2020-03-23 NOTE — TELEPHONE ENCOUNTER
Called pt will continue same for now she will need to see me in 3 months.  She also is to get epidural but will need to be bridged with lovenox.XAVI

## 2020-03-23 NOTE — TELEPHONE ENCOUNTER
Alert from home monitor due to VT with successful atp that occurred yesterday morning at 1036.  I called pt and lm for her to call pacemaker dept.  Dr. Galarza and Dr. Boucher patient.  This is the 3rd time she has received therapy since 2/26/2020.

## 2020-03-23 NOTE — TELEPHONE ENCOUNTER
Spoke with patient and her epidural has been postponed. No reschedule date until virus issue resolved.    We are still managing her warfarin so we will watch for update on epidural. She will need updated BMP if enoxaparin is being considered.

## 2020-03-23 NOTE — TELEPHONE ENCOUNTER
Vargas,  Please see Dr. Galarza's note regarding Ms. Denzel and Lovenox.  I believe pt's epidural is scheduled for 4/01/2020.  Thanks-Rosemarie

## 2020-03-27 DIAGNOSIS — I48.20 CHRONIC ATRIAL FIBRILLATION (HCC): Primary | ICD-10-CM

## 2020-04-01 ENCOUNTER — CLINICAL SUPPORT NO REQUIREMENTS (OUTPATIENT)
Dept: CARDIOLOGY | Facility: CLINIC | Age: 80
End: 2020-04-01

## 2020-04-01 ENCOUNTER — TELEPHONE (OUTPATIENT)
Dept: CARDIOLOGY | Facility: CLINIC | Age: 80
End: 2020-04-01

## 2020-04-01 DIAGNOSIS — I47.20 V-TACH (HCC): Primary | ICD-10-CM

## 2020-04-01 NOTE — TELEPHONE ENCOUNTER
She does not have an upcoming appt with Dr. Boucher at this time. How soon does she need to see him?

## 2020-04-01 NOTE — TELEPHONE ENCOUNTER
FYI- Patient had another alert transmission.     Alert is for VT episode that occurred on 3/31/20 @ 1751, avg v-rates 160's-170's bpm, patient received ATP x2, arrhythmia successfully converted after second ATP delivery, total length of episode 32 secs. Since last alert transmission on 3/23/20, patient has also had 3 NST VT episodes as well, no EGM's available to review.     I called patient with results. Patient's daughter states that she is still asleep, but if she had symptoms, she will have her call our office.     Patient has upcoming appt with NICKY Manning on 6/19/20.

## 2020-04-02 ENCOUNTER — APPOINTMENT (OUTPATIENT)
Dept: SLEEP MEDICINE | Facility: HOSPITAL | Age: 80
End: 2020-04-02

## 2020-04-03 ENCOUNTER — TELEPHONE (OUTPATIENT)
Dept: PHARMACY | Facility: HOSPITAL | Age: 80
End: 2020-04-03

## 2020-04-03 ENCOUNTER — ANTICOAGULATION VISIT (OUTPATIENT)
Dept: PHARMACY | Facility: HOSPITAL | Age: 80
End: 2020-04-03

## 2020-04-03 DIAGNOSIS — I48.20 CHRONIC ATRIAL FIBRILLATION (HCC): ICD-10-CM

## 2020-04-03 LAB — INR PPP: 1.5

## 2020-04-03 NOTE — TELEPHONE ENCOUNTER
Kevin Jauregui [facility]    INR: 1.5  PT: 17.9    1MG Tabs    2 Mg - Sun, M, W, F  1 Mg - All other days

## 2020-04-03 NOTE — PROGRESS NOTES
Anticoagulation Clinic Progress Note    Anticoagulation Summary  As of 4/3/2020    INR goal:   2.0-3.0   TTR:   58.7 % (1.4 y)   INR used for dosin.50! (4/3/2020)   Warfarin maintenance plan:   1 mg every Tue, Thu, Sat; 2 mg all other days   Weekly warfarin total:   11 mg   Plan last modified:   Ana Gandara RPH (2/10/2020)   Next INR check:   2020   Priority:   Critical   Target end date:   Indefinite    Indications    Chronic atrial fibrillation [I48.20]             Anticoagulation Episode Summary     INR check location:       Preferred lab:       Send INR reminders to:    [facility]    Comments:         Anticoagulation Care Providers     Provider Role Specialty Phone number    Nik Galarza MD Referring Cardiology [phone]          Clinic Interview:  Patient Findings     Negatives:   Signs/symptoms of thrombosis, Signs/symptoms of bleeding,   Laboratory test error suspected, Change in health, Change in alcohol use,   Change in activity, Upcoming invasive procedure, Emergency department   visit, Upcoming dental procedure, Missed doses, Extra doses, Change in   medications, Change in diet/appetite, Hospital admission, Bruising, Other   complaints      Clinical Outcomes     Negatives:   Major bleeding event, Thromboembolic event,   Anticoagulation-related hospital admission, Anticoagulation-related ED   visit, Anticoagulation-related fatality        INR History:  Anticoagulation Monitoring 2020 3/9/2020 4/3/2020   INR 2.3 2.0 1.50   INR Date 2020 3/9/2020 4/3/2020   INR Goal 2.0-3.0 2.0-3.0 2.0-3.0   Trend Same Same Same   Last Week Total 11 mg 11 mg 11 mg   Next Week Total 11 mg 11 mg 12 mg   Sun 2 mg 2 mg 2 mg   Mon 2 mg 2 mg 2 mg   Tue 1 mg 1 mg 1 mg   Wed 2 mg 2 mg 2 mg   Thu 1 mg 1 mg 1 mg   Fri 2 mg 2 mg 3 mg (4/3); Otherwise 2 mg   Sat 1 mg 1 mg 1 mg   Visit Report - - -   Some recent data might be hidden       Plan:  1. INR is Subtherapeutic today- see above  in Anticoagulation Summary.   Will instruct Janel Mahoney to Change their warfarin regimen- see above in Anticoagulation Summary.  2. Follow up in 2 weeks w/ [facility] INR  3. They have been instructed to call if any changes in medications, doses, concerns, etc. Patient expresses understanding and has no further questions at this time.    Vargas Butcher [facility]

## 2020-04-07 RX ORDER — PANTOPRAZOLE SODIUM 40 MG/1
TABLET, DELAYED RELEASE ORAL
Qty: 180 TABLET | Refills: 0 | Status: SHIPPED | OUTPATIENT
Start: 2020-04-07 | End: 2020-05-29

## 2020-04-14 ENCOUNTER — LAB (OUTPATIENT)
Dept: LAB | Facility: HOSPITAL | Age: 80
End: 2020-04-14

## 2020-04-14 ENCOUNTER — INFUSION (OUTPATIENT)
Dept: ONCOLOGY | Facility: HOSPITAL | Age: 80
End: 2020-04-14

## 2020-04-14 DIAGNOSIS — E53.8 B12 DEFICIENCY: Primary | ICD-10-CM

## 2020-04-14 LAB
BASOPHILS # BLD AUTO: 0.02 10*3/MM3 (ref 0–0.2)
BASOPHILS NFR BLD AUTO: 0.5 % (ref 0–1.5)
DEPRECATED RDW RBC AUTO: 45.4 FL (ref 37–54)
EOSINOPHIL # BLD AUTO: 0.12 10*3/MM3 (ref 0–0.4)
EOSINOPHIL NFR BLD AUTO: 3.2 % (ref 0.3–6.2)
ERYTHROCYTE [DISTWIDTH] IN BLOOD BY AUTOMATED COUNT: 13.3 % (ref 12.3–15.4)
HCT VFR BLD AUTO: 31 % (ref 34–46.6)
HGB BLD-MCNC: 10.2 G/DL (ref 12–15.9)
IMM GRANULOCYTES # BLD AUTO: 0.04 10*3/MM3 (ref 0–0.05)
IMM GRANULOCYTES NFR BLD AUTO: 1.1 % (ref 0–0.5)
LYMPHOCYTES # BLD AUTO: 0.58 10*3/MM3 (ref 0.7–3.1)
LYMPHOCYTES NFR BLD AUTO: 15.5 % (ref 19.6–45.3)
MCH RBC QN AUTO: 30.6 PG (ref 26.6–33)
MCHC RBC AUTO-ENTMCNC: 32.9 G/DL (ref 31.5–35.7)
MCV RBC AUTO: 93.1 FL (ref 79–97)
MONOCYTES # BLD AUTO: 0.23 10*3/MM3 (ref 0.1–0.9)
MONOCYTES NFR BLD AUTO: 6.1 % (ref 5–12)
NEUTROPHILS # BLD AUTO: 2.75 10*3/MM3 (ref 1.7–7)
NEUTROPHILS NFR BLD AUTO: 73.6 % (ref 42.7–76)
NRBC BLD AUTO-RTO: 0 /100 WBC (ref 0–0.2)
PLATELET # BLD AUTO: 98 10*3/MM3 (ref 140–450)
PMV BLD AUTO: 10.2 FL (ref 6–12)
RBC # BLD AUTO: 3.33 10*6/MM3 (ref 3.77–5.28)
WBC NRBC COR # BLD: 3.74 10*3/MM3 (ref 3.4–10.8)

## 2020-04-14 PROCEDURE — 85025 COMPLETE CBC W/AUTO DIFF WBC: CPT

## 2020-04-14 PROCEDURE — 36415 COLL VENOUS BLD VENIPUNCTURE: CPT

## 2020-04-14 PROCEDURE — G0463 HOSPITAL OUTPT CLINIC VISIT: HCPCS

## 2020-04-14 RX ORDER — BUMETANIDE 2 MG/1
TABLET ORAL
Qty: 180 TABLET | Refills: 1 | Status: SHIPPED | OUTPATIENT
Start: 2020-04-14 | End: 2020-09-04

## 2020-04-14 NOTE — PROGRESS NOTES
Hgb today is 10.2.  No procrit today per guidelines.   Patient without questions or concerns at this time.   Given copy of labs.

## 2020-04-17 ENCOUNTER — ANTICOAGULATION VISIT (OUTPATIENT)
Dept: PHARMACY | Facility: HOSPITAL | Age: 80
End: 2020-04-17

## 2020-04-17 ENCOUNTER — TELEPHONE (OUTPATIENT)
Dept: PHARMACY | Facility: HOSPITAL | Age: 80
End: 2020-04-17

## 2020-04-17 DIAGNOSIS — I48.20 CHRONIC ATRIAL FIBRILLATION (HCC): ICD-10-CM

## 2020-04-17 LAB
INR PPP: 1.6
INR PPP: 1.6

## 2020-04-17 NOTE — PROGRESS NOTES
Anticoagulation Clinic Progress Note    Anticoagulation Summary  As of 2020    INR goal:   2.0-3.0   TTR:   57.1 % (1.4 y)   INR used for dosin.60! (2020)   Warfarin maintenance plan:   1 mg every Tue, Sat; 2 mg all other days   Weekly warfarin total:   12 mg   Plan last modified:   Vargas Butcher RPH (2020)   Next INR check:   2020   Priority:   Critical   Target end date:   Indefinite    Indications    Chronic atrial fibrillation [I48.20]             Anticoagulation Episode Summary     INR check location:       Preferred lab:       Send INR reminders to:    [facility]    Comments:         Anticoagulation Care Providers     Provider Role Specialty Phone number    Nik Galarza MD Referring Cardiology [phone]          Clinic Interview:  Patient Findings     Positives:   Change in diet/appetite    Negatives:   Signs/symptoms of thrombosis, Signs/symptoms of bleeding,   Laboratory test error suspected, Change in health, Change in alcohol use,   Change in activity, Upcoming invasive procedure, Emergency department   visit, Upcoming dental procedure, Missed doses, Extra doses, Change in   medications, Hospital admission, Bruising, Other complaints    Comments:   Slightly more vit k recently ([facility] x [facility] this week).      Clinical Outcomes     Negatives:   Major bleeding event, Thromboembolic event,   Anticoagulation-related hospital admission, Anticoagulation-related ED   visit, Anticoagulation-related fatality    Comments:   Slightly more vit k recently ([facility] x 3 this week).        INR History:  Anticoagulation Monitoring 4/3/2020 2020 2020   INR 1.50 1.60 1.60   INR Date 4/3/2020 2020 2020   INR Goal 2.0-3.0 2.0-3.0 2.0-3.0   Trend Same Same Up   Last Week Total 11 mg 11 mg 11 mg   Next Week Total 12 mg 11 mg 13 mg   Sun 2 mg 2 mg 2 mg   Mon 2 mg 2 mg 2 mg   Tue 1 mg 1 mg 1 mg   Wed 2 mg 2 mg 2 mg   Thu 1 mg 1 mg 2 mg   Fri 3 mg (4/3); Otherwise 2  mg 2 mg 3 mg (4/17); Otherwise 2 mg   Sat 1 mg 1 mg 1 mg   Visit Report - - -   Some recent data might be hidden       Plan:  1. INR is Subtherapeutic today- see above in Anticoagulation Summary.   Will instruct Janel DORINA Covingtons to Increase their warfarin regimen- see above in Anticoagulation Summary.  2. Follow up in 2 weeks w/ [facility] INR.   3. They have been instructed to call if any changes in medications, doses, concerns, etc. Patient expresses understanding and has no further questions at this time.    Vargas Butcher, [facility]

## 2020-04-20 RX ORDER — CARVEDILOL 25 MG/1
TABLET ORAL
Qty: 180 TABLET | Refills: 0 | Status: SHIPPED | OUTPATIENT
Start: 2020-04-20 | End: 2020-06-11

## 2020-04-22 ENCOUNTER — OFFICE VISIT (OUTPATIENT)
Dept: FAMILY MEDICINE CLINIC | Facility: CLINIC | Age: 80
End: 2020-04-22

## 2020-04-22 DIAGNOSIS — I10 ESSENTIAL HYPERTENSION: Primary | ICD-10-CM

## 2020-04-22 DIAGNOSIS — I50.42 CHRONIC COMBINED SYSTOLIC AND DIASTOLIC CONGESTIVE HEART FAILURE (HCC): ICD-10-CM

## 2020-04-22 PROBLEM — I50.43 ACUTE ON CHRONIC COMBINED SYSTOLIC (CONGESTIVE) AND DIASTOLIC (CONGESTIVE) HEART FAILURE (HCC): Chronic | Status: RESOLVED | Noted: 2019-02-20 | Resolved: 2020-04-22

## 2020-04-22 PROBLEM — D62 ACUTE BLOOD LOSS ANEMIA: Status: RESOLVED | Noted: 2017-09-26 | Resolved: 2020-04-22

## 2020-04-22 PROCEDURE — 99422 OL DIG E/M SVC 11-20 MIN: CPT | Performed by: INTERNAL MEDICINE

## 2020-04-22 NOTE — PROGRESS NOTES
Subjective   Janel Mahoney is a 79 y.o. female.     History of Present Illness   You have chosen to receive care through a telephone visit. Do you consent to use a telephone visit for your medical care today? Yes  11-minute phone call.  Patient had a phone encounter for hypertension.  Blood pressures been running 140s over 80s at home.  Patient is monitoring her blood pressure weekly.  Patient's congestive heart failure is been stable over the past several months.  She has no PND orthopnea and is able to ambulate as needed.  Patient will continue her present treatment and monitoring blood pressure at home.    Dictated utilizing Dragon dictation. If there are questions or for further clarification, please contact me.  The following portions of the patient's history were reviewed and updated as appropriate: allergies, current medications, past family history, past medical history, past social history, past surgical history and problem list.    Review of Systems   Constitutional: Negative for fatigue and fever.   HENT: Positive for congestion. Negative for trouble swallowing.    Eyes: Negative for discharge and visual disturbance.   Respiratory: Negative for choking and shortness of breath.    Cardiovascular: Negative for chest pain and palpitations.   Gastrointestinal: Negative for abdominal pain and blood in stool.   Endocrine: Negative.    Genitourinary: Negative for genital sores and hematuria.   Musculoskeletal: Negative for gait problem and joint swelling.   Skin: Negative for color change, pallor, rash and wound.   Allergic/Immunologic: Positive for environmental allergies. Negative for immunocompromised state.   Neurological: Negative for facial asymmetry and speech difficulty.   Psychiatric/Behavioral: Negative for hallucinations and suicidal ideas.       Objective   Physical Exam   Constitutional: She is oriented to person, place, and time.   Pulmonary/Chest: Effort normal and breath sounds normal.    Neurological: She is alert and oriented to person, place, and time.   Psychiatric: She has a normal mood and affect. Her behavior is normal. Judgment and thought content normal.       Assessment/Plan #1 continue present diet and physical activity level.  #2 monitor blood pressure at home  Diagnoses and all orders for this visit:    Essential hypertension    Chronic combined systolic and diastolic congestive heart failure (CMS/HCC)

## 2020-05-01 ENCOUNTER — ANTICOAGULATION VISIT (OUTPATIENT)
Dept: PHARMACY | Facility: HOSPITAL | Age: 80
End: 2020-05-01

## 2020-05-01 ENCOUNTER — TELEPHONE (OUTPATIENT)
Dept: PHARMACY | Facility: HOSPITAL | Age: 80
End: 2020-05-01

## 2020-05-01 DIAGNOSIS — I48.20 CHRONIC ATRIAL FIBRILLATION (HCC): ICD-10-CM

## 2020-05-01 LAB — INR PPP: 2.1

## 2020-05-01 NOTE — PROGRESS NOTES
Anticoagulation Clinic Progress Note    Anticoagulation Summary  As of 2020    INR goal:   2.0-3.0   TTR:   56.1 % (1.4 y)   INR used for dosin.10 (2020)   Warfarin maintenance plan:   1 mg every Tue, Sat; 2 mg all other days   Weekly warfarin total:   12 mg   No change documented:   Vargas Butcher RPH   Plan last modified:   Vargas Butcher RPH (2020)   Next INR check:   5/15/2020   Priority:   Critical   Target end date:   Indefinite    Indications    Chronic atrial fibrillation [I48.20]             Anticoagulation Episode Summary     INR check location:       Preferred lab:       Send INR reminders to:    [facility]    Comments:         Anticoagulation Care Providers     Provider Role Specialty Phone number    Nik Galarza MD Referring Cardiology [phone]          Clinic Interview:  Patient Findings     Negatives:   Signs/symptoms of thrombosis, Signs/symptoms of bleeding,   Laboratory test error suspected, Change in health, Change in alcohol use,   Change in activity, Upcoming invasive procedure, Emergency department   visit, Upcoming dental procedure, Missed doses, Extra doses, Change in   medications, Change in diet/appetite, Hospital admission, Bruising, Other   complaints      Clinical Outcomes     Negatives:   Major bleeding event, Thromboembolic event,   Anticoagulation-related hospital admission, Anticoagulation-related ED   visit, Anticoagulation-related fatality        INR History:  Anticoagulation Monitoring 4/3/2020 2020 2020   INR 1.50 1.60 2.10   INR Date 4/3/2020 2020 2020   INR Goal 2.0-3.0 2.0-3.0 2.0-3.0   Trend Same Up Same   Last Week Total 11 mg 11 mg 12 mg   Next Week Total 12 mg 13 mg 12 mg   Sun 2 mg 2 mg 2 mg   Mon 2 mg 2 mg 2 mg   Tue 1 mg 1 mg 1 mg   Wed 2 mg 2 mg 2 mg   Thu 1 mg 2 mg 2 mg   Fri 3 mg (4/3); Otherwise 2 mg 3 mg (); Otherwise 2 mg 2 mg   Sat 1 mg 1 mg 1 mg   Visit Report - - -   Some recent data might be  hidden       Plan:  1. INR is Therapeutic today- see above in Anticoagulation Summary.   Will instruct Janel Mahoney to Continue their warfarin regimen- see above in Anticoagulation Summary.  2. Follow up in 2 weeks w/ [facility] INR.  3. They have been instructed to call if any changes in medications, doses, concerns, etc. Patient expresses understanding and has no further questions at this time.    Vargas Butcher, [facility]

## 2020-05-11 ENCOUNTER — TELEPHONE (OUTPATIENT)
Dept: CARDIOLOGY | Facility: CLINIC | Age: 80
End: 2020-05-11

## 2020-05-11 NOTE — TELEPHONE ENCOUNTER
Unscheduled Remote received on CRT-D    Reason: ATP therapy delivered to convert arrhythmia.  Alerts or Events: 11 VHR episodes. On 5/10/20 @ 0002, VT occurred avg v-rate 179 bpm, ATP therapy delivered x1, successfully converting arrhythmia. 3 others are labeled as VT episodes, all are 16-24 beats each, avg v-rates 170's-180's bpm. All other episodes listed on logbook are labeled as NST VT, no EGM's available.    Patient has upcoming appt with Dr. Boucher on 7/23/20.

## 2020-05-12 ENCOUNTER — APPOINTMENT (OUTPATIENT)
Dept: LAB | Facility: HOSPITAL | Age: 80
End: 2020-05-12

## 2020-05-12 ENCOUNTER — OFFICE VISIT (OUTPATIENT)
Dept: ONCOLOGY | Facility: CLINIC | Age: 80
End: 2020-05-12

## 2020-05-12 ENCOUNTER — APPOINTMENT (OUTPATIENT)
Dept: ONCOLOGY | Facility: HOSPITAL | Age: 80
End: 2020-05-12

## 2020-05-12 VITALS
WEIGHT: 151.8 LBS | DIASTOLIC BLOOD PRESSURE: 68 MMHG | SYSTOLIC BLOOD PRESSURE: 118 MMHG | TEMPERATURE: 98.1 F | HEIGHT: 62 IN | RESPIRATION RATE: 18 BRPM | HEART RATE: 60 BPM | BODY MASS INDEX: 27.94 KG/M2 | OXYGEN SATURATION: 95 %

## 2020-05-12 DIAGNOSIS — D69.6 THROMBOCYTOPENIA (HCC): Primary | ICD-10-CM

## 2020-05-12 DIAGNOSIS — D63.1 ANEMIA IN STAGE 3 CHRONIC KIDNEY DISEASE (HCC): Primary | ICD-10-CM

## 2020-05-12 DIAGNOSIS — D69.6 THROMBOCYTOPENIA (HCC): ICD-10-CM

## 2020-05-12 DIAGNOSIS — N18.30 ANEMIA IN STAGE 3 CHRONIC KIDNEY DISEASE (HCC): Primary | ICD-10-CM

## 2020-05-12 LAB
BASOPHILS # BLD AUTO: 0.01 10*3/MM3 (ref 0–0.2)
BASOPHILS NFR BLD AUTO: 0.2 % (ref 0–1.5)
DEPRECATED RDW RBC AUTO: 46.9 FL (ref 37–54)
EOSINOPHIL # BLD AUTO: 0.26 10*3/MM3 (ref 0–0.4)
EOSINOPHIL NFR BLD AUTO: 5.9 % (ref 0.3–6.2)
ERYTHROCYTE [DISTWIDTH] IN BLOOD BY AUTOMATED COUNT: 13.6 % (ref 12.3–15.4)
HCT VFR BLD AUTO: 32.1 % (ref 34–46.6)
HGB BLD-MCNC: 10.3 G/DL (ref 12–15.9)
IMM GRANULOCYTES # BLD AUTO: 0.01 10*3/MM3 (ref 0–0.05)
IMM GRANULOCYTES NFR BLD AUTO: 0.2 % (ref 0–0.5)
LYMPHOCYTES # BLD AUTO: 0.85 10*3/MM3 (ref 0.7–3.1)
LYMPHOCYTES NFR BLD AUTO: 19.2 % (ref 19.6–45.3)
MCH RBC QN AUTO: 30.3 PG (ref 26.6–33)
MCHC RBC AUTO-ENTMCNC: 32.1 G/DL (ref 31.5–35.7)
MCV RBC AUTO: 94.4 FL (ref 79–97)
MONOCYTES # BLD AUTO: 0.36 10*3/MM3 (ref 0.1–0.9)
MONOCYTES NFR BLD AUTO: 8.1 % (ref 5–12)
NEUTROPHILS # BLD AUTO: 2.94 10*3/MM3 (ref 1.7–7)
NEUTROPHILS NFR BLD AUTO: 66.4 % (ref 42.7–76)
NRBC BLD AUTO-RTO: 0 /100 WBC (ref 0–0.2)
PLATELET # BLD AUTO: 102 10*3/MM3 (ref 140–450)
PMV BLD AUTO: 10.4 FL (ref 6–12)
RBC # BLD AUTO: 3.4 10*6/MM3 (ref 3.77–5.28)
WBC NRBC COR # BLD: 4.43 10*3/MM3 (ref 3.4–10.8)

## 2020-05-12 PROCEDURE — 36416 COLLJ CAPILLARY BLOOD SPEC: CPT

## 2020-05-12 PROCEDURE — 85025 COMPLETE CBC W/AUTO DIFF WBC: CPT

## 2020-05-12 PROCEDURE — 99213 OFFICE O/P EST LOW 20 MIN: CPT | Performed by: NURSE PRACTITIONER

## 2020-05-12 NOTE — PROGRESS NOTES
Subjective     CHIEF COMPLAINT: Anemia due to chronic kidney disease.    Chief Complaint   Patient presents with   • Follow-up       HISTORY OF PRESENT ILLNESS:     Janel Mahoney is a 79 y.o. female with the above-mentioned history returns today for follow-up visit on her anemia.  She receives Procrit monthly as needed when her hemoglobin drops below 10.  She reports that she is doing well.  She does bruise and bleed easily, as she is on anticoagulation.  Patient takes Coumadin, managed by cardiology.  She denies any new problems or concerns today.        REVIEW OF SYSTEMS:  Review of Systems   Constitutional: Positive for fatigue. Negative for chills, fever and unexpected weight change.   HENT: Negative for mouth sores, nosebleeds, sore throat and voice change.    Eyes: Negative for visual disturbance.   Respiratory: Negative for cough and shortness of breath.    Cardiovascular: Negative for chest pain and leg swelling.   Gastrointestinal: Negative for abdominal pain, blood in stool, constipation, diarrhea, nausea and vomiting.   Endocrine: Positive for cold intolerance.   Genitourinary: Negative for dysuria, frequency and hematuria.   Musculoskeletal: Positive for back pain. Negative for arthralgias and joint swelling.   Skin: Negative for rash.   Neurological: Negative for dizziness, numbness and headaches.   Hematological: Negative for adenopathy. Bruises/bleeds easily.   Psychiatric/Behavioral: Negative for dysphoric mood. The patient is not nervous/anxious.    5/12/2020 ROS unchanged from above except as updated.      MEDICATIONS:    Current Outpatient Medications:   •  alendronate (FOSAMAX) 70 MG tablet, Take 70 mg by mouth Every 14 (Fourteen) Days., Disp: , Rfl:   •  aspirin 81 MG tablet, Take 81 mg by mouth Daily. PT CALLING TO SEE IF SHE HAS TO STOP PRIOR TO SURGERY, Disp: , Rfl:   •  bumetanide (BUMEX) 2 MG tablet, TAKE 1 TABLET TWICE DAILY. DOSE CHANGED , Disp: 180 tablet, Rfl: 1  •  calcitriol  (ROCALTROL) 0.25 MCG capsule, Take 0.25 mcg by mouth 2 (Two) Times a Day., Disp: , Rfl:   •  Carboxymethylcellulose Sodium (EYE DROPS OP), Apply  to eye(s) as directed by provider., Disp: , Rfl:   •  carvedilol (COREG) 25 MG tablet, TAKE 1 TABLET TWICE DAILY, Disp: 180 tablet, Rfl: 0  •  Cholecalciferol (VITAMIN D) 2000 UNITS tablet, Take 2,000 Units by mouth Daily., Disp: , Rfl:   •  epoetin adele (EPOGEN,PROCRIT) 69101 UNIT/ML injection, Inject 10,000 Units under the skin into the appropriate area as directed As Needed., Disp: , Rfl:   •  ferrous sulfate 325 (65 FE) MG tablet, Take 1 tablet by mouth Every Other Day., Disp: , Rfl:   •  HYDROcodone-acetaminophen (NORCO) 5-325 MG per tablet, Take 1 tablet by mouth Daily As Needed., Disp: , Rfl:   •  Multiple Vitamins-Minerals (SENIOR MULTIVITAMIN PLUS) tablet, Take 1 tablet by mouth Daily., Disp: , Rfl:   •  pantoprazole (PROTONIX) 40 MG EC tablet, TAKE 1 TABLET TWICE DAILY, Disp: 180 tablet, Rfl: 0  •  ramipril (ALTACE) 5 MG capsule, Take 1 capsule by mouth Daily., Disp: 90 capsule, Rfl: 1  •  simvastatin (ZOCOR) 20 MG tablet, Take 20 mg by mouth Every Night., Disp: , Rfl:   •  traMADol (ULTRAM) 50 MG tablet, Take 2 tablets by mouth 2 (Two) Times a Day. 3 months, Disp: 180 tablet, Rfl: 1  •  vitamin B-12 (CYANOCOBALAMIN) 1000 MCG tablet, Take 1,000 mcg by mouth Daily., Disp: , Rfl:   •  warfarin (COUMADIN) 1 MG tablet, Take 1 mg by mouth Daily. DOSES VARY WEEKLY BASED ON INR DR LAWRENCE INSTRUCTED TO STOP 5 DAYS PRIOR TO SURGERY, Disp: , Rfl:   •  zolpidem (AMBIEN) 10 MG tablet, Take 1 tablet by mouth At Night As Needed for Sleep., Disp: 90 tablet, Rfl: 1  •  COLCRYS 0.6 MG tablet, Take 0.6 mg by mouth Every Other Day., Disp: , Rfl:   •  febuxostat (ULORIC) 40 MG tablet, Take 80 mg by mouth Daily., Disp: , Rfl:     ALLERGIES:  Allergies   Allergen Reactions   • Diclofenac GI Bleeding     She had adverse effects from the medications that required hospitalization. Pt  "got stomach ulcers from this medication prescribed by Dr. Arango - pediatrist.         Objective   VITAL SIGNS:     Vitals:    05/12/20 1319   BP: 118/68   Pulse: 60   Resp: 18   Temp: 98.1 °F (36.7 °C)   TempSrc: Oral   SpO2: 95%   Weight: 68.9 kg (151 lb 12.8 oz)   Height: 157.5 cm (62.01\")   PainSc: 0-No pain     Body mass index is 27.76 kg/m².     Wt Readings from Last 3 Encounters:   05/12/20 68.9 kg (151 lb 12.8 oz)   02/19/20 66.4 kg (146 lb 4.8 oz)   01/21/20 64.8 kg (142 lb 12.8 oz)       Physical Exam   Constitutional: She is oriented to person, place, and time. She appears well-developed and well-nourished. No distress.   Ambulating with a walker   HENT:   Head: Normocephalic and atraumatic.   Mouth/Throat: Oropharynx is clear and moist and mucous membranes are normal. No oropharyngeal exudate.   Eyes: Pupils are equal, round, and reactive to light.   Neck: Normal range of motion.   Cardiovascular: Normal rate, regular rhythm and normal heart sounds.   No murmur heard.  Pulmonary/Chest: Effort normal and breath sounds normal. No respiratory distress. She has no wheezes. She has no rhonchi. She has no rales.   Musculoskeletal: Normal range of motion. She exhibits no edema.   Neurological: She is alert and oriented to person, place, and time.   Skin: Skin is warm and dry. No rash noted.   Scattered ecchymosis on the forearms bilaterally   Psychiatric: She has a normal mood and affect.   Vitals reviewed.    Patient screened negative for depression based on a PHQ-9 score of 0 on 5/12/2020. Follow-up recommendations include: none.       DIAGNOSTIC DATA:     Results from last 7 days   Lab Units 05/12/20  1257   WBC 10*3/mm3 4.43   NEUTROS ABS 10*3/mm3 2.94   HEMOGLOBIN g/dL 10.3*   HEMATOCRIT % 32.1*   PLATELETS 10*3/mm3 102*         Assessment/Plan   1.  Anemia of chronic kidney disease, stage III.  She is on Procrit.  The last dose she received was 5000 units 2/19/2020 for hemoglobin of 9.4.  Today, 5/12/2020 " her hemoglobin is 10.3, and no Procrit is indicated.  We will continue monthly CBC with RN review and possible Procrit if indicated.      2.  Thrombocytopenia likely due to Vitamin B12 deficiency.  Platelets are mildly low but stable. She has easy bruising but this is attributed to her coumadin.  Platelet count 102,000 today.    PLAN:    1. No Procrit needed today.  2. Continue monthly CBC with RN review and possible Procrit.  3. Continue ferrous sulfate every other day.  4. Return for MD follow-up in 3 months with repeat labs and possible Procrit.        Milagros Sandoval, APRN  05/12/20

## 2020-05-15 ENCOUNTER — TELEPHONE (OUTPATIENT)
Dept: PHARMACY | Facility: HOSPITAL | Age: 80
End: 2020-05-15

## 2020-05-15 ENCOUNTER — ANTICOAGULATION VISIT (OUTPATIENT)
Dept: PHARMACY | Facility: HOSPITAL | Age: 80
End: 2020-05-15

## 2020-05-15 DIAGNOSIS — I48.20 CHRONIC ATRIAL FIBRILLATION (HCC): ICD-10-CM

## 2020-05-15 LAB — INR PPP: 1.8

## 2020-05-15 NOTE — PROGRESS NOTES
Anticoagulation Clinic Progress Note    Anticoagulation Summary  As of 5/15/2020    INR goal:   2.0-3.0   TTR:   55.5 % (1.5 y)   INR used for dosin.80! (5/15/2020)   Warfarin maintenance plan:   1 mg every Tue; 2 mg all other days   Weekly warfarin total:   13 mg   Plan last modified:   Vargas Butcher RPH (5/15/2020)   Next INR check:   2020   Priority:   Critical   Target end date:   Indefinite    Indications    Chronic atrial fibrillation [I48.20]             Anticoagulation Episode Summary     INR check location:       Preferred lab:       Send INR reminders to:    [facility]    Comments:         Anticoagulation Care Providers     Provider Role Specialty Phone number    Nik Galarza MD Referring Cardiology [phone]          Clinic Interview:  Patient Findings     Negatives:   Signs/symptoms of thrombosis, Signs/symptoms of bleeding,   Laboratory test error suspected, Change in health, Change in alcohol use,   Change in activity, Upcoming invasive procedure, Emergency department   visit, Upcoming dental procedure, Missed doses, Extra doses, Change in   medications, Change in diet/appetite, Hospital admission, Bruising, Other   complaints      Clinical Outcomes     Negatives:   Major bleeding event, Thromboembolic event,   Anticoagulation-related hospital admission, Anticoagulation-related ED   visit, Anticoagulation-related fatality        INR History:  Anticoagulation Monitoring 2020 2020 5/15/2020   INR 1.60 2.10 1.80   INR Date 2020 2020 5/15/2020   INR Goal 2.0-3.0 2.0-3.0 2.0-3.0   Trend Up Same Up   Last Week Total 11 mg 12 mg 12 mg   Next Week Total 13 mg 12 mg 13 mg   Sun 2 mg 2 mg 2 mg   Mon 2 mg 2 mg 2 mg   Tue 1 mg 1 mg 1 mg   Wed 2 mg 2 mg 2 mg   Thu 2 mg 2 mg 2 mg   Fri 3 mg (); Otherwise 2 mg 2 mg 2 mg   Sat 1 mg 1 mg 2 mg   Visit Report - - -   Some recent data might be hidden       Plan:  1. INR is Subtherapeutic today- see above in  Anticoagulation Summary.   Will instruct Janel Mahoney to Increase their warfarin regimen- see above in Anticoagulation Summary.  2. Follow up in 3 weeks to coordinate with other appt  3. They have been instructed to call if any changes in medications, doses, concerns, etc. Patient expresses understanding and has no further questions at this time.    Vargas Butcher RP

## 2020-05-20 ENCOUNTER — TELEPHONE (OUTPATIENT)
Dept: ONCOLOGY | Facility: CLINIC | Age: 80
End: 2020-05-20

## 2020-05-20 NOTE — TELEPHONE ENCOUNTER
PT is confused about why her appt is in august and not every month like it normal is. Please call pt to advise @[phone]

## 2020-05-28 ENCOUNTER — ANTICOAGULATION VISIT (OUTPATIENT)
Dept: PHARMACY | Facility: HOSPITAL | Age: 80
End: 2020-05-28

## 2020-05-28 ENCOUNTER — TELEPHONE (OUTPATIENT)
Dept: CARDIOLOGY | Facility: CLINIC | Age: 80
End: 2020-05-28

## 2020-05-28 DIAGNOSIS — I48.20 CHRONIC ATRIAL FIBRILLATION (HCC): ICD-10-CM

## 2020-05-28 NOTE — TELEPHONE ENCOUNTER
Spoke with Ms. Mahoney. She had labs with Dr. Milagros Cruz ([facility]) 2 wks ago - Creatinine 2.13 mg/dL (5/12/20) --> CrCl 19 mL/min (using AdjBW).    5/31 Last dose of warfarin  6/1   HOLD warfarin  6/2   HOLD warfarin; enoxaparin 70 mg in PM (at least 36 hrs before procedure)  6/3   HOLD warfarin; HOLD enoxparin  6/4   PROCEDURE; Resume warfarin 3 mg; HOLD enoxaparin  6/5   Warfarin 3 mg; enoxaparin 70 mg in PM  6/6   Warfarin 2 mg; enoxaparin 70 mg in PM  6/7   Warfarin 2 mg; enoxaparin 70 mg in PM  6/8   INR check in clinic before her other appt in town    She voiced understanding and is agreeable with plan. She wrote down instructions and repeated back instructions accurately. She voiced familiarity with warfarin, and her daughter will administer her enoxaparin as during previous bridging. She will  enoxaparin from [facility] outpatient pharmacy per her request.

## 2020-05-28 NOTE — PROGRESS NOTES
Anticoagulation Clinic Progress Note    Anticoagulation Summary  As of 5/28/2020    INR goal:   2.0-3.0   TTR:   55.5 % (1.5 y)   INR used for dosing:   No new INR was available at the time of this encounter.   Warfarin maintenance plan:   1 mg every Tue; 2 mg all other days   Weekly warfarin total:   13 mg   Plan last modified:   Vargas Butcher RPH (5/15/2020)   Next INR check:   6/8/2020   Priority:   Critical   Target end date:   Indefinite    Indications    Chronic atrial fibrillation [I48.20]             Anticoagulation Episode Summary     INR check location:       Preferred lab:       Send INR reminders to:    [facility]    Comments:         Anticoagulation Care Providers     Provider Role Specialty Phone number    Nik Galarza MD Referring Cardiology [phone]          Clinic Interview:  Patient Findings     Positives:   Upcoming invasive procedure    Negatives:   Signs/symptoms of thrombosis, Signs/symptoms of bleeding,   Laboratory test error suspected, Change in health, Change in alcohol use,   Change in activity, Emergency department visit, Upcoming dental procedure,   Missed doses, Extra doses, Change in medications, Change in diet/appetite,   Hospital admission, Bruising, Other complaints    Comments:   Caudal epidural 6/4; to hold x 3 days; needs bridging per Dr. Galarza (see 5/28 tele note).      Clinical Outcomes     Negatives:   Major bleeding event, Thromboembolic event,   Anticoagulation-related hospital admission, Anticoagulation-related ED   visit, Anticoagulation-related fatality    Comments:   Caudal epidural 6/4; to hold x 3 days; needs bridging per Dr. Galarza (see 5/28 tele note).        INR History:  Anticoagulation Monitoring 5/1/2020 5/15/2020 5/28/2020   INR 2.10 1.80 -   INR Date 5/1/2020 5/15/2020 -   INR Goal 2.0-3.0 2.0-3.0 2.0-3.0   Trend Same Up Same   Last Week Total 12 mg 12 mg 13 mg   Next Week Total 12 mg 13 mg 8 mg   Sun 2 mg 2 mg 2 mg   Mon 2  mg 2 mg Hold (6/1)   Tue 1 mg 1 mg Hold (6/2)   Wed 2 mg 2 mg Hold (6/3)   Thu 2 mg 2 mg 3 mg (6/4); Otherwise 2 mg   Fri 2 mg 2 mg 3 mg (6/5); Otherwise 2 mg   Sat 1 mg 2 mg 2 mg   Visit Report - - -   Some recent data might be hidden       Plan:  1. INR is unavailable today- see above in Anticoagulation Summary.   Will instruct Janel Covingtons to Change their warfarin regimen- see above in Anticoagulation Summary.  2. Enoxaparin 70 mg q24h as directed periprocedurally  3. Follow up in 1.5 weeks (4 days post-epidural)  4. She voiced understanding and is agreeable with plan. She wrote down instructions and repeated back instructions accurately. She voiced familiarity with warfarin, and her daughter will administer her enoxaparin as during previous bridging. She will  enoxaparin from [facility] outpatient pharmacy per her request. They have been instructed to call if any changes in medications, doses, concerns, etc. Patient expresses understanding and has no further questions at this time.    Vargas Butcher [facility]

## 2020-05-28 NOTE — TELEPHONE ENCOUNTER
Vargas,  Please see Dr. Galarza's note.  Are you able to help Ms. Mahoney with the Lovenox or Herparin?  Thanks-Rosemarie

## 2020-05-28 NOTE — TELEPHONE ENCOUNTER
We received a fax from [facility] regarding Ms. Mahoney.  She is scheduled for Caudal FARIDEH on 6/4/20.  They would like to hold her Warfarin for 3 days prior.  Is this okay?  I placed the fax in your inbox for your signature.  Thanks/hernandez

## 2020-05-28 NOTE — PATIENT INSTRUCTIONS
5/31 Last dose of warfarin  6/1   HOLD warfarin  6/2   HOLD warfarin; enoxaparin 70 mg in PM (at least 36 hrs before procedure)  6/3   HOLD warfarin; HOLD enoxparin  6/4   PROCEDURE; Resume warfarin 3 mg; HOLD enoxaparin  6/5   Warfarin 3 mg; enoxaparin 70 mg in PM  6/6   Warfarin 2 mg; enoxaparin 70 mg in PM  6/7   Warfarin 2 mg; enoxaparin 70 mg in PM  6/8   INR check in clinic before her other appt in town

## 2020-05-29 RX ORDER — PANTOPRAZOLE SODIUM 40 MG/1
TABLET, DELAYED RELEASE ORAL
Qty: 180 TABLET | Refills: 0 | Status: SHIPPED | OUTPATIENT
Start: 2020-05-29 | End: 2020-08-05

## 2020-06-05 ENCOUNTER — OFFICE VISIT (OUTPATIENT)
Dept: FAMILY MEDICINE CLINIC | Facility: CLINIC | Age: 80
End: 2020-06-05

## 2020-06-05 VITALS
WEIGHT: 148.6 LBS | TEMPERATURE: 98.6 F | OXYGEN SATURATION: 98 % | HEIGHT: 62 IN | SYSTOLIC BLOOD PRESSURE: 126 MMHG | DIASTOLIC BLOOD PRESSURE: 68 MMHG | BODY MASS INDEX: 27.34 KG/M2 | HEART RATE: 62 BPM

## 2020-06-05 DIAGNOSIS — I50.42 CHRONIC COMBINED SYSTOLIC AND DIASTOLIC CONGESTIVE HEART FAILURE (HCC): ICD-10-CM

## 2020-06-05 DIAGNOSIS — M10.00 IDIOPATHIC GOUT, UNSPECIFIED CHRONICITY, UNSPECIFIED SITE: ICD-10-CM

## 2020-06-05 DIAGNOSIS — N18.30 ANEMIA IN STAGE 3 CHRONIC KIDNEY DISEASE (HCC): ICD-10-CM

## 2020-06-05 DIAGNOSIS — D64.9 ANEMIA, UNSPECIFIED TYPE: ICD-10-CM

## 2020-06-05 DIAGNOSIS — D63.1 ANEMIA IN STAGE 3 CHRONIC KIDNEY DISEASE (HCC): ICD-10-CM

## 2020-06-05 DIAGNOSIS — Z00.00 MEDICARE ANNUAL WELLNESS VISIT, SUBSEQUENT: Primary | ICD-10-CM

## 2020-06-05 DIAGNOSIS — I10 ESSENTIAL HYPERTENSION: ICD-10-CM

## 2020-06-05 PROBLEM — N17.9 ACUTE KIDNEY INJURY (HCC): Status: RESOLVED | Noted: 2018-11-25 | Resolved: 2020-06-05

## 2020-06-05 PROCEDURE — G0439 PPPS, SUBSEQ VISIT: HCPCS | Performed by: INTERNAL MEDICINE

## 2020-06-05 PROCEDURE — 99214 OFFICE O/P EST MOD 30 MIN: CPT | Performed by: INTERNAL MEDICINE

## 2020-06-05 NOTE — PROGRESS NOTES
Subsequent Medicare Wellness Visit   The ABC's of the Annual Wellness Visit    Chief Complaint   Patient presents with   • check belly hardness   • ears checked wax?   • Hypertension   • Hyperlipidemia       HPI:  Janel Mahoney, -1940, is a 79 y.o. female who presents for a Subsequent Medicare Wellness Visit.  Patient was seen for Medicare wellness visit.  Patient has end-stage renal disease and is being evaluated for dialysis.  Patient has severe congestive heart failure and requiring Bumex for diuresis.  Patient's BUN and creatinine are continuing to elevate but she needs a diuretic because of the fluid.  Patient is being followed by cardiology and nephrology.  Patient BUN at present time is 62 with a creatinine of 2.9.  Patient is requiring Bumex 2 mg twice daily to maintain her weight.  Patient also has anemia secondary to renal insufficiency and elevated uric acid for the same reason.  Patient labs are currently drawn and she will follow-up in 6 months.  Patient is being seen regularly by nephrology and cardiology.    Dictated utilizing Dragon dictation. If there are questions or for further clarification, please contact me.  Recent Hospitalizations:  Recently treated at the following:  [facility].    Current Medical Providers:  Patient Care Team:  Ariel Matute MD as PCP - General (Internal Medicine)  Edward Vasquez MD as Consulting Physician (Hematology and Oncology)  Milagros Cruz MD as Referring Physician (Nephrology)  Anthony Cardona MD as Consulting Physician (Sleep Medicine)  Nik Galarza MD as Consulting Physician (Cardiology)  Shanna Major RP as Pharmacist  Juvencio Pressley II, MD as Consulting Physician (Orthopedic Surgery)  Vargas Butcher RPH as Pharmacist (Pharmacy)    Health Habits and Functional and Cognitive Screening and Depression Screening:  Functional & Cognitive Status 2020   Do you have difficulty preparing food and  eating? No   Do you have difficulty bathing yourself, getting dressed or grooming yourself? No   Do you have difficulty using the toilet? No   Do you have difficulty moving around from place to place? No   Do you have trouble with steps or getting out of a bed or a chair? No   Current Diet Well Balanced Diet   Dental Exam Not up to date   Eye Exam Up to date   Exercise (times per week) 2 times per week   Current Exercise Activities Include Aerobics   Do you need help using the phone?  No   Are you deaf or do you have serious difficulty hearing?  No   Do you need help with transportation? No   Do you need help shopping? No   Do you need help preparing meals?  No   Do you need help with housework?  No   Do you need help with laundry? No   Do you need help taking your medications? No   Do you need help managing money? No   Do you ever drive or ride in a car without wearing a seat belt? No   Have you felt unusual stress, anger or loneliness in the last month? No   Who do you live with? Spouse   If you need help, do you have trouble finding someone available to you? No   Have you been bothered in the last four weeks by sexual problems? No   Do you have difficulty concentrating, remembering or making decisions? No       Compared to one year ago, the patient feels her physical health is the same and her mental health is the same.    Depression Screen:  PHQ-2/PHQ-9 Depression Screening 6/5/2020   Little interest or pleasure in doing things 0   Feeling down, depressed, or hopeless 0   Trouble falling or staying asleep, or sleeping too much 0   Feeling tired or having little energy 0   Poor appetite or overeating 0   Feeling bad about yourself - or that you are a failure or have let yourself or your family down 0   Trouble concentrating on things, such as reading the newspaper or watching television 0   Moving or speaking so slowly that other people could have noticed. Or the opposite - being so fidgety or restless that you  have been moving around a lot more than usual 0   Thoughts that you would be better off dead, or of hurting yourself in some way 0   Total Score 0   If you checked off any problems, how difficult have these problems made it for you to do your work, take care of things at home, or get along with other people? Not difficult at all         Past Medical/Family/Social History:  The following portions of the patient's history were reviewed and updated as appropriate: allergies, current medications, past family history, past medical history, past social history, past surgical history and problem list.    Allergies   Allergen Reactions   • Diclofenac GI Bleeding     She had adverse effects from the medications that required hospitalization. Pt got stomach ulcers from this medication prescribed by Dr. Arango - pediatrist.         Current Outpatient Medications:   •  alendronate (FOSAMAX) 70 MG tablet, Take 70 mg by mouth Every 14 (Fourteen) Days., Disp: , Rfl:   •  aspirin 81 MG tablet, Take 81 mg by mouth Daily. PT CALLING TO SEE IF SHE HAS TO STOP PRIOR TO SURGERY, Disp: , Rfl:   •  bumetanide (BUMEX) 2 MG tablet, TAKE 1 TABLET TWICE DAILY. DOSE CHANGED , Disp: 180 tablet, Rfl: 1  •  calcitriol (ROCALTROL) 0.25 MCG capsule, Take 0.25 mcg by mouth 2 (Two) Times a Day., Disp: , Rfl:   •  carvedilol (COREG) 25 MG tablet, TAKE 1 TABLET TWICE DAILY, Disp: 180 tablet, Rfl: 0  •  Cholecalciferol (VITAMIN D) 2000 UNITS tablet, Take 2,000 Units by mouth Daily., Disp: , Rfl:   •  enoxaparin (LOVENOX) 80 MG/0.8ML solution syringe,  0.1 mL from syringe and inject 0.7 mL under the skin into the appropriate area as directed Every 12 (Twelve) Hours., Disp: 4 mL, Rfl: 1  •  epoetin adele (EPOGEN,PROCRIT) 25480 UNIT/ML injection, Inject 10,000 Units under the skin into the appropriate area as directed As Needed., Disp: , Rfl:   •  ferrous sulfate 325 (65 FE) MG tablet, Take 1 tablet by mouth Every Other Day., Disp: , Rfl:   •   HYDROcodone-acetaminophen (NORCO) 5-325 MG per tablet, Take 1 tablet by mouth Daily As Needed., Disp: , Rfl:   •  Multiple Vitamins-Minerals (SENIOR MULTIVITAMIN PLUS) tablet, Take 1 tablet by mouth Daily., Disp: , Rfl:   •  pantoprazole (PROTONIX) 40 MG EC tablet, TAKE 1 TABLET TWICE DAILY, Disp: 180 tablet, Rfl: 0  •  ramipril (ALTACE) 5 MG capsule, Take 1 capsule by mouth Daily., Disp: 90 capsule, Rfl: 1  •  simvastatin (ZOCOR) 20 MG tablet, Take 20 mg by mouth Every Night., Disp: , Rfl:   •  traMADol (ULTRAM) 50 MG tablet, Take 2 tablets by mouth 2 (Two) Times a Day. 3 months, Disp: 180 tablet, Rfl: 1  •  vitamin B-12 (CYANOCOBALAMIN) 1000 MCG tablet, Take 1,000 mcg by mouth Daily., Disp: , Rfl:   •  warfarin (COUMADIN) 1 MG tablet, Take 1 mg by mouth Daily. DOSES VARY WEEKLY BASED ON INR DR LAWRENCE INSTRUCTED TO STOP 5 DAYS PRIOR TO SURGERY, Disp: , Rfl:   •  zolpidem (AMBIEN) 10 MG tablet, Take 1 tablet by mouth At Night As Needed for Sleep., Disp: 90 tablet, Rfl: 1  •  Carboxymethylcellulose Sodium (EYE DROPS OP), Apply  to eye(s) as directed by provider., Disp: , Rfl:   •  COLCRYS 0.6 MG tablet, Take 0.6 mg by mouth Every Other Day., Disp: , Rfl:   •  febuxostat (ULORIC) 40 MG tablet, Take 80 mg by mouth Daily., Disp: , Rfl:     Aspirin use counseling: Taking ASA appropriately as indicated    Current medication list contains no high risk medications.  No harmful drug interactions have been identified.     Family History   Problem Relation Age of Onset   • Cancer Mother    • Breast cancer Mother    • Heart disease Mother    • Hypertension Mother    • Stroke Mother    • Coronary artery disease Father    • Stroke Father    • Heart disease Father    • Hypertension Father    • Other Daughter         thiamin deficiency    • Hypertension Son    • Lung disease Other    • Hypertension Other    • Malig Hyperthermia Neg Hx        Social History     Tobacco Use   • Smoking status: Former Smoker     Packs/day: 1.00      Years: 50.00     Pack years: 50.00     Types: Cigarettes     Last attempt to quit: 2009     Years since quittin.4   • Smokeless tobacco: Never Used   • Tobacco comment: Daily caffeine - soda   Substance Use Topics   • Alcohol use: Yes     Comment: rare       Past Surgical History:   Procedure Laterality Date   • AV NODE ABLATION     • BREAST BIOPSY     • CARDIAC CATHETERIZATION      Showed severe mitral insufficiency and borderline coronary artery disease   • CARDIAC CATHETERIZATION      Showed an ejection fraction of 35%. She had occlusive disease of the right posterior LV branch and no other significant disease, treated medically.   • CARDIAC DEFIBRILLATOR PLACEMENT      Biventricular   • CARDIAC ELECTROPHYSIOLOGY PROCEDURE N/A 2019    Procedure: GENERATOR CHANGE BI-V ICD   [place];  Surgeon: James Hwang MD;  Location: [facility] CATH INVASIVE LOCATION;  Service: Cardiology   • CARDIAC VALVE REPLACEMENT  2009    Done with stent placement   • CARDIOVERSION      multiple electrocardioversions.   • CAROTID ARTERY ANGIOPLASTY Right    • COLONOSCOPY N/A 2017    Procedure: COLONOSCOPY TO CECUM;  Surgeon: Kevin Davis MD;  Location: [facility] ENDOSCOPY;  Service:    • CORONARY ANGIOPLASTY WITH STENT PLACEMENT     • CORONARY ARTERY BYPASS GRAFT      single graft to the PDA   • CORONARY STENT PLACEMENT     • ENDOSCOPY N/A 2017    Procedure: ESOPHAGOGASTRODUODENOSCOPY ;  Surgeon: Kevin Davis MD;  Location: [facility] ENDOSCOPY;  Service:    • HEMORRHOIDECTOMY     • HYSTERECTOMY     • INCISION AND DRAINAGE TRUNK Right 2018    Procedure: EVACUATION OF RIGHT CHEST WALL HEMATOMA;  Surgeon: Juvencio Rodriguez MD;  Location: [facility] OR;  Service: General   • MITRAL VALVE REPLACEMENT  2010    #31 Epic porcine valve.   • THROMBOENDARTERECTOMY Right     carotid thromboendarterectomy    • TONSILLECTOMY      age 32   • TOTAL KNEE ARTHROPLASTY Left    • TOTAL KNEE ARTHROPLASTY REVISION Left  11/19/2019    Procedure: TOTAL KNEE ARTHROPLASTY REVISION LEFT;  Surgeon: Juvencio Pressley II, MD;  Location: [facility] OR;  Service: Orthopedics   • TOTAL SHOULDER ARTHROPLASTY W/ DISTAL CLAVICLE EXCISION Left 1/16/2018    Procedure: TOTAL SHOULDER REVERSE ARTHROPLASTY;  Surgeon: Bianka Quesada MD;  Location: [facility] OR;  Service:        Patient Active Problem List   Diagnosis   • Anemia   • Thrombocytopenia (CMS/HCC)   • B12 deficiency   • Coronary artery disease involving coronary bypass graft of native heart without angina pectoris   • S/P MVR (porcine)   • Chronic atrial fibrillation   • Bilateral carotid artery disease (CMS/HCC)   • Intractable low back pain   • Long-term (current) use of anticoagulants   • Low back pain   • Osteoporosis   • Murmur, heart   • GEORGINA on auto CPAP - Dr Cardona   • Hypersomnia due to medical condition - GEORGINA   • Esophageal stricture   • Dysphagia   • Closed fracture of left proximal humerus   • Hypertension   • Supratherapeutic INR   • Chronic combined systolic and diastolic congestive heart failure (CMS/HCC)   • Osteoporosis with pathological fracture   • Closed 3-part fracture of proximal end of left humerus   • Closed fracture of proximal end of humerus with delayed healing   • Injury of right knee   • Knee injury, left, initial encounter   • History of fall   • S/P TKR (total knee replacement) using cement, left   • Ischemic cardiomyopathy   • Intramuscular hematoma right pecotralis   • Hemorrhagic disorder due to extrinsic circulating anticoagulants (CMS/HCC)   • Acute posthemorrhagic anemia   • Chronic kidney disease, stage 3 (CMS/HCC)   • Peripheral edema   • Chronic coronary artery disease   • Inflammatory arthritis   • Ventricular tachycardia (CMS/HCC)   • S/P revision of total knee   • Idiopathic gout       Review of Systems   Constitutional: Negative for fatigue and fever.   HENT: Positive for congestion. Negative for trouble swallowing.    Eyes:  "Negative for discharge and visual disturbance.   Respiratory: Positive for shortness of breath. Negative for choking.    Cardiovascular: Negative for chest pain and palpitations.   Gastrointestinal: Negative for abdominal pain and blood in stool.   Endocrine: Negative.    Genitourinary: Negative for genital sores and hematuria.   Musculoskeletal: Negative for gait problem and joint swelling.   Skin: Negative for color change, pallor, rash and wound.   Allergic/Immunologic: Positive for environmental allergies. Negative for immunocompromised state.   Neurological: Negative for facial asymmetry and speech difficulty.   Psychiatric/Behavioral: Negative for hallucinations and suicidal ideas.       Objective     Vitals:    06/05/20 1259   BP: 126/68   Pulse: 62   Temp: 98.6 °F (37 °C)   SpO2: 98%   Weight: 67.4 kg (148 lb 9.6 oz)   Height: 157.5 cm (62\")       Patient's Body mass index is 27.18 kg/m². BMI is within normal parameters. No follow-up required..      No exam data present    The patient has no evidence of cognitve impairment.     Physical Exam   Constitutional: She is oriented to person, place, and time. She appears well-developed and well-nourished.   HENT:   Head: Normocephalic and atraumatic.   Right Ear: External ear normal.   Left Ear: External ear normal.   Nose: Nose normal.   Mouth/Throat: Oropharynx is clear and moist.   Eyes: Pupils are equal, round, and reactive to light. Conjunctivae and EOM are normal.   Neck: Normal range of motion. Neck supple.   Cardiovascular: Normal rate, regular rhythm and intact distal pulses. Exam reveals gallop. Exam reveals no friction rub.   No murmur heard.  Pulmonary/Chest: Effort normal. No stridor. No respiratory distress. She has no wheezes. She has rales. She exhibits no tenderness.   Abdominal: Soft. Bowel sounds are normal.   Musculoskeletal: Normal range of motion. She exhibits edema.   Neurological: She is alert and oriented to person, place, and time.   Skin: " Skin is warm and dry.   Psychiatric: She has a normal mood and affect. Her behavior is normal. Judgment and thought content normal.       Recent Lab Results:     Lab Results   Component Value Date    CHOL 155 10/17/2019    TRIG 92 10/17/2019    HDL 52 10/17/2019    VLDL 18.4 10/17/2019    LDLHDL 1.63 10/17/2019       Assessment/Plan #1 labs #2 continue following with nephrology and cardiology #3 continue monitor blood pressure at home #4 follow-up labs in 4 days.  Age-appropriate Screening Schedule:  Refer to the list below for future screening recommendations based on patient's age, sex and/or medical conditions.      Health Maintenance   Topic Date Due   • ZOSTER VACCINE (2 of 2) 03/02/2017   • DXA SCAN  09/20/2019   • PNEUMOCOCCAL VACCINE (65+ HIGH RISK) (1 of 2 - PCV13) 03/11/2022 (Originally 10/28/2005)   • INFLUENZA VACCINE  08/01/2020   • LIPID PANEL  10/17/2020   • MAMMOGRAM  09/24/2021   • TDAP/TD VACCINES (2 - Td) 05/02/2027   • COLONOSCOPY  09/28/2027       Medicare Risks and Personalized Health Plan:  NA      CMS-Preventive Services Quick Reference  Medicare Preventive Services Addressed:  NA    Advance Care Planning:  ACP discussion was held with the patient during this visit. Patient has an advance directive in EMR which is still valid.     Diagnoses and all orders for this visit:    1. Medicare annual wellness visit, subsequent (Primary)    2. Anemia in stage 3 chronic kidney disease (CMS/HCC)  -     CBC (No Diff); Future  -     Comprehensive Metabolic Panel  -     Lipid Panel  -     Vitamin D 25 Hydroxy  -     proBNP    3. Chronic combined systolic and diastolic congestive heart failure (CMS/HCC)  -     CBC (No Diff); Future  -     Comprehensive Metabolic Panel  -     Lipid Panel  -     Vitamin D 25 Hydroxy  -     proBNP    4. Essential hypertension  -     CBC (No Diff); Future  -     Comprehensive Metabolic Panel  -     Lipid Panel  -     Vitamin D 25 Hydroxy  -     proBNP    5. Anemia, unspecified  type  -     Ferritin    6. Idiopathic gout, unspecified chronicity, unspecified site  -     Uric Acid        An After Visit Summary and PPPS with all of these plans were given to the patient.      Follow Up:  No follow-ups on file.

## 2020-06-05 NOTE — PATIENT INSTRUCTIONS
Medicare Wellness  Personal Prevention Plan of Service     Date of Office Visit:  2020  Encounter Provider:  Ariel Matute MD  Place of Service:  [facility]  Patient Name: Janel Mahoney  :  1940    As part of the Medicare Wellness portion of your visit today, we are providing you with this personalized preventive plan of services (PPPS). This plan is based upon recommendations of the [place] Preventive Services Task Force (USPSTF) and the Advisory Committee on Immunization Practices (ACIP).    This lists the preventive care services that should be considered, and provides dates of when you are due. Items listed as completed are up-to-date and do not require any further intervention.    Health Maintenance   Topic Date Due   • HEPATITIS C SCREENING  2016   • ZOSTER VACCINE (2 of 2) 2017   • DXA SCAN  2019   • LUNG CANCER SCREENING  2019   • PNEUMOCOCCAL VACCINE (65+ HIGH RISK) (1 of 2 - PCV13) 2022 (Originally 10/28/2005)   • INFLUENZA VACCINE  2020   • LIPID PANEL  10/17/2020   • MEDICARE ANNUAL WELLNESS  2021   • MAMMOGRAM  2021   • TDAP/TD VACCINES (2 - Td) 2027   • COLONOSCOPY  2027       No orders of the defined types were placed in this encounter.      No follow-ups on file.

## 2020-06-08 ENCOUNTER — LAB (OUTPATIENT)
Dept: LAB | Facility: HOSPITAL | Age: 80
End: 2020-06-08

## 2020-06-08 ENCOUNTER — ANTICOAGULATION VISIT (OUTPATIENT)
Dept: PHARMACY | Facility: HOSPITAL | Age: 80
End: 2020-06-08

## 2020-06-08 ENCOUNTER — TRANSCRIBE ORDERS (OUTPATIENT)
Dept: ADMINISTRATIVE | Facility: HOSPITAL | Age: 80
End: 2020-06-08

## 2020-06-08 DIAGNOSIS — M25.562 LEFT KNEE PAIN, UNSPECIFIED CHRONICITY: ICD-10-CM

## 2020-06-08 DIAGNOSIS — M25.562 LEFT KNEE PAIN, UNSPECIFIED CHRONICITY: Primary | ICD-10-CM

## 2020-06-08 DIAGNOSIS — I48.20 CHRONIC ATRIAL FIBRILLATION (HCC): ICD-10-CM

## 2020-06-08 LAB
CRP SERPL-MCNC: 0.07 MG/DL (ref 0–0.5)
ERYTHROCYTE [SEDIMENTATION RATE] IN BLOOD: 63 MM/HR (ref 0–30)
INR PPP: 2.6 (ref 0.91–1.09)
PROTHROMBIN TIME: 31.4 SECONDS (ref 10–13.8)

## 2020-06-08 PROCEDURE — 86140 C-REACTIVE PROTEIN: CPT

## 2020-06-08 PROCEDURE — 36415 COLL VENOUS BLD VENIPUNCTURE: CPT

## 2020-06-08 PROCEDURE — 85610 PROTHROMBIN TIME: CPT

## 2020-06-08 PROCEDURE — G0463 HOSPITAL OUTPT CLINIC VISIT: HCPCS

## 2020-06-08 PROCEDURE — 36416 COLLJ CAPILLARY BLOOD SPEC: CPT

## 2020-06-08 PROCEDURE — 85652 RBC SED RATE AUTOMATED: CPT

## 2020-06-08 NOTE — PROGRESS NOTES
Anticoagulation Clinic Progress Note    Anticoagulation Summary  As of 2020    INR goal:   2.0-3.0   TTR:   56.3 % (1.5 y)   INR used for dosin.6 (2020)   Warfarin maintenance plan:   1 mg every Tue; 2 mg all other days   Weekly warfarin total:   13 mg   No change documented:   Shanna Major RPH   Plan last modified:   Vargas Butcher RPH (5/15/2020)   Next INR check:   2020   Priority:   Critical   Target end date:   Indefinite    Indications    Chronic atrial fibrillation [I48.20]             Anticoagulation Episode Summary     INR check location:       Preferred lab:       Send INR reminders to:    [facility]    Comments:         Anticoagulation Care Providers     Provider Role Specialty Phone number    Nik Galarza MD Referring Cardiology [phone]          Clinic Interview:      INR History:  Anticoagulation Monitoring 5/15/2020 2020 2020   INR 1.80 - 2.6   INR Date 5/15/2020 - 2020   INR Goal 2.0-3.0 2.0-3.0 2.0-3.0   Trend Up Same Same   Last Week Total 12 mg 13 mg 10 mg   Next Week Total 13 mg 8 mg 13 mg   Sun 2 mg 2 mg 2 mg   Mon 2 mg Hold () 2 mg   Tue 1 mg Hold () 1 mg   Wed 2 mg Hold (6/3) 2 mg   Thu 2 mg 3 mg (); Otherwise 2 mg 2 mg   Fri 2 mg 3 mg (); Otherwise 2 mg 2 mg   Sat 2 mg 2 mg 2 mg   Visit Report - - -   Some recent data might be hidden       Plan:  1. INR is Therapeutic today- see above in Anticoagulation Summary.  Will instruct Janel Mahoney to STOP enoxaparin injections and continue their warfarin regimen- see above in Anticoagulation Summary.  2. Follow up in 2 weeks  3. Patient declines warfarin refills.  4. Verbal and written information provided. Patient expresses understanding and has no further questions at this time.    Shanna Major RPH

## 2020-06-09 ENCOUNTER — INFUSION (OUTPATIENT)
Dept: ONCOLOGY | Facility: HOSPITAL | Age: 80
End: 2020-06-09

## 2020-06-09 ENCOUNTER — LAB (OUTPATIENT)
Dept: LAB | Facility: HOSPITAL | Age: 80
End: 2020-06-09

## 2020-06-09 DIAGNOSIS — D69.6 THROMBOCYTOPENIA (HCC): Primary | ICD-10-CM

## 2020-06-09 DIAGNOSIS — N18.30 ANEMIA IN STAGE 3 CHRONIC KIDNEY DISEASE (HCC): Primary | ICD-10-CM

## 2020-06-09 DIAGNOSIS — D63.1 ANEMIA IN STAGE 3 CHRONIC KIDNEY DISEASE (HCC): Primary | ICD-10-CM

## 2020-06-09 DIAGNOSIS — D50.9 IRON DEFICIENCY ANEMIA, UNSPECIFIED IRON DEFICIENCY ANEMIA TYPE: ICD-10-CM

## 2020-06-09 DIAGNOSIS — N18.30 CHRONIC KIDNEY DISEASE, STAGE 3 (HCC): ICD-10-CM

## 2020-06-09 LAB
BASOPHILS # BLD AUTO: 0.01 10*3/MM3 (ref 0–0.2)
BASOPHILS NFR BLD AUTO: 0.1 % (ref 0–1.5)
DEPRECATED RDW RBC AUTO: 45.7 FL (ref 37–54)
EOSINOPHIL # BLD AUTO: 0.06 10*3/MM3 (ref 0–0.4)
EOSINOPHIL NFR BLD AUTO: 0.9 % (ref 0.3–6.2)
ERYTHROCYTE [DISTWIDTH] IN BLOOD BY AUTOMATED COUNT: 13.5 % (ref 12.3–15.4)
HCT VFR BLD AUTO: 29.4 % (ref 34–46.6)
HGB BLD-MCNC: 9.6 G/DL (ref 12–15.9)
IMM GRANULOCYTES # BLD AUTO: 0.03 10*3/MM3 (ref 0–0.05)
IMM GRANULOCYTES NFR BLD AUTO: 0.4 % (ref 0–0.5)
LYMPHOCYTES # BLD AUTO: 0.8 10*3/MM3 (ref 0.7–3.1)
LYMPHOCYTES NFR BLD AUTO: 11.6 % (ref 19.6–45.3)
MCH RBC QN AUTO: 30.5 PG (ref 26.6–33)
MCHC RBC AUTO-ENTMCNC: 32.7 G/DL (ref 31.5–35.7)
MCV RBC AUTO: 93.3 FL (ref 79–97)
MONOCYTES # BLD AUTO: 0.44 10*3/MM3 (ref 0.1–0.9)
MONOCYTES NFR BLD AUTO: 6.4 % (ref 5–12)
NEUTROPHILS # BLD AUTO: 5.58 10*3/MM3 (ref 1.7–7)
NEUTROPHILS NFR BLD AUTO: 80.6 % (ref 42.7–76)
NRBC BLD AUTO-RTO: 0 /100 WBC (ref 0–0.2)
PLATELET # BLD AUTO: 112 10*3/MM3 (ref 140–450)
PMV BLD AUTO: 10.6 FL (ref 6–12)
RBC # BLD AUTO: 3.15 10*6/MM3 (ref 3.77–5.28)
WBC NRBC COR # BLD: 6.92 10*3/MM3 (ref 3.4–10.8)

## 2020-06-09 PROCEDURE — 96372 THER/PROPH/DIAG INJ SC/IM: CPT

## 2020-06-09 PROCEDURE — 85025 COMPLETE CBC W/AUTO DIFF WBC: CPT

## 2020-06-09 PROCEDURE — 25010000002 EPOETIN ALFA PER 1000 UNITS: Performed by: INTERNAL MEDICINE

## 2020-06-09 RX ADMIN — ERYTHROPOIETIN 5000 UNITS: 20000 INJECTION, SOLUTION INTRAVENOUS; SUBCUTANEOUS at 14:03

## 2020-06-10 ENCOUNTER — LAB (OUTPATIENT)
Dept: FAMILY MEDICINE CLINIC | Facility: CLINIC | Age: 80
End: 2020-06-10

## 2020-06-10 DIAGNOSIS — I10 ESSENTIAL HYPERTENSION: ICD-10-CM

## 2020-06-10 DIAGNOSIS — D63.1 ANEMIA IN STAGE 3 CHRONIC KIDNEY DISEASE (HCC): ICD-10-CM

## 2020-06-10 DIAGNOSIS — I50.42 CHRONIC COMBINED SYSTOLIC AND DIASTOLIC CONGESTIVE HEART FAILURE (HCC): ICD-10-CM

## 2020-06-10 DIAGNOSIS — N18.30 ANEMIA IN STAGE 3 CHRONIC KIDNEY DISEASE (HCC): ICD-10-CM

## 2020-06-10 LAB
25(OH)D3 SERPL-MCNC: 66.2 NG/ML (ref 30–100)
ALBUMIN SERPL-MCNC: 3.9 G/DL (ref 3.5–5.2)
ALBUMIN/GLOB SERPL: 1.5 G/DL
ALP SERPL-CCNC: 39 U/L (ref 39–117)
ALT SERPL W P-5'-P-CCNC: 15 U/L (ref 1–33)
ANION GAP SERPL CALCULATED.3IONS-SCNC: 10 MMOL/L (ref 5–15)
AST SERPL-CCNC: 16 U/L (ref 1–32)
BILIRUB SERPL-MCNC: 0.4 MG/DL (ref 0.2–1.2)
BUN BLD-MCNC: 67 MG/DL (ref 8–23)
BUN/CREAT SERPL: 28.2 (ref 7–25)
CALCIUM SPEC-SCNC: 9.5 MG/DL (ref 8.6–10.5)
CHLORIDE SERPL-SCNC: 103 MMOL/L (ref 98–107)
CHOLEST SERPL-MCNC: 144 MG/DL (ref 0–200)
CO2 SERPL-SCNC: 30 MMOL/L (ref 22–29)
CREAT BLD-MCNC: 2.38 MG/DL (ref 0.57–1)
DEPRECATED RDW RBC AUTO: 42.8 FL (ref 37–54)
ERYTHROCYTE [DISTWIDTH] IN BLOOD BY AUTOMATED COUNT: 12.5 % (ref 12.3–15.4)
FERRITIN SERPL-MCNC: 417 NG/ML (ref 13–150)
GFR SERPL CREATININE-BSD FRML MDRD: 20 ML/MIN/1.73
GLOBULIN UR ELPH-MCNC: 2.6 GM/DL
GLUCOSE BLD-MCNC: 105 MG/DL (ref 65–99)
HCT VFR BLD AUTO: 26.7 % (ref 34–46.6)
HDLC SERPL-MCNC: 55 MG/DL (ref 40–60)
HGB BLD-MCNC: 8.7 G/DL (ref 12–15.9)
LDLC SERPL CALC-MCNC: 79 MG/DL (ref 0–100)
LDLC/HDLC SERPL: 1.43 {RATIO}
MCH RBC QN AUTO: 30.3 PG (ref 26.6–33)
MCHC RBC AUTO-ENTMCNC: 32.6 G/DL (ref 31.5–35.7)
MCV RBC AUTO: 93 FL (ref 79–97)
NT-PROBNP SERPL-MCNC: 5074 PG/ML (ref 5–1800)
PLATELET # BLD AUTO: 125 10*3/MM3 (ref 140–450)
PMV BLD AUTO: 10.2 FL (ref 6–12)
POTASSIUM BLD-SCNC: 4.3 MMOL/L (ref 3.5–5.2)
PROT SERPL-MCNC: 6.5 G/DL (ref 6–8.5)
RBC # BLD AUTO: 2.87 10*6/MM3 (ref 3.77–5.28)
SODIUM BLD-SCNC: 143 MMOL/L (ref 136–145)
TRIGL SERPL-MCNC: 52 MG/DL (ref 0–150)
URATE SERPL-MCNC: 12.1 MG/DL (ref 2.4–5.7)
VLDLC SERPL-MCNC: 10.4 MG/DL
WBC NRBC COR # BLD: 5.1 10*3/MM3 (ref 3.4–10.8)

## 2020-06-10 PROCEDURE — 82306 VITAMIN D 25 HYDROXY: CPT | Performed by: INTERNAL MEDICINE

## 2020-06-10 PROCEDURE — 36415 COLL VENOUS BLD VENIPUNCTURE: CPT | Performed by: INTERNAL MEDICINE

## 2020-06-10 PROCEDURE — 83880 ASSAY OF NATRIURETIC PEPTIDE: CPT | Performed by: INTERNAL MEDICINE

## 2020-06-10 PROCEDURE — 80061 LIPID PANEL: CPT | Performed by: INTERNAL MEDICINE

## 2020-06-10 PROCEDURE — 85027 COMPLETE CBC AUTOMATED: CPT | Performed by: INTERNAL MEDICINE

## 2020-06-10 PROCEDURE — 84550 ASSAY OF BLOOD/URIC ACID: CPT | Performed by: INTERNAL MEDICINE

## 2020-06-10 PROCEDURE — 80053 COMPREHEN METABOLIC PANEL: CPT | Performed by: INTERNAL MEDICINE

## 2020-06-10 PROCEDURE — 82728 ASSAY OF FERRITIN: CPT | Performed by: INTERNAL MEDICINE

## 2020-06-11 RX ORDER — CARVEDILOL 25 MG/1
TABLET ORAL
Qty: 180 TABLET | Refills: 0 | Status: SHIPPED | OUTPATIENT
Start: 2020-06-11 | End: 2020-08-17

## 2020-06-19 ENCOUNTER — OFFICE VISIT (OUTPATIENT)
Dept: CARDIOLOGY | Facility: CLINIC | Age: 80
End: 2020-06-19

## 2020-06-19 ENCOUNTER — CLINICAL SUPPORT NO REQUIREMENTS (OUTPATIENT)
Dept: CARDIOLOGY | Facility: CLINIC | Age: 80
End: 2020-06-19

## 2020-06-19 VITALS
SYSTOLIC BLOOD PRESSURE: 124 MMHG | HEIGHT: 62 IN | BODY MASS INDEX: 26.5 KG/M2 | WEIGHT: 144 LBS | HEART RATE: 60 BPM | DIASTOLIC BLOOD PRESSURE: 60 MMHG

## 2020-06-19 DIAGNOSIS — I25.5 ISCHEMIC CARDIOMYOPATHY: Primary | ICD-10-CM

## 2020-06-19 DIAGNOSIS — Z95.2 S/P MVR (MITRAL VALVE REPLACEMENT): ICD-10-CM

## 2020-06-19 DIAGNOSIS — I42.0 DILATED CARDIOMYOPATHY (HCC): ICD-10-CM

## 2020-06-19 DIAGNOSIS — I47.20 VENTRICULAR TACHYCARDIA (HCC): ICD-10-CM

## 2020-06-19 DIAGNOSIS — I48.20 CHRONIC ATRIAL FIBRILLATION (HCC): ICD-10-CM

## 2020-06-19 DIAGNOSIS — I65.23 BILATERAL CAROTID ARTERY STENOSIS: ICD-10-CM

## 2020-06-19 DIAGNOSIS — Z95.0 PACEMAKER: ICD-10-CM

## 2020-06-19 DIAGNOSIS — I25.810 CORONARY ARTERY DISEASE INVOLVING CORONARY BYPASS GRAFT OF NATIVE HEART WITHOUT ANGINA PECTORIS: ICD-10-CM

## 2020-06-19 PROCEDURE — 93283 PRGRMG EVAL IMPLANTABLE DFB: CPT | Performed by: INTERNAL MEDICINE

## 2020-06-19 PROCEDURE — 99214 OFFICE O/P EST MOD 30 MIN: CPT | Performed by: NURSE PRACTITIONER

## 2020-06-19 PROCEDURE — 93000 ELECTROCARDIOGRAM COMPLETE: CPT | Performed by: NURSE PRACTITIONER

## 2020-06-19 RX ORDER — ALLOPURINOL 100 MG/1
TABLET ORAL
COMMUNITY
Start: 2020-06-13 | End: 2021-06-18 | Stop reason: SDUPTHER

## 2020-06-19 NOTE — PROGRESS NOTES
Date of Office Visit: 20  Encounter Provider: NICKY Roman  Place of Service: [facility] CARDIOLOGY  Patient Name: Janel Mahoney  :1940    Chief Complaint   Patient presents with   • Atrial Fibrillation   • Coronary Artery Disease   • Follow-up   :     HPI: Janel Mahoney is a 79 y.o. female  with history of coronary artery disease status post coronary artery bypass grafting, mitral insufficiency status post mitral valve replacement with porcine valve, atrial fibrillation, ischemic cardiomyopathy, chronic systolic congestive heart failure, nonsustained ventricular tachycardia status post biventricular ICD.  She is followed by Dr. Galarza I will see her in follow-up today     She has history of mild disease of the left anterior descending and then angioplasty and stent placement of the right coronary artery.  She later was found to have premature ventricular contractions as well as severe vascular disease.  In 2007 she had an acute infarct catheterization showed left ventricular ejection fraction reduced at 35%.  She had occlusive disease of the right posterior left ventricular branch and no other significant disease.  She was treated medically.  She had a JASON which confirmed left ventricular ejection fraction of 40% and moderate mitral insufficiency.  She had atrial fibrillation and electro cardioversion back to sinus rhythm in May 2007 and then presented with recurrent atrial fibrillation in 2007 and was placed on Tikosyn.  This was titrated up to 500 mcg  but she developed marked QT prolongation even on 250 mcg twice daily.  Then opted for warfarin and rate control however she continued to have symptoms and went to Dr. Harish Iverson in [place] to have pulmonary vein isolation.  She then had recurrent A. fib and was placed on sotalol converted then went back into atrial fibrillation.  She had a repeat catheterization in 2010 which  showed severe mitral insufficiency,  and she ultimately had mitral valve replacement with a #31 epic porcine valve and a single bypass graft to the posterior descending artery.  She continued to have episodes of rapid atrial fibrillation and left ventricular dysfunction.  She ultimately underwent AV node ablation and had upgrade of her device to a biventricular.  She presented in September 2013 with fatigue, tiredness and weakness.  Echocardiogram in 2016 showed left ventricular ejection fraction to be 45%, moderate pulmonary hypertension at 62..  She had a perfusion stress test which showed no evidence of ischemia.  She had a positive sleep study and was started on CPAP.  She also had some volume overload improved with twice a day diuretic.  In August 2016, she presented with multiple compression fractures of her back.  In September 2017 she had some GI bleeding and was found to have esophageal ulcers but her INR was supratherapeutic.  The ulcers were treated and her hemoglobin stabilized.  She was readmitted in November 2018 with spontaneous hematoma of her right pectoral muscle which had to be surgically drained.  She had a fall after stumbling 2-3 weeks prior but did not recall hitting her chest.  She had been on a Lovenox bridge around that time for her back injections. She was due for biventricular generator replacement and had that completed 1/4/2019.  In early February 2019 her nephrologist had decreased ramipril and then stopped it altogether then had issues with increased lower extremity edema and shortness of breath with orthopnea and she was admitted to the hospital received IV Lasix and was switched from Lasix to Bumex and she improved.    In August 2019 she was noted to have episodes of ventricular tachycardia on ICD which were successfully treated with ATP.  There is one episode that the ATP failed to terminate, but it broke just before further therapy could be given.  It was felt at that time that  ATP would be the treatment for those episodes.     She had successful ATP for ventricular tachycardia in 03/2020 and 05/2020 and no changes were recommended by Dr. Boucher with electrophysiology.     Revisit today for reassessment.  She had her left knee done in November but has continued to have pain which really did not subside.  She had some fluid drained and continues to follow with .  She denies chest pain tightness pressure, dizziness, lightheadedness, near-syncope, syncope, palpitation, shortness of breath.  She is working with physical therapy has no exertional symptoms with that.  She does report fatigue which is chronic and intermittent lower extremity edema more prominent in the left.  Allergies   Allergen Reactions   • Diclofenac GI Bleeding     She had adverse effects from the medications that required hospitalization. Pt got stomach ulcers from this medication prescribed by Dr. Arango - pediatrist.       Past Medical History:   Diagnosis Date   • Acute kidney injury (CMS/HCC)    • Anemia    • Atrial fibrillation (CMS/HCC)    • Bruises easily    • Carotid artery stenosis    • Chronic back pain    • Chronic combined systolic and diastolic congestive heart failure (CMS/HCC)    • Chronic coronary artery disease     moderate to severe LV dysfunction.   • Chronic kidney disease, stage 3 (CMS/HCC)    • Dysphagia    • GERD (gastroesophageal reflux disease)    • Gout    • H/O cardiac murmur    • Hematoma     LEFT LEG; DR ALONZO AWARE   • Kaw (hard of hearing)     wears hearing aids   • Hyperlipidemia    • Hypertension    • Hypotension    • ICD (implantable cardioverter-defibrillator) in place    • Ischemic cardiomyopathy    • Kyphoscoliosis    • Leukopenia    • Lumbar spondylosis    • Obesity    • GEORGINA (obstructive sleep apnea) 12/01/2013    Overnight polysomnogram, weight 168 pounds.  AHI mildly abnormal at 10.3 events per hour.  Respiratory effort related arousals 9.5 events per hour.  Total time  snoring 30% and arousals associated with snoring 15 events per hour.   • Osteoarthritis    • Osteoporosis    • Peptic ulcer    • Premature ventricular contractions    • Renal insufficiency syndrome    • Scoliosis    • Shoulder fracture, left    • Stroke syndrome    • Thrombocytopenia (CMS/HCC)    • Urine incontinence    • Ventricular tachycardia (CMS/HCC)    • Vitamin B12 deficiency        Past Surgical History:   Procedure Laterality Date   • AV NODE ABLATION     • BREAST BIOPSY     • CARDIAC CATHETERIZATION      Showed severe mitral insufficiency and borderline coronary artery disease   • CARDIAC CATHETERIZATION      Showed an ejection fraction of 35%. She had occlusive disease of the right posterior LV branch and no other significant disease, treated medically.   • CARDIAC DEFIBRILLATOR PLACEMENT      Biventricular   • CARDIAC ELECTROPHYSIOLOGY PROCEDURE N/A 1/4/2019    Procedure: GENERATOR CHANGE BI-V ICD   [place];  Surgeon: James Hwang MD;  Location: [facility] CATH INVASIVE LOCATION;  Service: Cardiology   • CARDIAC VALVE REPLACEMENT  2009    Done with stent placement   • CARDIOVERSION      multiple electrocardioversions.   • CAROTID ARTERY ANGIOPLASTY Right    • COLONOSCOPY N/A 9/28/2017    Procedure: COLONOSCOPY TO CECUM;  Surgeon: Kevin Davis MD;  Location: [facility] ENDOSCOPY;  Service:    • CORONARY ANGIOPLASTY WITH STENT PLACEMENT  2009   • CORONARY ARTERY BYPASS GRAFT      single graft to the PDA   • CORONARY STENT PLACEMENT     • ENDOSCOPY N/A 9/28/2017    Procedure: ESOPHAGOGASTRODUODENOSCOPY ;  Surgeon: Kevin Davis MD;  Location: [facility] ENDOSCOPY;  Service:    • HEMORRHOIDECTOMY     • HYSTERECTOMY     • INCISION AND DRAINAGE TRUNK Right 11/27/2018    Procedure: EVACUATION OF RIGHT CHEST WALL HEMATOMA;  Surgeon: Juvencio Rodriguez MD;  Location: [facility] OR;  Service: General   • MITRAL VALVE REPLACEMENT  01/2010    #31 Epic porcine valve.   • THROMBOENDARTERECTOMY Right     carotid  "thromboendarterectomy    • TONSILLECTOMY      age 32   • TOTAL KNEE ARTHROPLASTY Left    • TOTAL KNEE ARTHROPLASTY REVISION Left 11/19/2019    Procedure: TOTAL KNEE ARTHROPLASTY REVISION LEFT;  Surgeon: Juvencio Pressley II, MD;  Location: [facility];  Service: Orthopedics   • TOTAL SHOULDER ARTHROPLASTY W/ DISTAL CLAVICLE EXCISION Left 1/16/2018    Procedure: TOTAL SHOULDER REVERSE ARTHROPLASTY;  Surgeon: Bianka Quesada MD;  Location: [facility];  Service:          Family and social history reviewed.     Review of Systems   Constitution: Positive for malaise/fatigue.   Cardiovascular: Positive for leg swelling.     All other systems were reviewed and are negative          Objective:     Vitals:    06/19/20 1056   BP: 124/60   BP Location: Left arm   Patient Position: Sitting   Pulse: 60   Weight: 65.3 kg (144 lb)   Height: 157.5 cm (62\")     Body mass index is 26.34 kg/m².    PHYSICAL EXAM:  Physical Exam   Constitutional: She is oriented to person, place, and time. She appears well-developed and well-nourished. No distress.   HENT:   Head: Normocephalic.   Eyes: Conjunctivae are normal.   Neck: Normal range of motion. No JVD present.   Cardiovascular: Normal rate, regular rhythm and intact distal pulses.   Murmur heard.  Pulses:       Carotid pulses are 2+ on the right side, and 2+ on the left side.       Radial pulses are 2+ on the right side, and 2+ on the left side.        Posterior tibial pulses are 2+ on the right side, and 2+ on the left side.   Pulmonary/Chest: Effort normal and breath sounds normal. No respiratory distress. She has no wheezes. She has no rhonchi. She has no rales. She exhibits no tenderness.   Abdominal: Soft. Bowel sounds are normal. She exhibits no distension.   Musculoskeletal: Normal range of motion. She exhibits edema (1+ bilateral ankle to pretibial).   Neurological: She is alert and oriented to person, place, and time.   Skin: Skin is warm, dry and intact. No " rash noted. She is not diaphoretic. No cyanosis.   Psychiatric: She has a normal mood and affect. Her behavior is normal. Judgment and thought content normal.         ECG 12 Lead  Date/Time: 6/19/2020 11:18 AM  Performed by: Francesca Manning APRN  Authorized by: Francesca Manning APRN   Comparison: compared with previous ECG   Similar to previous ECG  Rhythm: atrial fibrillation and paced  Rate: normal    Clinical impression: abnormal EKG            Current Outpatient Medications   Medication Sig Dispense Refill   • alendronate (FOSAMAX) 70 MG tablet Take 70 mg by mouth Every 14 (Fourteen) Days.     • allopurinol (ZYLOPRIM) 100 MG tablet      • aspirin 81 MG tablet Take 81 mg by mouth Daily. PT CALLING TO SEE IF SHE HAS TO STOP PRIOR TO SURGERY     • bumetanide (BUMEX) 2 MG tablet TAKE 1 TABLET TWICE DAILY. DOSE CHANGED  180 tablet 1   • calcitriol (ROCALTROL) 0.25 MCG capsule Take 0.25 mcg by mouth 2 (Two) Times a Day.     • carvedilol (COREG) 25 MG tablet TAKE 1 TABLET TWICE DAILY 180 tablet 0   • Cholecalciferol (VITAMIN D) 2000 UNITS tablet Take 2,000 Units by mouth Daily.     • epoetin adele (EPOGEN,PROCRIT) 19693 UNIT/ML injection Inject 10,000 Units under the skin into the appropriate area as directed As Needed.     • ferrous sulfate 325 (65 FE) MG tablet Take 1 tablet by mouth Every Other Day.     • HYDROcodone-acetaminophen (NORCO) 5-325 MG per tablet Take 1 tablet by mouth Daily As Needed.     • Multiple Vitamins-Minerals (SENIOR MULTIVITAMIN PLUS) tablet Take 1 tablet by mouth Daily.     • pantoprazole (PROTONIX) 40 MG EC tablet TAKE 1 TABLET TWICE DAILY 180 tablet 0   • ramipril (ALTACE) 5 MG capsule Take 1 capsule by mouth Daily. 90 capsule 1   • simvastatin (ZOCOR) 20 MG tablet Take 10 mg by mouth Every Night.     • traMADol (ULTRAM) 50 MG tablet Take 2 tablets by mouth 2 (Two) Times a Day. 3 months 180 tablet 1   • vitamin B-12 (CYANOCOBALAMIN) 1000 MCG tablet Take 1,000 mcg by mouth Daily.     • warfarin  (COUMADIN) 1 MG tablet Take 1 mg by mouth Daily. DOSES VARY WEEKLY BASED ON INR  DR LAWRENCE INSTRUCTED TO STOP 5 DAYS PRIOR TO SURGERY     • zolpidem (AMBIEN) 10 MG tablet Take 1 tablet by mouth At Night As Needed for Sleep. 90 tablet 1   • febuxostat (ULORIC) 40 MG tablet Take 80 mg by mouth Daily.       No current facility-administered medications for this visit.      Assessment:       Diagnosis Plan   1. Ischemic cardiomyopathy  ECG 12 Lead   2. S/P MVR (mitral valve replacement)     3. Ventricular tachycardia (CMS/HCC)  ECG 12 Lead   4. Coronary artery disease involving coronary bypass graft of native heart without angina pectoris     5. Pacemaker     6. Dilated cardiomyopathy (CMS/HCC)     7. Chronic atrial fibrillation (CMS/HCC)          Orders Placed This Encounter   Procedures   • ECG 12 Lead     This order was created via procedure documentation         Plan:     1.  79-year-old female with coronary artery disease status post stent to the right coronary artery, then inferior infarct and ultimately had single- vessel bypass graft January 2010 to the posterior descending artery at the time of her mitral valve replacement, moderate left ventricular dysfunction with last echocardiogram December 2016 ejection fraction of approximately 40%  2.  History of mitral insufficiency status post mitral valve replacement with porcine valve. Last echo 12/2016  3.  Ischemic cardiomyopathy, see beow  4.  Chronic systolic and diastolic congestive heart failure, last EF 45% in December 2016. Appears euvolemic continue the same.  5.  Chronic atrial fibrillation.CHADS2-VASc score is 6. she had electrocardioversion to sinus rhythm May 2007 and readmitted in August with a recurrent episode placed on Tikosyn then marked QT prolongation on 250 mics twice daily.  Treated with warfarin then pulmonary vein isolation placed on sotalol recurrent A. fib and AV node ablation status post permanent pacemaker upgraded to biV ICD with  generator upgrade 01/2019.  She is maintained on warfarin    6.  Hypertension stable continue the same  7. Hyperlipidemia on simvastatin   8. Obstructive sleep apnea on CPAP  9.  History of  ventricular tachycardia status post ICD with generator upgrade in January 2019 treated with ATP.  She had successful ATP therapy in March and May 2020.  Device interrogated today. She follows with Dr. Boucher  10.  Spinal disease with history of compression fractures in serial back injection.  She requires Lovenox bridge for these  11.  History of anemia she follows with hematology  12.  History of renal insufficiency followed by nephrology with Dr. Cruz  13. Bilateral carotid stenosis sent left less than 50% December 2016 she will need repeat carotid duplex    Follow-up in 6 months call with questions or concerns.            It has been a pleasure to participate in this patient's care.      Thank you,  NICKY Roman      **I used Dragon to dictate this note:**

## 2020-06-22 RX ORDER — RAMIPRIL 5 MG/1
CAPSULE ORAL
Qty: 90 CAPSULE | Refills: 1 | Status: SHIPPED | OUTPATIENT
Start: 2020-06-22 | End: 2020-11-13

## 2020-06-26 ENCOUNTER — ANTICOAGULATION VISIT (OUTPATIENT)
Dept: PHARMACY | Facility: HOSPITAL | Age: 80
End: 2020-06-26

## 2020-06-26 DIAGNOSIS — I48.20 CHRONIC ATRIAL FIBRILLATION (HCC): ICD-10-CM

## 2020-06-26 LAB
INR PPP: 2.4 (ref 0.91–1.09)
PROTHROMBIN TIME: 28.5 SECONDS (ref 10–13.8)

## 2020-06-26 PROCEDURE — 85610 PROTHROMBIN TIME: CPT

## 2020-06-26 PROCEDURE — 36416 COLLJ CAPILLARY BLOOD SPEC: CPT

## 2020-06-26 NOTE — PROGRESS NOTES
Anticoagulation Clinic Progress Note    Anticoagulation Summary  As of 2020    INR goal:   2.0-3.0   TTR:   57.7 % (1.6 y)   INR used for dosin.4 (2020)   Warfarin maintenance plan:   1 mg every Tue; 2 mg all other days   Weekly warfarin total:   13 mg   No change documented:   Taylor Shepherd   Plan last modified:   Vargas Butcher RP (5/15/2020)   Next INR check:   2020   Priority:   Maintenance   Target end date:   Indefinite    Indications    Chronic atrial fibrillation (CMS/HCC) [I48.20]             Anticoagulation Episode Summary     INR check location:       Preferred lab:       Send INR reminders to:    [facility]IN[facility] POOL    Comments:         Anticoagulation Care Providers     Provider Role Specialty Phone number    Nik Galarza MD Referring Cardiology [phone]          Clinic Interview:  Patient Findings     Negatives:   Signs/symptoms of thrombosis, Signs/symptoms of bleeding,   Laboratory test error suspected, Change in health, Change in alcohol use,   Change in activity, Upcoming invasive procedure, Emergency department   visit, Upcoming dental procedure, Missed doses, Extra doses, Change in   medications, Change in diet/appetite, Hospital admission, Bruising, Other   complaints      Clinical Outcomes     Negatives:   Major bleeding event, Thromboembolic event,   Anticoagulation-related hospital admission, Anticoagulation-related ED   visit, Anticoagulation-related fatality        INR History:  Anticoagulation Monitoring 2020   INR - 2.6 2.4   INR Date - 2020   INR Goal 2.0-3.0 2.0-3.0 2.0-3.0   Trend Same Same Same   Last Week Total 13 mg 10 mg 13 mg   Next Week Total 8 mg 13 mg 13 mg   Sun 2 mg 2 mg 2 mg   Mon Hold () 2 mg 2 mg   Tue Hold () 1 mg 1 mg   Wed Hold (6/3) 2 mg 2 mg   Thu 3 mg (); Otherwise 2 mg 2 mg 2 mg   Fri 3 mg (); Otherwise 2 mg 2 mg 2 mg   Sat 2 mg 2 mg 2 mg   Visit Report - - -   Some recent  data might be hidden       Plan:  1. INR is therapeutic today- see above in Anticoagulation Summary.   Will instruct Janel Mahoney to continue their warfarin regimen- see above in Anticoagulation Summary.  2. Follow up in 4 weeks.  3. Patient declines warfarin refills.  4. Verbal and written information provided. Patient expresses understanding and has no further questions at this time.    Taylor Shepherd

## 2020-07-02 ENCOUNTER — OFFICE VISIT (OUTPATIENT)
Dept: SLEEP MEDICINE | Facility: HOSPITAL | Age: 80
End: 2020-07-02

## 2020-07-02 VITALS — BODY MASS INDEX: 26.5 KG/M2 | WEIGHT: 144 LBS | HEIGHT: 62 IN

## 2020-07-02 DIAGNOSIS — F51.04 PSYCHOPHYSIOLOGICAL INSOMNIA: ICD-10-CM

## 2020-07-02 DIAGNOSIS — G47.33 OSA ON CPAP: Primary | Chronic | ICD-10-CM

## 2020-07-02 DIAGNOSIS — Z99.89 OSA ON CPAP: Primary | Chronic | ICD-10-CM

## 2020-07-02 PROCEDURE — 99213 OFFICE O/P EST LOW 20 MIN: CPT | Performed by: INTERNAL MEDICINE

## 2020-07-02 PROCEDURE — G0463 HOSPITAL OUTPT CLINIC VISIT: HCPCS

## 2020-07-02 NOTE — PROGRESS NOTES
"Follow Up Sleep Disorders Center Note     Chief Complaint:  GEORGINA     Primary Care Physician: Ariel Matute MD    Interval History:   I last saw the patient in January.  She goes to bed at 2 AM and awakens between 8 and 9 AM.  She will use the bathroom during her sleep.  The patient complains that her machine is making too much noise?  Dema Sleepiness Scale is normal at 5.    Review of Systems:    A complete review of systems was done and all were negative with the exception of the above    Social History:    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     Employer: RETIRED   Tobacco Use   • Smoking status: Former Smoker     Packs/day: 1.00     Years: 50.00     Pack years: 50.00     Types: Cigarettes     Last attempt to quit: 2009     Years since quittin.4   • Smokeless tobacco: Never Used   • Tobacco comment: Daily caffeine - soda   Substance and Sexual Activity   • Alcohol use: Yes     Comment: rare   • Drug use: No       Allergies:  Diclofenac     Medication Review: Her list was reviewed.  She takes Ambien 10 mg, one half tab at bedtime.  This is prescribed by Dr. Matute    Vital Signs:    Vitals:    20 0925   Weight: 65.3 kg (144 lb)   Height: 157.5 cm (62\")     Body mass index is 26.34 kg/m².    Physical Exam:    Constitutional:  Well developed 79 y.o. female that appears in no apparent distress.  Awake & oriented times 3.  Normal mood with normal recent and remote memory and normal judgement.  Eyes:  Conjunctivae normal.  Oropharynx: Previously, moist mucous membranes without exudate and a large tongue and class III-4 Mallampati airway, patient is wearing a facemask.     Results Review:  DME is [facility].  Downloads between 43 and 2020 compliance 97%.  Average usage is 4 hours " and 31 minutes.  Average AHI is abnormal at 11.7 but the patient has a large leak of 2 hours and 4 minutes.  Average AutoCPAP pressure is 8.9 and her auto CPAP is 7-16.       Impression:   Mild obstructive sleep apnea, significant positional component, 30% of total sleep time with snoring and arousals associated with snoring 15 events per hour, by overnight polysomnogram 12/17/2013 (weight 168 pounds) not adequately treated with auto CPAP because she is uncomfortable with her mask.   Psychophysiologic insomnia treated with Ambien per Dr. Matute    Plan:  Good sleep hygiene measures should be maintained.  Some weight loss would be beneficial in this patient who is overweight by BMI.  The patient is benefiting from the treatment being provided.     The patient will continue auto CPAP as she is doing.    I again warned her about taking Ambien without treating her obstructive sleep apnea.  Presently she is doing much better than during her last visit.    The patient will call for any problems and will follow up in 9 months.      Anthony Cardona MD  Sleep Medicine  07/02/20  09:58

## 2020-07-05 PROBLEM — F51.04 PSYCHOPHYSIOLOGICAL INSOMNIA: Status: ACTIVE | Noted: 2020-07-05

## 2020-07-07 ENCOUNTER — INFUSION (OUTPATIENT)
Dept: ONCOLOGY | Facility: HOSPITAL | Age: 80
End: 2020-07-07

## 2020-07-07 ENCOUNTER — LAB (OUTPATIENT)
Dept: LAB | Facility: HOSPITAL | Age: 80
End: 2020-07-07

## 2020-07-07 DIAGNOSIS — N18.30 ANEMIA IN STAGE 3 CHRONIC KIDNEY DISEASE (HCC): ICD-10-CM

## 2020-07-07 DIAGNOSIS — D50.9 IRON DEFICIENCY ANEMIA, UNSPECIFIED IRON DEFICIENCY ANEMIA TYPE: ICD-10-CM

## 2020-07-07 DIAGNOSIS — N18.30 CHRONIC KIDNEY DISEASE, STAGE 3 (HCC): Primary | ICD-10-CM

## 2020-07-07 DIAGNOSIS — D63.1 ANEMIA IN STAGE 3 CHRONIC KIDNEY DISEASE (HCC): ICD-10-CM

## 2020-07-07 LAB
BASOPHILS # BLD AUTO: 0.02 10*3/MM3 (ref 0–0.2)
BASOPHILS NFR BLD AUTO: 0.5 % (ref 0–1.5)
DEPRECATED RDW RBC AUTO: 50.6 FL (ref 37–54)
EOSINOPHIL # BLD AUTO: 0.16 10*3/MM3 (ref 0–0.4)
EOSINOPHIL NFR BLD AUTO: 4 % (ref 0.3–6.2)
ERYTHROCYTE [DISTWIDTH] IN BLOOD BY AUTOMATED COUNT: 14.2 % (ref 12.3–15.4)
FERRITIN SERPL-MCNC: 441.5 NG/ML (ref 13–150)
HCT VFR BLD AUTO: 30.3 % (ref 34–46.6)
HGB BLD-MCNC: 9.5 G/DL (ref 12–15.9)
IMM GRANULOCYTES # BLD AUTO: 0.01 10*3/MM3 (ref 0–0.05)
IMM GRANULOCYTES NFR BLD AUTO: 0.3 % (ref 0–0.5)
IRON 24H UR-MRATE: 64 MCG/DL (ref 37–145)
IRON SATN MFR SERPL: 22 % (ref 14–48)
LYMPHOCYTES # BLD AUTO: 0.7 10*3/MM3 (ref 0.7–3.1)
LYMPHOCYTES NFR BLD AUTO: 17.5 % (ref 19.6–45.3)
MCH RBC QN AUTO: 30.7 PG (ref 26.6–33)
MCHC RBC AUTO-ENTMCNC: 31.4 G/DL (ref 31.5–35.7)
MCV RBC AUTO: 98.1 FL (ref 79–97)
MONOCYTES # BLD AUTO: 0.32 10*3/MM3 (ref 0.1–0.9)
MONOCYTES NFR BLD AUTO: 8 % (ref 5–12)
NEUTROPHILS NFR BLD AUTO: 2.78 10*3/MM3 (ref 1.7–7)
NEUTROPHILS NFR BLD AUTO: 69.7 % (ref 42.7–76)
NRBC BLD AUTO-RTO: 0 /100 WBC (ref 0–0.2)
PLATELET # BLD AUTO: 128 10*3/MM3 (ref 140–450)
PMV BLD AUTO: 9.3 FL (ref 6–12)
RBC # BLD AUTO: 3.09 10*6/MM3 (ref 3.77–5.28)
TIBC SERPL-MCNC: 294 MCG/DL (ref 249–505)
TRANSFERRIN SERPL-MCNC: 210 MG/DL (ref 200–360)
WBC # BLD AUTO: 3.99 10*3/MM3 (ref 3.4–10.8)

## 2020-07-07 PROCEDURE — 83540 ASSAY OF IRON: CPT

## 2020-07-07 PROCEDURE — 85025 COMPLETE CBC W/AUTO DIFF WBC: CPT

## 2020-07-07 PROCEDURE — 25010000002 EPOETIN ALFA PER 1000 UNITS: Performed by: INTERNAL MEDICINE

## 2020-07-07 PROCEDURE — 96372 THER/PROPH/DIAG INJ SC/IM: CPT

## 2020-07-07 PROCEDURE — 36415 COLL VENOUS BLD VENIPUNCTURE: CPT

## 2020-07-07 PROCEDURE — 84466 ASSAY OF TRANSFERRIN: CPT

## 2020-07-07 PROCEDURE — 82728 ASSAY OF FERRITIN: CPT

## 2020-07-07 RX ADMIN — ERYTHROPOIETIN 5000 UNITS: 20000 INJECTION, SOLUTION INTRAVENOUS; SUBCUTANEOUS at 13:55

## 2020-07-14 ENCOUNTER — ANTICOAGULATION VISIT (OUTPATIENT)
Dept: PHARMACY | Facility: HOSPITAL | Age: 80
End: 2020-07-14

## 2020-07-14 ENCOUNTER — LAB (OUTPATIENT)
Dept: LAB | Facility: HOSPITAL | Age: 80
End: 2020-07-14

## 2020-07-14 ENCOUNTER — TRANSCRIBE ORDERS (OUTPATIENT)
Dept: LAB | Facility: HOSPITAL | Age: 80
End: 2020-07-14

## 2020-07-14 DIAGNOSIS — I48.20 CHRONIC ATRIAL FIBRILLATION (HCC): ICD-10-CM

## 2020-07-14 DIAGNOSIS — M10.9 GOUT, UNSPECIFIED CAUSE, UNSPECIFIED CHRONICITY, UNSPECIFIED SITE: ICD-10-CM

## 2020-07-14 DIAGNOSIS — Z79.899 ENCOUNTER FOR LONG-TERM (CURRENT) USE OF OTHER MEDICATIONS: ICD-10-CM

## 2020-07-14 DIAGNOSIS — M10.9 GOUT, UNSPECIFIED CAUSE, UNSPECIFIED CHRONICITY, UNSPECIFIED SITE: Primary | ICD-10-CM

## 2020-07-14 LAB
ALBUMIN SERPL-MCNC: 3.8 G/DL (ref 3.5–5.2)
ALBUMIN/GLOB SERPL: 1.5 G/DL
ALP SERPL-CCNC: 42 U/L (ref 39–117)
ALT SERPL W P-5'-P-CCNC: 14 U/L (ref 1–33)
ANION GAP SERPL CALCULATED.3IONS-SCNC: 13.2 MMOL/L (ref 5–15)
AST SERPL-CCNC: 19 U/L (ref 1–32)
BASOPHILS # BLD AUTO: 0.01 10*3/MM3 (ref 0–0.2)
BASOPHILS NFR BLD AUTO: 0.2 % (ref 0–1.5)
BILIRUB SERPL-MCNC: 0.3 MG/DL (ref 0–1.2)
BUN SERPL-MCNC: 76 MG/DL (ref 8–23)
BUN/CREAT SERPL: 27.8 (ref 7–25)
CALCIUM SPEC-SCNC: 9.6 MG/DL (ref 8.6–10.5)
CHLORIDE SERPL-SCNC: 99 MMOL/L (ref 98–107)
CO2 SERPL-SCNC: 26.8 MMOL/L (ref 22–29)
CREAT SERPL-MCNC: 2.73 MG/DL (ref 0.57–1)
DEPRECATED RDW RBC AUTO: 47.2 FL (ref 37–54)
EOSINOPHIL # BLD AUTO: 0.11 10*3/MM3 (ref 0–0.4)
EOSINOPHIL NFR BLD AUTO: 2 % (ref 0.3–6.2)
ERYTHROCYTE [DISTWIDTH] IN BLOOD BY AUTOMATED COUNT: 13.6 % (ref 12.3–15.4)
GFR SERPL CREATININE-BSD FRML MDRD: 17 ML/MIN/1.73
GLOBULIN UR ELPH-MCNC: 2.6 GM/DL
GLUCOSE SERPL-MCNC: 97 MG/DL (ref 65–99)
HCT VFR BLD AUTO: 28.9 % (ref 34–46.6)
HGB BLD-MCNC: 9.4 G/DL (ref 12–15.9)
IMM GRANULOCYTES # BLD AUTO: 0.02 10*3/MM3 (ref 0–0.05)
IMM GRANULOCYTES NFR BLD AUTO: 0.4 % (ref 0–0.5)
INR PPP: 2.64 (ref 0.9–1.1)
LYMPHOCYTES # BLD AUTO: 0.64 10*3/MM3 (ref 0.7–3.1)
LYMPHOCYTES NFR BLD AUTO: 11.8 % (ref 19.6–45.3)
MCH RBC QN AUTO: 30.9 PG (ref 26.6–33)
MCHC RBC AUTO-ENTMCNC: 32.5 G/DL (ref 31.5–35.7)
MCV RBC AUTO: 95.1 FL (ref 79–97)
MONOCYTES # BLD AUTO: 0.34 10*3/MM3 (ref 0.1–0.9)
MONOCYTES NFR BLD AUTO: 6.3 % (ref 5–12)
NEUTROPHILS NFR BLD AUTO: 4.3 10*3/MM3 (ref 1.7–7)
NEUTROPHILS NFR BLD AUTO: 79.3 % (ref 42.7–76)
NRBC BLD AUTO-RTO: 0 /100 WBC (ref 0–0.2)
PLATELET # BLD AUTO: 140 10*3/MM3 (ref 140–450)
PMV BLD AUTO: 9.5 FL (ref 6–12)
POTASSIUM SERPL-SCNC: 4.5 MMOL/L (ref 3.5–5.2)
PROT SERPL-MCNC: 6.4 G/DL (ref 6–8.5)
PROTHROMBIN TIME: 27.3 SECONDS (ref 11.7–14.2)
RBC # BLD AUTO: 3.04 10*6/MM3 (ref 3.77–5.28)
SODIUM SERPL-SCNC: 139 MMOL/L (ref 136–145)
URATE SERPL-MCNC: 7.5 MG/DL (ref 2.4–5.7)
WBC # BLD AUTO: 5.42 10*3/MM3 (ref 3.4–10.8)

## 2020-07-14 PROCEDURE — 85610 PROTHROMBIN TIME: CPT

## 2020-07-14 PROCEDURE — 84550 ASSAY OF BLOOD/URIC ACID: CPT

## 2020-07-14 PROCEDURE — 36415 COLL VENOUS BLD VENIPUNCTURE: CPT

## 2020-07-14 PROCEDURE — 85025 COMPLETE CBC W/AUTO DIFF WBC: CPT

## 2020-07-14 PROCEDURE — 80053 COMPREHEN METABOLIC PANEL: CPT

## 2020-07-14 NOTE — PROGRESS NOTES
Anticoagulation Clinic Progress Note    Anticoagulation Summary  As of 2020    INR goal:   2.0-3.0   TTR:   59.0 % (1.6 y)   INR used for dosin.64 (2020)   Warfarin maintenance plan:   1 mg every Tue; 2 mg all other days   Weekly warfarin total:   13 mg   No change documented:   Shanna Major RPH   Plan last modified:   Vargas Butcher RPH (5/15/2020)   Next INR check:   2020   Priority:   Maintenance   Target end date:   Indefinite    Indications    Chronic atrial fibrillation (CMS/HCC) [I48.20]             Anticoagulation Episode Summary     INR check location:       Preferred lab:       [facility]    [facility]    Comments:         Anticoagulation Care Providers     Provider Role Specialty Phone number    Nik Galarza MD Referring Cardiology [phone]          Clinic Interview:      INR History:  Anticoagulation Monitoring 2020   INR 2.6 2.4 2.64   INR Date 2020   INR Goal 2.0-3.0 2.0-3.0 2.0-3.0   Trend Same Same Same   Last Week Total 10 mg 13 mg 13 mg   Next Week Total 13 mg 13 mg 13 mg   Sun 2 mg 2 mg 2 mg   Mon 2 mg 2 mg 2 mg   Tue 1 mg 1 mg 1 mg   Wed 2 mg 2 mg 2 mg   Thu 2 mg 2 mg 2 mg   Fri 2 mg 2 mg 2 mg   Sat 2 mg 2 mg 2 mg   Visit Report - - -   Some recent data might be hidden       Plan:  1. INR is Therapeutic today- see above in Anticoagulation Summary.   Will instruct Janelsavannah Mahoney to Continue their warfarin regimen- see above in Anticoagulation Summary.  2. Follow up in 3 weeks  3. Spoke with pt today. They have been instructed to call if any changes in medications, doses, concerns, etc. Patient expresses understanding and has no further questions at this time.    Shanna Major RPH

## 2020-07-23 ENCOUNTER — OFFICE VISIT (OUTPATIENT)
Dept: CARDIOLOGY | Facility: CLINIC | Age: 80
End: 2020-07-23

## 2020-07-23 VITALS
BODY MASS INDEX: 26.87 KG/M2 | DIASTOLIC BLOOD PRESSURE: 58 MMHG | WEIGHT: 146 LBS | HEIGHT: 62 IN | HEART RATE: 60 BPM | SYSTOLIC BLOOD PRESSURE: 112 MMHG

## 2020-07-23 DIAGNOSIS — I47.20 VENTRICULAR TACHYCARDIA (HCC): Primary | ICD-10-CM

## 2020-07-23 DIAGNOSIS — Z95.810 ICD (IMPLANTABLE CARDIOVERTER-DEFIBRILLATOR) IN PLACE: ICD-10-CM

## 2020-07-23 PROCEDURE — 99213 OFFICE O/P EST LOW 20 MIN: CPT | Performed by: INTERNAL MEDICINE

## 2020-07-23 PROCEDURE — 93000 ELECTROCARDIOGRAM COMPLETE: CPT | Performed by: INTERNAL MEDICINE

## 2020-07-23 NOTE — PROGRESS NOTES
Date of Office Visit: 2020  Encounter Provider: Gary Boucher MD  Place of Service: [facility]  Patient Name: Janel Mahoney  :1940    Chief Complaint   Patient presents with   • Atrial Fibrillation   • Cardiac Valve Problem   • Cardiomyopathy   • Follow-up   :     HPI: Janel Mahoney is a 79 y.o. female who presents today for biventricular ICD, for secondary prevention of ventricular tachycardia.    Janel Mahoney is a 79 y.o. female  with history of coronary artery disease status post coronary artery bypass grafting, mitral insufficiency status post mitral valve replacement with porcine valve, atrial fibrillation, ischemic cardiomyopathy, chronic systolic congestive heart failure, nonsustained ventricular tachycardia status post biventricular ICD.  She is followed by Dr. Galarza I will see her in follow    She has had several episodes of ATP for ventricular tachycardia over the past year, they have been successful.  She denies any awareness of palpitations.  She has never had any loss of consciousness.            Past Medical History:   Diagnosis Date   • Acute kidney injury (CMS/HCC)    • Anemia    • Atrial fibrillation (CMS/HCC)    • Bruises easily    • Carotid artery stenosis    • Chronic back pain    • Chronic combined systolic and diastolic congestive heart failure (CMS/HCC)    • Chronic coronary artery disease     moderate to severe LV dysfunction.   • Chronic kidney disease, stage 3 (CMS/HCC)    • Dysphagia    • GERD (gastroesophageal reflux disease)    • Gout    • H/O cardiac murmur    • Hematoma     LEFT LEG; DR ALONZO AWARE   • [place] (hard of hearing)     wears hearing aids   • Hyperlipidemia    • Hypertension    • Hypotension    • ICD (implantable cardioverter-defibrillator) in place    • Ischemic cardiomyopathy    • Kyphoscoliosis    • Leukopenia    • Lumbar spondylosis    • Obesity    • GEORGINA (obstructive sleep apnea) 2013    Overnight  polysomnogram, weight 168 pounds.  AHI mildly abnormal at 10.3 events per hour.  Respiratory effort related arousals 9.5 events per hour.  Total time snoring 30% and arousals associated with snoring 15 events per hour.   • Osteoarthritis    • Osteoporosis    • Peptic ulcer    • Premature ventricular contractions    • Renal insufficiency syndrome    • Scoliosis    • Shoulder fracture, left    • Stroke syndrome    • Thrombocytopenia (CMS/HCC)    • Urine incontinence    • Ventricular tachycardia (CMS/HCC)    • Vitamin B12 deficiency        Past Surgical History:   Procedure Laterality Date   • AV NODE ABLATION     • BREAST BIOPSY     • CARDIAC CATHETERIZATION      Showed severe mitral insufficiency and borderline coronary artery disease   • CARDIAC CATHETERIZATION      Showed an ejection fraction of 35%. She had occlusive disease of the right posterior LV branch and no other significant disease, treated medically.   • CARDIAC DEFIBRILLATOR PLACEMENT      Biventricular   • CARDIAC ELECTROPHYSIOLOGY PROCEDURE N/A 1/4/2019    Procedure: GENERATOR CHANGE BI-V ICD   [place];  Surgeon: James Hwang MD;  Location: [facility] CATH INVASIVE LOCATION;  Service: Cardiology   • CARDIAC VALVE REPLACEMENT  2009    Done with stent placement   • CARDIOVERSION      multiple electrocardioversions.   • CAROTID ARTERY ANGIOPLASTY Right    • COLONOSCOPY N/A 9/28/2017    Procedure: COLONOSCOPY TO CECUM;  Surgeon: Kevin Davis MD;  Location: [facility] ENDOSCOPY;  Service:    • CORONARY ANGIOPLASTY WITH STENT PLACEMENT  2009   • CORONARY ARTERY BYPASS GRAFT      single graft to the PDA   • CORONARY STENT PLACEMENT     • ENDOSCOPY N/A 9/28/2017    Procedure: ESOPHAGOGASTRODUODENOSCOPY ;  Surgeon: Kevin Davis MD;  Location: [facility] ENDOSCOPY;  Service:    • HEMORRHOIDECTOMY     • HYSTERECTOMY     • INCISION AND DRAINAGE TRUNK Right 11/27/2018    Procedure: EVACUATION OF RIGHT CHEST WALL HEMATOMA;  Surgeon: Juvencio Rodriguez MD;  Location:  [facility] OR;  Service: General   • MITRAL VALVE REPLACEMENT  2010    #31 Epic porcine valve.   • THROMBOENDARTERECTOMY Right     carotid thromboendarterectomy    • TONSILLECTOMY      age 32   • TOTAL KNEE ARTHROPLASTY Left    • TOTAL KNEE ARTHROPLASTY REVISION Left 2019    Procedure: TOTAL KNEE ARTHROPLASTY REVISION LEFT;  Surgeon: Juvencio Pressley II, MD;  Location: [facility];  Service: Orthopedics   • TOTAL SHOULDER ARTHROPLASTY W/ DISTAL CLAVICLE EXCISION Left 2018    Procedure: TOTAL SHOULDER REVERSE ARTHROPLASTY;  Surgeon: Bianka Quesada MD;  Location: [facility];  Service:        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     Employer: RETIRED   Tobacco Use   • Smoking status: Former Smoker     Packs/day: 1.00     Years: 50.00     Pack years: 50.00     Types: Cigarettes     Last attempt to quit: 2009     Years since quittin.5   • Smokeless tobacco: Never Used   • Tobacco comment: Daily caffeine - soda   Substance and Sexual Activity   • Alcohol use: Yes     Comment: rare   • Drug use: No       Family History   Problem Relation Age of Onset   • Cancer Mother    • Breast cancer Mother    • Heart disease Mother    • Hypertension Mother    • Stroke Mother    • Coronary artery disease Father    • Stroke Father    • Heart disease Father    • Hypertension Father    • Other Daughter         thiamin deficiency    • Hypertension Son    • Lung disease Other    • Hypertension Other    • Malig Hyperthermia Neg Hx        Review of Systems   Constitution: Negative for malaise/fatigue.   HENT: Negative.    Eyes: Negative.    Cardiovascular: Negative for chest pain, dyspnea on exertion, leg swelling and near-syncope.   Respiratory: Negative for cough and  shortness of breath.    Endocrine: Negative.    Hematologic/Lymphatic: Negative.    Skin: Negative.    Musculoskeletal: Positive for joint pain and joint swelling.   Gastrointestinal: Negative.    Genitourinary: Negative.    Neurological: Negative.  Negative for weakness.   Psychiatric/Behavioral: Negative.    Allergic/Immunologic: Negative.        Allergies   Allergen Reactions   • Diclofenac GI Bleeding     She had adverse effects from the medications that required hospitalization. Pt got stomach ulcers from this medication prescribed by Dr. Arango - pediatrist.         Current Outpatient Medications:   •  alendronate (FOSAMAX) 70 MG tablet, Take 70 mg by mouth Every 14 (Fourteen) Days., Disp: , Rfl:   •  allopurinol (ZYLOPRIM) 100 MG tablet, , Disp: , Rfl:   •  aspirin 81 MG tablet, Take 81 mg by mouth Daily. PT CALLING TO SEE IF SHE HAS TO STOP PRIOR TO SURGERY, Disp: , Rfl:   •  bumetanide (BUMEX) 2 MG tablet, TAKE 1 TABLET TWICE DAILY. DOSE CHANGED , Disp: 180 tablet, Rfl: 1  •  calcitriol (ROCALTROL) 0.25 MCG capsule, Take 0.25 mcg by mouth 2 (Two) Times a Day., Disp: , Rfl:   •  carvedilol (COREG) 25 MG tablet, TAKE 1 TABLET TWICE DAILY, Disp: 180 tablet, Rfl: 0  •  Cholecalciferol (VITAMIN D) 2000 UNITS tablet, Take 2,000 Units by mouth Daily., Disp: , Rfl:   •  epoetin adele (EPOGEN,PROCRIT) 45041 UNIT/ML injection, Inject 10,000 Units under the skin into the appropriate area as directed As Needed., Disp: , Rfl:   •  ferrous sulfate 325 (65 FE) MG tablet, Take 1 tablet by mouth Every Other Day., Disp: , Rfl:   •  HYDROcodone-acetaminophen (NORCO) 5-325 MG per tablet, Take 1 tablet by mouth Daily As Needed., Disp: , Rfl:   •  Multiple Vitamins-Minerals (SENIOR MULTIVITAMIN PLUS) tablet, Take 1 tablet by mouth Daily., Disp: , Rfl:   •  pantoprazole (PROTONIX) 40 MG EC tablet, TAKE 1 TABLET TWICE DAILY, Disp: 180 tablet, Rfl: 0  •  ramipril (ALTACE) 5 MG capsule, TAKE 1 CAPSULE EVERY DAY, Disp: 90 capsule,  "Rfl: 1  •  simvastatin (ZOCOR) 20 MG tablet, Take 10 mg by mouth Every Night., Disp: , Rfl:   •  traMADol (ULTRAM) 50 MG tablet, Take 2 tablets by mouth 2 (Two) Times a Day. 3 months, Disp: 180 tablet, Rfl: 1  •  vitamin B-12 (CYANOCOBALAMIN) 1000 MCG tablet, Take 1,000 mcg by mouth Daily., Disp: , Rfl:   •  warfarin (COUMADIN) 1 MG tablet, Take 1 mg by mouth Daily. DOSES VARY WEEKLY BASED ON INR DR LAWRENCE INSTRUCTED TO STOP 5 DAYS PRIOR TO SURGERY, Disp: , Rfl:   •  zolpidem (AMBIEN) 10 MG tablet, Take 1 tablet by mouth At Night As Needed for Sleep., Disp: 90 tablet, Rfl: 1      Objective:     Vitals:    07/23/20 1355   BP: 112/58   BP Location: Left arm   Patient Position: Sitting   Pulse: 60   Weight: 66.2 kg (146 lb)   Height: 157.5 cm (62\")     Body mass index is 26.7 kg/m².    PHYSICAL EXAM:    Physical Exam   Constitutional: She is oriented to person, place, and time. No distress.   Frail elderly female, with extensive bruising, particularly on her arms.   HENT:   Head: Normocephalic and atraumatic.   Eyes: Right eye exhibits no discharge. Left eye exhibits no discharge.   Cardiovascular: Normal rate and regular rhythm.   Pulmonary/Chest: Effort normal and breath sounds normal.   ICD, well-healed, but prominent on left chest.  No threatening erosion.   Musculoskeletal: She exhibits no edema.   Neurological: She is alert and oriented to person, place, and time.   Skin: Skin is warm and dry.   Psychiatric: She has a normal mood and affect. Her behavior is normal. Judgment and thought content normal.   Nursing note and vitals reviewed.          ECG 12 Lead  Date/Time: 7/23/2020 4:45 PM  Performed by: Gary Boucher MD  Authorized by: Gary Boucher MD   Comparison: compared with previous ECG from 7/23/2020  Rhythm: atrial fibrillation and paced              Assessment:       Diagnosis Plan   1. Ventricular tachycardia (CMS/HCC)     2. ICD (implantable cardioverter-defibrillator) in place          "   Plan:       The patient has no awareness of her episodes of ventricular tachycardia.  There have been successfully treated with ATP, she has not received any high-energy shocks for the episodes.  I recommend continuing her current care.  If she was to receive shocks, we should consider starting amiodarone.  She looks quite frail, and I am not sure she could withstand a ventricular tachycardia ablation.  So amiodarone therapy may be her best treatment if it is ever needed.    As always, it has been a pleasure to participate in your patient's care.      Sincerely,         Gary Boucher MD

## 2020-08-04 ENCOUNTER — ANTICOAGULATION VISIT (OUTPATIENT)
Dept: PHARMACY | Facility: HOSPITAL | Age: 80
End: 2020-08-04

## 2020-08-04 DIAGNOSIS — I48.20 CHRONIC ATRIAL FIBRILLATION (HCC): ICD-10-CM

## 2020-08-04 LAB
INR PPP: 3.1 (ref 0.91–1.09)
PROTHROMBIN TIME: 36.6 SECONDS (ref 10–13.8)

## 2020-08-04 PROCEDURE — 85610 PROTHROMBIN TIME: CPT

## 2020-08-04 PROCEDURE — 36416 COLLJ CAPILLARY BLOOD SPEC: CPT

## 2020-08-04 NOTE — PROGRESS NOTES
Anticoagulation Clinic Progress Note    Anticoagulation Summary  As of 8/4/2020    INR goal:   2.0-3.0   TTR:   59.6 % (1.7 y)   INR used for dosing:   3.1! (8/4/2020)   Warfarin maintenance plan:   1 mg every Tue; 2 mg all other days   Weekly warfarin total:   13 mg   No change documented:   Anna Marie Ndiaye   Plan last modified:   Vargas Butcher [facility] (5/15/2020)   Next INR check:   8/25/2020   Priority:   Maintenance   Target end date:   Indefinite    Indications    Chronic atrial fibrillation (CMS/HCC) [I48.20]             Anticoagulation Episode Summary     INR check location:       Preferred lab:       [facility]ers [facility]    [facility]    Comments:         Anticoagulation Care Providers     Provider Role Specialty Phone number    Nik Galarza MD Referring Cardiology [phone]          Clinic Interview:      INR History:  Anticoagulation Monitoring 6/26/2020 7/14/2020 8/4/2020   INR 2.4 2.64 3.1   INR Date 6/26/2020 7/14/2020 8/4/2020   INR Goal 2.0-3.0 2.0-3.0 2.0-3.0   Trend Same Same Same   Last Week Total 13 mg 13 mg 13 mg   Next Week Total 13 mg 13 mg 13 mg   Sun 2 mg 2 mg 2 mg   Mon 2 mg 2 mg 2 mg   Tue 1 mg 1 mg 1 mg   Wed 2 mg 2 mg 2 mg   Thu 2 mg 2 mg 2 mg   Fri 2 mg 2 mg 2 mg   Sat 2 mg 2 mg 2 mg   Visit Report - - -   Some recent data might be hidden       Plan:  1. INR is out of range- see above in Anticoagulation Summary.   Will instruct Janel Mahoney to Continue  their warfarin regimen- see above in Anticoagulation Summary.  2. Follow up in 4 weeks.   3. Patient declines warfarin refills.  4. Verbal and written information provided. Patient expresses understanding and has no further questions at this time.    Anna Marie Ndiaye

## 2020-08-04 NOTE — PROGRESS NOTES
I have reviewed the notes, assessments, and/or procedures performed by Anna Marie Ndiaye, I concur with her/his documentation of Janel Mahoney. Followup will be in 3 weeks.

## 2020-08-05 RX ORDER — PANTOPRAZOLE SODIUM 40 MG/1
TABLET, DELAYED RELEASE ORAL
Qty: 180 TABLET | Refills: 0 | Status: SHIPPED | OUTPATIENT
Start: 2020-08-05 | End: 2020-10-21

## 2020-08-10 RX ORDER — TRAMADOL HYDROCHLORIDE 50 MG/1
100 TABLET ORAL 2 TIMES DAILY
Qty: 180 TABLET | Refills: 1 | Status: SHIPPED | OUTPATIENT
Start: 2020-08-10 | End: 2020-08-16 | Stop reason: SDUPTHER

## 2020-08-11 ENCOUNTER — INFUSION (OUTPATIENT)
Dept: ONCOLOGY | Facility: HOSPITAL | Age: 80
End: 2020-08-11

## 2020-08-11 ENCOUNTER — LAB (OUTPATIENT)
Dept: LAB | Facility: HOSPITAL | Age: 80
End: 2020-08-11

## 2020-08-11 ENCOUNTER — OFFICE VISIT (OUTPATIENT)
Dept: ONCOLOGY | Facility: CLINIC | Age: 80
End: 2020-08-11

## 2020-08-11 VITALS
RESPIRATION RATE: 16 BRPM | SYSTOLIC BLOOD PRESSURE: 105 MMHG | WEIGHT: 152.1 LBS | BODY MASS INDEX: 27.99 KG/M2 | OXYGEN SATURATION: 96 % | TEMPERATURE: 97.1 F | HEIGHT: 62 IN | HEART RATE: 71 BPM | DIASTOLIC BLOOD PRESSURE: 72 MMHG

## 2020-08-11 DIAGNOSIS — R23.3 EASY BRUISABILITY: ICD-10-CM

## 2020-08-11 DIAGNOSIS — D69.6 THROMBOCYTOPENIA (HCC): ICD-10-CM

## 2020-08-11 DIAGNOSIS — E53.8 B12 DEFICIENCY: Primary | ICD-10-CM

## 2020-08-11 DIAGNOSIS — N18.30 ANEMIA IN STAGE 3 CHRONIC KIDNEY DISEASE (HCC): Primary | ICD-10-CM

## 2020-08-11 DIAGNOSIS — D63.1 ANEMIA IN STAGE 3 CHRONIC KIDNEY DISEASE (HCC): Primary | ICD-10-CM

## 2020-08-11 DIAGNOSIS — D50.9 IRON DEFICIENCY ANEMIA, UNSPECIFIED IRON DEFICIENCY ANEMIA TYPE: ICD-10-CM

## 2020-08-11 DIAGNOSIS — N18.30 CHRONIC KIDNEY DISEASE, STAGE 3 (HCC): Primary | ICD-10-CM

## 2020-08-11 LAB
BASOPHILS # BLD AUTO: 0.03 10*3/MM3 (ref 0–0.2)
BASOPHILS NFR BLD AUTO: 0.6 % (ref 0–1.5)
DEPRECATED RDW RBC AUTO: 51.1 FL (ref 37–54)
EOSINOPHIL # BLD AUTO: 0.15 10*3/MM3 (ref 0–0.4)
EOSINOPHIL NFR BLD AUTO: 3.1 % (ref 0.3–6.2)
ERYTHROCYTE [DISTWIDTH] IN BLOOD BY AUTOMATED COUNT: 14.6 % (ref 12.3–15.4)
HCT VFR BLD AUTO: 27.8 % (ref 34–46.6)
HGB BLD-MCNC: 8.9 G/DL (ref 12–15.9)
IMM GRANULOCYTES # BLD AUTO: 0.08 10*3/MM3 (ref 0–0.05)
IMM GRANULOCYTES NFR BLD AUTO: 1.7 % (ref 0–0.5)
LYMPHOCYTES # BLD AUTO: 0.76 10*3/MM3 (ref 0.7–3.1)
LYMPHOCYTES NFR BLD AUTO: 15.9 % (ref 19.6–45.3)
MCH RBC QN AUTO: 30.8 PG (ref 26.6–33)
MCHC RBC AUTO-ENTMCNC: 32 G/DL (ref 31.5–35.7)
MCV RBC AUTO: 96.2 FL (ref 79–97)
MONOCYTES # BLD AUTO: 0.37 10*3/MM3 (ref 0.1–0.9)
MONOCYTES NFR BLD AUTO: 7.8 % (ref 5–12)
NEUTROPHILS NFR BLD AUTO: 3.38 10*3/MM3 (ref 1.7–7)
NEUTROPHILS NFR BLD AUTO: 70.9 % (ref 42.7–76)
NRBC BLD AUTO-RTO: 0 /100 WBC (ref 0–0.2)
PLATELET # BLD AUTO: 115 10*3/MM3 (ref 140–450)
PMV BLD AUTO: 10.7 FL (ref 6–12)
RBC # BLD AUTO: 2.89 10*6/MM3 (ref 3.77–5.28)
WBC # BLD AUTO: 4.77 10*3/MM3 (ref 3.4–10.8)

## 2020-08-11 PROCEDURE — 85025 COMPLETE CBC W/AUTO DIFF WBC: CPT

## 2020-08-11 PROCEDURE — 25010000002 EPOETIN ALFA PER 1000 UNITS: Performed by: INTERNAL MEDICINE

## 2020-08-11 PROCEDURE — 36415 COLL VENOUS BLD VENIPUNCTURE: CPT

## 2020-08-11 PROCEDURE — 96372 THER/PROPH/DIAG INJ SC/IM: CPT

## 2020-08-11 PROCEDURE — 99214 OFFICE O/P EST MOD 30 MIN: CPT | Performed by: INTERNAL MEDICINE

## 2020-08-11 RX ADMIN — ERYTHROPOIETIN 6000 UNITS: 20000 INJECTION, SOLUTION INTRAVENOUS; SUBCUTANEOUS at 14:58

## 2020-08-11 NOTE — PROGRESS NOTES
Subjective     CHIEF COMPLAINT:      Chief Complaint   Patient presents with   • Follow-up     no concerns       HISTORY OF PRESENT ILLNESS:     Janel Mahoney is a 79 y.o. female patient who returns today for follow up on her anemia.  She is on ferrous sulfate 325 mg every other day.  She is on Procrit monthly.  The last dose was 5000 units on 7/7/2020.    Patient states she is no longer on colchicine for her gout.  She is on allopurinol.      REVIEW OF SYSTEMS:  Review of Systems   Constitutional: Positive for fatigue. Negative for fever and unexpected weight change.   HENT: Negative for nosebleeds and voice change.    Eyes: Negative for visual disturbance.   Respiratory: Negative for cough and shortness of breath.    Cardiovascular: Negative for chest pain and leg swelling.   Gastrointestinal: Positive for diarrhea. Negative for abdominal pain, blood in stool, constipation, nausea and vomiting.   Genitourinary: Negative for frequency and hematuria.   Musculoskeletal: Positive for back pain and joint swelling.   Skin: Negative for rash.   Neurological: Negative for dizziness and headaches.   Hematological: Negative for adenopathy. Bruises/bleeds easily.   Psychiatric/Behavioral: Negative for dysphoric mood. The patient is not nervous/anxious.      I reviewed and verified the CC and ROS obtained by the MA.     Past Medical History:   Diagnosis Date   • Acute kidney injury (CMS/HCC)    • Anemia    • Atrial fibrillation (CMS/HCC)    • Bruises easily    • Carotid artery stenosis    • Chronic back pain    • Chronic combined systolic and diastolic congestive heart failure (CMS/HCC)    • Chronic coronary artery disease     moderate to severe LV dysfunction.   • Chronic kidney disease, stage 3 (CMS/HCC)    • Dysphagia    • GERD (gastroesophageal reflux disease)    • Gout    • H/O cardiac murmur    • Hematoma     LEFT LEG; DR ALONZO AWARE   • [place] (hard of hearing)     wears hearing aids   • Hyperlipidemia    • Hypertension     • Hypotension    • ICD (implantable cardioverter-defibrillator) in place    • Ischemic cardiomyopathy    • Kyphoscoliosis    • Leukopenia    • Lumbar spondylosis    • Obesity    • GEORGINA (obstructive sleep apnea) 12/01/2013    Overnight polysomnogram, weight 168 pounds.  AHI mildly abnormal at 10.3 events per hour.  Respiratory effort related arousals 9.5 events per hour.  Total time snoring 30% and arousals associated with snoring 15 events per hour.   • Osteoarthritis    • Osteoporosis    • Peptic ulcer    • Premature ventricular contractions    • Renal insufficiency syndrome    • Scoliosis    • Shoulder fracture, left    • Stroke syndrome    • Thrombocytopenia (CMS/HCC)    • Urine incontinence    • Ventricular tachycardia (CMS/HCC)    • Vitamin B12 deficiency        Past Surgical History:   Procedure Laterality Date   • AV NODE ABLATION     • BREAST BIOPSY     • CARDIAC CATHETERIZATION      Showed severe mitral insufficiency and borderline coronary artery disease   • CARDIAC CATHETERIZATION      Showed an ejection fraction of 35%. She had occlusive disease of the right posterior LV branch and no other significant disease, treated medically.   • CARDIAC DEFIBRILLATOR PLACEMENT      Biventricular   • CARDIAC ELECTROPHYSIOLOGY PROCEDURE N/A 1/4/2019    Procedure: GENERATOR CHANGE BI-V ICD   [place];  Surgeon: James Hwang MD;  Location: [facility] CATH INVASIVE LOCATION;  Service: Cardiology   • CARDIAC VALVE REPLACEMENT  2009    Done with stent placement   • CARDIOVERSION      multiple electrocardioversions.   • CAROTID ARTERY ANGIOPLASTY Right    • COLONOSCOPY N/A 9/28/2017    Procedure: COLONOSCOPY TO CECUM;  Surgeon: Kevin Davis MD;  Location: [facility] ENDOSCOPY;  Service:    • CORONARY ANGIOPLASTY WITH STENT PLACEMENT  2009   • CORONARY ARTERY BYPASS GRAFT      single graft to the PDA   • CORONARY STENT PLACEMENT     • ENDOSCOPY N/A 9/28/2017    Procedure: ESOPHAGOGASTRODUODENOSCOPY ;  Surgeon: Kevin ZENG  MD Susan;  Location: [facility] ENDOSCOPY;  Service:    • HEMORRHOIDECTOMY     • HYSTERECTOMY     • INCISION AND DRAINAGE TRUNK Right 2018    Procedure: EVACUATION OF RIGHT CHEST WALL HEMATOMA;  Surgeon: Juvencio Rodriguez MD;  Location: [facility] OR;  Service: General   • MITRAL VALVE REPLACEMENT  2010    #31 Epic porcine valve.   • THROMBOENDARTERECTOMY Right     carotid thromboendarterectomy    • TONSILLECTOMY      age 32   • TOTAL KNEE ARTHROPLASTY Left    • TOTAL KNEE ARTHROPLASTY REVISION Left 2019    Procedure: TOTAL KNEE ARTHROPLASTY REVISION LEFT;  Surgeon: Juvencio Pressley II, MD;  Location: [facility] OR;  Service: Orthopedics   • TOTAL SHOULDER ARTHROPLASTY W/ DISTAL CLAVICLE EXCISION Left 2018    Procedure: TOTAL SHOULDER REVERSE ARTHROPLASTY;  Surgeon: Bianka Quesada MD;  Location: [facility] OR;  Service:        Cancer-related family history includes Breast cancer in her mother; Cancer in her mother.  Social History     Tobacco Use   • Smoking status: Former Smoker     Packs/day: 1.00     Years: 50.00     Pack years: 50.00     Types: Cigarettes     Last attempt to quit: 2009     Years since quittin.5   • Smokeless tobacco: Never Used   • Tobacco comment: Daily caffeine - soda   Substance Use Topics   • Alcohol use: Yes     Comment: rare       MEDICATIONS:    Current Outpatient Medications:   •  allopurinol (ZYLOPRIM) 100 MG tablet, , Disp: , Rfl:   •  carvedilol (COREG) 25 MG tablet, TAKE 1 TABLET TWICE DAILY, Disp: 180 tablet, Rfl: 0  •  ramipril (ALTACE) 5 MG capsule, TAKE 1 CAPSULE EVERY DAY, Disp: 90 capsule, Rfl: 1  •  simvastatin (ZOCOR) 20 MG tablet, Take 10 mg by mouth Every Night., Disp: , Rfl:   •  traMADol (ULTRAM) 50 MG tablet, Take 2 tablets by mouth 2 (Two) Times a Day. 3 months, Disp: 180 tablet, Rfl: 1  •  warfarin (COUMADIN) 1 MG tablet, Take 1 mg by mouth Daily. DOSES VARY WEEKLY BASED ON INR DR LAWRENCE INSTRUCTED TO STOP 5 DAYS PRIOR  "TO SURGERY, Disp: , Rfl:   •  alendronate (FOSAMAX) 70 MG tablet, Take 70 mg by mouth Every 14 (Fourteen) Days., Disp: , Rfl:   •  aspirin 81 MG tablet, Take 81 mg by mouth Daily. PT CALLING TO SEE IF SHE HAS TO STOP PRIOR TO SURGERY, Disp: , Rfl:   •  bumetanide (BUMEX) 2 MG tablet, TAKE 1 TABLET TWICE DAILY. DOSE CHANGED , Disp: 180 tablet, Rfl: 1  •  calcitriol (ROCALTROL) 0.25 MCG capsule, Take 0.25 mcg by mouth 2 (Two) Times a Day., Disp: , Rfl:   •  Cholecalciferol (VITAMIN D) 2000 UNITS tablet, Take 2,000 Units by mouth Daily., Disp: , Rfl:   •  epoetin adele (EPOGEN,PROCRIT) 49938 UNIT/ML injection, Inject 10,000 Units under the skin into the appropriate area as directed As Needed., Disp: , Rfl:   •  ferrous sulfate 325 (65 FE) MG tablet, Take 1 tablet by mouth Every Other Day., Disp: , Rfl:   •  Multiple Vitamins-Minerals (SENIOR MULTIVITAMIN PLUS) tablet, Take 1 tablet by mouth Daily., Disp: , Rfl:   •  pantoprazole (PROTONIX) 40 MG EC tablet, TAKE 1 TABLET TWICE DAILY, Disp: 180 tablet, Rfl: 0  •  vitamin B-12 (CYANOCOBALAMIN) 1000 MCG tablet, Take 1,000 mcg by mouth Daily., Disp: , Rfl:   •  zolpidem (AMBIEN) 10 MG tablet, Take 1 tablet by mouth At Night As Needed for Sleep., Disp: 90 tablet, Rfl: 1    ALLERGIES:  Allergies   Allergen Reactions   • Diclofenac GI Bleeding     She had adverse effects from the medications that required hospitalization. Pt got stomach ulcers from this medication prescribed by Dr. Arango - pediatrist.         Objective   VITAL SIGNS:     Vitals:    08/11/20 1427   BP: 105/72   Pulse: 71   Resp: 16   Temp: 97.1 °F (36.2 °C)   TempSrc: Temporal   SpO2: 96%   Weight: 69 kg (152 lb 1.6 oz)   Height: 157.5 cm (62.01\")   PainSc:   8   PainLoc: Back  Comment: knee     Body mass index is 27.81 kg/m².     Wt Readings from Last 3 Encounters:   08/11/20 69 kg (152 lb 1.6 oz)   07/23/20 66.2 kg (146 lb)   07/02/20 65.3 kg (144 lb)       PHYSICAL EXAMINATION:  GENERAL:  The patient appears " in fair general condition, not in acute distress.  SKIN: No skin rashes.  Significant ecchymosis at the left >right knees.  HEAD:  Normocephalic.  EYES:  No Jaundice. Pallor. Pupils equal. EOMI.  NECK:  Supple with Good ROM. No Thyromegaly. No Masses.  CHEST: Normal respiratory effort. Lungs clear to auscultation.   CARDIAC:  Normal S1 & S2. No murmur.  +1 edema.  ABDOMEN: Nondistended.  EXTREMITIES: Chronic hyperpigmentation of the distal aspect of the legs.  No calf tenderness.  NEUROLOGICAL:  No Focal neurological deficits.       DIAGNOSTIC DATA:     Results from last 7 days   Lab Units 08/11/20  1400   WBC 10*3/mm3 4.77   NEUTROS ABS 10*3/mm3 3.38   HEMOGLOBIN g/dL 8.9*   HEMATOCRIT % 27.8*   PLATELETS 10*3/mm3 115*     Component      Latest Ref Rng & Units 2/19/2020 6/10/2020 7/7/2020   Iron      37 - 145 mcg/dL 64  64   Iron Saturation      14 - 48 % 22  22   Transferrin      200 - 360 mg/dL 210  210   TIBC      249 - 505 mcg/dL 294  294   Ferritin      13.00 - 150.00 ng/mL 452.80 (H) 417.00 (H) 441.50 (H)       Assessment/Plan   1.  Anemia of chronic kidney disease, stage III.    · Patient is on Procrit monthly.    · Last dose was 5000 units on 7/7/2020.   · Labs on 7/7/2020 showed adequate iron stores with a ferritin of 441 and transferrin saturation of 22%.  · Hemoglobin decreased to 8.9 today.      2.  Thrombocytopenia attributed to B12 deficiency.  Platelet count is in the 110,000-140,000 range.  She has excessive bruising in the lower extremities.  This is attributed to Coumadin based on her current platelet count.     3.  Chronic anticoagulation with Coumadin.  She is having more bruising especially around the legs.  This is being managed by her cardiologist and the warfarin clinic.  I suggested having her INR in the lower end of the range of 2-3 to decrease the development of bruising.    PLAN:    1.  Increase Procrit to 6000 units.    2.  Continue ferrous sulfate 325 mg every other day.  3.  Return  monthly for CBC with Procrit injection.  4.  Repeat iron studies in 2 and 5 months.  I will see her in follow-up in 6 months.        Edward Vasquez MD  08/11/20

## 2020-08-16 RX ORDER — TRAMADOL HYDROCHLORIDE 50 MG/1
100 TABLET ORAL 2 TIMES DAILY
Qty: 180 TABLET | Refills: 1 | Status: SHIPPED | OUTPATIENT
Start: 2020-08-16 | End: 2020-08-17 | Stop reason: SDUPTHER

## 2020-08-17 RX ORDER — TRAMADOL HYDROCHLORIDE 50 MG/1
100 TABLET ORAL 2 TIMES DAILY
Qty: 180 TABLET | Refills: 1 | Status: SHIPPED | OUTPATIENT
Start: 2020-08-17 | End: 2021-02-09 | Stop reason: SDUPTHER

## 2020-08-17 RX ORDER — CARVEDILOL 25 MG/1
TABLET ORAL
Qty: 180 TABLET | Refills: 0 | Status: SHIPPED | OUTPATIENT
Start: 2020-08-17 | End: 2020-11-03

## 2020-08-17 NOTE — TELEPHONE ENCOUNTER
Caller: Janel Mahoney    Relationship: Self    Best call back number: [phone]    Medication needed:   Requested Prescriptions     Pending Prescriptions Disp Refills   • traMADol (ULTRAM) 50 MG tablet 180 tablet 1     Sig: Take 2 tablets by mouth 2 (Two) Times a Day. 3 months     When do you need the refill by: 8/17/2020    What details did the patient provide when requesting the medication:  Has less than a week supply. Needs 3 month supply with refills attached.    Does the patient have less than a 3 day supply:  [x] Yes  [] No    What is the patient's preferred pharmacy: [facility] - [facility] [address] - [phone]  - [phone] FX

## 2020-08-25 ENCOUNTER — ANTICOAGULATION VISIT (OUTPATIENT)
Dept: PHARMACY | Facility: HOSPITAL | Age: 80
End: 2020-08-25

## 2020-08-25 DIAGNOSIS — I48.20 CHRONIC ATRIAL FIBRILLATION (HCC): ICD-10-CM

## 2020-08-25 LAB
INR PPP: 2.4 (ref 0.91–1.09)
PROTHROMBIN TIME: 28.4 SECONDS (ref 10–13.8)

## 2020-08-25 PROCEDURE — 36416 COLLJ CAPILLARY BLOOD SPEC: CPT

## 2020-08-25 PROCEDURE — 85610 PROTHROMBIN TIME: CPT

## 2020-08-26 NOTE — PROGRESS NOTES
Anticoagulation Clinic Progress Note    Anticoagulation Summary  As of 2020    INR goal:   2.0-3.0   TTR:   60.5 % (1.8 y)   INR used for dosin.4 (2020)   Warfarin maintenance plan:   1 mg every Tue; 2 mg all other days   Weekly warfarin total:   13 mg   No change documented:   Lee Ann Diamond   Plan last modified:   Vargas Butcher RPH (5/15/2020)   Next INR check:   2020   Priority:   Maintenance   Target end date:   Indefinite    Indications    Chronic atrial fibrillation (CMS/HCC) [I48.20]             Anticoagulation Episode Summary     INR check location:       Preferred lab:       Send INR reminders to:    [facility]    Comments:         Anticoagulation Care Providers     Provider Role Specialty Phone number    Nik Galarza MD Referring Cardiology [phone]          Clinic Interview:  Patient Findings     Negatives:   Signs/symptoms of thrombosis, Signs/symptoms of bleeding,   Laboratory test error suspected, Change in health, Change in alcohol use,   Change in activity, Upcoming invasive procedure, Emergency department   visit, Upcoming dental procedure, Missed doses, Extra doses, Change in   medications, Change in diet/appetite, Hospital admission, Bruising, Other   complaints      Clinical Outcomes     Negatives:   Major bleeding event, Thromboembolic event,   Anticoagulation-related hospital admission, Anticoagulation-related ED   visit, Anticoagulation-related fatality        INR History:  Anticoagulation Monitoring 2020   INR 2.64 3.1 2.4   INR Date 2020   INR Goal 2.0-3.0 2.0-3.0 2.0-3.0   Trend Same Same Same   Last Week Total 13 mg 13 mg 13 mg   Next Week Total 13 mg 13 mg 13 mg   Sun 2 mg 2 mg 2 mg   Mon 2 mg 2 mg 2 mg   Tue 1 mg 1 mg 1 mg   Wed 2 mg 2 mg 2 mg   Thu 2 mg 2 mg 2 mg   Fri 2 mg 2 mg 2 mg   Sat 2 mg 2 mg 2 mg   Visit Report - - -   Some recent data might be hidden       Plan:  1. INR is  Therapeutic today- see above in Anticoagulation Summary.  Will instruct Janel Mahoney to Continue their warfarin regimen- see above in Anticoagulation Summary.  2. Follow up in 1 month  3. Patient declines warfarin refills.  4. Verbal and written information provided. Patient expresses understanding and has no further questions at this time.    Shanna Major [facility]

## 2020-09-02 ENCOUNTER — TELEPHONE (OUTPATIENT)
Dept: CARDIOLOGY | Facility: CLINIC | Age: 80
End: 2020-09-02

## 2020-09-02 NOTE — TELEPHONE ENCOUNTER
Alert transmission received due to VT with successful at on 9/1/20.  She has had additional nst vt episodes since her last check, 8/14/20.    2 other untreated episodes.  1 is 20 beats of nst vt and the other is 48 beats and not treated due to stablility/af.  She is pacer dependent.  5 nst vt, no egms to view    Pt of both Dr. Galarza and Dr. Boucher.  Dr. Boucher--Stri[facility] in [facility] for you to view.    I spoke to pt and other than a flutter feeling last night, no symptoms of arrhythmia.

## 2020-09-04 RX ORDER — BUMETANIDE 2 MG/1
TABLET ORAL
Qty: 180 TABLET | Refills: 1 | Status: SHIPPED | OUTPATIENT
Start: 2020-09-04 | End: 2021-01-27

## 2020-09-08 RX ORDER — ZOLPIDEM TARTRATE 10 MG/1
10 TABLET ORAL NIGHTLY PRN
Qty: 90 TABLET | Refills: 1 | Status: SHIPPED | OUTPATIENT
Start: 2020-09-08 | End: 2021-08-11 | Stop reason: SDUPTHER

## 2020-09-09 ENCOUNTER — LAB (OUTPATIENT)
Dept: LAB | Facility: HOSPITAL | Age: 80
End: 2020-09-09

## 2020-09-09 ENCOUNTER — INFUSION (OUTPATIENT)
Dept: ONCOLOGY | Facility: HOSPITAL | Age: 80
End: 2020-09-09

## 2020-09-09 DIAGNOSIS — D63.1 ANEMIA IN STAGE 3 CHRONIC KIDNEY DISEASE (HCC): ICD-10-CM

## 2020-09-09 DIAGNOSIS — D69.6 THROMBOCYTOPENIA (HCC): ICD-10-CM

## 2020-09-09 DIAGNOSIS — D50.9 IRON DEFICIENCY ANEMIA, UNSPECIFIED IRON DEFICIENCY ANEMIA TYPE: ICD-10-CM

## 2020-09-09 DIAGNOSIS — N18.30 CHRONIC KIDNEY DISEASE, STAGE 3 (HCC): Primary | ICD-10-CM

## 2020-09-09 DIAGNOSIS — N18.30 ANEMIA IN STAGE 3 CHRONIC KIDNEY DISEASE (HCC): ICD-10-CM

## 2020-09-09 LAB
BASOPHILS # BLD AUTO: 0.02 10*3/MM3 (ref 0–0.2)
BASOPHILS NFR BLD AUTO: 0.5 % (ref 0–1.5)
DEPRECATED RDW RBC AUTO: 48.8 FL (ref 37–54)
EOSINOPHIL # BLD AUTO: 0.15 10*3/MM3 (ref 0–0.4)
EOSINOPHIL NFR BLD AUTO: 3.4 % (ref 0.3–6.2)
ERYTHROCYTE [DISTWIDTH] IN BLOOD BY AUTOMATED COUNT: 14.2 % (ref 12.3–15.4)
HCT VFR BLD AUTO: 30.7 % (ref 34–46.6)
HGB BLD-MCNC: 9.8 G/DL (ref 12–15.9)
IMM GRANULOCYTES # BLD AUTO: 0.08 10*3/MM3 (ref 0–0.05)
IMM GRANULOCYTES NFR BLD AUTO: 1.8 % (ref 0–0.5)
LYMPHOCYTES # BLD AUTO: 0.63 10*3/MM3 (ref 0.7–3.1)
LYMPHOCYTES NFR BLD AUTO: 14.4 % (ref 19.6–45.3)
MCH RBC QN AUTO: 30.2 PG (ref 26.6–33)
MCHC RBC AUTO-ENTMCNC: 31.9 G/DL (ref 31.5–35.7)
MCV RBC AUTO: 94.8 FL (ref 79–97)
MONOCYTES # BLD AUTO: 0.33 10*3/MM3 (ref 0.1–0.9)
MONOCYTES NFR BLD AUTO: 7.5 % (ref 5–12)
NEUTROPHILS NFR BLD AUTO: 3.17 10*3/MM3 (ref 1.7–7)
NEUTROPHILS NFR BLD AUTO: 72.4 % (ref 42.7–76)
NRBC BLD AUTO-RTO: 0 /100 WBC (ref 0–0.2)
PLATELET # BLD AUTO: 125 10*3/MM3 (ref 140–450)
PMV BLD AUTO: 11.1 FL (ref 6–12)
RBC # BLD AUTO: 3.24 10*6/MM3 (ref 3.77–5.28)
WBC # BLD AUTO: 4.38 10*3/MM3 (ref 3.4–10.8)

## 2020-09-09 PROCEDURE — 25010000002 EPOETIN ALFA PER 1000 UNITS: Performed by: INTERNAL MEDICINE

## 2020-09-09 PROCEDURE — 36415 COLL VENOUS BLD VENIPUNCTURE: CPT

## 2020-09-09 PROCEDURE — 96372 THER/PROPH/DIAG INJ SC/IM: CPT

## 2020-09-09 PROCEDURE — 85025 COMPLETE CBC W/AUTO DIFF WBC: CPT

## 2020-09-09 RX ADMIN — ERYTHROPOIETIN 6000 UNITS: 20000 INJECTION, SOLUTION INTRAVENOUS; SUBCUTANEOUS at 13:46

## 2020-09-22 ENCOUNTER — ANTICOAGULATION VISIT (OUTPATIENT)
Dept: PHARMACY | Facility: HOSPITAL | Age: 80
End: 2020-09-22

## 2020-09-22 DIAGNOSIS — I48.20 CHRONIC ATRIAL FIBRILLATION (HCC): ICD-10-CM

## 2020-09-22 LAB
INR PPP: 3.6 (ref 0.91–1.09)
PROTHROMBIN TIME: 43.7 SECONDS (ref 10–13.8)

## 2020-09-22 PROCEDURE — 85610 PROTHROMBIN TIME: CPT

## 2020-09-22 PROCEDURE — G0463 HOSPITAL OUTPT CLINIC VISIT: HCPCS

## 2020-09-22 PROCEDURE — 36416 COLLJ CAPILLARY BLOOD SPEC: CPT

## 2020-09-22 NOTE — PROGRESS NOTES
Anticoagulation Clinic Progress Note    Anticoagulation Summary  As of 9/22/2020    INR goal:  2.0-3.0   TTR:  60.0 % (1.8 y)   INR used for dosing:  3.6 (9/22/2020)   Warfarin maintenance plan:  1 mg every Tue; 2 mg all other days   Weekly warfarin total:  13 mg   Plan last modified:  [facility] (5/15/2020)   Next INR check:  10/1/2020   Priority:  Maintenance   Target end date:  Indefinite    Indications    Chronic atrial fibrillation (CMS/HCC) [I48.20]             Anticoagulation Episode Summary     INR check location:      Preferred lab:      Send INR reminders to:   [facility] [place]    Comments:        Anticoagulation Care Providers     Provider Role Specialty Phone number    Nik Galarza MD Referring Cardiology [phone]          Clinic Interview:      INR History:  Anticoagulation Monitoring 8/4/2020 8/25/2020 9/22/2020   INR 3.1 2.4 3.6   INR Date 8/4/2020 8/25/2020 9/22/2020   INR Goal 2.0-3.0 2.0-3.0 2.0-3.0   Trend Same Same Same   Last Week Total 13 mg 13 mg 13 mg   Next Week Total 13 mg 13 mg 11 mg   Sun 2 mg 2 mg 2 mg   Mon 2 mg 2 mg 2 mg   Tue 1 mg 1 mg Hold (9/22); Otherwise 1 mg   Wed 2 mg 2 mg 1 mg (9/23); Otherwise 2 mg   Thu 2 mg 2 mg 2 mg   Fri 2 mg 2 mg 2 mg   Sat 2 mg 2 mg 2 mg   Visit Report - - -   Some recent data might be hidden       Plan:  1. INR is Supratherapeutic today- see above in Anticoagulation Summary.  Will instruct Janel Mahoney to HOLD warfarin today, then reduce tomorrow to 1mg, then continue their warfarin regimen- see above in Anticoagulation Summary.  2. Follow up in 1.5 weeks  3. Patient declines warfarin refills.  4. Verbal and written information provided. Patient expresses understanding and has no further questions at this time.    Shanna Major [facility]

## 2020-09-28 ENCOUNTER — HOSPITAL ENCOUNTER (EMERGENCY)
Facility: HOSPITAL | Age: 80
Discharge: HOME | End: 2020-09-28
Attending: EMERGENCY MEDICINE | Admitting: EMERGENCY MEDICINE

## 2020-09-28 VITALS
HEART RATE: 60 BPM | SYSTOLIC BLOOD PRESSURE: 133 MMHG | BODY MASS INDEX: 26.68 KG/M2 | WEIGHT: 145 LBS | DIASTOLIC BLOOD PRESSURE: 50 MMHG | RESPIRATION RATE: 18 BRPM | HEIGHT: 62 IN | OXYGEN SATURATION: 97 % | TEMPERATURE: 99.4 F

## 2020-09-28 DIAGNOSIS — S00.411A ABRASION OF RIGHT EAR CANAL, INITIAL ENCOUNTER: Primary | ICD-10-CM

## 2020-09-28 DIAGNOSIS — R79.1 ELEVATED INR: ICD-10-CM

## 2020-09-28 LAB
INR PPP: 3.44 (ref 0.9–1.1)
PROTHROMBIN TIME: 33.4 SECONDS (ref 11.7–14.2)

## 2020-09-28 PROCEDURE — 85610 PROTHROMBIN TIME: CPT | Performed by: NURSE PRACTITIONER

## 2020-09-28 PROCEDURE — 99283 EMERGENCY DEPT VISIT LOW MDM: CPT

## 2020-09-28 RX ADMIN — SILVER NITRATE APPLICATORS 1 APPLICATION: 25; 75 STICK TOPICAL at 13:45

## 2020-10-01 ENCOUNTER — ANTICOAGULATION VISIT (OUTPATIENT)
Dept: PHARMACY | Facility: HOSPITAL | Age: 80
End: 2020-10-01

## 2020-10-01 DIAGNOSIS — I48.20 CHRONIC ATRIAL FIBRILLATION (HCC): ICD-10-CM

## 2020-10-01 LAB
INR PPP: 4.2 (ref 0.91–1.09)
PROTHROMBIN TIME: 50.5 SECONDS (ref 10–13.8)

## 2020-10-01 PROCEDURE — 85610 PROTHROMBIN TIME: CPT

## 2020-10-01 PROCEDURE — G0463 HOSPITAL OUTPT CLINIC VISIT: HCPCS

## 2020-10-01 PROCEDURE — 36416 COLLJ CAPILLARY BLOOD SPEC: CPT

## 2020-10-01 NOTE — PROGRESS NOTES
Anticoagulation Clinic Progress Note    Anticoagulation Summary  As of 10/1/2020    INR goal:  2.0-3.0   TTR:  59.2 % (1.9 y)   INR used for dosin.2 (10/1/2020)   Warfarin maintenance plan:  1 mg every Tue; 2 mg all other days   Weekly warfarin total:  13 mg   Plan last modified:  Vargas Butcher RPH (5/15/2020)   Next INR check:  10/5/2020   Priority:  Maintenance   Target end date:  Indefinite    Indications    Chronic atrial fibrillation (CMS/HCC) [I48.20]             Anticoagulation Episode Summary     INR check location:      Preferred lab:      Send INR reminders to:  [facility]    Comments:        Anticoagulation Care Providers     Provider Role Specialty Phone number    Nik Galarza MD Referring Cardiology [phone]          Clinic Interview:  Patient Findings     Positives:  Signs/symptoms of bleeding, Emergency department visit, Other   complaints    Negatives:  Signs/symptoms of thrombosis, Laboratory test error   suspected, Change in health, Change in alcohol use, Change in activity,   Upcoming invasive procedure, Upcoming dental procedure, Missed doses,   Extra doses, Change in medications, Change in diet/appetite, Hospital   admission, Bruising    Comments:  ED visit  r/t bleeding from ear after cleaning with a   qtip. Reports some BLE edema recently, but now resolved.       Clinical Outcomes     Negatives:  Major bleeding event, Thromboembolic event,   Anticoagulation-related hospital admission, Anticoagulation-related ED   visit, Anticoagulation-related fatality    Comments:  ED visit  r/t bleeding from ear after cleaning with a   qtip. Reports some BLE edema recently, but now resolved.         INR History:  Anticoagulation Monitoring 2020 2020 10/1/2020   INR 2.4 3.6 4.2   INR Date 2020 2020 10/1/2020   INR Goal 2.0-3.0 2.0-3.0 2.0-3.0   Trend Same Same Same   Last Week Total 13 mg 13 mg 13 mg   Next Week Total 13 mg 11 mg 9 mg   Sun 2 mg 2  mg 2 mg   Mon 2 mg 2 mg -   Tue 1 mg Hold (9/22); Otherwise 1 mg -   Wed 2 mg 1 mg (9/23); Otherwise 2 mg -   Thu 2 mg 2 mg Hold (10/1)   Fri 2 mg 2 mg Hold (10/2)   Sat 2 mg 2 mg 2 mg   Visit Report - - -   Some recent data might be hidden       Plan:  1. INR is Supratherapeutic today- see above in Anticoagulation Summary.  Will instruct Janel Mahoney to Change their warfarin regimen- see above in Anticoagulation Summary.  2. Follow up in 4 days  3. Patient declines warfarin refills.  4. Verbal and written information provided. Patient expresses understanding and has no further questions at this time.    Vargas Butcher RP

## 2020-10-05 ENCOUNTER — ANTICOAGULATION VISIT (OUTPATIENT)
Dept: PHARMACY | Facility: HOSPITAL | Age: 80
End: 2020-10-05

## 2020-10-05 DIAGNOSIS — I48.20 CHRONIC ATRIAL FIBRILLATION (HCC): ICD-10-CM

## 2020-10-05 LAB
INR PPP: 3.3 (ref 0.91–1.09)
PROTHROMBIN TIME: 39.4 SECONDS (ref 10–13.8)

## 2020-10-05 PROCEDURE — 85610 PROTHROMBIN TIME: CPT

## 2020-10-05 PROCEDURE — G0463 HOSPITAL OUTPT CLINIC VISIT: HCPCS

## 2020-10-05 PROCEDURE — 36416 COLLJ CAPILLARY BLOOD SPEC: CPT

## 2020-10-05 NOTE — PROGRESS NOTES
Anticoagulation Clinic Progress Note    Anticoagulation Summary  As of 10/5/2020    INR goal:  2.0-3.0   TTR:  58.9 % (1.9 y)   INR used for dosing:  3.3 (10/5/2020)   Warfarin maintenance plan:  1 mg every Tue; 2 mg all other days   Weekly warfarin total:  13 mg   Plan last modified:  [facility] (5/15/2020)   Next INR check:  10/8/2020   Priority:  Maintenance   Target end date:  Indefinite    Indications    Chronic atrial fibrillation (CMS/HCC) [I48.20]             Anticoagulation Episode Summary     INR check location:      Preferred lab:      Send INR reminders to:   [facility] [place]    Comments:        Anticoagulation Care Providers     Provider Role Specialty Phone number    Nik Galarza MD Referring Cardiology [phone]          Clinic Interview:  Patient Findings     Positives:  Other complaints    Negatives:  Signs/symptoms of thrombosis, Signs/symptoms of bleeding,   Laboratory test error suspected, Change in health, Change in alcohol use,   Change in activity, Upcoming invasive procedure, Emergency department   visit, Upcoming dental procedure, Missed doses, Extra doses, Change in   medications, Change in diet/appetite, Hospital admission, Bruising    Comments:  No recurrence of bleeding at ear, but continues to have some   tenderness.      Clinical Outcomes     Negatives:  Major bleeding event, Thromboembolic event,   Anticoagulation-related hospital admission, Anticoagulation-related ED   visit, Anticoagulation-related fatality    Comments:  No recurrence of bleeding at ear, but continues to have some   tenderness.        INR History:  Anticoagulation Monitoring 9/22/2020 10/1/2020 10/5/2020   INR 3.6 4.2 3.3   INR Date 9/22/2020 10/1/2020 10/5/2020   INR Goal 2.0-3.0 2.0-3.0 2.0-3.0   Trend Same Same Same   Last Week Total 13 mg 13 mg 9 mg   Next Week Total 11 mg 9 mg 11 mg   Sun 2 mg 2 mg -   Mon 2 mg - 1 mg (10/5)   Tue Hold (9/22); Otherwise 1 mg - 1 mg   Wed 1 mg (9/23);  Otherwise 2 mg - 1 mg (10/7)   Thu 2 mg Hold (10/1) -   Fri 2 mg Hold (10/2) -   Sat 2 mg 2 mg -   Visit Report - - -   Some recent data might be hidden       Plan:  1. INR is Supratherapeutic today- see above in Anticoagulation Summary.  Will instruct Janel DORINA Covingtons to Change their warfarin regimen- see above in Anticoagulation Summary.  2. Follow up in 3 days  3. Patient declines warfarin refills.  4. Verbal and written information provided. Patient expresses understanding and has no further questions at this time.    Vargas Butcher [facility]

## 2020-10-06 ENCOUNTER — LAB (OUTPATIENT)
Dept: LAB | Facility: HOSPITAL | Age: 80
End: 2020-10-06

## 2020-10-06 ENCOUNTER — INFUSION (OUTPATIENT)
Dept: ONCOLOGY | Facility: HOSPITAL | Age: 80
End: 2020-10-06

## 2020-10-06 DIAGNOSIS — N18.30 ANEMIA IN STAGE 3 CHRONIC KIDNEY DISEASE (HCC): ICD-10-CM

## 2020-10-06 DIAGNOSIS — D50.9 IRON DEFICIENCY ANEMIA, UNSPECIFIED IRON DEFICIENCY ANEMIA TYPE: ICD-10-CM

## 2020-10-06 DIAGNOSIS — D63.1 ANEMIA IN STAGE 3 CHRONIC KIDNEY DISEASE (HCC): ICD-10-CM

## 2020-10-06 DIAGNOSIS — N18.30 STAGE 3 CHRONIC KIDNEY DISEASE, UNSPECIFIED WHETHER STAGE 3A OR 3B CKD (HCC): Primary | ICD-10-CM

## 2020-10-06 DIAGNOSIS — D69.6 THROMBOCYTOPENIA (HCC): ICD-10-CM

## 2020-10-06 LAB
BASOPHILS # BLD AUTO: 0.02 10*3/MM3 (ref 0–0.2)
BASOPHILS NFR BLD AUTO: 0.4 % (ref 0–1.5)
DEPRECATED RDW RBC AUTO: 51 FL (ref 37–54)
EOSINOPHIL # BLD AUTO: 0.14 10*3/MM3 (ref 0–0.4)
EOSINOPHIL NFR BLD AUTO: 3.1 % (ref 0.3–6.2)
ERYTHROCYTE [DISTWIDTH] IN BLOOD BY AUTOMATED COUNT: 14.2 % (ref 12.3–15.4)
FERRITIN SERPL-MCNC: 435.6 NG/ML (ref 13–150)
HCT VFR BLD AUTO: 30 % (ref 34–46.6)
HGB BLD-MCNC: 9.1 G/DL (ref 12–15.9)
IMM GRANULOCYTES # BLD AUTO: 0.03 10*3/MM3 (ref 0–0.05)
IMM GRANULOCYTES NFR BLD AUTO: 0.7 % (ref 0–0.5)
IRON 24H UR-MRATE: 66 MCG/DL (ref 37–145)
IRON SATN MFR SERPL: 24 % (ref 14–48)
LYMPHOCYTES # BLD AUTO: 0.7 10*3/MM3 (ref 0.7–3.1)
LYMPHOCYTES NFR BLD AUTO: 15.4 % (ref 19.6–45.3)
MCH RBC QN AUTO: 29.8 PG (ref 26.6–33)
MCHC RBC AUTO-ENTMCNC: 30.3 G/DL (ref 31.5–35.7)
MCV RBC AUTO: 98.4 FL (ref 79–97)
MONOCYTES # BLD AUTO: 0.22 10*3/MM3 (ref 0.1–0.9)
MONOCYTES NFR BLD AUTO: 4.8 % (ref 5–12)
NEUTROPHILS NFR BLD AUTO: 3.44 10*3/MM3 (ref 1.7–7)
NEUTROPHILS NFR BLD AUTO: 75.6 % (ref 42.7–76)
NRBC BLD AUTO-RTO: 0 /100 WBC (ref 0–0.2)
PLATELET # BLD AUTO: 130 10*3/MM3 (ref 140–450)
PMV BLD AUTO: 8.5 FL (ref 6–12)
RBC # BLD AUTO: 3.05 10*6/MM3 (ref 3.77–5.28)
TIBC SERPL-MCNC: 276 MCG/DL (ref 249–505)
TRANSFERRIN SERPL-MCNC: 197 MG/DL (ref 200–360)
WBC # BLD AUTO: 4.55 10*3/MM3 (ref 3.4–10.8)

## 2020-10-06 PROCEDURE — 85025 COMPLETE CBC W/AUTO DIFF WBC: CPT

## 2020-10-06 PROCEDURE — 25010000002 EPOETIN ALFA PER 1000 UNITS: Performed by: INTERNAL MEDICINE

## 2020-10-06 PROCEDURE — 36415 COLL VENOUS BLD VENIPUNCTURE: CPT

## 2020-10-06 PROCEDURE — 82728 ASSAY OF FERRITIN: CPT

## 2020-10-06 PROCEDURE — 84466 ASSAY OF TRANSFERRIN: CPT

## 2020-10-06 PROCEDURE — 83540 ASSAY OF IRON: CPT

## 2020-10-06 PROCEDURE — 96372 THER/PROPH/DIAG INJ SC/IM: CPT

## 2020-10-06 RX ADMIN — ERYTHROPOIETIN 6000 UNITS: 20000 INJECTION, SOLUTION INTRAVENOUS; SUBCUTANEOUS at 14:21

## 2020-10-08 ENCOUNTER — ANTICOAGULATION VISIT (OUTPATIENT)
Dept: PHARMACY | Facility: HOSPITAL | Age: 80
End: 2020-10-08

## 2020-10-08 DIAGNOSIS — I48.20 CHRONIC ATRIAL FIBRILLATION (HCC): ICD-10-CM

## 2020-10-08 LAB
INR PPP: 2.9 (ref 0.91–1.09)
PROTHROMBIN TIME: 35.2 SECONDS (ref 10–13.8)

## 2020-10-08 PROCEDURE — 85610 PROTHROMBIN TIME: CPT

## 2020-10-08 PROCEDURE — 36416 COLLJ CAPILLARY BLOOD SPEC: CPT

## 2020-10-08 PROCEDURE — G0463 HOSPITAL OUTPT CLINIC VISIT: HCPCS

## 2020-10-08 NOTE — PROGRESS NOTES
Anticoagulation Clinic Progress Note    Anticoagulation Summary  As of 10/8/2020    INR goal:  2.0-3.0   TTR:  58.7 % (1.9 y)   INR used for dosin.9 (10/8/2020)   Warfarin maintenance plan:  1 mg every day   Weekly warfarin total:  7 mg   Plan last modified:  Vargas Butcher RPH (10/8/2020)   Next INR check:  10/13/2020   Priority:  Maintenance   Target end date:  Indefinite    Indications    Chronic atrial fibrillation (CMS/HCC) [I48.20]             Anticoagulation Episode Summary     INR check location:      Preferred lab:      Send INR reminders to:  [facility] [place]    Comments:        Anticoagulation Care Providers     Provider Role Specialty Phone number    Nik Galarza MD Referring Cardiology [phone]          Clinic Interview:  Patient Findings     Negatives:  Signs/symptoms of thrombosis, Signs/symptoms of bleeding,   Laboratory test error suspected, Change in health, Change in alcohol use,   Change in activity, Upcoming invasive procedure, Emergency department   visit, Upcoming dental procedure, Missed doses, Extra doses, Change in   medications, Change in diet/appetite, Hospital admission, Bruising, Other   complaints      Clinical Outcomes     Negatives:  Major bleeding event, Thromboembolic event,   Anticoagulation-related hospital admission, Anticoagulation-related ED   visit, Anticoagulation-related fatality        INR History:  Anticoagulation Monitoring 10/1/2020 10/5/2020 10/8/2020   INR 4.2 3.3 2.9   INR Date 10/1/2020 10/5/2020 10/8/2020   INR Goal 2.0-3.0 2.0-3.0 2.0-3.0   Trend Same Same Down   Last Week Total 13 mg 9 mg 7 mg   Next Week Total 9 mg 11 mg 7 mg   Sun 2 mg - 1 mg   Mon - 1 mg (10/5) 1 mg   Tue - 1 mg -   Wed - 1 mg (10/7) -   Thu Hold (10/1) - 1 mg   Fri Hold (10/2) - 1 mg   Sat 2 mg - 1 mg   Visit Report - - -   Some recent data might be hidden       Plan:  1. INR is Therapeutic today- see above in Anticoagulation Summary.  Will instruct Janel Mahoney  to Change their warfarin regimen- see above in Anticoagulation Summary.  2. Follow up in 5 days  3. Patient declines warfarin refills.  4. Verbal and written information provided. Patient expresses understanding and has no further questions at this time.    Vargas Butcher [facility]

## 2020-10-13 ENCOUNTER — ANTICOAGULATION VISIT (OUTPATIENT)
Dept: PHARMACY | Facility: HOSPITAL | Age: 80
End: 2020-10-13

## 2020-10-13 DIAGNOSIS — I48.20 CHRONIC ATRIAL FIBRILLATION (HCC): ICD-10-CM

## 2020-10-13 LAB
INR PPP: 1.6 (ref 0.91–1.09)
PROTHROMBIN TIME: 19.5 SECONDS (ref 10–13.8)

## 2020-10-13 PROCEDURE — 85610 PROTHROMBIN TIME: CPT

## 2020-10-13 PROCEDURE — G0463 HOSPITAL OUTPT CLINIC VISIT: HCPCS

## 2020-10-13 PROCEDURE — 36416 COLLJ CAPILLARY BLOOD SPEC: CPT

## 2020-10-13 NOTE — PROGRESS NOTES
Anticoagulation Clinic Progress Note    Anticoagulation Summary  As of 10/13/2020    INR goal:  2.0-3.0   TTR:  58.8 % (1.9 y)   INR used for dosin.6 (10/13/2020)   Warfarin maintenance plan:  2 mg every Tue, Fri; 1 mg all other days   Weekly warfarin total:  9 mg   Plan last modified:  Terri Ko RPH (10/13/2020)   Next INR check:  10/20/2020   Priority:  Maintenance   Target end date:  Indefinite    Indications    Chronic atrial fibrillation (CMS/HCC) [I48.20]             Anticoagulation Episode Summary     INR check location:      Preferred lab:      Send INR reminders to:   [facility]    Comments:        Anticoagulation Care Providers     Provider Role Specialty Phone number    Nik Galarza MD Referring Cardiology [phone]          Clinic Interview:  Patient Findings     Negatives:  Signs/symptoms of thrombosis, Signs/symptoms of bleeding,   Laboratory test error suspected, Change in health, Change in alcohol use,   Change in activity, Upcoming invasive procedure, Emergency department   visit, Upcoming dental procedure, Missed doses, Extra doses, Change in   medications, Change in diet/appetite, Hospital admission, Bruising, Other   complaints      Clinical Outcomes     Negatives:  Major bleeding event, Thromboembolic event,   Anticoagulation-related hospital admission, Anticoagulation-related ED   visit, Anticoagulation-related fatality        INR History:  Anticoagulation Monitoring 10/5/2020 10/8/2020 10/13/2020   INR 3.3 2.9 1.6   INR Date 10/5/2020 10/8/2020 10/13/2020   INR Goal 2.0-3.0 2.0-3.0 2.0-3.0   Trend Same Down Up   Last Week Total 9 mg 7 mg 7 mg   Next Week Total 11 mg 7 mg 9 mg   Sun - 1 mg 1 mg   Mon 1 mg (10/5) 1 mg 1 mg   Tue 1 mg - 2 mg   Wed 1 mg (10/7) - 1 mg   Thu - 1 mg 1 mg   Fri - 1 mg 2 mg   Sat - 1 mg 1 mg   Visit Report - - -   Some recent data might be hidden       Plan:  1. INR is Subtherapeutic today- see above in Anticoagulation  Summary.  Will instruct Janel Mahoney to Increase their warfarin regimen- see above in Anticoagulation Summary.  2. Follow up in 1 week  3. Patient declines warfarin refills.  4. Verbal and written information provided. Patient expresses understanding and has no further questions at this time.    Terri Ko [facility]

## 2020-10-20 ENCOUNTER — ANTICOAGULATION VISIT (OUTPATIENT)
Dept: PHARMACY | Facility: HOSPITAL | Age: 80
End: 2020-10-20

## 2020-10-20 DIAGNOSIS — I48.20 CHRONIC ATRIAL FIBRILLATION (HCC): ICD-10-CM

## 2020-10-20 LAB
INR PPP: 1.7 (ref 0.91–1.09)
PROTHROMBIN TIME: 19.9 SECONDS (ref 10–13.8)

## 2020-10-20 PROCEDURE — G0463 HOSPITAL OUTPT CLINIC VISIT: HCPCS

## 2020-10-20 PROCEDURE — 85610 PROTHROMBIN TIME: CPT

## 2020-10-20 PROCEDURE — 36416 COLLJ CAPILLARY BLOOD SPEC: CPT

## 2020-10-20 NOTE — PROGRESS NOTES
I have supervised and reviewed the notes, assessments, and/or procedures performed by our Pharmacy Intern. The documented assessment and plan were developed cooperatively, and the plan was implemented in my presence. I concur with the documentation of this patient encounter.    Shanna Major [facility]

## 2020-10-20 NOTE — PROGRESS NOTES
Anticoagulation Clinic Progress Note    Anticoagulation Summary  As of 10/20/2020    INR goal:  2.0-3.0   TTR:  58.2 % (1.9 y)   INR used for dosin.7 (10/20/2020)   Warfarin maintenance plan:  2 mg every Tue, Thu, Sat; 1 mg all other days   Weekly warfarin total:  10 mg   Plan last modified:  Shanna Major RPH (10/20/2020)   Next INR check:  10/27/2020   Priority:  Maintenance   Target end date:  Indefinite    Indications    Chronic atrial fibrillation (CMS/[facility]) [I48.20]             Anticoagulation Episode Summary     INR check location:      Preferred lab:      Send INR reminders to:   [facility]    Comments:        Anticoagulation Care Providers     Provider Role Specialty Phone number    Nik Galarza MD Referring Cardiology [phone]          Clinic Interview:  Patient Findings     Negatives:  Signs/symptoms of thrombosis, Signs/symptoms of bleeding,   Laboratory test error suspected, Change in health, Change in alcohol use,   Change in activity, Upcoming invasive procedure, Emergency department   visit, Upcoming dental procedure, Missed doses, Extra doses, Change in   medications, Change in diet/appetite, Hospital admission, Bruising, Other   complaints      Clinical Outcomes     Negatives:  Major bleeding event, Thromboembolic event,   Anticoagulation-related hospital admission, Anticoagulation-related ED   visit, Anticoagulation-related fatality        INR History:  Anticoagulation Monitoring 10/8/2020 10/13/2020 10/20/2020   INR 2.9 1.6 1.7   INR Date 10/8/2020 10/13/2020 10/20/2020   INR Goal 2.0-3.0 2.0-3.0 2.0-3.0   Trend Down Up Up   Last Week Total 7 mg 7 mg 9 mg   Next Week Total 7 mg 9 mg 10 mg   Sun 1 mg 1 mg 1 mg   Mon 1 mg 1 mg 1 mg   Tue - 2 mg 2 mg   Wed - 1 mg 1 mg   Thu 1 mg 1 mg 2 mg   Fri 1 mg 2 mg 1 mg   Sat 1 mg 1 mg 2 mg   Visit Report - - -   Some recent data might be hidden       Plan:  1. INR is Subtherapeutic today- see above in Anticoagulation  Summary.  Will instruct Janel J Denzel to Change their warfarin regimen- see above in Anticoagulation Summary.  2. Follow up in 1 week  3. Patient declines warfarin refills.  4. Verbal and written information provided. Patient expresses understanding and has no further questions at this time.    Theresa Do, Pharmacy Intern

## 2020-10-21 RX ORDER — PANTOPRAZOLE SODIUM 40 MG/1
TABLET, DELAYED RELEASE ORAL
Qty: 180 TABLET | Refills: 0 | Status: SHIPPED | OUTPATIENT
Start: 2020-10-21 | End: 2020-12-28 | Stop reason: SDUPTHER

## 2020-10-27 ENCOUNTER — ANTICOAGULATION VISIT (OUTPATIENT)
Dept: PHARMACY | Facility: HOSPITAL | Age: 80
End: 2020-10-27

## 2020-10-27 DIAGNOSIS — I48.20 CHRONIC ATRIAL FIBRILLATION (HCC): ICD-10-CM

## 2020-10-27 LAB
INR PPP: 1.7 (ref 0.91–1.09)
PROTHROMBIN TIME: 19.9 SECONDS (ref 10–13.8)

## 2020-10-27 PROCEDURE — 85610 PROTHROMBIN TIME: CPT

## 2020-10-27 PROCEDURE — 36416 COLLJ CAPILLARY BLOOD SPEC: CPT

## 2020-10-27 PROCEDURE — G0463 HOSPITAL OUTPT CLINIC VISIT: HCPCS

## 2020-10-27 NOTE — PROGRESS NOTES
Anticoagulation Clinic Progress Note    Anticoagulation Summary  As of 10/27/2020    INR goal:  2.0-3.0   TTR:  57.6 % (1.9 y)   INR used for dosin.7 (10/27/2020)   Warfarin maintenance plan:  1 mg every Mon, Wed, Fri; 2 mg all other days   Weekly warfarin total:  11 mg   Plan last modified:  Shanna Major RPH (10/27/2020)   Next INR check:  11/3/2020   Priority:  Maintenance   Target end date:  Indefinite    Indications    Chronic atrial fibrillation (CMS/HCC) [I48.20]             Anticoagulation Episode Summary     INR check location:      Preferred lab:      Send INR reminders to:   [facility]    Comments:        Anticoagulation Care Providers     Provider Role Specialty Phone number    Nik Galarza MD Referring Cardiology [phone]          Clinic Interview:      INR History:  Anticoagulation Monitoring 10/13/2020 10/20/2020 10/27/2020   INR 1.6 1.7 1.7   INR Date 10/13/2020 10/20/2020 10/27/2020   INR Goal 2.0-3.0 2.0-3.0 2.0-3.0   Trend Up Up Up   Last Week Total 7 mg 9 mg 10 mg   Next Week Total 9 mg 10 mg 11 mg   Sun 1 mg 1 mg 2 mg   Mon 1 mg 1 mg 1 mg   Tue 2 mg 2 mg 2 mg   Wed 1 mg 1 mg 1 mg   Thu 1 mg 2 mg 2 mg   Fri 2 mg 1 mg 1 mg   Sat 1 mg 2 mg 2 mg   Visit Report - - -   Some recent data might be hidden       Plan:  1. INR is Subtherapeutic today- see above in Anticoagulation Summary.  Will instruct Janelsavannah Mahoney to Increase their warfarin regimen- see above in Anticoagulation Summary.  2. Follow up in 1 week at [facility]  3. Patient declines warfarin refills.  4. Verbal and written information provided. Patient expresses understanding and has no further questions at this time.    Shanna Major RPH

## 2020-10-29 RX ORDER — SIMVASTATIN 20 MG
40 TABLET ORAL NIGHTLY
Qty: 90 TABLET | Refills: 1 | Status: SHIPPED | OUTPATIENT
Start: 2020-10-29 | End: 2020-12-28

## 2020-11-03 ENCOUNTER — LAB (OUTPATIENT)
Dept: LAB | Facility: HOSPITAL | Age: 80
End: 2020-11-03

## 2020-11-03 ENCOUNTER — ANTICOAGULATION VISIT (OUTPATIENT)
Dept: PHARMACY | Facility: HOSPITAL | Age: 80
End: 2020-11-03

## 2020-11-03 ENCOUNTER — INFUSION (OUTPATIENT)
Dept: ONCOLOGY | Facility: HOSPITAL | Age: 80
End: 2020-11-03

## 2020-11-03 DIAGNOSIS — D63.1 ANEMIA IN STAGE 3 CHRONIC KIDNEY DISEASE (HCC): ICD-10-CM

## 2020-11-03 DIAGNOSIS — I48.20 CHRONIC ATRIAL FIBRILLATION (HCC): ICD-10-CM

## 2020-11-03 DIAGNOSIS — N18.30 STAGE 3 CHRONIC KIDNEY DISEASE, UNSPECIFIED WHETHER STAGE 3A OR 3B CKD (HCC): Primary | ICD-10-CM

## 2020-11-03 DIAGNOSIS — D50.9 IRON DEFICIENCY ANEMIA, UNSPECIFIED IRON DEFICIENCY ANEMIA TYPE: ICD-10-CM

## 2020-11-03 DIAGNOSIS — D69.6 THROMBOCYTOPENIA (HCC): ICD-10-CM

## 2020-11-03 DIAGNOSIS — N18.30 ANEMIA IN STAGE 3 CHRONIC KIDNEY DISEASE (HCC): ICD-10-CM

## 2020-11-03 LAB
BASOPHILS # BLD AUTO: 0.02 10*3/MM3 (ref 0–0.2)
BASOPHILS NFR BLD AUTO: 0.4 % (ref 0–1.5)
DEPRECATED RDW RBC AUTO: 54.4 FL (ref 37–54)
EOSINOPHIL # BLD AUTO: 0.13 10*3/MM3 (ref 0–0.4)
EOSINOPHIL NFR BLD AUTO: 2.7 % (ref 0.3–6.2)
ERYTHROCYTE [DISTWIDTH] IN BLOOD BY AUTOMATED COUNT: 15.1 % (ref 12.3–15.4)
HCT VFR BLD AUTO: 29.7 % (ref 34–46.6)
HGB BLD-MCNC: 9.1 G/DL (ref 12–15.9)
IMM GRANULOCYTES # BLD AUTO: 0.01 10*3/MM3 (ref 0–0.05)
IMM GRANULOCYTES NFR BLD AUTO: 0.2 % (ref 0–0.5)
INR PPP: 2.32 (ref 0.9–1.1)
LYMPHOCYTES # BLD AUTO: 0.67 10*3/MM3 (ref 0.7–3.1)
LYMPHOCYTES NFR BLD AUTO: 13.9 % (ref 19.6–45.3)
MCH RBC QN AUTO: 30.1 PG (ref 26.6–33)
MCHC RBC AUTO-ENTMCNC: 30.6 G/DL (ref 31.5–35.7)
MCV RBC AUTO: 98.3 FL (ref 79–97)
MONOCYTES # BLD AUTO: 0.28 10*3/MM3 (ref 0.1–0.9)
MONOCYTES NFR BLD AUTO: 5.8 % (ref 5–12)
NEUTROPHILS NFR BLD AUTO: 3.7 10*3/MM3 (ref 1.7–7)
NEUTROPHILS NFR BLD AUTO: 77 % (ref 42.7–76)
NRBC BLD AUTO-RTO: 0 /100 WBC (ref 0–0.2)
PLATELET # BLD AUTO: 120 10*3/MM3 (ref 140–450)
PMV BLD AUTO: 10.6 FL (ref 6–12)
PROTHROMBIN TIME: 24.8 SECONDS (ref 11.7–14.2)
RBC # BLD AUTO: 3.02 10*6/MM3 (ref 3.77–5.28)
WBC # BLD AUTO: 4.81 10*3/MM3 (ref 3.4–10.8)

## 2020-11-03 PROCEDURE — 85610 PROTHROMBIN TIME: CPT | Performed by: INTERNAL MEDICINE

## 2020-11-03 PROCEDURE — 96372 THER/PROPH/DIAG INJ SC/IM: CPT

## 2020-11-03 PROCEDURE — 25010000002 EPOETIN ALFA PER 1000 UNITS: Performed by: INTERNAL MEDICINE

## 2020-11-03 PROCEDURE — 85025 COMPLETE CBC W/AUTO DIFF WBC: CPT

## 2020-11-03 PROCEDURE — 36415 COLL VENOUS BLD VENIPUNCTURE: CPT

## 2020-11-03 RX ORDER — CARVEDILOL 25 MG/1
TABLET ORAL
Qty: 180 TABLET | Refills: 1 | Status: SHIPPED | OUTPATIENT
Start: 2020-11-03 | End: 2021-03-15

## 2020-11-03 RX ADMIN — ERYTHROPOIETIN 6000 UNITS: 20000 INJECTION, SOLUTION INTRAVENOUS; SUBCUTANEOUS at 13:47

## 2020-11-03 NOTE — PROGRESS NOTES
Anticoagulation Clinic Progress Note    Anticoagulation Summary  As of 11/3/2020    INR goal:  2.0-3.0   TTR:  57.6 % (2 y)   INR used for dosin.32 (11/3/2020)   Warfarin maintenance plan:  1 mg every Mon, Wed, Fri; 2 mg all other days   Weekly warfarin total:  11 mg   Plan last modified:  Shanna Major RPH (10/27/2020)   Next INR check:  11/10/2020   Priority:  Maintenance   Target end date:  Indefinite    Indications    Chronic atrial fibrillation (CMS/HCC) [I48.20]             Anticoagulation Episode Summary     INR check location:      Preferred lab:      Send INR reminders to:   [facility] [place]    Comments:        Anticoagulation Care Providers     Provider Role Specialty Phone number    Nik Galarza MD Referring Cardiology [phone]          Clinic Interview:      INR History:  Anticoagulation Monitoring 10/20/2020 10/27/2020 11/3/2020   INR 1.7 1.7 2.32   INR Date 10/20/2020 10/27/2020 11/3/2020   INR Goal 2.0-3.0 2.0-3.0 2.0-3.0   Trend Up Up Same   Last Week Total 9 mg 10 mg 11 mg   Next Week Total 10 mg 11 mg 11 mg   Sun 1 mg 2 mg 2 mg   Mon 1 mg 1 mg 1 mg   Tue 2 mg 2 mg 2 mg   Wed 1 mg 1 mg 1 mg   Thu 2 mg 2 mg 2 mg   Fri 1 mg 1 mg 1 mg   Sat 2 mg 2 mg 2 mg   Visit Report - - -   Some recent data might be hidden       Plan:  1. INR is Therapeutic today- see above in Anticoagulation Summary.   Will instruct Janelsavannah Mahoney to Continue their warfarin regimen- see above in Anticoagulation Summary.  2. Follow up in 1 week  3. Spoke with pt today. They have been instructed to call if any changes in medications, doses, concerns, etc. Patient expresses understanding and has no further questions at this time.    Shanna Major RPH

## 2020-11-06 ENCOUNTER — TRANSCRIBE ORDERS (OUTPATIENT)
Dept: ADMINISTRATIVE | Facility: HOSPITAL | Age: 80
End: 2020-11-06

## 2020-11-06 DIAGNOSIS — Z12.31 SCREENING MAMMOGRAM, ENCOUNTER FOR: Primary | ICD-10-CM

## 2020-11-10 ENCOUNTER — ANTICOAGULATION VISIT (OUTPATIENT)
Dept: PHARMACY | Facility: HOSPITAL | Age: 80
End: 2020-11-10

## 2020-11-10 DIAGNOSIS — I48.20 CHRONIC ATRIAL FIBRILLATION (HCC): ICD-10-CM

## 2020-11-10 LAB
INR PPP: 2.2 (ref 0.91–1.09)
PROTHROMBIN TIME: 26.8 SECONDS (ref 10–13.8)

## 2020-11-10 PROCEDURE — 36416 COLLJ CAPILLARY BLOOD SPEC: CPT

## 2020-11-10 PROCEDURE — 85610 PROTHROMBIN TIME: CPT

## 2020-11-10 NOTE — PROGRESS NOTES
I have supervised and reviewed the notes, assessments, and/or procedures performed by our Pharmacy Intern. The documented assessment and plan were developed cooperatively, and the plan was implemented in my presence. I concur with the documentation of this patient encounter.    Shanna Major [facility]

## 2020-11-10 NOTE — PROGRESS NOTES
Anticoagulation Clinic Progress Note    Anticoagulation Summary  As of 11/10/2020    INR goal:  2.0-3.0   TTR:  58.0 % (2 y)   INR used for dosin.2 (11/10/2020)   Warfarin maintenance plan:  1 mg every Mon, Wed, Fri; 2 mg all other days   Weekly warfarin total:  11 mg   No change documented:  Theresa Do, Pharmacy Intern   Plan last modified:  Shanna Major RPH (10/27/2020)   Next INR check:  2020   Priority:  Maintenance   Target end date:  Indefinite    Indications    Chronic atrial fibrillation (CMS/[facility]) [I48.20]             Anticoagulation Episode Summary     INR check location:      Preferred lab:      Send INR reminders to:   [facility]    Comments:        Anticoagulation Care Providers     Provider Role Specialty Phone number    Nik Galarza MD Referring Cardiology [phone]          Clinic Interview:  Patient Findings     Negatives:  Signs/symptoms of thrombosis, Signs/symptoms of bleeding,   Laboratory test error suspected, Change in health, Change in alcohol use,   Change in activity, Upcoming invasive procedure, Emergency department   visit, Upcoming dental procedure, Missed doses, Extra doses, Change in   medications, Change in diet/appetite, Hospital admission, Bruising, Other   complaints      Clinical Outcomes     Negatives:  Major bleeding event, Thromboembolic event,   Anticoagulation-related hospital admission, Anticoagulation-related ED   visit, Anticoagulation-related fatality        INR History:  Anticoagulation Monitoring 10/27/2020 11/3/2020 11/10/2020   INR 1.7 2.32 2.2   INR Date 10/27/2020 11/3/2020 11/10/2020   INR Goal 2.0-3.0 2.0-3.0 2.0-3.0   Trend Up Same Same   Last Week Total 10 mg 11 mg 11 mg   Next Week Total 11 mg 11 mg 11 mg   Sun 2 mg 2 mg 2 mg   Mon 1 mg 1 mg 1 mg   Tue 2 mg 2 mg 2 mg   Wed 1 mg 1 mg 1 mg   Thu 2 mg 2 mg 2 mg   Fri 1 mg 1 mg 1 mg   Sat 2 mg 2 mg 2 mg   Visit Report - - -   Some recent data might be hidden       Plan:  1.  INR is Therapeutic today- see above in Anticoagulation Summary.  Will instruct Janel Mahoney to Continue their warfarin regimen- see above in Anticoagulation Summary.  2. Follow up in 2 weeks  3. Patient declines warfarin refills.  4. Verbal and written information provided. Patient expresses understanding and has no further questions at this time.    Theresa Do, Pharmacy Intern

## 2020-11-13 RX ORDER — RAMIPRIL 5 MG/1
CAPSULE ORAL
Qty: 90 CAPSULE | Refills: 1 | Status: SHIPPED | OUTPATIENT
Start: 2020-11-13 | End: 2021-04-12

## 2020-11-24 ENCOUNTER — ANTICOAGULATION VISIT (OUTPATIENT)
Dept: PHARMACY | Facility: HOSPITAL | Age: 80
End: 2020-11-24

## 2020-11-24 DIAGNOSIS — I48.20 CHRONIC ATRIAL FIBRILLATION (HCC): ICD-10-CM

## 2020-11-24 LAB
INR PPP: 2.5 (ref 0.91–1.09)
PROTHROMBIN TIME: 30 SECONDS (ref 10–13.8)

## 2020-11-24 PROCEDURE — 85610 PROTHROMBIN TIME: CPT

## 2020-11-24 PROCEDURE — 36416 COLLJ CAPILLARY BLOOD SPEC: CPT

## 2020-11-24 NOTE — PROGRESS NOTES
I have supervised and reviewed the notes, assessments, and/or procedures performed by our Pharmacy Intern. The documented assessment and plan were developed cooperatively. I concur with the documentation of this patient encounter.    Shanna Major RP

## 2020-11-24 NOTE — PROGRESS NOTES
Anticoagulation Clinic Progress Note    Anticoagulation Summary  As of 2020    INR goal:  2.0-3.0   TTR:  58.8 % (2 y)   INR used for dosin.5 (2020)   Warfarin maintenance plan:  1 mg every Mon, Wed, Fri; 2 mg all other days   Weekly warfarin total:  11 mg   No change documented:  Yesy Townsend, Pharmacy Intern   Plan last modified:  Shanna Major RPH (10/27/2020)   Next INR check:  2020   Priority:  Maintenance   Target end date:  Indefinite    Indications    Chronic atrial fibrillation (CMS/[facility]) [I48.20]             Anticoagulation Episode Summary     INR check location:      Preferred lab:      Send INR reminders to:   [facility]    Comments:        Anticoagulation Care Providers     Provider Role Specialty Phone number    Nik Galarza MD Referring Cardiology [phone]          Clinic Interview:  Patient Findings     Negatives:  Signs/symptoms of thrombosis, Signs/symptoms of bleeding,   Laboratory test error suspected, Change in health, Change in alcohol use,   Change in activity, Upcoming invasive procedure, Emergency department   visit, Upcoming dental procedure, Missed doses, Extra doses, Change in   medications, Change in diet/appetite, Hospital admission, Bruising, Other   complaints      Clinical Outcomes     Negatives:  Major bleeding event, Thromboembolic event,   Anticoagulation-related hospital admission, Anticoagulation-related ED   visit, Anticoagulation-related fatality        INR History:  Anticoagulation Monitoring 11/3/2020 11/10/2020 2020   INR 2.32 2.2 2.5   INR Date 11/3/2020 11/10/2020 2020   INR Goal 2.0-3.0 2.0-3.0 2.0-3.0   Trend Same Same Same   Last Week Total 11 mg 11 mg 11 mg   Next Week Total 11 mg 11 mg 11 mg   Sun 2 mg 2 mg 2 mg   Mon 1 mg 1 mg 1 mg   Tue 2 mg 2 mg 2 mg   Wed 1 mg 1 mg 1 mg   Thu 2 mg 2 mg 2 mg   Fri 1 mg 1 mg 1 mg   Sat 2 mg 2 mg 2 mg   Visit Report - - -   Some recent data might be hidden        Plan:  1. INR is Therapeutic today- see above in Anticoagulation Summary.  Will instruct Janel Mahoney to Continue their warfarin regimen- see above in Anticoagulation Summary.  2. Follow up in 2 weeks  3. Patient declines warfarin refills.  4. Verbal and written information provided. Patient expresses understanding and has no further questions at this time.    Yesy Townsend, Pharmacy Intern

## 2020-12-01 ENCOUNTER — LAB (OUTPATIENT)
Dept: LAB | Facility: HOSPITAL | Age: 80
End: 2020-12-01

## 2020-12-01 ENCOUNTER — INFUSION (OUTPATIENT)
Dept: ONCOLOGY | Facility: HOSPITAL | Age: 80
End: 2020-12-01

## 2020-12-01 DIAGNOSIS — D50.9 IRON DEFICIENCY ANEMIA, UNSPECIFIED IRON DEFICIENCY ANEMIA TYPE: ICD-10-CM

## 2020-12-01 DIAGNOSIS — D63.1 ANEMIA IN STAGE 3 CHRONIC KIDNEY DISEASE (HCC): ICD-10-CM

## 2020-12-01 DIAGNOSIS — D69.6 THROMBOCYTOPENIA (HCC): ICD-10-CM

## 2020-12-01 DIAGNOSIS — N18.30 STAGE 3 CHRONIC KIDNEY DISEASE, UNSPECIFIED WHETHER STAGE 3A OR 3B CKD (HCC): Primary | ICD-10-CM

## 2020-12-01 DIAGNOSIS — N18.30 ANEMIA IN STAGE 3 CHRONIC KIDNEY DISEASE (HCC): ICD-10-CM

## 2020-12-01 LAB
BASOPHILS # BLD AUTO: 0.02 10*3/MM3 (ref 0–0.2)
BASOPHILS NFR BLD AUTO: 0.4 % (ref 0–1.5)
DEPRECATED RDW RBC AUTO: 53.9 FL (ref 37–54)
EOSINOPHIL # BLD AUTO: 0.14 10*3/MM3 (ref 0–0.4)
EOSINOPHIL NFR BLD AUTO: 3 % (ref 0.3–6.2)
ERYTHROCYTE [DISTWIDTH] IN BLOOD BY AUTOMATED COUNT: 15.4 % (ref 12.3–15.4)
HCT VFR BLD AUTO: 29.3 % (ref 34–46.6)
HGB BLD-MCNC: 9.1 G/DL (ref 12–15.9)
IMM GRANULOCYTES # BLD AUTO: 0.01 10*3/MM3 (ref 0–0.05)
IMM GRANULOCYTES NFR BLD AUTO: 0.2 % (ref 0–0.5)
LYMPHOCYTES # BLD AUTO: 0.77 10*3/MM3 (ref 0.7–3.1)
LYMPHOCYTES NFR BLD AUTO: 16.5 % (ref 19.6–45.3)
MCH RBC QN AUTO: 30 PG (ref 26.6–33)
MCHC RBC AUTO-ENTMCNC: 31.1 G/DL (ref 31.5–35.7)
MCV RBC AUTO: 96.7 FL (ref 79–97)
MONOCYTES # BLD AUTO: 0.39 10*3/MM3 (ref 0.1–0.9)
MONOCYTES NFR BLD AUTO: 8.4 % (ref 5–12)
NEUTROPHILS NFR BLD AUTO: 3.34 10*3/MM3 (ref 1.7–7)
NEUTROPHILS NFR BLD AUTO: 71.5 % (ref 42.7–76)
NRBC BLD AUTO-RTO: 0 /100 WBC (ref 0–0.2)
PLATELET # BLD AUTO: 117 10*3/MM3 (ref 140–450)
PMV BLD AUTO: 9.7 FL (ref 6–12)
RBC # BLD AUTO: 3.03 10*6/MM3 (ref 3.77–5.28)
WBC # BLD AUTO: 4.67 10*3/MM3 (ref 3.4–10.8)

## 2020-12-01 PROCEDURE — 25010000002 EPOETIN ALFA PER 1000 UNITS: Performed by: INTERNAL MEDICINE

## 2020-12-01 PROCEDURE — 36415 COLL VENOUS BLD VENIPUNCTURE: CPT

## 2020-12-01 PROCEDURE — 96372 THER/PROPH/DIAG INJ SC/IM: CPT

## 2020-12-01 PROCEDURE — 85025 COMPLETE CBC W/AUTO DIFF WBC: CPT

## 2020-12-01 RX ADMIN — ERYTHROPOIETIN 7000 UNITS: 20000 INJECTION, SOLUTION INTRAVENOUS; SUBCUTANEOUS at 14:02

## 2020-12-03 DIAGNOSIS — D64.9 ANEMIA, UNSPECIFIED TYPE: ICD-10-CM

## 2020-12-03 DIAGNOSIS — I10 ESSENTIAL HYPERTENSION, BENIGN: ICD-10-CM

## 2020-12-03 DIAGNOSIS — E55.9 VITAMIN D DEFICIENCY, UNSPECIFIED: ICD-10-CM

## 2020-12-03 DIAGNOSIS — I25.810 CORONARY ARTERY DISEASE INVOLVING CORONARY BYPASS GRAFT OF NATIVE HEART WITHOUT ANGINA PECTORIS: ICD-10-CM

## 2020-12-03 DIAGNOSIS — I50.42 CHRONIC COMBINED SYSTOLIC AND DIASTOLIC CONGESTIVE HEART FAILURE (HCC): Primary | ICD-10-CM

## 2020-12-04 ENCOUNTER — LAB (OUTPATIENT)
Dept: FAMILY MEDICINE CLINIC | Facility: CLINIC | Age: 80
End: 2020-12-04

## 2020-12-04 DIAGNOSIS — I10 ESSENTIAL HYPERTENSION, BENIGN: ICD-10-CM

## 2020-12-04 DIAGNOSIS — D64.9 ANEMIA, UNSPECIFIED TYPE: ICD-10-CM

## 2020-12-04 DIAGNOSIS — I25.810 CORONARY ARTERY DISEASE INVOLVING CORONARY BYPASS GRAFT OF NATIVE HEART WITHOUT ANGINA PECTORIS: ICD-10-CM

## 2020-12-04 DIAGNOSIS — I50.42 CHRONIC COMBINED SYSTOLIC AND DIASTOLIC CONGESTIVE HEART FAILURE (HCC): ICD-10-CM

## 2020-12-04 LAB
25(OH)D3 SERPL-MCNC: 65.6 NG/ML (ref 30–100)
ALBUMIN SERPL-MCNC: 4 G/DL (ref 3.5–5.2)
ALBUMIN/GLOB SERPL: 1.7 G/DL
ALP SERPL-CCNC: 43 U/L (ref 39–117)
ALT SERPL W P-5'-P-CCNC: 8 U/L (ref 1–33)
ANION GAP SERPL CALCULATED.3IONS-SCNC: 11.5 MMOL/L (ref 5–15)
AST SERPL-CCNC: 18 U/L (ref 1–32)
BILIRUB SERPL-MCNC: 0.4 MG/DL (ref 0–1.2)
BUN SERPL-MCNC: 65 MG/DL (ref 8–23)
BUN/CREAT SERPL: 27.5 (ref 7–25)
CALCIUM SPEC-SCNC: 9.8 MG/DL (ref 8.6–10.5)
CHLORIDE SERPL-SCNC: 100 MMOL/L (ref 98–107)
CHOLEST SERPL-MCNC: 136 MG/DL (ref 0–200)
CO2 SERPL-SCNC: 31.5 MMOL/L (ref 22–29)
CREAT SERPL-MCNC: 2.36 MG/DL (ref 0.57–1)
DEPRECATED RDW RBC AUTO: 46.2 FL (ref 37–54)
ERYTHROCYTE [DISTWIDTH] IN BLOOD BY AUTOMATED COUNT: 13.8 % (ref 12.3–15.4)
FERRITIN SERPL-MCNC: 371 NG/ML (ref 13–150)
GFR SERPL CREATININE-BSD FRML MDRD: 20 ML/MIN/1.73
GLOBULIN UR ELPH-MCNC: 2.4 GM/DL
GLUCOSE SERPL-MCNC: 92 MG/DL (ref 65–99)
HCT VFR BLD AUTO: 27.8 % (ref 34–46.6)
HDLC SERPL-MCNC: 51 MG/DL (ref 40–60)
HGB BLD-MCNC: 8.9 G/DL (ref 12–15.9)
LDLC SERPL CALC-MCNC: 68 MG/DL (ref 0–100)
LDLC/HDLC SERPL: 1.31 {RATIO}
MCH RBC QN AUTO: 30 PG (ref 26.6–33)
MCHC RBC AUTO-ENTMCNC: 32 G/DL (ref 31.5–35.7)
MCV RBC AUTO: 93.6 FL (ref 79–97)
PLATELET # BLD AUTO: 130 10*3/MM3 (ref 140–450)
PMV BLD AUTO: 10.7 FL (ref 6–12)
POTASSIUM SERPL-SCNC: 4.3 MMOL/L (ref 3.5–5.2)
PROT SERPL-MCNC: 6.4 G/DL (ref 6–8.5)
RBC # BLD AUTO: 2.97 10*6/MM3 (ref 3.77–5.28)
SODIUM SERPL-SCNC: 143 MMOL/L (ref 136–145)
TRIGL SERPL-MCNC: 92 MG/DL (ref 0–150)
VLDLC SERPL-MCNC: 17 MG/DL (ref 5–40)
WBC # BLD AUTO: 3.39 10*3/MM3 (ref 3.4–10.8)

## 2020-12-04 PROCEDURE — 82306 VITAMIN D 25 HYDROXY: CPT | Performed by: INTERNAL MEDICINE

## 2020-12-04 PROCEDURE — 85027 COMPLETE CBC AUTOMATED: CPT | Performed by: INTERNAL MEDICINE

## 2020-12-04 PROCEDURE — 36415 COLL VENOUS BLD VENIPUNCTURE: CPT | Performed by: INTERNAL MEDICINE

## 2020-12-04 PROCEDURE — 80053 COMPREHEN METABOLIC PANEL: CPT | Performed by: INTERNAL MEDICINE

## 2020-12-04 PROCEDURE — 82728 ASSAY OF FERRITIN: CPT | Performed by: INTERNAL MEDICINE

## 2020-12-04 PROCEDURE — 80061 LIPID PANEL: CPT | Performed by: INTERNAL MEDICINE

## 2020-12-08 ENCOUNTER — ANTICOAGULATION VISIT (OUTPATIENT)
Dept: PHARMACY | Facility: HOSPITAL | Age: 80
End: 2020-12-08

## 2020-12-08 DIAGNOSIS — I48.20 CHRONIC ATRIAL FIBRILLATION (HCC): ICD-10-CM

## 2020-12-08 LAB
INR PPP: 2.2 (ref 0.91–1.09)
PROTHROMBIN TIME: 26 SECONDS (ref 10–13.8)

## 2020-12-08 PROCEDURE — 85610 PROTHROMBIN TIME: CPT

## 2020-12-08 PROCEDURE — 36416 COLLJ CAPILLARY BLOOD SPEC: CPT

## 2020-12-08 NOTE — PROGRESS NOTES
Anticoagulation Clinic Progress Note    Anticoagulation Summary  As of 2020    INR goal:  2.0-3.0   TTR:  59.6 % (2 y)   INR used for dosin.2 (2020)   Warfarin maintenance plan:  1 mg every Mon, Wed, Fri; 2 mg all other days   Weekly warfarin total:  11 mg   No change documented:  Vargas Butcher RPH   Plan last modified:  Shanna Major RPH (10/27/2020)   Next INR check:  2020   Priority:  Maintenance   Target end date:  Indefinite    Indications    Chronic atrial fibrillation (CMS/[facility]) [I48.20]             Anticoagulation Episode Summary     INR check location:      Preferred lab:      Send INR reminders to:   [facility]    Comments:        Anticoagulation Care Providers     Provider Role Specialty Phone number    Nki Galarza MD Referring Cardiology [phone]          Clinic Interview:  Patient Findings     Positives:  Other complaints    Negatives:  Signs/symptoms of thrombosis, Signs/symptoms of bleeding,   Laboratory test error suspected, Change in health, Change in alcohol use,   Change in activity, Upcoming invasive procedure, Emergency department   visit, Upcoming dental procedure, Missed doses, Extra doses, Change in   medications, Change in diet/appetite, Hospital admission, Bruising    Comments:  Reports injection in knee tomorrow; pt unsure if a steroid;   however, she reports it has never elevated INR previously.      Clinical Outcomes     Negatives:  Major bleeding event, Thromboembolic event,   Anticoagulation-related hospital admission, Anticoagulation-related ED   visit, Anticoagulation-related fatality    Comments:  Reports injection in knee tomorrow; pt unsure if a steroid;   however, she reports it has never elevated INR previously.        INR History:  Anticoagulation Monitoring 11/10/2020 2020 2020   INR 2.2 2.5 2.2   INR Date 11/10/2020 2020 2020   INR Goal 2.0-3.0 2.0-3.0 2.0-3.0   Trend Same Same Same   Last Week Total 11 mg  11 mg 11 mg   Next Week Total 11 mg 11 mg 11 mg   Sun 2 mg 2 mg 2 mg   Mon 1 mg 1 mg 1 mg   Tue 2 mg 2 mg 2 mg   Wed 1 mg 1 mg 1 mg   Thu 2 mg 2 mg 2 mg   Fri 1 mg 1 mg 1 mg   Sat 2 mg 2 mg 2 mg   Visit Report - - -   Some recent data might be hidden       Plan:  1. INR is Therapeutic today- see above in Anticoagulation Summary.  Will instruct Janel Mahoney to Continue their warfarin regimen- see above in Anticoagulation Summary.  2. Follow up in 3 weeks  3. Patient declines warfarin refills.  4. Verbal and written information provided. Patient expresses understanding and has no further questions at this time.    Vargas Butcher [facility]

## 2020-12-11 ENCOUNTER — OFFICE VISIT (OUTPATIENT)
Dept: FAMILY MEDICINE CLINIC | Facility: CLINIC | Age: 80
End: 2020-12-11

## 2020-12-11 VITALS
RESPIRATION RATE: 15 BRPM | SYSTOLIC BLOOD PRESSURE: 124 MMHG | OXYGEN SATURATION: 100 % | HEART RATE: 59 BPM | DIASTOLIC BLOOD PRESSURE: 70 MMHG | BODY MASS INDEX: 27.42 KG/M2 | HEIGHT: 62 IN | WEIGHT: 149 LBS | TEMPERATURE: 97.5 F

## 2020-12-11 DIAGNOSIS — I25.810 CORONARY ARTERY DISEASE INVOLVING CORONARY BYPASS GRAFT OF NATIVE HEART WITHOUT ANGINA PECTORIS: ICD-10-CM

## 2020-12-11 DIAGNOSIS — M54.40 BILATERAL LOW BACK PAIN WITH SCIATICA, SCIATICA LATERALITY UNSPECIFIED, UNSPECIFIED CHRONICITY: ICD-10-CM

## 2020-12-11 DIAGNOSIS — I48.20 CHRONIC ATRIAL FIBRILLATION (HCC): Primary | ICD-10-CM

## 2020-12-11 DIAGNOSIS — I50.42 CHRONIC COMBINED SYSTOLIC AND DIASTOLIC CONGESTIVE HEART FAILURE (HCC): ICD-10-CM

## 2020-12-11 PROBLEM — I25.10 CHRONIC CORONARY ARTERY DISEASE: Chronic | Status: RESOLVED | Noted: 2019-02-20 | Resolved: 2020-12-11

## 2020-12-11 PROCEDURE — 99214 OFFICE O/P EST MOD 30 MIN: CPT | Performed by: INTERNAL MEDICINE

## 2020-12-11 NOTE — PROGRESS NOTES
Subjective   Janel Mahoney is a 80 y.o. female.     Vitals:    12/11/20 1053   BP: 124/70   Pulse: 59   Resp: 15   Temp: 97.5 °F (36.4 °C)   SpO2: 100%      Body mass index is 27.25 kg/m².     History of Present Illness   Patient was seen for chronic atrial fibrillation.  Patient's INR was 2.2.  Patient takes warfarin 1 mg daily.  Patient does have coronary artery disease and sees a cardiologist on a regular basis.  Patient's been stable over the last 6 months.  Patient's not had any shortness of breath or chest pain.  Patient is able to ambulate without extreme shortness of breath.  Patient also has congestive heart failure patient is maintaining her weight around 146.  Patient will obtain her lose.  Patient will continue present treatment and follow-up with cardiologist and our office.  Patient does have some degenerative joint disease.  Pain patient pain is lower back and is doing well at present time.    Dictated utilizing Dragon dictation. If there are questions or for further clarification, please contact me.  The following portions of the patient's history were reviewed and updated as appropriate: allergies, current medications, past family history, past medical history, past social history, past surgical history and problem list.    Review of Systems   Constitutional: Negative for fatigue and fever.   HENT: Positive for congestion. Negative for trouble swallowing.    Eyes: Negative for discharge and visual disturbance.   Respiratory: Negative for choking and shortness of breath.    Cardiovascular: Negative for chest pain, palpitations and leg swelling.   Gastrointestinal: Negative for abdominal pain and blood in stool.   Endocrine: Negative.    Genitourinary: Negative for genital sores and hematuria.   Musculoskeletal: Positive for arthralgias, back pain and joint swelling. Negative for gait problem.   Skin: Negative for color change, pallor, rash and wound.   Allergic/Immunologic: Positive for  environmental allergies. Negative for immunocompromised state.   Neurological: Negative for facial asymmetry and speech difficulty.   Psychiatric/Behavioral: Negative for hallucinations and suicidal ideas.       Objective   Physical Exam  Vitals signs and nursing note reviewed.   Constitutional:       Appearance: Normal appearance. She is well-developed.   HENT:      Head: Normocephalic and atraumatic.      Nose: Nose normal.      Mouth/Throat:      Mouth: Mucous membranes are moist.      Pharynx: Oropharynx is clear.   Eyes:      Extraocular Movements: Extraocular movements intact.      Conjunctiva/sclera: Conjunctivae normal.      Pupils: Pupils are equal, round, and reactive to light.   Neck:      Musculoskeletal: Normal range of motion and neck supple.   Cardiovascular:      Rate and Rhythm: Normal rate and regular rhythm.      Heart sounds: No murmur. No friction rub. Gallop present.    Pulmonary:      Effort: Pulmonary effort is normal. No respiratory distress.      Breath sounds: Normal breath sounds. No stridor. No wheezing, rhonchi or rales.   Chest:      Chest wall: No tenderness.   Abdominal:      General: Bowel sounds are normal.      Palpations: Abdomen is soft.   Musculoskeletal: Normal range of motion.         General: Tenderness present.   Skin:     General: Skin is warm and dry.   Neurological:      General: No focal deficit present.      Mental Status: She is alert and oriented to person, place, and time. Mental status is at baseline.   Psychiatric:         Mood and Affect: Mood normal.         Behavior: Behavior normal.         Thought Content: Thought content normal.         Judgment: Judgment normal.         Assessment/Plan   Problems Addressed this Visit     Cardiovascular and Mediastinum              Chronic atrial fibrillation (CMS/HCC) - Primary    Chronic combined systolic and diastolic congestive heart failure (CMS/HCC)          Nervous and Auditory              Low back pain           Other              Coronary artery disease involving coronary bypass graft of native heart without angina pectoris            Diagnoses     Diagnosis Codes Comments    Chronic atrial fibrillation (CMS/[facility])    -  Primary ICD-10-CM: I48.20  ICD-9-CM: 427.31     Coronary artery disease involving coronary bypass graft of native heart without angina pectoris     ICD-10-CM: I25.810  ICD-9-CM: 414.05     Chronic combined systolic and diastolic congestive heart failure (CMS/[facility])     ICD-10-CM: I50.42  ICD-9-CM: 428.42, 428.0     Bilateral low back pain with sciatica, sciatica laterality unspecified, unspecified chronicity     ICD-10-CM: M54.40  ICD-9-CM: 724.3

## 2020-12-28 RX ORDER — SIMVASTATIN 20 MG
TABLET ORAL
Qty: 180 TABLET | Refills: 1 | Status: SHIPPED | OUTPATIENT
Start: 2020-12-28 | End: 2021-06-29

## 2020-12-28 RX ORDER — PANTOPRAZOLE SODIUM 40 MG/1
40 TABLET, DELAYED RELEASE ORAL 2 TIMES DAILY
Qty: 60 TABLET | Refills: 5 | Status: SHIPPED | OUTPATIENT
Start: 2020-12-28 | End: 2021-04-20 | Stop reason: SDUPTHER

## 2020-12-28 RX ORDER — PANTOPRAZOLE SODIUM 40 MG/1
TABLET, DELAYED RELEASE ORAL
Qty: 180 TABLET | Refills: 0 | OUTPATIENT
Start: 2020-12-28

## 2020-12-29 ENCOUNTER — LAB (OUTPATIENT)
Dept: LAB | Facility: HOSPITAL | Age: 80
End: 2020-12-29

## 2020-12-29 ENCOUNTER — INFUSION (OUTPATIENT)
Dept: ONCOLOGY | Facility: HOSPITAL | Age: 80
End: 2020-12-29

## 2020-12-29 DIAGNOSIS — D50.9 IRON DEFICIENCY ANEMIA, UNSPECIFIED IRON DEFICIENCY ANEMIA TYPE: ICD-10-CM

## 2020-12-29 DIAGNOSIS — N18.30 STAGE 3 CHRONIC KIDNEY DISEASE, UNSPECIFIED WHETHER STAGE 3A OR 3B CKD (HCC): Primary | ICD-10-CM

## 2020-12-29 DIAGNOSIS — N18.30 ANEMIA IN STAGE 3 CHRONIC KIDNEY DISEASE (HCC): ICD-10-CM

## 2020-12-29 DIAGNOSIS — D63.1 ANEMIA IN STAGE 3 CHRONIC KIDNEY DISEASE (HCC): ICD-10-CM

## 2020-12-29 DIAGNOSIS — D69.6 THROMBOCYTOPENIA (HCC): ICD-10-CM

## 2020-12-29 LAB
BASOPHILS # BLD AUTO: 0.02 10*3/MM3 (ref 0–0.2)
BASOPHILS NFR BLD AUTO: 0.4 % (ref 0–1.5)
DEPRECATED RDW RBC AUTO: 54.4 FL (ref 37–54)
EOSINOPHIL # BLD AUTO: 0.16 10*3/MM3 (ref 0–0.4)
EOSINOPHIL NFR BLD AUTO: 3.4 % (ref 0.3–6.2)
ERYTHROCYTE [DISTWIDTH] IN BLOOD BY AUTOMATED COUNT: 15.3 % (ref 12.3–15.4)
FERRITIN SERPL-MCNC: 328.7 NG/ML (ref 13–150)
HCT VFR BLD AUTO: 31.1 % (ref 34–46.6)
HGB BLD-MCNC: 9.6 G/DL (ref 12–15.9)
IMM GRANULOCYTES # BLD AUTO: 0.01 10*3/MM3 (ref 0–0.05)
IMM GRANULOCYTES NFR BLD AUTO: 0.2 % (ref 0–0.5)
IRON 24H UR-MRATE: 71 MCG/DL (ref 37–145)
IRON SATN MFR SERPL: 24 % (ref 14–48)
LYMPHOCYTES # BLD AUTO: 0.86 10*3/MM3 (ref 0.7–3.1)
LYMPHOCYTES NFR BLD AUTO: 18.1 % (ref 19.6–45.3)
MCH RBC QN AUTO: 30 PG (ref 26.6–33)
MCHC RBC AUTO-ENTMCNC: 30.9 G/DL (ref 31.5–35.7)
MCV RBC AUTO: 97.2 FL (ref 79–97)
MONOCYTES # BLD AUTO: 0.28 10*3/MM3 (ref 0.1–0.9)
MONOCYTES NFR BLD AUTO: 5.9 % (ref 5–12)
NEUTROPHILS NFR BLD AUTO: 3.42 10*3/MM3 (ref 1.7–7)
NEUTROPHILS NFR BLD AUTO: 72 % (ref 42.7–76)
NRBC BLD AUTO-RTO: 0 /100 WBC (ref 0–0.2)
PLATELET # BLD AUTO: 115 10*3/MM3 (ref 140–450)
PMV BLD AUTO: 9.8 FL (ref 6–12)
RBC # BLD AUTO: 3.2 10*6/MM3 (ref 3.77–5.28)
TIBC SERPL-MCNC: 301 MCG/DL (ref 249–505)
TRANSFERRIN SERPL-MCNC: 215 MG/DL (ref 200–360)
WBC # BLD AUTO: 4.75 10*3/MM3 (ref 3.4–10.8)

## 2020-12-29 PROCEDURE — 96372 THER/PROPH/DIAG INJ SC/IM: CPT

## 2020-12-29 PROCEDURE — 82728 ASSAY OF FERRITIN: CPT

## 2020-12-29 PROCEDURE — 25010000002 EPOETIN ALFA PER 1000 UNITS: Performed by: INTERNAL MEDICINE

## 2020-12-29 PROCEDURE — 83540 ASSAY OF IRON: CPT

## 2020-12-29 PROCEDURE — 85025 COMPLETE CBC W/AUTO DIFF WBC: CPT

## 2020-12-29 PROCEDURE — 36415 COLL VENOUS BLD VENIPUNCTURE: CPT

## 2020-12-29 PROCEDURE — 84466 ASSAY OF TRANSFERRIN: CPT

## 2020-12-29 RX ADMIN — ERYTHROPOIETIN 7000 UNITS: 20000 INJECTION, SOLUTION INTRAVENOUS; SUBCUTANEOUS at 12:56

## 2020-12-30 ENCOUNTER — OFFICE VISIT (OUTPATIENT)
Dept: CARDIOLOGY | Facility: CLINIC | Age: 80
End: 2020-12-30

## 2020-12-30 ENCOUNTER — ANTICOAGULATION VISIT (OUTPATIENT)
Dept: PHARMACY | Facility: HOSPITAL | Age: 80
End: 2020-12-30

## 2020-12-30 VITALS
BODY MASS INDEX: 27.31 KG/M2 | DIASTOLIC BLOOD PRESSURE: 80 MMHG | HEIGHT: 62 IN | HEART RATE: 60 BPM | WEIGHT: 148.4 LBS | SYSTOLIC BLOOD PRESSURE: 136 MMHG

## 2020-12-30 DIAGNOSIS — Z95.2 S/P MVR (MITRAL VALVE REPLACEMENT): ICD-10-CM

## 2020-12-30 DIAGNOSIS — I48.20 CHRONIC ATRIAL FIBRILLATION (HCC): ICD-10-CM

## 2020-12-30 DIAGNOSIS — I65.23 BILATERAL CAROTID ARTERY STENOSIS: ICD-10-CM

## 2020-12-30 DIAGNOSIS — I25.5 ISCHEMIC CARDIOMYOPATHY: ICD-10-CM

## 2020-12-30 DIAGNOSIS — I50.9 CONGESTIVE HEART FAILURE, UNSPECIFIED HF CHRONICITY, UNSPECIFIED HEART FAILURE TYPE (HCC): ICD-10-CM

## 2020-12-30 DIAGNOSIS — I47.20 VENTRICULAR TACHYCARDIA (HCC): Primary | ICD-10-CM

## 2020-12-30 DIAGNOSIS — I25.810 CORONARY ARTERY DISEASE INVOLVING CORONARY BYPASS GRAFT OF NATIVE HEART WITHOUT ANGINA PECTORIS: ICD-10-CM

## 2020-12-30 LAB
INR PPP: 1.9 (ref 0.91–1.09)
PROTHROMBIN TIME: 23.2 SECONDS (ref 10–13.8)

## 2020-12-30 PROCEDURE — 93000 ELECTROCARDIOGRAM COMPLETE: CPT | Performed by: INTERNAL MEDICINE

## 2020-12-30 PROCEDURE — 36416 COLLJ CAPILLARY BLOOD SPEC: CPT

## 2020-12-30 PROCEDURE — 85610 PROTHROMBIN TIME: CPT

## 2020-12-30 PROCEDURE — 99214 OFFICE O/P EST MOD 30 MIN: CPT | Performed by: INTERNAL MEDICINE

## 2020-12-30 NOTE — PROGRESS NOTES
Date of Office Visit: 20  Encounter Provider: Nik Galarza MD  Place of Service: [facility] CARDIOLOGY  Patient Name: Janel Mahoney  :1940  Referral Provider:No ref. provider found      Chief Complaint   Patient presents with   • Follow-up     History of Present Illness   The patient is a pleasant, 79-year-old white female with history of severe ischemic heart  disease, mild disease of the left anterior descending, angioplasty, and stent placement of  the right coronary artery. She also had premature ventricular contractions and severe  vascular disease, and left ventricular ejection fraction of 50%. In 2007, she had  an acute infarct. Catheterization showed a left ventricular ejection fraction of 35%. She  had occlusive disease of the right posterior left ventricular branch and no other  significant disease. She was treated medically. She had a transesophageal echocardiogram  that confirmed a left ventricular ejection fraction of 40% and just moderate mitral  insufficiency. She had atrial fibrillation and had electrocardioversion back to sinus  rhythm in May 2007 and then presented in atrial fibrillation and was admitted in 2007 and placed on Tikosyn. We titrated it up to 500 mcg daily but developed marked QT  prolongation even on 250 mcg twice daily. We then discussed ablation versus warfarin and  rate control. We opted for warfarin and rate control. We continued to have symptoms and  went to Dr. Harish Iverson in [place]. She had atrial fibrillation and flutter with  pulmonary vein isolation. She went back into atrial fibrillation and was started on  sotalol. She converted back to sinus rhythm. She then went back into atrial fibrillation.  Again, ultimately, she had a repeat catheterization that showed severe mitral  insufficiency, borderline coronary disease, and in 2010 had mitral valve  replacement with a #31 Epic porcine valve and  single bypass graft to the posterior  descending artery. She continued to have episodes of rapid atrial fibrillation and left  ventricular dysfunction. She underwent AV node ablation and upgrade of her device to a  bi-V.   She then presented in 09/2013 with complaints of a lot of fatigue, tiredness and weakness.   As part of the evaluation she had an echocardiogram which showed her left ventricular  ejection fraction to be 45%, moderate pulmonary hypertension at 62 mmHg.  A perfusion  stress test showed no evidence of ischemia and she had a sleep study which was positive so  she was started on CPAP.  She had some volume overload and did much better on twice a day diuretic.   She was in the hospital around August 2016 with multiple compression fractures of her back.    She then presented to the hospital in September 2017 with GI bleeding was found to have esophageal ulcers but her INR was also supratherapeutic.  There was treated resolved her hemoglobin stabilized.   She then was in the hospital end of November 2018 with a spontaneous hematoma of her right pectoral muscle and had to be surgically drained and didn't appear to be traumatic however about 2-3 weeks prior to that she had stumbled but did not hit her chest.   In early February 2019 her nephrologist had decreased ramipril and then stopped it altogether then had issues with increased lower extremity edema and shortness of breath with orthopnea and she was admitted to the hospital received IV Lasix and was switched from Lasix to Bumex and she improved.    In August 2019 she was noted to have episodes of ventricular tachycardia on ICD which were successfully treated with ATP.  There is one episode that the ATP failed to terminate, but it broke just before further therapy could be given. She was seen by our arrhythmia service that felt the only treatment would be amiodarone but that since she had responded antitachycardia pacing therapy to continue that.  She  was having recurrent episodes of ventricular tachycardia was seen by arrhythmia service again felt that she was asymptomatic to just follow that clinically.  She did start CPAP for sleep apnea.  She now comes in for follow-up.  She has been doing well does have some lower extremity edema and fatigue.  No chest pain or pressure.  No palpitations no near syncope or syncope.  States she does not have any shortness of breath but obviously she has lost some limit in her activity but this is been unchanged.    Coronary Artery Disease  Pertinent negatives include no dizziness, muscle weakness or weight gain. Her past medical history is significant for CHF and past myocardial infarction.   Atrial Fibrillation  Symptoms are negative for dizziness and weakness. Past medical history includes atrial fibrillation, AICD, CAD and CHF.   Congestive Heart Failure  Pertinent negatives include no abdominal pain, muscle weakness or nocturia. Her past medical history is significant for CAD.         Past Medical History:   Diagnosis Date   • Acute kidney injury (CMS/HCC)    • Anemia    • Atrial fibrillation (CMS/HCC)    • Bruises easily    • Carotid artery stenosis    • Chronic back pain    • Chronic combined systolic and diastolic congestive heart failure (CMS/HCC)    • Chronic coronary artery disease     moderate to severe LV dysfunction.   • Chronic kidney disease, stage 3    • Dysphagia    • GERD (gastroesophageal reflux disease)    • Gout    • H/O cardiac murmur    • Hematoma     LEFT LEG; DR ALONZO AWARE   • [place] (hard of hearing)     wears hearing aids   • Hyperlipidemia    • Hypertension    • Hypotension    • ICD (implantable cardioverter-defibrillator) in place    • Ischemic cardiomyopathy    • Kyphoscoliosis    • Leukopenia    • Lumbar spondylosis    • Obesity    • GEORGINA (obstructive sleep apnea) 12/01/2013    Overnight polysomnogram, weight 168 pounds.  AHI mildly abnormal at 10.3 events per hour.  Respiratory effort related  arousals 9.5 events per hour.  Total time snoring 30% and arousals associated with snoring 15 events per hour.   • Osteoarthritis    • Osteoporosis    • Peptic ulcer    • Premature ventricular contractions    • Renal insufficiency syndrome    • Scoliosis    • Shoulder fracture, left    • Stroke syndrome    • Thrombocytopenia (CMS/HCC)    • Urine incontinence    • Ventricular tachycardia (CMS/HCC)    • Vitamin B12 deficiency          Past Surgical History:   Procedure Laterality Date   • AV NODE ABLATION     • BREAST BIOPSY     • CARDIAC CATHETERIZATION      Showed severe mitral insufficiency and borderline coronary artery disease   • CARDIAC CATHETERIZATION      Showed an ejection fraction of 35%. She had occlusive disease of the right posterior LV branch and no other significant disease, treated medically.   • CARDIAC DEFIBRILLATOR PLACEMENT      Biventricular   • CARDIAC ELECTROPHYSIOLOGY PROCEDURE N/A 1/4/2019    Procedure: GENERATOR CHANGE BI-V ICD   [place];  Surgeon: James Hwang MD;  Location: [facility] CATH INVASIVE LOCATION;  Service: Cardiology   • CARDIAC VALVE REPLACEMENT  2009    Done with stent placement   • CARDIOVERSION      multiple electrocardioversions.   • CAROTID ARTERY ANGIOPLASTY Right    • COLONOSCOPY N/A 9/28/2017    Procedure: COLONOSCOPY TO CECUM;  Surgeon: Kevin Davis MD;  Location: [facility] ENDOSCOPY;  Service:    • CORONARY ANGIOPLASTY WITH STENT PLACEMENT  2009   • CORONARY ARTERY BYPASS GRAFT      single graft to the PDA   • CORONARY STENT PLACEMENT     • ENDOSCOPY N/A 9/28/2017    Procedure: ESOPHAGOGASTRODUODENOSCOPY ;  Surgeon: Kevin Davis MD;  Location: [facility] ENDOSCOPY;  Service:    • HEMORRHOIDECTOMY     • HYSTERECTOMY     • INCISION AND DRAINAGE TRUNK Right 11/27/2018    Procedure: EVACUATION OF RIGHT CHEST WALL HEMATOMA;  Surgeon: Juvencio Rodriguez MD;  Location: [facility] OR;  Service: General   • MITRAL VALVE REPLACEMENT  01/2010    #31 Epic porcine valve.   •  THROMBOENDARTERECTOMY Right     carotid thromboendarterectomy    • TONSILLECTOMY      age 32   • TOTAL KNEE ARTHROPLASTY Left    • TOTAL KNEE ARTHROPLASTY REVISION Left 11/19/2019    Procedure: TOTAL KNEE ARTHROPLASTY REVISION LEFT;  Surgeon: Juvencio Pressley II, MD;  Location: [facility];  Service: Orthopedics   • TOTAL SHOULDER ARTHROPLASTY W/ DISTAL CLAVICLE EXCISION Left 1/16/2018    Procedure: TOTAL SHOULDER REVERSE ARTHROPLASTY;  Surgeon: Bianka Quesada MD;  Location: [facility];  Service:          Current Outpatient Medications on File Prior to Visit   Medication Sig Dispense Refill   • alendronate (FOSAMAX) 70 MG tablet Take 70 mg by mouth Every 14 (Fourteen) Days.     • allopurinol (ZYLOPRIM) 100 MG tablet      • aspirin 81 MG tablet Take 81 mg by mouth Daily. PT CALLING TO SEE IF SHE HAS TO STOP PRIOR TO SURGERY     • bumetanide (BUMEX) 2 MG tablet TAKE 1 TABLET TWICE DAILY. DOSE CHANGED  180 tablet 1   • calcitriol (ROCALTROL) 0.25 MCG capsule Take 0.25 mcg by mouth 2 (Two) Times a Day.     • carvedilol (COREG) 25 MG tablet TAKE 1 TABLET TWICE DAILY 180 tablet 1   • Cholecalciferol (VITAMIN D) 2000 UNITS tablet Take 2,000 Units by mouth Daily.     • epoetin adele (EPOGEN,PROCRIT) 68983 UNIT/ML injection Inject 10,000 Units under the skin into the appropriate area as directed As Needed.     • ferrous sulfate 325 (65 FE) MG tablet Take 1 tablet by mouth Every Other Day.     • Multiple Vitamins-Minerals (SENIOR MULTIVITAMIN PLUS) tablet Take 1 tablet by mouth Daily.     • pantoprazole (PROTONIX) 40 MG EC tablet Take 1 tablet by mouth 2 (Two) Times a Day. 60 tablet 5   • ramipril (ALTACE) 5 MG capsule TAKE 1 CAPSULE EVERY DAY 90 capsule 1   • simvastatin (ZOCOR) 20 MG tablet TAKE 2 TABLETS EVERY NIGHT 180 tablet 1   • traMADol (ULTRAM) 50 MG tablet Take 2 tablets by mouth 2 (Two) Times a Day. 3 months 180 tablet 1   • vitamin B-12 (CYANOCOBALAMIN) 1000 MCG tablet Take 1,000 mcg by  mouth Daily.     • warfarin (COUMADIN) 1 MG tablet Take 1 mg by mouth Daily. DOSES VARY WEEKLY BASED ON INR  DR LAWRENCE INSTRUCTED TO STOP 5 DAYS PRIOR TO SURGERY     • zolpidem (Ambien) 10 MG tablet Take 1 tablet by mouth At Night As Needed for Sleep. 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     Employer: RETIRED   Tobacco Use   • Smoking status: Former Smoker     Packs/day: 1.00     Years: 50.00     Pack years: 50.00     Types: Cigarettes     Quit date: 2009     Years since quittin.9   • Smokeless tobacco: Never Used   • Tobacco comment: Daily caffeine - soda   Substance and Sexual Activity   • Alcohol use: Yes     Comment: rare   • Drug use: No   Lifestyle   • Physical activity     Days per week: 0 days     Minutes per session: 0 min   • Stress: Not on file       Family History   Problem Relation Age of Onset   • Cancer Mother    • Breast cancer Mother    • Heart disease Mother    • Hypertension Mother    • Stroke Mother    • Coronary artery disease Father    • Stroke Father    • Heart disease Father    • Hypertension Father    • Other Daughter         thiamin deficiency    • Hypertension Son    • Lung disease Other    • Hypertension Other    • Malig Hyperthermia Neg Hx          Review of Systems   Constitution: Positive for malaise/fatigue. Negative for decreased appetite, diaphoresis, fever, weight gain and weight loss.   HENT: Negative for congestion, hearing loss, nosebleeds and  "tinnitus.    Eyes: Negative for blurred vision, double vision, vision loss in left eye, vision loss in right eye and visual disturbance.   Cardiovascular:        As noted in HPI   Respiratory:        As noted HPI   Endocrine: Negative for cold intolerance and heat intolerance.   Hematologic/Lymphatic: Negative for bleeding problem. Does not bruise/bleed easily.   Skin: Negative for color change, flushing, itching and rash.   Musculoskeletal: Negative for arthritis, back pain, joint pain, joint swelling, muscle weakness and myalgias.   Gastrointestinal: Negative for bloating, abdominal pain, constipation, diarrhea, dysphagia, heartburn, hematemesis, hematochezia, melena, nausea and vomiting.   Genitourinary: Negative for bladder incontinence, dysuria, frequency, nocturia and urgency.   Neurological: Negative for dizziness, focal weakness, headaches, light-headedness, loss of balance, numbness, paresthesias, vertigo and weakness.   Psychiatric/Behavioral: Negative for depression, memory loss and substance abuse.       Procedures      ECG 12 Lead    Date/Time: 12/30/2020 12:05 PM  Performed by: Nik Galarza MD  Authorized by: Nik Galarza MD   Comparison: compared with previous ECG   Similar to previous ECG  Rhythm: atrial fibrillation  Rhythm comments: Ventricular paced.                  Objective:    /80 (BP Location: Right arm)   Pulse 60   Ht 157.5 cm (62\")   Wt 67.3 kg (148 lb 6.4 oz)   BMI 27.14 kg/m²        Physical Exam  Vitals signs reviewed.   Constitutional:       General: Not in acute distress.     Appearance: Well-developed. Not diaphoretic.   Eyes:      General: No scleral icterus.     Conjunctiva/sclera: Conjunctivae normal.      Pupils: Pupils are equal, round, and reactive to light.   HENT:      Head: Normocephalic.   Neck:      Musculoskeletal: No edema or erythema.      Thyroid: No thyromegaly.      Vascular: Normal carotid pulses. No carotid bruit, hepatojugular reflux or JVD. "      Trachea: No tracheal deviation.   Pulmonary:      Effort: Pulmonary effort is normal. No respiratory distress.      Breath sounds: Normal breath sounds. No decreased breath sounds. No wheezing. No rhonchi. No rales.   Chest:      Chest wall: Not tender to palpatation.   Cardiovascular:      PMI at left midclavicular line. Normal rate. Regular rhythm. S1 with normal intensity. S2 with normal intensity.      Murmurs: There is a grade 2/6 systolic murmur at the URSB.      No gallop. No click. No rub.   Pulses:     Carotid: 2+ with bruit bilaterally.     Radial: 2+ bilaterally.     Femoral: 2+ bilaterally.     Dorsalis pedis: 1+ bilaterally.     Posterior tibial: 1+ bilaterally.  Edema:     Peripheral edema absent.   Abdominal:      General: Bowel sounds are normal. There is no distension.      Palpations: Abdomen is soft. There is no abdominal mass.      Tenderness: There is no abdominal tenderness. There is no guarding or rebound.   Musculoskeletal:         General: No tenderness or deformity.      Extremities: No clubbing present.  Skin:     General: Skin is warm and dry. There is no cyanosis.      Coloration: Skin is not pale.      Findings: No erythema or rash.   Neurological:      Mental Status: Alert and oriented to person, place, and time.   Psychiatric:         Speech: Speech normal.         Behavior: Behavior normal.         Thought Content: Thought content normal.         Judgment: Judgment normal.             Assessment:   1. This is a 79 -year-old female with history of severe ischemic heart disease, previous angioplasty of the right coronary artery, and then inferior infarct in February 2007.  Ultimately, she had single-vessel bypass grafting to the posterior descending artery at the time of her mitral valve replacement, moderate left ventricular dysfunction.  Echocardiogram in January 2019 reported ejection fraction of approximately 40%.  Now doing well.  We will continue the same she will see us in  6 months.  2. History of mitral insufficiency status post mitral valve replacement with a tissue valve as above. Echocardiogram today showed left ventricular ejection fraction of approximate 40% with segmental wall motion abnormality. Normal functioning prosthetic mitral valve moderate tricuspid insufficiency with moderate pulmonary hypertension.  Echocardiogram January 2019 apparently unremarkable.  Consider follow-up echocardiogram when she returns in six months  3. Atrial fibrillation/sick sinus syndrome. Failed multiple cardioversions, multiple medications. Failed pulmonary vein isolation. Now status post AV node ablation and BiV  Defibrillator.The patient's CHADS2-VASc score is 6.  Stable on warfarin.  4. Cerebral vascular disease status post carotid endarterectomy.  Follow up carotid ultrasound 12/19 shows moderate left common carotid stenosis and moderate right internal carotid artery stenosis relatively unchanged.    She will have a repeat carotid ultrasound.  5. Hypertension. Blood pressure adequately controlled.  6. Renal insufficiency. Followed by nephrology, Dr. Romero.  7. Hyperlipidemia, on therapy. Followed by PCP.  8. Nonsustained ventricular tachycardia.   9. Pulmonary hypertension. This is most likely secondary to sleep apnea and atrial fibrillation.   10. Sleep Apnea. Now on CPAP.    11.  Anemia.       Plan:

## 2021-01-08 ENCOUNTER — HOSPITAL ENCOUNTER (OUTPATIENT)
Dept: CARDIOLOGY | Facility: HOSPITAL | Age: 81
Discharge: HOME | End: 2021-01-08
Admitting: INTERNAL MEDICINE

## 2021-01-08 PROCEDURE — 93880 EXTRACRANIAL BILAT STUDY: CPT | Performed by: INTERNAL MEDICINE

## 2021-01-08 PROCEDURE — 93880 EXTRACRANIAL BILAT STUDY: CPT

## 2021-01-10 LAB
BH CV XLRA MEAS LEFT DIST CCA EDV: 27 CM/SEC
BH CV XLRA MEAS LEFT DIST CCA PSV: 102.4 CM/SEC
BH CV XLRA MEAS LEFT DIST ICA EDV: -22.9 CM/SEC
BH CV XLRA MEAS LEFT DIST ICA PSV: -102.4 CM/SEC
BH CV XLRA MEAS LEFT ICA/CCA RATIO: 1.3
BH CV XLRA MEAS LEFT MID CCA EDV: 24.4 CM/SEC
BH CV XLRA MEAS LEFT MID CCA PSV: 135.4 CM/SEC
BH CV XLRA MEAS LEFT MID ICA EDV: -36.4 CM/SEC
BH CV XLRA MEAS LEFT MID ICA PSV: -128.2 CM/SEC
BH CV XLRA MEAS LEFT PROX CCA EDV: 26.8 CM/SEC
BH CV XLRA MEAS LEFT PROX CCA PSV: 106.1 CM/SEC
BH CV XLRA MEAS LEFT PROX ECA PSV: -244.3 CM/SEC
BH CV XLRA MEAS LEFT PROX ICA EDV: -34.7 CM/SEC
BH CV XLRA MEAS LEFT PROX ICA PSV: -142.1 CM/SEC
BH CV XLRA MEAS LEFT PROX SCLA PSV: 253.4 CM/SEC
BH CV XLRA MEAS LEFT VERTEBRAL A PSV: 56.1 CM/SEC
BH CV XLRA MEAS RIGHT DIST CCA EDV: 14.3 CM/SEC
BH CV XLRA MEAS RIGHT DIST CCA PSV: 73.9 CM/SEC
BH CV XLRA MEAS RIGHT DIST ICA EDV: -26.1 CM/SEC
BH CV XLRA MEAS RIGHT DIST ICA PSV: -105.6 CM/SEC
BH CV XLRA MEAS RIGHT ICA/CCA RATIO: 3
BH CV XLRA MEAS RIGHT MID CCA EDV: 16.2 CM/SEC
BH CV XLRA MEAS RIGHT MID CCA PSV: 85.7 CM/SEC
BH CV XLRA MEAS RIGHT MID ICA EDV: -28 CM/SEC
BH CV XLRA MEAS RIGHT MID ICA PSV: -169.5 CM/SEC
BH CV XLRA MEAS RIGHT PROX CCA EDV: 11.2 CM/SEC
BH CV XLRA MEAS RIGHT PROX CCA PSV: 73.9 CM/SEC
BH CV XLRA MEAS RIGHT PROX ECA PSV: -153.7 CM/SEC
BH CV XLRA MEAS RIGHT PROX ICA EDV: 35.7 CM/SEC
BH CV XLRA MEAS RIGHT PROX ICA PSV: 226.4 CM/SEC
BH CV XLRA MEAS RIGHT PROX SCLA PSV: 135.3 CM/SEC
BH CV XLRA MEAS RIGHT VERTEBRAL A PSV: -57.6 CM/SEC

## 2021-01-20 ENCOUNTER — ANTICOAGULATION VISIT (OUTPATIENT)
Dept: PHARMACY | Facility: HOSPITAL | Age: 81
End: 2021-01-20

## 2021-01-20 DIAGNOSIS — I48.20 CHRONIC ATRIAL FIBRILLATION (HCC): ICD-10-CM

## 2021-01-20 LAB
INR PPP: 2.8 (ref 0.91–1.09)
PROTHROMBIN TIME: 33.4 SECONDS (ref 10–13.8)

## 2021-01-20 PROCEDURE — 85610 PROTHROMBIN TIME: CPT

## 2021-01-20 PROCEDURE — 36416 COLLJ CAPILLARY BLOOD SPEC: CPT

## 2021-01-20 NOTE — PROGRESS NOTES
Anticoagulation Clinic Progress Note    Anticoagulation Summary  As of 2021    INR goal:  2.0-3.0   TTR:  60.6 % (2.2 y)   INR used for dosin.8 (2021)   Warfarin maintenance plan:  1 mg every Mon, Wed, Fri; 2 mg all other days   Weekly warfarin total:  11 mg   No change documented:  Lee Ann Diamond   Plan last modified:  Shanna Major RPH (10/27/2020)   Next INR check:  2/3/2021   Priority:  Maintenance   Target end date:  Indefinite    Indications    Chronic atrial fibrillation (CMS/HCC) [I48.20]             Anticoagulation Episode Summary     INR check location:      Preferred lab:      Send INR reminders to:   [facility]    Comments:        Anticoagulation Care Providers     Provider Role Specialty Phone number    Nik Galarza MD Referring Cardiology [phone]          Clinic Interview:  Patient Findings     Positives:  Change in medications    Negatives:  Signs/symptoms of thrombosis, Signs/symptoms of bleeding,   Laboratory test error suspected, Change in health, Change in alcohol use,   Change in activity, Upcoming invasive procedure, Emergency department   visit, Upcoming dental procedure, Missed doses, Extra doses, Change in   diet/appetite, Hospital admission, Bruising, Other complaints      Clinical Outcomes     Negatives:  Major bleeding event, Thromboembolic event,   Anticoagulation-related hospital admission, Anticoagulation-related ED   visit, Anticoagulation-related fatality        INR History:  Anticoagulation Monitoring 2020   INR 2.2 1.9 2.8   INR Date 2020   INR Goal 2.0-3.0 2.0-3.0 2.0-3.0   Trend Same Same Same   Last Week Total 11 mg 11 mg 11 mg   Next Week Total 11 mg 11 mg 11 mg   Sun 2 mg 2 mg 2 mg   Mon 1 mg 1 mg 1 mg   Tue 2 mg 2 mg 2 mg   Wed 1 mg 1 mg 1 mg   Thu 2 mg 2 mg 2 mg   Fri 1 mg 1 mg 1 mg   Sat 2 mg 2 mg 2 mg   Visit Report - - -   Some recent data might be hidden        Plan:  1. INR is therapeutic today- see above in Anticoagulation Summary.   Will instruct Janel Mahoney to continue their warfarin regimen- see above in Anticoagulation Summary.  2. Follow up in 2 weeks.  3. Patient declines warfarin refills.  4. Verbal and written information provided. Patient expresses understanding and has no further questions at this time.    Lee Ann Diamond

## 2021-01-26 ENCOUNTER — OFFICE VISIT (OUTPATIENT)
Dept: ONCOLOGY | Facility: CLINIC | Age: 81
End: 2021-01-26

## 2021-01-26 ENCOUNTER — LAB (OUTPATIENT)
Dept: LAB | Facility: HOSPITAL | Age: 81
End: 2021-01-26

## 2021-01-26 ENCOUNTER — APPOINTMENT (OUTPATIENT)
Dept: ONCOLOGY | Facility: HOSPITAL | Age: 81
End: 2021-01-26

## 2021-01-26 VITALS
SYSTOLIC BLOOD PRESSURE: 95 MMHG | HEIGHT: 62 IN | OXYGEN SATURATION: 96 % | DIASTOLIC BLOOD PRESSURE: 77 MMHG | RESPIRATION RATE: 16 BRPM | HEART RATE: 91 BPM | BODY MASS INDEX: 27.03 KG/M2 | TEMPERATURE: 97.5 F | WEIGHT: 146.9 LBS

## 2021-01-26 DIAGNOSIS — N18.30 ANEMIA IN STAGE 3 CHRONIC KIDNEY DISEASE (HCC): ICD-10-CM

## 2021-01-26 DIAGNOSIS — D69.6 THROMBOCYTOPENIA (HCC): ICD-10-CM

## 2021-01-26 DIAGNOSIS — D63.1 ANEMIA IN STAGE 3 CHRONIC KIDNEY DISEASE (HCC): ICD-10-CM

## 2021-01-26 DIAGNOSIS — D50.9 IRON DEFICIENCY ANEMIA, UNSPECIFIED IRON DEFICIENCY ANEMIA TYPE: ICD-10-CM

## 2021-01-26 DIAGNOSIS — Z79.01 CHRONIC ANTICOAGULATION: ICD-10-CM

## 2021-01-26 DIAGNOSIS — D63.1 ANEMIA IN STAGE 3A CHRONIC KIDNEY DISEASE (HCC): Primary | ICD-10-CM

## 2021-01-26 DIAGNOSIS — N18.31 ANEMIA IN STAGE 3A CHRONIC KIDNEY DISEASE (HCC): Primary | ICD-10-CM

## 2021-01-26 LAB
BASOPHILS # BLD AUTO: 0.03 10*3/MM3 (ref 0–0.2)
BASOPHILS NFR BLD AUTO: 0.6 % (ref 0–1.5)
DEPRECATED RDW RBC AUTO: 51.7 FL (ref 37–54)
EOSINOPHIL # BLD AUTO: 0.11 10*3/MM3 (ref 0–0.4)
EOSINOPHIL NFR BLD AUTO: 2.3 % (ref 0.3–6.2)
ERYTHROCYTE [DISTWIDTH] IN BLOOD BY AUTOMATED COUNT: 15 % (ref 12.3–15.4)
HCT VFR BLD AUTO: 30.3 % (ref 34–46.6)
HGB BLD-MCNC: 10 G/DL (ref 12–15.9)
IMM GRANULOCYTES # BLD AUTO: 0.04 10*3/MM3 (ref 0–0.05)
IMM GRANULOCYTES NFR BLD AUTO: 0.8 % (ref 0–0.5)
LYMPHOCYTES # BLD AUTO: 0.74 10*3/MM3 (ref 0.7–3.1)
LYMPHOCYTES NFR BLD AUTO: 15.4 % (ref 19.6–45.3)
MCH RBC QN AUTO: 31 PG (ref 26.6–33)
MCHC RBC AUTO-ENTMCNC: 33 G/DL (ref 31.5–35.7)
MCV RBC AUTO: 93.8 FL (ref 79–97)
MONOCYTES # BLD AUTO: 0.32 10*3/MM3 (ref 0.1–0.9)
MONOCYTES NFR BLD AUTO: 6.7 % (ref 5–12)
NEUTROPHILS NFR BLD AUTO: 3.56 10*3/MM3 (ref 1.7–7)
NEUTROPHILS NFR BLD AUTO: 74.2 % (ref 42.7–76)
NRBC BLD AUTO-RTO: 0 /100 WBC (ref 0–0.2)
PLATELET # BLD AUTO: 115 10*3/MM3 (ref 140–450)
PMV BLD AUTO: 10.8 FL (ref 6–12)
RBC # BLD AUTO: 3.23 10*6/MM3 (ref 3.77–5.28)
WBC # BLD AUTO: 4.8 10*3/MM3 (ref 3.4–10.8)

## 2021-01-26 PROCEDURE — 36415 COLL VENOUS BLD VENIPUNCTURE: CPT

## 2021-01-26 PROCEDURE — 85025 COMPLETE CBC W/AUTO DIFF WBC: CPT

## 2021-01-26 PROCEDURE — 99214 OFFICE O/P EST MOD 30 MIN: CPT | Performed by: INTERNAL MEDICINE

## 2021-01-26 NOTE — PROGRESS NOTES
Subjective     CHIEF COMPLAINT:      Chief Complaint   Patient presents with   • Follow-up     no concerns       HISTORY OF PRESENT ILLNESS:     Janel Mahoney is a 80 y.o. female patient who returns today for follow up on her anemia.  She is on ferrous sulfate 325 mg every other day.  She is on Procrit monthly.  Last dose was on 12/29/2020 when hemoglobin was 9.6.  She denies fatigue.  Her main complaint today is back pain.  She has not been able to have an injection to the back as she was not able to come off Coumadin for 5 days (cardiologist only okayed stopping Coumadin for 3 days).    Patient has easy bruising especially over the upper and lower extremities.  No problem with bleeding.    Past medical, surgical, social and family history reviewed.     MEDICATIONS:    Current Outpatient Medications:   •  alendronate (FOSAMAX) 70 MG tablet, Take 70 mg by mouth Every 14 (Fourteen) Days., Disp: , Rfl:   •  allopurinol (ZYLOPRIM) 100 MG tablet, , Disp: , Rfl:   •  aspirin 81 MG tablet, Take 81 mg by mouth Daily. PT CALLING TO SEE IF SHE HAS TO STOP PRIOR TO SURGERY, Disp: , Rfl:   •  bumetanide (BUMEX) 2 MG tablet, TAKE 1 TABLET TWICE DAILY. DOSE CHANGED , Disp: 180 tablet, Rfl: 1  •  calcitriol (ROCALTROL) 0.25 MCG capsule, Take 0.25 mcg by mouth 2 (Two) Times a Day., Disp: , Rfl:   •  carvedilol (COREG) 25 MG tablet, TAKE 1 TABLET TWICE DAILY, Disp: 180 tablet, Rfl: 1  •  Cholecalciferol (VITAMIN D) 2000 UNITS tablet, Take 2,000 Units by mouth Daily., Disp: , Rfl:   •  epoetin adele (EPOGEN,PROCRIT) 36352 UNIT/ML injection, Inject 10,000 Units under the skin into the appropriate area as directed As Needed., Disp: , Rfl:   •  ferrous sulfate 325 (65 FE) MG tablet, Take 1 tablet by mouth Every Other Day., Disp: , Rfl:   •  Multiple Vitamins-Minerals (SENIOR MULTIVITAMIN PLUS) tablet, Take 1 tablet by mouth Daily., Disp: , Rfl:   •  pantoprazole (PROTONIX) 40 MG EC tablet, Take 1 tablet by mouth 2 (Two) Times a Day.,  "Disp: 60 tablet, Rfl: 5  •  ramipril (ALTACE) 5 MG capsule, TAKE 1 CAPSULE EVERY DAY, Disp: 90 capsule, Rfl: 1  •  simvastatin (ZOCOR) 20 MG tablet, TAKE 2 TABLETS EVERY NIGHT, Disp: 180 tablet, Rfl: 1  •  traMADol (ULTRAM) 50 MG tablet, Take 2 tablets by mouth 2 (Two) Times a Day. 3 months, Disp: 180 tablet, Rfl: 1  •  vitamin B-12 (CYANOCOBALAMIN) 1000 MCG tablet, Take 1,000 mcg by mouth Daily., Disp: , Rfl:   •  warfarin (COUMADIN) 1 MG tablet, Take 1 mg by mouth Daily. DOSES VARY WEEKLY BASED ON INR DR LAWRENCE INSTRUCTED TO STOP 5 DAYS PRIOR TO SURGERY, Disp: , Rfl:   •  zolpidem (Ambien) 10 MG tablet, Take 1 tablet by mouth At Night As Needed for Sleep., Disp: 90 tablet, Rfl: 1    Objective   VITAL SIGNS:     Vitals:    01/26/21 1321   BP: 95/77   Pulse: 91   Resp: 16   Temp: 97.5 °F (36.4 °C)   TempSrc: Temporal   SpO2: 96%   Weight: 66.6 kg (146 lb 14.4 oz)   Height: 157.5 cm (62.01\")   PainSc:   7   PainLoc: Back  Comment: pressure     Body mass index is 26.86 kg/m².     Wt Readings from Last 5 Encounters:   01/26/21 66.6 kg (146 lb 14.4 oz)   12/30/20 67.3 kg (148 lb 6.4 oz)   12/11/20 67.6 kg (149 lb)   09/28/20 65.8 kg (145 lb)   08/11/20 69 kg (152 lb 1.6 oz)       PHYSICAL EXAMINATION:   GENERAL: The patient appears in fair general condition, not in acute distress.   SKIN: Old ecchymosis over the upper and lower extremities.  EYES: No jaundice.  LYMPHATICS: No cervical lymphadenopathy.  CHEST: Lungs clear to auscultation.   CARDIAC: Normal S1 & S2.  ABDOMEN: Nondistended.  EXTREMITIES: No edema.  No erythema or warmth of the lower extremities.      DIAGNOSTIC DATA:     Results from last 7 days   Lab Units 01/26/21  1304   WBC 10*3/mm3 4.80   NEUTROS ABS 10*3/mm3 3.56   HEMOGLOBIN g/dL 10.0*   HEMATOCRIT % 30.3*   PLATELETS 10*3/mm3 115*         Results from last 7 days   Lab Units 01/20/21  1449   INR  2.8*     Component      Latest Ref Rng & Units 12/29/2020   Iron      37 - 145 mcg/dL 71   Iron " Saturation      14 - 48 % 24   Transferrin      200 - 360 mg/dL 215   TIBC      249 - 505 mcg/dL 301   Ferritin      13.00 - 150.00 ng/mL 328.70 (H)       Assessment/Plan   1.  Anemia of chronic kidney disease, stage III.    · Patient is on Procrit monthly.    · Last dose was 7000 units on 12/29/2020 for a hemoglobin of 9.6.  · Hemoglobin improved to 10.0 today.  · Patient denies having fatigue today.  Iron stores are adequate based on the levels from 12/29/2020.  Patient is on ferrous sulfate 325 mg every other day.  She is tolerating this dose.    2.  Thrombocytopenia attributed to B12 deficiency.  Platelet count is in the 110,000-140,000 range.  Platelets are today at 115,000.  Patient is having bruising but this is attributed to the anticoagulation effect of Coumadin.    3.  Chronic anticoagulation with Coumadin. This is being managed by her cardiologist and the warfarin clinic.  INR is today at 2.8.  She is having bruising of the upper lower extremities.    PLAN:    1.  Patient does not need Procrit injection today.  2.  Continue ferrous sulfate 325 mg every other day.  3.  We will schedule patient return monthly for CBC and Procrit injection if hemoglobin is <10.0.  4.  Monitor iron studies every 3 months (ferritin iron panel in 2 and 5 months).  5.  Follow-up in 6 months with a CBC and possible Procrit.        Edward Vasquez MD  01/26/21

## 2021-01-27 RX ORDER — WARFARIN SODIUM 1 MG/1
1 TABLET ORAL DAILY
Qty: 90 TABLET | Refills: 1 | Status: SHIPPED | OUTPATIENT
Start: 2021-01-27 | End: 2021-01-27 | Stop reason: SDUPTHER

## 2021-01-27 RX ORDER — WARFARIN SODIUM 1 MG/1
1 TABLET ORAL DAILY
Qty: 90 TABLET | Refills: 1 | Status: SHIPPED | OUTPATIENT
Start: 2021-01-27 | End: 2021-02-09 | Stop reason: SDUPTHER

## 2021-01-27 RX ORDER — BUMETANIDE 2 MG/1
TABLET ORAL
Qty: 180 TABLET | Refills: 1 | Status: SHIPPED | OUTPATIENT
Start: 2021-01-27 | End: 2021-06-16

## 2021-02-03 ENCOUNTER — RESEARCH ENCOUNTER (OUTPATIENT)
Dept: CARDIOLOGY | Facility: CLINIC | Age: 81
End: 2021-02-03

## 2021-02-03 ENCOUNTER — ANTICOAGULATION VISIT (OUTPATIENT)
Dept: PHARMACY | Facility: HOSPITAL | Age: 81
End: 2021-02-03

## 2021-02-03 DIAGNOSIS — I48.20 CHRONIC ATRIAL FIBRILLATION (HCC): ICD-10-CM

## 2021-02-03 LAB
INR PPP: 2.1 (ref 0.91–1.09)
PROTHROMBIN TIME: 25.1 SECONDS (ref 10–13.8)

## 2021-02-03 PROCEDURE — 36416 COLLJ CAPILLARY BLOOD SPEC: CPT

## 2021-02-03 PROCEDURE — 85610 PROTHROMBIN TIME: CPT

## 2021-02-03 NOTE — PROGRESS NOTES
Anticoagulation Clinic Progress Note    Anticoagulation Summary  As of 2/3/2021    INR goal:  2.0-3.0   TTR:  61.2 % (2.2 y)   INR used for dosin.1 (2/3/2021)   Warfarin maintenance plan:  1 mg every Mon, Wed, Fri; 2 mg all other days   Weekly warfarin total:  11 mg   No change documented:  Lee Ann Diamond   Plan last modified:  Shanna Major RPH (10/27/2020)   Next INR check:  3/3/2021   Priority:  Maintenance   Target end date:  Indefinite    Indications    Chronic atrial fibrillation (CMS/HCC) [I48.20]             Anticoagulation Episode Summary     INR check location:      Preferred lab:      Send INR reminders to:   [facility]    Comments:        Anticoagulation Care Providers     Provider Role Specialty Phone number    Nik Galarza MD Referring Cardiology [phone]          Clinic Interview:  Patient Findings     Negatives:  Signs/symptoms of thrombosis, Signs/symptoms of bleeding,   Laboratory test error suspected, Change in health, Change in alcohol use,   Change in activity, Upcoming invasive procedure, Emergency department   visit, Upcoming dental procedure, Missed doses, Extra doses, Change in   medications, Change in diet/appetite, Hospital admission, Bruising, Other   complaints      Clinical Outcomes     Negatives:  Major bleeding event, Thromboembolic event,   Anticoagulation-related hospital admission, Anticoagulation-related ED   visit, Anticoagulation-related fatality        INR History:  Anticoagulation Monitoring 2020 2021 2/3/2021   INR 1.9 2.8 2.1   INR Date 2020 2021 2/3/2021   INR Goal 2.0-3.0 2.0-3.0 2.0-3.0   Trend Same Same Same   Last Week Total 11 mg 11 mg 11 mg   Next Week Total 11 mg 11 mg 11 mg   Sun 2 mg 2 mg 2 mg   Mon 1 mg 1 mg 1 mg   Tue 2 mg 2 mg 2 mg   Wed 1 mg 1 mg 1 mg   Thu 2 mg 2 mg 2 mg   Fri 1 mg 1 mg 1 mg   Sat 2 mg 2 mg 2 mg   Visit Report - - -   Some recent data might be hidden       Plan:  1. INR is  therapeutic today- see above in Anticoagulation Summary.   Will instruct Janel DORINA Mahoney to continue their warfarin regimen- see above in Anticoagulation Summary.  2. Follow up in 4 weeks.  3. Patient declines warfarin refills.  4. Verbal and written information provided. Patient expresses understanding and has no further questions at this time.    Lee Ann Diamond

## 2021-02-03 NOTE — RESEARCH
Research study: [employer] PSR  Subject initials: MARYCHUY  Subject study ID#: [id number]     MARYCHUY came to the [facility] office on 12- to see Dr Galarza and had routine remote interrogation of her CRT-D device on 11-. Today I entered data from those visits in the [employer] study database. There were no device or lead events to report, nor any qualifying health events to report. We will continue to follow MARYCHUY per study protocol, which is approximately every 6 months for CRT devices.      Tiffany CALLESN RN  CV Research Coordinator

## 2021-02-09 ENCOUNTER — TELEPHONE (OUTPATIENT)
Dept: FAMILY MEDICINE CLINIC | Facility: CLINIC | Age: 81
End: 2021-02-09

## 2021-02-09 RX ORDER — WARFARIN SODIUM 1 MG/1
1 TABLET ORAL DAILY
Qty: 90 TABLET | Refills: 1 | Status: SHIPPED | OUTPATIENT
Start: 2021-02-09 | End: 2021-04-13 | Stop reason: SDUPTHER

## 2021-02-09 RX ORDER — WARFARIN SODIUM 1 MG/1
1 TABLET ORAL DAILY
Qty: 90 TABLET | Refills: 1 | Status: CANCELLED | OUTPATIENT
Start: 2021-02-09

## 2021-02-09 RX ORDER — WARFARIN SODIUM 1 MG/1
1 TABLET ORAL DAILY
Qty: 90 TABLET | Refills: 1 | Status: SHIPPED | OUTPATIENT
Start: 2021-02-09 | End: 2021-02-09 | Stop reason: SDUPTHER

## 2021-02-09 RX ORDER — TRAMADOL HYDROCHLORIDE 50 MG/1
100 TABLET ORAL 2 TIMES DAILY
Qty: 180 TABLET | Refills: 1 | Status: SHIPPED | OUTPATIENT
Start: 2021-02-09 | End: 2021-07-29 | Stop reason: SDUPTHER

## 2021-02-09 NOTE — TELEPHONE ENCOUNTER
Caller: Janel Mahoney    Relationship: Self    Best call back number: [phone]    Medication needed:   Requested Prescriptions     Pending Prescriptions Disp Refills   • warfarin (COUMADIN) 1 MG tablet 90 tablet 1     Sig: Take 1 tablet by mouth Daily. DOSES VARY WEEKLY BASED ON INR  DR LAWRENCE INSTRUCTED TO STOP 5 DAYS PRIOR TO SURGERY       When do you need the refill by: 02/11/2021    What details did the patient provide when requesting the medication: ONE WEEK LEFT    Does the patient have less than a 3 day supply:  [] Yes  [x] No    What is the patient's preferred pharmacy: [facility] - [facility] [address] - [phone]  - [phone] FX

## 2021-02-18 ENCOUNTER — HOSPITAL ENCOUNTER (OUTPATIENT)
Dept: MAMMOGRAPHY | Facility: HOSPITAL | Age: 81
Discharge: HOME | End: 2021-02-18
Admitting: INTERNAL MEDICINE

## 2021-02-18 DIAGNOSIS — Z12.31 SCREENING MAMMOGRAM, ENCOUNTER FOR: ICD-10-CM

## 2021-02-18 PROCEDURE — 77063 BREAST TOMOSYNTHESIS BI: CPT

## 2021-02-18 PROCEDURE — 77067 SCR MAMMO BI INCL CAD: CPT

## 2021-02-23 ENCOUNTER — LAB (OUTPATIENT)
Dept: LAB | Facility: HOSPITAL | Age: 81
End: 2021-02-23

## 2021-02-23 ENCOUNTER — INFUSION (OUTPATIENT)
Dept: ONCOLOGY | Facility: HOSPITAL | Age: 81
End: 2021-02-23

## 2021-02-23 DIAGNOSIS — D63.1 ANEMIA IN STAGE 3A CHRONIC KIDNEY DISEASE (HCC): ICD-10-CM

## 2021-02-23 DIAGNOSIS — D50.9 IRON DEFICIENCY ANEMIA, UNSPECIFIED IRON DEFICIENCY ANEMIA TYPE: ICD-10-CM

## 2021-02-23 DIAGNOSIS — N18.30 STAGE 3 CHRONIC KIDNEY DISEASE, UNSPECIFIED WHETHER STAGE 3A OR 3B CKD (HCC): Primary | ICD-10-CM

## 2021-02-23 DIAGNOSIS — N18.31 ANEMIA IN STAGE 3A CHRONIC KIDNEY DISEASE (HCC): ICD-10-CM

## 2021-02-23 DIAGNOSIS — D69.6 THROMBOCYTOPENIA (HCC): ICD-10-CM

## 2021-02-23 LAB
BASOPHILS # BLD AUTO: 0.02 10*3/MM3 (ref 0–0.2)
BASOPHILS NFR BLD AUTO: 0.4 % (ref 0–1.5)
DEPRECATED RDW RBC AUTO: 52.6 FL (ref 37–54)
EOSINOPHIL # BLD AUTO: 0.19 10*3/MM3 (ref 0–0.4)
EOSINOPHIL NFR BLD AUTO: 3.5 % (ref 0.3–6.2)
ERYTHROCYTE [DISTWIDTH] IN BLOOD BY AUTOMATED COUNT: 15.2 % (ref 12.3–15.4)
HCT VFR BLD AUTO: 29.8 % (ref 34–46.6)
HGB BLD-MCNC: 9.7 G/DL (ref 12–15.9)
IMM GRANULOCYTES # BLD AUTO: 0.07 10*3/MM3 (ref 0–0.05)
IMM GRANULOCYTES NFR BLD AUTO: 1.3 % (ref 0–0.5)
LYMPHOCYTES # BLD AUTO: 0.89 10*3/MM3 (ref 0.7–3.1)
LYMPHOCYTES NFR BLD AUTO: 16.3 % (ref 19.6–45.3)
MCH RBC QN AUTO: 31 PG (ref 26.6–33)
MCHC RBC AUTO-ENTMCNC: 32.6 G/DL (ref 31.5–35.7)
MCV RBC AUTO: 95.2 FL (ref 79–97)
MONOCYTES # BLD AUTO: 0.39 10*3/MM3 (ref 0.1–0.9)
MONOCYTES NFR BLD AUTO: 7.1 % (ref 5–12)
NEUTROPHILS NFR BLD AUTO: 3.91 10*3/MM3 (ref 1.7–7)
NEUTROPHILS NFR BLD AUTO: 71.4 % (ref 42.7–76)
NRBC BLD AUTO-RTO: 0 /100 WBC (ref 0–0.2)
PLATELET # BLD AUTO: 133 10*3/MM3 (ref 140–450)
PMV BLD AUTO: 10.6 FL (ref 6–12)
RBC # BLD AUTO: 3.13 10*6/MM3 (ref 3.77–5.28)
WBC # BLD AUTO: 5.47 10*3/MM3 (ref 3.4–10.8)

## 2021-02-23 PROCEDURE — 36415 COLL VENOUS BLD VENIPUNCTURE: CPT

## 2021-02-23 PROCEDURE — 85025 COMPLETE CBC W/AUTO DIFF WBC: CPT

## 2021-02-23 PROCEDURE — 96372 THER/PROPH/DIAG INJ SC/IM: CPT

## 2021-02-23 PROCEDURE — 25010000002 EPOETIN ALFA PER 1000 UNITS: Performed by: INTERNAL MEDICINE

## 2021-02-23 RX ADMIN — ERYTHROPOIETIN 5000 UNITS: 20000 INJECTION, SOLUTION INTRAVENOUS; SUBCUTANEOUS at 15:57

## 2021-02-24 PROCEDURE — 93296 REM INTERROG EVL PM/IDS: CPT | Performed by: INTERNAL MEDICINE

## 2021-02-24 PROCEDURE — 93295 DEV INTERROG REMOTE 1/2/MLT: CPT | Performed by: INTERNAL MEDICINE

## 2021-03-03 ENCOUNTER — CLINICAL SUPPORT NO REQUIREMENTS (OUTPATIENT)
Dept: CARDIOLOGY | Facility: CLINIC | Age: 81
End: 2021-03-03

## 2021-03-03 ENCOUNTER — ANTICOAGULATION VISIT (OUTPATIENT)
Dept: PHARMACY | Facility: HOSPITAL | Age: 81
End: 2021-03-03

## 2021-03-03 DIAGNOSIS — I48.20 CHRONIC ATRIAL FIBRILLATION (HCC): ICD-10-CM

## 2021-03-03 DIAGNOSIS — I47.20 VENTRICULAR TACHYCARDIA (HCC): Primary | ICD-10-CM

## 2021-03-03 LAB
INR PPP: 1.9 (ref 0.91–1.09)
PROTHROMBIN TIME: 22.3 SECONDS (ref 10–13.8)

## 2021-03-03 PROCEDURE — 36416 COLLJ CAPILLARY BLOOD SPEC: CPT

## 2021-03-03 PROCEDURE — 93283 PRGRMG EVAL IMPLANTABLE DFB: CPT | Performed by: INTERNAL MEDICINE

## 2021-03-03 PROCEDURE — 85610 PROTHROMBIN TIME: CPT

## 2021-03-03 NOTE — PROGRESS NOTES
Anticoagulation Clinic Progress Note    Anticoagulation Summary  As of 3/3/2021    INR goal:  2.0-3.0   TTR:  60.9 % (2.3 y)   INR used for dosin.9 (3/3/2021)   Warfarin maintenance plan:  1 mg every Mon, Wed, Fri; 2 mg all other days   Weekly warfarin total:  11 mg   No change documented:  Anna Marie Ndiaye   Plan last modified:  Shanna Major RPH (10/27/2020)   Next INR check:  3/24/2021   Priority:  Maintenance   Target end date:  Indefinite    Indications    Chronic atrial fibrillation (CMS/[facility]) [I48.20]             Anticoagulation Episode Summary     INR check location:      Preferred lab:      Send INR reminders to:   [facility]    Comments:        Anticoagulation Care Providers     Provider Role Specialty Phone number    Nik Galarza MD Referring Cardiology [phone]          Clinic Interview:  Patient Findings     Negatives:  Signs/symptoms of thrombosis, Signs/symptoms of bleeding,   Laboratory test error suspected, Change in health, Change in alcohol use,   Change in activity, Upcoming invasive procedure, Emergency department   visit, Upcoming dental procedure, Missed doses, Extra doses, Change in   medications, Change in diet/appetite, Hospital admission, Bruising, Other   complaints      Clinical Outcomes     Negatives:  Major bleeding event, Thromboembolic event,   Anticoagulation-related hospital admission, Anticoagulation-related ED   visit, Anticoagulation-related fatality        INR History:  Anticoagulation Monitoring 2021 2/3/2021 3/3/2021   INR 2.8 2.1 1.9   INR Date 2021 2/3/2021 3/3/2021   INR Goal 2.0-3.0 2.0-3.0 2.0-3.0   Trend Same Same Same   Last Week Total 11 mg 11 mg 11 mg   Next Week Total 11 mg 11 mg 11 mg   Sun 2 mg 2 mg 2 mg   Mon 1 mg 1 mg 1 mg   Tue 2 mg 2 mg 2 mg   Wed 1 mg 1 mg 1 mg   Thu 2 mg 2 mg 2 mg   Fri 1 mg 1 mg 1 mg   Sat 2 mg 2 mg 2 mg   Visit Report - - -   Some recent data might be hidden       Plan:  1. INR is out of range- see  above in Anticoagulation Summary.   Will instruct Janel Covingtons to Continue  their warfarin regimen- see above in Anticoagulation Summary.  2. Follow up in 3 weeks.   3. Patient declines warfarin refills.  4. Verbal and written information provided. Patient expresses understanding and has no further questions at this time.    Anna Marie Ndiaye

## 2021-03-15 RX ORDER — CARVEDILOL 25 MG/1
TABLET ORAL
Qty: 180 TABLET | Refills: 1 | Status: SHIPPED | OUTPATIENT
Start: 2021-03-15 | End: 2021-08-04

## 2021-03-23 ENCOUNTER — LAB (OUTPATIENT)
Dept: LAB | Facility: HOSPITAL | Age: 81
End: 2021-03-23

## 2021-03-23 ENCOUNTER — INFUSION (OUTPATIENT)
Dept: ONCOLOGY | Facility: HOSPITAL | Age: 81
End: 2021-03-23

## 2021-03-23 DIAGNOSIS — D63.1 ANEMIA IN STAGE 3A CHRONIC KIDNEY DISEASE (HCC): ICD-10-CM

## 2021-03-23 DIAGNOSIS — D50.9 IRON DEFICIENCY ANEMIA, UNSPECIFIED IRON DEFICIENCY ANEMIA TYPE: ICD-10-CM

## 2021-03-23 DIAGNOSIS — N18.30 STAGE 3 CHRONIC KIDNEY DISEASE, UNSPECIFIED WHETHER STAGE 3A OR 3B CKD (HCC): Primary | ICD-10-CM

## 2021-03-23 DIAGNOSIS — D69.6 THROMBOCYTOPENIA (HCC): ICD-10-CM

## 2021-03-23 DIAGNOSIS — N18.31 ANEMIA IN STAGE 3A CHRONIC KIDNEY DISEASE (HCC): ICD-10-CM

## 2021-03-23 LAB
BASOPHILS # BLD AUTO: 0.03 10*3/MM3 (ref 0–0.2)
BASOPHILS NFR BLD AUTO: 0.5 % (ref 0–1.5)
DEPRECATED RDW RBC AUTO: 54.3 FL (ref 37–54)
EOSINOPHIL # BLD AUTO: 0.14 10*3/MM3 (ref 0–0.4)
EOSINOPHIL NFR BLD AUTO: 2.2 % (ref 0.3–6.2)
ERYTHROCYTE [DISTWIDTH] IN BLOOD BY AUTOMATED COUNT: 14.8 % (ref 12.3–15.4)
FERRITIN SERPL-MCNC: 371.7 NG/ML (ref 13–150)
HCT VFR BLD AUTO: 31.4 % (ref 34–46.6)
HGB BLD-MCNC: 9.7 G/DL (ref 12–15.9)
IMM GRANULOCYTES # BLD AUTO: 0.02 10*3/MM3 (ref 0–0.05)
IMM GRANULOCYTES NFR BLD AUTO: 0.3 % (ref 0–0.5)
IRON 24H UR-MRATE: 58 MCG/DL (ref 37–145)
IRON SATN MFR SERPL: 20 % (ref 14–48)
LYMPHOCYTES # BLD AUTO: 0.76 10*3/MM3 (ref 0.7–3.1)
LYMPHOCYTES NFR BLD AUTO: 12 % (ref 19.6–45.3)
MCH RBC QN AUTO: 31 PG (ref 26.6–33)
MCHC RBC AUTO-ENTMCNC: 30.9 G/DL (ref 31.5–35.7)
MCV RBC AUTO: 100.3 FL (ref 79–97)
MONOCYTES # BLD AUTO: 0.34 10*3/MM3 (ref 0.1–0.9)
MONOCYTES NFR BLD AUTO: 5.4 % (ref 5–12)
NEUTROPHILS NFR BLD AUTO: 5.03 10*3/MM3 (ref 1.7–7)
NEUTROPHILS NFR BLD AUTO: 79.6 % (ref 42.7–76)
NRBC BLD AUTO-RTO: 0 /100 WBC (ref 0–0.2)
PLATELET # BLD AUTO: 126 10*3/MM3 (ref 140–450)
PMV BLD AUTO: 10 FL (ref 6–12)
RBC # BLD AUTO: 3.13 10*6/MM3 (ref 3.77–5.28)
TIBC SERPL-MCNC: 295 MCG/DL (ref 249–505)
TRANSFERRIN SERPL-MCNC: 211 MG/DL (ref 200–360)
WBC # BLD AUTO: 6.32 10*3/MM3 (ref 3.4–10.8)

## 2021-03-23 PROCEDURE — 85025 COMPLETE CBC W/AUTO DIFF WBC: CPT

## 2021-03-23 PROCEDURE — 84466 ASSAY OF TRANSFERRIN: CPT

## 2021-03-23 PROCEDURE — 83540 ASSAY OF IRON: CPT

## 2021-03-23 PROCEDURE — 96372 THER/PROPH/DIAG INJ SC/IM: CPT

## 2021-03-23 PROCEDURE — 25010000002 EPOETIN ALFA PER 1000 UNITS: Performed by: INTERNAL MEDICINE

## 2021-03-23 PROCEDURE — 82728 ASSAY OF FERRITIN: CPT

## 2021-03-23 PROCEDURE — 36415 COLL VENOUS BLD VENIPUNCTURE: CPT

## 2021-03-23 RX ADMIN — ERYTHROPOIETIN 5000 UNITS: 20000 INJECTION, SOLUTION INTRAVENOUS; SUBCUTANEOUS at 13:32

## 2021-03-26 ENCOUNTER — APPOINTMENT (OUTPATIENT)
Dept: PHARMACY | Facility: HOSPITAL | Age: 81
End: 2021-03-26

## 2021-03-29 ENCOUNTER — ANTICOAGULATION VISIT (OUTPATIENT)
Dept: PHARMACY | Facility: HOSPITAL | Age: 81
End: 2021-03-29

## 2021-03-29 DIAGNOSIS — I48.20 CHRONIC ATRIAL FIBRILLATION (HCC): ICD-10-CM

## 2021-03-29 LAB
INR PPP: 2.3 (ref 0.91–1.09)
PROTHROMBIN TIME: 27.3 SECONDS (ref 10–13.8)

## 2021-03-29 PROCEDURE — 36416 COLLJ CAPILLARY BLOOD SPEC: CPT

## 2021-03-29 PROCEDURE — 85610 PROTHROMBIN TIME: CPT

## 2021-04-08 ENCOUNTER — OFFICE VISIT (OUTPATIENT)
Dept: SLEEP MEDICINE | Facility: HOSPITAL | Age: 81
End: 2021-04-08

## 2021-04-08 VITALS — BODY MASS INDEX: 27.6 KG/M2 | WEIGHT: 150 LBS | HEIGHT: 62 IN

## 2021-04-08 DIAGNOSIS — G47.33 OSA ON CPAP: Primary | Chronic | ICD-10-CM

## 2021-04-08 DIAGNOSIS — G47.14 HYPERSOMNIA DUE TO MEDICAL CONDITION: Chronic | ICD-10-CM

## 2021-04-08 DIAGNOSIS — Z99.89 OSA ON CPAP: Primary | Chronic | ICD-10-CM

## 2021-04-08 PROCEDURE — 99213 OFFICE O/P EST LOW 20 MIN: CPT | Performed by: INTERNAL MEDICINE

## 2021-04-08 NOTE — PROGRESS NOTES
"Follow Up [facility] Disorders [facility] Note     Chief Complaint:  GEORGINA     Primary Care Physician: Ariel Matute MD    Interval History:   The patient is a 80 y.o. female  who I last saw 2020 and that note was reviewed.  The patient reports she is better because she does like the DreamWear full facemask.  The patient continues to take Ambien at bedtime per Dr. Matute.  The patient goes to bed at 2 AM and awakens at 9 AM.  [place] Sleepiness Scale is abnormal at 10.    Review of Systems:    A complete review of systems was done and all were negative with the exception of the above    Social History:    Social History     Socioeconomic History   • Marital status:      Spouse name: Russ   • Number of children: Not on file   • Years of education: Not on file   • Highest education level: Not on file   Tobacco Use   • Smoking status: Former Smoker     Packs/day: 1.00     Years: 50.00     Pack years: 50.00     Types: Cigarettes     Quit date: 2009     Years since quittin.2   • Smokeless tobacco: Never Used   • Tobacco comment: Daily caffeine - soda   Substance and Sexual Activity   • Alcohol use: Yes     Comment: rare   • Drug use: No       Allergies:  Diclofenac     Medication Review:  Reviewed.      Vital Signs:    Vitals:    21 1101   Weight: 68 kg (150 lb)   Height: 157.5 cm (62\")     Body mass index is 27.44 kg/m².    Physical Exam:    Constitutional:  Well developed 80 y.o. female that appears in no apparent distress.  Awake & oriented times 3.  Normal mood with normal recent and remote memory and normal judgement.  Eyes:  Conjunctivae normal.  Oropharynx: Previously, moist mucous membranes without exudate and a large tongue and class III-4 Mallampati airway, patient is wearing a facemask.     Downloaded PAP Data Reviewed For Compliance:  DME is [facility] and she uses a DreamWear full facemask..  Downloads between  and 2021 compliance 92%.  Average usage is 4 hours and 55 minutes.  " Average AHI is mildly abnormal at 8.7 but all subsets are normal except for partial obstructive index which is 6.1.  The patient does have a leak of 1 hour and 19 minutes.  Average AutoCPAP pressure is 9.5 and her auto CPAP is 7-16.    I have reviewed the above results and compared them with previous results and reviewed with the patient.    Impression:   Mild obstructive sleep apnea, significant positional component, 30% of total sleep time with snoring and arousals associated with snoring abnormal at 15 events per hour, by overnight polysomnogram 12/17/2013, weight 168 pounds, adequately treated with auto CPAP with good compliance and usage and some persistent complaints of hypersomnolence.  The patient does have a leak.  Psychophysiologic insomnia treated with Ambien per Dr. Matute    Plan:  Good sleep hygiene measures should be maintained.  Some weight loss would be beneficial in this patient who is overweight by BMI.      After evaluating the patient and assessing results available, the patient is benefiting from the treatment being provided.     The patient will continue auto CPAP with the DreamWear full facemask.  The patient demonstrates good compliance and usage.  I recommend no changes to the patient's present pressure.  A new prescription will be sent to her DME    I answered all of the patient's questions.  The patient will call for any problems and will follow up in 1 year.      Anthony Cardona MD  Sleep Medicine  04/08/21  11:12 EDT

## 2021-04-12 RX ORDER — RAMIPRIL 5 MG/1
CAPSULE ORAL
Qty: 90 CAPSULE | Refills: 1 | Status: SHIPPED | OUTPATIENT
Start: 2021-04-12 | End: 2021-10-21

## 2021-04-13 ENCOUNTER — ANTICOAGULATION VISIT (OUTPATIENT)
Dept: PHARMACY | Facility: HOSPITAL | Age: 81
End: 2021-04-13

## 2021-04-13 DIAGNOSIS — I48.20 CHRONIC ATRIAL FIBRILLATION (HCC): Primary | ICD-10-CM

## 2021-04-13 LAB
INR PPP: 2.6 (ref 0.91–1.09)
PROTHROMBIN TIME: 31.1 SECONDS (ref 10–13.8)

## 2021-04-13 PROCEDURE — G0463 HOSPITAL OUTPT CLINIC VISIT: HCPCS

## 2021-04-13 PROCEDURE — 85610 PROTHROMBIN TIME: CPT

## 2021-04-13 PROCEDURE — 36416 COLLJ CAPILLARY BLOOD SPEC: CPT

## 2021-04-13 RX ORDER — WARFARIN SODIUM 1 MG/1
TABLET ORAL
Qty: 48 TABLET | Refills: 0 | OUTPATIENT
Start: 2021-04-13 | End: 2021-06-15 | Stop reason: SDUPTHER

## 2021-04-13 RX ORDER — WARFARIN SODIUM 1 MG/1
TABLET ORAL
Qty: 48 TABLET | Refills: 0 | Status: SHIPPED | OUTPATIENT
Start: 2021-04-13 | End: 2021-04-13

## 2021-04-13 NOTE — PROGRESS NOTES
Anticoagulation Clinic Progress Note    Anticoagulation Summary  As of 2021    INR goal:  2.0-3.0   TTR:  62.0 % (2.4 y)   INR used for dosin.6 (2021)   Warfarin maintenance plan:  1 mg every Mon, Wed, Fri; 2 mg all other days   Weekly warfarin total:  11 mg   No change documented:  Shaunna Santos, Pharmacy Intern   Plan last modified:  Shanna Major RPH (10/27/2020)   Next INR check:  2021   Priority:  Maintenance   Target end date:  Indefinite    Indications    Chronic atrial fibrillation (CMS/[facility]) [I48.20]             Anticoagulation Episode Summary     INR check location:      Preferred lab:      Send INR reminders to:   [facility]    Comments:        Anticoagulation Care Providers     Provider Role Specialty Phone number    Nik Galarza MD Referring Cardiology [phone]          Clinic Interview:  Patient Findings     Negatives:  Signs/symptoms of thrombosis, Signs/symptoms of bleeding,   Laboratory test error suspected, Change in health, Change in alcohol use,   Change in activity, Emergency department visit, Upcoming dental procedure,   Missed doses, Extra doses, Change in medications, Change in diet/appetite,   Hospital admission, Bruising, Other complaints    Comments:  Patient states she potentially has an upcoming procedure   requiring warfarin to be held.       Clinical Outcomes     Negatives:  Major bleeding event, Thromboembolic event,   Anticoagulation-related hospital admission, Anticoagulation-related ED   visit, Anticoagulation-related fatality    Comments:  Patient states she potentially has an upcoming procedure   requiring warfarin to be held.         INR History:  Anticoagulation Monitoring 3/3/2021 3/29/2021 2021   INR 1.9 2.3 2.6   INR Date 3/3/2021 3/29/2021 2021   INR Goal 2.0-3.0 2.0-3.0 2.0-3.0   Trend Same Same Same   Last Week Total 11 mg 11 mg 11 mg   Next Week Total 11 mg 11 mg 11 mg   Sun 2 mg 2 mg 2 mg   Mon 1 mg 1 mg 1 mg    Tue 2 mg 2 mg 2 mg   Wed 1 mg 1 mg 1 mg   Thu 2 mg 2 mg 2 mg   Fri 1 mg 1 mg 1 mg   Sat 2 mg 2 mg 2 mg   Visit Report - - -   Some recent data might be hidden       Plan:  1. INR is Therapeutic today- see above in Anticoagulation Summary.  Will instruct Janel Mahoney to Continue their warfarin regimen- see above in Anticoagulation Summary.  2. Follow up in 1 month  3. Patient desires warfarin refills.  4. Verbal and written information provided. Patient expresses understanding and has no further questions at this time.    Shaunna Santos, Pharmacy Intern

## 2021-04-13 NOTE — PROGRESS NOTES
"I have supervised and reviewed the notes, assessments, and/or procedures performed by our Pharmacy Intern. The documented assessment and plan were developed cooperatively, and the plan was implemented in my presence. I concur with the documentation of this patient encounter.      Pt to have \"injections\" to back in early May, unclear on how long she is instructed to hold warfarin prior.  Pt to call back when she is given further instruction from proceduralist.      Pt requests refill as she will take last dose tonight.  Not expected to receive mail order rx until roughly 4/23.  One month supply for warfarin called in to [facility] at pt request.    Terri Ko [facility]     "

## 2021-04-13 NOTE — PROGRESS NOTES
I have supervised and reviewed the notes, assessments, and/or procedures performed by our Pharmacy Intern. The documented assessment and plan were developed cooperatively, and the plan was implemented in my presence. I concur with the documentation of this patient encounter.    Terri Ko [facility]

## 2021-04-20 ENCOUNTER — INFUSION (OUTPATIENT)
Dept: ONCOLOGY | Facility: HOSPITAL | Age: 81
End: 2021-04-20

## 2021-04-20 ENCOUNTER — LAB (OUTPATIENT)
Dept: LAB | Facility: HOSPITAL | Age: 81
End: 2021-04-20

## 2021-04-20 DIAGNOSIS — N18.30 STAGE 3 CHRONIC KIDNEY DISEASE, UNSPECIFIED WHETHER STAGE 3A OR 3B CKD (HCC): Primary | ICD-10-CM

## 2021-04-20 DIAGNOSIS — D69.6 THROMBOCYTOPENIA (HCC): ICD-10-CM

## 2021-04-20 DIAGNOSIS — N18.31 ANEMIA IN STAGE 3A CHRONIC KIDNEY DISEASE (HCC): ICD-10-CM

## 2021-04-20 DIAGNOSIS — D50.9 IRON DEFICIENCY ANEMIA, UNSPECIFIED IRON DEFICIENCY ANEMIA TYPE: ICD-10-CM

## 2021-04-20 DIAGNOSIS — D63.1 ANEMIA IN STAGE 3A CHRONIC KIDNEY DISEASE (HCC): ICD-10-CM

## 2021-04-20 LAB
BASOPHILS # BLD AUTO: 0.03 10*3/MM3 (ref 0–0.2)
BASOPHILS NFR BLD AUTO: 0.6 % (ref 0–1.5)
DEPRECATED RDW RBC AUTO: 50.1 FL (ref 37–54)
EOSINOPHIL # BLD AUTO: 0.18 10*3/MM3 (ref 0–0.4)
EOSINOPHIL NFR BLD AUTO: 3.8 % (ref 0.3–6.2)
ERYTHROCYTE [DISTWIDTH] IN BLOOD BY AUTOMATED COUNT: 14.4 % (ref 12.3–15.4)
HCT VFR BLD AUTO: 29.7 % (ref 34–46.6)
HGB BLD-MCNC: 9.7 G/DL (ref 12–15.9)
IMM GRANULOCYTES # BLD AUTO: 0.04 10*3/MM3 (ref 0–0.05)
IMM GRANULOCYTES NFR BLD AUTO: 0.8 % (ref 0–0.5)
LYMPHOCYTES # BLD AUTO: 0.75 10*3/MM3 (ref 0.7–3.1)
LYMPHOCYTES NFR BLD AUTO: 15.7 % (ref 19.6–45.3)
MCH RBC QN AUTO: 31.3 PG (ref 26.6–33)
MCHC RBC AUTO-ENTMCNC: 32.7 G/DL (ref 31.5–35.7)
MCV RBC AUTO: 95.8 FL (ref 79–97)
MONOCYTES # BLD AUTO: 0.34 10*3/MM3 (ref 0.1–0.9)
MONOCYTES NFR BLD AUTO: 7.1 % (ref 5–12)
NEUTROPHILS NFR BLD AUTO: 3.43 10*3/MM3 (ref 1.7–7)
NEUTROPHILS NFR BLD AUTO: 72 % (ref 42.7–76)
NRBC BLD AUTO-RTO: 0 /100 WBC (ref 0–0.2)
PLATELET # BLD AUTO: 133 10*3/MM3 (ref 140–450)
PMV BLD AUTO: 10.4 FL (ref 6–12)
RBC # BLD AUTO: 3.1 10*6/MM3 (ref 3.77–5.28)
WBC # BLD AUTO: 4.77 10*3/MM3 (ref 3.4–10.8)

## 2021-04-20 PROCEDURE — 96372 THER/PROPH/DIAG INJ SC/IM: CPT

## 2021-04-20 PROCEDURE — 36415 COLL VENOUS BLD VENIPUNCTURE: CPT

## 2021-04-20 PROCEDURE — 85025 COMPLETE CBC W/AUTO DIFF WBC: CPT

## 2021-04-20 PROCEDURE — 25010000002 EPOETIN ALFA PER 1000 UNITS: Performed by: INTERNAL MEDICINE

## 2021-04-20 RX ORDER — PANTOPRAZOLE SODIUM 40 MG/1
40 TABLET, DELAYED RELEASE ORAL 2 TIMES DAILY
Qty: 60 TABLET | Refills: 5 | Status: SHIPPED | OUTPATIENT
Start: 2021-04-20 | End: 2022-01-01 | Stop reason: SDUPTHER

## 2021-04-20 RX ADMIN — ERYTHROPOIETIN 5000 UNITS: 20000 INJECTION, SOLUTION INTRAVENOUS; SUBCUTANEOUS at 13:38

## 2021-04-21 ENCOUNTER — TELEPHONE (OUTPATIENT)
Dept: CARDIOLOGY | Facility: CLINIC | Age: 81
End: 2021-04-21

## 2021-04-21 NOTE — TELEPHONE ENCOUNTER
On 4/20/21 @ 17:07, 1 VT that was treated by ATPx1 and converted to AF/BiVP @ 60ppm. Also 1 VT on 4/14/21 @ 18:16 with no therapy, lasting 16 beats avg V rate 168bpm. Pt denied symptoms, said she been feeling okay    Presenting rhythm AF/BiVP @ 60ppm with a PVC

## 2021-05-11 ENCOUNTER — ANTICOAGULATION VISIT (OUTPATIENT)
Dept: PHARMACY | Facility: HOSPITAL | Age: 81
End: 2021-05-11

## 2021-05-11 DIAGNOSIS — I48.20 CHRONIC ATRIAL FIBRILLATION (HCC): Primary | ICD-10-CM

## 2021-05-11 LAB
INR PPP: 1.6 (ref 0.91–1.09)
PROTHROMBIN TIME: 18.8 SECONDS (ref 10–13.8)

## 2021-05-11 PROCEDURE — 36416 COLLJ CAPILLARY BLOOD SPEC: CPT

## 2021-05-11 PROCEDURE — 85610 PROTHROMBIN TIME: CPT

## 2021-05-11 PROCEDURE — G0463 HOSPITAL OUTPT CLINIC VISIT: HCPCS

## 2021-05-11 NOTE — PROGRESS NOTES
Anticoagulation Clinic Progress Note    Anticoagulation Summary  As of 2021    INR goal:  2.0-3.0   TTR:  61.9 % (2.5 y)   INR used for dosin.6 (2021)   Warfarin maintenance plan:  1 mg every Mon, Wed, Fri; 2 mg all other days   Weekly warfarin total:  11 mg   Plan last modified:  Shanna Major RPH (10/27/2020)   Next INR check:  2021   Priority:  Maintenance   Target end date:  Indefinite    Indications    Chronic atrial fibrillation (CMS/HCC) [I48.20]             Anticoagulation Episode Summary     INR check location:      Preferred lab:      Send INR reminders to:   [facility]    Comments:        Anticoagulation Care Providers     Provider Role Specialty Phone number    Nik Galarza MD Referring Cardiology [phone]          Clinic Interview:  Patient Findings     Positives:  Other complaints    Negatives:  Signs/symptoms of thrombosis, Signs/symptoms of bleeding,   Laboratory test error suspected, Change in health, Change in alcohol use,   Change in activity, Upcoming invasive procedure, Emergency department   visit, Upcoming dental procedure, Missed doses, Extra doses, Change in   medications, Change in diet/appetite, Hospital admission, Bruising    Comments:  Reports injections in back last week; however, reports she did   not need to hold her warfarin for this.       Clinical Outcomes     Negatives:  Major bleeding event, Thromboembolic event,   Anticoagulation-related hospital admission, Anticoagulation-related ED   visit, Anticoagulation-related fatality    Comments:  Reports injections in back last week; however, reports she did   not need to hold her warfarin for this.         INR History:  Anticoagulation Monitoring 3/29/2021 2021 2021   INR 2.3 2.6 1.6   INR Date 3/29/2021 2021 2021   INR Goal 2.0-3.0 2.0-3.0 2.0-3.0   Trend Same Same Same   Last Week Total 11 mg 11 mg 11 mg   Next Week Total 11 mg 11 mg 12 mg   Sun 2 mg 2 mg 2 mg   Mon  1 mg 1 mg 1 mg   Tue 2 mg 2 mg 2 mg   Wed 1 mg 1 mg 2 mg (5/12); Otherwise 1 mg   Thu 2 mg 2 mg 2 mg   Fri 1 mg 1 mg 1 mg   Sat 2 mg 2 mg 2 mg   Visit Report - - -   Some recent data might be hidden       Plan:  1. INR is Subtherapeutic today- see above in Anticoagulation Summary.  Will instruct Janel Mahoney to Change their warfarin regimen- see above in Anticoagulation Summary.  2. Follow up in 2 weeks  3. Patient declines warfarin refills.  4. Verbal and written information provided. Patient expresses understanding and has no further questions at this time.    Vargas Butcher [facility]

## 2021-05-18 ENCOUNTER — INFUSION (OUTPATIENT)
Dept: ONCOLOGY | Facility: HOSPITAL | Age: 81
End: 2021-05-18

## 2021-05-18 ENCOUNTER — LAB (OUTPATIENT)
Dept: LAB | Facility: HOSPITAL | Age: 81
End: 2021-05-18

## 2021-05-18 DIAGNOSIS — D50.9 IRON DEFICIENCY ANEMIA, UNSPECIFIED IRON DEFICIENCY ANEMIA TYPE: ICD-10-CM

## 2021-05-18 DIAGNOSIS — N18.30 STAGE 3 CHRONIC KIDNEY DISEASE, UNSPECIFIED WHETHER STAGE 3A OR 3B CKD (HCC): Primary | ICD-10-CM

## 2021-05-18 DIAGNOSIS — N18.31 ANEMIA IN STAGE 3A CHRONIC KIDNEY DISEASE (HCC): ICD-10-CM

## 2021-05-18 DIAGNOSIS — D63.1 ANEMIA IN STAGE 3A CHRONIC KIDNEY DISEASE (HCC): ICD-10-CM

## 2021-05-18 DIAGNOSIS — D69.6 THROMBOCYTOPENIA (HCC): ICD-10-CM

## 2021-05-18 LAB
BASOPHILS # BLD AUTO: 0.01 10*3/MM3 (ref 0–0.2)
BASOPHILS NFR BLD AUTO: 0.1 % (ref 0–1.5)
DEPRECATED RDW RBC AUTO: 53.1 FL (ref 37–54)
EOSINOPHIL # BLD AUTO: 0.04 10*3/MM3 (ref 0–0.4)
EOSINOPHIL NFR BLD AUTO: 0.6 % (ref 0.3–6.2)
ERYTHROCYTE [DISTWIDTH] IN BLOOD BY AUTOMATED COUNT: 15 % (ref 12.3–15.4)
FERRITIN SERPL-MCNC: 428 NG/ML (ref 13–150)
HCT VFR BLD AUTO: 29.4 % (ref 34–46.6)
HGB BLD-MCNC: 9.5 G/DL (ref 12–15.9)
IMM GRANULOCYTES # BLD AUTO: 0.03 10*3/MM3 (ref 0–0.05)
IMM GRANULOCYTES NFR BLD AUTO: 0.4 % (ref 0–0.5)
IRON 24H UR-MRATE: 77 MCG/DL (ref 37–145)
IRON SATN MFR SERPL: 26 % (ref 14–48)
LYMPHOCYTES # BLD AUTO: 0.72 10*3/MM3 (ref 0.7–3.1)
LYMPHOCYTES NFR BLD AUTO: 10.5 % (ref 19.6–45.3)
MCH RBC QN AUTO: 31.6 PG (ref 26.6–33)
MCHC RBC AUTO-ENTMCNC: 32.3 G/DL (ref 31.5–35.7)
MCV RBC AUTO: 97.7 FL (ref 79–97)
MONOCYTES # BLD AUTO: 0.36 10*3/MM3 (ref 0.1–0.9)
MONOCYTES NFR BLD AUTO: 5.2 % (ref 5–12)
NEUTROPHILS NFR BLD AUTO: 5.71 10*3/MM3 (ref 1.7–7)
NEUTROPHILS NFR BLD AUTO: 83.2 % (ref 42.7–76)
NRBC BLD AUTO-RTO: 0 /100 WBC (ref 0–0.2)
PLATELET # BLD AUTO: 121 10*3/MM3 (ref 140–450)
PMV BLD AUTO: 10.5 FL (ref 6–12)
RBC # BLD AUTO: 3.01 10*6/MM3 (ref 3.77–5.28)
TIBC SERPL-MCNC: 300 MCG/DL (ref 249–505)
TRANSFERRIN SERPL-MCNC: 214 MG/DL (ref 200–360)
WBC # BLD AUTO: 6.87 10*3/MM3 (ref 3.4–10.8)

## 2021-05-18 PROCEDURE — 82728 ASSAY OF FERRITIN: CPT

## 2021-05-18 PROCEDURE — 36415 COLL VENOUS BLD VENIPUNCTURE: CPT

## 2021-05-18 PROCEDURE — 96372 THER/PROPH/DIAG INJ SC/IM: CPT

## 2021-05-18 PROCEDURE — 84466 ASSAY OF TRANSFERRIN: CPT

## 2021-05-18 PROCEDURE — 85025 COMPLETE CBC W/AUTO DIFF WBC: CPT

## 2021-05-18 PROCEDURE — 25010000002 EPOETIN ALFA PER 1000 UNITS: Performed by: INTERNAL MEDICINE

## 2021-05-18 PROCEDURE — 83540 ASSAY OF IRON: CPT

## 2021-05-18 RX ADMIN — ERYTHROPOIETIN 5000 UNITS: 20000 INJECTION, SOLUTION INTRAVENOUS; SUBCUTANEOUS at 13:46

## 2021-05-25 ENCOUNTER — ANTICOAGULATION VISIT (OUTPATIENT)
Dept: PHARMACY | Facility: HOSPITAL | Age: 81
End: 2021-05-25

## 2021-05-25 DIAGNOSIS — I48.20 CHRONIC ATRIAL FIBRILLATION (HCC): Primary | ICD-10-CM

## 2021-05-25 LAB
INR PPP: 2.2 (ref 0.91–1.09)
PROTHROMBIN TIME: 26.8 SECONDS (ref 10–13.8)

## 2021-05-25 PROCEDURE — 36416 COLLJ CAPILLARY BLOOD SPEC: CPT

## 2021-05-25 PROCEDURE — 85610 PROTHROMBIN TIME: CPT

## 2021-05-26 PROCEDURE — 93296 REM INTERROG EVL PM/IDS: CPT | Performed by: INTERNAL MEDICINE

## 2021-05-26 PROCEDURE — 93295 DEV INTERROG REMOTE 1/2/MLT: CPT | Performed by: INTERNAL MEDICINE

## 2021-05-26 NOTE — PROGRESS NOTES
Anticoagulation Clinic Progress Note    Anticoagulation Summary  As of 2021    INR goal:  2.0-3.0   TTR:  61.5 % (2.5 y)   INR used for dosin.2 (2021)   Warfarin maintenance plan:  1 mg every Mon, Wed, Fri; 2 mg all other days   Weekly warfarin total:  11 mg   No change documented:  Anna Marie Ndiaye   Plan last modified:  Shanna Major RPH (10/27/2020)   Next INR check:  6/15/2021   Priority:  Maintenance   Target end date:  Indefinite    Indications    Chronic atrial fibrillation (CMS/[facility]) [I48.20]             Anticoagulation Episode Summary     INR check location:      Preferred lab:      Send INR reminders to:   [facility]    Comments:        Anticoagulation Care Providers     Provider Role Specialty Phone number    Nik Galarza MD Referring Cardiology [phone]          Clinic Interview:  Patient Findings     Negatives:  Signs/symptoms of thrombosis, Signs/symptoms of bleeding,   Laboratory test error suspected, Change in health, Change in alcohol use,   Change in activity, Upcoming invasive procedure, Emergency department   visit, Upcoming dental procedure, Missed doses, Extra doses, Change in   medications, Change in diet/appetite, Hospital admission, Bruising, Other   complaints      Clinical Outcomes     Negatives:  Major bleeding event, Thromboembolic event,   Anticoagulation-related hospital admission, Anticoagulation-related ED   visit, Anticoagulation-related fatality        INR History:  Anticoagulation Monitoring 2021   INR 2.6 1.6 2.2   INR Date 2021   INR Goal 2.0-3.0 2.0-3.0 2.0-3.0   Trend Same Same Same   Last Week Total 11 mg 11 mg 11 mg   Next Week Total 11 mg 12 mg 11 mg   Sun 2 mg 2 mg 2 mg   Mon 1 mg 1 mg 1 mg   Tue 2 mg 2 mg 2 mg   Wed 1 mg 2 mg (); Otherwise 1 mg 1 mg   Thu 2 mg 2 mg 2 mg   Fri 1 mg 1 mg 1 mg   Sat 2 mg 2 mg 2 mg   Visit Report - - -   Some recent data might be hidden        Plan:  1. INR is therapeutic today- see above in Anticoagulation Summary.   Will instruct Janel Mahoney to continue their warfarin regimen- see above in Anticoagulation Summary.  2. Follow up in 3 weeks.  3. Patient declines warfarin refills.  4. Verbal and written information provided. Patient expresses understanding and has no further questions at this time.    Anna Marie Ndiaye

## 2021-05-28 ENCOUNTER — RESEARCH ENCOUNTER (OUTPATIENT)
Dept: CARDIOLOGY | Facility: CLINIC | Age: 81
End: 2021-05-28

## 2021-05-28 NOTE — RESEARCH
Research study: [employer] PSR  Subject initials: MARYCHUY  Subject study ID#: [id number]     MARYCHUY came to the [facility] office on 3-3-2021 for a routine interrogation of her CRT-D device  Today I entered data from those visits in the [employer] study database. There were no device or lead events to report, nor any qualifying health events to report. We will continue to follow MARYCHUY per study protocol.     Tiffany DOSHI RN  CV Research Coordinator

## 2021-06-10 DIAGNOSIS — I10 ESSENTIAL HYPERTENSION: ICD-10-CM

## 2021-06-10 DIAGNOSIS — M10.00 IDIOPATHIC GOUT, UNSPECIFIED CHRONICITY, UNSPECIFIED SITE: ICD-10-CM

## 2021-06-10 DIAGNOSIS — I25.810 CORONARY ARTERY DISEASE INVOLVING CORONARY BYPASS GRAFT OF NATIVE HEART WITHOUT ANGINA PECTORIS: ICD-10-CM

## 2021-06-10 DIAGNOSIS — I50.42 CHRONIC COMBINED SYSTOLIC AND DIASTOLIC CONGESTIVE HEART FAILURE (HCC): Primary | ICD-10-CM

## 2021-06-10 DIAGNOSIS — E55.9 VITAMIN D DEFICIENCY, UNSPECIFIED: ICD-10-CM

## 2021-06-11 ENCOUNTER — LAB (OUTPATIENT)
Dept: FAMILY MEDICINE CLINIC | Facility: CLINIC | Age: 81
End: 2021-06-11

## 2021-06-11 LAB
25(OH)D3 SERPL-MCNC: 64.7 NG/ML (ref 30–100)
ALBUMIN SERPL-MCNC: 3.4 G/DL (ref 3.5–5.2)
ALBUMIN/GLOB SERPL: 1.2 G/DL
ALP SERPL-CCNC: 46 U/L (ref 39–117)
ALT SERPL W P-5'-P-CCNC: 12 U/L (ref 1–33)
ANION GAP SERPL CALCULATED.3IONS-SCNC: 10.8 MMOL/L (ref 5–15)
AST SERPL-CCNC: 16 U/L (ref 1–32)
BILIRUB SERPL-MCNC: 0.3 MG/DL (ref 0–1.2)
BUN SERPL-MCNC: 71 MG/DL (ref 8–23)
BUN/CREAT SERPL: 20.9 (ref 7–25)
CALCIUM SPEC-SCNC: 9.8 MG/DL (ref 8.6–10.5)
CHLORIDE SERPL-SCNC: 99 MMOL/L (ref 98–107)
CHOLEST SERPL-MCNC: 164 MG/DL (ref 0–200)
CO2 SERPL-SCNC: 30.2 MMOL/L (ref 22–29)
CREAT SERPL-MCNC: 3.39 MG/DL (ref 0.57–1)
ERYTHROCYTE [DISTWIDTH] IN BLOOD BY AUTOMATED COUNT: 16.1 % (ref 12.3–15.4)
FERRITIN SERPL-MCNC: 475 NG/ML (ref 13–150)
GFR SERPL CREATININE-BSD FRML MDRD: 13 ML/MIN/1.73
GFR SERPL CREATININE-BSD FRML MDRD: ABNORMAL ML/MIN/{1.73_M2}
GLOBULIN UR ELPH-MCNC: 2.8 GM/DL
GLUCOSE SERPL-MCNC: 98 MG/DL (ref 65–99)
HCT VFR BLD AUTO: 30.6 % (ref 34–46.6)
HDLC SERPL-MCNC: 56 MG/DL (ref 40–60)
HGB BLD-MCNC: 9.9 G/DL (ref 12–15.9)
LDLC SERPL CALC-MCNC: 92 MG/DL (ref 0–100)
LDLC/HDLC SERPL: 1.61 {RATIO}
LYMPHOCYTES # BLD AUTO: 0.8 10*3/MM3 (ref 0.7–3.1)
LYMPHOCYTES NFR BLD AUTO: 20.5 % (ref 19.6–45.3)
MCH RBC QN AUTO: 30.8 PG (ref 26.6–33)
MCHC RBC AUTO-ENTMCNC: 32.3 G/DL (ref 31.5–35.7)
MCV RBC AUTO: 95.4 FL (ref 79–97)
MONOCYTES # BLD AUTO: 0.3 10*3/MM3 (ref 0.1–0.9)
MONOCYTES NFR BLD AUTO: 7.4 % (ref 5–12)
NEUTROPHILS NFR BLD AUTO: 3 10*3/MM3 (ref 1.7–7)
NEUTROPHILS NFR BLD AUTO: 72.1 % (ref 42.7–76)
PLATELET # BLD AUTO: 182 10*3/MM3 (ref 140–450)
PMV BLD AUTO: 6.6 FL (ref 6–12)
POTASSIUM SERPL-SCNC: 4.8 MMOL/L (ref 3.5–5.2)
PROT SERPL-MCNC: 6.2 G/DL (ref 6–8.5)
RBC # BLD AUTO: 3.2 10*6/MM3 (ref 3.77–5.28)
SODIUM SERPL-SCNC: 140 MMOL/L (ref 136–145)
TRIGL SERPL-MCNC: 88 MG/DL (ref 0–150)
URATE SERPL-MCNC: 7 MG/DL (ref 2.4–5.7)
VLDLC SERPL-MCNC: 16 MG/DL (ref 5–40)
WBC # BLD AUTO: 4.1 10*3/MM3 (ref 3.4–10.8)

## 2021-06-11 PROCEDURE — 82728 ASSAY OF FERRITIN: CPT | Performed by: INTERNAL MEDICINE

## 2021-06-11 PROCEDURE — 36415 COLL VENOUS BLD VENIPUNCTURE: CPT | Performed by: INTERNAL MEDICINE

## 2021-06-11 PROCEDURE — 82306 VITAMIN D 25 HYDROXY: CPT | Performed by: INTERNAL MEDICINE

## 2021-06-11 PROCEDURE — 85025 COMPLETE CBC W/AUTO DIFF WBC: CPT | Performed by: INTERNAL MEDICINE

## 2021-06-11 PROCEDURE — 84550 ASSAY OF BLOOD/URIC ACID: CPT | Performed by: INTERNAL MEDICINE

## 2021-06-11 PROCEDURE — 80061 LIPID PANEL: CPT | Performed by: INTERNAL MEDICINE

## 2021-06-11 PROCEDURE — 80053 COMPREHEN METABOLIC PANEL: CPT | Performed by: INTERNAL MEDICINE

## 2021-06-15 ENCOUNTER — ANTICOAGULATION VISIT (OUTPATIENT)
Dept: PHARMACY | Facility: HOSPITAL | Age: 81
End: 2021-06-15

## 2021-06-15 ENCOUNTER — LAB (OUTPATIENT)
Dept: LAB | Facility: HOSPITAL | Age: 81
End: 2021-06-15

## 2021-06-15 ENCOUNTER — INFUSION (OUTPATIENT)
Dept: ONCOLOGY | Facility: HOSPITAL | Age: 81
End: 2021-06-15

## 2021-06-15 DIAGNOSIS — N18.30 STAGE 3 CHRONIC KIDNEY DISEASE, UNSPECIFIED WHETHER STAGE 3A OR 3B CKD (HCC): Primary | ICD-10-CM

## 2021-06-15 DIAGNOSIS — N18.31 ANEMIA IN STAGE 3A CHRONIC KIDNEY DISEASE (HCC): ICD-10-CM

## 2021-06-15 DIAGNOSIS — D50.9 IRON DEFICIENCY ANEMIA, UNSPECIFIED IRON DEFICIENCY ANEMIA TYPE: ICD-10-CM

## 2021-06-15 DIAGNOSIS — D69.6 THROMBOCYTOPENIA (HCC): ICD-10-CM

## 2021-06-15 DIAGNOSIS — D63.1 ANEMIA IN STAGE 3A CHRONIC KIDNEY DISEASE (HCC): ICD-10-CM

## 2021-06-15 DIAGNOSIS — I48.20 CHRONIC ATRIAL FIBRILLATION (HCC): Primary | ICD-10-CM

## 2021-06-15 LAB
BASOPHILS # BLD AUTO: 0.04 10*3/MM3 (ref 0–0.2)
BASOPHILS NFR BLD AUTO: 0.7 % (ref 0–1.5)
DEPRECATED RDW RBC AUTO: 52.6 FL (ref 37–54)
EOSINOPHIL # BLD AUTO: 0.06 10*3/MM3 (ref 0–0.4)
EOSINOPHIL NFR BLD AUTO: 1.1 % (ref 0.3–6.2)
ERYTHROCYTE [DISTWIDTH] IN BLOOD BY AUTOMATED COUNT: 14.8 % (ref 12.3–15.4)
HCT VFR BLD AUTO: 29.3 % (ref 34–46.6)
HGB BLD-MCNC: 9.6 G/DL (ref 12–15.9)
IMM GRANULOCYTES # BLD AUTO: 0.05 10*3/MM3 (ref 0–0.05)
IMM GRANULOCYTES NFR BLD AUTO: 0.9 % (ref 0–0.5)
INR PPP: 2 (ref 0.91–1.09)
LYMPHOCYTES # BLD AUTO: 0.66 10*3/MM3 (ref 0.7–3.1)
LYMPHOCYTES NFR BLD AUTO: 12.4 % (ref 19.6–45.3)
MCH RBC QN AUTO: 31.8 PG (ref 26.6–33)
MCHC RBC AUTO-ENTMCNC: 32.8 G/DL (ref 31.5–35.7)
MCV RBC AUTO: 97 FL (ref 79–97)
MONOCYTES # BLD AUTO: 0.33 10*3/MM3 (ref 0.1–0.9)
MONOCYTES NFR BLD AUTO: 6.2 % (ref 5–12)
NEUTROPHILS NFR BLD AUTO: 4.2 10*3/MM3 (ref 1.7–7)
NEUTROPHILS NFR BLD AUTO: 78.7 % (ref 42.7–76)
NRBC BLD AUTO-RTO: 0 /100 WBC (ref 0–0.2)
PLATELET # BLD AUTO: 158 10*3/MM3 (ref 140–450)
PMV BLD AUTO: 9.9 FL (ref 6–12)
PROTHROMBIN TIME: 23.9 SECONDS (ref 10–13.8)
RBC # BLD AUTO: 3.02 10*6/MM3 (ref 3.77–5.28)
WBC # BLD AUTO: 5.34 10*3/MM3 (ref 3.4–10.8)

## 2021-06-15 PROCEDURE — 96372 THER/PROPH/DIAG INJ SC/IM: CPT

## 2021-06-15 PROCEDURE — 36415 COLL VENOUS BLD VENIPUNCTURE: CPT

## 2021-06-15 PROCEDURE — 36416 COLLJ CAPILLARY BLOOD SPEC: CPT

## 2021-06-15 PROCEDURE — 85025 COMPLETE CBC W/AUTO DIFF WBC: CPT

## 2021-06-15 PROCEDURE — 25010000002 EPOETIN ALFA PER 1000 UNITS: Performed by: INTERNAL MEDICINE

## 2021-06-15 PROCEDURE — 85610 PROTHROMBIN TIME: CPT

## 2021-06-15 RX ORDER — WARFARIN SODIUM 1 MG/1
TABLET ORAL
Qty: 48 TABLET | Refills: 0 | OUTPATIENT
Start: 2021-06-15 | End: 2021-07-02 | Stop reason: SDUPTHER

## 2021-06-15 RX ADMIN — ERYTHROPOIETIN 6000 UNITS: 20000 INJECTION, SOLUTION INTRAVENOUS; SUBCUTANEOUS at 13:34

## 2021-06-15 NOTE — PROGRESS NOTES
Anticoagulation Clinic Progress Note    Anticoagulation Summary  As of 6/15/2021    INR goal:  2.0-3.0   TTR:  62.3 % (2.6 y)   INR used for dosin.0 (6/15/2021)   Warfarin maintenance plan:  1 mg every Mon, Wed, Fri; 2 mg all other days   Weekly warfarin total:  11 mg   No change documented:  Anna Marie Ndiaye   Plan last modified:  Shanna Major RPH (10/27/2020)   Next INR check:  2021   Priority:  Maintenance   Target end date:  Indefinite    Indications    Chronic atrial fibrillation (CMS/[facility]) [I48.20]             Anticoagulation Episode Summary     INR check location:      Preferred lab:      Send INR reminders to:   [facility]    Comments:        Anticoagulation Care Providers     Provider Role Specialty Phone number    Nik Galarza MD Referring Cardiology [phone]          Clinic Interview:  Patient Findings     Negatives:  Signs/symptoms of thrombosis, Signs/symptoms of bleeding,   Laboratory test error suspected, Change in health, Change in alcohol use,   Change in activity, Upcoming invasive procedure, Emergency department   visit, Upcoming dental procedure, Missed doses, Extra doses, Change in   medications, Change in diet/appetite, Hospital admission, Bruising, Other   complaints      Clinical Outcomes     Negatives:  Major bleeding event, Thromboembolic event,   Anticoagulation-related hospital admission, Anticoagulation-related ED   visit, Anticoagulation-related fatality        INR History:  Anticoagulation Monitoring 2021 2021 6/15/2021   INR 1.6 2.2 2.0   INR Date 2021 2021 6/15/2021   INR Goal 2.0-3.0 2.0-3.0 2.0-3.0   Trend Same Same Same   Last Week Total 11 mg 11 mg 11 mg   Next Week Total 12 mg 11 mg 11 mg   Sun 2 mg 2 mg 2 mg   Mon 1 mg 1 mg 1 mg   Tue 2 mg 2 mg 2 mg   Wed 2 mg (); Otherwise 1 mg 1 mg 1 mg   Thu 2 mg 2 mg 2 mg   Fri 1 mg 1 mg 1 mg   Sat 2 mg 2 mg 2 mg   Visit Report - - -   Some recent data might be hidden        Plan:  1. INR is therapeutic today- see above in Anticoagulation Summary.   Will instruct Janel Mahoney to continue their warfarin regimen- see above in Anticoagulation Summary.  2. Follow up in 4 weeks.  3. Patient declines warfarin refills.  4. Verbal and written information provided. Patient expresses understanding and has no further questions at this time.    Anna Marie Ndiaye

## 2021-06-16 RX ORDER — BUMETANIDE 2 MG/1
TABLET ORAL
Qty: 180 TABLET | Refills: 1 | Status: SHIPPED | OUTPATIENT
Start: 2021-06-16 | End: 2021-11-04

## 2021-06-17 RX ORDER — PANTOPRAZOLE SODIUM 40 MG/1
40 TABLET, DELAYED RELEASE ORAL 2 TIMES DAILY
Qty: 180 TABLET | Refills: 1 | Status: CANCELLED | OUTPATIENT
Start: 2021-06-17

## 2021-06-18 ENCOUNTER — OFFICE VISIT (OUTPATIENT)
Dept: FAMILY MEDICINE CLINIC | Facility: CLINIC | Age: 81
End: 2021-06-18

## 2021-06-18 VITALS
DIASTOLIC BLOOD PRESSURE: 70 MMHG | OXYGEN SATURATION: 99 % | BODY MASS INDEX: 26.87 KG/M2 | SYSTOLIC BLOOD PRESSURE: 114 MMHG | RESPIRATION RATE: 14 BRPM | TEMPERATURE: 97.5 F | HEIGHT: 62 IN | WEIGHT: 146 LBS | HEART RATE: 60 BPM

## 2021-06-18 DIAGNOSIS — I50.42 CHRONIC COMBINED SYSTOLIC AND DIASTOLIC CONGESTIVE HEART FAILURE (HCC): Primary | ICD-10-CM

## 2021-06-18 DIAGNOSIS — M10.00 IDIOPATHIC GOUT, UNSPECIFIED CHRONICITY, UNSPECIFIED SITE: ICD-10-CM

## 2021-06-18 DIAGNOSIS — R60.9 PERIPHERAL EDEMA: ICD-10-CM

## 2021-06-18 PROCEDURE — 99214 OFFICE O/P EST MOD 30 MIN: CPT | Performed by: INTERNAL MEDICINE

## 2021-06-18 RX ORDER — ALLOPURINOL 100 MG/1
100 TABLET ORAL DAILY
Qty: 90 TABLET | Refills: 1 | Status: SHIPPED | OUTPATIENT
Start: 2021-06-18 | End: 2021-06-18 | Stop reason: SDUPTHER

## 2021-06-18 RX ORDER — ALLOPURINOL 100 MG/1
TABLET ORAL
Qty: 270 TABLET | Refills: 1 | Status: SHIPPED | OUTPATIENT
Start: 2021-06-18 | End: 2021-06-23 | Stop reason: SDUPTHER

## 2021-06-18 NOTE — PROGRESS NOTES
Subjective   Janel Mahoney is a 80 y.o. female.     Vitals:    06/18/21 0948   BP: 114/70   Pulse: 60   Resp: 14   Temp: 97.5 °F (36.4 °C)   SpO2: 99%      Body mass index is 26.7 kg/m².     History of Present Illness   Patient was seen for congestive heart failure.  Patient's weight is stabilized between 150 and 146.  Patient has not gained any weight over the past several months.  Patient has also only mild PND orthopnea.  Blood pressures been running 114 over 70s.  Patient does have a history of gout and uric acid is 7.0.  Patient does have an occasional attack of gout.  Patient's allopurinol was increased to 200 mg daily.  Patient will have uric acid levels checked in 1 week.  Patient does have severe peripheral edema.  Patient was seeing a podiatrist who wrapped her right leg.  Patient's left leg started weeping and became very edematous.  Podiatrist was called and agreed to wrap the leg left leg today.    Dictated utilizing Dragon dictation. If there are questions or for further clarification, please contact me.  The following portions of the patient's history were reviewed and updated as appropriate: allergies, current medications, past family history, past medical history, past social history, past surgical history and problem list.    Review of Systems   Constitutional: Negative for fatigue and fever.   HENT: Positive for congestion. Negative for trouble swallowing.    Eyes: Negative for discharge and visual disturbance.   Respiratory: Negative for choking and shortness of breath.    Cardiovascular: Positive for leg swelling. Negative for chest pain and palpitations.   Gastrointestinal: Negative for abdominal pain and blood in stool.   Endocrine: Negative.    Genitourinary: Negative for genital sores and hematuria.   Musculoskeletal: Negative for gait problem and joint swelling.   Skin: Negative for color change, pallor, rash and wound.   Allergic/Immunologic: Positive for environmental allergies.  Negative for immunocompromised state.   Neurological: Negative for facial asymmetry and speech difficulty.   Psychiatric/Behavioral: Negative for hallucinations and suicidal ideas.       Objective   Physical Exam  Vitals and nursing note reviewed.   Constitutional:       Appearance: Normal appearance. She is well-developed.   HENT:      Head: Normocephalic and atraumatic.      Nose: Nose normal.      Mouth/Throat:      Mouth: Mucous membranes are moist.      Pharynx: Oropharynx is clear.   Eyes:      Extraocular Movements: Extraocular movements intact.      Conjunctiva/sclera: Conjunctivae normal.      Pupils: Pupils are equal, round, and reactive to light.   Cardiovascular:      Rate and Rhythm: Normal rate and regular rhythm.      Heart sounds: Normal heart sounds. No murmur heard.   No friction rub. No gallop.    Pulmonary:      Effort: Pulmonary effort is normal. No respiratory distress.      Breath sounds: Normal breath sounds. No stridor. No wheezing, rhonchi or rales.   Chest:      Chest wall: No tenderness.   Abdominal:      General: Bowel sounds are normal.      Palpations: Abdomen is soft.   Musculoskeletal:         General: Normal range of motion.      Cervical back: Normal range of motion and neck supple.   Skin:     General: Skin is warm and dry.   Neurological:      General: No focal deficit present.      Mental Status: She is alert and oriented to person, place, and time. Mental status is at baseline.   Psychiatric:         Mood and Affect: Mood normal.         Behavior: Behavior normal.         Thought Content: Thought content normal.         Judgment: Judgment normal.         Assessment/Plan #1 continue to monitor weight #2 increase allopurinol 200 mg daily #3 uric acid in 1 week #4 mapping of left leg  Problems Addressed this Visit     Cardiac and Vasculature            Chronic combined systolic and diastolic congestive heart failure (CMS/HCC) - Primary          Musculoskeletal and Injuries             Idiopathic gout    Relevant Orders    Uric Acid          Symptoms and Signs            Peripheral edema            Diagnoses     Diagnosis Codes Comments    Chronic combined systolic and diastolic congestive heart failure (CMS/[facility])    -  Primary ICD-10-CM: I50.42  ICD-9-CM: 428.42, 428.0     Idiopathic gout, unspecified chronicity, unspecified site     ICD-10-CM: M10.00  ICD-9-CM: 274.9     Peripheral edema     ICD-10-CM: R60.9  ICD-9-CM: 782.3

## 2021-06-23 RX ORDER — ALLOPURINOL 100 MG/1
TABLET ORAL
Qty: 270 TABLET | Refills: 1 | Status: SHIPPED | OUTPATIENT
Start: 2021-06-23 | End: 2021-06-29 | Stop reason: SDUPTHER

## 2021-06-24 ENCOUNTER — ANTICOAGULATION VISIT (OUTPATIENT)
Dept: PHARMACY | Facility: HOSPITAL | Age: 81
End: 2021-06-24

## 2021-06-24 DIAGNOSIS — I48.20 CHRONIC ATRIAL FIBRILLATION (HCC): Primary | ICD-10-CM

## 2021-06-24 NOTE — PROGRESS NOTES
Anticoagulation Clinic Progress Note    Anticoagulation Summary  As of 6/24/2021    INR goal:  2.0-3.0   TTR:  62.3 % (2.6 y)   INR used for dosing:  No new INR was available at the time of this encounter.   Warfarin maintenance plan:  1 mg every Mon, Wed, Fri; 2 mg all other days   Weekly warfarin total:  11 mg   Plan last modified:  Shanna Major RPH (10/27/2020)   Next INR check:  7/2/2021   Priority:  Maintenance   Target end date:  Indefinite    Indications    Chronic atrial fibrillation (CMS/HCC) [I48.20]             Anticoagulation Episode Summary     INR check location:      Preferred lab:      Send INR reminders to:   [facility]    Comments:        Anticoagulation Care Providers     Provider Role Specialty Phone number    Nik Galarza MD Referring Cardiology [phone]          Clinic Interview:  Patient Findings     Positives:  Upcoming invasive procedure, Missed doses    Negatives:  Signs/symptoms of thrombosis, Signs/symptoms of bleeding,   Laboratory test error suspected, Change in health, Change in alcohol use,   Change in activity, Emergency department visit, Upcoming dental procedure,   Extra doses, Change in medications, Change in diet/appetite, Hospital   admission, Bruising, Other complaints    Comments:  Pt/ report injection in back scheduled for 6/28/21, for   which pt began holding warfarin 6/22/21.       Clinical Outcomes     Negatives:  Major bleeding event, Thromboembolic event,   Anticoagulation-related hospital admission, Anticoagulation-related ED   visit, Anticoagulation-related fatality    Comments:  Pt/ report injection in back scheduled for 6/28/21, for   which pt began holding warfarin 6/22/21.         INR History:  Anticoagulation Monitoring 5/25/2021 6/15/2021 6/24/2021   INR 2.2 2.0 -   INR Date 5/25/2021 6/15/2021 -   INR Goal 2.0-3.0 2.0-3.0 2.0-3.0   Trend Same Same Same   Last Week Total 11 mg 11 mg 8 mg   Next Week Total 11 mg 11 mg 7  The patient is a 69y Female complaining of shortness of breath. mg   Sun 2 mg 2 mg Hold (6/27)   Mon 1 mg 1 mg 3 mg (6/28)   Tue 2 mg 2 mg 2 mg   Wed 1 mg 1 mg 2 mg (6/30)   Thu 2 mg 2 mg Hold (6/24); Otherwise 2 mg   Fri 1 mg 1 mg Hold (6/25)   Sat 2 mg 2 mg Hold (6/26)   Visit Report - - -   Some recent data might be hidden       Plan:  1. INR is unknown today- see above in Anticoagulation Summary.   Will instruct Janel Mahoney to Change their warfarin regimen- see above in Anticoagulation Summary.  2. Spoke w/ Joellen/Dr. Galarza, who advised enoxaparrin bridge beginning tomorrow. Reports wt of 140 lbs (63.6 kg) today; CrCl <30 mL/min. Administer enoxaparin 60 mg q24h surrounding procedure as directed (see patient instructions).  3. Follow up 7/2/21  4. They have been instructed to call if any changes in medications, doses, concerns, etc. Reviewed enoxaparin use.  picked up bridging calendar from clinic. Patient expresses understanding and has no further questions at this time.    Vargas Butcher [facility]

## 2021-06-24 NOTE — PATIENT INSTRUCTIONS
6/24  HOLD warfarin  6/25  HOLD warfarin; enoxaparin 60 mg every 24 hours  6/26  HOLD warfarin; enoxaparin 60 mg every 24 hours  6/27  HOLD warfarin; HOLD enoxaparin  6/28  PROCEDURE; warfarin 3 mg  6/29  Warfarin 2 mg; enoxaparin 60 mg every 24 hours  6/30  Warfarin 2 mg; enoxaparin 60 mg every 24 hours  7/1    Warfarin 2 mg; enoxaparin 60 mg every 24 hours  7/2    INR Check

## 2021-06-29 RX ORDER — ALLOPURINOL 100 MG/1
TABLET ORAL
Qty: 270 TABLET | Refills: 1 | Status: SHIPPED | OUTPATIENT
Start: 2021-06-29 | End: 2021-06-30 | Stop reason: SDUPTHER

## 2021-06-29 RX ORDER — SIMVASTATIN 20 MG
TABLET ORAL
Qty: 180 TABLET | Refills: 1 | Status: SHIPPED | OUTPATIENT
Start: 2021-06-29 | End: 2022-01-01

## 2021-06-29 NOTE — TELEPHONE ENCOUNTER
Caller: Janel Mahoney    Relationship: Self    Best call back number:     Medication needed:   Requested Prescriptions     Pending Prescriptions Disp Refills   • allopurinol (ZYLOPRIM) 100 MG tablet 270 tablet 1     Si po qday       When do you need the refill by:       What additional details did the patient provide when requesting the medication: PATIENT STATES THAT DR LYNN HAS CHANGED HER TO TAKING TWO PILLS A DAY INSTEAD OF ONE PILL A DAY SO SHE WILL NEED A NEW PRESCRIPTION WRITTEN FOR THIS BEFORE IT IS FILLED.  SHE NEEDS THE NEW PRESCRIPTION TO BE IN DR JESSICA NAME AND NOT DR LOYA    Does the patient have less than a 3 day supply:  [x] Yes  [] No    What is the patient's preferred pharmacy:  [facility]  [address]  [phone]

## 2021-06-30 ENCOUNTER — TELEPHONE (OUTPATIENT)
Dept: FAMILY MEDICINE CLINIC | Facility: CLINIC | Age: 81
End: 2021-06-30

## 2021-06-30 RX ORDER — ALLOPURINOL 100 MG/1
TABLET ORAL
Qty: 270 TABLET | Refills: 1 | Status: SHIPPED | OUTPATIENT
Start: 2021-06-30 | End: 2021-11-02 | Stop reason: SDUPTHER

## 2021-06-30 NOTE — TELEPHONE ENCOUNTER
PATIENT'S  CALLED AND STATED THAT [facility] CALLED AND ADVISED THAT PATIENT COULD NOT REFILL THIS PRESCRIPTION UNTIL 9/2021 OF THE allopurinol (ZYLOPRIM) 100 MG tablet.  PLEASE SEND A NEW PRESCRIPTION WITH DR. LYNN AS THE PROVIDER TO [facility].  PLEASE CONTACT PATIENT'S  TO [facility] @ [phone]

## 2021-07-02 ENCOUNTER — ANTICOAGULATION VISIT (OUTPATIENT)
Dept: PHARMACY | Facility: HOSPITAL | Age: 81
End: 2021-07-02

## 2021-07-02 DIAGNOSIS — I48.20 CHRONIC ATRIAL FIBRILLATION (HCC): Primary | ICD-10-CM

## 2021-07-02 LAB
INR PPP: 1.4 (ref 0.91–1.09)
PROTHROMBIN TIME: 17 SECONDS (ref 10–13.8)

## 2021-07-02 PROCEDURE — 85610 PROTHROMBIN TIME: CPT

## 2021-07-02 PROCEDURE — 36416 COLLJ CAPILLARY BLOOD SPEC: CPT

## 2021-07-02 PROCEDURE — G0463 HOSPITAL OUTPT CLINIC VISIT: HCPCS

## 2021-07-02 RX ORDER — WARFARIN SODIUM 1 MG/1
TABLET ORAL
Qty: 48 TABLET | Refills: 0 | OUTPATIENT
Start: 2021-07-02 | End: 2022-01-01

## 2021-07-02 NOTE — PROGRESS NOTES
Anticoagulation Clinic Progress Note    Anticoagulation Summary  As of 2021    INR goal:  2.0-3.0   TTR:  61.2 % (2.6 y)   INR used for dosin.4 (2021)   Warfarin maintenance plan:  1 mg every Mon, Wed, Fri; 2 mg all other days   Weekly warfarin total:  11 mg   Plan last modified:  Shanna Major RPH (10/27/2020)   Next INR check:  2021   Priority:  Maintenance   Target end date:  Indefinite    Indications    Chronic atrial fibrillation (CMS/HCC) [I48.20]             Anticoagulation Episode Summary     INR check location:      Preferred lab:      Send INR reminders to:   [facility]    Comments:        Anticoagulation Care Providers     Provider Role Specialty Phone number    Nik Galarza MD Referring Cardiology [phone]          Clinic Interview:  Patient Findings     Negatives:  Signs/symptoms of thrombosis, Signs/symptoms of bleeding,   Laboratory test error suspected, Change in health, Change in alcohol use,   Change in activity, Upcoming invasive procedure, Emergency department   visit, Upcoming dental procedure, Missed doses, Extra doses, Change in   medications, Change in diet/appetite, Hospital admission, Bruising, Other   complaints    Comments:  Followed periprocedural directions as instructed at last   encounter.       Clinical Outcomes     Negatives:  Major bleeding event, Thromboembolic event,   Anticoagulation-related hospital admission, Anticoagulation-related ED   visit, Anticoagulation-related fatality    Comments:  Followed periprocedural directions as instructed at last   encounter.         INR History:  Anticoagulation Monitoring 6/15/2021 2021 2021   INR 2.0 - 1.4   INR Date 6/15/2021 - 2021   INR Goal 2.0-3.0 2.0-3.0 2.0-3.0   Trend Same Same Same   Last Week Total 11 mg 8 mg 9 mg   Next Week Total 11 mg 7 mg 13 mg   Sun 2 mg Hold () 2 mg   Mon 1 mg 3 mg () -   Tue 2 mg 2 mg -   Wed 1 mg 2 mg () -   Thu 2 mg Hold ();  Otherwise 2 mg -   Fri 1 mg Hold (6/25) 3 mg (7/2)   Sat 2 mg Hold (6/26) 2 mg   Visit Report - - -   Some recent data might be hidden       Plan:  1. INR is Subtherapeutic today- see above in Anticoagulation Summary.  Will instruct Janel Mahoney to Change their warfarin regimen- see above in Anticoagulation Summary.  2. Continue enoxaparin 60 mg q24h (reports has 1 syringe remaining; refill sent to pharmacy for 2 additional syringes)  3. Follow up in 3 days  4. Patient declines warfarin refills.  5. Verbal and written information provided. Patient expresses understanding and has no further questions at this time.    Vargas Butcher [facility]

## 2021-07-05 ENCOUNTER — ANTICOAGULATION VISIT (OUTPATIENT)
Dept: PHARMACY | Facility: HOSPITAL | Age: 81
End: 2021-07-05

## 2021-07-05 DIAGNOSIS — I48.20 CHRONIC ATRIAL FIBRILLATION (HCC): Primary | ICD-10-CM

## 2021-07-05 LAB
INR PPP: 2 (ref 0.91–1.09)
PROTHROMBIN TIME: 23.4 SECONDS (ref 10–13.8)

## 2021-07-05 PROCEDURE — 85610 PROTHROMBIN TIME: CPT

## 2021-07-05 PROCEDURE — 36416 COLLJ CAPILLARY BLOOD SPEC: CPT

## 2021-07-05 PROCEDURE — G0463 HOSPITAL OUTPT CLINIC VISIT: HCPCS

## 2021-07-05 NOTE — PROGRESS NOTES
Anticoagulation Clinic Progress Note    Anticoagulation Summary  As of 2021    INR goal:  2.0-3.0   TTR:  61.0 % (2.6 y)   INR used for dosin.0 (2021)   Warfarin maintenance plan:  1 mg every Mon, Wed, Fri; 2 mg all other days   Weekly warfarin total:  11 mg   No change documented:  Vargas Butcher RPH   Plan last modified:  Shanna Major RPH (10/27/2020)   Next INR check:  2021   Priority:  Maintenance   Target end date:  Indefinite    Indications    Chronic atrial fibrillation (CMS/[facility]) [I48.20]             Anticoagulation Episode Summary     INR check location:      Preferred lab:      Send INR reminders to:   [facility]    Comments:        Anticoagulation Care Providers     Provider Role Specialty Phone number    Nik Galarza MD Referring Cardiology [phone]          Clinic Interview:  Patient Findings     Negatives:  Signs/symptoms of thrombosis, Signs/symptoms of bleeding,   Laboratory test error suspected, Change in health, Change in alcohol use,   Change in activity, Upcoming invasive procedure, Emergency department   visit, Upcoming dental procedure, Missed doses, Extra doses, Change in   medications, Change in diet/appetite, Hospital admission, Bruising, Other   complaints      Clinical Outcomes     Negatives:  Major bleeding event, Thromboembolic event,   Anticoagulation-related hospital admission, Anticoagulation-related ED   visit, Anticoagulation-related fatality        INR History:  Anticoagulation Monitoring 2021   INR - 1.4 2.0   INR Date - 2021   INR Goal 2.0-3.0 2.0-3.0 2.0-3.0   Trend Same Same Same   Last Week Total 8 mg 9 mg 16 mg   Next Week Total 7 mg 13 mg 11 mg   Sun Hold () 2 mg 2 mg   Mon 3 mg () - 1 mg   Tue 2 mg - 2 mg   Wed 2 mg () - 1 mg   Thu Hold (); Otherwise 2 mg - 2 mg   Fri Hold () 3 mg () 1 mg   Sat Hold () 2 mg 2 mg   Visit Report - - -   Some recent data might be hidden        Plan:  1. INR is Therapeutic today- see above in Anticoagulation Summary.  Will instruct Janel Mahoney to resume warfarin 1 mg MWF, 2 mg AOD (previously stable on this dose prior to holding for procedure) - see above in Anticoagulation Summary.  2. Discontinue enoxaparin now that INR is therapeutic.   3. Follow up in 1 week  4. Patient declines warfarin refills.  5. Verbal and written information provided. Patient expresses understanding and has no further questions at this time.    Vargas Butcher RP

## 2021-07-13 ENCOUNTER — INFUSION (OUTPATIENT)
Dept: ONCOLOGY | Facility: HOSPITAL | Age: 81
End: 2021-07-13

## 2021-07-13 ENCOUNTER — ANTICOAGULATION VISIT (OUTPATIENT)
Dept: PHARMACY | Facility: HOSPITAL | Age: 81
End: 2021-07-13

## 2021-07-13 ENCOUNTER — LAB (OUTPATIENT)
Dept: LAB | Facility: HOSPITAL | Age: 81
End: 2021-07-13

## 2021-07-13 DIAGNOSIS — N18.31 ANEMIA IN STAGE 3A CHRONIC KIDNEY DISEASE (HCC): Primary | ICD-10-CM

## 2021-07-13 DIAGNOSIS — D50.9 IRON DEFICIENCY ANEMIA, UNSPECIFIED IRON DEFICIENCY ANEMIA TYPE: ICD-10-CM

## 2021-07-13 DIAGNOSIS — D69.6 THROMBOCYTOPENIA (HCC): ICD-10-CM

## 2021-07-13 DIAGNOSIS — I48.20 CHRONIC ATRIAL FIBRILLATION (HCC): Primary | ICD-10-CM

## 2021-07-13 DIAGNOSIS — D63.1 ANEMIA IN STAGE 3A CHRONIC KIDNEY DISEASE (HCC): Primary | ICD-10-CM

## 2021-07-13 DIAGNOSIS — N18.30 STAGE 3 CHRONIC KIDNEY DISEASE, UNSPECIFIED WHETHER STAGE 3A OR 3B CKD (HCC): Primary | ICD-10-CM

## 2021-07-13 LAB
BASOPHILS # BLD AUTO: 0.03 10*3/MM3 (ref 0–0.2)
BASOPHILS NFR BLD AUTO: 0.4 % (ref 0–1.5)
DEPRECATED RDW RBC AUTO: 58.9 FL (ref 37–54)
EOSINOPHIL # BLD AUTO: 0.08 10*3/MM3 (ref 0–0.4)
EOSINOPHIL NFR BLD AUTO: 0.9 % (ref 0.3–6.2)
ERYTHROCYTE [DISTWIDTH] IN BLOOD BY AUTOMATED COUNT: 16.2 % (ref 12.3–15.4)
FERRITIN SERPL-MCNC: 513.2 NG/ML (ref 13–150)
HCT VFR BLD AUTO: 31.1 % (ref 34–46.6)
HGB BLD-MCNC: 9.7 G/DL (ref 12–15.9)
IMM GRANULOCYTES # BLD AUTO: 0.03 10*3/MM3 (ref 0–0.05)
IMM GRANULOCYTES NFR BLD AUTO: 0.4 % (ref 0–0.5)
INR PPP: 2.6 (ref 0.91–1.09)
IRON 24H UR-MRATE: 89 MCG/DL (ref 37–145)
IRON SATN MFR SERPL: 30 % (ref 14–48)
LYMPHOCYTES # BLD AUTO: 0.86 10*3/MM3 (ref 0.7–3.1)
LYMPHOCYTES NFR BLD AUTO: 10 % (ref 19.6–45.3)
MCH RBC QN AUTO: 31.6 PG (ref 26.6–33)
MCHC RBC AUTO-ENTMCNC: 31.2 G/DL (ref 31.5–35.7)
MCV RBC AUTO: 101.3 FL (ref 79–97)
MONOCYTES # BLD AUTO: 0.42 10*3/MM3 (ref 0.1–0.9)
MONOCYTES NFR BLD AUTO: 4.9 % (ref 5–12)
NEUTROPHILS NFR BLD AUTO: 7.15 10*3/MM3 (ref 1.7–7)
NEUTROPHILS NFR BLD AUTO: 83.4 % (ref 42.7–76)
NRBC BLD AUTO-RTO: 0 /100 WBC (ref 0–0.2)
PLATELET # BLD AUTO: 127 10*3/MM3 (ref 140–450)
PMV BLD AUTO: 10.7 FL (ref 6–12)
PROTHROMBIN TIME: 30.7 SECONDS (ref 10–13.8)
RBC # BLD AUTO: 3.07 10*6/MM3 (ref 3.77–5.28)
TIBC SERPL-MCNC: 300 MCG/DL (ref 249–505)
TRANSFERRIN SERPL-MCNC: 214 MG/DL (ref 200–360)
WBC # BLD AUTO: 8.57 10*3/MM3 (ref 3.4–10.8)

## 2021-07-13 PROCEDURE — 85610 PROTHROMBIN TIME: CPT

## 2021-07-13 PROCEDURE — 82728 ASSAY OF FERRITIN: CPT

## 2021-07-13 PROCEDURE — 96372 THER/PROPH/DIAG INJ SC/IM: CPT

## 2021-07-13 PROCEDURE — 83540 ASSAY OF IRON: CPT

## 2021-07-13 PROCEDURE — 85025 COMPLETE CBC W/AUTO DIFF WBC: CPT

## 2021-07-13 PROCEDURE — 36416 COLLJ CAPILLARY BLOOD SPEC: CPT

## 2021-07-13 PROCEDURE — 84466 ASSAY OF TRANSFERRIN: CPT

## 2021-07-13 PROCEDURE — 36415 COLL VENOUS BLD VENIPUNCTURE: CPT

## 2021-07-13 PROCEDURE — 25010000002 EPOETIN ALFA PER 1000 UNITS: Performed by: INTERNAL MEDICINE

## 2021-07-13 RX ADMIN — ERYTHROPOIETIN 6000 UNITS: 20000 INJECTION, SOLUTION INTRAVENOUS; SUBCUTANEOUS at 13:34

## 2021-07-13 NOTE — PROGRESS NOTES
Anticoagulation Clinic Progress Note    Anticoagulation Summary  As of 2021    INR goal:  2.0-3.0   TTR:  61.3 % (2.6 y)   INR used for dosin.6 (2021)   Warfarin maintenance plan:  1 mg every Mon, Wed, Fri; 2 mg all other days   Weekly warfarin total:  11 mg   No change documented:  Lee Ann Diamond   Plan last modified:  Shanna Major RPH (10/27/2020)   Next INR check:  2021   Priority:  Maintenance   Target end date:  Indefinite    Indications    Chronic atrial fibrillation (CMS/HCC) [I48.20]             Anticoagulation Episode Summary     INR check location:      Preferred lab:      Send INR reminders to:   [facility] [place]    Comments:        Anticoagulation Care Providers     Provider Role Specialty Phone number    Nik Galarza MD Referring Cardiology [phone]          Clinic Interview:  Patient Findings     Negatives:  Signs/symptoms of thrombosis, Signs/symptoms of bleeding,   Laboratory test error suspected, Change in health, Change in alcohol use,   Change in activity, Upcoming invasive procedure, Emergency department   visit, Upcoming dental procedure, Missed doses, Extra doses, Change in   medications, Change in diet/appetite, Hospital admission, Bruising, Other   complaints      Clinical Outcomes     Negatives:  Major bleeding event, Thromboembolic event,   Anticoagulation-related hospital admission, Anticoagulation-related ED   visit, Anticoagulation-related fatality        INR History:  Anticoagulation Monitoring 2021   INR 1.4 2.0 2.6   INR Date 2021   INR Goal 2.0-3.0 2.0-3.0 2.0-3.0   Trend Same Same Same   Last Week Total 9 mg 16 mg 11 mg   Next Week Total 13 mg 11 mg 11 mg   Sun 2 mg 2 mg 2 mg   Mon - 1 mg 1 mg   Tue - 2 mg 2 mg   Wed - 1 mg 1 mg   Thu - 2 mg 2 mg   Fri 3 mg () 1 mg 1 mg   Sat 2 mg 2 mg 2 mg   Visit Report - - -   Some recent data might be hidden       Plan:  1. INR is therapeutic  today- see above in Anticoagulation Summary.   Will instruct Janel DORINA Mahoney to continue their warfarin regimen- see above in Anticoagulation Summary.  2. Follow up in 2 weeks.  3. Patient declines warfarin refills.  4. Verbal and written information provided. Patient expresses understanding and has no further questions at this time.    Lee Ann Diamond

## 2021-07-20 ENCOUNTER — OFFICE VISIT (OUTPATIENT)
Dept: FAMILY MEDICINE CLINIC | Facility: CLINIC | Age: 81
End: 2021-07-20

## 2021-07-20 ENCOUNTER — LAB (OUTPATIENT)
Dept: FAMILY MEDICINE CLINIC | Facility: CLINIC | Age: 81
End: 2021-07-20

## 2021-07-20 VITALS
BODY MASS INDEX: 26.13 KG/M2 | HEIGHT: 62 IN | TEMPERATURE: 97.5 F | DIASTOLIC BLOOD PRESSURE: 60 MMHG | OXYGEN SATURATION: 100 % | HEART RATE: 60 BPM | SYSTOLIC BLOOD PRESSURE: 122 MMHG | WEIGHT: 142 LBS

## 2021-07-20 DIAGNOSIS — M10.00 IDIOPATHIC GOUT, UNSPECIFIED CHRONICITY, UNSPECIFIED SITE: Primary | ICD-10-CM

## 2021-07-20 LAB — URATE SERPL-MCNC: 5.4 MG/DL (ref 2.4–5.7)

## 2021-07-20 PROCEDURE — 36415 COLL VENOUS BLD VENIPUNCTURE: CPT | Performed by: INTERNAL MEDICINE

## 2021-07-20 PROCEDURE — 84550 ASSAY OF BLOOD/URIC ACID: CPT | Performed by: INTERNAL MEDICINE

## 2021-07-23 ENCOUNTER — TELEPHONE (OUTPATIENT)
Dept: ONCOLOGY | Facility: CLINIC | Age: 81
End: 2021-07-23

## 2021-07-23 NOTE — TELEPHONE ENCOUNTER
Provider: DR GALLARDO    Caller: LE    Relationship to Patient: SELF    Phone Number:[phone]    Reason for Call: PATIENT STATED HER PCP STATED HER IRON LEVEL WAS EXTRMELY HIGH. PATIENT NEEDED TO KNOW WHAT TO DO. PLEASE CALL PATIENT BACK TO [facility]

## 2021-07-27 ENCOUNTER — CLINICAL SUPPORT NO REQUIREMENTS (OUTPATIENT)
Dept: CARDIOLOGY | Facility: CLINIC | Age: 81
End: 2021-07-27

## 2021-07-27 ENCOUNTER — OFFICE VISIT (OUTPATIENT)
Dept: CARDIOLOGY | Facility: CLINIC | Age: 81
End: 2021-07-27

## 2021-07-27 ENCOUNTER — ANTICOAGULATION VISIT (OUTPATIENT)
Dept: PHARMACY | Facility: HOSPITAL | Age: 81
End: 2021-07-27

## 2021-07-27 VITALS
BODY MASS INDEX: 25.76 KG/M2 | HEART RATE: 60 BPM | DIASTOLIC BLOOD PRESSURE: 62 MMHG | HEIGHT: 62 IN | SYSTOLIC BLOOD PRESSURE: 134 MMHG | WEIGHT: 140 LBS

## 2021-07-27 DIAGNOSIS — I47.20 VENTRICULAR TACHYCARDIA (HCC): ICD-10-CM

## 2021-07-27 DIAGNOSIS — I48.20 CHRONIC ATRIAL FIBRILLATION (HCC): Primary | ICD-10-CM

## 2021-07-27 DIAGNOSIS — I25.5 ISCHEMIC CARDIOMYOPATHY: ICD-10-CM

## 2021-07-27 DIAGNOSIS — I47.20 VENTRICULAR TACHYCARDIA (HCC): Primary | ICD-10-CM

## 2021-07-27 LAB
INR PPP: 2.4 (ref 0.91–1.09)
PROTHROMBIN TIME: 29 SECONDS (ref 10–13.8)

## 2021-07-27 PROCEDURE — 99214 OFFICE O/P EST MOD 30 MIN: CPT | Performed by: INTERNAL MEDICINE

## 2021-07-27 PROCEDURE — 93000 ELECTROCARDIOGRAM COMPLETE: CPT | Performed by: INTERNAL MEDICINE

## 2021-07-27 PROCEDURE — 85610 PROTHROMBIN TIME: CPT

## 2021-07-27 PROCEDURE — 36416 COLLJ CAPILLARY BLOOD SPEC: CPT

## 2021-07-27 PROCEDURE — 93284 PRGRMG EVAL IMPLANTABLE DFB: CPT | Performed by: INTERNAL MEDICINE

## 2021-07-27 NOTE — PROGRESS NOTES
Date of Office Visit: 2021  Encounter Provider: Gary Boucher MD  Place of Service: [facility] CARDIOLOGY  Patient Name: Janel Mahoney  :1940    Chief Complaint   Patient presents with   • ventricular tachycardia   • s/p MVR   • ICD check   :     HPI: Janel Mahoney is a 80 y.o. female who presents today for routine follow-up of ischemic cardiomyopathy, ventricular tachycardia, atrial fibrillation    Last year the patient had several episodes of ventricular tachycardia that were successful treated with ATP.  She was asymptomatic with these episodes so we elected to follow clinically.    She generally has done well, but she did have another treated event in April.     She is severely limited with back pain.  She has a recent injection and was off her warfarin, but has restarted.         Past Medical History:   Diagnosis Date   • Acute kidney injury (CMS/HCC)    • Anemia    • Atrial fibrillation (CMS/HCC)    • Bruises easily    • Carotid artery stenosis    • Chronic back pain    • Chronic combined systolic and diastolic congestive heart failure (CMS/HCC)    • Chronic coronary artery disease     moderate to severe LV dysfunction.   • Chronic kidney disease, stage 3 (CMS/HCC)    • Dysphagia    • GERD (gastroesophageal reflux disease)    • Gout    • H/O cardiac murmur    • Hematoma     LEFT LEG; DR ALONZO AWARE   • [place] (hard of hearing)     wears hearing aids   • Hyperlipidemia    • Hypertension    • Hypotension    • ICD (implantable cardioverter-defibrillator) in place    • Ischemic cardiomyopathy    • Kyphoscoliosis    • Leukopenia    • Lumbar spondylosis    • Obesity    • GEORGINA (obstructive sleep apnea) 2013    Overnight polysomnogram, weight 168 pounds.  AHI mildly abnormal at 10.3 events per hour.  Respiratory effort related arousals 9.5 events per hour.  Total time snoring 30% and arousals associated with snoring 15 events per hour.   • Osteoarthritis    •  Osteoporosis    • Peptic ulcer    • Premature ventricular contractions    • Renal insufficiency syndrome    • Scoliosis    • Shoulder fracture, left    • Stroke syndrome    • Thrombocytopenia (CMS/HCC)    • Urine incontinence    • Ventricular tachycardia (CMS/HCC)    • Vitamin B12 deficiency        Past Surgical History:   Procedure Laterality Date   • AV NODE ABLATION     • BREAST BIOPSY     • CARDIAC CATHETERIZATION      Showed severe mitral insufficiency and borderline coronary artery disease   • CARDIAC CATHETERIZATION      Showed an ejection fraction of 35%. She had occlusive disease of the right posterior LV branch and no other significant disease, treated medically.   • CARDIAC DEFIBRILLATOR PLACEMENT      Biventricular   • CARDIAC ELECTROPHYSIOLOGY PROCEDURE N/A 1/4/2019    Procedure: GENERATOR CHANGE BI-V ICD   [place];  Surgeon: James Hwang MD;  Location: [facility] CATH INVASIVE LOCATION;  Service: Cardiology   • CARDIAC VALVE REPLACEMENT  2009    Done with stent placement   • CARDIOVERSION      multiple electrocardioversions.   • CAROTID ARTERY ANGIOPLASTY Right    • COLONOSCOPY N/A 9/28/2017    Procedure: COLONOSCOPY TO CECUM;  Surgeon: Kevin Davis MD;  Location: [facility] ENDOSCOPY;  Service:    • CORONARY ANGIOPLASTY WITH STENT PLACEMENT  2009   • CORONARY ARTERY BYPASS GRAFT      single graft to the PDA   • CORONARY STENT PLACEMENT     • ENDOSCOPY N/A 9/28/2017    Procedure: ESOPHAGOGASTRODUODENOSCOPY ;  Surgeon: Kevin Davis MD;  Location: [facility] ENDOSCOPY;  Service:    • HEMORRHOIDECTOMY     • HYSTERECTOMY     • INCISION AND DRAINAGE TRUNK Right 11/27/2018    Procedure: EVACUATION OF RIGHT CHEST WALL HEMATOMA;  Surgeon: Juvencio Rodriguez MD;  Location: [facility] OR;  Service: General   • MITRAL VALVE REPLACEMENT  01/2010    #31 Epic porcine valve.   • THROMBOENDARTERECTOMY Right     carotid thromboendarterectomy    • TONSILLECTOMY      age 32   • TOTAL KNEE ARTHROPLASTY Left    • TOTAL  KNEE ARTHROPLASTY REVISION Left 2019    Procedure: TOTAL KNEE ARTHROPLASTY REVISION LEFT;  Surgeon: Juvencio Pressley II, MD;  Location: [facility] OR;  Service: Orthopedics   • TOTAL SHOULDER ARTHROPLASTY W/ DISTAL CLAVICLE EXCISION Left 2018    Procedure: TOTAL SHOULDER REVERSE ARTHROPLASTY;  Surgeon: Bianka Quesada MD;  Location: [facility] OR;  Service:        Social History     Socioeconomic History   • Marital status:      Spouse name: Russ   • Number of children: Not on file   • Years of education: Not on file   • Highest education level: Not on file   Tobacco Use   • Smoking status: Former Smoker     Packs/day: 1.00     Years: 50.00     Pack years: 50.00     Types: Cigarettes     Quit date: 2009     Years since quittin.5   • Smokeless tobacco: Never Used   • Tobacco comment: Daily caffeine - soda   Vaping Use   • Vaping Use: Never used   Substance and Sexual Activity   • Alcohol use: Yes     Comment: rare   • Drug use: No   • Sexual activity: Defer       Family History   Problem Relation Age of Onset   • Cancer Mother    • Breast cancer Mother    • Heart disease Mother    • Hypertension Mother    • Stroke Mother    • Coronary artery disease Father    • Stroke Father    • Heart disease Father    • Hypertension Father    • Other Daughter         thiamin deficiency    • Hypertension Son    • Lung disease Other    • Hypertension Other    • Malig Hyperthermia Neg Hx        Review of Systems   Constitutional: Negative.   Cardiovascular: Negative.    Respiratory: Negative.    Gastrointestinal: Negative.        Allergies   Allergen Reactions   • Diclofenac GI Bleeding     She had adverse effects from the medications that required hospitalization. Pt got stomach ulcers from this medication prescribed by Dr. Arango - pediatrist.         Current Outpatient Medications:   •  alendronate (FOSAMAX) 70 MG tablet, Take 70 mg by mouth Every 14 (Fourteen) Days., Disp: , Rfl:   •  " allopurinol (ZYLOPRIM) 100 MG tablet, 2 po qday, Disp: 270 tablet, Rfl: 1  •  aspirin 81 MG tablet, Take 81 mg by mouth Daily. PT CALLING TO SEE IF SHE HAS TO STOP PRIOR TO SURGERY, Disp: , Rfl:   •  bumetanide (BUMEX) 2 MG tablet, TAKE 1 TABLET TWICE DAILY. DOSE CHANGED , Disp: 180 tablet, Rfl: 1  •  calcitriol (ROCALTROL) 0.25 MCG capsule, Take 0.25 mcg by mouth 2 (Two) Times a Day., Disp: , Rfl:   •  carvedilol (COREG) 25 MG tablet, TAKE 1 TABLET TWICE DAILY, Disp: 180 tablet, Rfl: 1  •  Cholecalciferol (VITAMIN D) 2000 UNITS tablet, Take 2,000 Units by mouth Daily., Disp: , Rfl:   •  epoetin adele (EPOGEN,PROCRIT) 94312 UNIT/ML injection, Inject 10,000 Units under the skin into the appropriate area as directed As Needed., Disp: , Rfl:   •  ferrous sulfate 325 (65 FE) MG tablet, Take 1 tablet by mouth Every Other Day., Disp: , Rfl:   •  Multiple Vitamins-Minerals (SENIOR MULTIVITAMIN PLUS) tablet, Take 1 tablet by mouth Daily., Disp: , Rfl:   •  pantoprazole (PROTONIX) 40 MG EC tablet, Take 1 tablet by mouth 2 (Two) Times a Day., Disp: 60 tablet, Rfl: 5  •  ramipril (ALTACE) 5 MG capsule, TAKE 1 CAPSULE EVERY DAY, Disp: 90 capsule, Rfl: 1  •  simvastatin (ZOCOR) 20 MG tablet, TAKE 2 TABLETS EVERY NIGHT, Disp: 180 tablet, Rfl: 1  •  traMADol (ULTRAM) 50 MG tablet, Take 2 tablets by mouth 2 (Two) Times a Day. 3 months, Disp: 180 tablet, Rfl: 1  •  vitamin B-12 (CYANOCOBALAMIN) 1000 MCG tablet, Take 1,000 mcg by mouth Daily., Disp: , Rfl:   •  warfarin (COUMADIN) 1 MG tablet, Take one tablet (1mg) by mouth on Mon, Wed, Fri and 2 tablets (2mg) on all other days or as directed by [facility]., Disp: 48 tablet, Rfl: 0  •  zolpidem (Ambien) 10 MG tablet, Take 1 tablet by mouth At Night As Needed for Sleep., Disp: 90 tablet, Rfl: 1      Objective:     Vitals:    07/27/21 1123   BP: 134/62   Pulse: 60   Weight: 63.5 kg (140 lb)   Height: 157.5 cm (62\")     Body mass index is 25.61 kg/m².    PHYSICAL " EXAM:    Vitals and nursing note reviewed.   Constitutional:       Appearance: Frail. Chronically ill-appearing.   HENT:      Head: Normocephalic and atraumatic.    Mouth/Throat:      Pharynx: Oropharynx is clear.   Pulmonary:      Effort: Pulmonary effort is normal.   Cardiovascular:      Normal rate. Regular rhythm.   Edema:     Peripheral edema absent.   Skin:     General: Skin is warm.      Findings: Bruising present.   Neurological:      Mental Status: Alert, oriented to person, place, and time and oriented to person, place and time.   Psychiatric:         Attention and Perception: Attention normal.         Behavior: Behavior is cooperative.             ECG 12 Lead    Date/Time: 7/27/2021 10:54 PM  Performed by: Gary Boucher MD  Authorized by: Gary Boucher MD   Comparison: compared with previous ECG from 12/30/2020  Similar to previous ECG  Rhythm: atrial fibrillation and paced              Assessment:       Diagnosis Plan   1. Chronic atrial fibrillation (CMS/HCC)     2. Ischemic cardiomyopathy     3. Ventricular tachycardia (CMS/HCC)            Plan:       She has occasional episodes of ventricular tachycardia that have been successfully treated with ATP.  They have been asymptomatic.  She is far too frail to consider ablation.  The only option would be amiodarone, but as events have been asymptomatic I don't think there is any benefit at this point.  We will continue follow the device remotely, and see in one year.  She is also following with Dr. Galarza.     As always, it has been a pleasure to participate in your patient's care.      Sincerely,         Gary Boucher MD

## 2021-07-27 NOTE — PROGRESS NOTES
Anticoagulation Clinic Progress Note    Anticoagulation Summary  As of 2021    INR goal:  2.0-3.0   TTR:  61.9 % (2.7 y)   INR used for dosin.4 (2021)   Warfarin maintenance plan:  1 mg every Mon, Wed, Fri; 2 mg all other days   Weekly warfarin total:  11 mg   No change documented:  Anna Marie Ndiaye   Plan last modified:  Shanna Major RPH (10/27/2020)   Next INR check:  2021   Priority:  Maintenance   Target end date:  Indefinite    Indications    Chronic atrial fibrillation (CMS/[facility]) [I48.20]             Anticoagulation Episode Summary     INR check location:      Preferred lab:      Send INR reminders to:   [facility] [place]    Comments:        Anticoagulation Care Providers     Provider Role Specialty Phone number    Nik Galarza MD Referring Cardiology [phone]          Clinic Interview:  Patient Findings     Positives:  Other complaints    Negatives:  Signs/symptoms of thrombosis, Signs/symptoms of bleeding,   Laboratory test error suspected, Change in health, Change in alcohol use,   Change in activity, Upcoming invasive procedure, Emergency department   visit, Upcoming dental procedure, Missed doses, Extra doses, Change in   medications, Change in diet/appetite, Hospital admission, Bruising    Comments:  Back pain and right arm pain, hydrocodone given to patient to   take everyday however she does not. She states she does just take her   tramadol 50mg      Clinical Outcomes     Negatives:  Major bleeding event, Thromboembolic event,   Anticoagulation-related hospital admission, Anticoagulation-related ED   visit, Anticoagulation-related fatality    Comments:  Back pain and right arm pain, hydrocodone given to patient to   take everyday however she does not. She states she does just take her   tramadol 50mg        INR History:  Anticoagulation Monitoring 2021   INR 2.0 2.6 2.4   INR Date 2021   INR Goal 2.0-3.0 2.0-3.0  2.0-3.0   Trend Same Same Same   Last Week Total 16 mg 11 mg 11 mg   Next Week Total 11 mg 11 mg 11 mg   Sun 2 mg 2 mg 2 mg   Mon 1 mg 1 mg 1 mg   Tue 2 mg 2 mg 2 mg   Wed 1 mg 1 mg 1 mg   Thu 2 mg 2 mg 2 mg   Fri 1 mg 1 mg 1 mg   Sat 2 mg 2 mg 2 mg   Visit Report - - -   Some recent data might be hidden       Plan:  1. INR is therapeutic today- see above in Anticoagulation Summary.   Will instruct Janel Mahoney to continue their warfarin regimen- see above in Anticoagulation Summary.  2. Follow up in 4 weeks.  3. Patient declines warfarin refills.  4. Verbal and written information provided. Patient expresses understanding and has no further questions at this time.    Anna Marie Ndiaye

## 2021-07-29 ENCOUNTER — TELEPHONE (OUTPATIENT)
Dept: FAMILY MEDICINE CLINIC | Facility: CLINIC | Age: 81
End: 2021-07-29

## 2021-07-29 RX ORDER — TRAMADOL HYDROCHLORIDE 50 MG/1
100 TABLET ORAL 2 TIMES DAILY
Qty: 180 TABLET | Refills: 1 | Status: ON HOLD | OUTPATIENT
Start: 2021-07-29 | End: 2021-09-06

## 2021-07-29 RX ORDER — TRAMADOL HYDROCHLORIDE 50 MG/1
100 TABLET ORAL 2 TIMES DAILY
Qty: 180 TABLET | Refills: 1 | Status: SHIPPED | OUTPATIENT
Start: 2021-07-29 | End: 2021-07-29 | Stop reason: SDUPTHER

## 2021-07-29 RX ORDER — TRAMADOL HYDROCHLORIDE 50 MG/1
100 TABLET ORAL 2 TIMES DAILY
Qty: 180 TABLET | Refills: 1 | Status: CANCELLED | OUTPATIENT
Start: 2021-07-29

## 2021-07-29 NOTE — TELEPHONE ENCOUNTER
You sent to [facility] employee pharmacy- you need to change to [facility] mail order

## 2021-07-29 NOTE — TELEPHONE ENCOUNTER
Caller: [facility] - [facility] [address] - [phone] [facility] [phone] FX    Relationship: Pharmacy      Medication needed:   Requested Prescriptions     Pending Prescriptions Disp Refills   • traMADol (ULTRAM) 50 MG tablet 180 tablet 1     Sig: Take 2 tablets by mouth 2 (Two) Times a Day. 3 months chronic pain medicine for low back pain       When do you need the refill by: ASAP     What additional details did the patient provide when requesting the medication: WAS SENT TO [facility] PHARMACY AND NOT [facility] MAIL ORDER     Does the patient have less than a 3 day supply:  [x] Yes  [] No    What is the patient's preferred pharmacy: [facility] - [place], [place] - [address] - [phone] [facility] [phone] FX

## 2021-08-04 RX ORDER — CARVEDILOL 25 MG/1
TABLET ORAL
Qty: 180 TABLET | Refills: 1 | Status: SHIPPED | OUTPATIENT
Start: 2021-08-04 | End: 2022-01-01

## 2021-08-10 ENCOUNTER — INFUSION (OUTPATIENT)
Dept: ONCOLOGY | Facility: HOSPITAL | Age: 81
End: 2021-08-10

## 2021-08-10 ENCOUNTER — HOSPITAL ENCOUNTER (OUTPATIENT)
Facility: HOSPITAL | Age: 81
Setting detail: SURGERY ADMIT
End: 2021-08-10
Attending: ORTHOPAEDIC SURGERY | Admitting: ORTHOPAEDIC SURGERY

## 2021-08-10 ENCOUNTER — LAB (OUTPATIENT)
Dept: LAB | Facility: HOSPITAL | Age: 81
End: 2021-08-10

## 2021-08-10 ENCOUNTER — OFFICE VISIT (OUTPATIENT)
Dept: ONCOLOGY | Facility: CLINIC | Age: 81
End: 2021-08-10

## 2021-08-10 VITALS
BODY MASS INDEX: 26.92 KG/M2 | OXYGEN SATURATION: 96 % | SYSTOLIC BLOOD PRESSURE: 118 MMHG | WEIGHT: 146.3 LBS | HEART RATE: 64 BPM | TEMPERATURE: 97.5 F | DIASTOLIC BLOOD PRESSURE: 46 MMHG | RESPIRATION RATE: 18 BRPM | HEIGHT: 62 IN

## 2021-08-10 DIAGNOSIS — D50.9 IRON DEFICIENCY ANEMIA, UNSPECIFIED IRON DEFICIENCY ANEMIA TYPE: ICD-10-CM

## 2021-08-10 DIAGNOSIS — N18.31 ANEMIA IN STAGE 3A CHRONIC KIDNEY DISEASE (HCC): Primary | ICD-10-CM

## 2021-08-10 DIAGNOSIS — N18.30 STAGE 3 CHRONIC KIDNEY DISEASE, UNSPECIFIED WHETHER STAGE 3A OR 3B CKD (HCC): Primary | ICD-10-CM

## 2021-08-10 DIAGNOSIS — D69.6 THROMBOCYTOPENIA (HCC): ICD-10-CM

## 2021-08-10 DIAGNOSIS — D63.1 ANEMIA IN STAGE 3A CHRONIC KIDNEY DISEASE (HCC): ICD-10-CM

## 2021-08-10 DIAGNOSIS — D63.1 ANEMIA IN STAGE 3A CHRONIC KIDNEY DISEASE (HCC): Primary | ICD-10-CM

## 2021-08-10 DIAGNOSIS — N18.31 ANEMIA IN STAGE 3A CHRONIC KIDNEY DISEASE (HCC): ICD-10-CM

## 2021-08-10 DIAGNOSIS — Z79.01 CHRONIC ANTICOAGULATION: ICD-10-CM

## 2021-08-10 LAB
BASOPHILS # BLD AUTO: 0.02 10*3/MM3 (ref 0–0.2)
BASOPHILS NFR BLD AUTO: 0.4 % (ref 0–1.5)
DEPRECATED RDW RBC AUTO: 59.1 FL (ref 37–54)
EOSINOPHIL # BLD AUTO: 0.1 10*3/MM3 (ref 0–0.4)
EOSINOPHIL NFR BLD AUTO: 1.9 % (ref 0.3–6.2)
ERYTHROCYTE [DISTWIDTH] IN BLOOD BY AUTOMATED COUNT: 15.7 % (ref 12.3–15.4)
HCT VFR BLD AUTO: 29.6 % (ref 34–46.6)
HGB BLD-MCNC: 9 G/DL (ref 12–15.9)
IMM GRANULOCYTES # BLD AUTO: 0.03 10*3/MM3 (ref 0–0.05)
IMM GRANULOCYTES NFR BLD AUTO: 0.6 % (ref 0–0.5)
LYMPHOCYTES # BLD AUTO: 0.84 10*3/MM3 (ref 0.7–3.1)
LYMPHOCYTES NFR BLD AUTO: 16.1 % (ref 19.6–45.3)
MCH RBC QN AUTO: 31.4 PG (ref 26.6–33)
MCHC RBC AUTO-ENTMCNC: 30.4 G/DL (ref 31.5–35.7)
MCV RBC AUTO: 103.1 FL (ref 79–97)
MONOCYTES # BLD AUTO: 0.39 10*3/MM3 (ref 0.1–0.9)
MONOCYTES NFR BLD AUTO: 7.5 % (ref 5–12)
NEUTROPHILS NFR BLD AUTO: 3.83 10*3/MM3 (ref 1.7–7)
NEUTROPHILS NFR BLD AUTO: 73.5 % (ref 42.7–76)
NRBC BLD AUTO-RTO: 0 /100 WBC (ref 0–0.2)
PLATELET # BLD AUTO: 137 10*3/MM3 (ref 140–450)
PMV BLD AUTO: 9.4 FL (ref 6–12)
RBC # BLD AUTO: 2.87 10*6/MM3 (ref 3.77–5.28)
WBC # BLD AUTO: 5.21 10*3/MM3 (ref 3.4–10.8)

## 2021-08-10 PROCEDURE — 85025 COMPLETE CBC W/AUTO DIFF WBC: CPT

## 2021-08-10 PROCEDURE — 36415 COLL VENOUS BLD VENIPUNCTURE: CPT

## 2021-08-10 PROCEDURE — 99214 OFFICE O/P EST MOD 30 MIN: CPT | Performed by: INTERNAL MEDICINE

## 2021-08-10 PROCEDURE — 96372 THER/PROPH/DIAG INJ SC/IM: CPT

## 2021-08-10 PROCEDURE — 25010000002 EPOETIN ALFA PER 1000 UNITS: Performed by: INTERNAL MEDICINE

## 2021-08-10 RX ORDER — FERROUS SULFATE 325(65) MG
325 TABLET ORAL 2 TIMES WEEKLY
Start: 2021-08-12 | End: 2022-01-01

## 2021-08-10 RX ADMIN — ERYTHROPOIETIN 6000 UNITS: 20000 INJECTION, SOLUTION INTRAVENOUS; SUBCUTANEOUS at 16:04

## 2021-08-10 NOTE — PROGRESS NOTES
Subjective     CHIEF COMPLAINT:      Chief Complaint   Patient presents with   • Follow-up       HISTORY OF PRESENT ILLNESS:     Janel Mahoney is a 80 y.o. female patient who returns today for follow up on her anemia.  She returns today for follow-up accompanied by her .  She is on Procrit monthly.  The last dose she received was 6000 units on 7/13/2021.    Patient reports fatigue.    Patient reports easy bruisability.  She has bruises over her upper and lower extremities.  No problem with bleeding.    Patient developed cellulitis of the right leg recently.  Her primary care physician referred her to a specialist and the cellulitis is improving.    ROS:  Pertinent ROS is in the HPI.     Past medical, surgical, social and family history were reviewed.     MEDICATIONS:    Current Outpatient Medications:   •  alendronate (FOSAMAX) 70 MG tablet, Take 70 mg by mouth Every 14 (Fourteen) Days., Disp: , Rfl:   •  allopurinol (ZYLOPRIM) 100 MG tablet, 2 po qday, Disp: 270 tablet, Rfl: 1  •  aspirin 81 MG tablet, Take 81 mg by mouth Daily. PT CALLING TO SEE IF SHE HAS TO STOP PRIOR TO SURGERY, Disp: , Rfl:   •  bumetanide (BUMEX) 2 MG tablet, TAKE 1 TABLET TWICE DAILY. DOSE CHANGED , Disp: 180 tablet, Rfl: 1  •  calcitriol (ROCALTROL) 0.25 MCG capsule, Take 0.25 mcg by mouth 2 (Two) Times a Day., Disp: , Rfl:   •  carvedilol (COREG) 25 MG tablet, TAKE 1 TABLET TWICE DAILY, Disp: 180 tablet, Rfl: 1  •  Cholecalciferol (VITAMIN D) 2000 UNITS tablet, Take 2,000 Units by mouth Daily., Disp: , Rfl:   •  epoetin adele (EPOGEN,PROCRIT) 08106 UNIT/ML injection, Inject 10,000 Units under the skin into the appropriate area as directed As Needed., Disp: , Rfl:   •  ferrous sulfate 325 (65 FE) MG tablet, Take 1 tablet by mouth Every Other Day., Disp: , Rfl:   •  Multiple Vitamins-Minerals (SENIOR MULTIVITAMIN PLUS) tablet, Take 1 tablet by mouth Daily., Disp: , Rfl:   •  pantoprazole (PROTONIX) 40 MG EC tablet, Take 1 tablet by  "mouth 2 (Two) Times a Day., Disp: 60 tablet, Rfl: 5  •  ramipril (ALTACE) 5 MG capsule, TAKE 1 CAPSULE EVERY DAY, Disp: 90 capsule, Rfl: 1  •  simvastatin (ZOCOR) 20 MG tablet, TAKE 2 TABLETS EVERY NIGHT, Disp: 180 tablet, Rfl: 1  •  traMADol (ULTRAM) 50 MG tablet, Take 2 tablets by mouth 2 (Two) Times a Day. 3 months chronic pain medicine for low back pain, Disp: 180 tablet, Rfl: 1  •  vitamin B-12 (CYANOCOBALAMIN) 1000 MCG tablet, Take 1,000 mcg by mouth Daily., Disp: , Rfl:   •  warfarin (COUMADIN) 1 MG tablet, Take one tablet (1mg) by mouth on Mon, Wed, Fri and 2 tablets (2mg) on all other days or as directed by [facility]., Disp: 48 tablet, Rfl: 0  •  zolpidem (Ambien) 10 MG tablet, Take 1 tablet by mouth At Night As Needed for Sleep., Disp: 90 tablet, Rfl: 1    Objective   VITAL SIGNS:     Vitals:    08/10/21 1446   BP: 118/46   Pulse: 64   Resp: 18   Temp: 97.5 °F (36.4 °C)   TempSrc: Temporal   SpO2: 96%   Weight: 66.4 kg (146 lb 4.8 oz)   Height: 157.5 cm (62.01\")   PainSc: 0-No pain     Body mass index is 26.75 kg/m².     Wt Readings from Last 5 Encounters:   08/10/21 66.4 kg (146 lb 4.8 oz)   07/27/21 63.5 kg (140 lb)   07/20/21 64.4 kg (142 lb)   06/18/21 66.2 kg (146 lb)   04/08/21 68 kg (150 lb)       PHYSICAL EXAMINATION:   GENERAL: The patient appears in good general condition, not in acute distress.   SKIN: Ecchymosis over the forearms bilaterally.  EYES: No jaundice.  LYMPHATICS: No cervical lymphadenopathy.  CHEST: Normal respiratory effort.   CVS: Trace edema.  ABDOMEN: Soft. No tenderness. No Hepatomegaly. No Splenomegaly. No masses.  EXTREMITIES: A scab over the distal right leg preventing an area of cellulitis that has healed.     DIAGNOSTIC DATA:     Results from last 7 days   Lab Units 08/10/21  1500   WBC 10*3/mm3 5.21   NEUTROS ABS 10*3/mm3 3.83   HEMOGLOBIN g/dL 9.0*   HEMATOCRIT % 29.6*   PLATELETS 10*3/mm3 137*     Component      Latest Ref Rng & Units 7/13/2021 "   Iron      37 - 145 mcg/dL 89   Iron Saturation      14 - 48 % 30   Transferrin      200 - 360 mg/dL 214   TIBC      249 - 505 mcg/dL 300   Ferritin      13.00 - 150.00 ng/mL 513.20 (H)       Assessment/Plan   1.  Anemia of chronic kidney disease, stage III.    · Patient is on Procrit monthly.    · The last dose was 6000 units on 7/13/2021 for hemoglobin of 9.7.  · Hemoglobin decreased to 9.0 today.  · Iron stores from 7/13/2021 were adequate.  The ferritin elevation at 513th represented acute phase reaction.  · Patient reports some fatigue.    2.  Thrombocytopenia attributed to B12 deficiency.    · Platelet count is in the 110,000-140,000 range.   · Platelets are 137,000 today.    · Patient is having bruising but this is attributed to Coumadin.    3.  Chronic anticoagulation with Coumadin.   · This is being managed by her cardiologist and the warfarin clinic.    · INR was 2.4 on 7/27/2021.  · Patient is having ecchymosis over the upper and 25 degree the lower extremities secondary to warfarin.    PLAN:    1.  Continue Procrit monthly.  We will give a dose today.  2.  Reduce ferrous sulfate 325 mg to twice weekly on Sundays and Thursdays.  3.  Patient will return monthly for CBC and Procrit.  Ferritin and iron panel will be obtained in 2 and 5 months.  4.  I will see her in follow-up in 6 months with a CBC and Procrit.      Edward Vasquez MD  08/10/21

## 2021-08-11 ENCOUNTER — TRANSCRIBE ORDERS (OUTPATIENT)
Dept: PREADMISSION TESTING | Facility: HOSPITAL | Age: 81
End: 2021-08-11

## 2021-08-11 RX ORDER — ZOLPIDEM TARTRATE 10 MG/1
10 TABLET ORAL NIGHTLY PRN
Qty: 90 TABLET | Refills: 1 | Status: SHIPPED | OUTPATIENT
Start: 2021-08-11 | End: 2022-01-01 | Stop reason: SDUPTHER

## 2021-08-11 NOTE — TELEPHONE ENCOUNTER
Caller: Janel Mahoney    Relationship: Self    Best call back number:     Medication needed:   Requested Prescriptions     Pending Prescriptions Disp Refills   • zolpidem (Ambien) 10 MG tablet 90 tablet 1     Sig: Take 1 tablet by mouth At Night As Needed for Sleep.       When do you need the refill by:     What additional details did the patient provide when requesting the medication:     Does the patient have less than a 3 day supply:  [] Yes  [x] No    What is the patient's preferred pharmacy:  [email or web address]  [address]  [phone]

## 2021-08-18 ENCOUNTER — HOSPITAL ENCOUNTER (OUTPATIENT)
Dept: GENERAL RADIOLOGY | Facility: HOSPITAL | Age: 81
Discharge: HOME | End: 2021-08-18

## 2021-08-18 ENCOUNTER — PRE-ADMISSION TESTING (OUTPATIENT)
Dept: PREADMISSION TESTING | Facility: HOSPITAL | Age: 81
End: 2021-08-18

## 2021-08-18 ENCOUNTER — ANTICOAGULATION VISIT (OUTPATIENT)
Dept: PHARMACY | Facility: HOSPITAL | Age: 81
End: 2021-08-18

## 2021-08-18 VITALS
DIASTOLIC BLOOD PRESSURE: 66 MMHG | TEMPERATURE: 98.9 F | HEIGHT: 62 IN | RESPIRATION RATE: 20 BRPM | WEIGHT: 151 LBS | OXYGEN SATURATION: 99 % | SYSTOLIC BLOOD PRESSURE: 153 MMHG | BODY MASS INDEX: 27.79 KG/M2 | HEART RATE: 60 BPM

## 2021-08-18 DIAGNOSIS — I48.20 CHRONIC ATRIAL FIBRILLATION (HCC): Primary | ICD-10-CM

## 2021-08-18 LAB
ALBUMIN SERPL-MCNC: 3.8 G/DL (ref 3.5–5.2)
ALBUMIN/GLOB SERPL: 1.5 G/DL
ALP SERPL-CCNC: 47 U/L (ref 39–117)
ALT SERPL W P-5'-P-CCNC: 12 U/L (ref 1–33)
ANION GAP SERPL CALCULATED.3IONS-SCNC: 12.4 MMOL/L (ref 5–15)
APTT PPP: 39.4 SECONDS (ref 22.7–35.4)
AST SERPL-CCNC: 19 U/L (ref 1–32)
BASOPHILS # BLD AUTO: 0.03 10*3/MM3 (ref 0–0.2)
BASOPHILS NFR BLD AUTO: 0.5 % (ref 0–1.5)
BILIRUB SERPL-MCNC: 0.4 MG/DL (ref 0–1.2)
BILIRUB UR QL STRIP: NEGATIVE
BUN SERPL-MCNC: 69 MG/DL (ref 8–23)
BUN/CREAT SERPL: 27.1 (ref 7–25)
CALCIUM SPEC-SCNC: 9.5 MG/DL (ref 8.6–10.5)
CHLORIDE SERPL-SCNC: 102 MMOL/L (ref 98–107)
CLARITY UR: CLEAR
CO2 SERPL-SCNC: 27.6 MMOL/L (ref 22–29)
COLOR UR: YELLOW
CREAT SERPL-MCNC: 2.55 MG/DL (ref 0.57–1)
DEPRECATED RDW RBC AUTO: 53.5 FL (ref 37–54)
EOSINOPHIL # BLD AUTO: 0.19 10*3/MM3 (ref 0–0.4)
EOSINOPHIL NFR BLD AUTO: 3 % (ref 0.3–6.2)
ERYTHROCYTE [DISTWIDTH] IN BLOOD BY AUTOMATED COUNT: 14.6 % (ref 12.3–15.4)
GFR SERPL CREATININE-BSD FRML MDRD: 18 ML/MIN/1.73
GLOBULIN UR ELPH-MCNC: 2.5 GM/DL
GLUCOSE SERPL-MCNC: 98 MG/DL (ref 65–99)
GLUCOSE UR STRIP-MCNC: NEGATIVE MG/DL
HCT VFR BLD AUTO: 26.7 % (ref 34–46.6)
HGB BLD-MCNC: 8.4 G/DL (ref 12–15.9)
HGB UR QL STRIP.AUTO: NEGATIVE
IMM GRANULOCYTES # BLD AUTO: 0.04 10*3/MM3 (ref 0–0.05)
IMM GRANULOCYTES NFR BLD AUTO: 0.6 % (ref 0–0.5)
INR PPP: 2.8 (ref 0.9–1.1)
KETONES UR QL STRIP: NEGATIVE
LEUKOCYTE ESTERASE UR QL STRIP.AUTO: NEGATIVE
LYMPHOCYTES # BLD AUTO: 0.63 10*3/MM3 (ref 0.7–3.1)
LYMPHOCYTES NFR BLD AUTO: 10 % (ref 19.6–45.3)
MCH RBC QN AUTO: 31.9 PG (ref 26.6–33)
MCHC RBC AUTO-ENTMCNC: 31.5 G/DL (ref 31.5–35.7)
MCV RBC AUTO: 101.5 FL (ref 79–97)
MONOCYTES # BLD AUTO: 0.39 10*3/MM3 (ref 0.1–0.9)
MONOCYTES NFR BLD AUTO: 6.2 % (ref 5–12)
NEUTROPHILS NFR BLD AUTO: 5.05 10*3/MM3 (ref 1.7–7)
NEUTROPHILS NFR BLD AUTO: 79.7 % (ref 42.7–76)
NITRITE UR QL STRIP: NEGATIVE
NRBC BLD AUTO-RTO: 0 /100 WBC (ref 0–0.2)
PH UR STRIP.AUTO: 5.5 [PH] (ref 5–8)
PLATELET # BLD AUTO: 120 10*3/MM3 (ref 140–450)
PMV BLD AUTO: 10.2 FL (ref 6–12)
POTASSIUM SERPL-SCNC: 4.2 MMOL/L (ref 3.5–5.2)
PROT SERPL-MCNC: 6.3 G/DL (ref 6–8.5)
PROT UR QL STRIP: NEGATIVE
PROTHROMBIN TIME: 29.3 SECONDS (ref 11.7–14.2)
RBC # BLD AUTO: 2.63 10*6/MM3 (ref 3.77–5.28)
SODIUM SERPL-SCNC: 142 MMOL/L (ref 136–145)
SP GR UR STRIP: 1.01 (ref 1–1.03)
UROBILINOGEN UR QL STRIP: NORMAL
WBC # BLD AUTO: 6.33 10*3/MM3 (ref 3.4–10.8)

## 2021-08-18 PROCEDURE — 85730 THROMBOPLASTIN TIME PARTIAL: CPT

## 2021-08-18 PROCEDURE — A9270 NON-COVERED ITEM OR SERVICE: HCPCS | Performed by: ORTHOPAEDIC SURGERY

## 2021-08-18 PROCEDURE — 80053 COMPREHEN METABOLIC PANEL: CPT

## 2021-08-18 PROCEDURE — 36415 COLL VENOUS BLD VENIPUNCTURE: CPT

## 2021-08-18 PROCEDURE — 85610 PROTHROMBIN TIME: CPT

## 2021-08-18 PROCEDURE — 85025 COMPLETE CBC W/AUTO DIFF WBC: CPT

## 2021-08-18 PROCEDURE — 81003 URINALYSIS AUTO W/O SCOPE: CPT

## 2021-08-18 PROCEDURE — 71046 X-RAY EXAM CHEST 2 VIEWS: CPT

## 2021-08-18 PROCEDURE — 63710000001 MUPIROCIN 2 % OINTMENT: Performed by: ORTHOPAEDIC SURGERY

## 2021-08-18 PROCEDURE — 73030 X-RAY EXAM OF SHOULDER: CPT

## 2021-08-18 RX ORDER — CHLORHEXIDINE GLUCONATE 500 MG/1
1 CLOTH TOPICAL
Status: ON HOLD | COMMUNITY
End: 2021-09-06

## 2021-08-18 NOTE — PROGRESS NOTES
Anticoagulation Clinic Progress Note    Anticoagulation Summary  As of 2021    INR goal:  2.0-3.0   TTR:  62.7 % (2.7 y)   INR used for dosin.80 (2021)   Warfarin maintenance plan:  1 mg every Mon, Wed, Fri; 2 mg all other days   Weekly warfarin total:  11 mg   No change documented:  Vargas Butcher, PharmD   Plan last modified:  Shanna Major RPH (10/27/2020)   Next INR check:  2021   Priority:  Maintenance   Target end date:  Indefinite    Indications    Chronic atrial fibrillation (CMS/[facility]) [I48.20]             Anticoagulation Episode Summary     INR check location:      Preferred lab:      Send INR reminders to:   [facility] [place]    Comments:        Anticoagulation Care Providers     Provider Role Specialty Phone number    Nik Galarza MD Referring Cardiology [phone]          Clinic Interview:  Patient Findings     Positives:  Upcoming invasive procedure    Negatives:  Signs/symptoms of thrombosis, Signs/symptoms of bleeding,   Laboratory test error suspected, Change in health, Change in alcohol use,   Change in activity, Emergency department visit, Upcoming dental procedure,   Missed doses, Extra doses, Change in medications, Change in diet/appetite,   Hospital admission, Bruising, Other complaints    Comments:  Total shoulder replacement 21      Clinical Outcomes     Negatives:  Major bleeding event, Thromboembolic event,   Anticoagulation-related hospital admission, Anticoagulation-related ED   visit, Anticoagulation-related fatality    Comments:  Total shoulder replacement 21        INR History:  Anticoagulation Monitoring 2021   INR 2.6 2.4 2.80   INR Date 2021   INR Goal 2.0-3.0 2.0-3.0 2.0-3.0   Trend Same Same Same   Last Week Total 11 mg 11 mg 11 mg   Next Week Total 11 mg 11 mg 11 mg   Sun 2 mg 2 mg 2 mg   Mon 1 mg 1 mg 1 mg   Tue 2 mg 2 mg -   Wed 1 mg 1 mg 1 mg   Thu 2 mg 2 mg 2 mg   Fri 1 mg 1 mg 1  mg   Sat 2 mg 2 mg 2 mg   Visit Report - - -   Some recent data might be hidden       Plan:  1. INR is Therapeutic today- see above in Anticoagulation Summary.   Will instruct Janel Mahoney to Continue their warfarin regimen- see above in Anticoagulation Summary.  2. Follow up 8/24/21 to review periprocedural bridging plan  3. They have been instructed to call if any changes in medications, doses, concerns, etc. Patient expresses understanding and has no further questions at this time.    Vargas Butcher, PharmD

## 2021-08-18 NOTE — DISCHARGE INSTRUCTIONS
Take the following medications the morning of surgery:    Carvedilol       If you are on prescription narcotic pain medication to control your pain you may also take that medication the morning of surgery.    General Instructions:  • Do not eat solid food after midnight the night before surgery.  • You may drink clear liquids day of surgery but must stop at least one hour before your hospital arrival time.  • It is beneficial for you to have a clear drink that contains carbohydrates the day of surgery.  We suggest a 12 to 20 ounce bottle of Gatorade or Powerade for non-diabetic patients or a 12 to 20 ounce bottle of G2 or Powerade Zero for diabetic patients. (Pediatric patients, are not advised to drink a 12 to 20 ounce carbohydrate drink)    Clear liquids are liquids you can see through.  Nothing red in color.     Plain water                               Sports drinks  Sodas                                   Gelatin (Jell-O)  Fruit juices without pulp such as white grape juice and apple juice  Popsicles that contain no fruit or yogurt  Tea or coffee (no cream or milk added)  Gatorade / Powerade  G2 / Powerade Zero    • Infants may have breast milk up to four hours before surgery.  • Infants drinking formula may drink formula up to six hours before surgery.   • Patients who avoid smoking, chewing tobacco and alcohol for 4 weeks prior to surgery have a reduced risk of post-operative complications.  Quit smoking as many days before surgery as you can.  • Do not smoke, use chewing tobacco or drink alcohol the day of surgery.   • If applicable bring your C-PAP/ BI-PAP machine.  • Bring any papers given to you in the doctor’s office.  • Wear clean comfortable clothes.  • Do not wear contact lenses, false eyelashes or make-up.  Bring a case for your glasses.   • Bring crutches or walker if applicable.  • Remove all piercings.  Leave jewelry and any other valuables at home.  • Hair extensions with metal clips must be  removed prior to surgery.  • The Pre-Admission Testing nurse will instruct you to bring medications if unable to obtain an accurate list in Pre-Admission Testing.        If you were given a blood bank ID arm band remember to bring it with you the day of surgery.    Preventing a Surgical Site Infection:  • For 2 to 3 days before surgery, avoid shaving with a razor because the razor can irritate skin and make it easier to develop an infection.    • Any areas of open skin can increase the risk of a post-operative wound infection by allowing bacteria to enter and travel throughout the body.  Notify your surgeon if you have any skin wounds / rashes even if it is not near the expected surgical site.  The area will need assessed to determine if surgery should be delayed until it is healed.  • The night prior to surgery shower using a fresh bar of anti-bacterial soap (such as Dial) and clean washcloth.  Sleep in a clean bed with clean clothing.  Do not allow pets to sleep with you.  • Shower on the morning of surgery using a fresh bar of anti-bacterial soap (such as Dial) and clean washcloth.  Dry with a clean towel and dress in clean clothing.  • Ask your surgeon if you will be receiving antibiotics prior to surgery.  • Make sure you, your family, and all healthcare providers clean their hands with soap and water or an alcohol based hand  before caring for you or your wound.    Day of surgery:8/31/2021   1000  Your arrival time is approximately two hours before your scheduled surgery time.  Upon arrival, a Pre-op nurse and Anesthesiologist will review your health history, obtain vital signs, and answer questions you may have.  The only belongings needed at this time will be a list of your home medications and if applicable your C-PAP/BI-PAP machine.  A Pre-op nurse will start an IV and you may receive medication in preparation for surgery, including something to help you relax.     Please be aware that surgery  does come with discomfort.  We want to make every effort to control your discomfort so please discuss any uncontrolled symptoms with your nurse.   Your doctor will most likely have prescribed pain medications.      If you are going home after surgery you will receive individualized written care instructions before being discharged.  A responsible adult must drive you to and from the hospital on the day of your surgery and stay with you for 24 hours.  Discharge prescriptions can be filled by the hospital pharmacy during regular pharmacy hours.  If you are having surgery late in the day/evening your prescription may be e-prescribed to your pharmacy.  Please verify your pharmacy hours or chose a 24 hour pharmacy to avoid not having access to your prescription because your pharmacy has closed for the day.    If you are staying overnight following surgery, you will be transported to your hospital room following the recovery period.  [facility] has all private rooms.    If you have any questions please call Pre-Admission Testing at [phone].  Deductibles and co-payments are collected on the day of service. Please be prepared to pay the required co-pay, deductible or deposit on the day of service as defined by your plan.    Patient Education for Self-Quarantine Process    • Following your COVID testing, we strongly recommend that you wear a mask when you are with other people and practice social distancing.   • Limit your activities to only required outings.  • Wash your hands with soap and water frequently for at least 20 seconds.   • Avoid touching your eyes, nose and mouth with unwashed hands.  • Do not share anything - utensils, drinking glasses, food from the same bowl.   • Sanitize household surfaces daily. Include all high touch areas (door handles, light switches, phones, countertops, etc.)    Call your surgeon immediately if you experience any of the following symptoms:  • Sore  Throat  • Shortness of Breath or difficulty breathing  • Cough  • Chills  • Body soreness or muscle pain  • Headache  • Fever  • New loss of taste or smell  • Do not arrive for your surgery ill.  Your procedure will need to be rescheduled to another time.  You will need to call your physician before the day of surgery to avoid any unnecessary exposure to hospital staff as well as other patients.    CHLORHEXIDINE CLOTH INSTRUCTIONS  The morning of surgery follow these instructions using the Chlorhexidine cloths you've been given.  These steps reduce bacteria on the body.  Do not use the cloths near your eyes, ears mouth, genitalia or on open wounds.  Throw the cloths away after use but do not try to flush them down a toilet.      • Open and remove one cloth at a time from the package.    • Leave the cloth unfolded and begin the bathing.  • Massage the skin with the cloths using gentle pressure to remove bacteria.  Do not scrub harshly.   • Follow the steps below with one 2% CHG cloth per area (6 total cloths).  • One cloth for neck, shoulders and chest.  • One cloth for both arms, hands, fingers and underarms (do underarms last).  • One cloth for the abdomen followed by groin.  • One cloth for right leg and foot including between the toes.  • One cloth for left leg and foot including between the toes.  • The last cloth is to be used for the back of the neck, back and buttocks.    Allow the CHG to air dry 3 minutes on the skin which will give it time to work and decrease the chance of irritation.  The skin may feel sticky until it is dry.  Do not rinse with water or any other liquid or you will lose the beneficial effects of the CHG.  If mild skin irritation occurs, do rinse the skin to remove the CHG.  Report this to the nurse at time of admission.  Do not apply lotions, creams, ointments, deodorants or perfumes after using the clothes. Dress in clean clothes before coming to the hospital.    BACTROBAN NASAL  OINTMENT  There are many germs normally in your nose. Bactroban is an ointment that will help reduce these germs. Please follow these instructions for Bactroban use:      __1__The day before surgery in the morning  Date_8/30/2021_______    __2__The day before surgery in the evening              Date_8/30/2021_______    _3___The day of surgery in the morning    Date_8/31/2021_______    **Squirt ½ package of Bactroban Ointment onto a cotton applicator and apply to inside of 1st nostril.  Squirt the remaining Bactroban and apply to the inside of the other nostril.    PERIDEX- ORAL:  Use only if your surgeon has ordered  Use the night before and morning of surgery - Swish, gargle, and spit - do not swallow.

## 2021-08-24 ENCOUNTER — RESEARCH ENCOUNTER (OUTPATIENT)
Dept: CARDIOLOGY | Facility: CLINIC | Age: 81
End: 2021-08-24

## 2021-08-24 ENCOUNTER — ANTICOAGULATION VISIT (OUTPATIENT)
Dept: PHARMACY | Facility: HOSPITAL | Age: 81
End: 2021-08-24

## 2021-08-24 DIAGNOSIS — I48.20 CHRONIC ATRIAL FIBRILLATION (HCC): Primary | ICD-10-CM

## 2021-08-24 LAB
INR PPP: 2.9 (ref 0.91–1.09)
PROTHROMBIN TIME: 34.5 SECONDS (ref 10–13.8)

## 2021-08-24 PROCEDURE — 85610 PROTHROMBIN TIME: CPT

## 2021-08-24 PROCEDURE — G0463 HOSPITAL OUTPT CLINIC VISIT: HCPCS

## 2021-08-24 PROCEDURE — 36416 COLLJ CAPILLARY BLOOD SPEC: CPT

## 2021-08-24 NOTE — PROGRESS NOTES
Anticoagulation Clinic Progress Note    Anticoagulation Summary  As of 2021    INR goal:  2.0-3.0   TTR:  62.9 % (2.8 y)   INR used for dosin.9 (2021)   Warfarin maintenance plan:  1 mg every Mon, Wed, Fri; 2 mg all other days   Weekly warfarin total:  11 mg   Plan last modified:  Shanna Major RPH (10/27/2020)   Next INR check:  9/3/2021   Priority:  Maintenance   Target end date:  Indefinite    Indications    Chronic atrial fibrillation (CMS/HCC) [I48.20]             Anticoagulation Episode Summary     INR check location:      Preferred lab:      Send INR reminders to:   [facility]    Comments:        Anticoagulation Care Providers     Provider Role Specialty Phone number    Nik Galarza MD Referring Cardiology [phone]          Clinic Interview:  Patient Findings     Positives:  Upcoming invasive procedure    Negatives:  Signs/symptoms of thrombosis, Signs/symptoms of bleeding,   Laboratory test error suspected, Change in health, Change in alcohol use,   Change in activity, Emergency department visit, Upcoming dental procedure,   Missed doses, Extra doses, Change in medications, Change in diet/appetite,   Hospital admission, Bruising, Other complaints    Comments:  - shoulder surgery, requires bridging, hold warfarin x 5   days.       Clinical Outcomes     Negatives:  Major bleeding event, Thromboembolic event,   Anticoagulation-related hospital admission, Anticoagulation-related ED   visit, Anticoagulation-related fatality    Comments:  - shoulder surgery, requires bridging, hold warfarin x 5   days.         INR History:  Anticoagulation Monitoring 2021   INR 2.4 2.80 2.9   INR Date 2021   INR Goal 2.0-3.0 2.0-3.0 2.0-3.0   Trend Same Same Same   Last Week Total 11 mg 11 mg 11 mg   Next Week Total 11 mg 11 mg 3 mg   Sun 2 mg 2 mg Hold ()   Mon 1 mg 1 mg Hold ()   Tue 2 mg - 3 mg (); Otherwise 2 mg    Wed 1 mg 1 mg 3 mg (9/1); Otherwise 1 mg   Thu 2 mg 2 mg Hold (8/26), 3 mg (9/2)   Fri 1 mg 1 mg Hold (8/27)   Sat 2 mg 2 mg Hold (8/28)   Visit Report - - -   Some recent data might be hidden       Plan:  1. INR is Therapeutic today- see above in Anticoagulation Summary.  Will instruct Janel Mahoney to Change their warfarin regimen- see above in Anticoagulation Summary due to upcoming procedure on 8/31. Will start warfarin hold on 8/26. Providing bridging with Lovenox (CrCl 19 ml/min, weight 68.5 kg). Provided a slightly more aggressive boost post surgery due to patients history with boost in June after surgery. Patient was provided 5 mg extra of warfarin in June after holding x 5 days and INR was 1.4 5 days later. See patient instructions.   2. Follow up in 1 week  3. Patient declines warfarin refills.  4. Verbal and written information provided. Patient expresses understanding and has no further questions at this time.    Yue Olivera, [facility]

## 2021-08-24 NOTE — RESEARCH
Research study: [employer] PSR  Subject initials: MARYCHUY  Subject study ID#: [id number]     MARYCHUY came to the [facility] office on 7- for a routine interrogation of her CRT-D device and to see Dr Boucher.  Today I entered data from those visits in the [employer] study database. There were no device or lead events to report, nor any qualifying health events to report. We will continue to follow MARYCHUY per study protocol.     Tiffany CALLESN RN  Research Coordinator

## 2021-08-25 PROCEDURE — 93296 REM INTERROG EVL PM/IDS: CPT | Performed by: INTERNAL MEDICINE

## 2021-08-25 PROCEDURE — 93295 DEV INTERROG REMOTE 1/2/MLT: CPT | Performed by: INTERNAL MEDICINE

## 2021-08-28 ENCOUNTER — LAB (OUTPATIENT)
Dept: LAB | Facility: HOSPITAL | Age: 81
End: 2021-08-28

## 2021-08-28 ENCOUNTER — APPOINTMENT (OUTPATIENT)
Dept: LAB | Facility: HOSPITAL | Age: 81
End: 2021-08-28

## 2021-08-28 LAB
ABO GROUP BLD: NORMAL
BLD GP AB SCN SERPL QL: NEGATIVE
MRSA DNA SPEC QL NAA+PROBE: NORMAL
RH BLD: POSITIVE
T&S EXPIRATION DATE: NORMAL

## 2021-08-28 PROCEDURE — 86850 RBC ANTIBODY SCREEN: CPT

## 2021-08-28 PROCEDURE — 87641 MR-STAPH DNA AMP PROBE: CPT

## 2021-08-28 PROCEDURE — 86900 BLOOD TYPING SEROLOGIC ABO: CPT

## 2021-08-28 PROCEDURE — C9803 HOPD COVID-19 SPEC COLLECT: HCPCS

## 2021-08-28 PROCEDURE — U0004 COV-19 TEST NON-CDC HGH THRU: HCPCS

## 2021-08-28 PROCEDURE — U0005 INFEC AGEN DETEC AMPLI PROBE: HCPCS

## 2021-08-28 PROCEDURE — 86901 BLOOD TYPING SEROLOGIC RH(D): CPT

## 2021-08-29 LAB — SARS-COV-2 ORF1AB RESP QL NAA+PROBE: NOT DETECTED

## 2021-08-30 ENCOUNTER — ANESTHESIA EVENT (OUTPATIENT)
Dept: PERIOP | Facility: HOSPITAL | Age: 81
End: 2021-08-30

## 2021-08-31 ENCOUNTER — ANESTHESIA (OUTPATIENT)
Dept: PERIOP | Facility: HOSPITAL | Age: 81
End: 2021-08-31

## 2021-08-31 ENCOUNTER — HOSPITAL ENCOUNTER (OUTPATIENT)
Facility: HOSPITAL | Age: 81
Discharge: NURSING, REHAB OR LONG-TERM CARE | End: 2021-09-01
Attending: ORTHOPAEDIC SURGERY | Admitting: ORTHOPAEDIC SURGERY

## 2021-08-31 ENCOUNTER — APPOINTMENT (OUTPATIENT)
Dept: GENERAL RADIOLOGY | Facility: HOSPITAL | Age: 81
End: 2021-08-31

## 2021-08-31 PROBLEM — Z96.611 STATUS POST REVERSE ARTHROPLASTY OF RIGHT SHOULDER: Status: ACTIVE | Noted: 2021-08-31

## 2021-08-31 LAB
APTT PPP: 30.4 SECONDS (ref 24–31)
INR PPP: 1.54 (ref 2–3)
INR PPP: 1.56 (ref 2–3)
PROTHROMBIN TIME: 16.6 SECONDS (ref 19.4–28.5)
PROTHROMBIN TIME: 16.8 SECONDS (ref 19.4–28.5)

## 2021-08-31 PROCEDURE — 25010000002 DEXAMETHASONE PER 1 MG: Performed by: ANESTHESIOLOGY

## 2021-08-31 PROCEDURE — 73020 X-RAY EXAM OF SHOULDER: CPT

## 2021-08-31 PROCEDURE — 85730 THROMBOPLASTIN TIME PARTIAL: CPT | Performed by: ORTHOPAEDIC SURGERY

## 2021-08-31 PROCEDURE — 25010000002 ONDANSETRON PER 1 MG: Performed by: ANESTHESIOLOGY

## 2021-08-31 PROCEDURE — 76942 ECHO GUIDE FOR BIOPSY: CPT | Performed by: ORTHOPAEDIC SURGERY

## 2021-08-31 PROCEDURE — 25010000002 FENTANYL CITRATE (PF) 100 MCG/2ML SOLUTION: Performed by: ANESTHESIOLOGY

## 2021-08-31 PROCEDURE — C1889 IMPLANT/INSERT DEVICE, NOC: HCPCS | Performed by: ORTHOPAEDIC SURGERY

## 2021-08-31 PROCEDURE — C9290 INJ, BUPIVACAINE LIPOSOME: HCPCS | Performed by: ANESTHESIOLOGY

## 2021-08-31 PROCEDURE — 85610 PROTHROMBIN TIME: CPT | Performed by: ORTHOPAEDIC SURGERY

## 2021-08-31 PROCEDURE — G0378 HOSPITAL OBSERVATION PER HR: HCPCS

## 2021-08-31 PROCEDURE — 25010000002 PROPOFOL 10 MG/ML EMULSION: Performed by: ANESTHESIOLOGY

## 2021-08-31 PROCEDURE — C1776 JOINT DEVICE (IMPLANTABLE): HCPCS | Performed by: ORTHOPAEDIC SURGERY

## 2021-08-31 PROCEDURE — 25010000002 FENTANYL CITRATE (PF) 50 MCG/ML SOLUTION: Performed by: ANESTHESIOLOGY

## 2021-08-31 PROCEDURE — 25010000003 BUPIVACAINE LIPOSOME 1.3 % SUSPENSION: Performed by: ANESTHESIOLOGY

## 2021-08-31 DEVICE — IMPLANTABLE DEVICE: Type: IMPLANTABLE DEVICE | Site: SHOULDER | Status: FUNCTIONAL

## 2021-08-31 DEVICE — RSS 4.5MM STAR SCREW AND CAP; 15MM LENGTH
Type: IMPLANTABLE DEVICE | Site: SHOULDER | Status: FUNCTIONAL
Brand: TITAN™

## 2021-08-31 DEVICE — BASEPLT GLEN RSS NEG/S TI 27.3X22.8X2MM: Type: IMPLANTABLE DEVICE | Site: SHOULDER | Status: FUNCTIONAL

## 2021-08-31 DEVICE — SUT FW 5 W .5 CIR CUT NDL 48M AR7211: Type: IMPLANTABLE DEVICE | Site: SHOULDER | Status: FUNCTIONAL

## 2021-08-31 DEVICE — THE TITAN REVERSE SHOULDER HUMERAL BODY IS MADE OF TITANIUM. IT IS DESIGNED FOR POTENTIAL INCREASED ROM AND MINIMIZED SCAPULAR NOTCHING, AS WELL AS INCREASED VISUAL AND ACCESS TO GLENOID. THE ASYMMATRIX COATING ALLOWS FOR AN ALL PRESS FIT HUMERAL COMPONENT AND SECONDARY FIXATION. A TITANIUM SCREW IS INCLUDED FOR FIXATION.
Type: IMPLANTABLE DEVICE | Site: SHOULDER | Status: FUNCTIONAL
Brand: TITAN™ REVERSE SHOULDER SYSTEM

## 2021-08-31 DEVICE — DEV CONTRL TISS STRATAFIX SPIRAL PLS PDS SH 2/0 30CM: Type: IMPLANTABLE DEVICE | Site: SHOULDER | Status: FUNCTIONAL

## 2021-08-31 DEVICE — RSS 4.5MM STAR SCREW AND CAP; 25MM LENGTH
Type: IMPLANTABLE DEVICE | Site: SHOULDER | Status: FUNCTIONAL
Brand: TITAN™

## 2021-08-31 DEVICE — RSS GLENOSPHERE, CONCENTRIC-S, 2MM
Type: IMPLANTABLE DEVICE | Site: SHOULDER | Status: FUNCTIONAL
Brand: TITAN™

## 2021-08-31 RX ORDER — ZOLPIDEM TARTRATE 5 MG/1
5 TABLET ORAL NIGHTLY PRN
Status: DISCONTINUED | OUTPATIENT
Start: 2021-08-31 | End: 2021-09-01 | Stop reason: HOSPADM

## 2021-08-31 RX ORDER — OXYCODONE HYDROCHLORIDE 5 MG/1
5 TABLET ORAL EVERY 4 HOURS PRN
Status: DISCONTINUED | OUTPATIENT
Start: 2021-08-31 | End: 2021-09-01 | Stop reason: HOSPADM

## 2021-08-31 RX ORDER — MEPERIDINE HYDROCHLORIDE 25 MG/ML
12.5 INJECTION INTRAMUSCULAR; INTRAVENOUS; SUBCUTANEOUS
Status: DISCONTINUED | OUTPATIENT
Start: 2021-08-31 | End: 2021-08-31 | Stop reason: HOSPADM

## 2021-08-31 RX ORDER — ONDANSETRON 2 MG/ML
4 INJECTION INTRAMUSCULAR; INTRAVENOUS EVERY 6 HOURS PRN
Status: DISCONTINUED | OUTPATIENT
Start: 2021-08-31 | End: 2021-09-01 | Stop reason: HOSPADM

## 2021-08-31 RX ORDER — FENTANYL CITRATE 50 UG/ML
INJECTION, SOLUTION INTRAMUSCULAR; INTRAVENOUS
Status: COMPLETED | OUTPATIENT
Start: 2021-08-31 | End: 2021-08-31

## 2021-08-31 RX ORDER — SODIUM CHLORIDE, SODIUM LACTATE, POTASSIUM CHLORIDE, CALCIUM CHLORIDE 600; 310; 30; 20 MG/100ML; MG/100ML; MG/100ML; MG/100ML
9 INJECTION, SOLUTION INTRAVENOUS CONTINUOUS PRN
Status: DISCONTINUED | OUTPATIENT
Start: 2021-08-31 | End: 2021-08-31 | Stop reason: HOSPADM

## 2021-08-31 RX ORDER — HYDROMORPHONE HCL 110MG/55ML
2 PATIENT CONTROLLED ANALGESIA SYRINGE INTRAVENOUS EVERY 4 HOURS PRN
Status: DISCONTINUED | OUTPATIENT
Start: 2021-08-31 | End: 2021-09-01 | Stop reason: HOSPADM

## 2021-08-31 RX ORDER — ATORVASTATIN CALCIUM 10 MG/1
10 TABLET, FILM COATED ORAL DAILY
Status: DISCONTINUED | OUTPATIENT
Start: 2021-09-01 | End: 2021-09-01 | Stop reason: HOSPADM

## 2021-08-31 RX ORDER — FENTANYL CITRATE 50 UG/ML
25 INJECTION, SOLUTION INTRAMUSCULAR; INTRAVENOUS
Status: DISCONTINUED | OUTPATIENT
Start: 2021-08-31 | End: 2021-08-31 | Stop reason: HOSPADM

## 2021-08-31 RX ORDER — OXYCODONE HYDROCHLORIDE 5 MG/1
10 TABLET ORAL EVERY 4 HOURS PRN
Status: DISCONTINUED | OUTPATIENT
Start: 2021-08-31 | End: 2021-09-01 | Stop reason: HOSPADM

## 2021-08-31 RX ORDER — ALLOPURINOL 100 MG/1
200 TABLET ORAL
Status: DISCONTINUED | OUTPATIENT
Start: 2021-08-31 | End: 2021-09-01 | Stop reason: HOSPADM

## 2021-08-31 RX ORDER — SODIUM CHLORIDE 0.9 % (FLUSH) 0.9 %
10 SYRINGE (ML) INJECTION EVERY 12 HOURS SCHEDULED
Status: DISCONTINUED | OUTPATIENT
Start: 2021-08-31 | End: 2021-08-31 | Stop reason: HOSPADM

## 2021-08-31 RX ORDER — DEXAMETHASONE SODIUM PHOSPHATE 4 MG/ML
INJECTION, SOLUTION INTRA-ARTICULAR; INTRALESIONAL; INTRAMUSCULAR; INTRAVENOUS; SOFT TISSUE AS NEEDED
Status: DISCONTINUED | OUTPATIENT
Start: 2021-08-31 | End: 2021-08-31 | Stop reason: SURG

## 2021-08-31 RX ORDER — ONDANSETRON 2 MG/ML
INJECTION INTRAMUSCULAR; INTRAVENOUS AS NEEDED
Status: DISCONTINUED | OUTPATIENT
Start: 2021-08-31 | End: 2021-08-31 | Stop reason: SURG

## 2021-08-31 RX ORDER — FENTANYL CITRATE 50 UG/ML
INJECTION, SOLUTION INTRAMUSCULAR; INTRAVENOUS AS NEEDED
Status: DISCONTINUED | OUTPATIENT
Start: 2021-08-31 | End: 2021-08-31 | Stop reason: SURG

## 2021-08-31 RX ORDER — CARVEDILOL 25 MG/1
25 TABLET ORAL 2 TIMES DAILY WITH MEALS
Status: DISCONTINUED | OUTPATIENT
Start: 2021-08-31 | End: 2021-09-01 | Stop reason: HOSPADM

## 2021-08-31 RX ORDER — PROMETHAZINE HYDROCHLORIDE 12.5 MG/1
12.5 SUPPOSITORY RECTAL EVERY 6 HOURS PRN
Status: DISCONTINUED | OUTPATIENT
Start: 2021-08-31 | End: 2021-09-01 | Stop reason: HOSPADM

## 2021-08-31 RX ORDER — ONDANSETRON 2 MG/ML
4 INJECTION INTRAMUSCULAR; INTRAVENOUS ONCE AS NEEDED
Status: DISCONTINUED | OUTPATIENT
Start: 2021-08-31 | End: 2021-08-31 | Stop reason: HOSPADM

## 2021-08-31 RX ORDER — SODIUM CHLORIDE 9 MG/ML
100 INJECTION, SOLUTION INTRAVENOUS CONTINUOUS
Status: DISCONTINUED | OUTPATIENT
Start: 2021-08-31 | End: 2021-09-01

## 2021-08-31 RX ORDER — DOCUSATE SODIUM 100 MG/1
100 CAPSULE, LIQUID FILLED ORAL 2 TIMES DAILY PRN
Status: DISCONTINUED | OUTPATIENT
Start: 2021-08-31 | End: 2021-09-01 | Stop reason: HOSPADM

## 2021-08-31 RX ORDER — RAMIPRIL 5 MG/1
5 CAPSULE ORAL
Status: DISCONTINUED | OUTPATIENT
Start: 2021-09-01 | End: 2021-09-01 | Stop reason: HOSPADM

## 2021-08-31 RX ORDER — TRANEXAMIC ACID 10 MG/ML
1000 INJECTION, SOLUTION INTRAVENOUS ONCE
Status: DISCONTINUED | OUTPATIENT
Start: 2021-08-31 | End: 2021-08-31 | Stop reason: HOSPADM

## 2021-08-31 RX ORDER — SODIUM CHLORIDE 0.9 % (FLUSH) 0.9 %
10 SYRINGE (ML) INJECTION AS NEEDED
Status: DISCONTINUED | OUTPATIENT
Start: 2021-08-31 | End: 2021-09-01 | Stop reason: HOSPADM

## 2021-08-31 RX ORDER — HYDROMORPHONE HCL 110MG/55ML
0.25 PATIENT CONTROLLED ANALGESIA SYRINGE INTRAVENOUS
Status: DISCONTINUED | OUTPATIENT
Start: 2021-08-31 | End: 2021-08-31 | Stop reason: HOSPADM

## 2021-08-31 RX ORDER — CALCITRIOL 0.25 UG/1
0.25 CAPSULE, LIQUID FILLED ORAL 2 TIMES DAILY
Status: DISCONTINUED | OUTPATIENT
Start: 2021-08-31 | End: 2021-09-01 | Stop reason: HOSPADM

## 2021-08-31 RX ORDER — PROMETHAZINE HYDROCHLORIDE 12.5 MG/1
12.5 TABLET ORAL EVERY 6 HOURS PRN
Status: DISCONTINUED | OUTPATIENT
Start: 2021-08-31 | End: 2021-09-01 | Stop reason: HOSPADM

## 2021-08-31 RX ORDER — EPHEDRINE SULFATE 50 MG/ML
INJECTION INTRAVENOUS AS NEEDED
Status: DISCONTINUED | OUTPATIENT
Start: 2021-08-31 | End: 2021-08-31 | Stop reason: SURG

## 2021-08-31 RX ORDER — WARFARIN SODIUM 1 MG/1
1 TABLET ORAL
Status: DISCONTINUED | OUTPATIENT
Start: 2021-08-31 | End: 2021-09-01 | Stop reason: HOSPADM

## 2021-08-31 RX ORDER — PROPOFOL 10 MG/ML
VIAL (ML) INTRAVENOUS AS NEEDED
Status: DISCONTINUED | OUTPATIENT
Start: 2021-08-31 | End: 2021-08-31 | Stop reason: SURG

## 2021-08-31 RX ORDER — SODIUM CHLORIDE 0.9 % (FLUSH) 0.9 %
10 SYRINGE (ML) INJECTION AS NEEDED
Status: DISCONTINUED | OUTPATIENT
Start: 2021-08-31 | End: 2021-08-31 | Stop reason: HOSPADM

## 2021-08-31 RX ORDER — PANTOPRAZOLE SODIUM 40 MG/1
40 TABLET, DELAYED RELEASE ORAL
Status: DISCONTINUED | OUTPATIENT
Start: 2021-09-01 | End: 2021-09-01 | Stop reason: HOSPADM

## 2021-08-31 RX ORDER — NALOXONE HCL 0.4 MG/ML
0.1 VIAL (ML) INJECTION
Status: DISCONTINUED | OUTPATIENT
Start: 2021-08-31 | End: 2021-09-01 | Stop reason: HOSPADM

## 2021-08-31 RX ORDER — PHENYLEPHRINE HCL IN 0.9% NACL 1 MG/10 ML
SYRINGE (ML) INTRAVENOUS AS NEEDED
Status: DISCONTINUED | OUTPATIENT
Start: 2021-08-31 | End: 2021-08-31 | Stop reason: SURG

## 2021-08-31 RX ORDER — BUPIVACAINE HYDROCHLORIDE 5 MG/ML
INJECTION, SOLUTION EPIDURAL; INTRACAUDAL
Status: COMPLETED | OUTPATIENT
Start: 2021-08-31 | End: 2021-08-31

## 2021-08-31 RX ORDER — SODIUM CHLORIDE 0.9 % (FLUSH) 0.9 %
3 SYRINGE (ML) INJECTION EVERY 12 HOURS SCHEDULED
Status: DISCONTINUED | OUTPATIENT
Start: 2021-08-31 | End: 2021-09-01 | Stop reason: HOSPADM

## 2021-08-31 RX ORDER — BUMETANIDE 1 MG/1
2 TABLET ORAL
Status: DISCONTINUED | OUTPATIENT
Start: 2021-08-31 | End: 2021-09-01 | Stop reason: HOSPADM

## 2021-08-31 RX ORDER — ASPIRIN 81 MG/1
81 TABLET ORAL DAILY
Status: DISCONTINUED | OUTPATIENT
Start: 2021-09-01 | End: 2021-09-01 | Stop reason: HOSPADM

## 2021-08-31 RX ORDER — ACETAMINOPHEN 650 MG
TABLET, EXTENDED RELEASE ORAL AS NEEDED
Status: DISCONTINUED | OUTPATIENT
Start: 2021-08-31 | End: 2021-08-31 | Stop reason: HOSPADM

## 2021-08-31 RX ORDER — ONDANSETRON 4 MG/1
4 TABLET, FILM COATED ORAL EVERY 6 HOURS PRN
Status: DISCONTINUED | OUTPATIENT
Start: 2021-08-31 | End: 2021-09-01 | Stop reason: HOSPADM

## 2021-08-31 RX ORDER — ROCURONIUM BROMIDE 10 MG/ML
INJECTION, SOLUTION INTRAVENOUS AS NEEDED
Status: DISCONTINUED | OUTPATIENT
Start: 2021-08-31 | End: 2021-08-31 | Stop reason: SURG

## 2021-08-31 RX ORDER — TRANEXAMIC ACID 100 MG/ML
INJECTION, SOLUTION INTRAVENOUS AS NEEDED
Status: DISCONTINUED | OUTPATIENT
Start: 2021-08-31 | End: 2021-08-31 | Stop reason: SURG

## 2021-08-31 RX ADMIN — FENTANYL CITRATE 50 MCG: 50 INJECTION, SOLUTION INTRAMUSCULAR; INTRAVENOUS at 16:28

## 2021-08-31 RX ADMIN — TRANEXAMIC ACID 1000 MG: 100 INJECTION, SOLUTION INTRAVENOUS at 16:41

## 2021-08-31 RX ADMIN — Medication 200 MCG: at 17:17

## 2021-08-31 RX ADMIN — ROCURONIUM BROMIDE 50 MG: 10 INJECTION INTRAVENOUS at 16:30

## 2021-08-31 RX ADMIN — DEXAMETHASONE SODIUM PHOSPHATE 4 MG: 4 INJECTION, SOLUTION INTRAMUSCULAR; INTRAVENOUS at 17:01

## 2021-08-31 RX ADMIN — Medication 200 MCG: at 16:45

## 2021-08-31 RX ADMIN — FENTANYL CITRATE 25 MCG: 50 INJECTION, SOLUTION INTRAMUSCULAR; INTRAVENOUS at 15:45

## 2021-08-31 RX ADMIN — Medication 200 MCG: at 17:36

## 2021-08-31 RX ADMIN — SODIUM CHLORIDE, SODIUM LACTATE, POTASSIUM CHLORIDE, AND CALCIUM CHLORIDE: .6; .31; .03; .02 INJECTION, SOLUTION INTRAVENOUS at 16:16

## 2021-08-31 RX ADMIN — EPHEDRINE SULFATE 10 MG: 50 INJECTION INTRAVENOUS at 16:48

## 2021-08-31 RX ADMIN — Medication 200 MCG: at 17:06

## 2021-08-31 RX ADMIN — FENTANYL CITRATE 25 MCG: 50 INJECTION, SOLUTION INTRAMUSCULAR; INTRAVENOUS at 17:19

## 2021-08-31 RX ADMIN — Medication 100 MCG: at 16:40

## 2021-08-31 RX ADMIN — CEFAZOLIN SODIUM 2 G: 1 INJECTION, POWDER, FOR SOLUTION INTRAMUSCULAR; INTRAVENOUS at 16:40

## 2021-08-31 RX ADMIN — ONDANSETRON 4 MG: 2 INJECTION INTRAMUSCULAR; INTRAVENOUS at 17:29

## 2021-08-31 RX ADMIN — EPHEDRINE SULFATE 10 MG: 50 INJECTION INTRAVENOUS at 17:04

## 2021-08-31 RX ADMIN — BUPIVACAINE HYDROCHLORIDE 10 ML: 5 INJECTION, SOLUTION EPIDURAL; INTRACAUDAL at 15:45

## 2021-08-31 RX ADMIN — BUPIVACAINE 10 ML: 13.3 INJECTION, SUSPENSION, LIPOSOMAL INFILTRATION at 15:45

## 2021-08-31 RX ADMIN — SUGAMMADEX 200 MG: 100 INJECTION, SOLUTION INTRAVENOUS at 17:50

## 2021-08-31 RX ADMIN — PROPOFOL 120 MG: 10 INJECTION, EMULSION INTRAVENOUS at 16:29

## 2021-08-31 RX ADMIN — PROPOFOL 100 MCG/KG/MIN: 10 INJECTION, EMULSION INTRAVENOUS at 16:36

## 2021-08-31 NOTE — ANESTHESIA PROCEDURE NOTES
Arterial Line    Pre-sedation assessment completed: 8/31/2021 3:00 PM    Patient reassessed immediately prior to procedure    Patient location during procedure: OR  Start time: 8/31/2021 4:30 PM  Stop Time:8/31/2021 4:33 PM       Line placed for hemodynamic monitoring.  Performed By   Anesthesiologist: Anthony Camilo MD  Preanesthetic Checklist  Completed: patient identified, IV checked, site marked, risks and benefits discussed, surgical consent, monitors and equipment checked, pre-op evaluation and timeout performed  Arterial Line Prep   Sterile Tech: cap, gloves, mask and sterile barriers  Prep: ChloraPrep  Patient monitoring: blood pressure monitoring, continuous pulse oximetry and EKG  Arterial Line Procedure   Laterality:left  Location:  radial artery  Catheter size: 20 G   Guidance: palpation technique  Number of attempts: 1  Successful placement: yes  Post Assessment   Dressing Type: occlusive dressing applied, secured with tape and wrist guard applied.   Complications no  Circ/Move/Sens Assessment: normal and unchanged.   Patient Tolerance: patient tolerated the procedure well with no apparent complications

## 2021-08-31 NOTE — ANESTHESIA PREPROCEDURE EVALUATION
Anesthesia Evaluation     Patient summary reviewed and Nursing notes reviewed   NPO Solid Status: > 8 hours  NPO Liquid Status: > 8 hours           Airway   Mallampati: II  TM distance: <3 FB  Neck ROM: full  No difficulty expected  Dental    (+) edentulous    Pulmonary    (+) sleep apnea,   Cardiovascular   Exercise tolerance: poor (<4 METS)    (+) hypertension, valvular problems/murmurs, CAD, CABG, dysrhythmias Atrial Fib, CHF , hyperlipidemia,  carotid artery disease right carotid      Neuro/Psych  GI/Hepatic/Renal/Endo    (+)  GERD, PUD,  renal disease CRI,     Musculoskeletal     Abdominal    Substance History      OB/GYN          Other   arthritis,      ROS/Med Hx Other: Cards clearance on chart, high risk but no modifiable risk factors.                Anesthesia Plan    ASA 4     general with block   (A line)  intravenous induction     Anesthetic plan, all risks, benefits, and alternatives have been provided, discussed and informed consent has been obtained with: patient and spouse/significant other.

## 2021-08-31 NOTE — ANESTHESIA POSTPROCEDURE EVALUATION
Patient: Janel Mahoney    Procedure Summary     Date: 08/31/21 Room / Location: [facility] OR 11 / [facility] MAIN OR    Anesthesia Start: 1610 Anesthesia Stop: 1805    Procedure: TOTAL SHOULDER REVERSE ARTHROPLASTY (Right Shoulder) Diagnosis:       Rotator cuff arthropathy      (Rotator cuff arthropathy [M12.819])    Surgeons: Juvencio Pressley II, MD Provider: Anthony Camilo MD    Anesthesia Type: general with block ASA Status: 4          Anesthesia Type: general with block    Vitals  Vitals Value Taken Time   /33 08/31/21 1854   Temp 97.4 °F (36.3 °C) 08/31/21 1802   Pulse 61 08/31/21 1902   Resp 13 08/31/21 1847   SpO2 100 % 08/31/21 1902   Vitals shown include unvalidated device data.        Post Anesthesia Care and Evaluation    Patient location during evaluation: PACU  Patient participation: complete - patient participated  Level of consciousness: awake and awake and alert  Pain score: 0 (See nurse's notes for pain score)  Pain management: adequate  Airway patency: patent  Anesthetic complications: No anesthetic complications  PONV Status: none  Cardiovascular status: acceptable and hemodynamically stable  Respiratory status: acceptable  Hydration status: acceptable    Comments: Patient seen and examined postoperatively; vital signs stable; SpO2 greater than or equal to 90%; cardiopulmonary status stable; nausea/vomiting adequately controlled; pain adequately controlled; no apparent anesthesia complications; patient discharged from anesthesia care when discharge criteria were met      No pain in pacu, bp stable 140s, ok to dc miguel ángel and with sips

## 2021-08-31 NOTE — ANESTHESIA PROCEDURE NOTES
Peripheral Block    Pre-sedation assessment completed: 8/31/2021 3:15 PM    Patient reassessed immediately prior to procedure    Patient location during procedure: pre-op  Start time: 8/31/2021 3:30 PM  Stop time: 8/31/2021 3:45 PM  Reason for block: at surgeon's request and post-op pain management  Performed by  Anesthesiologist: Anthony Camilo MD  Preanesthetic Checklist  Completed: patient identified, IV checked, site marked, risks and benefits discussed, surgical consent, monitors and equipment checked, pre-op evaluation and timeout performed  Prep:  Pt Position: sitting  Sterile barriers:cap, gloves, mask and washed/disinfected hands  Prep: ChloraPrep  Patient monitoring: blood pressure monitoring, continuous pulse oximetry and EKG  Procedure  Sedation:yes  Performed under: local infiltration  Guidance:ultrasound guided  ULTRASOUND INTERPRETATION. Using ultrasound guidance a gauge needle was placed in close proximity to the brachial plexus nerve, at which point, under ultrasound guidance anesthetic was injected in the area of the nerve and spread of the anesthesia was seen on ultrasound in close proximity thereto.  There were no abnormalities seen on ultrasound; a digital image was taken; and the patient tolerated the procedure with no complications. Images:still images obtained, printed/placed on chart    Laterality:right  Block Type:interscalene  Injection Technique:single-shot  Needle Type:echogenic  Resistance on Injection: none  Sedation medications used: fentaNYL citrate (PF) (SUBLIMAZE) injection, 25 mcg  Medications Used: bupivacaine liposome (EXPAREL) injection 1.3%, 10 mL  bupivacaine PF (MARCAINE) injection 0.5%, 10 mL  Med admintered at 8/31/2021 3:45 PM      Post Assessment  Injection Assessment: negative aspiration for heme, no paresthesia on injection and incremental injection  Patient Tolerance:comfortable throughout block  Complications:no

## 2021-08-31 NOTE — ANESTHESIA PROCEDURE NOTES
Airway  Date/Time: 8/31/2021 4:32 PM  Airway not difficult    General Information and Staff    Patient location during procedure: OR  Anesthesiologist: Anthony Camilo MD    Indications and Patient Condition  Indications for airway management: CNS depression and airway protection    Preoxygenated: yes  Mask difficulty assessment: 2 - vent by mask + OA or adjuvant +/- NMBA    Final Airway Details  Final airway type: endotracheal airway      Successful airway: ETT  Cuffed: yes   Successful intubation technique: direct laryngoscopy  Endotracheal tube insertion site: oral  Blade: Pola  Blade size: 3  ETT size (mm): 7.0  Cormack-Lehane Classification: grade I - full view of glottis  Placement verified by: capnometry   Measured from: gums  ETT/EBT to gums (cm): 22  Number of attempts at approach: 1  Assessment: lips, teeth, and gum same as pre-op and atraumatic intubation

## 2021-09-01 VITALS
HEIGHT: 62 IN | HEART RATE: 65 BPM | SYSTOLIC BLOOD PRESSURE: 156 MMHG | DIASTOLIC BLOOD PRESSURE: 62 MMHG | RESPIRATION RATE: 18 BRPM | OXYGEN SATURATION: 96 % | WEIGHT: 150 LBS | BODY MASS INDEX: 27.6 KG/M2 | TEMPERATURE: 98.5 F

## 2021-09-01 PROBLEM — I50.42 CHRONIC COMBINED SYSTOLIC AND DIASTOLIC CONGESTIVE HEART FAILURE: Chronic | Status: ACTIVE | Noted: 2017-12-24

## 2021-09-01 PROBLEM — D68.32 HEMORRHAGIC DISORDER DUE TO EXTRINSIC CIRCULATING ANTICOAGULANTS: Status: RESOLVED | Noted: 2018-11-23 | Resolved: 2021-09-01

## 2021-09-01 PROBLEM — N18.4 CKD (CHRONIC KIDNEY DISEASE) STAGE 4, GFR 15-29 ML/MIN (HCC): Chronic | Status: ACTIVE | Noted: 2018-11-23

## 2021-09-01 PROBLEM — I50.43 ACUTE ON CHRONIC COMBINED SYSTOLIC (CONGESTIVE) AND DIASTOLIC (CONGESTIVE) HEART FAILURE (HCC): Status: RESOLVED | Noted: 2019-02-20 | Resolved: 2020-04-22

## 2021-09-01 PROBLEM — D62 ACUTE POSTHEMORRHAGIC ANEMIA: Status: RESOLVED | Noted: 2018-11-23 | Resolved: 2021-09-01

## 2021-09-01 PROBLEM — M10.00 IDIOPATHIC GOUT: Chronic | Status: ACTIVE | Noted: 2020-06-05

## 2021-09-01 PROBLEM — I10 HYPERTENSION: Chronic | Status: ACTIVE | Noted: 2017-12-24

## 2021-09-01 PROBLEM — E78.2 MIXED HYPERLIPIDEMIA: Status: ACTIVE | Noted: 2021-06-24

## 2021-09-01 PROBLEM — E78.2 MIXED HYPERLIPIDEMIA: Chronic | Status: ACTIVE | Noted: 2021-06-24

## 2021-09-01 PROBLEM — Z95.810 ICD (IMPLANTABLE CARDIOVERTER-DEFIBRILLATOR) IN PLACE: Chronic | Status: ACTIVE | Noted: 2020-07-23

## 2021-09-01 PROBLEM — I25.5 ISCHEMIC CARDIOMYOPATHY: Chronic | Status: ACTIVE | Noted: 2018-11-13

## 2021-09-01 LAB
ANION GAP SERPL CALCULATED.3IONS-SCNC: 10 MMOL/L (ref 5–15)
BUN SERPL-MCNC: 55 MG/DL (ref 8–23)
BUN/CREAT SERPL: 22.1 (ref 7–25)
CALCIUM SPEC-SCNC: 8.9 MG/DL (ref 8.6–10.5)
CHLORIDE SERPL-SCNC: 99 MMOL/L (ref 98–107)
CO2 SERPL-SCNC: 31 MMOL/L (ref 22–29)
CREAT SERPL-MCNC: 2.49 MG/DL (ref 0.57–1)
GFR SERPL CREATININE-BSD FRML MDRD: 19 ML/MIN/1.73
GLUCOSE SERPL-MCNC: 131 MG/DL (ref 65–99)
HCT VFR BLD AUTO: 25.2 % (ref 34–46.6)
HGB BLD-MCNC: 8.4 G/DL (ref 12–15.9)
INR PPP: 1.49 (ref 2–3)
POTASSIUM SERPL-SCNC: 4.4 MMOL/L (ref 3.5–5.2)
PROTHROMBIN TIME: 16.1 SECONDS (ref 19.4–28.5)
SODIUM SERPL-SCNC: 140 MMOL/L (ref 136–145)

## 2021-09-01 PROCEDURE — A9270 NON-COVERED ITEM OR SERVICE: HCPCS | Performed by: ORTHOPAEDIC SURGERY

## 2021-09-01 PROCEDURE — 97162 PT EVAL MOD COMPLEX 30 MIN: CPT

## 2021-09-01 PROCEDURE — 85018 HEMOGLOBIN: CPT | Performed by: ORTHOPAEDIC SURGERY

## 2021-09-01 PROCEDURE — 80048 BASIC METABOLIC PNL TOTAL CA: CPT | Performed by: ORTHOPAEDIC SURGERY

## 2021-09-01 PROCEDURE — 85610 PROTHROMBIN TIME: CPT | Performed by: ORTHOPAEDIC SURGERY

## 2021-09-01 PROCEDURE — G0378 HOSPITAL OBSERVATION PER HR: HCPCS

## 2021-09-01 PROCEDURE — 63710000001 BUMETANIDE 1 MG TABLET: Performed by: ORTHOPAEDIC SURGERY

## 2021-09-01 PROCEDURE — 63710000001 ASPIRIN 81 MG TABLET DELAYED-RELEASE: Performed by: ORTHOPAEDIC SURGERY

## 2021-09-01 PROCEDURE — 63710000001 ATORVASTATIN 10 MG TABLET: Performed by: ORTHOPAEDIC SURGERY

## 2021-09-01 PROCEDURE — 63710000001 CALCITRIOL 0.25 MCG CAPSULE: Performed by: ORTHOPAEDIC SURGERY

## 2021-09-01 PROCEDURE — 63710000001 OXYCODONE 5 MG TABLET: Performed by: ORTHOPAEDIC SURGERY

## 2021-09-01 PROCEDURE — 85014 HEMATOCRIT: CPT | Performed by: ORTHOPAEDIC SURGERY

## 2021-09-01 PROCEDURE — 63710000001 CARVEDILOL 25 MG TABLET: Performed by: ORTHOPAEDIC SURGERY

## 2021-09-01 PROCEDURE — 97165 OT EVAL LOW COMPLEX 30 MIN: CPT

## 2021-09-01 PROCEDURE — 63710000001 RAMIPRIL 5 MG CAPSULE: Performed by: ORTHOPAEDIC SURGERY

## 2021-09-01 PROCEDURE — 97530 THERAPEUTIC ACTIVITIES: CPT

## 2021-09-01 PROCEDURE — 63710000001 WARFARIN 1 MG TABLET: Performed by: ORTHOPAEDIC SURGERY

## 2021-09-01 PROCEDURE — 25010000002 CEFAZOLIN PER 500 MG: Performed by: ORTHOPAEDIC SURGERY

## 2021-09-01 PROCEDURE — 63710000001 ALLOPURINOL 100 MG TABLET: Performed by: ORTHOPAEDIC SURGERY

## 2021-09-01 RX ORDER — OXYCODONE HYDROCHLORIDE AND ACETAMINOPHEN 5; 325 MG/1; MG/1
1 TABLET ORAL EVERY 4 HOURS PRN
Qty: 50 TABLET | Refills: 0 | Status: ON HOLD | OUTPATIENT
Start: 2021-09-01 | End: 2021-09-10 | Stop reason: SDUPTHER

## 2021-09-01 RX ORDER — OXYCODONE HYDROCHLORIDE AND ACETAMINOPHEN 5; 325 MG/1; MG/1
1 TABLET ORAL EVERY 4 HOURS PRN
Qty: 50 TABLET | Refills: 0 | Status: ON HOLD | OUTPATIENT
Start: 2021-09-01 | End: 2021-09-06

## 2021-09-01 RX ADMIN — CALCITRIOL 0.25 MCG: 0.25 CAPSULE ORAL at 09:23

## 2021-09-01 RX ADMIN — BUMETANIDE 2 MG: 1 TABLET ORAL at 17:51

## 2021-09-01 RX ADMIN — CEFAZOLIN SODIUM 2 G: 10 INJECTION, POWDER, FOR SOLUTION INTRAVENOUS at 03:37

## 2021-09-01 RX ADMIN — CARVEDILOL 25 MG: 25 TABLET, FILM COATED ORAL at 17:51

## 2021-09-01 RX ADMIN — CARVEDILOL 25 MG: 25 TABLET, FILM COATED ORAL at 00:53

## 2021-09-01 RX ADMIN — CARVEDILOL 25 MG: 25 TABLET, FILM COATED ORAL at 09:23

## 2021-09-01 RX ADMIN — OXYCODONE 5 MG: 5 TABLET ORAL at 11:52

## 2021-09-01 RX ADMIN — ATORVASTATIN CALCIUM 10 MG: 10 TABLET, FILM COATED ORAL at 09:23

## 2021-09-01 RX ADMIN — WARFARIN 1 MG: 1 TABLET ORAL at 17:51

## 2021-09-01 RX ADMIN — WARFARIN 1 MG: 1 TABLET ORAL at 00:53

## 2021-09-01 RX ADMIN — OXYCODONE 10 MG: 5 TABLET ORAL at 17:50

## 2021-09-01 RX ADMIN — BUMETANIDE 2 MG: 1 TABLET ORAL at 00:53

## 2021-09-01 RX ADMIN — RAMIPRIL 5 MG: 5 CAPSULE ORAL at 05:43

## 2021-09-01 RX ADMIN — CEFAZOLIN SODIUM 2 G: 10 INJECTION, POWDER, FOR SOLUTION INTRAVENOUS at 09:23

## 2021-09-01 RX ADMIN — SODIUM CHLORIDE 100 ML/HR: 9 INJECTION, SOLUTION INTRAVENOUS at 00:54

## 2021-09-01 RX ADMIN — BUMETANIDE 2 MG: 1 TABLET ORAL at 09:23

## 2021-09-01 RX ADMIN — CALCITRIOL 0.25 MCG: 0.25 CAPSULE ORAL at 00:53

## 2021-09-01 RX ADMIN — ASPIRIN 81 MG: 81 TABLET, COATED ORAL at 09:23

## 2021-09-01 RX ADMIN — ALLOPURINOL 200 MG: 100 TABLET ORAL at 17:51

## 2021-09-01 NOTE — DISCHARGE INSTRUCTIONS
Shoulder Fracture Discharge Instructions  Dr. JURGEN Ortiz” Huan II  [phone]    • INCISION CARE  o Wash your hands prior to dressing changes  o The sling should be left in place until seen in the office at 2 weeks unless changing the dressing or performing hygeine. New dry dressings can be placed over the incision daily starting on postop day #2. Dressings should be changed daily until seen in the office or the incision had no drainage.   o No creams or ointments to the incision until 4 weeks post op.   o May remove dressing once the incision is completely free of drainage. But need to wait at least a 2 weeks after surgery.  o Do not touch or pick at the incision  o Check incision every day and notify surgeon immediately if any of the following signs or symptoms are seen:  - Increase in redness  - Increase in swelling around the incision and of the entire extremity  - Increase in pain  - NEW drainage or oozing from the incision  - Pulling apart of the edges of the incision  - Increase in overall body temperature (greater than 100.4 degrees)  o Your surgeon will instruct you regarding suture or staple removal. In general, this happens at the 2-week post op appointment.    • ACTIVITIES  o Exercises:  - Physical therapy will begin at the discretion of the surgeon. You may have to wait some time before beginning therapy to allow for healing.   - Elevate the affected arm most of the day during the first week post operatively.   - Use cold packs for 20-30 minutes approximately 5 times per day.  - Do not work on range of motion exercises of the shoulder until instructed to do so.   o Activities of Daily Living:   - The incision cannot get wet after surgery which usually prevents any showering. It is ok to shower starting on postoperative day #5 assuming no drainage from the incision. Do not rub the incision site. Allow soapy water to rinse the shoulder. A new dry dressing should be applied afterwards. May shower and  let water run over the incision if there is no drainage and the wound is well healing.   - No tub baths, hot tubs, or swimming pools for 4 weeks.     • Restrictions  o Weight: Do not put weight on the arm or hand until instructed to do so. Your surgeon will discuss with restrictions in terms of activities allowed. In general anything more strenuous than holding a pen or piece of paper should be avoided, the other arm should do the vast majority of the work.   o Driving: Many patients have questions about when it is safe to return to driving. The answer is that this is extremely variable. It depends on the extent of the surgery, as well as how quickly you heal. Until you can safely navigate the steering wheel, and are off of all narcotics, driving is not permitted. Your surgeon cannot “clear” you to return to driving, only you can make the decision when you feel it is safe.     • Medications  o Anticoagulants: After upper extremity surgery most patients do not require an anticoagulant unless you have another injury that will be keeping you from mobilizing. Lower extremity surgery typically does require use of an anticoagulation medicine.   - IF YOU HAD LOWER EXTREMITY SURGERY AND ARE NOT DISCHARGED HOME WITH ANY ANTICOAGULANT MEDICINE YOU SHOULD TAKE ASPIRIN 325mg DAILY FOR 30 DAYS POSTOPERATIVELY.  - If you are discharged home with an anticoagulant such as Aspirin, Xarelto, Eliquis, Coumadin, or Lovenox, follow these simple instructions:   - Notify surgeon immediately if any juvencio bleeding is noted in the urine, stool, emesis, or from the nose or the incision. Blood in the stool will often appear as black rather than red. Blood in urine may appear as pink. Blood in emesis may appear as brown/black like coffee grounds.  - You will need to apply pressure for longer periods of time to any cuts or abrasions to stop bleeding  - Avoid alcohol while taking anticoagulants  - Most anticoagulants are to be taken for 30 days  postoperatively. After this time, you may stop using them unless instructed otherwise.   - If you were already taking an anticoagulant (commonly Aspirin, Coumadin, or Plavix) you will likely be resuming your normal dose postoperatively and will be continuing that medication at the discretion of the prescribing physician.  o Stool Softeners: You will be at greater risk of constipation after surgery due to being less mobile and the pain medications.  - Take stool softeners as needed. Over the counter Colace 100 mg 1-2 capsules twice daily can be taken.  - If stools become too loose or too frequent, please decreases the dosage or stop the stool softener.  - If constipation occurs despite use of stool softeners, you are to continue the stool softeners and add a laxative (Milk of Magnesia 1 ounce daily as needed)  - Drink plenty of fluids, and eat fruits and vegetables during your recovery time. Getting up and mobilizing will help the bowels to recover their regular function, as will weaning off of all narcotics when the pain becomes tolerable.  o Pain Medications utilized after surgery are narcotics. This is some general information about these medications.  - CLASSIFICATION: Pain medications are called Opioids and are narcotics  - LEGALITIES: It is illegal to share narcotics with others  - DRIVING: it is illegal to drive while under the influence of narcotics. Doing so is a DUI.  - POTENTIAL SIDE EFFECTS: nausea, vomiting, itching, dizziness, drowsiness, dry mouth, constipation, and difficulty urinating.  - POTENTIAL ADVERSE EFFECTS:  - Opioid tolerance can develop with use of pain medications and this simply means that it requires more and more of the medication to control pain. However, this is seen more in patients that use opioids for longer periods of time.  - Opioid dependence can develop with use of Opioids. People with opioid dependence will experience withdrawal symptoms upon cessation of the  medication.  - Opioid addiction can develop with use of Opioids. The incidence of this is very unlikely in patients who take the medications as ordered and stop the medications as instructed.  - Opioid overdose can be dangerous, but is unlikely when the medication is taken as ordered and stopped when ordered. It is important not to mix opioids with alcohol as this can lead to over sedation and respiratory difficulty.  - DOSAGE:  - After the initial surgical pain begins to resolve, you may begin to decrease the pain medication. By the end of a few weeks, you should be off of pain medications.  - Refills will not be given by the office during evening hours, on weekends, or after 6 weeks post-op. You are responsible for weaning off of pain medication. You can increase the time between narcotic pills, taking one every 4 then 6 then 8 hours and so on.  - To seek refills on pain medications during the initial 6-week post-operative period, you must call the office to request the refill. The office will then notify you when to  the prescription. DO NOT wait until you are out of the medication to request a refill. Prescriptions will not be filled over the weekend and depending on the schedule, it may take a couple days for the prescription to be available. Someone will have to pick the prescription in person at the office.    • FOLLOW-UP VISITS  o You will need to follow up in the office with your surgeon in 2 weeks, or as instructed elsewhere in your discharge paperwork. Please call this number [phone] to schedule this appointment. If you are going to an SNF facility, they will arrange for you to follow up in the office.   o If you have any concerns or suspected complications prior to your follow up visit, please call the office. Do not wait until your appointment time if you suspect complications. These will need to be addressed in the office promptly.    Juvencio Pressley II, MD  Orthopaedic Surgery  [facility]

## 2021-09-01 NOTE — PROGRESS NOTES
"Pharmacy dosing service  Anticoagulant  Warfarin     Subjective:    Janel Mahoney is a 80 y.o.female being continued on warfarin for atrial fibrillation.    INR Goal: 2 - 3  Home medication?: Yes, warfarin 1 mg PO on Mon/Wed/Fri and warfarin 2 mg PO on all other days (last night's dose, 1 mg)  Bridge Therapy Present?:  No  Interacting Medications Evaluation (New/Present/Discontinued): allopurinol, aspirin  Additional Contributing Factors: Post op day 1 shoulder arthroplasty      Assessment/Plan:    Warfarin restarted overnight. Will continue home regimen listed above. Regimen confirmed with most recent anticoag clinic.     Continue to monitor and adjust based on INR.         Date 8/31 9/1          INR 1.56 1.49          Dose 1mg 1mg              Objective:  [Ht: 157.5 cm (62.01\"); Wt: 68 kg (150 lb); BMI: Body mass index is 27.43 kg/m².]    Lab Results   Component Value Date    ALBUMIN 3.80 08/18/2021     Lab Results   Component Value Date    INR 1.49 (L) 09/01/2021    INR 1.56 (L) 08/31/2021    INR 1.54 (L) 08/31/2021    PROTIME 16.1 (L) 09/01/2021    PROTIME 16.8 (L) 08/31/2021    PROTIME 16.6 (L) 08/31/2021     Lab Results   Component Value Date    HGB 8.4 (L) 09/01/2021    HGB 8.4 (L) 08/18/2021    HGB 9.0 (L) 08/10/2021     Lab Results   Component Value Date    HCT 25.2 (L) 09/01/2021    HCT 26.7 (L) 08/18/2021    HCT 29.6 (L) 08/10/2021       Woodrow Heredia, Artur  09/01/21 15:06 EDT     "

## 2021-09-01 NOTE — THERAPY EVALUATION
Patient Name: Janel Mahoney  : 1940    MRN: [id number]                              Today's Date: 2021       Admit Date: 2021    Visit Dx: No diagnosis found.  Patient Active Problem List   Diagnosis   • Anemia in chronic kidney disease (CKD)   • Thrombocytopenia (CMS/HCC)   • B12 deficiency   • Coronary artery disease involving coronary bypass graft of native heart without angina pectoris   • S/P MVR (porcine)   • Chronic atrial fibrillation (CMS/HCC)   • Bilateral carotid artery disease (CMS/HCC)   • Intractable low back pain   • Long-term (current) use of anticoagulants   • Low back pain   • Osteoporosis   • Murmur, heart   • GEORGINA on auto CPAP - Dr Cardona   • Hypersomnia due to medical condition - GEORGINA   • Esophageal stricture   • Dysphagia   • Closed fracture of left proximal humerus   • Essential hypertension   • Supratherapeutic INR   • Chronic combined systolic and diastolic congestive heart failure (CMS/HCC)   • Osteoporosis with pathological fracture   • Closed 3-part fracture of proximal end of left humerus   • Closed fracture of proximal end of humerus with delayed healing   • Injury of right knee   • Knee injury, left, initial encounter   • History of fall   • S/P TKR (total knee replacement) using cement, left   • Ischemic cardiomyopathy   • Intramuscular hematoma right pecotralis   • CKD (chronic kidney disease) stage 4, GFR 15-29 ml/min (CMS/HCC)   • Peripheral edema   • Inflammatory arthritis   • Ventricular tachycardia (CMS/HCC)   • S/P revision of total knee   • Idiopathic gout   • Psychophysiological insomnia   • ICD (implantable cardioverter-defibrillator) in place   • Status post reverse arthroplasty of right shoulder   • Mixed hyperlipidemia     Past Medical History:   Diagnosis Date   • Acute kidney injury (CMS/HCC)    • Anemia     on procrit   • Atrial fibrillation (CMS/HCC)    • Bruises easily    • Carotid artery stenosis    • Chronic back pain    • Chronic combined  systolic and diastolic congestive heart failure (CMS/HCC)    • Chronic coronary artery disease     moderate to severe LV dysfunction.  Sees Dr. Dominguez   • Chronic kidney disease, stage 3 (CMS/HCC)    • Dysphagia    • GERD (gastroesophageal reflux disease)    • Gout    • H/O cardiac murmur    • Hematoma     LEFT LEG; DR ALONZO AWARE   • [place] (hard of hearing)     wears hearing aids   • Hyperlipidemia    • Hypertension    • Hypotension    • ICD (implantable cardioverter-defibrillator) in place    • Ischemic cardiomyopathy    • Kyphoscoliosis    • Leukopenia    • Lumbar spondylosis    • Obesity    • GEORGINA (obstructive sleep apnea) 12/01/2013    Overnight polysomnogram, weight 168 pounds.  AHI mildly abnormal at 10.3 events per hour.  Respiratory effort related arousals 9.5 events per hour.  Total time snoring 30% and arousals associated with snoring 15 events per hour.          Bring machine DOS   • Osteoarthritis    • Osteoporosis    • Peptic ulcer    • Premature ventricular contractions    • Renal insufficiency syndrome    • Right shoulder pain 08/2021   • Scoliosis    • Shoulder fracture, left    • Shoulder pain, right    • Thrombocytopenia (CMS/HCC)    • Urine incontinence     pads   • Ventricular tachycardia (CMS/HCC)    • Vitamin B12 deficiency    • Warfarin anticoagulation      Past Surgical History:   Procedure Laterality Date   • AV NODE ABLATION     • BREAST BIOPSY     • CARDIAC CATHETERIZATION      Showed severe mitral insufficiency and borderline coronary artery disease   • CARDIAC CATHETERIZATION      Showed an ejection fraction of 35%. She had occlusive disease of the right posterior LV branch and no other significant disease, treated medically.   • CARDIAC DEFIBRILLATOR PLACEMENT      Biventricular   • CARDIAC DEFIBRILLATOR PLACEMENT Left    • CARDIAC ELECTROPHYSIOLOGY PROCEDURE N/A 1/4/2019    Procedure: GENERATOR CHANGE BI-V ICD   [place];  Surgeon: James Hwang MD;  Location: [facility]  LOCATION;  Service: Cardiology   • CARDIAC VALVE REPLACEMENT  2009    Done with stent placement   • CARDIOVERSION      multiple electrocardioversions.   • CAROTID ARTERY ANGIOPLASTY Right    • CATARACT EXTRACTION EXTRACAPSULAR W/ INTRAOCULAR LENS IMPLANTATION Bilateral    • COLONOSCOPY N/A 9/28/2017    Procedure: COLONOSCOPY TO CECUM;  Surgeon: Kevin Davis MD;  Location: [facility] ENDOSCOPY;  Service:    • CORONARY ANGIOPLASTY WITH STENT PLACEMENT  2009   • CORONARY ARTERY BYPASS GRAFT      single graft to the PDA   • CORONARY STENT PLACEMENT     • ENDOSCOPY N/A 9/28/2017    Procedure: ESOPHAGOGASTRODUODENOSCOPY ;  Surgeon: Kevin Davis MD;  Location: [facility] ENDOSCOPY;  Service:    • EYE SURGERY     • HEMORRHOIDECTOMY     • HYSTERECTOMY     • INCISION AND DRAINAGE TRUNK Right 11/27/2018    Procedure: EVACUATION OF RIGHT CHEST WALL HEMATOMA;  Surgeon: Juvencio Rodriguez MD;  Location: [facility] OR;  Service: General   • MITRAL VALVE REPLACEMENT  01/2010    #31 Epic porcine valve.   • THROMBOENDARTERECTOMY Right     carotid thromboendarterectomy    • TONSILLECTOMY      age 32   • TOTAL KNEE ARTHROPLASTY Left    • TOTAL KNEE ARTHROPLASTY REVISION Left 11/19/2019    Procedure: TOTAL KNEE ARTHROPLASTY REVISION LEFT;  Surgeon: Juvencio Pressley II, MD;  Location: [facility] OR;  Service: Orthopedics   • TOTAL SHOULDER ARTHROPLASTY W/ DISTAL CLAVICLE EXCISION Left 1/16/2018    Procedure: TOTAL SHOULDER REVERSE ARTHROPLASTY;  Surgeon: Bianka Quesada MD;  Location: [facility] OR;  Service:      General Information     Row Name 09/01/21 1256          OT Time and Intention    Document Type  evaluation  -SR     Mode of Treatment  occupational therapy  -SR     Row Name 09/01/21 1256          General Information    Patient Profile Reviewed  yes  -SR     Existing Precautions/Restrictions  fall;shoulder;right  -SR     Row Name 09/01/21 1256          Occupational Profile    Reason for Services/Referral  (Occupational Profile)  Pt is s/p R shoulder rTSA 8/31.  -SR     Successful Occupations (Occupational Profile)  Pt lives at home with  and dtr.  Dtr is unable to physically assist.  Pt typically uses walker and is independent with ADLs.  -     Row Name 09/01/21 1256          Living Environment    Lives With  spouse  -     Row Name 09/01/21 1256          Home Main Entrance    Number of Stairs, Main Entrance  five  -SR     Row Name 09/01/21 1256          Safety Issues, Functional Mobility    Impairments Affecting Function (Mobility)  balance;endurance/activity tolerance;range of motion (ROM);strength;postural/trunk control  -SR       User Key  (r) = Recorded By, (t) = Taken By, (c) = Cosigned By    Initials Name Provider Type    Lashawn Servin OT Occupational Therapist          Mobility/ADL's     Row Name 09/01/21 1309          Transfers    Sit-Stand Jack (Transfers)  moderate assist (50% patient effort)  -     Row Name 09/01/21 1309          Functional Mobility    Functional Mobility- Ind. Level  moderate assist (50% patient effort)  -     Functional Mobility- Device  ayaan walker  -SR     Row Name 09/01/21 1309          Activities of Daily Living    BADL Assessment/Intervention  lower body dressing  -     Row Name 09/01/21 1309          Mobility    Extremity Weight-bearing Status  right upper extremity  -SR     Right Upper Extremity (Weight-bearing Status)  non weight-bearing (NWB)  -     Row Name 09/01/21 1309          Lower Body Dressing Assessment/Training    Jack Level (Lower Body Dressing)  don;socks;contact guard assist  -SR       User Key  (r) = Recorded By, (t) = Taken By, (c) = Cosigned By    Initials Name Provider Type    Lashawn Servin OT Occupational Therapist        Obj/Interventions     Row Name 09/01/21 1541          Range of Motion Comprehensive    Comment, General Range of Motion  LUE impaired due to weakness  -     Row Name 09/01/21 7659           Strength Comprehensive (MMT)    Comment, General Manual Muscle Testing (MMT) Assessment  SHIRA medrnao 3-/5  -SR     Row Name 09/01/21 1541          Balance    Static Sitting Balance  WFL  -SR     Dynamic Sitting Balance  mild impairment  -SR     Static Standing Balance  moderate impairment;supported  -SR     Dynamic Standing Balance  moderate impairment;supported  -SR     Balance Interventions  sitting;standing;sit to stand;supported;static;dynamic;minimal challenge  -SR       User Key  (r) = Recorded By, (t) = Taken By, (c) = Cosigned By    Initials Name Provider Type    SR Lashawn Butler OT Occupational Therapist        Goals/Plan     Row Name 09/01/21 1547          Transfer Goal 1 (OT)    Activity/Assistive Device (Transfer Goal 1, OT)  sit-to-stand/stand-to-sit  -SR     [place] Level/Cues Needed (Transfer Goal 1, OT)  contact guard assist  -SR     Time Frame (Transfer Goal 1, OT)  2 weeks  -SR     [facility] Name 09/01/21 1547          Dressing Goal 1 (OT)    Activity/Device (Dressing Goal 1, OT)  upper body dressing  -SR     [place]/Cues Needed (Dressing Goal 1, OT)  minimum assist (75% or more patient effort)  -SR     Time Frame (Dressing Goal 1, OT)  2 weeks  -SR     [facility] Name 09/01/21 1547          Therapy Assessment/Plan (OT)    Planned Therapy Interventions (OT)  activity tolerance training;adaptive equipment training;BADL retraining;IADL retraining;occupation/activity based interventions;passive ROM/stretching;ROM/therapeutic exercise;strengthening exercise;transfer/mobility retraining  -SR       User Key  (r) = Recorded By, (t) = Taken By, (c) = Cosigned By    Initials Name Provider Type    SR Lashawn Butler OT Occupational Therapist        Clinical Impression     Row Name 09/01/21 1542          Pain Scale: Numbers Pre/Post-Treatment    Pretreatment Pain Rating  4/10  -SR     Posttreatment Pain Rating  4/10  -SR     Row Name 09/01/21 1542          Plan of Care Review     Outcome Summary  Pt is s/p R shoulder rTSA 8/31.  She is farily independent at baseline, though typically uses rwx.  She currently demos weakness and is unable to take steps from chair due to weaknes and balance impairments and diffiuclty using ayaan walker.  She was unable to compelte pendulum exercises due to balance, though OT removed sling for RUE to dangle while pt was standing.  At this point pt is unsafe to return home and will require IP rehab at discharge.  -SR     Row Name 09/01/21 1542          Therapy Assessment/Plan (OT)    Rehab Potential (OT)  good, to achieve stated therapy goals  -SR     Criteria for Skilled Therapeutic Interventions Met (OT)  yes  -SR     Therapy Frequency (OT)  5 times/wk  -SR     Predicted Duration of Therapy Intervention (OT)  Until discharge  -SR     Row Name 09/01/21 1542          Therapy Plan Review/Discharge Plan (OT)    Anticipated Discharge Disposition (OT)  inpatient rehabilitation facility  -SR     Row Name 09/01/21 1542          Positioning and Restraints    Pre-Treatment Position  sitting in chair/recliner  -SR     Post Treatment Position  chair  -SR     In Chair  exit alarm on;encouraged to call for assist;call light within reach  -SR       User Key  (r) = Recorded By, (t) = Taken By, (c) = Cosigned By    Initials Name Provider Type    SR Lashawn Butler, OT Occupational Therapist        Outcome Measures     Row Name 09/01/21 3487          How much help from another is currently needed...    Putting on and taking off regular lower body clothing?  3  -SR     Bathing (including washing, rinsing, and drying)  2  -SR     Toileting (which includes using toilet bed pan or urinal)  2  -SR     Putting on and taking off regular upper body clothing  2  -SR     Taking care of personal grooming (such as brushing teeth)  3  -SR     Eating meals  3  -SR     AM-PAC 6 Clicks Score (OT)  15  -SR     Row Name 09/01/21 1035          How much help from another person do you  currently need...    Turning from your back to your side while in flat bed without using bedrails?  2  -CM     Moving from lying on back to sitting on the side of a flat bed without bedrails?  2  -CM     Moving to and from a bed to a chair (including a wheelchair)?  2  -CM     Standing up from a chair using your arms (e.g., wheelchair, bedside chair)?  2  -CM     Climbing 3-5 steps with a railing?  1  -CM     To walk in hospital room?  2  -CM     AM-PAC 6 Clicks Score (PT)  11  -CM     Row Name 09/01/21 1547 09/01/21 1035       Functional Assessment    Outcome Measure Options  AM-PAC 6 Clicks Daily Activity (OT)  -SR  AM-PAC 6 Clicks Basic Mobility (PT);[place]  -CM      User Key  (r) = Recorded By, (t) = Taken By, (c) = Cosigned By    Initials Name Provider Type    SR Lashawn Butler, OT Occupational Therapist    CM Geno Murillo, PT Physical Therapist          Occupational Therapy Education                 Title: PT OT SLP Therapies (In Progress)     Topic: Occupational Therapy (In Progress)     Point: ADL training (In Progress)     Description:   Instruct learner(s) on proper safety adaptation and remediation techniques during self care or transfers.   Instruct in proper use of assistive devices.              Learning Progress Summary           Patient Acceptance, E,TB, NR by SR at 9/1/2021 0215                   Point: Home exercise program (Not Started)     Description:   Instruct learner(s) on appropriate technique for monitoring, assisting and/or progressing therapeutic exercises/activities.              Learner Progress:  Not documented in this visit.          Point: Precautions (Not Started)     Description:   Instruct learner(s) on prescribed precautions during self-care and functional transfers.              Learner Progress:  Not documented in this visit.          Point: Body mechanics (In Progress)     Description:   Instruct learner(s) on proper positioning and spine alignment during  self-care, functional mobility activities and/or exercises.              Learning Progress Summary           Patient Acceptance, E,TB, NR by  at 9/1/2021 1548                               User Key     Initials Effective Dates Name Provider Type Discipline     06/16/21 -  Lashawn Butler OT Occupational Therapist OT              OT Recommendation and Plan  Planned Therapy Interventions (OT): activity tolerance training, adaptive equipment training, BADL retraining, IADL retraining, occupation/activity based interventions, passive ROM/stretching, ROM/therapeutic exercise, strengthening exercise, transfer/mobility retraining  Therapy Frequency (OT): 5 times/wk  Plan of Care Review  Outcome Summary: Pt is s/p R shoulder rTSA 8/31.  She is farily independent at baseline, though typically uses rwx.  She currently demos weakness and is unable to take steps from chair due to weaknes and balance impairments and diffiuclty using ayaan walker.  She was unable to compelte pendulum exercises due to balance, though OT removed sling for RUE to dangle while pt was standing.  At this point pt is unsafe to return home and will require IP rehab at discharge.     Time Calculation:   Time Calculation- OT     Row Name 09/01/21 1549             Time Calculation- OT    OT Start Time  0917  -      OT Stop Time  0946  -      OT Time Calculation (min)  29 min  -      Total Timed Code Minutes- OT  10 minute(s)  -      OT Received On  09/01/21  -      OT - Next Appointment  09/02/21  -      OT Goal Re-Cert Due Date  09/15/21  -        User Key  (r) = Recorded By, (t) = Taken By, (c) = Cosigned By    Initials Name Provider Type     Lashawn Butler OT Occupational Therapist        Therapy Charges for Today     Code Description Service Date Service Provider Modifiers Qty    [id number]  OT EVAL LOW COMPLEXITY 3 9/1/2021 Lashawn Butler OT GO 1    [id number]  OT THERAPEUTIC ACT EA 15 MIN 9/1/2021  Luke, Lashawn METCALF, OT GO 1               Lashawn Butler, APRIL  9/1/2021

## 2021-09-01 NOTE — DISCHARGE PLACEMENT REQUEST
"Janel Munoz (80 y.o. Female)     Date of Birth Social Security Number Address Home Phone MRN    1940  [id number] [facility] [id number] [phone] [id number]    Jewish Marital Status          Mandaeism        Admission Date Admission Type Admitting Provider Attending Provider Department, Room/Bed    8/31/21 Elective Juvencio Pressley II, MD Sweet, Richard Alexander II, MD [facility] SURGICAL INPATIENT, 4102/1    Discharge Date Discharge Disposition Discharge Destination         Home or Self Care              Attending Provider: Juvencio Pressley II, MD    Allergies: Diclofenac    Isolation: None   Infection: None   Code Status: CPR    Ht: 157.5 cm (62.01\")   Wt: 68 kg (150 lb)    Admission Cmt: None   Principal Problem: None                Active Insurance as of 8/31/2021     Primary Coverage     Payor Plan Insurance Group Employer/Plan Group    MEDICARE MEDICARE A & B      Payor Plan Address Payor Plan Phone Number Payor Plan Fax Number Effective Dates    [address] [phone]  10/1/2005 - None Entered    [facility] [id number]       Subscriber Name Subscriber Birth Date Member ID       JANEL MUNOZ 1940 [id number]           Secondary Coverage     Payor Plan Insurance Group Employer/Plan Group    HUMANA HUMANA [facility]              [id number]     Payor Plan Address Payor Plan Phone Number Payor Plan Fax Number Effective Dates    [address] [address]   9/1/2013 - None Entered    [facility] [id number]       Subscriber Name Subscriber Birth Date Member ID       JANEL MUNOZ 1940 [id number]                 Emergency Contacts      (Rel.) Home Phone Work Phone Mobile Phone    DenzelRuss (Spouse) [phone] -- [phone]    MUNOZ,AMY (Daughter) [phone] -- [phone]               History & Physical      Juvencio Pressley II, MD at 08/31/21 1124            Orthopaedic Surgery  History & Physical  Dr. JURGEN Pressley " II  [phone]    HPI:  Patient is a 80 y.o. Not  or  female who presents with right rotator cuff  arthropathy.  She was indicated for reverse total shoulder and is here today for surgery.    MEDICAL HISTORY  Past Medical History:   Diagnosis Date   • Acute kidney injury (CMS/HCC)    • Anemia     on procrit   • Atrial fibrillation (CMS/HCC)    • Bruises easily    • Carotid artery stenosis    • Chronic back pain    • Chronic combined systolic and diastolic congestive heart failure (CMS/HCC)    • Chronic coronary artery disease     moderate to severe LV dysfunction.  Sees Dr. Dominguez   • Chronic kidney disease, stage 3 (CMS/HCC)    • Dysphagia    • GERD (gastroesophageal reflux disease)    • Gout    • H/O cardiac murmur    • Hematoma     LEFT LEG; DR ALONZO AWARE   • [place] (hard of hearing)     wears hearing aids   • Hyperlipidemia    • Hypertension    • Hypotension    • ICD (implantable cardioverter-defibrillator) in place    • Ischemic cardiomyopathy    • Kyphoscoliosis    • Leukopenia    • Lumbar spondylosis    • Obesity    • GEORGINA (obstructive sleep apnea) 12/01/2013    Overnight polysomnogram, weight 168 pounds.  AHI mildly abnormal at 10.3 events per hour.  Respiratory effort related arousals 9.5 events per hour.  Total time snoring 30% and arousals associated with snoring 15 events per hour.          Bring machine DOS   • Osteoarthritis    • Osteoporosis    • Peptic ulcer    • Premature ventricular contractions    • Renal insufficiency syndrome    • Right shoulder pain 08/2021   • Scoliosis    • Shoulder fracture, left    • Shoulder pain, right    • Thrombocytopenia (CMS/HCC)    • Urine incontinence     pads   • Ventricular tachycardia (CMS/HCC)    • Vitamin B12 deficiency    • Warfarin anticoagulation    ·   Past Surgical History:   Procedure Laterality Date   • AV NODE ABLATION     • BREAST BIOPSY     • CARDIAC CATHETERIZATION      Showed severe mitral insufficiency and borderline coronary artery  disease   • CARDIAC CATHETERIZATION      Showed an ejection fraction of 35%. She had occlusive disease of the right posterior LV branch and no other significant disease, treated medically.   • CARDIAC DEFIBRILLATOR PLACEMENT      Biventricular   • CARDIAC DEFIBRILLATOR PLACEMENT Left    • CARDIAC ELECTROPHYSIOLOGY PROCEDURE N/A 1/4/2019    Procedure: GENERATOR CHANGE BI-V ICD   [place];  Surgeon: James Hwang MD;  Location: [facility] CATH INVASIVE LOCATION;  Service: Cardiology   • CARDIAC VALVE REPLACEMENT  2009    Done with stent placement   • CARDIOVERSION      multiple electrocardioversions.   • CAROTID ARTERY ANGIOPLASTY Right    • CATARACT EXTRACTION EXTRACAPSULAR W/ INTRAOCULAR LENS IMPLANTATION Bilateral    • COLONOSCOPY N/A 9/28/2017    Procedure: COLONOSCOPY TO CECUM;  Surgeon: Kevin Davis MD;  Location: [facility] ENDOSCOPY;  Service:    • CORONARY ANGIOPLASTY WITH STENT PLACEMENT  2009   • CORONARY ARTERY BYPASS GRAFT      single graft to the PDA   • CORONARY STENT PLACEMENT     • ENDOSCOPY N/A 9/28/2017    Procedure: ESOPHAGOGASTRODUODENOSCOPY ;  Surgeon: Kevin Davis MD;  Location: [facility] ENDOSCOPY;  Service:    • EYE SURGERY     • HEMORRHOIDECTOMY     • HYSTERECTOMY     • INCISION AND DRAINAGE TRUNK Right 11/27/2018    Procedure: EVACUATION OF RIGHT CHEST WALL HEMATOMA;  Surgeon: Juvencio Rodriguez MD;  Location: [facility] OR;  Service: General   • MITRAL VALVE REPLACEMENT  01/2010    #31 Epic porcine valve.   • THROMBOENDARTERECTOMY Right     carotid thromboendarterectomy    • TONSILLECTOMY      age 32   • TOTAL KNEE ARTHROPLASTY Left    • TOTAL KNEE ARTHROPLASTY REVISION Left 11/19/2019    Procedure: TOTAL KNEE ARTHROPLASTY REVISION LEFT;  Surgeon: Juvencio Pressley II, MD;  Location: [facility] OR;  Service: Orthopedics   • TOTAL SHOULDER ARTHROPLASTY W/ DISTAL CLAVICLE EXCISION Left 1/16/2018    Procedure: TOTAL SHOULDER REVERSE ARTHROPLASTY;  Surgeon: Bianka Quesada MD;   Location: [facility] MAIN OR;  Service:    ·   Prior to Admission medications    Medication Sig Start Date End Date Taking? Authorizing Provider   alendronate (FOSAMAX) 70 MG tablet Take 70 mg by mouth Every 14 (Fourteen) Days.   Yes Tejal Pena MD   allopurinol (ZYLOPRIM) 100 MG tablet 2 po qday  Patient taking differently: Take 200 mg by mouth Daily Before Supper. 6/30/21  Yes Ariel Mautte MD   aspirin 81 MG tablet Take 81 mg by mouth Daily. 4/1/14  Yes Tejal Pena MD   bumetanide (BUMEX) 2 MG tablet TAKE 1 TABLET TWICE DAILY. DOSE CHANGED   Patient taking differently: Take 2 mg by mouth 2 (Two) Times a Day. Do not take dos 6/16/21  Yes Ariel Mattue MD   calcitriol (ROCALTROL) 0.25 MCG capsule Take 0.25 mcg by mouth 2 (Two) Times a Day.   Yes Tejal Pena MD   carvedilol (COREG) 25 MG tablet TAKE 1 TABLET TWICE DAILY  Patient taking differently: Take  by mouth 2 (Two) Times a Day With Meals. Take dos 8/4/21  Yes Ariel Matute MD   Chlorhexidine Gluconate Cloth 2 % pads Apply 1 application topically. USE AS DIRECTED PREOP   Yes Tejal Pena MD   Cholecalciferol (VITAMIN D) 2000 UNITS tablet Take 2,000 Units by mouth 2 (two) times a day. Stop now for surgery 11/14/12  Yes Tejal Pena MD   enoxaparin (LOVENOX) 80 MG/0.8ML solution syringe Discard 0.1mL from syringe and Inject 0.7 mL under the skin into the appropriate area as directed Daily for 7 doses.  Patient taking differently: Inject 70 mg under the skin into the appropriate area as directed Daily. To start today 8-27.  LD Mon 8-30 21 8/24/21 8/31/21 Yes Nik Galarza MD   epoetin adele (EPOGEN,PROCRIT) 65028 UNIT/ML injection Inject 10,000 Units under the skin into the appropriate area as directed As Needed. 4/1/14  Yes Tejal Pena MD   ferrous sulfate 325 (65 FE) MG tablet Take 1 tablet by mouth 2 (Two) Times a Week.  Patient taking differently: Take 325 mg by mouth 2 (Two) Times a Week.  Sun, Thur only 8/12/21  Yes Edward Vasquez MD   Multiple Vitamins-Minerals (SENIOR MULTIVITAMIN PLUS) tablet Take 1 tablet by mouth Daily.   Yes Tejal Pena MD   mupirocin (BACTROBAN) 2 % nasal ointment 1 application into the nostril(s) as directed by provider. USE AS DIRECTED PREOP   Yes Tejal Pena MD   pantoprazole (PROTONIX) 40 MG EC tablet Take 1 tablet by mouth 2 (Two) Times a Day.  Patient taking differently: Take 40 mg by mouth 2 (Two) Times a Day. Take dos 4/20/21  Yes Ariel Matute MD   ramipril (ALTACE) 5 MG capsule TAKE 1 CAPSULE EVERY DAY  Patient taking differently: Take 5 mg by mouth Every Morning. Do not take 24 hr prior to surgery, LD 8-30 am 4/12/21  Yes Ariel Matute MD   simvastatin (ZOCOR) 20 MG tablet TAKE 2 TABLETS EVERY NIGHT  Patient taking differently: Take 20 mg by mouth Every Night. 6/29/21  Yes Ariel Matute MD   traMADol (ULTRAM) 50 MG tablet Take 2 tablets by mouth 2 (Two) Times a Day. 3 months chronic pain medicine for low back pain 7/29/21  Yes Ariel Matute MD   vitamin B-12 (CYANOCOBALAMIN) 1000 MCG tablet Take 1,000 mcg by mouth Daily. Stop now for surgery   Yes Tejal Pena MD   zolpidem (Ambien) 10 MG tablet Take 1 tablet by mouth At Night As Needed for Sleep. 8/11/21  Yes Ariel Matute MD   warfarin (COUMADIN) 1 MG tablet Take one tablet (1mg) by mouth on Mon, Wed, Fri and 2 tablets (2mg) on all other days or as directed by [facility]. 7/2/21   Nik Galarza MD   ·   Allergies   Allergen Reactions   • Diclofenac GI Bleeding     She had adverse effects from the medications that required hospitalization. Pt got stomach ulcers from this medication prescribed by Dr. Arango - pediatrist.   ·   Most Recent Immunizations   Administered Date(s) Administered   • COVID-19 (PFIZER) 03/20/2021   • Fluzone High Dose =>65 Years ([facility] ONLY) 01/05/2017   • Influenza Quad Vaccine (Inpatient) 09/29/2017   ·   Social  "History     Tobacco Use   • Smoking status: Former Smoker     Packs/day: 1.00     Years: 50.00     Pack years: 50.00     Types: Cigarettes     Quit date: 2009     Years since quittin.6   • Smokeless tobacco: Never Used   • Tobacco comment: Daily caffeine - soda   Substance Use Topics   • Alcohol use: Yes     Comment: 1 yearly   ·    Social History     Substance and Sexual Activity   Drug Use Never   ·     REVIEW OF SYSTEMS:  · Head: negative for headache  · Respiratory: negative for shortness of breath.   · Cardiovascular: negative for chest pain.   · Gastrointestinal: negative abdominal pain.   · Neurological: negative for LOC  · Psychiatric/Behavioral: negative for memory loss.   · All other systems reviewed and are negative    VITALS: There were no vitals taken for this visit. There is no height or weight on file to calculate BMI.    PHYSICAL EXAM:   · CONSTITUTIONAL: A&Ox3, No acute distress  · LUNGS: Equal chest rise, no shortness of air  · CARDIOVASCULAR: palpable peripheral pulses  · SKIN: no skin lesions in the area examined  · LYMPH: no lymphadenopathy in the area examined  · EXTREMITY: Right Upper Extremity  · Tenderness to Palpation: No tenderness to palpation  · Gross Deformity: No Gross Deformity  · Pulses:  Brisk Capillary Refill  · Sensation: Intact to Radial, Median, Ulnar Nerves and grossly throughout extremity  · Motor: 5/5 C5/C6/C7/C8/T1 Motor Nerves    RADIOLOGY REVIEW:   No radiology results for the last 7 days    LABS:   Results for the past 24 hours: No results found for this or any previous visit (from the past 24 hour(s)).    IMPRESSION:  Patient is a 80 y.o. Not  or  female with right rotator cuff arthropathy    PLAN:   · Admited to: Juvencio Pressley II, MD  · Disposition: Right reverse total shoulder today    R \"Leonel\" Huan ARIAS MD  Orthopaedic Surgery  [facility]  [phone] - [place] Office  [phone] - [place] " Office      Electronically signed by Juvencio Pressley II, MD at 21 1125     H&P signed by [employer], [place] at 21 1336      Scan on 2021 by [employer], [place]: H&amp;P, LOC, 2021          Electronically signed by [employer], [place] at 21 1336       Physician Progress Notes (most recent note)    No notes of this type exist for this encounter.            Physical Therapy Notes (most recent note)      Geno Murillo PT at 21 1047  Version 1 of 1         Patient Name: Janel Mahoney  : 1940    MRN: [id number]                              Today's Date: 2021       Admit Date: 2021    Visit Dx: No diagnosis found.  Patient Active Problem List   Diagnosis   • Anemia   • Thrombocytopenia (CMS/HCC)   • B12 deficiency   • Coronary artery disease involving coronary bypass graft of native heart without angina pectoris   • S/P MVR (porcine)   • Chronic atrial fibrillation (CMS/HCC)   • Bilateral carotid artery disease (CMS/HCC)   • Intractable low back pain   • Long-term (current) use of anticoagulants   • Low back pain   • Osteoporosis   • Murmur, heart   • GEORGINA on auto CPAP - Dr Cardona   • Hypersomnia due to medical condition - GEORGINA   • Esophageal stricture   • Dysphagia   • Closed fracture of left proximal humerus   • Hypertension   • Supratherapeutic INR   • Chronic combined systolic and diastolic congestive heart failure (CMS/HCC)   • Osteoporosis with pathological fracture   • Closed 3-part fracture of proximal end of left humerus   • Closed fracture of proximal end of humerus with delayed healing   • Injury of right knee   • Knee injury, left, initial encounter   • History of fall   • S/P TKR (total knee replacement) using cement, left   • Ischemic cardiomyopathy   • Intramuscular hematoma right pecotralis   • Hemorrhagic disorder due to extrinsic circulating anticoagulants (CMS/HCC)   • Acute posthemorrhagic anemia   • Chronic kidney disease, stage 3  (CMS/HCC)   • Peripheral edema   • Inflammatory arthritis   • Ventricular tachycardia (CMS/HCC)   • S/P revision of total knee   • Idiopathic gout   • Psychophysiological insomnia   • ICD (implantable cardioverter-defibrillator) in place   • Status post reverse arthroplasty of right shoulder     Past Medical History:   Diagnosis Date   • Acute kidney injury (CMS/HCC)    • Anemia     on procrit   • Atrial fibrillation (CMS/HCC)    • Bruises easily    • Carotid artery stenosis    • Chronic back pain    • Chronic combined systolic and diastolic congestive heart failure (CMS/HCC)    • Chronic coronary artery disease     moderate to severe LV dysfunction.  Sees Dr. Dominguez   • Chronic kidney disease, stage 3 (CMS/HCC)    • Dysphagia    • GERD (gastroesophageal reflux disease)    • Gout    • H/O cardiac murmur    • Hematoma     LEFT LEG; DR CHERI MAHAJAN   • [place] (hard of hearing)     wears hearing aids   • Hyperlipidemia    • Hypertension    • Hypotension    • ICD (implantable cardioverter-defibrillator) in place    • Ischemic cardiomyopathy    • Kyphoscoliosis    • Leukopenia    • Lumbar spondylosis    • Obesity    • GEORGINA (obstructive sleep apnea) 12/01/2013    Overnight polysomnogram, weight 168 pounds.  AHI mildly abnormal at 10.3 events per hour.  Respiratory effort related arousals 9.5 events per hour.  Total time snoring 30% and arousals associated with snoring 15 events per hour.          Bring machine DOS   • Osteoarthritis    • Osteoporosis    • Peptic ulcer    • Premature ventricular contractions    • Renal insufficiency syndrome    • Right shoulder pain 08/2021   • Scoliosis    • Shoulder fracture, left    • Shoulder pain, right    • Thrombocytopenia (CMS/HCC)    • Urine incontinence     pads   • Ventricular tachycardia (CMS/HCC)    • Vitamin B12 deficiency    • Warfarin anticoagulation      Past Surgical History:   Procedure Laterality Date   • AV NODE ABLATION     • BREAST BIOPSY     • CARDIAC CATHETERIZATION       Showed severe mitral insufficiency and borderline coronary artery disease   • CARDIAC CATHETERIZATION      Showed an ejection fraction of 35%. She had occlusive disease of the right posterior LV branch and no other significant disease, treated medically.   • CARDIAC DEFIBRILLATOR PLACEMENT      Biventricular   • CARDIAC DEFIBRILLATOR PLACEMENT Left    • CARDIAC ELECTROPHYSIOLOGY PROCEDURE N/A 1/4/2019    Procedure: GENERATOR CHANGE BI-V ICD   [place];  Surgeon: James Hwang MD;  Location: [facility] CATH INVASIVE LOCATION;  Service: Cardiology   • CARDIAC VALVE REPLACEMENT  2009    Done with stent placement   • CARDIOVERSION      multiple electrocardioversions.   • CAROTID ARTERY ANGIOPLASTY Right    • CATARACT EXTRACTION EXTRACAPSULAR W/ INTRAOCULAR LENS IMPLANTATION Bilateral    • COLONOSCOPY N/A 9/28/2017    Procedure: COLONOSCOPY TO CECUM;  Surgeon: Kevin Davis MD;  Location: [facility] ENDOSCOPY;  Service:    • CORONARY ANGIOPLASTY WITH STENT PLACEMENT  2009   • CORONARY ARTERY BYPASS GRAFT      single graft to the PDA   • CORONARY STENT PLACEMENT     • ENDOSCOPY N/A 9/28/2017    Procedure: ESOPHAGOGASTRODUODENOSCOPY ;  Surgeon: Kevin Davis MD;  Location: [facility] ENDOSCOPY;  Service:    • EYE SURGERY     • HEMORRHOIDECTOMY     • HYSTERECTOMY     • INCISION AND DRAINAGE TRUNK Right 11/27/2018    Procedure: EVACUATION OF RIGHT CHEST WALL HEMATOMA;  Surgeon: Juvencio Rodriguez MD;  Location: [facility] OR;  Service: General   • MITRAL VALVE REPLACEMENT  01/2010    #31 Epic porcine valve.   • THROMBOENDARTERECTOMY Right     carotid thromboendarterectomy    • TONSILLECTOMY      age 32   • TOTAL KNEE ARTHROPLASTY Left    • TOTAL KNEE ARTHROPLASTY REVISION Left 11/19/2019    Procedure: TOTAL KNEE ARTHROPLASTY REVISION LEFT;  Surgeon: Juvencio Pressley II, MD;  Location: [facility] OR;  Service: Orthopedics   • TOTAL SHOULDER ARTHROPLASTY W/ DISTAL CLAVICLE EXCISION Left 1/16/2018    Procedure: TOTAL  "SHOULDER REVERSE ARTHROPLASTY;  Surgeon: Bianka Quesada MD;  Location: [facility] OR;  Service:      General Information     Row Name 09/01/21 1030          Physical Therapy Time and Intention    Document Type  evaluation  -CM     Mode of Treatment  physical therapy  -CM     Row Name 09/01/21 1030          General Information    Patient Profile Reviewed  yes  -CM     Prior Level of Function  independent:;all household mobility;gait uses rw \"sometimes\" in home, dependent on day; uses scooter when at large store; does not drive  -CM     Existing Precautions/Restrictions  fall;shoulder;right  -CM     Barriers to Rehab  medically complex;hearing deficit extremely [place]  -CM     Row Name 09/01/21 1030          Living Environment    Lives With  spouse;child(lisandro), adult;other (see comments) adult dtr uses rw also.  -CM     Row Name 09/01/21 1030          Home Main Entrance    Number of Stairs, Main Entrance  five  -CM     Stair Railings, Main Entrance  railings safe and in good condition  -CM     Row Name 09/01/21 1030          Stairs Within Home, Primary    Number of Stairs, Within Home, Primary  none  -CM     Row Name 09/01/21 1030          Cognition    Orientation Status (Cognition)  oriented x 4  -CM     Row Name 09/01/21 1030          Safety Issues, Functional Mobility    Impairments Affecting Function (Mobility)  balance;endurance/activity tolerance;range of motion (ROM);strength;postural/trunk control  -CM       User Key  (r) = Recorded By, (t) = Taken By, (c) = Cosigned By    Initials Name Provider Type    CM Geno Murillo, PT Physical Therapist        Mobility     Row Name 09/01/21 1032          Bed Mobility    Bed Mobility  supine-sit  -CM     Supine-Sit [place] (Bed Mobility)  minimum assist (75% patient effort);moderate assist (50% patient effort);1 person assist  -CM     Assistive Device (Bed Mobility)  head of bed elevated  -CM     Row Name 09/01/21 1032          Transfers    Comment " (Transfers)  bed to bsc, bsc to chair; needs assist for management of brief and balance in standing  -CM     Row Name 09/01/21 1032          Bed-Chair Transfer    Bed-Chair [place] (Transfers)  moderate assist (50% patient effort);1 person assist  -CM     Assistive Device (Bed-Chair Transfers)  other (see comments) gait belt, non skid socks  -CM     Row Name 09/01/21 1032          Sit-Stand Transfer    Sit-Stand [place] (Transfers)  minimum assist (75% patient effort);moderate assist (50% patient effort);1 person assist  -CM     Assistive Device (Sit-Stand Transfers)  walker, ayaan  -CM     Row Name 09/01/21 1032          Gait/Stairs (Locomotion)    [place] Level (Gait)  moderate assist (50% patient effort);1 person assist  -CM     Assistive Device (Gait)  walker, ayaan  -CM     Distance in Feet (Gait)  15 ft; needs assist to lift hemiwx to advance due to LUE weakness.  -CM     Deviations/Abnormal Patterns (Gait)  bilateral deviations;festinating/shuffling;gait speed decreased;stride length decreased  -CM     Bilateral Gait Deviations  heel strike decreased;forward flexed posture kyphotic deformity of spine  -CM     Row Name 09/01/21 1032          Mobility    Extremity Weight-bearing Status  right upper extremity  -CM     Right Upper Extremity (Weight-bearing Status)  non weight-bearing (NWB)  -CM       User Key  (r) = Recorded By, (t) = Taken By, (c) = Cosigned By    Initials Name Provider Type    Geno Romero, PT Physical Therapist        Obj/Interventions     Row Name 09/01/21 1034          Range of Motion Comprehensive    General Range of Motion  bilateral lower extremity ROM WFL  -CM     Row Name 09/01/21 1034          Strength Comprehensive (MMT)    General Manual Muscle Testing (MMT) Assessment  upper extremity strength deficits identified;lower extremity strength deficits identified  -CM     Comment, General Manual Muscle Testing (MMT) Assessment  LUE 3-/5; RUE n/a; BLEs 3/5  -CM     Row  Name 09/01/21 1037 09/01/21 1034       Balance    Balance Assessment  --  sitting static balance;sitting dynamic balance;standing static balance;standing dynamic balance  -CM    Static Sitting Balance  --  WFL;unsupported;sitting, edge of bed  -CM    Dynamic Sitting Balance  --  mild impairment;unsupported;sitting, edge of bed posterior loss of balance w/ attempts to lift LEs  -CM    Static Standing Balance  --  mild impairment;moderate impairment;supported;standing  -CM    Dynamic Standing Balance  --  standing;supported;moderate impairment  -CM    Comment, Balance  scores 5/28 on Tinetti balance scale, indicating high risk for falls.  -CM  --    Row Name 09/01/21 1034          Sensory Assessment (Somatosensory)    Sensory Assessment (Somatosensory)  sensation intact  -CM       User Key  (r) = Recorded By, (t) = Taken By, (c) = Cosigned By    Initials Name Provider Type    CM Geno Murillo, PT Physical Therapist        Goals/Plan     Row Name 09/01/21 1045          Bed Mobility Goal 1 (PT)    Activity/Assistive Device (Bed Mobility Goal 1, PT)  bed mobility activities, all  -CM     [place] Level/Cues Needed (Bed Mobility Goal 1, PT)  contact guard assist;1 person assist  -CM     Time Frame (Bed Mobility Goal 1, PT)  2 weeks  -CM     Row Name 09/01/21 1045          Transfer Goal 1 (PT)    Activity/Assistive Device (Transfer Goal 1, PT)  transfers, all;other (see comments) appropriate assistive device  -CM     [place] Level/Cues Needed (Transfer Goal 1, PT)  1 person assist;contact guard assist  -CM     Time Frame (Transfer Goal 1, PT)  2 weeks  -CM     Row Name 09/01/21 1045          Gait Training Goal 1 (PT)    Activity/Assistive Device (Gait Training Goal 1, PT)  gait (walking locomotion);assistive device use  -CM     [place] Level (Gait Training Goal 1, PT)  1 person assist;contact guard assist  -CM     Distance (Gait Training Goal 1, PT)  75 ft  -CM     Time Frame (Gait Training Goal 1, PT)  2  "weeks  -CM       User Key  (r) = Recorded By, (t) = Taken By, (c) = Cosigned By    Initials Name Provider Type    Geno Romero, PT Physical Therapist        Clinical Impression     Row Name 09/01/21 1038          Pain    Additional Documentation  Pain Scale: Numbers Pre/Post-Treatment (Group)  -CM     Row Name 09/01/21 1038          Pain Scale: Numbers Pre/Post-Treatment    Pretreatment Pain Rating  4/10  -CM     Posttreatment Pain Rating  4/10  -CM     Pain Location  back  -CM     Pre/Posttreatment Pain Comment  chronic back pain, worse since TSA  -CM     Pain Intervention(s)  Medication (See MAR);Repositioned;Emotional support;Ambulation/increased activity  -CM     Row Name 09/01/21 1038          Plan of Care Review    Plan of Care Reviewed With  patient  -CM     Outcome Summary  81 yo female seen s/p R reverse TSA on 8/31/21. Pt in abduction sling for RUE. Pt reports she normally uses a rw \"most of the time\" and amb in home w/o it \"sometimes.\" She does not drive. Uses a scooter if in a store. Lives w/  and disabled adult dtr, who also uses a rw. Uses lift chair at home. Today, pt is alert and oriented x 4. She comes to sit w/ mod assist of 1. She requires mod assist to come to stand and to transfer on/off bsc. She requires mod assist to come to stand. Attempted use of hemiwx, but pt is so weak in LUE that she needed assist in lifting/advancing the hemiwx. She amb 15 ft w/ mod assist and unsteady gait. She scores only 5/28 on the Tinetti balance scale, indicating high risk for falls. Pt is unsafe to go home w/ family at d/c. Will need IP rehab at d/c. Will follow.  -CM     Row Name 09/01/21 1038          Therapy Assessment/Plan (PT)    Patient/Family Therapy Goals Statement (PT)  agreeable to IP rehab  -CM     Rehab Potential (PT)  good, to achieve stated therapy goals  -CM     Criteria for Skilled Interventions Met (PT)  yes;meets criteria;skilled treatment is necessary  -CM     Predicted Duration " "of Therapy Intervention (PT)  until d/c  -CM     Row Name 09/01/21 1038          Positioning and Restraints    Pre-Treatment Position  in bed  -CM     Post Treatment Position  chair  -CM     In Chair  notified nsg;sitting;call light within reach;encouraged to call for assist;exit alarm on  -CM       User Key  (r) = Recorded By, (t) = Taken By, (c) = Cosigned By    Initials Name Provider Type    Gneo Romero, PT Physical Therapist        Outcome Measures     Row Name 09/01/21 1035          How much help from another person do you currently need...    Turning from your back to your side while in flat bed without using bedrails?  2  -CM     Moving from lying on back to sitting on the side of a flat bed without bedrails?  2  -CM     Moving to and from a bed to a chair (including a wheelchair)?  2  -CM     Standing up from a chair using your arms (e.g., wheelchair, bedside chair)?  2  -CM     Climbing 3-5 steps with a railing?  1  -CM     To walk in hospital room?  2  -CM     AM-PAC 6 Clicks Score (PT)  11  -CM     Row Name 09/01/21 1035          Tinetti Assessment    Tinetti Assessment  yes  -CM     Sitting Balance  1  -CM     Arises  0  -CM     Attempts to Rise  0  -CM     Immediate Standing Balance (first 5 sec)  0  -CM     Standing Balance  0  -CM     Sternal Nudge (feet close together)  0  -CM     Eyes Closed (feet close together)  0  -CM     Turning 360 Degrees- Steps  0  -CM     Turning 360 Degrees- Steadiness  0  -CM     Sitting Down  0  -CM     Tinetti Balance Score  1  -CM     Gait Initiation (immediate after told \"go\")  0  -CM     Step Length- Right Swing  0  -CM     Step Length- Left Swing  0  -CM     Foot Clearance- Right Foot  1  -CM     Foot Clearance- Left Foot  1  -CM     Step Symmetry  1  -CM     Step Continuity  0  -CM     Path (excursion)  1  -CM     Trunk  0  -CM     Base of Support  0  -CM     Gait Score  4  -CM     Tinetti Total Score  5  -CM     Row Name 09/01/21 1035          Functional " "Assessment    Outcome Measure Options  AM-PAC 6 Clicks Basic Mobility (PT);Tinetti  -CM       User Key  (r) = Recorded By, (t) = Taken By, (c) = Cosigned By    Initials Name Provider Type    CM Geno Murillo, PT Physical Therapist                       Physical Therapy Education                 Title: PT OT SLP Therapies (Done)     Topic: Physical Therapy (Done)     Point: Mobility training (Done)     Learning Progress Summary           Patient Acceptance, E,TB, VU by CM at 9/1/2021 1046                   Point: Precautions (Done)     Learning Progress Summary           Patient Acceptance, E,TB, VU by CM at 9/1/2021 1046                               User Key     Initials Effective Dates Name Provider Type Discipline     06/16/21 -  Geno Murillo PT Physical Therapist PT              PT Recommendation and Plan  Planned Therapy Interventions (PT): balance training, bed mobility training, gait training, patient/family education, strengthening, ROM (range of motion), postural re-education, transfer training  Plan of Care Reviewed With: patient  Outcome Summary: 79 yo female seen s/p R reverse TSA on 8/31/21. Pt in abduction sling for RUE. Pt reports she normally uses a rw \"most of the time\" and amb in home w/o it \"sometimes.\" She does not drive. Uses a scooter if in a store. Lives w/  and disabled adult dtr, who also uses a rw. Uses lift chair at home. Today, pt is alert and oriented x 4. She comes to sit w/ mod assist of 1. She requires mod assist to come to stand and to transfer on/off bsc. She requires mod assist to come to stand. Attempted use of hemiwx, but pt is so weak in LUE that she needed assist in lifting/advancing the hemiwx. She amb 15 ft w/ mod assist and unsteady gait. She scores only 5/28 on the Tinetti balance scale, indicating high risk for falls. Pt is unsafe to go home w/ family at d/c. Will need IP rehab at d/c. Will follow.     Time Calculation:   PT Charges     Row Name 09/01/21 " 1047             Time Calculation    Start Time  0817  -CM      Stop Time  0853  -CM      Time Calculation (min)  36 min  -CM      PT Received On  09/01/21  -CM      PT - Next Appointment  09/02/21  -CM      PT Goal Re-Cert Due Date  09/15/21  -CM         Time Calculation- PT    Total Timed Code Minutes- PT  0 minute(s)  -CM        User Key  (r) = Recorded By, (t) = Taken By, (c) = Cosigned By    Initials Name Provider Type    CM Geno Murillo, PT Physical Therapist        Therapy Charges for Today     Code Description Service Date Service Provider Modifiers Qty    90732329939 HC PT EVAL MOD COMPLEXITY 4 9/1/2021 Geno Murillo, PT GP 1          PT G-Codes  Outcome Measure Options: AM-PAC 6 Clicks Basic Mobility (PT), Tinetti  AM-PAC 6 Clicks Score (PT): 11  Tinetti Total Score: 5    Geno Murillo PT  9/1/2021      Electronically signed by Geno Murillo, PT at 09/01/21 1047       Occupational Therapy Notes (most recent note)    No notes exist for this encounter.

## 2021-09-01 NOTE — DISCHARGE SUMMARY
Orthopaedic Discharge Summary  Dr. JURGEN Ortiz” [facility]  [phone]    NAME: Janel Mahoney PCP: Ariel Matute MD   :  MRN: [id number]  [id number] LOS:  ADMIT: 0 days  2021   AGE/SEX: 80 y.o. 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]    · Surgery Performed: AR RECONSTR TOTAL SHOULDER IMPLANT [53101] (TOTAL SHOULDER REVERSE ARTHROPLASTY)    · Discharge Medications:         Discharge Medications      New Medications      Instructions Start Date   oxyCODONE-acetaminophen 5-325 MG per tablet  Commonly known as: PERCOCET   1 tablet, Oral, Every 4 Hours PRN      oxyCODONE-acetaminophen 5-325 MG per tablet  Commonly known as: PERCOCET   1 tablet, Oral, Every 4 Hours PRN         Changes to Medications      Instructions Start Date   allopurinol 100 MG tablet  Commonly known as: ZYLOPRIM  What changed:   · how much to take  · how to take this  · when to take this  · additional instructions   2 po qday      bumetanide 2 MG tablet  Commonly known as: BUMEX  What changed: See the new instructions.   TAKE 1 TABLET TWICE DAILY. DOSE CHANGED       carvedilol 25 MG tablet  Commonly known as: COREG  What changed:   · how much to take  · when to take this  · additional instructions   TAKE 1 TABLET TWICE DAILY      ferrous sulfate 325 (65 FE) MG tablet  What changed: additional instructions   325 mg, Oral, 2 Times Weekly      pantoprazole 40 MG EC tablet  Commonly known as: PROTONIX  What changed: additional instructions   40 mg, Oral, 2 Times Daily      ramipril 5 MG capsule  Commonly known as: ALTACE  What changed:   · when to take this  · additional instructions   TAKE 1 CAPSULE EVERY DAY      simvastatin 20 MG tablet  Commonly known as: ZOCOR  What changed: how much to take   TAKE 2 TABLETS EVERY NIGHT         Continue These Medications      Instructions Start Date   aspirin 81 MG tablet   81 mg, Oral,  "Daily      calcitriol 0.25 MCG capsule  Commonly known as: ROCALTROL   0.25 mcg, Oral, 2 Times Daily      Chlorhexidine Gluconate Cloth 2 % pads   1 application, Apply externally, USE AS DIRECTED PREOP       epoetin adele 85154 UNIT/ML injection  Commonly known as: EPOGEN,PROCRIT   10,000 Units, Subcutaneous, As Needed      Fosamax 70 MG tablet  Generic drug: alendronate   70 mg, Oral, Every 14 Days      mupirocin 2 % nasal ointment  Commonly known as: BACTROBAN   1 application, Nasal, USE AS DIRECTED PREOP       Senior Multivitamin Plus tablet tablet  Generic drug: multivitamin with minerals   1 tablet, Oral, Daily      traMADol 50 MG tablet  Commonly known as: ULTRAM   100 mg, Oral, 2 Times Daily, 3 months chronic pain medicine for low back pain      vitamin B-12 1000 MCG tablet  Commonly known as: CYANOCOBALAMIN   1,000 mcg, Oral, Daily, Stop now for surgery      Vitamin D 50 MCG (2000 UT) tablet   2,000 Units, Oral, 2 times daily, Stop now for surgery      warfarin 1 MG tablet  Commonly known as: COUMADIN   Take one tablet (1mg) by mouth on Mon, Wed, Fri and 2 tablets (2mg) on all other days or as directed by [facility].      zolpidem 10 MG tablet  Commonly known as: Ambien   10 mg, Oral, Nightly PRN         Stop These Medications    enoxaparin 80 MG/0.8ML solution syringe  Commonly known as: LOVENOX            · Vitals:     Vitals:    09/01/21 0439 09/01/21 0447 09/01/21 0955 09/01/21 1245   BP: 152/64 152/64 138/65 110/58   BP Location: Left arm Left arm Left arm Left arm   Patient Position:  Lying Lying Sitting   Pulse: 60 60 60 59   Resp: 12 12 14 17   Temp: 97.6 °F (36.4 °C) 97.6 °F (36.4 °C) 97.6 °F (36.4 °C) 97.9 °F (36.6 °C)   TempSrc: Oral Oral Oral Oral   SpO2: 98% 98% 100% 96%   Weight:  68 kg (150 lb)     Height:  157.5 cm (62.01\")         · Labs:      Admission on 08/31/2021   Component Date Value Ref Range Status   • Protime 08/31/2021 16.6* 19.4 - 28.5 Seconds Final   • INR " 08/31/2021 1.54* 2.00 - 3.00 Final   • PTT 08/31/2021 30.4  24.0 - 31.0 seconds Final   • Protime 08/31/2021 16.8* 19.4 - 28.5 Seconds Final   • INR 08/31/2021 1.56* 2.00 - 3.00 Final   • Protime 09/01/2021 16.1* 19.4 - 28.5 Seconds Final   • INR 09/01/2021 1.49* 2.00 - 3.00 Final   • Glucose 09/01/2021 131* 65 - 99 mg/dL Final   • BUN 09/01/2021 55* 8 - 23 mg/dL Final   • Creatinine 09/01/2021 2.49* 0.57 - 1.00 mg/dL Final   • Sodium 09/01/2021 140  136 - 145 mmol/L Final   • Potassium 09/01/2021 4.4  3.5 - 5.2 mmol/L Final   • Chloride 09/01/2021 99  98 - 107 mmol/L Final   • CO2 09/01/2021 31.0* 22.0 - 29.0 mmol/L Final   • Calcium 09/01/2021 8.9  8.6 - 10.5 mg/dL Final   • eGFR Non African Amer 09/01/2021 19* >60 mL/min/1.73 Final   • BUN/Creatinine Ratio 09/01/2021 22.1  7.0 - 25.0 Final   • Anion Gap 09/01/2021 10.0  5.0 - 15.0 mmol/L Final   • Hemoglobin 09/01/2021 8.4* 12.0 - 15.9 g/dL Final   • Hematocrit 09/01/2021 25.2* 34.0 - 46.6 % Final        No results found.    · Hospital Course:   80 y.o. 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 Post-operatively the patient transferred to the floor where the patient underwent mobilization therapy. Opioids were titrated to achieve appropriate pain management to allow for participation in mobilization exercises. Vital signs and laboratory values are now within safe parameters for discharge. The dressings and/or incision is intact without signs or symptoms of active infection. Operative extremity neurovascular status remains intact as compared to the preoperative exam. Appropriate education re: incision care, activity levels, medications, and follow up visits was completed and all questions were answered. The patient  "is now deemed stable for discharge.    SNF: The patient had a difficult time mobilizing sufficiently with physical therapy. Further rehabilitation was recommended in a SNF facility. Once a bed was available, and the patient was cleared, there were discharged to the SNF in good condition}.       R \"Leonel\"Huan ARIAS MD  Orthopaedic Surgery  [facility]  [phone]                                               "

## 2021-09-01 NOTE — CASE MANAGEMENT/SOCIAL WORK
Discharge Planning Assessment   Kofi     Patient Name: Janel Mahoney  MRN: [id number]  Today's Date: 9/1/2021    Admit Date: 8/31/2021    Discharge Needs Assessment     Row Name 09/01/21 8512       Living Environment    Lives With  spouse    Name(s) of Who Lives With Patient  Spouse- Russ    Current Living Arrangements  home/apartment/condo    Primary Care Provided by  self    Provides Primary Care For  no one    Family Caregiver if Needed  child(lisandro), adult;spouse    Family Caregiver Names  Daughter- Marlen    Quality of Family Relationships  supportive    Able to Return to Prior Arrangements  yes       Resource/Environmental Concerns    Resource/Environmental Concerns  none    Transportation Concerns  car, none       Transition Planning    Patient/Family Anticipates Transition to  inpatient rehabilitation facility    Transportation Anticipated  family or friend will provide;health plan transportation       Discharge Needs Assessment    Readmission Within the Last 30 Days  no previous admission in last 30 days    Equipment Currently Used at Home  other (see comments);deep wray Patient reported she has pads at home    Concerns to be Addressed  care coordination/care conferences;discharge planning    Anticipated Changes Related to Illness  none    Equipment Needed After Discharge  none    Discharge Facility/Level of Care Needs  nursing facility, skilled    Provided Post Acute Provider List?  Yes    Post Acute Provider List  Inpatient Rehab    Provided Post Acute Provider Quality & Resource List?  Yes    Post Acute Provider Quality and Resource List  Inpatient Rehab    Delivered To  Patient    Method of Delivery  In person        Discharge Plan     Row Name 09/01/21 9178       Plan    Plan  DC Plan: Accepted to [facility] for rehab, no precert or PASRR needed.    Patient/Family in Agreement with Plan  yes    Plan Comments  CM met with patient at bedside to discuss dc planning. PCP confirmed  (Ariel Matute). Patient expressed interest in short-term inpt rehab. Reported she has been to one in [place] before near [place]. CM reviewed Medicare.gov facilities near vicinity and she reported it was [place]. CM contacted another [facility] facility to obtain contact number for liaison. Trevon Morfin ([phone]). CM added to basket and discussed with liaison who later reported they are able to accept and bed is ready today. CM confirmed pharmacy ([facility]). Updated MD and RN via secure chat of acceptance, bed available, and that medications needed to be re-sent to correct pharmacy. CM provided RN with number to call report ([phone]).        Continued Care and Services - Admitted Since 8/31/2021     Destination Coordination complete.    Service Provider Request Status Selected Services Address Phone Fax Patient Preferred    [facility] [address]   Selected Skilled Nursing [address] , [facility] [address] [phone]               Expected Discharge Date and Time     Expected Discharge Date Expected Discharge Time    Sep 1, 2021         Demographic Summary     Row Name 09/01/21 135       General Information    Admission Type  observation    Required Notices Provided  Observation Status Notice    Reason for Consult  discharge planning    Preferred Language  English     Used During This Interaction  no       Contact Information    Permission Granted to Share Info With          Functional Status     Row Name 09/01/21 1351       Functional Status    Usual Activity Tolerance  moderate    Current Activity Tolerance  fair       Functional Status, IADL    Medications  independent    Meal Preparation  assistive equipment and person    Housekeeping  assistive equipment and person    Laundry  assistive equipment and person    Shopping  assistive equipment and person        LACE: 7    Met with patient in room wearing PPE: mask/goggles.      Spent less than  15 minutes in the room.      Lora Jennings RN     Office Phone: [phone]  Office Cell: [phone]

## 2021-09-01 NOTE — PROGRESS NOTES
"  Orthopaedic Surgery   Daily Progress Note  Dr. JURGEN Ortiz” Huan ARIAS  [phone]  DEMOGRAPHICS:   · Janel Mahoney   · Age:80 y.o.   · MRN:[id number]  · Admitted: 8/31/2021    PROCEDURE: 1 Day Post-Op s/p Procedure(s):  TOTAL SHOULDER REVERSE ARTHROPLASTY     HOSPITAL PROGRESS  · Patient Issues: No major issues since surgery other than she was very unsteady with therapy, pain starting to come back after the block wearing off  · Ambulation/Activity: Ambulating minimally with PT/Nursing.      VITALS:  Vitals:    09/01/21 0020 09/01/21 0439 09/01/21 0447 09/01/21 0955   BP: 155/54 152/64 152/64 138/65   BP Location: Left arm Left arm Left arm Left arm   Patient Position: Lying  Lying Lying   Pulse: 60 60 60 60   Resp: 20 12 12 14   Temp: 98.1 °F (36.7 °C) 97.6 °F (36.4 °C) 97.6 °F (36.4 °C) 97.6 °F (36.4 °C)   TempSrc: Oral Oral Oral Oral   SpO2: 97% 98% 98% 100%   Weight:   68 kg (150 lb)    Height:   157.5 cm (62.01\")        PHYSICAL EXAM:  · LUNGS: Equal chest rise, no shortness of air  · CARDIOVASCULAR: brisk capillary refill intact  · WOUND: Dressings clean, dry, and intact    LABS:   Lab Results   Component Value Date    HGB 8.4 (L) 09/01/2021     Lab Results   Component Value Date    WBC 6.33 08/18/2021     Lab Results   Component Value Date    GLUCOSE 131 (H) 09/01/2021    CALCIUM 8.9 09/01/2021     09/01/2021    K 4.4 09/01/2021    CO2 31.0 (H) 09/01/2021    CL 99 09/01/2021    BUN 55 (H) 09/01/2021    CREATININE 2.49 (H) 09/01/2021    EGFRIFAFRI  06/11/2021      Comment:      <15 Indicative of kidney failure.    EGFRIFNONA 19 (L) 09/01/2021    BCR 22.1 09/01/2021    ANIONGAP 10.0 09/01/2021       ASSESSMENT: Patient is a 80 y.o. female who is 1 Day Post-Op s/p Procedure(s):  TOTAL SHOULDER REVERSE ARTHROPLASTY     PLAN:   · Weight Bearing: Non Weight Bearing and no range of motion or therapy until least 4 to 6 weeks  · Labs: Above lab values review. Plan: Hospitalist consult for management of " "electrolye imbalances.   · PT/OT: To Mobilize  · DVT PPX: Resume Home DVT PPX  · Post-Op Xray: Good appearance of reverse total shoulder  · Follow-Up: In office at 3 weeks postop  · Dispo: Patient needing rehab, will discharge when appropriate and bed available    R \"Leonel\" Huan ARIAS MD  Orthopaedic Surgery  [facility]  [phone] - [place] Office  [phone] - [place] Office      "

## 2021-09-01 NOTE — THERAPY EVALUATION
Patient Name: Janel Mahoney  : 1940    MRN: [id number]                              Today's Date: 2021       Admit Date: 2021    Visit Dx: No diagnosis found.  Patient Active Problem List   Diagnosis   • Anemia   • Thrombocytopenia (CMS/HCC)   • B12 deficiency   • Coronary artery disease involving coronary bypass graft of native heart without angina pectoris   • S/P MVR (porcine)   • Chronic atrial fibrillation (CMS/HCC)   • Bilateral carotid artery disease (CMS/HCC)   • Intractable low back pain   • Long-term (current) use of anticoagulants   • Low back pain   • Osteoporosis   • Murmur, heart   • GEORGINA on auto CPAP - Dr Cardona   • Hypersomnia due to medical condition - GEORGINA   • Esophageal stricture   • Dysphagia   • Closed fracture of left proximal humerus   • Hypertension   • Supratherapeutic INR   • Chronic combined systolic and diastolic congestive heart failure (CMS/HCC)   • Osteoporosis with pathological fracture   • Closed 3-part fracture of proximal end of left humerus   • Closed fracture of proximal end of humerus with delayed healing   • Injury of right knee   • Knee injury, left, initial encounter   • History of fall   • S/P TKR (total knee replacement) using cement, left   • Ischemic cardiomyopathy   • Intramuscular hematoma right pecotralis   • Hemorrhagic disorder due to extrinsic circulating anticoagulants (CMS/HCC)   • Acute posthemorrhagic anemia   • Chronic kidney disease, stage 3 (CMS/HCC)   • Peripheral edema   • Inflammatory arthritis   • Ventricular tachycardia (CMS/HCC)   • S/P revision of total knee   • Idiopathic gout   • Psychophysiological insomnia   • ICD (implantable cardioverter-defibrillator) in place   • Status post reverse arthroplasty of right shoulder     Past Medical History:   Diagnosis Date   • Acute kidney injury (CMS/HCC)    • Anemia     on procrit   • Atrial fibrillation (CMS/HCC)    • Bruises easily    • Carotid artery stenosis    • Chronic back pain    •  Chronic combined systolic and diastolic congestive heart failure (CMS/HCC)    • Chronic coronary artery disease     moderate to severe LV dysfunction.  Sees Dr. Dominguez   • Chronic kidney disease, stage 3 (CMS/HCC)    • Dysphagia    • GERD (gastroesophageal reflux disease)    • Gout    • H/O cardiac murmur    • Hematoma     LEFT LEG; DR ALONZO AWARE   • [place] (hard of hearing)     wears hearing aids   • Hyperlipidemia    • Hypertension    • Hypotension    • ICD (implantable cardioverter-defibrillator) in place    • Ischemic cardiomyopathy    • Kyphoscoliosis    • Leukopenia    • Lumbar spondylosis    • Obesity    • GEORGINA (obstructive sleep apnea) 12/01/2013    Overnight polysomnogram, weight 168 pounds.  AHI mildly abnormal at 10.3 events per hour.  Respiratory effort related arousals 9.5 events per hour.  Total time snoring 30% and arousals associated with snoring 15 events per hour.          Bring machine DOS   • Osteoarthritis    • Osteoporosis    • Peptic ulcer    • Premature ventricular contractions    • Renal insufficiency syndrome    • Right shoulder pain 08/2021   • Scoliosis    • Shoulder fracture, left    • Shoulder pain, right    • Thrombocytopenia (CMS/HCC)    • Urine incontinence     pads   • Ventricular tachycardia (CMS/HCC)    • Vitamin B12 deficiency    • Warfarin anticoagulation      Past Surgical History:   Procedure Laterality Date   • AV NODE ABLATION     • BREAST BIOPSY     • CARDIAC CATHETERIZATION      Showed severe mitral insufficiency and borderline coronary artery disease   • CARDIAC CATHETERIZATION      Showed an ejection fraction of 35%. She had occlusive disease of the right posterior LV branch and no other significant disease, treated medically.   • CARDIAC DEFIBRILLATOR PLACEMENT      Biventricular   • CARDIAC DEFIBRILLATOR PLACEMENT Left    • CARDIAC ELECTROPHYSIOLOGY PROCEDURE N/A 1/4/2019    Procedure: GENERATOR CHANGE BI-V ICD   [place];  Surgeon: James Hwang MD;  Location:   "[facility];  Service: Cardiology   • CARDIAC VALVE REPLACEMENT  2009    Done with stent placement   • CARDIOVERSION      multiple electrocardioversions.   • CAROTID ARTERY ANGIOPLASTY Right    • CATARACT EXTRACTION EXTRACAPSULAR W/ INTRAOCULAR LENS IMPLANTATION Bilateral    • COLONOSCOPY N/A 9/28/2017    Procedure: COLONOSCOPY TO CECUM;  Surgeon: Kevin Davis MD;  Location: [facility] ENDOSCOPY;  Service:    • CORONARY ANGIOPLASTY WITH STENT PLACEMENT  2009   • CORONARY ARTERY BYPASS GRAFT      single graft to the PDA   • CORONARY STENT PLACEMENT     • ENDOSCOPY N/A 9/28/2017    Procedure: ESOPHAGOGASTRODUODENOSCOPY ;  Surgeon: Kevin Davis MD;  Location: [facility] ENDOSCOPY;  Service:    • EYE SURGERY     • HEMORRHOIDECTOMY     • HYSTERECTOMY     • INCISION AND DRAINAGE TRUNK Right 11/27/2018    Procedure: EVACUATION OF RIGHT CHEST WALL HEMATOMA;  Surgeon: Juvencio Rodriguez MD;  Location: [facility] MAIN OR;  Service: General   • MITRAL VALVE REPLACEMENT  01/2010    #31 Epic porcine valve.   • THROMBOENDARTERECTOMY Right     carotid thromboendarterectomy    • TONSILLECTOMY      age 32   • TOTAL KNEE ARTHROPLASTY Left    • TOTAL KNEE ARTHROPLASTY REVISION Left 11/19/2019    Procedure: TOTAL KNEE ARTHROPLASTY REVISION LEFT;  Surgeon: Juvencio Pressley II, MD;  Location: [facility] OR;  Service: Orthopedics   • TOTAL SHOULDER ARTHROPLASTY W/ DISTAL CLAVICLE EXCISION Left 1/16/2018    Procedure: TOTAL SHOULDER REVERSE ARTHROPLASTY;  Surgeon: Bianka Quesada MD;  Location: [facility] MAIN OR;  Service:      General Information     Row Name 09/01/21 1030          Physical Therapy Time and Intention    Document Type  evaluation  -CM     Mode of Treatment  physical therapy  -CM     Row Name 09/01/21 1030          General Information    Patient Profile Reviewed  yes  -CM     Prior Level of Function  independent:;all household mobility;gait uses rw \"sometimes\" in home, dependent on day; uses scooter " when at large store; does not drive  -CM     Existing Precautions/Restrictions  fall;shoulder;right  -CM     Barriers to Rehab  medically complex;hearing deficit extremely [place]  -CM     Row Name 09/01/21 1030          Living Environment    Lives With  spouse;child(lisandro), adult;other (see comments) adult dtr uses rw also.  -CM     Row Name 09/01/21 1030          Home Main Entrance    Number of Stairs, Main Entrance  five  -CM     Stair Railings, Main Entrance  railings safe and in good condition  -CM     Row Name 09/01/21 1030          Stairs Within Home, Primary    Number of Stairs, Within Home, Primary  none  -CM     Row Name 09/01/21 1030          Cognition    Orientation Status (Cognition)  oriented x 4  -CM     Row Name 09/01/21 1030          Safety Issues, Functional Mobility    Impairments Affecting Function (Mobility)  balance;endurance/activity tolerance;range of motion (ROM);strength;postural/trunk control  -CM       User Key  (r) = Recorded By, (t) = Taken By, (c) = Cosigned By    Initials Name Provider Type    CM Geno Murillo, PT Physical Therapist        Mobility     Row Name 09/01/21 1032          Bed Mobility    Bed Mobility  supine-sit  -CM     Supine-Sit [place] (Bed Mobility)  minimum assist (75% patient effort);moderate assist (50% patient effort);1 person assist  -CM     Assistive Device (Bed Mobility)  head of bed elevated  -CM     Row Name 09/01/21 1032          Transfers    Comment (Transfers)  bed to bsc, bsc to chair; needs assist for management of brief and balance in standing  -CM     Row Name 09/01/21 1032          Bed-Chair Transfer    Bed-Chair [place] (Transfers)  moderate assist (50% patient effort);1 person assist  -CM     Assistive Device (Bed-Chair Transfers)  other (see comments) gait belt, non skid socks  -CM     Row Name 09/01/21 1032          Sit-Stand Transfer    Sit-Stand [place] (Transfers)  minimum assist (75% patient effort);moderate assist (50% patient  effort);1 person assist  -CM     Assistive Device (Sit-Stand Transfers)  walker, ayaan  -CM     Row Name 09/01/21 1032          Gait/Stairs (Locomotion)    [place] Level (Gait)  moderate assist (50% patient effort);1 person assist  -CM     Assistive Device (Gait)  walker, ayaan  -CM     Distance in Feet (Gait)  15 ft; needs assist to lift hemiwx to advance due to LUE weakness.  -CM     Deviations/Abnormal Patterns (Gait)  bilateral deviations;festinating/shuffling;gait speed decreased;stride length decreased  -CM     Bilateral Gait Deviations  heel strike decreased;forward flexed posture kyphotic deformity of spine  -CM     Row Name 09/01/21 1032          Mobility    Extremity Weight-bearing Status  right upper extremity  -CM     Right Upper Extremity (Weight-bearing Status)  non weight-bearing (NWB)  -CM       User Key  (r) = Recorded By, (t) = Taken By, (c) = Cosigned By    Initials Name Provider Type    Geno Romero, PT Physical Therapist        Obj/Interventions     Row Name 09/01/21 1034          Range of Motion Comprehensive    General Range of Motion  bilateral lower extremity ROM WFL  -CM     Row Name 09/01/21 1034          Strength Comprehensive (MMT)    General Manual Muscle Testing (MMT) Assessment  upper extremity strength deficits identified;lower extremity strength deficits identified  -CM     Comment, General Manual Muscle Testing (MMT) Assessment  LUE 3-/5; RUE n/a; BLEs 3/5  -CM     Row Name 09/01/21 1037 09/01/21 1034       Balance    Balance Assessment  --  sitting static balance;sitting dynamic balance;standing static balance;standing dynamic balance  -CM    Static Sitting Balance  --  WFL;unsupported;sitting, edge of bed  -CM    Dynamic Sitting Balance  --  mild impairment;unsupported;sitting, edge of bed posterior loss of balance w/ attempts to lift LEs  -CM    Static Standing Balance  --  mild impairment;moderate impairment;supported;standing  -CM    Dynamic Standing Balance  --   standing;supported;moderate impairment  -CM    Comment, Balance  scores 5/28 on Tinetti balance scale, indicating high risk for falls.  -CM  --    Row Name 09/01/21 1034          Sensory Assessment (Somatosensory)    Sensory Assessment (Somatosensory)  sensation intact  -CM       User Key  (r) = Recorded By, (t) = Taken By, (c) = Cosigned By    Initials Name Provider Type    Geno Romero, PT Physical Therapist        Goals/Plan     Row Name 09/01/21 1045          Bed Mobility Goal 1 (PT)    Activity/Assistive Device (Bed Mobility Goal 1, PT)  bed mobility activities, all  -CM     [place] Level/Cues Needed (Bed Mobility Goal 1, PT)  contact guard assist;1 person assist  -CM     Time Frame (Bed Mobility Goal 1, PT)  2 weeks  -CM     [facility] Name 09/01/21 1045          Transfer Goal 1 (PT)    Activity/Assistive Device (Transfer Goal 1, PT)  transfers, all;other (see comments) appropriate assistive device  -CM     [place] Level/Cues Needed (Transfer Goal 1, PT)  1 person assist;contact guard assist  -CM     Time Frame (Transfer Goal 1, PT)  2 weeks  -CM     [facility] Name 09/01/21 1045          Gait Training Goal 1 (PT)    Activity/Assistive Device (Gait Training Goal 1, PT)  gait (walking locomotion);assistive device use  -CM     [place] Level (Gait Training Goal 1, PT)  1 person assist;contact guard assist  -CM     Distance (Gait Training Goal 1, PT)  75 ft  -CM     Time Frame (Gait Training Goal 1, PT)  2 weeks  -CM       User Key  (r) = Recorded By, (t) = Taken By, (c) = Cosigned By    Initials Name Provider Type    Geno Romero, PT Physical Therapist        Clinical Impression     Row Name 09/01/21 1038          Pain    Additional Documentation  Pain Scale: Numbers Pre/Post-Treatment (Group)  -CM     [facility] Name 09/01/21 1038          Pain Scale: Numbers Pre/Post-Treatment    Pretreatment Pain Rating  4/10  -CM     Posttreatment Pain Rating  4/10  -CM     Pain Location  back  -CM      "Pre/Posttreatment Pain Comment  chronic back pain, worse since TSA  -     Pain Intervention(s)  Medication (See MAR);Repositioned;Emotional support;Ambulation/increased activity  -     Row Name 09/01/21 1038          Plan of Care Review    Plan of Care Reviewed With  patient  -CM     Outcome Summary  79 yo female seen s/p R reverse TSA on 8/31/21. Pt in abduction sling for RUE. Pt reports she normally uses a rw \"most of the time\" and amb in home w/o it \"sometimes.\" She does not drive. Uses a scooter if in a store. Lives w/  and disabled adult dtr, who also uses a rw. Uses lift chair at home. Today, pt is alert and oriented x 4. She comes to sit w/ mod assist of 1. She requires mod assist to come to stand and to transfer on/off bsc. She requires mod assist to come to stand. Attempted use of hemiwx, but pt is so weak in LUE that she needed assist in lifting/advancing the hemiwx. She amb 15 ft w/ mod assist and unsteady gait. She scores only 5/28 on the Tinetti balance scale, indicating high risk for falls. Pt is unsafe to go home w/ family at d/c. Will need IP rehab at d/c. Will follow.  -     Row Name 09/01/21 1038          Therapy Assessment/Plan (PT)    Patient/Family Therapy Goals Statement (PT)  agreeable to IP rehab  -     Rehab Potential (PT)  good, to achieve stated therapy goals  -CM     Criteria for Skilled Interventions Met (PT)  yes;meets criteria;skilled treatment is necessary  -     Predicted Duration of Therapy Intervention (PT)  until d/c  -     Row Name 09/01/21 1038          Positioning and Restraints    Pre-Treatment Position  in bed  -CM     Post Treatment Position  chair  -CM     In Chair  notified nsg;sitting;call light within reach;encouraged to call for assist;exit alarm on  -       User Key  (r) = Recorded By, (t) = Taken By, (c) = Cosigned By    Initials Name Provider Type    Geno Romero, PT Physical Therapist        Outcome Measures     Row Name 09/01/21 1035    " "      How much help from another person do you currently need...    Turning from your back to your side while in flat bed without using bedrails?  2  -CM     Moving from lying on back to sitting on the side of a flat bed without bedrails?  2  -CM     Moving to and from a bed to a chair (including a wheelchair)?  2  -CM     Standing up from a chair using your arms (e.g., wheelchair, bedside chair)?  2  -CM     Climbing 3-5 steps with a railing?  1  -CM     To walk in hospital room?  2  -CM     AM-PAC 6 Clicks Score (PT)  11  -CM     Row Name 09/01/21 1035          Tinetti Assessment    Tinetti Assessment  yes  -CM     Sitting Balance  1  -CM     Arises  0  -CM     Attempts to Rise  0  -CM     Immediate Standing Balance (first 5 sec)  0  -CM     Standing Balance  0  -CM     Sternal Nudge (feet close together)  0  -CM     Eyes Closed (feet close together)  0  -CM     Turning 360 Degrees- Steps  0  -CM     Turning 360 Degrees- Steadiness  0  -CM     Sitting Down  0  -CM     Tinetti Balance Score  1  -CM     Gait Initiation (immediate after told \"go\")  0  -CM     Step Length- Right Swing  0  -CM     Step Length- Left Swing  0  -CM     Foot Clearance- Right Foot  1  -CM     Foot Clearance- Left Foot  1  -CM     Step Symmetry  1  -CM     Step Continuity  0  -CM     Path (excursion)  1  -CM     Trunk  0  -CM     Base of Support  0  -CM     Gait Score  4  -CM     Tinetti Total Score  5  -CM     Row Name 09/01/21 1035          Functional Assessment    Outcome Measure Options  AM-[facility] 6 Clicks Basic Mobility (PT);Tinetti  -CM       User Key  (r) = Recorded By, (t) = Taken By, (c) = Cosigned By    Initials Name Provider Type    Geno Romero, PT Physical Therapist                       Physical Therapy Education                 Title: PT OT SLP Therapies (Done)     Topic: Physical Therapy (Done)     Point: Mobility training (Done)     Learning Progress Summary           Patient Acceptance, E,TB, VU by BRI at 9/1/2021 1046 " "                  Point: Precautions (Done)     Learning Progress Summary           Patient Acceptance, E,TB, VU by CM at 9/1/2021 1046                               User Key     Initials Effective Dates Name Provider Type Discipline     06/16/21 -  Geno Murillo, PT Physical Therapist PT              PT Recommendation and Plan  Planned Therapy Interventions (PT): balance training, bed mobility training, gait training, patient/family education, strengthening, ROM (range of motion), postural re-education, transfer training  Plan of Care Reviewed With: patient  Outcome Summary: 79 yo female seen s/p R reverse TSA on 8/31/21. Pt in abduction sling for RUE. Pt reports she normally uses a rw \"most of the time\" and amb in home w/o it \"sometimes.\" She does not drive. Uses a scooter if in a store. Lives w/  and disabled adult dtr, who also uses a rw. Uses lift chair at home. Today, pt is alert and oriented x 4. She comes to sit w/ mod assist of 1. She requires mod assist to come to stand and to transfer on/off bsc. She requires mod assist to come to stand. Attempted use of hemiwx, but pt is so weak in LUE that she needed assist in lifting/advancing the hemiwx. She amb 15 ft w/ mod assist and unsteady gait. She scores only 5/28 on the Tinetti balance scale, indicating high risk for falls. Pt is unsafe to go home w/ family at d/c. Will need IP rehab at d/c. Will follow.     Time Calculation:   PT Charges     Row Name 09/01/21 1047             Time Calculation    Start Time  0817  -CM      Stop Time  0853  -CM      Time Calculation (min)  36 min  -CM      PT Received On  09/01/21  -CM      PT - Next Appointment  09/02/21  -CM      PT Goal Re-Cert Due Date  09/15/21  -CM         Time Calculation- PT    Total Timed Code Minutes- PT  0 minute(s)  -CM        User Key  (r) = Recorded By, (t) = Taken By, (c) = Cosigned By    Initials Name Provider Type    Geno Romero, PT Physical Therapist        Therapy " Charges for Today     Code Description Service Date Service Provider Modifiers Qty    04998084952 HC PT EVAL MOD COMPLEXITY 4 9/1/2021 Geno Murillo, PT GP 1          PT G-Codes  Outcome Measure Options: AM-PAC 6 Clicks Basic Mobility (PT), Tinetti  AM-PAC 6 Clicks Score (PT): 11  Tinetti Total Score: 5    Geno Murillo, PT  9/1/2021

## 2021-09-01 NOTE — PLAN OF CARE
Goal Outcome Evaluation:              Outcome Summary: Pt is s/p R shoulder rTSA 8/31.  She is farily independent at baseline, though typically uses rwx.  She currently demos weakness and is unable to take steps from chair due to weaknes and balance impairments and diffiuclty using ayaan walker.  She was unable to compelte pendulum exercises due to balance, though OT removed sling for RUE to dangle while pt was standing.  At this point pt is unsafe to return home and will require IP rehab at discharge.

## 2021-09-02 NOTE — CASE MANAGEMENT/SOCIAL WORK
Case Management Discharge Note      Final Note: [facility]      Selected Continued Care - Discharged on 9/1/2021 Admission date: 8/31/2021 - Discharge disposition: Skilled Nursing Facility (DC - External)    Destination Coordination complete.    Service Provider Selected Services Address Phone Fax Patient Preferred    [facility] [place]  [facility] [address] , [facility] [address] [phone]            Final Discharge Disposition Code: 03 - skilled nursing facility (SNF)

## 2021-09-03 ENCOUNTER — APPOINTMENT (OUTPATIENT)
Dept: PHARMACY | Facility: HOSPITAL | Age: 81
End: 2021-09-03

## 2021-09-06 ENCOUNTER — APPOINTMENT (OUTPATIENT)
Dept: GENERAL RADIOLOGY | Facility: HOSPITAL | Age: 81
End: 2021-09-06

## 2021-09-06 ENCOUNTER — HOSPITAL ENCOUNTER (INPATIENT)
Facility: HOSPITAL | Age: 81
LOS: 4 days | Discharge: NURSING, REHAB OR LONG-TERM CARE | End: 2021-09-10
Attending: EMERGENCY MEDICINE | Admitting: INTERNAL MEDICINE

## 2021-09-06 DIAGNOSIS — D64.9 SYMPTOMATIC ANEMIA: Primary | ICD-10-CM

## 2021-09-06 LAB
ABO GROUP BLD: NORMAL
ALBUMIN SERPL-MCNC: 3.4 G/DL (ref 3.5–5.2)
ALBUMIN/GLOB SERPL: 1.3 G/DL
ALP SERPL-CCNC: 64 U/L (ref 39–117)
ALT SERPL W P-5'-P-CCNC: 14 U/L (ref 1–33)
ANION GAP SERPL CALCULATED.3IONS-SCNC: 7.8 MMOL/L (ref 5–15)
APTT PPP: 42 SECONDS (ref 22.7–35.4)
AST SERPL-CCNC: 36 U/L (ref 1–32)
BASOPHILS # BLD AUTO: 0.01 10*3/MM3 (ref 0–0.2)
BASOPHILS NFR BLD AUTO: 0.2 % (ref 0–1.5)
BILIRUB SERPL-MCNC: 0.3 MG/DL (ref 0–1.2)
BILIRUB UR QL STRIP: NEGATIVE
BLD GP AB SCN SERPL QL: NEGATIVE
BUN SERPL-MCNC: 73 MG/DL (ref 8–23)
BUN/CREAT SERPL: 29.2 (ref 7–25)
CALCIUM SPEC-SCNC: 9.5 MG/DL (ref 8.6–10.5)
CHLORIDE SERPL-SCNC: 100 MMOL/L (ref 98–107)
CLARITY UR: CLEAR
CO2 SERPL-SCNC: 31.2 MMOL/L (ref 22–29)
COLOR UR: YELLOW
CREAT SERPL-MCNC: 2.5 MG/DL (ref 0.57–1)
DEPRECATED RDW RBC AUTO: 51.8 FL (ref 37–54)
EOSINOPHIL # BLD AUTO: 0.17 10*3/MM3 (ref 0–0.4)
EOSINOPHIL NFR BLD AUTO: 3 % (ref 0.3–6.2)
ERYTHROCYTE [DISTWIDTH] IN BLOOD BY AUTOMATED COUNT: 14 % (ref 12.3–15.4)
EXPIRATION DATE: NORMAL
FECAL OCCULT BLOOD SCREEN, POC: NEGATIVE
GFR SERPL CREATININE-BSD FRML MDRD: 19 ML/MIN/1.73
GLOBULIN UR ELPH-MCNC: 2.6 GM/DL
GLUCOSE SERPL-MCNC: 126 MG/DL (ref 65–99)
GLUCOSE UR STRIP-MCNC: NEGATIVE MG/DL
HCT VFR BLD AUTO: 22.9 % (ref 34–46.6)
HGB BLD-MCNC: 7.3 G/DL (ref 12–15.9)
HGB UR QL STRIP.AUTO: NEGATIVE
HOLD SPECIMEN: NORMAL
HOLD SPECIMEN: NORMAL
IMM GRANULOCYTES # BLD AUTO: 0.03 10*3/MM3 (ref 0–0.05)
IMM GRANULOCYTES NFR BLD AUTO: 0.5 % (ref 0–0.5)
INR PPP: 1.87 (ref 0.9–1.1)
INR PPP: 1.93 (ref 0.9–1.1)
KETONES UR QL STRIP: NEGATIVE
LEUKOCYTE ESTERASE UR QL STRIP.AUTO: NEGATIVE
LYMPHOCYTES # BLD AUTO: 0.45 10*3/MM3 (ref 0.7–3.1)
LYMPHOCYTES NFR BLD AUTO: 8 % (ref 19.6–45.3)
Lab: 174
MCH RBC QN AUTO: 32 PG (ref 26.6–33)
MCHC RBC AUTO-ENTMCNC: 31.9 G/DL (ref 31.5–35.7)
MCV RBC AUTO: 100.4 FL (ref 79–97)
MONOCYTES # BLD AUTO: 0.5 10*3/MM3 (ref 0.1–0.9)
MONOCYTES NFR BLD AUTO: 8.9 % (ref 5–12)
NEGATIVE CONTROL: NEGATIVE
NEUTROPHILS NFR BLD AUTO: 4.46 10*3/MM3 (ref 1.7–7)
NEUTROPHILS NFR BLD AUTO: 79.4 % (ref 42.7–76)
NITRITE UR QL STRIP: NEGATIVE
NRBC BLD AUTO-RTO: 0 /100 WBC (ref 0–0.2)
PH UR STRIP.AUTO: 5.5 [PH] (ref 5–8)
PLATELET # BLD AUTO: 146 10*3/MM3 (ref 140–450)
PMV BLD AUTO: 10.4 FL (ref 6–12)
POSITIVE CONTROL: POSITIVE
POTASSIUM SERPL-SCNC: 4.7 MMOL/L (ref 3.5–5.2)
PROT SERPL-MCNC: 6 G/DL (ref 6–8.5)
PROT UR QL STRIP: NEGATIVE
PROTHROMBIN TIME: 21.3 SECONDS (ref 11.7–14.2)
PROTHROMBIN TIME: 21.8 SECONDS (ref 11.7–14.2)
QT INTERVAL: 477 MS
RBC # BLD AUTO: 2.28 10*6/MM3 (ref 3.77–5.28)
RH BLD: POSITIVE
SARS-COV-2 ORF1AB RESP QL NAA+PROBE: NOT DETECTED
SODIUM SERPL-SCNC: 139 MMOL/L (ref 136–145)
SP GR UR STRIP: 1.01 (ref 1–1.03)
T&S EXPIRATION DATE: NORMAL
UROBILINOGEN UR QL STRIP: NORMAL
WBC # BLD AUTO: 5.62 10*3/MM3 (ref 3.4–10.8)
WHOLE BLOOD HOLD SPECIMEN: NORMAL
WHOLE BLOOD HOLD SPECIMEN: NORMAL

## 2021-09-06 PROCEDURE — 36430 TRANSFUSION BLD/BLD COMPNT: CPT

## 2021-09-06 PROCEDURE — 93005 ELECTROCARDIOGRAM TRACING: CPT | Performed by: EMERGENCY MEDICINE

## 2021-09-06 PROCEDURE — 71045 X-RAY EXAM CHEST 1 VIEW: CPT

## 2021-09-06 PROCEDURE — U0004 COV-19 TEST NON-CDC HGH THRU: HCPCS | Performed by: PHYSICIAN ASSISTANT

## 2021-09-06 PROCEDURE — 86900 BLOOD TYPING SEROLOGIC ABO: CPT

## 2021-09-06 PROCEDURE — 82270 OCCULT BLOOD FECES: CPT | Performed by: PHYSICIAN ASSISTANT

## 2021-09-06 PROCEDURE — 99285 EMERGENCY DEPT VISIT HI MDM: CPT

## 2021-09-06 PROCEDURE — 93010 ELECTROCARDIOGRAM REPORT: CPT | Performed by: INTERNAL MEDICINE

## 2021-09-06 PROCEDURE — 86923 COMPATIBILITY TEST ELECTRIC: CPT

## 2021-09-06 PROCEDURE — 85025 COMPLETE CBC W/AUTO DIFF WBC: CPT

## 2021-09-06 PROCEDURE — P9016 RBC LEUKOCYTES REDUCED: HCPCS

## 2021-09-06 PROCEDURE — 85610 PROTHROMBIN TIME: CPT

## 2021-09-06 PROCEDURE — P9612 CATHETERIZE FOR URINE SPEC: HCPCS

## 2021-09-06 PROCEDURE — 86850 RBC ANTIBODY SCREEN: CPT

## 2021-09-06 PROCEDURE — 80053 COMPREHEN METABOLIC PANEL: CPT

## 2021-09-06 PROCEDURE — 86901 BLOOD TYPING SEROLOGIC RH(D): CPT

## 2021-09-06 PROCEDURE — 85730 THROMBOPLASTIN TIME PARTIAL: CPT | Performed by: PHYSICIAN ASSISTANT

## 2021-09-06 PROCEDURE — 81003 URINALYSIS AUTO W/O SCOPE: CPT | Performed by: EMERGENCY MEDICINE

## 2021-09-06 PROCEDURE — 85610 PROTHROMBIN TIME: CPT | Performed by: INTERNAL MEDICINE

## 2021-09-06 PROCEDURE — U0005 INFEC AGEN DETEC AMPLI PROBE: HCPCS | Performed by: PHYSICIAN ASSISTANT

## 2021-09-06 RX ORDER — WARFARIN SODIUM 2 MG/1
2 TABLET ORAL
Status: DISCONTINUED | OUTPATIENT
Start: 2021-09-06 | End: 2021-09-06 | Stop reason: DRUGHIGH

## 2021-09-06 RX ORDER — WARFARIN SODIUM 2 MG/1
2 TABLET ORAL
Status: DISCONTINUED | OUTPATIENT
Start: 2021-09-07 | End: 2021-09-07

## 2021-09-06 RX ORDER — ATORVASTATIN CALCIUM 20 MG/1
10 TABLET, FILM COATED ORAL NIGHTLY
Status: DISCONTINUED | OUTPATIENT
Start: 2021-09-06 | End: 2021-09-10 | Stop reason: HOSPADM

## 2021-09-06 RX ORDER — SODIUM CHLORIDE 0.9 % (FLUSH) 0.9 %
10 SYRINGE (ML) INJECTION AS NEEDED
Status: DISCONTINUED | OUTPATIENT
Start: 2021-09-06 | End: 2021-09-10 | Stop reason: HOSPADM

## 2021-09-06 RX ORDER — ZOLPIDEM TARTRATE 5 MG/1
5 TABLET ORAL NIGHTLY PRN
Status: DISCONTINUED | OUTPATIENT
Start: 2021-09-06 | End: 2021-09-10 | Stop reason: HOSPADM

## 2021-09-06 RX ORDER — ACETAMINOPHEN 325 MG/1
650 TABLET ORAL EVERY 6 HOURS PRN
COMMUNITY
End: 2022-01-01 | Stop reason: SDDI

## 2021-09-06 RX ORDER — OXYCODONE HYDROCHLORIDE AND ACETAMINOPHEN 5; 325 MG/1; MG/1
1 TABLET ORAL EVERY 4 HOURS PRN
Status: DISCONTINUED | OUTPATIENT
Start: 2021-09-06 | End: 2021-09-10 | Stop reason: HOSPADM

## 2021-09-06 RX ORDER — FERROUS SULFATE 325(65) MG
325 TABLET ORAL 2 TIMES WEEKLY
Status: DISCONTINUED | OUTPATIENT
Start: 2021-09-09 | End: 2021-09-10 | Stop reason: HOSPADM

## 2021-09-06 RX ORDER — CARVEDILOL 25 MG/1
25 TABLET ORAL 2 TIMES DAILY
Status: DISCONTINUED | OUTPATIENT
Start: 2021-09-06 | End: 2021-09-10 | Stop reason: HOSPADM

## 2021-09-06 RX ORDER — RAMIPRIL 5 MG/1
5 CAPSULE ORAL DAILY
Status: DISCONTINUED | OUTPATIENT
Start: 2021-09-07 | End: 2021-09-10 | Stop reason: HOSPADM

## 2021-09-06 RX ORDER — WARFARIN SODIUM 1 MG/1
1 TABLET ORAL 3 TIMES WEEKLY
Status: DISCONTINUED | OUTPATIENT
Start: 2021-09-06 | End: 2021-09-10 | Stop reason: HOSPADM

## 2021-09-06 RX ORDER — PANTOPRAZOLE SODIUM 40 MG/1
40 TABLET, DELAYED RELEASE ORAL
Status: DISCONTINUED | OUTPATIENT
Start: 2021-09-07 | End: 2021-09-10 | Stop reason: HOSPADM

## 2021-09-06 RX ORDER — CALCITRIOL 0.25 UG/1
0.25 CAPSULE, LIQUID FILLED ORAL 2 TIMES DAILY
Status: DISCONTINUED | OUTPATIENT
Start: 2021-09-06 | End: 2021-09-10 | Stop reason: HOSPADM

## 2021-09-06 RX ORDER — BUMETANIDE 2 MG/1
2 TABLET ORAL
Status: DISCONTINUED | OUTPATIENT
Start: 2021-09-06 | End: 2021-09-10 | Stop reason: HOSPADM

## 2021-09-06 RX ADMIN — BUMETANIDE 2 MG: 2 TABLET ORAL at 21:39

## 2021-09-06 RX ADMIN — ATORVASTATIN CALCIUM 10 MG: 20 TABLET, FILM COATED ORAL at 21:39

## 2021-09-06 RX ADMIN — WARFARIN 1 MG: 1 TABLET ORAL at 23:04

## 2021-09-06 RX ADMIN — CALCITRIOL 0.25 MCG: 0.25 CAPSULE ORAL at 21:39

## 2021-09-06 RX ADMIN — CARVEDILOL 25 MG: 25 TABLET, FILM COATED ORAL at 23:06

## 2021-09-06 RX ADMIN — OXYCODONE AND ACETAMINOPHEN 1 TABLET: 5; 325 TABLET ORAL at 23:06

## 2021-09-06 NOTE — ED TRIAGE NOTES
Pt was brought in by ems from [facility] [place]  for low hgb (6.8), HCT (20.9) and increased confusion since yesterday. Denies rectal bleeding    Pt wearing mask on arrival. Staff wearing mask and goggles at time of triage.

## 2021-09-06 NOTE — ED NOTES
Nursing report ED to floor  Janel Mahoney  80 y.o.  female    HPI (triage note):   Chief Complaint   Patient presents with   • Abnormal Lab       Admitting doctor:   Agustin Escobar MD    Admitting diagnosis:   The encounter diagnosis was Symptomatic anemia.    Code status:   Current Code Status     Date Active Code Status Order ID Comments User Context       Prior    Advance Care Planning Activity          Allergies:   Diclofenac    Weight:       09/06/21  1735   Weight: 75.6 kg (166 lb 9.6 oz)       Most recent vitals:   Vitals:    09/06/21 1801 09/06/21 1831 09/06/21 1901 09/06/21 1904   BP: 140/65 142/51 132/69 132/69   BP Location:    Left arm   Patient Position:    Lying   Pulse: 64 64 74 60   Resp:    16   Temp:    98.9 °F (37.2 °C)   TempSrc:    Oral   SpO2: 97% 100% 100% 100%   Weight:       Height:           Active LDAs/IV Access:   Lines, Drains & Airways    Active LDAs     Name:   Placement date:   Placement time:   Site:   Days:    Peripheral IV 09/06/21 1542 Left Antecubital   09/06/21    1542    Antecubital   less than 1                Labs (abnormal labs have a star):   Labs Reviewed   COMPREHENSIVE METABOLIC PANEL - Abnormal; Notable for the following components:       Result Value    Glucose 126 (*)     BUN 73 (*)     Creatinine 2.50 (*)     CO2 31.2 (*)     Albumin 3.40 (*)     AST (SGOT) 36 (*)     eGFR Non African Amer 19 (*)     BUN/Creatinine Ratio 29.2 (*)     All other components within normal limits    Narrative:     GFR Normal >60  Chronic Kidney Disease <60  Kidney Failure <15     PROTIME-INR - Abnormal; Notable for the following components:    Protime 21.8 (*)     INR 1.93 (*)     All other components within normal limits   CBC WITH AUTO DIFFERENTIAL - Abnormal; Notable for the following components:    RBC 2.28 (*)     Hemoglobin 7.3 (*)     Hematocrit 22.9 (*)     .4 (*)     Neutrophil % 79.4 (*)     Lymphocyte % 8.0 (*)     Lymphocytes, Absolute 0.45 (*)     All other  components within normal limits   APTT - Abnormal; Notable for the following components:    PTT 42.0 (*)     All other components within normal limits   URINALYSIS W/ MICROSCOPIC IF INDICATED (NO CULTURE) - Normal    Narrative:     Urine microscopic not indicated.   POCT OCCULT BLOOD STOOL (ED ONLY) - Normal   COVID PRE-OP / PRE-PROCEDURE SCREENING ORDER (NO ISOLATION)    Narrative:     The following orders were created for panel order COVID PRE-OP / PRE-PROCEDURE SCREENING ORDER (NO ISOLATION) - Swab, Nasopharynx.  Procedure                               Abnormality         Status                     ---------                               -----------         ------                     COVID-19,APTIMA PANTHER(...[[id number]]                      In process                   Please view results for these tests on the individual orders.   COVID-19,APTIMA PANTHER (CON)[facility] ,NP/OP SWAB IN UTM/VTM/SALINE TRANSPORT MEDIA,24 HR TAT   RAINBOW DRAW    Narrative:     The following orders were created for panel order [place] Draw.  Procedure                               Abnormality         Status                     ---------                               -----------         ------                     Green Top (Gel)[[id number]]                                  Final result               Lavender Top[[id number]]                                     Final result               Gold Top - SST[[id number]]                                   Final result               Light Blue Top[[id number]]                                   Final result                 Please view results for these tests on the individual orders.   TYPE AND SCREEN   PREPARE RBC   CBC AND DIFFERENTIAL    Narrative:     The following orders were created for panel order CBC & Differential.  Procedure                               Abnormality         Status                     ---------                               -----------         ------                     CBC  Auto Differential[[id number]]        Abnormal            Final result                 Please view results for these tests on the individual orders.   GREEN TOP   LAVENDER TOP   GOLD TOP - SST   LIGHT BLUE TOP       EKG:   ECG 12 Lead   Final Result   HEART RATE= 60  bpm   RR Interval= 1000  ms   VT Interval=   ms   P Horizontal Axis=   deg   P Front Axis=   deg   QRSD Interval= 163  ms   QT Interval= 477  ms   QRS Axis= 255  deg   T Wave Axis= 113  deg   - ABNORMAL ECG -   Afib/flutter and ventricular-paced rhythm   Biventricular paced rhythm   No change from prior tracing   Electronically Signed By: Aries Amor ([facility]) 06-Sep-2021 16:43:47   Date and Time of Study: 2021 15:00:40          Meds given in ED:   Medications   sodium chloride 0.9 % flush 10 mL (has no administration in time range)       Imaging results:  No radiology results for the last day    Ambulatory status:   - up with assistance    Social issues:   Social History     Socioeconomic History   • Marital status:      Spouse name: Russ   • Number of children: Not on file   • Years of education: Not on file   • Highest education level: Not on file   Tobacco Use   • Smoking status: Former Smoker     Packs/day: 1.00     Years: 50.00     Pack years: 50.00     Types: Cigarettes     Quit date: 2009     Years since quittin.6   • Smokeless tobacco: Never Used   • Tobacco comment: Daily caffeine - soda   Vaping Use   • Vaping Use: Never used   Substance and Sexual Activity   • Alcohol use: Yes     Comment: 1 yearly   • Drug use: Never   • Sexual activity: Defer    Nursing report ED to floor     Flower Brown RN  21 1931

## 2021-09-06 NOTE — H&P
Patient Name:  Janel Mahoney  YOB: 1940  MRN:  [id number]  Admit Date:  9/6/2021  Patient Care Team:  Ariel Matute MD as PCP - General (Internal Medicine)  Edward Vasquez MD as Consulting Physician (Hematology and Oncology)  Milagros Cruz MD as Referring Physician (Nephrology)  Anthony Cardona MD as Consulting Physician (Sleep Medicine)  Nik Galarza MD as Consulting Physician (Cardiology)  Shanna Major MATHIEU as Pharmacist  Juvencio Pressley II, MD as Consulting Physician (Orthopedic Surgery)  Vargas Butcher, MeaganD as Pharmacist (Pharmacy)  Geraldine Huizar, KIRT as Ambulatory  ([facility])      Subjective   History Present Illness     Chief Complaint   Patient presents with   • Abnormal Lab     History of Present Illness   80-year-old female, with history of chronic kidney disease, chronic atrial fibrillation, long-term use of anticoagulation, obstructive sleep apnea, essential hypertension, chronic combined systolic and diastolic CHF, presented from rehabilitation facility where her hemoglobin was noted to be 6.8.  Patient has had shoulder surgery last Tuesday and had been in rehab, there are no reports of dark stools.  Patient denies any active bleeding.  She is on Coumadin for history of atrial fibrillation.  Patient currently denies any dizziness or lightheadedness.  She denies any fever, chills or chest pain.    Review of Systems   Constitutional: Negative for activity change and appetite change.   HENT: Negative for congestion and dental problem.    Eyes: Negative for discharge and itching.   Respiratory: Negative for apnea and chest tightness.    Cardiovascular: Negative for chest pain and palpitations.   Gastrointestinal: Negative for abdominal distention and abdominal pain.   Endocrine: Negative for cold intolerance and heat intolerance.   Genitourinary: Negative for difficulty urinating and frequency.   Musculoskeletal: Negative  for arthralgias and back pain.   Skin: Negative for color change and pallor.   Allergic/Immunologic: Negative for environmental allergies and food allergies.   Neurological: Negative for dizziness and facial asymmetry.   Hematological: Negative for adenopathy. Does not bruise/bleed easily.   Psychiatric/Behavioral: Negative for agitation and suicidal ideas.       Personal History     Past Medical History:   Diagnosis Date   • Acute kidney injury (CMS/HCC)    • Anemia     on procrit   • Atrial fibrillation (CMS/HCC)    • Bruises easily    • Carotid artery stenosis    • Chronic back pain    • Chronic combined systolic and diastolic congestive heart failure (CMS/HCC)    • Chronic coronary artery disease     moderate to severe LV dysfunction.  Sees Dr. Dominguez   • Chronic kidney disease, stage 3 (CMS/HCC)    • Dysphagia    • GERD (gastroesophageal reflux disease)    • Gout    • H/O cardiac murmur    • Hematoma     LEFT LEG; DR CHERI MAHAJAN   • [place] (hard of hearing)     wears hearing aids   • Hyperlipidemia    • Hypertension    • Hypotension    • ICD (implantable cardioverter-defibrillator) in place    • Ischemic cardiomyopathy    • Kyphoscoliosis    • Leukopenia    • Lumbar spondylosis    • Obesity    • GEORGINA (obstructive sleep apnea) 12/01/2013    Overnight polysomnogram, weight 168 pounds.  AHI mildly abnormal at 10.3 events per hour.  Respiratory effort related arousals 9.5 events per hour.  Total time snoring 30% and arousals associated with snoring 15 events per hour.          Bring machine DOS   • Osteoarthritis    • Osteoporosis    • Peptic ulcer    • Premature ventricular contractions    • Renal insufficiency syndrome    • Right shoulder pain 08/2021   • Scoliosis    • Shoulder fracture, left    • Shoulder pain, right    • Thrombocytopenia (CMS/HCC)    • Urine incontinence     pads   • Ventricular tachycardia (CMS/HCC)    • Vitamin B12 deficiency    • Warfarin anticoagulation      Past Surgical History:    Procedure Laterality Date   • AV NODE ABLATION     • BREAST BIOPSY     • CARDIAC CATHETERIZATION      Showed severe mitral insufficiency and borderline coronary artery disease   • CARDIAC CATHETERIZATION      Showed an ejection fraction of 35%. She had occlusive disease of the right posterior LV branch and no other significant disease, treated medically.   • CARDIAC DEFIBRILLATOR PLACEMENT      Biventricular   • CARDIAC DEFIBRILLATOR PLACEMENT Left    • CARDIAC ELECTROPHYSIOLOGY PROCEDURE N/A 1/4/2019    Procedure: GENERATOR CHANGE BI-V ICD   [place];  Surgeon: James Hwang MD;  Location: [facility] CATH INVASIVE LOCATION;  Service: Cardiology   • CARDIAC VALVE REPLACEMENT  2009    Done with stent placement   • CARDIOVERSION      multiple electrocardioversions.   • CAROTID ARTERY ANGIOPLASTY Right    • CATARACT EXTRACTION EXTRACAPSULAR W/ INTRAOCULAR LENS IMPLANTATION Bilateral    • COLONOSCOPY N/A 9/28/2017    Procedure: COLONOSCOPY TO CECUM;  Surgeon: Kevin Davis MD;  Location: [facility] ENDOSCOPY;  Service:    • CORONARY ANGIOPLASTY WITH STENT PLACEMENT  2009   • CORONARY ARTERY BYPASS GRAFT      single graft to the PDA   • CORONARY STENT PLACEMENT     • ENDOSCOPY N/A 9/28/2017    Procedure: ESOPHAGOGASTRODUODENOSCOPY ;  Surgeon: Kevin Davis MD;  Location: [facility] ENDOSCOPY;  Service:    • EYE SURGERY     • HEMORRHOIDECTOMY     • HYSTERECTOMY     • INCISION AND DRAINAGE TRUNK Right 11/27/2018    Procedure: EVACUATION OF RIGHT CHEST WALL HEMATOMA;  Surgeon: Juvencio Rodriguez MD;  Location: [facility] OR;  Service: General   • MITRAL VALVE REPLACEMENT  01/2010    #31 Epic porcine valve.   • THROMBOENDARTERECTOMY Right     carotid thromboendarterectomy    • TONSILLECTOMY      age 32   • TOTAL KNEE ARTHROPLASTY Left    • TOTAL KNEE ARTHROPLASTY REVISION Left 11/19/2019    Procedure: TOTAL KNEE ARTHROPLASTY REVISION LEFT;  Surgeon: Juvencio Pressley II, MD;  Location: [facility] OR;  Service:  Orthopedics   • TOTAL SHOULDER ARTHROPLASTY W/ DISTAL CLAVICLE EXCISION Left 2018    Procedure: TOTAL SHOULDER REVERSE ARTHROPLASTY;  Surgeon: Bianka Quesada MD;  Location: [facility] MAIN OR;  Service:    • TOTAL SHOULDER ARTHROPLASTY W/ DISTAL CLAVICLE EXCISION Right 2021    Procedure: TOTAL SHOULDER REVERSE ARTHROPLASTY;  Surgeon: Juvencio Pressley II, MD;  Location: [facility] MAIN OR;  Service: Orthopedics;  Laterality: Right;     Family History   Problem Relation Age of Onset   • Cancer Mother    • Breast cancer Mother    • Heart disease Mother    • Hypertension Mother    • Stroke Mother    • Coronary artery disease Father    • Stroke Father    • Heart disease Father    • Hypertension Father    • Other Daughter         thiamin deficiency    • Hypertension Son    • Lung disease Other    • Hypertension Other    • Malig Hyperthermia Neg Hx      Social History     Tobacco Use   • Smoking status: Former Smoker     Packs/day: 1.00     Years: 50.00     Pack years: 50.00     Types: Cigarettes     Quit date: 2009     Years since quittin.6   • Smokeless tobacco: Never Used   • Tobacco comment: Daily caffeine - soda   Vaping Use   • Vaping Use: Never used   Substance Use Topics   • Alcohol use: Yes     Comment: 1 yearly   • Drug use: Never     No current facility-administered medications on file prior to encounter.     Current Outpatient Medications on File Prior to Encounter   Medication Sig Dispense Refill   • alendronate (FOSAMAX) 70 MG tablet Take 70 mg by mouth Every 14 (Fourteen) Days.     • allopurinol (ZYLOPRIM) 100 MG tablet 2 po qday (Patient taking differently: Take 200 mg by mouth Daily Before Supper.) 270 tablet 1   • aspirin 81 MG tablet Take 81 mg by mouth Daily.     • bumetanide (BUMEX) 2 MG tablet TAKE 1 TABLET TWICE DAILY. DOSE CHANGED  (Patient taking differently: Take 2 mg by mouth 2 (Two) Times a Day. Do not take dos) 180 tablet 1   • calcitriol (ROCALTROL) 0.25 MCG  capsule Take 0.25 mcg by mouth 2 (Two) Times a Day.     • carvedilol (COREG) 25 MG tablet TAKE 1 TABLET TWICE DAILY (Patient taking differently: Take  by mouth 2 (Two) Times a Day With Meals. Take dos) 180 tablet 1   • Chlorhexidine Gluconate Cloth 2 % pads Apply 1 application topically. USE AS DIRECTED PREOP     • Cholecalciferol (VITAMIN D) 2000 UNITS tablet Take 2,000 Units by mouth 2 (two) times a day. Stop now for surgery     • epoetin [place] (EPOGEN,PROCRIT) 51374 UNIT/ML injection Inject 10,000 Units under the skin into the appropriate area as directed As Needed.     • ferrous sulfate 325 (65 FE) MG tablet Take 1 tablet by mouth 2 (Two) Times a Week. (Patient taking differently: Take 325 mg by mouth 2 (Two) Times a Week. Jodie Nix only)     • Multiple Vitamins-Minerals (SENIOR MULTIVITAMIN PLUS) tablet Take 1 tablet by mouth Daily.     • mupirocin (BACTROBAN) 2 % nasal ointment 1 application into the nostril(s) as directed by provider. USE AS DIRECTED PREOP     • oxyCODONE-acetaminophen (PERCOCET) 5-325 MG per tablet Take 1 tablet by mouth Every 4 (Four) Hours As Needed for Severe Pain . 50 tablet 0   • oxyCODONE-acetaminophen (PERCOCET) 5-325 MG per tablet Take 1 tablet by mouth Every 4 (Four) Hours As Needed for Severe Pain . 50 tablet 0   • pantoprazole (PROTONIX) 40 MG EC tablet Take 1 tablet by mouth 2 (Two) Times a Day. (Patient taking differently: Take 40 mg by mouth 2 (Two) Times a Day. Take dos) 60 tablet 5   • ramipril (ALTACE) 5 MG capsule TAKE 1 CAPSULE EVERY DAY (Patient taking differently: Take 5 mg by mouth Every Morning. Do not take 24 hr prior to surgery, LD 8-30 am) 90 capsule 1   • simvastatin (ZOCOR) 20 MG tablet TAKE 2 TABLETS EVERY NIGHT (Patient taking differently: Take 20 mg by mouth Every Night.) 180 tablet 1   • traMADol (ULTRAM) 50 MG tablet Take 2 tablets by mouth 2 (Two) Times a Day. 3 months chronic pain medicine for low back pain 180 tablet 1   • vitamin B-12 (CYANOCOBALAMIN) 1000  MCG tablet Take 1,000 mcg by mouth Daily. Stop now for surgery     • warfarin (COUMADIN) 1 MG tablet Take one tablet (1mg) by mouth on Mon, Wed, Fri and 2 tablets (2mg) on all other days or as directed by [facility]. 48 tablet 0   • zolpidem (Ambien) 10 MG tablet Take 1 tablet by mouth At Night As Needed for Sleep. 90 tablet 1     Allergies   Allergen Reactions   • Diclofenac GI Bleeding     She had adverse effects from the medications that required hospitalization. Pt got stomach ulcers from this medication prescribed by Dr. Arango - pediatrist.       Objective    Objective     Vital Signs  Temp:  [98.2 °F (36.8 °C)-98.8 °F (37.1 °C)] 98.8 °F (37.1 °C)  Heart Rate:  [60-70] 60  Resp:  [18] 18  BP: (132-142)/(50-54) 141/52  SpO2:  [93 %-100 %] 100 %  on   ;   Device (Oxygen Therapy): room air  There is no height or weight on file to calculate BMI.    Physical Exam  General, awake and alert.  Head and ENT, normocephalic and atraumatic.  Mucous membranes appear pale.  Lungs, symmetric expansion, equal air entry bilaterally.  Heart, regular rate and rhythm.  Abdomen, soft and nontender.  Extremities, no clubbing or cyanosis.  Neuro, no focal deficits.  Skin: Warm and no rash.  Psych, normal mood and affect.  Musculoskeletal, joint examination is grossly normal.    Results Review:  I reviewed the patient's new clinical results.  I reviewed the patient's new imaging results and agree with the interpretation.  I reviewed the patient's other test results and agree with the interpretation  I personally viewed and interpreted the patient's EKG/Telemetry data  Discussed with ED provider.    Lab Results (last 24 hours)     Procedure Component Value Units Date/Time    Urinalysis With Microscopic If Indicated (No Culture) - Urine, Catheter [[id number]]  (Normal) Collected: 09/06/21 1509    Specimen: Urine, Catheter Updated: 09/06/21 1529     Color, UA Yellow     Appearance, UA Clear     pH, UA 5.5     Specific  Gravity, UA 1.011     Glucose, UA Negative     Ketones, UA Negative     Bilirubin, UA Negative     Blood, UA Negative     Protein, UA Negative     Leuk Esterase, UA Negative     Nitrite, UA Negative     Urobilinogen, UA 0.2 E.U./dL    Narrative:      Urine microscopic not indicated.    POCT Occult Blood Stool [[id number]]  (Normal) Collected: 09/06/21 1510    Specimen: Stool from Per Rectum Updated: 09/06/21 1510     Fecal Occult Blood Negative     Lot Number [id number]     Expiration Date 04/30/2022     Positive Control Positive     Negative Control Negative    CBC & Differential [[id number]]  (Abnormal) Collected: 09/06/21 1542    Specimen: Blood Updated: 09/06/21 1556    Narrative:      The following orders were created for panel order CBC & Differential.  Procedure                               Abnormality         Status                     ---------                               -----------         ------                     CBC Auto Differential[[id number]]        Abnormal            Final result                 Please view results for these tests on the individual orders.    Comprehensive Metabolic Panel [[id number]]  (Abnormal) Collected: 09/06/21 1542    Specimen: Blood Updated: 09/06/21 1613     Glucose 126 mg/dL      BUN 73 mg/dL      Creatinine 2.50 mg/dL      Sodium 139 mmol/L      Potassium 4.7 mmol/L      Chloride 100 mmol/L      CO2 31.2 mmol/L      Calcium 9.5 mg/dL      Total Protein 6.0 g/dL      Albumin 3.40 g/dL      ALT (SGPT) 14 U/L      AST (SGOT) 36 U/L      Alkaline Phosphatase 64 U/L      Total Bilirubin 0.3 mg/dL      eGFR Non African Amer 19 mL/min/1.73      Globulin 2.6 gm/dL      A/G Ratio 1.3 g/dL      BUN/Creatinine Ratio 29.2     Anion Gap 7.8 mmol/L     Narrative:      GFR Normal >60  Chronic Kidney Disease <60  Kidney Failure <15      Protime-INR [[id number]]  (Abnormal) Collected: 09/06/21 1542    Specimen: Blood Updated: 09/06/21 1608     Protime 21.8 Seconds      INR 1.93    CBC Auto  Differential [[id number]]  (Abnormal) Collected: 09/06/21 1542    Specimen: Blood Updated: 09/06/21 1556     WBC 5.62 10*3/mm3      RBC 2.28 10*6/mm3      Hemoglobin 7.3 g/dL      Hematocrit 22.9 %      .4 fL      MCH 32.0 pg      MCHC 31.9 g/dL      RDW 14.0 %      RDW-SD 51.8 fl      MPV 10.4 fL      Platelets 146 10*3/mm3      Neutrophil % 79.4 %      Lymphocyte % 8.0 %      Monocyte % 8.9 %      Eosinophil % 3.0 %      Basophil % 0.2 %      Immature Grans % 0.5 %      Neutrophils, Absolute 4.46 10*3/mm3      Lymphocytes, Absolute 0.45 10*3/mm3      Monocytes, Absolute 0.50 10*3/mm3      Eosinophils, Absolute 0.17 10*3/mm3      Basophils, Absolute 0.01 10*3/mm3      Immature Grans, Absolute 0.03 10*3/mm3      nRBC 0.0 /100 WBC     aPTT [[id number]]  (Abnormal) Collected: 09/06/21 1542    Specimen: Blood Updated: 09/06/21 1607     PTT 42.0 seconds     COVID PRE-OP / PRE-PROCEDURE SCREENING ORDER (NO ISOLATION) - Swab, Nasopharynx [[id number]] Collected: 09/06/21 1644    Specimen: Swab from Nasopharynx Updated: 09/06/21 1655    Narrative:      The following orders were created for panel order COVID PRE-OP / PRE-PROCEDURE SCREENING ORDER (NO ISOLATION) - Swab, Nasopharynx.  Procedure                               Abnormality         Status                     ---------                               -----------         ------                     COVID-19,APTIMA PANTHER(...[[id number]]                      In process                   Please view results for these tests on the individual orders.    COVID-19,APTIMA PANTHER(CON), [facility], NP/OP SWAB IN UTM/VTM/SALINE TRANSPORT MEDIA,24 HR TAT - Swab, Nasopharynx [[id number]] Collected: 09/06/21 1644    Specimen: Swab from Nasopharynx Updated: 09/06/21 1655          Imaging Results (Last 24 Hours)     Procedure Component Value Units Date/Time    XR Chest 1 View [[id number]] Collected: 09/06/21 1434     Updated: 09/06/21 1618    Narrative:      ONE-VIEW PORTABLE CHEST AT  2:14 PM     HISTORY: Confusion. Hypertension. Pacemaker.     FINDINGS: There is cardiomegaly with a pacemaker in place. The lungs are  moderately expanded with mild-to-moderate vascular congestion compared  to the study of 08/18/2021 and suspicious for an element of congestive  heart failure.     This report was finalized on 9/6/2021 4:14 PM by Dr. Jesse Bautista M.D.                 ECG 12 Lead   Final Result   HEART RATE= 60  bpm   RR Interval= 1000  ms   IN Interval=   ms   P Horizontal Axis=   deg   P Front Axis=   deg   QRSD Interval= 163  ms   QT Interval= 477  ms   QRS Axis= 255  deg   T Wave Axis= 113  deg   - ABNORMAL ECG -   Afib/flutter and ventricular-paced rhythm   Biventricular paced rhythm   No change from prior tracing   Electronically Signed By: Aries Amor ([facility]) 06-Sep-2021 16:43:47   Date and Time of Study: 2021-09-06 15:00:40           Assessment/Plan     Active Hospital Problems    Diagnosis  POA   • **Symptomatic anemia [D64.9]  Yes   • Mixed hyperlipidemia [E78.2]  Yes   • Chronic combined systolic and diastolic congestive heart failure (CMS/HCC) [I50.42]  Yes   • Essential hypertension [I10]  Yes   • Murmur, heart [R01.1]  Yes   • Chronic atrial fibrillation (CMS/HCC) [I48.20]  Yes   • Coronary artery disease involving coronary bypass graft of native heart without angina pectoris [I25.810]  Yes   • Anemia in chronic kidney disease (CKD) [N18.9, D63.1]  Yes      Resolved Hospital Problems   No resolved problems to display.     Assessment and plan:  1.  Symptomatic anemia, hemoglobin noted to be slightly above 7.  At nursing home, hemoglobin was noted to be less than 7.  We will go ahead and transfuse 1 unit of PRBCs.  Patient is on anticoagulation with Coumadin for chronic afibrillation.  She is also noted to be on aspirin.  It would be reasonable to consult hematology/oncology and cardiology for further input.    2.  Chronic combined systolic and diastolic CHF, appears euvolemic at  this point of time.  Resume home dose of Bumex.    3.  Mixed hyperlipidemia, patient noted to be on statins.    4.  Chronic atrial fibrillation, she is currently rate controlled.  INR is slightly subtherapeutic, since patient is not actively bleeding, it is reasonable to continue Coumadin for now.  Cardiology has been consulted.    5.  History of coronary artery disease.  Continue home dose of statins.  Also, transfuse 1 unit of PRBCs.    6.  Chronic kidney disease stage IV, renal function appears to be close to baseline.    7.  CODE STATUS is full code.  Further plans based on hospital course.       Agustin Escobar MD  [facility]  09/06/21  17:22 EDT

## 2021-09-06 NOTE — ED PROVIDER NOTES
EMERGENCY DEPARTMENT ENCOUNTER    Room Number:  S417/1  Date seen:  9/7/2021  Time seen: 14:46 EDT  PCP: Ariel Matute MD  Historian: patient      HPI:  Chief Complaint: low hemoglobin    A complete HPI/ROS/PMH/PSH/SH/FH are unobtainable due to: none    Context: Janel Mahoney is a 80 y.o. female who presents to the ED for evaluation of low hemoglobin.  Patient presents from the [place] [facility] rehabilitation for hemoglobin of 6.8 noted today.  It was noted on routine labs today.  Patient states she had shoulder surgery last Tuesday and has been at rehab upon discharge.  She denies any bloody stools, states her stools are chronically dark due to iron supplementation.  Additionally she takes warfarin for chronic A. fib.  She denies any lightheadedness or syncope chest pain or shortness of breath, has felt a little more weak the last couple days.      PAST MEDICAL HISTORY  Active Ambulatory Problems     Diagnosis Date Noted   • Anemia in chronic kidney disease (CKD) 02/12/2016   • Thrombocytopenia (CMS/[facility]) 05/17/2016   • B12 deficiency 05/17/2016   • Coronary artery disease involving coronary bypass graft of native heart without angina pectoris 06/08/2016   • S/P MVR (porcine) 06/08/2016   • Chronic atrial fibrillation (CMS/[facility]) 06/08/2016   • Bilateral carotid artery disease (CMS/[facility]) 06/08/2016   • Intractable low back pain 08/02/2016   • Long-term (current) use of anticoagulants 08/16/2016   • Low back pain 08/18/2016   • Osteoporosis 09/01/2016   • Murmur, heart 09/01/2016   • GEORGINA on auto CPAP - Dr Cardona 09/08/2016   • Hypersomnia due to medical condition - GEORGINA 09/08/2016   • Esophageal stricture 09/06/2017   • Dysphagia 09/26/2017   • Closed fracture of left proximal humerus 12/24/2017   • Essential hypertension 12/24/2017   • Supratherapeutic INR 12/24/2017   • Chronic combined systolic and diastolic congestive heart failure (CMS/HCC) 12/24/2017   • Osteoporosis with pathological fracture  12/24/2017   • Closed 3-part fracture of proximal end of left humerus 01/10/2018   • Closed fracture of proximal end of humerus with delayed healing 01/16/2018   • Injury of right knee 11/12/2018   • Knee injury, left, initial encounter 11/12/2018   • History of fall 11/12/2018   • S/P TKR (total knee replacement) using cement, left 11/12/2018   • Ischemic cardiomyopathy 11/13/2018   • Intramuscular hematoma right pecotralis 11/23/2018   • CKD (chronic kidney disease) stage 4, GFR 15-29 ml/min (CMS/[facility]) 11/23/2018   • Peripheral edema 02/15/2019   • Inflammatory arthritis 02/20/2019   • Ventricular tachycardia (CMS/[facility]) 08/27/2019   • S/P revision of total knee 11/19/2019   • Idiopathic gout 06/05/2020   • Psychophysiological insomnia 07/05/2020   • ICD (implantable cardioverter-defibrillator) in place 07/23/2020   • Status post reverse arthroplasty of right shoulder 08/31/2021   • Mixed hyperlipidemia 06/24/2021     Resolved Ambulatory Problems     Diagnosis Date Noted   • Acute blood loss anemia 09/26/2017   • Hemorrhagic disorder due to extrinsic circulating anticoagulants (CMS/[facility]) 11/23/2018   • Acute posthemorrhagic anemia 11/23/2018   • Acute kidney injury (CMS/[facility]) 11/25/2018   • Acute on chronic combined systolic (congestive) and diastolic (congestive) heart failure (CMS/[facility]) 02/20/2019     Past Medical History:   Diagnosis Date   • Anemia    • Atrial fibrillation (CMS/[facility])    • Bruises easily    • Carotid artery stenosis    • Chronic back pain    • Chronic coronary artery disease    • Chronic kidney disease, stage 3 (CMS/[facility])    • GERD (gastroesophageal reflux disease)    • Gout    • H/O cardiac murmur    • Hematoma    • [place] (hard of hearing)    • Hyperlipidemia    • Hypertension    • Hypotension    • Kyphoscoliosis    • Leukopenia    • Lumbar spondylosis    • Obesity    • GEORGINA (obstructive sleep apnea) 12/01/2013   • Osteoarthritis    • Peptic ulcer    • Premature ventricular contractions    • Renal  insufficiency syndrome    • Right shoulder pain 08/2021   • Scoliosis    • Shoulder fracture, left    • Shoulder pain, right    • Urine incontinence    • Vitamin B12 deficiency    • Warfarin anticoagulation          PAST SURGICAL HISTORY  Past Surgical History:   Procedure Laterality Date   • AV NODE ABLATION     • BREAST BIOPSY     • CARDIAC CATHETERIZATION      Showed severe mitral insufficiency and borderline coronary artery disease   • CARDIAC CATHETERIZATION      Showed an ejection fraction of 35%. She had occlusive disease of the right posterior LV branch and no other significant disease, treated medically.   • CARDIAC DEFIBRILLATOR PLACEMENT      Biventricular   • CARDIAC DEFIBRILLATOR PLACEMENT Left    • CARDIAC ELECTROPHYSIOLOGY PROCEDURE N/A 1/4/2019    Procedure: GENERATOR CHANGE BI-V ICD   [place];  Surgeon: James Hwang MD;  Location: [facility] CATH INVASIVE LOCATION;  Service: Cardiology   • CARDIAC VALVE REPLACEMENT  2009    Done with stent placement   • CARDIOVERSION      multiple electrocardioversions.   • CAROTID ARTERY ANGIOPLASTY Right    • CATARACT EXTRACTION EXTRACAPSULAR W/ INTRAOCULAR LENS IMPLANTATION Bilateral    • COLONOSCOPY N/A 9/28/2017    Procedure: COLONOSCOPY TO CECUM;  Surgeon: Kevin Davis MD;  Location: [facility] ENDOSCOPY;  Service:    • CORONARY ANGIOPLASTY WITH STENT PLACEMENT  2009   • CORONARY ARTERY BYPASS GRAFT      single graft to the PDA   • CORONARY STENT PLACEMENT     • ENDOSCOPY N/A 9/28/2017    Procedure: ESOPHAGOGASTRODUODENOSCOPY ;  Surgeon: Kevin Davis MD;  Location: [facility] ENDOSCOPY;  Service:    • EYE SURGERY     • HEMORRHOIDECTOMY     • HYSTERECTOMY     • INCISION AND DRAINAGE TRUNK Right 11/27/2018    Procedure: EVACUATION OF RIGHT CHEST WALL HEMATOMA;  Surgeon: Juvencio Rodriguez MD;  Location: [facility] OR;  Service: General   • MITRAL VALVE REPLACEMENT  01/2010    #31 Epic porcine valve.   • THROMBOENDARTERECTOMY Right     carotid  thromboendarterectomy    • TONSILLECTOMY      age 32   • TOTAL KNEE ARTHROPLASTY Left    • TOTAL KNEE ARTHROPLASTY REVISION Left 2019    Procedure: TOTAL KNEE ARTHROPLASTY REVISION LEFT;  Surgeon: Juvencio Pressley II, MD;  Location: [facility] OR;  Service: Orthopedics   • TOTAL SHOULDER ARTHROPLASTY W/ DISTAL CLAVICLE EXCISION Left 2018    Procedure: TOTAL SHOULDER REVERSE ARTHROPLASTY;  Surgeon: Bianka Quesada MD;  Location: [facility] OR;  Service:    • TOTAL SHOULDER ARTHROPLASTY W/ DISTAL CLAVICLE EXCISION Right 2021    Procedure: TOTAL SHOULDER REVERSE ARTHROPLASTY;  Surgeon: Juvencio Pressley II, MD;  Location: [facility] OR;  Service: Orthopedics;  Laterality: Right;         FAMILY HISTORY  Family History   Problem Relation Age of Onset   • Cancer Mother    • Breast cancer Mother    • Heart disease Mother    • Hypertension Mother    • Stroke Mother    • Coronary artery disease Father    • Stroke Father    • Heart disease Father    • Hypertension Father    • Other Daughter         thiamin deficiency    • Hypertension Son    • Lung disease Other    • Hypertension Other    • Malig Hyperthermia Neg Hx          SOCIAL HISTORY  Social History     Socioeconomic History   • Marital status:      Spouse name: Russ   • Number of children: Not on file   • Years of education: Not on file   • Highest education level: Not on file   Tobacco Use   • Smoking status: Former Smoker     Packs/day: 1.00     Years: 50.00     Pack years: 50.00     Types: Cigarettes     Quit date: 2009     Years since quittin.6   • Smokeless tobacco: Never Used   • Tobacco comment: Daily caffeine - soda   Vaping Use   • Vaping Use: Never used   Substance and Sexual Activity   • Alcohol use: Yes     Comment: 1 yearly   • Drug use: Never   • Sexual activity: Defer         ALLERGIES  Diclofenac        REVIEW OF SYSTEMS  Review of Systems     All systems reviewed and negative except for those  discussed in HPI.       PHYSICAL EXAM  ED Triage Vitals [09/06/21 1346]   Temp Heart Rate Resp BP SpO2   98.2 °F (36.8 °C) 70 18 142/50 98 %      Temp src Heart Rate Source Patient Position BP Location FiO2 (%)   Oral Monitor -- -- --         GENERAL: Chronically ill-appearing, pale, not distressed  HENT: atraumatic  EYES: no scleral icterus  CV: regular rhythm, regular rate  RESPIRATORY: normal effort CTA B  ABDOMEN: soft, nontender  : Chaperoned by KIRT Esqueda.  Normal rectal tone, stool in the rectal vault is brown and heme-negative  MUSCULOSKELETAL: no deformity  NEURO: alert, moves all extremities, follows commands  SKIN: warm, dry    Vital signs and nursing notes reviewed.          LAB RESULTS  Recent Results (from the past 24 hour(s))   Comprehensive Metabolic Panel    Collection Time: 09/06/21  3:42 PM    Specimen: Blood   Result Value Ref Range    Glucose 126 (H) 65 - 99 mg/dL    BUN 73 (H) 8 - 23 mg/dL    Creatinine 2.50 (H) 0.57 - 1.00 mg/dL    Sodium 139 136 - 145 mmol/L    Potassium 4.7 3.5 - 5.2 mmol/L    Chloride 100 98 - 107 mmol/L    CO2 31.2 (H) 22.0 - 29.0 mmol/L    Calcium 9.5 8.6 - 10.5 mg/dL    Total Protein 6.0 6.0 - 8.5 g/dL    Albumin 3.40 (L) 3.50 - 5.20 g/dL    ALT (SGPT) 14 1 - 33 U/L    AST (SGOT) 36 (H) 1 - 32 U/L    Alkaline Phosphatase 64 39 - 117 U/L    Total Bilirubin 0.3 0.0 - 1.2 mg/dL    eGFR Non African Amer 19 (L) >60 mL/min/1.73    Globulin 2.6 gm/dL    A/G Ratio 1.3 g/dL    BUN/Creatinine Ratio 29.2 (H) 7.0 - 25.0    Anion Gap 7.8 5.0 - 15.0 mmol/L   Type & Screen    Collection Time: 09/06/21  3:42 PM    Specimen: Blood   Result Value Ref Range    ABO Type O     RH type Positive     Antibody Screen Negative     T&S Expiration Date 9/9/2021 11:59:59 PM    Protime-INR    Collection Time: 09/06/21  3:42 PM    Specimen: Blood   Result Value Ref Range    Protime 21.8 (H) 11.7 - 14.2 Seconds    INR 1.93 (H) 0.90 - 1.10   Green Top (Gel)    Collection Time: 09/06/21  3:42 PM    Result Value Ref Range    Extra Tube Hold for add-ons.    Lavender Top    Collection Time: 09/06/21  3:42 PM   Result Value Ref Range    Extra Tube hold for add-on    Gold Top - SST    Collection Time: 09/06/21  3:42 PM   Result Value Ref Range    Extra Tube Hold for add-ons.    Light Blue Top    Collection Time: 09/06/21  3:42 PM   Result Value Ref Range    Extra Tube hold for add-on    CBC Auto Differential    Collection Time: 09/06/21  3:42 PM    Specimen: Blood   Result Value Ref Range    WBC 5.62 3.40 - 10.80 10*3/mm3    RBC 2.28 (L) 3.77 - 5.28 10*6/mm3    Hemoglobin 7.3 (L) 12.0 - 15.9 g/dL    Hematocrit 22.9 (L) 34.0 - 46.6 %    .4 (H) 79.0 - 97.0 fL    MCH 32.0 26.6 - 33.0 pg    MCHC 31.9 31.5 - 35.7 g/dL    RDW 14.0 12.3 - 15.4 %    RDW-SD 51.8 37.0 - 54.0 fl    MPV 10.4 6.0 - 12.0 fL    Platelets 146 140 - 450 10*3/mm3    Neutrophil % 79.4 (H) 42.7 - 76.0 %    Lymphocyte % 8.0 (L) 19.6 - 45.3 %    Monocyte % 8.9 5.0 - 12.0 %    Eosinophil % 3.0 0.3 - 6.2 %    Basophil % 0.2 0.0 - 1.5 %    Immature Grans % 0.5 0.0 - 0.5 %    Neutrophils, Absolute 4.46 1.70 - 7.00 10*3/mm3    Lymphocytes, Absolute 0.45 (L) 0.70 - 3.10 10*3/mm3    Monocytes, Absolute 0.50 0.10 - 0.90 10*3/mm3    Eosinophils, Absolute 0.17 0.00 - 0.40 10*3/mm3    Basophils, Absolute 0.01 0.00 - 0.20 10*3/mm3    Immature Grans, Absolute 0.03 0.00 - 0.05 10*3/mm3    nRBC 0.0 0.0 - 0.2 /100 WBC   aPTT    Collection Time: 09/06/21  3:42 PM    Specimen: Blood   Result Value Ref Range    PTT 42.0 (H) 22.7 - 35.4 seconds   Protime-INR    Collection Time: 09/06/21  3:42 PM    Specimen: Blood   Result Value Ref Range    Protime 21.3 (H) 11.7 - 14.2 Seconds    INR 1.87 (H) 0.90 - 1.10   COVID-19,APTIMA PANTHER(CON), AMRITA, NP/OP SWAB IN UTM/VTM/[facility]ALINE TRANSPORT MEDIA,24 HR TAT - Swab, Nasopharynx    Collection Time: 09/06/21  4:44 PM    Specimen: Nasopharynx; Swab   Result Value Ref Range    COVID19 Not Detected Not Detected - Ref. Range    Protime-INR    Collection Time: 09/07/21  4:11 AM    Specimen: Blood   Result Value Ref Range    Protime 20.7 (H) 11.7 - 14.2 Seconds    INR 1.81 (H) 0.90 - 1.10   Prepare RBC, 1 Units    Collection Time: 09/07/21  6:00 AM   Result Value Ref Range    Product Code [id number]     Unit Number [id number]     UNIT  ABO O     UNIT  RH POS     Crossmatch Interpretation Compatible     Dispense Status PT     Blood Expiration Date [id number]     Blood Type Barcode 5100        Ordered the above labs and independently reviewed the results.        RADIOLOGY      XR Chest 1 View    Result Date: 9/6/2021  Narrative: ONE-VIEW PORTABLE CHEST AT 2:14 PM  HISTORY: Confusion. Hypertension. Pacemaker.  FINDINGS: There is cardiomegaly with a pacemaker in place. The lungs are moderately expanded with mild-to-moderate vascular congestion compared to the study of 08/18/2021 and suspicious for an element of congestive heart failure.          I ordered the above noted radiological studies. Reviewed by me and discussed with radiologist.  See dictation for official radiology interpretation.    PROCEDURES  Critical Care  Performed by: Melody Shah PA  Authorized by: Christiano Harrison II, MD     Critical care provider statement:     Critical care time (minutes):  32    Critical care time was exclusive of:  Separately billable procedures and treating other patients and teaching time    Critical care was necessary to treat or prevent imminent or life-threatening deterioration of the following conditions:  Circulatory failure    Critical care was time spent personally by me on the following activities:  Development of treatment plan with patient or surrogate, discussions with consultants, evaluation of patient's response to treatment, examination of patient, obtaining history from patient or surrogate, review of old charts, re-evaluation of patient's condition, pulse oximetry, ordering and review of radiographic studies, ordering and  review of laboratory studies and ordering and performing treatments and interventions            MEDICATIONS GIVEN IN ER  Medications   sodium chloride 0.9 % flush 10 mL (has no administration in time range)   bumetanide (BUMEX) tablet 2 mg (2 mg Oral Given 9/7/21 0941)   calcitriol (ROCALTROL) capsule 0.25 mcg (0.25 mcg Oral Given 9/7/21 0941)   carvedilol (COREG) tablet 25 mg (25 mg Oral Given 9/7/21 0941)   ferrous sulfate tablet 325 mg (has no administration in time range)   oxyCODONE-acetaminophen (PERCOCET) 5-325 MG per tablet 1 tablet (1 tablet Oral Given 9/7/21 0940)   pantoprazole (PROTONIX) EC tablet 40 mg (40 mg Oral Given 9/7/21 0940)   ramipril (ALTACE) capsule 5 mg (5 mg Oral Given 9/7/21 0941)   atorvastatin (LIPITOR) tablet 10 mg (10 mg Oral Given 9/6/21 2139)   Pharmacy to dose warfarin (has no administration in time range)   zolpidem (AMBIEN) tablet 5 mg (has no administration in time range)   warfarin (COUMADIN) tablet 1 mg (1 mg Oral Given 9/6/21 2304)   epoetin adele-epbx (RETACRIT) injection 10,000 Units (has no administration in time range)   polyethylene glycol (MIRALAX) packet 17 g (has no administration in time range)   warfarin (COUMADIN) tablet 2.5 mg (has no administration in time range)   warfarin (COUMADIN) tablet 2 mg (has no administration in time range)             PROGRESS AND CONSULTS    DDX includes but not limited to blood loss anemia, B12 deficiency anemia, iron deficiency anemia, anemia of chronic disease    ED Course as of Sep 07 1520   Mon Sep 06, 2021   1512 EKG interpreted by myself.  Time 1500.  Atrial fibrillation.  Ventricularly paced rhythm.  Heart rate 60.  Negative Sgarbossa criteria.    [TD]   1513 On medical chart review, old EKG obtained from 2/17/2019.  Heart rate 62.  Atrial fibrillation.  Ventricularly paced.    [TD]   1514 Medical records reviewed.  Labs sent to ER with patient show patient's hemoglobin today was 6.8, creatinine 2.3, BUN 75, INR 1.5.    [KA]    1519 My interpretation of the chest x-ray is no acute infiltrate, cardiomegaly, pacemaker in the left upper chest, mild vascular congestion.    [KA]   1621 Chronic, baseline   Creatinine(!): 2.50 [KA]   1645 I discussed the case with Dr. Escobar, hospitalist.  We reviewed the patient's labs and history.  I let him know that Dr. Pressley is aware of the patient's presence.    [TD]      ED Course User Index  [KA] Melody Shah PA  [TD] Christiano Harrison II, MD             Patient was placed in face mask in first look. Patient was wearing facemask each time I entered the room and throughout our encounter. I wore protective equipment throughout this patient encounter including a face mask, eye shield and gloves. Hand hygiene was performed before donning protective equipment and after removal when leaving the room.        DIAGNOSIS  Final diagnoses:   Symptomatic anemia         Follow Up:  No follow-up provider specified.    RX:     Medication List      ASK your doctor about these medications    oxyCODONE-acetaminophen 5-325 MG per tablet  Commonly known as: PERCOCET  Take 1 tablet by mouth Every 4 (Four) Hours As Needed for Severe Pain .  Ask about: Which instructions should I use?              Latest Documented Vital Signs:  As of 15:20 EDT  BP- 125/44 HR- 59 Temp- 99 °F (37.2 °C) (Oral) O2 sat- 95%       Melody Shah PA  09/07/21 1521

## 2021-09-07 ENCOUNTER — APPOINTMENT (OUTPATIENT)
Dept: ONCOLOGY | Facility: HOSPITAL | Age: 81
End: 2021-09-07

## 2021-09-07 ENCOUNTER — APPOINTMENT (OUTPATIENT)
Dept: LAB | Facility: HOSPITAL | Age: 81
End: 2021-09-07

## 2021-09-07 ENCOUNTER — APPOINTMENT (OUTPATIENT)
Dept: CARDIOLOGY | Facility: HOSPITAL | Age: 81
End: 2021-09-07

## 2021-09-07 LAB
BH BB BLOOD EXPIRATION DATE: NORMAL
BH BB BLOOD TYPE BARCODE: 5100
BH BB DISPENSE STATUS: NORMAL
BH BB PRODUCT CODE: NORMAL
BH BB UNIT NUMBER: NORMAL
BH CV LOW VAS RIGHT GASTRONEMIUS VESSEL: 1
BH CV LOWER VASCULAR LEFT COMMON FEMORAL AUGMENT: NORMAL
BH CV LOWER VASCULAR LEFT COMMON FEMORAL COMPETENT: NORMAL
BH CV LOWER VASCULAR LEFT COMMON FEMORAL COMPRESS: NORMAL
BH CV LOWER VASCULAR LEFT COMMON FEMORAL PHASIC: NORMAL
BH CV LOWER VASCULAR LEFT COMMON FEMORAL SPONT: NORMAL
BH CV LOWER VASCULAR LEFT DISTAL FEMORAL COMPRESS: NORMAL
BH CV LOWER VASCULAR LEFT GASTRONEMIUS COMPRESS: NORMAL
BH CV LOWER VASCULAR LEFT GREATER SAPH AK COMPRESS: NORMAL
BH CV LOWER VASCULAR LEFT GREATER SAPH BK COMPRESS: NORMAL
BH CV LOWER VASCULAR LEFT LESSER SAPH COMPRESS: NORMAL
BH CV LOWER VASCULAR LEFT MID FEMORAL AUGMENT: NORMAL
BH CV LOWER VASCULAR LEFT MID FEMORAL COMPETENT: NORMAL
BH CV LOWER VASCULAR LEFT MID FEMORAL COMPRESS: NORMAL
BH CV LOWER VASCULAR LEFT MID FEMORAL PHASIC: NORMAL
BH CV LOWER VASCULAR LEFT MID FEMORAL SPONT: NORMAL
BH CV LOWER VASCULAR LEFT PERONEAL COMPRESS: NORMAL
BH CV LOWER VASCULAR LEFT POPLITEAL AUGMENT: NORMAL
BH CV LOWER VASCULAR LEFT POPLITEAL COMPETENT: NORMAL
BH CV LOWER VASCULAR LEFT POPLITEAL COMPRESS: NORMAL
BH CV LOWER VASCULAR LEFT POPLITEAL PHASIC: NORMAL
BH CV LOWER VASCULAR LEFT POPLITEAL SPONT: NORMAL
BH CV LOWER VASCULAR LEFT POSTERIOR TIBIAL COMPRESS: NORMAL
BH CV LOWER VASCULAR LEFT PROFUNDA FEMORAL COMPRESS: NORMAL
BH CV LOWER VASCULAR LEFT PROXIMAL FEMORAL COMPRESS: NORMAL
BH CV LOWER VASCULAR LEFT SAPHENOFEMORAL JUNCTION COMPRESS: NORMAL
BH CV LOWER VASCULAR RIGHT COMMON FEMORAL AUGMENT: NORMAL
BH CV LOWER VASCULAR RIGHT COMMON FEMORAL COMPETENT: NORMAL
BH CV LOWER VASCULAR RIGHT COMMON FEMORAL COMPRESS: NORMAL
BH CV LOWER VASCULAR RIGHT COMMON FEMORAL PHASIC: NORMAL
BH CV LOWER VASCULAR RIGHT COMMON FEMORAL SPONT: NORMAL
BH CV LOWER VASCULAR RIGHT DISTAL FEMORAL COMPRESS: NORMAL
BH CV LOWER VASCULAR RIGHT GASTRONEMIUS COMPRESS: NORMAL
BH CV LOWER VASCULAR RIGHT GASTRONEMIUS THROMBUS: NORMAL
BH CV LOWER VASCULAR RIGHT GREATER SAPH AK COMPRESS: NORMAL
BH CV LOWER VASCULAR RIGHT GREATER SAPH BK COMPRESS: NORMAL
BH CV LOWER VASCULAR RIGHT LESSER SAPH COMPRESS: NORMAL
BH CV LOWER VASCULAR RIGHT MID FEMORAL AUGMENT: NORMAL
BH CV LOWER VASCULAR RIGHT MID FEMORAL COMPETENT: NORMAL
BH CV LOWER VASCULAR RIGHT MID FEMORAL COMPRESS: NORMAL
BH CV LOWER VASCULAR RIGHT MID FEMORAL PHASIC: NORMAL
BH CV LOWER VASCULAR RIGHT MID FEMORAL SPONT: NORMAL
BH CV LOWER VASCULAR RIGHT PERONEAL COMPRESS: NORMAL
BH CV LOWER VASCULAR RIGHT POPLITEAL AUGMENT: NORMAL
BH CV LOWER VASCULAR RIGHT POPLITEAL COMPETENT: NORMAL
BH CV LOWER VASCULAR RIGHT POPLITEAL COMPRESS: NORMAL
BH CV LOWER VASCULAR RIGHT POPLITEAL PHASIC: NORMAL
BH CV LOWER VASCULAR RIGHT POPLITEAL SPONT: NORMAL
BH CV LOWER VASCULAR RIGHT POSTERIOR TIBIAL COMPRESS: NORMAL
BH CV LOWER VASCULAR RIGHT PROFUNDA FEMORAL COMPRESS: NORMAL
BH CV LOWER VASCULAR RIGHT PROXIMAL FEMORAL COMPRESS: NORMAL
BH CV LOWER VASCULAR RIGHT SAPHENOFEMORAL JUNCTION COMPRESS: NORMAL
CROSSMATCH INTERPRETATION: NORMAL
INR PPP: 1.81 (ref 0.9–1.1)
MAXIMAL PREDICTED HEART RATE: 140 BPM
PROTHROMBIN TIME: 20.7 SECONDS (ref 11.7–14.2)
STRESS TARGET HR: 119 BPM
UNIT  ABO: NORMAL
UNIT  RH: NORMAL

## 2021-09-07 PROCEDURE — 25010000002 ENOXAPARIN PER 10 MG: Performed by: INTERNAL MEDICINE

## 2021-09-07 PROCEDURE — 99222 1ST HOSP IP/OBS MODERATE 55: CPT | Performed by: NURSE PRACTITIONER

## 2021-09-07 PROCEDURE — 85610 PROTHROMBIN TIME: CPT | Performed by: INTERNAL MEDICINE

## 2021-09-07 PROCEDURE — 25010000002 EPOETIN ALFA-EPBX 10000 UNIT/ML SOLUTION: Performed by: INTERNAL MEDICINE

## 2021-09-07 PROCEDURE — 99223 1ST HOSP IP/OBS HIGH 75: CPT | Performed by: INTERNAL MEDICINE

## 2021-09-07 PROCEDURE — 93970 EXTREMITY STUDY: CPT

## 2021-09-07 RX ORDER — POLYETHYLENE GLYCOL 3350 17 G/17G
17 POWDER, FOR SOLUTION ORAL DAILY PRN
Status: DISCONTINUED | OUTPATIENT
Start: 2021-09-07 | End: 2021-09-10 | Stop reason: HOSPADM

## 2021-09-07 RX ORDER — WARFARIN SODIUM 2 MG/1
2 TABLET ORAL
Status: DISCONTINUED | OUTPATIENT
Start: 2021-09-09 | End: 2021-09-10 | Stop reason: HOSPADM

## 2021-09-07 RX ORDER — WARFARIN SODIUM 2.5 MG/1
2.5 TABLET ORAL
Status: COMPLETED | OUTPATIENT
Start: 2021-09-07 | End: 2021-09-07

## 2021-09-07 RX ADMIN — CARVEDILOL 25 MG: 25 TABLET, FILM COATED ORAL at 09:41

## 2021-09-07 RX ADMIN — CALCITRIOL 0.25 MCG: 0.25 CAPSULE ORAL at 20:27

## 2021-09-07 RX ADMIN — WARFARIN 2.5 MG: 2.5 TABLET ORAL at 17:13

## 2021-09-07 RX ADMIN — RAMIPRIL 5 MG: 5 CAPSULE ORAL at 09:41

## 2021-09-07 RX ADMIN — CARVEDILOL 25 MG: 25 TABLET, FILM COATED ORAL at 20:27

## 2021-09-07 RX ADMIN — ATORVASTATIN CALCIUM 10 MG: 20 TABLET, FILM COATED ORAL at 20:27

## 2021-09-07 RX ADMIN — BUMETANIDE 2 MG: 2 TABLET ORAL at 17:14

## 2021-09-07 RX ADMIN — BUMETANIDE 2 MG: 2 TABLET ORAL at 09:41

## 2021-09-07 RX ADMIN — OXYCODONE AND ACETAMINOPHEN 1 TABLET: 5; 325 TABLET ORAL at 18:19

## 2021-09-07 RX ADMIN — PANTOPRAZOLE SODIUM 40 MG: 40 TABLET, DELAYED RELEASE ORAL at 09:40

## 2021-09-07 RX ADMIN — OXYCODONE AND ACETAMINOPHEN 1 TABLET: 5; 325 TABLET ORAL at 09:40

## 2021-09-07 RX ADMIN — EPOETIN ALFA-EPBX 10000 UNITS: 10000 INJECTION, SOLUTION INTRAVENOUS; SUBCUTANEOUS at 17:14

## 2021-09-07 RX ADMIN — CALCITRIOL 0.25 MCG: 0.25 CAPSULE ORAL at 09:41

## 2021-09-07 RX ADMIN — ENOXAPARIN SODIUM 70 MG: 80 INJECTION, SOLUTION INTRAVENOUS; SUBCUTANEOUS at 20:27

## 2021-09-07 RX ADMIN — PANTOPRAZOLE SODIUM 40 MG: 40 TABLET, DELAYED RELEASE ORAL at 17:14

## 2021-09-07 NOTE — PROGRESS NOTES
Pharmacy Consult: Warfarin Dosing/ Monitoring    Janel Mahoney is a 80 y.o. female, estimated creatinine clearance is 17.1 mL/min (A) (by C-G formula based on SCr of 2.5 mg/dL (H)). weighing 75.6 kg (166 lb 9.6 oz).     has a past medical history of Acute kidney injury (CMS/HCC), Anemia, Atrial fibrillation (CMS/HCC), Bruises easily, Carotid artery stenosis, Chronic back pain, Chronic combined systolic and diastolic congestive heart failure (CMS/HCC), Chronic coronary artery disease, Chronic kidney disease, stage 3 (CMS/HCC), Dysphagia, GERD (gastroesophageal reflux disease), Gout, H/O cardiac murmur, Hematoma, [place] (hard of hearing), Hyperlipidemia, Hypertension, Hypotension, ICD (implantable cardioverter-defibrillator) in place, Ischemic cardiomyopathy, Kyphoscoliosis, Leukopenia, Lumbar spondylosis, Obesity, GEORGINA (obstructive sleep apnea) (2013), Osteoarthritis, Osteoporosis, Peptic ulcer, Premature ventricular contractions, Renal insufficiency syndrome, Right shoulder pain (2021), Scoliosis, Shoulder fracture, left, Shoulder pain, right, Thrombocytopenia (CMS/HCC), Urine incontinence, Ventricular tachycardia (CMS/HCC), Vitamin B12 deficiency, and Warfarin anticoagulation.    Social History     Tobacco Use    Smoking status: Former Smoker     Packs/day: 1.00     Years: 50.00     Pack years: 50.00     Types: Cigarettes     Quit date: 2009     Years since quittin.6    Smokeless tobacco: Never Used    Tobacco comment: Daily caffeine - soda   Vaping Use    Vaping Use: Never used   Substance Use Topics    Alcohol use: Yes     Comment: 1 yearly    Drug use: Never       Results from last 7 days   Lab Units 21  0411 21  1542 21  0244 21  2121   INR  1.81* 1.87*  1.93* 1.49* 1.56*   APTT seconds  --  42.0*  --   --    HEMOGLOBIN g/dL  --  7.3* 8.4*  --    HEMATOCRIT %  --  22.9* 25.2*  --    PLATELETS 10*3/mm3  --  146  --   --      Results from last 7 days   Lab Units  "09/06/21  1542 09/01/21  0244   SODIUM mmol/L 139 140   POTASSIUM mmol/L 4.7 4.4   CHLORIDE mmol/L 100 99   CO2 mmol/L 31.2* 31.0*   BUN mg/dL 73* 55*   CREATININE mg/dL 2.50* 2.49*   CALCIUM mg/dL 9.5 8.9   BILIRUBIN mg/dL 0.3  --    ALK PHOS U/L 64  --    ALT (SGPT) U/L 14  --    AST (SGOT) U/L 36*  --    GLUCOSE mg/dL 126* 131*     Anticoagulation history: Per most recent encounter with [facility] anticoagulation clinic on 8/24/21, patient takes warfarin 1 mg on MWF and 2 mg all other days (11 mg weekly).    Hospital Anticoagulation:  Consulting provider: Dr. Escobar  Start date: 9/6/21  Indication: \"Other - full anticoagulation\"  Target INR: 2-3  Expected duration: Indefinite   Bridge Therapy: No               Date 9/6 9/7           INR 1.87 1.81           Warfarin dose 1 mg 2.5 mg             Potential drug interactions:  Acetaminophen - may enhance the anticoagulation effect of warfarin, typically at doses >2 g/day.    Relevant nutrition status: Regular diet    Other:     Education complete?/ Date:     Assessment/Plan:  INR is subtherapeutic - boost dose 2.5 mg once today then tentatively resume home regimen of 1 mg on MWF and 2 mg all other days.  Monitor and follow up on daily CBC, PT/INR, signs or symptoms of bleeding      Pharmacy will continue to follow until discharge or discontinuation of warfarin.   Bernardo Vidales III, PharmD  9/7/2021    "

## 2021-09-07 NOTE — CONSULTS
"      Electrophysiology Hospital Consult            Patient Name: Janel Mahoney  Age/Sex: 80 y.o. female  : 1940  MRN: [id number]    Date of Admission: 2021  Date of Encounter Visit: 21  Encounter Provider: NICKY Cadet  Referring Provider: Agustin Escobar MD  Place of Service: [facility] CARDIOLOGY  Patient Care Team:  Ariel Matute MD as PCP - General (Internal Medicine)  Edward Vasquez MD as Consulting Physician (Hematology and Oncology)  Milagros Cruz MD as Referring Physician (Nephrology)  Anthony Cardona MD as Consulting Physician (Sleep Medicine)  Nik Galarza MD as Consulting Physician (Cardiology)  Shanna Major RPH as Pharmacist  Juvencio Pressley II, MD as Consulting Physician (Orthopedic Surgery)  Vargas Butcher, PharmD as Pharmacist (Pharmacy)  Geraldine Huizar RN as Ambulatory  ([facility])      Subjective:   EP Consultation for: Anemia, AC recommendations    Chief Complaint:     History of Present Illness:  Janel Mahoney is a 80 y.o. female who follows with Dr. Galarza and Dr. Boucher. She has a history of CAD, ICM, combined heart failure, VT (asymptomatic but treated with ATP), perm atrial fib, s/p CRT-D (AV node ablation), CKD IV,HTN and stroke.     She presented to hospital yesterday from rehab facility secondary to hemoglobin 6.8. She had shoulder surgery last Tuesday.     She is on warfarin and ASA, cardiology has been ask to see regarding AC recommendations.     She currently denies chest pain, shortness of air, PND, orthopnea. She says \"I think my heart is fine.\"    She denies any bleeding or dark stools.     Past Medical History:  Past Medical History:   Diagnosis Date   • Acute kidney injury (CMS/HCC)    • Anemia     on procrit   • Atrial fibrillation (CMS/HCC)    • Bruises easily    • Carotid artery stenosis    • Chronic back pain    • Chronic combined systolic and diastolic " congestive heart failure (CMS/HCC)    • Chronic coronary artery disease     moderate to severe LV dysfunction.  Sees Dr. Dominguez   • Chronic kidney disease, stage 3 (CMS/HCC)    • Dysphagia    • GERD (gastroesophageal reflux disease)    • Gout    • H/O cardiac murmur    • Hematoma     LEFT LEG; DR ALONZO AWARE   • [place] (hard of hearing)     wears hearing aids   • Hyperlipidemia    • Hypertension    • Hypotension    • ICD (implantable cardioverter-defibrillator) in place    • Ischemic cardiomyopathy    • Kyphoscoliosis    • Leukopenia    • Lumbar spondylosis    • Obesity    • GEORGINA (obstructive sleep apnea) 12/01/2013    Overnight polysomnogram, weight 168 pounds.  AHI mildly abnormal at 10.3 events per hour.  Respiratory effort related arousals 9.5 events per hour.  Total time snoring 30% and arousals associated with snoring 15 events per hour.          Bring machine DOS   • Osteoarthritis    • Osteoporosis    • Peptic ulcer    • Premature ventricular contractions    • Renal insufficiency syndrome    • Right shoulder pain 08/2021   • Scoliosis    • Shoulder fracture, left    • Shoulder pain, right    • Thrombocytopenia (CMS/HCC)    • Urine incontinence     pads   • Ventricular tachycardia (CMS/HCC)    • Vitamin B12 deficiency    • Warfarin anticoagulation        Past Surgical History:   Procedure Laterality Date   • AV NODE ABLATION     • BREAST BIOPSY     • CARDIAC CATHETERIZATION      Showed severe mitral insufficiency and borderline coronary artery disease   • CARDIAC CATHETERIZATION      Showed an ejection fraction of 35%. She had occlusive disease of the right posterior LV branch and no other significant disease, treated medically.   • CARDIAC DEFIBRILLATOR PLACEMENT      Biventricular   • CARDIAC DEFIBRILLATOR PLACEMENT Left    • CARDIAC ELECTROPHYSIOLOGY PROCEDURE N/A 1/4/2019    Procedure: GENERATOR CHANGE BI-V ICD   [place];  Surgeon: James Hwang MD;  Location: [facility] INVASIVE LOCATION;  Service:  Cardiology   • CARDIAC VALVE REPLACEMENT  2009    Done with stent placement   • CARDIOVERSION      multiple electrocardioversions.   • CAROTID ARTERY ANGIOPLASTY Right    • CATARACT EXTRACTION EXTRACAPSULAR W/ INTRAOCULAR LENS IMPLANTATION Bilateral    • COLONOSCOPY N/A 9/28/2017    Procedure: COLONOSCOPY TO CECUM;  Surgeon: Kevin Davis MD;  Location: [facility] ENDOSCOPY;  Service:    • CORONARY ANGIOPLASTY WITH STENT PLACEMENT  2009   • CORONARY ARTERY BYPASS GRAFT      single graft to the PDA   • CORONARY STENT PLACEMENT     • ENDOSCOPY N/A 9/28/2017    Procedure: ESOPHAGOGASTRODUODENOSCOPY ;  Surgeon: Kevin Davis MD;  Location: [facility] ENDOSCOPY;  Service:    • EYE SURGERY     • HEMORRHOIDECTOMY     • HYSTERECTOMY     • INCISION AND DRAINAGE TRUNK Right 11/27/2018    Procedure: EVACUATION OF RIGHT CHEST WALL HEMATOMA;  Surgeon: Juvencio Rodriguez MD;  Location: [facility] OR;  Service: General   • MITRAL VALVE REPLACEMENT  01/2010    #31 Epic porcine valve.   • THROMBOENDARTERECTOMY Right     carotid thromboendarterectomy    • TONSILLECTOMY      age 32   • TOTAL KNEE ARTHROPLASTY Left    • TOTAL KNEE ARTHROPLASTY REVISION Left 11/19/2019    Procedure: TOTAL KNEE ARTHROPLASTY REVISION LEFT;  Surgeon: Juvencio Pressley II, MD;  Location: [facility] OR;  Service: Orthopedics   • TOTAL SHOULDER ARTHROPLASTY W/ DISTAL CLAVICLE EXCISION Left 1/16/2018    Procedure: TOTAL SHOULDER REVERSE ARTHROPLASTY;  Surgeon: Bianka Quesada MD;  Location: [facility] OR;  Service:    • TOTAL SHOULDER ARTHROPLASTY W/ DISTAL CLAVICLE EXCISION Right 8/31/2021    Procedure: TOTAL SHOULDER REVERSE ARTHROPLASTY;  Surgeon: Juvencio Pressley II, MD;  Location: [facility] OR;  Service: Orthopedics;  Laterality: Right;       Home Medications:   Medications Prior to Admission   Medication Sig Dispense Refill Last Dose   • acetaminophen (TYLENOL) 325 MG tablet Take 650 mg by mouth Every 6 (Six) Hours As Needed for  Mild Pain .   Past Week at Unknown time   • alendronate (FOSAMAX) 70 MG tablet Take 70 mg by mouth Every 14 (Fourteen) Days. On Friday   Past Week at Unknown time   • allopurinol (ZYLOPRIM) 100 MG tablet 2 po qday (Patient taking differently: Take 200 mg by mouth Daily Before Supper.) 270 tablet 1 9/5/2021 at Unknown time   • aspirin 81 MG tablet Take 81 mg by mouth Daily.   9/6/2021 at Unknown time   • bumetanide (BUMEX) 2 MG tablet TAKE 1 TABLET TWICE DAILY. DOSE CHANGED  (Patient taking differently: Take 2 mg by mouth 2 (Two) Times a Day. Do not take dos) 180 tablet 1 9/6/2021 at Unknown time   • calcitriol (ROCALTROL) 0.25 MCG capsule Take 0.25 mcg by mouth 2 (Two) Times a Day.   9/6/2021 at Unknown time   • carvedilol (COREG) 25 MG tablet TAKE 1 TABLET TWICE DAILY (Patient taking differently: Take  by mouth 2 (Two) Times a Day With Meals. Take dos) 180 tablet 1 9/6/2021 at Unknown time   • ferrous sulfate 325 (65 FE) MG tablet Take 1 tablet by mouth 2 (Two) Times a Week. (Patient taking differently: Take 325 mg by mouth 2 (Two) Times a Week. Mon, Thur only)   9/6/2021 at Unknown time   • Multiple Vitamins-Minerals (SENIOR MULTIVITAMIN PLUS) tablet Take 1 tablet by mouth Daily.   9/6/2021 at Unknown time   • oxyCODONE-acetaminophen (PERCOCET) 5-325 MG per tablet Take 1 tablet by mouth Every 4 (Four) Hours As Needed for Severe Pain . 50 tablet 0 9/6/2021 at Unknown time   • pantoprazole (PROTONIX) 40 MG EC tablet Take 1 tablet by mouth 2 (Two) Times a Day. (Patient taking differently: Take 40 mg by mouth 2 (Two) Times a Day. Take dos) 60 tablet 5 9/6/2021 at Unknown time   • ramipril (ALTACE) 5 MG capsule TAKE 1 CAPSULE EVERY DAY (Patient taking differently: Take 5 mg by mouth Every Morning. Do not take 24 hr prior to surgery, LD 8-30 am) 90 capsule 1 9/6/2021 at Unknown time   • simvastatin (ZOCOR) 20 MG tablet TAKE 2 TABLETS EVERY NIGHT (Patient taking differently: Take 40 mg by mouth Every Night.) 180 tablet 1  2021 at Unknown time   • vitamin B-12 (CYANOCOBALAMIN) 1000 MCG tablet Take 1,000 mcg by mouth Daily. Stop now for surgery   2021 at Unknown time   • warfarin (COUMADIN) 1 MG tablet Take one tablet (1mg) by mouth on Mon, Wed, Fri and 2 tablets (2mg) on all other days or as directed by [facility]. 48 tablet 0 2021 at Unknown time   • zolpidem (Ambien) 10 MG tablet Take 1 tablet by mouth At Night As Needed for Sleep. 90 tablet 1 Past Week at Unknown time       Allergies:  Allergies   Allergen Reactions   • Diclofenac GI Bleeding     She had adverse effects from the medications that required hospitalization. Pt got stomach ulcers from this medication prescribed by Dr. Arango - pediatrist.       Past Social History:  Social History     Socioeconomic History   • Marital status:      Spouse name: Russ   • Number of children: Not on file   • Years of education: Not on file   • Highest education level: Not on file   Tobacco Use   • Smoking status: Former Smoker     Packs/day: 1.00     Years: 50.00     Pack years: 50.00     Types: Cigarettes     Quit date: 2009     Years since quittin.6   • Smokeless tobacco: Never Used   • Tobacco comment: Daily caffeine - soda   Vaping Use   • Vaping Use: Never used   Substance and Sexual Activity   • Alcohol use: Yes     Comment: 1 yearly   • Drug use: Never   • Sexual activity: Defer       Past Family History:  Family History   Problem Relation Age of Onset   • Cancer Mother    • Breast cancer Mother    • Heart disease Mother    • Hypertension Mother    • Stroke Mother    • Coronary artery disease Father    • Stroke Father    • Heart disease Father    • Hypertension Father    • Other Daughter         thiamin deficiency    • Hypertension Son    • Lung disease Other    • Hypertension Other    • Malig Hyperthermia Neg Hx        Review of Systems: All systems reviewed. Pertinent positives identified in HPI. All other systems are negative.      14 point ROS was performed and is negative except as outlined in HPI.     Objective:     Objective:  Vital Signs (last 24 hours)       09/06 0700  -  09/07 0659 09/07 0700  -  09/07 0832   Most Recent    Temp (°F) 97.4 -  99      99     99 (37.2)    Heart Rate 60 -  79      66     66    Resp 16 -  18      20     20    /84 -  147/62      146/51     146/51    SpO2 (%) 93 -  100      97     97        Temp:  [97.4 °F (36.3 °C)-99 °F (37.2 °C)] 99 °F (37.2 °C)  Heart Rate:  [60-79] 66  Resp:  [16-20] 20  BP: (108-147)/(50-84) 146/51  Body mass index is 30.47 kg/m².        Physical Exam:     General Appearance: No acute distress, well developed and well nourished.   Eyes: Conjunctiva and lids: No erythema, swelling, or discharge. Sclera non-icteric.   HENT: Atraumatic, normocephalic. External eyes, ears, and nose normal.   Respiratory: No signs of respiratory distress. Respiration rhythm and depth normal.   Clear to auscultation. No rales, crackles, rhonchi, or wheezing auscultated.   Cardiovascular:  Heart Rate and Rhythm: Normal, Heart Sounds: S1 and S2.  Lower Extremities: No edema noted.  Gastrointestinal:  Abdomen soft, non-distended, non-tender.  Musculoskeletal: Right arm in sling. Moves all other extremities.   Skin: Warm and dry.   Psychiatric: Patient alert and cooperative.   Labs:   Lab Review:     Results from last 7 days   Lab Units 09/06/21  1542 09/01/21  0244   SODIUM mmol/L 139 140   POTASSIUM mmol/L 4.7 4.4   CHLORIDE mmol/L 100 99   CO2 mmol/L 31.2* 31.0*   BUN mg/dL 73* 55*   CREATININE mg/dL 2.50* 2.49*   GLUCOSE mg/dL 126* 131*   CALCIUM mg/dL 9.5 8.9   AST (SGOT) U/L 36*  --    ALT (SGPT) U/L 14  --          Results from last 7 days   Lab Units 09/06/21  1542   WBC 10*3/mm3 5.62   HEMOGLOBIN g/dL 7.3*   HEMATOCRIT % 22.9*   PLATELETS 10*3/mm3 146     Results from last 7 days   Lab Units 09/07/21  0411 09/06/21  1542 09/01/21  0244 08/31/21  2121 08/31/21  1406   INR  1.81* 1.87*  1.93*  1.49*   < > 1.54*   APTT seconds  --  42.0*  --   --  30.4    < > = values in this interval not displayed.       Imagin/6/2021 CXR:    Study Result    Narrative & Impression   ONE-VIEW PORTABLE CHEST AT 2:14 PM     HISTORY: Confusion. Hypertension. Pacemaker.     FINDINGS: There is cardiomegaly with a pacemaker in place. The lungs are  moderately expanded with mild-to-moderate vascular congestion compared  to the study of 2021 and suspicious for an element of congestive  heart failure.     This report was finalized on 2021 4:14 PM by Dr. Jesse Bautista M.D.            2016 Echo:    Interpretation Summary    · Left ventricular function is mildly decreased. Calculated EF = 53%. Estimated EF was in agreement with the calculated EF. Estimated EF = 40%. Normal left ventricular wall thickness noted.  · The left ventricular cavity is mildly dilated.  · The following segments are akinetic: basal inferior, basal inferolateral, mid inferior and mid inferolateral. The following segments are hypokinetic: basal inferoseptal and mid inferoseptal.  · Left atrial volume is severely increased.  · Right atrial cavity size is severely dilated.  · The aortic valve is abnormal in structure. The valve exhibits sclerosis.  · There is a bioprosthetic mitral valve present. The prosthetic valve is normal.  · Moderate tricuspid valve regurgitation is present. Estimated right ventricular systolic pressure from tricuspid regurgitation is moderately elevated (45-55 mmHg).  · There is mild pulmonic valve regurgitation present.  · The inferior vena cava is dilated.       EKG:             I personally viewed and interpreted the patient's EKG/Telemetry tracings.    Assessment:       Symptomatic anemia    Anemia in chronic kidney disease (CKD)    Coronary artery disease involving coronary bypass graft of native heart without angina pectoris    Chronic atrial fibrillation (CMS/HCC)    Murmur, heart    Essential hypertension     Chronic combined systolic and diastolic congestive heart failure (CMS/HCC)    Mixed hyperlipidemia        Plan:     Dr. Bronson and I saw patient this afternoon. No signs of active bleeding, hemo/onc saw, work up in progress.     Suspect anemia is secondary to recent surgery as well as anemia of chronic disease.     INR 1.8---would transfuse as needed and continue warfarin for now unless heme thinks we should hold or she exhibits active bleeding.     Will continue to follow along.     Thank you for allowing me to participate in the care of Janel Mahoney. Feel free to contact me directly with any further questions or concerns.    NICKY Cadet  [facility]  09/07/21  [id number]

## 2021-09-07 NOTE — CONSULTS
FIRST UROLOGY CONSULT      Patient Identification:  NAME:  Janel Mahoney  Age:  80 y.o.   Sex:  female   :  1940   MRN:  [id number]       Chief complaint: Difficulty voiding    History of present illness: 80-year-old female very hard of hearing who has some baseline urgency incontinence and frequency.  She is required intermittent catheterization last 48 hours with volumes as high as a liter.  They are getting ready to catheterize her at this point and she has over 900 cc in his bladder on her last straight cath.  She denies any incontinence but on further probing she probably had some urgency incontinence at time and had some significant frequency and urgency.  She is on diuretics at home.      Past medical history:  Past Medical History:   Diagnosis Date   • Acute kidney injury (CMS/HCC)    • Anemia     on procrit   • Atrial fibrillation (CMS/HCC)    • Bruises easily    • Carotid artery stenosis    • Chronic back pain    • Chronic combined systolic and diastolic congestive heart failure (CMS/HCC)    • Chronic coronary artery disease     moderate to severe LV dysfunction.  Sees Dr. Dominguez   • Chronic kidney disease, stage 3 (CMS/HCC)    • Dysphagia    • GERD (gastroesophageal reflux disease)    • Gout    • H/O cardiac murmur    • Hematoma     LEFT LEG; DR ALONZO AWARE   • [place] (hard of hearing)     wears hearing aids   • Hyperlipidemia    • Hypertension    • Hypotension    • ICD (implantable cardioverter-defibrillator) in place    • Ischemic cardiomyopathy    • Kyphoscoliosis    • Leukopenia    • Lumbar spondylosis    • Obesity    • GEORGINA (obstructive sleep apnea) 2013    Overnight polysomnogram, weight 168 pounds.  AHI mildly abnormal at 10.3 events per hour.  Respiratory effort related arousals 9.5 events per hour.  Total time snoring 30% and arousals associated with snoring 15 events per hour.          Bring machine DOS   • Osteoarthritis    • Osteoporosis    • Peptic ulcer    •  Premature ventricular contractions    • Renal insufficiency syndrome    • Right shoulder pain 08/2021   • Scoliosis    • Shoulder fracture, left    • Shoulder pain, right    • Thrombocytopenia (CMS/HCC)    • Urine incontinence     pads   • Ventricular tachycardia (CMS/HCC)    • Vitamin B12 deficiency    • Warfarin anticoagulation        Past surgical history:  Past Surgical History:   Procedure Laterality Date   • AV NODE ABLATION     • BREAST BIOPSY     • CARDIAC CATHETERIZATION      Showed severe mitral insufficiency and borderline coronary artery disease   • CARDIAC CATHETERIZATION      Showed an ejection fraction of 35%. She had occlusive disease of the right posterior LV branch and no other significant disease, treated medically.   • CARDIAC DEFIBRILLATOR PLACEMENT      Biventricular   • CARDIAC DEFIBRILLATOR PLACEMENT Left    • CARDIAC ELECTROPHYSIOLOGY PROCEDURE N/A 1/4/2019    Procedure: GENERATOR CHANGE BI-V ICD   [place];  Surgeon: James Hwang MD;  Location: [facility] CATH INVASIVE LOCATION;  Service: Cardiology   • CARDIAC VALVE REPLACEMENT  2009    Done with stent placement   • CARDIOVERSION      multiple electrocardioversions.   • CAROTID ARTERY ANGIOPLASTY Right    • CATARACT EXTRACTION EXTRACAPSULAR W/ INTRAOCULAR LENS IMPLANTATION Bilateral    • COLONOSCOPY N/A 9/28/2017    Procedure: COLONOSCOPY TO CECUM;  Surgeon: Kevin Davis MD;  Location: [facility] ENDOSCOPY;  Service:    • CORONARY ANGIOPLASTY WITH STENT PLACEMENT  2009   • CORONARY ARTERY BYPASS GRAFT      single graft to the PDA   • CORONARY STENT PLACEMENT     • ENDOSCOPY N/A 9/28/2017    Procedure: ESOPHAGOGASTRODUODENOSCOPY ;  Surgeon: Kevin Davis MD;  Location: [facility] ENDOSCOPY;  Service:    • EYE SURGERY     • HEMORRHOIDECTOMY     • HYSTERECTOMY     • INCISION AND DRAINAGE TRUNK Right 11/27/2018    Procedure: EVACUATION OF RIGHT CHEST WALL HEMATOMA;  Surgeon: Juvencio Rodriguez MD;  Location: [facility] OR;  Service: General   •  MITRAL VALVE REPLACEMENT  01/2010    #31 Epic porcine valve.   • THROMBOENDARTERECTOMY Right     carotid thromboendarterectomy    • TONSILLECTOMY      age 32   • TOTAL KNEE ARTHROPLASTY Left    • TOTAL KNEE ARTHROPLASTY REVISION Left 11/19/2019    Procedure: TOTAL KNEE ARTHROPLASTY REVISION LEFT;  Surgeon: Juvencio Pressley II, MD;  Location: [facility];  Service: Orthopedics   • TOTAL SHOULDER ARTHROPLASTY W/ DISTAL CLAVICLE EXCISION Left 1/16/2018    Procedure: TOTAL SHOULDER REVERSE ARTHROPLASTY;  Surgeon: Bianka Quesada MD;  Location: [facility] OR;  Service:    • TOTAL SHOULDER ARTHROPLASTY W/ DISTAL CLAVICLE EXCISION Right 8/31/2021    Procedure: TOTAL SHOULDER REVERSE ARTHROPLASTY;  Surgeon: Juvencio Pressley II, MD;  Location: [facility];  Service: Orthopedics;  Laterality: Right;       Allergies:  Diclofenac    Home medications:  Medications Prior to Admission   Medication Sig Dispense Refill Last Dose   • acetaminophen (TYLENOL) 325 MG tablet Take 650 mg by mouth Every 6 (Six) Hours As Needed for Mild Pain .   Past Week at Unknown time   • alendronate (FOSAMAX) 70 MG tablet Take 70 mg by mouth Every 14 (Fourteen) Days. On Friday   Past Week at Unknown time   • allopurinol (ZYLOPRIM) 100 MG tablet 2 po qday (Patient taking differently: Take 200 mg by mouth Daily Before Supper.) 270 tablet 1 9/5/2021 at Unknown time   • aspirin 81 MG tablet Take 81 mg by mouth Daily.   9/6/2021 at Unknown time   • bumetanide (BUMEX) 2 MG tablet TAKE 1 TABLET TWICE DAILY. DOSE CHANGED  (Patient taking differently: Take 2 mg by mouth 2 (Two) Times a Day. Do not take dos) 180 tablet 1 9/6/2021 at Unknown time   • calcitriol (ROCALTROL) 0.25 MCG capsule Take 0.25 mcg by mouth 2 (Two) Times a Day.   9/6/2021 at Unknown time   • carvedilol (COREG) 25 MG tablet TAKE 1 TABLET TWICE DAILY (Patient taking differently: Take  by mouth 2 (Two) Times a Day With Meals. Take dos) 180 tablet 1 9/6/2021 at  Unknown time   • ferrous sulfate 325 (65 FE) MG tablet Take 1 tablet by mouth 2 (Two) Times a Week. (Patient taking differently: Take 325 mg by mouth 2 (Two) Times a Week. Mon, Thur only)   9/6/2021 at Unknown time   • Multiple Vitamins-Minerals (SENIOR MULTIVITAMIN PLUS) tablet Take 1 tablet by mouth Daily.   9/6/2021 at Unknown time   • oxyCODONE-acetaminophen (PERCOCET) 5-325 MG per tablet Take 1 tablet by mouth Every 4 (Four) Hours As Needed for Severe Pain . 50 tablet 0 9/6/2021 at Unknown time   • pantoprazole (PROTONIX) 40 MG EC tablet Take 1 tablet by mouth 2 (Two) Times a Day. (Patient taking differently: Take 40 mg by mouth 2 (Two) Times a Day. Take dos) 60 tablet 5 9/6/2021 at Unknown time   • ramipril (ALTACE) 5 MG capsule TAKE 1 CAPSULE EVERY DAY (Patient taking differently: Take 5 mg by mouth Every Morning. Do not take 24 hr prior to surgery, LD 8-30 am) 90 capsule 1 9/6/2021 at Unknown time   • simvastatin (ZOCOR) 20 MG tablet TAKE 2 TABLETS EVERY NIGHT (Patient taking differently: Take 40 mg by mouth Every Night.) 180 tablet 1 9/5/2021 at Unknown time   • vitamin B-12 (CYANOCOBALAMIN) 1000 MCG tablet Take 1,000 mcg by mouth Daily. Stop now for surgery   9/6/2021 at Unknown time   • warfarin (COUMADIN) 1 MG tablet Take one tablet (1mg) by mouth on Mon, Wed, Fri and 2 tablets (2mg) on all other days or as directed by [facility]. 48 tablet 0 9/5/2021 at Unknown time   • zolpidem (Ambien) 10 MG tablet Take 1 tablet by mouth At Night As Needed for Sleep. 90 tablet 1 Past Week at Unknown time       Hospital medications:  atorvastatin, 10 mg, Oral, Nightly  bumetanide, 2 mg, Oral, BID  calcitriol, 0.25 mcg, Oral, BID  carvedilol, 25 mg, Oral, BID  enoxaparin, 70 mg, Subcutaneous, Q24H  [START ON 9/9/2021] ferrous sulfate, 325 mg, Oral, Once per day on Mon Thu  pantoprazole, 40 mg, Oral, BID AC  ramipril, 5 mg, Oral, Daily  warfarin, 1 mg, Oral, Once per day on Mon Wed Fri  [START ON  2021] warfarin, 2 mg, Oral, Once per day on Sun Tue Thu Sat      Pharmacy to dose warfarin,       oxyCODONE-acetaminophen  •  Pharmacy to dose warfarin  •  polyethylene glycol  •  sodium chloride  •  zolpidem    Family history:  Family History   Problem Relation Age of Onset   • Cancer Mother    • Breast cancer Mother    • Heart disease Mother    • Hypertension Mother    • Stroke Mother    • Coronary artery disease Father    • Stroke Father    • Heart disease Father    • Hypertension Father    • Other Daughter         thiamin deficiency    • Hypertension Son    • Lung disease Other    • Hypertension Other    • Malig Hyperthermia Neg Hx        Social history:  Social History     Tobacco Use   • Smoking status: Former Smoker     Packs/day: 1.00     Years: 50.00     Pack years: 50.00     Types: Cigarettes     Quit date: 2009     Years since quittin.6   • Smokeless tobacco: Never Used   • Tobacco comment: Daily caffeine - soda   Vaping Use   • Vaping Use: Never used   Substance Use Topics   • Alcohol use: Yes     Comment: 1 yearly   • Drug use: Never       Review of systems:    Negative 12-system ROS except for the following: As above.      Objective:  TMax 24 hours:   Temp (24hrs), Av.3 °F (36.8 °C), Min:97.4 °F (36.3 °C), Max:99 °F (37.2 °C)      Vitals Ranges:   Temp:  [97.4 °F (36.3 °C)-99 °F (37.2 °C)] 99 °F (37.2 °C)  Heart Rate:  [59-74] 59  Resp:  [16-20] 18  BP: (108-147)/(44-84) 125/44    Intake/Output Last 3 shifts:  I/O last 3 completed shifts:  In: 422.5 [Blood:422.5]  Out: 1000 [Urine:1000]     Physical Exam:       General Appearance:    Alert, cooperative, in no acute distress   Head:    Normocephalic, without obvious abnormality, atraumatic   Eyes:          PERRL, conjunctivae and corneas clear   Ears:    Normal external inspection   Throat:   No oral lesions, oral mucosa moist   Neck:   Supple, no LAD, trachea midline   Back:     No CVA tenderness   Lungs:     Respirations unlabored,  symmetric excursion    Heart:    RRR, intact peripheral pulses   Abdomen:    Soft benign no distention.  Bladder is palpable.   :   Deferred   Extremities:   No edema, no deformity   Skin:   No bleeding, bruising or rashes   Neuro/Psych:   Orientation intact, mood/affect pleasant, no focal findings       Results review:   I reviewed the patient's new clinical results.    Data review:  Lab Results (last 24 hours)     Procedure Component Value Units Date/Time    Protime-INR [[id number]]  (Abnormal) Collected: 09/07/21 0411    Specimen: Blood Updated: 09/07/21 0456     Protime 20.7 Seconds      INR 1.81    COVID PRE-OP / PRE-PROCEDURE SCREENING ORDER (NO ISOLATION) - Swab, Nasopharynx [[id number]]  (Normal) Collected: 09/06/21 1644    Specimen: Swab from Nasopharynx Updated: 09/06/21 2233    Narrative:      The following orders were created for panel order COVID PRE-OP / PRE-PROCEDURE SCREENING ORDER (NO ISOLATION) - Swab, Nasopharynx.  Procedure                               Abnormality         Status                     ---------                               -----------         ------                     COVID-19,APTIMA PANTHER(...[[id number]]  Normal              Final result                 Please view results for these tests on the individual orders.    COVID-19,APTIMA PANTHER(CON),[facility], NP/OP SWAB IN UTM/VTM/SALINE TRANSPORT MEDIA,24 HR TAT - Swab, Nasopharynx [[id number]]  (Normal) Collected: 09/06/21 1644    Specimen: Swab from Nasopharynx Updated: 09/06/21 2233     COVID19 Not Detected    Narrative:      Fact sheet for providers: https://www.fda.gov/media/328799/download     Fact sheet for patients: https://www.fda.gov/media/860804/download    Test performed by RT PCR.           Imaging:  Imaging Results (Last 24 Hours)     ** No results found for the last 24 hours. **             Assessment:       Symptomatic anemia    Anemia in chronic kidney disease (CKD)    Coronary artery disease involving coronary  bypass graft of native heart without angina pectoris    Chronic atrial fibrillation (CMS/HCC)    Murmur, heart    Essential hypertension    Chronic combined systolic and diastolic congestive heart failure (CMS/HCC)    Mixed hyperlipidemia    Urinary retention likely chronic now exacerbated by generalized weakness, medication changes, mild cognitive impairment.    Plan:     Catheter will be placed this evening to give her bladder rest.  This could stay in until she gets to rehab but if she is here for the next several days we will go and give her another voiding trial later.    Aries Joe MD  09/07/21  18:28 EDT

## 2021-09-07 NOTE — DISCHARGE PLACEMENT REQUEST
"Janel Munoz (80 y.o. Female)     Date of Birth Social Security Number Address Home Phone MRN    1940  [address] [facility] [id number] [phone] [id number]    Taoism Marital Status          Yazidi        Admission Date Admission Type Admitting Provider Attending Provider Department, Room/Bed    9/6/21 Emergency Agustin Escobar MD Nguyen, Minh Loc, MD [address], [address]/1    Discharge Date Discharge Disposition Discharge Destination                       Attending Provider: Murphy Estes MD    Allergies: Diclofenac    Isolation: None   Infection: None   Code Status: CPR    Ht: 157.5 cm (62\")   Wt: 75.6 kg (166 lb 9.6 oz)    Admission Cmt: None   Principal Problem: Symptomatic anemia [D64.9]                 Active Insurance as of 9/6/2021     Primary Coverage     Payor Plan Insurance Group Employer/Plan Group    MEDICARE MEDICARE A & B      Payor Plan Address Payor Plan Phone Number Payor Plan Fax Number Effective Dates    [address] [phone]  10/1/2005 - None Entered    [facility] [address]       Subscriber Name Subscriber Birth Date Member ID       JANEL MUNOZ 1940 [id number]           Secondary Coverage     Payor Plan Insurance Group Employer/Plan Group    HUMANA HUMANA [facility]              [id number]     Payor Plan Address Payor Plan Phone Number Payor Plan Fax Number Effective Dates    [address] [address]   9/1/2013 - None Entered    [facility] [address]       Subscriber Name Subscriber Birth Date Member ID       JANEL MUNOZ 1940 [id number]                 Emergency Contacts      (Rel.) Home Phone Work Phone Mobile Phone    Russ Munoz (Spouse) [phone] -- [phone]    MARISSA MUNOZ (Daughter) [phone] -- [phone]              "

## 2021-09-07 NOTE — PROGRESS NOTES
Name: Janel Mahoney ADMIT: 2021   : 1940  PCP: Ariel Matute MD    MRN: [id number] LOS: 1 days   AGE/SEX: 80 y.o. female  ROOM: [facility]     Subjective   Subjective   Complaining of dry mouth. Also complaining of right shoulder and left knee pain. She had shoulder surgery about a week ago. Hard of hearing.    Review of Systems   Constitutional: Negative for fever.   Respiratory: Negative for cough and shortness of breath.    Cardiovascular: Negative for chest pain.   Gastrointestinal: Negative for abdominal pain.   Musculoskeletal: Positive for arthralgias.      Objective   Objective   Vital Signs  Temp:  [97.4 °F (36.3 °C)-99 °F (37.2 °C)] 99 °F (37.2 °C)  Heart Rate:  [59-79] 59  Resp:  [16-20] 18  BP: (108-147)/(44-84) 125/44  SpO2:  [93 %-100 %] 95 %  on   ;   Device (Oxygen Therapy): room air  Body mass index is 30.47 kg/m².    Physical Exam  Constitutional:       Appearance: Normal appearance.   HENT:      Head: Normocephalic and atraumatic.   Cardiovascular:      Rate and Rhythm: Normal rate and regular rhythm.   Pulmonary:      Effort: Pulmonary effort is normal. No respiratory distress.      Breath sounds: Normal breath sounds.   Abdominal:      General: Bowel sounds are normal. There is no distension.      Palpations: Abdomen is soft.      Tenderness: There is no abdominal tenderness. There is no guarding or rebound.   Musculoskeletal:         General: No swelling.      Right lower leg: No edema.      Left lower leg: No edema.   Skin:     General: Skin is warm and dry.   Neurological:      General: No focal deficit present.      Mental Status: She is alert and oriented to person, place, and time.   Psychiatric:         Mood and Affect: Mood normal.         Behavior: Behavior normal.     Results Review  I reviewed the patient's new clinical results.  Results from last 7 days   Lab Units 21  1542 21  0244   WBC 10*3/mm3 5.62  --    HEMOGLOBIN g/dL 7.3* 8.4*   PLATELETS  10*3/mm3 146  --      Results from last 7 days   Lab Units 09/06/21  1542 09/01/21  0244   SODIUM mmol/L 139 140   POTASSIUM mmol/L 4.7 4.4   CHLORIDE mmol/L 100 99   CO2 mmol/L 31.2* 31.0*   BUN mg/dL 73* 55*   CREATININE mg/dL 2.50* 2.49*   GLUCOSE mg/dL 126* 131*     Lab Results   Component Value Date    ANIONGAP 7.8 09/06/2021     Estimated Creatinine Clearance: 17.1 mL/min (A) (by C-G formula based on SCr of 2.5 mg/dL (H)).    Results from last 7 days   Lab Units 09/06/21  1542   ALBUMIN g/dL 3.40*   BILIRUBIN mg/dL 0.3   ALK PHOS U/L 64   AST (SGOT) U/L 36*   ALT (SGPT) U/L 14     Results from last 7 days   Lab Units 09/06/21  1542 09/01/21  0244   CALCIUM mg/dL 9.5 8.9   ALBUMIN g/dL 3.40*  --        No results found for: HGBA1C, POCGLU    Scheduled Meds  atorvastatin, 10 mg, Oral, Nightly  bumetanide, 2 mg, Oral, BID  calcitriol, 0.25 mcg, Oral, BID  carvedilol, 25 mg, Oral, BID  epoetin adele/adele-epbx, 10,000 Units, Subcutaneous, Once  [START ON 9/9/2021] ferrous sulfate, 325 mg, Oral, Once per day on Mon Thu  pantoprazole, 40 mg, Oral, BID AC  ramipril, 5 mg, Oral, Daily  warfarin, 1 mg, Oral, Once per day on Mon Wed Fri  [START ON 9/9/2021] warfarin, 2 mg, Oral, Once per day on Sun Tue Thu Sat  warfarin, 2.5 mg, Oral, Once    Continuous Infusions  Pharmacy to dose warfarin,     PRN Meds  oxyCODONE-acetaminophen  •  Pharmacy to dose warfarin  •  polyethylene glycol  •  sodium chloride  •  zolpidem    Pharmacy to dose warfarin,     Diet  Diet Regular     Results from last 7 days   Lab Units 09/07/21  0411 09/06/21  1542 09/01/21  0244   INR  1.81* 1.87*  1.93* 1.49*      I personally reviewed images     Assessment/Plan     Active Hospital Problems    Diagnosis  POA   • **Symptomatic anemia [D64.9]  Yes   • Mixed hyperlipidemia [E78.2]  Yes   • Chronic combined systolic and diastolic congestive heart failure (CMS/HCC) [I50.42]  Yes   • Essential hypertension [I10]  Yes   • Murmur, heart [R01.1]  Yes   •  Chronic atrial fibrillation (CMS/HCC) [I48.20]  Yes   • Coronary artery disease involving coronary bypass graft of native heart without angina pectoris [I25.810]  Yes   • Anemia in chronic kidney disease (CKD) [N18.9, D63.1]  Yes      Resolved Hospital Problems   No resolved problems to display.     80 y.o. female admitted with Symptomatic anemia. Recent right shoulder surgery    · Symptomatic anemia probably postop blood loss anemia and chronic anemia  · Recheck after 1 unit PRBCs yesterday  · Iron p.o. Possible IV iron per hematology  · Occult blood negative x1  · Dopplers pending to rule out DVT. INR subtherapeutic  · Chronic systolic and diastolic CHF  · Mild to moderate vascular congestion on chest x-ray  · Continue Bumex  · Chronic atrial fibrillation  · Warfarin anticoagulation  · CAD  · CKD 4 at baseline  · SCDs for DVT prophylaxis  · Full code  · Discussed with patient, nursing staff and care team on multidisciplinary rounds  · Anticipate discharge to SNU facility timing yet to be determined.    Murphy Estes MD  [facility]  09/07/21  16:01 EDT    I wore protective equipment throughout this patient encounter including a face mask, gloves, and protective eyewear.  Hand hygiene was performed before donning protective equipment and after removal when leaving the room.

## 2021-09-07 NOTE — PLAN OF CARE
Goal Outcome Evaluation:  Plan of Care Reviewed With: patient        Progress: no change  Outcome Summary: Admit to floor and orient to unit. Pt received 1 unit PRBC per MD order. Orthos done. VSS. See MAR for pain management. straight cath x1 d/t pt c/o inability to void and increasing abdomanal discomfort.

## 2021-09-07 NOTE — PROGRESS NOTES
Discharge Planning Assessment  [facility]     Patient Name: Janel Mahoney  MRN: [id number]  Today's Date: 9/7/2021    Admit Date: 9/6/2021    Discharge Needs Assessment     Row Name 09/07/21 1730       Living Environment    Lives With  spouse    Name(s) of Who Lives With Patient  Spouse/Russ. Pt admitted from [facility] s/p TSA one week ago    Primary Care Provided by  spouse/significant other    Provides Primary Care For  no one, unable/limited ability to care for self    Family Caregiver if Needed  spouse    Family Caregiver Names  Spouse Russ, daughter Marlen    Quality of Family Relationships  helpful;involved;supportive    Able to Return to Prior Arrangements  yes       Resource/Environmental Concerns    Resource/Environmental Concerns  none       Transition Planning    Patient/Family Anticipates Transition to  inpatient rehabilitation facility    Patient/Family Anticipated Services at Transition  skilled nursing;rehabilitation services    Transportation Anticipated  family or friend will provide;health plan transportation       Discharge Needs Assessment    Provided Post Acute Provider List?  N/A    N/A Provider List Comment  [facility]        Discharge Plan     Row Name 09/07/21 8457       Plan    Plan  Return to [facility] for short-term rehab    Patient/Family in Agreement with Plan  yes    Plan Comments  Met w/ pt and pt's spouse, Russ, face sheet verified. Pt is from short term rehab at [facility] (s/p total shoulder d/c to [place] on 9/2/21).  Pt and family would like to return to [place] at [place]. Epic referral sent to [place] and Silvia notified, Silvia to confirm LOC/bed hold status. CCP will follow up. [place] transportation tbd likely will need wc van vs. stretcher.        Continued Care and Services - Admitted Since 9/6/2021     Destination     Service Provider Request Status Selected Services Address Phone Fax Patient Preferred    [facility] [place]   Pending - Request Sent N/A [address], [facility] [address] [phone] --            Selected Continued Care - Episodes Includes selections from active Coordinated Care Management episodes    High Risk Care Management Episode start date: 9/2/2021 (Paused)   There are no active outsourced providers for this episode.             Selected Continued Care - Prior Encounters Includes selections from prior encounters from 6/8/2021 to 9/7/2021    Discharged on 9/1/2021 Admission date: 8/31/2021 - Discharge disposition: Skilled Nursing Facility (DC - External)    Destination     Service Provider Selected Services Address Phone Fax Patient Preferred    [facility] [place]  Skilled Nursing [address], [facility] [address] [phone]                       Demographic Summary    No documentation.       Functional Status     Row Name 09/07/21 1732       Functional Status    Usual Activity Tolerance  fair        Psychosocial    No documentation.       Abuse/Neglect    No documentation.       Legal    No documentation.       Substance Abuse    No documentation.       Patient Forms    No documentation.           Amparo Le RN

## 2021-09-07 NOTE — CONSULTS
Subjective     REASON FOR CONSULTATION:  Provide an opinion on any further workup or treatment on:    Anemia                       REQUESTING PHYSICIAN: Agustin Escobar MD      HISTORY OF PRESENT ILLNESS:      Janel Mahoney is a 80 y.o. patient who was admitted on 9/6/2021.  Patient has chronic anemia secondary to chronic kidney disease and receives Procrit at our office.  She had right shoulder surgery on 8/31/2021.  The day after surgery, she was discharged to rehab.  She was noted to be weak at the rehab facility and was transferred to the ER on 9/6/2021 and was subsequently admitted.  She was noted to be anemic and hemoglobin was down to 7.3.    Patient is on chronic anticoagulation with Coumadin.  She has problem with bruising.  No bleeding.  No blood in the stool or urine.  Stool is occasionally dark due to the iron.  No black tarry stools.  She had 1 bowel movement since the surgery on 8/31/2021.     Past Medical History:   Diagnosis Date   • Acute kidney injury (CMS/HCC)    • Anemia     on procrit   • Atrial fibrillation (CMS/HCC)    • Bruises easily    • Carotid artery stenosis    • Chronic back pain    • Chronic combined systolic and diastolic congestive heart failure (CMS/HCC)    • Chronic coronary artery disease     moderate to severe LV dysfunction.  Sees Dr. Dominguez   • Chronic kidney disease, stage 3 (CMS/HCC)    • Dysphagia    • GERD (gastroesophageal reflux disease)    • Gout    • H/O cardiac murmur    • Hematoma     LEFT LEG; DR ALONZO AWARE   • [place] (hard of hearing)     wears hearing aids   • Hyperlipidemia    • Hypertension    • Hypotension    • ICD (implantable cardioverter-defibrillator) in place    • Ischemic cardiomyopathy    • Kyphoscoliosis    • Leukopenia    • Lumbar spondylosis    • Obesity    • GEORGINA (obstructive sleep apnea) 12/01/2013    Overnight polysomnogram, weight 168 pounds.  AHI mildly abnormal at 10.3 events per hour.  Respiratory effort related arousals 9.5 events per hour.   Total time snoring 30% and arousals associated with snoring 15 events per hour.          Bring machine DOS   • Osteoarthritis    • Osteoporosis    • Peptic ulcer    • Premature ventricular contractions    • Renal insufficiency syndrome    • Right shoulder pain 08/2021   • Scoliosis    • Shoulder fracture, left    • Shoulder pain, right    • Thrombocytopenia (CMS/HCC)    • Urine incontinence     pads   • Ventricular tachycardia (CMS/HCC)    • Vitamin B12 deficiency    • Warfarin anticoagulation        Past Surgical History:   Procedure Laterality Date   • AV NODE ABLATION     • BREAST BIOPSY     • CARDIAC CATHETERIZATION      Showed severe mitral insufficiency and borderline coronary artery disease   • CARDIAC CATHETERIZATION      Showed an ejection fraction of 35%. She had occlusive disease of the right posterior LV branch and no other significant disease, treated medically.   • CARDIAC DEFIBRILLATOR PLACEMENT      Biventricular   • CARDIAC DEFIBRILLATOR PLACEMENT Left    • CARDIAC ELECTROPHYSIOLOGY PROCEDURE N/A 1/4/2019    Procedure: GENERATOR CHANGE BI-V ICD   [place];  Surgeon: James Hwang MD;  Location: [facility] CATH INVASIVE LOCATION;  Service: Cardiology   • CARDIAC VALVE REPLACEMENT  2009    Done with stent placement   • CARDIOVERSION      multiple electrocardioversions.   • CAROTID ARTERY ANGIOPLASTY Right    • CATARACT EXTRACTION EXTRACAPSULAR W/ INTRAOCULAR LENS IMPLANTATION Bilateral    • COLONOSCOPY N/A 9/28/2017    Procedure: COLONOSCOPY TO CECUM;  Surgeon: Kevin Davis MD;  Location: [facility] ENDOSCOPY;  Service:    • CORONARY ANGIOPLASTY WITH STENT PLACEMENT  2009   • CORONARY ARTERY BYPASS GRAFT      single graft to the PDA   • CORONARY STENT PLACEMENT     • ENDOSCOPY N/A 9/28/2017    Procedure: ESOPHAGOGASTRODUODENOSCOPY ;  Surgeon: Kevin Davis MD;  Location: [facility] ENDOSCOPY;  Service:    • EYE SURGERY     • HEMORRHOIDECTOMY     • HYSTERECTOMY     • INCISION AND DRAINAGE TRUNK Right  11/27/2018    Procedure: EVACUATION OF RIGHT CHEST WALL HEMATOMA;  Surgeon: Juvencio Rodriguez MD;  Location: [facility] MAIN OR;  Service: General   • MITRAL VALVE REPLACEMENT  01/2010    #31 Epic porcine valve.   • THROMBOENDARTERECTOMY Right     carotid thromboendarterectomy    • TONSILLECTOMY      age 32   • TOTAL KNEE ARTHROPLASTY Left    • TOTAL KNEE ARTHROPLASTY REVISION Left 11/19/2019    Procedure: TOTAL KNEE ARTHROPLASTY REVISION LEFT;  Surgeon: Juvencio Pressley II, MD;  Location: [facility] MAIN OR;  Service: Orthopedics   • TOTAL SHOULDER ARTHROPLASTY W/ DISTAL CLAVICLE EXCISION Left 1/16/2018    Procedure: TOTAL SHOULDER REVERSE ARTHROPLASTY;  Surgeon: Bianka Quesada MD;  Location: [facility] MAIN OR;  Service:    • TOTAL SHOULDER ARTHROPLASTY W/ DISTAL CLAVICLE EXCISION Right 8/31/2021    Procedure: TOTAL SHOULDER REVERSE ARTHROPLASTY;  Surgeon: Juvencio Pressley II, MD;  Location: [facility] MAIN OR;  Service: Orthopedics;  Laterality: Right;     SCHEDULED MEDS:  atorvastatin, 10 mg, Oral, Nightly  bumetanide, 2 mg, Oral, BID  calcitriol, 0.25 mcg, Oral, BID  carvedilol, 25 mg, Oral, BID  [START ON 9/9/2021] ferrous sulfate, 325 mg, Oral, Once per day on Mon Thu  pantoprazole, 40 mg, Oral, BID AC  ramipril, 5 mg, Oral, Daily  warfarin, 1 mg, Oral, Once per day on Mon Wed Fri  warfarin, 2 mg, Oral, Once per day on Sun Tue Thu Sat      INFUSIONS:  Pharmacy to dose warfarin,       PRN MEDS:  oxyCODONE-acetaminophen  •  Pharmacy to dose warfarin  •  sodium chloride  •  zolpidem    ALLERGIES:  Allergies   Allergen Reactions   • Diclofenac GI Bleeding     She had adverse effects from the medications that required hospitalization. Pt got stomach ulcers from this medication prescribed by Dr. Arango - pediatrist.        Social History     Socioeconomic History   • Marital status:      Spouse name: Russ   • Number of children: Not on file   • Years of education: Not on file   • Highest education  level: Not on file   Tobacco Use   • Smoking status: Former Smoker     Packs/day: 1.00     Years: 50.00     Pack years: 50.00     Types: Cigarettes     Quit date: 2009     Years since quittin.6   • Smokeless tobacco: Never Used   • Tobacco comment: Daily caffeine - soda   Vaping Use   • Vaping Use: Never used   Substance and Sexual Activity   • Alcohol use: Yes     Comment: 1 yearly   • Drug use: Never   • Sexual activity: Defer       Family History   Problem Relation Age of Onset   • Cancer Mother    • Breast cancer Mother    • Heart disease Mother    • Hypertension Mother    • Stroke Mother    • Coronary artery disease Father    • Stroke Father    • Heart disease Father    • Hypertension Father    • Other Daughter         thiamin deficiency    • Hypertension Son    • Lung disease Other    • Hypertension Other    • [place] Hyperthermia Neg Hx         REVIEW OF SYSTEMS:   GENERAL: Generalized weakness.   SKIN: Negative.  HEME/LYMPH: Negative.  EYES:  Negative.  ENT:  Hard of hearing.  RESPIRATORY:  Negative.   CVS:  Negative.   GI: Constipation.  She had 1 bowel movement since her shoulder surgery.   :  Negative.   MUSCULOSKELETAL: Recent right shoulder surgery.  Limited movement in the right upper extremity.  NEUROLOGICAL:  Negative.  PSYCHIATRIC:  Negative.     Objective   VITAL SIGNS:  Temp:  [97.4 °F (36.3 °C)-99 °F (37.2 °C)] 99 °F (37.2 °C)  Heart Rate:  [60-79] 66  Resp:  [16-20] 20  BP: (108-147)/(50-84) 146/51     Wt Readings from Last 3 Encounters:   21 75.6 kg (166 lb 9.6 oz)   21 68 kg (150 lb)   21 68.5 kg (151 lb)       PHYSICAL EXAMINATION:   GENERAL:  The patient appears in week, not in acute distress.  SKIN: Warm and dry. No skin rash.  Ecchymosis over the upper and lower extremities.  HEAD:  Normocephalic.  EYES:  No Jaundice. Pallor. Pupils equal. EOMI.  NECK:  Supple. No Thyromegaly. No Masses.  LYMPHATICS:  No cervical or supraclavicular lymphadenopathy.  CHEST:  Normal respiratory effort. Lungs clear to auscultation.   CARDIAC:  Normal S1 & S2.  Grade 2 systolic murmur.   ABDOMEN:  Soft. No tenderness. No Hepatomegaly. No Splenomegaly. No masses.  EXTREMITIES: Bilateral calf tenderness.  No edema.  Dressing covering the incision at the right shoulder.  Right upper extremity in a sling.  NEUROLOGICAL:  No Focal neurological deficits.        RESULT REVIEW:   Results from last 7 days   Lab Units 09/06/21  1542 09/01/21  0244   WBC 10*3/mm3 5.62  --    NEUTROS ABS 10*3/mm3 4.46  --    HEMOGLOBIN g/dL 7.3* 8.4*   HEMATOCRIT % 22.9* 25.2*   PLATELETS 10*3/mm3 146  --      Results from last 7 days   Lab Units 09/06/21  1542 09/01/21  0244   SODIUM mmol/L 139 140   POTASSIUM mmol/L 4.7 4.4   CHLORIDE mmol/L 100 99   CO2 mmol/L 31.2* 31.0*   BUN mg/dL 73* 55*   CREATININE mg/dL 2.50* 2.49*   CALCIUM mg/dL 9.5 8.9   ALBUMIN g/dL 3.40*  --    BILIRUBIN mg/dL 0.3  --    ALK PHOS U/L 64  --    ALT (SGPT) U/L 14  --    AST (SGOT) U/L 36*  --      Results from last 7 days   Lab Units 09/07/21  0411 09/06/21  1542 09/01/21  0244 08/31/21  2121 08/31/21  1406   INR  1.81* 1.87*  1.93* 1.49*   < > 1.54*   APTT seconds  --  42.0*  --   --  30.4    < > = values in this interval not displayed.       Lab Results   Component Value Date    FERRITIN 513.20 (H) 07/13/2021    FERRITIN 475.00 (H) 06/11/2021    FERRITIN 428.00 (H) 05/18/2021    IRON 89 07/13/2021    IRON 77 05/18/2021    IRON 58 03/23/2021    TIBC 300 07/13/2021    TIBC 300 05/18/2021    TIBC 295 03/23/2021     Lab Results   Component Value Date    FOLATE 11.70 02/20/2019    FOLATE 12.21 08/04/2016     Lab Results   Component Value Date    XPVTKREL65 >2,000 (H) 02/20/2019    WJXQKVDR33 2,000 (H) 11/29/2016    GDAMQMFJ52 1,913 (H) 08/04/2016     Lab Results   Component Value Date    OCCULTBLD Negative 09/06/2021    OCCULTBLD Negative 01/16/2018    OCCULTBLD Positive (A) 09/27/2017          Assessment/Plan    *Acute on chronic anemia.    · Patient has anemia of chronic kidney disease. She receives Procrit at our office as below.  · Hemoglobin was 8.4 on 9/1/2021.  · Hemoglobin decreased to 7.3 on 9/6/2021.   · No obvious sign of active bleeding.  However, the patient had a right shoulder surgery on 8/31/2021 that may have resulted in blood losses.  · Stool Hemoccult was negative x1.  · Patient received 1 unit PRBC transfusion.    *Anemia of chronic kidney disease, stage III.    · Patient is on Procrit monthly.    · Iron stores from 7/13/2021 were adequate.  Ferritin was 513 and this represented acute phase reaction.   · Patient is on ferrous sulfate 325 mg 2 days a week to maintain adequate iron stores.  · Last Procrit dose was 6000 units on 8/10/2021 for hemoglobin of 9.0.  · Since hemoglobin is low, we will give Procrit today.    *Thrombocytopenia attributed to B12 deficiency.    · Platelet count is usually in the 100,000-150,000 range.    · Platelet count is today at 146,000.    *Chronic anticoagulation with Coumadin.   · This is being managed by her cardiologist and the warfarin clinic.    · Patient has ecchymosis over the upper and lower extremities.   · INR was 1.93 on admission on 9/6/2021.   · INR is 1.81 today.   · She is having excessive bruising.  No obvious signs of bleeding.    *Bilateral leg pain.  There is calf tenderness.  She recently had cellulitis of the lower extremity.  The symptoms are concerning for DVT.    PLAN:    1.  Obtain ferritin and iron panel today.  If iron is low, we will give IV Venofer.  2.  We will give Procrit 10,000 units today.  3.  We will repeat stool Hemoccult x 2.  4.  We will obtain venous Doppler of both lower extremities.    Discussed with family at bedside.    ADDENDUM:  Venous Doppler revealed acute calf DVT.  Patient is subtherapeutic on her Coumadin with an INR of 1.8.  I will start Lovenox at the reduced dose of 70 mg every 24 hours (dose reduced due to chronic kidney disease and anemia).  Plan to  continue Lovenox until Coumadin is therapeutic.    Discussed with the patient's RN.      Edward Vasquez MD  09/07/21

## 2021-09-08 ENCOUNTER — TELEPHONE (OUTPATIENT)
Dept: SLEEP MEDICINE | Facility: HOSPITAL | Age: 81
End: 2021-09-08

## 2021-09-08 PROBLEM — I82.401 RIGHT LEG DVT (HCC): Status: ACTIVE | Noted: 2021-09-08

## 2021-09-08 LAB
BASOPHILS # BLD AUTO: 0.02 10*3/MM3 (ref 0–0.2)
BASOPHILS NFR BLD AUTO: 0.5 % (ref 0–1.5)
DEPRECATED RDW RBC AUTO: 54.8 FL (ref 37–54)
EOSINOPHIL # BLD AUTO: 0.18 10*3/MM3 (ref 0–0.4)
EOSINOPHIL NFR BLD AUTO: 4.1 % (ref 0.3–6.2)
ERYTHROCYTE [DISTWIDTH] IN BLOOD BY AUTOMATED COUNT: 15.9 % (ref 12.3–15.4)
FERRITIN SERPL-MCNC: 503 NG/ML (ref 13–150)
HCT VFR BLD AUTO: 24.9 % (ref 34–46.6)
HEMOCCULT STL QL: NEGATIVE
HGB BLD-MCNC: 7.9 G/DL (ref 12–15.9)
IMM GRANULOCYTES # BLD AUTO: 0.04 10*3/MM3 (ref 0–0.05)
IMM GRANULOCYTES NFR BLD AUTO: 0.9 % (ref 0–0.5)
INR PPP: 2.05 (ref 0.9–1.1)
IRON 24H UR-MRATE: 25 MCG/DL (ref 37–145)
IRON SATN MFR SERPL: 11 % (ref 20–50)
LYMPHOCYTES # BLD AUTO: 0.54 10*3/MM3 (ref 0.7–3.1)
LYMPHOCYTES NFR BLD AUTO: 12.2 % (ref 19.6–45.3)
MCH RBC QN AUTO: 30 PG (ref 26.6–33)
MCHC RBC AUTO-ENTMCNC: 31.7 G/DL (ref 31.5–35.7)
MCV RBC AUTO: 94.7 FL (ref 79–97)
MONOCYTES # BLD AUTO: 0.45 10*3/MM3 (ref 0.1–0.9)
MONOCYTES NFR BLD AUTO: 10.1 % (ref 5–12)
NEUTROPHILS NFR BLD AUTO: 3.21 10*3/MM3 (ref 1.7–7)
NEUTROPHILS NFR BLD AUTO: 72.2 % (ref 42.7–76)
NRBC BLD AUTO-RTO: 0.2 /100 WBC (ref 0–0.2)
PLATELET # BLD AUTO: 160 10*3/MM3 (ref 140–450)
PMV BLD AUTO: 10.4 FL (ref 6–12)
PROTHROMBIN TIME: 22.9 SECONDS (ref 11.7–14.2)
RBC # BLD AUTO: 2.63 10*6/MM3 (ref 3.77–5.28)
TIBC SERPL-MCNC: 228 MCG/DL (ref 298–536)
TRANSFERRIN SERPL-MCNC: 153 MG/DL (ref 200–360)
WBC # BLD AUTO: 4.44 10*3/MM3 (ref 3.4–10.8)

## 2021-09-08 PROCEDURE — 83540 ASSAY OF IRON: CPT | Performed by: INTERNAL MEDICINE

## 2021-09-08 PROCEDURE — 85610 PROTHROMBIN TIME: CPT | Performed by: INTERNAL MEDICINE

## 2021-09-08 PROCEDURE — 84466 ASSAY OF TRANSFERRIN: CPT | Performed by: INTERNAL MEDICINE

## 2021-09-08 PROCEDURE — 82728 ASSAY OF FERRITIN: CPT | Performed by: INTERNAL MEDICINE

## 2021-09-08 PROCEDURE — 25010000002 IRON SUCROSE PER 1 MG: Performed by: INTERNAL MEDICINE

## 2021-09-08 PROCEDURE — 99232 SBSQ HOSP IP/OBS MODERATE 35: CPT | Performed by: NURSE PRACTITIONER

## 2021-09-08 PROCEDURE — 85025 COMPLETE CBC W/AUTO DIFF WBC: CPT | Performed by: INTERNAL MEDICINE

## 2021-09-08 PROCEDURE — 99232 SBSQ HOSP IP/OBS MODERATE 35: CPT | Performed by: INTERNAL MEDICINE

## 2021-09-08 PROCEDURE — 25010000002 ENOXAPARIN PER 10 MG: Performed by: INTERNAL MEDICINE

## 2021-09-08 PROCEDURE — 82272 OCCULT BLD FECES 1-3 TESTS: CPT | Performed by: INTERNAL MEDICINE

## 2021-09-08 RX ADMIN — IRON SUCROSE 300 MG: 20 INJECTION, SOLUTION INTRAVENOUS at 16:06

## 2021-09-08 RX ADMIN — CALCITRIOL 0.25 MCG: 0.25 CAPSULE ORAL at 20:00

## 2021-09-08 RX ADMIN — RAMIPRIL 5 MG: 5 CAPSULE ORAL at 08:25

## 2021-09-08 RX ADMIN — CALCITRIOL 0.25 MCG: 0.25 CAPSULE ORAL at 08:25

## 2021-09-08 RX ADMIN — OXYCODONE AND ACETAMINOPHEN 1 TABLET: 5; 325 TABLET ORAL at 04:57

## 2021-09-08 RX ADMIN — WARFARIN 1 MG: 1 TABLET ORAL at 18:12

## 2021-09-08 RX ADMIN — BUMETANIDE 2 MG: 2 TABLET ORAL at 08:25

## 2021-09-08 RX ADMIN — BUMETANIDE 2 MG: 2 TABLET ORAL at 18:13

## 2021-09-08 RX ADMIN — PANTOPRAZOLE SODIUM 40 MG: 40 TABLET, DELAYED RELEASE ORAL at 06:31

## 2021-09-08 RX ADMIN — CARVEDILOL 25 MG: 25 TABLET, FILM COATED ORAL at 20:00

## 2021-09-08 RX ADMIN — CARVEDILOL 25 MG: 25 TABLET, FILM COATED ORAL at 08:25

## 2021-09-08 RX ADMIN — PANTOPRAZOLE SODIUM 40 MG: 40 TABLET, DELAYED RELEASE ORAL at 18:13

## 2021-09-08 RX ADMIN — ATORVASTATIN CALCIUM 10 MG: 20 TABLET, FILM COATED ORAL at 20:00

## 2021-09-08 RX ADMIN — ENOXAPARIN SODIUM 70 MG: 80 INJECTION, SOLUTION INTRAVENOUS; SUBCUTANEOUS at 18:12

## 2021-09-08 RX ADMIN — OXYCODONE AND ACETAMINOPHEN 1 TABLET: 5; 325 TABLET ORAL at 17:57

## 2021-09-08 NOTE — PROGRESS NOTES
Pharmacy Consult: Warfarin Dosing/ Monitoring    Janel Mahoney is a 80 y.o. female, estimated creatinine clearance is 17.1 mL/min (A) (by C-G formula based on SCr of 2.5 mg/dL (H)). weighing 75.6 kg (166 lb 9.6 oz).     has a past medical history of Acute kidney injury (CMS/HCC), Anemia, Atrial fibrillation (CMS/HCC), Bruises easily, Carotid artery stenosis, Chronic back pain, Chronic combined systolic and diastolic congestive heart failure (CMS/HCC), Chronic coronary artery disease, Chronic kidney disease, stage 3 (CMS/HCC), Dysphagia, GERD (gastroesophageal reflux disease), Gout, H/O cardiac murmur, Hematoma, [place] (hard of hearing), Hyperlipidemia, Hypertension, Hypotension, ICD (implantable cardioverter-defibrillator) in place, Ischemic cardiomyopathy, Kyphoscoliosis, Leukopenia, Lumbar spondylosis, Obesity, GEORGINA (obstructive sleep apnea) (2013), Osteoarthritis, Osteoporosis, Peptic ulcer, Premature ventricular contractions, Renal insufficiency syndrome, Right shoulder pain (2021), Scoliosis, Shoulder fracture, left, Shoulder pain, right, Thrombocytopenia (CMS/HCC), Urine incontinence, Ventricular tachycardia (CMS/HCC), Vitamin B12 deficiency, and Warfarin anticoagulation.    Social History     Tobacco Use    Smoking status: Former Smoker     Packs/day: 1.00     Years: 50.00     Pack years: 50.00     Types: Cigarettes     Quit date: 2009     Years since quittin.6    Smokeless tobacco: Never Used    Tobacco comment: Daily caffeine - soda   Vaping Use    Vaping Use: Never used   Substance Use Topics    Alcohol use: Yes     Comment: 1 yearly    Drug use: Never       Results from last 7 days   Lab Units 21  0444 21  0411 21  1542   INR  2.05* 1.81* 1.87*  1.93*   APTT seconds  --   --  42.0*   HEMOGLOBIN g/dL 7.9*  --  7.3*   HEMATOCRIT % 24.9*  --  22.9*   PLATELETS 10*3/mm3 160  --  146     Results from last 7 days   Lab Units 21  1542   SODIUM mmol/L 139   POTASSIUM  "mmol/L 4.7   CHLORIDE mmol/L 100   CO2 mmol/L 31.2*   BUN mg/dL 73*   CREATININE mg/dL 2.50*   CALCIUM mg/dL 9.5   BILIRUBIN mg/dL 0.3   ALK PHOS U/L 64   ALT (SGPT) U/L 14   AST (SGOT) U/L 36*   GLUCOSE mg/dL 126*     Anticoagulation history: Per most recent encounter with [facility] anticoagulation clinic on 8/24/21, patient takes warfarin 1 mg on MWF and 2 mg all other days (11 mg weekly).    Hospital Anticoagulation:  Consulting provider: Dr. Escobar  Start date: 9/6/21  Indication: \"Other - full anticoagulation\"  Target INR: 2-3  Expected duration: Indefinite   Bridge Therapy: No               Date 9/6 9/7 9/8          INR 1.87 1.81 2.05          Warfarin dose 1 mg 2.5 mg 1mg            Potential drug interactions:  Acetaminophen - may enhance the anticoagulation effect of warfarin, typically at doses >2 g/day.    Relevant nutrition status: Regular diet      Assessment/Plan:  INR is therapeutic - resume home regimen of 1 mg on MWF and 2 mg all other days.  Monitor and follow up on daily CBC, PT/INR, signs or symptoms of bleeding.    Pharmacy will continue to follow until discharge or discontinuation of warfarin.     Itzel Wilhelm, PharmD  9/8/2021      "

## 2021-09-08 NOTE — NURSING NOTE
09/08/21 1140   Wound 09/07/21 1842 anterior pubis Blisters   Placement Date/Time: 09/07/21 1842   Present on Hospital Admission: No  Orientation: anterior  Location: pubis  Primary Wound Type: Blisters   Base   (roofed clear blister)   Periwound pink;intact   Wound Length (cm) 3 cm   Wound Width (cm) 4 cm   Care, Wound cleansed with;sterile normal saline   Dressing Care dressing applied  (optifoam )     Wound/ostomy - patient has a fluid filled blister to lower mid abdomen in a fold. Unsure of etiology, no redness indicating trauma or friction, patient is unsure as well. Recommend to cover and protect to prevent rupture and allow natural resolve.

## 2021-09-08 NOTE — PROGRESS NOTES
Subjective     CHIEF COMPLAINT:     Anemia  Right lower extremity DVT    INTERVAL HISTORY:     Patient continues to feel weak.  No shortness of breath today.    REVIEW OF SYSTEMS:  Review of systems is significant for the following.  Rest of ROS is negative.    GI: Constipation  HEME: Easy bruising    SCHEDULED MEDS:  atorvastatin, 10 mg, Oral, Nightly  bumetanide, 2 mg, Oral, BID  calcitriol, 0.25 mcg, Oral, BID  carvedilol, 25 mg, Oral, BID  enoxaparin, 70 mg, Subcutaneous, Q24H  [START ON 9/9/2021] ferrous sulfate, 325 mg, Oral, Once per day on Mon Thu  pantoprazole, 40 mg, Oral, BID AC  ramipril, 5 mg, Oral, Daily  warfarin, 1 mg, Oral, Once per day on Mon Wed Fri  [START ON 9/9/2021] warfarin, 2 mg, Oral, Once per day on Sun Tue Thu Sat      INFUSIONS:  Pharmacy to dose warfarin,       PRN MEDS:  oxyCODONE-acetaminophen  •  Pharmacy to dose warfarin  •  polyethylene glycol  •  sodium chloride  •  zolpidem       Objective   VITAL SIGNS:  Temp:  [98.4 °F (36.9 °C)-99 °F (37.2 °C)] 98.4 °F (36.9 °C)  Heart Rate:  [59-67] 67  Resp:  [16-20] 16  BP: (124-139)/(44-64) 137/44       PHYSICAL EXAMINATION:  GENERAL:  The patient appears weak, not in acute distress.  SKIN: Old ecchymosis.   HEAD:  Normocephalic.  EARS: Patient is hard of hearing.  EYES:  No Jaundice. Pallor.   NECK:  Supple. No Masses.  LYMPHATICS:  No cervical or supraclavicular lymphadenopathy.  CHEST: Normal respiratory effort.    CARDIAC:  No edema.  ABDOMEN:  Soft. No tenderness. No Hepatomegaly. No Splenomegaly. No masses.  EXTREMITIES: Bilateral calf tenderness.      RESULT REVIEW:   Results from last 7 days   Lab Units 09/08/21  0444 09/06/21  1542   WBC 10*3/mm3 4.44 5.62   NEUTROS ABS 10*3/mm3 3.21 4.46   HEMOGLOBIN g/dL 7.9* 7.3*   HEMATOCRIT % 24.9* 22.9*   PLATELETS 10*3/mm3 160 146     Results from last 7 days   Lab Units 09/06/21  1542   SODIUM mmol/L 139   POTASSIUM mmol/L 4.7   CHLORIDE mmol/L 100   CO2 mmol/L 31.2*   BUN mg/dL 73*    CREATININE mg/dL 2.50*   CALCIUM mg/dL 9.5   ALBUMIN g/dL 3.40*   BILIRUBIN mg/dL 0.3   ALK PHOS U/L 64   ALT (SGPT) U/L 14   AST (SGOT) U/L 36*     Results from last 7 days   Lab Units 09/08/21  0444 09/07/21  0411 09/06/21  1542   INR  2.05* 1.81* 1.87*  1.93*   APTT seconds  --   --  42.0*     Component      Latest Ref Rng & Units 7/13/2021 9/8/2021   Iron      37 - 145 mcg/dL 89 25 (L)   Iron Saturation      20 - 50 % 30 11 (L)   Transferrin      200 - 360 mg/dL 214 153 (L)   TIBC      298 - 536 mcg/dL 300 228 (L)   Ferritin      13.00 - 150.00 ng/mL 513.20 (H) 503.00 (H)       Assessment/Plan   *Acute on chronic anemia.   · Patient has anemia of chronic kidney disease. She receives Procrit at our office as below.  · Hemoglobin was 8.4 on 9/1/2021.  · Hemoglobin decreased to 7.3 on 9/6/2021.   · No obvious sign of active bleeding.  However, the patient had a right shoulder surgery on 8/31/2021 that may have resulted in blood losses.  · Stool Hemoccult was negative x1.  · Patient received 1 unit PRBC transfusion.  · Hemoglobin is 7.9 today.  · Iron panel revealed low transferrin saturation of 11%.  Ferritin is elevated at 503.  · Based on the transferrin saturation of 11%, I recommended IV Venofer.     *Anemia of chronic kidney disease, stage III.    · Patient is on Procrit monthly.    · Iron stores from 7/13/2021 were adequate.  Ferritin was 513 and this represented acute phase reaction.   · Patient is on ferrous sulfate 325 mg 2 days a week to maintain adequate iron stores.  · Last Procrit dose was 6000 units on 8/10/2021 for hemoglobin of 9.0.  · Patient was given Procrit 10,000 units on 9/7/2021.  · Hemoglobin is 7.9 today.     *Thrombocytopenia attributed to B12 deficiency.    · Platelet count is usually in the 100,000-150,000 range.    · Platelets are normal today at 160,000.     *Chronic anticoagulation with Coumadin.   · This is being managed by her cardiologist and the warfarin clinic.    · Patient  has ecchymosis over the upper and lower extremities.   · INR was 1.93 on admission on 9/6/2021.   · INR was 1.81 on 9/7/2021.    · INR is 2.05 today.     *Acute DVT in the right gastrocnemius vein diagnosed on 9/7/2021.    · Developed while on subtherapeutic Coumadin.    · This may have developed while off Coumadin due to recent right shoulder surgery.    · Patient also reports being inactive recently.  · Patient was started on Lovenox on 9/7/2021.   · Patient has tenderness in the right calf.     PLAN:     1.  Continue Lovenox for 1 additional dose today.   2.  IV Venofer 300 mg today.  3.  Repeat CBC and PT/INR in a.m.  If hemoglobin has not improved, we will transfuse a second unit of PRBCs.     Discussed with Dr. Estes.       Edward Vasquez MD  09/08/21

## 2021-09-08 NOTE — PLAN OF CARE
Goal Outcome Evaluation:  Plan of Care Reviewed With: patient           Outcome Summary: VSS. No c/o pain. C/o blister to lower abd itching, lotion applied per pt request. Resting well in bed at this time. Will continue to monitor.

## 2021-09-08 NOTE — PROGRESS NOTES
Electrophysiology Follow-Up Note      Patient Name: Janel Mahoney  Age/Sex: 80 y.o. female  : 1940  MRN: [id number]      Day of Service: 21       Chief Complaint/Follow-up: Anemia, AC recommendations, permanent AF, ICM, s/p CRT-D        S: Denies CP, SOB      Temp:  [98.4 °F (36.9 °C)-99 °F (37.2 °C)] 98.4 °F (36.9 °C)  Heart Rate:  [59-67] 67  Resp:  [16-20] 16  BP: (124-139)/(44-64) 137/44     PHYSICAL EXAM:    General Appearance: No acute distress, well developed and well nourished.   Eyes: Conjunctiva and lids: No erythema, swelling, or discharge. Sclera non-icteric.   HENT: Atraumatic, normocephalic. External eyes, ears, and nose normal.   Respiratory: No signs of respiratory distress. Respiration rhythm and depth normal.  Cardiovascular:  Heart Rate and Rhythm: Normal, Heart Sounds: S1 and S2.   Murmurs: No murmurs noted. No rubs, thrills, or gallops.   Arterial Pulses: Posterior tibialis and dorsalis pedis pulses normal.   Lower Extremities: No edema noted.  Gastrointestinal:  Abdomen soft, non-distended, non-tender.  Musculoskeletal: Normal movement of extremities  Skin: Warm and dry.   Psychiatric: Patient alert and oriented to person, place, and time. Speech and behavior appropriate. Normal mood and affect.       ECG/TELE:           Results from last 7 days   Lab Units 21  1542   SODIUM mmol/L 139   POTASSIUM mmol/L 4.7   CHLORIDE mmol/L 100   CO2 mmol/L 31.2*   BUN mg/dL 73*   CREATININE mg/dL 2.50*   GLUCOSE mg/dL 126*   CALCIUM mg/dL 9.5     Results from last 7 days   Lab Units 21  0444 21  1542   WBC 10*3/mm3 4.44 5.62   HEMOGLOBIN g/dL 7.9* 7.3*   HEMATOCRIT % 24.9* 22.9*   PLATELETS 10*3/mm3 160 146     Results from last 7 days   Lab Units 21  0444 21  0411 21  1542   INR  2.05* 1.81* 1.87*  1.93*       Current Medications:   Scheduled Meds:atorvastatin, 10 mg, Oral, Nightly  bumetanide, 2 mg, Oral, BID  calcitriol, 0.25 mcg, Oral,  BID  carvedilol, 25 mg, Oral, BID  enoxaparin, 70 mg, Subcutaneous, Q24H  [START ON 9/9/2021] ferrous sulfate, 325 mg, Oral, Once per day on Mon Thu  pantoprazole, 40 mg, Oral, BID AC  ramipril, 5 mg, Oral, Daily  warfarin, 1 mg, Oral, Once per day on Mon Wed Fri  [START ON 9/9/2021] warfarin, 2 mg, Oral, Once per day on Sun Tue Thu Sat          Symptomatic anemia    Anemia in chronic kidney disease (CKD)    Coronary artery disease involving coronary bypass graft of native heart without angina pectoris    Chronic atrial fibrillation (CMS/HCC)    Murmur, heart    Essential hypertension    Chronic combined systolic and diastolic congestive heart failure (CMS/HCC)    Mixed hyperlipidemia       Plan:     Saw patient this morning, she denies CP, SOB. Hgb up to 7.9 after transfusion. She denies any bleeding. Remains on warfarin/ASA, hematology on board working up anemia.     Cardiac status stable. Recommend continuing AC unless hematology thinks it needs to be held.     We will sign off. Please call if needed.    Tanisha Bernardo, NICKY  09/08/21  08:45 EDT

## 2021-09-08 NOTE — ED PROVIDER NOTES
MD ATTESTATION NOTE    The JF and I have discussed this patient's history, physical exam, and treatment plan.  I have reviewed the documentation and personally had a face to face interaction with the patient. I affirm the documentation and agree with the treatment and plan.  The attached note describes my personal findings.      Janel Mahoney is a 80 y.o. female who presents to the ED c/o low hemoglobin.  She was sent from her facility with a hemoglobin of 6.8.      On exam:  Abdomen soft and nontender  Right shoulder has some bruising but no significant swelling to suggest perioperative cause    Labs  Recent Results (from the past 24 hour(s))   Protime-INR    Collection Time: 09/07/21  4:11 AM    Specimen: Blood   Result Value Ref Range    Protime 20.7 (H) 11.7 - 14.2 Seconds    INR 1.81 (H) 0.90 - 1.10   Prepare RBC, 1 Units    Collection Time: 09/07/21  6:00 AM   Result Value Ref Range    Product Code [id number]     Unit Number [id number]     UNIT  ABO O     UNIT  RH POS     Crossmatch Interpretation Compatible     Dispense Status PT     Blood Expiration Date [id number]     Blood Type Barcode 5100    Duplex Venous Lower Extremity - Bilateral CAR    Collection Time: 09/07/21  4:12 PM   Result Value Ref Range    Target HR (85%) 119 bpm    Max. Pred. HR (100%) 140 bpm    Right GastronemiusSoleal Vessel 1     Right Common Femoral Spont Y     Right Common Femoral Phasic Y     Right Common Femoral Augment Y     Right Common Femoral Competent Y     Right Common Femoral Compress C     Right Saphenofemoral Junction Compress C     Right Profunda Femoral Compress C     Right Proximal Femoral Compress C     Right Mid Femoral Spont Y     Right Mid Femoral Phasic Y     Right Mid Femoral Augment Y     Right Mid Femoral Competent Y     Right Mid Femoral Compress C     Right Distal Femoral Compress C     Right Popliteal Spont Y     Right Popliteal Phasic Y     Right Popliteal Augment Y     Right Popliteal Competent Y      Right Popliteal Compress C     Right Posterior Tibial Compress C     Right Peroneal Compress C     Right GastronemiusSoleal Compress N     Right GastronemiusSoleal Thrombus A     Right Greater Saph AK Compress C     Right Greater Saph BK Compress C     Right Lesser Saph Compress C     Left Common Femoral Spont Y     Left Common Femoral Phasic Y     Left Common Femoral Augment Y     Left Common Femoral Competent Y     Left Common Femoral Compress C     Left Saphenofemoral Junction Compress C     Left Profunda Femoral Compress C     Left Proximal Femoral Compress C     Left Mid Femoral Spont Y     Left Mid Femoral Phasic Y     Left Mid Femoral Augment Y     Left Mid Femoral Competent Y     Left Mid Femoral Compress C     Left Distal Femoral Compress C     Left Popliteal Spont Y     Left Popliteal Phasic Y     Left Popliteal Augment Y     Left Popliteal Competent Y     Left Popliteal Compress C     Left Posterior Tibial Compress C     Left Peroneal Compress C     Left GastronemiusSoleal Compress C     Left Greater Saph AK Compress C     Left Greater Saph BK Compress C     Left Lesser Saph Compress C        Radiology  Duplex Venous Lower Extremity - Bilateral CAR    Result Date: 9/7/2021  · Acute right lower extremity deep vein thrombosis noted in the gastrocnemius. · All other veins appeared normal bilaterally.        Medical Decision Making:  ED Course as of Sep 08 0228   Mon Sep 06, 2021   1512 EKG interpreted by myself.  Time 1500.  Atrial fibrillation.  Ventricularly paced rhythm.  Heart rate 60.  Negative Sgarbossa criteria.    [TD]   1513 On medical chart review, old EKG obtained from 2/17/2019.  Heart rate 62.  Atrial fibrillation.  Ventricularly paced.    [TD]   1514 Medical records reviewed.  Labs sent to ER with patient show patient's hemoglobin today was 6.8, creatinine 2.3, BUN 75, INR 1.5.    [KA]   1519 My interpretation of the chest x-ray is no acute infiltrate, cardiomegaly, pacemaker in the left upper  chest, mild vascular congestion.    [KA]   1621 Chronic, baseline   Creatinine(!): 2.50 [KA]   1645 I discussed the case with Dr. Escobar, hospitalist.  We reviewed the patient's labs and history.  I let him know that Dr. Pressley is aware of the patient's presence.    [TD]      ED Course User Index  [KA] Melody Shah PA  [TD] Christiano Harrison II, MD       Patient does have chronic knee dysfunction which may be causing or contributing to her anemia.    PPE: Both the patient and I wore a surgical mask throughout the entire patient encounter. I wore protective goggles.     Diagnosis  Final diagnoses:   Symptomatic anemia        Christiano Harrison II, MD  09/08/21 0228       Christiano Harrison II, MD  09/08/21 0229

## 2021-09-08 NOTE — PROGRESS NOTES
Continued Stay Note  [facility]     Patient Name: Janel Mahoney  MRN: [id number]  Today's Date: 9/8/2021    Admit Date: 9/6/2021    Discharge Plan     Row Name 09/08/21 1600       Plan    Plan  [facility]    Plan Comments  Spoke w/ Silvia, pt is from skilled unit at [facility] no bed hold, but may return when ready. CCP will assist w/ dc transportation.        Discharge Codes    No documentation.             Amparo Le RN

## 2021-09-08 NOTE — PROGRESS NOTES
Name: Janel Mahoney ADMIT: 2021   : 1940  PCP: Ariel Matute MD    MRN: [id number] LOS: 2 days   AGE/SEX: 80 y.o. female  ROOM: [facility]     Subjective   Subjective    No new complaints. Hard of hearing. States her hearing aids do not work.    Review of Systems   Constitutional: Negative for fever.   Respiratory: Negative for cough and shortness of breath.    Cardiovascular: Negative for chest pain.   Gastrointestinal: Negative for abdominal pain.   Musculoskeletal: Positive for arthralgias.      Objective   Objective   Vital Signs  Temp:  [98.4 °F (36.9 °C)-99 °F (37.2 °C)] 98.4 °F (36.9 °C)  Heart Rate:  [59-67] 67  Resp:  [16-20] 16  BP: (124-139)/(44-64) 137/44  SpO2:  [95 %-99 %] 97 %  on   ;   Device (Oxygen Therapy): room air  Body mass index is 30.47 kg/m².    Physical Exam  Constitutional:       Appearance: Normal appearance.   HENT:      Head: Normocephalic and atraumatic.   Cardiovascular:      Rate and Rhythm: Normal rate and regular rhythm.   Pulmonary:      Effort: Pulmonary effort is normal. No respiratory distress.      Breath sounds: Normal breath sounds.   Abdominal:      General: Bowel sounds are normal. There is no distension.      Palpations: Abdomen is soft.      Tenderness: There is no abdominal tenderness. There is no guarding or rebound.   Musculoskeletal:         General: No swelling.      Right lower leg: No edema.      Left lower leg: No edema.   Skin:     General: Skin is warm and dry.   Neurological:      General: No focal deficit present.      Mental Status: She is alert and oriented to person, place, and time.   Psychiatric:         Mood and Affect: Mood normal.         Behavior: Behavior normal.     Results Review  I reviewed the patient's new clinical results.  Results from last 7 days   Lab Units 21  0444 21  1542   WBC 10*3/mm3 4.44 5.62   HEMOGLOBIN g/dL 7.9* 7.3*   PLATELETS 10*3/mm3 160 146     Results from last 7 days   Lab Units 21  1542    SODIUM mmol/L 139   POTASSIUM mmol/L 4.7   CHLORIDE mmol/L 100   CO2 mmol/L 31.2*   BUN mg/dL 73*   CREATININE mg/dL 2.50*   GLUCOSE mg/dL 126*     Lab Results   Component Value Date    ANIONGAP 7.8 09/06/2021     Estimated Creatinine Clearance: 17.1 mL/min (A) (by C-G formula based on SCr of 2.5 mg/dL (H)).    Results from last 7 days   Lab Units 09/06/21  1542   ALBUMIN g/dL 3.40*   BILIRUBIN mg/dL 0.3   ALK PHOS U/L 64   AST (SGOT) U/L 36*   ALT (SGPT) U/L 14     Results from last 7 days   Lab Units 09/06/21  1542   CALCIUM mg/dL 9.5   ALBUMIN g/dL 3.40*       No results found for: HGBA1C, POCGLU    Scheduled Meds  atorvastatin, 10 mg, Oral, Nightly  bumetanide, 2 mg, Oral, BID  calcitriol, 0.25 mcg, Oral, BID  carvedilol, 25 mg, Oral, BID  enoxaparin, 70 mg, Subcutaneous, Q24H  [START ON 9/9/2021] ferrous sulfate, 325 mg, Oral, Once per day on Mon Thu  pantoprazole, 40 mg, Oral, BID AC  ramipril, 5 mg, Oral, Daily  warfarin, 1 mg, Oral, Once per day on Mon Wed Fri  [START ON 9/9/2021] warfarin, 2 mg, Oral, Once per day on Sun Tue Thu Sat    Continuous Infusions  Pharmacy to dose warfarin,     PRN Meds  oxyCODONE-acetaminophen  •  Pharmacy to dose warfarin  •  polyethylene glycol  •  sodium chloride  •  zolpidem    Pharmacy to dose warfarin,     Diet  Diet Regular     Results from last 7 days   Lab Units 09/08/21  0444 09/07/21  0411 09/06/21  1542   INR  2.05* 1.81* 1.87*  1.93*      Results for orders placed during the hospital encounter of 09/06/21    Duplex Venous Lower Extremity - Bilateral CAR    Interpretation Summary  · Acute right lower extremity deep vein thrombosis noted in the gastrocnemius.  · All other veins appeared normal bilaterally.        Assessment/Plan     Active Hospital Problems    Diagnosis  POA   • **Symptomatic anemia [D64.9]  Yes   • Right leg DVT (CMS/HCC) [I82.401]  Unknown   • Mixed hyperlipidemia [E78.2]  Yes   • Chronic combined systolic and diastolic congestive heart failure  (CMS/HCC) [I50.42]  Yes   • Essential hypertension [I10]  Yes   • Murmur, heart [R01.1]  Yes   • Chronic atrial fibrillation (CMS/HCC) [I48.20]  Yes   • Coronary artery disease involving coronary bypass graft of native heart without angina pectoris [I25.810]  Yes   • Anemia in chronic kidney disease (CKD) [N18.9, D63.1]  Yes      Resolved Hospital Problems   No resolved problems to display.     80 y.o. female admitted with Symptomatic anemia. Recent right shoulder surgery    · Symptomatic anemia probably postop blood loss anemia and chronic anemia  · Recheck after 1 unit PRBCs yesterday hemoglobin 7.9 today, may need another unit if no better tomorrow  · IV iron per hematology  · Occult blood negative x1  · Doppler with acute right lower extremity DVT (gastrocnemius  · Lovenox bridge to therapeutic INR (1 more day)  · Chronic systolic and diastolic CHF  · Mild to moderate vascular congestion on chest x-ray  · Continue Bumex  · Chronic atrial fibrillation  · Warfarin anticoagulation  · CAD  · CKD 4 at baseline. Recheck tomorrow  · Warfarin/Lovenox for DVT prophylaxis  · Full code  · Discussed with patient, nursing staff and care team on multidisciplinary rounds and Dr. Vasquez  · Anticipate discharge to SNU facility in 1-2 days.    Murphy Estes MD  [facility]  09/08/21  11:47 EDT    I wore protective equipment throughout this patient encounter including a face mask, gloves, and protective eyewear.  Hand hygiene was performed before donning protective equipment and after removal when leaving the room.

## 2021-09-08 NOTE — TELEPHONE ENCOUNTER
Patients  called requesting new cpap order be sent to [facility], she has an old machine . I will have Dr Cardona sign order and send to [facility]

## 2021-09-09 ENCOUNTER — TELEPHONE (OUTPATIENT)
Dept: ONCOLOGY | Facility: CLINIC | Age: 81
End: 2021-09-09

## 2021-09-09 LAB
ABO GROUP BLD: NORMAL
ANION GAP SERPL CALCULATED.3IONS-SCNC: 10.8 MMOL/L (ref 5–15)
BASOPHILS # BLD AUTO: 0.02 10*3/MM3 (ref 0–0.2)
BASOPHILS NFR BLD AUTO: 0.5 % (ref 0–1.5)
BLD GP AB SCN SERPL QL: NEGATIVE
BUN SERPL-MCNC: 76 MG/DL (ref 8–23)
BUN/CREAT SERPL: 35.2 (ref 7–25)
CALCIUM SPEC-SCNC: 9 MG/DL (ref 8.6–10.5)
CHLORIDE SERPL-SCNC: 98 MMOL/L (ref 98–107)
CO2 SERPL-SCNC: 29.2 MMOL/L (ref 22–29)
CREAT SERPL-MCNC: 2.16 MG/DL (ref 0.57–1)
DEPRECATED RDW RBC AUTO: 56.9 FL (ref 37–54)
EOSINOPHIL # BLD AUTO: 0.2 10*3/MM3 (ref 0–0.4)
EOSINOPHIL NFR BLD AUTO: 4.7 % (ref 0.3–6.2)
ERYTHROCYTE [DISTWIDTH] IN BLOOD BY AUTOMATED COUNT: 16.1 % (ref 12.3–15.4)
GFR SERPL CREATININE-BSD FRML MDRD: 22 ML/MIN/1.73
GLUCOSE SERPL-MCNC: 85 MG/DL (ref 65–99)
HCT VFR BLD AUTO: 26.5 % (ref 34–46.6)
HGB BLD-MCNC: 8.2 G/DL (ref 12–15.9)
IMM GRANULOCYTES # BLD AUTO: 0.07 10*3/MM3 (ref 0–0.05)
IMM GRANULOCYTES NFR BLD AUTO: 1.6 % (ref 0–0.5)
INR PPP: 2.1 (ref 0.9–1.1)
LYMPHOCYTES # BLD AUTO: 0.6 10*3/MM3 (ref 0.7–3.1)
LYMPHOCYTES NFR BLD AUTO: 14 % (ref 19.6–45.3)
MCH RBC QN AUTO: 29.9 PG (ref 26.6–33)
MCHC RBC AUTO-ENTMCNC: 30.9 G/DL (ref 31.5–35.7)
MCV RBC AUTO: 96.7 FL (ref 79–97)
MONOCYTES # BLD AUTO: 0.41 10*3/MM3 (ref 0.1–0.9)
MONOCYTES NFR BLD AUTO: 9.6 % (ref 5–12)
NEUTROPHILS NFR BLD AUTO: 2.98 10*3/MM3 (ref 1.7–7)
NEUTROPHILS NFR BLD AUTO: 69.6 % (ref 42.7–76)
NRBC BLD AUTO-RTO: 0 /100 WBC (ref 0–0.2)
PLATELET # BLD AUTO: 174 10*3/MM3 (ref 140–450)
PMV BLD AUTO: 9.9 FL (ref 6–12)
POTASSIUM SERPL-SCNC: 3.9 MMOL/L (ref 3.5–5.2)
PROTHROMBIN TIME: 23.3 SECONDS (ref 11.7–14.2)
RBC # BLD AUTO: 2.74 10*6/MM3 (ref 3.77–5.28)
RH BLD: POSITIVE
SODIUM SERPL-SCNC: 138 MMOL/L (ref 136–145)
T&S EXPIRATION DATE: NORMAL
WBC # BLD AUTO: 4.28 10*3/MM3 (ref 3.4–10.8)

## 2021-09-09 PROCEDURE — 80048 BASIC METABOLIC PNL TOTAL CA: CPT | Performed by: HOSPITALIST

## 2021-09-09 PROCEDURE — 97530 THERAPEUTIC ACTIVITIES: CPT

## 2021-09-09 PROCEDURE — 86900 BLOOD TYPING SEROLOGIC ABO: CPT | Performed by: INTERNAL MEDICINE

## 2021-09-09 PROCEDURE — 85610 PROTHROMBIN TIME: CPT | Performed by: INTERNAL MEDICINE

## 2021-09-09 PROCEDURE — P9016 RBC LEUKOCYTES REDUCED: HCPCS

## 2021-09-09 PROCEDURE — 86901 BLOOD TYPING SEROLOGIC RH(D): CPT | Performed by: INTERNAL MEDICINE

## 2021-09-09 PROCEDURE — 86923 COMPATIBILITY TEST ELECTRIC: CPT

## 2021-09-09 PROCEDURE — 86850 RBC ANTIBODY SCREEN: CPT | Performed by: INTERNAL MEDICINE

## 2021-09-09 PROCEDURE — 99232 SBSQ HOSP IP/OBS MODERATE 35: CPT | Performed by: INTERNAL MEDICINE

## 2021-09-09 PROCEDURE — 36430 TRANSFUSION BLD/BLD COMPNT: CPT

## 2021-09-09 PROCEDURE — 85025 COMPLETE CBC W/AUTO DIFF WBC: CPT | Performed by: INTERNAL MEDICINE

## 2021-09-09 PROCEDURE — 97162 PT EVAL MOD COMPLEX 30 MIN: CPT

## 2021-09-09 PROCEDURE — 86900 BLOOD TYPING SEROLOGIC ABO: CPT

## 2021-09-09 RX ADMIN — FERROUS SULFATE TAB 325 MG (65 MG ELEMENTAL FE) 325 MG: 325 (65 FE) TAB at 08:21

## 2021-09-09 RX ADMIN — ZOLPIDEM TARTRATE 5 MG: 5 TABLET ORAL at 21:30

## 2021-09-09 RX ADMIN — OXYCODONE AND ACETAMINOPHEN 1 TABLET: 5; 325 TABLET ORAL at 02:24

## 2021-09-09 RX ADMIN — BUMETANIDE 2 MG: 2 TABLET ORAL at 08:21

## 2021-09-09 RX ADMIN — RAMIPRIL 5 MG: 5 CAPSULE ORAL at 08:21

## 2021-09-09 RX ADMIN — BUMETANIDE 2 MG: 2 TABLET ORAL at 18:50

## 2021-09-09 RX ADMIN — CALCITRIOL 0.25 MCG: 0.25 CAPSULE ORAL at 08:21

## 2021-09-09 RX ADMIN — PANTOPRAZOLE SODIUM 40 MG: 40 TABLET, DELAYED RELEASE ORAL at 18:51

## 2021-09-09 RX ADMIN — OXYCODONE AND ACETAMINOPHEN 1 TABLET: 5; 325 TABLET ORAL at 08:21

## 2021-09-09 RX ADMIN — ATORVASTATIN CALCIUM 10 MG: 20 TABLET, FILM COATED ORAL at 21:28

## 2021-09-09 RX ADMIN — PANTOPRAZOLE SODIUM 40 MG: 40 TABLET, DELAYED RELEASE ORAL at 06:30

## 2021-09-09 RX ADMIN — CALCITRIOL 0.25 MCG: 0.25 CAPSULE ORAL at 21:28

## 2021-09-09 RX ADMIN — POLYETHYLENE GLYCOL 3350 17 G: 17 POWDER, FOR SOLUTION ORAL at 08:21

## 2021-09-09 RX ADMIN — CARVEDILOL 25 MG: 25 TABLET, FILM COATED ORAL at 21:28

## 2021-09-09 RX ADMIN — ZOLPIDEM TARTRATE 5 MG: 5 TABLET ORAL at 02:23

## 2021-09-09 RX ADMIN — CARVEDILOL 25 MG: 25 TABLET, FILM COATED ORAL at 08:21

## 2021-09-09 RX ADMIN — WARFARIN 2 MG: 2 TABLET ORAL at 18:50

## 2021-09-09 RX ADMIN — OXYCODONE AND ACETAMINOPHEN 1 TABLET: 5; 325 TABLET ORAL at 18:52

## 2021-09-09 NOTE — PLAN OF CARE
Goal Outcome Evaluation:  Plan of Care Reviewed With: patient           Outcome Summary: Pt readmitted to hospital from rehab at SNU w/ symptomatic anemia. H/o R total shoulder arthroplasty 8/31, R UE in sling.  Today pt demonstrates weakness, impaired balance, and ind. w/ mobility requiring moderate-min assist for standing and taking few shuffling steps in room.  Cues for safety and following shoulder precautions.  Recommend DC back to SNU.    ..Patient was intermittently wearing a face mask during this therapy encounter. Therapist used appropriate personal protective equipment including eye protection, mask, and gloves.  Mask used was standard procedure mask. Appropriate PPE was worn during the entire therapy session. Hand hygiene was completed before and after therapy session. Patient is not in enhanced droplet precautions.

## 2021-09-09 NOTE — PROGRESS NOTES
Subjective     CHIEF COMPLAINT:     Anemia  Right lower extremity DVT    INTERVAL HISTORY:     Patient was seen with  at bedside.  She denies pain in the right lower extremity.  She continues to have problem with bruising.    REVIEW OF SYSTEMS:  Review of systems is significant for the following.  Rest of ROS is negative.    GI: Constipation improved.  HEME: Easy bruisability    SCHEDULED MEDS:  atorvastatin, 10 mg, Oral, Nightly  bumetanide, 2 mg, Oral, BID  calcitriol, 0.25 mcg, Oral, BID  carvedilol, 25 mg, Oral, BID  enoxaparin, 70 mg, Subcutaneous, Q24H  ferrous sulfate, 325 mg, Oral, Once per day on Mon Thu  pantoprazole, 40 mg, Oral, BID AC  ramipril, 5 mg, Oral, Daily  warfarin, 1 mg, Oral, Once per day on Mon Wed Fri  warfarin, 2 mg, Oral, Once per day on Sun Tue Thu Sat      INFUSIONS:  Pharmacy to dose warfarin,       PRN MEDS:  oxyCODONE-acetaminophen  •  Pharmacy to dose warfarin  •  polyethylene glycol  •  sodium chloride  •  zolpidem       Objective   VITAL SIGNS:  Temp:  [97.2 °F (36.2 °C)-99.3 °F (37.4 °C)] 97.9 °F (36.6 °C)  Heart Rate:  [59-60] 60  Resp:  [16] 16  BP: (117-148)/(40-54) 148/54       PHYSICAL EXAMINATION:  GENERAL: Weak.  Not in acute distress.  SKIN: Old ecchymosis over the upper and lower extremities  HEAD:  Normocephalic.  EARS: Patient is hard of hearing.  EYES:  No Jaundice. Pallor.   NECK:  Supple. No Masses.  CHEST: Normal respiratory effort.    CARDIAC: No edema.  ABDOMEN: Nondistended.  EXTREMITIES: Right calf tenderness.  No erythema or warmth.      RESULT REVIEW:   Results from last 7 days   Lab Units 09/09/21  0427 09/08/21  0444 09/06/21  1542   WBC 10*3/mm3 4.28 4.44 5.62   NEUTROS ABS 10*3/mm3 2.98 3.21 4.46   HEMOGLOBIN g/dL 8.2* 7.9* 7.3*   HEMATOCRIT % 26.5* 24.9* 22.9*   PLATELETS 10*3/mm3 174 160 146     Results from last 7 days   Lab Units 09/09/21  0427 09/06/21  1542   SODIUM mmol/L 138 139   POTASSIUM mmol/L 3.9 4.7   CHLORIDE mmol/L 98 100   CO2  mmol/L 29.2* 31.2*   BUN mg/dL 76* 73*   CREATININE mg/dL 2.16* 2.50*   CALCIUM mg/dL 9.0 9.5   ALBUMIN g/dL  --  3.40*   BILIRUBIN mg/dL  --  0.3   ALK PHOS U/L  --  64   ALT (SGPT) U/L  --  14   AST (SGOT) U/L  --  36*     Results from last 7 days   Lab Units 09/09/21  0427 09/08/21  0444 09/07/21  0411 09/06/21  1542   INR  2.10* 2.05* 1.81* 1.87*  1.93*   APTT seconds  --   --   --  42.0*     Component      Latest Ref Rng & Units 7/13/2021 9/8/2021   Iron      37 - 145 mcg/dL 89 25 (L)   Iron Saturation      20 - 50 % 30 11 (L)   Transferrin      200 - 360 mg/dL 214 153 (L)   TIBC      298 - 536 mcg/dL 300 228 (L)   Ferritin      13.00 - 150.00 ng/mL 513.20 (H) 503.00 (H)       Assessment/Plan   *Acute on chronic anemia.   · Patient has anemia of chronic kidney disease. She receives Procrit at our office as below.  · Hemoglobin was 8.4 on 9/1/2021.  · Hemoglobin decreased to 7.3 on 9/6/2021.   · No obvious sign of active bleeding.  However, the patient had a right shoulder surgery on 8/31/2021 that may have resulted in blood losses.  · Stool Hemoccult was negative x1.  · Patient received 1 unit PRBC transfusion.  · Hemoglobin is 7.9 today.  · Iron panel revealed low transferrin saturation of 11%.  Ferritin is elevated at 503.  · Based on the transferrin saturation of 11%, I recommended IV Venofer.  · She was given 1 dose of IV Venofer 300 mg on 9/8/2021.     *Anemia of chronic kidney disease, stage III.    · Patient is on Procrit monthly.    · Iron stores from 7/13/2021 were adequate.  Ferritin was 513 and this represented acute phase reaction.   · Patient is on ferrous sulfate 325 mg 2 days a week to maintain adequate iron stores.  · Last Procrit dose was 6000 units on 8/10/2021 for hemoglobin of 9.0.  · Patient was given Procrit 10,000 units on 9/7/2021.  · Hemoglobin was 7.9 on 9/8/2021.  · Patient was given IV Venofer 300 mg on 9/8/2021.  · Hemoglobin improved to 8.2 today.  · Due to the hemoglobin level  and the patient's significant fatigue, I recommended 1 unit PRBC transfusion.     *Thrombocytopenia attributed to B12 deficiency.    · Platelet count is usually in the 100,000-150,000 range.    · Platelets improved to 174,000 today.     *Chronic anticoagulation with Coumadin.   · This is being managed by her cardiologist and the warfarin clinic.    · Patient has ecchymosis over the upper and lower extremities.   · INR was 1.93 on admission on 9/6/2021.   · INR was 1.81 on 9/7/2021.    · INR increased to 2.10 today.     *Acute DVT in the right gastrocnemius vein diagnosed on 9/7/2021.    · Developed while on subtherapeutic Coumadin.    · This may have developed while off Coumadin due to recent right shoulder surgery.    · Patient also reports being inactive recently.  · Patient was started on Lovenox on 9/7/2021.   · Patient has tenderness in the right calf.     PLAN:     1.  Transfuse 1 unit of PRBCs.  2.  Discontinue Lovenox.  3.  Okay for discharge later today.  4.  We will schedule patient for CBC with Procrit injection in 2 weeks.    Discussed with  at bedside.  Discussed with Dr. Estes.    Edward Vasquez MD  09/09/21

## 2021-09-09 NOTE — PROGRESS NOTES
[facility] Progress Note    09/09/21 18:35 EDT        events noted.  Urine remains clear.  Leave in [place] for now unless she remains here for any further prolonged period of time then another voiding trial.  We will plan to likely send her to rehab with a catheter in let them give her a voiding trial when she is more ambulatory and upright.

## 2021-09-09 NOTE — PROGRESS NOTES
Discharge Planning Assessment  [facility]     Patient Name: Janel Mahoney  MRN: [id number]  Today's Date: 9/9/2021    Admit Date: 9/6/2021    Discharge Needs Assessment    No documentation.       Discharge Plan     Row Name 09/09/21 1538       Plan    Plan  [facility] skilled nursing    Plan Comments  spoke with Silvia/Fadia patient can return and will need transporation. Jerry MORALEZ        Continued Care and Services - Admitted Since 9/6/2021     Destination Coordination complete.    Service Provider Request Status Selected Services Address Phone Fax Patient Preferred    [facility]   Selected Skilled Nursing [address] [facility] [address] [phone] --            Selected Continued Care - Episodes Includes selections from active Coordinated Care Management episodes    High Risk Care Management Episode start date: 9/2/2021 (Paused)   There are no active outsourced providers for this episode.             Selected Continued Care - Prior Encounters Includes selections from prior encounters from 6/8/2021 to 9/9/2021    Discharged on 9/1/2021 Admission date: 8/31/2021 - Discharge disposition: Skilled Nursing Facility (DC - External)    Destination     Service Provider Selected Services Address Phone Fax Patient Preferred    [facility]  Skilled Nursing [address] [facility] [address] [phone]                     Expected Discharge Date and Time     Expected Discharge Date Expected Discharge Time    Sep 10, 2021         Demographic Summary    No documentation.       Functional Status    No documentation.       Psychosocial    No documentation.       Abuse/Neglect    No documentation.       Legal    No documentation.       Substance Abuse    No documentation.       Patient Forms    No documentation.           Fátima Ordoñez, RN

## 2021-09-09 NOTE — PROGRESS NOTES
Pharmacy Consult: Warfarin Dosing/ Monitoring    Janel Mahoney is a 80 y.o. female, estimated creatinine clearance is 19.8 mL/min (A) (by C-G formula based on SCr of 2.16 mg/dL (H)). weighing 75.6 kg (166 lb 9.6 oz).     has a past medical history of Acute kidney injury (CMS/HCC), Anemia, Atrial fibrillation (CMS/HCC), Bruises easily, Carotid artery stenosis, Chronic back pain, Chronic combined systolic and diastolic congestive heart failure (CMS/HCC), Chronic coronary artery disease, Chronic kidney disease, stage 3 (CMS/HCC), Dysphagia, GERD (gastroesophageal reflux disease), Gout, H/O cardiac murmur, Hematoma, [place] (hard of hearing), Hyperlipidemia, Hypertension, Hypotension, ICD (implantable cardioverter-defibrillator) in place, Ischemic cardiomyopathy, Kyphoscoliosis, Leukopenia, Lumbar spondylosis, Obesity, GEORGINA (obstructive sleep apnea) (2013), Osteoarthritis, Osteoporosis, Peptic ulcer, Premature ventricular contractions, Renal insufficiency syndrome, Right shoulder pain (2021), Scoliosis, Shoulder fracture, left, Shoulder pain, right, Thrombocytopenia (CMS/HCC), Urine incontinence, Ventricular tachycardia (CMS/HCC), Vitamin B12 deficiency, and Warfarin anticoagulation.    Social History     Tobacco Use   • Smoking status: Former Smoker     Packs/day: 1.00     Years: 50.00     Pack years: 50.00     Types: Cigarettes     Quit date: 2009     Years since quittin.6   • Smokeless tobacco: Never Used   • Tobacco comment: Daily caffeine - soda   Vaping Use   • Vaping Use: Never used   Substance Use Topics   • Alcohol use: Yes     Comment: 1 yearly   • Drug use: Never       Results from last 7 days   Lab Units 21  0427 21  0444 21  0411 21  1542   INR  2.10* 2.05* 1.81* 1.87*  1.93*   APTT seconds  --   --   --  42.0*   HEMOGLOBIN g/dL 8.2* 7.9*  --  7.3*   HEMATOCRIT % 26.5* 24.9*  --  22.9*   PLATELETS 10*3/mm3 174 160  --  146     Results from last 7 days   Lab Units  "09/09/21  0427 09/06/21  1542   SODIUM mmol/L 138 139   POTASSIUM mmol/L 3.9 4.7   CHLORIDE mmol/L 98 100   CO2 mmol/L 29.2* 31.2*   BUN mg/dL 76* 73*   CREATININE mg/dL 2.16* 2.50*   CALCIUM mg/dL 9.0 9.5   BILIRUBIN mg/dL  --  0.3   ALK PHOS U/L  --  64   ALT (SGPT) U/L  --  14   AST (SGOT) U/L  --  36*   GLUCOSE mg/dL 85 126*     Anticoagulation history: Per most recent encounter with [facility] anticoagulation clinic on 8/24/21, patient takes warfarin 1 mg on MWF and 2 mg all other days (11 mg weekly).    Hospital Anticoagulation:  Consulting provider: Dr. Escobar  Start date: 9/6/21  Indication: \"Other - full anticoagulation\"  Target INR: 2-3  Expected duration: Indefinite   Bridge Therapy: No               Date 9/6 9/7 9/8 9/9         INR 1.87 1.81 2.05 2.10         Warfarin dose 1 mg 2.5 mg 1mg 2 mg           Potential drug interactions:  • Acetaminophen - may enhance the anticoagulation effect of warfarin, typically at doses >2 g/day.    Relevant nutrition status: Regular diet      Assessment/Plan:  INR is therapeutic at 2.10 - continue home regimen of 1 mg on MWF and 2 mg all other days.  Monitor and follow up on daily CBC, PT/INR, signs or symptoms of bleeding.    Pharmacy will continue to follow until discharge or discontinuation of warfarin.     James Bruno, PharmD  9/9/2021      "

## 2021-09-09 NOTE — PLAN OF CARE
Goal Outcome Evaluation:  Plan of Care Reviewed With: patient        Progress: no change  Outcome Summary: VSS. c/o pain and not being able to sleep good around 2 am. Prn pain med and ambien given. Refused to wear home cpap. No acute changes noted. Resting well in bed at this time. Will continue to monitor.

## 2021-09-09 NOTE — TELEPHONE ENCOUNTER
PER STAFF MSG DR. GALLARDO WANTED PT TO COME IN IN 2 WKS FOR STAT LABS AND PROCRIT INJ- CALLED NO ANSWER - MAILED OUT APPT

## 2021-09-09 NOTE — THERAPY EVALUATION
Patient Name: Janel Mahoney  : 1940    MRN: [id number]                              Today's Date: 2021       Admit Date: 2021    Visit Dx:     ICD-10-CM ICD-9-CM   1. Symptomatic anemia  D64.9 285.9     Patient Active Problem List   Diagnosis   • Anemia in chronic kidney disease (CKD)   • Thrombocytopenia (CMS/HCC)   • B12 deficiency   • Coronary artery disease involving coronary bypass graft of native heart without angina pectoris   • S/P MVR (porcine)   • Chronic atrial fibrillation (CMS/HCC)   • Bilateral carotid artery disease (CMS/HCC)   • Intractable low back pain   • Long-term (current) use of anticoagulants   • Low back pain   • Osteoporosis   • Murmur, heart   • GEORGINA on auto CPAP - Dr Cardona   • Hypersomnia due to medical condition - GEORGINA   • Esophageal stricture   • Dysphagia   • Closed fracture of left proximal humerus   • Essential hypertension   • Supratherapeutic INR   • Chronic combined systolic and diastolic congestive heart failure (CMS/HCC)   • Osteoporosis with pathological fracture   • Closed 3-part fracture of proximal end of left humerus   • Closed fracture of proximal end of humerus with delayed healing   • Injury of right knee   • Knee injury, left, initial encounter   • History of fall   • S/P TKR (total knee replacement) using cement, left   • Ischemic cardiomyopathy   • Intramuscular hematoma right pecotralis   • CKD (chronic kidney disease) stage 4, GFR 15-29 ml/min (CMS/HCC)   • Peripheral edema   • Inflammatory arthritis   • Ventricular tachycardia (CMS/HCC)   • S/P revision of total knee   • Idiopathic gout   • Psychophysiological insomnia   • ICD (implantable cardioverter-defibrillator) in place   • Status post reverse arthroplasty of right shoulder   • Mixed hyperlipidemia   • Symptomatic anemia   • Right leg DVT (CMS/HCC)     Past Medical History:   Diagnosis Date   • Acute kidney injury (CMS/HCC)    • Anemia     on procrit   • Atrial fibrillation (CMS/HCC)    •  Bruises easily    • Carotid artery stenosis    • Chronic back pain    • Chronic combined systolic and diastolic congestive heart failure (CMS/HCC)    • Chronic coronary artery disease     moderate to severe LV dysfunction.  Sees Dr. Dominguez   • Chronic kidney disease, stage 3 (CMS/HCC)    • Dysphagia    • GERD (gastroesophageal reflux disease)    • Gout    • H/O cardiac murmur    • Hematoma     LEFT LEG; DR ALONZO AWARE   • [facility] (hard of hearing)     wears hearing aids   • Hyperlipidemia    • Hypertension    • Hypotension    • ICD (implantable cardioverter-defibrillator) in place    • Ischemic cardiomyopathy    • Kyphoscoliosis    • Leukopenia    • Lumbar spondylosis    • Obesity    • GEORGINA (obstructive sleep apnea) 12/01/2013    Overnight polysomnogram, weight 168 pounds.  AHI mildly abnormal at 10.3 events per hour.  Respiratory effort related arousals 9.5 events per hour.  Total time snoring 30% and arousals associated with snoring 15 events per hour.          Bring machine DOS   • Osteoarthritis    • Osteoporosis    • Peptic ulcer    • Premature ventricular contractions    • Renal insufficiency syndrome    • Right shoulder pain 08/2021   • Scoliosis    • Shoulder fracture, left    • Shoulder pain, right    • Thrombocytopenia (CMS/HCC)    • Urine incontinence     pads   • Ventricular tachycardia (CMS/HCC)    • Vitamin B12 deficiency    • Warfarin anticoagulation      Past Surgical History:   Procedure Laterality Date   • AV NODE ABLATION     • BREAST BIOPSY     • CARDIAC CATHETERIZATION      Showed severe mitral insufficiency and borderline coronary artery disease   • CARDIAC CATHETERIZATION      Showed an ejection fraction of 35%. She had occlusive disease of the right posterior LV branch and no other significant disease, treated medically.   • CARDIAC DEFIBRILLATOR PLACEMENT      Biventricular   • CARDIAC DEFIBRILLATOR PLACEMENT Left    • CARDIAC ELECTROPHYSIOLOGY PROCEDURE N/A 1/4/2019    Procedure: GENERATOR  CHANGE BI-V ICD   [place];  Surgeon: James Hwang MD;  Location: [facility] CATH INVASIVE LOCATION;  Service: Cardiology   • CARDIAC VALVE REPLACEMENT  2009    Done with stent placement   • CARDIOVERSION      multiple electrocardioversions.   • CAROTID ARTERY ANGIOPLASTY Right    • CATARACT EXTRACTION EXTRACAPSULAR W/ INTRAOCULAR LENS IMPLANTATION Bilateral    • COLONOSCOPY N/A 9/28/2017    Procedure: COLONOSCOPY TO CECUM;  Surgeon: Kevin Davis MD;  Location: [facility] ENDOSCOPY;  Service:    • CORONARY ANGIOPLASTY WITH STENT PLACEMENT  2009   • CORONARY ARTERY BYPASS GRAFT      single graft to the PDA   • CORONARY STENT PLACEMENT     • ENDOSCOPY N/A 9/28/2017    Procedure: ESOPHAGOGASTRODUODENOSCOPY ;  Surgeon: Kevin Davis MD;  Location: [facility] ENDOSCOPY;  Service:    • EYE SURGERY     • HEMORRHOIDECTOMY     • HYSTERECTOMY     • INCISION AND DRAINAGE TRUNK Right 11/27/2018    Procedure: EVACUATION OF RIGHT CHEST WALL HEMATOMA;  Surgeon: Juvencio Rodriguez MD;  Location: [facility] MAIN OR;  Service: General   • MITRAL VALVE REPLACEMENT  01/2010    #31 Epic porcine valve.   • THROMBOENDARTERECTOMY Right     carotid thromboendarterectomy    • TONSILLECTOMY      age 32   • TOTAL KNEE ARTHROPLASTY Left    • TOTAL KNEE ARTHROPLASTY REVISION Left 11/19/2019    Procedure: TOTAL KNEE ARTHROPLASTY REVISION LEFT;  Surgeon: Juvencio Pressley II, MD;  Location: [facility] MAIN OR;  Service: Orthopedics   • TOTAL SHOULDER ARTHROPLASTY W/ DISTAL CLAVICLE EXCISION Left 1/16/2018    Procedure: TOTAL SHOULDER REVERSE ARTHROPLASTY;  Surgeon: Bianka Quesada MD;  Location: [facility] MAIN OR;  Service:    • TOTAL SHOULDER ARTHROPLASTY W/ DISTAL CLAVICLE EXCISION Right 8/31/2021    Procedure: TOTAL SHOULDER REVERSE ARTHROPLASTY;  Surgeon: Juvencio Pressley II, MD;  Location: [facility] MAIN OR;  Service: Orthopedics;  Laterality: Right;     General Information     Row Name 09/09/21 1130          Physical Therapy Time and  Intention    Document Type  evaluation  -AR     Mode of Treatment  physical therapy  -AR     Row Name 09/09/21 1130          General Information    Patient Profile Reviewed  yes  -AR     Prior Level of Function  mod assist: R total shoulder 8/31, DC to SNU  -AR     Existing Precautions/Restrictions  fall;shoulder;right  -AR     Barriers to Rehab  none identified  -AR     Row Name 09/09/21 1130          Living Environment    Lives With  spouse admit from [facility]  -AR     Row Name 09/09/21 1130          Cognition    Orientation Status (Cognition)  oriented to;person;place  -AR     Row Name 09/09/21 1130          Safety Issues, Functional Mobility    Safety Issues Affecting Function (Mobility)  insight into deficits/self-awareness;judgment;safety precaution awareness  -AR     Impairments Affecting Function (Mobility)  balance;cognition;endurance/activity tolerance;strength;pain;range of motion (ROM)  -AR       User Key  (r) = Recorded By, (t) = Taken By, (c) = Cosigned By    Initials Name Provider Type    AR Hoda Qureshi PT Physical Therapist        Mobility     Row Name 09/09/21 1131          Bed Mobility    Bed Mobility  supine-sit;sit-supine  -AR     Supine-Sit [place] (Bed Mobility)  minimum assist (75% patient effort)  -AR     Sit-Supine [place] (Bed Mobility)  minimum assist (75% patient effort)  -AR     Assistive Device (Bed Mobility)  bed rails;head of bed elevated  -AR     Comment (Bed Mobility)  cues to maintain R shoulder precautions  -AR     Row Name 09/09/21 1131          Transfers    Comment (Transfers)  sit<>stand from bed twice and from commode  -AR     Row Name 09/09/21 1131          Bed-Chair Transfer    Bed-Chair [place] (Transfers)  minimum assist (75% patient effort)  -AR     Assistive Device (Bed-Chair Transfers)  -- cues for UE placement on bed/commode arm rest  -AR     Row Name 09/09/21 1131          Sit-Stand Transfer    Sit-Stand [place] (Transfers)  moderate assist (50%  patient effort)  -AR     Assistive Device (Sit-Stand Transfers)  -- UE support on bed/commode hand rail  -AR     Row Name 09/09/21 1131          Gait/Stairs (Locomotion)    [place] Level (Gait)  minimum assist (75% patient effort)  -AR     Assistive Device (Gait)  -- HHA, reachingfor UE support on rails  -AR     Distance in Feet (Gait)  few shuffling steps bed<>commode; limted by fatigue/weakness  -AR     Deviations/Abnormal Patterns (Gait)  gait speed decreased;festinating/shuffling  -AR     Bilateral Gait Deviations  heel strike decreased;forward flexed posture  -AR       User Key  (r) = Recorded By, (t) = Taken By, (c) = Cosigned By    Initials Name Provider Type    Hoda Suarez, PT Physical Therapist        Obj/Interventions     Row Name 09/09/21 1132          Range of Motion Comprehensive    Comment, General Range of Motion  B LE WFL  -AR     Row Name 09/09/21 1132          Strength Comprehensive (MMT)    Comment, General Manual Muscle Testing (MMT) Assessment  B LE 3+/5  -AR     Row Name 09/09/21 1132          Balance    Balance Assessment  sitting static balance;standing dynamic balance  -AR     Static Sitting Balance  WNL  -AR     Dynamic Standing Balance  severe impairment;supported  -AR       User Key  (r) = Recorded By, (t) = Taken By, (c) = Cosigned By    Initials Name Provider Type    Hoda Suarez, PT Physical Therapist        Goals/Plan     Row Name 09/09/21 1135          Bed Mobility Goal 1 (PT)    Activity/Assistive Device (Bed Mobility Goal 1, PT)  bed mobility activities, all  -AR     [place] Level/Cues Needed (Bed Mobility Goal 1, PT)  standby assist  -AR     Time Frame (Bed Mobility Goal 1, PT)  1 week  -AR     Row Name 09/09/21 1135          Transfer Goal 1 (PT)    Activity/Assistive Device (Transfer Goal 1, PT)  sit-to-stand/stand-to-sit;bed-to-chair/chair-to-bed;cane, quad  -AR     [place] Level/Cues Needed (Transfer Goal 1, PT)  contact guard assist  -AR     Time  Frame (Transfer Goal 1, PT)  1 week  -AR     Row Name 09/09/21 1135          Gait Training Goal 1 (PT)    Activity/Assistive Device (Gait Training Goal 1, PT)  gait (walking locomotion);cane, quad  -AR     [place] Level (Gait Training Goal 1, PT)  minimum assist (75% or more patient effort)  -AR     Distance (Gait Training Goal 1, PT)  25  -AR     Time Frame (Gait Training Goal 1, PT)  1 week  -AR       User Key  (r) = Recorded By, (t) = Taken By, (c) = Cosigned By    Initials Name Provider Type    AR Hoda Qureshi, PT Physical Therapist        Clinical Impression     Row Name 09/09/21 1133          Pain    Additional Documentation  Pain Scale: Numbers Pre/Post-Treatment (Group)  -AR     Row Name 09/09/21 1133          Pain Scale: Numbers Pre/Post-Treatment    Pretreatment Pain Rating  5/10  -AR     Posttreatment Pain Rating  5/10  -AR     Pain Location - Side  Right  -AR     Pain Location  shoulder  -AR     Pain Intervention(s)  Medication (See MAR);Repositioned  -AR     Row Name 09/09/21 1133          Plan of Care Review    Plan of Care Reviewed With  patient  -AR     Outcome Summary  Pt readmitted to hospital from rehab at [facility] w/ symptomatic anemia. H/o R total shoulder arthroplasty 8/31, R UE in sling.  Today pt demonstrates weakness, impaired balance, and ind. w/ mobility requiring moderate-min assist for standing and taking few shuffling steps in room.  Cues for safety and following shoulder precautions.  Recommend DC back to [facility].  -AR     Row Name 09/09/21 1133          Therapy Assessment/Plan (PT)    Rehab Potential (PT)  good, to achieve stated therapy goals  -AR     Criteria for Skilled Interventions Met (PT)  yes  -AR     Row Name 09/09/21 1133          Vital Signs    O2 Delivery Pre Treatment  room air  -AR     Row Name 09/09/21 1133          Positioning and Restraints    Pre-Treatment Position  in bed  -AR     Post Treatment Position  bed  -AR     In Bed  notified nsg;supine;call light within  reach;encouraged to call for assist;exit alarm on;with nsg  -AR       User Key  (r) = Recorded By, (t) = Taken By, (c) = Cosigned By    Initials Name Provider Type    Hoda Suarez PT Physical Therapist        Outcome Measures     Row Name 09/09/21 1136          How much help from another person do you currently need...    Turning from your back to your side while in flat bed without using bedrails?  3  -AR     Moving from lying on back to sitting on the side of a flat bed without bedrails?  2  -AR     Moving to and from a bed to a chair (including a wheelchair)?  2  -AR     Standing up from a chair using your arms (e.g., wheelchair, bedside chair)?  2  -AR     Climbing 3-5 steps with a railing?  1  -AR     To walk in hospital room?  1  -AR     AM-PAC 6 Clicks Score (PT)  11  -AR     Row Name 09/09/21 1136          Functional Assessment    Outcome Measure Options  AM-PAC 6 Clicks Basic Mobility (PT)  -AR       User Key  (r) = Recorded By, (t) = Taken By, (c) = Cosigned By    Initials Name Provider Type    Hoda Suarez PT Physical Therapist                       Physical Therapy Education                 Title: PT OT SLP Therapies (Not Started)     Topic: Physical Therapy (Not Started)     Point: Mobility training (In Progress)     Learning Progress Summary           Patient Acceptance, E, NR by AR at 9/9/2021 1136                   Point: Precautions (In Progress)     Learning Progress Summary           Patient Acceptance, E, NR by AR at 9/9/2021 1136                               User Key     Initials Effective Dates Name Provider Type [facility] 06/16/21 -  Hoda Qureshi PT Physical Therapist PT              PT Recommendation and Plan  Planned Therapy Interventions (PT): balance training, bed mobility training, gait training, home exercise program, patient/family education, transfer training, ROM (range of motion), stair training, strengthening  Plan of Care Reviewed With: patient  Outcome  Summary: Pt readmitted to hospital from rehab at [facility] w/ symptomatic anemia. H/o R total shoulder arthroplasty 8/31, R UE in sling.  Today pt demonstrates weakness, impaired balance, and ind. w/ mobility requiring moderate-min assist for standing and taking few shuffling steps in room.  Cues for safety and following shoulder precautions.  Recommend DC back to [facility].     Time Calculation:   PT Charges     Row Name 09/09/21 1129             Time Calculation    Start Time  0950  -AR      Stop Time  1015  -AR      Time Calculation (min)  25 min  -AR      PT Received On  09/09/21  -AR      PT - Next Appointment  09/10/21  -AR      PT Goal Re-Cert Due Date  09/16/21  -AR        User Key  (r) = Recorded By, (t) = Taken By, (c) = Cosigned By    Initials Name Provider Type    Hoda Suarez, PT Physical Therapist        Therapy Charges for Today     Code Description Service Date Service Provider Modifiers Qty    43264155868 HC PT EVAL MOD COMPLEXITY 2 9/9/2021 Hoda Qureshi, PT GP 1    89383775018 HC PT THERAPEUTIC ACT EA 15 MIN 9/9/2021 Hoda Qureshi, PT GP 1          PT G-Codes  Outcome Measure Options: AM-PAC 6 Clicks Basic Mobility (PT)  AM-PAC 6 Clicks Score (PT): 11    Hoda Qureshi PT  9/9/2021

## 2021-09-09 NOTE — PROGRESS NOTES
Name: Janel Mahoney ADMIT: 2021   : 1940  PCP: Ariel Matute MD    MRN: [id number] LOS: 3 days   AGE/SEX: 80 y.o. female  ROOM: [facility]     Subjective   Subjective    No new complaints. Family at bedside.    Review of Systems   Constitutional: Negative for fever.   Respiratory: Negative for cough and shortness of breath.    Cardiovascular: Negative for chest pain.   Gastrointestinal: Negative for abdominal pain.      Objective   Objective   Vital Signs  Temp:  [97.2 °F (36.2 °C)-99.3 °F (37.4 °C)] 97.9 °F (36.6 °C)  Heart Rate:  [59-60] 60  Resp:  [16] 16  BP: (117-148)/(40-54) 148/54  SpO2:  [92 %-100 %] 98 %  on   ;   Device (Oxygen Therapy): room air  Body mass index is 30.47 kg/m².    Physical Exam  Constitutional:       Appearance: Normal appearance.   HENT:      Head: Normocephalic and atraumatic.   Cardiovascular:      Rate and Rhythm: Normal rate and regular rhythm.   Pulmonary:      Effort: Pulmonary effort is normal. No respiratory distress.      Breath sounds: Normal breath sounds.   Abdominal:      General: Bowel sounds are normal. There is no distension.      Palpations: Abdomen is soft.      Tenderness: There is no abdominal tenderness. There is no guarding or rebound.   Musculoskeletal:         General: No swelling.      Right lower leg: No edema.      Left lower leg: No edema.   Skin:     General: Skin is warm and dry.   Neurological:      General: No focal deficit present.      Mental Status: She is alert and oriented to person, place, and time.   Psychiatric:         Mood and Affect: Mood normal.         Behavior: Behavior normal.     Results Review  I reviewed the patient's new clinical results.  Results from last 7 days   Lab Units 21  0427 21  0444 21  1542   WBC 10*3/mm3 4.28 4.44 5.62   HEMOGLOBIN g/dL 8.2* 7.9* 7.3*   PLATELETS 10*3/mm3 174 160 146     Results from last 7 days   Lab Units 21  0427 21  1542   SODIUM mmol/L 138 139   POTASSIUM  mmol/L 3.9 4.7   CHLORIDE mmol/L 98 100   CO2 mmol/L 29.2* 31.2*   BUN mg/dL 76* 73*   CREATININE mg/dL 2.16* 2.50*   GLUCOSE mg/dL 85 126*     Lab Results   Component Value Date    ANIONGAP 10.8 09/09/2021     Estimated Creatinine Clearance: 19.8 mL/min (A) (by C-G formula based on SCr of 2.16 mg/dL (H)).    Results from last 7 days   Lab Units 09/06/21  1542   ALBUMIN g/dL 3.40*   BILIRUBIN mg/dL 0.3   ALK PHOS U/L 64   AST (SGOT) U/L 36*   ALT (SGPT) U/L 14     Results from last 7 days   Lab Units 09/09/21  0427 09/06/21  1542   CALCIUM mg/dL 9.0 9.5   ALBUMIN g/dL  --  3.40*       No results found for: HGBA1C, POCGLU    Scheduled Meds  atorvastatin, 10 mg, Oral, Nightly  bumetanide, 2 mg, Oral, BID  calcitriol, 0.25 mcg, Oral, BID  carvedilol, 25 mg, Oral, BID  ferrous sulfate, 325 mg, Oral, Once per day on Mon Thu  pantoprazole, 40 mg, Oral, BID AC  ramipril, 5 mg, Oral, Daily  warfarin, 1 mg, Oral, Once per day on Mon Wed Fri  warfarin, 2 mg, Oral, Once per day on Sun Tue Thu Sat    Continuous Infusions  Pharmacy to dose warfarin,     PRN Meds  oxyCODONE-acetaminophen  •  Pharmacy to dose warfarin  •  polyethylene glycol  •  sodium chloride  •  zolpidem    Pharmacy to dose warfarin,     Diet  Diet Regular     Results from last 7 days   Lab Units 09/09/21  0427 09/08/21  0444 09/07/21  0411   INR  2.10* 2.05* 1.81*      Results for orders placed during the hospital encounter of 09/06/21    Duplex Venous Lower Extremity - Bilateral CAR    Interpretation Summary  · Acute right lower extremity deep vein thrombosis noted in the gastrocnemius.  · All other veins appeared normal bilaterally.      Assessment/Plan     Active Hospital Problems    Diagnosis  POA   • **Symptomatic anemia [D64.9]  Yes   • Right leg DVT (CMS/HCC) [I82.401]  Unknown   • Mixed hyperlipidemia [E78.2]  Yes   • Chronic combined systolic and diastolic congestive heart failure (CMS/HCC) [I50.42]  Yes   • Essential hypertension [I10]  Yes   •  Murmur, heart [R01.1]  Yes   • Chronic atrial fibrillation (CMS/HCC) [I48.20]  Yes   • Coronary artery disease involving coronary bypass graft of native heart without angina pectoris [I25.810]  Yes   • Anemia in chronic kidney disease (CKD) [N18.9, D63.1]  Yes      Resolved Hospital Problems   No resolved problems to display.     80 y.o. female admitted with Symptomatic anemia. Recent right shoulder surgery    · Symptomatic anemia probably postop blood loss anemia and chronic anemia  · Improved after 1 unit PRBCs, to get another unit today  · IV iron per hematology  · Occult blood negative x2  · Procrit injection 2 weeks with CBC  · Doppler with acute right lower extremity DVT gastrocnemius  · INR now therapeutic x2 days, stopping Lovenox  · Chronic systolic and diastolic CHF  · Mild to moderate vascular congestion on chest x-ray.   · Continue Bumex  · No dyspnea and lungs are clear  · Chronic atrial fibrillation  · Warfarin anticoagulation  · UR per urology   · Currently has a Galindo can stay in until she gets to rehab but if she is here for several days voiding trial here  · CAD  · CKD 4 at baseline. Recheck tomorrow  · Warfarin (home med) for DVT prophylaxis  · Full code  · Discussed with patient, family, nursing staff and care team on multidisciplinary rounds and Dr. Vasquez  · Anticipate discharge to SNU facility tomorrow. No transportation available today discussed with CCP    Murphy Estes MD  [facility]  09/09/21  13:15 EDT    I wore protective equipment throughout this patient encounter including a face mask, gloves, and protective eyewear.  Hand hygiene was performed before donning protective equipment and after removal when leaving the room.

## 2021-09-10 VITALS
RESPIRATION RATE: 18 BRPM | HEIGHT: 62 IN | TEMPERATURE: 97.6 F | OXYGEN SATURATION: 97 % | HEART RATE: 60 BPM | WEIGHT: 166.6 LBS | SYSTOLIC BLOOD PRESSURE: 141 MMHG | BODY MASS INDEX: 30.66 KG/M2 | DIASTOLIC BLOOD PRESSURE: 52 MMHG

## 2021-09-10 LAB
BASOPHILS # BLD AUTO: 0.03 10*3/MM3 (ref 0–0.2)
BASOPHILS NFR BLD AUTO: 0.6 % (ref 0–1.5)
BH BB BLOOD EXPIRATION DATE: NORMAL
BH BB BLOOD TYPE BARCODE: 5100
BH BB DISPENSE STATUS: NORMAL
BH BB PRODUCT CODE: NORMAL
BH BB UNIT NUMBER: NORMAL
CROSSMATCH INTERPRETATION: NORMAL
DEPRECATED RDW RBC AUTO: 56.9 FL (ref 37–54)
EOSINOPHIL # BLD AUTO: 0.19 10*3/MM3 (ref 0–0.4)
EOSINOPHIL NFR BLD AUTO: 4 % (ref 0.3–6.2)
ERYTHROCYTE [DISTWIDTH] IN BLOOD BY AUTOMATED COUNT: 16.7 % (ref 12.3–15.4)
HCT VFR BLD AUTO: 29.3 % (ref 34–46.6)
HGB BLD-MCNC: 9.4 G/DL (ref 12–15.9)
IMM GRANULOCYTES # BLD AUTO: 0.18 10*3/MM3 (ref 0–0.05)
IMM GRANULOCYTES NFR BLD AUTO: 3.8 % (ref 0–0.5)
INR PPP: 2.03 (ref 0.9–1.1)
LYMPHOCYTES # BLD AUTO: 0.75 10*3/MM3 (ref 0.7–3.1)
LYMPHOCYTES NFR BLD AUTO: 15.7 % (ref 19.6–45.3)
MCH RBC QN AUTO: 30.2 PG (ref 26.6–33)
MCHC RBC AUTO-ENTMCNC: 32.1 G/DL (ref 31.5–35.7)
MCV RBC AUTO: 94.2 FL (ref 79–97)
MONOCYTES # BLD AUTO: 0.41 10*3/MM3 (ref 0.1–0.9)
MONOCYTES NFR BLD AUTO: 8.6 % (ref 5–12)
NEUTROPHILS NFR BLD AUTO: 3.22 10*3/MM3 (ref 1.7–7)
NEUTROPHILS NFR BLD AUTO: 67.3 % (ref 42.7–76)
NRBC BLD AUTO-RTO: 0.2 /100 WBC (ref 0–0.2)
PLATELET # BLD AUTO: 178 10*3/MM3 (ref 140–450)
PMV BLD AUTO: 10.1 FL (ref 6–12)
PROTHROMBIN TIME: 22.7 SECONDS (ref 11.7–14.2)
RBC # BLD AUTO: 3.11 10*6/MM3 (ref 3.77–5.28)
UNIT  ABO: NORMAL
UNIT  RH: NORMAL
WBC # BLD AUTO: 4.78 10*3/MM3 (ref 3.4–10.8)

## 2021-09-10 PROCEDURE — 85025 COMPLETE CBC W/AUTO DIFF WBC: CPT | Performed by: INTERNAL MEDICINE

## 2021-09-10 PROCEDURE — 85610 PROTHROMBIN TIME: CPT | Performed by: INTERNAL MEDICINE

## 2021-09-10 PROCEDURE — 97530 THERAPEUTIC ACTIVITIES: CPT

## 2021-09-10 RX ORDER — POLYETHYLENE GLYCOL 3350 17 G/17G
17 POWDER, FOR SOLUTION ORAL DAILY PRN
Start: 2021-09-10

## 2021-09-10 RX ORDER — OXYCODONE HYDROCHLORIDE AND ACETAMINOPHEN 5; 325 MG/1; MG/1
1 TABLET ORAL EVERY 4 HOURS PRN
Qty: 10 TABLET | Refills: 0 | Status: SHIPPED | OUTPATIENT
Start: 2021-09-10 | End: 2021-12-21 | Stop reason: SDUPTHER

## 2021-09-10 RX ADMIN — OXYCODONE AND ACETAMINOPHEN 1 TABLET: 5; 325 TABLET ORAL at 12:49

## 2021-09-10 RX ADMIN — CALCITRIOL 0.25 MCG: 0.25 CAPSULE ORAL at 08:41

## 2021-09-10 RX ADMIN — BUMETANIDE 2 MG: 2 TABLET ORAL at 08:41

## 2021-09-10 RX ADMIN — CARVEDILOL 25 MG: 25 TABLET, FILM COATED ORAL at 08:41

## 2021-09-10 RX ADMIN — OXYCODONE AND ACETAMINOPHEN 1 TABLET: 5; 325 TABLET ORAL at 08:54

## 2021-09-10 RX ADMIN — OXYCODONE AND ACETAMINOPHEN 1 TABLET: 5; 325 TABLET ORAL at 01:29

## 2021-09-10 RX ADMIN — RAMIPRIL 5 MG: 5 CAPSULE ORAL at 08:41

## 2021-09-10 RX ADMIN — PANTOPRAZOLE SODIUM 40 MG: 40 TABLET, DELAYED RELEASE ORAL at 06:32

## 2021-09-10 NOTE — PROGRESS NOTES
"  Physicians Statement of Medical Necessity for  Ambulance Transportation    GENERAL INFORMATION     Name: Janel Mahoney  YOB: 1940  Medicare #: [id number]  Transport Date: 9/10/2021  Origin: [facility] S[facility]   Destination: [facility]   Is the Patient's stay covered under Medicare Part A (PPS/DRG?)Yes  Closest appropriate facility? Yes  If this a hosp-hosp transfer? No  Is this a hospice patient? No    MEDICAL NECESSITY QUESTIONAIRE    Ambulance Transportation is medically necessary only if other means of transportation are contraindicated or would be potentially harmful to the patient.  To meet this requirement, the patient must be either \"bed confined\" or suffer from a condition such that transport by means other than an ambulance is contraindicated by the patient's condition.  The following questions must be answered by the healthcare professional signing below for this form to be valid:     1) Describe the MEDICAL CONDITION (physical and/or mental) of this patient AT THE TIME OF AMBULANCE TRANSPORT that requires the patient to be transported in an ambulance, and why transport by other means is contraindicated by the patient's condition:   Active Hospital Problems    Diagnosis    • **Symptomatic anemia    • Right leg DVT (CMS/HCC)    • Mixed hyperlipidemia    • Chronic combined systolic and diastolic congestive heart failure (CMS/HCC)    • Essential hypertension    • Murmur, heart    • Chronic atrial fibrillation (CMS/HCC)    • Coronary artery disease involving coronary bypass graft of native heart without angina pectoris    • Anemia in chronic kidney disease (CKD)        Past Medical History:   Diagnosis Date   • Acute kidney injury (CMS/HCC)    • Anemia     on procrit   • Atrial fibrillation (CMS/HCC)    • Bruises easily    • Carotid artery stenosis    • Chronic back pain    • Chronic combined systolic and diastolic congestive heart failure (CMS/HCC)    • Chronic coronary " artery disease     moderate to severe LV dysfunction.  Sees Dr. Dominguez   • Chronic kidney disease, stage 3 (CMS/HCC)    • Dysphagia    • GERD (gastroesophageal reflux disease)    • Gout    • H/O cardiac murmur    • Hematoma     LEFT LEG; DR ALONZO AWARE   • [place] (hard of hearing)     wears hearing aids   • Hyperlipidemia    • Hypertension    • Hypotension    • ICD (implantable cardioverter-defibrillator) in place    • Ischemic cardiomyopathy    • Kyphoscoliosis    • Leukopenia    • Lumbar spondylosis    • Obesity    • GEORGINA (obstructive sleep apnea) 12/01/2013    Overnight polysomnogram, weight 168 pounds.  AHI mildly abnormal at 10.3 events per hour.  Respiratory effort related arousals 9.5 events per hour.  Total time snoring 30% and arousals associated with snoring 15 events per hour.          Bring machine DOS   • Osteoarthritis    • Osteoporosis    • Peptic ulcer    • Premature ventricular contractions    • Renal insufficiency syndrome    • Right shoulder pain 08/2021   • Scoliosis    • Shoulder fracture, left    • Shoulder pain, right    • Thrombocytopenia (CMS/HCC)    • Urine incontinence     pads   • Ventricular tachycardia (CMS/HCC)    • Vitamin B12 deficiency    • Warfarin anticoagulation       Past Surgical History:   Procedure Laterality Date   • AV NODE ABLATION     • BREAST BIOPSY     • CARDIAC CATHETERIZATION      Showed severe mitral insufficiency and borderline coronary artery disease   • CARDIAC CATHETERIZATION      Showed an ejection fraction of 35%. She had occlusive disease of the right posterior LV branch and no other significant disease, treated medically.   • CARDIAC DEFIBRILLATOR PLACEMENT      Biventricular   • CARDIAC DEFIBRILLATOR PLACEMENT Left    • CARDIAC ELECTROPHYSIOLOGY PROCEDURE N/A 1/4/2019    Procedure: GENERATOR CHANGE BI-V ICD   [place];  Surgeon: James Hwang MD;  Location: [facility] INVASIVE LOCATION;  Service: Cardiology   • CARDIAC VALVE REPLACEMENT  2009    Done  "with stent placement   • CARDIOVERSION      multiple electrocardioversions.   • CAROTID ARTERY ANGIOPLASTY Right    • CATARACT EXTRACTION EXTRACAPSULAR W/ INTRAOCULAR LENS IMPLANTATION Bilateral    • COLONOSCOPY N/A 9/28/2017    Procedure: COLONOSCOPY TO CECUM;  Surgeon: Kevin Davis MD;  Location: [facility] ENDOSCOPY;  Service:    • CORONARY ANGIOPLASTY WITH STENT PLACEMENT  2009   • CORONARY ARTERY BYPASS GRAFT      single graft to the PDA   • CORONARY STENT PLACEMENT     • ENDOSCOPY N/A 9/28/2017    Procedure: ESOPHAGOGASTRODUODENOSCOPY ;  Surgeon: Kevin Davis MD;  Location: [facility] ENDOSCOPY;  Service:    • EYE SURGERY     • HEMORRHOIDECTOMY     • HYSTERECTOMY     • INCISION AND DRAINAGE TRUNK Right 11/27/2018    Procedure: EVACUATION OF RIGHT CHEST WALL HEMATOMA;  Surgeon: Juvencio Rodriguez MD;  Location: [facility] OR;  Service: General   • MITRAL VALVE REPLACEMENT  01/2010    #31 Epic porcine valve.   • THROMBOENDARTERECTOMY Right     carotid thromboendarterectomy    • TONSILLECTOMY      age 32   • TOTAL KNEE ARTHROPLASTY Left    • TOTAL KNEE ARTHROPLASTY REVISION Left 11/19/2019    Procedure: TOTAL KNEE ARTHROPLASTY REVISION LEFT;  Surgeon: Juvencio Pressley II, MD;  Location: [facility] OR;  Service: Orthopedics   • TOTAL SHOULDER ARTHROPLASTY W/ DISTAL CLAVICLE EXCISION Left 1/16/2018    Procedure: TOTAL SHOULDER REVERSE ARTHROPLASTY;  Surgeon: Bianka Quesada MD;  Location: [facility] OR;  Service:    • TOTAL SHOULDER ARTHROPLASTY W/ DISTAL CLAVICLE EXCISION Right 8/31/2021    Procedure: TOTAL SHOULDER REVERSE ARTHROPLASTY;  Surgeon: Juvencio Pressley II, MD;  Location: [facility] OR;  Service: Orthopedics;  Laterality: Right;      2) Is this patient \"bed confined\" as defined below?Yes   To be \"bed confined\" the patient must satisfy all three of the following criteria:  (1) unable to get up from bed without assistance; AND (2) unable to ambulate;  AND (3) unable to sit in a " chair or wheelchair.  3) Can this patient safely be transported by car or wheelchair van (I.e., may safely sit during transport, without an attendant or monitoring?)No   4. In addition to completing questions 1-3 above, please check any of the following conditions that apply*:          *Note: supporting documentation for any boxes checked must be maintained in the patient's medical records Non-healed fractures and Unable to tolerate seated position for time needed to transport      SIGNATURE OF PHYSICIAN OR OTHER AUTHORIZED HEALTHCARE PROFESSIONAL    I certify that the above information is true and correct based on my evaluation of this patient, and represent that the patient requires transport by ambulance and that other forms of transport are contraindicated.  I understand that this information will be used by the Centers for Medicare and Medicaid Services (CMS) to support the determiniation of medical necessity for ambulance services, and I represent that I have personal knowledge of the patient's condition at the time of transport.       If this box is checked, I also certify that the patient is physically or mentally incapable of signing the ambulance service's claim form and that the institution with which I am affiliated has furnished care, services or assistance to the patient.  My signature below is made on behalf of the patient pursuant to 42 .36(b)(4). In accordance with 42 .37, the specific reason(s) that the patient is physically or mentally incapable of signing the claim for is as follows:     Signature of Physician or Healthcare Professional     Fátima Ordoñez RN   Date/Time:     09:56 EDT  9/10/2021       (For Scheduled repetitive transport, this form is not valid for transports performed more than 60 days after this date).                                                                                                                                             --------------------------------------------------------------------------------------------  Printed Name and Credentials of Physician or Authorized Healthcare Professional     *Form must be signed by patient's attending physician for scheduled, repetitive transports,.  For non-repetitive ambulance transports, if unable to obtain the signature of the attending physician, any of the following may sign (please select below):     Physician  Clinical Nurse Specialist  Registered Nurse     Physician Assistant  Discharge Planner  Licensed Practical Nurse     Nurse Practitioner

## 2021-09-10 NOTE — PROGRESS NOTES
Discharge Planning Assessment  [facility]     Patient Name: Janel Mahoney  MRN: [id number]  Today's Date: 9/10/2021    Admit Date: 9/6/2021    Discharge Needs Assessment    No documentation.       Discharge Plan     Row Name 09/10/21 1342       Plan    Plan  [facility] skilled rehab    Final Discharge Disposition Code  03 - skilled nursing facility (SNF)    Final Note  [facility] skilled nursing via [facility]        Continued Care and Services - Discharged on 9/10/2021 Admission date: 9/6/2021 - Discharge disposition: Skilled Nursing Facility (DC - External)    Destination Coordination complete.    Service Provider Request Status Selected Services Address Phone Fax Patient Preferred    [facility] [place]   Selected Skilled Nursing [address] [facility] [address] [phone] --            Selected Continued Care - Episodes Includes selections from active Coordinated Care Management episodes    High Risk Care Management Episode start date: 9/2/2021 (Paused)   There are no active outsourced providers for this episode.             Selected Continued Care - Prior Encounters Includes selections from prior encounters from 6/8/2021 to 9/10/2021    Discharged on 9/1/2021 Admission date: 8/31/2021 - Discharge disposition: Skilled Nursing Facility (DC - External)    Destination     Service Provider Selected Services Address Phone Fax Patient Preferred    [facility] [place]  Skilled Nursing [address] [facility] [address] [phone]                     Expected Discharge Date and Time     Expected Discharge Date Expected Discharge Time    Sep 10, 2021         Demographic Summary    No documentation.       Functional Status    No documentation.       Psychosocial    No documentation.       Abuse/Neglect    No documentation.       Legal    No documentation.       Substance Abuse    No documentation.       Patient Forms    No documentation.            Fátima Ordoñez RN

## 2021-09-10 NOTE — DISCHARGE SUMMARY
[facility]    Date of Discharge:  9/10/2021    PCP: Ariel Matute MD    Discharge Diagnosis:   Active Hospital Problems    Diagnosis  POA   • **Symptomatic anemia [D64.9]  Yes   • Right leg DVT (CMS/HCC) [I82.401]  Unknown   • Mixed hyperlipidemia [E78.2]  Yes   • Chronic combined systolic and diastolic congestive heart failure (CMS/HCC) [I50.42]  Yes   • Essential hypertension [I10]  Yes   • Murmur, heart [R01.1]  Yes   • Chronic atrial fibrillation (CMS/HCC) [I48.20]  Yes   • Coronary artery disease involving coronary bypass graft of native heart without angina pectoris [I25.810]  Yes   • Anemia in chronic kidney disease (CKD) [N18.9, D63.1]  Yes      Resolved Hospital Problems   No resolved problems to display.          Consults     Date and Time Order Name Status Description    9/7/2021 12:23 PM Inpatient Urology Consult Completed     9/6/2021  5:22 PM Inpatient Cardiology Consult Completed     9/6/2021  5:18 PM Hematology & Oncology Inpatient Consult Completed     9/6/2021  4:11 PM [facility] (on-call MD unless specified) Details Completed         Hospital Course  80 y.o. female with a history of recent right shoulder surgery (Dr. Pressley) admitted with symptomatic anemia from rehab facility. Occult blood was negative x2. Hematology also gave her IV iron. There is no evidence of ongoing bleeding. She did receive 2 units of PRBCs and hemoglobin today is 9.4. Hematology plans Procrit injection in 2 weeks.    She had urinary retention a Galindo catheter was placed. Urology recommends leaving it in for now and they recommend a voiding trial when she is more ambulatory and upright.    She was found to have acute right lower extremity gastrocnemius DVT. INR was slightly subtherapeutic and she was covered with Lovenox until her INR came up. She is on warfarin for atrial fibrillation. INR is below. Check INR tomorrow at rehab.    Results from last 7 days   Lab Units  09/10/21  0627 09/09/21  0427 09/08/21  0444   INR  2.03* 2.10* 2.05*      Chest x-ray showed mild to moderate vascular congestion on chest x-ray however her lungs were clear on exam and she denied any dyspnea. Her Bumex was continued.    I discussed the patient's findings and my recommendations with patient and nursing staff.    Condition on Discharge: Improved.     Temp:  [97.6 °F (36.4 °C)-99.1 °F (37.3 °C)] 97.6 °F (36.4 °C)  Heart Rate:  [60-62] 60  Resp:  [16-18] 18  BP: (133-149)/(46-76) 141/52  Body mass index is 30.47 kg/m².    Physical Exam  Constitutional:       Appearance: Normal appearance.   HENT:      Head: Normocephalic and atraumatic.   Cardiovascular:      Rate and Rhythm: Normal rate and regular rhythm.   Pulmonary:      Effort: Pulmonary effort is normal. No respiratory distress.      Breath sounds: Normal breath sounds. No wheezing or rales.   Abdominal:      General: Bowel sounds are normal. There is no distension.      Palpations: Abdomen is soft.      Tenderness: There is no abdominal tenderness. There is no guarding or rebound.   Musculoskeletal:      Right lower leg: No edema.      Left lower leg: No edema.   Skin:     General: Skin is warm and dry.   Neurological:      General: No focal deficit present.      Mental Status: She is alert and oriented to person, place, and time.   Psychiatric:         Mood and Affect: Mood normal.         Behavior: Behavior normal.     While in the room and during my examination of the patient I wore gloves, mask, eye protection.  I washed my hands before and after this patient encounter.     Disposition: Skilled Nursing Facility (DC - External)       Discharge Medications      New Medications      Instructions Start Date   polyethylene glycol 17 g packet  Commonly known as: MIRALAX   17 g, Oral, Daily PRN         Changes to Medications      Instructions Start Date   allopurinol 100 MG tablet  Commonly known as: ZYLOPRIM  What changed:   · how much to  take  · how to take this  · when to take this  · additional instructions   2 po qday      bumetanide 2 MG tablet  Commonly known as: BUMEX  What changed: See the new instructions.   TAKE 1 TABLET TWICE DAILY. DOSE CHANGED       carvedilol 25 MG tablet  Commonly known as: COREG  What changed:   · how much to take  · when to take this  · additional instructions   TAKE 1 TABLET TWICE DAILY      ferrous sulfate 325 (65 FE) MG tablet  What changed: additional instructions   325 mg, Oral, 2 Times Weekly      pantoprazole 40 MG EC tablet  Commonly known as: PROTONIX  What changed: additional instructions   40 mg, Oral, 2 Times Daily      ramipril 5 MG capsule  Commonly known as: ALTACE  What changed:   · when to take this  · additional instructions   TAKE 1 CAPSULE EVERY DAY         Continue These Medications      Instructions Start Date   acetaminophen 325 MG tablet  Commonly known as: TYLENOL   650 mg, Oral, Every 6 Hours PRN      aspirin 81 MG tablet   81 mg, Oral, Daily      calcitriol 0.25 MCG capsule  Commonly known as: ROCALTROL   0.25 mcg, Oral, 2 Times Daily      Fosamax 70 MG tablet  Generic drug: alendronate   70 mg, Oral, Every 14 Days, On Friday      oxyCODONE-acetaminophen 5-325 MG per tablet  Commonly known as: PERCOCET   1 tablet, Oral, Every 4 Hours PRN      Senior Multivitamin Plus tablet tablet  Generic drug: multivitamin with minerals   1 tablet, Oral, Daily      simvastatin 20 MG tablet  Commonly known as: ZOCOR   TAKE 2 TABLETS EVERY NIGHT      vitamin B-12 1000 MCG tablet  Commonly known as: CYANOCOBALAMIN   1,000 mcg, Oral, Daily, Stop now for surgery      warfarin 1 MG tablet  Commonly known as: COUMADIN   Take one tablet (1mg) by mouth on Mon, Wed, Fri and 2 tablets (2mg) on all other days or as directed by [facility].      zolpidem 10 MG tablet  Commonly known as: Ambien   10 mg, Oral, Nightly PRN            Diet Instructions     Diet: Regular      Discharge Diet: Regular           Additional Instructions for the Follow-ups that You Need to Schedule     Call MD for problems / concerns.   As directed         Contact information for follow-up providers     Ariel Matute MD .    Specialty: Internal Medicine  Contact information:  [address] [facility] [address]  [facility] [address]  [phone]             Edward Vasquez MD Follow up.    Specialties: Hematology and Oncology, Oncology, Hematology  Why: Procrit injection in 2 weeks  Contact information:  [address] [facility] [address]  [facility] [address]  [phone]             Aries Joe MD Follow up.    Specialty: Urology  Why: Urinary retention  Contact information:  [address]  [facility] [address]  [phone]             Juvencio Pressley II, MD Follow up.    Specialty: Orthopedic Surgery  Contact information:  [address]  [facility] [address]  [phone]                   Contact information for after-discharge care     Destination     [facility] [place] [facility] .    Service: Skilled Nursing  Contact information:  [address]   [facility] [id number] [phone]                               Murphy Estes MD  09/10/21  10:16 EDT    Discharge time spent greater than 30 minutes.

## 2021-09-10 NOTE — PLAN OF CARE
Goal Outcome Evaluation:  Plan of Care Reviewed With: patient        Progress: no change  Outcome Summary: VSS. C/o pain earlier in shift. PRN pain med given. Relief in pain per pt. Galindo remains in place for retention. Possible d/c back to [facility] today. Will continue to monitor.

## 2021-09-10 NOTE — PLAN OF CARE
Goal Outcome Evaluation:  Plan of Care Reviewed With: spouse         Pt ready for d/c back to snf. Pt mod asst sup to sit, mod of 2 STS with LUE support on railing. Pt once standing and balance, was able to standing statically for one minute cga of 2 LUE on railing in front of her. Sling adjusted prior to STS. Pt amb 12' with at least min of 2 very unsteady gait, shuffle step pattern, HHA on the left with PT arm wrapped behind pt plus one for additional cga. Pt unable to perform pendulum ex today due to unsteadiness/ impaired tolerance for standing . Pt is very cooperative but very verbose and does not stop talking in order to hear instructions. She is very distracted at times. High fall risk.

## 2021-09-10 NOTE — THERAPY TREATMENT NOTE
Patient Name: Janel Mahoney  : 1940    MRN: [id number]                              Today's Date: 9/10/2021       Admit Date: 2021    Visit Dx:     ICD-10-CM ICD-9-CM   1. Symptomatic anemia  D64.9 285.9     Patient Active Problem List   Diagnosis   • Anemia in chronic kidney disease (CKD)   • Thrombocytopenia (CMS/HCC)   • B12 deficiency   • Coronary artery disease involving coronary bypass graft of native heart without angina pectoris   • S/P MVR (porcine)   • Chronic atrial fibrillation (CMS/HCC)   • Bilateral carotid artery disease (CMS/HCC)   • Intractable low back pain   • Long-term (current) use of anticoagulants   • Low back pain   • Osteoporosis   • Murmur, heart   • GEORGINA on auto CPAP - Dr Cardona   • Hypersomnia due to medical condition - GEORGINA   • Esophageal stricture   • Dysphagia   • Closed fracture of left proximal humerus   • Essential hypertension   • Supratherapeutic INR   • Chronic combined systolic and diastolic congestive heart failure (CMS/HCC)   • Osteoporosis with pathological fracture   • Closed 3-part fracture of proximal end of left humerus   • Closed fracture of proximal end of humerus with delayed healing   • Injury of right knee   • Knee injury, left, initial encounter   • History of fall   • S/P TKR (total knee replacement) using cement, left   • Ischemic cardiomyopathy   • Intramuscular hematoma right pecotralis   • CKD (chronic kidney disease) stage 4, GFR 15-29 ml/min (CMS/HCC)   • Peripheral edema   • Inflammatory arthritis   • Ventricular tachycardia (CMS/HCC)   • S/P revision of total knee   • Idiopathic gout   • Psychophysiological insomnia   • ICD (implantable cardioverter-defibrillator) in place   • Status post reverse arthroplasty of right shoulder   • Mixed hyperlipidemia   • Symptomatic anemia   • Right leg DVT (CMS/HCC)     Past Medical History:   Diagnosis Date   • Acute kidney injury (CMS/HCC)    • Anemia     on procrit   • Atrial fibrillation (CMS/HCC)    •  Bruises easily    • Carotid artery stenosis    • Chronic back pain    • Chronic combined systolic and diastolic congestive heart failure (CMS/HCC)    • Chronic coronary artery disease     moderate to severe LV dysfunction.  Sees Dr. Dominguez   • Chronic kidney disease, stage 3 (CMS/HCC)    • Dysphagia    • GERD (gastroesophageal reflux disease)    • Gout    • H/O cardiac murmur    • Hematoma     LEFT LEG; DR ALONZO AWARE   • [place] (hard of hearing)     wears hearing aids   • Hyperlipidemia    • Hypertension    • Hypotension    • ICD (implantable cardioverter-defibrillator) in place    • Ischemic cardiomyopathy    • Kyphoscoliosis    • Leukopenia    • Lumbar spondylosis    • Obesity    • GEORGINA (obstructive sleep apnea) 12/01/2013    Overnight polysomnogram, weight 168 pounds.  AHI mildly abnormal at 10.3 events per hour.  Respiratory effort related arousals 9.5 events per hour.  Total time snoring 30% and arousals associated with snoring 15 events per hour.          Bring machine DOS   • Osteoarthritis    • Osteoporosis    • Peptic ulcer    • Premature ventricular contractions    • Renal insufficiency syndrome    • Right shoulder pain 08/2021   • Scoliosis    • Shoulder fracture, left    • Shoulder pain, right    • Thrombocytopenia (CMS/HCC)    • Urine incontinence     pads   • Ventricular tachycardia (CMS/HCC)    • Vitamin B12 deficiency    • Warfarin anticoagulation      Past Surgical History:   Procedure Laterality Date   • AV NODE ABLATION     • BREAST BIOPSY     • CARDIAC CATHETERIZATION      Showed severe mitral insufficiency and borderline coronary artery disease   • CARDIAC CATHETERIZATION      Showed an ejection fraction of 35%. She had occlusive disease of the right posterior LV branch and no other significant disease, treated medically.   • CARDIAC DEFIBRILLATOR PLACEMENT      Biventricular   • CARDIAC DEFIBRILLATOR PLACEMENT Left    • CARDIAC ELECTROPHYSIOLOGY PROCEDURE N/A 1/4/2019    Procedure: GENERATOR  CHANGE BI-V ICD   [place];  Surgeon: James Hwang MD;  Location: [facility] CATH INVASIVE LOCATION;  Service: Cardiology   • CARDIAC VALVE REPLACEMENT  2009    Done with stent placement   • CARDIOVERSION      multiple electrocardioversions.   • CAROTID ARTERY ANGIOPLASTY Right    • CATARACT EXTRACTION EXTRACAPSULAR W/ INTRAOCULAR LENS IMPLANTATION Bilateral    • COLONOSCOPY N/A 9/28/2017    Procedure: COLONOSCOPY TO CECUM;  Surgeon: Kevin Davis MD;  Location: [facility] ENDOSCOPY;  Service:    • CORONARY ANGIOPLASTY WITH STENT PLACEMENT  2009   • CORONARY ARTERY BYPASS GRAFT      single graft to the PDA   • CORONARY STENT PLACEMENT     • ENDOSCOPY N/A 9/28/2017    Procedure: ESOPHAGOGASTRODUODENOSCOPY ;  Surgeon: Kevin Davis MD;  Location: [facility] ENDOSCOPY;  Service:    • EYE SURGERY     • HEMORRHOIDECTOMY     • HYSTERECTOMY     • INCISION AND DRAINAGE TRUNK Right 11/27/2018    Procedure: EVACUATION OF RIGHT CHEST WALL HEMATOMA;  Surgeon: Juvencio Rodriguez MD;  Location: [facility] MAIN OR;  Service: General   • MITRAL VALVE REPLACEMENT  01/2010    #31 Epic porcine valve.   • THROMBOENDARTERECTOMY Right     carotid thromboendarterectomy    • TONSILLECTOMY      age 32   • TOTAL KNEE ARTHROPLASTY Left    • TOTAL KNEE ARTHROPLASTY REVISION Left 11/19/2019    Procedure: TOTAL KNEE ARTHROPLASTY REVISION LEFT;  Surgeon: Juvencio Pressley II, MD;  Location: [facility] MAIN OR;  Service: Orthopedics   • TOTAL SHOULDER ARTHROPLASTY W/ DISTAL CLAVICLE EXCISION Left 1/16/2018    Procedure: TOTAL SHOULDER REVERSE ARTHROPLASTY;  Surgeon: Bianka Quesada MD;  Location: [facility] MAIN OR;  Service:    • TOTAL SHOULDER ARTHROPLASTY W/ DISTAL CLAVICLE EXCISION Right 8/31/2021    Procedure: TOTAL SHOULDER REVERSE ARTHROPLASTY;  Surgeon: Juvencio Pressley II, MD;  Location: [facility] MAIN OR;  Service: Orthopedics;  Laterality: Right;     General Information     Row Name 09/10/21 1142          Physical Therapy Time and  Intention    Document Type  therapy note (daily note)  -SV     Mode of Treatment  individual therapy;physical therapy  -     Row Name 09/10/21 1145          General Information    Patient Profile Reviewed  yes  -SV       User Key  (r) = Recorded By, (t) = Taken By, (c) = Cosigned By    Initials Name Provider Type    SV Mariajose Holman PT Physical Therapist        Mobility     Row Name 09/10/21 1241          Bed Mobility    Supine-Sit [place] (Bed Mobility)  moderate assist (50% patient effort)  -SV     Sit-Supine [place] (Bed Mobility)  moderate assist (50% patient effort);2 person assist  -SV     Assistive Device (Bed Mobility)  draw sheet;bed rails;head of bed elevated  -SV     Comment (Bed Mobility)  pt is verbose, does follow directions but does not stop talking in order to hear instructions  -     Row Name 09/10/21 1241          Transfers    Comment (Transfers)  HHA LUE due to sling on RUE  -     Row Name 09/10/21 1241          Sit-Stand Transfer    Sit-Stand [place] (Transfers)  minimum assist (75% patient effort);moderate assist (50% patient effort);2 person assist  -SV     Row Name 09/10/21 1241          Gait/Stairs (Locomotion)    [place] Level (Gait)  minimum assist (75% patient effort);2 person assist;moderate assist (50% patient effort)  -SV     Distance in Feet (Gait)  12' HHA LUE, PT wrapped arm around pt trunk as able with gait belt, plus one for safety  -SV     Comment (Gait/Stairs)  sit bal with sba  :PPT foward head, cga x2 with LUE support statically for 1-2 minutes,  -SV       User Key  (r) = Recorded By, (t) = Taken By, (c) = Cosigned By    Initials Name Provider Type    Mariajose Singleton PT Physical Therapist        Obj/Interventions     Row Name 09/10/21 1245          Motor Skills    Therapeutic Exercise  -- unable to tolerate pendulum ex due to unsteadiness in standing and amb.  pt reports performing finger and wrist ex inside sling  -SV       User Key  (r) =  Recorded By, (t) = Taken By, (c) = Cosigned By    Initials Name Provider Type    Mariajose Singleton, PT Physical Therapist        Goals/Plan    No documentation.       Clinical Impression     Row Name 09/10/21 1246          Pain Scale: Numbers Pre/Post-Treatment    Pre/Posttreatment Pain Comment  no report of pain during PT  -SV     Row Name 09/10/21 1246          Plan of Care Review    Plan of Care Reviewed With  spouse  -SV     Row Name 09/10/21 1246          Vital Signs    O2 Delivery Pre Treatment  room air  -SV     O2 Delivery Intra Treatment  room air  -SV     O2 Delivery Post Treatment  room air  -SV     Pre Patient Position  Supine  -SV     Intra Patient Position  Standing  -SV     Post Patient Position  Supine  -SV     Row Name 09/10/21 1246          Positioning and Restraints    Pre-Treatment Position  in bed  -SV     Post Treatment Position  bed  -SV     In Bed  fowlers;exit alarm on;with family/caregiver;encouraged to call for assist;call light within reach  -SV       User Key  (r) = Recorded By, (t) = Taken By, (c) = Cosigned By    Initials Name Provider Type    Mariajose Singleton, PT Physical Therapist        Outcome Measures     Row Name 09/10/21 1247          How much help from another person do you currently need...    Turning from your back to your side while in flat bed without using bedrails?  3  -SV     Moving from lying on back to sitting on the side of a flat bed without bedrails?  2  -SV     Moving to and from a bed to a chair (including a wheelchair)?  2  -SV     Standing up from a chair using your arms (e.g., wheelchair, bedside chair)?  2  -SV     Climbing 3-5 steps with a railing?  1  -SV     To walk in hospital room?  2  -SV     AM-PAC 6 Clicks Score (PT)  12  -SV       User Key  (r) = Recorded By, (t) = Taken By, (c) = Cosigned By    Initials Name Provider Type    Mariajose Singleton, PT Physical Therapist                       Physical Therapy Education                 Title: PT OT  SLP Therapies (Resolved)     Topic: Physical Therapy (Resolved)     Point: Mobility training (Resolved)     Learning Progress Summary           Patient Acceptance, E, NR by AR at 9/9/2021 1136                   Point: Precautions (Resolved)     Learning Progress Summary           Patient Acceptance, E, NR by AR at 9/9/2021 1136                               User Key     Initials Effective Dates Name Provider Type Discipline    AR 06/16/21 -  Hoda Qureshi, PT Physical Therapist PT              PT Recommendation and Plan     Plan of Care Reviewed With: spouse     Time Calculation:   PT Charges     Row Name 09/10/21 1253             Time Calculation    Start Time  1145  -SV      Stop Time  1205  -SV      Time Calculation (min)  20 min  -SV      PT Received On  09/10/21  -SV        User Key  (r) = Recorded By, (t) = Taken By, (c) = Cosigned By    Initials Name Provider Type    SV Mariajose Holman, PT Physical Therapist        Therapy Charges for Today     Code Description Service Date Service Provider Modifiers Qty    67035813487 HC PT THER SUPP EA 15 MIN 9/10/2021 Mariajose Holman, PT GP 1    34149335458 HC PT THERAPEUTIC ACT EA 15 MIN 9/10/2021 Mariajose Holman, PT GP 1          PT G-Codes  Outcome Measure Options: AM-PAC 6 Clicks Basic Mobility (PT)  AM-PAC 6 Clicks Score (PT): 12    Mariajose Holman PT  9/10/2021

## 2021-09-10 NOTE — PROGRESS NOTES
Pharmacy Consult: Warfarin Dosing/ Monitoring    Janel Mahoney is a 80 y.o. female, estimated creatinine clearance is 19.8 mL/min (A) (by C-G formula based on SCr of 2.16 mg/dL (H)). weighing 75.6 kg (166 lb 9.6 oz).     has a past medical history of Acute kidney injury (CMS/HCC), Anemia, Atrial fibrillation (CMS/HCC), Bruises easily, Carotid artery stenosis, Chronic back pain, Chronic combined systolic and diastolic congestive heart failure (CMS/HCC), Chronic coronary artery disease, Chronic kidney disease, stage 3 (CMS/HCC), Dysphagia, GERD (gastroesophageal reflux disease), Gout, H/O cardiac murmur, Hematoma, [place] (hard of hearing), Hyperlipidemia, Hypertension, Hypotension, ICD (implantable cardioverter-defibrillator) in place, Ischemic cardiomyopathy, Kyphoscoliosis, Leukopenia, Lumbar spondylosis, Obesity, GEORGINA (obstructive sleep apnea) (2013), Osteoarthritis, Osteoporosis, Peptic ulcer, Premature ventricular contractions, Renal insufficiency syndrome, Right shoulder pain (2021), Scoliosis, Shoulder fracture, left, Shoulder pain, right, Thrombocytopenia (CMS/HCC), Urine incontinence, Ventricular tachycardia (CMS/HCC), Vitamin B12 deficiency, and Warfarin anticoagulation.    Social History     Tobacco Use    Smoking status: Former Smoker     Packs/day: 1.00     Years: 50.00     Pack years: 50.00     Types: Cigarettes     Quit date: 2009     Years since quittin.6    Smokeless tobacco: Never Used    Tobacco comment: Daily caffeine - soda   Vaping Use    Vaping Use: Never used   Substance Use Topics    Alcohol use: Yes     Comment: 1 yearly    Drug use: Never       Results from last 7 days   Lab Units 09/10/21  0627 21  0427 21  0444 21  0411 21  1542   INR  2.03* 2.10* 2.05* 1.81* 1.87*  1.93*   APTT seconds  --   --   --   --  42.0*   HEMOGLOBIN g/dL 9.4* 8.2* 7.9*  --  7.3*   HEMATOCRIT % 29.3* 26.5* 24.9*  --  22.9*   PLATELETS 10*3/mm3 178 174 160  --  146  "    Results from last 7 days   Lab Units 09/09/21  0427 09/06/21  1542   SODIUM mmol/L 138 139   POTASSIUM mmol/L 3.9 4.7   CHLORIDE mmol/L 98 100   CO2 mmol/L 29.2* 31.2*   BUN mg/dL 76* 73*   CREATININE mg/dL 2.16* 2.50*   CALCIUM mg/dL 9.0 9.5   BILIRUBIN mg/dL  --  0.3   ALK PHOS U/L  --  64   ALT (SGPT) U/L  --  14   AST (SGOT) U/L  --  36*   GLUCOSE mg/dL 85 126*     Anticoagulation history: Per most recent encounter with [facility] anticoagulation clinic on 8/24/21, patient takes warfarin 1 mg on MWF and 2 mg all other days (11 mg weekly).       Hospital Anticoagulation:  Consulting provider: Dr. Escobar  Start date: 9/6/21  Indication: \"Other - full anticoagulation\"  Target INR: 2-3  Expected duration: Indefinite   Bridge Therapy: No    Date 9/6 9/7 9/8 9/9 9/10        INR 1.87 1.81 2.05 2.10 2.03        Warfarin dose 1mg 2.5mg 1mg 2mg 1mg          Potential drug interactions: Acetaminophen - may enhance the anticoagulation effect of warfarin, typically at doses >2 g/day.    Relevant nutrition status: Regular diet    Assessment/Plan:  INR is therapeutic- continue current warfarin regimen.  Monitor INR daily    Pharmacy will continue to follow until discharge or discontinuation of warfarin.     Itzel Wilhelm, PharmD  9/10/2021    "

## 2021-09-23 ENCOUNTER — INFUSION (OUTPATIENT)
Dept: ONCOLOGY | Facility: HOSPITAL | Age: 81
End: 2021-09-23

## 2021-09-23 ENCOUNTER — LAB (OUTPATIENT)
Dept: LAB | Facility: HOSPITAL | Age: 81
End: 2021-09-23

## 2021-09-23 DIAGNOSIS — N18.4 CKD (CHRONIC KIDNEY DISEASE) STAGE 4, GFR 15-29 ML/MIN (HCC): Primary | ICD-10-CM

## 2021-09-23 DIAGNOSIS — D63.1 ANEMIA IN STAGE 3A CHRONIC KIDNEY DISEASE (HCC): ICD-10-CM

## 2021-09-23 DIAGNOSIS — N18.31 ANEMIA IN STAGE 3A CHRONIC KIDNEY DISEASE (HCC): ICD-10-CM

## 2021-09-23 LAB
BASOPHILS # BLD AUTO: 0.02 10*3/MM3 (ref 0–0.2)
BASOPHILS NFR BLD AUTO: 0.4 % (ref 0–1.5)
DEPRECATED RDW RBC AUTO: 62 FL (ref 37–54)
EOSINOPHIL # BLD AUTO: 0.16 10*3/MM3 (ref 0–0.4)
EOSINOPHIL NFR BLD AUTO: 3 % (ref 0.3–6.2)
ERYTHROCYTE [DISTWIDTH] IN BLOOD BY AUTOMATED COUNT: 17.2 % (ref 12.3–15.4)
FERRITIN SERPL-MCNC: 699.6 NG/ML (ref 13–150)
HCT VFR BLD AUTO: 28.3 % (ref 34–46.6)
HGB BLD-MCNC: 8.7 G/DL (ref 12–15.9)
IMM GRANULOCYTES # BLD AUTO: 0.02 10*3/MM3 (ref 0–0.05)
IMM GRANULOCYTES NFR BLD AUTO: 0.4 % (ref 0–0.5)
IRON 24H UR-MRATE: 68 MCG/DL (ref 37–145)
IRON SATN MFR SERPL: 27 % (ref 14–48)
LYMPHOCYTES # BLD AUTO: 0.67 10*3/MM3 (ref 0.7–3.1)
LYMPHOCYTES NFR BLD AUTO: 12.6 % (ref 19.6–45.3)
MCH RBC QN AUTO: 30.4 PG (ref 26.6–33)
MCHC RBC AUTO-ENTMCNC: 30.7 G/DL (ref 31.5–35.7)
MCV RBC AUTO: 99 FL (ref 79–97)
MONOCYTES # BLD AUTO: 0.39 10*3/MM3 (ref 0.1–0.9)
MONOCYTES NFR BLD AUTO: 7.3 % (ref 5–12)
NEUTROPHILS NFR BLD AUTO: 4.05 10*3/MM3 (ref 1.7–7)
NEUTROPHILS NFR BLD AUTO: 76.3 % (ref 42.7–76)
NRBC BLD AUTO-RTO: 0 /100 WBC (ref 0–0.2)
PLATELET # BLD AUTO: 135 10*3/MM3 (ref 140–450)
PMV BLD AUTO: 9.5 FL (ref 6–12)
RBC # BLD AUTO: 2.86 10*6/MM3 (ref 3.77–5.28)
TIBC SERPL-MCNC: 256 MCG/DL (ref 249–505)
TRANSFERRIN SERPL-MCNC: 183 MG/DL (ref 200–360)
WBC # BLD AUTO: 5.31 10*3/MM3 (ref 3.4–10.8)

## 2021-09-23 PROCEDURE — 96372 THER/PROPH/DIAG INJ SC/IM: CPT

## 2021-09-23 PROCEDURE — 83540 ASSAY OF IRON: CPT

## 2021-09-23 PROCEDURE — 85025 COMPLETE CBC W/AUTO DIFF WBC: CPT

## 2021-09-23 PROCEDURE — 84466 ASSAY OF TRANSFERRIN: CPT

## 2021-09-23 PROCEDURE — 25010000002 EPOETIN ALFA PER 1000 UNITS: Performed by: INTERNAL MEDICINE

## 2021-09-23 PROCEDURE — 82728 ASSAY OF FERRITIN: CPT

## 2021-09-23 PROCEDURE — 36415 COLL VENOUS BLD VENIPUNCTURE: CPT

## 2021-09-23 RX ADMIN — ERYTHROPOIETIN 10000 UNITS: 10000 INJECTION, SOLUTION INTRAVENOUS; SUBCUTANEOUS at 11:37

## 2021-10-05 ENCOUNTER — INFUSION (OUTPATIENT)
Dept: ONCOLOGY | Facility: HOSPITAL | Age: 81
End: 2021-10-05

## 2021-10-05 ENCOUNTER — TELEPHONE (OUTPATIENT)
Dept: ONCOLOGY | Facility: CLINIC | Age: 81
End: 2021-10-05

## 2021-10-05 ENCOUNTER — LAB (OUTPATIENT)
Dept: LAB | Facility: HOSPITAL | Age: 81
End: 2021-10-05

## 2021-10-05 DIAGNOSIS — D63.1 ANEMIA IN STAGE 3A CHRONIC KIDNEY DISEASE (HCC): Primary | ICD-10-CM

## 2021-10-05 DIAGNOSIS — N18.31 ANEMIA IN STAGE 3A CHRONIC KIDNEY DISEASE (HCC): ICD-10-CM

## 2021-10-05 DIAGNOSIS — N18.31 ANEMIA IN STAGE 3A CHRONIC KIDNEY DISEASE (HCC): Primary | ICD-10-CM

## 2021-10-05 DIAGNOSIS — D63.1 ANEMIA IN STAGE 3A CHRONIC KIDNEY DISEASE (HCC): ICD-10-CM

## 2021-10-05 DIAGNOSIS — N18.4 CKD (CHRONIC KIDNEY DISEASE) STAGE 4, GFR 15-29 ML/MIN (HCC): Primary | ICD-10-CM

## 2021-10-05 LAB
BASOPHILS # BLD AUTO: 0.02 10*3/MM3 (ref 0–0.2)
BASOPHILS NFR BLD AUTO: 0.4 % (ref 0–1.5)
DEPRECATED RDW RBC AUTO: 61.9 FL (ref 37–54)
EOSINOPHIL # BLD AUTO: 0.11 10*3/MM3 (ref 0–0.4)
EOSINOPHIL NFR BLD AUTO: 2 % (ref 0.3–6.2)
ERYTHROCYTE [DISTWIDTH] IN BLOOD BY AUTOMATED COUNT: 18.1 % (ref 12.3–15.4)
HCT VFR BLD AUTO: 25.1 % (ref 34–46.6)
HGB BLD-MCNC: 8.1 G/DL (ref 12–15.9)
IMM GRANULOCYTES # BLD AUTO: 0.08 10*3/MM3 (ref 0–0.05)
IMM GRANULOCYTES NFR BLD AUTO: 1.4 % (ref 0–0.5)
LYMPHOCYTES # BLD AUTO: 0.67 10*3/MM3 (ref 0.7–3.1)
LYMPHOCYTES NFR BLD AUTO: 12 % (ref 19.6–45.3)
MCH RBC QN AUTO: 30.6 PG (ref 26.6–33)
MCHC RBC AUTO-ENTMCNC: 32.3 G/DL (ref 31.5–35.7)
MCV RBC AUTO: 94.7 FL (ref 79–97)
MONOCYTES # BLD AUTO: 0.46 10*3/MM3 (ref 0.1–0.9)
MONOCYTES NFR BLD AUTO: 8.2 % (ref 5–12)
NEUTROPHILS NFR BLD AUTO: 4.26 10*3/MM3 (ref 1.7–7)
NEUTROPHILS NFR BLD AUTO: 76 % (ref 42.7–76)
NRBC BLD AUTO-RTO: 0 /100 WBC (ref 0–0.2)
PLATELET # BLD AUTO: 102 10*3/MM3 (ref 140–450)
PMV BLD AUTO: 10.3 FL (ref 6–12)
RBC # BLD AUTO: 2.65 10*6/MM3 (ref 3.77–5.28)
WBC # BLD AUTO: 5.6 10*3/MM3 (ref 3.4–10.8)

## 2021-10-05 PROCEDURE — 25010000002 EPOETIN ALFA PER 1000 UNITS: Performed by: INTERNAL MEDICINE

## 2021-10-05 PROCEDURE — 85025 COMPLETE CBC W/AUTO DIFF WBC: CPT

## 2021-10-05 PROCEDURE — 96372 THER/PROPH/DIAG INJ SC/IM: CPT

## 2021-10-05 PROCEDURE — 36415 COLL VENOUS BLD VENIPUNCTURE: CPT

## 2021-10-05 RX ORDER — ACETAMINOPHEN 325 MG/1
650 TABLET ORAL ONCE
Status: CANCELLED | OUTPATIENT
Start: 2021-10-07 | End: 2021-10-05

## 2021-10-05 RX ORDER — SODIUM CHLORIDE 9 MG/ML
250 INJECTION, SOLUTION INTRAVENOUS AS NEEDED
Status: CANCELLED | OUTPATIENT
Start: 2021-10-07

## 2021-10-05 RX ORDER — DIPHENHYDRAMINE HCL 25 MG
25 CAPSULE ORAL ONCE
Status: CANCELLED | OUTPATIENT
Start: 2021-10-07 | End: 2021-10-05

## 2021-10-05 RX ADMIN — ERYTHROPOIETIN 10000 UNITS: 10000 INJECTION, SOLUTION INTRAVENOUS; SUBCUTANEOUS at 13:35

## 2021-10-05 NOTE — PROGRESS NOTES
"Hgb today is 8.1.  Patient is \"very tired.\"  Reviewed with Dr Vasquez's nurse Kelly and she will send message to Dr Vasquez re need for transfusion.  Patients  requests that he be called to arrange transportation as the patient is now now staying at [facility]  [email or web address], phone number there is [phone]. Patients  uses [employer] for transportation to [facility].  "

## 2021-10-05 NOTE — TELEPHONE ENCOUNTER
Spoke with pts , Russ, and informed him of pts [facility] infusion appt on 10/7 at 7:45am. Russ informed this RN that their transportation services are available that day and will pick the pt up at 7:00 am. Gave Russ direct number if he has any questions or concerns. Russ gonzalez/u.

## 2021-10-07 ENCOUNTER — HOSPITAL ENCOUNTER (OUTPATIENT)
Dept: INFUSION THERAPY | Facility: HOSPITAL | Age: 81
Setting detail: INFUSION SERIES
Discharge: HOME | End: 2021-10-07

## 2021-10-07 VITALS
TEMPERATURE: 96.9 F | RESPIRATION RATE: 16 BRPM | DIASTOLIC BLOOD PRESSURE: 55 MMHG | SYSTOLIC BLOOD PRESSURE: 144 MMHG | HEART RATE: 60 BPM | OXYGEN SATURATION: 97 %

## 2021-10-07 DIAGNOSIS — D63.1 ANEMIA IN STAGE 3A CHRONIC KIDNEY DISEASE (HCC): ICD-10-CM

## 2021-10-07 DIAGNOSIS — N18.31 ANEMIA IN STAGE 3A CHRONIC KIDNEY DISEASE (HCC): ICD-10-CM

## 2021-10-07 LAB
ABO GROUP BLD: NORMAL
BLD GP AB SCN SERPL QL: NEGATIVE
RH BLD: POSITIVE
T&S EXPIRATION DATE: NORMAL

## 2021-10-07 PROCEDURE — 86850 RBC ANTIBODY SCREEN: CPT | Performed by: INTERNAL MEDICINE

## 2021-10-07 PROCEDURE — 36415 COLL VENOUS BLD VENIPUNCTURE: CPT

## 2021-10-07 PROCEDURE — 86901 BLOOD TYPING SEROLOGIC RH(D): CPT | Performed by: INTERNAL MEDICINE

## 2021-10-07 PROCEDURE — 63710000001 DIPHENHYDRAMINE PER 50 MG: Performed by: INTERNAL MEDICINE

## 2021-10-07 PROCEDURE — 86923 COMPATIBILITY TEST ELECTRIC: CPT

## 2021-10-07 PROCEDURE — 36430 TRANSFUSION BLD/BLD COMPNT: CPT

## 2021-10-07 PROCEDURE — 86900 BLOOD TYPING SEROLOGIC ABO: CPT

## 2021-10-07 PROCEDURE — P9016 RBC LEUKOCYTES REDUCED: HCPCS

## 2021-10-07 PROCEDURE — 86900 BLOOD TYPING SEROLOGIC ABO: CPT | Performed by: INTERNAL MEDICINE

## 2021-10-07 RX ORDER — DIPHENHYDRAMINE HCL 25 MG
25 CAPSULE ORAL ONCE
Status: COMPLETED | OUTPATIENT
Start: 2021-10-07 | End: 2021-10-07

## 2021-10-07 RX ORDER — MULTIVIT-MIN/IRON/FOLIC ACID/K 18-600-40
2000 CAPSULE ORAL DAILY
COMMUNITY

## 2021-10-07 RX ORDER — SODIUM CHLORIDE 9 MG/ML
250 INJECTION, SOLUTION INTRAVENOUS AS NEEDED
Status: DISCONTINUED | OUTPATIENT
Start: 2021-10-07 | End: 2021-10-09 | Stop reason: HOSPADM

## 2021-10-07 RX ORDER — ACETAMINOPHEN 325 MG/1
650 TABLET ORAL ONCE
Status: COMPLETED | OUTPATIENT
Start: 2021-10-07 | End: 2021-10-07

## 2021-10-07 RX ADMIN — ACETAMINOPHEN 650 MG: 325 TABLET, FILM COATED ORAL at 09:26

## 2021-10-07 RX ADMIN — DIPHENHYDRAMINE HYDROCHLORIDE 25 MG: 25 CAPSULE ORAL at 09:26

## 2021-10-07 NOTE — PATIENT INSTRUCTIONS
"https://www.[facility]cross[facility].org/donate-blood/blood-donation-process/what-happens-to-donated-blood/blood-transfusions/types-of-blood-transfusions.html\"> https://www.hematology.org/education/patients/blood-basics/blood-safety-and-matching\"> https://www.nhlbi.nih.gov/health-topics/blood-transfusion\">   Blood Transfusion, Adult  A blood transfusion is a procedure in which you receive blood or a type of blood cell (blood component) through an IV. You may need a blood transfusion when your blood level is low. This may result from a bleeding disorder, illness, injury, or surgery. The blood may come from a donor. You may also be able to donate blood for yourself (autologous blood donation) before a planned surgery.  The blood given in a transfusion is made up of different blood components. You may receive:  · Red blood cells. These carry oxygen to the cells in the body.  · Platelets. These help your blood to clot.  · Plasma. This is the liquid part of your blood. It carries proteins and other substances throughout the body.  · White blood cells. These help you fight infections.  If you have hemophilia or another clotting disorder, you may also receive other types of blood products.  Tell a health care provider about:  · Any blood disorders you have.  · Any previous reactions you have had during a blood transfusion.  · Any allergies you have.  · All medicines you are taking, including vitamins, herbs, eye drops, creams, and over-the-counter medicines.  · Any surgeries you have had.  · Any medical conditions you have, including any recent fever or cold symptoms.  · Whether you are pregnant or may be pregnant.  What are the risks?  Generally, this is a safe procedure. However, problems may occur.  · The most common problems include:  ? A mild allergic reaction, such as red, swollen areas of skin (hives) and itching.  ? Fever or chills. This may be the body's response to new blood cells received. This may occur during or up to 4 " hours after the transfusion.  · More serious problems may include:  ? Transfusion-associated circulatory overload (TACO), or too much fluid in the lungs. This may cause breathing problems.  ? A serious allergic reaction, such as difficulty breathing or swelling around the face and lips.  ? Transfusion-related acute lung injury (TRALI), which causes breathing difficulty and low oxygen in the blood. This can occur within hours of the transfusion or several days later.  ? Iron overload. This can happen after receiving many blood transfusions over a period of time.  ? Infection or virus being transmitted. This is rare because donated blood is carefully tested before it is given.  ? Hemolytic transfusion reaction. This is rare. It happens when your body's defense system (immune system)tries to attack the new blood cells. Symptoms may include fever, chills, nausea, low blood pressure, and low back or chest pain.  ? Transfusion-associated ktkuu-aftsyh-kysl disease (TAGVHD). This is rare. It happens when donated cells attack your body's healthy tissues.  What happens before the procedure?  Medicines  Ask your health care provider about:  · Changing or stopping your regular medicines. This is especially important if you are taking diabetes medicines or blood thinners.  · Taking medicines such as aspirin and ibuprofen. These medicines can thin your blood. Do not take these medicines unless your health care provider tells you to take them.  · Taking over-the-counter medicines, vitamins, herbs, and supplements.  General instructions  · Follow instructions from your health care provider about eating and drinking restrictions.  · You will have a blood test to determine your blood type. This is necessary to know what kind of blood your body will accept and to match it to the donor blood.  · If you are going to have a planned surgery, you may be able to do an autologous blood donation. This may be done in case you need to have a  transfusion.  · You will have your temperature, blood pressure, and pulse monitored before the transfusion.  · If you have had an allergic reaction to a transfusion in the past, you may be given medicine to help prevent a reaction. This medicine may be given to you by mouth (orally) or through an IV.  · Set aside time for the blood transfusion. This procedure generally takes 1-4 hours to complete.  What happens during the procedure?    · An IV will be inserted into one of your veins.  · The bag of donated blood will be attached to your IV. The blood will then enter through your vein.  · Your temperature, blood pressure, and pulse will be monitored regularly during the transfusion. This monitoring is done to detect early signs of a transfusion reaction.  · Tell your nurse right away if you have any of these symptoms during the transfusion:  ? Shortness of breath or trouble breathing.  ? Chest or back pain.  ? Fever or chills.  ? Hives or itching.  · If you have any signs or symptoms of a reaction, your transfusion will be stopped and you may be given medicine.  · When the transfusion is complete, your IV will be removed.  · Pressure may be applied to the IV site for a few minutes.  · A bandage (dressing)will be applied.  The procedure may vary among health care providers and hospitals.  What happens after the procedure?  · Your temperature, blood pressure, pulse, breathing rate, and blood oxygen level will be monitored until you leave the hospital or clinic.  · Your blood may be tested to see how you are responding to the transfusion.  · You may be warmed with fluids or blankets to maintain a normal body temperature.  · If you receive your blood transfusion in an outpatient setting, you will be told whom to contact to report any reactions.  Where to find more information  For more information on blood transfusions, visit the [place]: [facility]cross.org  Summary  · A blood transfusion is a procedure in which you  "receive blood or a type of blood cell (blood component) through an IV.  · The blood you receive may come from a donor or be donated by yourself (autologous blood donation) before a planned surgery.  · The blood given in a transfusion is made up of different blood components. You may receive red blood cells, platelets, plasma, or white blood cells depending on the condition treated.  · Your temperature, blood pressure, and pulse will be monitored before, during, and after the transfusion.  · After the transfusion, your blood may be tested to see how your body has responded.  This information is not intended to replace advice given to you by your health care provider. Make sure you discuss any questions you have with your health care provider.  Document Revised: 10/22/2020 Document Reviewed: 06/11/2020  [employer] Patient Education © 2021 [employer]    https://www.[facility].org/donate-blood/blood-donation-process/what-happens-to-donated-blood/blood-transfusions/types-of-blood-transfusions.html\"> https://www.[facility].org/donate-blood/blood-donation-process/what-happens-to-donated-blood/blood-transfusions/risks-complications.html\">   Blood Transfusion, Adult, Care After  This sheet gives you information about how to care for yourself after your procedure. Your health care provider may also give you more specific instructions. If you have problems or questions, contact your health care provider.  What can I expect after the procedure?  After the procedure, it is common to have:  · Bruising and soreness where the IV was inserted.  · A fever or chills on the day of the procedure. This may be your body's response to the new blood cells received.  · A headache.  Follow these instructions at home:  IV insertion site care         · Follow instructions from your health care provider about how to take care of your IV insertion site. Make sure you:  ? Wash your hands with soap and water before and after you change your " bandage (dressing). If soap and water are not available, use hand .  ? Change your dressing as told by your health care provider.  · Check your IV insertion site every day for signs of infection. Check for:  ? Redness, swelling, or pain.  ? Bleeding from the site.  ? Warmth.  ? Pus or a bad smell.  General instructions  · Take over-the-counter and prescription medicines only as told by your health care provider.  · Rest as told by your health care provider.  · Return to your normal activities as told by your health care provider.  · Keep all follow-up visits as told by your health care provider. This is important.  Contact a health care provider if:  · You have itching or red, swollen areas of skin (hives).  · You feel anxious.  · You feel weak after doing your normal activities.  · You have redness, swelling, warmth, or pain around the IV insertion site.  · You have blood coming from the IV insertion site that does not stop with pressure.  · You have pus or a bad smell coming from your IV insertion site.  Get help right away if:  · You have symptoms of a serious allergic or immune system reaction, including:  ? Trouble breathing or shortness of breath.  ? Swelling of the face or feeling flushed.  ? Fever or chills.  ? Pain in the head, back, or chest.  ? Dark urine or blood in the urine.  ? Widespread rash.  ? Fast heartbeat.  ? Feeling dizzy or light-headed.  If you receive your blood transfusion in an outpatient setting, you will be told whom to contact to report any reactions.  These symptoms may represent a serious problem that is an emergency. Do not wait to see if the symptoms will go away. Get medical help right away. Call your local emergency services (911 in the [place] Do not drive yourself to the hospital.  Summary  · Bruising and tenderness around the IV insertion site are common.  · Check your IV insertion site every day for signs of infection.  · Rest as told by your health care provider.  Return to your normal activities as told by your health care provider.  · Get help right away for symptoms of a serious allergic or immune system reaction to blood transfusion.  This information is not intended to replace advice given to you by your health care provider. Make sure you discuss any questions you have with your health care provider.  Document Revised: 06/11/2020 Document Reviewed: 06/11/2020  [employer] Patient Education © 2021 Elsevier Inc.

## 2021-10-08 LAB
BH BB BLOOD EXPIRATION DATE: NORMAL
BH BB BLOOD TYPE BARCODE: 5100
BH BB DISPENSE STATUS: NORMAL
BH BB PRODUCT CODE: NORMAL
BH BB UNIT NUMBER: NORMAL
CROSSMATCH INTERPRETATION: NORMAL
UNIT  ABO: NORMAL
UNIT  RH: NORMAL

## 2021-10-13 ENCOUNTER — TELEPHONE (OUTPATIENT)
Dept: CARDIOLOGY | Facility: CLINIC | Age: 81
End: 2021-10-13

## 2021-10-13 ENCOUNTER — HOME HEALTH ADMISSION (OUTPATIENT)
Dept: HOME HEALTH SERVICES | Facility: HOME HEALTHCARE | Age: 81
End: 2021-10-13

## 2021-10-13 ENCOUNTER — TRANSCRIBE ORDERS (OUTPATIENT)
Dept: HOME HEALTH SERVICES | Facility: HOME HEALTHCARE | Age: 81
End: 2021-10-13

## 2021-10-13 ENCOUNTER — READMISSION MANAGEMENT (OUTPATIENT)
Dept: CALL CENTER | Facility: HOSPITAL | Age: 81
End: 2021-10-13

## 2021-10-13 DIAGNOSIS — D69.9 ANTICOAGULANT DISORDER (HCC): ICD-10-CM

## 2021-10-13 DIAGNOSIS — M12.811 ROTATOR CUFF ARTHROPATHY OF RIGHT SHOULDER: Primary | ICD-10-CM

## 2021-10-13 NOTE — TELEPHONE ENCOUNTER
Alert report for ATP treated episode from 10/10 0431. Strips show true VT lasting 28 beats treated with ATPx1 and successful conversion to AF/BVP 60s. I spoke with pt. She does not recall any issues and was likely sleeping when this occurred. She has not felt well but relates to recent shoulder surgery. Pt just returned home from NH/Rehab this morning.     Additionally, since Aug remote, 21 new NST events recorded. Durations 14-19 sec with longest from 9/30 0158 lasting 33 beats at 179bpm. This is known to pt. Presents AF/BVP 60s. BVP 95%. CAF known to pt. She is dependent by Hx.  NS

## 2021-10-13 NOTE — OUTREACH NOTE
Prep Survey      Responses   [facility] facility patient discharged from? Non-BH   Is LACE score < 7 ? Non-BH Discharge   Emergency Room discharge w/ pulse ox? No   Eligibility [facility]   Date of Discharge 10/13/21   Discharge diagnosis Unavailable   Does the patient have one of the following disease processes/diagnoses(primary or secondary)? Other   Prep survey completed? Yes          Geno Burgess RN

## 2021-10-14 ENCOUNTER — TRANSITIONAL CARE MANAGEMENT TELEPHONE ENCOUNTER (OUTPATIENT)
Dept: CALL CENTER | Facility: HOSPITAL | Age: 81
End: 2021-10-14

## 2021-10-14 ENCOUNTER — HOME CARE VISIT (OUTPATIENT)
Dept: HOME HEALTH SERVICES | Facility: HOME HEALTHCARE | Age: 81
End: 2021-10-14

## 2021-10-14 VITALS
RESPIRATION RATE: 18 BRPM | HEART RATE: 60 BPM | OXYGEN SATURATION: 98 % | DIASTOLIC BLOOD PRESSURE: 64 MMHG | SYSTOLIC BLOOD PRESSURE: 122 MMHG | TEMPERATURE: 97.4 F

## 2021-10-14 LAB — INR PPP: 2.1

## 2021-10-14 PROCEDURE — G0151 HHCP-SERV OF PT,EA 15 MIN: HCPCS

## 2021-10-14 NOTE — HOME HEALTH
"SUBJECTIVE: \"I'm doing ok, but both shoulders are hurting.\"    DIAGNOSIS: RIGHT shoulder replacement [id number] with patient in subacute rehab at [facility]; recent blood transfusion at hospital due to anemia    PAST MEDICAL HX: left shoulder replacement after fall 2-3 years ago and is currently sore after some exercise at NH, Left TKR 3 years ago, Right knee OA, insomnia, osteoporosis    PRIOR LEVEL OF FUNCTION:  independent household mobility sometimes using rolling walker; declining arm       PLAN FOR NEXT VISIT: Contact Dr Pressley's office to check when she can begin right shoulder movements as they reported he didn't want her moving arm for a week and is in sling, gait training, HEP instruction for leg strengthening, transfer training and patient/family education as needed; may need to recheck protime/await orders"

## 2021-10-15 ENCOUNTER — ANTICOAGULATION VISIT (OUTPATIENT)
Dept: PHARMACY | Facility: HOSPITAL | Age: 81
End: 2021-10-15

## 2021-10-15 DIAGNOSIS — I48.20 CHRONIC ATRIAL FIBRILLATION (HCC): Primary | ICD-10-CM

## 2021-10-15 NOTE — PROGRESS NOTES
Anticoagulation Clinic Progress Note    Anticoagulation Summary  As of 10/15/2021    INR goal:  2.0-3.0   TTR:  62.6 % (2.8 y)   INR used for dosin.10 (10/14/2021)   Warfarin maintenance plan:  1 mg every Mon, Wed, Fri; 2 mg all other days   Weekly warfarin total:  11 mg   Plan last modified:  Shanna Major RPH (10/27/2020)   Next INR check:  10/18/2021   Priority:  Maintenance   Target end date:  Indefinite    Indications    Chronic atrial fibrillation (HCC) [I48.20]             Anticoagulation Episode Summary     INR check location:      Preferred lab:      [facility]:   [facility]    Comments:        Anticoagulation Care Providers     Provider Role Specialty Phone number    Nik Galarza MD Referring Cardiology [phone]        Findings:  Concerned that INR could become subtherapeutic, which would be detrimental in context of recent DVT.     INR History:  Anticoagulation Monitoring 2021 2021 10/15/2021   INR 2.80 2.9 2.10   INR Date 2021 2021 10/14/2021   INR Goal 2.0-3.0 2.0-3.0 2.0-3.0   Trend Same Same Same   Last Week Total 11 mg 11 mg 11 mg   Next Week Total 11 mg 3 mg 12 mg   Sun 2 mg Hold () 2 mg   Mon 1 mg Hold () -   Tue - 3 mg (); Otherwise 2 mg -   Wed 1 mg 3 mg (); Otherwise 1 mg -   Thu 2 mg Hold (), 3 mg () -   Fri 1 mg Hold () 2 mg (10/15)   Sat 2 mg Hold () 1.5 mg (10/16)   Visit Report - - -   Some recent data might be hidden       Plan:  1. INR was Therapeutic yseterday- see above in Anticoagulation Summary. Concerned that INR could become subtherapeutic, which would be detrimental in context of recent DVT.   Provided instructions to patient's spouse and Rosalinda with [facility] to Change their warfarin regimen (Alternate 2 mg and 1.5 mg until INR check 3 days to ensure INR) - see above in Anticoagulation Summary.  2. Follow up in 3 days      Vargas Butcher, MeaganD

## 2021-10-18 ENCOUNTER — ANTICOAGULATION VISIT (OUTPATIENT)
Dept: PHARMACY | Facility: HOSPITAL | Age: 81
End: 2021-10-18

## 2021-10-18 ENCOUNTER — HOME CARE VISIT (OUTPATIENT)
Dept: HOME HEALTH SERVICES | Facility: HOME HEALTHCARE | Age: 81
End: 2021-10-18

## 2021-10-18 DIAGNOSIS — I48.20 CHRONIC ATRIAL FIBRILLATION (HCC): Primary | ICD-10-CM

## 2021-10-18 LAB — INR PPP: 2.5

## 2021-10-18 PROCEDURE — G0157 HHC PT ASSISTANT EA 15: HCPCS

## 2021-10-18 NOTE — CASE COMMUNICATION
"Filippo Luther received INR orders, entered, and notified daughter. Please obtain next INR with Next PT visit 10/21. Thanks         \"  VERBAL ORDERS RECEIVED BY PHONE FROM YASSINE ARELLANO/PHARMACIST AT [facility] PER DR. LAWRENCE.     PT/INR: 29.7/2.5  DRAWN ON 10/18/21    COUMADIN DOSAGE: PATIENT TO 1MG BY MOUTH ON MONDAY AND FRIDAY; AND TAKE 2MG ALL OTHER DAYS.    RECHECK PT/INR PER FINGERSTICK ON THURSDAY 10/21/21 WITH RESULT S TO [facility].     PATIENT'S DAUGHTER MARISSA WAS CONTACTED AND STATED UNDERSTANDING.\"  "

## 2021-10-18 NOTE — PROGRESS NOTES
Anticoagulation Clinic Progress Note    Anticoagulation Summary  As of 10/18/2021    INR goal:  2.0-3.0   TTR:  62.7 % (2.8 y)   INR used for dosin.50 (10/18/2021)   Warfarin maintenance plan:  1 mg every Mon, Fri; 2 mg all other days   Weekly warfarin total:  12 mg   Plan last modified:  Vargas Butcher PharmD (10/18/2021)   Next INR check:  10/21/2021   Priority:  Maintenance   Target end date:  Indefinite    Indications    Chronic atrial fibrillation (HCC) [I48.20]             Anticoagulation Episode Summary     INR check location:      Preferred lab:      [facility]   [facility] [place]    Comments:        Anticoagulation Care Providers     Provider Role Specialty Phone number    Nik Galarza MD Referring Cardiology [phone]          INR History:  Anticoagulation Monitoring 2021 10/15/2021 10/18/2021   INR 2.9 2.10 2.50   INR Date 2021 10/14/2021 10/18/2021   INR Goal 2.0-3.0 2.0-3.0 2.0-3.0   Trend Same Same Up   Last Week Total 11 mg 11 mg 11.5 mg   Next Week Total 3 mg 12 mg 12 mg   Sun Hold () 2 mg -   Mon Hold () - 1 mg   Tue 3 mg (); Otherwise 2 mg - 2 mg   Wed 3 mg (); Otherwise 1 mg - 2 mg   Thu Hold (), 3 mg () - -   Fri Hold () 2 mg (10/15) -   Sat Hold () 1.5 mg (10/16) -   Visit Report - - -   Some recent data might be hidden       Plan:  1. INR is Therapeutic today- see above in Anticoagulation Summary.   Provided instructions to Rosalinda with [facility] to Increase their warfarin regimen- see above in Anticoagulation Summary.  2. Follow up in 3 days      Vargas Butcher PharmD

## 2021-10-19 ENCOUNTER — HOME CARE VISIT (OUTPATIENT)
Dept: HOME HEALTH SERVICES | Facility: HOME HEALTHCARE | Age: 81
End: 2021-10-19

## 2021-10-19 VITALS
TEMPERATURE: 96.9 F | HEART RATE: 70 BPM | SYSTOLIC BLOOD PRESSURE: 148 MMHG | OXYGEN SATURATION: 97 % | RESPIRATION RATE: 18 BRPM | DIASTOLIC BLOOD PRESSURE: 68 MMHG

## 2021-10-19 VITALS
SYSTOLIC BLOOD PRESSURE: 151 MMHG | TEMPERATURE: 97.6 F | DIASTOLIC BLOOD PRESSURE: 78 MMHG | HEART RATE: 61 BPM | RESPIRATION RATE: 18 BRPM | OXYGEN SATURATION: 97 %

## 2021-10-19 PROCEDURE — G0152 HHCP-SERV OF OT,EA 15 MIN: HCPCS

## 2021-10-19 NOTE — HOME HEALTH
"SUBJECTIVE: \"I'm doing OK\".  Spouse reports that pt. is \"getting better every day\". She reports that her RUE pain has improved.  Spouse reports that she had appt. with Dr. Pressley last week on 10/14/21, and xray \"looked good\", but due to increased pain he recommended pt. begin utilizing sling again, and no ROM for 1 week.  Spouse reports that PT was calling MD to determine RUE ROM and restrictions.      OT home health referral given due to recent R TSA with hospital and rehab stay with impaired ADL, IADL, functional transfers and mobility.  Pt. also had recent blood transfusion at hospital due to anemia.     PAST MEDICAL HX: left shoulder replacement after fall 2-3 years ago,, Left TKR 3 years ago, Right knee OA, insomnia, osteoporosis, anemia, hearing impairment     PRIOR LEVEL OF FUNCTION:Pt. was previously independent with ADL tasks, functional transfers and mobility in the home with RW. Spouse and daughter assist with IADL tasks.     HOME ENVIRONMENT:   Pt. lives with spouse and daughter in ranch style home with small dog.  Patient has proper footwear, AD, and appears to have approp. DME.    PLAN FOR NEXT VISIT: OT to continue with established POC with focus on increasing LUE strength, RUE ROM per MD order, standing tolerance and balance related to ADL, increase indep. with ADL. Next OT visit will be next week d/t pt's. spouse reports that pt. was to stay in sling with no ROM for 1 week which will be at the end of this week - RUE in sling limits pt's. particpation in ADL.  Pt. and spouse in agreement with next visit to be next week, and they are in agreement with OT POC.     MEDICAL NECESSITY FOR ONGOING SKILLED THERAPY: Patient requires skilled occupational therapy for remediation of deficits to improve activities of daily living, home safety, falls prevention, monitor vital signs, including pulse oximetry, hand/ upper extremity function/range of motion/strength, therapeutic exercise, safety in home, and improve " endurance and fatigue management with self care, and functional mobility with durable medical equipment as necessary

## 2021-10-20 NOTE — PROGRESS NOTES
Anticoagulation Clinic Progress Note    Anticoagulation Summary  As of 3/29/2021    INR goal:  2.0-3.0   TTR:  61.3 % (2.4 y)   INR used for dosin.3 (3/29/2021)   Warfarin maintenance plan:  1 mg every Mon, Wed, Fri; 2 mg all other days   Weekly warfarin total:  11 mg   No change documented:  Anna Marie Ndiaye   Plan last modified:  Shanna Major RPH (10/27/2020)   Next INR check:  2021   Priority:  Maintenance   Target end date:  Indefinite    Indications    Chronic atrial fibrillation (CMS/[facility]) [I48.20]             Anticoagulation Episode Summary     INR check location:      Preferred lab:      Send INR reminders to:   [facility] CLINICAL POOL    Comments:        Anticoagulation Care Providers     Provider Role Specialty Phone number    Nik Galarza MD Referring Cardiology [phone]          Clinic Interview:  Patient Findings     Negatives:  Signs/symptoms of thrombosis, Signs/symptoms of bleeding,   Laboratory test error suspected, Change in health, Change in alcohol use,   Change in activity, Upcoming invasive procedure, Emergency department   visit, Upcoming dental procedure, Missed doses, Extra doses, Change in   medications, Change in diet/appetite, Hospital admission, Bruising, Other   complaints      Clinical Outcomes     Negatives:  Major bleeding event, Thromboembolic event,   Anticoagulation-related hospital admission, Anticoagulation-related ED   visit, Anticoagulation-related fatality        INR History:  Anticoagulation Monitoring 2/3/2021 3/3/2021 3/29/2021   INR 2.1 1.9 2.3   INR Date 2/3/2021 3/3/2021 3/29/2021   INR Goal 2.0-3.0 2.0-3.0 2.0-3.0   Trend Same Same Same   Last Week Total 11 mg 11 mg 11 mg   Next Week Total 11 mg 11 mg 11 mg   Sun 2 mg 2 mg 2 mg   Mon 1 mg 1 mg 1 mg   Tue 2 mg 2 mg 2 mg   Wed 1 mg 1 mg 1 mg   Thu 2 mg 2 mg 2 mg   Fri 1 mg 1 mg 1 mg   Sat 2 mg 2 mg 2 mg   Visit Report - - -   Some recent data might be hidden       Plan:  1. INR is therapeutic  today- see above in Anticoagulation Summary.   Will instruct Janel Mahoney to continue their warfarin regimen- see above in Anticoagulation Summary.  2. Follow up in 2 weeks.  3. Patient declines warfarin refills.  4. Verbal and written information provided. Patient expresses understanding and has no further questions at this time.    Anna Marie Ndiaye

## 2021-10-21 ENCOUNTER — ANTICOAGULATION VISIT (OUTPATIENT)
Dept: PHARMACY | Facility: HOSPITAL | Age: 81
End: 2021-10-21

## 2021-10-21 ENCOUNTER — HOME CARE VISIT (OUTPATIENT)
Dept: HOME HEALTH SERVICES | Facility: HOME HEALTHCARE | Age: 81
End: 2021-10-21

## 2021-10-21 VITALS
RESPIRATION RATE: 18 BRPM | SYSTOLIC BLOOD PRESSURE: 145 MMHG | OXYGEN SATURATION: 98 % | HEART RATE: 78 BPM | DIASTOLIC BLOOD PRESSURE: 78 MMHG | TEMPERATURE: 97.4 F

## 2021-10-21 DIAGNOSIS — I48.20 CHRONIC ATRIAL FIBRILLATION (HCC): Primary | ICD-10-CM

## 2021-10-21 LAB — INR PPP: 2.9

## 2021-10-21 PROCEDURE — G0157 HHC PT ASSISTANT EA 15: HCPCS

## 2021-10-21 RX ORDER — RAMIPRIL 5 MG/1
5 CAPSULE ORAL
Qty: 90 CAPSULE | Refills: 1 | Status: SHIPPED | OUTPATIENT
Start: 2021-10-21 | End: 2021-10-26 | Stop reason: SDUPTHER

## 2021-10-21 NOTE — PROGRESS NOTES
Anticoagulation Clinic Progress Note    Anticoagulation Summary  As of 10/21/2021    INR goal:  2.0-3.0   TTR:  62.8 % (2.8 y)   INR used for dosin.90 (10/21/2021)   Warfarin maintenance plan:  1 mg every Mon, Fri; 2 mg all other days   Weekly warfarin total:  12 mg   No change documented:  Vargas Butcher PharmD   Plan last modified:  Vargas Butcher PharmD (10/18/2021)   Next INR check:  10/25/2021   Priority:  Maintenance   Target end date:  Indefinite    Indications    Chronic atrial fibrillation (HCC) [I48.20]             Anticoagulation Episode Summary     INR check location:      Preferred lab:      Send INR reminders to:  [facility] CLINICAL [place]    Comments:        Anticoagulation Care Providers     Provider Role Specialty Phone number    Nik Galarza MD Referring Cardiology [phone]          INR History:  Anticoagulation Monitoring 10/15/2021 10/18/2021 10/21/2021   INR 2.10 2.50 2.90   INR Date 10/14/2021 10/18/2021 10/21/2021   INR Goal 2.0-3.0 2.0-3.0 2.0-3.0   Trend Same Up Same   Last Week Total 11 mg 11.5 mg 12 mg   Next Week Total 12 mg 12 mg 12 mg   Sun 2 mg - 2 mg   Mon - 1 mg -   Tue - 2 mg -   Wed - 2 mg -   Thu - - 2 mg   Fri 2 mg (10/15) - 1 mg   Sat 1.5 mg (10/16) - 2 mg   Visit Report - - -   Some recent data might be hidden       Plan:  1. INR is Therapeutic today- see above in Anticoagulation Summary.   Provided instructions to Rosalinda with [facility] to Continue their warfarin regimen- see above in Anticoagulation Summary.  2. Follow up in 4 days      Vargas Butcher PharmD

## 2021-10-21 NOTE — CASE COMMUNICATION
"Homa Bryant I obtained the INR orders and entered. I notified patient's daughter of continuing the same dosage. Next INR due w/ visit on 10/25 please. Thanks         \"VERBAL ORDERS RECEIVED BY PHONE FROM YASSINE ARELLANO/PHARMACIST AT [facility] PER DR. LAWRENCE.     PT/INR: 35.1/2.9 DRAWN ON 10/21/21.     COUMADIN DOSAGE: PATIENT TO CONTINUE TO TAKE 1MG BY MOUTH ON MONDAY AND FRIDAY; AND TAKE 2MG ALL OTHER DAYS.     RECHECK PT /INR PER FINGERSTICK ON MONDAY 10/25/21 WITH RESULTS TO [facility].     PATIENT'S DAUGHTER MARISSA WAS CONTACTED AND INSTRUCTED ON THE ABOVE COUMADIN DOSAGE AND STATED UNDERSTANDING. \"  "

## 2021-10-25 ENCOUNTER — HOME CARE VISIT (OUTPATIENT)
Dept: HOME HEALTH SERVICES | Facility: HOME HEALTHCARE | Age: 81
End: 2021-10-25

## 2021-10-25 ENCOUNTER — ANTICOAGULATION VISIT (OUTPATIENT)
Dept: PHARMACY | Facility: HOSPITAL | Age: 81
End: 2021-10-25

## 2021-10-25 VITALS
HEART RATE: 60 BPM | DIASTOLIC BLOOD PRESSURE: 72 MMHG | SYSTOLIC BLOOD PRESSURE: 138 MMHG | RESPIRATION RATE: 18 BRPM | OXYGEN SATURATION: 99 % | TEMPERATURE: 98.1 F

## 2021-10-25 DIAGNOSIS — I48.20 CHRONIC ATRIAL FIBRILLATION (HCC): Primary | ICD-10-CM

## 2021-10-25 LAB — INR PPP: 3.4

## 2021-10-25 PROCEDURE — G0157 HHC PT ASSISTANT EA 15: HCPCS

## 2021-10-25 PROCEDURE — G0152 HHCP-SERV OF OT,EA 15 MIN: HCPCS

## 2021-10-25 NOTE — PROGRESS NOTES
Anticoagulation Clinic Progress Note    Anticoagulation Summary  As of 10/25/2021    INR goal:  2.0-3.0   TTR:  62.7 % (2.8 y)   INR used for dosing:  3.40 (10/25/2021)   Warfarin maintenance plan:  1 mg every Mon, Wed, Fri; 2 mg all other days   Weekly warfarin total:  11 mg   Plan last modified:  Vargas Butcher, Artur (10/25/2021)   Next INR check:  10/28/2021   Priority:  Maintenance   Target end date:  Indefinite    Indications    Chronic atrial fibrillation (HCC) [I48.20]             Anticoagulation Episode Summary     INR check location:      Preferred lab:      Send INR reminders to:   [facility] POOL    Comments:        Anticoagulation Care Providers     Provider Role Specialty Phone number    Nik Galarza MD Referring Cardiology [phone]          INR History:  Anticoagulation Monitoring 10/18/2021 10/21/2021 10/25/2021   INR 2.50 2.90 3.40   INR Date 10/18/2021 10/21/2021 10/25/2021   INR Goal 2.0-3.0 2.0-3.0 2.0-3.0   Trend Up Same Down   Last Week Total 11.5 mg 12 mg 12 mg   Next Week Total 12 mg 12 mg 11 mg   Sun - 2 mg -   Mon 1 mg - 1 mg   Tue 2 mg - 2 mg   Wed 2 mg - 1 mg   Thu - 2 mg -   Fri - 1 mg -   Sat - 2 mg -   Visit Report - - -   Some recent data might be hidden       Plan:  1. INR is Supratherapeutic today- see above in Anticoagulation Summary.   Provided instructions to Rosalinda with [facility] to Change their warfarin regimen- see above in Anticoagulation Summary.  2. Follow up in 3 days      Vargas Butcher, MeaganD

## 2021-10-25 NOTE — CASE COMMUNICATION
"Homa & Jeancarlos- See INR orders below. I notified daughter of dosage change. Please obtain next inr on thurs 10/28 with your visit. Thanks         \"  VERBAL ORDERS RECEIVED BY PHONE FROM YASSINE ARELLANO/PHARMACIST AT [facility] PER DR. LAWRENCE.     PT/INR: 40.5/3.4 DRAWN ON 10/25/21.     COUMADIN DOSAGE: PATIENT TO TAKE 1MG BY MOUTH ON MONDAY,WEDNESDAY, AND FRIDAY; AND TAKE 2MG ALL OTHER DAYS.     RECHECK PT/INR PER FINGERSTICK ON  THURSDAY10/28/21 WITH RESULTS TO [facility].     PATIENT'S DAUGHTER MARISSA WAS CONTACTED AND INSTRUCTED ON THE ABOVE COUMADIN DOSAGE AND STATED UNDERSTANDING.\"  "

## 2021-10-25 NOTE — HOME HEALTH
"SUBJECTIVE: \"I took a pain pill because I didn't know what you would have me do.\" No falls reported.     PLAN FOR NEXT VISIT: f/u on HEP w/UE, ADLs."

## 2021-10-26 ENCOUNTER — TELEPHONE (OUTPATIENT)
Dept: FAMILY MEDICINE CLINIC | Facility: CLINIC | Age: 81
End: 2021-10-26

## 2021-10-26 VITALS
SYSTOLIC BLOOD PRESSURE: 139 MMHG | HEART RATE: 72 BPM | OXYGEN SATURATION: 97 % | DIASTOLIC BLOOD PRESSURE: 78 MMHG | TEMPERATURE: 97.2 F | RESPIRATION RATE: 18 BRPM

## 2021-10-26 RX ORDER — RAMIPRIL 5 MG/1
5 CAPSULE ORAL
Qty: 90 CAPSULE | Refills: 1 | Status: SHIPPED | OUTPATIENT
Start: 2021-10-26 | End: 2021-10-27 | Stop reason: SDUPTHER

## 2021-10-26 NOTE — HOME HEALTH
SUBJECTIVE: Patient reports f/u appt with Ear  went well. She has a f/u in 2 weeks. Good adherence to HEP reported,    INR: 3.4 called into Rosalinda Ferreira since last visit: no    Medication changes: no

## 2021-10-27 ENCOUNTER — TELEPHONE (OUTPATIENT)
Dept: FAMILY MEDICINE CLINIC | Facility: CLINIC | Age: 81
End: 2021-10-27

## 2021-10-27 RX ORDER — RAMIPRIL 5 MG/1
5 CAPSULE ORAL
Qty: 90 CAPSULE | Refills: 1 | Status: SHIPPED | OUTPATIENT
Start: 2021-10-27 | End: 2022-01-01 | Stop reason: HOSPADM

## 2021-10-27 NOTE — TELEPHONE ENCOUNTER
PT'S DAUGHTER CALLED TO CHECK ON REFILLS OF ramipril (ALTACE) 5 MG capsule  CALLED [facility] AND THEY SAID THEY ARE NOT GETTING A RESPONSE FROM THE PRESCRIBER  [facility] PHAR-EMPLOYEE ONLY(NOT MAIL) - [place], [place] - [facility] [address] [facility] [phone] [facility] [phone] FX

## 2021-10-28 ENCOUNTER — HOME CARE VISIT (OUTPATIENT)
Dept: HOME HEALTH SERVICES | Facility: HOME HEALTHCARE | Age: 81
End: 2021-10-28

## 2021-10-28 ENCOUNTER — ANTICOAGULATION VISIT (OUTPATIENT)
Dept: PHARMACY | Facility: HOSPITAL | Age: 81
End: 2021-10-28

## 2021-10-28 VITALS
HEART RATE: 65 BPM | OXYGEN SATURATION: 98 % | RESPIRATION RATE: 18 BRPM | SYSTOLIC BLOOD PRESSURE: 141 MMHG | TEMPERATURE: 97.6 F | DIASTOLIC BLOOD PRESSURE: 71 MMHG

## 2021-10-28 VITALS
OXYGEN SATURATION: 98 % | RESPIRATION RATE: 18 BRPM | SYSTOLIC BLOOD PRESSURE: 131 MMHG | HEART RATE: 83 BPM | DIASTOLIC BLOOD PRESSURE: 78 MMHG | TEMPERATURE: 97.4 F

## 2021-10-28 DIAGNOSIS — I48.20 CHRONIC ATRIAL FIBRILLATION (HCC): Primary | ICD-10-CM

## 2021-10-28 LAB — INR PPP: 3.6

## 2021-10-28 PROCEDURE — G0157 HHC PT ASSISTANT EA 15: HCPCS

## 2021-10-28 PROCEDURE — G0152 HHCP-SERV OF OT,EA 15 MIN: HCPCS

## 2021-10-28 NOTE — PROGRESS NOTES
Anticoagulation Clinic Progress Note  Anticoagulation Summary  As of 10/28/2021    INR goal:  2.0-3.0   TTR:  62.5 % (2.8 y)   INR used for dosing:  3.60 (10/28/2021)   Warfarin maintenance plan:  1 mg every Mon, Wed, Fri; 2 mg all other days   Weekly warfarin total:  11 mg   Plan last modified:  Vargas Butcher, PharmD (10/25/2021)   Next INR check:  11/1/2021   Priority:  Maintenance   Target end date:  Indefinite    Indications    Chronic atrial fibrillation (HCC) [I48.20]             Anticoagulation Episode Summary     INR check location:      Preferred lab:      Send INR reminders to:   [facility]IN[facility] POOL    Comments:        Anticoagulation Care Providers     Provider Role Specialty Phone number    Nik Galarza MD Referring Cardiology [phone]        Clinic Interview:  Patient Findings     Comments:  Info per [facility] KIRT Tellez.  No bleeding or additional doses were   reported.         INR History:  Anticoagulation Monitoring 10/21/2021 10/25/2021 10/28/2021   INR 2.90 3.40 3.60   INR Date 10/21/2021 10/25/2021 10/28/2021   INR Goal 2.0-3.0 2.0-3.0 2.0-3.0   Trend Same Down Same   Last Week Total 12 mg 12 mg 11 mg   Next Week Total 12 mg 11 mg 9 mg   Sun 2 mg - 2 mg   Mon - 1 mg -   Tue - 2 mg -   Wed - 1 mg -   Thu 2 mg - Hold (10/28)   Fri 1 mg - 1 mg   Sat 2 mg - 2 mg   Visit Report - - -   Some recent data might be hidden     Plan:  1. INR is Supratherapeutic today- see above in Anticoagulation Summary.   Will instruct Janel Mahoney via [facility] RN Rosalinda [phone] to Change their warfarin regimen- see above in Anticoagulation Summary.  [facility] RN Rosalinda will notify Patient / [facility] RN to contact MD immediately for any bleeding, falls, or bumps to the head.    2. Follow up on Monday 11/1/21  3. They have been instructed to call if any changes in medications, doses, concerns, etc. Patient expresses understanding and has no further questions at this time.    Jameel Ford, MeaganD

## 2021-10-28 NOTE — CASE COMMUNICATION
"Israel Rodas told me that she planned to have you follow for the nursing.  I verified with Dr. Pressley's nurse Arianna that she is follwing this patient. I got the order for nursing since patient's daughter has had questions about the INR being high and her concerns about patient's new ear drops and some bleeding she had had to her ear in the recent past & thus she had been put on abx ear drops. The INR has been high the last 2  times Symon has taken it. I will put copy of INR orders below along with Arianna's orders per Dr. Pressley. Adrianna told me that the Abx ear drops could cause the INR to go up and so I did let pt daughter know; daughter says that there are 2 new ear drops and she is to be on them 10 or 14 days- can you please add those to patient's med list since I don't think that the daughter had mentioned that to the therapist.   Please call Arianna sena r your visit for status report. They don't usually add nursing to their \"Bundle\" patients so they have to give approval for additional visits. Arianna is great so please call her for any questions/needs- [phone].    Maira gave me an order to add nursing to do the INRs but said that the home nurse would need to stop seeing patient when physical therapy was discharging patient- so you can just let [facility] know when  we discharge so they can call family to schedule. Arianna is with Dr. Suellen SAHU; [facility] is giving the INR orders under Dr. Lawrence. Thanks         \"  VERBAL ORDERS RECEIVED BY PHONE FROM JARVIS/PHARMACIST AT [facility] PER DR. LAWRENCE.     PT/INR: 43.2/3.6 DRAWN ON 10/28/21.     COUMADIN DOSAGE: PATIENT TO HOLD TAKING TODAY 10/28/21; THEN TOMORROW 10/29/21 PATIENT TO RESUME TAKING 1MG BY MOUTH ON MONDA Y, WEDNESDAY AND FRIDAY; AND TAKING 2MG ALL OTHER DAYS.    PATIENT TO TAKE 1MG BY MOUTH ON MONDAY,WEDNESDAY, AND FRIDAY; AND TAKE 2MG ALL OTHER DAYS.   EDUCATE PATIENT AND FAMILY ON " "SIGNS OF ABNORMAL BLEEDING TO REPORT.    SKILLED NURSE TO OBTAIN PT/INR PER FINGERSTICK OR VENIPUNCTURE ON MONDAY 11/1/21 WITH RESULTS TO [facility].     PATIENT'S DAUGHTER MARISSA WAS CONTACTED AND GIVEN THE ABOVE COUMADIN INSTRUCTIONS AND SHE STATE D UNDERSTANDING. SHE WAS ALSO INSTRUCTED ON SIGNS OF ABNORMAL BLEEDING TO OBSERVE FOR AND TO REPORT TO.\"
"[id number]"

## 2021-10-28 NOTE — HOME HEALTH
Spouse rpeorts patient is still doing well wiht gait and HEP. INR was 3.6 today, called in to Rosalinda Poe.

## 2021-10-28 NOTE — HOME HEALTH
"Upon arrival to pt's home, PTA present; therefore OT visit rescheduled for later this afternoon.    SUBJECTIVE: \"My ears popped again last night and now I can't hardly hear anything.\"  No falls reported.     PLAN FOR NEXT VISIT: address ADLs, UE HEP."

## 2021-10-29 ENCOUNTER — HOME CARE VISIT (OUTPATIENT)
Dept: HOME HEALTH SERVICES | Facility: HOME HEALTHCARE | Age: 81
End: 2021-10-29

## 2021-10-29 VITALS
OXYGEN SATURATION: 100 % | SYSTOLIC BLOOD PRESSURE: 142 MMHG | TEMPERATURE: 98 F | DIASTOLIC BLOOD PRESSURE: 72 MMHG | RESPIRATION RATE: 18 BRPM | HEART RATE: 60 BPM

## 2021-10-29 PROCEDURE — G0299 HHS/HOSPICE OF RN EA 15 MIN: HCPCS

## 2021-10-29 NOTE — CASE COMMUNICATION
Adrienne saw patient today and called report to Maira, No need for any additional nursing visits   PT to continue with INR checks. Patient has a follow up apt with ENT regarding her ears on 11/4 already scheduled.

## 2021-11-01 ENCOUNTER — HOME CARE VISIT (OUTPATIENT)
Dept: HOME HEALTH SERVICES | Facility: HOME HEALTHCARE | Age: 81
End: 2021-11-01

## 2021-11-01 ENCOUNTER — ANTICOAGULATION VISIT (OUTPATIENT)
Dept: PHARMACY | Facility: HOSPITAL | Age: 81
End: 2021-11-01

## 2021-11-01 VITALS
RESPIRATION RATE: 18 BRPM | TEMPERATURE: 97.4 F | OXYGEN SATURATION: 98 % | SYSTOLIC BLOOD PRESSURE: 131 MMHG | DIASTOLIC BLOOD PRESSURE: 80 MMHG | HEART RATE: 71 BPM

## 2021-11-01 VITALS
OXYGEN SATURATION: 99 % | SYSTOLIC BLOOD PRESSURE: 124 MMHG | TEMPERATURE: 97.5 F | DIASTOLIC BLOOD PRESSURE: 58 MMHG | HEART RATE: 64 BPM

## 2021-11-01 DIAGNOSIS — I48.20 CHRONIC ATRIAL FIBRILLATION (HCC): Primary | ICD-10-CM

## 2021-11-01 LAB — INR PPP: 5.3

## 2021-11-01 PROCEDURE — G0157 HHC PT ASSISTANT EA 15: HCPCS

## 2021-11-01 PROCEDURE — G0152 HHCP-SERV OF OT,EA 15 MIN: HCPCS

## 2021-11-01 NOTE — CASE COMMUNICATION
"Homa and Oziel- I received INR orders & entered. I have contacted pt's spouse and informed him to hold Coumadin and that next INR will be w/ therapy visit thursday 11/4. Thanks         \"  VERBAL ORDERS RECEIVED BY PHONE FROM TRAY/PHARMACIST AT [facility] PER DR. LAWRENCE.     PT/INR:  63.2/ 5.3 DRAWN ON 11/1/21.     COUMADIN DOSAGE: PATIENT TO HOLD TAKING ANY COUMADIN TODAY 11/1/21, TUESDAY 11/2/21, AND WEDNESDAY 11/3/21.      NEXT PT/INR IS TO BE DONE WITH VISIT ON 11/4/21 PER FINGERSTICK ON  WITH RESULTS TO [facility].     PATIENT SPOUSE WAS CONTACTED BY PHONE AND INSTRUCTED THAT PATIENT IS TO NOT TAKE ANY COUMADIN TODAY 11/1/21, TUESDAY 11/2/21, OR WEDNESDAY 11/3/21 AND HE STATED UNDERSTANDING. HE WAS ALSO INSTRUCTED ON SIGNS OF ABNORMAL BLEEDING TO OBSERVE FOR AND TO REPORT."

## 2021-11-01 NOTE — HOME HEALTH
"SUBJECTIVE: \"I go back to the doctor for my ears on Thursday. Hopefully they can help me cause I'm tired of not being able to hear.\"  No falls reported.     PLAN FOR NEXT VISIT: RUE HEP."

## 2021-11-01 NOTE — PROGRESS NOTES
Anticoagulation Clinic Progress Note    Anticoagulation Summary  As of 2021    INR goal:  2.0-3.0   TTR:  62.3 % (2.8 y)   INR used for dosin.30 (2021)   Warfarin maintenance plan:  1 mg every Mon, Wed, Fri; 2 mg all other days   Weekly warfarin total:  11 mg   Plan last modified:  Vargas Butcher, PharmD (10/25/2021)   Next INR check:  2021   Priority:  Maintenance   Target end date:  Indefinite    Indications    Chronic atrial fibrillation (HCC) [I48.20]             Anticoagulation Episode Summary     INR check location:      Preferred lab:      Send INR reminders to:  [facility]    Comments:        Anticoagulation Care Providers     Provider Role Specialty Phone number    Nik Galarza MD Referring Cardiology [phone]          Clinic Interview:  Patient Findings     Positives:  Change in medications    Negatives:  Signs/symptoms of thrombosis, Signs/symptoms of bleeding,   Laboratory test error suspected, Change in health, Change in alcohol use,   Change in activity, Upcoming invasive procedure, Emergency department   visit, Upcoming dental procedure, Missed doses, Extra doses, Change in   diet/appetite, Hospital admission, Bruising, Other complaints    Comments:  Reports ENT gave ofloxacin ear drops x10 days for crusted blood in ear (stop date tomorrow). Family denies any signs/symptoms of bleeding.      Clinical Outcomes     Negatives:  Major bleeding event, Thromboembolic event,   Anticoagulation-related hospital admission, Anticoagulation-related ED   visit, Anticoagulation-related fatality    Comments:  Reports ENT gave ofloxacin ear drops x10 days for crusted blood in ear (stop date tomorrow). Family denies any signs/symptoms of bleeding.        INR History:  Anticoagulation Monitoring 10/25/2021 10/28/2021 2021   INR 3.40 3.60 5.30   INR Date 10/25/2021 10/28/2021 2021   INR Goal 2.0-3.0 2.0-3.0 2.0-3.0   Trend Down Same Same   Last Week Total 12 mg 11  mg 9 mg   Next Week Total 11 mg 9 mg 7 mg   Sun - 2 mg -   Mon 1 mg - Hold (11/1)   Tue 2 mg - Hold (11/2)   Wed 1 mg - Hold (11/3)   Thu - Hold (10/28) -   Fri - 1 mg -   Sat - 2 mg -   Visit Report - - -   Some recent data might be hidden       Plan:  1. INR is Supratherapeutic. Provided instructions to Rosalinda with [facility] to HOLD their warfarin - see above in Anticoagulation Summary. Patient / RN has been instructed to seek immediate medical attention if develop signs/symptoms of bleeding or if a fall occurs.  2. Follow up in 3 days      Bernardo Vidales III, PharmD

## 2021-11-02 ENCOUNTER — INFUSION (OUTPATIENT)
Dept: ONCOLOGY | Facility: HOSPITAL | Age: 81
End: 2021-11-02

## 2021-11-02 ENCOUNTER — HOME CARE VISIT (OUTPATIENT)
Dept: HOME HEALTH SERVICES | Facility: HOME HEALTHCARE | Age: 81
End: 2021-11-02

## 2021-11-02 ENCOUNTER — LAB (OUTPATIENT)
Dept: LAB | Facility: HOSPITAL | Age: 81
End: 2021-11-02

## 2021-11-02 DIAGNOSIS — D63.1 ANEMIA IN STAGE 3A CHRONIC KIDNEY DISEASE (HCC): ICD-10-CM

## 2021-11-02 DIAGNOSIS — N18.31 ANEMIA IN STAGE 3A CHRONIC KIDNEY DISEASE (HCC): ICD-10-CM

## 2021-11-02 LAB
BASOPHILS # BLD AUTO: 0.02 10*3/MM3 (ref 0–0.2)
BASOPHILS NFR BLD AUTO: 0.3 % (ref 0–1.5)
DEPRECATED RDW RBC AUTO: 58.6 FL (ref 37–54)
EOSINOPHIL # BLD AUTO: 0.14 10*3/MM3 (ref 0–0.4)
EOSINOPHIL NFR BLD AUTO: 2.2 % (ref 0.3–6.2)
ERYTHROCYTE [DISTWIDTH] IN BLOOD BY AUTOMATED COUNT: 16.8 % (ref 12.3–15.4)
HCT VFR BLD AUTO: 32.7 % (ref 34–46.6)
HGB BLD-MCNC: 10.5 G/DL (ref 12–15.9)
IMM GRANULOCYTES # BLD AUTO: 0.02 10*3/MM3 (ref 0–0.05)
IMM GRANULOCYTES NFR BLD AUTO: 0.3 % (ref 0–0.5)
LYMPHOCYTES # BLD AUTO: 0.85 10*3/MM3 (ref 0.7–3.1)
LYMPHOCYTES NFR BLD AUTO: 13.6 % (ref 19.6–45.3)
MCH RBC QN AUTO: 30.6 PG (ref 26.6–33)
MCHC RBC AUTO-ENTMCNC: 32.1 G/DL (ref 31.5–35.7)
MCV RBC AUTO: 95.3 FL (ref 79–97)
MONOCYTES # BLD AUTO: 0.45 10*3/MM3 (ref 0.1–0.9)
MONOCYTES NFR BLD AUTO: 7.2 % (ref 5–12)
NEUTROPHILS NFR BLD AUTO: 4.79 10*3/MM3 (ref 1.7–7)
NEUTROPHILS NFR BLD AUTO: 76.4 % (ref 42.7–76)
NRBC BLD AUTO-RTO: 0 /100 WBC (ref 0–0.2)
PLATELET # BLD AUTO: 132 10*3/MM3 (ref 140–450)
PMV BLD AUTO: 10.5 FL (ref 6–12)
RBC # BLD AUTO: 3.43 10*6/MM3 (ref 3.77–5.28)
WBC # BLD AUTO: 6.27 10*3/MM3 (ref 3.4–10.8)

## 2021-11-02 PROCEDURE — 36415 COLL VENOUS BLD VENIPUNCTURE: CPT

## 2021-11-02 PROCEDURE — 85025 COMPLETE CBC W/AUTO DIFF WBC: CPT

## 2021-11-02 RX ORDER — ALLOPURINOL 100 MG/1
200 TABLET ORAL
Qty: 90 TABLET | Refills: 1 | Status: SHIPPED | OUTPATIENT
Start: 2021-11-02 | End: 2022-01-01

## 2021-11-02 NOTE — TELEPHONE ENCOUNTER
PATIENT CALLED FOR MEDICATION REFILL OF   allopurinol (ZYLOPRIM) 100 MG tablet  PATIENT TAKES DIFFERENTLY    90 DAY SUPPLY    [facility] - [place], [place] - [address] - [phone] [facility] [phone]   [phone]    CALL BACK NUMBER [phone]

## 2021-11-02 NOTE — NURSING NOTE
Lab Results   Component Value Date    WBC 6.27 11/02/2021    HGB 10.5 (L) 11/02/2021    HCT 32.7 (L) 11/02/2021    MCV 95.3 11/02/2021     (L) 11/02/2021   per protocol no procrit shot today. Reviewed with  and patient.

## 2021-11-03 ENCOUNTER — ANTICOAGULATION VISIT (OUTPATIENT)
Dept: PHARMACY | Facility: HOSPITAL | Age: 81
End: 2021-11-03

## 2021-11-03 ENCOUNTER — LAB REQUISITION (OUTPATIENT)
Dept: LAB | Facility: HOSPITAL | Age: 81
End: 2021-11-03

## 2021-11-03 ENCOUNTER — HOME CARE VISIT (OUTPATIENT)
Dept: HOME HEALTH SERVICES | Facility: HOME HEALTHCARE | Age: 81
End: 2021-11-03

## 2021-11-03 VITALS
DIASTOLIC BLOOD PRESSURE: 74 MMHG | SYSTOLIC BLOOD PRESSURE: 124 MMHG | TEMPERATURE: 96.9 F | OXYGEN SATURATION: 98 % | HEART RATE: 68 BPM

## 2021-11-03 VITALS
DIASTOLIC BLOOD PRESSURE: 68 MMHG | OXYGEN SATURATION: 98 % | TEMPERATURE: 97.9 F | HEART RATE: 74 BPM | SYSTOLIC BLOOD PRESSURE: 116 MMHG | RESPIRATION RATE: 16 BRPM

## 2021-11-03 DIAGNOSIS — Z00.00 ENCOUNTER FOR GENERAL ADULT MEDICAL EXAMINATION WITHOUT ABNORMAL FINDINGS: ICD-10-CM

## 2021-11-03 DIAGNOSIS — I48.20 CHRONIC ATRIAL FIBRILLATION (HCC): Primary | ICD-10-CM

## 2021-11-03 LAB
INR PPP: 4.09 (ref 0.9–1.1)
INR PPP: 6.4
PROTHROMBIN TIME: 39.4 SECONDS (ref 11.7–14.2)

## 2021-11-03 PROCEDURE — G0299 HHS/HOSPICE OF RN EA 15 MIN: HCPCS

## 2021-11-03 PROCEDURE — G0151 HHCP-SERV OF PT,EA 15 MIN: HCPCS

## 2021-11-03 PROCEDURE — 85610 PROTHROMBIN TIME: CPT | Performed by: INTERNAL MEDICINE

## 2021-11-03 NOTE — HOME HEALTH
Patient reports she saw the doctor yesterday, she wore herself out. She did not need a shot yesterday.          PT/INR drawn with INR 6.4. Results caled to Rosalinda Rajan

## 2021-11-03 NOTE — PROGRESS NOTES
Anticoagulation Clinic Progress Note    Anticoagulation Summary  As of 11/3/2021    INR goal:  2.0-3.0   TTR:  62.1 % (2.8 y)   INR used for dosin.09 (11/3/2021)   Warfarin maintenance plan:  1 mg every Mon, Wed, Fri; 2 mg all other days   Weekly warfarin total:  11 mg   Plan last modified:  Vargas Butcher, Artur (10/25/2021)   Next INR check:  2021   Priority:  Maintenance   Target end date:  Indefinite    Indications    Chronic atrial fibrillation (HCC) [I48.20]             Anticoagulation Episode Summary     INR check location:      Preferred lab:      Send INR reminders to:   [facility]    Comments:        Anticoagulation Care Providers     Provider Role Specialty Phone number    Nik Galarza MD Referring Cardiology [phone]        Findings:  Daughter confirmed that warfarin has been held since 21.     INR History:  Anticoagulation Monitoring 10/28/2021 2021 11/3/2021   INR 3.60 5.30 4.09   INR Date 10/28/2021 2021 11/3/2021   INR Goal 2.0-3.0 2.0-3.0 2.0-3.0   Trend Same Same Same   Last Week Total 11 mg 9 mg 6 mg   Next Week Total 9 mg 7 mg 8 mg   Sun 2 mg - -   Mon - Hold () -   Tue - Hold () -   Wed - Hold (11/3) Hold (11/3)   Thu Hold (10/28) - Hold ()   Fri 1 mg - -   Sat 2 mg - -   Visit Report - - -   Some recent data might be hidden       Plan:  1. INR is Supratherapeutic today (POC 6.4 / lab 4.09) - see above in Anticoagulation Summary.   Provided instructions to Rosalinda with [facility] to HOLD their warfarin regimen- see above in Anticoagulation Summary.  2. Follow up in 2 days      Vargas Butcher PharmD

## 2021-11-03 NOTE — CASE COMMUNICATION
Oziel- I have just removed the SN visits that I had generated with my order from last week and so there should not be any more nursing visits on the patient's calendar since RN Bri indicated that only the 1 nursing visit was needed which was done last week.   I did go ahead & enter an order for the PT/INR to be done 11/3/21 rather than 11/4 so please obtain w/ your visit today and you can call Ebony Crockett or me to give us the resul ts. Thanks

## 2021-11-03 NOTE — HOME HEALTH
INR taken today 5.3, information passed to Rosalinda Poe. DC notice signed. Discussion of upcoming DC and OT to stay in and continue to stay in and work with ROM and strengthening of the R shoulder. Patient/spouse advised that once PT is DC'd they will need to go back to the clinic to have the INR taken. Verbal understanding given. No Falls since last visit.

## 2021-11-04 ENCOUNTER — HOME CARE VISIT (OUTPATIENT)
Dept: HOME HEALTH SERVICES | Facility: HOME HEALTHCARE | Age: 81
End: 2021-11-04

## 2021-11-04 VITALS
SYSTOLIC BLOOD PRESSURE: 136 MMHG | OXYGEN SATURATION: 97 % | DIASTOLIC BLOOD PRESSURE: 64 MMHG | RESPIRATION RATE: 18 BRPM | HEART RATE: 72 BPM | TEMPERATURE: 97.9 F

## 2021-11-04 PROCEDURE — G0152 HHCP-SERV OF OT,EA 15 MIN: HCPCS

## 2021-11-04 RX ORDER — BUMETANIDE 2 MG/1
2 TABLET ORAL 2 TIMES DAILY
Qty: 60 TABLET | Refills: 2 | Status: SHIPPED | OUTPATIENT
Start: 2021-11-04 | End: 2021-11-09 | Stop reason: SDUPTHER

## 2021-11-04 NOTE — HOME HEALTH
"SUBJECTIVE: \"I guess nursing will be doing the blood thing from now on. If not, we'll get her to [facility] to get it done.\"  No falls reported.     PLAN FOR NEXT VISIT: RUE HEP."

## 2021-11-05 ENCOUNTER — HOME CARE VISIT (OUTPATIENT)
Dept: HOME HEALTH SERVICES | Facility: HOME HEALTHCARE | Age: 81
End: 2021-11-05

## 2021-11-05 ENCOUNTER — ANTICOAGULATION VISIT (OUTPATIENT)
Dept: PHARMACY | Facility: HOSPITAL | Age: 81
End: 2021-11-05

## 2021-11-05 DIAGNOSIS — I48.20 CHRONIC ATRIAL FIBRILLATION (HCC): Primary | ICD-10-CM

## 2021-11-05 LAB — INR PPP: 3.1

## 2021-11-05 PROCEDURE — G0299 HHS/HOSPICE OF RN EA 15 MIN: HCPCS

## 2021-11-05 NOTE — CASE COMMUNICATION
11/3/21 4:30pm I spoke with Dr. Pressley's RN Maira Tapia to update her regarding patient's anticoagulation therapy which is being managed by [facility] along with Dr. Galarza. Informed that patient's INR today was 6.4 by finger stick by the physical therapist and so Dr. Galarza had given an order for home health to have an RN make visit today to repeat the PT/INR by venipuncture and taken to lab. Maira was notified that  the repeat INR per  was 4.09 and that the nurse would be obtaining the next INR which will be obtained on 11/5.

## 2021-11-05 NOTE — CASE COMMUNICATION
I spoke with Dr. Pressley's nurse Maira and so the current plan is for SN to stay in on case to obtain the PT/INRs until OT discharges which is anticipated to be soon.  [facility] follows the PT/INRs for Dr. Galarza so INRs need to be given to Med Management before 4pm when obtained otherwise if cardiologist needs to be contacted for a critical INR value after 4pm then it will need to go to Dr. Galarza's office which [facility] not [place] [facility].

## 2021-11-07 VITALS
SYSTOLIC BLOOD PRESSURE: 122 MMHG | HEART RATE: 76 BPM | DIASTOLIC BLOOD PRESSURE: 76 MMHG | TEMPERATURE: 98.2 F | OXYGEN SATURATION: 99 % | RESPIRATION RATE: 20 BRPM

## 2021-11-08 ENCOUNTER — ANTICOAGULATION VISIT (OUTPATIENT)
Dept: PHARMACY | Facility: HOSPITAL | Age: 81
End: 2021-11-08

## 2021-11-08 ENCOUNTER — HOME CARE VISIT (OUTPATIENT)
Dept: HOME HEALTH SERVICES | Facility: HOME HEALTHCARE | Age: 81
End: 2021-11-08

## 2021-11-08 VITALS
HEART RATE: 61 BPM | RESPIRATION RATE: 18 BRPM | TEMPERATURE: 97.7 F | DIASTOLIC BLOOD PRESSURE: 50 MMHG | SYSTOLIC BLOOD PRESSURE: 134 MMHG | OXYGEN SATURATION: 97 %

## 2021-11-08 DIAGNOSIS — I48.20 CHRONIC ATRIAL FIBRILLATION (HCC): Primary | ICD-10-CM

## 2021-11-08 LAB — INR PPP: 2.1

## 2021-11-08 PROCEDURE — G0299 HHS/HOSPICE OF RN EA 15 MIN: HCPCS

## 2021-11-08 PROCEDURE — G0152 HHCP-SERV OF OT,EA 15 MIN: HCPCS

## 2021-11-08 NOTE — PROGRESS NOTES
Anticoagulation Clinic Progress Note    Anticoagulation Summary  As of 2021    INR goal:  2.0-3.0   TTR:  62.1 % (2.9 y)   INR used for dosin.10 (2021)   Warfarin maintenance plan:  2 mg every Mon; 1 mg all other days   Weekly warfarin total:  8 mg   Plan last modified:  Vargas Butcher, PharmD (2021)   Next INR check:  11/15/2021   Priority:  Maintenance   Target end date:  Indefinite    Indications    Chronic atrial fibrillation (HCC) [I48.20]             Anticoagulation Episode Summary     INR check location:      Preferred lab:      Send INR reminders to:   [facility]INICAL POOL    Comments:        Anticoagulation Care Providers     Provider Role Specialty Phone number    Nik Galarza MD Referring Cardiology [phone]          Clinic Interview:  Patient Findings     Positives:  Other complaints    Negatives:  Signs/symptoms of thrombosis, Signs/symptoms of bleeding,   Laboratory test error suspected, Change in health, Change in alcohol use,   Change in activity, Upcoming invasive procedure, Emergency department   visit, Upcoming dental procedure, Missed doses, Extra doses, Change in   medications, Change in diet/appetite, Hospital admission, Bruising    Comments:  Discharged from [facility].      Clinical Outcomes     Negatives:  Major bleeding event, Thromboembolic event,   Anticoagulation-related hospital admission, Anticoagulation-related ED   visit, Anticoagulation-related fatality    Comments:  Discharged from [facility].        INR History:  Anticoagulation Monitoring 11/3/2021 2021 2021   INR 4.09 3.10 2.10   INR Date 11/3/2021 2021 2021   INR Goal 2.0-3.0 2.0-3.0 2.0-3.0   Trend Same Same Down   Last Week Total 6 mg 5 mg 3 mg   Next Week Total 8 mg 9 mg 8 mg   Sun - 1 mg () 1 mg   Mon - - 2 mg   Tue - - 1 mg   Wed Hold (11/3) - 1 mg   Thu Hold () - 1 mg   Fri - 1 mg 1 mg   Sat - 1 mg () 1 mg   Visit Report - - -   Some  recent data might be hidden       Plan:  1. INR is Therapeutic today- see above in Anticoagulation Summary.   Will instruct Janel Mahoney to Change their warfarin regimen- see above in Anticoagulation Summary.  2. Follow up in 1 week in clinic  3. They have been instructed to call if any changes in medications, doses, concerns, etc. Patient expresses understanding and has no further questions at this time.    Vargas Butcher, PharmD

## 2021-11-08 NOTE — HOME HEALTH
"SUBJECTIVE: \"I had a good weekend.\" No falls reported.     PLAN FOR NEXT VISIT: JEANNINE NUNEZ. Call Maira w/megan. See if MD wants to continue or d/c to OP."

## 2021-11-08 NOTE — HOME HEALTH
nursing seeing patient for pt/inr until she starts opt therapy  nursing obtained pt/inr vis fs and called to Kayleigh at office , she will fax to MD   nursing applied bandaid to finger .   patient tolerated well.   she is to start opt thearpy soon

## 2021-11-09 RX ORDER — BUMETANIDE 2 MG/1
2 TABLET ORAL 2 TIMES DAILY
Qty: 60 TABLET | Refills: 2 | Status: SHIPPED | OUTPATIENT
Start: 2021-11-09 | End: 2022-01-01

## 2021-11-09 NOTE — CASE COMMUNICATION
nursing d/c patient from nursing today   you will need to do [facility] d/c on Wed with your visit.   she needs to start opt therapy .   thanks   pt/inr done today and given to Kayleigh , they will go to [facility] from now on

## 2021-11-10 ENCOUNTER — HOME CARE VISIT (OUTPATIENT)
Dept: HOME HEALTH SERVICES | Facility: HOME HEALTHCARE | Age: 81
End: 2021-11-10

## 2021-11-10 PROCEDURE — G0152 HHCP-SERV OF OT,EA 15 MIN: HCPCS

## 2021-11-11 VITALS
TEMPERATURE: 97.7 F | HEART RATE: 71 BPM | DIASTOLIC BLOOD PRESSURE: 52 MMHG | RESPIRATION RATE: 18 BRPM | OXYGEN SATURATION: 98 % | SYSTOLIC BLOOD PRESSURE: 118 MMHG

## 2021-11-11 NOTE — HOME HEALTH
"SUBJECTIVE: \"I already have an appointment scheduled for Monday the 15th at [facility].\" No falls reported. Agreeable to agency d/c today."

## 2021-11-12 ENCOUNTER — TELEPHONE (OUTPATIENT)
Dept: FAMILY MEDICINE CLINIC | Facility: CLINIC | Age: 81
End: 2021-11-12

## 2021-11-12 NOTE — TELEPHONE ENCOUNTER
Caller: Janel Mahoney    Relationship to patient: Self    Best call back number: [phone]    Patient is needing: [employer] CONTACTED PATIENT STATING THEY HAVE TRIED TO CONTACT PROVIDER SEVERAL TIMES TO GET A NEW PRESCRIPTION, BUT HAVE NOT BEEN CONTACTED.   PATIENT DOES NOT KNOW WHAT THE MEDICATION IS. ASKS THAT PROVIDER CONTACT [employer]

## 2021-11-12 NOTE — TELEPHONE ENCOUNTER
[facility] [address] - [phone]  - [phone]        Pharmacy representative name: CLAUDETTE    Pharmacy representative phone number:[phone]    What medication are you calling in regards to: bumetanide (BUMEX) 2 MG tablet    What question does the pharmacy have:PHARMACY NEEDING TO GET CLARIFICATION OF DIRECTIONS CLAUDETTE FOREMAN DIRECTIONS WERE CUT OFF OF ELECTRONIC REQUEST    Who is the provider that prescribed the medication: DR LYNN    Additional notes:

## 2021-11-15 ENCOUNTER — ANTICOAGULATION VISIT (OUTPATIENT)
Dept: PHARMACY | Facility: HOSPITAL | Age: 81
End: 2021-11-15

## 2021-11-15 DIAGNOSIS — I48.20 CHRONIC ATRIAL FIBRILLATION (HCC): Primary | ICD-10-CM

## 2021-11-15 LAB
INR PPP: 2.5 (ref 0.91–1.09)
PROTHROMBIN TIME: 30 SECONDS (ref 10–13.8)

## 2021-11-15 PROCEDURE — 36416 COLLJ CAPILLARY BLOOD SPEC: CPT

## 2021-11-15 PROCEDURE — 85610 PROTHROMBIN TIME: CPT

## 2021-11-15 NOTE — PROGRESS NOTES
Anticoagulation Clinic Progress Note    Anticoagulation Summary  As of 11/15/2021    INR goal:  2.0-3.0   TTR:  62.4 % (2.9 y)   INR used for dosin.5 (11/15/2021)   Warfarin maintenance plan:  2 mg every Mon; 1 mg all other days   Weekly warfarin total:  8 mg   No change documented:  Vargas Butcher, Artur   Plan last modified:  Vargas Butcher PharmD (2021)   Next INR check:  2021   Priority:  Maintenance   Target end date:  Indefinite    Indications    Chronic atrial fibrillation (HCC) [I48.20]             Anticoagulation Episode Summary     INR check location:      Preferred lab:      Send INR reminders to:   [facility]IN[facility] POOL    Comments:        Anticoagulation Care Providers     Provider Role Specialty Phone number    Nik Galarza MD Referring Cardiology [phone]          Clinic Interview:  Patient Findings     Negatives:  Signs/symptoms of thrombosis, Signs/symptoms of bleeding,   Laboratory test error suspected, Change in health, Change in alcohol use,   Change in activity, Upcoming invasive procedure, Emergency department   visit, Upcoming dental procedure, Missed doses, Extra doses, Change in   medications, Change in diet/appetite, Hospital admission, Bruising, Other   complaints      Clinical Outcomes     Negatives:  Major bleeding event, Thromboembolic event,   Anticoagulation-related hospital admission, Anticoagulation-related ED   visit, Anticoagulation-related fatality        INR History:  Anticoagulation Monitoring 2021 2021 11/15/2021   INR 3.10 2.10 2.5   INR Date 2021 2021 11/15/2021   INR Goal 2.0-3.0 2.0-3.0 2.0-3.0   Trend Same Down Same   Last Week Total 5 mg 3 mg 8 mg   Next Week Total 9 mg 8 mg 8 mg   Sun 1 mg () 1 mg 1 mg   Mon - 2 mg 2 mg   Tue - 1 mg 1 mg   Wed - 1 mg 1 mg   Thu - 1 mg 1 mg   Fri 1 mg 1 mg 1 mg   Sat 1 mg () 1 mg 1 mg   Visit Report - - -   Some recent data might be hidden       Plan:  1. INR is Therapeutic  today- see above in Anticoagulation Summary.  Will instruct Janel Mahoney to Continue their warfarin regimen- see above in Anticoagulation Summary.  2. Follow up in 1 week to ensure stable in light of large dose reduction recently.  3. Patient declines warfarin refills.  4. Verbal and written information provided. Patient expresses understanding and has no further questions at this time.    Vargas Butcher, PharmD

## 2021-11-22 ENCOUNTER — ANTICOAGULATION VISIT (OUTPATIENT)
Dept: PHARMACY | Facility: HOSPITAL | Age: 81
End: 2021-11-22

## 2021-11-22 DIAGNOSIS — I48.20 CHRONIC ATRIAL FIBRILLATION (HCC): Primary | ICD-10-CM

## 2021-11-22 LAB
INR PPP: 2 (ref 0.91–1.09)
PROTHROMBIN TIME: 24.4 SECONDS (ref 10–13.8)

## 2021-11-22 PROCEDURE — G0463 HOSPITAL OUTPT CLINIC VISIT: HCPCS

## 2021-11-22 PROCEDURE — 36416 COLLJ CAPILLARY BLOOD SPEC: CPT

## 2021-11-22 PROCEDURE — 85610 PROTHROMBIN TIME: CPT

## 2021-11-22 NOTE — PROGRESS NOTES
Anticoagulation Clinic Progress Note    Anticoagulation Summary  As of 2021    INR goal:  2.0-3.0   TTR:  62.6 % (2.9 y)   INR used for dosin.0 (2021)   Warfarin maintenance plan:  2 mg every Mon, Fri; 1 mg all other days   Weekly warfarin total:  9 mg   Plan last modified:  Vargas Butcher, PharmD (2021)   Next INR check:  2021   Priority:  Maintenance   Target end date:  Indefinite    Indications    Chronic atrial fibrillation (HCC) [I48.20]             Anticoagulation Episode Summary     INR check location:      Preferred lab:      Send INR reminders to:   [facility]    Comments:        Anticoagulation Care Providers     Provider Role Specialty Phone number    Nik Galarza MD Referring Cardiology [phone]          Clinic Interview:  Patient Findings     Positives:  Change in diet/appetite    Negatives:  Signs/symptoms of thrombosis, Signs/symptoms of bleeding,   Laboratory test error suspected, Change in health, Change in alcohol use,   Change in activity, Upcoming invasive procedure, Emergency department   visit, Upcoming dental procedure, Missed doses, Extra doses, Change in   medications, Hospital admission, Bruising, Other complaints    Comments:  Reports broccoli soup 1 week ago. Reports intention to begin   having a little more vit k in diet.      Clinical Outcomes     Negatives:  Major bleeding event, Thromboembolic event,   Anticoagulation-related hospital admission, Anticoagulation-related ED   visit, Anticoagulation-related fatality    Comments:  Reports broccoli soup 1 week ago. Reports intention to begin   having a little more vit k in diet.        INR History:  Anticoagulation Monitoring 2021 11/15/2021 2021   INR 2.10 2.5 2.0   INR Date 2021 11/15/2021 2021   INR Goal 2.0-3.0 2.0-3.0 2.0-3.0   Trend Down Same Up   Last Week Total 3 mg 8 mg 8 mg   Next Week Total 8 mg 8 mg 9 mg   Sun 1 mg 1 mg 1 mg   Mon 2 mg 2 mg 2 mg   Tue 1  mg 1 mg 1 mg   Wed 1 mg 1 mg 1 mg   Thu 1 mg 1 mg 1 mg   Fri 1 mg 1 mg 2 mg   Sat 1 mg 1 mg 1 mg   Visit Report - - -   Some recent data might be hidden       Plan:  1. INR is Therapeutic today- see above in Anticoagulation Summary.  Will instruct Janel Mahoney to Increase their warfarin regimen in light of patient's plan to begin incorporating a little more vitamin k in diet - see above in Anticoagulation Summary.  2. Follow up in 2 weeks  3. Patient declines warfarin refills.  4. Verbal and written information provided. Patient expresses understanding and has no further questions at this time.    Vargas Butcher, PharmD

## 2021-11-24 PROCEDURE — 93296 REM INTERROG EVL PM/IDS: CPT | Performed by: INTERNAL MEDICINE

## 2021-11-24 PROCEDURE — 93295 DEV INTERROG REMOTE 1/2/MLT: CPT | Performed by: INTERNAL MEDICINE

## 2021-11-30 ENCOUNTER — INFUSION (OUTPATIENT)
Dept: ONCOLOGY | Facility: HOSPITAL | Age: 81
End: 2021-11-30

## 2021-11-30 ENCOUNTER — LAB (OUTPATIENT)
Dept: LAB | Facility: HOSPITAL | Age: 81
End: 2021-11-30

## 2021-11-30 DIAGNOSIS — D63.1 ANEMIA IN STAGE 3A CHRONIC KIDNEY DISEASE (HCC): ICD-10-CM

## 2021-11-30 DIAGNOSIS — N18.31 ANEMIA IN STAGE 3A CHRONIC KIDNEY DISEASE (HCC): ICD-10-CM

## 2021-11-30 LAB
BASOPHILS # BLD AUTO: 0.02 10*3/MM3 (ref 0–0.2)
BASOPHILS NFR BLD AUTO: 0.3 % (ref 0–1.5)
DEPRECATED RDW RBC AUTO: 58.8 FL (ref 37–54)
EOSINOPHIL # BLD AUTO: 0.15 10*3/MM3 (ref 0–0.4)
EOSINOPHIL NFR BLD AUTO: 2.4 % (ref 0.3–6.2)
ERYTHROCYTE [DISTWIDTH] IN BLOOD BY AUTOMATED COUNT: 16.8 % (ref 12.3–15.4)
HCT VFR BLD AUTO: 31.3 % (ref 34–46.6)
HGB BLD-MCNC: 10.1 G/DL (ref 12–15.9)
IMM GRANULOCYTES # BLD AUTO: 0.05 10*3/MM3 (ref 0–0.05)
IMM GRANULOCYTES NFR BLD AUTO: 0.8 % (ref 0–0.5)
LYMPHOCYTES # BLD AUTO: 0.96 10*3/MM3 (ref 0.7–3.1)
LYMPHOCYTES NFR BLD AUTO: 15.4 % (ref 19.6–45.3)
MCH RBC QN AUTO: 30.8 PG (ref 26.6–33)
MCHC RBC AUTO-ENTMCNC: 32.3 G/DL (ref 31.5–35.7)
MCV RBC AUTO: 95.4 FL (ref 79–97)
MONOCYTES # BLD AUTO: 0.34 10*3/MM3 (ref 0.1–0.9)
MONOCYTES NFR BLD AUTO: 5.5 % (ref 5–12)
NEUTROPHILS NFR BLD AUTO: 4.71 10*3/MM3 (ref 1.7–7)
NEUTROPHILS NFR BLD AUTO: 75.6 % (ref 42.7–76)
NRBC BLD AUTO-RTO: 0 /100 WBC (ref 0–0.2)
PLATELET # BLD AUTO: 129 10*3/MM3 (ref 140–450)
PMV BLD AUTO: 11.4 FL (ref 6–12)
RBC # BLD AUTO: 3.28 10*6/MM3 (ref 3.77–5.28)
WBC NRBC COR # BLD: 6.23 10*3/MM3 (ref 3.4–10.8)

## 2021-11-30 PROCEDURE — 36415 COLL VENOUS BLD VENIPUNCTURE: CPT

## 2021-11-30 PROCEDURE — 85025 COMPLETE CBC W/AUTO DIFF WBC: CPT

## 2021-11-30 NOTE — TELEPHONE ENCOUNTER
She should be seen, recommend ICC or ER.    Scribe Attestation (For Scribes USE Only)... I have personally evaluated and examined the patient. The Attending was available to me as a supervising provider if needed./Scribe Attestation (For Scribes USE Only)...

## 2021-11-30 NOTE — PROGRESS NOTES
Hgb today is 10.1.  No procrit today per guidelines.  Patient without questions or concerns at this time.  Given copy of labs.

## 2021-12-06 ENCOUNTER — ANTICOAGULATION VISIT (OUTPATIENT)
Dept: PHARMACY | Facility: HOSPITAL | Age: 81
End: 2021-12-06

## 2021-12-06 DIAGNOSIS — I48.20 CHRONIC ATRIAL FIBRILLATION (HCC): Primary | ICD-10-CM

## 2021-12-06 LAB
INR PPP: 2.5 (ref 0.91–1.09)
PROTHROMBIN TIME: 29.5 SECONDS (ref 10–13.8)

## 2021-12-06 PROCEDURE — 85610 PROTHROMBIN TIME: CPT

## 2021-12-06 PROCEDURE — 36416 COLLJ CAPILLARY BLOOD SPEC: CPT

## 2021-12-06 NOTE — PROGRESS NOTES
Anticoagulation Clinic Progress Note    Anticoagulation Summary  As of 2021    INR goal:  2.0-3.0   TTR:  63.1 % (2.9 y)   INR used for dosin.5 (2021)   Warfarin maintenance plan:  2 mg every Mon, Fri; 1 mg all other days   Weekly warfarin total:  9 mg   No change documented:  Anna Marie Ndiaye   Plan last modified:  Vargas Butcher, PharmD (2021)   Next INR check:  1/3/2022   Priority:  Maintenance   Target end date:  Indefinite    Indications    Chronic atrial fibrillation (HCC) [I48.20]             Anticoagulation Episode Summary     INR check location:      Preferred lab:      Send INR reminders to:   [facility]    Comments:        Anticoagulation Care Providers     Provider Role Specialty Phone number    Nik Galarza MD Referring Cardiology [phone]          Clinic Interview:  Patient Findings     Negatives:  Signs/symptoms of thrombosis, Signs/symptoms of bleeding,   Laboratory test error suspected, Change in health, Change in alcohol use,   Change in activity, Upcoming invasive procedure, Emergency department   visit, Upcoming dental procedure, Missed doses, Extra doses, Change in   medications, Change in diet/appetite, Hospital admission, Bruising, Other   complaints      Clinical Outcomes     Negatives:  Major bleeding event, Thromboembolic event,   Anticoagulation-related hospital admission, Anticoagulation-related ED   visit, Anticoagulation-related fatality        INR History:  Anticoagulation Monitoring 11/15/2021 2021 2021   INR 2.5 2.0 2.5   INR Date 11/15/2021 2021 2021   INR Goal 2.0-3.0 2.0-3.0 2.0-3.0   Trend Same Up Same   Last Week Total 8 mg 8 mg 9 mg   Next Week Total 8 mg 9 mg 9 mg   Sun 1 mg 1 mg 1 mg   Mon 2 mg 2 mg 2 mg   Tue 1 mg 1 mg 1 mg   Wed 1 mg 1 mg 1 mg   Thu 1 mg 1 mg 1 mg   Fri 1 mg 2 mg 2 mg   Sat 1 mg 1 mg 1 mg   Visit Report - - -   Some recent data might be hidden       Plan:  1. INR is therapeutic today- see  above in Anticoagulation Summary.   Will instruct Janel Mahoney to continue their warfarin regimen- see above in Anticoagulation Summary.  2. Follow up in 4 weeks.  3. Patient declines warfarin refills.  4. Verbal and written information provided. Patient expresses understanding and has no further questions at this time.    Anna Marie Ndiaye

## 2021-12-08 ENCOUNTER — TELEPHONE (OUTPATIENT)
Dept: CARDIOLOGY | Facility: CLINIC | Age: 81
End: 2021-12-08

## 2021-12-08 NOTE — TELEPHONE ENCOUNTER
Reviewed with Dr. Bronson---continue to monitor for now per Dr. Boucher's plan since she is asymptomatic and successfully treated with ATP

## 2021-12-10 ENCOUNTER — APPOINTMENT (OUTPATIENT)
Dept: VACCINE CLINIC | Facility: HOSPITAL | Age: 81
End: 2021-12-10

## 2021-12-11 ENCOUNTER — IMMUNIZATION (OUTPATIENT)
Dept: VACCINE CLINIC | Facility: HOSPITAL | Age: 81
End: 2021-12-11

## 2021-12-11 PROCEDURE — 0004A HC ADM SARSCOV2 30MCG/0.3ML BOOSTER: CPT | Performed by: INTERNAL MEDICINE

## 2021-12-11 PROCEDURE — 91300 HC SARSCOV02 VAC 30MCG/0.3ML IM: CPT | Performed by: INTERNAL MEDICINE

## 2021-12-21 ENCOUNTER — OFFICE VISIT (OUTPATIENT)
Dept: FAMILY MEDICINE CLINIC | Facility: CLINIC | Age: 81
End: 2021-12-21

## 2021-12-21 VITALS
HEIGHT: 62 IN | SYSTOLIC BLOOD PRESSURE: 130 MMHG | TEMPERATURE: 97.3 F | BODY MASS INDEX: 25.36 KG/M2 | RESPIRATION RATE: 15 BRPM | WEIGHT: 137.8 LBS | DIASTOLIC BLOOD PRESSURE: 60 MMHG | HEART RATE: 60 BPM | OXYGEN SATURATION: 100 %

## 2021-12-21 DIAGNOSIS — Z00.00 MEDICARE ANNUAL WELLNESS VISIT, SUBSEQUENT: Primary | ICD-10-CM

## 2021-12-21 DIAGNOSIS — E55.9 VITAMIN D DEFICIENCY, UNSPECIFIED: ICD-10-CM

## 2021-12-21 DIAGNOSIS — I48.20 CHRONIC ATRIAL FIBRILLATION (HCC): Chronic | ICD-10-CM

## 2021-12-21 DIAGNOSIS — M15.9 OSTEOARTHRITIS OF MULTIPLE JOINTS, UNSPECIFIED OSTEOARTHRITIS TYPE: ICD-10-CM

## 2021-12-21 DIAGNOSIS — I10 ESSENTIAL HYPERTENSION: Chronic | ICD-10-CM

## 2021-12-21 DIAGNOSIS — E78.2 MIXED HYPERLIPIDEMIA: Chronic | ICD-10-CM

## 2021-12-21 LAB
25(OH)D3 SERPL-MCNC: 85.7 NG/ML (ref 30–100)
ALBUMIN SERPL-MCNC: 4 G/DL (ref 3.5–5.2)
ALBUMIN/GLOB SERPL: 1.7 G/DL
ALP SERPL-CCNC: 51 U/L (ref 39–117)
ALT SERPL W P-5'-P-CCNC: 10 U/L (ref 1–33)
ANION GAP SERPL CALCULATED.3IONS-SCNC: 8.7 MMOL/L (ref 5–15)
AST SERPL-CCNC: 18 U/L (ref 1–32)
BILIRUB SERPL-MCNC: 0.5 MG/DL (ref 0–1.2)
BUN SERPL-MCNC: 93 MG/DL (ref 8–23)
BUN/CREAT SERPL: 42.9 (ref 7–25)
CALCIUM SPEC-SCNC: 9.7 MG/DL (ref 8.6–10.5)
CHLORIDE SERPL-SCNC: 103 MMOL/L (ref 98–107)
CHOLEST SERPL-MCNC: 167 MG/DL (ref 0–200)
CO2 SERPL-SCNC: 30.3 MMOL/L (ref 22–29)
CREAT SERPL-MCNC: 2.17 MG/DL (ref 0.57–1)
DEPRECATED RDW RBC AUTO: 53.9 FL (ref 37–54)
ERYTHROCYTE [DISTWIDTH] IN BLOOD BY AUTOMATED COUNT: 15.4 % (ref 12.3–15.4)
GFR SERPL CREATININE-BSD FRML MDRD: 22 ML/MIN/1.73
GLOBULIN UR ELPH-MCNC: 2.3 GM/DL
GLUCOSE SERPL-MCNC: 100 MG/DL (ref 65–99)
HCT VFR BLD AUTO: 30.1 % (ref 34–46.6)
HDLC SERPL-MCNC: 58 MG/DL (ref 40–60)
HGB BLD-MCNC: 9.7 G/DL (ref 12–15.9)
LDLC SERPL CALC-MCNC: 92 MG/DL (ref 0–100)
LDLC/HDLC SERPL: 1.55 {RATIO}
MCH RBC QN AUTO: 31.3 PG (ref 26.6–33)
MCHC RBC AUTO-ENTMCNC: 32.2 G/DL (ref 31.5–35.7)
MCV RBC AUTO: 97.1 FL (ref 79–97)
PLATELET # BLD AUTO: 160 10*3/MM3 (ref 140–450)
PMV BLD AUTO: 10.7 FL (ref 6–12)
POTASSIUM SERPL-SCNC: 4.1 MMOL/L (ref 3.5–5.2)
PROT SERPL-MCNC: 6.3 G/DL (ref 6–8.5)
RBC # BLD AUTO: 3.1 10*6/MM3 (ref 3.77–5.28)
SODIUM SERPL-SCNC: 142 MMOL/L (ref 136–145)
TRIGL SERPL-MCNC: 95 MG/DL (ref 0–150)
VLDLC SERPL-MCNC: 17 MG/DL (ref 5–40)
WBC NRBC COR # BLD: 7.79 10*3/MM3 (ref 3.4–10.8)

## 2021-12-21 PROCEDURE — G0439 PPPS, SUBSEQ VISIT: HCPCS | Performed by: INTERNAL MEDICINE

## 2021-12-21 PROCEDURE — 99214 OFFICE O/P EST MOD 30 MIN: CPT | Performed by: INTERNAL MEDICINE

## 2021-12-21 PROCEDURE — 80061 LIPID PANEL: CPT | Performed by: INTERNAL MEDICINE

## 2021-12-21 PROCEDURE — 36415 COLL VENOUS BLD VENIPUNCTURE: CPT | Performed by: INTERNAL MEDICINE

## 2021-12-21 PROCEDURE — 82306 VITAMIN D 25 HYDROXY: CPT | Performed by: INTERNAL MEDICINE

## 2021-12-21 PROCEDURE — 1126F AMNT PAIN NOTED NONE PRSNT: CPT | Performed by: INTERNAL MEDICINE

## 2021-12-21 PROCEDURE — 1170F FXNL STATUS ASSESSED: CPT | Performed by: INTERNAL MEDICINE

## 2021-12-21 PROCEDURE — 80053 COMPREHEN METABOLIC PANEL: CPT | Performed by: INTERNAL MEDICINE

## 2021-12-21 PROCEDURE — 85027 COMPLETE CBC AUTOMATED: CPT | Performed by: INTERNAL MEDICINE

## 2021-12-21 PROCEDURE — 1159F MED LIST DOCD IN RCRD: CPT | Performed by: INTERNAL MEDICINE

## 2021-12-21 RX ORDER — TRAMADOL HYDROCHLORIDE 50 MG/1
50 TABLET ORAL EVERY 6 HOURS PRN
COMMUNITY
End: 2021-12-21

## 2021-12-21 RX ORDER — OXYCODONE HYDROCHLORIDE AND ACETAMINOPHEN 5; 325 MG/1; MG/1
1 TABLET ORAL EVERY 8 HOURS PRN
Qty: 30 TABLET | Refills: 0 | Status: SHIPPED | OUTPATIENT
Start: 2021-12-21 | End: 2022-01-01

## 2021-12-21 NOTE — PROGRESS NOTES
QUICK REFERENCE INFORMATION:  The ABCs of the Annual Wellness Visit    Subsequent Medicare Wellness Visit patient was seen for Medicare wellness exam.  Patient was seen for hypertension.  Blood pressures been running 130s over 60s.  Patient will continue carvedilol 25 mg p.o. twice daily, ramipril 5 mg daily.  Patient has severe degenerative joint disease.  Patient has a left knee and left shoulder replacement.  Patient is having severe pain is requiring knee injections.  Patient was put on oxycodone by the orthopedic surgeon but patient wants to get her pain medicines here.    The patient has read and signed the [facility] Controlled Substance Contract.  I will continue to see patient for regular follow up appointments.  They are well controlled on their medication.  BEAR has been reviewed by me and is updated every 3 months. The patient is aware of the potential for addiction and dependence.  Patient's lipids been treated with diet exercise and simvastatin 20 mg daily.  Labs are being drawn today and results are pending at the time of dictation.  Patient does have chronic atrial fibrillation.  Patient is using warfarin for clot control and carvedilol 25 mg twice daily for rate control.  Patient has been stable on this treatment.    Dictated utilizing Dragon dictation. If there are questions or for further clarification, please contact me.    HEALTH RISK ASSESSMENT    1940    Recent Hospitalizations:  Recently treated at the following:  [facility] .        Current Medical Providers:  Patient Care Team:  Ariel Matute MD as PCP - General (Internal Medicine)  Edward Vasquez MD as Consulting Physician (Hematology and Oncology)  Milagros Cruz MD as Referring Physician (Nephrology)  Anthony Cardona MD as Consulting Physician (Sleep Medicine)  Nik Galarza MD as Consulting Physician (Cardiology)  Shanna Major [facility] as Pharmacist  Juvencio Pressley II, MD as  Consulting Physician (Orthopedic Surgery)  Vargas Butcher, PharmD as Pharmacist (Pharmacy)  Geraldine Huizar, RN as Ambulatory  (Population Health)        Smoking Status:  Social History     Tobacco Use   Smoking Status Former Smoker   • Packs/day: 1.00   • Years: 50.00   • Pack years: 50.00   • Types: Cigarettes   • Quit date: 2009   • Years since quittin.9   Smokeless Tobacco Never Used   Tobacco Comment    Daily caffeine - soda       Alcohol Consumption:  Social History     Substance and Sexual Activity   Alcohol Use Yes    Comment: 1 yearly       Depression Screen:   PHQ-2/PHQ-9 Depression Screening 2021   Little interest or pleasure in doing things 0   Feeling down, depressed, or hopeless 0   Trouble falling or staying asleep, or sleeping too much 1   Feeling tired or having little energy 0   Poor appetite or overeating 1   Feeling bad about yourself - or that you are a failure or have let yourself or your family down 0   Trouble concentrating on things, such as reading the newspaper or watching television 0   Moving or speaking so slowly that other people could have noticed. Or the opposite - being so fidgety or restless that you have been moving around a lot more than usual 0   Thoughts that you would be better off dead, or of hurting yourself in some way 0   Total Score 2   If you checked off any problems, how difficult have these problems made it for you to do your work, take care of things at home, or get along with other people? Somewhat difficult       Health Habits and Functional and Cognitive Screening:  Functional & Cognitive Status 2021   Do you have difficulty preparing food and eating? No   Do you have difficulty bathing yourself, getting dressed or grooming yourself? No   Do you have difficulty using the toilet? No   Do you have difficulty moving around from place to place? Yes   Do you have trouble with steps or getting out of a bed or a chair? Yes   Current Diet  Well Balanced Diet   Dental Exam Not up to date   Eye Exam Up to date   Exercise (times per week) 7 times per week   Current Exercises Include Aerobics   Current Exercise Activities Include -   Do you need help using the phone?  No   Are you deaf or do you have serious difficulty hearing?  Yes   Do you need help with transportation? Yes   Do you need help shopping? Yes   Do you need help preparing meals?  Yes   Do you need help with housework?  Yes   Do you need help with laundry? Yes   Do you need help taking your medications? No   Do you need help managing money? No   Do you ever drive or ride in a car without wearing a seat belt? No   Have you felt unusual stress, anger or loneliness in the last month? No   Who do you live with? Spouse   If you need help, do you have trouble finding someone available to you? No   Have you been bothered in the last four weeks by sexual problems? No   Do you have difficulty concentrating, remembering or making decisions? No           Does the patient have evidence of cognitive impairment? No    Aspirin use counseling: Taking ASA appropriately as indicated      Recent Lab Results:  CMP:  Lab Results   Component Value Date    BUN 76 (H) 09/09/2021    CREATININE 2.16 (H) 09/09/2021    EGFRIFNONA 22 (L) 09/09/2021    EGFRIFAFRI  06/11/2021      Comment:      <15 Indicative of kidney failure.    BCR 35.2 (H) 09/09/2021     09/09/2021    K 3.9 09/09/2021    CO2 29.2 (H) 09/09/2021    CALCIUM 9.0 09/09/2021    ALBUMIN 3.40 (L) 09/06/2021    BILITOT 0.3 09/06/2021    ALKPHOS 64 09/06/2021    AST 36 (H) 09/06/2021    ALT 14 09/06/2021     Lipid Panel:  Lab Results   Component Value Date    CHOL 164 06/11/2021    TRIG 88 06/11/2021    HDL 56 06/11/2021    VLDL 16 06/11/2021    LDLHDL 1.61 06/11/2021     HbA1c:  Lab Results   Component Value Date    HGBA1C 5.20 01/16/2018       Visual Acuity:  No exam data present    Age-appropriate Screening Schedule:  Refer to the list below for  future screening recommendations based on patient's age, sex and/or medical conditions. Orders for these recommended tests are listed in the plan section. The patient has been provided with a written plan.    Health Maintenance   Topic Date Due   • ZOSTER VACCINE (2 of 2) 03/02/2017   • DXA SCAN  09/20/2019   • INFLUENZA VACCINE  08/01/2021   • LIPID PANEL  06/11/2022   • MAMMOGRAM  02/18/2023   • TDAP/TD VACCINES (2 - Td or Tdap) 05/02/2027        Subjective   History of Present Illness    Janel Mahoney is a 81 y.o. female who presents for an Subsequent Wellness Visit.    The following portions of the patient's history were reviewed and updated as appropriate: allergies, current medications, past family history, past medical history, past social history, past surgical history and problem list.    Outpatient Medications Prior to Visit   Medication Sig Dispense Refill   • acetaminophen (TYLENOL) 325 MG tablet Take 650 mg by mouth Every 6 (Six) Hours As Needed for Mild Pain .     • alendronate (FOSAMAX) 70 MG tablet Take 70 mg by mouth Every 14 (Fourteen) Days. On Friday     • allopurinol (ZYLOPRIM) 100 MG tablet Take 2 tablets by mouth Daily Before Supper. 90 tablet 1   • aspirin 81 MG tablet Take 81 mg by mouth Daily.     • bumetanide (BUMEX) 2 MG tablet Take 1 tablet by mouth 2 (Two) Times a Day. Do not take dos 60 tablet 2   • calcitriol (ROCALTROL) 0.25 MCG capsule Take 0.25 mcg by mouth 2 (Two) Times a Day.     • carvedilol (COREG) 25 MG tablet TAKE 1 TABLET TWICE DAILY (Patient taking differently: Take  by mouth 2 (Two) Times a Day With Meals. Take dos) 180 tablet 1   • ferrous sulfate 325 (65 FE) MG tablet Take 1 tablet by mouth 2 (Two) Times a Week. (Patient taking differently: Take 325 mg by mouth 2 (Two) Times a Week. Mon, Thur only)     • Multiple Vitamins-Minerals (SENIOR MULTIVITAMIN PLUS) tablet Take 1 tablet by mouth Daily.     • pantoprazole (PROTONIX) 40 MG EC tablet Take 1 tablet by mouth 2 (Two)  Times a Day. (Patient taking differently: Take 40 mg by mouth 2 (Two) Times a Day. Take dos) 60 tablet 5   • polyethylene glycol (polyethylene glycol) 17 g packet Take 17 g by mouth Daily As Needed (Constipation).     • ramipril (ALTACE) 5 MG capsule Take 1 capsule by mouth Every Morning. 90 capsule 1   • simvastatin (ZOCOR) 20 MG tablet TAKE 2 TABLETS EVERY NIGHT (Patient taking differently: Take 40 mg by mouth Every Night.) 180 tablet 1   • vitamin B-12 (CYANOCOBALAMIN) 1000 MCG tablet Take 1,000 mcg by mouth Daily. Stop now for surgery     • Vitamin D, Cholecalciferol, 50 MCG (2000 UT) capsule Take 2,000 Units by mouth Daily.     • warfarin (COUMADIN) 1 MG tablet Take one tablet (1mg) by mouth on Mon, Wed, Fri and 2 tablets (2mg) on all other days or as directed by Medication Management Clinic. (Patient taking differently: Take 1 mg by mouth Daily. 1.5 TABLETS DAILY PER DAUGHTER PER [facility] AT [facility] SUBACUTE REHAB DURING PT SOC VISIT ON 101421) 48 tablet 0   • zolpidem (Ambien) 10 MG tablet Take 1 tablet by mouth At Night As Needed for Sleep. 90 tablet 1   • oxyCODONE-acetaminophen (PERCOCET) 5-325 MG per tablet Take 1 tablet by mouth Every 4 (Four) Hours As Needed for Severe Pain . 10 tablet 0   • traMADol (ULTRAM) 50 MG tablet Take 50 mg by mouth Every 6 (Six) Hours As Needed for Moderate Pain .       No facility-administered medications prior to visit.       Patient Active Problem List   Diagnosis   • Anemia in chronic kidney disease (CKD)   • Thrombocytopenia (HCC)   • B12 deficiency   • Coronary artery disease involving coronary bypass graft of native heart without angina pectoris   • S/P MVR (porcine)   • Chronic atrial fibrillation (HCC)   • Bilateral carotid artery disease (HCC)   • Intractable low back pain   • Long-term (current) use of anticoagulants   • Low back pain   • Osteoporosis   • Murmur, heart   • GEORGINA on auto CPAP - Dr Cardona   • Hypersomnia due to medical condition - GEORGINA   • Esophageal  stricture   • Dysphagia   • Closed fracture of left proximal humerus   • Essential hypertension   • Supratherapeutic INR   • Chronic combined systolic and diastolic congestive heart failure (HCC)   • Osteoporosis with pathological fracture   • Closed 3-part fracture of proximal end of left humerus   • Closed fracture of proximal end of humerus with delayed healing   • Injury of right knee   • Knee injury, left, initial encounter   • History of fall   • S/P TKR (total knee replacement) using cement, left   • Ischemic cardiomyopathy   • Intramuscular hematoma right pecotralis   • CKD (chronic kidney disease) stage 4, GFR 15-29 ml/min ([facility])   • Peripheral edema   • Inflammatory arthritis   • Ventricular tachycardia ([facility])   • S/P revision of total knee   • Idiopathic gout   • Psychophysiological insomnia   • ICD (implantable cardioverter-defibrillator) in place   • Status post reverse arthroplasty of right shoulder   • Mixed hyperlipidemia   • Symptomatic anemia   • Right leg DVT ([facility])   • Osteoarthritis of multiple joints       Advance Care Planning:  ACP discussion was held with the patient during this visit. Patient has an advance directive in EMR which is still valid.     Identification of Risk Factors:  Risk factors include: NA.    Review of Systems   Constitutional: Negative for fatigue and fever.   HENT: Positive for congestion. Negative for trouble swallowing.    Eyes: Negative for discharge and visual disturbance.   Respiratory: Negative for choking and shortness of breath.    Cardiovascular: Negative for chest pain and palpitations.   Gastrointestinal: Negative for abdominal pain and blood in stool.   Endocrine: Negative.    Genitourinary: Negative for genital sores and hematuria.   Musculoskeletal: Positive for gait problem and joint swelling.   Skin: Negative for color change, pallor, rash and wound.   Allergic/Immunologic: Positive for environmental allergies. Negative for immunocompromised state.    Neurological: Negative for facial asymmetry and speech difficulty.   Psychiatric/Behavioral: Negative for hallucinations and suicidal ideas.       Compared to one year ago, the patient feels her physical health is the same.  Compared to one year ago, the patient feels her mental health is the same.    Objective     Physical Exam  Vitals and nursing note reviewed.   Constitutional:       Appearance: Normal appearance. She is well-developed.   HENT:      Head: Normocephalic and atraumatic.      Nose: Nose normal.      Mouth/Throat:      Mouth: Mucous membranes are moist.      Pharynx: Oropharynx is clear.   Eyes:      Extraocular Movements: Extraocular movements intact.      Conjunctiva/sclera: Conjunctivae normal.      Pupils: Pupils are equal, round, and reactive to light.   Cardiovascular:      Rate and Rhythm: Normal rate. Rhythm irregular.      Heart sounds: Normal heart sounds. No murmur heard.  No friction rub. No gallop.    Pulmonary:      Effort: Pulmonary effort is normal. No respiratory distress.      Breath sounds: Normal breath sounds. No stridor. No wheezing, rhonchi or rales.   Chest:      Chest wall: No tenderness.   Abdominal:      General: Bowel sounds are normal.      Palpations: Abdomen is soft.   Musculoskeletal:         General: Normal range of motion.      Cervical back: Normal range of motion and neck supple.   Skin:     General: Skin is warm and dry.   Neurological:      General: No focal deficit present.      Mental Status: She is alert and oriented to person, place, and time. Mental status is at baseline.   Psychiatric:         Mood and Affect: Mood normal.         Behavior: Behavior normal.         Thought Content: Thought content normal.         Judgment: Judgment normal.         Vitals:    12/21/21 1305   BP: 130/60   BP Location: Left arm   Patient Position: Sitting   Cuff Size: Adult   Pulse: 60   Resp: 15   Temp: 97.3 °F (36.3 °C)   TempSrc: Infrared   SpO2: 100%   Weight: 62.5 kg  "(137 lb 12.8 oz)   Height: 157.5 cm (62.01\")   PainSc: 0-No pain       Patient's Body mass index is 25.2 kg/m². indicating that she is within normal range (BMI 18.5-24.9). No BMI management plan needed..      Assessment/Plan #1 oxycodone for severe degenerative joint disease #2 follow-up with cardiology in a regular basis #3 continue present diet and activity levels  Patient Self-Management and Personalized Health Advice  The patient has been provided with information about: NA and preventive services including:   · NA.    Visit Diagnoses:    ICD-10-CM ICD-9-CM   1. Medicare annual wellness visit, subsequent  Z00.00 V70.0   2. Chronic atrial fibrillation (HCC)  I48.20 427.31   3. Essential hypertension  I10 401.9   4. Mixed hyperlipidemia  E78.2 272.2   5. Osteoarthritis of multiple joints, unspecified osteoarthritis type  M15.9 715.89       Orders Placed This Encounter   Procedures   • CBC (No Diff)     Standing Status:   Future     Standing Expiration Date:   12/21/2022     Order Specific Question:   Release to patient     Answer:   Immediate   • Comprehensive Metabolic Panel     Order Specific Question:   Release to patient     Answer:   Immediate   • Lipid Panel   • Vitamin D 25 Hydroxy     Order Specific Question:   Release to patient     Answer:   Immediate       Outpatient Encounter Medications as of 12/21/2021   Medication Sig Dispense Refill   • acetaminophen (TYLENOL) 325 MG tablet Take 650 mg by mouth Every 6 (Six) Hours As Needed for Mild Pain .     • alendronate (FOSAMAX) 70 MG tablet Take 70 mg by mouth Every 14 (Fourteen) Days. On Friday     • allopurinol (ZYLOPRIM) 100 MG tablet Take 2 tablets by mouth Daily Before Supper. 90 tablet 1   • aspirin 81 MG tablet Take 81 mg by mouth Daily.     • bumetanide (BUMEX) 2 MG tablet Take 1 tablet by mouth 2 (Two) Times a Day. Do not take dos 60 tablet 2   • calcitriol (ROCALTROL) 0.25 MCG capsule Take 0.25 mcg by mouth 2 (Two) Times a Day.     • carvedilol " (COREG) 25 MG tablet TAKE 1 TABLET TWICE DAILY (Patient taking differently: Take  by mouth 2 (Two) Times a Day With Meals. Take dos) 180 tablet 1   • ferrous sulfate 325 (65 FE) MG tablet Take 1 tablet by mouth 2 (Two) Times a Week. (Patient taking differently: Take 325 mg by mouth 2 (Two) Times a Week. Mon, Thur only)     • Multiple Vitamins-Minerals (SENIOR MULTIVITAMIN PLUS) tablet Take 1 tablet by mouth Daily.     • oxyCODONE-acetaminophen (PERCOCET) 5-325 MG per tablet Take 1 tablet by mouth Every 8 (Eight) Hours As Needed for Severe Pain  (chronic pain med chronic knee pain). 30 tablet 0   • pantoprazole (PROTONIX) 40 MG EC tablet Take 1 tablet by mouth 2 (Two) Times a Day. (Patient taking differently: Take 40 mg by mouth 2 (Two) Times a Day. Take dos) 60 tablet 5   • polyethylene glycol (polyethylene glycol) 17 g packet Take 17 g by mouth Daily As Needed (Constipation).     • ramipril (ALTACE) 5 MG capsule Take 1 capsule by mouth Every Morning. 90 capsule 1   • simvastatin (ZOCOR) 20 MG tablet TAKE 2 TABLETS EVERY NIGHT (Patient taking differently: Take 40 mg by mouth Every Night.) 180 tablet 1   • vitamin B-12 (CYANOCOBALAMIN) 1000 MCG tablet Take 1,000 mcg by mouth Daily. Stop now for surgery     • Vitamin D, Cholecalciferol, 50 MCG (2000 UT) capsule Take 2,000 Units by mouth Daily.     • warfarin (COUMADIN) 1 MG tablet Take one tablet (1mg) by mouth on Mon, Wed, Fri and 2 tablets (2mg) on all other days or as directed by Medication Management Clinic. (Patient taking differently: Take 1 mg by mouth Daily. 1.5 TABLETS DAILY PER DAUGHTER PER [facility] AT [facility]AC[facility] REHAB DURING PT SOC VISIT ON 101421) 48 tablet 0   • zolpidem (Ambien) 10 MG tablet Take 1 tablet by mouth At Night As Needed for Sleep. 90 tablet 1   • [DISCONTINUED] oxyCODONE-acetaminophen (PERCOCET) 5-325 MG per tablet Take 1 tablet by mouth Every 4 (Four) Hours As Needed for Severe Pain . 10 tablet 0   • [DISCONTINUED] traMADol (ULTRAM)  50 MG tablet Take 50 mg by mouth Every 6 (Six) Hours As Needed for Moderate Pain .       No facility-administered encounter medications on file as of 12/21/2021.       Reviewed use of high risk medication in the elderly: not applicable  Reviewed for potential of harmful drug interactions in the elderly: not applicable    Follow Up:  Return in about 6 months (around 6/21/2022), or if symptoms worsen or fail to improve, for Recheck.     An After Visit Summary and PPPS with all of these plans were given to the patient.

## 2021-12-21 NOTE — PATIENT INSTRUCTIONS
Medicare Wellness  Personal Prevention Plan of Service     Date of Office Visit:  2021  Encounter Provider:  Ariel Matute MD  Place of Service:  [facility]  Patient Name: Janel Mahoney  :  1940    As part of the Medicare Wellness portion of your visit today, we are providing you with this personalized preventive plan of services (PPPS). This plan is based upon recommendations of the [place] Preventive Services Task Force (USPSTF) and the Advisory Committee on Immunization Practices (ACIP).    This lists the preventive care services that should be considered, and provides dates of when you are due. Items listed as completed are up-to-date and do not require any further intervention.    Health Maintenance   Topic Date Due   • Pneumococcal Vaccine 65+ (1 of 2 - PPSV23) Never done   • ZOSTER VACCINE (2 of 2) 2017   • DXA SCAN  2019   • INFLUENZA VACCINE  2021   • LIPID PANEL  2022   • ANNUAL WELLNESS VISIT  2022   • MAMMOGRAM  2023   • TDAP/TD VACCINES (2 - Td or Tdap) 2027   • COLORECTAL CANCER SCREENING  2027   • COVID-19 Vaccine  Completed       No orders of the defined types were placed in this encounter.      No follow-ups on file.

## 2021-12-28 ENCOUNTER — LAB (OUTPATIENT)
Dept: LAB | Facility: HOSPITAL | Age: 81
End: 2021-12-28

## 2021-12-28 ENCOUNTER — INFUSION (OUTPATIENT)
Dept: ONCOLOGY | Facility: HOSPITAL | Age: 81
End: 2021-12-28

## 2021-12-28 DIAGNOSIS — D63.1 ANEMIA IN STAGE 3A CHRONIC KIDNEY DISEASE (HCC): Primary | ICD-10-CM

## 2021-12-28 DIAGNOSIS — N18.31 ANEMIA IN STAGE 3A CHRONIC KIDNEY DISEASE: ICD-10-CM

## 2021-12-28 DIAGNOSIS — N18.31 ANEMIA IN STAGE 3A CHRONIC KIDNEY DISEASE (HCC): Primary | ICD-10-CM

## 2021-12-28 DIAGNOSIS — D63.1 ANEMIA IN STAGE 3A CHRONIC KIDNEY DISEASE: ICD-10-CM

## 2021-12-28 DIAGNOSIS — N18.4 CKD (CHRONIC KIDNEY DISEASE) STAGE 4, GFR 15-29 ML/MIN (HCC): ICD-10-CM

## 2021-12-28 LAB
BASOPHILS # BLD AUTO: 0.02 10*3/MM3 (ref 0–0.2)
BASOPHILS NFR BLD AUTO: 0.3 % (ref 0–1.5)
DEPRECATED RDW RBC AUTO: 61.3 FL (ref 37–54)
EOSINOPHIL # BLD AUTO: 0.12 10*3/MM3 (ref 0–0.4)
EOSINOPHIL NFR BLD AUTO: 2.1 % (ref 0.3–6.2)
ERYTHROCYTE [DISTWIDTH] IN BLOOD BY AUTOMATED COUNT: 16.6 % (ref 12.3–15.4)
FERRITIN SERPL-MCNC: 845.4 NG/ML (ref 13–150)
HCT VFR BLD AUTO: 29.9 % (ref 34–46.6)
HGB BLD-MCNC: 9.3 G/DL (ref 12–15.9)
IMM GRANULOCYTES # BLD AUTO: 0.03 10*3/MM3 (ref 0–0.05)
IMM GRANULOCYTES NFR BLD AUTO: 0.5 % (ref 0–0.5)
IRON 24H UR-MRATE: 46 MCG/DL (ref 37–145)
IRON SATN MFR SERPL: 18 % (ref 14–48)
LYMPHOCYTES # BLD AUTO: 0.87 10*3/MM3 (ref 0.7–3.1)
LYMPHOCYTES NFR BLD AUTO: 15.2 % (ref 19.6–45.3)
MCH RBC QN AUTO: 31.4 PG (ref 26.6–33)
MCHC RBC AUTO-ENTMCNC: 31.1 G/DL (ref 31.5–35.7)
MCV RBC AUTO: 101 FL (ref 79–97)
MONOCYTES # BLD AUTO: 0.48 10*3/MM3 (ref 0.1–0.9)
MONOCYTES NFR BLD AUTO: 8.4 % (ref 5–12)
NEUTROPHILS NFR BLD AUTO: 4.22 10*3/MM3 (ref 1.7–7)
NEUTROPHILS NFR BLD AUTO: 73.5 % (ref 42.7–76)
NRBC BLD AUTO-RTO: 0 /100 WBC (ref 0–0.2)
PLATELET # BLD AUTO: 123 10*3/MM3 (ref 140–450)
PMV BLD AUTO: 10.5 FL (ref 6–12)
RBC # BLD AUTO: 2.96 10*6/MM3 (ref 3.77–5.28)
TIBC SERPL-MCNC: 258 MCG/DL (ref 249–505)
TRANSFERRIN SERPL-MCNC: 184 MG/DL (ref 200–360)
WBC NRBC COR # BLD: 5.74 10*3/MM3 (ref 3.4–10.8)

## 2021-12-28 PROCEDURE — 83540 ASSAY OF IRON: CPT

## 2021-12-28 PROCEDURE — 82728 ASSAY OF FERRITIN: CPT

## 2021-12-28 PROCEDURE — 25010000002 EPOETIN ALFA PER 1000 UNITS: Performed by: INTERNAL MEDICINE

## 2021-12-28 PROCEDURE — 84466 ASSAY OF TRANSFERRIN: CPT

## 2021-12-28 PROCEDURE — 85025 COMPLETE CBC W/AUTO DIFF WBC: CPT

## 2021-12-28 PROCEDURE — 96372 THER/PROPH/DIAG INJ SC/IM: CPT

## 2021-12-28 PROCEDURE — 36415 COLL VENOUS BLD VENIPUNCTURE: CPT

## 2021-12-28 RX ADMIN — ERYTHROPOIETIN 7000 UNITS: 20000 INJECTION, SOLUTION INTRAVENOUS; SUBCUTANEOUS at 13:31

## 2022-01-01 ENCOUNTER — INFUSION (OUTPATIENT)
Dept: ONCOLOGY | Facility: HOSPITAL | Age: 82
End: 2022-01-01

## 2022-01-01 ENCOUNTER — TELEPHONE (OUTPATIENT)
Dept: CARDIOLOGY | Facility: CLINIC | Age: 82
End: 2022-01-01

## 2022-01-01 ENCOUNTER — ANESTHESIA EVENT (OUTPATIENT)
Dept: GASTROENTEROLOGY | Facility: HOSPITAL | Age: 82
End: 2022-01-01

## 2022-01-01 ENCOUNTER — APPOINTMENT (OUTPATIENT)
Dept: LAB | Facility: HOSPITAL | Age: 82
End: 2022-01-01

## 2022-01-01 ENCOUNTER — ANTICOAGULATION VISIT (OUTPATIENT)
Dept: PHARMACY | Facility: HOSPITAL | Age: 82
End: 2022-01-01

## 2022-01-01 ENCOUNTER — APPOINTMENT (OUTPATIENT)
Dept: GENERAL RADIOLOGY | Facility: HOSPITAL | Age: 82
End: 2022-01-01

## 2022-01-01 ENCOUNTER — HOME CARE VISIT (OUTPATIENT)
Dept: HOME HEALTH SERVICES | Facility: HOME HEALTHCARE | Age: 82
End: 2022-01-01

## 2022-01-01 ENCOUNTER — TELEPHONE (OUTPATIENT)
Dept: GASTROENTEROLOGY | Facility: CLINIC | Age: 82
End: 2022-01-01

## 2022-01-01 ENCOUNTER — LAB (OUTPATIENT)
Dept: LAB | Facility: HOSPITAL | Age: 82
End: 2022-01-01

## 2022-01-01 ENCOUNTER — APPOINTMENT (OUTPATIENT)
Dept: CT IMAGING | Facility: HOSPITAL | Age: 82
End: 2022-01-01

## 2022-01-01 ENCOUNTER — HOSPITAL ENCOUNTER (INPATIENT)
Facility: HOSPITAL | Age: 82
LOS: 4 days | Discharge: NURSING, REHAB OR LONG-TERM CARE | End: 2022-09-20
Attending: EMERGENCY MEDICINE | Admitting: STUDENT IN AN ORGANIZED HEALTH CARE EDUCATION/TRAINING PROGRAM

## 2022-01-01 ENCOUNTER — HOME HEALTH ADMISSION (OUTPATIENT)
Dept: HOME HEALTH SERVICES | Facility: HOME HEALTHCARE | Age: 82
End: 2022-01-01

## 2022-01-01 ENCOUNTER — APPOINTMENT (OUTPATIENT)
Dept: ONCOLOGY | Facility: HOSPITAL | Age: 82
End: 2022-01-01

## 2022-01-01 ENCOUNTER — OFFICE VISIT (OUTPATIENT)
Dept: FAMILY MEDICINE CLINIC | Facility: CLINIC | Age: 82
End: 2022-01-01

## 2022-01-01 ENCOUNTER — TELEPHONE (OUTPATIENT)
Dept: ONCOLOGY | Facility: CLINIC | Age: 82
End: 2022-01-01

## 2022-01-01 ENCOUNTER — TRANSITIONAL CARE MANAGEMENT TELEPHONE ENCOUNTER (OUTPATIENT)
Dept: CALL CENTER | Facility: HOSPITAL | Age: 82
End: 2022-01-01

## 2022-01-01 ENCOUNTER — HOSPITAL ENCOUNTER (OUTPATIENT)
Dept: INFUSION THERAPY | Facility: HOSPITAL | Age: 82
Setting detail: INFUSION SERIES
Discharge: HOME | End: 2022-03-24

## 2022-01-01 ENCOUNTER — OFFICE VISIT (OUTPATIENT)
Dept: ONCOLOGY | Facility: CLINIC | Age: 82
End: 2022-01-01

## 2022-01-01 ENCOUNTER — TELEPHONE (OUTPATIENT)
Dept: FAMILY MEDICINE CLINIC | Facility: CLINIC | Age: 82
End: 2022-01-01

## 2022-01-01 ENCOUNTER — PATIENT OUTREACH (OUTPATIENT)
Dept: CASE MANAGEMENT | Facility: OTHER | Age: 82
End: 2022-01-01

## 2022-01-01 ENCOUNTER — TRANSCRIBE ORDERS (OUTPATIENT)
Dept: LAB | Facility: HOSPITAL | Age: 82
End: 2022-01-01

## 2022-01-01 ENCOUNTER — TRANSCRIBE ORDERS (OUTPATIENT)
Dept: ADMINISTRATIVE | Facility: HOSPITAL | Age: 82
End: 2022-01-01

## 2022-01-01 ENCOUNTER — CLINICAL SUPPORT NO REQUIREMENTS (OUTPATIENT)
Dept: CARDIOLOGY | Facility: CLINIC | Age: 82
End: 2022-01-01

## 2022-01-01 ENCOUNTER — APPOINTMENT (OUTPATIENT)
Dept: CT IMAGING | Facility: HOSPITAL | Age: 82
DRG: 682 | End: 2022-01-01
Payer: MEDICARE

## 2022-01-01 ENCOUNTER — LAB (OUTPATIENT)
Dept: FAMILY MEDICINE CLINIC | Facility: CLINIC | Age: 82
End: 2022-01-01

## 2022-01-01 ENCOUNTER — RESEARCH ENCOUNTER (OUTPATIENT)
Dept: CARDIOLOGY | Facility: CLINIC | Age: 82
End: 2022-01-01

## 2022-01-01 ENCOUNTER — READMISSION MANAGEMENT (OUTPATIENT)
Dept: CALL CENTER | Facility: HOSPITAL | Age: 82
End: 2022-01-01

## 2022-01-01 ENCOUNTER — HOSPITAL ENCOUNTER (EMERGENCY)
Facility: HOSPITAL | Age: 82
Discharge: HOME | End: 2022-09-28
Attending: EMERGENCY MEDICINE | Admitting: EMERGENCY MEDICINE

## 2022-01-01 ENCOUNTER — TELEPHONE (OUTPATIENT)
Dept: PHARMACY | Facility: HOSPITAL | Age: 82
End: 2022-01-01

## 2022-01-01 ENCOUNTER — HOSPITAL ENCOUNTER (INPATIENT)
Facility: HOSPITAL | Age: 82
LOS: 3 days | Discharge: HOSPICE | DRG: 682 | End: 2022-12-29
Attending: EMERGENCY MEDICINE | Admitting: INTERNAL MEDICINE
Payer: MEDICARE

## 2022-01-01 ENCOUNTER — OFFICE VISIT (OUTPATIENT)
Dept: CARDIOLOGY | Facility: CLINIC | Age: 82
End: 2022-01-01

## 2022-01-01 ENCOUNTER — HOSPITAL ENCOUNTER (OUTPATIENT)
Dept: CARDIOLOGY | Facility: HOSPITAL | Age: 82
Discharge: HOME | End: 2022-11-28
Admitting: INTERNAL MEDICINE

## 2022-01-01 ENCOUNTER — ANESTHESIA (OUTPATIENT)
Dept: GASTROENTEROLOGY | Facility: HOSPITAL | Age: 82
End: 2022-01-01

## 2022-01-01 ENCOUNTER — HOSPITAL ENCOUNTER (OUTPATIENT)
Facility: HOSPITAL | Age: 82
Setting detail: HOSPITAL OUTPATIENT SURGERY
End: 2022-01-01
Attending: INTERNAL MEDICINE | Admitting: INTERNAL MEDICINE

## 2022-01-01 ENCOUNTER — HOSPITAL ENCOUNTER (INPATIENT)
Facility: HOSPITAL | Age: 82
LOS: 3 days | Discharge: HOME | End: 2022-09-06
Attending: EMERGENCY MEDICINE | Admitting: INTERNAL MEDICINE

## 2022-01-01 ENCOUNTER — APPOINTMENT (OUTPATIENT)
Dept: GENERAL RADIOLOGY | Facility: HOSPITAL | Age: 82
DRG: 682 | End: 2022-01-01
Payer: MEDICARE

## 2022-01-01 ENCOUNTER — HOSPITAL ENCOUNTER (INPATIENT)
Facility: HOSPITAL | Age: 82
LOS: 5 days | Discharge: HOME | End: 2022-03-19
Attending: EMERGENCY MEDICINE | Admitting: INTERNAL MEDICINE

## 2022-01-01 ENCOUNTER — HOSPITAL ENCOUNTER (OUTPATIENT)
Dept: MAMMOGRAPHY | Facility: HOSPITAL | Age: 82
Discharge: HOME | End: 2022-03-29
Admitting: INTERNAL MEDICINE

## 2022-01-01 ENCOUNTER — APPOINTMENT (OUTPATIENT)
Dept: PHARMACY | Facility: HOSPITAL | Age: 82
End: 2022-01-01

## 2022-01-01 ENCOUNTER — HOSPITAL ENCOUNTER (EMERGENCY)
Facility: HOSPITAL | Age: 82
Discharge: HOME | End: 2022-01-10
Attending: EMERGENCY MEDICINE | Admitting: EMERGENCY MEDICINE

## 2022-01-01 ENCOUNTER — TRANSCRIBE ORDERS (OUTPATIENT)
Dept: HOME HEALTH SERVICES | Facility: HOME HEALTHCARE | Age: 82
End: 2022-01-01

## 2022-01-01 ENCOUNTER — HOSPITAL ENCOUNTER (EMERGENCY)
Facility: HOSPITAL | Age: 82
Discharge: NURSING, REHAB OR LONG-TERM CARE | End: 2022-10-12
Attending: EMERGENCY MEDICINE | Admitting: EMERGENCY MEDICINE

## 2022-01-01 ENCOUNTER — APPOINTMENT (OUTPATIENT)
Dept: SLEEP MEDICINE | Facility: HOSPITAL | Age: 82
End: 2022-01-01

## 2022-01-01 VITALS
BODY MASS INDEX: 23.97 KG/M2 | TEMPERATURE: 98 F | SYSTOLIC BLOOD PRESSURE: 112 MMHG | OXYGEN SATURATION: 98 % | HEART RATE: 60 BPM | WEIGHT: 140.4 LBS | DIASTOLIC BLOOD PRESSURE: 66 MMHG | RESPIRATION RATE: 18 BRPM | HEIGHT: 64 IN

## 2022-01-01 VITALS
DIASTOLIC BLOOD PRESSURE: 60 MMHG | HEIGHT: 64 IN | TEMPERATURE: 97.3 F | WEIGHT: 145 LBS | BODY MASS INDEX: 24.75 KG/M2 | SYSTOLIC BLOOD PRESSURE: 92 MMHG | OXYGEN SATURATION: 98 % | RESPIRATION RATE: 14 BRPM | HEART RATE: 58 BPM

## 2022-01-01 VITALS
BODY MASS INDEX: 23.92 KG/M2 | OXYGEN SATURATION: 100 % | HEIGHT: 63 IN | DIASTOLIC BLOOD PRESSURE: 68 MMHG | HEART RATE: 60 BPM | RESPIRATION RATE: 16 BRPM | SYSTOLIC BLOOD PRESSURE: 122 MMHG | TEMPERATURE: 97.1 F

## 2022-01-01 VITALS
TEMPERATURE: 97.4 F | SYSTOLIC BLOOD PRESSURE: 128 MMHG | DIASTOLIC BLOOD PRESSURE: 56 MMHG | RESPIRATION RATE: 17 BRPM | HEART RATE: 70 BPM

## 2022-01-01 VITALS
BODY MASS INDEX: 26.07 KG/M2 | DIASTOLIC BLOOD PRESSURE: 53 MMHG | RESPIRATION RATE: 18 BRPM | HEART RATE: 69 BPM | HEIGHT: 62 IN | SYSTOLIC BLOOD PRESSURE: 148 MMHG | OXYGEN SATURATION: 90 % | WEIGHT: 141.7 LBS | TEMPERATURE: 98.2 F

## 2022-01-01 VITALS
BODY MASS INDEX: 25.21 KG/M2 | RESPIRATION RATE: 16 BRPM | HEART RATE: 61 BPM | SYSTOLIC BLOOD PRESSURE: 138 MMHG | OXYGEN SATURATION: 100 % | HEIGHT: 62 IN | DIASTOLIC BLOOD PRESSURE: 45 MMHG | WEIGHT: 137 LBS | TEMPERATURE: 97.6 F

## 2022-01-01 VITALS
WEIGHT: 150.6 LBS | HEART RATE: 62 BPM | TEMPERATURE: 97.8 F | BODY MASS INDEX: 25.71 KG/M2 | HEIGHT: 64 IN | RESPIRATION RATE: 16 BRPM | SYSTOLIC BLOOD PRESSURE: 140 MMHG | DIASTOLIC BLOOD PRESSURE: 56 MMHG | OXYGEN SATURATION: 100 %

## 2022-01-01 VITALS — HEART RATE: 59 BPM | OXYGEN SATURATION: 100 % | DIASTOLIC BLOOD PRESSURE: 54 MMHG | SYSTOLIC BLOOD PRESSURE: 132 MMHG

## 2022-01-01 VITALS
HEART RATE: 64 BPM | SYSTOLIC BLOOD PRESSURE: 118 MMHG | OXYGEN SATURATION: 98 % | TEMPERATURE: 97.5 F | RESPIRATION RATE: 18 BRPM | DIASTOLIC BLOOD PRESSURE: 70 MMHG

## 2022-01-01 VITALS
SYSTOLIC BLOOD PRESSURE: 146 MMHG | DIASTOLIC BLOOD PRESSURE: 56 MMHG | TEMPERATURE: 97.2 F | RESPIRATION RATE: 18 BRPM | SYSTOLIC BLOOD PRESSURE: 136 MMHG | HEART RATE: 60 BPM | OXYGEN SATURATION: 99 % | HEART RATE: 60 BPM | RESPIRATION RATE: 18 BRPM | DIASTOLIC BLOOD PRESSURE: 50 MMHG | TEMPERATURE: 96.6 F | OXYGEN SATURATION: 97 %

## 2022-01-01 VITALS
HEIGHT: 64 IN | TEMPERATURE: 97.7 F | BODY MASS INDEX: 25.13 KG/M2 | DIASTOLIC BLOOD PRESSURE: 71 MMHG | OXYGEN SATURATION: 99 % | WEIGHT: 147.2 LBS | RESPIRATION RATE: 16 BRPM | SYSTOLIC BLOOD PRESSURE: 174 MMHG | HEART RATE: 60 BPM

## 2022-01-01 VITALS
RESPIRATION RATE: 20 BRPM | SYSTOLIC BLOOD PRESSURE: 132 MMHG | TEMPERATURE: 97.7 F | BODY MASS INDEX: 24.84 KG/M2 | OXYGEN SATURATION: 97 % | HEART RATE: 60 BPM | DIASTOLIC BLOOD PRESSURE: 68 MMHG | WEIGHT: 135 LBS | HEIGHT: 62 IN

## 2022-01-01 VITALS
RESPIRATION RATE: 20 BRPM | TEMPERATURE: 98.2 F | SYSTOLIC BLOOD PRESSURE: 144 MMHG | HEART RATE: 60 BPM | DIASTOLIC BLOOD PRESSURE: 76 MMHG | OXYGEN SATURATION: 98 %

## 2022-01-01 VITALS
OXYGEN SATURATION: 100 % | TEMPERATURE: 97.6 F | SYSTOLIC BLOOD PRESSURE: 130 MMHG | RESPIRATION RATE: 18 BRPM | HEART RATE: 81 BPM | DIASTOLIC BLOOD PRESSURE: 82 MMHG

## 2022-01-01 VITALS
TEMPERATURE: 98.3 F | BODY MASS INDEX: 23.56 KG/M2 | HEIGHT: 64 IN | WEIGHT: 138 LBS | HEART RATE: 68 BPM | RESPIRATION RATE: 18 BRPM | OXYGEN SATURATION: 100 % | DIASTOLIC BLOOD PRESSURE: 55 MMHG | SYSTOLIC BLOOD PRESSURE: 132 MMHG

## 2022-01-01 VITALS
HEART RATE: 59 BPM | RESPIRATION RATE: 16 BRPM | OXYGEN SATURATION: 99 % | HEIGHT: 64 IN | BODY MASS INDEX: 23.87 KG/M2 | SYSTOLIC BLOOD PRESSURE: 164 MMHG | WEIGHT: 139.8 LBS | TEMPERATURE: 96.8 F | DIASTOLIC BLOOD PRESSURE: 73 MMHG

## 2022-01-01 VITALS
HEART RATE: 68 BPM | OXYGEN SATURATION: 96 % | SYSTOLIC BLOOD PRESSURE: 118 MMHG | RESPIRATION RATE: 18 BRPM | TEMPERATURE: 98.1 F | DIASTOLIC BLOOD PRESSURE: 58 MMHG

## 2022-01-01 VITALS
SYSTOLIC BLOOD PRESSURE: 116 MMHG | HEIGHT: 62 IN | HEART RATE: 73 BPM | BODY MASS INDEX: 25.51 KG/M2 | DIASTOLIC BLOOD PRESSURE: 64 MMHG | OXYGEN SATURATION: 100 % | RESPIRATION RATE: 16 BRPM | TEMPERATURE: 97.3 F | WEIGHT: 138.6 LBS

## 2022-01-01 VITALS
DIASTOLIC BLOOD PRESSURE: 70 MMHG | OXYGEN SATURATION: 96 % | SYSTOLIC BLOOD PRESSURE: 172 MMHG | TEMPERATURE: 97.1 F | HEIGHT: 64 IN | HEART RATE: 60 BPM | BODY MASS INDEX: 23.68 KG/M2

## 2022-01-01 VITALS
OXYGEN SATURATION: 98 % | RESPIRATION RATE: 16 BRPM | TEMPERATURE: 97.8 F | DIASTOLIC BLOOD PRESSURE: 72 MMHG | HEART RATE: 75 BPM | SYSTOLIC BLOOD PRESSURE: 128 MMHG

## 2022-01-01 VITALS
DIASTOLIC BLOOD PRESSURE: 62 MMHG | HEART RATE: 64 BPM | SYSTOLIC BLOOD PRESSURE: 132 MMHG | TEMPERATURE: 98.3 F | OXYGEN SATURATION: 97 %

## 2022-01-01 VITALS
WEIGHT: 136.6 LBS | HEIGHT: 64 IN | OXYGEN SATURATION: 99 % | HEART RATE: 60 BPM | SYSTOLIC BLOOD PRESSURE: 110 MMHG | BODY MASS INDEX: 23.32 KG/M2 | DIASTOLIC BLOOD PRESSURE: 70 MMHG | TEMPERATURE: 97.3 F

## 2022-01-01 VITALS
OXYGEN SATURATION: 98 % | WEIGHT: 148 LBS | HEIGHT: 64 IN | TEMPERATURE: 97.3 F | HEART RATE: 62 BPM | BODY MASS INDEX: 25.27 KG/M2 | SYSTOLIC BLOOD PRESSURE: 128 MMHG | DIASTOLIC BLOOD PRESSURE: 62 MMHG

## 2022-01-01 VITALS
WEIGHT: 150 LBS | DIASTOLIC BLOOD PRESSURE: 72 MMHG | BODY MASS INDEX: 25.61 KG/M2 | HEART RATE: 68 BPM | HEIGHT: 64 IN | SYSTOLIC BLOOD PRESSURE: 162 MMHG

## 2022-01-01 VITALS — BODY MASS INDEX: 23.49 KG/M2 | HEIGHT: 64 IN | WEIGHT: 137.57 LBS | TEMPERATURE: 96.6 F

## 2022-01-01 VITALS
SYSTOLIC BLOOD PRESSURE: 130 MMHG | TEMPERATURE: 97.8 F | RESPIRATION RATE: 18 BRPM | DIASTOLIC BLOOD PRESSURE: 64 MMHG | HEART RATE: 70 BPM

## 2022-01-01 VITALS
SYSTOLIC BLOOD PRESSURE: 129 MMHG | OXYGEN SATURATION: 96 % | RESPIRATION RATE: 18 BRPM | BODY MASS INDEX: 23.92 KG/M2 | HEIGHT: 63 IN | TEMPERATURE: 98.6 F | HEART RATE: 62 BPM | DIASTOLIC BLOOD PRESSURE: 62 MMHG | WEIGHT: 135 LBS

## 2022-01-01 VITALS
DIASTOLIC BLOOD PRESSURE: 70 MMHG | TEMPERATURE: 97.8 F | RESPIRATION RATE: 18 BRPM | SYSTOLIC BLOOD PRESSURE: 126 MMHG | HEART RATE: 74 BPM

## 2022-01-01 VITALS
SYSTOLIC BLOOD PRESSURE: 122 MMHG | TEMPERATURE: 97.9 F | OXYGEN SATURATION: 99 % | RESPIRATION RATE: 20 BRPM | DIASTOLIC BLOOD PRESSURE: 72 MMHG | HEART RATE: 60 BPM

## 2022-01-01 VITALS
DIASTOLIC BLOOD PRESSURE: 60 MMHG | SYSTOLIC BLOOD PRESSURE: 143 MMHG | WEIGHT: 128.53 LBS | TEMPERATURE: 98.1 F | HEART RATE: 59 BPM | BODY MASS INDEX: 22.77 KG/M2 | RESPIRATION RATE: 16 BRPM | HEIGHT: 63 IN | OXYGEN SATURATION: 100 %

## 2022-01-01 VITALS
HEART RATE: 68 BPM | SYSTOLIC BLOOD PRESSURE: 128 MMHG | TEMPERATURE: 97.4 F | RESPIRATION RATE: 17 BRPM | OXYGEN SATURATION: 96 % | DIASTOLIC BLOOD PRESSURE: 62 MMHG

## 2022-01-01 VITALS
TEMPERATURE: 98 F | RESPIRATION RATE: 18 BRPM | SYSTOLIC BLOOD PRESSURE: 136 MMHG | HEART RATE: 64 BPM | DIASTOLIC BLOOD PRESSURE: 74 MMHG

## 2022-01-01 DIAGNOSIS — D69.6 THROMBOCYTOPENIA: ICD-10-CM

## 2022-01-01 DIAGNOSIS — K92.2 GASTROINTESTINAL HEMORRHAGE, UNSPECIFIED GASTROINTESTINAL HEMORRHAGE TYPE: ICD-10-CM

## 2022-01-01 DIAGNOSIS — D63.1 ANEMIA IN STAGE 3A CHRONIC KIDNEY DISEASE: ICD-10-CM

## 2022-01-01 DIAGNOSIS — Z79.01 CHRONIC ANTICOAGULATION: ICD-10-CM

## 2022-01-01 DIAGNOSIS — D63.1 ANEMIA IN STAGE 3A CHRONIC KIDNEY DISEASE: Primary | ICD-10-CM

## 2022-01-01 DIAGNOSIS — I48.20 CHRONIC ATRIAL FIBRILLATION: Primary | ICD-10-CM

## 2022-01-01 DIAGNOSIS — N18.31 ANEMIA IN STAGE 3A CHRONIC KIDNEY DISEASE: ICD-10-CM

## 2022-01-01 DIAGNOSIS — N18.31 ANEMIA IN STAGE 3A CHRONIC KIDNEY DISEASE: Primary | ICD-10-CM

## 2022-01-01 DIAGNOSIS — N18.9 CHRONIC KIDNEY DISEASE, UNSPECIFIED CKD STAGE: ICD-10-CM

## 2022-01-01 DIAGNOSIS — R41.82 ALTERED MENTAL STATUS, UNSPECIFIED ALTERED MENTAL STATUS TYPE: ICD-10-CM

## 2022-01-01 DIAGNOSIS — R53.1 WEAKNESS: ICD-10-CM

## 2022-01-01 DIAGNOSIS — R77.8 ELEVATED TROPONIN: ICD-10-CM

## 2022-01-01 DIAGNOSIS — Z12.31 VISIT FOR SCREENING MAMMOGRAM: ICD-10-CM

## 2022-01-01 DIAGNOSIS — N18.4 CKD (CHRONIC KIDNEY DISEASE) STAGE 4, GFR 15-29 ML/MIN: Primary | ICD-10-CM

## 2022-01-01 DIAGNOSIS — M15.9 PRIMARY OSTEOARTHRITIS INVOLVING MULTIPLE JOINTS: ICD-10-CM

## 2022-01-01 DIAGNOSIS — R23.3 EASY BRUISING: ICD-10-CM

## 2022-01-01 DIAGNOSIS — I25.810 CORONARY ARTERY DISEASE INVOLVING CORONARY BYPASS GRAFT OF NATIVE HEART WITHOUT ANGINA PECTORIS: Chronic | ICD-10-CM

## 2022-01-01 DIAGNOSIS — Z95.810 ICD (IMPLANTABLE CARDIOVERTER-DEFIBRILLATOR) IN PLACE: Chronic | ICD-10-CM

## 2022-01-01 DIAGNOSIS — R79.9 ELEVATED BUN: ICD-10-CM

## 2022-01-01 DIAGNOSIS — E55.9 VITAMIN D DEFICIENCY: ICD-10-CM

## 2022-01-01 DIAGNOSIS — N17.9 AKI (ACUTE KIDNEY INJURY): ICD-10-CM

## 2022-01-01 DIAGNOSIS — I82.401 DEEP VEIN THROMBOSIS (DVT) OF RIGHT LOWER EXTREMITY, UNSPECIFIED CHRONICITY, UNSPECIFIED VEIN: ICD-10-CM

## 2022-01-01 DIAGNOSIS — D69.6 THROMBOCYTOPENIA: Primary | ICD-10-CM

## 2022-01-01 DIAGNOSIS — M15.9 PRIMARY OSTEOARTHRITIS INVOLVING MULTIPLE JOINTS: Primary | ICD-10-CM

## 2022-01-01 DIAGNOSIS — R29.6 MULTIPLE FALLS: ICD-10-CM

## 2022-01-01 DIAGNOSIS — Z79.891 OPIOID CONTRACT EXISTS: ICD-10-CM

## 2022-01-01 DIAGNOSIS — I25.5 ISCHEMIC CARDIOMYOPATHY: Primary | ICD-10-CM

## 2022-01-01 DIAGNOSIS — D64.9 ANEMIA, UNSPECIFIED TYPE: Primary | ICD-10-CM

## 2022-01-01 DIAGNOSIS — D64.9 ANEMIA, UNSPECIFIED TYPE: ICD-10-CM

## 2022-01-01 DIAGNOSIS — D63.1 ANEMIA IN STAGE 4 CHRONIC KIDNEY DISEASE: Primary | ICD-10-CM

## 2022-01-01 DIAGNOSIS — K92.2 ACUTE UPPER GI BLEED: Primary | ICD-10-CM

## 2022-01-01 DIAGNOSIS — R41.82 ALTERED MENTAL STATUS, UNSPECIFIED ALTERED MENTAL STATUS TYPE: Primary | ICD-10-CM

## 2022-01-01 DIAGNOSIS — B34.0 ADENOVIRUS VIREMIA: ICD-10-CM

## 2022-01-01 DIAGNOSIS — N18.4 CKD (CHRONIC KIDNEY DISEASE) STAGE 4, GFR 15-29 ML/MIN: Chronic | ICD-10-CM

## 2022-01-01 DIAGNOSIS — D63.1 ANEMIA IN STAGE 3A CHRONIC KIDNEY DISEASE: Primary | Chronic | ICD-10-CM

## 2022-01-01 DIAGNOSIS — R79.9 ABNORMAL FINDING OF BLOOD CHEMISTRY, UNSPECIFIED: ICD-10-CM

## 2022-01-01 DIAGNOSIS — S12.9XXA CLOSED FRACTURE OF SPINOUS PROCESS OF CERVICAL VERTEBRA, INITIAL ENCOUNTER: ICD-10-CM

## 2022-01-01 DIAGNOSIS — Z79.01 ANTICOAGULATED ON COUMADIN: ICD-10-CM

## 2022-01-01 DIAGNOSIS — N18.4 CKD (CHRONIC KIDNEY DISEASE) STAGE 4, GFR 15-29 ML/MIN: ICD-10-CM

## 2022-01-01 DIAGNOSIS — D62 ACUTE BLOOD LOSS ANEMIA: ICD-10-CM

## 2022-01-01 DIAGNOSIS — R30.0 DYSURIA: ICD-10-CM

## 2022-01-01 DIAGNOSIS — I95.9 HYPOTENSION, UNSPECIFIED HYPOTENSION TYPE: Primary | ICD-10-CM

## 2022-01-01 DIAGNOSIS — I50.42 CHRONIC COMBINED SYSTOLIC AND DIASTOLIC CONGESTIVE HEART FAILURE: Chronic | ICD-10-CM

## 2022-01-01 DIAGNOSIS — R60.0 BILATERAL LEG EDEMA: ICD-10-CM

## 2022-01-01 DIAGNOSIS — R04.0 ACUTE ANTERIOR EPISTAXIS: Primary | ICD-10-CM

## 2022-01-01 DIAGNOSIS — N18.4 STAGE 4 CHRONIC KIDNEY DISEASE: Primary | ICD-10-CM

## 2022-01-01 DIAGNOSIS — E78.2 MIXED HYPERLIPIDEMIA: ICD-10-CM

## 2022-01-01 DIAGNOSIS — L98.9 LESION OF SKIN OF NOSE: ICD-10-CM

## 2022-01-01 DIAGNOSIS — D64.9 SYMPTOMATIC ANEMIA: Primary | ICD-10-CM

## 2022-01-01 DIAGNOSIS — N18.9 CHRONIC RENAL IMPAIRMENT, UNSPECIFIED CKD STAGE: ICD-10-CM

## 2022-01-01 DIAGNOSIS — S81.811A SKIN TEAR OF RIGHT LOWER LEG WITHOUT COMPLICATION, INITIAL ENCOUNTER: ICD-10-CM

## 2022-01-01 DIAGNOSIS — I48.20 CHRONIC ATRIAL FIBRILLATION: Primary | Chronic | ICD-10-CM

## 2022-01-01 DIAGNOSIS — I47.29 NONSUSTAINED VENTRICULAR TACHYCARDIA: ICD-10-CM

## 2022-01-01 DIAGNOSIS — M10.00 IDIOPATHIC GOUT, UNSPECIFIED CHRONICITY, UNSPECIFIED SITE: Chronic | ICD-10-CM

## 2022-01-01 DIAGNOSIS — E55.9 VITAMIN D DEFICIENCY, UNSPECIFIED: ICD-10-CM

## 2022-01-01 DIAGNOSIS — I10 ESSENTIAL HYPERTENSION, BENIGN: ICD-10-CM

## 2022-01-01 DIAGNOSIS — Z91.81 HISTORY OF FALL: ICD-10-CM

## 2022-01-01 DIAGNOSIS — D64.9 SYMPTOMATIC ANEMIA: ICD-10-CM

## 2022-01-01 DIAGNOSIS — I48.20 CHRONIC ATRIAL FIBRILLATION: Chronic | ICD-10-CM

## 2022-01-01 DIAGNOSIS — I47.20 VENTRICULAR TACHYCARDIA: ICD-10-CM

## 2022-01-01 DIAGNOSIS — I10 ESSENTIAL HYPERTENSION, BENIGN: Primary | ICD-10-CM

## 2022-01-01 DIAGNOSIS — N17.9 ACUTE RENAL FAILURE, UNSPECIFIED ACUTE RENAL FAILURE TYPE: Primary | ICD-10-CM

## 2022-01-01 DIAGNOSIS — N18.4 STAGE 4 CHRONIC KIDNEY DISEASE: ICD-10-CM

## 2022-01-01 DIAGNOSIS — I95.9 HYPOTENSION, UNSPECIFIED HYPOTENSION TYPE: ICD-10-CM

## 2022-01-01 DIAGNOSIS — I21.A1 TYPE 2 MYOCARDIAL INFARCTION: ICD-10-CM

## 2022-01-01 DIAGNOSIS — Z12.31 VISIT FOR SCREENING MAMMOGRAM: Primary | ICD-10-CM

## 2022-01-01 DIAGNOSIS — D50.0 ANEMIA, BLOOD LOSS: ICD-10-CM

## 2022-01-01 DIAGNOSIS — I50.9 ACUTE ON CHRONIC CONGESTIVE HEART FAILURE, UNSPECIFIED HEART FAILURE TYPE: Primary | ICD-10-CM

## 2022-01-01 DIAGNOSIS — I10 ESSENTIAL HYPERTENSION: Chronic | ICD-10-CM

## 2022-01-01 DIAGNOSIS — M10.00 IDIOPATHIC GOUT, UNSPECIFIED CHRONICITY, UNSPECIFIED SITE: Primary | ICD-10-CM

## 2022-01-01 DIAGNOSIS — R60.0 BILATERAL LEG EDEMA: Primary | ICD-10-CM

## 2022-01-01 DIAGNOSIS — T45.511A POISONING BY WARFARIN SODIUM, ACCIDENTAL OR UNINTENTIONAL, INITIAL ENCOUNTER: ICD-10-CM

## 2022-01-01 DIAGNOSIS — I25.5 ISCHEMIC CARDIOMYOPATHY: Primary | Chronic | ICD-10-CM

## 2022-01-01 DIAGNOSIS — I47.29 PAROXYSMAL VENTRICULAR TACHYCARDIA: ICD-10-CM

## 2022-01-01 DIAGNOSIS — N18.4 ANEMIA IN STAGE 4 CHRONIC KIDNEY DISEASE: Primary | ICD-10-CM

## 2022-01-01 DIAGNOSIS — N18.31 ANEMIA IN STAGE 3A CHRONIC KIDNEY DISEASE: Primary | Chronic | ICD-10-CM

## 2022-01-01 LAB
25(OH)D3 SERPL-MCNC: 66.9 NG/ML (ref 30–100)
25(OH)D3 SERPL-MCNC: 68.1 NG/ML (ref 30–100)
ABO GROUP BLD: NORMAL
ALBUMIN SERPL ELPH-MCNC: 2.7 G/DL (ref 2.9–4.4)
ALBUMIN SERPL-MCNC: 2.9 G/DL (ref 3.5–5.2)
ALBUMIN SERPL-MCNC: 3 G/DL (ref 3.5–5.2)
ALBUMIN SERPL-MCNC: 3.3 G/DL (ref 3.5–5.2)
ALBUMIN SERPL-MCNC: 3.6 G/DL (ref 3.5–5.2)
ALBUMIN SERPL-MCNC: 3.6 G/DL (ref 3.5–5.2)
ALBUMIN SERPL-MCNC: 3.7 G/DL (ref 3.5–5.2)
ALBUMIN SERPL-MCNC: 4.3 G/DL (ref 3.5–5.2)
ALBUMIN SERPL-MCNC: 4.4 G/DL (ref 3.5–5.2)
ALBUMIN/GLOB SERPL: 1.3 {RATIO} (ref 0.7–1.7)
ALBUMIN/GLOB SERPL: 1.4 G/DL
ALBUMIN/GLOB SERPL: 1.6 G/DL
ALBUMIN/GLOB SERPL: 1.6 G/DL
ALBUMIN/GLOB SERPL: 1.7 G/DL
ALBUMIN/GLOB SERPL: 1.7 G/DL
ALBUMIN/GLOB SERPL: 1.8 G/DL
ALBUMIN/GLOB SERPL: 1.9 G/DL
ALBUMIN/GLOB SERPL: 2 G/DL
ALBUMIN/GLOB SERPL: 2.1 G/DL
ALBUMIN/GLOB SERPL: 2.2 G/DL
ALP SERPL-CCNC: 33 U/L (ref 39–117)
ALP SERPL-CCNC: 36 U/L (ref 39–117)
ALP SERPL-CCNC: 37 U/L (ref 39–117)
ALP SERPL-CCNC: 48 U/L (ref 39–117)
ALP SERPL-CCNC: 54 U/L (ref 39–117)
ALP SERPL-CCNC: 59 U/L (ref 39–117)
ALP SERPL-CCNC: 62 U/L (ref 39–117)
ALP SERPL-CCNC: 64 U/L (ref 39–117)
ALP SERPL-CCNC: 75 U/L (ref 39–117)
ALP SERPL-CCNC: 82 U/L (ref 39–117)
ALPHA1 GLOB SERPL ELPH-MCNC: 0.3 G/DL (ref 0–0.4)
ALPHA2 GLOB SERPL ELPH-MCNC: 0.7 G/DL (ref 0.4–1)
ALT SERPL W P-5'-P-CCNC: 10 U/L (ref 1–33)
ALT SERPL W P-5'-P-CCNC: 10 U/L (ref 1–33)
ALT SERPL W P-5'-P-CCNC: 11 U/L (ref 1–33)
ALT SERPL W P-5'-P-CCNC: 12 U/L (ref 1–33)
ALT SERPL W P-5'-P-CCNC: 12 U/L (ref 1–33)
ALT SERPL W P-5'-P-CCNC: 16 U/L (ref 1–33)
ALT SERPL W P-5'-P-CCNC: 20 U/L (ref 1–33)
ALT SERPL W P-5'-P-CCNC: 20 U/L (ref 1–33)
ALT SERPL W P-5'-P-CCNC: 31 U/L (ref 1–33)
ALT SERPL W P-5'-P-CCNC: 7 U/L (ref 1–33)
AMPHET+METHAMPHET UR QL: NEGATIVE
ANION GAP SERPL CALCULATED.3IONS-SCNC: 10 MMOL/L (ref 5–15)
ANION GAP SERPL CALCULATED.3IONS-SCNC: 11 MMOL/L (ref 5–15)
ANION GAP SERPL CALCULATED.3IONS-SCNC: 11.2 MMOL/L (ref 5–15)
ANION GAP SERPL CALCULATED.3IONS-SCNC: 11.6 MMOL/L (ref 5–15)
ANION GAP SERPL CALCULATED.3IONS-SCNC: 11.6 MMOL/L (ref 5–15)
ANION GAP SERPL CALCULATED.3IONS-SCNC: 11.7 MMOL/L (ref 5–15)
ANION GAP SERPL CALCULATED.3IONS-SCNC: 11.9 MMOL/L (ref 5–15)
ANION GAP SERPL CALCULATED.3IONS-SCNC: 12 MMOL/L (ref 5–15)
ANION GAP SERPL CALCULATED.3IONS-SCNC: 12.4 MMOL/L (ref 5–15)
ANION GAP SERPL CALCULATED.3IONS-SCNC: 12.7 MMOL/L (ref 5–15)
ANION GAP SERPL CALCULATED.3IONS-SCNC: 13 MMOL/L (ref 5–15)
ANION GAP SERPL CALCULATED.3IONS-SCNC: 13.8 MMOL/L (ref 5–15)
ANION GAP SERPL CALCULATED.3IONS-SCNC: 13.9 MMOL/L (ref 5–15)
ANION GAP SERPL CALCULATED.3IONS-SCNC: 14 MMOL/L (ref 5–15)
ANION GAP SERPL CALCULATED.3IONS-SCNC: 14.4 MMOL/L (ref 5–15)
ANION GAP SERPL CALCULATED.3IONS-SCNC: 15 MMOL/L (ref 5–15)
ANION GAP SERPL CALCULATED.3IONS-SCNC: 15.4 MMOL/L (ref 5–15)
ANION GAP SERPL CALCULATED.3IONS-SCNC: 19.4 MMOL/L (ref 5–15)
ANION GAP SERPL CALCULATED.3IONS-SCNC: 7.7 MMOL/L (ref 5–15)
ANION GAP SERPL CALCULATED.3IONS-SCNC: 9 MMOL/L (ref 5–15)
APTT PPP: 32.6 SECONDS (ref 22.7–35.4)
AST SERPL-CCNC: 14 U/L (ref 1–32)
AST SERPL-CCNC: 14 U/L (ref 1–32)
AST SERPL-CCNC: 16 U/L (ref 1–32)
AST SERPL-CCNC: 17 U/L (ref 1–32)
AST SERPL-CCNC: 20 U/L (ref 1–32)
AST SERPL-CCNC: 21 U/L (ref 1–32)
AST SERPL-CCNC: 21 U/L (ref 1–32)
AST SERPL-CCNC: 22 U/L (ref 1–32)
AST SERPL-CCNC: 23 U/L (ref 1–32)
AST SERPL-CCNC: 28 U/L (ref 1–32)
B PARAPERT DNA SPEC QL NAA+PROBE: NOT DETECTED
B PERT DNA SPEC QL NAA+PROBE: NOT DETECTED
B-GLOBULIN SERPL ELPH-MCNC: 0.7 G/DL (ref 0.7–1.3)
BACTERIA SPEC AEROBE CULT: ABNORMAL
BACTERIA UR QL AUTO: ABNORMAL /HPF
BARBITURATES UR QL SCN: NEGATIVE
BASOPHILS # BLD AUTO: 0 10*3/MM3 (ref 0–0.2)
BASOPHILS # BLD AUTO: 0.01 10*3/MM3 (ref 0–0.2)
BASOPHILS # BLD AUTO: 0.02 10*3/MM3 (ref 0–0.2)
BASOPHILS # BLD AUTO: 0.03 10*3/MM3 (ref 0–0.2)
BASOPHILS # BLD AUTO: 0.04 10*3/MM3 (ref 0–0.2)
BASOPHILS NFR BLD AUTO: 0 % (ref 0–1.5)
BASOPHILS NFR BLD AUTO: 0.1 % (ref 0–1.5)
BASOPHILS NFR BLD AUTO: 0.2 % (ref 0–1.5)
BASOPHILS NFR BLD AUTO: 0.3 % (ref 0–1.5)
BASOPHILS NFR BLD AUTO: 0.4 % (ref 0–1.5)
BASOPHILS NFR BLD AUTO: 0.5 % (ref 0–1.5)
BASOPHILS NFR BLD AUTO: 0.6 % (ref 0–1.5)
BASOPHILS NFR BLD AUTO: 0.7 % (ref 0–1.5)
BASOPHILS NFR BLD AUTO: 0.9 % (ref 0–1.5)
BENZODIAZ UR QL SCN: NEGATIVE
BH BB BLOOD EXPIRATION DATE: NORMAL
BH BB BLOOD TYPE BARCODE: 5100
BH BB DISPENSE STATUS: NORMAL
BH BB PRODUCT CODE: NORMAL
BH BB UNIT NUMBER: NORMAL
BH CV LOWER VASCULAR LEFT COMMON FEMORAL AUGMENT: NORMAL
BH CV LOWER VASCULAR LEFT COMMON FEMORAL COMPETENT: NORMAL
BH CV LOWER VASCULAR LEFT COMMON FEMORAL COMPRESS: NORMAL
BH CV LOWER VASCULAR LEFT COMMON FEMORAL PHASIC: NORMAL
BH CV LOWER VASCULAR LEFT COMMON FEMORAL SPONT: NORMAL
BH CV LOWER VASCULAR LEFT DISTAL FEMORAL COMPRESS: NORMAL
BH CV LOWER VASCULAR LEFT GASTRONEMIUS COMPRESS: NORMAL
BH CV LOWER VASCULAR LEFT GREATER SAPH AK COMPRESS: NORMAL
BH CV LOWER VASCULAR LEFT GREATER SAPH BK COMPRESS: NORMAL
BH CV LOWER VASCULAR LEFT LESSER SAPH COMPRESS: NORMAL
BH CV LOWER VASCULAR LEFT MID FEMORAL AUGMENT: NORMAL
BH CV LOWER VASCULAR LEFT MID FEMORAL COMPETENT: NORMAL
BH CV LOWER VASCULAR LEFT MID FEMORAL COMPRESS: NORMAL
BH CV LOWER VASCULAR LEFT MID FEMORAL PHASIC: NORMAL
BH CV LOWER VASCULAR LEFT MID FEMORAL SPONT: NORMAL
BH CV LOWER VASCULAR LEFT PERONEAL COMPRESS: NORMAL
BH CV LOWER VASCULAR LEFT POPLITEAL AUGMENT: NORMAL
BH CV LOWER VASCULAR LEFT POPLITEAL COMPETENT: NORMAL
BH CV LOWER VASCULAR LEFT POPLITEAL COMPRESS: NORMAL
BH CV LOWER VASCULAR LEFT POPLITEAL PHASIC: NORMAL
BH CV LOWER VASCULAR LEFT POPLITEAL SPONT: NORMAL
BH CV LOWER VASCULAR LEFT POSTERIOR TIBIAL COMPRESS: NORMAL
BH CV LOWER VASCULAR LEFT PROFUNDA FEMORAL COMPRESS: NORMAL
BH CV LOWER VASCULAR LEFT PROXIMAL FEMORAL COMPRESS: NORMAL
BH CV LOWER VASCULAR LEFT SAPHENOFEMORAL JUNCTION COMPRESS: NORMAL
BH CV LOWER VASCULAR RIGHT COMMON FEMORAL AUGMENT: NORMAL
BH CV LOWER VASCULAR RIGHT COMMON FEMORAL COMPETENT: NORMAL
BH CV LOWER VASCULAR RIGHT COMMON FEMORAL COMPRESS: NORMAL
BH CV LOWER VASCULAR RIGHT COMMON FEMORAL PHASIC: NORMAL
BH CV LOWER VASCULAR RIGHT COMMON FEMORAL SPONT: NORMAL
BH CV LOWER VASCULAR RIGHT DISTAL FEMORAL COMPRESS: NORMAL
BH CV LOWER VASCULAR RIGHT GASTRONEMIUS COMPRESS: NORMAL
BH CV LOWER VASCULAR RIGHT GREATER SAPH AK COMPRESS: NORMAL
BH CV LOWER VASCULAR RIGHT GREATER SAPH BK COMPRESS: NORMAL
BH CV LOWER VASCULAR RIGHT LESSER SAPH COMPRESS: NORMAL
BH CV LOWER VASCULAR RIGHT MID FEMORAL AUGMENT: NORMAL
BH CV LOWER VASCULAR RIGHT MID FEMORAL COMPETENT: NORMAL
BH CV LOWER VASCULAR RIGHT MID FEMORAL COMPRESS: NORMAL
BH CV LOWER VASCULAR RIGHT MID FEMORAL PHASIC: NORMAL
BH CV LOWER VASCULAR RIGHT MID FEMORAL SPONT: NORMAL
BH CV LOWER VASCULAR RIGHT PERONEAL COMPRESS: NORMAL
BH CV LOWER VASCULAR RIGHT POPLITEAL AUGMENT: NORMAL
BH CV LOWER VASCULAR RIGHT POPLITEAL COMPETENT: NORMAL
BH CV LOWER VASCULAR RIGHT POPLITEAL COMPRESS: NORMAL
BH CV LOWER VASCULAR RIGHT POPLITEAL PHASIC: NORMAL
BH CV LOWER VASCULAR RIGHT POPLITEAL SPONT: NORMAL
BH CV LOWER VASCULAR RIGHT POSTERIOR TIBIAL COMPRESS: NORMAL
BH CV LOWER VASCULAR RIGHT PROFUNDA FEMORAL COMPRESS: NORMAL
BH CV LOWER VASCULAR RIGHT PROXIMAL FEMORAL COMPRESS: NORMAL
BH CV LOWER VASCULAR RIGHT SAPHENOFEMORAL JUNCTION COMPRESS: NORMAL
BILIRUB SERPL-MCNC: 0.2 MG/DL (ref 0–1.2)
BILIRUB SERPL-MCNC: 0.3 MG/DL (ref 0–1.2)
BILIRUB SERPL-MCNC: 0.3 MG/DL (ref 0–1.2)
BILIRUB SERPL-MCNC: 0.4 MG/DL (ref 0–1.2)
BILIRUB SERPL-MCNC: 0.5 MG/DL (ref 0–1.2)
BILIRUB SERPL-MCNC: 0.6 MG/DL (ref 0–1.2)
BILIRUB SERPL-MCNC: 0.7 MG/DL (ref 0–1.2)
BILIRUB UR QL STRIP: NEGATIVE
BLD GP AB SCN SERPL QL: NEGATIVE
BUN SERPL-MCNC: 103 MG/DL (ref 8–23)
BUN SERPL-MCNC: 39 MG/DL (ref 8–23)
BUN SERPL-MCNC: 50 MG/DL (ref 8–23)
BUN SERPL-MCNC: 56 MG/DL (ref 8–23)
BUN SERPL-MCNC: 57 MG/DL (ref 8–23)
BUN SERPL-MCNC: 58 MG/DL (ref 8–23)
BUN SERPL-MCNC: 59 MG/DL (ref 8–23)
BUN SERPL-MCNC: 65 MG/DL (ref 8–23)
BUN SERPL-MCNC: 67 MG/DL (ref 8–23)
BUN SERPL-MCNC: 69 MG/DL (ref 8–23)
BUN SERPL-MCNC: 74 MG/DL (ref 8–23)
BUN SERPL-MCNC: 76 MG/DL (ref 8–23)
BUN SERPL-MCNC: 77 MG/DL (ref 8–23)
BUN SERPL-MCNC: 78 MG/DL (ref 8–23)
BUN SERPL-MCNC: 79 MG/DL (ref 8–23)
BUN SERPL-MCNC: 81 MG/DL (ref 8–23)
BUN SERPL-MCNC: 82 MG/DL (ref 8–23)
BUN SERPL-MCNC: 85 MG/DL (ref 8–23)
BUN SERPL-MCNC: 89 MG/DL (ref 8–23)
BUN SERPL-MCNC: 89 MG/DL (ref 8–23)
BUN SERPL-MCNC: 91 MG/DL (ref 8–23)
BUN SERPL-MCNC: 92 MG/DL (ref 8–23)
BUN/CREAT SERPL: 11.2 (ref 7–25)
BUN/CREAT SERPL: 11.7 (ref 7–25)
BUN/CREAT SERPL: 11.8 (ref 7–25)
BUN/CREAT SERPL: 22.7 (ref 7–25)
BUN/CREAT SERPL: 23.2 (ref 7–25)
BUN/CREAT SERPL: 23.2 (ref 7–25)
BUN/CREAT SERPL: 24.1 (ref 7–25)
BUN/CREAT SERPL: 24.1 (ref 7–25)
BUN/CREAT SERPL: 24.3 (ref 7–25)
BUN/CREAT SERPL: 24.4 (ref 7–25)
BUN/CREAT SERPL: 25.5 (ref 7–25)
BUN/CREAT SERPL: 25.6 (ref 7–25)
BUN/CREAT SERPL: 25.6 (ref 7–25)
BUN/CREAT SERPL: 26.3 (ref 7–25)
BUN/CREAT SERPL: 27.5 (ref 7–25)
BUN/CREAT SERPL: 30 (ref 7–25)
BUN/CREAT SERPL: 30.5 (ref 7–25)
BUN/CREAT SERPL: 33.2 (ref 7–25)
BUN/CREAT SERPL: 33.2 (ref 7–25)
BUN/CREAT SERPL: 35.6 (ref 7–25)
BUN/CREAT SERPL: 38.5 (ref 7–25)
BUN/CREAT SERPL: 38.7 (ref 7–25)
BUN/CREAT SERPL: 39.6 (ref 7–25)
BUN/CREAT SERPL: 40.5 (ref 7–25)
C PNEUM DNA NPH QL NAA+NON-PROBE: NOT DETECTED
CALCIUM SPEC-SCNC: 10.1 MG/DL (ref 8.6–10.5)
CALCIUM SPEC-SCNC: 10.3 MG/DL (ref 8.6–10.5)
CALCIUM SPEC-SCNC: 10.3 MG/DL (ref 8.6–10.5)
CALCIUM SPEC-SCNC: 10.6 MG/DL (ref 8.6–10.5)
CALCIUM SPEC-SCNC: 8.4 MG/DL (ref 8.6–10.5)
CALCIUM SPEC-SCNC: 8.5 MG/DL (ref 8.6–10.5)
CALCIUM SPEC-SCNC: 8.6 MG/DL (ref 8.6–10.5)
CALCIUM SPEC-SCNC: 8.6 MG/DL (ref 8.6–10.5)
CALCIUM SPEC-SCNC: 8.8 MG/DL (ref 8.6–10.5)
CALCIUM SPEC-SCNC: 8.8 MG/DL (ref 8.6–10.5)
CALCIUM SPEC-SCNC: 8.9 MG/DL (ref 8.6–10.5)
CALCIUM SPEC-SCNC: 9 MG/DL (ref 8.6–10.5)
CALCIUM SPEC-SCNC: 9 MG/DL (ref 8.6–10.5)
CALCIUM SPEC-SCNC: 9.1 MG/DL (ref 8.6–10.5)
CALCIUM SPEC-SCNC: 9.2 MG/DL (ref 8.6–10.5)
CALCIUM SPEC-SCNC: 9.3 MG/DL (ref 8.6–10.5)
CALCIUM SPEC-SCNC: 9.5 MG/DL (ref 8.6–10.5)
CALCIUM SPEC-SCNC: 9.5 MG/DL (ref 8.6–10.5)
CALCIUM SPEC-SCNC: 9.7 MG/DL (ref 8.6–10.5)
CANNABINOIDS SERPL QL: NEGATIVE
CHLORIDE SERPL-SCNC: 101 MMOL/L (ref 98–107)
CHLORIDE SERPL-SCNC: 102 MMOL/L (ref 98–107)
CHLORIDE SERPL-SCNC: 103 MMOL/L (ref 98–107)
CHLORIDE SERPL-SCNC: 103 MMOL/L (ref 98–107)
CHLORIDE SERPL-SCNC: 106 MMOL/L (ref 98–107)
CHLORIDE SERPL-SCNC: 107 MMOL/L (ref 98–107)
CHLORIDE SERPL-SCNC: 108 MMOL/L (ref 98–107)
CHLORIDE SERPL-SCNC: 108 MMOL/L (ref 98–107)
CHLORIDE SERPL-SCNC: 109 MMOL/L (ref 98–107)
CHLORIDE SERPL-SCNC: 109 MMOL/L (ref 98–107)
CHLORIDE SERPL-SCNC: 110 MMOL/L (ref 98–107)
CHLORIDE SERPL-SCNC: 110 MMOL/L (ref 98–107)
CHLORIDE SERPL-SCNC: 111 MMOL/L (ref 98–107)
CHLORIDE SERPL-SCNC: 111 MMOL/L (ref 98–107)
CHLORIDE SERPL-SCNC: 112 MMOL/L (ref 98–107)
CHLORIDE SERPL-SCNC: 113 MMOL/L (ref 98–107)
CHLORIDE SERPL-SCNC: 113 MMOL/L (ref 98–107)
CHLORIDE SERPL-SCNC: 115 MMOL/L (ref 98–107)
CHLORIDE SERPL-SCNC: 95 MMOL/L (ref 98–107)
CHLORIDE SERPL-SCNC: 96 MMOL/L (ref 98–107)
CHLORIDE SERPL-SCNC: 97 MMOL/L (ref 98–107)
CHLORIDE SERPL-SCNC: 98 MMOL/L (ref 98–107)
CHLORIDE SERPL-SCNC: 99 MMOL/L (ref 98–107)
CHLORIDE SERPL-SCNC: 99 MMOL/L (ref 98–107)
CHOLEST SERPL-MCNC: 141 MG/DL (ref 0–200)
CHOLEST SERPL-MCNC: 148 MG/DL (ref 0–200)
CLARITY UR: ABNORMAL
CLARITY UR: ABNORMAL
CLARITY UR: CLEAR
CO2 SERPL-SCNC: 19.8 MMOL/L (ref 22–29)
CO2 SERPL-SCNC: 20 MMOL/L (ref 22–29)
CO2 SERPL-SCNC: 20 MMOL/L (ref 22–29)
CO2 SERPL-SCNC: 20.1 MMOL/L (ref 22–29)
CO2 SERPL-SCNC: 20.1 MMOL/L (ref 22–29)
CO2 SERPL-SCNC: 20.2 MMOL/L (ref 22–29)
CO2 SERPL-SCNC: 21 MMOL/L (ref 22–29)
CO2 SERPL-SCNC: 21 MMOL/L (ref 22–29)
CO2 SERPL-SCNC: 21.4 MMOL/L (ref 22–29)
CO2 SERPL-SCNC: 21.4 MMOL/L (ref 22–29)
CO2 SERPL-SCNC: 21.6 MMOL/L (ref 22–29)
CO2 SERPL-SCNC: 22 MMOL/L (ref 22–29)
CO2 SERPL-SCNC: 22 MMOL/L (ref 22–29)
CO2 SERPL-SCNC: 23.6 MMOL/L (ref 22–29)
CO2 SERPL-SCNC: 23.6 MMOL/L (ref 22–29)
CO2 SERPL-SCNC: 24 MMOL/L (ref 22–29)
CO2 SERPL-SCNC: 25 MMOL/L (ref 22–29)
CO2 SERPL-SCNC: 25.3 MMOL/L (ref 22–29)
CO2 SERPL-SCNC: 27 MMOL/L (ref 22–29)
CO2 SERPL-SCNC: 28.3 MMOL/L (ref 22–29)
CO2 SERPL-SCNC: 28.6 MMOL/L (ref 22–29)
CO2 SERPL-SCNC: 29.3 MMOL/L (ref 22–29)
CO2 SERPL-SCNC: 30 MMOL/L (ref 22–29)
CO2 SERPL-SCNC: 32 MMOL/L (ref 22–29)
COCAINE UR QL: NEGATIVE
COLOR UR: YELLOW
COPPER SERPL-MCNC: 138 UG/DL (ref 80–158)
CREAT SERPL-MCNC: 1.64 MG/DL (ref 0.57–1)
CREAT SERPL-MCNC: 1.68 MG/DL (ref 0.57–1)
CREAT SERPL-MCNC: 1.74 MG/DL (ref 0.57–1)
CREAT SERPL-MCNC: 1.94 MG/DL (ref 0.57–1)
CREAT SERPL-MCNC: 2.04 MG/DL (ref 0.57–1)
CREAT SERPL-MCNC: 2.13 MG/DL (ref 0.57–1)
CREAT SERPL-MCNC: 2.2 MG/DL (ref 0.57–1)
CREAT SERPL-MCNC: 2.37 MG/DL (ref 0.57–1)
CREAT SERPL-MCNC: 2.41 MG/DL (ref 0.57–1)
CREAT SERPL-MCNC: 2.43 MG/DL (ref 0.57–1)
CREAT SERPL-MCNC: 2.47 MG/DL (ref 0.57–1)
CREAT SERPL-MCNC: 2.47 MG/DL (ref 0.57–1)
CREAT SERPL-MCNC: 2.56 MG/DL (ref 0.57–1)
CREAT SERPL-MCNC: 2.6 MG/DL (ref 0.57–1)
CREAT SERPL-MCNC: 2.75 MG/DL (ref 0.57–1)
CREAT SERPL-MCNC: 2.8 MG/DL (ref 0.57–1)
CREAT SERPL-MCNC: 2.95 MG/DL (ref 0.57–1)
CREAT SERPL-MCNC: 2.95 MG/DL (ref 0.57–1)
CREAT SERPL-MCNC: 2.97 MG/DL (ref 0.57–1)
CREAT SERPL-MCNC: 3.01 MG/DL (ref 0.57–1)
CREAT SERPL-MCNC: 3.06 MG/DL (ref 0.57–1)
CREAT SERPL-MCNC: 7.56 MG/DL (ref 0.57–1)
CREAT SERPL-MCNC: 7.87 MG/DL (ref 0.57–1)
CREAT SERPL-MCNC: 8.1 MG/DL (ref 0.57–1)
CROSSMATCH INTERPRETATION: NORMAL
D-LACTATE SERPL-SCNC: 0.6 MMOL/L (ref 0.5–2)
DEPRECATED RDW RBC AUTO: 50.4 FL (ref 37–54)
DEPRECATED RDW RBC AUTO: 52.4 FL (ref 37–54)
DEPRECATED RDW RBC AUTO: 52.5 FL (ref 37–54)
DEPRECATED RDW RBC AUTO: 53.5 FL (ref 37–54)
DEPRECATED RDW RBC AUTO: 53.7 FL (ref 37–54)
DEPRECATED RDW RBC AUTO: 53.8 FL (ref 37–54)
DEPRECATED RDW RBC AUTO: 53.8 FL (ref 37–54)
DEPRECATED RDW RBC AUTO: 54.7 FL (ref 37–54)
DEPRECATED RDW RBC AUTO: 54.8 FL (ref 37–54)
DEPRECATED RDW RBC AUTO: 55.1 FL (ref 37–54)
DEPRECATED RDW RBC AUTO: 55.2 FL (ref 37–54)
DEPRECATED RDW RBC AUTO: 55.4 FL (ref 37–54)
DEPRECATED RDW RBC AUTO: 56.1 FL (ref 37–54)
DEPRECATED RDW RBC AUTO: 56.4 FL (ref 37–54)
DEPRECATED RDW RBC AUTO: 57.1 FL (ref 37–54)
DEPRECATED RDW RBC AUTO: 57.2 FL (ref 37–54)
DEPRECATED RDW RBC AUTO: 57.6 FL (ref 37–54)
DEPRECATED RDW RBC AUTO: 57.6 FL (ref 37–54)
DEPRECATED RDW RBC AUTO: 58.1 FL (ref 37–54)
DEPRECATED RDW RBC AUTO: 58.2 FL (ref 37–54)
DEPRECATED RDW RBC AUTO: 58.4 FL (ref 37–54)
DEPRECATED RDW RBC AUTO: 59.2 FL (ref 37–54)
DEPRECATED RDW RBC AUTO: 59.4 FL (ref 37–54)
DEPRECATED RDW RBC AUTO: 59.7 FL (ref 37–54)
DEPRECATED RDW RBC AUTO: 59.8 FL (ref 37–54)
DEPRECATED RDW RBC AUTO: 59.9 FL (ref 37–54)
DEPRECATED RDW RBC AUTO: 60.3 FL (ref 37–54)
DEPRECATED RDW RBC AUTO: 60.4 FL (ref 37–54)
DEPRECATED RDW RBC AUTO: 61 FL (ref 37–54)
DEPRECATED RDW RBC AUTO: 61.4 FL (ref 37–54)
DEPRECATED RDW RBC AUTO: 62.1 FL (ref 37–54)
DEPRECATED RDW RBC AUTO: 62.3 FL (ref 37–54)
DEPRECATED RDW RBC AUTO: 62.6 FL (ref 37–54)
DEPRECATED RDW RBC AUTO: 62.8 FL (ref 37–54)
DEPRECATED RDW RBC AUTO: 62.9 FL (ref 37–54)
DEPRECATED RDW RBC AUTO: 63.9 FL (ref 37–54)
DEPRECATED RDW RBC AUTO: 64.6 FL (ref 37–54)
DEPRECATED RDW RBC AUTO: 65.6 FL (ref 37–54)
DEPRECATED RDW RBC AUTO: 65.6 FL (ref 37–54)
DEPRECATED RDW RBC AUTO: 65.8 FL (ref 37–54)
DEPRECATED RDW RBC AUTO: 67.1 FL (ref 37–54)
DEPRECATED RDW RBC AUTO: 67.3 FL (ref 37–54)
DEPRECATED RDW RBC AUTO: 68.5 FL (ref 37–54)
DEPRECATED RDW RBC AUTO: 68.5 FL (ref 37–54)
DEPRECATED RDW RBC AUTO: 70.2 FL (ref 37–54)
DRUGS UR: NORMAL
DRUGS UR: NORMAL
EGFRCR SERPLBLD CKD-EPI 2021: 15.4 ML/MIN/1.73
EGFRCR SERPLBLD CKD-EPI 2021: 15.5 ML/MIN/1.73
EGFRCR SERPLBLD CKD-EPI 2021: 15.5 ML/MIN/1.73
EGFRCR SERPLBLD CKD-EPI 2021: 16.5 ML/MIN/1.73
EGFRCR SERPLBLD CKD-EPI 2021: 16.8 ML/MIN/1.73
EGFRCR SERPLBLD CKD-EPI 2021: 18 ML/MIN/1.73
EGFRCR SERPLBLD CKD-EPI 2021: 18.4 ML/MIN/1.73
EGFRCR SERPLBLD CKD-EPI 2021: 19.2 ML/MIN/1.73
EGFRCR SERPLBLD CKD-EPI 2021: 19.5 ML/MIN/1.73
EGFRCR SERPLBLD CKD-EPI 2021: 19.7 ML/MIN/1.73
EGFRCR SERPLBLD CKD-EPI 2021: 20.1 ML/MIN/1.73
EGFRCR SERPLBLD CKD-EPI 2021: 22 ML/MIN/1.73
EGFRCR SERPLBLD CKD-EPI 2021: 22.9 ML/MIN/1.73
EGFRCR SERPLBLD CKD-EPI 2021: 24.1 ML/MIN/1.73
EGFRCR SERPLBLD CKD-EPI 2021: 25.6 ML/MIN/1.73
EGFRCR SERPLBLD CKD-EPI 2021: 29.2 ML/MIN/1.73
EGFRCR SERPLBLD CKD-EPI 2021: 30.4 ML/MIN/1.73
EGFRCR SERPLBLD CKD-EPI 2021: 31.3 ML/MIN/1.73
EGFRCR SERPLBLD CKD-EPI 2021: 4.6 ML/MIN/1.73
EGFRCR SERPLBLD CKD-EPI 2021: 4.7 ML/MIN/1.73
EGFRCR SERPLBLD CKD-EPI 2021: 5 ML/MIN/1.73
EOSINOPHIL # BLD AUTO: 0.04 10*3/MM3 (ref 0–0.4)
EOSINOPHIL # BLD AUTO: 0.05 10*3/MM3 (ref 0–0.4)
EOSINOPHIL # BLD AUTO: 0.05 10*3/MM3 (ref 0–0.4)
EOSINOPHIL # BLD AUTO: 0.08 10*3/MM3 (ref 0–0.4)
EOSINOPHIL # BLD AUTO: 0.08 10*3/MM3 (ref 0–0.4)
EOSINOPHIL # BLD AUTO: 0.1 10*3/MM3 (ref 0–0.4)
EOSINOPHIL # BLD AUTO: 0.12 10*3/MM3 (ref 0–0.4)
EOSINOPHIL # BLD AUTO: 0.12 10*3/MM3 (ref 0–0.4)
EOSINOPHIL # BLD AUTO: 0.13 10*3/MM3 (ref 0–0.4)
EOSINOPHIL # BLD AUTO: 0.14 10*3/MM3 (ref 0–0.4)
EOSINOPHIL # BLD AUTO: 0.16 10*3/MM3 (ref 0–0.4)
EOSINOPHIL # BLD AUTO: 0.16 10*3/MM3 (ref 0–0.4)
EOSINOPHIL # BLD AUTO: 0.17 10*3/MM3 (ref 0–0.4)
EOSINOPHIL # BLD AUTO: 0.17 10*3/MM3 (ref 0–0.4)
EOSINOPHIL # BLD AUTO: 0.19 10*3/MM3 (ref 0–0.4)
EOSINOPHIL # BLD AUTO: 0.2 10*3/MM3 (ref 0–0.4)
EOSINOPHIL # BLD AUTO: 0.2 10*3/MM3 (ref 0–0.4)
EOSINOPHIL # BLD AUTO: 0.21 10*3/MM3 (ref 0–0.4)
EOSINOPHIL # BLD AUTO: 0.23 10*3/MM3 (ref 0–0.4)
EOSINOPHIL # BLD AUTO: 0.23 10*3/MM3 (ref 0–0.4)
EOSINOPHIL # BLD AUTO: 0.24 10*3/MM3 (ref 0–0.4)
EOSINOPHIL # BLD AUTO: 0.26 10*3/MM3 (ref 0–0.4)
EOSINOPHIL # BLD AUTO: 0.36 10*3/MM3 (ref 0–0.4)
EOSINOPHIL NFR BLD AUTO: 0.7 % (ref 0.3–6.2)
EOSINOPHIL NFR BLD AUTO: 0.9 % (ref 0.3–6.2)
EOSINOPHIL NFR BLD AUTO: 1 % (ref 0.3–6.2)
EOSINOPHIL NFR BLD AUTO: 1.1 % (ref 0.3–6.2)
EOSINOPHIL NFR BLD AUTO: 1.3 % (ref 0.3–6.2)
EOSINOPHIL NFR BLD AUTO: 1.5 % (ref 0.3–6.2)
EOSINOPHIL NFR BLD AUTO: 1.7 % (ref 0.3–6.2)
EOSINOPHIL NFR BLD AUTO: 1.7 % (ref 0.3–6.2)
EOSINOPHIL NFR BLD AUTO: 1.8 % (ref 0.3–6.2)
EOSINOPHIL NFR BLD AUTO: 1.9 % (ref 0.3–6.2)
EOSINOPHIL NFR BLD AUTO: 2 % (ref 0.3–6.2)
EOSINOPHIL NFR BLD AUTO: 2.1 % (ref 0.3–6.2)
EOSINOPHIL NFR BLD AUTO: 2.1 % (ref 0.3–6.2)
EOSINOPHIL NFR BLD AUTO: 2.2 % (ref 0.3–6.2)
EOSINOPHIL NFR BLD AUTO: 2.4 % (ref 0.3–6.2)
EOSINOPHIL NFR BLD AUTO: 2.7 % (ref 0.3–6.2)
EOSINOPHIL NFR BLD AUTO: 2.7 % (ref 0.3–6.2)
EOSINOPHIL NFR BLD AUTO: 2.9 % (ref 0.3–6.2)
EOSINOPHIL NFR BLD AUTO: 3.2 % (ref 0.3–6.2)
EOSINOPHIL NFR BLD AUTO: 3.2 % (ref 0.3–6.2)
EOSINOPHIL NFR BLD AUTO: 3.3 % (ref 0.3–6.2)
EOSINOPHIL NFR BLD AUTO: 3.4 % (ref 0.3–6.2)
EOSINOPHIL NFR BLD AUTO: 3.5 % (ref 0.3–6.2)
EOSINOPHIL NFR BLD AUTO: 3.5 % (ref 0.3–6.2)
EOSINOPHIL NFR BLD AUTO: 3.6 % (ref 0.3–6.2)
EOSINOPHIL NFR BLD AUTO: 3.8 % (ref 0.3–6.2)
EOSINOPHIL NFR BLD AUTO: 4 % (ref 0.3–6.2)
EOSINOPHIL NFR BLD AUTO: 4.2 % (ref 0.3–6.2)
EOSINOPHIL NFR BLD AUTO: 4.3 % (ref 0.3–6.2)
EOSINOPHIL NFR BLD AUTO: 4.3 % (ref 0.3–6.2)
EOSINOPHIL NFR BLD AUTO: 5.2 % (ref 0.3–6.2)
EOSINOPHIL NFR BLD AUTO: 5.3 % (ref 0.3–6.2)
ERYTHROCYTE [DISTWIDTH] IN BLOOD BY AUTOMATED COUNT: 14 % (ref 12.3–15.4)
ERYTHROCYTE [DISTWIDTH] IN BLOOD BY AUTOMATED COUNT: 15.1 % (ref 12.3–15.4)
ERYTHROCYTE [DISTWIDTH] IN BLOOD BY AUTOMATED COUNT: 15.2 % (ref 12.3–15.4)
ERYTHROCYTE [DISTWIDTH] IN BLOOD BY AUTOMATED COUNT: 15.4 % (ref 12.3–15.4)
ERYTHROCYTE [DISTWIDTH] IN BLOOD BY AUTOMATED COUNT: 15.5 % (ref 12.3–15.4)
ERYTHROCYTE [DISTWIDTH] IN BLOOD BY AUTOMATED COUNT: 15.5 % (ref 12.3–15.4)
ERYTHROCYTE [DISTWIDTH] IN BLOOD BY AUTOMATED COUNT: 15.6 % (ref 12.3–15.4)
ERYTHROCYTE [DISTWIDTH] IN BLOOD BY AUTOMATED COUNT: 15.7 % (ref 12.3–15.4)
ERYTHROCYTE [DISTWIDTH] IN BLOOD BY AUTOMATED COUNT: 15.7 % (ref 12.3–15.4)
ERYTHROCYTE [DISTWIDTH] IN BLOOD BY AUTOMATED COUNT: 15.8 % (ref 12.3–15.4)
ERYTHROCYTE [DISTWIDTH] IN BLOOD BY AUTOMATED COUNT: 15.9 % (ref 12.3–15.4)
ERYTHROCYTE [DISTWIDTH] IN BLOOD BY AUTOMATED COUNT: 16.3 % (ref 12.3–15.4)
ERYTHROCYTE [DISTWIDTH] IN BLOOD BY AUTOMATED COUNT: 16.6 % (ref 12.3–15.4)
ERYTHROCYTE [DISTWIDTH] IN BLOOD BY AUTOMATED COUNT: 16.8 % (ref 12.3–15.4)
ERYTHROCYTE [DISTWIDTH] IN BLOOD BY AUTOMATED COUNT: 16.9 % (ref 12.3–15.4)
ERYTHROCYTE [DISTWIDTH] IN BLOOD BY AUTOMATED COUNT: 17 % (ref 12.3–15.4)
ERYTHROCYTE [DISTWIDTH] IN BLOOD BY AUTOMATED COUNT: 17 % (ref 12.3–15.4)
ERYTHROCYTE [DISTWIDTH] IN BLOOD BY AUTOMATED COUNT: 17.1 % (ref 12.3–15.4)
ERYTHROCYTE [DISTWIDTH] IN BLOOD BY AUTOMATED COUNT: 17.2 % (ref 12.3–15.4)
ERYTHROCYTE [DISTWIDTH] IN BLOOD BY AUTOMATED COUNT: 17.3 % (ref 12.3–15.4)
ERYTHROCYTE [DISTWIDTH] IN BLOOD BY AUTOMATED COUNT: 17.9 % (ref 12.3–15.4)
ERYTHROCYTE [DISTWIDTH] IN BLOOD BY AUTOMATED COUNT: 18 % (ref 12.3–15.4)
ERYTHROCYTE [DISTWIDTH] IN BLOOD BY AUTOMATED COUNT: 18 % (ref 12.3–15.4)
ERYTHROCYTE [DISTWIDTH] IN BLOOD BY AUTOMATED COUNT: 18.2 % (ref 12.3–15.4)
ERYTHROCYTE [DISTWIDTH] IN BLOOD BY AUTOMATED COUNT: 18.7 % (ref 12.3–15.4)
ERYTHROCYTE [DISTWIDTH] IN BLOOD BY AUTOMATED COUNT: 18.7 % (ref 12.3–15.4)
ERYTHROCYTE [DISTWIDTH] IN BLOOD BY AUTOMATED COUNT: 18.9 % (ref 12.3–15.4)
ERYTHROCYTE [DISTWIDTH] IN BLOOD BY AUTOMATED COUNT: 19.2 % (ref 12.3–15.4)
ERYTHROCYTE [DISTWIDTH] IN BLOOD BY AUTOMATED COUNT: 19.3 % (ref 12.3–15.4)
ERYTHROCYTE [DISTWIDTH] IN BLOOD BY AUTOMATED COUNT: 19.3 % (ref 12.3–15.4)
ERYTHROCYTE [DISTWIDTH] IN BLOOD BY AUTOMATED COUNT: 19.5 % (ref 12.3–15.4)
ERYTHROCYTE [DISTWIDTH] IN BLOOD BY AUTOMATED COUNT: 19.6 % (ref 12.3–15.4)
ERYTHROCYTE [DISTWIDTH] IN BLOOD BY AUTOMATED COUNT: 19.7 % (ref 12.3–15.4)
ERYTHROCYTE [DISTWIDTH] IN BLOOD BY AUTOMATED COUNT: 19.9 % (ref 12.3–15.4)
ERYTHROCYTE [DISTWIDTH] IN BLOOD BY AUTOMATED COUNT: 20 % (ref 12.3–15.4)
ETHANOL BLD-MCNC: <10 MG/DL (ref 0–10)
ETHANOL UR QL: <0.01 %
EXPIRATION DATE: ABNORMAL
FECAL OCCULT BLOOD SCREEN, POC: POSITIVE
FERRITIN SERPL-MCNC: 305.7 NG/ML (ref 13–150)
FERRITIN SERPL-MCNC: 328 NG/ML (ref 13–150)
FERRITIN SERPL-MCNC: 352.9 NG/ML (ref 13–150)
FERRITIN SERPL-MCNC: 366 NG/ML (ref 13–150)
FERRITIN SERPL-MCNC: 381 NG/ML (ref 13–150)
FERRITIN SERPL-MCNC: 387.7 NG/ML (ref 13–150)
FERRITIN SERPL-MCNC: 391 NG/ML (ref 13–150)
FERRITIN SERPL-MCNC: 421 NG/ML (ref 13–150)
FERRITIN SERPL-MCNC: 457 NG/ML (ref 13–150)
FLUAV SUBTYP SPEC NAA+PROBE: NOT DETECTED
FLUBV RNA ISLT QL NAA+PROBE: NOT DETECTED
FOLATE SERPL-MCNC: >20 NG/ML (ref 4.78–24.2)
GAMMA GLOB SERPL ELPH-MCNC: 0.4 G/DL (ref 0.4–1.8)
GFR SERPL CREATININE-BSD FRML MDRD: 15 ML/MIN/1.73
GFR SERPL CREATININE-BSD FRML MDRD: 15 ML/MIN/1.73
GFR SERPL CREATININE-BSD FRML MDRD: 19 ML/MIN/1.73
GLOBULIN SER-MCNC: 2.1 G/DL (ref 2.2–3.9)
GLOBULIN UR ELPH-MCNC: 1.4 GM/DL
GLOBULIN UR ELPH-MCNC: 1.5 GM/DL
GLOBULIN UR ELPH-MCNC: 1.7 GM/DL
GLOBULIN UR ELPH-MCNC: 2 GM/DL
GLOBULIN UR ELPH-MCNC: 2.1 GM/DL
GLOBULIN UR ELPH-MCNC: 2.1 GM/DL
GLOBULIN UR ELPH-MCNC: 2.2 GM/DL
GLOBULIN UR ELPH-MCNC: 2.2 GM/DL
GLOBULIN UR ELPH-MCNC: 2.3 GM/DL
GLOBULIN UR ELPH-MCNC: 2.4 GM/DL
GLUCOSE BLDC GLUCOMTR-MCNC: 105 MG/DL (ref 70–130)
GLUCOSE BLDC GLUCOMTR-MCNC: 105 MG/DL (ref 70–130)
GLUCOSE BLDC GLUCOMTR-MCNC: 106 MG/DL (ref 70–130)
GLUCOSE BLDC GLUCOMTR-MCNC: 121 MG/DL (ref 70–130)
GLUCOSE BLDC GLUCOMTR-MCNC: 125 MG/DL (ref 70–130)
GLUCOSE BLDC GLUCOMTR-MCNC: 131 MG/DL (ref 70–130)
GLUCOSE BLDC GLUCOMTR-MCNC: 133 MG/DL (ref 70–130)
GLUCOSE BLDC GLUCOMTR-MCNC: 136 MG/DL (ref 70–130)
GLUCOSE BLDC GLUCOMTR-MCNC: 142 MG/DL (ref 70–130)
GLUCOSE BLDC GLUCOMTR-MCNC: 161 MG/DL (ref 70–130)
GLUCOSE BLDC GLUCOMTR-MCNC: 90 MG/DL (ref 70–130)
GLUCOSE BLDC GLUCOMTR-MCNC: 99 MG/DL (ref 70–130)
GLUCOSE SERPL-MCNC: 102 MG/DL (ref 65–99)
GLUCOSE SERPL-MCNC: 104 MG/DL (ref 65–99)
GLUCOSE SERPL-MCNC: 105 MG/DL (ref 65–99)
GLUCOSE SERPL-MCNC: 106 MG/DL (ref 65–99)
GLUCOSE SERPL-MCNC: 108 MG/DL (ref 65–99)
GLUCOSE SERPL-MCNC: 110 MG/DL (ref 65–99)
GLUCOSE SERPL-MCNC: 127 MG/DL (ref 65–99)
GLUCOSE SERPL-MCNC: 129 MG/DL (ref 65–99)
GLUCOSE SERPL-MCNC: 138 MG/DL (ref 65–99)
GLUCOSE SERPL-MCNC: 61 MG/DL (ref 65–99)
GLUCOSE SERPL-MCNC: 67 MG/DL (ref 65–99)
GLUCOSE SERPL-MCNC: 75 MG/DL (ref 65–99)
GLUCOSE SERPL-MCNC: 80 MG/DL (ref 65–99)
GLUCOSE SERPL-MCNC: 85 MG/DL (ref 65–99)
GLUCOSE SERPL-MCNC: 85 MG/DL (ref 65–99)
GLUCOSE SERPL-MCNC: 90 MG/DL (ref 65–99)
GLUCOSE SERPL-MCNC: 91 MG/DL (ref 65–99)
GLUCOSE SERPL-MCNC: 92 MG/DL (ref 65–99)
GLUCOSE SERPL-MCNC: 92 MG/DL (ref 65–99)
GLUCOSE SERPL-MCNC: 94 MG/DL (ref 65–99)
GLUCOSE SERPL-MCNC: 95 MG/DL (ref 65–99)
GLUCOSE SERPL-MCNC: 96 MG/DL (ref 65–99)
GLUCOSE SERPL-MCNC: 96 MG/DL (ref 65–99)
GLUCOSE SERPL-MCNC: 97 MG/DL (ref 65–99)
GLUCOSE UR STRIP-MCNC: NEGATIVE MG/DL
HADV DNA SPEC NAA+PROBE: DETECTED
HADV DNA SPEC NAA+PROBE: NOT DETECTED
HADV DNA SPEC NAA+PROBE: NOT DETECTED
HAPTOGLOB SERPL-MCNC: 163 MG/DL (ref 30–200)
HCOV 229E RNA SPEC QL NAA+PROBE: NOT DETECTED
HCOV HKU1 RNA SPEC QL NAA+PROBE: NOT DETECTED
HCOV NL63 RNA SPEC QL NAA+PROBE: NOT DETECTED
HCOV OC43 RNA SPEC QL NAA+PROBE: NOT DETECTED
HCT VFR BLD AUTO: 14.2 % (ref 34–46.6)
HCT VFR BLD AUTO: 15.3 % (ref 34–46.6)
HCT VFR BLD AUTO: 21.4 % (ref 34–46.6)
HCT VFR BLD AUTO: 21.8 % (ref 34–46.6)
HCT VFR BLD AUTO: 21.9 % (ref 34–46.6)
HCT VFR BLD AUTO: 22 % (ref 34–46.6)
HCT VFR BLD AUTO: 22.1 % (ref 34–46.6)
HCT VFR BLD AUTO: 22.2 % (ref 34–46.6)
HCT VFR BLD AUTO: 22.5 % (ref 34–46.6)
HCT VFR BLD AUTO: 22.7 % (ref 34–46.6)
HCT VFR BLD AUTO: 23.2 % (ref 34–46.6)
HCT VFR BLD AUTO: 23.2 % (ref 34–46.6)
HCT VFR BLD AUTO: 23.3 % (ref 34–46.6)
HCT VFR BLD AUTO: 23.3 % (ref 34–46.6)
HCT VFR BLD AUTO: 23.8 % (ref 34–46.6)
HCT VFR BLD AUTO: 24 % (ref 34–46.6)
HCT VFR BLD AUTO: 24.1 % (ref 34–46.6)
HCT VFR BLD AUTO: 24.2 % (ref 34–46.6)
HCT VFR BLD AUTO: 24.4 % (ref 34–46.6)
HCT VFR BLD AUTO: 24.5 % (ref 34–46.6)
HCT VFR BLD AUTO: 25 % (ref 34–46.6)
HCT VFR BLD AUTO: 25.1 % (ref 34–46.6)
HCT VFR BLD AUTO: 25.9 % (ref 34–46.6)
HCT VFR BLD AUTO: 26.3 % (ref 34–46.6)
HCT VFR BLD AUTO: 26.7 % (ref 34–46.6)
HCT VFR BLD AUTO: 26.9 % (ref 34–46.6)
HCT VFR BLD AUTO: 26.9 % (ref 34–46.6)
HCT VFR BLD AUTO: 27.2 % (ref 34–46.6)
HCT VFR BLD AUTO: 27.3 % (ref 34–46.6)
HCT VFR BLD AUTO: 27.5 % (ref 34–46.6)
HCT VFR BLD AUTO: 27.7 % (ref 34–46.6)
HCT VFR BLD AUTO: 27.7 % (ref 34–46.6)
HCT VFR BLD AUTO: 27.9 % (ref 34–46.6)
HCT VFR BLD AUTO: 28.3 % (ref 34–46.6)
HCT VFR BLD AUTO: 28.8 % (ref 34–46.6)
HCT VFR BLD AUTO: 28.8 % (ref 34–46.6)
HCT VFR BLD AUTO: 29.6 % (ref 34–46.6)
HCT VFR BLD AUTO: 29.6 % (ref 34–46.6)
HCT VFR BLD AUTO: 30 % (ref 34–46.6)
HCT VFR BLD AUTO: 30.1 % (ref 34–46.6)
HCT VFR BLD AUTO: 30.3 % (ref 34–46.6)
HCT VFR BLD AUTO: 30.4 % (ref 34–46.6)
HCT VFR BLD AUTO: 30.5 % (ref 34–46.6)
HCT VFR BLD AUTO: 30.6 % (ref 34–46.6)
HCT VFR BLD AUTO: 30.7 % (ref 34–46.6)
HCT VFR BLD AUTO: 30.8 % (ref 34–46.6)
HCT VFR BLD AUTO: 31 % (ref 34–46.6)
HCT VFR BLD AUTO: 31.1 % (ref 34–46.6)
HCT VFR BLD AUTO: 31.6 % (ref 34–46.6)
HCT VFR BLD AUTO: 31.7 % (ref 34–46.6)
HCT VFR BLD AUTO: 32.3 % (ref 34–46.6)
HCT VFR BLD AUTO: 32.9 % (ref 34–46.6)
HCT VFR BLD AUTO: 34 % (ref 34–46.6)
HDLC SERPL-MCNC: 56 MG/DL (ref 40–60)
HDLC SERPL-MCNC: 59 MG/DL (ref 40–60)
HGB BLD-MCNC: 10.1 G/DL (ref 12–15.9)
HGB BLD-MCNC: 10.4 G/DL (ref 12–15.9)
HGB BLD-MCNC: 10.4 G/DL (ref 12–15.9)
HGB BLD-MCNC: 10.7 G/DL (ref 12–15.9)
HGB BLD-MCNC: 4.5 G/DL (ref 12–15.9)
HGB BLD-MCNC: 4.8 G/DL (ref 12–15.9)
HGB BLD-MCNC: 6.8 G/DL (ref 12–15.9)
HGB BLD-MCNC: 6.9 G/DL (ref 12–15.9)
HGB BLD-MCNC: 7 G/DL (ref 12–15.9)
HGB BLD-MCNC: 7.1 G/DL (ref 12–15.9)
HGB BLD-MCNC: 7.2 G/DL (ref 12–15.9)
HGB BLD-MCNC: 7.2 G/DL (ref 12–15.9)
HGB BLD-MCNC: 7.3 G/DL (ref 12–15.9)
HGB BLD-MCNC: 7.4 G/DL (ref 12–15.9)
HGB BLD-MCNC: 7.4 G/DL (ref 12–15.9)
HGB BLD-MCNC: 7.5 G/DL (ref 12–15.9)
HGB BLD-MCNC: 7.6 G/DL (ref 12–15.9)
HGB BLD-MCNC: 7.6 G/DL (ref 12–15.9)
HGB BLD-MCNC: 7.7 G/DL (ref 12–15.9)
HGB BLD-MCNC: 7.8 G/DL (ref 12–15.9)
HGB BLD-MCNC: 7.9 G/DL (ref 12–15.9)
HGB BLD-MCNC: 8 G/DL (ref 12–15.9)
HGB BLD-MCNC: 8.2 G/DL (ref 12–15.9)
HGB BLD-MCNC: 8.2 G/DL (ref 12–15.9)
HGB BLD-MCNC: 8.3 G/DL (ref 12–15.9)
HGB BLD-MCNC: 8.4 G/DL (ref 12–15.9)
HGB BLD-MCNC: 8.4 G/DL (ref 12–15.9)
HGB BLD-MCNC: 8.5 G/DL (ref 12–15.9)
HGB BLD-MCNC: 8.5 G/DL (ref 12–15.9)
HGB BLD-MCNC: 8.7 G/DL (ref 12–15.9)
HGB BLD-MCNC: 8.7 G/DL (ref 12–15.9)
HGB BLD-MCNC: 8.8 G/DL (ref 12–15.9)
HGB BLD-MCNC: 8.9 G/DL (ref 12–15.9)
HGB BLD-MCNC: 9.1 G/DL (ref 12–15.9)
HGB BLD-MCNC: 9.1 G/DL (ref 12–15.9)
HGB BLD-MCNC: 9.3 G/DL (ref 12–15.9)
HGB BLD-MCNC: 9.3 G/DL (ref 12–15.9)
HGB BLD-MCNC: 9.4 G/DL (ref 12–15.9)
HGB BLD-MCNC: 9.6 G/DL (ref 12–15.9)
HGB BLD-MCNC: 9.6 G/DL (ref 12–15.9)
HGB BLD-MCNC: 9.7 G/DL (ref 12–15.9)
HGB BLD-MCNC: 9.7 G/DL (ref 12–15.9)
HGB BLD-MCNC: 9.8 G/DL (ref 12–15.9)
HGB BLD-MCNC: 9.9 G/DL (ref 12–15.9)
HGB BLD-MCNC: 9.9 G/DL (ref 12–15.9)
HGB RETIC QN AUTO: 29.4 PG (ref 29.8–36.1)
HGB RETIC QN AUTO: 30.8 PG (ref 29.8–36.1)
HGB UR QL STRIP.AUTO: ABNORMAL
HGB UR QL STRIP.AUTO: NEGATIVE
HH POC INTERNATIONAL NORMALIZATION RATIO: 2.6
HH POC PROTIME: 31.2 SECONDS
HMPV RNA NPH QL NAA+NON-PROBE: NOT DETECTED
HOLD SPECIMEN: NORMAL
HPIV1 RNA ISLT QL NAA+PROBE: NOT DETECTED
HPIV2 RNA SPEC QL NAA+PROBE: NOT DETECTED
HPIV3 RNA NPH QL NAA+PROBE: NOT DETECTED
HPIV4 P GENE NPH QL NAA+PROBE: NOT DETECTED
HYALINE CASTS UR QL AUTO: ABNORMAL /LPF
IGA SERPL-MCNC: 78 MG/DL (ref 64–422)
IGG SERPL-MCNC: 400 MG/DL (ref 586–1602)
IGM SERPL-MCNC: 83 MG/DL (ref 26–217)
IMM GRANULOCYTES # BLD AUTO: 0.01 10*3/MM3 (ref 0–0.05)
IMM GRANULOCYTES # BLD AUTO: 0.02 10*3/MM3 (ref 0–0.05)
IMM GRANULOCYTES # BLD AUTO: 0.03 10*3/MM3 (ref 0–0.05)
IMM GRANULOCYTES # BLD AUTO: 0.04 10*3/MM3 (ref 0–0.05)
IMM GRANULOCYTES # BLD AUTO: 0.05 10*3/MM3 (ref 0–0.05)
IMM GRANULOCYTES # BLD AUTO: 0.06 10*3/MM3 (ref 0–0.05)
IMM GRANULOCYTES # BLD AUTO: 0.06 10*3/MM3 (ref 0–0.05)
IMM GRANULOCYTES # BLD AUTO: 0.07 10*3/MM3 (ref 0–0.05)
IMM GRANULOCYTES # BLD AUTO: 0.07 10*3/MM3 (ref 0–0.05)
IMM GRANULOCYTES # BLD AUTO: 0.08 10*3/MM3 (ref 0–0.05)
IMM GRANULOCYTES # BLD AUTO: 0.09 10*3/MM3 (ref 0–0.05)
IMM GRANULOCYTES # BLD AUTO: 0.1 10*3/MM3 (ref 0–0.05)
IMM GRANULOCYTES # BLD AUTO: 0.1 10*3/MM3 (ref 0–0.05)
IMM GRANULOCYTES # BLD AUTO: 0.16 10*3/MM3 (ref 0–0.05)
IMM GRANULOCYTES NFR BLD AUTO: 0.2 % (ref 0–0.5)
IMM GRANULOCYTES NFR BLD AUTO: 0.3 % (ref 0–0.5)
IMM GRANULOCYTES NFR BLD AUTO: 0.4 % (ref 0–0.5)
IMM GRANULOCYTES NFR BLD AUTO: 0.5 % (ref 0–0.5)
IMM GRANULOCYTES NFR BLD AUTO: 0.6 % (ref 0–0.5)
IMM GRANULOCYTES NFR BLD AUTO: 0.6 % (ref 0–0.5)
IMM GRANULOCYTES NFR BLD AUTO: 0.7 % (ref 0–0.5)
IMM GRANULOCYTES NFR BLD AUTO: 0.7 % (ref 0–0.5)
IMM GRANULOCYTES NFR BLD AUTO: 0.8 % (ref 0–0.5)
IMM GRANULOCYTES NFR BLD AUTO: 0.8 % (ref 0–0.5)
IMM GRANULOCYTES NFR BLD AUTO: 0.9 % (ref 0–0.5)
IMM GRANULOCYTES NFR BLD AUTO: 1 % (ref 0–0.5)
IMM GRANULOCYTES NFR BLD AUTO: 1 % (ref 0–0.5)
IMM GRANULOCYTES NFR BLD AUTO: 1.2 % (ref 0–0.5)
IMM GRANULOCYTES NFR BLD AUTO: 1.4 % (ref 0–0.5)
IMM GRANULOCYTES NFR BLD AUTO: 1.5 % (ref 0–0.5)
IMM GRANULOCYTES NFR BLD AUTO: 1.7 % (ref 0–0.5)
IMM GRANULOCYTES NFR BLD AUTO: 1.9 % (ref 0–0.5)
IMM GRANULOCYTES NFR BLD AUTO: 2.6 % (ref 0–0.5)
IMM RETICS NFR: 27.6 % (ref 3–15.8)
IMM RETICS NFR: 36.3 % (ref 3–15.8)
INR PPP: 1.17 (ref 0.9–1.1)
INR PPP: 1.18 (ref 0.9–1.1)
INR PPP: 1.24 (ref 0.9–1.1)
INR PPP: 1.25 (ref 0.9–1.1)
INR PPP: 1.26 (ref 0.9–1.1)
INR PPP: 1.28 (ref 0.9–1.1)
INR PPP: 1.34 (ref 0.9–1.1)
INR PPP: 1.35 (ref 0.9–1.1)
INR PPP: 1.5 (ref 0.91–1.09)
INR PPP: 1.5 (ref 0.91–1.09)
INR PPP: 1.8 (ref 0.91–1.09)
INR PPP: 1.9
INR PPP: 1.9 (ref 0.91–1.09)
INR PPP: 1.9 (ref 0.91–1.09)
INR PPP: 2 (ref 0.91–1.09)
INR PPP: 2 (ref 0.91–1.09)
INR PPP: 2.1 (ref 0.91–1.09)
INR PPP: 2.1 (ref 0.91–1.09)
INR PPP: 2.2
INR PPP: 2.2 (ref 0.91–1.09)
INR PPP: 2.2 (ref 0.91–1.09)
INR PPP: 2.3 (ref 0.91–1.09)
INR PPP: 2.4
INR PPP: 2.5 (ref 0.91–1.09)
INR PPP: 2.6
INR PPP: 2.88 (ref 0.9–1.1)
INR PPP: 3.3 (ref 0.91–1.09)
INR PPP: 3.6 (ref 0.91–1.09)
INR PPP: 5.44 (ref 0.9–1.1)
INR PPP: 8.95 (ref 0.9–1.1)
INTERPRETATION SERPL IEP-IMP: ABNORMAL
IRON 24H UR-MRATE: 26 MCG/DL (ref 37–145)
IRON 24H UR-MRATE: 33 MCG/DL (ref 37–145)
IRON 24H UR-MRATE: 38 MCG/DL (ref 37–145)
IRON 24H UR-MRATE: 44 MCG/DL (ref 37–145)
IRON 24H UR-MRATE: 58 MCG/DL (ref 37–145)
IRON 24H UR-MRATE: 67 MCG/DL (ref 37–145)
IRON 24H UR-MRATE: 74 MCG/DL (ref 37–145)
IRON 24H UR-MRATE: 82 MCG/DL (ref 37–145)
IRON SATN MFR SERPL: 10 % (ref 14–48)
IRON SATN MFR SERPL: 12 % (ref 14–48)
IRON SATN MFR SERPL: 13 % (ref 14–48)
IRON SATN MFR SERPL: 16 % (ref 20–50)
IRON SATN MFR SERPL: 17 % (ref 20–50)
IRON SATN MFR SERPL: 21 % (ref 20–50)
IRON SATN MFR SERPL: 26 % (ref 14–48)
IRON SATN MFR SERPL: 27 % (ref 14–48)
KAPPA LC FREE SER-MCNC: 43.6 MG/L (ref 3.3–19.4)
KAPPA LC FREE/LAMBDA FREE SER: 1.12 {RATIO} (ref 0.26–1.65)
KETONES UR QL STRIP: NEGATIVE
LAB AP CASE REPORT: NORMAL
LABORATORY COMMENT REPORT: ABNORMAL
LAMBDA LC FREE SERPL-MCNC: 39.1 MG/L (ref 5.7–26.3)
LDH SERPL-CCNC: 278 U/L (ref 135–214)
LDLC SERPL CALC-MCNC: 69 MG/DL (ref 0–100)
LDLC SERPL CALC-MCNC: 70 MG/DL (ref 0–100)
LDLC/HDLC SERPL: 1.16 {RATIO}
LDLC/HDLC SERPL: 1.22 {RATIO}
LEUKOCYTE ESTERASE UR QL STRIP.AUTO: ABNORMAL
LEUKOCYTE ESTERASE UR QL STRIP.AUTO: NEGATIVE
LEUKOCYTE ESTERASE UR QL STRIP.AUTO: NEGATIVE
LYMPHOCYTES # BLD AUTO: 0.3 10*3/MM3 (ref 0.7–3.1)
LYMPHOCYTES # BLD AUTO: 0.32 10*3/MM3 (ref 0.7–3.1)
LYMPHOCYTES # BLD AUTO: 0.42 10*3/MM3 (ref 0.7–3.1)
LYMPHOCYTES # BLD AUTO: 0.45 10*3/MM3 (ref 0.7–3.1)
LYMPHOCYTES # BLD AUTO: 0.47 10*3/MM3 (ref 0.7–3.1)
LYMPHOCYTES # BLD AUTO: 0.48 10*3/MM3 (ref 0.7–3.1)
LYMPHOCYTES # BLD AUTO: 0.49 10*3/MM3 (ref 0.7–3.1)
LYMPHOCYTES # BLD AUTO: 0.49 10*3/MM3 (ref 0.7–3.1)
LYMPHOCYTES # BLD AUTO: 0.5 10*3/MM3 (ref 0.7–3.1)
LYMPHOCYTES # BLD AUTO: 0.53 10*3/MM3 (ref 0.7–3.1)
LYMPHOCYTES # BLD AUTO: 0.53 10*3/MM3 (ref 0.7–3.1)
LYMPHOCYTES # BLD AUTO: 0.55 10*3/MM3 (ref 0.7–3.1)
LYMPHOCYTES # BLD AUTO: 0.58 10*3/MM3 (ref 0.7–3.1)
LYMPHOCYTES # BLD AUTO: 0.62 10*3/MM3 (ref 0.7–3.1)
LYMPHOCYTES # BLD AUTO: 0.63 10*3/MM3 (ref 0.7–3.1)
LYMPHOCYTES # BLD AUTO: 0.66 10*3/MM3 (ref 0.7–3.1)
LYMPHOCYTES # BLD AUTO: 0.66 10*3/MM3 (ref 0.7–3.1)
LYMPHOCYTES # BLD AUTO: 0.67 10*3/MM3 (ref 0.7–3.1)
LYMPHOCYTES # BLD AUTO: 0.74 10*3/MM3 (ref 0.7–3.1)
LYMPHOCYTES # BLD AUTO: 0.74 10*3/MM3 (ref 0.7–3.1)
LYMPHOCYTES # BLD AUTO: 0.76 10*3/MM3 (ref 0.7–3.1)
LYMPHOCYTES # BLD AUTO: 0.79 10*3/MM3 (ref 0.7–3.1)
LYMPHOCYTES # BLD AUTO: 0.8 10*3/MM3 (ref 0.7–3.1)
LYMPHOCYTES # BLD AUTO: 0.83 10*3/MM3 (ref 0.7–3.1)
LYMPHOCYTES # BLD AUTO: 0.9 10*3/MM3 (ref 0.7–3.1)
LYMPHOCYTES # BLD AUTO: 0.9 10*3/MM3 (ref 0.7–3.1)
LYMPHOCYTES # BLD AUTO: 0.92 10*3/MM3 (ref 0.7–3.1)
LYMPHOCYTES # BLD AUTO: 0.92 10*3/MM3 (ref 0.7–3.1)
LYMPHOCYTES # BLD AUTO: 0.93 10*3/MM3 (ref 0.7–3.1)
LYMPHOCYTES # BLD AUTO: 0.94 10*3/MM3 (ref 0.7–3.1)
LYMPHOCYTES # BLD AUTO: 0.97 10*3/MM3 (ref 0.7–3.1)
LYMPHOCYTES NFR BLD AUTO: 10.1 % (ref 19.6–45.3)
LYMPHOCYTES NFR BLD AUTO: 10.1 % (ref 19.6–45.3)
LYMPHOCYTES NFR BLD AUTO: 10.7 % (ref 19.6–45.3)
LYMPHOCYTES NFR BLD AUTO: 10.9 % (ref 19.6–45.3)
LYMPHOCYTES NFR BLD AUTO: 11 % (ref 19.6–45.3)
LYMPHOCYTES NFR BLD AUTO: 11.2 % (ref 19.6–45.3)
LYMPHOCYTES NFR BLD AUTO: 11.6 % (ref 19.6–45.3)
LYMPHOCYTES NFR BLD AUTO: 12.1 % (ref 19.6–45.3)
LYMPHOCYTES NFR BLD AUTO: 12.8 % (ref 19.6–45.3)
LYMPHOCYTES NFR BLD AUTO: 12.9 % (ref 19.6–45.3)
LYMPHOCYTES NFR BLD AUTO: 13 % (ref 19.6–45.3)
LYMPHOCYTES NFR BLD AUTO: 13.2 % (ref 19.6–45.3)
LYMPHOCYTES NFR BLD AUTO: 13.8 % (ref 19.6–45.3)
LYMPHOCYTES NFR BLD AUTO: 14.1 % (ref 19.6–45.3)
LYMPHOCYTES NFR BLD AUTO: 14.8 % (ref 19.6–45.3)
LYMPHOCYTES NFR BLD AUTO: 15.3 % (ref 19.6–45.3)
LYMPHOCYTES NFR BLD AUTO: 15.4 % (ref 19.6–45.3)
LYMPHOCYTES NFR BLD AUTO: 16.1 % (ref 19.6–45.3)
LYMPHOCYTES NFR BLD AUTO: 16.9 % (ref 19.6–45.3)
LYMPHOCYTES NFR BLD AUTO: 17.5 % (ref 19.6–45.3)
LYMPHOCYTES NFR BLD AUTO: 18.8 % (ref 19.6–45.3)
LYMPHOCYTES NFR BLD AUTO: 18.9 % (ref 19.6–45.3)
LYMPHOCYTES NFR BLD AUTO: 19.3 % (ref 19.6–45.3)
LYMPHOCYTES NFR BLD AUTO: 19.5 % (ref 19.6–45.3)
LYMPHOCYTES NFR BLD AUTO: 5.7 % (ref 19.6–45.3)
LYMPHOCYTES NFR BLD AUTO: 5.7 % (ref 19.6–45.3)
LYMPHOCYTES NFR BLD AUTO: 6.3 % (ref 19.6–45.3)
LYMPHOCYTES NFR BLD AUTO: 6.7 % (ref 19.6–45.3)
LYMPHOCYTES NFR BLD AUTO: 6.7 % (ref 19.6–45.3)
LYMPHOCYTES NFR BLD AUTO: 6.8 % (ref 19.6–45.3)
LYMPHOCYTES NFR BLD AUTO: 7.4 % (ref 19.6–45.3)
LYMPHOCYTES NFR BLD AUTO: 7.9 % (ref 19.6–45.3)
LYMPHOCYTES NFR BLD AUTO: 8.8 % (ref 19.6–45.3)
LYMPHOCYTES NFR BLD AUTO: 9 % (ref 19.6–45.3)
LYMPHOCYTES NFR BLD AUTO: 9.3 % (ref 19.6–45.3)
LYMPHOCYTES NFR BLD AUTO: 9.6 % (ref 19.6–45.3)
Lab: 174
M PNEUMO IGG SER IA-ACNC: NOT DETECTED
M PROTEIN SERPL ELPH-MCNC: ABNORMAL G/DL
MAGNESIUM SERPL-MCNC: 1.9 MG/DL (ref 1.8–2.5)
MAGNESIUM SERPL-MCNC: 2 MG/DL (ref 1.6–2.4)
MAGNESIUM SERPL-MCNC: 2 MG/DL (ref 1.6–2.4)
MAGNESIUM SERPL-MCNC: 2.2 MG/DL (ref 1.6–2.4)
MAGNESIUM SERPL-MCNC: 2.3 MG/DL (ref 1.6–2.4)
MAGNESIUM SERPL-MCNC: 2.4 MG/DL (ref 1.6–2.4)
MAGNESIUM SERPL-MCNC: 2.4 MG/DL (ref 1.6–2.4)
MAGNESIUM SERPL-MCNC: 2.6 MG/DL (ref 1.6–2.4)
MAXIMAL PREDICTED HEART RATE: 138 BPM
MCH RBC QN AUTO: 28.7 PG (ref 26.6–33)
MCH RBC QN AUTO: 28.8 PG (ref 26.6–33)
MCH RBC QN AUTO: 28.9 PG (ref 26.6–33)
MCH RBC QN AUTO: 29 PG (ref 26.6–33)
MCH RBC QN AUTO: 29.1 PG (ref 26.6–33)
MCH RBC QN AUTO: 29.1 PG (ref 26.6–33)
MCH RBC QN AUTO: 29.3 PG (ref 26.6–33)
MCH RBC QN AUTO: 29.3 PG (ref 26.6–33)
MCH RBC QN AUTO: 29.4 PG (ref 26.6–33)
MCH RBC QN AUTO: 29.5 PG (ref 26.6–33)
MCH RBC QN AUTO: 29.7 PG (ref 26.6–33)
MCH RBC QN AUTO: 29.7 PG (ref 26.6–33)
MCH RBC QN AUTO: 29.8 PG (ref 26.6–33)
MCH RBC QN AUTO: 29.9 PG (ref 26.6–33)
MCH RBC QN AUTO: 30 PG (ref 26.6–33)
MCH RBC QN AUTO: 30 PG (ref 26.6–33)
MCH RBC QN AUTO: 30.1 PG (ref 26.6–33)
MCH RBC QN AUTO: 30.2 PG (ref 26.6–33)
MCH RBC QN AUTO: 30.6 PG (ref 26.6–33)
MCH RBC QN AUTO: 30.7 PG (ref 26.6–33)
MCH RBC QN AUTO: 30.8 PG (ref 26.6–33)
MCH RBC QN AUTO: 30.9 PG (ref 26.6–33)
MCH RBC QN AUTO: 31 PG (ref 26.6–33)
MCH RBC QN AUTO: 31 PG (ref 26.6–33)
MCH RBC QN AUTO: 31.1 PG (ref 26.6–33)
MCH RBC QN AUTO: 31.1 PG (ref 26.6–33)
MCH RBC QN AUTO: 31.3 PG (ref 26.6–33)
MCH RBC QN AUTO: 31.5 PG (ref 26.6–33)
MCH RBC QN AUTO: 31.5 PG (ref 26.6–33)
MCH RBC QN AUTO: 31.9 PG (ref 26.6–33)
MCH RBC QN AUTO: 32.1 PG (ref 26.6–33)
MCH RBC QN AUTO: 32.2 PG (ref 26.6–33)
MCH RBC QN AUTO: 32.7 PG (ref 26.6–33)
MCHC RBC AUTO-ENTMCNC: 29.3 G/DL (ref 31.5–35.7)
MCHC RBC AUTO-ENTMCNC: 29.7 G/DL (ref 31.5–35.7)
MCHC RBC AUTO-ENTMCNC: 29.8 G/DL (ref 31.5–35.7)
MCHC RBC AUTO-ENTMCNC: 30.2 G/DL (ref 31.5–35.7)
MCHC RBC AUTO-ENTMCNC: 30.5 G/DL (ref 31.5–35.7)
MCHC RBC AUTO-ENTMCNC: 30.5 G/DL (ref 31.5–35.7)
MCHC RBC AUTO-ENTMCNC: 30.6 G/DL (ref 31.5–35.7)
MCHC RBC AUTO-ENTMCNC: 30.7 G/DL (ref 31.5–35.7)
MCHC RBC AUTO-ENTMCNC: 30.7 G/DL (ref 31.5–35.7)
MCHC RBC AUTO-ENTMCNC: 30.8 G/DL (ref 31.5–35.7)
MCHC RBC AUTO-ENTMCNC: 31.1 G/DL (ref 31.5–35.7)
MCHC RBC AUTO-ENTMCNC: 31.2 G/DL (ref 31.5–35.7)
MCHC RBC AUTO-ENTMCNC: 31.3 G/DL (ref 31.5–35.7)
MCHC RBC AUTO-ENTMCNC: 31.4 G/DL (ref 31.5–35.7)
MCHC RBC AUTO-ENTMCNC: 31.4 G/DL (ref 31.5–35.7)
MCHC RBC AUTO-ENTMCNC: 31.5 G/DL (ref 31.5–35.7)
MCHC RBC AUTO-ENTMCNC: 31.6 G/DL (ref 31.5–35.7)
MCHC RBC AUTO-ENTMCNC: 31.7 G/DL (ref 31.5–35.7)
MCHC RBC AUTO-ENTMCNC: 31.7 G/DL (ref 31.5–35.7)
MCHC RBC AUTO-ENTMCNC: 31.8 G/DL (ref 31.5–35.7)
MCHC RBC AUTO-ENTMCNC: 31.9 G/DL (ref 31.5–35.7)
MCHC RBC AUTO-ENTMCNC: 32 G/DL (ref 31.5–35.7)
MCHC RBC AUTO-ENTMCNC: 32 G/DL (ref 31.5–35.7)
MCHC RBC AUTO-ENTMCNC: 32.2 G/DL (ref 31.5–35.7)
MCHC RBC AUTO-ENTMCNC: 32.3 G/DL (ref 31.5–35.7)
MCHC RBC AUTO-ENTMCNC: 32.4 G/DL (ref 31.5–35.7)
MCHC RBC AUTO-ENTMCNC: 32.5 G/DL (ref 31.5–35.7)
MCHC RBC AUTO-ENTMCNC: 32.6 G/DL (ref 31.5–35.7)
MCHC RBC AUTO-ENTMCNC: 32.7 G/DL (ref 31.5–35.7)
MCHC RBC AUTO-ENTMCNC: 32.7 G/DL (ref 31.5–35.7)
MCHC RBC AUTO-ENTMCNC: 32.8 G/DL (ref 31.5–35.7)
MCHC RBC AUTO-ENTMCNC: 32.8 G/DL (ref 31.5–35.7)
MCHC RBC AUTO-ENTMCNC: 32.9 G/DL (ref 31.5–35.7)
MCHC RBC AUTO-ENTMCNC: 33.1 G/DL (ref 31.5–35.7)
MCV RBC AUTO: 100 FL (ref 79–97)
MCV RBC AUTO: 101.3 FL (ref 79–97)
MCV RBC AUTO: 101.4 FL (ref 79–97)
MCV RBC AUTO: 101.4 FL (ref 79–97)
MCV RBC AUTO: 101.5 FL (ref 79–97)
MCV RBC AUTO: 101.7 FL (ref 79–97)
MCV RBC AUTO: 105.4 FL (ref 79–97)
MCV RBC AUTO: 88.1 FL (ref 79–97)
MCV RBC AUTO: 89.4 FL (ref 79–97)
MCV RBC AUTO: 89.4 FL (ref 79–97)
MCV RBC AUTO: 90.8 FL (ref 79–97)
MCV RBC AUTO: 90.9 FL (ref 79–97)
MCV RBC AUTO: 91 FL (ref 79–97)
MCV RBC AUTO: 91 FL (ref 79–97)
MCV RBC AUTO: 92 FL (ref 79–97)
MCV RBC AUTO: 92.2 FL (ref 79–97)
MCV RBC AUTO: 92.7 FL (ref 79–97)
MCV RBC AUTO: 92.9 FL (ref 79–97)
MCV RBC AUTO: 93 FL (ref 79–97)
MCV RBC AUTO: 93.2 FL (ref 79–97)
MCV RBC AUTO: 93.3 FL (ref 79–97)
MCV RBC AUTO: 93.6 FL (ref 79–97)
MCV RBC AUTO: 93.8 FL (ref 79–97)
MCV RBC AUTO: 94 FL (ref 79–97)
MCV RBC AUTO: 94.2 FL (ref 79–97)
MCV RBC AUTO: 94.3 FL (ref 79–97)
MCV RBC AUTO: 94.4 FL (ref 79–97)
MCV RBC AUTO: 94.7 FL (ref 79–97)
MCV RBC AUTO: 95.3 FL (ref 79–97)
MCV RBC AUTO: 95.9 FL (ref 79–97)
MCV RBC AUTO: 96 FL (ref 79–97)
MCV RBC AUTO: 96 FL (ref 79–97)
MCV RBC AUTO: 96.2 FL (ref 79–97)
MCV RBC AUTO: 96.4 FL (ref 79–97)
MCV RBC AUTO: 96.5 FL (ref 79–97)
MCV RBC AUTO: 97.2 FL (ref 79–97)
MCV RBC AUTO: 97.4 FL (ref 79–97)
MCV RBC AUTO: 97.5 FL (ref 79–97)
MCV RBC AUTO: 97.6 FL (ref 79–97)
MCV RBC AUTO: 97.7 FL (ref 79–97)
MCV RBC AUTO: 97.8 FL (ref 79–97)
MCV RBC AUTO: 98.4 FL (ref 79–97)
MCV RBC AUTO: 98.6 FL (ref 79–97)
MCV RBC AUTO: 98.7 FL (ref 79–97)
MCV RBC AUTO: 99.1 FL (ref 79–97)
METHADONE UR QL SCN: NEGATIVE
METHYLMALONATE SERPL-SCNC: 757 NMOL/L (ref 0–378)
MONOCYTES # BLD AUTO: 0.23 10*3/MM3 (ref 0.1–0.9)
MONOCYTES # BLD AUTO: 0.26 10*3/MM3 (ref 0.1–0.9)
MONOCYTES # BLD AUTO: 0.28 10*3/MM3 (ref 0.1–0.9)
MONOCYTES # BLD AUTO: 0.29 10*3/MM3 (ref 0.1–0.9)
MONOCYTES # BLD AUTO: 0.29 10*3/MM3 (ref 0.1–0.9)
MONOCYTES # BLD AUTO: 0.3 10*3/MM3 (ref 0.1–0.9)
MONOCYTES # BLD AUTO: 0.31 10*3/MM3 (ref 0.1–0.9)
MONOCYTES # BLD AUTO: 0.31 10*3/MM3 (ref 0.1–0.9)
MONOCYTES # BLD AUTO: 0.32 10*3/MM3 (ref 0.1–0.9)
MONOCYTES # BLD AUTO: 0.33 10*3/MM3 (ref 0.1–0.9)
MONOCYTES # BLD AUTO: 0.35 10*3/MM3 (ref 0.1–0.9)
MONOCYTES # BLD AUTO: 0.36 10*3/MM3 (ref 0.1–0.9)
MONOCYTES # BLD AUTO: 0.36 10*3/MM3 (ref 0.1–0.9)
MONOCYTES # BLD AUTO: 0.37 10*3/MM3 (ref 0.1–0.9)
MONOCYTES # BLD AUTO: 0.38 10*3/MM3 (ref 0.1–0.9)
MONOCYTES # BLD AUTO: 0.39 10*3/MM3 (ref 0.1–0.9)
MONOCYTES # BLD AUTO: 0.4 10*3/MM3 (ref 0.1–0.9)
MONOCYTES # BLD AUTO: 0.4 10*3/MM3 (ref 0.1–0.9)
MONOCYTES # BLD AUTO: 0.43 10*3/MM3 (ref 0.1–0.9)
MONOCYTES # BLD AUTO: 0.45 10*3/MM3 (ref 0.1–0.9)
MONOCYTES # BLD AUTO: 0.46 10*3/MM3 (ref 0.1–0.9)
MONOCYTES # BLD AUTO: 0.47 10*3/MM3 (ref 0.1–0.9)
MONOCYTES # BLD AUTO: 0.5 10*3/MM3 (ref 0.1–0.9)
MONOCYTES # BLD AUTO: 0.58 10*3/MM3 (ref 0.1–0.9)
MONOCYTES NFR BLD AUTO: 4.8 % (ref 5–12)
MONOCYTES NFR BLD AUTO: 4.9 % (ref 5–12)
MONOCYTES NFR BLD AUTO: 5.1 % (ref 5–12)
MONOCYTES NFR BLD AUTO: 5.1 % (ref 5–12)
MONOCYTES NFR BLD AUTO: 5.2 % (ref 5–12)
MONOCYTES NFR BLD AUTO: 5.3 % (ref 5–12)
MONOCYTES NFR BLD AUTO: 5.4 % (ref 5–12)
MONOCYTES NFR BLD AUTO: 5.4 % (ref 5–12)
MONOCYTES NFR BLD AUTO: 5.5 % (ref 5–12)
MONOCYTES NFR BLD AUTO: 5.6 % (ref 5–12)
MONOCYTES NFR BLD AUTO: 6.1 % (ref 5–12)
MONOCYTES NFR BLD AUTO: 6.2 % (ref 5–12)
MONOCYTES NFR BLD AUTO: 6.3 % (ref 5–12)
MONOCYTES NFR BLD AUTO: 6.4 % (ref 5–12)
MONOCYTES NFR BLD AUTO: 6.4 % (ref 5–12)
MONOCYTES NFR BLD AUTO: 6.7 % (ref 5–12)
MONOCYTES NFR BLD AUTO: 6.7 % (ref 5–12)
MONOCYTES NFR BLD AUTO: 7 % (ref 5–12)
MONOCYTES NFR BLD AUTO: 7.2 % (ref 5–12)
MONOCYTES NFR BLD AUTO: 7.4 % (ref 5–12)
MONOCYTES NFR BLD AUTO: 7.6 % (ref 5–12)
MONOCYTES NFR BLD AUTO: 7.7 % (ref 5–12)
MONOCYTES NFR BLD AUTO: 7.8 % (ref 5–12)
MONOCYTES NFR BLD AUTO: 7.9 % (ref 5–12)
MONOCYTES NFR BLD AUTO: 8 % (ref 5–12)
MONOCYTES NFR BLD AUTO: 8.3 % (ref 5–12)
MONOCYTES NFR BLD AUTO: 8.3 % (ref 5–12)
MONOCYTES NFR BLD AUTO: 8.4 % (ref 5–12)
MONOCYTES NFR BLD AUTO: 8.5 % (ref 5–12)
MONOCYTES NFR BLD AUTO: 8.9 % (ref 5–12)
NEGATIVE CONTROL: NEGATIVE
NEUTROPHILS NFR BLD AUTO: 2.86 10*3/MM3 (ref 1.7–7)
NEUTROPHILS NFR BLD AUTO: 2.94 10*3/MM3 (ref 1.7–7)
NEUTROPHILS NFR BLD AUTO: 3.02 10*3/MM3 (ref 1.7–7)
NEUTROPHILS NFR BLD AUTO: 3.21 10*3/MM3 (ref 1.7–7)
NEUTROPHILS NFR BLD AUTO: 3.36 10*3/MM3 (ref 1.7–7)
NEUTROPHILS NFR BLD AUTO: 3.51 10*3/MM3 (ref 1.7–7)
NEUTROPHILS NFR BLD AUTO: 3.74 10*3/MM3 (ref 1.7–7)
NEUTROPHILS NFR BLD AUTO: 3.77 10*3/MM3 (ref 1.7–7)
NEUTROPHILS NFR BLD AUTO: 3.87 10*3/MM3 (ref 1.7–7)
NEUTROPHILS NFR BLD AUTO: 3.87 10*3/MM3 (ref 1.7–7)
NEUTROPHILS NFR BLD AUTO: 4.01 10*3/MM3 (ref 1.7–7)
NEUTROPHILS NFR BLD AUTO: 4.05 10*3/MM3 (ref 1.7–7)
NEUTROPHILS NFR BLD AUTO: 4.11 10*3/MM3 (ref 1.7–7)
NEUTROPHILS NFR BLD AUTO: 4.21 10*3/MM3 (ref 1.7–7)
NEUTROPHILS NFR BLD AUTO: 4.22 10*3/MM3 (ref 1.7–7)
NEUTROPHILS NFR BLD AUTO: 4.26 10*3/MM3 (ref 1.7–7)
NEUTROPHILS NFR BLD AUTO: 4.35 10*3/MM3 (ref 1.7–7)
NEUTROPHILS NFR BLD AUTO: 4.36 10*3/MM3 (ref 1.7–7)
NEUTROPHILS NFR BLD AUTO: 4.37 10*3/MM3 (ref 1.7–7)
NEUTROPHILS NFR BLD AUTO: 4.4 10*3/MM3 (ref 1.7–7)
NEUTROPHILS NFR BLD AUTO: 4.4 10*3/MM3 (ref 1.7–7)
NEUTROPHILS NFR BLD AUTO: 4.54 10*3/MM3 (ref 1.7–7)
NEUTROPHILS NFR BLD AUTO: 4.54 10*3/MM3 (ref 1.7–7)
NEUTROPHILS NFR BLD AUTO: 4.71 10*3/MM3 (ref 1.7–7)
NEUTROPHILS NFR BLD AUTO: 4.72 10*3/MM3 (ref 1.7–7)
NEUTROPHILS NFR BLD AUTO: 4.77 10*3/MM3 (ref 1.7–7)
NEUTROPHILS NFR BLD AUTO: 4.77 10*3/MM3 (ref 1.7–7)
NEUTROPHILS NFR BLD AUTO: 4.84 10*3/MM3 (ref 1.7–7)
NEUTROPHILS NFR BLD AUTO: 5.01 10*3/MM3 (ref 1.7–7)
NEUTROPHILS NFR BLD AUTO: 5.13 10*3/MM3 (ref 1.7–7)
NEUTROPHILS NFR BLD AUTO: 5.49 10*3/MM3 (ref 1.7–7)
NEUTROPHILS NFR BLD AUTO: 5.9 10*3/MM3 (ref 1.7–7)
NEUTROPHILS NFR BLD AUTO: 5.97 10*3/MM3 (ref 1.7–7)
NEUTROPHILS NFR BLD AUTO: 67 % (ref 42.7–76)
NEUTROPHILS NFR BLD AUTO: 67.3 % (ref 42.7–76)
NEUTROPHILS NFR BLD AUTO: 69.7 % (ref 42.7–76)
NEUTROPHILS NFR BLD AUTO: 7.21 10*3/MM3 (ref 1.7–7)
NEUTROPHILS NFR BLD AUTO: 70.4 % (ref 42.7–76)
NEUTROPHILS NFR BLD AUTO: 70.6 % (ref 42.7–76)
NEUTROPHILS NFR BLD AUTO: 71 % (ref 42.7–76)
NEUTROPHILS NFR BLD AUTO: 71.9 % (ref 42.7–76)
NEUTROPHILS NFR BLD AUTO: 72 % (ref 42.7–76)
NEUTROPHILS NFR BLD AUTO: 73.6 % (ref 42.7–76)
NEUTROPHILS NFR BLD AUTO: 73.6 % (ref 42.7–76)
NEUTROPHILS NFR BLD AUTO: 74 % (ref 42.7–76)
NEUTROPHILS NFR BLD AUTO: 74.2 % (ref 42.7–76)
NEUTROPHILS NFR BLD AUTO: 75.1 % (ref 42.7–76)
NEUTROPHILS NFR BLD AUTO: 76.6 % (ref 42.7–76)
NEUTROPHILS NFR BLD AUTO: 77 % (ref 42.7–76)
NEUTROPHILS NFR BLD AUTO: 77.3 % (ref 42.7–76)
NEUTROPHILS NFR BLD AUTO: 77.8 % (ref 42.7–76)
NEUTROPHILS NFR BLD AUTO: 78.2 % (ref 42.7–76)
NEUTROPHILS NFR BLD AUTO: 78.3 % (ref 42.7–76)
NEUTROPHILS NFR BLD AUTO: 78.7 % (ref 42.7–76)
NEUTROPHILS NFR BLD AUTO: 78.8 % (ref 42.7–76)
NEUTROPHILS NFR BLD AUTO: 79.2 % (ref 42.7–76)
NEUTROPHILS NFR BLD AUTO: 79.4 % (ref 42.7–76)
NEUTROPHILS NFR BLD AUTO: 79.8 % (ref 42.7–76)
NEUTROPHILS NFR BLD AUTO: 80.3 % (ref 42.7–76)
NEUTROPHILS NFR BLD AUTO: 80.4 % (ref 42.7–76)
NEUTROPHILS NFR BLD AUTO: 81.2 % (ref 42.7–76)
NEUTROPHILS NFR BLD AUTO: 81.5 % (ref 42.7–76)
NEUTROPHILS NFR BLD AUTO: 82.6 % (ref 42.7–76)
NEUTROPHILS NFR BLD AUTO: 82.9 % (ref 42.7–76)
NEUTROPHILS NFR BLD AUTO: 82.9 % (ref 42.7–76)
NEUTROPHILS NFR BLD AUTO: 84.6 % (ref 42.7–76)
NEUTROPHILS NFR BLD AUTO: 85 % (ref 42.7–76)
NEUTROPHILS NFR BLD AUTO: 85.7 % (ref 42.7–76)
NEUTROPHILS NFR BLD AUTO: 86.4 % (ref 42.7–76)
NEUTROPHILS NFR BLD AUTO: 86.9 % (ref 42.7–76)
NITRITE UR QL STRIP: NEGATIVE
NRBC BLD AUTO-RTO: 0 /100 WBC (ref 0–0.2)
NRBC BLD AUTO-RTO: 0.1 /100 WBC (ref 0–0.2)
NRBC BLD AUTO-RTO: 0.1 /100 WBC (ref 0–0.2)
NRBC BLD AUTO-RTO: 0.2 /100 WBC (ref 0–0.2)
NRBC BLD AUTO-RTO: 0.3 /100 WBC (ref 0–0.2)
NRBC BLD AUTO-RTO: 0.4 /100 WBC (ref 0–0.2)
NT-PROBNP SERPL-MCNC: 5785 PG/ML (ref 0–1800)
NT-PROBNP SERPL-MCNC: ABNORMAL PG/ML (ref 0–1800)
OPIATES UR QL: NEGATIVE
OXYCODONE UR QL SCN: POSITIVE
PATH REPORT.FINAL DX SPEC: NORMAL
PATH REPORT.GROSS SPEC: NORMAL
PH UR STRIP.AUTO: 6.5 [PH] (ref 5–8)
PH UR STRIP.AUTO: <=5 [PH] (ref 5–8)
PHOSPHATE SERPL-MCNC: 3.5 MG/DL (ref 2.5–4.5)
PLAT MORPH BLD: NORMAL
PLATELET # BLD AUTO: 100 10*3/MM3 (ref 140–450)
PLATELET # BLD AUTO: 101 10*3/MM3 (ref 140–450)
PLATELET # BLD AUTO: 101 10*3/MM3 (ref 140–450)
PLATELET # BLD AUTO: 103 10*3/MM3 (ref 140–450)
PLATELET # BLD AUTO: 103 10*3/MM3 (ref 140–450)
PLATELET # BLD AUTO: 104 10*3/MM3 (ref 140–450)
PLATELET # BLD AUTO: 104 10*3/MM3 (ref 140–450)
PLATELET # BLD AUTO: 108 10*3/MM3 (ref 140–450)
PLATELET # BLD AUTO: 108 10*3/MM3 (ref 140–450)
PLATELET # BLD AUTO: 110 10*3/MM3 (ref 140–450)
PLATELET # BLD AUTO: 112 10*3/MM3 (ref 140–450)
PLATELET # BLD AUTO: 113 10*3/MM3 (ref 140–450)
PLATELET # BLD AUTO: 114 10*3/MM3 (ref 140–450)
PLATELET # BLD AUTO: 114 10*3/MM3 (ref 140–450)
PLATELET # BLD AUTO: 115 10*3/MM3 (ref 140–450)
PLATELET # BLD AUTO: 116 10*3/MM3 (ref 140–450)
PLATELET # BLD AUTO: 118 10*3/MM3 (ref 140–450)
PLATELET # BLD AUTO: 119 10*3/MM3 (ref 140–450)
PLATELET # BLD AUTO: 120 10*3/MM3 (ref 140–450)
PLATELET # BLD AUTO: 121 10*3/MM3 (ref 140–450)
PLATELET # BLD AUTO: 121 10*3/MM3 (ref 140–450)
PLATELET # BLD AUTO: 123 10*3/MM3 (ref 140–450)
PLATELET # BLD AUTO: 124 10*3/MM3 (ref 140–450)
PLATELET # BLD AUTO: 125 10*3/MM3 (ref 140–450)
PLATELET # BLD AUTO: 126 10*3/MM3 (ref 140–450)
PLATELET # BLD AUTO: 128 10*3/MM3 (ref 140–450)
PLATELET # BLD AUTO: 132 10*3/MM3 (ref 140–450)
PLATELET # BLD AUTO: 132 10*3/MM3 (ref 140–450)
PLATELET # BLD AUTO: 133 10*3/MM3 (ref 140–450)
PLATELET # BLD AUTO: 136 10*3/MM3 (ref 140–450)
PLATELET # BLD AUTO: 138 10*3/MM3 (ref 140–450)
PLATELET # BLD AUTO: 140 10*3/MM3 (ref 140–450)
PLATELET # BLD AUTO: 146 10*3/MM3 (ref 140–450)
PLATELET # BLD AUTO: 86 10*3/MM3 (ref 140–450)
PLATELET # BLD AUTO: 88 10*3/MM3 (ref 140–450)
PLATELET # BLD AUTO: 91 10*3/MM3 (ref 140–450)
PLATELET # BLD AUTO: 93 10*3/MM3 (ref 140–450)
PLATELET # BLD AUTO: 95 10*3/MM3 (ref 140–450)
PLATELET # BLD AUTO: 99 10*3/MM3 (ref 140–450)
PLATELET # BLD AUTO: 99 10*3/MM3 (ref 140–450)
PLATELETS.RETICULATED NFR BLD AUTO: 1.5 % (ref 0.9–6.5)
PMV BLD AUTO: 10 FL (ref 6–12)
PMV BLD AUTO: 10.1 FL (ref 6–12)
PMV BLD AUTO: 10.2 FL (ref 6–12)
PMV BLD AUTO: 10.3 FL (ref 6–12)
PMV BLD AUTO: 10.3 FL (ref 6–12)
PMV BLD AUTO: 10.4 FL (ref 6–12)
PMV BLD AUTO: 10.5 FL (ref 6–12)
PMV BLD AUTO: 10.6 FL (ref 6–12)
PMV BLD AUTO: 10.7 FL (ref 6–12)
PMV BLD AUTO: 10.7 FL (ref 6–12)
PMV BLD AUTO: 10.8 FL (ref 6–12)
PMV BLD AUTO: 11 FL (ref 6–12)
PMV BLD AUTO: 11.1 FL (ref 6–12)
PMV BLD AUTO: 11.2 FL (ref 6–12)
PMV BLD AUTO: 11.6 FL (ref 6–12)
PMV BLD AUTO: 11.8 FL (ref 6–12)
PMV BLD AUTO: 11.9 FL (ref 6–12)
PMV BLD AUTO: 11.9 FL (ref 6–12)
PMV BLD AUTO: 9.2 FL (ref 6–12)
PMV BLD AUTO: 9.3 FL (ref 6–12)
PMV BLD AUTO: 9.4 FL (ref 6–12)
PMV BLD AUTO: 9.5 FL (ref 6–12)
PMV BLD AUTO: 9.6 FL (ref 6–12)
PMV BLD AUTO: 9.7 FL (ref 6–12)
PMV BLD AUTO: 9.7 FL (ref 6–12)
PMV BLD AUTO: 9.8 FL (ref 6–12)
POSITIVE CONTROL: POSITIVE
POTASSIUM SERPL-SCNC: 3.1 MMOL/L (ref 3.5–5.2)
POTASSIUM SERPL-SCNC: 3.4 MMOL/L (ref 3.5–5.2)
POTASSIUM SERPL-SCNC: 3.4 MMOL/L (ref 3.5–5.2)
POTASSIUM SERPL-SCNC: 3.5 MMOL/L (ref 3.5–5.2)
POTASSIUM SERPL-SCNC: 3.7 MMOL/L (ref 3.5–5.2)
POTASSIUM SERPL-SCNC: 3.8 MMOL/L (ref 3.5–5.2)
POTASSIUM SERPL-SCNC: 3.9 MMOL/L (ref 3.5–5.2)
POTASSIUM SERPL-SCNC: 3.9 MMOL/L (ref 3.5–5.2)
POTASSIUM SERPL-SCNC: 4 MMOL/L (ref 3.5–5.2)
POTASSIUM SERPL-SCNC: 4 MMOL/L (ref 3.5–5.2)
POTASSIUM SERPL-SCNC: 4.1 MMOL/L (ref 3.5–5.2)
POTASSIUM SERPL-SCNC: 4.2 MMOL/L (ref 3.5–5.2)
POTASSIUM SERPL-SCNC: 4.3 MMOL/L (ref 3.5–5.2)
POTASSIUM SERPL-SCNC: 4.4 MMOL/L (ref 3.5–5.2)
POTASSIUM SERPL-SCNC: 4.4 MMOL/L (ref 3.5–5.2)
POTASSIUM SERPL-SCNC: 4.5 MMOL/L (ref 3.5–5.2)
POTASSIUM SERPL-SCNC: 4.5 MMOL/L (ref 3.5–5.2)
POTASSIUM SERPL-SCNC: 4.6 MMOL/L (ref 3.5–5.2)
POTASSIUM SERPL-SCNC: 4.9 MMOL/L (ref 3.5–5.2)
POTASSIUM SERPL-SCNC: 5 MMOL/L (ref 3.5–5.2)
POTASSIUM SERPL-SCNC: 5 MMOL/L (ref 3.5–5.2)
PROCALCITONIN SERPL-MCNC: 0.19 NG/ML (ref 0–0.25)
PROT SERPL-MCNC: 4.4 G/DL (ref 6–8.5)
PROT SERPL-MCNC: 4.6 G/DL (ref 6–8.5)
PROT SERPL-MCNC: 4.8 G/DL (ref 6–8.5)
PROT SERPL-MCNC: 4.8 G/DL (ref 6–8.5)
PROT SERPL-MCNC: 5.4 G/DL (ref 6–8.5)
PROT SERPL-MCNC: 5.6 G/DL (ref 6–8.5)
PROT SERPL-MCNC: 5.7 G/DL (ref 6–8.5)
PROT SERPL-MCNC: 5.8 G/DL (ref 6–8.5)
PROT SERPL-MCNC: 5.9 G/DL (ref 6–8.5)
PROT SERPL-MCNC: 6.4 G/DL (ref 6–8.5)
PROT SERPL-MCNC: 6.7 G/DL (ref 6–8.5)
PROT UR QL STRIP: ABNORMAL
PROT UR QL STRIP: ABNORMAL
PROT UR QL STRIP: NEGATIVE
PROTHROMBIN TIME: 15.1 SECONDS (ref 11.7–14.2)
PROTHROMBIN TIME: 15.1 SECONDS (ref 11.7–14.2)
PROTHROMBIN TIME: 15.5 SECONDS (ref 11.7–14.2)
PROTHROMBIN TIME: 15.9 SECONDS (ref 11.7–14.2)
PROTHROMBIN TIME: 16.4 SECONDS (ref 11.7–14.2)
PROTHROMBIN TIME: 16.7 SECONDS (ref 11.7–14.2)
PROTHROMBIN TIME: 17.4 SECONDS (ref 10–13.8)
PROTHROMBIN TIME: 18.4 SECONDS (ref 10–13.8)
PROTHROMBIN TIME: 21.9 SECONDS (ref 10–13.8)
PROTHROMBIN TIME: 22.7 SECONDS (ref 10–13.8)
PROTHROMBIN TIME: 23.1 SECONDS (ref 10–13.8)
PROTHROMBIN TIME: 23.5 SECONDS (ref 10–13.8)
PROTHROMBIN TIME: 23.8 SECONDS (ref 10–13.8)
PROTHROMBIN TIME: 25 SECONDS (ref 10–13.8)
PROTHROMBIN TIME: 25 SECONDS (ref 10–13.8)
PROTHROMBIN TIME: 26.8 SECONDS (ref 10–13.8)
PROTHROMBIN TIME: 26.8 SECONDS (ref 10–13.8)
PROTHROMBIN TIME: 27.6 SECONDS (ref 10–13.8)
PROTHROMBIN TIME: 29.6 SECONDS (ref 10–13.8)
PROTHROMBIN TIME: 29.9 SECONDS (ref 11.7–14.2)
PROTHROMBIN TIME: 39.8 SECONDS (ref 10–13.8)
PROTHROMBIN TIME: 42.8 SECONDS (ref 10–13.8)
PROTHROMBIN TIME: 50.3 SECONDS (ref 11.7–14.2)
PROTHROMBIN TIME: 69.8 SECONDS (ref 11.7–14.2)
QT INTERVAL: 495 MS
QT INTERVAL: 516 MS
QT INTERVAL: 543 MS
QT INTERVAL: 577 MS
RBC # BLD AUTO: 1.4 10*6/MM3 (ref 3.77–5.28)
RBC # BLD AUTO: 1.55 10*6/MM3 (ref 3.77–5.28)
RBC # BLD AUTO: 2.16 10*6/MM3 (ref 3.77–5.28)
RBC # BLD AUTO: 2.3 10*6/MM3 (ref 3.77–5.28)
RBC # BLD AUTO: 2.33 10*6/MM3 (ref 3.77–5.28)
RBC # BLD AUTO: 2.35 10*6/MM3 (ref 3.77–5.28)
RBC # BLD AUTO: 2.35 10*6/MM3 (ref 3.77–5.28)
RBC # BLD AUTO: 2.38 10*6/MM3 (ref 3.77–5.28)
RBC # BLD AUTO: 2.39 10*6/MM3 (ref 3.77–5.28)
RBC # BLD AUTO: 2.46 10*6/MM3 (ref 3.77–5.28)
RBC # BLD AUTO: 2.58 10*6/MM3 (ref 3.77–5.28)
RBC # BLD AUTO: 2.61 10*6/MM3 (ref 3.77–5.28)
RBC # BLD AUTO: 2.65 10*6/MM3 (ref 3.77–5.28)
RBC # BLD AUTO: 2.66 10*6/MM3 (ref 3.77–5.28)
RBC # BLD AUTO: 2.67 10*6/MM3 (ref 3.77–5.28)
RBC # BLD AUTO: 2.7 10*6/MM3 (ref 3.77–5.28)
RBC # BLD AUTO: 2.72 10*6/MM3 (ref 3.77–5.28)
RBC # BLD AUTO: 2.73 10*6/MM3 (ref 3.77–5.28)
RBC # BLD AUTO: 2.73 10*6/MM3 (ref 3.77–5.28)
RBC # BLD AUTO: 2.74 10*6/MM3 (ref 3.77–5.28)
RBC # BLD AUTO: 2.75 10*6/MM3 (ref 3.77–5.28)
RBC # BLD AUTO: 2.76 10*6/MM3 (ref 3.77–5.28)
RBC # BLD AUTO: 2.76 10*6/MM3 (ref 3.77–5.28)
RBC # BLD AUTO: 2.83 10*6/MM3 (ref 3.77–5.28)
RBC # BLD AUTO: 2.84 10*6/MM3 (ref 3.77–5.28)
RBC # BLD AUTO: 2.85 10*6/MM3 (ref 3.77–5.28)
RBC # BLD AUTO: 2.87 10*6/MM3 (ref 3.77–5.28)
RBC # BLD AUTO: 2.91 10*6/MM3 (ref 3.77–5.28)
RBC # BLD AUTO: 2.91 10*6/MM3 (ref 3.77–5.28)
RBC # BLD AUTO: 2.94 10*6/MM3 (ref 3.77–5.28)
RBC # BLD AUTO: 2.95 10*6/MM3 (ref 3.77–5.28)
RBC # BLD AUTO: 3 10*6/MM3 (ref 3.77–5.28)
RBC # BLD AUTO: 3.1 10*6/MM3 (ref 3.77–5.28)
RBC # BLD AUTO: 3.14 10*6/MM3 (ref 3.77–5.28)
RBC # BLD AUTO: 3.16 10*6/MM3 (ref 3.77–5.28)
RBC # BLD AUTO: 3.19 10*6/MM3 (ref 3.77–5.28)
RBC # BLD AUTO: 3.2 10*6/MM3 (ref 3.77–5.28)
RBC # BLD AUTO: 3.21 10*6/MM3 (ref 3.77–5.28)
RBC # BLD AUTO: 3.22 10*6/MM3 (ref 3.77–5.28)
RBC # BLD AUTO: 3.29 10*6/MM3 (ref 3.77–5.28)
RBC # BLD AUTO: 3.3 10*6/MM3 (ref 3.77–5.28)
RBC # BLD AUTO: 3.37 10*6/MM3 (ref 3.77–5.28)
RBC # BLD AUTO: 3.45 10*6/MM3 (ref 3.77–5.28)
RBC # BLD AUTO: 3.55 10*6/MM3 (ref 3.77–5.28)
RBC # BLD AUTO: 3.59 10*6/MM3 (ref 3.77–5.28)
RBC # UR STRIP: ABNORMAL /HPF
RBC MORPH BLD: NORMAL
REF LAB TEST METHOD: ABNORMAL
RETICS # AUTO: 0.06 10*6/MM3 (ref 0.02–0.13)
RETICS # AUTO: 0.06 10*6/MM3 (ref 0.02–0.13)
RETICS # AUTO: 0.13 10*6/MM3 (ref 0.02–0.13)
RETICS/RBC NFR AUTO: 1.89 % (ref 0.7–1.9)
RETICS/RBC NFR AUTO: 2.47 % (ref 0.7–1.9)
RETICS/RBC NFR AUTO: 4.83 % (ref 0.7–1.9)
RH BLD: POSITIVE
RHINOVIRUS RNA SPEC NAA+PROBE: NOT DETECTED
RSV RNA NPH QL NAA+NON-PROBE: NOT DETECTED
SARS-COV-2 RNA NPH QL NAA+NON-PROBE: NOT DETECTED
SARS-COV-2 RNA PNL SPEC NAA+PROBE: NOT DETECTED
SARS-COV-2 RNA RESP QL NAA+PROBE: NOT DETECTED
SODIUM SERPL-SCNC: 138 MMOL/L (ref 136–145)
SODIUM SERPL-SCNC: 139 MMOL/L (ref 136–145)
SODIUM SERPL-SCNC: 140 MMOL/L (ref 136–145)
SODIUM SERPL-SCNC: 141 MMOL/L (ref 136–145)
SODIUM SERPL-SCNC: 142 MMOL/L (ref 136–145)
SODIUM SERPL-SCNC: 143 MMOL/L (ref 136–145)
SODIUM SERPL-SCNC: 144 MMOL/L (ref 136–145)
SODIUM SERPL-SCNC: 146 MMOL/L (ref 136–145)
SODIUM SERPL-SCNC: 146 MMOL/L (ref 136–145)
SODIUM SERPL-SCNC: 147 MMOL/L (ref 136–145)
SP GR UR STRIP: 1.01 (ref 1–1.03)
SP GR UR STRIP: <=1.005 (ref 1–1.03)
SQUAMOUS #/AREA URNS HPF: ABNORMAL /HPF
STRESS TARGET HR: 117 BPM
T&S EXPIRATION DATE: NORMAL
T4 FREE SERPL-MCNC: 1.5 NG/DL (ref 0.93–1.7)
TIBC SERPL-MCNC: 248 MCG/DL (ref 249–505)
TIBC SERPL-MCNC: 268 MCG/DL (ref 298–536)
TIBC SERPL-MCNC: 272 MCG/DL (ref 249–505)
TIBC SERPL-MCNC: 290 MCG/DL (ref 249–505)
TIBC SERPL-MCNC: 301 MCG/DL (ref 249–505)
TIBC SERPL-MCNC: 309 MCG/DL (ref 249–505)
TIBC SERPL-MCNC: 317 MCG/DL (ref 298–536)
TIBC SERPL-MCNC: 332 MCG/DL (ref 298–536)
TRANSFERRIN SERPL-MCNC: 177 MG/DL (ref 200–360)
TRANSFERRIN SERPL-MCNC: 180 MG/DL (ref 200–360)
TRANSFERRIN SERPL-MCNC: 194 MG/DL (ref 200–360)
TRANSFERRIN SERPL-MCNC: 207 MG/DL (ref 200–360)
TRANSFERRIN SERPL-MCNC: 213 MG/DL (ref 200–360)
TRANSFERRIN SERPL-MCNC: 215 MG/DL (ref 200–360)
TRANSFERRIN SERPL-MCNC: 221 MG/DL (ref 200–360)
TRANSFERRIN SERPL-MCNC: 223 MG/DL (ref 200–360)
TRIGL SERPL-MCNC: 102 MG/DL (ref 0–150)
TRIGL SERPL-MCNC: 83 MG/DL (ref 0–150)
TROPONIN T SERPL-MCNC: 0.03 NG/ML (ref 0–0.03)
TROPONIN T SERPL-MCNC: 0.04 NG/ML (ref 0–0.03)
TROPONIN T SERPL-MCNC: 0.09 NG/ML (ref 0–0.03)
TROPONIN T SERPL-MCNC: 0.17 NG/ML (ref 0–0.03)
TSH SERPL DL<=0.05 MIU/L-ACNC: 0.65 UIU/ML (ref 0.27–4.2)
TSH SERPL DL<=0.05 MIU/L-ACNC: 2.23 UIU/ML (ref 0.27–4.2)
UNIT  ABO: NORMAL
UNIT  RH: NORMAL
URATE SERPL-MCNC: 5.3 MG/DL (ref 2.4–5.7)
URATE SERPL-MCNC: 5.4 MG/DL (ref 2.4–5.7)
UROBILINOGEN UR QL STRIP: ABNORMAL
UROBILINOGEN UR QL STRIP: NORMAL
VIT B12 BLD-MCNC: >2000 PG/ML (ref 211–946)
VIT B12 BLD-MCNC: >2000 PG/ML (ref 211–946)
VIT C SERPL-MCNC: 2.2 MG/DL (ref 0.4–2)
VLDLC SERPL-MCNC: 16 MG/DL (ref 5–40)
VLDLC SERPL-MCNC: 19 MG/DL (ref 5–40)
WBC # UR STRIP: ABNORMAL /HPF
WBC MORPH BLD: NORMAL
WBC NRBC COR # BLD: 4.2 10*3/MM3 (ref 3.4–10.8)
WBC NRBC COR # BLD: 4.25 10*3/MM3 (ref 3.4–10.8)
WBC NRBC COR # BLD: 4.39 10*3/MM3 (ref 3.4–10.8)
WBC NRBC COR # BLD: 4.51 10*3/MM3 (ref 3.4–10.8)
WBC NRBC COR # BLD: 4.56 10*3/MM3 (ref 3.4–10.8)
WBC NRBC COR # BLD: 4.79 10*3/MM3 (ref 3.4–10.8)
WBC NRBC COR # BLD: 4.82 10*3/MM3 (ref 3.4–10.8)
WBC NRBC COR # BLD: 4.86 10*3/MM3 (ref 3.4–10.8)
WBC NRBC COR # BLD: 4.92 10*3/MM3 (ref 3.4–10.8)
WBC NRBC COR # BLD: 4.94 10*3/MM3 (ref 3.4–10.8)
WBC NRBC COR # BLD: 4.94 10*3/MM3 (ref 3.4–10.8)
WBC NRBC COR # BLD: 5.19 10*3/MM3 (ref 3.4–10.8)
WBC NRBC COR # BLD: 5.21 10*3/MM3 (ref 3.4–10.8)
WBC NRBC COR # BLD: 5.22 10*3/MM3 (ref 3.4–10.8)
WBC NRBC COR # BLD: 5.25 10*3/MM3 (ref 3.4–10.8)
WBC NRBC COR # BLD: 5.37 10*3/MM3 (ref 3.4–10.8)
WBC NRBC COR # BLD: 5.44 10*3/MM3 (ref 3.4–10.8)
WBC NRBC COR # BLD: 5.47 10*3/MM3 (ref 3.4–10.8)
WBC NRBC COR # BLD: 5.47 10*3/MM3 (ref 3.4–10.8)
WBC NRBC COR # BLD: 5.49 10*3/MM3 (ref 3.4–10.8)
WBC NRBC COR # BLD: 5.54 10*3/MM3 (ref 3.4–10.8)
WBC NRBC COR # BLD: 5.57 10*3/MM3 (ref 3.4–10.8)
WBC NRBC COR # BLD: 5.6 10*3/MM3 (ref 3.4–10.8)
WBC NRBC COR # BLD: 5.71 10*3/MM3 (ref 3.4–10.8)
WBC NRBC COR # BLD: 5.76 10*3/MM3 (ref 3.4–10.8)
WBC NRBC COR # BLD: 5.76 10*3/MM3 (ref 3.4–10.8)
WBC NRBC COR # BLD: 5.8 10*3/MM3 (ref 3.4–10.8)
WBC NRBC COR # BLD: 5.86 10*3/MM3 (ref 3.4–10.8)
WBC NRBC COR # BLD: 5.9 10*3/MM3 (ref 3.4–10.8)
WBC NRBC COR # BLD: 5.93 10*3/MM3 (ref 3.4–10.8)
WBC NRBC COR # BLD: 6.02 10*3/MM3 (ref 3.4–10.8)
WBC NRBC COR # BLD: 6.04 10*3/MM3 (ref 3.4–10.8)
WBC NRBC COR # BLD: 6.1 10*3/MM3 (ref 3.4–10.8)
WBC NRBC COR # BLD: 6.21 10*3/MM3 (ref 3.4–10.8)
WBC NRBC COR # BLD: 6.23 10*3/MM3 (ref 3.4–10.8)
WBC NRBC COR # BLD: 6.25 10*3/MM3 (ref 3.4–10.8)
WBC NRBC COR # BLD: 6.29 10*3/MM3 (ref 3.4–10.8)
WBC NRBC COR # BLD: 6.4 10*3/MM3 (ref 3.4–10.8)
WBC NRBC COR # BLD: 6.47 10*3/MM3 (ref 3.4–10.8)
WBC NRBC COR # BLD: 6.51 10*3/MM3 (ref 3.4–10.8)
WBC NRBC COR # BLD: 6.62 10*3/MM3 (ref 3.4–10.8)
WBC NRBC COR # BLD: 6.86 10*3/MM3 (ref 3.4–10.8)
WBC NRBC COR # BLD: 6.97 10*3/MM3 (ref 3.4–10.8)
WBC NRBC COR # BLD: 6.98 10*3/MM3 (ref 3.4–10.8)
WBC NRBC COR # BLD: 7.03 10*3/MM3 (ref 3.4–10.8)
WBC NRBC COR # BLD: 7.19 10*3/MM3 (ref 3.4–10.8)
WBC NRBC COR # BLD: 8.41 10*3/MM3 (ref 3.4–10.8)
WHOLE BLOOD HOLD SPECIMEN: NORMAL
WHOLE BLOOD HOLD SPECIMEN: NORMAL
ZINC SERPL-MCNC: 59 UG/DL (ref 44–115)

## 2022-01-01 PROCEDURE — 84466 ASSAY OF TRANSFERRIN: CPT

## 2022-01-01 PROCEDURE — 83010 ASSAY OF HAPTOGLOBIN QUANT: CPT | Performed by: INTERNAL MEDICINE

## 2022-01-01 PROCEDURE — 87186 SC STD MICRODIL/AGAR DIL: CPT | Performed by: INTERNAL MEDICINE

## 2022-01-01 PROCEDURE — 85025 COMPLETE CBC W/AUTO DIFF WBC: CPT | Performed by: INTERNAL MEDICINE

## 2022-01-01 PROCEDURE — 99232 SBSQ HOSP IP/OBS MODERATE 35: CPT | Performed by: INTERNAL MEDICINE

## 2022-01-01 PROCEDURE — 80048 BASIC METABOLIC PNL TOTAL CA: CPT | Performed by: INTERNAL MEDICINE

## 2022-01-01 PROCEDURE — 83735 ASSAY OF MAGNESIUM: CPT | Performed by: INTERNAL MEDICINE

## 2022-01-01 PROCEDURE — 36415 COLL VENOUS BLD VENIPUNCTURE: CPT

## 2022-01-01 PROCEDURE — 82962 GLUCOSE BLOOD TEST: CPT

## 2022-01-01 PROCEDURE — 80053 COMPREHEN METABOLIC PANEL: CPT | Performed by: EMERGENCY MEDICINE

## 2022-01-01 PROCEDURE — 85610 PROTHROMBIN TIME: CPT

## 2022-01-01 PROCEDURE — 25010000002 PROTHROMBIN COMPLEX CONC HUMAN 1000 UNITS KIT: Performed by: EMERGENCY MEDICINE

## 2022-01-01 PROCEDURE — 85025 COMPLETE CBC W/AUTO DIFF WBC: CPT

## 2022-01-01 PROCEDURE — G0180 MD CERTIFICATION HHA PATIENT: HCPCS | Performed by: INTERNAL MEDICINE

## 2022-01-01 PROCEDURE — 97116 GAIT TRAINING THERAPY: CPT

## 2022-01-01 PROCEDURE — 83540 ASSAY OF IRON: CPT | Performed by: INTERNAL MEDICINE

## 2022-01-01 PROCEDURE — 36430 TRANSFUSION BLD/BLD COMPNT: CPT

## 2022-01-01 PROCEDURE — 97110 THERAPEUTIC EXERCISES: CPT | Performed by: OCCUPATIONAL THERAPIST

## 2022-01-01 PROCEDURE — 36416 COLLJ CAPILLARY BLOOD SPEC: CPT

## 2022-01-01 PROCEDURE — 80307 DRUG TEST PRSMV CHEM ANLYZR: CPT | Performed by: EMERGENCY MEDICINE

## 2022-01-01 PROCEDURE — 85610 PROTHROMBIN TIME: CPT | Performed by: EMERGENCY MEDICINE

## 2022-01-01 PROCEDURE — 93295 DEV INTERROG REMOTE 1/2/MLT: CPT | Performed by: INTERNAL MEDICINE

## 2022-01-01 PROCEDURE — 82180 ASSAY OF ASCORBIC ACID: CPT | Performed by: INTERNAL MEDICINE

## 2022-01-01 PROCEDURE — 85018 HEMOGLOBIN: CPT | Performed by: INTERNAL MEDICINE

## 2022-01-01 PROCEDURE — 77063 BREAST TOMOSYNTHESIS BI: CPT

## 2022-01-01 PROCEDURE — 86334 IMMUNOFIX E-PHORESIS SERUM: CPT | Performed by: INTERNAL MEDICINE

## 2022-01-01 PROCEDURE — 84484 ASSAY OF TROPONIN QUANT: CPT | Performed by: INTERNAL MEDICINE

## 2022-01-01 PROCEDURE — 85610 PROTHROMBIN TIME: CPT | Performed by: INTERNAL MEDICINE

## 2022-01-01 PROCEDURE — 99285 EMERGENCY DEPT VISIT HI MDM: CPT

## 2022-01-01 PROCEDURE — 25010000002 TETANUS-DIPHTH-ACELL PERTUSSIS 5-2.5-18.5 LF-MCG/0.5 SUSPENSION PREFILLED SYRINGE: Performed by: PHYSICIAN ASSISTANT

## 2022-01-01 PROCEDURE — 85025 COMPLETE CBC W/AUTO DIFF WBC: CPT | Performed by: EMERGENCY MEDICINE

## 2022-01-01 PROCEDURE — 85027 COMPLETE CBC AUTOMATED: CPT | Performed by: INTERNAL MEDICINE

## 2022-01-01 PROCEDURE — 99221 1ST HOSP IP/OBS SF/LOW 40: CPT | Performed by: NURSE PRACTITIONER

## 2022-01-01 PROCEDURE — 71045 X-RAY EXAM CHEST 1 VIEW: CPT

## 2022-01-01 PROCEDURE — 99222 1ST HOSP IP/OBS MODERATE 55: CPT | Performed by: INTERNAL MEDICINE

## 2022-01-01 PROCEDURE — 86850 RBC ANTIBODY SCREEN: CPT

## 2022-01-01 PROCEDURE — 25010000002 EPOETIN ALFA PER 1000 UNITS: Performed by: INTERNAL MEDICINE

## 2022-01-01 PROCEDURE — 25010000002 PROPOFOL 10 MG/ML EMULSION: Performed by: ANESTHESIOLOGY

## 2022-01-01 PROCEDURE — 70450 CT HEAD/BRAIN W/O DYE: CPT

## 2022-01-01 PROCEDURE — 99214 OFFICE O/P EST MOD 30 MIN: CPT | Performed by: NURSE PRACTITIONER

## 2022-01-01 PROCEDURE — 86901 BLOOD TYPING SEROLOGIC RH(D): CPT | Performed by: PHYSICIAN ASSISTANT

## 2022-01-01 PROCEDURE — 85610 PROTHROMBIN TIME: CPT | Performed by: PHYSICIAN ASSISTANT

## 2022-01-01 PROCEDURE — 93010 ELECTROCARDIOGRAM REPORT: CPT | Performed by: INTERNAL MEDICINE

## 2022-01-01 PROCEDURE — 82306 VITAMIN D 25 HYDROXY: CPT | Performed by: INTERNAL MEDICINE

## 2022-01-01 PROCEDURE — 86923 COMPATIBILITY TEST ELECTRIC: CPT

## 2022-01-01 PROCEDURE — 85046 RETICYTE/HGB CONCENTRATE: CPT | Performed by: INTERNAL MEDICINE

## 2022-01-01 PROCEDURE — 82728 ASSAY OF FERRITIN: CPT | Performed by: INTERNAL MEDICINE

## 2022-01-01 PROCEDURE — G0299 HHS/HOSPICE OF RN EA 15 MIN: HCPCS

## 2022-01-01 PROCEDURE — 25010000002 LORAZEPAM PER 2 MG: Performed by: INTERNAL MEDICINE

## 2022-01-01 PROCEDURE — 83735 ASSAY OF MAGNESIUM: CPT

## 2022-01-01 PROCEDURE — 93005 ELECTROCARDIOGRAM TRACING: CPT | Performed by: STUDENT IN AN ORGANIZED HEALTH CARE EDUCATION/TRAINING PROGRAM

## 2022-01-01 PROCEDURE — 99223 1ST HOSP IP/OBS HIGH 75: CPT | Performed by: INTERNAL MEDICINE

## 2022-01-01 PROCEDURE — 86900 BLOOD TYPING SEROLOGIC ABO: CPT | Performed by: INTERNAL MEDICINE

## 2022-01-01 PROCEDURE — 82728 ASSAY OF FERRITIN: CPT

## 2022-01-01 PROCEDURE — G0151 HHCP-SERV OF PT,EA 15 MIN: HCPCS

## 2022-01-01 PROCEDURE — 87086 URINE CULTURE/COLONY COUNT: CPT

## 2022-01-01 PROCEDURE — 0 PHYTONADIONE 10 MG/ML SOLUTION 1 ML AMPULE: Performed by: EMERGENCY MEDICINE

## 2022-01-01 PROCEDURE — 97535 SELF CARE MNGMENT TRAINING: CPT

## 2022-01-01 PROCEDURE — 77067 SCR MAMMO BI INCL CAD: CPT

## 2022-01-01 PROCEDURE — U0005 INFEC AGEN DETEC AMPLI PROBE: HCPCS | Performed by: EMERGENCY MEDICINE

## 2022-01-01 PROCEDURE — 96372 THER/PROPH/DIAG INJ SC/IM: CPT

## 2022-01-01 PROCEDURE — 25010000002 MORPHINE PER 10 MG: Performed by: INTERNAL MEDICINE

## 2022-01-01 PROCEDURE — 87186 SC STD MICRODIL/AGAR DIL: CPT

## 2022-01-01 PROCEDURE — 83735 ASSAY OF MAGNESIUM: CPT | Performed by: STUDENT IN AN ORGANIZED HEALTH CARE EDUCATION/TRAINING PROGRAM

## 2022-01-01 PROCEDURE — 85014 HEMATOCRIT: CPT | Performed by: INTERNAL MEDICINE

## 2022-01-01 PROCEDURE — 83521 IG LIGHT CHAINS FREE EACH: CPT | Performed by: INTERNAL MEDICINE

## 2022-01-01 PROCEDURE — 99214 OFFICE O/P EST MOD 30 MIN: CPT | Performed by: INTERNAL MEDICINE

## 2022-01-01 PROCEDURE — G0483 DRUG TEST DEF 22+ CLASSES: HCPCS | Performed by: INTERNAL MEDICINE

## 2022-01-01 PROCEDURE — 73560 X-RAY EXAM OF KNEE 1 OR 2: CPT

## 2022-01-01 PROCEDURE — P9612 CATHETERIZE FOR URINE SPEC: HCPCS

## 2022-01-01 PROCEDURE — 90471 IMMUNIZATION ADMIN: CPT | Performed by: PHYSICIAN ASSISTANT

## 2022-01-01 PROCEDURE — 85055 RETICULATED PLATELET ASSAY: CPT | Performed by: INTERNAL MEDICINE

## 2022-01-01 PROCEDURE — 85045 AUTOMATED RETICULOCYTE COUNT: CPT | Performed by: INTERNAL MEDICINE

## 2022-01-01 PROCEDURE — 82746 ASSAY OF FOLIC ACID SERUM: CPT | Performed by: INTERNAL MEDICINE

## 2022-01-01 PROCEDURE — 82607 VITAMIN B-12: CPT | Performed by: INTERNAL MEDICINE

## 2022-01-01 PROCEDURE — 99284 EMERGENCY DEPT VISIT MOD MDM: CPT

## 2022-01-01 PROCEDURE — 25010000002 HALOPERIDOL LACTATE PER 5 MG: Performed by: INTERNAL MEDICINE

## 2022-01-01 PROCEDURE — 86900 BLOOD TYPING SEROLOGIC ABO: CPT | Performed by: PHYSICIAN ASSISTANT

## 2022-01-01 PROCEDURE — 86900 BLOOD TYPING SEROLOGIC ABO: CPT

## 2022-01-01 PROCEDURE — 84484 ASSAY OF TROPONIN QUANT: CPT | Performed by: EMERGENCY MEDICINE

## 2022-01-01 PROCEDURE — 43235 EGD DIAGNOSTIC BRUSH WASH: CPT | Performed by: INTERNAL MEDICINE

## 2022-01-01 PROCEDURE — 84466 ASSAY OF TRANSFERRIN: CPT | Performed by: INTERNAL MEDICINE

## 2022-01-01 PROCEDURE — 0DJ08ZZ INSPECTION OF UPPER INTESTINAL TRACT, VIA NATURAL OR ARTIFICIAL OPENING ENDOSCOPIC: ICD-10-PCS | Performed by: INTERNAL MEDICINE

## 2022-01-01 PROCEDURE — 86850 RBC ANTIBODY SCREEN: CPT | Performed by: EMERGENCY MEDICINE

## 2022-01-01 PROCEDURE — 99232 SBSQ HOSP IP/OBS MODERATE 35: CPT | Performed by: PHYSICIAN ASSISTANT

## 2022-01-01 PROCEDURE — 84443 ASSAY THYROID STIM HORMONE: CPT | Performed by: INTERNAL MEDICINE

## 2022-01-01 PROCEDURE — 80048 BASIC METABOLIC PNL TOTAL CA: CPT

## 2022-01-01 PROCEDURE — 84630 ASSAY OF ZINC: CPT | Performed by: INTERNAL MEDICINE

## 2022-01-01 PROCEDURE — 80307 DRUG TEST PRSMV CHEM ANLYZR: CPT | Performed by: INTERNAL MEDICINE

## 2022-01-01 PROCEDURE — 83880 ASSAY OF NATRIURETIC PEPTIDE: CPT | Performed by: EMERGENCY MEDICINE

## 2022-01-01 PROCEDURE — 97166 OT EVAL MOD COMPLEX 45 MIN: CPT

## 2022-01-01 PROCEDURE — 73562 X-RAY EXAM OF KNEE 3: CPT

## 2022-01-01 PROCEDURE — 0202U NFCT DS 22 TRGT SARS-COV-2: CPT | Performed by: EMERGENCY MEDICINE

## 2022-01-01 PROCEDURE — 84100 ASSAY OF PHOSPHORUS: CPT | Performed by: INTERNAL MEDICINE

## 2022-01-01 PROCEDURE — G0300 HHS/HOSPICE OF LPN EA 15 MIN: HCPCS

## 2022-01-01 PROCEDURE — 87088 URINE BACTERIA CULTURE: CPT

## 2022-01-01 PROCEDURE — 85027 COMPLETE CBC AUTOMATED: CPT

## 2022-01-01 PROCEDURE — 93005 ELECTROCARDIOGRAM TRACING: CPT | Performed by: EMERGENCY MEDICINE

## 2022-01-01 PROCEDURE — 83880 ASSAY OF NATRIURETIC PEPTIDE: CPT

## 2022-01-01 PROCEDURE — 73070 X-RAY EXAM OF ELBOW: CPT

## 2022-01-01 PROCEDURE — P9016 RBC LEUKOCYTES REDUCED: HCPCS

## 2022-01-01 PROCEDURE — 84439 ASSAY OF FREE THYROXINE: CPT | Performed by: EMERGENCY MEDICINE

## 2022-01-01 PROCEDURE — 87040 BLOOD CULTURE FOR BACTERIA: CPT | Performed by: EMERGENCY MEDICINE

## 2022-01-01 PROCEDURE — 30283B1 TRANSFUSION OF NONAUTOLOGOUS 4-FACTOR PROTHROMBIN COMPLEX CONCENTRATE INTO VEIN, PERCUTANEOUS APPROACH: ICD-10-PCS | Performed by: EMERGENCY MEDICINE

## 2022-01-01 PROCEDURE — 36415 COLL VENOUS BLD VENIPUNCTURE: CPT | Performed by: INTERNAL MEDICINE

## 2022-01-01 PROCEDURE — G0463 HOSPITAL OUTPT CLINIC VISIT: HCPCS

## 2022-01-01 PROCEDURE — 86901 BLOOD TYPING SEROLOGIC RH(D): CPT

## 2022-01-01 PROCEDURE — 81001 URINALYSIS AUTO W/SCOPE: CPT | Performed by: PHYSICIAN ASSISTANT

## 2022-01-01 PROCEDURE — U0003 INFECTIOUS AGENT DETECTION BY NUCLEIC ACID (DNA OR RNA); SEVERE ACUTE RESPIRATORY SYNDROME CORONAVIRUS 2 (SARS-COV-2) (CORONAVIRUS DISEASE [COVID-19]), AMPLIFIED PROBE TECHNIQUE, MAKING USE OF HIGH THROUGHPUT TECHNOLOGIES AS DESCRIBED BY CMS-2020-01-R: HCPCS | Performed by: EMERGENCY MEDICINE

## 2022-01-01 PROCEDURE — 72125 CT NECK SPINE W/O DYE: CPT

## 2022-01-01 PROCEDURE — 86901 BLOOD TYPING SEROLOGIC RH(D): CPT | Performed by: EMERGENCY MEDICINE

## 2022-01-01 PROCEDURE — 80048 BASIC METABOLIC PNL TOTAL CA: CPT | Performed by: STUDENT IN AN ORGANIZED HEALTH CARE EDUCATION/TRAINING PROGRAM

## 2022-01-01 PROCEDURE — 93970 EXTREMITY STUDY: CPT

## 2022-01-01 PROCEDURE — 90715 TDAP VACCINE 7 YRS/> IM: CPT | Performed by: PHYSICIAN ASSISTANT

## 2022-01-01 PROCEDURE — 86850 RBC ANTIBODY SCREEN: CPT | Performed by: INTERNAL MEDICINE

## 2022-01-01 PROCEDURE — 83605 ASSAY OF LACTIC ACID: CPT | Performed by: EMERGENCY MEDICINE

## 2022-01-01 PROCEDURE — 81003 URINALYSIS AUTO W/O SCOPE: CPT | Performed by: EMERGENCY MEDICINE

## 2022-01-01 PROCEDURE — 97530 THERAPEUTIC ACTIVITIES: CPT

## 2022-01-01 PROCEDURE — 84132 ASSAY OF SERUM POTASSIUM: CPT | Performed by: INTERNAL MEDICINE

## 2022-01-01 PROCEDURE — 97162 PT EVAL MOD COMPLEX 30 MIN: CPT

## 2022-01-01 PROCEDURE — 86901 BLOOD TYPING SEROLOGIC RH(D): CPT | Performed by: INTERNAL MEDICINE

## 2022-01-01 PROCEDURE — 99213 OFFICE O/P EST LOW 20 MIN: CPT | Performed by: INTERNAL MEDICINE

## 2022-01-01 PROCEDURE — 87077 CULTURE AEROBIC IDENTIFY: CPT | Performed by: INTERNAL MEDICINE

## 2022-01-01 PROCEDURE — 63710000001 INSULIN REGULAR HUMAN PER 5 UNITS: Performed by: INTERNAL MEDICINE

## 2022-01-01 PROCEDURE — 93283 PRGRMG EVAL IMPLANTABLE DFB: CPT | Performed by: INTERNAL MEDICINE

## 2022-01-01 PROCEDURE — 86850 RBC ANTIBODY SCREEN: CPT | Performed by: PHYSICIAN ASSISTANT

## 2022-01-01 PROCEDURE — 83615 LACTATE (LD) (LDH) ENZYME: CPT | Performed by: INTERNAL MEDICINE

## 2022-01-01 PROCEDURE — 81001 URINALYSIS AUTO W/SCOPE: CPT

## 2022-01-01 PROCEDURE — 82270 OCCULT BLOOD FECES: CPT | Performed by: EMERGENCY MEDICINE

## 2022-01-01 PROCEDURE — 0DBN8ZX EXCISION OF SIGMOID COLON, VIA NATURAL OR ARTIFICIAL OPENING ENDOSCOPIC, DIAGNOSTIC: ICD-10-PCS | Performed by: INTERNAL MEDICINE

## 2022-01-01 PROCEDURE — 80061 LIPID PANEL: CPT | Performed by: INTERNAL MEDICINE

## 2022-01-01 PROCEDURE — 80053 COMPREHEN METABOLIC PANEL: CPT | Performed by: INTERNAL MEDICINE

## 2022-01-01 PROCEDURE — 97166 OT EVAL MOD COMPLEX 45 MIN: CPT | Performed by: OCCUPATIONAL THERAPIST

## 2022-01-01 PROCEDURE — 63710000001 DIPHENHYDRAMINE PER 50 MG: Performed by: INTERNAL MEDICINE

## 2022-01-01 PROCEDURE — 0202U NFCT DS 22 TRGT SARS-COV-2: CPT | Performed by: PHYSICIAN ASSISTANT

## 2022-01-01 PROCEDURE — 99231 SBSQ HOSP IP/OBS SF/LOW 25: CPT | Performed by: INTERNAL MEDICINE

## 2022-01-01 PROCEDURE — 87086 URINE CULTURE/COLONY COUNT: CPT | Performed by: PHYSICIAN ASSISTANT

## 2022-01-01 PROCEDURE — 83540 ASSAY OF IRON: CPT

## 2022-01-01 PROCEDURE — 0 POTASSIUM CHLORIDE 10 MEQ/100ML SOLUTION: Performed by: INTERNAL MEDICINE

## 2022-01-01 PROCEDURE — 84132 ASSAY OF SERUM POTASSIUM: CPT | Performed by: STUDENT IN AN ORGANIZED HEALTH CARE EDUCATION/TRAINING PROGRAM

## 2022-01-01 PROCEDURE — 85730 THROMBOPLASTIN TIME PARTIAL: CPT | Performed by: EMERGENCY MEDICINE

## 2022-01-01 PROCEDURE — 99282 EMERGENCY DEPT VISIT SF MDM: CPT

## 2022-01-01 PROCEDURE — 81001 URINALYSIS AUTO W/SCOPE: CPT | Performed by: INTERNAL MEDICINE

## 2022-01-01 PROCEDURE — 97110 THERAPEUTIC EXERCISES: CPT

## 2022-01-01 PROCEDURE — 84484 ASSAY OF TROPONIN QUANT: CPT | Performed by: PHYSICIAN ASSISTANT

## 2022-01-01 PROCEDURE — 88305 TISSUE EXAM BY PATHOLOGIST: CPT | Performed by: INTERNAL MEDICINE

## 2022-01-01 PROCEDURE — 86900 BLOOD TYPING SEROLOGIC ABO: CPT | Performed by: EMERGENCY MEDICINE

## 2022-01-01 PROCEDURE — 80053 COMPREHEN METABOLIC PANEL: CPT | Performed by: PHYSICIAN ASSISTANT

## 2022-01-01 PROCEDURE — 99214 OFFICE O/P EST MOD 30 MIN: CPT

## 2022-01-01 PROCEDURE — 85025 COMPLETE CBC W/AUTO DIFF WBC: CPT | Performed by: PHYSICIAN ASSISTANT

## 2022-01-01 PROCEDURE — 84145 PROCALCITONIN (PCT): CPT | Performed by: EMERGENCY MEDICINE

## 2022-01-01 PROCEDURE — 85007 BL SMEAR W/DIFF WBC COUNT: CPT | Performed by: INTERNAL MEDICINE

## 2022-01-01 PROCEDURE — 82784 ASSAY IGA/IGD/IGG/IGM EACH: CPT | Performed by: INTERNAL MEDICINE

## 2022-01-01 PROCEDURE — 82525 ASSAY OF COPPER: CPT | Performed by: INTERNAL MEDICINE

## 2022-01-01 PROCEDURE — 83735 ASSAY OF MAGNESIUM: CPT | Performed by: EMERGENCY MEDICINE

## 2022-01-01 PROCEDURE — 84550 ASSAY OF BLOOD/URIC ACID: CPT | Performed by: INTERNAL MEDICINE

## 2022-01-01 PROCEDURE — 92610 EVALUATE SWALLOWING FUNCTION: CPT

## 2022-01-01 PROCEDURE — 93005 ELECTROCARDIOGRAM TRACING: CPT | Performed by: PHYSICIAN ASSISTANT

## 2022-01-01 PROCEDURE — 84443 ASSAY THYROID STIM HORMONE: CPT | Performed by: EMERGENCY MEDICINE

## 2022-01-01 PROCEDURE — 84165 PROTEIN E-PHORESIS SERUM: CPT | Performed by: INTERNAL MEDICINE

## 2022-01-01 PROCEDURE — 93296 REM INTERROG EVL PM/IDS: CPT | Performed by: INTERNAL MEDICINE

## 2022-01-01 PROCEDURE — 87086 URINE CULTURE/COLONY COUNT: CPT | Performed by: INTERNAL MEDICINE

## 2022-01-01 PROCEDURE — 93000 ELECTROCARDIOGRAM COMPLETE: CPT

## 2022-01-01 PROCEDURE — 87635 SARS-COV-2 COVID-19 AMP PRB: CPT | Performed by: INTERNAL MEDICINE

## 2022-01-01 PROCEDURE — 45385 COLONOSCOPY W/LESION REMOVAL: CPT | Performed by: INTERNAL MEDICINE

## 2022-01-01 PROCEDURE — 36415 COLL VENOUS BLD VENIPUNCTURE: CPT | Performed by: STUDENT IN AN ORGANIZED HEALTH CARE EDUCATION/TRAINING PROGRAM

## 2022-01-01 PROCEDURE — 87186 SC STD MICRODIL/AGAR DIL: CPT | Performed by: PHYSICIAN ASSISTANT

## 2022-01-01 PROCEDURE — 97161 PT EVAL LOW COMPLEX 20 MIN: CPT

## 2022-01-01 PROCEDURE — 82077 ASSAY SPEC XCP UR&BREATH IA: CPT | Performed by: EMERGENCY MEDICINE

## 2022-01-01 PROCEDURE — 85027 COMPLETE CBC AUTOMATED: CPT | Performed by: STUDENT IN AN ORGANIZED HEALTH CARE EDUCATION/TRAINING PROGRAM

## 2022-01-01 PROCEDURE — 87077 CULTURE AEROBIC IDENTIFY: CPT | Performed by: PHYSICIAN ASSISTANT

## 2022-01-01 PROCEDURE — 84155 ASSAY OF PROTEIN SERUM: CPT | Performed by: INTERNAL MEDICINE

## 2022-01-01 RX ORDER — MORPHINE SULFATE 10 MG/ML
6 INJECTION INTRAMUSCULAR; INTRAVENOUS; SUBCUTANEOUS
Status: CANCELLED | OUTPATIENT
Start: 2022-01-01 | End: 2023-01-03

## 2022-01-01 RX ORDER — PROMETHAZINE HYDROCHLORIDE 25 MG/1
25 SUPPOSITORY RECTAL ONCE AS NEEDED
Status: DISCONTINUED | OUTPATIENT
Start: 2022-01-01 | End: 2022-01-01 | Stop reason: HOSPADM

## 2022-01-01 RX ORDER — ACETAMINOPHEN 325 MG/1
650 TABLET ORAL EVERY 4 HOURS PRN
Status: DISCONTINUED | OUTPATIENT
Start: 2022-01-01 | End: 2022-01-01

## 2022-01-01 RX ORDER — PROCHLORPERAZINE 25 MG
25 SUPPOSITORY, RECTAL RECTAL EVERY 12 HOURS PRN
Status: CANCELLED | OUTPATIENT
Start: 2022-01-01

## 2022-01-01 RX ORDER — ONDANSETRON 4 MG/1
4 TABLET, FILM COATED ORAL EVERY 6 HOURS PRN
Status: DISCONTINUED | OUTPATIENT
Start: 2022-01-01 | End: 2022-01-01 | Stop reason: HOSPADM

## 2022-01-01 RX ORDER — ALLOPURINOL 100 MG/1
TABLET ORAL
Qty: 180 TABLET | Refills: 1 | Status: SHIPPED | OUTPATIENT
Start: 2022-01-01 | End: 2022-01-01

## 2022-01-01 RX ORDER — DIPHENHYDRAMINE HYDROCHLORIDE AND LIDOCAINE HYDROCHLORIDE AND ALUMINUM HYDROXIDE AND MAGNESIUM HYDRO
5 KIT EVERY 4 HOURS PRN
Status: DISCONTINUED | OUTPATIENT
Start: 2022-01-01 | End: 2022-01-01 | Stop reason: HOSPADM

## 2022-01-01 RX ORDER — OXYCODONE HYDROCHLORIDE AND ACETAMINOPHEN 5; 325 MG/1; MG/1
1 TABLET ORAL ONCE
Status: COMPLETED | OUTPATIENT
Start: 2022-01-01 | End: 2022-01-01

## 2022-01-01 RX ORDER — POLYETHYLENE GLYCOL 3350 17 G/17G
17 POWDER, FOR SOLUTION ORAL DAILY PRN
Status: DISCONTINUED | OUTPATIENT
Start: 2022-01-01 | End: 2022-01-01 | Stop reason: HOSPADM

## 2022-01-01 RX ORDER — ECHINACEA PURPUREA EXTRACT 125 MG
2 TABLET ORAL AS NEEDED
Status: CANCELLED | OUTPATIENT
Start: 2022-01-01

## 2022-01-01 RX ORDER — BISACODYL 5 MG/1
5 TABLET, DELAYED RELEASE ORAL DAILY PRN
Status: DISCONTINUED | OUTPATIENT
Start: 2022-01-01 | End: 2022-01-01

## 2022-01-01 RX ORDER — DIPHENHYDRAMINE HYDROCHLORIDE 50 MG/ML
25 INJECTION INTRAMUSCULAR; INTRAVENOUS ONCE
Status: DISCONTINUED | OUTPATIENT
Start: 2022-01-01 | End: 2022-01-01

## 2022-01-01 RX ORDER — MORPHINE SULFATE 4 MG/ML
4 INJECTION, SOLUTION INTRAMUSCULAR; INTRAVENOUS
Status: CANCELLED | OUTPATIENT
Start: 2022-01-01 | End: 2023-01-03

## 2022-01-01 RX ORDER — ZOLPIDEM TARTRATE 10 MG/1
10 TABLET ORAL NIGHTLY PRN
Qty: 90 TABLET | Refills: 1 | Status: SHIPPED | OUTPATIENT
Start: 2022-01-01 | End: 2022-01-01 | Stop reason: SDUPTHER

## 2022-01-01 RX ORDER — NITROGLYCERIN 0.4 MG/1
0.4 TABLET SUBLINGUAL
Status: DISCONTINUED | OUTPATIENT
Start: 2022-01-01 | End: 2022-01-01 | Stop reason: HOSPADM

## 2022-01-01 RX ORDER — GLYCOPYRROLATE 0.2 MG/ML
0.2 INJECTION INTRAMUSCULAR; INTRAVENOUS
Status: DISCONTINUED | OUTPATIENT
Start: 2022-01-01 | End: 2022-01-01 | Stop reason: HOSPADM

## 2022-01-01 RX ORDER — DIPHENOXYLATE HYDROCHLORIDE AND ATROPINE SULFATE 2.5; .025 MG/1; MG/1
1 TABLET ORAL DAILY
Status: DISCONTINUED | OUTPATIENT
Start: 2022-01-01 | End: 2022-01-01 | Stop reason: HOSPADM

## 2022-01-01 RX ORDER — PANTOPRAZOLE SODIUM 40 MG/1
40 TABLET, DELAYED RELEASE ORAL 2 TIMES DAILY
Status: DISCONTINUED | OUTPATIENT
Start: 2022-01-01 | End: 2022-01-01 | Stop reason: HOSPADM

## 2022-01-01 RX ORDER — ALLOPURINOL 100 MG/1
200 TABLET ORAL DAILY
Status: DISCONTINUED | OUTPATIENT
Start: 2022-01-01 | End: 2022-01-01 | Stop reason: HOSPADM

## 2022-01-01 RX ORDER — ACETAMINOPHEN 325 MG/1
650 TABLET ORAL EVERY 4 HOURS PRN
Status: CANCELLED | OUTPATIENT
Start: 2022-01-01

## 2022-01-01 RX ORDER — GLYCOPYRROLATE 0.2 MG/ML
0.4 INJECTION INTRAMUSCULAR; INTRAVENOUS
Status: DISCONTINUED | OUTPATIENT
Start: 2022-01-01 | End: 2022-01-01 | Stop reason: HOSPADM

## 2022-01-01 RX ORDER — ACETAMINOPHEN 325 MG/1
650 TABLET ORAL ONCE
Status: CANCELLED | OUTPATIENT
Start: 2022-01-01 | End: 2022-01-01

## 2022-01-01 RX ORDER — MORPHINE SULFATE 2 MG/ML
2 INJECTION, SOLUTION INTRAMUSCULAR; INTRAVENOUS
Status: DISCONTINUED | OUTPATIENT
Start: 2022-01-01 | End: 2022-01-01 | Stop reason: HOSPADM

## 2022-01-01 RX ORDER — LORAZEPAM 2 MG/ML
0.5 INJECTION INTRAMUSCULAR
Status: CANCELLED | OUTPATIENT
Start: 2022-01-01 | End: 2023-01-03

## 2022-01-01 RX ORDER — PROPOFOL 10 MG/ML
VIAL (ML) INTRAVENOUS AS NEEDED
Status: DISCONTINUED | OUTPATIENT
Start: 2022-01-01 | End: 2022-01-01 | Stop reason: SURG

## 2022-01-01 RX ORDER — PROCHLORPERAZINE EDISYLATE 5 MG/ML
5 INJECTION INTRAMUSCULAR; INTRAVENOUS EVERY 6 HOURS PRN
Status: DISCONTINUED | OUTPATIENT
Start: 2022-01-01 | End: 2022-01-01 | Stop reason: HOSPADM

## 2022-01-01 RX ORDER — CHOLECALCIFEROL (VITAMIN D3) 125 MCG
1000 CAPSULE ORAL DAILY
Status: DISCONTINUED | OUTPATIENT
Start: 2022-01-01 | End: 2022-01-01 | Stop reason: HOSPADM

## 2022-01-01 RX ORDER — UREA 10 %
3 LOTION (ML) TOPICAL NIGHTLY PRN
Status: DISCONTINUED | OUTPATIENT
Start: 2022-01-01 | End: 2022-01-01 | Stop reason: HOSPADM

## 2022-01-01 RX ORDER — DIPHENHYDRAMINE HYDROCHLORIDE 50 MG/ML
25 INJECTION INTRAMUSCULAR; INTRAVENOUS ONCE
Status: COMPLETED | OUTPATIENT
Start: 2022-01-01 | End: 2022-01-01

## 2022-01-01 RX ORDER — MORPHINE SULFATE 20 MG/ML
5 SOLUTION ORAL
Status: DISCONTINUED | OUTPATIENT
Start: 2022-01-01 | End: 2022-01-01 | Stop reason: HOSPADM

## 2022-01-01 RX ORDER — POTASSIUM CHLORIDE 7.45 MG/ML
10 INJECTION INTRAVENOUS
Status: COMPLETED | OUTPATIENT
Start: 2022-01-01 | End: 2022-01-01

## 2022-01-01 RX ORDER — SODIUM CHLORIDE 9 MG/ML
250 INJECTION, SOLUTION INTRAVENOUS AS NEEDED
Status: DISCONTINUED | OUTPATIENT
Start: 2022-01-01 | End: 2022-01-01 | Stop reason: HOSPADM

## 2022-01-01 RX ORDER — SCOLOPAMINE TRANSDERMAL SYSTEM 1 MG/1
1 PATCH, EXTENDED RELEASE TRANSDERMAL
Status: DISCONTINUED | OUTPATIENT
Start: 2022-01-01 | End: 2022-01-01 | Stop reason: HOSPADM

## 2022-01-01 RX ORDER — LABETALOL HYDROCHLORIDE 5 MG/ML
5 INJECTION, SOLUTION INTRAVENOUS
Status: DISCONTINUED | OUTPATIENT
Start: 2022-01-01 | End: 2022-01-01 | Stop reason: HOSPADM

## 2022-01-01 RX ORDER — LORAZEPAM 2 MG/ML
1 INJECTION INTRAMUSCULAR
Status: DISCONTINUED | OUTPATIENT
Start: 2022-01-01 | End: 2022-01-01

## 2022-01-01 RX ORDER — CARVEDILOL 25 MG/1
TABLET ORAL
Qty: 180 TABLET | Refills: 1 | Status: SHIPPED | OUTPATIENT
Start: 2022-01-01 | End: 2022-01-01

## 2022-01-01 RX ORDER — PROCHLORPERAZINE MALEATE 10 MG
5 TABLET ORAL EVERY 6 HOURS PRN
Status: CANCELLED | OUTPATIENT
Start: 2022-01-01

## 2022-01-01 RX ORDER — DIPHENOXYLATE HYDROCHLORIDE AND ATROPINE SULFATE 2.5; .025 MG/1; MG/1
1 TABLET ORAL
Status: DISCONTINUED | OUTPATIENT
Start: 2022-01-01 | End: 2022-01-01 | Stop reason: HOSPADM

## 2022-01-01 RX ORDER — DIPHENOXYLATE HYDROCHLORIDE AND ATROPINE SULFATE 2.5; .025 MG/1; MG/1
1 TABLET ORAL
Status: CANCELLED | OUTPATIENT
Start: 2022-01-01

## 2022-01-01 RX ORDER — SODIUM CHLORIDE 0.9 % (FLUSH) 0.9 %
10 SYRINGE (ML) INJECTION EVERY 12 HOURS SCHEDULED
Status: DISCONTINUED | OUTPATIENT
Start: 2022-01-01 | End: 2022-01-01 | Stop reason: HOSPADM

## 2022-01-01 RX ORDER — AMIODARONE HYDROCHLORIDE 200 MG/1
200 TABLET ORAL DAILY
Status: DISCONTINUED | OUTPATIENT
Start: 2022-01-01 | End: 2022-01-01

## 2022-01-01 RX ORDER — LORAZEPAM 2 MG/ML
1 INJECTION INTRAMUSCULAR
Status: DISCONTINUED | OUTPATIENT
Start: 2022-01-01 | End: 2022-01-01 | Stop reason: HOSPADM

## 2022-01-01 RX ORDER — CALCIUM CARBONATE 200(500)MG
2 TABLET,CHEWABLE ORAL 3 TIMES DAILY PRN
Status: DISCONTINUED | OUTPATIENT
Start: 2022-01-01 | End: 2022-01-01

## 2022-01-01 RX ORDER — ASPIRIN 81 MG/1
81 TABLET, CHEWABLE ORAL DAILY
Status: DISCONTINUED | OUTPATIENT
Start: 2022-01-01 | End: 2022-01-01

## 2022-01-01 RX ORDER — ACETAMINOPHEN 650 MG/1
650 SUPPOSITORY RECTAL EVERY 4 HOURS PRN
Status: DISCONTINUED | OUTPATIENT
Start: 2022-01-01 | End: 2022-01-01 | Stop reason: HOSPADM

## 2022-01-01 RX ORDER — DIPHENHYDRAMINE HCL 25 MG
25 CAPSULE ORAL
Status: DISCONTINUED | OUTPATIENT
Start: 2022-01-01 | End: 2022-01-01 | Stop reason: HOSPADM

## 2022-01-01 RX ORDER — HYDROMORPHONE HYDROCHLORIDE 1 MG/ML
0.5 INJECTION, SOLUTION INTRAMUSCULAR; INTRAVENOUS; SUBCUTANEOUS
Status: CANCELLED | OUTPATIENT
Start: 2022-01-01 | End: 2023-01-01

## 2022-01-01 RX ORDER — PANTOPRAZOLE SODIUM 40 MG/1
40 TABLET, DELAYED RELEASE ORAL 2 TIMES DAILY
Qty: 180 TABLET | Refills: 0 | Status: SHIPPED | OUTPATIENT
Start: 2022-01-01 | End: 2022-01-01

## 2022-01-01 RX ORDER — ATROPINE SULFATE 10 MG/ML
2 SOLUTION/ DROPS OPHTHALMIC 2 TIMES DAILY PRN
Status: DISCONTINUED | OUTPATIENT
Start: 2022-01-01 | End: 2022-01-01 | Stop reason: HOSPADM

## 2022-01-01 RX ORDER — HYDROMORPHONE HCL 110MG/55ML
0.5 PATIENT CONTROLLED ANALGESIA SYRINGE INTRAVENOUS
Status: DISCONTINUED | OUTPATIENT
Start: 2022-01-01 | End: 2022-01-01 | Stop reason: HOSPADM

## 2022-01-01 RX ORDER — WARFARIN SODIUM 1 MG/1
TABLET ORAL
Qty: 90 TABLET | OUTPATIENT
Start: 2022-01-01

## 2022-01-01 RX ORDER — PANTOPRAZOLE SODIUM 40 MG/10ML
80 INJECTION, POWDER, LYOPHILIZED, FOR SOLUTION INTRAVENOUS ONCE
Status: COMPLETED | OUTPATIENT
Start: 2022-01-01 | End: 2022-01-01

## 2022-01-01 RX ORDER — BUMETANIDE 2 MG/1
2 TABLET ORAL 2 TIMES DAILY
Status: DISCONTINUED | OUTPATIENT
Start: 2022-01-01 | End: 2022-01-01 | Stop reason: HOSPADM

## 2022-01-01 RX ORDER — LORAZEPAM 2 MG/ML
2 INJECTION INTRAMUSCULAR
Status: CANCELLED | OUTPATIENT
Start: 2022-01-01 | End: 2023-01-03

## 2022-01-01 RX ORDER — SODIUM CHLORIDE 9 MG/ML
40 INJECTION, SOLUTION INTRAVENOUS AS NEEDED
Status: DISCONTINUED | OUTPATIENT
Start: 2022-01-01 | End: 2022-01-01

## 2022-01-01 RX ORDER — PROCHLORPERAZINE 25 MG
25 SUPPOSITORY, RECTAL RECTAL EVERY 12 HOURS PRN
Status: DISCONTINUED | OUTPATIENT
Start: 2022-01-01 | End: 2022-01-01 | Stop reason: HOSPADM

## 2022-01-01 RX ORDER — PANTOPRAZOLE SODIUM 40 MG/1
TABLET, DELAYED RELEASE ORAL
Qty: 180 TABLET | Refills: 0 | Status: SHIPPED | OUTPATIENT
Start: 2022-01-01

## 2022-01-01 RX ORDER — LIDOCAINE HYDROCHLORIDE AND EPINEPHRINE 10; 10 MG/ML; UG/ML
10 INJECTION, SOLUTION INFILTRATION; PERINEURAL ONCE
Status: COMPLETED | OUTPATIENT
Start: 2022-01-01 | End: 2022-01-01

## 2022-01-01 RX ORDER — SODIUM CHLORIDE 0.9 % (FLUSH) 0.9 %
10 SYRINGE (ML) INJECTION AS NEEDED
Status: DISCONTINUED | OUTPATIENT
Start: 2022-01-01 | End: 2022-01-01

## 2022-01-01 RX ORDER — HYDRALAZINE HYDROCHLORIDE 20 MG/ML
5 INJECTION INTRAMUSCULAR; INTRAVENOUS
Status: DISCONTINUED | OUTPATIENT
Start: 2022-01-01 | End: 2022-01-01 | Stop reason: HOSPADM

## 2022-01-01 RX ORDER — ENOXAPARIN SODIUM 100 MG/ML
60 INJECTION SUBCUTANEOUS
Qty: 3.6 ML | Refills: 0 | Status: SHIPPED | OUTPATIENT
Start: 2022-01-01 | End: 2022-01-01 | Stop reason: SDUPTHER

## 2022-01-01 RX ORDER — OXYCODONE HYDROCHLORIDE AND ACETAMINOPHEN 5; 325 MG/1; MG/1
1 TABLET ORAL EVERY 8 HOURS PRN
Qty: 9 TABLET | Refills: 0 | Status: SHIPPED | OUTPATIENT
Start: 2022-01-01 | End: 2022-01-01 | Stop reason: SDUPTHER

## 2022-01-01 RX ORDER — OXYCODONE AND ACETAMINOPHEN 7.5; 325 MG/1; MG/1
1 TABLET ORAL EVERY 4 HOURS PRN
Status: DISCONTINUED | OUTPATIENT
Start: 2022-01-01 | End: 2022-01-01 | Stop reason: HOSPADM

## 2022-01-01 RX ORDER — NITROFURANTOIN 25; 75 MG/1; MG/1
100 CAPSULE ORAL 2 TIMES DAILY
Qty: 14 CAPSULE | Refills: 0 | Status: SHIPPED | OUTPATIENT
Start: 2022-01-01

## 2022-01-01 RX ORDER — SODIUM CHLORIDE 0.9 % (FLUSH) 0.9 %
10 SYRINGE (ML) INJECTION AS NEEDED
Status: DISCONTINUED | OUTPATIENT
Start: 2022-01-01 | End: 2022-01-01 | Stop reason: HOSPADM

## 2022-01-01 RX ORDER — MORPHINE SULFATE 20 MG/ML
20 SOLUTION ORAL
Status: DISCONTINUED | OUTPATIENT
Start: 2022-01-01 | End: 2022-01-01 | Stop reason: HOSPADM

## 2022-01-01 RX ORDER — LORAZEPAM 2 MG/ML
1 INJECTION INTRAMUSCULAR
Status: CANCELLED | OUTPATIENT
Start: 2022-01-01 | End: 2023-01-03

## 2022-01-01 RX ORDER — DIPHENHYDRAMINE HYDROCHLORIDE 50 MG/ML
12.5 INJECTION INTRAMUSCULAR; INTRAVENOUS
Status: DISCONTINUED | OUTPATIENT
Start: 2022-01-01 | End: 2022-01-01 | Stop reason: HOSPADM

## 2022-01-01 RX ORDER — AMIODARONE HYDROCHLORIDE 200 MG/1
200 TABLET ORAL 2 TIMES DAILY
Status: DISCONTINUED | OUTPATIENT
Start: 2022-01-01 | End: 2022-01-01 | Stop reason: HOSPADM

## 2022-01-01 RX ORDER — ZOLPIDEM TARTRATE 10 MG/1
TABLET ORAL
COMMUNITY
Start: 2022-01-01 | End: 2022-01-01 | Stop reason: HOSPADM

## 2022-01-01 RX ORDER — ZOLPIDEM TARTRATE 5 MG/1
5 TABLET ORAL NIGHTLY PRN
Qty: 90 TABLET | Refills: 3 | Status: SHIPPED | OUTPATIENT
Start: 2022-01-01 | End: 2022-01-01 | Stop reason: SDUPTHER

## 2022-01-01 RX ORDER — ACETAMINOPHEN 160 MG/5ML
650 SOLUTION ORAL EVERY 4 HOURS PRN
Status: CANCELLED | OUTPATIENT
Start: 2022-01-01

## 2022-01-01 RX ORDER — NICOTINE POLACRILEX 4 MG
15 LOZENGE BUCCAL
Status: DISCONTINUED | OUTPATIENT
Start: 2022-01-01 | End: 2022-01-01 | Stop reason: HOSPADM

## 2022-01-01 RX ORDER — ATORVASTATIN CALCIUM 20 MG/1
10 TABLET, FILM COATED ORAL DAILY
Refills: 1 | Status: DISCONTINUED | OUTPATIENT
Start: 2022-01-01 | End: 2022-01-01 | Stop reason: HOSPADM

## 2022-01-01 RX ORDER — SODIUM CHLORIDE, SODIUM LACTATE, POTASSIUM CHLORIDE, CALCIUM CHLORIDE 600; 310; 30; 20 MG/100ML; MG/100ML; MG/100ML; MG/100ML
INJECTION, SOLUTION INTRAVENOUS CONTINUOUS PRN
Status: DISCONTINUED | OUTPATIENT
Start: 2022-01-01 | End: 2022-01-01 | Stop reason: SURG

## 2022-01-01 RX ORDER — DEXTROSE MONOHYDRATE 25 G/50ML
25 INJECTION, SOLUTION INTRAVENOUS
Status: DISCONTINUED | OUTPATIENT
Start: 2022-01-01 | End: 2022-01-01 | Stop reason: HOSPADM

## 2022-01-01 RX ORDER — WARFARIN SODIUM 1 MG/1
TABLET ORAL
Qty: 105 TABLET | Refills: 1 | Status: SHIPPED | OUTPATIENT
Start: 2022-01-01 | End: 2022-01-01 | Stop reason: HOSPADM

## 2022-01-01 RX ORDER — ONDANSETRON 2 MG/ML
4 INJECTION INTRAMUSCULAR; INTRAVENOUS EVERY 6 HOURS PRN
Status: DISCONTINUED | OUTPATIENT
Start: 2022-01-01 | End: 2022-01-01 | Stop reason: HOSPADM

## 2022-01-01 RX ORDER — GLYCOPYRROLATE 0.2 MG/ML
0.2 INJECTION INTRAMUSCULAR; INTRAVENOUS
Status: CANCELLED | OUTPATIENT
Start: 2022-01-01

## 2022-01-01 RX ORDER — SODIUM CHLORIDE 9 MG/ML
250 INJECTION, SOLUTION INTRAVENOUS AS NEEDED
Status: CANCELLED | OUTPATIENT
Start: 2022-01-01

## 2022-01-01 RX ORDER — ALLOPURINOL 100 MG/1
100 TABLET ORAL DAILY
Status: DISCONTINUED | OUTPATIENT
Start: 2022-01-01 | End: 2022-01-01

## 2022-01-01 RX ORDER — LORAZEPAM 2 MG/ML
2 INJECTION INTRAMUSCULAR
Status: DISCONTINUED | OUTPATIENT
Start: 2022-01-01 | End: 2022-01-01 | Stop reason: HOSPADM

## 2022-01-01 RX ORDER — LORAZEPAM 2 MG/ML
1 CONCENTRATE ORAL
Status: DISCONTINUED | OUTPATIENT
Start: 2022-01-01 | End: 2022-01-01 | Stop reason: HOSPADM

## 2022-01-01 RX ORDER — MORPHINE SULFATE 20 MG/ML
10 SOLUTION ORAL
Status: DISCONTINUED | OUTPATIENT
Start: 2022-01-01 | End: 2022-01-01 | Stop reason: HOSPADM

## 2022-01-01 RX ORDER — WARFARIN SODIUM 1 MG/1
TABLET ORAL
Qty: 90 TABLET | Refills: 1 | Status: SHIPPED | OUTPATIENT
Start: 2022-01-01

## 2022-01-01 RX ORDER — SCOLOPAMINE TRANSDERMAL SYSTEM 1 MG/1
1 PATCH, EXTENDED RELEASE TRANSDERMAL
Status: CANCELLED | OUTPATIENT
Start: 2022-01-01

## 2022-01-01 RX ORDER — ALENDRONATE SODIUM 70 MG/1
70 TABLET ORAL
COMMUNITY

## 2022-01-01 RX ORDER — DIPHENHYDRAMINE HCL 25 MG
25 CAPSULE ORAL ONCE
Status: COMPLETED | OUTPATIENT
Start: 2022-01-01 | End: 2022-01-01

## 2022-01-01 RX ORDER — LORAZEPAM 2 MG/ML
1 CONCENTRATE ORAL
Status: CANCELLED | OUTPATIENT
Start: 2022-01-01 | End: 2023-01-03

## 2022-01-01 RX ORDER — ACETAMINOPHEN 650 MG/1
650 SUPPOSITORY RECTAL ONCE
Status: COMPLETED | OUTPATIENT
Start: 2022-01-01 | End: 2022-01-01

## 2022-01-01 RX ORDER — MORPHINE SULFATE 2 MG/ML
4 INJECTION, SOLUTION INTRAMUSCULAR; INTRAVENOUS
Status: DISCONTINUED | OUTPATIENT
Start: 2022-01-01 | End: 2022-01-01 | Stop reason: HOSPADM

## 2022-01-01 RX ORDER — CHOLECALCIFEROL (VITAMIN D3) 125 MCG
5 CAPSULE ORAL NIGHTLY PRN
Status: DISCONTINUED | OUTPATIENT
Start: 2022-01-01 | End: 2022-01-01 | Stop reason: HOSPADM

## 2022-01-01 RX ORDER — MORPHINE SULFATE 20 MG/ML
5 SOLUTION ORAL
Status: CANCELLED | OUTPATIENT
Start: 2022-01-01 | End: 2023-01-01

## 2022-01-01 RX ORDER — WARFARIN SODIUM 1 MG/1
1 TABLET ORAL
Qty: 90 TABLET | Refills: 1 | OUTPATIENT
Start: 2022-01-01 | End: 2022-01-01 | Stop reason: HOSPADM

## 2022-01-01 RX ORDER — PROCHLORPERAZINE MALEATE 10 MG
5 TABLET ORAL EVERY 6 HOURS PRN
Status: DISCONTINUED | OUTPATIENT
Start: 2022-01-01 | End: 2022-01-01 | Stop reason: HOSPADM

## 2022-01-01 RX ORDER — ACETAMINOPHEN 325 MG/1
650 TABLET ORAL EVERY 4 HOURS PRN
Status: DISCONTINUED | OUTPATIENT
Start: 2022-01-01 | End: 2022-01-01 | Stop reason: HOSPADM

## 2022-01-01 RX ORDER — GLYCOPYRROLATE 0.2 MG/ML
0.4 INJECTION INTRAMUSCULAR; INTRAVENOUS
Status: CANCELLED | OUTPATIENT
Start: 2022-01-01

## 2022-01-01 RX ORDER — LORAZEPAM 2 MG/ML
0.5 INJECTION INTRAMUSCULAR
Status: DISCONTINUED | OUTPATIENT
Start: 2022-01-01 | End: 2022-01-01 | Stop reason: HOSPADM

## 2022-01-01 RX ORDER — SIMVASTATIN 40 MG
40 TABLET ORAL NIGHTLY
COMMUNITY

## 2022-01-01 RX ORDER — FERROUS SULFATE 325(65) MG
325 TABLET ORAL 2 TIMES WEEKLY
Status: DISCONTINUED | OUTPATIENT
Start: 2022-01-01 | End: 2022-01-01 | Stop reason: HOSPADM

## 2022-01-01 RX ORDER — WARFARIN SODIUM 1 MG/1
2 TABLET ORAL NIGHTLY
Qty: 90 TABLET | Refills: 3 | Status: SHIPPED | OUTPATIENT
Start: 2022-01-01 | End: 2022-01-01 | Stop reason: SDUPTHER

## 2022-01-01 RX ORDER — POLYETHYLENE GLYCOL 3350 17 G/17G
1 POWDER, FOR SOLUTION ORAL ONCE
Status: DISCONTINUED | OUTPATIENT
Start: 2022-01-01 | End: 2022-01-01

## 2022-01-01 RX ORDER — LORAZEPAM 2 MG/ML
2 CONCENTRATE ORAL
Status: CANCELLED | OUTPATIENT
Start: 2022-01-01 | End: 2023-01-03

## 2022-01-01 RX ORDER — CHOLECALCIFEROL (VITAMIN D3) 125 MCG
5 CAPSULE ORAL NIGHTLY
COMMUNITY

## 2022-01-01 RX ORDER — ZOLPIDEM TARTRATE 5 MG/1
5 TABLET ORAL NIGHTLY PRN
Qty: 90 TABLET | Refills: 3 | Status: SHIPPED | OUTPATIENT
Start: 2022-01-01 | End: 2022-01-01 | Stop reason: DRUGHIGH

## 2022-01-01 RX ORDER — SODIUM CHLORIDE 9 MG/ML
60 INJECTION, SOLUTION INTRAVENOUS CONTINUOUS
Status: DISCONTINUED | OUTPATIENT
Start: 2022-01-01 | End: 2022-01-01

## 2022-01-01 RX ORDER — ENOXAPARIN SODIUM 100 MG/ML
60 INJECTION SUBCUTANEOUS
Qty: 3.6 ML | Refills: 0 | Status: SHIPPED | OUTPATIENT
Start: 2022-01-01 | End: 2022-01-01

## 2022-01-01 RX ORDER — EPHEDRINE SULFATE 50 MG/ML
INJECTION, SOLUTION INTRAVENOUS AS NEEDED
Status: DISCONTINUED | OUTPATIENT
Start: 2022-01-01 | End: 2022-01-01 | Stop reason: SURG

## 2022-01-01 RX ORDER — HYDROMORPHONE HYDROCHLORIDE 1 MG/ML
0.5 INJECTION, SOLUTION INTRAMUSCULAR; INTRAVENOUS; SUBCUTANEOUS
Status: DISCONTINUED | OUTPATIENT
Start: 2022-01-01 | End: 2022-01-01 | Stop reason: HOSPADM

## 2022-01-01 RX ORDER — ACETAMINOPHEN 160 MG/5ML
650 SOLUTION ORAL EVERY 4 HOURS PRN
Status: DISCONTINUED | OUTPATIENT
Start: 2022-01-01 | End: 2022-01-01

## 2022-01-01 RX ORDER — DIPHENHYDRAMINE HCL 25 MG
25 CAPSULE ORAL ONCE
Status: DISCONTINUED | OUTPATIENT
Start: 2022-01-01 | End: 2022-01-01

## 2022-01-01 RX ORDER — BUMETANIDE 2 MG/1
TABLET ORAL
Qty: 180 TABLET | Refills: 1 | Status: SHIPPED | OUTPATIENT
Start: 2022-01-01 | End: 2022-01-01 | Stop reason: DRUGHIGH

## 2022-01-01 RX ORDER — PROCHLORPERAZINE EDISYLATE 5 MG/ML
5 INJECTION INTRAMUSCULAR; INTRAVENOUS EVERY 6 HOURS PRN
Status: CANCELLED | OUTPATIENT
Start: 2022-01-01

## 2022-01-01 RX ORDER — SODIUM CHLORIDE, SODIUM LACTATE, POTASSIUM CHLORIDE, CALCIUM CHLORIDE 600; 310; 30; 20 MG/100ML; MG/100ML; MG/100ML; MG/100ML
100 INJECTION, SOLUTION INTRAVENOUS CONTINUOUS
Status: DISCONTINUED | OUTPATIENT
Start: 2022-01-01 | End: 2022-01-01

## 2022-01-01 RX ORDER — ASPIRIN 81 MG/1
81 TABLET, CHEWABLE ORAL DAILY
Status: DISCONTINUED | OUTPATIENT
Start: 2022-01-01 | End: 2022-01-01 | Stop reason: HOSPADM

## 2022-01-01 RX ORDER — AMOXICILLIN 250 MG
2 CAPSULE ORAL 2 TIMES DAILY
Status: DISCONTINUED | OUTPATIENT
Start: 2022-01-01 | End: 2022-01-01

## 2022-01-01 RX ORDER — POTASSIUM CHLORIDE 7.45 MG/ML
10 INJECTION INTRAVENOUS ONCE
Status: COMPLETED | OUTPATIENT
Start: 2022-01-01 | End: 2022-01-01

## 2022-01-01 RX ORDER — CARVEDILOL 25 MG/1
25 TABLET ORAL 2 TIMES DAILY
Status: DISCONTINUED | OUTPATIENT
Start: 2022-01-01 | End: 2022-01-01 | Stop reason: HOSPADM

## 2022-01-01 RX ORDER — AMIODARONE HYDROCHLORIDE 200 MG/1
200 TABLET ORAL 2 TIMES DAILY
Qty: 180 TABLET | Refills: 1 | Status: SHIPPED | OUTPATIENT
Start: 2022-01-01

## 2022-01-01 RX ORDER — MORPHINE SULFATE 20 MG/ML
10 SOLUTION ORAL
Status: CANCELLED | OUTPATIENT
Start: 2022-01-01 | End: 2023-01-03

## 2022-01-01 RX ORDER — LORAZEPAM 2 MG/ML
0.5 CONCENTRATE ORAL
Status: CANCELLED | OUTPATIENT
Start: 2022-01-01 | End: 2023-01-03

## 2022-01-01 RX ORDER — ACETAMINOPHEN 160 MG/5ML
650 SOLUTION ORAL ONCE
Status: DISCONTINUED | OUTPATIENT
Start: 2022-01-01 | End: 2022-01-01

## 2022-01-01 RX ORDER — SODIUM CHLORIDE 0.9 % (FLUSH) 0.9 %
10 SYRINGE (ML) INJECTION EVERY 12 HOURS SCHEDULED
Status: DISCONTINUED | OUTPATIENT
Start: 2022-01-01 | End: 2022-01-01

## 2022-01-01 RX ORDER — ATROPINE SULFATE 10 MG/ML
2 SOLUTION/ DROPS OPHTHALMIC 2 TIMES DAILY PRN
Status: CANCELLED | OUTPATIENT
Start: 2022-01-01

## 2022-01-01 RX ORDER — ACETAMINOPHEN 650 MG/1
650 SUPPOSITORY RECTAL EVERY 4 HOURS PRN
Status: DISCONTINUED | OUTPATIENT
Start: 2022-01-01 | End: 2022-01-01

## 2022-01-01 RX ORDER — PANTOPRAZOLE SODIUM 40 MG/1
TABLET, DELAYED RELEASE ORAL
Qty: 180 TABLET | Refills: 0 | Status: SHIPPED | OUTPATIENT
Start: 2022-01-01 | End: 2022-01-01

## 2022-01-01 RX ORDER — CARVEDILOL 6.25 MG/1
6.25 TABLET ORAL 2 TIMES DAILY
Status: DISCONTINUED | OUTPATIENT
Start: 2022-01-01 | End: 2022-01-01 | Stop reason: HOSPADM

## 2022-01-01 RX ORDER — ALLOPURINOL 100 MG/1
200 TABLET ORAL DAILY
Qty: 60 TABLET | Refills: 3 | Status: SHIPPED | OUTPATIENT
Start: 2022-01-01 | End: 2022-01-01

## 2022-01-01 RX ORDER — WARFARIN SODIUM 1 MG/1
1 TABLET ORAL
Qty: 90 TABLET | Refills: 1 | Status: ON HOLD | OUTPATIENT
Start: 2022-01-01 | End: 2022-01-01 | Stop reason: SDUPTHER

## 2022-01-01 RX ORDER — HYDROCODONE BITARTRATE AND ACETAMINOPHEN 7.5; 325 MG/1; MG/1
1 TABLET ORAL ONCE AS NEEDED
Status: DISCONTINUED | OUTPATIENT
Start: 2022-01-01 | End: 2022-01-01 | Stop reason: HOSPADM

## 2022-01-01 RX ORDER — CEFADROXIL 500 MG/1
CAPSULE ORAL
COMMUNITY
Start: 2022-01-01 | End: 2022-01-01 | Stop reason: SDDI

## 2022-01-01 RX ORDER — HALOPERIDOL 5 MG/ML
2 INJECTION INTRAMUSCULAR EVERY 6 HOURS PRN
Status: DISCONTINUED | OUTPATIENT
Start: 2022-01-01 | End: 2022-01-01

## 2022-01-01 RX ORDER — BISACODYL 5 MG/1
20 TABLET, DELAYED RELEASE ORAL ONCE
Status: COMPLETED | OUTPATIENT
Start: 2022-01-01 | End: 2022-01-01

## 2022-01-01 RX ORDER — SODIUM CHLORIDE, SODIUM LACTATE, POTASSIUM CHLORIDE, CALCIUM CHLORIDE 600; 310; 30; 20 MG/100ML; MG/100ML; MG/100ML; MG/100ML
50 INJECTION, SOLUTION INTRAVENOUS CONTINUOUS
Status: DISCONTINUED | OUTPATIENT
Start: 2022-01-01 | End: 2022-01-01

## 2022-01-01 RX ORDER — CARVEDILOL 25 MG/1
25 TABLET ORAL 2 TIMES DAILY
Status: DISCONTINUED | OUTPATIENT
Start: 2022-01-01 | End: 2022-01-01

## 2022-01-01 RX ORDER — FENTANYL CITRATE 50 UG/ML
50 INJECTION, SOLUTION INTRAMUSCULAR; INTRAVENOUS
Status: DISCONTINUED | OUTPATIENT
Start: 2022-01-01 | End: 2022-01-01 | Stop reason: HOSPADM

## 2022-01-01 RX ORDER — FERROUS SULFATE 325(65) MG
325 TABLET ORAL
Status: DISCONTINUED | OUTPATIENT
Start: 2022-01-01 | End: 2022-01-01 | Stop reason: HOSPADM

## 2022-01-01 RX ORDER — PROMETHAZINE HYDROCHLORIDE 25 MG/1
25 TABLET ORAL ONCE AS NEEDED
Status: DISCONTINUED | OUTPATIENT
Start: 2022-01-01 | End: 2022-01-01 | Stop reason: HOSPADM

## 2022-01-01 RX ORDER — FERROUS SULFATE 325(65) MG
325 TABLET ORAL DAILY
COMMUNITY

## 2022-01-01 RX ORDER — ACETAMINOPHEN 325 MG/1
650 TABLET ORAL ONCE
Status: DISCONTINUED | OUTPATIENT
Start: 2022-01-01 | End: 2022-01-01

## 2022-01-01 RX ORDER — OXYCODONE HYDROCHLORIDE AND ACETAMINOPHEN 5; 325 MG/1; MG/1
1 TABLET ORAL EVERY 8 HOURS PRN
Status: DISCONTINUED | OUTPATIENT
Start: 2022-01-01 | End: 2022-01-01 | Stop reason: HOSPADM

## 2022-01-01 RX ORDER — ACETAMINOPHEN 160 MG/5ML
650 SOLUTION ORAL EVERY 4 HOURS PRN
Status: DISCONTINUED | OUTPATIENT
Start: 2022-01-01 | End: 2022-01-01 | Stop reason: HOSPADM

## 2022-01-01 RX ORDER — ACETAMINOPHEN 325 MG/1
650 TABLET ORAL ONCE
Status: COMPLETED | OUTPATIENT
Start: 2022-01-01 | End: 2022-01-01

## 2022-01-01 RX ORDER — ECHINACEA PURPUREA EXTRACT 125 MG
2 TABLET ORAL AS NEEDED
Status: DISCONTINUED | OUTPATIENT
Start: 2022-01-01 | End: 2022-01-01 | Stop reason: HOSPADM

## 2022-01-01 RX ORDER — ONDANSETRON 2 MG/ML
4 INJECTION INTRAMUSCULAR; INTRAVENOUS EVERY 6 HOURS PRN
Status: DISCONTINUED | OUTPATIENT
Start: 2022-01-01 | End: 2022-01-01

## 2022-01-01 RX ORDER — ONDANSETRON 4 MG/1
4 TABLET, FILM COATED ORAL EVERY 6 HOURS PRN
Status: DISCONTINUED | OUTPATIENT
Start: 2022-01-01 | End: 2022-01-01

## 2022-01-01 RX ORDER — POLYETHYLENE GLYCOL 3350 17 G/17G
17 POWDER, FOR SOLUTION ORAL DAILY PRN
Status: DISCONTINUED | OUTPATIENT
Start: 2022-01-01 | End: 2022-01-01

## 2022-01-01 RX ORDER — MORPHINE SULFATE 2 MG/ML
6 INJECTION, SOLUTION INTRAMUSCULAR; INTRAVENOUS
Status: DISCONTINUED | OUTPATIENT
Start: 2022-01-01 | End: 2022-01-01 | Stop reason: HOSPADM

## 2022-01-01 RX ORDER — MORPHINE SULFATE 20 MG/ML
20 SOLUTION ORAL
Status: CANCELLED | OUTPATIENT
Start: 2022-01-01 | End: 2023-01-03

## 2022-01-01 RX ORDER — FLUMAZENIL 0.1 MG/ML
0.2 INJECTION INTRAVENOUS AS NEEDED
Status: DISCONTINUED | OUTPATIENT
Start: 2022-01-01 | End: 2022-01-01 | Stop reason: HOSPADM

## 2022-01-01 RX ORDER — LORAZEPAM 2 MG/ML
0.5 CONCENTRATE ORAL
Status: DISCONTINUED | OUTPATIENT
Start: 2022-01-01 | End: 2022-01-01 | Stop reason: HOSPADM

## 2022-01-01 RX ORDER — ACETAMINOPHEN 650 MG/1
650 SUPPOSITORY RECTAL EVERY 4 HOURS PRN
Status: CANCELLED | OUTPATIENT
Start: 2022-01-01

## 2022-01-01 RX ORDER — OXYCODONE HYDROCHLORIDE AND ACETAMINOPHEN 5; 325 MG/1; MG/1
1 TABLET ORAL EVERY 8 HOURS PRN
Qty: 30 TABLET | Refills: 0 | Status: SHIPPED | OUTPATIENT
Start: 2022-01-01 | End: 2022-01-01 | Stop reason: SDUPTHER

## 2022-01-01 RX ORDER — OXYCODONE HYDROCHLORIDE AND ACETAMINOPHEN 5; 325 MG/1; MG/1
1 TABLET ORAL EVERY 8 HOURS PRN
Qty: 30 TABLET | Refills: 0 | Status: SHIPPED | OUTPATIENT
Start: 2022-01-01

## 2022-01-01 RX ORDER — ZOLPIDEM TARTRATE 10 MG/1
10 TABLET ORAL NIGHTLY PRN
Qty: 30 TABLET | Refills: 3 | Status: SHIPPED | OUTPATIENT
Start: 2022-01-01

## 2022-01-01 RX ORDER — PANTOPRAZOLE SODIUM 40 MG/1
40 TABLET, DELAYED RELEASE ORAL
Status: DISCONTINUED | OUTPATIENT
Start: 2022-01-01 | End: 2022-01-01 | Stop reason: HOSPADM

## 2022-01-01 RX ORDER — CHOLECALCIFEROL (VITAMIN D3) 125 MCG
5 CAPSULE ORAL NIGHTLY
Status: DISCONTINUED | OUTPATIENT
Start: 2022-01-01 | End: 2022-01-01

## 2022-01-01 RX ORDER — ACETAMINOPHEN 650 MG/1
650 SUPPOSITORY RECTAL ONCE
Status: DISCONTINUED | OUTPATIENT
Start: 2022-01-01 | End: 2022-01-01

## 2022-01-01 RX ORDER — BUMETANIDE 2 MG/1
TABLET ORAL
Qty: 30 TABLET | Refills: 3 | Status: SHIPPED | OUTPATIENT
Start: 2022-01-01 | End: 2022-01-01

## 2022-01-01 RX ORDER — BISACODYL 10 MG
10 SUPPOSITORY, RECTAL RECTAL DAILY PRN
Status: DISCONTINUED | OUTPATIENT
Start: 2022-01-01 | End: 2022-01-01

## 2022-01-01 RX ORDER — NITROFURANTOIN 25; 75 MG/1; MG/1
100 CAPSULE ORAL 2 TIMES DAILY
Qty: 14 CAPSULE | Refills: 0 | Status: SHIPPED | OUTPATIENT
Start: 2022-01-01 | End: 2022-01-01 | Stop reason: SDUPTHER

## 2022-01-01 RX ORDER — PANTOPRAZOLE SODIUM 40 MG/1
40 TABLET, DELAYED RELEASE ORAL
Status: CANCELLED | OUTPATIENT
Start: 2022-01-01

## 2022-01-01 RX ORDER — EPHEDRINE SULFATE 50 MG/ML
5 INJECTION, SOLUTION INTRAVENOUS ONCE AS NEEDED
Status: DISCONTINUED | OUTPATIENT
Start: 2022-01-01 | End: 2022-01-01 | Stop reason: HOSPADM

## 2022-01-01 RX ORDER — HYDROMORPHONE HCL 110MG/55ML
1.5 PATIENT CONTROLLED ANALGESIA SYRINGE INTRAVENOUS
Status: CANCELLED | OUTPATIENT
Start: 2022-01-01 | End: 2023-01-03

## 2022-01-01 RX ORDER — LORAZEPAM 2 MG/ML
2 CONCENTRATE ORAL
Status: DISCONTINUED | OUTPATIENT
Start: 2022-01-01 | End: 2022-01-01 | Stop reason: HOSPADM

## 2022-01-01 RX ORDER — MORPHINE SULFATE 2 MG/ML
2 INJECTION, SOLUTION INTRAMUSCULAR; INTRAVENOUS
Status: CANCELLED | OUTPATIENT
Start: 2022-01-01 | End: 2023-01-01

## 2022-01-01 RX ORDER — ATORVASTATIN CALCIUM 20 MG/1
10 TABLET, FILM COATED ORAL DAILY
Status: DISCONTINUED | OUTPATIENT
Start: 2022-01-01 | End: 2022-01-01

## 2022-01-01 RX ORDER — OXYCODONE HYDROCHLORIDE AND ACETAMINOPHEN 5; 325 MG/1; MG/1
1 TABLET ORAL EVERY 8 HOURS PRN
Status: DISCONTINUED | OUTPATIENT
Start: 2022-01-01 | End: 2022-01-01

## 2022-01-01 RX ORDER — NALOXONE HCL 0.4 MG/ML
0.2 VIAL (ML) INJECTION AS NEEDED
Status: DISCONTINUED | OUTPATIENT
Start: 2022-01-01 | End: 2022-01-01 | Stop reason: HOSPADM

## 2022-01-01 RX ORDER — BUMETANIDE 2 MG/1
TABLET ORAL
Qty: 180 TABLET | Refills: 3 | Status: SHIPPED | OUTPATIENT
Start: 2022-01-01

## 2022-01-01 RX ORDER — DIPHENHYDRAMINE HYDROCHLORIDE AND LIDOCAINE HYDROCHLORIDE AND ALUMINUM HYDROXIDE AND MAGNESIUM HYDRO
5 KIT EVERY 4 HOURS PRN
Status: CANCELLED | OUTPATIENT
Start: 2022-01-01

## 2022-01-01 RX ORDER — DIPHENHYDRAMINE HCL 25 MG
25 CAPSULE ORAL ONCE
Status: CANCELLED | OUTPATIENT
Start: 2022-01-01 | End: 2022-01-01

## 2022-01-01 RX ORDER — CHOLECALCIFEROL (VITAMIN D3) 125 MCG
5 CAPSULE ORAL NIGHTLY PRN
Status: DISCONTINUED | OUTPATIENT
Start: 2022-01-01 | End: 2022-01-01

## 2022-01-01 RX ORDER — ALLOPURINOL 100 MG/1
TABLET ORAL
Qty: 180 TABLET | Refills: 1 | Status: SHIPPED | OUTPATIENT
Start: 2022-01-01

## 2022-01-01 RX ORDER — ONDANSETRON 2 MG/ML
4 INJECTION INTRAMUSCULAR; INTRAVENOUS ONCE AS NEEDED
Status: DISCONTINUED | OUTPATIENT
Start: 2022-01-01 | End: 2022-01-01 | Stop reason: HOSPADM

## 2022-01-01 RX ORDER — CARVEDILOL 25 MG/1
TABLET ORAL
Qty: 180 TABLET | Refills: 1 | Status: SHIPPED | OUTPATIENT
Start: 2022-01-01

## 2022-01-01 RX ORDER — LIDOCAINE HYDROCHLORIDE 20 MG/ML
INJECTION, SOLUTION INFILTRATION; PERINEURAL AS NEEDED
Status: DISCONTINUED | OUTPATIENT
Start: 2022-01-01 | End: 2022-01-01 | Stop reason: SURG

## 2022-01-01 RX ORDER — ACETAMINOPHEN 160 MG/5ML
650 SOLUTION ORAL ONCE
Status: COMPLETED | OUTPATIENT
Start: 2022-01-01 | End: 2022-01-01

## 2022-01-01 RX ORDER — ZOLPIDEM TARTRATE 10 MG/1
10 TABLET ORAL NIGHTLY PRN
Qty: 90 TABLET | Refills: 1 | Status: SHIPPED | OUTPATIENT
Start: 2022-01-01 | End: 2022-01-01 | Stop reason: HOSPADM

## 2022-01-01 RX ORDER — OXYCODONE HYDROCHLORIDE AND ACETAMINOPHEN 5; 325 MG/1; MG/1
1 TABLET ORAL EVERY 6 HOURS PRN
Status: DISCONTINUED | OUTPATIENT
Start: 2022-01-01 | End: 2022-01-01 | Stop reason: HOSPADM

## 2022-01-01 RX ORDER — NITROGLYCERIN 0.4 MG/1
0.4 TABLET SUBLINGUAL
Status: DISCONTINUED | OUTPATIENT
Start: 2022-01-01 | End: 2022-01-01

## 2022-01-01 RX ORDER — CALCITRIOL 0.25 UG/1
0.25 CAPSULE, LIQUID FILLED ORAL 2 TIMES DAILY
Status: DISCONTINUED | OUTPATIENT
Start: 2022-01-01 | End: 2022-01-01 | Stop reason: HOSPADM

## 2022-01-01 RX ADMIN — PANTOPRAZOLE SODIUM 40 MG: 40 TABLET, DELAYED RELEASE ORAL at 05:35

## 2022-01-01 RX ADMIN — PANTOPRAZOLE SODIUM 8 MG/HR: 40 INJECTION, POWDER, FOR SOLUTION INTRAVENOUS at 09:00

## 2022-01-01 RX ADMIN — OXYCODONE HYDROCHLORIDE AND ACETAMINOPHEN 1 TABLET: 5; 325 TABLET ORAL at 13:41

## 2022-01-01 RX ADMIN — ATORVASTATIN CALCIUM 10 MG: 20 TABLET, FILM COATED ORAL at 09:32

## 2022-01-01 RX ADMIN — PANTOPRAZOLE SODIUM 8 MG/HR: 40 INJECTION, POWDER, FOR SOLUTION INTRAVENOUS at 04:02

## 2022-01-01 RX ADMIN — MUPIROCIN 1 APPLICATION: 20 OINTMENT TOPICAL at 09:33

## 2022-01-01 RX ADMIN — ASPIRIN 81 MG: 81 TABLET, CHEWABLE ORAL at 09:32

## 2022-01-01 RX ADMIN — ERYTHROPOIETIN 13000 UNITS: 20000 INJECTION, SOLUTION INTRAVENOUS; SUBCUTANEOUS at 13:35

## 2022-01-01 RX ADMIN — CALCITRIOL 0.25 MCG: 0.25 CAPSULE ORAL at 09:32

## 2022-01-01 RX ADMIN — ALLOPURINOL 200 MG: 100 TABLET ORAL at 08:20

## 2022-01-01 RX ADMIN — PANTOPRAZOLE SODIUM 8 MG/HR: 40 INJECTION, POWDER, LYOPHILIZED, FOR SOLUTION INTRAVENOUS at 11:33

## 2022-01-01 RX ADMIN — PANTOPRAZOLE SODIUM 40 MG: 40 TABLET, DELAYED RELEASE ORAL at 20:52

## 2022-01-01 RX ADMIN — ERYTHROPOIETIN 13000 UNITS: 20000 INJECTION, SOLUTION INTRAVENOUS; SUBCUTANEOUS at 13:48

## 2022-01-01 RX ADMIN — Medication 10 ML: at 09:37

## 2022-01-01 RX ADMIN — ATORVASTATIN CALCIUM 10 MG: 20 TABLET, FILM COATED ORAL at 15:51

## 2022-01-01 RX ADMIN — AMIODARONE HYDROCHLORIDE 200 MG: 200 TABLET ORAL at 20:47

## 2022-01-01 RX ADMIN — AMIODARONE HYDROCHLORIDE 200 MG: 200 TABLET ORAL at 08:55

## 2022-01-01 RX ADMIN — SODIUM CHLORIDE, POTASSIUM CHLORIDE, SODIUM LACTATE AND CALCIUM CHLORIDE: 600; 310; 30; 20 INJECTION, SOLUTION INTRAVENOUS at 13:09

## 2022-01-01 RX ADMIN — POTASSIUM CHLORIDE 10 MEQ: 7.46 INJECTION, SOLUTION INTRAVENOUS at 17:59

## 2022-01-01 RX ADMIN — BUMETANIDE 2 MG: 2 TABLET ORAL at 20:43

## 2022-01-01 RX ADMIN — ALLOPURINOL 200 MG: 100 TABLET ORAL at 09:32

## 2022-01-01 RX ADMIN — ACETAMINOPHEN 650 MG: 325 TABLET, FILM COATED ORAL at 19:55

## 2022-01-01 RX ADMIN — PANTOPRAZOLE SODIUM 8 MG/HR: 40 INJECTION, POWDER, FOR SOLUTION INTRAVENOUS at 15:16

## 2022-01-01 RX ADMIN — POTASSIUM CHLORIDE 10 MEQ: 7.46 INJECTION, SOLUTION INTRAVENOUS at 16:40

## 2022-01-01 RX ADMIN — CARVEDILOL 25 MG: 25 TABLET, FILM COATED ORAL at 08:41

## 2022-01-01 RX ADMIN — CARVEDILOL 6.25 MG: 6.25 TABLET, FILM COATED ORAL at 20:10

## 2022-01-01 RX ADMIN — AMIODARONE HYDROCHLORIDE 200 MG: 200 TABLET ORAL at 09:37

## 2022-01-01 RX ADMIN — BUMETANIDE 2 MG: 2 TABLET ORAL at 08:29

## 2022-01-01 RX ADMIN — SODIUM CHLORIDE, POTASSIUM CHLORIDE, SODIUM LACTATE AND CALCIUM CHLORIDE: 600; 310; 30; 20 INJECTION, SOLUTION INTRAVENOUS at 08:48

## 2022-01-01 RX ADMIN — OXYCODONE HYDROCHLORIDE AND ACETAMINOPHEN 1 TABLET: 5; 325 TABLET ORAL at 03:14

## 2022-01-01 RX ADMIN — Medication 10 ML: at 20:43

## 2022-01-01 RX ADMIN — Medication 5 MG: at 21:47

## 2022-01-01 RX ADMIN — PROPOFOL 30 MG: 10 INJECTION, EMULSION INTRAVENOUS at 13:15

## 2022-01-01 RX ADMIN — PROPOFOL 20 MG: 10 INJECTION, EMULSION INTRAVENOUS at 13:14

## 2022-01-01 RX ADMIN — Medication 10 ML: at 08:14

## 2022-01-01 RX ADMIN — DIPHENHYDRAMINE HYDROCHLORIDE 25 MG: 25 CAPSULE ORAL at 14:12

## 2022-01-01 RX ADMIN — AMIODARONE HYDROCHLORIDE 200 MG: 200 TABLET ORAL at 01:18

## 2022-01-01 RX ADMIN — CALCITRIOL 0.25 MCG: 0.25 CAPSULE ORAL at 21:07

## 2022-01-01 RX ADMIN — LORAZEPAM 0.5 MG: 2 INJECTION INTRAMUSCULAR; INTRAVENOUS at 01:01

## 2022-01-01 RX ADMIN — AMIODARONE HYDROCHLORIDE 200 MG: 200 TABLET ORAL at 08:03

## 2022-01-01 RX ADMIN — Medication 5 MG: at 01:46

## 2022-01-01 RX ADMIN — SODIUM CHLORIDE, POTASSIUM CHLORIDE, SODIUM LACTATE AND CALCIUM CHLORIDE 100 ML/HR: 600; 310; 30; 20 INJECTION, SOLUTION INTRAVENOUS at 21:35

## 2022-01-01 RX ADMIN — ACETAMINOPHEN 325 MG: 325 TABLET, FILM COATED ORAL at 07:44

## 2022-01-01 RX ADMIN — Medication 1000 MCG: at 08:29

## 2022-01-01 RX ADMIN — PANTOPRAZOLE SODIUM 40 MG: 40 TABLET, DELAYED RELEASE ORAL at 09:58

## 2022-01-01 RX ADMIN — PANTOPRAZOLE SODIUM 8 MG/HR: 40 INJECTION, POWDER, FOR SOLUTION INTRAVENOUS at 13:50

## 2022-01-01 RX ADMIN — MORPHINE SULFATE 6 MG: 2 INJECTION, SOLUTION INTRAMUSCULAR; INTRAVENOUS at 21:07

## 2022-01-01 RX ADMIN — GLYCOPYRROLATE 0.4 MG: 0.2 INJECTION INTRAMUSCULAR; INTRAVENOUS at 20:53

## 2022-01-01 RX ADMIN — POTASSIUM CHLORIDE 10 MEQ: 7.46 INJECTION, SOLUTION INTRAVENOUS at 09:09

## 2022-01-01 RX ADMIN — CALCITRIOL 0.25 MCG: 0.25 CAPSULE ORAL at 08:41

## 2022-01-01 RX ADMIN — CARVEDILOL 25 MG: 25 TABLET, FILM COATED ORAL at 21:07

## 2022-01-01 RX ADMIN — Medication 10 ML: at 22:41

## 2022-01-01 RX ADMIN — PANTOPRAZOLE SODIUM 40 MG: 40 TABLET, DELAYED RELEASE ORAL at 21:07

## 2022-01-01 RX ADMIN — PANTOPRAZOLE SODIUM 8 MG/HR: 40 INJECTION, POWDER, LYOPHILIZED, FOR SOLUTION INTRAVENOUS at 22:55

## 2022-01-01 RX ADMIN — ERYTHROPOIETIN 10000 UNITS: 10000 INJECTION, SOLUTION INTRAVENOUS; SUBCUTANEOUS at 13:41

## 2022-01-01 RX ADMIN — FERROUS SULFATE TAB 325 MG (65 MG ELEMENTAL FE) 325 MG: 325 (65 FE) TAB at 11:03

## 2022-01-01 RX ADMIN — OXYCODONE HYDROCHLORIDE AND ACETAMINOPHEN 1 TABLET: 5; 325 TABLET ORAL at 23:27

## 2022-01-01 RX ADMIN — BUMETANIDE 2 MG: 2 TABLET ORAL at 09:25

## 2022-01-01 RX ADMIN — AMIODARONE HYDROCHLORIDE 200 MG: 200 TABLET ORAL at 20:52

## 2022-01-01 RX ADMIN — Medication 10 ML: at 21:28

## 2022-01-01 RX ADMIN — TETANUS TOXOID, REDUCED DIPHTHERIA TOXOID AND ACELLULAR PERTUSSIS VACCINE, ADSORBED 0.5 ML: 5; 2.5; 8; 8; 2.5 SUSPENSION INTRAMUSCULAR at 11:46

## 2022-01-01 RX ADMIN — CALCITRIOL 0.25 MCG: 0.25 CAPSULE ORAL at 20:43

## 2022-01-01 RX ADMIN — MUPIROCIN 1 APPLICATION: 20 OINTMENT TOPICAL at 08:41

## 2022-01-01 RX ADMIN — MORPHINE SULFATE 4 MG: 2 INJECTION, SOLUTION INTRAMUSCULAR; INTRAVENOUS at 11:28

## 2022-01-01 RX ADMIN — PANTOPRAZOLE SODIUM 40 MG: 40 TABLET, DELAYED RELEASE ORAL at 11:04

## 2022-01-01 RX ADMIN — Medication 10 ML: at 08:20

## 2022-01-01 RX ADMIN — CARVEDILOL 6.25 MG: 6.25 TABLET, FILM COATED ORAL at 08:20

## 2022-01-01 RX ADMIN — ERYTHROPOIETIN 5000 UNITS: 20000 INJECTION, SOLUTION INTRAVENOUS; SUBCUTANEOUS at 16:12

## 2022-01-01 RX ADMIN — BISACODYL 20 MG: 5 TABLET ORAL at 18:25

## 2022-01-01 RX ADMIN — MUPIROCIN 1 APPLICATION: 20 OINTMENT TOPICAL at 18:15

## 2022-01-01 RX ADMIN — PHYTONADIONE 10 MG: 10 INJECTION, EMULSION INTRAMUSCULAR; INTRAVENOUS; SUBCUTANEOUS at 00:01

## 2022-01-01 RX ADMIN — CARVEDILOL 25 MG: 25 TABLET, FILM COATED ORAL at 09:25

## 2022-01-01 RX ADMIN — ERYTHROPOIETIN 13000 UNITS: 20000 INJECTION, SOLUTION INTRAVENOUS; SUBCUTANEOUS at 14:26

## 2022-01-01 RX ADMIN — MUPIROCIN 1 APPLICATION: 20 OINTMENT TOPICAL at 09:41

## 2022-01-01 RX ADMIN — PHYTONADIONE 10 MG: 10 INJECTION, EMULSION INTRAMUSCULAR; INTRAVENOUS; SUBCUTANEOUS at 15:16

## 2022-01-01 RX ADMIN — LORAZEPAM 2 MG: 2 INJECTION INTRAMUSCULAR; INTRAVENOUS at 07:48

## 2022-01-01 RX ADMIN — MORPHINE SULFATE 6 MG: 2 INJECTION, SOLUTION INTRAMUSCULAR; INTRAVENOUS at 07:49

## 2022-01-01 RX ADMIN — Medication 1 TABLET: at 08:29

## 2022-01-01 RX ADMIN — CALCITRIOL 0.25 MCG: 0.25 CAPSULE ORAL at 20:52

## 2022-01-01 RX ADMIN — CARVEDILOL 6.25 MG: 6.25 TABLET, FILM COATED ORAL at 11:04

## 2022-01-01 RX ADMIN — CALCITRIOL 0.25 MCG: 0.25 CAPSULE ORAL at 09:25

## 2022-01-01 RX ADMIN — PANTOPRAZOLE SODIUM 40 MG: 40 TABLET, DELAYED RELEASE ORAL at 09:32

## 2022-01-01 RX ADMIN — Medication 10 ML: at 09:59

## 2022-01-01 RX ADMIN — CARVEDILOL 25 MG: 25 TABLET, FILM COATED ORAL at 09:32

## 2022-01-01 RX ADMIN — BUMETANIDE 2 MG: 2 TABLET ORAL at 09:32

## 2022-01-01 RX ADMIN — MUPIROCIN 1 APPLICATION: 20 OINTMENT TOPICAL at 08:30

## 2022-01-01 RX ADMIN — FERROUS SULFATE TAB 325 MG (65 MG ELEMENTAL FE) 325 MG: 325 (65 FE) TAB at 09:26

## 2022-01-01 RX ADMIN — PROPOFOL 10 MG: 10 INJECTION, EMULSION INTRAVENOUS at 13:16

## 2022-01-01 RX ADMIN — ERYTHROPOIETIN 10000 UNITS: 10000 INJECTION, SOLUTION INTRAVENOUS; SUBCUTANEOUS at 13:25

## 2022-01-01 RX ADMIN — FERROUS SULFATE TAB 325 MG (65 MG ELEMENTAL FE) 325 MG: 325 (65 FE) TAB at 08:29

## 2022-01-01 RX ADMIN — PANTOPRAZOLE SODIUM 40 MG: 40 TABLET, DELAYED RELEASE ORAL at 20:04

## 2022-01-01 RX ADMIN — PANTOPRAZOLE SODIUM 40 MG: 40 TABLET, DELAYED RELEASE ORAL at 20:10

## 2022-01-01 RX ADMIN — MORPHINE SULFATE 2 MG: 2 INJECTION, SOLUTION INTRAMUSCULAR; INTRAVENOUS at 01:00

## 2022-01-01 RX ADMIN — ERYTHROPOIETIN 10000 UNITS: 10000 INJECTION, SOLUTION INTRAVENOUS; SUBCUTANEOUS at 14:57

## 2022-01-01 RX ADMIN — LORAZEPAM 1 MG: 2 INJECTION INTRAMUSCULAR; INTRAVENOUS at 10:02

## 2022-01-01 RX ADMIN — SCOPALAMINE 1 PATCH: 1 PATCH, EXTENDED RELEASE TRANSDERMAL at 07:49

## 2022-01-01 RX ADMIN — ATORVASTATIN CALCIUM 10 MG: 20 TABLET, FILM COATED ORAL at 11:03

## 2022-01-01 RX ADMIN — ALLOPURINOL 200 MG: 100 TABLET ORAL at 09:25

## 2022-01-01 RX ADMIN — PANTOPRAZOLE SODIUM 8 MG/HR: 40 INJECTION, POWDER, LYOPHILIZED, FOR SOLUTION INTRAVENOUS at 20:42

## 2022-01-01 RX ADMIN — PANTOPRAZOLE SODIUM 8 MG/HR: 40 INJECTION, POWDER, FOR SOLUTION INTRAVENOUS at 22:34

## 2022-01-01 RX ADMIN — ALLOPURINOL 200 MG: 100 TABLET ORAL at 09:58

## 2022-01-01 RX ADMIN — AMIODARONE HYDROCHLORIDE 200 MG: 200 TABLET ORAL at 09:32

## 2022-01-01 RX ADMIN — LORAZEPAM 0.5 MG: 2 INJECTION INTRAMUSCULAR; INTRAVENOUS at 09:09

## 2022-01-01 RX ADMIN — SODIUM CHLORIDE 500 ML: 9 INJECTION, SOLUTION INTRAVENOUS at 18:48

## 2022-01-01 RX ADMIN — PANTOPRAZOLE SODIUM 40 MG: 40 TABLET, DELAYED RELEASE ORAL at 08:30

## 2022-01-01 RX ADMIN — MORPHINE SULFATE 6 MG: 2 INJECTION, SOLUTION INTRAMUSCULAR; INTRAVENOUS at 15:32

## 2022-01-01 RX ADMIN — DIPHENHYDRAMINE HYDROCHLORIDE 25 MG: 25 CAPSULE ORAL at 07:44

## 2022-01-01 RX ADMIN — LIDOCAINE HYDROCHLORIDE 10 ML: 10; .005 INJECTION, SOLUTION EPIDURAL; INFILTRATION; INTRACAUDAL; PERINEURAL at 18:20

## 2022-01-01 RX ADMIN — PROTHROMBIN, COAGULATION FACTOR VII HUMAN, COAGULATION FACTOR IX HUMAN, COAGULATION FACTOR X HUMAN, PROTEIN C, PROTEIN S HUMAN, AND WATER 2266 UNITS: KIT at 14:08

## 2022-01-01 RX ADMIN — ERYTHROPOIETIN 7000 UNITS: 20000 INJECTION, SOLUTION INTRAVENOUS; SUBCUTANEOUS at 13:31

## 2022-01-01 RX ADMIN — HALOPERIDOL LACTATE 2 MG: 5 INJECTION, SOLUTION INTRAMUSCULAR at 14:35

## 2022-01-01 RX ADMIN — AMIODARONE HYDROCHLORIDE 200 MG: 200 TABLET ORAL at 20:43

## 2022-01-01 RX ADMIN — LORAZEPAM 1 MG: 2 INJECTION INTRAMUSCULAR; INTRAVENOUS at 21:06

## 2022-01-01 RX ADMIN — FERROUS SULFATE TAB 325 MG (65 MG ELEMENTAL FE) 325 MG: 325 (65 FE) TAB at 09:32

## 2022-01-01 RX ADMIN — AMIODARONE HYDROCHLORIDE 200 MG: 200 TABLET ORAL at 22:11

## 2022-01-01 RX ADMIN — CARVEDILOL 6.25 MG: 6.25 TABLET, FILM COATED ORAL at 09:58

## 2022-01-01 RX ADMIN — LORAZEPAM 1 MG: 2 INJECTION INTRAMUSCULAR; INTRAVENOUS at 16:14

## 2022-01-01 RX ADMIN — PROPOFOL 20 MG: 10 INJECTION, EMULSION INTRAVENOUS at 13:12

## 2022-01-01 RX ADMIN — PANTOPRAZOLE SODIUM 40 MG: 40 TABLET, DELAYED RELEASE ORAL at 20:39

## 2022-01-01 RX ADMIN — INSULIN HUMAN 3 UNITS: 100 INJECTION, SOLUTION PARENTERAL at 12:40

## 2022-01-01 RX ADMIN — PANTOPRAZOLE SODIUM 8 MG/HR: 40 INJECTION, POWDER, FOR SOLUTION INTRAVENOUS at 08:03

## 2022-01-01 RX ADMIN — MUPIROCIN 1 APPLICATION: 20 OINTMENT TOPICAL at 14:59

## 2022-01-01 RX ADMIN — Medication 10 ML: at 10:42

## 2022-01-01 RX ADMIN — ATORVASTATIN CALCIUM 10 MG: 20 TABLET, FILM COATED ORAL at 09:58

## 2022-01-01 RX ADMIN — MORPHINE SULFATE 6 MG: 2 INJECTION, SOLUTION INTRAMUSCULAR; INTRAVENOUS at 01:04

## 2022-01-01 RX ADMIN — HALOPERIDOL LACTATE 2 MG: 5 INJECTION, SOLUTION INTRAMUSCULAR at 02:24

## 2022-01-01 RX ADMIN — OXYCODONE AND ACETAMINOPHEN 1 TABLET: 5; 325 TABLET ORAL at 03:35

## 2022-01-01 RX ADMIN — AMIODARONE HYDROCHLORIDE 200 MG: 200 TABLET ORAL at 08:29

## 2022-01-01 RX ADMIN — PANTOPRAZOLE SODIUM 40 MG: 40 TABLET, DELAYED RELEASE ORAL at 08:20

## 2022-01-01 RX ADMIN — ERYTHROPOIETIN 7000 UNITS: 20000 INJECTION, SOLUTION INTRAVENOUS; SUBCUTANEOUS at 13:23

## 2022-01-01 RX ADMIN — POTASSIUM CHLORIDE 10 MEQ: 7.46 INJECTION, SOLUTION INTRAVENOUS at 14:33

## 2022-01-01 RX ADMIN — OXYCODONE HYDROCHLORIDE AND ACETAMINOPHEN 1 TABLET: 5; 325 TABLET ORAL at 01:14

## 2022-01-01 RX ADMIN — MORPHINE SULFATE 2 MG: 2 INJECTION, SOLUTION INTRAMUSCULAR; INTRAVENOUS at 20:52

## 2022-01-01 RX ADMIN — LORAZEPAM 1 MG: 2 INJECTION INTRAMUSCULAR; INTRAVENOUS at 01:01

## 2022-01-01 RX ADMIN — PROPOFOL 50 MCG/KG/MIN: 10 INJECTION, EMULSION INTRAVENOUS at 08:21

## 2022-01-01 RX ADMIN — AMIODARONE HYDROCHLORIDE 200 MG: 200 TABLET ORAL at 10:51

## 2022-01-01 RX ADMIN — SODIUM CHLORIDE 60 ML/HR: 9 INJECTION, SOLUTION INTRAVENOUS at 10:45

## 2022-01-01 RX ADMIN — ATORVASTATIN CALCIUM 10 MG: 20 TABLET, FILM COATED ORAL at 08:29

## 2022-01-01 RX ADMIN — Medication 10 ML: at 20:47

## 2022-01-01 RX ADMIN — BUMETANIDE 2 MG: 2 TABLET ORAL at 20:52

## 2022-01-01 RX ADMIN — Medication 1000 MCG: at 08:41

## 2022-01-01 RX ADMIN — PANTOPRAZOLE SODIUM 40 MG: 40 TABLET, DELAYED RELEASE ORAL at 20:43

## 2022-01-01 RX ADMIN — ASPIRIN 81 MG: 81 TABLET, CHEWABLE ORAL at 09:26

## 2022-01-01 RX ADMIN — CARVEDILOL 25 MG: 25 TABLET, FILM COATED ORAL at 08:29

## 2022-01-01 RX ADMIN — MORPHINE SULFATE 20 MG: 20 SOLUTION ORAL at 03:21

## 2022-01-01 RX ADMIN — LORAZEPAM 1 MG: 2 INJECTION INTRAMUSCULAR; INTRAVENOUS at 11:28

## 2022-01-01 RX ADMIN — ERYTHROPOIETIN 7000 UNITS: 20000 INJECTION, SOLUTION INTRAVENOUS; SUBCUTANEOUS at 13:33

## 2022-01-01 RX ADMIN — CARVEDILOL 25 MG: 25 TABLET, FILM COATED ORAL at 22:41

## 2022-01-01 RX ADMIN — EPHEDRINE SULFATE 10 MG: 50 INJECTION INTRAVENOUS at 13:11

## 2022-01-01 RX ADMIN — PANTOPRAZOLE SODIUM 8 MG/HR: 40 INJECTION, POWDER, LYOPHILIZED, FOR SOLUTION INTRAVENOUS at 05:33

## 2022-01-01 RX ADMIN — ERYTHROPOIETIN 7000 UNITS: 20000 INJECTION, SOLUTION INTRAVENOUS; SUBCUTANEOUS at 14:49

## 2022-01-01 RX ADMIN — PANTOPRAZOLE SODIUM 8 MG/HR: 40 INJECTION, POWDER, FOR SOLUTION INTRAVENOUS at 01:42

## 2022-01-01 RX ADMIN — ATORVASTATIN CALCIUM 10 MG: 20 TABLET, FILM COATED ORAL at 08:20

## 2022-01-01 RX ADMIN — ALLOPURINOL 200 MG: 100 TABLET ORAL at 08:41

## 2022-01-01 RX ADMIN — POLYETHYLENE GLYCOL 3350, SODIUM SULFATE ANHYDROUS, SODIUM BICARBONATE, SODIUM CHLORIDE, POTASSIUM CHLORIDE 4000 ML: 236; 22.74; 6.74; 5.86; 2.97 POWDER, FOR SOLUTION ORAL at 18:26

## 2022-01-01 RX ADMIN — LORAZEPAM 2 MG: 2 LIQUID ORAL at 03:16

## 2022-01-01 RX ADMIN — LORAZEPAM 1 MG: 2 INJECTION INTRAMUSCULAR; INTRAVENOUS at 04:08

## 2022-01-01 RX ADMIN — Medication 10 ML: at 10:50

## 2022-01-01 RX ADMIN — ERYTHROPOIETIN 12000 UNITS: 20000 INJECTION, SOLUTION INTRAVENOUS; SUBCUTANEOUS at 13:25

## 2022-01-01 RX ADMIN — Medication 1000 MCG: at 09:35

## 2022-01-01 RX ADMIN — PANTOPRAZOLE SODIUM 8 MG/HR: 40 INJECTION, POWDER, LYOPHILIZED, FOR SOLUTION INTRAVENOUS at 01:15

## 2022-01-01 RX ADMIN — ALLOPURINOL 200 MG: 100 TABLET ORAL at 08:29

## 2022-01-01 RX ADMIN — OXYCODONE AND ACETAMINOPHEN 1 TABLET: 5; 325 TABLET ORAL at 18:57

## 2022-01-01 RX ADMIN — PANTOPRAZOLE SODIUM 40 MG: 40 TABLET, DELAYED RELEASE ORAL at 08:41

## 2022-01-01 RX ADMIN — AMIODARONE HYDROCHLORIDE 200 MG: 200 TABLET ORAL at 09:25

## 2022-01-01 RX ADMIN — MORPHINE SULFATE 4 MG: 2 INJECTION, SOLUTION INTRAMUSCULAR; INTRAVENOUS at 12:42

## 2022-01-01 RX ADMIN — PANTOPRAZOLE SODIUM 40 MG: 40 TABLET, DELAYED RELEASE ORAL at 09:24

## 2022-01-01 RX ADMIN — PROPOFOL 50 MG: 10 INJECTION, EMULSION INTRAVENOUS at 08:49

## 2022-01-01 RX ADMIN — CARVEDILOL 6.25 MG: 6.25 TABLET, FILM COATED ORAL at 20:04

## 2022-01-01 RX ADMIN — Medication 10 ML: at 09:27

## 2022-01-01 RX ADMIN — GLYCOPYRROLATE 0.4 MG: 0.2 INJECTION INTRAMUSCULAR; INTRAVENOUS at 01:01

## 2022-01-01 RX ADMIN — BUMETANIDE 2 MG: 2 TABLET ORAL at 01:18

## 2022-01-01 RX ADMIN — MUPIROCIN 1 APPLICATION: 20 OINTMENT TOPICAL at 20:43

## 2022-01-01 RX ADMIN — LORAZEPAM 2 MG: 2 INJECTION INTRAMUSCULAR; INTRAVENOUS at 15:32

## 2022-01-01 RX ADMIN — Medication 10 ML: at 20:04

## 2022-01-01 RX ADMIN — ASPIRIN 81 MG: 81 TABLET, CHEWABLE ORAL at 08:29

## 2022-01-01 RX ADMIN — ATORVASTATIN CALCIUM 10 MG: 20 TABLET, FILM COATED ORAL at 08:41

## 2022-01-01 RX ADMIN — MORPHINE SULFATE 2 MG: 2 INJECTION, SOLUTION INTRAMUSCULAR; INTRAVENOUS at 09:09

## 2022-01-01 RX ADMIN — CARVEDILOL 25 MG: 25 TABLET, FILM COATED ORAL at 20:43

## 2022-01-01 RX ADMIN — LORAZEPAM 2 MG: 2 INJECTION INTRAMUSCULAR; INTRAVENOUS at 01:04

## 2022-01-01 RX ADMIN — PANTOPRAZOLE SODIUM 8 MG/HR: 40 INJECTION, POWDER, FOR SOLUTION INTRAVENOUS at 21:21

## 2022-01-01 RX ADMIN — AMIODARONE HYDROCHLORIDE 200 MG: 200 TABLET ORAL at 08:41

## 2022-01-01 RX ADMIN — AMIODARONE HYDROCHLORIDE 200 MG: 200 TABLET ORAL at 21:06

## 2022-01-01 RX ADMIN — OXYCODONE AND ACETAMINOPHEN 1 TABLET: 5; 325 TABLET ORAL at 12:48

## 2022-01-01 RX ADMIN — LORAZEPAM 2 MG: 2 INJECTION INTRAMUSCULAR; INTRAVENOUS at 12:57

## 2022-01-01 RX ADMIN — MORPHINE SULFATE 4 MG: 2 INJECTION, SOLUTION INTRAMUSCULAR; INTRAVENOUS at 11:10

## 2022-01-01 RX ADMIN — CARVEDILOL 25 MG: 25 TABLET, FILM COATED ORAL at 01:18

## 2022-01-01 RX ADMIN — GLYCOPYRROLATE 0.2 MG: 0.2 INJECTION INTRAMUSCULAR; INTRAVENOUS at 11:52

## 2022-01-01 RX ADMIN — Medication 10 ML: at 08:56

## 2022-01-01 RX ADMIN — GLYCOPYRROLATE 0.2 MG: 0.2 INJECTION INTRAMUSCULAR; INTRAVENOUS at 07:49

## 2022-01-01 RX ADMIN — LORAZEPAM 1 MG: 2 INJECTION INTRAMUSCULAR; INTRAVENOUS at 22:41

## 2022-01-01 RX ADMIN — OXYCODONE HYDROCHLORIDE AND ACETAMINOPHEN 1 TABLET: 5; 325 TABLET ORAL at 02:03

## 2022-01-01 RX ADMIN — Medication 1000 MCG: at 09:25

## 2022-01-01 RX ADMIN — FERROUS SULFATE TAB 325 MG (65 MG ELEMENTAL FE) 325 MG: 325 (65 FE) TAB at 08:41

## 2022-01-01 RX ADMIN — Medication 1 TABLET: at 09:25

## 2022-01-01 RX ADMIN — LORAZEPAM 2 MG: 2 INJECTION INTRAMUSCULAR; INTRAVENOUS at 11:53

## 2022-01-01 RX ADMIN — MORPHINE SULFATE 2 MG: 2 INJECTION, SOLUTION INTRAMUSCULAR; INTRAVENOUS at 21:06

## 2022-01-01 RX ADMIN — MUPIROCIN 1 APPLICATION: 20 OINTMENT TOPICAL at 17:00

## 2022-01-01 RX ADMIN — Medication 1 TABLET: at 09:31

## 2022-01-01 RX ADMIN — PANTOPRAZOLE SODIUM 80 MG: 40 INJECTION, POWDER, LYOPHILIZED, FOR SOLUTION INTRAVENOUS at 12:09

## 2022-01-01 RX ADMIN — CALCITRIOL 0.25 MCG: 0.25 CAPSULE ORAL at 01:18

## 2022-01-01 RX ADMIN — SODIUM CHLORIDE 250 ML: 0.9 INJECTION, SOLUTION INTRAVENOUS at 20:37

## 2022-01-01 RX ADMIN — ERYTHROPOIETIN 10000 UNITS: 10000 INJECTION, SOLUTION INTRAVENOUS; SUBCUTANEOUS at 10:47

## 2022-01-01 RX ADMIN — PANTOPRAZOLE SODIUM 8 MG/HR: 40 INJECTION, POWDER, LYOPHILIZED, FOR SOLUTION INTRAVENOUS at 08:24

## 2022-01-01 RX ADMIN — ATORVASTATIN CALCIUM 10 MG: 20 TABLET, FILM COATED ORAL at 09:24

## 2022-01-01 RX ADMIN — ERYTHROPOIETIN 10000 UNITS: 10000 INJECTION, SOLUTION INTRAVENOUS; SUBCUTANEOUS at 14:10

## 2022-01-01 RX ADMIN — CARVEDILOL 25 MG: 25 TABLET, FILM COATED ORAL at 20:52

## 2022-01-01 RX ADMIN — LIDOCAINE HYDROCHLORIDE,EPINEPHRINE BITARTRATE 10 ML: 10; .01 INJECTION, SOLUTION INFILTRATION; PERINEURAL at 12:16

## 2022-01-01 RX ADMIN — ERYTHROPOIETIN 10000 UNITS: 10000 INJECTION, SOLUTION INTRAVENOUS; SUBCUTANEOUS at 14:33

## 2022-01-01 RX ADMIN — PANTOPRAZOLE SODIUM 80 MG: 40 INJECTION, POWDER, FOR SOLUTION INTRAVENOUS at 22:32

## 2022-01-01 RX ADMIN — LORAZEPAM 2 MG: 2 INJECTION INTRAMUSCULAR; INTRAVENOUS at 21:08

## 2022-01-01 RX ADMIN — PANTOPRAZOLE SODIUM 8 MG/HR: 40 INJECTION, POWDER, LYOPHILIZED, FOR SOLUTION INTRAVENOUS at 14:29

## 2022-01-01 RX ADMIN — Medication 10 ML: at 08:03

## 2022-01-01 RX ADMIN — CALCITRIOL 0.25 MCG: 0.25 CAPSULE ORAL at 08:29

## 2022-01-01 RX ADMIN — MORPHINE SULFATE 6 MG: 2 INJECTION, SOLUTION INTRAMUSCULAR; INTRAVENOUS at 11:53

## 2022-01-01 RX ADMIN — SODIUM CHLORIDE, POTASSIUM CHLORIDE, SODIUM LACTATE AND CALCIUM CHLORIDE 100 ML/HR: 600; 310; 30; 20 INJECTION, SOLUTION INTRAVENOUS at 08:14

## 2022-01-01 RX ADMIN — ACETAMINOPHEN 650 MG: 325 TABLET, FILM COATED ORAL at 14:13

## 2022-01-01 RX ADMIN — Medication 1 TABLET: at 08:41

## 2022-01-01 RX ADMIN — LIDOCAINE HYDROCHLORIDE 100 MG: 20 INJECTION, SOLUTION INFILTRATION; PERINEURAL at 08:49

## 2022-01-01 RX ADMIN — Medication 10 ML: at 20:56

## 2022-01-01 RX ADMIN — BUMETANIDE 2 MG: 2 TABLET ORAL at 08:41

## 2022-01-01 RX ADMIN — ALLOPURINOL 200 MG: 100 TABLET ORAL at 15:50

## 2022-01-01 RX ADMIN — PANTOPRAZOLE SODIUM 8 MG/HR: 40 INJECTION, POWDER, LYOPHILIZED, FOR SOLUTION INTRAVENOUS at 04:06

## 2022-01-01 RX ADMIN — MUPIROCIN 1 APPLICATION: 20 OINTMENT TOPICAL at 16:09

## 2022-01-01 RX ADMIN — ALLOPURINOL 200 MG: 100 TABLET ORAL at 11:04

## 2022-01-01 RX ADMIN — PANTOPRAZOLE SODIUM 40 MG: 40 TABLET, DELAYED RELEASE ORAL at 01:18

## 2022-01-01 RX ADMIN — ERYTHROPOIETIN 9000 UNITS: 20000 INJECTION, SOLUTION INTRAVENOUS; SUBCUTANEOUS at 13:09

## 2022-01-01 RX ADMIN — ASPIRIN 81 MG: 81 TABLET, CHEWABLE ORAL at 08:41

## 2022-01-01 RX ADMIN — Medication 10 ML: at 20:39

## 2022-01-01 RX ADMIN — CARVEDILOL 6.25 MG: 6.25 TABLET, FILM COATED ORAL at 20:39

## 2022-01-01 RX ADMIN — BUMETANIDE 2 MG: 2 TABLET ORAL at 21:07

## 2022-01-01 RX ADMIN — PROTHROMBIN, COAGULATION FACTOR VII HUMAN, COAGULATION FACTOR IX HUMAN, COAGULATION FACTOR X HUMAN, PROTEIN C, PROTEIN S HUMAN, AND WATER 3321 UNITS: KIT at 23:34

## 2022-01-01 RX ADMIN — Medication 10 ML: at 21:43

## 2022-01-10 NOTE — ED TRIAGE NOTES
Pt states that she has had a right sided nose bleed that she can't get stopped. States that she had one last night but it stopped. Started again today at 0900. Pt is on coumadin. Patient masked at arrival and triage staff wore all appropriate PPE during entire encounter with patient.

## 2022-01-10 NOTE — ED NOTES
Pt to ED room 25, accompanied by her . A&Ox4. Pt reports epistaxis that started at 0845 this morning and was continuous. Pt says this happened last night and she was able to get the bleeding to stop but after sticking cotton in her nose today she was unable to get the bleeding to stop. Pt reports she has only been bleeding out of the right nares. Bleeding is controlled at this time. Pt does have blood on her mask and wet and dry blood in her right nares. No other complaints at this time.     I was wearing appropriate PPE during assessment, pt and pt  wearing mask during assessment.      Lashawn Red, RN  01/10/22 [id number]

## 2022-01-10 NOTE — ED PROVIDER NOTES
EMERGENCY DEPARTMENT ENCOUNTER    Room Number:  25/25  Date of encounter:  1/11/2022  PCP: Ariel Matute MD  Historian: patient ,    Full history not obtainable due to: none     HPI:  Chief Complaint: Epistaxis     Context: Janel Mahoney is a 81 y.o. female who presents to the ED c/o epistaxis onset last pm. Reports intermittent bleeding form the right nare. Notes it stopped last night completley and then began bleeding again this morning. She has been unable to control it. Takes coumadin, but unsure of the indication. She denies any trauma to the nose. No hx of chronic nosebleeds.     No reported dizziness or syncope. No weakness. No additional bleeding episodes.         PAST MEDICAL HISTORY    Active Ambulatory Problems     Diagnosis Date Noted   • Anemia in chronic kidney disease (CKD) 02/12/2016   • Thrombocytopenia (HCC) 05/17/2016   • B12 deficiency 05/17/2016   • Coronary artery disease involving coronary bypass graft of native heart without angina pectoris 06/08/2016   • S/P MVR (porcine) 06/08/2016   • Chronic atrial fibrillation (HCC) 06/08/2016   • Bilateral carotid artery disease (HCC) 06/08/2016   • Intractable low back pain 08/02/2016   • Long-term (current) use of anticoagulants 08/16/2016   • Low back pain 08/18/2016   • Osteoporosis 09/01/2016   • Murmur, heart 09/01/2016   • GEORGINA on auto CPAP - Dr Cardona 09/08/2016   • Hypersomnia due to medical condition - GEORGINA 09/08/2016   • Esophageal stricture 09/06/2017   • Dysphagia 09/26/2017   • Closed fracture of left proximal humerus 12/24/2017   • Essential hypertension 12/24/2017   • Supratherapeutic INR 12/24/2017   • Chronic combined systolic and diastolic congestive heart failure (HCC) 12/24/2017   • Osteoporosis with pathological fracture 12/24/2017   • Closed 3-part fracture of proximal end of left humerus 01/10/2018   • Closed fracture of proximal end of humerus with delayed healing 01/16/2018   • Injury of right knee 11/12/2018   •  Knee injury, left, initial encounter 11/12/2018   • History of fall 11/12/2018   • S/P TKR (total knee replacement) using cement, left 11/12/2018   • Ischemic cardiomyopathy 11/13/2018   • Intramuscular hematoma right pecotralis 11/23/2018   • CKD (chronic kidney disease) stage 4, GFR 15-29 ml/min ([facility]) 11/23/2018   • Peripheral edema 02/15/2019   • Inflammatory arthritis 02/20/2019   • Ventricular tachycardia ([facility]) 08/27/2019   • S/P revision of total knee 11/19/2019   • Idiopathic gout 06/05/2020   • Psychophysiological insomnia 07/05/2020   • ICD (implantable cardioverter-defibrillator) in place 07/23/2020   • Status post reverse arthroplasty of right shoulder 08/31/2021   • Mixed hyperlipidemia 06/24/2021   • Symptomatic anemia 09/06/2021   • Right leg DVT ([facility]) 09/08/2021   • Osteoarthritis of multiple joints 12/21/2021     Resolved Ambulatory Problems     Diagnosis Date Noted   • Acute blood loss anemia 09/26/2017   • Hemorrhagic disorder due to extrinsic circulating anticoagulants ([facility]) 11/23/2018   • Acute posthemorrhagic anemia 11/23/2018   • Acute kidney injury ([facility]) 11/25/2018   • Acute on chronic combined systolic (congestive) and diastolic (congestive) heart failure ([facility]) 02/20/2019     Past Medical History:   Diagnosis Date   • Anemia    • Atrial fibrillation ([facility])    • Bruises easily    • Carotid artery stenosis    • Chronic back pain    • Chronic coronary artery disease    • Chronic kidney disease, stage 3 ([facility])    • GERD (gastroesophageal reflux disease)    • Gout    • H/O cardiac murmur    • Hematoma    • [place] (hard of hearing)    • Hyperlipidemia    • Hypertension    • Hypotension    • Kyphoscoliosis    • Leukopenia    • Lumbar spondylosis    • Obesity    • GEORGINA (obstructive sleep apnea) 12/01/2013   • Osteoarthritis    • Peptic ulcer    • Premature ventricular contractions    • Renal insufficiency syndrome    • Right shoulder pain 08/2021   • Scoliosis    • Shoulder fracture, left    • Shoulder  pain, right    • Urine incontinence    • Vitamin B12 deficiency    • Warfarin anticoagulation          PAST SURGICAL HISTORY  Past Surgical History:   Procedure Laterality Date   • AV NODE ABLATION     • BREAST BIOPSY     • CARDIAC CATHETERIZATION      Showed severe mitral insufficiency and borderline coronary artery disease   • CARDIAC CATHETERIZATION      Showed an ejection fraction of 35%. She had occlusive disease of the right posterior LV branch and no other significant disease, treated medically.   • CARDIAC DEFIBRILLATOR PLACEMENT      Biventricular   • CARDIAC DEFIBRILLATOR PLACEMENT Left    • CARDIAC ELECTROPHYSIOLOGY PROCEDURE N/A 1/4/2019    Procedure: GENERATOR CHANGE BI-V ICD   [place];  Surgeon: James Hwang MD;  Location: [facility] CATH INVASIVE LOCATION;  Service: Cardiology   • CARDIAC VALVE REPLACEMENT  2009    Done with stent placement   • CARDIOVERSION      multiple electrocardioversions.   • CAROTID ARTERY ANGIOPLASTY Right    • CATARACT EXTRACTION EXTRACAPSULAR W/ INTRAOCULAR LENS IMPLANTATION Bilateral    • COLONOSCOPY N/A 9/28/2017    Procedure: COLONOSCOPY TO CECUM;  Surgeon: Kevin Davis MD;  Location: [facility] ENDOSCOPY;  Service:    • CORONARY ANGIOPLASTY WITH STENT PLACEMENT  2009   • CORONARY ARTERY BYPASS GRAFT      single graft to the PDA   • CORONARY STENT PLACEMENT     • ENDOSCOPY N/A 9/28/2017    Procedure: ESOPHAGOGASTRODUODENOSCOPY ;  Surgeon: Kevin Davis MD;  Location: [facility] ENDOSCOPY;  Service:    • EYE SURGERY     • HEMORRHOIDECTOMY     • HYSTERECTOMY     • INCISION AND DRAINAGE TRUNK Right 11/27/2018    Procedure: EVACUATION OF RIGHT CHEST WALL HEMATOMA;  Surgeon: Juvencio Rodriguez MD;  Location: [facility] OR;  Service: General   • MITRAL VALVE REPLACEMENT  01/2010    #31 Epic porcine valve.   • THROMBOENDARTERECTOMY Right     carotid thromboendarterectomy    • TONSILLECTOMY      age 32   • TOTAL KNEE ARTHROPLASTY Left    • TOTAL KNEE ARTHROPLASTY REVISION Left  2019    Procedure: TOTAL KNEE ARTHROPLASTY REVISION LEFT;  Surgeon: Juvencio Pressley II, MD;  Location: [facility] OR;  Service: Orthopedics   • TOTAL SHOULDER ARTHROPLASTY W/ DISTAL CLAVICLE EXCISION Left 2018    Procedure: TOTAL SHOULDER REVERSE ARTHROPLASTY;  Surgeon: Bianka Quesada MD;  Location: [facility] OR;  Service:    • TOTAL SHOULDER ARTHROPLASTY W/ DISTAL CLAVICLE EXCISION Right 2021    Procedure: TOTAL SHOULDER REVERSE ARTHROPLASTY;  Surgeon: Juvencio Pressley II, MD;  Location: [facility] MAIN OR;  Service: Orthopedics;  Laterality: Right;         FAMILY HISTORY  Family History   Problem Relation Age of Onset   • Cancer Mother    • Breast cancer Mother    • Heart disease Mother    • Hypertension Mother    • Stroke Mother    • Coronary artery disease Father    • Stroke Father    • Heart disease Father    • Hypertension Father    • Other Daughter         thiamin deficiency    • Hypertension Son    • Lung disease Other    • Hypertension Other    • Malig Hyperthermia Neg Hx          SOCIAL HISTORY  Social History     Socioeconomic History   • Marital status:      Spouse name: Russ   Tobacco Use   • Smoking status: Former Smoker     Packs/day: 1.00     Years: 50.00     Pack years: 50.00     Types: Cigarettes     Quit date: 2009     Years since quittin.0   • Smokeless tobacco: Never Used   • Tobacco comment: Daily caffeine - soda   Vaping Use   • Vaping Use: Never used   Substance and Sexual Activity   • Alcohol use: Yes     Comment: 1 yearly   • Drug use: Never   • Sexual activity: Defer         ALLERGIES  Diclofenac        REVIEW OF SYSTEMS  Review of Systems   All systems reviewed and marked as negative except as listed in HPI       PHYSICAL EXAM    I have reviewed the triage vital signs and nursing notes.    ED Triage Vitals   Temp Heart Rate Resp BP SpO2   01/10/22 1112 01/10/22 1113 01/10/22 1112 01/10/22 1159 01/10/22 1113   97.6 °F (36.4 °C) 66  18 142/53 99 %      Temp src Heart Rate Source Patient Position BP Location FiO2 (%)   01/10/22 1112 01/10/22 1113 01/10/22 1159 -- --   Tympanic Monitor Sitting         GENERAL: alert well developed, well nourished in no distress  HENT: NCAT, neck supple, trachea midline. Stuffed tissue inside of right nare removed along with long dark red blood clot. Septum of the right nare appears to be slowly bleeding. No FB. No septal hematoma.   EYES: no scleral icterus, PERRL, normal conjunctivae  CV: regular rhythm, regular rate, no murmur  RESPIRATORY: unlabored effort, CTAB  ABDOMEN: soft, nontender, nondistended, bowel sounds present  MUSCULOSKELETAL: no gross deformity  NEURO: alert,  sensory and motor function of extremities grossly intact, speech clear, mental status normal/baseline  SKIN: warm, dry, no rash  PSYCH:  Appropriate mood and affect    Vital signs and nursing notes reviewed.          LAB RESULTS  Recent Results (from the past 24 hour(s))   Comprehensive Metabolic Panel    Collection Time: 01/10/22  2:00 PM    Specimen: Blood   Result Value Ref Range    Glucose 110 (H) 65 - 99 mg/dL    BUN 74 (H) 8 - 23 mg/dL    Creatinine 2.47 (H) 0.57 - 1.00 mg/dL    Sodium 141 136 - 145 mmol/L    Potassium 4.1 3.5 - 5.2 mmol/L    Chloride 101 98 - 107 mmol/L    CO2 28.3 22.0 - 29.0 mmol/L    Calcium 9.5 8.6 - 10.5 mg/dL    Total Protein 5.8 (L) 6.0 - 8.5 g/dL    Albumin 3.60 3.50 - 5.20 g/dL    ALT (SGPT) 10 1 - 33 U/L    AST (SGOT) 17 1 - 32 U/L    Alkaline Phosphatase 48 39 - 117 U/L    Total Bilirubin 0.4 0.0 - 1.2 mg/dL    eGFR Non African Amer 19 (L) >60 mL/min/1.73    Globulin 2.2 gm/dL    A/G Ratio 1.6 g/dL    BUN/Creatinine Ratio 30.0 (H) 7.0 - 25.0    Anion Gap 11.7 5.0 - 15.0 mmol/L   Protime-INR    Collection Time: 01/10/22  2:00 PM    Specimen: Blood   Result Value Ref Range    Protime 29.9 (H) 11.7 - 14.2 Seconds    INR 2.88 (H) 0.90 - 1.10   CBC Auto Differential    Collection Time: 01/10/22  2:00 PM     Specimen: Blood   Result Value Ref Range    WBC 4.79 3.40 - 10.80 10*3/mm3    RBC 2.91 (L) 3.77 - 5.28 10*6/mm3    Hemoglobin 9.1 (L) 12.0 - 15.9 g/dL    Hematocrit 29.6 (L) 34.0 - 46.6 %    .7 (H) 79.0 - 97.0 fL    MCH 31.3 26.6 - 33.0 pg    MCHC 30.7 (L) 31.5 - 35.7 g/dL    RDW 15.2 12.3 - 15.4 %    RDW-SD 57.1 (H) 37.0 - 54.0 fl    MPV 10.1 6.0 - 12.0 fL    Platelets 113 (L) 140 - 450 10*3/mm3    Neutrophil % 78.7 (H) 42.7 - 76.0 %    Lymphocyte % 13.8 (L) 19.6 - 45.3 %    Monocyte % 4.8 (L) 5.0 - 12.0 %    Eosinophil % 2.1 0.3 - 6.2 %    Basophil % 0.4 0.0 - 1.5 %    Immature Grans % 0.2 0.0 - 0.5 %    Neutrophils, Absolute 3.77 1.70 - 7.00 10*3/mm3    Lymphocytes, Absolute 0.66 (L) 0.70 - 3.10 10*3/mm3    Monocytes, Absolute 0.23 0.10 - 0.90 10*3/mm3    Eosinophils, Absolute 0.10 0.00 - 0.40 10*3/mm3    Basophils, Absolute 0.02 0.00 - 0.20 10*3/mm3    Immature Grans, Absolute 0.01 0.00 - 0.05 10*3/mm3    nRBC 0.0 0.0 - 0.2 /100 WBC       Ordered the above labs and independently reviewed the results.        RADIOLOGY  No Radiology Exams Resulted Within Past 24 Hours    I ordered the above noted radiological studies. Independently reviewed by me and discussed with radiologist.  See dictation above for official radiology interpretation.      PROCEDURES    Epistaxis Management    Date/Time: 1/11/2022 7:08 AM  Performed by: Milagros Joe APRN  Authorized by: Christiano Harrison II, MD     Consent:     Consent obtained:  Verbal    Consent given by:  Patient    Risks discussed:  Bleeding, infection and pain    Alternatives discussed:  No treatment  Procedure details:     Treatment site:  R septum    Treatment method:  Silver nitrate    Treatment complexity:  Extensive    Treatment episode: initial    Post-procedure details:     Assessment:  Bleeding stopped    Patient tolerance of procedure:  Tolerated well, no immediate complications  Comments:      Multiple applications were required to stop the  bleeding along the septum.  At cessation of the procedure it appears the bleeding is now controlled.  We will continue to monitor.            MEDICATIONS GIVEN IN ER    Medications - No data to display      PROGRESS, DATA ANALYSIS, CONSULTS, AND MEDICAL DECISION MAKING    All labs have been independently reviewed by me.  All radiology studies have been reviewed by me.   EKG's independently reviewed by me.  Discussion below represents my analysis of pertinent findings related to patient's condition, differential diagnosis, treatment plan and final disposition.    DIFFERENTIAL DIAGNOSIS INCLUDE BUT NOT LIMITED TO: Elevated INR, thrombocytopenia, retained foreign body, nasal trauma, nasal fracture, septal hematoma, digital manipulation, Kiesselbach's plexus injury    ED Course    Mon Kirill 10, 2022   1417 Hemoglobin(!): 9.1 [JS]   1420 Rechecked pt, no re bleeding is appreciated. INR pending. She is ambulating currently. [JS]      ED Course User Index  [JS] Milagros Joe APRN         BP - 138/45  HR - 61  TEMP - 97.6 °F (36.4 °C) (Tympanic)  O2 SATS - 100%        DIAGNOSIS  Final diagnoses:   Acute anterior epistaxis, right   Anticoagulated on Coumadin         DISPOSITION  Discharge     Pt masked in first look. I wore appropriate PPE throughout my encounters with the pt. I performed hand hygiene on entry into the pt room and upon exit.     Dictated utilizing Dragon dictation:  Much of this encounter note is an electronic transcription/translation of spoken language to printed text. The electronic translation of spoken language may permit erroneous, or at times, nonsensical words or phrases to be inadvertently transcribed; Although I have reviewed the note for such errors, some may still exist.     Milagros Joe APRN  01/11/22 [id number]       Milagros Joe APRN  01/11/22 0133

## 2022-01-10 NOTE — ED PROVIDER NOTES
MD ATTESTATION NOTE    The JF and I have discussed this patient's history, physical exam, and treatment plan.    I provided a substantive portion of the care of this patient. I personally performed the physical exam, in its entirety. The attached note describes my personal findings.      Janel Mahoney is a 81 y.o. female who presents to the ED c/o epistaxis to the right anterior naris.  She is on warfarin.  It appears that she has a history of atrial fibrillation.      On exam:  GENERAL: not distressed  HENT: Good hemostasis the right anterior naris following cauterization by NICKY De  EYES: no scleral icterus  CV: irregular rhythm, regular rate  RESPIRATORY: normal effort  ABDOMEN: soft  MUSCULOSKELETAL: no deformity  NEURO: alert, moves all extremities, follows commands  SKIN: warm, dry, mild abrasion to the tip of the nose    Labs  Recent Results (from the past 24 hour(s))   Comprehensive Metabolic Panel    Collection Time: 01/10/22  2:00 PM    Specimen: Blood   Result Value Ref Range    Glucose 110 (H) 65 - 99 mg/dL    BUN 74 (H) 8 - 23 mg/dL    Creatinine 2.47 (H) 0.57 - 1.00 mg/dL    Sodium 141 136 - 145 mmol/L    Potassium 4.1 3.5 - 5.2 mmol/L    Chloride 101 98 - 107 mmol/L    CO2 28.3 22.0 - 29.0 mmol/L    Calcium 9.5 8.6 - 10.5 mg/dL    Total Protein 5.8 (L) 6.0 - 8.5 g/dL    Albumin 3.60 3.50 - 5.20 g/dL    ALT (SGPT) 10 1 - 33 U/L    AST (SGOT) 17 1 - 32 U/L    Alkaline Phosphatase 48 39 - 117 U/L    Total Bilirubin 0.4 0.0 - 1.2 mg/dL    eGFR Non African Amer 19 (L) >60 mL/min/1.73    Globulin 2.2 gm/dL    A/G Ratio 1.6 g/dL    BUN/Creatinine Ratio 30.0 (H) 7.0 - 25.0    Anion Gap 11.7 5.0 - 15.0 mmol/L   Protime-INR    Collection Time: 01/10/22  2:00 PM    Specimen: Blood   Result Value Ref Range    Protime 29.9 (H) 11.7 - 14.2 Seconds    INR 2.88 (H) 0.90 - 1.10   CBC Auto Differential    Collection Time: 01/10/22  2:00 PM    Specimen: Blood   Result Value Ref Range    WBC 4.79 3.40 -  10.80 10*3/mm3    RBC 2.91 (L) 3.77 - 5.28 10*6/mm3    Hemoglobin 9.1 (L) 12.0 - 15.9 g/dL    Hematocrit 29.6 (L) 34.0 - 46.6 %    .7 (H) 79.0 - 97.0 fL    MCH 31.3 26.6 - 33.0 pg    MCHC 30.7 (L) 31.5 - 35.7 g/dL    RDW 15.2 12.3 - 15.4 %    RDW-SD 57.1 (H) 37.0 - 54.0 fl    MPV 10.1 6.0 - 12.0 fL    Platelets 113 (L) 140 - 450 10*3/mm3    Neutrophil % 78.7 (H) 42.7 - 76.0 %    Lymphocyte % 13.8 (L) 19.6 - 45.3 %    Monocyte % 4.8 (L) 5.0 - 12.0 %    Eosinophil % 2.1 0.3 - 6.2 %    Basophil % 0.4 0.0 - 1.5 %    Immature Grans % 0.2 0.0 - 0.5 %    Neutrophils, Absolute 3.77 1.70 - 7.00 10*3/mm3    Lymphocytes, Absolute 0.66 (L) 0.70 - 3.10 10*3/mm3    Monocytes, Absolute 0.23 0.10 - 0.90 10*3/mm3    Eosinophils, Absolute 0.10 0.00 - 0.40 10*3/mm3    Basophils, Absolute 0.02 0.00 - 0.20 10*3/mm3    Immature Grans, Absolute 0.01 0.00 - 0.05 10*3/mm3    nRBC 0.0 0.0 - 0.2 /100 WBC       Radiology  No Radiology Exams Resulted Within Past 24 Hours    Medical Decision Making:  ED Course as of 01/10/22 1452   Mon Kirill 10, 2022   1417 Hemoglobin(!): 9.1 [JS]   1420 Rechecked pt, no re bleeding is appreciated. INR pending. She is ambulating currently. [JS]      ED Course User Index  [JS] Milagros Joe APRN           PPE: Both the patient and I wore a surgical mask throughout the entire patient encounter. I wore protective goggles.     Diagnosis  Final diagnoses:   Acute anterior epistaxis, right   Anticoagulated on Coumadin        Christiano Harrison II, MD  01/11/22 2472

## 2022-01-11 NOTE — PROGRESS NOTES
Received notice that patient was in ED 1/10 for epistaxis; resolved with silver nitrate.  INR was 2.88 in ED; no new INR today. S/W patient today, confirms resolution of nose bleed.  States she took all doses around ED visit (did not hold).  Due in clinic 1/12 (no show for last visit 1/3/22); confirms that she will keep 1/12 appt.  No warfarin instructions given.  Will guide further based on INR 1/12.    Terri Ko, MeaganD, MPH, BCPS

## 2022-01-12 NOTE — PROGRESS NOTES
Anticoagulation Clinic Progress Note    Anticoagulation Summary  As of 1/12/2022    INR goal:  2.0-3.0   TTR:  64.2 % (3 y)   INR used for dosing:  3.3 (1/12/2022)   Warfarin maintenance plan:  2 mg every Mon, Fri; 1 mg all other days   Weekly warfarin total:  9 mg   Plan last modified:  Vargas Butcher, PharmD (11/22/2021)   Next INR check:  1/19/2022   Priority:  Maintenance   Target end date:  Indefinite    Indications    Chronic atrial fibrillation (HCC) [I48.20]             Anticoagulation Episode Summary     INR check location:      Preferred lab:      Send INR reminders to:  [facility]    Comments:        Anticoagulation Care Providers     Provider Role Specialty Phone number    Nik Galarza MD Referring Cardiology [phone]          Clinic Interview:  Patient Findings     Positives:  Signs/symptoms of bleeding    Negatives:  Signs/symptoms of thrombosis, Laboratory test error   suspected, Change in health, Change in alcohol use, Change in activity,   Upcoming invasive procedure, Emergency department visit, Upcoming dental   procedure, Missed doses, Extra doses, Change in medications, Change in   diet/appetite, Hospital admission, Bruising, Other complaints    Comments:  Was in ED on Monday for nose bleed.       Clinical Outcomes     Negatives:  Major bleeding event, Thromboembolic event,   Anticoagulation-related hospital admission, Anticoagulation-related ED   visit, Anticoagulation-related fatality    Comments:  Was in ED on Monday for nose bleed.         INR History:  Anticoagulation Monitoring 12/6/2021 1/11/2022 1/12/2022   INR 2.5 - 3.3   INR Date 12/6/2021 - 1/12/2022   INR Goal 2.0-3.0 2.0-3.0 2.0-3.0   Trend Same Same Same   Last Week Total 9 mg 9 mg 9 mg   Next Week Total 9 mg 9 mg 8 mg   Sun 1 mg - 1 mg   Mon 2 mg - 2 mg   Tue 1 mg 1 mg 1 mg   Wed 1 mg - Hold (1/12)   Thu 1 mg - 1 mg   Fri 2 mg - 2 mg   Sat 1 mg - 1 mg   Visit Report - - -   Some recent data might be  hidden       Plan:  1. INR is Supratherapeutic today- see above in Anticoagulation Summary.  Will instruct Janel Mahoney to Change their warfarin regimen- see above in Anticoagulation Summary.  2. Follow up in 1 week  3. Patient declines warfarin refills.  4. Verbal and written information provided. Patient expresses understanding and has no further questions at this time.    Yue Olivera, [facility]

## 2022-01-19 NOTE — PROGRESS NOTES
Anticoagulation Clinic Progress Note    Anticoagulation Summary  As of 2022    INR goal:  2.0-3.0   TTR:  64.2 % (3.1 y)   INR used for dosin.5 (2022)   Warfarin maintenance plan:  2 mg every Mon, Fri; 1 mg all other days   Weekly warfarin total:  9 mg   No change documented:  Lee Ann Diamond   Plan last modified:  Vargas Butcher, PharmD (2021)   Next INR check:  2022   Priority:  Maintenance   Target end date:  Indefinite    Indications    Chronic atrial fibrillation (HCC) [I48.20]             Anticoagulation Episode Summary     INR check location:      Preferred lab:      Send INR reminders to:   [facility]    Comments:        Anticoagulation Care Providers     Provider Role Specialty Phone number    Nik Galarza MD Referring Cardiology [phone]          Clinic Interview:  Patient Findings     Negatives:  Signs/symptoms of thrombosis, Signs/symptoms of bleeding,   Laboratory test error suspected, Change in health, Change in alcohol use,   Change in activity, Upcoming invasive procedure, Emergency department   visit, Upcoming dental procedure, Missed doses, Extra doses, Change in   medications, Change in diet/appetite, Hospital admission, Bruising, Other   complaints      Clinical Outcomes     Negatives:  Major bleeding event, Thromboembolic event,   Anticoagulation-related hospital admission, Anticoagulation-related ED   visit, Anticoagulation-related fatality        INR History:  Anticoagulation Monitoring 2022   INR - 3.3 2.5   INR Date - 2022   INR Goal 2.0-3.0 2.0-3.0 2.0-3.0   Trend Same Same Same   Last Week Total 9 mg 9 mg 8 mg   Next Week Total 9 mg 8 mg 9 mg   Sun - 1 mg 1 mg   Mon - 2 mg 2 mg   Tue 1 mg 1 mg 1 mg   Wed - Hold () 1 mg   Thu - 1 mg 1 mg   Fri - 2 mg 2 mg   Sat - 1 mg 1 mg   Visit Report - - -   Some recent data might be hidden       Plan:  1. INR is therapeutic today- see above in  Anticoagulation Summary.   Will instruct Janel Covingtons to continue their warfarin regimen- see above in Anticoagulation Summary.  2. Follow up in 2 weeks.  3. Patient declines warfarin refills.  4. Verbal and written information provided. Patient expresses understanding and has no further questions at this time.    Lee Ann Diamond

## 2022-01-25 NOTE — PROGRESS NOTES
Subjective     CHIEF COMPLAINT:      Chief Complaint   Patient presents with   • Follow-up     discuss Vitamin E/weak       HISTORY OF PRESENT ILLNESS:     Janel Mahoney is a 81 y.o. female patient who returns today for follow up on her anemia. She returns today for follow up accompanied by her daughter. She is on Procrit monthly. She takes ferrous sulfate 325 mg twice weekly. She is tolerating it. She reports that the stool color is somewhat black and attributes this to the iron. She is not noticing blood in stool.     Patient reports that she had recent nose bleed. She went to the ER. She had the bleeding area cauterized. The bleeding did not recur.     The patient reports intermittent bleeding from a lesion in the tip of the nose. She is going to see a Dermatologist next week.     ROS:  Pertinent ROS is in the HPI.     Past medical, surgical, social and family history were reviewed.     MEDICATIONS:    Current Outpatient Medications:   •  acetaminophen (TYLENOL) 325 MG tablet, Take 650 mg by mouth Every 6 (Six) Hours As Needed for Mild Pain ., Disp: , Rfl:   •  alendronate (FOSAMAX) 70 MG tablet, Take 70 mg by mouth Every 14 (Fourteen) Days. On Friday, Disp: , Rfl:   •  allopurinol (ZYLOPRIM) 100 MG tablet, TAKE 2 TABLETS EVERY DAY BEFORE SUPPER, Disp: 180 tablet, Rfl: 1  •  aspirin 81 MG tablet, Take 81 mg by mouth Daily., Disp: , Rfl:   •  bumetanide (BUMEX) 2 MG tablet, TAKE 1 TABLET TWICE DAILY, Disp: 180 tablet, Rfl: 1  •  calcitriol (ROCALTROL) 0.25 MCG capsule, Take 0.25 mcg by mouth 2 (Two) Times a Day., Disp: , Rfl:   •  carvedilol (COREG) 25 MG tablet, TAKE 1 TABLET TWICE DAILY (Patient taking differently: Take  by mouth 2 (Two) Times a Day With Meals. Take dos), Disp: 180 tablet, Rfl: 1  •  ferrous sulfate 325 (65 FE) MG tablet, Take 1 tablet by mouth 2 (Two) Times a Week. (Patient taking differently: Take 325 mg by mouth 2 (Two) Times a Week. Mon, Thur only), Disp: , Rfl:   •  Multiple  "Vitamins-Minerals (SENIOR MULTIVITAMIN PLUS) tablet, Take 1 tablet by mouth Daily., Disp: , Rfl:   •  oxyCODONE-acetaminophen (PERCOCET) 5-325 MG per tablet, Take 1 tablet by mouth Every 8 (Eight) Hours As Needed for Severe Pain  (chronic pain med chronic knee pain)., Disp: 30 tablet, Rfl: 0  •  pantoprazole (PROTONIX) 40 MG EC tablet, Take 1 tablet by mouth 2 (Two) Times a Day. (Patient taking differently: Take 40 mg by mouth 2 (Two) Times a Day. Take dos), Disp: 60 tablet, Rfl: 5  •  polyethylene glycol (polyethylene glycol) 17 g packet, Take 17 g by mouth Daily As Needed (Constipation)., Disp: , Rfl:   •  ramipril (ALTACE) 5 MG capsule, Take 1 capsule by mouth Every Morning., Disp: 90 capsule, Rfl: 1  •  simvastatin (ZOCOR) 20 MG tablet, TAKE 2 TABLETS EVERY NIGHT (Patient taking differently: Take 40 mg by mouth Every Night.), Disp: 180 tablet, Rfl: 1  •  vitamin B-12 (CYANOCOBALAMIN) 1000 MCG tablet, Take 1,000 mcg by mouth Daily. Stop now for surgery, Disp: , Rfl:   •  Vitamin D, Cholecalciferol, 50 MCG (2000 UT) capsule, Take 2,000 Units by mouth Daily., Disp: , Rfl:   •  warfarin (COUMADIN) 1 MG tablet, Take one tablet (1mg) by mouth on Mon, Wed, Fri and 2 tablets (2mg) on all other days or as directed by [facility]. (Patient taking differently: Take 1 mg by mouth Daily. 1.5 TABLETS DAILY PER DAUGHTER PER [facility] DURING PT SOC VISIT ON [id number]), Disp: 48 tablet, Rfl: 0  •  zolpidem (Ambien) 10 MG tablet, Take 1 tablet by mouth At Night As Needed for Sleep., Disp: 90 tablet, Rfl: 1    Objective   VITAL SIGNS:     Vitals:    01/25/22 1415   BP: 116/64   Pulse: 73   Resp: 16   Temp: 97.3 °F (36.3 °C)   TempSrc: Temporal   SpO2: 100%   Weight: 62.9 kg (138 lb 9.6 oz)   Height: 157.5 cm (62.01\")   PainSc: 0-No pain     Body mass index is 25.34 kg/m².     Wt Readings from Last 5 Encounters:   01/25/22 62.9 kg (138 lb 9.6 oz)   01/10/22 62.1 kg (137 lb)   12/21/21 62.5 " kg (137 lb 12.8 oz)   09/06/21 75.6 kg (166 lb 9.6 oz)   09/01/21 68 kg (150 lb)     PHYSICAL EXAMINATION:   GENERAL: The patient appears in fair general condition, not in acute distress.   SKIN: Old ecchymosis over the upper extremities.  NOSE: skin lesion over the tip of the nose covered with a scab.   EYES: No jaundice.  LYMPHATICS: No cervical lymphadenopathy.  CHEST: Normal respiratory effort. Lungs clear bilaterally. No added sounds.   CVS: Normal S1 and S2. Grade II systolic murmur.  ABDOMEN: Non-distended.  EXTREMITIES: No edema. No calf tenderness.     DIAGNOSTIC DATA:     Results from last 7 days   Lab Units 01/25/22  1359   WBC 10*3/mm3 6.10   NEUTROS ABS 10*3/mm3 4.77   HEMOGLOBIN g/dL 8.9*   HEMATOCRIT % 27.2*   PLATELETS 10*3/mm3 133*     Results from last 7 days   Lab Units 01/19/22  1504   INR  2.5*     Component      Latest Ref Rng & Units 12/28/2021   Iron      37 - 145 mcg/dL 46   Iron Saturation      14 - 48 % 18   Transferrin      200 - 360 mg/dL 184 (L)   TIBC      249 - 505 mcg/dL 258   Ferritin      13.00 - 150.00 ng/mL 845.40 (H)        Assessment/Plan   *Anemia of chronic kidney disease, stage 3a.  · Patient is on Procrit monthly.    · The last Procrit injection was 7000 units on 12/28/2021 for hemoglobin of 9.3.  · Patient is on ferrous sulfate 325 mg twice weekly to maintain adequate iron stores.  · Iron stores were adequate on 12/28/2021.  · Hemoglobin is 8.9 today.  · Patient reports fatigue and reports feeling cold due to the anemia.     *Thrombocytopenia attributed to B12 deficiency.    · Platelet count is in the 110,000-140,000 range.   · Platelet count is 133,000 today.  · Patient has bruising. No active bleeding.  · The bruising is attributed to her chronic anticoagulation with Coumadin.     *Skin lesion over the tip of the nose. It is concerning for basal cell carcinoma. She recently had some bleeding from the lesion. She is going to see Dermatology next week.     PLAN:    1.  We  will give Procrit today at 7000 units.  2.  Continue Ferrous Sulfate twice weekly.   3.  Return monthly for a CBC. Procrit will be given if Hgb is <10.  4.  Iron studies will be rechecked in 2 months.  5.  I will see her in follow up in June with a CBC ferritin and iron panel.      Edward Vasquez MD  01/25/22

## 2022-02-02 NOTE — PROGRESS NOTES
Anticoagulation Clinic Progress Note    Anticoagulation Summary  As of 2/2/2022    INR goal:  2.0-3.0   TTR:  64.0 % (3.1 y)   INR used for dosing:  3.6 (2/2/2022)   Warfarin maintenance plan:  2 mg every Mon; 1 mg all other days   Weekly warfarin total:  8 mg   Plan last modified:  Yue Olivera RPH (2/2/2022)   Next INR check:  2/16/2022   Priority:  Maintenance   Target end date:  Indefinite    Indications    Chronic atrial fibrillation (HCC) [I48.20]             Anticoagulation Episode Summary     INR check location:      Preferred lab:      Send INR reminders to:   [facility]    Comments:        Anticoagulation Care Providers     Provider Role Specialty Phone number    Nik Galarza MD Referring Cardiology [phone]          Clinic Interview:  Patient Findings     Negatives:  Signs/symptoms of thrombosis, Signs/symptoms of bleeding,   Laboratory test error suspected, Change in health, Change in alcohol use,   Change in activity, Upcoming invasive procedure, Emergency department   visit, Upcoming dental procedure, Missed doses, Extra doses, Change in   medications, Change in diet/appetite, Hospital admission, Bruising, Other   complaints      Clinical Outcomes     Negatives:  Major bleeding event, Thromboembolic event,   Anticoagulation-related hospital admission, Anticoagulation-related ED   visit, Anticoagulation-related fatality        INR History:  Anticoagulation Monitoring 1/12/2022 1/19/2022 2/2/2022   INR 3.3 2.5 3.6   INR Date 1/12/2022 1/19/2022 2/2/2022   INR Goal 2.0-3.0 2.0-3.0 2.0-3.0   Trend Same Same Down   Last Week Total 9 mg 8 mg 9 mg   Next Week Total 8 mg 9 mg 7 mg   Sun 1 mg 1 mg 1 mg   Mon 2 mg 2 mg 2 mg   Tue 1 mg 1 mg 1 mg   Wed Hold (1/12) 1 mg Hold (2/2); Otherwise 1 mg   Thu 1 mg 1 mg 1 mg   Fri 2 mg 2 mg 1 mg   Sat 1 mg 1 mg 1 mg   Visit Report - - -   Some recent data might be hidden       Plan:  1. INR is Supratherapeutic today- see above in  Anticoagulation Summary.  Will instruct Janel Mahoney to Change their warfarin regimen- see above in Anticoagulation Summary.  2. Follow up in 2 weeks  3. Patient declines warfarin refills.  4. Verbal and written information provided. Patient expresses understanding and has no further questions at this time.    Yue Olivera, [facility]

## 2022-02-15 NOTE — RESEARCH
Research study: [employer] PSR  Subject initials: RUST  Subject study ID#: [id number]     I reviewed RUST's medical record and remote device interrogation from 12/8/21 to record data for the [employer] registry today.There were no device or lead events to report, nor any qualifying health events to report. We will continue to follow RUST per study protocol.     Tiffany DOSHI RN  Research Coordinator

## 2022-02-16 NOTE — PROGRESS NOTES
Anticoagulation Clinic Progress Note    Anticoagulation Summary  As of 2022    INR goal:  2.0-3.0   TTR:  63.9 % (3.1 y)   INR used for dosin.9 (2022)   Warfarin maintenance plan:  1.5 mg every Mon, Wed, Fri; 1 mg all other days   Weekly warfarin total:  8.5 mg   Plan last modified:  Vargas Butcher, PharmD (2022)   Next INR check:  2022   Priority:  Maintenance   Target end date:  Indefinite    Indications    Chronic atrial fibrillation (HCC) [I48.20]             Anticoagulation Episode Summary     INR check location:      Preferred lab:      Send INR reminders to:   [facility]    Comments:        Anticoagulation Care Providers     Provider Role Specialty Phone number    Nik Galarza MD Referring Cardiology [phone]          Clinic Interview:  Patient Findings     Negatives:  Signs/symptoms of thrombosis, Signs/symptoms of bleeding,   Laboratory test error suspected, Change in health, Change in alcohol use,   Change in activity, Upcoming invasive procedure, Emergency department   visit, Upcoming dental procedure, Missed doses, Extra doses, Change in   medications, Change in diet/appetite, Hospital admission, Bruising, Other   complaints      Clinical Outcomes     Negatives:  Major bleeding event, Thromboembolic event,   Anticoagulation-related hospital admission, Anticoagulation-related ED   visit, Anticoagulation-related fatality        INR History:  Anticoagulation Monitoring 2022   INR 2.5 3.6 1.9   INR Date 2022   INR Goal 2.0-3.0 2.0-3.0 2.0-3.0   Trend Same Down Up   Last Week Total 8 mg 9 mg 8 mg   Next Week Total 9 mg 7 mg 8.5 mg   Sun 1 mg 1 mg 1 mg   Mon 2 mg 2 mg 1.5 mg   Tue 1 mg 1 mg 1 mg   Wed 1 mg Hold (); Otherwise 1 mg 1.5 mg   Thu 1 mg 1 mg 1 mg   Fri 2 mg 1 mg 1.5 mg   Sat 1 mg 1 mg 1 mg   Visit Report - - -   Some recent data might be hidden       Plan:  1. INR is Subtherapeutic today- see above  in Anticoagulation Summary.  Will instruct Janel Mahoney to Increase their warfarin regimen- see above in Anticoagulation Summary.  2. Follow up in 2 weeks  3. Patient declines warfarin refills.  4. Verbal and written information provided. Patient expresses understanding and has no further questions at this time.    Vargas Butcher, PharmD

## 2022-02-22 NOTE — TELEPHONE ENCOUNTER
Caller: MARISSA MUNOZ    Relationship: Emergency Contact    Best call back number  [phone]    Requested Prescriptions:   Requested Prescriptions     Pending Prescriptions Disp Refills   • zolpidem (Ambien) 10 MG tablet 90 tablet 1     Sig: Take 1 tablet by mouth At Night As Needed for Sleep.      CALLER REQUESTED NOT ON MED LIST:   TRAMADOL 50MG    Pharmacy where request should be sent: [facility] - [address] - [phone] [facility] [phone] FX     Does the patient have less than a 3 day supply:  [x] Yes  [] No    Shahid Welch Rep   02/22/22 11:21 EST

## 2022-02-28 NOTE — PROGRESS NOTES
Anticoagulation Clinic Progress Note    Anticoagulation Summary  As of 2022    INR goal:  2.0-3.0   TTR:  64.0 % (3.2 y)   INR used for dosin.3 (2022)   Warfarin maintenance plan:  1.5 mg every Mon, Wed, Fri; 1 mg all other days   Weekly warfarin total:  8.5 mg   No change documented:  Stephanie Gill [facility]   Plan last modified:  Vargas Butcher, PharmD (2022)   Next INR check:  3/14/2022   Priority:  Maintenance   Target end date:  Indefinite    Indications    Chronic atrial fibrillation (HCC) [I48.20]             Anticoagulation Episode Summary     INR check location:      Preferred lab:      Send INR reminders to:   [facility] [place]    Comments:        Anticoagulation Care Providers     Provider Role Specialty Phone number    Nik Galarza MD Referring Cardiology [phone]          Clinic Interview:      INR History:  Anticoagulation Monitoring 2022   INR 3.6 1.9 2.3   INR Date 2022   INR Goal 2.0-3.0 2.0-3.0 2.0-3.0   Trend Down Up Same   Last Week Total 9 mg 8 mg 8.5 mg   Next Week Total 7 mg 8.5 mg 8.5 mg   Sun 1 mg 1 mg 1 mg   Mon 2 mg 1.5 mg 1.5 mg   Tue 1 mg 1 mg 1 mg   Wed Hold (); Otherwise 1 mg 1.5 mg 1.5 mg   Thu 1 mg 1 mg 1 mg   Fri 1 mg 1.5 mg 1.5 mg   Sat 1 mg 1 mg 1 mg   Visit Report - - -   Some recent data might be hidden       Plan:  1. INR is Therapeutic today- see above in Anticoagulation Summary.  Will instruct Janel Mahoney to Continue their warfarin regimen- see above in Anticoagulation Summary.  2. Follow up in 2 weeks  3. Patient declines warfarin refills.  4. Verbal and written information provided. Patient expresses understanding and has no further questions at this time.    Stephanie Gill [facility]

## 2022-03-10 NOTE — TELEPHONE ENCOUNTER
Caller: [facility] MAIL DELIV[facility] - [facility] [address] - [phone] [facility] [phone] FX    Relationship: Pharmacy    Best call back number:[phone] EXT [phone]  Requested Prescriptions:   Requested Prescriptions     Pending Prescriptions Disp Refills   • zolpidem (Ambien) 10 MG tablet 90 tablet 1     Sig: Take 1 tablet by mouth At Night As Needed for Sleep.        Pharmacy where request should be sent: [facility]IL DELIV[facility] - [facility] [address] - [phone] [facility] [phone] FX     Additional details provided by patient: PHARMACY STATES THAT PATIENT DIDN'T RECEIVE   THE MEDICATION WILL NEED DOCTOR TO OK THE REFILL     Does the patient have less than a 3 day supply:  [x] Yes  [] No    Shahid Bentley Rep   03/10/22 12:15 EST

## 2022-03-14 PROBLEM — K92.2 ACUTE UPPER GI BLEED: Status: ACTIVE | Noted: 2022-01-01

## 2022-03-15 NOTE — TELEPHONE ENCOUNTER
"[facility] IS CALLING TO GET A VERBAL \"OKAY\" FOR zolpidem (Ambien) 10 MG tablet.THEY HAVE ALREADY SENT MEDICATION IN THE MAIL BUT NEED A CLINICAL OKAY. CALLBACK: [phone] EXTENSION:[id number]  "

## 2022-03-16 NOTE — ANESTHESIA POSTPROCEDURE EVALUATION
"Patient: Janel Mahoney    Procedure Summary     Date: 03/16/22 Room / Location:  [facility]    Anesthesia Start: 1255 Anesthesia Stop: 1326    Procedure: ESOPHAGOGASTRODUODENOSCOPY AT BEDSIDE (N/A Esophagus) Diagnosis:       Symptomatic anemia      (Symptomatic anemia [D64.9])    Surgeons: Brooke Moscoso MD Provider: Jeancarlos Mcgraw MD    Anesthesia Type: MAC ASA Status: 4          Anesthesia Type: MAC    Vitals  Vitals Value Taken Time   /60 03/16/22 1401   Temp     Pulse 60 03/16/22 1404   Resp     SpO2 99 % 03/16/22 1404   Vitals shown include unvalidated device data.        Post Anesthesia Care and Evaluation    Patient location during evaluation: bedside  Patient participation: complete - patient participated  Level of consciousness: awake and alert  Pain management: adequate  Airway patency: patent  Anesthetic complications: No anesthetic complications  PONV Status: none  Cardiovascular status: acceptable  Respiratory status: acceptable  Hydration status: acceptable    Comments: /49   Pulse 60   Temp 36.2 °C (97.2 °F) (Oral)   Resp 12   Ht 162.6 cm (64\")   Wt 66 kg (145 lb 8.1 oz)   SpO2 98%   BMI 24.98 kg/m²         "

## 2022-03-16 NOTE — ANESTHESIA PREPROCEDURE EVALUATION
Anesthesia Evaluation     Patient summary reviewed and Nursing notes reviewed   no history of anesthetic complications:  NPO Solid Status: > 8 hours  NPO Liquid Status: > 2 hours           Airway   Mallampati: II  TM distance: >3 FB  Neck ROM: full  no difficulty expected  Dental - normal exam   (+) edentulous    Pulmonary - normal exam   (+) a smoker Former, sleep apnea on CPAP,   (-) COPD, asthma, lung cancer  Cardiovascular   Exercise tolerance: poor (<4 METS)    NYHA Classification: II  ECG reviewed  PT is on anticoagulation therapy  Rhythm: regular  Rate: normal    (+) pacemaker ICD, pacemaker interrogated 3-6 months ago, hypertension well controlled 2 medications or greater, valvular problems/murmurs murmur, past MI  1-7 days, CAD, CABG >6 Months, cardiac stents unknown timeframe dysrhythmias Atrial Fib, Tachycardia, CHF , PVD, DVT resolved, hyperlipidemia,  carotid artery disease carotid bilateral    ROS comment: CAD w/ known ICM and Chronic combined systolic and diastolic congestive heart failure with presenting anemia yesterday, elevated troponin likely non-ST elevation MI type II and per Cards note severely depressed EF s/p CABG and MVR, multiple coronary stents in place, pt has a BS AICD/Pacer, chronic Afib and episodic VT.  Poor functional capicity.  Per notes in Care Everywhere pt has has an EF of 35-45% since approximately 2007.    PE comment: Paced rhythm per EKG    Neuro/Psych- negative ROS  (-) seizures, TIA, CVA  GI/Hepatic/Renal/Endo    (+)  GERD poorly controlled, PUD, GI bleeding active bleeding, renal disease CRI,     Musculoskeletal     (+) back pain,   Abdominal  - normal exam   Substance History - negative use     OB/GYN negative ob/gyn ROS         Other   arthritis, blood dyscrasia anemia thrombocytopenia,                   Anesthesia Plan    ASA 4     MAC   (Pt is exceptionally frail and a poor surgical candidate as evidenced by PMHx and current Cards note.)  intravenous induction      Anesthetic plan, all risks, benefits, and alternatives have been provided, discussed and informed consent has been obtained with: patient.        CODE STATUS:    Code Status (Patient has no pulse and is not breathing): CPR (Attempt to Resuscitate)  Medical Interventions (Patient has pulse or is breathing): Full Support

## 2022-03-17 NOTE — ANESTHESIA PREPROCEDURE EVALUATION
" Anesthesia Evaluation     Patient summary reviewed and Nursing notes reviewed   history of anesthetic complications:  NPO Solid Status: > 8 hours  NPO Liquid Status: > 2 hours           Airway   Mallampati: II  Dental    (+) upper dentures and lower dentures    Pulmonary    (+) a smoker Former, sleep apnea on CPAP,   Cardiovascular   Exercise tolerance: poor (<4 METS)    ECG reviewed  Rhythm: irregular    (+) pacemaker, hypertension, valvular problems/murmurs murmur, CAD, CABG, dysrhythmias Atrial Fib, CHF , DVT, hyperlipidemia,  carotid artery disease    ROS comment: ABNORMAL ECG -  Ventricular-paced rhythm  Biventricular paced rhythm    Neuro/Psych- negative ROS  GI/Hepatic/Renal/Endo    (+)  GERD, PUD, GI bleeding , renal disease CRI,     Musculoskeletal     (+) back pain,   Abdominal    Substance History - negative use     OB/GYN negative ob/gyn ROS         Other   arthritis,                      Anesthesia Plan    ASA 4     MAC   (/71   Pulse 60   Temp 35.9 °C (96.7 °F) (Axillary)   Resp 12   Ht 162.6 cm (64\")   Wt 69.6 kg (153 lb 7 oz)   SpO2 97%   BMI 26.34 kg/m²     I have reviewed the patient's history with the patient and the chart, including all pertinent laboratory results and imaging. I have explained the risks of anesthesia including but not limited to dental damage, corneal abrasion, nerve injury, MI, stroke, and death.    )    Anesthetic plan, all risks, benefits, and alternatives have been provided, discussed and informed consent has been obtained with: patient.        CODE STATUS:    Code Status (Patient has no pulse and is not breathing): CPR (Attempt to Resuscitate)  Medical Interventions (Patient has pulse or is breathing): Full Support      "

## 2022-03-17 NOTE — ANESTHESIA POSTPROCEDURE EVALUATION
"Patient: Janel Mahoney    Procedure Summary     Date: 03/17/22 Room / Location:  [facility]    Anesthesia Start: 0846 Anesthesia Stop: 0907    Procedure: COLONOSCOPY WITH POLYPECTOMY (HOT SNARE) (N/A ) Diagnosis:       Anemia, blood loss      (Anemia, blood loss [D50.0])    Surgeons: Brooke Moscoso MD Provider: Cara Amos MD    Anesthesia Type: MAC ASA Status: 4          Anesthesia Type: MAC    Vitals  No vitals data found for the desired time range.          Post Anesthesia Care and Evaluation    Patient location during evaluation: bedside  Patient participation: complete - patient participated  Level of consciousness: awake  Pain management: adequate  Airway patency: patent  Anesthetic complications: No anesthetic complications  PONV Status: controlled  Cardiovascular status: acceptable  Respiratory status: acceptable  Hydration status: acceptable    Comments: /74 (BP Location: Left arm, Patient Position: Sitting)   Pulse 60   Temp 36.7 °C (98.1 °F) (Oral)   Resp 12   Ht 162.6 cm (64\")   Wt 69.6 kg (153 lb 7 oz)   SpO2 100%   BMI 26.34 kg/m²         "

## 2022-03-17 NOTE — TELEPHONE ENCOUNTER
----- Message from Brooke Moscoso MD sent at 3/17/2022  2:58 PM EDT -----  Regarding: RE: capsule  Order is in-thx  ----- Message -----  From: Daniella Harrison  Sent: 3/17/2022   2:25 PM EDT  To: Brooke Moscoso MD  Subject: RE: capsule                                      Please enter order for [id number] capsule     ----- Message -----  From: David Peralta RegSched Rep  Sent: 3/17/2022   2:19 PM EDT  To: Brooke Moscoso MD, Daniella Harrison  Subject: RE: capsule                                      No auth required, Daniella can you please get this scheduled.  ----- Message -----  From: Brooke Moscoso MD  Sent: 3/17/2022   1:28 PM EDT  To: Shahid Alicea  Subject: capsule                                          Please get PA for outpatient capsule-anemia due to chronic blood loss; send to daniella to schedule.

## 2022-03-18 NOTE — TELEPHONE ENCOUNTER
Dr. Galarza is no longer in our office.  Her INR is followed by our anticoagulation clinic and it appears she has not been seen in a while. I sent script for 1 tablet daily or as directed.     She is currently inpatient and will need INR follow-up once discharged

## 2022-03-18 NOTE — TELEPHONE ENCOUNTER
Caller: MARISSA MUNOZ    Relationship: Emergency Contact    Best call back number: [phone] (H)    PLEASE HAVE MICHAEL CALL MARISSA [phone] (P)    SHE IS RETURNING HER CALL.

## 2022-03-18 NOTE — TELEPHONE ENCOUNTER
TRI FROM [facility] CALLED REGARDING PATIENT     SHE IS NEEDING  EVALUATION ORDERS  FOR NURSING AND PHYSICAL THERAPY     CALL [phone] TRI/ [facility]

## 2022-03-18 NOTE — TELEPHONE ENCOUNTER
Dr. Matute sent refill with 2 sets of directions. Called pt to get correct dose, No answer, no voicemail

## 2022-03-18 NOTE — TELEPHONE ENCOUNTER
Spoke with Marlen,pts daughter and she states Dr. Galarza manages INR and he should be the one filling the medication. [facility] needs refill with correct directions.

## 2022-03-18 NOTE — TELEPHONE ENCOUNTER
Caller: [facility] - [facility] [address] - [phone]  - [phone]    Relationship: Pharmacy    Best call back number: [phone]     Who are you requesting to speak with (clinical staff, provider,  specific staff member): CLINICAL STAFF     What was the call regarding: HAS QUESTION ABOUT DIRECTIONS FOR warfarin (COUMADIN) 1 MG tablet    Do you require a callback: YES

## 2022-03-19 PROBLEM — K92.2 ACUTE UPPER GI BLEED: Status: RESOLVED | Noted: 2022-01-01 | Resolved: 2022-01-01

## 2022-03-19 PROBLEM — D64.9 SYMPTOMATIC ANEMIA: Status: RESOLVED | Noted: 2021-09-06 | Resolved: 2022-01-01

## 2022-03-19 NOTE — OUTREACH NOTE
Prep Survey    Flowsheet Row Responses   [facility] patient discharged from? [place]   Is LACE score < 7 ? No   Emergency Room discharge w/ pulse ox? No   Eligibility [facility]   Date of Admission 03/14/22   Date of Discharge 03/19/22   Discharge Disposition Home or Self Care   Discharge diagnosis Upper GI bleed,   Acute blood loss anemia   Does the patient have one of the following disease processes/diagnoses(primary or secondary)? Other   Does the patient have Home health ordered? Yes   What is the Home health agency?  [facility]    Is there a DME ordered? No   Prep survey completed? Yes          MACY OCHOA - Registered Nurse

## 2022-03-21 NOTE — OUTREACH NOTE
Call Center TCM Note    Flowsheet Row Responses   [facility] patient discharged from? [place]   Does the patient have one of the following disease processes/diagnoses(primary or secondary)? Other   TCM attempt successful? Yes   Discharge diagnosis Upper GI bleed,   Acute blood loss anemia   Meds reviewed with patient/caregiver? Yes   Does the patient have all medications ordered at discharge? N/A   Prescription comments No new medications added, pt has discontinued Altace, COumadin, and Ambien for now as directed at d/c.   Does the patient have a primary care provider?  Yes   Does the patient have an appointment with their PCP within 7 days of discharge? No   Comments regarding PCP PCP Dr Matute has no appts within TCM time frame. Pt already has call in to his ofc to sched TCM FOLLOW UP. This visit would need to be completed by 04/02/2022.   Has the patient kept scheduled appointments due by today? N/A   What is the Home health agency?   [facility]    Has home health visited the patient within 72 hours of discharge? Yes   Psychosocial issues? No   TCM call completed? Yes   Wrap up additional comments Pt doing fair, bilat knees very sore from fall prior to admit. Stitches needed in rt knee.  is seeing pt. No new medications added, pt has discontinued Altace, COumadin, and Ambien for now as directed at d/c. I went over all appts I could see with pt, and provided her some phone numbers for these offices. PCP Dr Matute has no appts within TCM time frame. Pt already has call in to his ofc to sched TCM FOLLOW UP. This visit would need to be completed by 04/02/2022.          Caprice Wakefield MA    3/21/2022, 14:07 EDT

## 2022-03-21 NOTE — TELEPHONE ENCOUNTER
Caller: Janel Mahoney    Relationship to patient: Self    Best call back number: [phone]      Type of visit: HOSPITAL F/U FOR ANEMIA ON 3/17-3/18 AT [facility]    Requested date: 3/25 WITH HER  OTILIO AT 2:45?  PLEASE ADVISE?

## 2022-03-21 NOTE — TELEPHONE ENCOUNTER
"Thank you for the update. Her last INR check on 2/28/22 was therapeutic in her goal range; however, she was a no-show for her next appointment on 3/7/22. She was admitted for upper GI bleed in setting of supratherapeutic INR on 3/14/22 and was discharged 3/19/22. On discharge her warfarin was on hold -- \"Patient was cleared to go back on the Coumadin as of tomorrow however her PT assessment showed unsteady gait and she is a risk for falling and should not be on the blood thinner until that condition improved.\"     [facility] has documented plan to contact us at the [facility] Management [facility] when warfarin is ultimately resumed so that we may resume monitoring/management.   "

## 2022-03-22 NOTE — PROGRESS NOTES
Hgb today is 7.6.  Patient had recent admission for GI bleed.  Reviewed with Dr Vasquez and orders placed to transfuse 1 unit PRBCS.  Patient to return weekly for CBC and procrit and see Dr Vasquez in 2 weeks.   Message sent to scheduling for appts.

## 2022-03-22 NOTE — HOME HEALTH
patient doing well.   continue with assessement each visit,   educationon skin care, meds, disease process.  she is very [place].   lives with spouse and dtr who is also having medical issues   she has large skin tear to right knee. She is keeping a island dressing to knee   and needs to be changed by nursing at next visit. Still has some bloody draiange.   not taking her coumadin at this time, It is on hold.

## 2022-03-23 NOTE — TELEPHONE ENCOUNTER
David,  Can you get cert for the capsul study? Pt is scheduled on 4/7/22    Patient and daughter notified of results and recommendations and verbalized understanding

## 2022-03-23 NOTE — TELEPHONE ENCOUNTER
----- Message from Brooke Moscoso MD sent at 3/22/2022  6:53 AM EDT -----  The polyp(s) biopsies showed adenomatous change. This is not cancerous but is considered potentially precancerous.  Would defer further screening colonoscopy due to her age.  Recommend outpatient capsule endoscopy for further evaluation of her anemia

## 2022-03-24 NOTE — HOME HEALTH
Patient referred to [facility] following hospitalization from 3/14-3/19 for acute blood loss anemia; upper GI bleed; atrial fibrillation on chronic anticoagulant; acute renal failure (has CKD); surgical spine fracture. Her warfarin is currently on hold until her transfers and ambulation are safer r/t risk for bleeding with falls. The INR clinic usually manages her warfarin so they will need to be contacted for INR's once warfarin is restarted. She is very [place] and can be forgetful, especially of peoples name and the such- but she remembers clearly minutes or hours later.  and daughter live in home and are PCG's. She is up with a walker and supervision. She has a skin tear on her right knee that is getting a dry dressing daily. Changed meds: allopurinol, carvedilol, ferrous sulfate, simvastatin; stopped meds: ramipril, warfarin, zolpidem. All meds are in home and family is admin by DC med rec. She has significant chronic pain in her knees and only uses pain meds when going out of home to MD appointments, otherwise she rests them and the pain subsides. She is incontinent of B&B and wears briefs. On regular diet and tolerating well.

## 2022-03-29 NOTE — HOME HEALTH
Patient fell off the toilet about 2 weeks ago, scrapped her right knee and bled profusely.  She was in the hospital 3/14 to 3/19. Her Hgb dropped below 8 and she received a transfusion. She has history of A-fib, but anticoagulants are held at this point.  She also has a history of falls, with subsequent  shoulder replacement.  Scoliosis.  She has B knee OA with the left being replaced, but the right one needing to be replaced.  She is [place] with mild cognitive/memory deficits.     PLOF:  she used a walker ad damaso when leaving the home, but cane or no device at times in the home.  She did her basic ADLs on her own, but  and daughter live with her and provide supervision as needed.      Assessment:  home PT will address her B LE weakness, pain and limited ROM with supine and seated ROM.  She was getting outpt PT for her right shoulder ROM and function and PT will incorporate continued exercise intervention for that.  Balance needs to be addressed as well as gait safety.     Plan for next visit  1.  Progress LE HEP  2.  Gait/balance training as needed.

## 2022-03-31 NOTE — HOME HEALTH
Skin tear observed, small amount of bleeding, family managing dressing changes and ointment.  I will order more border gauze for them.  Patient states she spoke with Dr. Galarza and [facility] management who restarted her on 2mg warfarin daily until INR check Monday which patient states she is setting up today.  CP assessment WNL.

## 2022-04-04 NOTE — PROGRESS NOTES
Anticoagulation Clinic Progress Note    Anticoagulation Summary  As of 2022    INR goal:  2.0-3.0   TTR:  63.3 % (3.2 y)   INR used for dosin.5 (2022)   Warfarin maintenance plan:  1.5 mg every Mon, Wed, Fri; 1 mg all other days   Weekly warfarin total:  8.5 mg   Plan last modified:  Vargas Butcher, PharmD (2022)   Next INR check:  2022   Priority:  Maintenance   Target end date:  Indefinite    Indications    Chronic atrial fibrillation (HCC) [I48.20]             Anticoagulation Episode Summary     INR check location:      Preferred lab:      Send INR reminders to:   [facility] [place]    Comments:        Anticoagulation Care Providers     Provider Role Specialty Phone number    Nik Galarza MD Referring Cardiology [phone]          Clinic Interview:  Patient Findings     Positives:  Hospital admission    Negatives:  Signs/symptoms of thrombosis, Signs/symptoms of bleeding,   Laboratory test error suspected, Change in health, Change in alcohol use,   Change in activity, Upcoming invasive procedure, Emergency department   visit, Upcoming dental procedure, Missed doses, Extra doses, Change in   medications, Change in diet/appetite, Bruising, Other complaints    Comments:  Reports a fall 3 wks ago. Stayed in the hospital M-. Reports   Dr. Galarza told her to take 2 mg daily for the last 4 or 5 days. Home   health to check INR tomorrow. Reports a spot on her leg from the fall that   bleeds often.      Clinical Outcomes     Negatives:  Major bleeding event, Thromboembolic event,   Anticoagulation-related hospital admission, Anticoagulation-related ED   visit, Anticoagulation-related fatality    Comments:  Reports a fall 3 wks ago. Stayed in the hospital M-. Reports   Dr. Galarza told her to take 2 mg daily for the last 4 or 5 days. Home   health to check INR tomorrow. Reports a spot on her leg from the fall that   bleeds often.        INR History:  Anticoagulation Monitoring  2/16/2022 2/28/2022 4/4/2022   INR 1.9 2.3 1.5   INR Date 2/16/2022 2/28/2022 4/4/2022   INR Goal 2.0-3.0 2.0-3.0 2.0-3.0   Trend Up Same Same   Last Week Total 8 mg 8.5 mg 8 mg   Next Week Total 8.5 mg 8.5 mg 9 mg   Sun 1 mg 1 mg -   Mon 1.5 mg 1.5 mg 2 mg (4/4)   Tue 1 mg 1 mg -   Wed 1.5 mg 1.5 mg -   Thu 1 mg 1 mg -   Fri 1.5 mg 1.5 mg -   Sat 1 mg 1 mg -   Visit Report - - -   Some recent data might be hidden       Plan:  1. INR is Subtherapeutic today- see above in Anticoagulation Summary.  Will instruct Janel DORINA Covingtons to Change their warfarin regimen- see above in Anticoagulation Summary.  2. Follow up in 1 day with [facility]. Called order into [facility] [phone].   3. Patient declines warfarin refills.  4. Verbal and written information provided. Patient expresses understanding and has no further questions at this time.    Tracey Reed, Pharmacy Intern

## 2022-04-04 NOTE — PROGRESS NOTES
I have supervised and reviewed the notes, assessments, and/or procedures performed by our Pharmacy Intern. The documented assessment and plan were developed cooperatively, and the plan was implemented in my presence. I concur with the documentation of this patient encounter.    Vargas Butcher, PharmD

## 2022-04-05 NOTE — PROGRESS NOTES
Patient was not at home today and she was unable to get her INR taken. Instructing her to take 2 mg today and recheck INR tomorrow (INR orders provided to home health)

## 2022-04-06 NOTE — PROGRESS NOTES
Anticoagulation Clinic Progress Note    Anticoagulation Summary  As of 2022    INR goal:  2.0-3.0   TTR:  63.2 % (3.2 y)   INR used for dosin.90 (2022)   Warfarin maintenance plan:  1.5 mg every Mon, Wed, Fri; 1 mg all other days   Weekly warfarin total:  8.5 mg   Plan last modified:  Vargas Butcher, PharmD (2022)   Next INR check:  2022   Priority:  Maintenance   Target end date:  Indefinite    Indications    Chronic atrial fibrillation (HCC) [I48.20]             Anticoagulation Episode Summary     INR check location:      Preferred lab:      Send INR reminders to:  [facility]    Comments:        Anticoagulation Care Providers     Provider Role Specialty Phone number    Nik Galarza MD Referring Cardiology [phone]          INR History:  Anticoagulation Monitoring 2022   INR 2.3 1.5 1.90   INR Date 2022   INR Goal 2.0-3.0 2.0-3.0 2.0-3.0   Trend Same Same Same   Last Week Total 8.5 mg 8 mg 12 mg   Next Week Total 8.5 mg 10 mg 8.5 mg   Sun 1 mg - -   Mon 1.5 mg 2 mg () -   Tue 1 mg 2 mg () -   Wed 1.5 mg - 1.5 mg   Thu 1 mg - 1 mg   Fri 1.5 mg - -   Sat 1 mg - -   Visit Report - - -   Some recent data might be hidden       Plan:  1. INR is Subtherapeutic today- see above in Anticoagulation Summary.   Provided instructions to Rosalinda with [facility] to resume prior dose of warfarin 1.5 mg MWF, 1 mg all other days - see above in Anticoagulation Summary.  2. Follow up in 2 days      Vargas Butcher, PharmD

## 2022-04-06 NOTE — TELEPHONE ENCOUNTER
Informed patient and nurse she needs to be seen today er/icc even tried give appt. Tomorrow having test in am and if no better will see someone

## 2022-04-06 NOTE — CASE COMMUNICATION
I contacted primary RN Bri to inform of the INR orders. She approved for me to move the scheduled nurse visit for tomorrow to friday 4/8/22 when next INR will need to be obtained.

## 2022-04-06 NOTE — TELEPHONE ENCOUNTER
Caller: ZULEMA WITH [facility]     Relationship: [unfilled]     Best call back number: [phone]    What is your medical concern? 3-4 EDEMA LEFT LEG, 23 EDEMA ON RIGHT LEG    How long has this issue been going on? 1 WEEK OR SO    Is your provider already aware of this issue? NO    Have you been treated for this issue? NO    PATIENT IS ON LASIX, WOULD LIKE TO KNOW IF DR LYNN WOULD LIKE HER TO INCREASE THIS.

## 2022-04-07 NOTE — PROGRESS NOTES
"Chief Complaint  Leg Swelling (Since in hosp)    Subjective          Janel Mahoney presents to [facility]  History of Present Illness  C/o leg swelling, with afib, CHF sees cardiology, with CKD sees nephrology, taking bumex 2mg bid, currently has HH after hosptial for GI bleed in 3/2022, noticed worsening pitting edema yesterday. Unsure if any worsening swelling, denies CP, SOA. Sees cardiology Dr. Galarza. Has coumadin checked at [facility] last cardiology visit 2021. She has fu with nephrology scheduled for tomorrow. She had compression stockings ordered per  and gets them on Saturday.             Objective   Vital Signs:   /62   Pulse 62   Temp 97.3 °F (36.3 °C)   Ht 162.6 cm (64\")   Wt 67.1 kg (148 lb)   SpO2 98%   BMI 25.40 kg/m²            Physical Exam  Vitals and nursing note reviewed.   Constitutional:       Appearance: She is well-developed.   HENT:      Head: Normocephalic.   Eyes:      Pupils: Pupils are equal, round, and reactive to light.   Cardiovascular:      Rate and Rhythm: Normal rate and regular rhythm.      Pulses: Normal pulses.      Heart sounds: Normal heart sounds.      Comments: No weeping noted today, skin intact.   Pulmonary:      Effort: Pulmonary effort is normal.      Breath sounds: Normal breath sounds.   Musculoskeletal:      Right lower le+ Edema present.      Left lower le+ Edema present.   Skin:     General: Skin is warm and dry.   Neurological:      Mental Status: She is alert and oriented to person, place, and time.   Psychiatric:         Behavior: Behavior normal.         Judgment: Judgment normal.        Result Review :                 Assessment and Plan    Diagnoses and all orders for this visit:    1. Bilateral leg edema (Primary)  -     Basic metabolic panel; Future  -     proBNP; Future    2. CKD (chronic kidney disease) stage 4, GFR 15-29 ml/min ([facility])  -     Basic metabolic panel; Future  -     proBNP; " Future    3. Chronic atrial fibrillation (HCC)  -     Basic metabolic panel; Future  -     proBNP; Future    4. Chronic combined systolic and diastolic congestive heart failure (HCC)  -     Basic metabolic panel; Future  -     proBNP; Future        Follow Up   Return if symptoms worsen or fail to improve.  Patient was given instructions and counseling regarding her condition or for health maintenance advice. Please see specific information pulled into the AVS if appropriate.     Cont f/u with nephrology tomorrow as scheduled.   She will call to make f/u with cardiology as she is established.   Cont with compression stockings, low salt diet, handout given.   Patient agrees with plan of care and understands instructions. Call if worsening symptoms or any problems or concerns.   Cont bumex as prescribed,   Labs ordered she will have at hospital tomorrow or with nephrology.   If any worsening symptoms, leg swelling, SOA advised ER.   Patient agrees with plan of care and understands instructions. Call if worsening symptoms or any problems or concerns.

## 2022-04-07 NOTE — HOME HEALTH
patient seen for pt/inr today .   called to Kayleigh at office.   she is also noted to have edema to vanessa lower ext more on the left than right,   t/i on need to elevate, and limit sodium in diet, and wear compression socks.   She is having the camera placed in her tomorrow, and will be doing mag citrate at 5pm   Family had miirlax. and t/i that they need to go get ,  pitting edea 3-4 left leg and 2-3 to right leg   called Dr Matute lm

## 2022-04-07 NOTE — PATIENT INSTRUCTIONS
Cont f/u with nephrology tomorrow as scheduled.   She will call to make f/u with cardiology as she is established.   Cont with compression stockings, low salt diet, handout given.   Patient agrees with plan of care and understands instructions. Call if worsening symptoms or any problems or concerns.   Cont bumex.   If any worsening symptoms, leg swelling, SOA advised ER.   Patient agrees with plan of care and understands instructions. Call if worsening symptoms or any problems or concerns.

## 2022-04-09 NOTE — HOME HEALTH
INR 2.4 PT 28.8   TAKING COUMADIN 1 MG A DAY  LAST VISIT PATIENT HAD EDEMA TO YSABEL LOWER EXT  I HAD CALLED DR LYNN THEY WANTED TO SEE HER   SHE SAW PA AT HIS OFFICE THEY DID NOT SEEM THAT CONCERNED.   INSTRUCTED HER TO WEIGH DAILY AFTER URINATING .   AND KEEP TRACK OF IT.   SHE WILL NOT ELEAVTE LEGS IT HURTS TO MUCH.    Called Cardiology, tt APRN on call , Patient is to take 1.5 mg today and 1 mg on Sat and Sun,   Nursing to recheck her INR on Monday.

## 2022-04-11 NOTE — PROGRESS NOTES
Anticoagulation Clinic Progress Note    Anticoagulation Summary  As of 2022    INR goal:  2.0-3.0   TTR:  63.2 % (3.2 y)   INR used for dosin.60 (2022)   Warfarin maintenance plan:  1.5 mg every Mon, Wed, Fri; 1 mg all other days   Weekly warfarin total:  8.5 mg   No change documented:  Vargas Butcher PharmD   Plan last modified:  Vargas Butcher PharmD (2022)   Next INR check:  2022   Priority:  Maintenance   Target end date:  Indefinite    Indications    Chronic atrial fibrillation (HCC) [I48.20]             Anticoagulation Episode Summary     INR check location:      Preferred lab:      Send INR reminders to:   [facility]    Comments:        Anticoagulation Care Providers     Provider Role Specialty Phone number    Nik Galarza MD Referring Cardiology [phone]          INR History:  Anticoagulation Monitoring 2022   INR 1.5 1.90 2.60   INR Date 2022   INR Goal 2.0-3.0 2.0-3.0 2.0-3.0   Trend Same Same Same   Last Week Total 8 mg 12 mg 10 mg   Next Week Total 10 mg 8.5 mg 8.5 mg   Sun - - 1 mg   Mon 2 mg () - 1.5 mg   Tue 2 mg (/) - 1 mg   Wed - 1.5 mg 1.5 mg   Thu - 1 mg 1 mg   Fri - - 1.5 mg   Sat - - 1 mg   Visit Report - - -   Some recent data might be hidden       Plan:  1. INR is Therapeutic today- see above in Anticoagulation Summary.   Provided instructions to Evelyne with [facility] to Continue their warfarin regimen- see above in Anticoagulation Summary.  2. Follow up in 1 week      Vargas Butcher PharmD

## 2022-04-11 NOTE — TELEPHONE ENCOUNTER
Thank you for addressing! Spoke with [facility] this afternoon. May refer to today's Anticoagulation visit for continuation of her care.

## 2022-04-12 NOTE — CASE COMMUNICATION
"[facility] obtained INR results and entered. Patient's daughter was notified. Next INR is 4/18. I do not see any more visits noted for patient and so I will generate a SN visit w/ this order. Let me know if anything else is needed. Thanks         \"  VERBAL ORDERS RECEIVED BY PHONE FROM YASSINE ARELLANO/PHARMACIST AT [facility] PER DR. LAWRENCE.     PT/INR: 31.2/2.6 DRAWN 4/11/22.    COUMADIN DOSAGE: CHANGE TO 1.5MG BY MOUTH  ON MONDAY, WEDNESDAY, AND FRIDAY; AND TO TAKE 1MG ALL OTHER DAYS.    SKILLED NURSE TO DRAW PT/INR VIA FINGERSTICK OR VENIPUNCTURE ON 4/18/22   WITH RESULTS TO [facility].     PATIENT'S DAUGHTER MARLEN WAS NOTIFIED BY PHONE OF THE ABOVE COUMADIN DOSAGE AND SHE REPEATED BACK TO ME AND STATED UNDERSTANDING. SHE STATED THAT SHE WOULD TELL PATIENT. \"  "

## 2022-04-13 NOTE — PROGRESS NOTES
Subjective     CHIEF COMPLAINT:      Chief Complaint   Patient presents with   • Follow-up       HISTORY OF PRESENT ILLNESS:     Janel Mahoney is a 81 y.o. female patient who returns today for follow up on her anemia.  She returns today for follow-up accompanied by her .  She has been receiving Procrit every week due to recent decline in her hemoglobin level requiring PRBC transfusion when hemoglobin decreased to 7.6 on 3/22/2022.  Patient denies chest pain or shortness of breath.  No recent bleeding.  She has some bruising over the upper extremities but overall have improved.    Patient is continuing to take the oral iron twice a week on Mondays and Fridays.    ROS:  Pertinent ROS is in the HPI.     Past medical, surgical, social and family history were reviewed.     MEDICATIONS:    Current Outpatient Medications:   •  acetaminophen (TYLENOL) 325 MG tablet, Take 650 mg by mouth Every 6 (Six) Hours As Needed for Mild Pain ., Disp: , Rfl:   •  allopurinol (ZYLOPRIM) 100 MG tablet, TAKE 2 TABLETS EVERY DAY BEFORE SUPPER (Patient taking differently: Take 200 mg by mouth Daily.), Disp: 180 tablet, Rfl: 1  •  aspirin 81 MG chewable tablet, Chew 81 mg Daily., Disp: , Rfl:   •  bumetanide (BUMEX) 2 MG tablet, TAKE 1 TABLET TWICE DAILY (Patient taking differently: Take 2 mg by mouth 2 (Two) Times a Day.), Disp: 180 tablet, Rfl: 1  •  calcitriol (ROCALTROL) 0.25 MCG capsule, Take 0.25 mcg by mouth 2 (Two) Times a Day., Disp: , Rfl:   •  carvedilol (COREG) 25 MG tablet, TAKE 1 TABLET TWICE DAILY (Patient taking differently: Take 25 mg by mouth 2 (Two) Times a Day With Meals.), Disp: 180 tablet, Rfl: 1  •  ferrous sulfate 325 (65 FE) MG tablet, Take 1 tablet by mouth 2 (Two) Times a Week. (Patient taking differently: Take 325 mg by mouth 2 (Two) Times a Week. Mon, Thur only), Disp: , Rfl:   •  multivitamin (THERAGRAN) tablet tablet, Take 1 tablet by mouth Daily., Disp: , Rfl:   •  mupirocin (BACTROBAN) 2 % ointment,  "Apply 1 application topically to the appropriate area as directed 3 (Three) Times a Day., Disp: 22 g, Rfl: 3  •  oxyCODONE-acetaminophen (PERCOCET) 5-325 MG per tablet, Take 1 tablet by mouth Every 8 (Eight) Hours As Needed for Moderate Pain ., Disp: , Rfl:   •  pantoprazole (PROTONIX) 40 MG EC tablet, Take 1 tablet by mouth 2 (Two) Times a Day., Disp: 180 tablet, Rfl: 0  •  polyethylene glycol (polyethylene glycol) 17 g packet, Take 17 g by mouth Daily As Needed (Constipation)., Disp: , Rfl:   •  simvastatin (ZOCOR) 20 MG tablet, TAKE 2 TABLETS EVERY NIGHT (Patient taking differently: Take 20 mg by mouth Every Night.), Disp: 180 tablet, Rfl: 1  •  vitamin B-12 (CYANOCOBALAMIN) 1000 MCG tablet, Take 1,000 mcg by mouth Daily., Disp: , Rfl:   •  Vitamin D, Cholecalciferol, 50 MCG (2000 UT) capsule, Take 2,000 Units by mouth Daily., Disp: , Rfl:   •  warfarin (COUMADIN) 1 MG tablet, Take 2 mg by mouth Every Night., Disp: , Rfl:   No current facility-administered medications for this visit.    Facility-Administered Medications Ordered in Other Visits:   •  epoetin adele (EPOGEN,PROCRIT) injection 12,000 Units, 12,000 Units, Subcutaneous, Once, Edward Vasquez MD    Objective   VITAL SIGNS:     Vitals:    04/13/22 1243   BP: 174/71   Pulse: 60   Resp: 16   Temp: 97.7 °F (36.5 °C)   TempSrc: Temporal   SpO2: 99%   Weight: 66.8 kg (147 lb 3.2 oz)   Height: 162.6 cm (64.02\")   PainSc:   8     Body mass index is 25.25 kg/m².     Wt Readings from Last 5 Encounters:   04/13/22 66.8 kg (147 lb 3.2 oz)   04/07/22 67.1 kg (148 lb)   03/20/22 61.2 kg (135 lb)   03/19/22 68.3 kg (150 lb 9.6 oz)   01/25/22 62.9 kg (138 lb 9.6 oz)     PHYSICAL EXAMINATION:  GENERAL: The patient appears in fair general condition, not in acute distress.   SKIN: Old ecchymosis over the upper extremities.  EYES: Pallor.  LYMPHATICS: No cervical lymphadenopathy.  CHEST: Normal respiratory effort.  Lungs clear bilaterally.  No added sounds.  CVS: Normal " S1-S2.  Grade 1 systolic murmur..  ABDOMEN: Nondistended..  EXTREMITIES: Bilateral edema 1-2+.  No calf tenderness.    DIAGNOSTIC DATA:     Results from last 7 days   Lab Units 04/13/22  1227   WBC 10*3/mm3 4.39   NEUTROS ABS 10*3/mm3 2.94   HEMOGLOBIN g/dL 9.8*   HEMATOCRIT % 31.1*   PLATELETS 10*3/mm3 136*         Results from last 7 days   Lab Units 04/11/22  0000 04/08/22  0000   INR  2.60 2.40      Component      Latest Ref Rng & Units 3/25/2022   Iron      37 - 145 mcg/dL 58   Iron Saturation      20 - 50 % 17 (L)   Transferrin      200 - 360 mg/dL 223   TIBC      298 - 536 mcg/dL 332   Ferritin      13.00 - 150.00 ng/mL 421.00 (H)       Assessment/Plan   *Anemia of chronic kidney disease, stage 3a.  · Patient is on Procrit monthly.    · The last Procrit injection was 7000 units on 12/28/2021 for hemoglobin of 9.3.  · She is on ferrous sulfate 325 mg twice weekly to maintain adequate iron stores.  · Iron stores were adequate on 12/28/2021.  · Patient was receiving Procrit 7000 units until January 2022.  · Hemoglobin decreased to 8.4 on 2/22/2022.  Procrit dose was increased to 10,000 units on 2/22/2022.  · Hemoglobin decreased to 7.6 on 3/22/2022.  She was transfused with 1 unit of PRBCs.  · Patient was continued on Procrit 10,000 units weekly.  · Iron stores were adequate on 3/25/2022.  · Hemoglobin is 9.8 today.  · Based on her current requirement of Procrit, I will change Procrit to every 2 weeks.    *Thrombocytopenia attributed to B12 deficiency.    · Platelet count is in the 110,000-140,000 range.   · Platelet count is 136,000 today.  · Patient has extensive bruising, attributed to Coumadin.  · Bruises have decreased on today's exam.    PLAN:    1.  We will give Procrit today at 12,000 units.   2.  Continue ferrous sulfate twice weekly.   3.  Patient will be scheduled to return every 2 weeks for CBC.  She will receive Procrit if hemoglobin is <10.   4.  We will repeat iron studies in 2 months.   5.  I  will see her in follow-up in 4 months with CBC ferritin iron panel.       Edward Vasquez MD  04/13/22

## 2022-04-15 NOTE — TELEPHONE ENCOUNTER
Mild inflammation was seen in the small intestine on capsule but nothing that could explain her anemia.  rec tight control of inr and close f/u of her hemoglobin with her pcp or hematologist - supplement iron as needed

## 2022-04-18 NOTE — CASE COMMUNICATION
Received PT/INR results from Primary RN Bri MORRIS and I faxed results over to [facility] and also included a message that patient has been discharged from home health w/ today's visit and so they would need to contact patient w/ the coumadin dosage and to set up appt for her to come to clinic for next INR..   I received call from pharmacist Vargas stating that he received the PT/INR fax and is aware that patient has been disch arged w/ nurse visit today. He said he will contact patient to tell her the coumadin dosage and set up next INR appt.

## 2022-04-18 NOTE — PROGRESS NOTES
Anticoagulation Clinic Progress Note    Anticoagulation Summary  As of 2022    INR goal:  2.0-3.0   TTR:  63.5 % (3.3 y)   INR used for dosin.20 (2022)   Warfarin maintenance plan:  1 mg every Sun, Tue, Thu; 1.5 mg all other days   Weekly warfarin total:  9 mg   Plan last modified:  Vargas Butcher, PharmD (2022)   Next INR check:  2022   Priority:  Maintenance   Target end date:  Indefinite    Indications    Chronic atrial fibrillation (HCC) [I48.20]             Anticoagulation Episode Summary     INR check location:      Preferred lab:      Send INR reminders to:   [facility]    Comments:        Anticoagulation Care Providers     Provider Role Specialty Phone number    Nik Galarza MD Referring Cardiology [phone]          Clinic Interview:  Patient Findings     Positives:  Other complaints    Negatives:  Signs/symptoms of thrombosis, Signs/symptoms of bleeding,   Laboratory test error suspected, Change in health, Change in alcohol use,   Change in activity, Upcoming invasive procedure, Emergency department   visit, Upcoming dental procedure, Missed doses, Extra doses, Change in   medications, Change in diet/appetite, Hospital admission, Bruising    Comments:  Discharged from [facility].       Clinical Outcomes     Negatives:  Major bleeding event, Thromboembolic event,   Anticoagulation-related hospital admission, Anticoagulation-related ED   visit, Anticoagulation-related fatality    Comments:  Discharged from [facility].         INR History:  Anticoagulation Monitoring 2022   INR 1.90 2.60 2.20   INR Date 2022   INR Goal 2.0-3.0 2.0-3.0 2.0-3.0   Trend Same Same Up   Last Week Total 12 mg 10 mg 8.5 mg   Next Week Total 8.5 mg 8.5 mg 9 mg   Sun - 1 mg 1 mg   Mon - 1.5 mg 1.5 mg   Tue - 1 mg 1 mg   Wed 1.5 mg 1.5 mg 1.5 mg   Thu 1 mg 1 mg 1 mg   Fri - 1.5 mg 1.5 mg   Sat - 1 mg 1.5 mg   Visit Report  - - -   Some recent data might be hidden       Plan:  1. INR is Therapeutic today- see above in Anticoagulation Summary.   Will instruct Janel Mahoney to Increase their warfarin regimen- see above in Anticoagulation Summary.  2. Follow up in 2 weeks  3. They have been instructed to call if any changes in medications, doses, concerns, etc. Patient expresses understanding and has no further questions at this time.    Vargas Butcher, PharmD

## 2022-04-19 NOTE — HOME HEALTH
PT/INR DONE TODAY AND CALLED TO MIRIAM TO GO TO [facility].    2.2 AND 26.3  TAKING 1.5 MON, WED AND FRI AND 1 MG ALL OTHER DAYS.    PATIENT D/C FROM [facility] TODAY NO FURTHER SKILL NOTED   THEY WILL FOLLOW WITH [facility].

## 2022-04-26 NOTE — PROGRESS NOTES
Subjective   Janel Mahoney is a 81 y.o. female.     Vitals:    04/26/22 1434   BP: 92/60   Pulse: 58   Resp: 14   Temp: 97.3 °F (36.3 °C)   SpO2: 98%      Body mass index is 24.88 kg/m².     History of Present Illness   Patient was seen for osteoarthritis.  Patient has severe degenerative changes of her knees and lower back.  Patient is in severe pain and was told by orthopedic that the only treatment would be medication.  Patient has been taking tramadol without relief and was put on oxycodone.  Patient is taking oxycodone 5/325 p.o. daily.  Patient also wants Ambien 5 mg nightly but was advised not to take it if she took her oxycodone at night.  Patient was told it can suppress her respirations.  Patient does have a history of gout and is on allopurinol 100 mg daily.  Patient's last uric acid was 5.4.  Patient's systolic blood pressure was 92 with a diastolic of 60.  Patient states she occasionally gets dizzy and lightheaded.  Patient states her blood pressure normally runs 120/80.  Patient will monitor blood pressure for a month and follow-up.    The patient has read and signed the [facility] Controlled Substance Contract.  I will continue to see patient for regular follow up appointments.  They are well controlled on their medication.  BEAR has been reviewed by me and is updated every 3 months. The patient is aware of the potential for addiction and dependence.      Dictated utilizing Dragon dictation. If there are questions or for further clarification, please contact me.  The following portions of the patient's history were reviewed and updated as appropriate: allergies, current medications, past family history, past medical history, past social history, past surgical history and problem list.    Review of Systems   Constitutional: Negative for fatigue and fever.   HENT: Positive for congestion. Negative for trouble swallowing.    Eyes: Negative for discharge and visual disturbance.   Respiratory:  Negative for choking and shortness of breath.    Cardiovascular: Negative for chest pain and palpitations.   Gastrointestinal: Negative for abdominal pain and blood in stool.   Endocrine: Negative.    Genitourinary: Negative for genital sores and hematuria.   Musculoskeletal: Positive for back pain, gait problem, joint swelling and neck pain.   Skin: Negative for color change, pallor, rash and wound.   Allergic/Immunologic: Positive for environmental allergies. Negative for immunocompromised state.   Neurological: Positive for weakness and light-headedness. Negative for facial asymmetry and speech difficulty.   Psychiatric/Behavioral: Negative for hallucinations and suicidal ideas.       Objective   Physical Exam  Vitals and nursing note reviewed.   Constitutional:       Appearance: Normal appearance. She is well-developed.   HENT:      Head: Normocephalic and atraumatic.      Nose: Nose normal.      Mouth/Throat:      Mouth: Mucous membranes are moist.      Pharynx: Oropharynx is clear.   Eyes:      Extraocular Movements: Extraocular movements intact.      Conjunctiva/sclera: Conjunctivae normal.      Pupils: Pupils are equal, round, and reactive to light.   Cardiovascular:      Rate and Rhythm: Normal rate and regular rhythm.      Heart sounds: Normal heart sounds. No murmur heard.    No friction rub. No gallop.   Pulmonary:      Effort: Pulmonary effort is normal. No respiratory distress.      Breath sounds: Normal breath sounds. No stridor. No wheezing, rhonchi or rales.   Chest:      Chest wall: No tenderness.   Abdominal:      General: Bowel sounds are normal.      Palpations: Abdomen is soft.   Musculoskeletal:         General: Swelling, tenderness and deformity present. Normal range of motion.      Cervical back: Normal range of motion and neck supple.   Skin:     General: Skin is warm and dry.   Neurological:      General: No focal deficit present.      Mental Status: She is alert and oriented to person,  place, and time. Mental status is at baseline.      Motor: Weakness present.      Coordination: Coordination abnormal.      Gait: Gait abnormal.      Deep Tendon Reflexes: Reflexes abnormal.   Psychiatric:         Mood and Affect: Mood normal.         Behavior: Behavior normal.         Thought Content: Thought content normal.         Judgment: Judgment normal.         Assessment/Plan #1 oxycodone 5/325 p.o. daily as needed pain #2 monitor blood pressure #3 labs  Problems Addressed this Visit        Musculoskeletal and Injuries    Idiopathic gout (Chronic)    Relevant Orders    CBC (No Diff)    Comprehensive Metabolic Panel    Lipid Panel    Vitamin D 25 Hydroxy    Uric Acid    Osteoarthritis of multiple joints - Primary    Relevant Orders    CBC (No Diff)    Comprehensive Metabolic Panel    Lipid Panel    Vitamin D 25 Hydroxy    Uric Acid      Other Visit Diagnoses     Opioid contract exists        Relevant Orders    Compliance Drug Analysis, Ur - Urine, Clean Catch    Hypotension, unspecified hypotension type        Relevant Orders    CBC (No Diff)    Comprehensive Metabolic Panel    Lipid Panel    Vitamin D 25 Hydroxy    Uric Acid    Vitamin D deficiency        Relevant Orders    CBC (No Diff)    Comprehensive Metabolic Panel    Lipid Panel    Vitamin D 25 Hydroxy    Uric Acid    Abnormal finding of blood chemistry, unspecified         Relevant Orders    Lipid Panel      Diagnoses     Diagnosis Codes Comments    Primary osteoarthritis involving multiple joints    -  Primary ICD-10-CM: M15.9  ICD-9-CM: 715.98     Idiopathic gout, unspecified chronicity, unspecified site     ICD-10-CM: M10.00  ICD-9-CM: 274.9     Opioid contract exists     ICD-10-CM: Z79.891  ICD-9-CM: V58.69     Hypotension, unspecified hypotension type     ICD-10-CM: I95.9  ICD-9-CM: 458.9     Vitamin D deficiency     ICD-10-CM: E55.9  ICD-9-CM: 268.9     Abnormal finding of blood chemistry, unspecified      ICD-10-CM: R79.9  ICD-9-CM: 790.6

## 2022-04-27 NOTE — PROGRESS NOTES
Hgb today is 10.7.  No procrit today per guidelines.   Patient without questions or concerns a this time.   Given copy of labs.

## 2022-05-02 NOTE — PROGRESS NOTES
Anticoagulation Clinic Progress Note    Anticoagulation Summary  As of 2022    INR goal:  2.0-3.0   TTR:  63.9 % (3.3 y)   INR used for dosin.1 (2022)   Warfarin maintenance plan:  1 mg every Sun, Tue, Thu; 1.5 mg all other days   Weekly warfarin total:  9 mg   No change documented:  Krissy Dodd, Artur   Plan last modified:  Vargas Butcher PharmD (2022)   Next INR check:  2022   Priority:  Maintenance   Target end date:  Indefinite    Indications    Chronic atrial fibrillation (HCC) [I48.20]             Anticoagulation Episode Summary     INR check location:      Preferred lab:      Send INR reminders to:  [facility]    Comments:        Anticoagulation Care Providers     Provider Role Specialty Phone number    Nik Galarza MD Referring Cardiology [phone]          Clinic Interview:  Patient Findings     Negatives:  Signs/symptoms of thrombosis, Signs/symptoms of bleeding,   Laboratory test error suspected, Change in health, Change in alcohol use,   Change in activity, Upcoming invasive procedure, Emergency department   visit, Upcoming dental procedure, Missed doses, Extra doses, Change in   medications, Change in diet/appetite, Hospital admission, Bruising, Other   complaints      Clinical Outcomes     Negatives:  Major bleeding event, Thromboembolic event,   Anticoagulation-related hospital admission, Anticoagulation-related ED   visit, Anticoagulation-related fatality        INR History:  Anticoagulation Monitoring 2022   INR 2.60 2.20 2.1   INR Date 2022   INR Goal 2.0-3.0 2.0-3.0 2.0-3.0   Trend Same Up Same   Last Week Total 10 mg 8.5 mg 8.5 mg   Next Week Total 8.5 mg 9 mg 9 mg   Sun 1 mg 1 mg 1 mg   Mon 1.5 mg 1.5 mg 1.5 mg   Tue 1 mg 1 mg 1 mg   Wed 1.5 mg 1.5 mg 1.5 mg   Thu 1 mg 1 mg 1 mg   Fri 1.5 mg 1.5 mg 1.5 mg   Sat 1 mg 1.5 mg 1.5 mg   Visit Report - - -   Some recent data might be hidden        Plan:  1. INR is Therapeutic today- see above in Anticoagulation Summary.  Will instruct Janel Mahoney to Change their warfarin regimen to trial 1.5 mg on Monday, Wednesday, Friday, and Saturday and 1 mg all other days- see above in Anticoagulation Summary.  2. Follow up in 2 weeks  3. Patient declines warfarin refills.  4. Verbal and written information provided. Patient expresses understanding and has no further questions at this time.    Krissy Dodd, PharmD

## 2022-05-09 NOTE — PATIENT INSTRUCTIONS
5/09/22  HOLD warfarin    5/10/22  HOLD warfarin; enoxaparin 60 mg every 24 hours    5/11/22  HOLD warfarin; HOLD enoxaparin     5/12/22  PROCEDURE; warfarin 3 mg    5/13/22  warfarin 2 mg; enoxaparin 60 mg every 24 hours    5/14/22  warfarin 2 mg; enoxaparin 60 mg every 24 hours    5/15/22  warfarin 2 mg; enoxaparin 60 mg every 24 hours    5/16/22  INR CHECK

## 2022-05-09 NOTE — PROGRESS NOTES
Anticoagulation Clinic Progress Note    Anticoagulation Summary  As of 2022    INR goal:  2.0-3.0   TTR:  64.1 % (3.3 y)   INR used for dosin.2 (2022)   Warfarin maintenance plan:  1 mg every Sun, Tue, Thu; 1.5 mg all other days   Weekly warfarin total:  9 mg   Plan last modified:  Vargas Butcher, PharmD (2022)   Next INR check:  2022   Priority:  Maintenance   Target end date:  Indefinite    Indications    Chronic atrial fibrillation (HCC) [I48.20]             Anticoagulation Episode Summary     INR check location:      Preferred lab:      Send INR reminders to:   [facility]    Comments:        Anticoagulation Care Providers     Provider Role Specialty Phone number    Nik Galarza MD Referring Cardiology [phone]          Clinic Interview:  Patient Findings     Positives:  Upcoming invasive procedure    Negatives:  Signs/symptoms of thrombosis, Signs/symptoms of bleeding,   Laboratory test error suspected, Change in health, Change in alcohol use,   Change in activity, Emergency department visit, Upcoming dental procedure,   Missed doses, Extra doses, Change in medications, Change in diet/appetite,   Hospital admission, Bruising, Other complaints    Comments:  Dermatology procedure r/t skin cancer scheduled for 22.       Clinical Outcomes     Negatives:  Major bleeding event, Thromboembolic event,   Anticoagulation-related hospital admission, Anticoagulation-related ED   visit, Anticoagulation-related fatality    Comments:  Dermatology procedure r/t skin cancer scheduled for 22.         INR History:  Anticoagulation Monitoring 2022   INR 2.20 2.1 2.2   INR Date 2022   INR Goal 2.0-3.0 2.0-3.0 2.0-3.0   Trend Up Same Same   Last Week Total 8.5 mg 8.5 mg 9 mg   Next Week Total 9 mg 9 mg 9 mg   Sun 1 mg 1 mg 2 mg (5/15)   Mon 1.5 mg 1.5 mg Hold ()   Tue 1 mg 1 mg Hold (5/10)   Wed 1.5 mg 1.5 mg Hold ()   Thu  1 mg 1 mg 3 mg (5/12)   Fri 1.5 mg 1.5 mg 2 mg (5/13)   Sat 1.5 mg 1.5 mg 2 mg (5/14)   Visit Report - - -   Some recent data might be hidden       Plan:  1. INR is Therapeutic today- see above in Anticoagulation Summary. Spoke with Dr. Galarza's office, and Dr. Galarza has recommended 3-day warfarin hold and enoxaparin bridge.   Will instruct Janel Mahoney to Change their warfarin regimen- see above in Anticoagulation Summary.  2. Administer enoxaparin 60 mg every 24 hours surrounding procedure as directed. Reports weight of 139 lbs (63.2 kg) this week. Scr 1.68 mg/dL on 3/18/22; CrCl 22.7 mL/min.   3. Follow up in 1 week  4. Patient declines warfarin refills. Enoxaparin prescription sent to [facility].   5. Verbal and written information provided. She is familiar and comfortable with enoxaparin use. Reviewed/provided written bridging instructions with calendar. Patient expresses understanding and has no further questions at this time.    Vargas Butcher, PharmD

## 2022-05-16 NOTE — PROGRESS NOTES
Anticoagulation Clinic Progress Note    Anticoagulation Summary  As of 2022    INR goal:  2.0-3.0   TTR:  64.3 % (3.3 y)   INR used for dosin.2 (2022)   Warfarin maintenance plan:  1 mg every Sun, Tue, Thu; 1.5 mg all other days   Weekly warfarin total:  9 mg   No change documented:  Marichuy Shipman, Pharmacy Technician   Plan last modified:  Vargas Butcher, PharmD (2022)   Next INR check:  2022   Priority:  Maintenance   Target end date:  Indefinite    Indications    Chronic atrial fibrillation (HCC) [I48.20]             Anticoagulation Episode Summary     INR check location:      Preferred lab:      Send INR reminders to:   [facility] [place]    Comments:        Anticoagulation Care Providers     Provider Role Specialty Phone number    Nik Galarza MD Referring Cardiology [phone]          Clinic Interview:      INR History:  Anticoagulation Monitoring 2022   INR 2.1 2.2 2.2   INR Date 2022   INR Goal 2.0-3.0 2.0-3.0 2.0-3.0   Trend Same Same Same   Last Week Total 8.5 mg 9 mg 9 mg   Next Week Total 9 mg 9 mg 9 mg   Sun 1 mg 2 mg (5/15) 1 mg   Mon 1.5 mg Hold () 1.5 mg   Tue 1 mg Hold (5/10) 1 mg   Wed 1.5 mg Hold () 1.5 mg   Thu 1 mg 3 mg () 1 mg   Fri 1.5 mg 2 mg () 1.5 mg   Sat 1.5 mg 2 mg () 1.5 mg   Visit Report - - -   Some recent data might be hidden       Plan:  1. INR is therapeutic today- see above in Anticoagulation Summary.   Will instruct Janel Mahoney to continue their warfarin regimen- see above in Anticoagulation Summary.  2. Follow up in 2 weeks.  3. Patient declines warfarin refills.  4. Verbal and written information provided. Patient expresses understanding and has no further questions at this time.    aMrichuy Shipman, Pharmacy Technician

## 2022-05-25 NOTE — NURSING NOTE
No procrit today. Hgb 10.1. No further questions/concerns. Has f/u appt in 2 weeks.     Lab Results   Component Value Date    WBC 5.90 05/25/2022    HGB 10.1 (L) 05/25/2022    HCT 31.0 (L) 05/25/2022    MCV 92.0 05/25/2022     (L) 05/25/2022

## 2022-05-31 NOTE — PROGRESS NOTES
Anticoagulation Clinic Progress Note    Anticoagulation Summary  As of 2022    INR goal:  2.0-3.0   TTR:  64.3 % (3.4 y)   INR used for dosin.9 (2022)   Warfarin maintenance plan:  1 mg every Sun, Tue, Thu; 1.5 mg all other days   Weekly warfarin total:  9 mg   No change documented:  Lee Ann Diamond   Plan last modified:  Vargas Butcher, PharmD (2022)   Next INR check:  2022   Priority:  Maintenance   Target end date:  Indefinite    Indications    Chronic atrial fibrillation (HCC) [I48.20]             Anticoagulation Episode Summary     INR check location:      Preferred lab:      Send INR reminders to:  [facility]    Comments:        Anticoagulation Care Providers     Provider Role Specialty Phone number    Nik Galarza MD Referring Cardiology [phone]          Clinic Interview:  Patient Findings     Negatives:  Signs/symptoms of thrombosis, Signs/symptoms of bleeding,   Laboratory test error suspected, Change in health, Change in alcohol use,   Change in activity, Upcoming invasive procedure, Emergency department   visit, Upcoming dental procedure, Missed doses, Extra doses, Change in   medications, Change in diet/appetite, Hospital admission, Bruising, Other   complaints      Clinical Outcomes     Negatives:  Major bleeding event, Thromboembolic event,   Anticoagulation-related hospital admission, Anticoagulation-related ED   visit, Anticoagulation-related fatality        INR History:  Anticoagulation Monitoring 2022   INR 2.2 2.2 1.9   INR Date 2022   INR Goal 2.0-3.0 2.0-3.0 2.0-3.0   Trend Same Same Same   Last Week Total 9 mg 9 mg 9 mg   Next Week Total 9 mg 9 mg 9 mg   Sun 2 mg (5/15) 1 mg 1 mg   Mon Hold () 1.5 mg 1.5 mg   Tue Hold (5/10) 1 mg 1 mg   Wed Hold () 1.5 mg 1.5 mg   Thu 3 mg () 1 mg 1 mg   Fri 2 mg () 1.5 mg 1.5 mg   Sat 2 mg () 1.5 mg 1.5 mg   Visit Report - - -   Some  recent data might be hidden       Plan:  1. INR is out of range- see above in Anticoagulation Summary.   Will instruct Janel Mahonye to Continue  their warfarin regimen- see above in Anticoagulation Summary.  2. Follow up in 2 weeks.   3. Patient desires warfarin refills.  4. Verbal and written information provided. Patient expresses understanding and has no further questions at this time.    Lee Ann Diamond

## 2022-06-01 PROBLEM — C44.310 BASAL CELL CARCINOMA (BCC) OF SKIN OF FACE: Status: ACTIVE | Noted: 2022-01-01

## 2022-06-01 PROBLEM — D04.9 SQUAMOUS CELL CARCINOMA IN SITU (SCCIS) OF SKIN: Status: ACTIVE | Noted: 2022-01-01

## 2022-06-01 NOTE — PROGRESS NOTES
Subjective   Janel Mahoney is a 81 y.o. female.     Vitals:    06/01/22 1257   BP: 110/70   Pulse: 60   Temp: 97.3 °F (36.3 °C)   SpO2: 99%      Body mass index is 23.44 kg/m².     History of Present Illness   Patient was seen for gout.  Patient takes allopurinol 100 mg 2 tablets daily.  Patient's uric acid is pending at the time of dictation.  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  Patient's lipids been treated with diet and simvastatin 20 mg daily.  Patient's labs being drawn today.  Results are pending at the time of dictation.  Patient does have a vitamin D3 deficiency.  Patient is using 2000 units vitamin D3 daily.  Patient does have severe arthritic changes and needs oxycodone 5/325 p.o. daily.  Patient will continue her low impact exercise.  Patient will continue present dieting.  Patient will have labs today and follow-up.    The patient has read and signed the [facility] Controlled Substance Contract.  I will continue to see patient for regular follow up appointments.  They are well controlled on their medication.  BEAR has been reviewed by me and is updated every 3 months. The patient is aware of the potential for addiction and dependence.    Dictated utilizing Dragon dictation. If there are questions or for further clarification, please contact me.    The following portions of the patient's history were reviewed and updated as appropriate: allergies, current medications, past family history, past medical history, past social history, past surgical history and problem list.    Review of Systems   Constitutional: Negative for fatigue and fever.   HENT: Positive for congestion. Negative for trouble swallowing.    Eyes: Negative for discharge and visual disturbance.   Respiratory: Negative for choking and shortness of breath.    Cardiovascular: Negative for chest pain and palpitations.   Gastrointestinal: Negative for abdominal pain and blood in stool.    Endocrine: Negative.    Genitourinary: Negative for genital sores and hematuria.   Musculoskeletal: Positive for arthralgias, back pain, gait problem and joint swelling.   Skin: Negative for color change, pallor, rash and wound.   Allergic/Immunologic: Positive for environmental allergies. Negative for immunocompromised state.   Neurological: Negative for facial asymmetry and speech difficulty.   Psychiatric/Behavioral: Negative for hallucinations and suicidal ideas.       Objective   Physical Exam  Vitals and nursing note reviewed.   Constitutional:       Appearance: Normal appearance. She is well-developed.   HENT:      Head: Normocephalic and atraumatic.      Nose: Nose normal.      Mouth/Throat:      Mouth: Mucous membranes are moist.      Pharynx: Oropharynx is clear.   Eyes:      Extraocular Movements: Extraocular movements intact.      Conjunctiva/sclera: Conjunctivae normal.      Pupils: Pupils are equal, round, and reactive to light.   Cardiovascular:      Rate and Rhythm: Normal rate and regular rhythm.      Heart sounds: Normal heart sounds. No murmur heard.    No friction rub. No gallop.   Pulmonary:      Effort: Pulmonary effort is normal. No respiratory distress.      Breath sounds: Normal breath sounds. No stridor. No wheezing, rhonchi or rales.   Chest:      Chest wall: No tenderness.   Abdominal:      General: Bowel sounds are normal.      Palpations: Abdomen is soft.   Musculoskeletal:         General: Swelling, tenderness and deformity present. Normal range of motion.      Cervical back: Normal range of motion and neck supple.   Skin:     General: Skin is warm and dry.   Neurological:      General: No focal deficit present.      Mental Status: She is alert and oriented to person, place, and time. Mental status is at baseline.      Sensory: Sensory deficit present.      Motor: Weakness present.      Coordination: Coordination abnormal.      Gait: Gait abnormal.      Deep Tendon Reflexes: Reflexes  abnormal.   Psychiatric:         Mood and Affect: Mood normal.         Behavior: Behavior normal.         Thought Content: Thought content normal.         Judgment: Judgment normal.         Assessment & Plan #1 labs with urine drug screen #2 continue present low impact exercise #3 follow-up in 4 months  Problems Addressed this Visit        Cardiac and Vasculature    Mixed hyperlipidemia (Chronic)    Relevant Orders    CBC (No Diff)    Comprehensive Metabolic Panel    Lipid Panel    Vitamin D 25 Hydroxy    Ferritin       Musculoskeletal and Injuries    Idiopathic gout - Primary (Chronic)    Relevant Orders    CBC (No Diff)    Comprehensive Metabolic Panel    Lipid Panel    Vitamin D 25 Hydroxy    Ferritin    Uric Acid      Other Visit Diagnoses     Vitamin D deficiency        Relevant Orders    CBC (No Diff)    Comprehensive Metabolic Panel    Lipid Panel    Vitamin D 25 Hydroxy    Ferritin    Opioid contract exists        Relevant Orders    CBC (No Diff)    Comprehensive Metabolic Panel    Lipid Panel    Vitamin D 25 Hydroxy    Ferritin    Compliance Drug Analysis, Ur - Urine, Clean Catch    Vitamin D deficiency, unspecified        Relevant Orders    CBC (No Diff)    Comprehensive Metabolic Panel    Lipid Panel    Vitamin D 25 Hydroxy    Ferritin      Diagnoses     Diagnosis Codes Comments    Idiopathic gout, unspecified chronicity, unspecified site    -  Primary ICD-10-CM: M10.00  ICD-9-CM: 274.9     Vitamin D deficiency     ICD-10-CM: E55.9  ICD-9-CM: 268.9     Opioid contract exists     ICD-10-CM: Z79.891  ICD-9-CM: V58.69     Mixed hyperlipidemia     ICD-10-CM: E78.2  ICD-9-CM: 272.2     Vitamin D deficiency, unspecified     ICD-10-CM: E55.9  ICD-9-CM: 268.9

## 2022-06-02 NOTE — PLAN OF CARE
Problem: Patient Care Overview  Goal: Plan of Care Review  Outcome: Ongoing (interventions implemented as appropriate)   11/20/19 7661   Coping/Psychosocial   Plan of Care Reviewed With patient   Plan of Care Review   Progress improving   OTHER   Outcome Summary POD 0 LTKA. VSS, pain well controlled with prn norco, denies any PONV. tolerated amb and transfer to chair fairly well with assist x2, walker, and gait belt; voiding spontaneously. plans to d/c back to rehab facility when ready. discussed BP monitoring r/t hx HTN.      Goal: Individualization and Mutuality  Outcome: Ongoing (interventions implemented as appropriate)    Goal: Discharge Needs Assessment  Outcome: Ongoing (interventions implemented as appropriate)      Problem: Pain, Chronic (Adult)  Goal: Identify Related Risk Factors and Signs and Symptoms  Outcome: Outcome(s) achieved Date Met: 11/20/19    Goal: Acceptable Pain/Comfort Level and Functional Ability  Outcome: Ongoing (interventions implemented as appropriate)      Problem: Fall Risk (Adult)  Goal: Identify Related Risk Factors and Signs and Symptoms  Outcome: Outcome(s) achieved Date Met: 11/20/19    Goal: Absence of Fall  Outcome: Ongoing (interventions implemented as appropriate)      Problem: Knee Arthroplasty (Total, Partial) (Adult)  Goal: Signs and Symptoms of Listed Potential Problems Will be Absent, Minimized or Managed (Knee Arthroplasty)  Outcome: Ongoing (interventions implemented as appropriate)    Goal: Anesthesia/Sedation Recovery  Outcome: Ongoing (interventions implemented as appropriate)         no concerns

## 2022-06-02 NOTE — TELEPHONE ENCOUNTER
Caller: Janel Mahoney    Relationship: Self    Best call back number: [phone] (H)    PATIENT IS NEEDING A NURSE TO PLEASE CALL HER BACK REGARDING MEDICATION CHANGES.    STATES SOMEONE IN OFFICE JUST SPOKE TO HER  ABOUT THIS AND SHE HAS QUESTIONS.    PLEASE ADVISE

## 2022-06-02 NOTE — TELEPHONE ENCOUNTER
Oxycodone was sent to incorrect pharmacy. Please resend to [facility] NOT mail order. [facility] sent back rejection letter that this cant be filled through mail order.

## 2022-06-14 NOTE — PROGRESS NOTES
Anticoagulation Clinic Progress Note    Anticoagulation Summary  As of 2022    INR goal:  2.0-3.0   TTR:  63.7 % (3.4 y)   INR used for dosin.0 (2022)   Warfarin maintenance plan:  1 mg every Sun, Tue, Thu; 1.5 mg all other days   Weekly warfarin total:  9 mg   No change documented:  Lee Ann Diamond   Plan last modified:  Vargas Butcher, PharmD (2022)   Next INR check:  2022   Priority:  Maintenance   Target end date:  Indefinite    Indications    Chronic atrial fibrillation (HCC) [I48.20]             Anticoagulation Episode Summary     INR check location:      Preferred lab:      Send INR reminders to:  [facility]    Comments:        Anticoagulation Care Providers     Provider Role Specialty Phone number    Nik Galarza MD Referring Cardiology [phone]          Clinic Interview:  Patient Findings     Negatives:  Signs/symptoms of thrombosis, Signs/symptoms of bleeding,   Laboratory test error suspected, Change in health, Change in alcohol use,   Change in activity, Upcoming invasive procedure, Emergency department   visit, Upcoming dental procedure, Missed doses, Extra doses, Change in   medications, Change in diet/appetite, Hospital admission, Bruising, Other   complaints      Clinical Outcomes     Negatives:  Major bleeding event, Thromboembolic event,   Anticoagulation-related hospital admission, Anticoagulation-related ED   visit, Anticoagulation-related fatality        INR History:  Anticoagulation Monitoring 2022   INR 2.2 1.9 2.0   INR Date 2022   INR Goal 2.0-3.0 2.0-3.0 2.0-3.0   Trend Same Same Same   Last Week Total 9 mg 9 mg 9 mg   Next Week Total 9 mg 9 mg 9 mg   Sun 1 mg 1 mg 1 mg   Mon 1.5 mg 1.5 mg 1.5 mg   Tue 1 mg 1 mg 1 mg   Wed 1.5 mg 1.5 mg 1.5 mg   Thu 1 mg 1 mg 1 mg   Fri 1.5 mg 1.5 mg 1.5 mg   Sat 1.5 mg 1.5 mg 1.5 mg   Visit Report - - -   Some recent data might be hidden        Plan:  1. INR is therapeutic today- see above in Anticoagulation Summary.   Will instruct Janel Mahoney to continue their warfarin regimen- see above in Anticoagulation Summary.  2. Follow up in 4 weeks.  3. Patient declines warfarin refills.  4. Verbal and written information provided. Patient expresses understanding and has no further questions at this time.    Lee Ann Diamond

## 2022-07-05 NOTE — TELEPHONE ENCOUNTER
Received a message and fax that the patient has an upcoming lumbar injections. Dr. Galarza cleared holding for 5 days with bridging. Spoke with the patients daughter and she will have her call back with a procedure date.     Update: Patient called back and procedure has not been scheduled. She will reach back out as soon as it is scheduled to go over bridging plan.

## 2022-07-12 NOTE — PROGRESS NOTES
I have supervised and reviewed the notes, assessments, and/or procedures performed by our Pharmacy Intern. The documented assessment and plan were developed cooperatively, and  I concur with the documentation of this patient encounter.    Milagros Alonso, PharmD

## 2022-07-12 NOTE — PROGRESS NOTES
Anticoagulation Clinic Progress Note    Anticoagulation Summary  As of 2022    INR goal:  2.0-3.0   TTR:  64.5 % (3.5 y)   INR used for dosin.1 (2022)   Warfarin maintenance plan:  1 mg every Sun, Tue, Thu; 1.5 mg all other days   Weekly warfarin total:  9 mg   No change documented:  Aries Howard, Pharmacy Intern   Plan last modified:  Vargas Butcher, PharmD (2022)   Next INR check:  2022   Priority:  Maintenance   Target end date:  Indefinite    Indications    Chronic atrial fibrillation (HCC) [I48.20]             Anticoagulation Episode Summary     INR check location:      Preferred lab:      Send INR reminders to:  [facility]    Comments:        Anticoagulation Care Providers     Provider Role Specialty Phone number    Nik Galarza MD Referring Cardiology [phone]          Clinic Interview:  Patient Findings     Positives:  Upcoming invasive procedure    Negatives:  Signs/symptoms of thrombosis, Signs/symptoms of bleeding,   Laboratory test error suspected, Change in health, Change in alcohol use,   Change in activity, Emergency department visit, Upcoming dental procedure,   Missed doses, Extra doses, Change in medications, Change in diet/appetite,   Hospital admission, Bruising, Other complaints    Comments:  Patient will talk with Dr. Spence about scheduling her   procedure. Her  said they would inform us when the appointment is   made.      Clinical Outcomes     Negatives:  Major bleeding event, Thromboembolic event,   Anticoagulation-related hospital admission, Anticoagulation-related ED   visit, Anticoagulation-related fatality    Comments:  Patient will talk with Dr. Spence about scheduling her   procedure. Her  said they would inform us when the appointment is   made.        INR History:  Anticoagulation Monitoring 2022   INR 1.9 2.0 2.1   INR Date 2022   INR Goal 2.0-3.0 2.0-3.0  2.0-3.0   Trend Same Same Same   Last Week Total 9 mg 9 mg 9 mg   Next Week Total 9 mg 9 mg 9 mg   Sun 1 mg 1 mg 1 mg   Mon 1.5 mg 1.5 mg 1.5 mg   Tue 1 mg 1 mg 1 mg   Wed 1.5 mg 1.5 mg 1.5 mg   Thu 1 mg 1 mg 1 mg   Fri 1.5 mg 1.5 mg 1.5 mg   Sat 1.5 mg 1.5 mg 1.5 mg   Visit Report - - -   Some recent data might be hidden       Plan:  1. INR is Therapeutic today- see above in Anticoagulation Summary.  Will instruct Janel Mahoney to Continue their warfarin regimen- see above in Anticoagulation Summary.  2. Follow up in 4 weeks  3. Patient declines warfarin refills.  4. Verbal and written information provided. Patient expresses understanding and has no further questions at this time.    Aries Howard, Pharmacy Intern

## 2022-07-14 NOTE — PROGRESS NOTES
"Chief Complaint  medication review and Ear Fullness    Subjective        Janel Mahoney presents to [facility]  History of Present Illness patient was seen for hypertension.  Blood pressures been running 112 over 60s.  Patient does have problems with frequent urination.  Patient is taking Bumex 2 g daily.  Patient's had a reduced from twice a day.  Patient is getting a BMP and results are pending.  Patient does have a history of congestive heart failure and required an extra half dose several days ago was seeing her nephrologist.  Patient does have dysuria and labs are being obtained at the hospital.    Dictated utilizing Dragon dictation. If there are questions or for further clarification, please contact me.    Objective   Vital Signs:  Blood Pressure 112/66 (BP Location: Left arm, Patient Position: Sitting, Cuff Size: Adult)   Pulse 60   Temperature 98 °F (36.7 °C) (Temporal)   Respiration 18   Height 162.6 cm (64.02\")   Weight 63.7 kg (140 lb 6.4 oz)   Oxygen Saturation 98%   Body Mass Index 24.09 kg/m²   Estimated body mass index is 24.09 kg/m² as calculated from the following:    Height as of this encounter: 162.6 cm (64.02\").    Weight as of this encounter: 63.7 kg (140 lb 6.4 oz).    BMI is within normal parameters. No other follow-up for BMI required.      Physical Exam  Vitals and nursing note reviewed.   Constitutional:       Appearance: Normal appearance. She is well-developed.   HENT:      Head: Normocephalic and atraumatic.      Nose: Nose normal.      Mouth/Throat:      Mouth: Mucous membranes are moist.      Pharynx: Oropharynx is clear.   Eyes:      Extraocular Movements: Extraocular movements intact.      Conjunctiva/sclera: Conjunctivae normal.      Pupils: Pupils are equal, round, and reactive to light.   Cardiovascular:      Rate and Rhythm: Normal rate and regular rhythm.      Heart sounds: Normal heart sounds. No murmur heard.    No friction rub. No " gallop.   Pulmonary:      Effort: Pulmonary effort is normal. No respiratory distress.      Breath sounds: Normal breath sounds. No stridor. No wheezing, rhonchi or rales.   Chest:      Chest wall: No tenderness.   Abdominal:      General: Bowel sounds are normal.      Palpations: Abdomen is soft.   Musculoskeletal:         General: Normal range of motion.      Cervical back: Normal range of motion and neck supple.   Skin:     General: Skin is warm and dry.   Neurological:      General: No focal deficit present.      Mental Status: She is alert and oriented to person, place, and time. Mental status is at baseline.   Psychiatric:         Mood and Affect: Mood normal.         Behavior: Behavior normal.         Thought Content: Thought content normal.         Judgment: Judgment normal.        Result Review :                Assessment and Plan  #1 BMP, BN P, UA CNS #2 irrigation #3 follow-up 4 months  Diagnoses and all orders for this visit:    1. Essential hypertension, benign (Primary)  -     Basic Metabolic Panel; Future  -     proBNP; Future    2. Chronic combined systolic and diastolic congestive heart failure (HCC)  -     Basic Metabolic Panel; Future  -     proBNP; Future    3. Dysuria  -     Urinalysis With Microscopic - Urine, Clean Catch; Future  -     Urine Culture - Urine, Urine, Catheter; Future             Follow Up   Return in about 4 months (around 11/14/2022).  Patient was given instructions and counseling regarding her condition or for health maintenance advice. Please see specific information pulled into the AVS if appropriate.

## 2022-07-18 NOTE — TELEPHONE ENCOUNTER
Caller: Janel Mahoney    Relationship: Self    Best call back number: [phone]     What is the best time to reach you: ANY    Who are you requesting to speak with (clinical staff, provider,  specific staff member): KAMARI    What was the call regarding: PATIENT DECLINED TO PROVIDE INFORMATION    Do you require a callback: YES

## 2022-07-19 NOTE — PROGRESS NOTES
Anticoagulation Clinic Progress Note    Anticoagulation Summary  As of 2022    INR goal:  2.0-3.0   TTR:  64.3 % (3.5 y)   INR used for dosin.8 (2022)   Warfarin maintenance plan:  1 mg every Sun, Tue, Thu; 1.5 mg all other days   Weekly warfarin total:  9 mg   Plan last modified:  Vargas Butcher, PharmD (2022)   Next INR check:  2022   Priority:  Maintenance   Target end date:  Indefinite    Indications    Chronic atrial fibrillation (HCC) [I48.20]             Anticoagulation Episode Summary     INR check location:      Preferred lab:      Send INR reminders to:   [facility]    Comments:        Anticoagulation Care Providers     Provider Role Specialty Phone number    Nik Galarza MD Referring Cardiology [phone]          Clinic Interview:      INR History:  Anticoagulation Monitoring 2022   INR 2.0 2.1 - 1.8   INR Date 2022 - 2022   INR Goal 2.0-3.0 2.0-3.0 2.0-3.0 2.0-3.0   Trend Same Same - Same   Last Week Total 9 mg 9 mg - 9 mg   Next Week Total 9 mg 9 mg - 3 mg   Sun 1 mg 1 mg - Hold ()   Mon 1.5 mg 1.5 mg - 2 mg ()   Tue 1 mg 1 mg - 2 mg (); Otherwise 1 mg   Wed 1.5 mg 1.5 mg - Hold (); Otherwise 1.5 mg   Thu 1 mg 1 mg - Hold (); Otherwise 1 mg   Fri 1.5 mg 1.5 mg - Hold ()   Sat 1.5 mg 1.5 mg - Hold ()   Visit Report - - - -   Some recent data might be hidden       Plan:  1. INR is Subtherapeutic today- see above in Anticoagulation Summary.  Will instruct Janel Mahoney to Continue their warfarin regimen- see above in Anticoagulation Summary.   2. Follow up in 1.5 weeks  3. Patient declines warfarin refills.  4. [place]-procedural Bridging - Reviewed with patient about appropriate holding of warfarin as well as bridging initiation. Provided dosing calendar outlining warfarin and Lovenox dose administrations.  5. Verbal and written information provided. Patient expresses  understanding and has no further questions at this time.    Aries Howard, Pharmacy Intern

## 2022-07-19 NOTE — PROGRESS NOTES
I have supervised and reviewed the notes, assessments, and/or procedures performed by our Pharmacy Intern. The documented assessment and plan were developed cooperatively, and the plan was implemented in my presence. I concur with the documentation of this patient encounter.    Milagros Alonso, PharmD

## 2022-07-19 NOTE — PATIENT INSTRUCTIONS
Bridging Instructions:     7/20: no warfarin, no lovenox  7/21: no warfarin, no lovenox  7/22: no warfarin, lovenox 60mg AM only  7/23: no warfarin; lovenox 60mg AM only  7/24: no warfarin, lovenox 60mg AM only (before 11am)  7/25: PROCEDURE--no lovenox, restart warfarin 2mg  7/26: warfarin 2mg; lovenox 60mg AM   7/27: warfarin 1.5mg; lovenox 60mg AM   7/28: warfarin 1mg; lovenox 60mg AM   7/29: NEXT INR--will evaluate dosing at appointment

## 2022-07-19 NOTE — PATIENT INSTRUCTIONS
Bridging Procedure 7/25/22 7/20 - Hold Warfarin  7/21 - Hold Warfarin  7/22 - Hold Warfarin; Lovenox injection 60 mg daily (renal dose)  7/23 - Hold Warfarin; Lovenox injection 60 mg daily (renal dose)  7/24 - Hold Warfarin and Lovenox  7/25 - Day of Procedure; restart warfarin 2mg   7/26 - Warfarin 2 mg; Lovenox 60 mg daily (renal dose)  7/27 - Warfarin 1.5 mg; Lovenox 60 mg daily (renal dose)  7/28 - Warfarin 1 mg: Lovenox 60 mg daily (renal dose)  7/29 - Return for INR check

## 2022-07-22 NOTE — PROGRESS NOTES
Hgb today is 10.4.  No Procrit today per guidelines.   Patient without questions or concerns at this time.   Given copy of labs.

## 2022-07-29 NOTE — PROGRESS NOTES
Anticoagulation Clinic Progress Note    Anticoagulation Summary  As of 2022    INR goal:  2.0-3.0   TTR:  63.8 % (3.5 y)   INR used for dosin.5 (2022)   Warfarin maintenance plan:  1 mg every Sun, Tue, Thu; 1.5 mg all other days   Weekly warfarin total:  9 mg   Plan last modified:  Vargas Butcher, PharmD (2022)   Next INR check:  2022   Priority:  Maintenance   Target end date:  Indefinite    Indications    Chronic atrial fibrillation (HCC) [I48.20]             Anticoagulation Episode Summary     INR check location:      Preferred lab:      [facility]ers [facility]:   [facility]    Comments:        Anticoagulation Care Providers     Provider Role Specialty Phone number    Nik Galarza MD Referring Cardiology [phone]          Clinic Interview:  Patient Findings     Negatives:  Signs/symptoms of thrombosis, Signs/symptoms of bleeding,   Laboratory test error suspected, Change in health, Change in alcohol use,   Change in activity, Upcoming invasive procedure, Emergency department   visit, Upcoming dental procedure, Missed doses, Extra doses, Change in   medications, Change in diet/appetite, Hospital admission, Bruising, Other   complaints    Comments:  Patient is still bridging post procedure.        Clinical Outcomes     Negatives:  Major bleeding event, Thromboembolic event,   Anticoagulation-related hospital admission, Anticoagulation-related ED   visit, Anticoagulation-related fatality    Comments:  Patient is still bridging post procedure.          INR History:  Anticoagulation Monitoring 2022   INR 2.1 - 1.8 1.5   INR Date 2022 - 2022   INR Goal 2.0-3.0 2.0-3.0 2.0-3.0 2.0-3.0   Trend Same - Same Same   Last Week Total 9 mg - 9 mg 6.5 mg   Next Week Total 9 mg - 3 mg 10 mg   Sun 1 mg - Hold () 1 mg   Mon 1.5 mg - 2 mg () 1.5 mg   Tue 1 mg - 2 mg (); Otherwise 1 mg -   Wed 1.5 mg - Hold ();  Otherwise 1.5 mg -   Thu 1 mg - Hold (7/21); Otherwise 1 mg -   Fri 1.5 mg - Hold (7/22) 2 mg (7/29)   Sat 1.5 mg - Hold (7/23) 2 mg (7/30)   Visit Report - - - -   Some recent data might be hidden       Plan:  1. INR is Subtherapeutic today- see above in Anticoagulation Summary.  Will instruct Janel Mahoney to Change their warfarin regimen- see above in Anticoagulation Summary.  Take 2 mg today 7/29 and tomorrow 7/30; then resume previous weekly dosing until next INR. Continue enoxaparin injections daily until next INR.  2. Follow up in 4 days  3. Patient declines warfarin refills.  4. Verbal and written information provided. Patient expresses understanding and has no further questions at this time.    Milagros Alonso, PharmD.

## 2022-08-02 NOTE — PROGRESS NOTES
Date of Office Visit: 2022  Encounter Provider: NICKY Tellez  Place of Service: [facility] CARDIOLOGY  Patient Name: Janel Mahoney  :1940    Chief Complaint   Patient presents with   • Pacemaker Check   • Atrial Fibrillation     1 year f/u   :     HPI: Janel Mahoney is a 81 y.o. female who follows with Dr. Galarza and Dr. Boucher. She has a history of CAD, ICM, combined heart failure, VT (previously treated with ATP), perm AF--s/p CRT-D (AV node ablattion), CKD, HTN, and stroke.      We saw her In the hospital in March when she presented with a fall and generalized weakness.  EF was severely depressed at that time, she was on warfarin for chronic AF.  She was having a lot of back pain from the fall, we saw her for clearance of endoscopy, which showed mild inflammation.                         She presents today for follow up appt.  She is here today with her .     She says that she has been doing okay. Other than severe back pain from her scoliosis.     She has poor functional status and uses a walker to get around.     Normal device testing and function. She did receive a shock on  for VT after unsuccessful ATP.  She was asleep and says she was unaware. This is the first time she has been shocked since device placement (multiple ATP in past).     She is not on any antiarrythmic therapy although this was mentioned in the past.     She has CKD and says that there has been mention on dialysis.     She is on carvedilol and warfarin.    EKG shows chronic AF.          Past Medical History:   Diagnosis Date   • Acute kidney injury (HCC)    • Anemia     on procrit   • Atrial fibrillation (HCC)    • Bruises easily    • Carotid artery stenosis    • Chronic back pain    • Chronic combined systolic and diastolic congestive heart failure (HCC)    • Chronic coronary artery disease     moderate to severe LV dysfunction.  Sees Dr. Dominguez   • Chronic kidney disease,  stage 3 (HCC)    • Dysphagia    • GERD (gastroesophageal reflux disease)    • Gout    • H/O cardiac murmur    • Hematoma     LEFT LEG; DR ALONZO AWARE   • [place] (hard of hearing)     wears hearing aids   • Hyperlipidemia    • Hypertension    • Hypotension    • ICD (implantable cardioverter-defibrillator) in place    • Ischemic cardiomyopathy    • Kyphoscoliosis    • Leukopenia    • Lumbar spondylosis    • Obesity    • GEORGINA (obstructive sleep apnea) 12/01/2013    Overnight polysomnogram, weight 168 pounds.  AHI mildly abnormal at 10.3 events per hour.  Respiratory effort related arousals 9.5 events per hour.  Total time snoring 30% and arousals associated with snoring 15 events per hour.          Bring machine DOS   • Osteoarthritis    • Osteoporosis    • Peptic ulcer    • Premature ventricular contractions    • Renal insufficiency syndrome    • Right shoulder pain 08/2021   • Scoliosis    • Shoulder fracture, left    • Shoulder pain, right    • Thrombocytopenia (HCC)    • Urine incontinence     pads   • Ventricular tachycardia (HCC)    • Vitamin B12 deficiency    • Warfarin anticoagulation        Past Surgical History:   Procedure Laterality Date   • AV NODE ABLATION     • BREAST BIOPSY     • CARDIAC CATHETERIZATION      Showed severe mitral insufficiency and borderline coronary artery disease   • CARDIAC CATHETERIZATION      Showed an ejection fraction of 35%. She had occlusive disease of the right posterior LV branch and no other significant disease, treated medically.   • CARDIAC DEFIBRILLATOR PLACEMENT      Biventricular   • CARDIAC DEFIBRILLATOR PLACEMENT Left    • CARDIAC ELECTROPHYSIOLOGY PROCEDURE N/A 1/4/2019    Procedure: GENERATOR CHANGE BI-V ICD   nilson;  Surgeon: James Hwang MD;  Location: [facility] INVASIVE LOCATION;  Service: Cardiology   • CARDIAC VALVE REPLACEMENT  2009    Done with stent placement   • CARDIOVERSION      multiple electrocardioversions.   • CAROTID ARTERY ANGIOPLASTY Right     • CATARACT EXTRACTION EXTRACAPSULAR W/ INTRAOCULAR LENS IMPLANTATION Bilateral    • COLONOSCOPY N/A 9/28/2017    Procedure: COLONOSCOPY TO CECUM;  Surgeon: Kevin Davis MD;  [facility];  Service:    • COLONOSCOPY N/A 3/17/2022    Procedure: COLONOSCOPY WITH POLYPECTOMY (HOT SNARE);  Surgeon: Brooke Moscoso MD;  [facility];  Service: Gastroenterology;  Laterality: N/A;  PRE- ANEMIA  POST-- POLYP   • CORONARY ANGIOPLASTY WITH STENT PLACEMENT  2009   • CORONARY ARTERY BYPASS GRAFT      single graft to the PDA   • CORONARY STENT PLACEMENT     • ENDOSCOPY N/A 9/28/2017    Procedure: ESOPHAGOGASTRODUODENOSCOPY ;  Surgeon: Kevin Davis MD;  [facility];  Service:    • ENDOSCOPY N/A 3/16/2022    Procedure: ESOPHAGOGASTRODUODENOSCOPY AT BEDSIDE;  Surgeon: Brooke Moscoso MD;  Location: [facility] ENDOSCOPY;  Service: Gastroenterology;  Laterality: N/A;  pre:  anemia  post: mild gastritis   • EYE SURGERY     • HEMORRHOIDECTOMY     • HYSTERECTOMY     • INCISION AND DRAINAGE TRUNK Right 11/27/2018    Procedure: EVACUATION OF RIGHT CHEST WALL HEMATOMA;  Surgeon: Juvencio Rodriguez MD;  Location: [facility] MAIN OR;  Service: General   • MITRAL VALVE REPLACEMENT  01/2010    #31 Epic porcine valve.   • THROMBOENDARTERECTOMY Right     carotid thromboendarterectomy    • TONSILLECTOMY      age 32   • TOTAL KNEE ARTHROPLASTY Left    • TOTAL KNEE ARTHROPLASTY REVISION Left 11/19/2019    Procedure: TOTAL KNEE ARTHROPLASTY REVISION LEFT;  Surgeon: Juvencio Pressley II, MD;  Location: [facility] MAIN OR;  Service: Orthopedics   • TOTAL SHOULDER ARTHROPLASTY W/ DISTAL CLAVICLE EXCISION Left 1/16/2018    Procedure: TOTAL SHOULDER REVERSE ARTHROPLASTY;  Surgeon: Bianka Quesada MD;  Location: [facility] MAIN OR;  Service:    • TOTAL SHOULDER ARTHROPLASTY W/ DISTAL CLAVICLE EXCISION Right 8/31/2021    Procedure: TOTAL SHOULDER REVERSE ARTHROPLASTY;  Surgeon: Juvencio Pressley  MD JUANA;  Location: [facility] MAIN OR;  Service: Orthopedics;  Laterality: Right;       Social History     Socioeconomic History   • Marital status:      Spouse name: Russ   Tobacco Use   • Smoking status: Former Smoker     Packs/day: 1.00     Years: 50.00     Pack years: 50.00     Types: Cigarettes     Quit date: 2009     Years since quittin.5   • Smokeless tobacco: Never Used   • Tobacco comment: Daily caffeine - soda   Vaping Use   • Vaping Use: Never used   Substance and Sexual Activity   • Alcohol use: Yes     Comment: 1 yearly   • Drug use: Never   • Sexual activity: Not Currently       Family History   Problem Relation Age of Onset   • Cancer Mother    • Breast cancer Mother    • Heart disease Mother    • Hypertension Mother    • Stroke Mother    • Coronary artery disease Father    • Stroke Father    • Heart disease Father    • Hypertension Father    • Other Daughter         thiamin deficiency    • Hypertension Son    • Lung disease Other    • Hypertension Other    • Malig Hyperthermia Neg Hx        Review of Systems   Constitutional: Negative for chills, fever and malaise/fatigue.   Cardiovascular: Negative for chest pain, dyspnea on exertion, leg swelling, near-syncope, orthopnea, palpitations, paroxysmal nocturnal dyspnea and syncope.   Respiratory: Negative for cough and shortness of breath.    Hematologic/Lymphatic: Negative.    Musculoskeletal: Positive for back pain. Negative for joint pain, joint swelling and myalgias.   Gastrointestinal: Negative for abdominal pain, diarrhea, melena, nausea and vomiting.   Genitourinary: Negative for frequency and hematuria.   Neurological: Negative for light-headedness, numbness, paresthesias and seizures.   Allergic/Immunologic: Negative.    All other systems reviewed and are negative.      Allergies   Allergen Reactions   • Diclofenac GI Bleeding     She had adverse effects from the medications that required hospitalization. Pt got stomach ulcers  from this medication prescribed by Dr. Arango - pediatrist.         Current Outpatient Medications:   •  acetaminophen (TYLENOL) 325 MG tablet, Take 650 mg by mouth Every 6 (Six) Hours As Needed for Mild Pain ., Disp: , Rfl:   •  alendronate (FOSAMAX) 70 MG tablet, Take 70 mg by mouth., Disp: , Rfl:   •  allopurinol (ZYLOPRIM) 100 MG tablet, TAKE 2 TABLETS EVERY DAY BEFORE SUPPER (Patient taking differently: Take 200 mg by mouth Daily.), Disp: 180 tablet, Rfl: 1  •  aspirin 81 MG chewable tablet, Chew 81 mg Daily., Disp: , Rfl:   •  bumetanide (BUMEX) 2 MG tablet, 1 p.o. q. OD, Disp: 30 tablet, Rfl: 3  •  calcitriol (ROCALTROL) 0.25 MCG capsule, Take 0.25 mcg by mouth 2 (Two) Times a Day., Disp: , Rfl:   •  carvedilol (COREG) 25 MG tablet, TAKE 1 TABLET TWICE DAILY, Disp: 180 tablet, Rfl: 1  •  cefadroxil (DURICEF) 500 MG capsule, , Disp: , Rfl:   •  Enoxaparin Sodium (LOVENOX) 60 MG/0.6ML solution prefilled syringe syringe, Inject 0.6 mL under the skin into the appropriate area as directed Daily., Disp: 3.6 mL, Rfl: 0  •  multivitamin (THERAGRAN) tablet tablet, Take 1 tablet by mouth Daily., Disp: , Rfl:   •  mupirocin (BACTROBAN) 2 % ointment, Apply 1 application topically to the appropriate area as directed 3 (Three) Times a Day., Disp: 22 g, Rfl: 3  •  oxyCODONE-acetaminophen (PERCOCET) 5-325 MG per tablet, Take 1 tablet by mouth Every 8 (Eight) Hours As Needed for Moderate Pain  (chronic pain meds for back pain)., Disp: 30 tablet, Rfl: 0  •  pantoprazole (PROTONIX) 40 MG EC tablet, TAKE 1 TABLET TWICE DAILY, Disp: 180 tablet, Rfl: 0  •  simvastatin (ZOCOR) 20 MG tablet, TAKE 2 TABLETS EVERY NIGHT (Patient taking differently: Take 20 mg by mouth Every Night.), Disp: 180 tablet, Rfl: 1  •  vitamin B-12 (CYANOCOBALAMIN) 1000 MCG tablet, Take 1,000 mcg by mouth Daily., Disp: , Rfl:   •  Vitamin D, Cholecalciferol, 50 MCG (2000 UT) capsule, Take 2,000 Units by mouth Daily., Disp: , Rfl:   •  warfarin (COUMADIN) 1 MG  "tablet, Take 1.5 tablets (1.5mg) on Sundays, Tuesdays and Thursdays and take 1 tablet (1MG) on all other days OR as directed., Disp: 105 tablet, Rfl: 1  •  zolpidem (AMBIEN) 10 MG tablet, , Disp: , Rfl:   •  amiodarone (PACERONE) 200 MG tablet, Take 1 tablet by mouth 2 (Two) Times a Day., Disp: 180 tablet, Rfl: 1  •  polyethylene glycol (polyethylene glycol) 17 g packet, Take 17 g by mouth Daily As Needed (Constipation)., Disp: , Rfl:       Objective:     Vitals:    08/02/22 1056   BP: 162/72   BP Location: Right arm   Patient Position: Sitting   Pulse: 68   Weight: 68 kg (150 lb)   Height: 162.6 cm (64.02\")     Body mass index is 25.73 kg/m².    PHYSICAL EXAM:    Vitals Reviewed.   General Appearance: No acute distress, well developed and well nourished.   Eyes: Conjunctiva and lids: No erythema, swelling, or discharge. Sclera non-icteric.   HENT: Atraumatic, normocephalic. External eyes, ears, and nose normal.   Respiratory: No signs of respiratory distress. Respiration rhythm and depth normal.   Clear to auscultation. No rales, crackles, rhonchi, or wheezing auscultated.   Cardiovascular:  Heart Rate and Rhythm: AF/paced, Heart Sounds: Normal S1 and S2. No S3 or S4 noted.  Gastrointestinal:  Abdomen soft, non-distended, non-tender.   Musculoskeletal: Normal movement of extremities  Skin: Warm and dry.   Psychiatric: Patient alert and oriented to person, place, and time. Speech and behavior appropriate. Normal mood and affect.       ECG 12 Lead    Date/Time: 8/2/2022 11:44 AM  Performed by: Fidencio Fonseca APRN  Authorized by: Fidencio Fonseca APRN   Rhythm: atrial fibrillation and paced  Rhythm comments: AF-  BPM: 68                Assessment:       Diagnosis Plan   1. Ischemic cardiomyopathy     2. ICD (implantable cardioverter-defibrillator) in place     3. Ventricular tachycardia (HCC)     4. Coronary artery disease involving coronary bypass graft of native heart without angina pectoris     5. Chronic atrial " fibrillation (HCC)     6. Essential hypertension     7. CKD (chronic kidney disease) stage 4, GFR 15-29 ml/min ([facility])            Plan:   1-4. ICM, ICD, VT- she recently received one shock for VT which successfully converted her. She was asleep and unaware. She has severely reduced EF. Discussed with Dr. Boucher and think the best option given her poor functional status is to start amiodarone.       5. Chronic AF- currently on carvedilol and warfarin. AV node ablation in the past.       6. HTN- currently well controlled.        7. CKD- last creatinine was 2.43. she follows with nephrology.       This is the first time she has been shocked but has received ATP in the past multiple times. We are going to start amiodarone 200mg BID.         She will follow up in 1 year or sooner if she has issues.         As always, it has been a pleasure to participate in your patient's care.      Sincerely,         NICKY Tellez

## 2022-08-02 NOTE — PROGRESS NOTES
Anticoagulation Clinic Progress Note    Anticoagulation Summary  As of 2022    INR goal:  2.0-3.0   TTR:  63.7 % (3.6 y)   INR used for dosin.0 (2022)   Warfarin maintenance plan:  1 mg every Sun, Tue, Thu; 1.5 mg all other days   Weekly warfarin total:  9 mg   No change documented:  Lee Ann Diamond   Plan last modified:  Vargas Butcher, PharmD (2022)   Next INR check:  8/15/2022   Priority:  Maintenance   Target end date:  Indefinite    Indications    Chronic atrial fibrillation (HCC) [I48.20]             Anticoagulation Episode Summary     INR check location:      Preferred lab:      Send INR reminders to:  [facility] CLINICAL POOL    Comments:        Anticoagulation Care Providers     Provider Role Specialty Phone number    Nik Galarza MD Referring Cardiology [phone]          Clinic Interview:  Patient Findings     Negatives:  Signs/symptoms of thrombosis, Signs/symptoms of bleeding,   Laboratory test error suspected, Change in health, Change in alcohol use,   Change in activity, Upcoming invasive procedure, Emergency department   visit, Upcoming dental procedure, Missed doses, Extra doses, Change in   medications, Change in diet/appetite, Hospital admission, Bruising, Other   complaints      Clinical Outcomes     Negatives:  Major bleeding event, Thromboembolic event,   Anticoagulation-related hospital admission, Anticoagulation-related ED   visit, Anticoagulation-related fatality        INR History:  Anticoagulation Monitoring 2022   INR 1.8 1.5 2.0   INR Date 2022   INR Goal 2.0-3.0 2.0-3.0 2.0-3.0   Trend Same Same Same   Last Week Total 9 mg 6.5 mg 11 mg   Next Week Total 3 mg 10 mg 9 mg   Sun Hold () 1 mg 1 mg   Mon 2 mg () 1.5 mg 1.5 mg   Tue 2 mg (); Otherwise 1 mg - 1 mg   Wed Hold (); Otherwise 1.5 mg - 1.5 mg   Thu Hold (); Otherwise 1 mg - 1 mg   Fri Hold () 2 mg () 1.5 mg   Sat Hold  (7/23) 2 mg (7/30) 1.5 mg   Visit Report - - -   Some recent data might be hidden       Plan:  1. INR is therapeutic today- see above in Anticoagulation Summary.   Will instruct Janel Mahoney to continue their warfarin regimen- see above in Anticoagulation Summary.  2. Follow up in 2 weeks.  3. Patient declines warfarin refills.  4. Verbal and written information provided. Patient expresses understanding and has no further questions at this time.    Lee Ann Diamond

## 2022-08-04 NOTE — PROGRESS NOTES
Subjective     CHIEF COMPLAINT:      Chief Complaint   Patient presents with   • Follow-up     No concerns       HISTORY OF PRESENT ILLNESS:     Janel Mahoney is a 81 y.o. female patient who returns today for follow up on her anemia and thrombocytopenia.  She returns today for follow-up accompanied by her .  She is taking oral iron twice a week on Mondays and Fridays.  She reports that her bowel movements change after she takes the iron.  No abdominal pain.    Patient reports pain in her knees.  She recently had a fall and scraped her right knee area.  She did physical therapy and her pain improved.  However, she has pain in the back that worsens with ambulation.    ROS:  Pertinent ROS is in the HPI.     Past medical, surgical, social and family history were reviewed.     MEDICATIONS:    Current Outpatient Medications:   •  alendronate (FOSAMAX) 70 MG tablet, Take 70 mg by mouth., Disp: , Rfl:   •  allopurinol (ZYLOPRIM) 100 MG tablet, TAKE 2 TABLETS EVERY DAY BEFORE SUPPER (Patient taking differently: Take 200 mg by mouth Daily.), Disp: 180 tablet, Rfl: 1  •  aspirin 81 MG chewable tablet, Chew 81 mg Daily., Disp: , Rfl:   •  bumetanide (BUMEX) 2 MG tablet, 1 p.o. q. OD, Disp: 30 tablet, Rfl: 3  •  calcitriol (ROCALTROL) 0.25 MCG capsule, Take 0.25 mcg by mouth 2 (Two) Times a Day., Disp: , Rfl:   •  carvedilol (COREG) 25 MG tablet, TAKE 1 TABLET TWICE DAILY, Disp: 180 tablet, Rfl: 1  •  Epoetin Akil (PROCRIT IJ), Inject  as directed., Disp: , Rfl:   •  ferrous sulfate 325 (65 FE) MG tablet, Take 325 mg by mouth Daily With Breakfast., Disp: , Rfl:   •  Menthol, Topical Analgesic, (BIOFREEZE EX), Apply  topically., Disp: , Rfl:   •  multivitamin (THERAGRAN) tablet tablet, Take 1 tablet by mouth Daily., Disp: , Rfl:   •  oxyCODONE-acetaminophen (PERCOCET) 5-325 MG per tablet, Take 1 tablet by mouth Every 8 (Eight) Hours As Needed for Moderate Pain  (chronic pain meds for back pain)., Disp: 30 tablet, Rfl:  "0  •  pantoprazole (PROTONIX) 40 MG EC tablet, TAKE 1 TABLET TWICE DAILY, Disp: 180 tablet, Rfl: 0  •  simvastatin (ZOCOR) 20 MG tablet, TAKE 2 TABLETS EVERY NIGHT (Patient taking differently: Take 20 mg by mouth Every Night.), Disp: 180 tablet, Rfl: 1  •  vitamin B-12 (CYANOCOBALAMIN) 1000 MCG tablet, Take 1,000 mcg by mouth Daily., Disp: , Rfl:   •  Vitamin D, Cholecalciferol, 50 MCG (2000 UT) capsule, Take 2,000 Units by mouth Daily., Disp: , Rfl:   •  warfarin (COUMADIN) 1 MG tablet, Take 1.5 tablets (1.5mg) on Sundays, Tuesdays and Thursdays and take 1 tablet (1MG) on all other days OR as directed., Disp: 105 tablet, Rfl: 1  •  zolpidem (AMBIEN) 10 MG tablet, , Disp: , Rfl:   •  amiodarone (PACERONE) 200 MG tablet, Take 1 tablet by mouth 2 (Two) Times a Day., Disp: 180 tablet, Rfl: 1  •  mupirocin (BACTROBAN) 2 % ointment, Apply 1 application topically to the appropriate area as directed 3 (Three) Times a Day., Disp: 22 g, Rfl: 3  •  polyethylene glycol (polyethylene glycol) 17 g packet, Take 17 g by mouth Daily As Needed (Constipation)., Disp: , Rfl:   No current facility-administered medications for this visit.    Facility-Administered Medications Ordered in Other Visits:   •  epoetin adele (EPOGEN,PROCRIT) injection 5,000 Units, 5,000 Units, Subcutaneous, Once, Edward Vasquez MD  Objective     VITAL SIGNS:     Vitals:    08/04/22 1530   BP: 164/73   Pulse: 59   Resp: 16   Temp: 96.8 °F (36 °C)   TempSrc: Temporal   SpO2: 99%   Weight: 63.4 kg (139 lb 12.8 oz)   Height: 162.6 cm (64.02\")   PainSc: 0-No pain     Body mass index is 23.98 kg/m².     Wt Readings from Last 5 Encounters:   08/04/22 63.4 kg (139 lb 12.8 oz)   08/02/22 68 kg (150 lb)   07/14/22 63.7 kg (140 lb 6.4 oz)   06/01/22 62 kg (136 lb 9.6 oz)   04/26/22 65.8 kg (145 lb)     PHYSICAL EXAMINATION:   GENERAL: The patient appears in good general condition, not in acute distress.   SKIN: Significant bruising over the upper and lower " extremities.  EYES: No jaundice. Pallor.  LYMPHATICS: No cervical lymphadenopathy.  CHEST: Normal respiratory effort.  Lungs clear bilaterally.  No added sounds.  CVS: Normal S1.  Mechanical S2.  Ejection systolic murmur grade 2.  ABDOMEN: Nondistended.  EXTREMITIES: Swelling at the knee joints bilaterally.  No edema.    DIAGNOSTIC DATA:     Results from last 7 days   Lab Units 08/04/22  1507   WBC 10*3/mm3 6.02   NEUTROS ABS 10*3/mm3 4.84   HEMOGLOBIN g/dL 9.6*   HEMATOCRIT % 30.3*   PLATELETS 10*3/mm3 110*     Results from last 7 days   Lab Units 08/02/22  1022 07/29/22  1328   INR  2.0* 1.5*         Lab 08/04/22  1507   IRON 67   IRON SATURATION 21   TIBC 317   TRANSFERRIN 213   FERRITIN 381.00*        Assessment & Plan    *Anemia of chronic kidney disease, stage 3a.  · Patient is on Procrit monthly.    · The last Procrit injection was 7000 units on 12/28/2021 for hemoglobin of 9.3.  · She is on ferrous sulfate 325 mg twice weekly to maintain adequate iron stores.  · Iron stores were adequate on 12/28/2021.  · Patient was receiving Procrit 7000 units until January 2022.  · Hemoglobin decreased to 8.4 on 2/22/2022.  Procrit dose was increased to 10,000 units on 2/22/2022.  · Hemoglobin decreased to 7.6 on 3/22/2022.  She was transfused with 1 unit of PRBCs.  · Patient was continued on Procrit 10,000 units weekly.  · Last Procrit injection was 7000 units on 7/6/2022.  · Hemoglobin is 9.6 today.  · She reports fatigue secondary to the anemia.  · Iron stores are adequate today.  · She is having problem with her bowel movements the days she takes the ferrous sulfate (2 days a week).     *Thrombocytopenia attributed to B12 deficiency.    · Platelet count is in the 110,000-140,000 range.   · Platelet count is 110,000 today.  · Patient is having significant bruising.  This is mainly secondary to effect of warfarin.  · I recommended increasing intake of vitamin C.    PLAN:    1.  We will give Procrit today at 5000 units.     2.  Continue ferrous sulfate will change the dose to half a tablet 4 days a week.    3.  Return every 3 weeks for CBC.  She will receive Procrit if hemoglobin is <10.    4.  Repeat ferritin iron panel in 3 months.    5.  I will see her in follow-up in December 2022 with CBC and Procrit.        Edward Vasquez MD  08/04/22

## 2022-08-15 NOTE — RESEARCH
Research study: [employer] PSR  Subject initials: MARYCHUY  Subject study ID#: [id number]     MARYCHUY came to the [facility] office on 8/2/22 to see Fidencio SAUNDERS for EP service and to have her CRT-D device interrogated. I reviewed records from these encounters to enter data in the [employer] registry today (patient's device has a [employer] generator but all leads are Medtronic). There were no device or lead events to report, nor any qualifying health events to report. We will continue to follow MARYCHUY per study protocol.     Tiffany CALLESN RN  Research Coordinator

## 2022-09-03 PROBLEM — R41.82 ALTERED MENTAL STATUS: Status: ACTIVE | Noted: 2022-01-01

## 2022-09-03 NOTE — ED NOTES
Patient to ED via EMS from home c/o altered mental status. Patient family called EMS for lethargy and confusion. Patient normally A&Ox4, currently disoriented to time. EMS reports the patient did not know who her  was. Per EMS, the patient's A/C went out at home, patient is very hot to touch and has had a fever (Tmax 101). Upon arrival to ED patient is normothermic.

## 2022-09-03 NOTE — ED PROVIDER NOTES
EMERGENCY DEPARTMENT ENCOUNTER    Room Number:  36/36  Date of encounter:  9/3/2022  PCP: Ariel Matute MD  Historian: Patient, family and EMS      HPI:  Chief Complaint: Altered mental status    A complete HPI/ROS/PMH/PSH/SH/FH are unobtainable due to: Altered mental status    Context: Janel Mahoney is a 81 y.o. female who presents to the ED via EMS from home where she was noted to have confusion.  Patient's  states that her last known normal was sometime yesterday.  He states that she slept most of the day today but then when she got up she was confused.  He tried to help her put on her underwear but the patient kept thinking they were inside out.  He states he then went in and found her essentially unresponsive and try to give her food tray but she pushed all the way but would not answer his questions.  Thus they called EMS for evaluation of the patient's confusion.  The patient does have a history of pacemaker and is on Coumadin.  They state that the patient did have several days of diarrhea but it stopped yesterday.    PAST MEDICAL HISTORY  Active Ambulatory Problems     Diagnosis Date Noted   • Anemia in chronic kidney disease (CKD) 02/12/2016   • Thrombocytopenia (HCC) 05/17/2016   • B12 deficiency 05/17/2016   • Coronary artery disease involving coronary bypass graft of native heart without angina pectoris 06/08/2016   • S/P MVR (porcine) 06/08/2016   • Chronic atrial fibrillation (HCC) 06/08/2016   • Bilateral carotid artery disease ([facility]) 06/08/2016   • Intractable low back pain 08/02/2016   • Long-term (current) use of anticoagulants 08/16/2016   • Low back pain 08/18/2016   • Osteoporosis 09/01/2016   • Murmur, heart 09/01/2016   • GEORGINA on auto CPAP - Dr Cardona 09/08/2016   • Hypersomnia due to medical condition - GEORGINA 09/08/2016   • Esophageal stricture 09/06/2017   • Dysphagia 09/26/2017   • Closed fracture of left proximal humerus 12/24/2017   • Essential hypertension 12/24/2017   •  Supratherapeutic INR 12/24/2017   • Chronic combined systolic and diastolic congestive heart failure ([facility]) 12/24/2017   • Osteoporosis with pathological fracture 12/24/2017   • Closed 3-part fracture of proximal end of left humerus 01/10/2018   • Closed fracture of proximal end of humerus with delayed healing 01/16/2018   • Injury of right knee 11/12/2018   • Knee injury, left, initial encounter 11/12/2018   • History of fall 11/12/2018   • S/P TKR (total knee replacement) using cement, left 11/12/2018   • Ischemic cardiomyopathy 11/13/2018   • Intramuscular hematoma right pecotralis 11/23/2018   • CKD (chronic kidney disease) stage 4, GFR 15-29 ml/min ([facility]) 11/23/2018   • Peripheral edema 02/15/2019   • Inflammatory arthritis 02/20/2019   • Ventricular tachycardia ([facility]) 08/27/2019   • S/P revision of total knee 11/19/2019   • Idiopathic gout 06/05/2020   • Psychophysiological insomnia 07/05/2020   • ICD (implantable cardioverter-defibrillator) in place 07/23/2020   • Status post reverse arthroplasty of right shoulder 08/31/2021   • Mixed hyperlipidemia 06/24/2021   • Right leg DVT ([facility]) 09/08/2021   • Osteoarthritis of multiple joints 12/21/2021   • Basal cell carcinoma (BCC) of skin of face 06/01/2022   • Squamous cell carcinoma in situ (SCCIS) of skin 06/01/2022     Resolved Ambulatory Problems     Diagnosis Date Noted   • Acute blood loss anemia 09/26/2017   • Hemorrhagic disorder due to extrinsic circulating anticoagulants ([facility]) 11/23/2018   • Acute posthemorrhagic anemia 11/23/2018   • Acute kidney injury ([facility]) 11/25/2018   • Acute on chronic combined systolic (congestive) and diastolic (congestive) heart failure ([facility]) 02/20/2019   • Symptomatic anemia 09/06/2021   • Acute upper GI bleed 03/14/2022     Past Medical History:   Diagnosis Date   • Anemia    • Atrial fibrillation ([facility])    • Bruises easily    • Carotid artery stenosis    • Chronic back pain    • Chronic coronary artery disease    • Chronic kidney  disease, stage 3 (HCC)    • GERD (gastroesophageal reflux disease)    • Gout    • H/O cardiac murmur    • Hematoma    • [place] (hard of hearing)    • Hyperlipidemia    • Hypertension    • Hypotension    • Kyphoscoliosis    • Leukopenia    • Lumbar spondylosis    • Obesity    • GEORGINA (obstructive sleep apnea) 12/01/2013   • Osteoarthritis    • Peptic ulcer    • Premature ventricular contractions    • Renal insufficiency syndrome    • Right shoulder pain 08/2021   • Scoliosis    • Shoulder fracture, left    • Shoulder pain, right    • Urine incontinence    • Vitamin B12 deficiency    • Warfarin anticoagulation          PAST SURGICAL HISTORY  Past Surgical History:   Procedure Laterality Date   • AV NODE ABLATION     • BREAST BIOPSY     • CARDIAC CATHETERIZATION      Showed severe mitral insufficiency and borderline coronary artery disease   • CARDIAC CATHETERIZATION      Showed an ejection fraction of 35%. She had occlusive disease of the right posterior LV branch and no other significant disease, treated medically.   • CARDIAC DEFIBRILLATOR PLACEMENT      Biventricular   • CARDIAC DEFIBRILLATOR PLACEMENT Left    • CARDIAC ELECTROPHYSIOLOGY PROCEDURE N/A 1/4/2019    Procedure: GENERATOR CHANGE BI-V ICD   [place];  Surgeon: James Hwang MD;  Location: [facility] CATH INVASIVE LOCATION;  Service: Cardiology   • CARDIAC VALVE REPLACEMENT  2009    Done with stent placement   • CARDIOVERSION      multiple electrocardioversions.   • CAROTID ARTERY ANGIOPLASTY Right    • CATARACT EXTRACTION EXTRACAPSULAR W/ INTRAOCULAR LENS IMPLANTATION Bilateral    • COLONOSCOPY N/A 9/28/2017    Procedure: COLONOSCOPY TO CECUM;  Surgeon: Kevin Davis MD;  Location: [facility] ENDOSCOPY;  Service:    • COLONOSCOPY N/A 3/17/2022    Procedure: COLONOSCOPY WITH POLYPECTOMY (HOT SNARE);  Surgeon: Brooke Moscoso MD;  Location: [facility] ENDOSCOPY;  Service: Gastroenterology;  Laterality: N/A;  PRE- ANEMIA  POST-- POLYP   • CORONARY ANGIOPLASTY WITH  STENT PLACEMENT  2009   • CORONARY ARTERY BYPASS GRAFT      single graft to the PDA   • CORONARY STENT PLACEMENT     • ENDOSCOPY N/A 9/28/2017    Procedure: ESOPHAGOGASTRODUODENOSCOPY ;  Surgeon: Kevin Davis MD;  Location: [facility] ENDOSCOPY;  Service:    • ENDOSCOPY N/A 3/16/2022    Procedure: ESOPHAGOGASTRODUODENOSCOPY AT BEDSIDE;  Surgeon: Brooke Moscoso MD;  Location: [facility] ENDOSCOPY;  Service: Gastroenterology;  Laterality: N/A;  pre:  anemia  post: mild gastritis   • EYE SURGERY     • HEMORRHOIDECTOMY     • HYSTERECTOMY     • INCISION AND DRAINAGE TRUNK Right 11/27/2018    Procedure: EVACUATION OF RIGHT CHEST WALL HEMATOMA;  Surgeon: Juvencio Rodriguez MD;  Location: [facility] MAIN OR;  Service: General   • MITRAL VALVE REPLACEMENT  01/2010    #31 Epic porcine valve.   • THROMBOENDARTERECTOMY Right     carotid thromboendarterectomy    • TONSILLECTOMY      age 32   • TOTAL KNEE ARTHROPLASTY Left    • TOTAL KNEE ARTHROPLASTY REVISION Left 11/19/2019    Procedure: TOTAL KNEE ARTHROPLASTY REVISION LEFT;  Surgeon: Juvencio Pressley II, MD;  Location: [facility] OR;  Service: Orthopedics   • TOTAL SHOULDER ARTHROPLASTY W/ DISTAL CLAVICLE EXCISION Left 1/16/2018    Procedure: TOTAL SHOULDER REVERSE ARTHROPLASTY;  Surgeon: Bianka Quesada MD;  Location: [facility] OR;  Service:    • TOTAL SHOULDER ARTHROPLASTY W/ DISTAL CLAVICLE EXCISION Right 8/31/2021    Procedure: TOTAL SHOULDER REVERSE ARTHROPLASTY;  Surgeon: Juvencio Pressley II, MD;  Location: [facility] OR;  Service: Orthopedics;  Laterality: Right;         FAMILY HISTORY  Family History   Problem Relation Age of Onset   • Cancer Mother    • Breast cancer Mother    • Heart disease Mother    • Hypertension Mother    • Stroke Mother    • Coronary artery disease Father    • Stroke Father    • Heart disease Father    • Hypertension Father    • Other Daughter         thiamin deficiency    • Hypertension Son    • Lung disease Other    •  Hypertension Other    • Malig Hyperthermia Neg Hx          SOCIAL HISTORY  Social History     Socioeconomic History   • Marital status:      Spouse name: Russ   Tobacco Use   • Smoking status: Former Smoker     Packs/day: 1.00     Years: 50.00     Pack years: 50.00     Types: Cigarettes     Quit date: 2009     Years since quittin.6   • Smokeless tobacco: Never Used   • Tobacco comment: Daily caffeine - soda   Vaping Use   • Vaping Use: Never used   Substance and Sexual Activity   • Alcohol use: Yes     Comment: 1 yearly   • Drug use: Never   • Sexual activity: Not Currently         ALLERGIES  Diclofenac        REVIEW OF SYSTEMS  Review of Systems     Unable due to confusion  All systems reviewed and negative except for those discussed in HPI.     PHYSICAL EXAM    I have reviewed the triage vital signs and nursing notes.    ED Triage Vitals [22]   Temp Heart Rate Resp BP SpO2   98.4 °F (36.9 °C) 60 18 122/57 98 %      Temp src Heart Rate Source Patient Position BP Location FiO2 (%)   Oral -- -- -- --       GENERAL: 81-year-old well developed, well nourished in mild distress  HENT: NCAT, neck supple, trachea midline  EYES: no scleral icterus, PERRL, pale conjunctivae  CV: regular rhythm, regular rate, no murmur  RESPIRATORY: unlabored effort, CTAB  ABDOMEN: soft, nontender, nondistended, bowel sounds present  MUSCULOSKELETAL: no gross deformity, no pedal edema, no calf tenderness, the patient is able to move all 4 extremities: All 4 extremities are weak but equally weak.  NEURO: alert and oriented x1,  sensory and motor function of extremities intact, speech clear, mental status is confused per family  SKIN: Multiple bruises on upper and lower extremities which the family state are chronic   PSYCH:  Appropriate mood and affect      PPE  Pt does not present with symptoms for COVID19; however, I was wearing a mask and goggles throughout all patient interaction.    Vital signs and nursing  notes reviewed.      LAB RESULTS  Recent Results (from the past 24 hour(s))   Comprehensive Metabolic Panel    Collection Time: 09/03/22  8:12 PM    Specimen: Blood   Result Value Ref Range    Glucose 91 65 - 99 mg/dL    BUN 85 (H) 8 - 23 mg/dL    Creatinine 2.56 (H) 0.57 - 1.00 mg/dL    Sodium 141 136 - 145 mmol/L    Potassium 3.7 3.5 - 5.2 mmol/L    Chloride 108 (H) 98 - 107 mmol/L    CO2 21.0 (L) 22.0 - 29.0 mmol/L    Calcium 8.4 (L) 8.6 - 10.5 mg/dL    Total Protein 4.4 (L) 6.0 - 8.5 g/dL    Albumin 3.00 (L) 3.50 - 5.20 g/dL    ALT (SGPT) 12 1 - 33 U/L    AST (SGOT) 16 1 - 32 U/L    Alkaline Phosphatase 33 (L) 39 - 117 U/L    Total Bilirubin 0.3 0.0 - 1.2 mg/dL    Globulin 1.4 gm/dL    A/G Ratio 2.1 g/dL    BUN/Creatinine Ratio 33.2 (H) 7.0 - 25.0    Anion Gap 12.0 5.0 - 15.0 mmol/L    eGFR 18.4 (L) >60.0 mL/min/1.73   Troponin    Collection Time: 09/03/22  8:12 PM    Specimen: Blood   Result Value Ref Range    Troponin T 0.173 (C) 0.000 - 0.030 ng/mL   Ethanol    Collection Time: 09/03/22  8:12 PM    Specimen: Blood   Result Value Ref Range    Ethanol <10 0 - 10 mg/dL    Ethanol % <0.010 %   Magnesium    Collection Time: 09/03/22  8:12 PM    Specimen: Blood   Result Value Ref Range    Magnesium 2.0 1.6 - 2.4 mg/dL   TSH    Collection Time: 09/03/22  8:12 PM    Specimen: Blood   Result Value Ref Range    TSH 2.230 0.270 - 4.200 uIU/mL   T4, Free    Collection Time: 09/03/22  8:12 PM    Specimen: Blood   Result Value Ref Range    Free T4 1.50 0.93 - 1.70 ng/dL   ECG 12 Lead    Collection Time: 09/03/22  8:16 PM   Result Value Ref Range    QT Interval 516 ms   Urinalysis With Culture If Indicated - Urine, Clean Catch    Collection Time: 09/03/22  8:26 PM    Specimen: Urine, Clean Catch   Result Value Ref Range    Color, UA Yellow Yellow, Straw    Appearance, UA Clear Clear    pH, UA <=5.0 5.0 - 8.0    Specific Gravity, UA 1.008 1.005 - 1.030    Glucose, UA Negative Negative    Ketones, UA Negative Negative     Bilirubin, UA Negative Negative    Blood, UA Negative Negative    Protein, UA Negative Negative    Leuk Esterase, UA Negative Negative    Nitrite, UA Negative Negative    Urobilinogen, UA 0.2 E.U./dL 0.2 - 1.0 E.U./dL   Urine Drug Screen - Urine, Clean Catch    Collection Time: 09/03/22  8:26 PM    Specimen: Urine, Clean Catch   Result Value Ref Range    Amphet/Methamphet, Screen Negative Negative    Barbiturates Screen, Urine Negative Negative    Benzodiazepine Screen, Urine Negative Negative    Cocaine Screen, Urine Negative Negative    Opiate Screen Negative Negative    THC, Screen, Urine Negative Negative    Methadone Screen, Urine Negative Negative    Oxycodone Screen, Urine Positive (A) Negative   Protime-INR    Collection Time: 09/03/22  9:51 PM    Specimen: Blood   Result Value Ref Range    Protime 69.8 (C) 11.7 - 14.2 Seconds    INR 8.95 (C) 0.90 - 1.10   CBC Auto Differential    Collection Time: 09/03/22  9:51 PM    Specimen: Blood   Result Value Ref Range    WBC 5.19 3.40 - 10.80 10*3/mm3    RBC 1.55 (L) 3.77 - 5.28 10*6/mm3    Hemoglobin 4.8 (C) 12.0 - 15.9 g/dL    Hematocrit 15.3 (C) 34.0 - 46.6 %    MCV 98.7 (H) 79.0 - 97.0 fL    MCH 31.0 26.6 - 33.0 pg    MCHC 31.4 (L) 31.5 - 35.7 g/dL    RDW 16.6 (H) 12.3 - 15.4 %    RDW-SD 59.8 (H) 37.0 - 54.0 fl    MPV 10.3 6.0 - 12.0 fL    Platelets 113 (L) 140 - 450 10*3/mm3    Neutrophil % 79.2 (H) 42.7 - 76.0 %    Lymphocyte % 11.2 (L) 19.6 - 45.3 %    Monocyte % 5.6 5.0 - 12.0 %    Eosinophil % 1.9 0.3 - 6.2 %    Basophil % 0.2 0.0 - 1.5 %    Immature Grans % 1.9 (H) 0.0 - 0.5 %    Neutrophils, Absolute 4.11 1.70 - 7.00 10*3/mm3    Lymphocytes, Absolute 0.58 (L) 0.70 - 3.10 10*3/mm3    Monocytes, Absolute 0.29 0.10 - 0.90 10*3/mm3    Eosinophils, Absolute 0.10 0.00 - 0.40 10*3/mm3    Basophils, Absolute 0.01 0.00 - 0.20 10*3/mm3    Immature Grans, Absolute 0.10 (H) 0.00 - 0.05 10*3/mm3    nRBC 0.4 (H) 0.0 - 0.2 /100 WBC   Type & Screen    Collection Time:  09/03/22  9:51 PM    Specimen: Blood   Result Value Ref Range    ABO Type O     RH type Positive     Antibody Screen Negative     T&S Expiration Date 9/6/2022 11:59:59 PM    COVID-19,[facility] IN-HOUSE CEPHEID/NAI NP SWAB IN TRANSPORT MEDIA 8-12 HR TAT - Swab, Nasopharynx    Collection Time: 09/03/22  9:54 PM    Specimen: Nasopharynx; Swab   Result Value Ref Range    COVID19 Not Detected Not Detected - Ref. Range   Prepare RBC, 2 Units    Collection Time: 09/03/22 11:17 PM   Result Value Ref Range    Product Code X0652O76     Unit Number [id number]     UNIT  ABO O     UNIT  RH POS     Crossmatch Interpretation Compatible     Dispense Status XM     Blood Expiration Date 202210042359     Blood Type Barcode 5100     Product Code U3044Q33     Unit Number [id number]     UNIT  ABO O     UNIT  RH POS     Crossmatch Interpretation Compatible     Dispense Status IS     Blood Expiration Date 202210042359     Blood Type Barcode 5100        Ordered the above labs and independently reviewed the results.        RADIOLOGY  CT Head Without Contrast    Result Date: 9/3/2022  CT HEAD WITHOUT CONTRAST  HISTORY: Altered mental status  COMPARISON: 03/14/2022  TECHNIQUE: Axial CT imaging was obtained through the brain. No IV contrast was administered.  FINDINGS: No acute intracranial hemorrhage is seen. There is diffuse atrophy. There is periventricular and deep white matter microangiopathic change. There is no midline shift or mass effect. Mucous retention cyst is seen within the maxillary sinuses.      No acute intracranial findings.  Radiation dose reduction techniques were utilized, including automated exposure control and exposure modulation based on body size.  This report was finalized on 9/3/2022 10:26 PM by Dr. Daniella Long M.D.      XR Chest 1 View    Result Date: 9/3/2022  SINGLE VIEW OF THE CHEST  HISTORY: Shortness of breath  COMPARISON: 03/14/2022  FINDINGS: Cardiomegaly is present. There is vascular  congestion. Left-sided pacemaker is present. No pneumothorax is identified. There are bilateral shoulder arthroplasties. No definite effusion is seen. There is left basilar atelectasis versus scarring.      Cardiomegaly with suspected vascular congestion.  This report was finalized on 9/3/2022 10:23 PM by Dr. Daniella Long M.D.        I ordered the above noted radiological studies. Independently reviewed by me and discussed with radiologist.  See dictation above for official radiology interpretation.      PROCEDURES    Critical Care  Performed by: Ty Carvajal MD  Authorized by: Ty Carvajal MD     Critical care provider statement:     Critical care time (minutes):  45    Critical care was necessary to treat or prevent imminent or life-threatening deterioration of the following conditions:  Circulatory failure and renal failure (GI bleed with hemoglobin of 4.8 and INR of 8.9 on Coumadin requiring blood transfusion and emergent reversal agents)    Critical care was time spent personally by me on the following activities:  Development of treatment plan with patient or surrogate, discussions with consultants, discussions with primary provider, evaluation of patient's response to treatment, examination of patient, obtaining history from patient or surrogate, ordering and performing treatments and interventions, ordering and review of laboratory studies, ordering and review of radiographic studies, pulse oximetry, re-evaluation of patient's condition and review of old charts    I assumed direction of critical care for this patient from another provider in my specialty: no              MEDICATIONS GIVEN IN ER    Medications   sodium chloride 0.9 % flush 10 mL (has no administration in time range)   pantoprazole (PROTONIX) injection 80 mg (80 mg Intravenous Given 9/3/22 2232)     And   pantoprazole (PROTONIX) 40 mg in 100 mL NS (VTB) (8 mg/hr Intravenous New Bag 9/3/22 2234)   prothrombin complex conc human  "(KCentra) IV solution 3,321 Units (has no administration in time range)     And   phytonadione (AQUA-MEPHYTON, VITAMIN K) 10 mg in sodium chloride 0.9 % 50 mL IVPB (has no administration in time range)         PROGRESS, DATA ANALYSIS, CONSULTS, AND MEDICAL DECISION MAKING    All labs have been independently reviewed by me.  All radiology studies have been reviewed by me and discussed with radiologist dictating report.   EKG's independently reviewed by me.  Discussion below represents my analysis of pertinent findings related to patient's condition, differential diagnosis, treatment plan and final disposition.      ED Course as of 09/03/22 2317   Sat Sep 03, 2022   2011 The patient is not 18 D or thrombolytics candidate with last known normal of \"sometime yesterday\". [GP]   2024 EKG    EKG time: 2016  Rhythm/Rate: Ventricularly paced at 60  No Acute Ischemia  Non-Specific ST-T changes    Unchanged compared to prior on 3/14/2022    Interpreted Contemporaneously by me.  Independently viewed by me     [GP]   2207 The lab initially called us to tell us that the patient's hemoglobin and INR were markedly abnormal.  Thus we repeated those labs and noted the patient's hemoglobin is 4.8.  The patient is pale with multiple bruises.  I have ordered 2 units of packed red blood cells.  Currently I am still awaiting the patient's repeat INR. [GP]   2213 I have spoken with the family who state that her INR was within normal range last week.  They state that this is happened in the past and the patient has had scopes and a swallow pill that did not show bleeding.  I did just perform a rectal exam on the patient and she did have black heme positive stool.  I have notified the patient and family that she will need a blood transfusion and I will start a Protonix bolus and drip.  I am still awaiting her INR. [GP]   2215 Of note is the patient's platelets are low to at 113.  It appears she has chronic thrombocytopenia. [GP]   2216 The " patient has been hemodynamically stable throughout her emergency department stay. [GP]   2246 Patient's head CT is negative acute.  I discussed the lab who believes the patient's INR is can be elevated but it is not back yet. [GP]   2300 Patient's INR is elevated 8.96.  I will order Kcentra and vitamin K. [GP]   2307 I just spoke with the patient and her family.  I advised them that she will need blood transfusion and reversal of her INR.  The patient and family states she is a full code.  I have placed a call to the intensivist for admission.  They understand and agree with the plan. [GP]   2315 I discussed the case with Dr. Merrill from the ICU who will admit her for further care and work-up.  He is aware that I ordered the patient 2 units of packed red blood cells, Kcentra and vitamin K. [GP]      ED Course User Index  [GP] Ty Carvajal MD             AS OF 23:17 EDT VITALS:    BP - 129/77  HR - 60  TEMP - 98.4 °F (36.9 °C) (Tympanic)  02 SATS - 98%        DIAGNOSIS  Final diagnoses:   Altered mental status, unspecified altered mental status type   Acute blood loss anemia   Chronic anticoagulation   Thrombocytopenia (HCC)   Chronic renal impairment, unspecified CKD stage   Elevated troponin   Gastrointestinal hemorrhage, unspecified gastrointestinal hemorrhage type   Poisoning by warfarin sodium, accidental or unintentional, initial encounter         DISPOSITION  ADMISSION    Discussed treatment plan and reason for admission with pt/family and admitting physician.  Pt/family voiced understanding of the plan for admission for further testing/treatment as needed.        EMR Dragon/Transcription disclaimer:   Much of this encounter note is an electronic transcription/translation of spoken language to printed text.        Ty Carvajal MD  09/03/22 3358

## 2022-09-04 NOTE — H&P
H&P NOTE    Patient Identification:  Janel Mahoney  81 y.o.  female  1940  [id number]                CC: Altered mental status    History of Present Illness:  81-year-old female present to the emergency room via EMS due to confusion.  Apparently she had been sleeping most of the day today when she woke up she was confused.  She was last known normal yesterday.  She was somewhat unresponsive and difficult to arouse also at home.  Patient was noted to have history of A. fib  on pacemaker and on Coumadin.  Also noted history of diarrhea but no active bleeding as such reported.  In the emergency room work-up revealed severe anemia with hemoglobin around 4.8 and INR 8.95.  She was noted to be heme positive in the emergency room.  She is in the process of getting 2 unit blood transfusion vitamin K and Kcentra in the emergency room.  Currently on room air.  Earlier she had complained of some abdominal pain which has improved.      Review of Systems  As above and limited with patient's current mental status  Past Medical History:  Past Medical History:   Diagnosis Date   • Acute kidney injury (HCC)    • Anemia     on procrit   • Atrial fibrillation (HCC)    • Bruises easily    • Carotid artery stenosis    • Chronic back pain    • Chronic combined systolic and diastolic congestive heart failure (HCC)    • Chronic coronary artery disease     moderate to severe LV dysfunction.  Sees Dr. Dominguez   • Chronic kidney disease, stage 3 (HCC)    • Dysphagia    • GERD (gastroesophageal reflux disease)    • Gout    • H/O cardiac murmur    • Hematoma     LEFT LEG; DR CHERI MAHAJAN   • [place] (hard of hearing)     wears hearing aids   • Hyperlipidemia    • Hypertension    • Hypotension    • ICD (implantable cardioverter-defibrillator) in place    • Ischemic cardiomyopathy    • Kyphoscoliosis    • Leukopenia    • Lumbar spondylosis    • Obesity    • GEORGINA (obstructive sleep apnea) 12/01/2013    Overnight polysomnogram, weight 168  pounds.  AHI mildly abnormal at 10.3 events per hour.  Respiratory effort related arousals 9.5 events per hour.  Total time snoring 30% and arousals associated with snoring 15 events per hour.          Bring machine DOS   • Osteoarthritis    • Osteoporosis    • Peptic ulcer    • Premature ventricular contractions    • Renal insufficiency syndrome    • Right shoulder pain 08/2021   • Scoliosis    • Shoulder fracture, left    • Shoulder pain, right    • Thrombocytopenia (HCC)    • Urine incontinence     pads   • Ventricular tachycardia (HCC)    • Vitamin B12 deficiency    • Warfarin anticoagulation        Past Surgical History:  Past Surgical History:   Procedure Laterality Date   • AV NODE ABLATION     • BREAST BIOPSY     • CARDIAC CATHETERIZATION      Showed severe mitral insufficiency and borderline coronary artery disease   • CARDIAC CATHETERIZATION      Showed an ejection fraction of 35%. She had occlusive disease of the right posterior LV branch and no other significant disease, treated medically.   • CARDIAC DEFIBRILLATOR PLACEMENT      Biventricular   • CARDIAC DEFIBRILLATOR PLACEMENT Left    • CARDIAC ELECTROPHYSIOLOGY PROCEDURE N/A 1/4/2019    Procedure: GENERATOR CHANGE BI-V ICD   [place];  Surgeon: James Hwang MD;  Location: [facility] CATH INVASIVE LOCATION;  Service: Cardiology   • CARDIAC VALVE REPLACEMENT  2009    Done with stent placement   • CARDIOVERSION      multiple electrocardioversions.   • CAROTID ARTERY ANGIOPLASTY Right    • CATARACT EXTRACTION EXTRACAPSULAR W/ INTRAOCULAR LENS IMPLANTATION Bilateral    • COLONOSCOPY N/A 9/28/2017    Procedure: COLONOSCOPY TO CECUM;  Surgeon: Kevin Davis MD;  Location: [facility] ENDOSCOPY;  Service:    • COLONOSCOPY N/A 3/17/2022    Procedure: COLONOSCOPY WITH POLYPECTOMY (HOT SNARE);  Surgeon: Brooke Moscoso MD;  Location: [facility] ENDOSCOPY;  Service: Gastroenterology;  Laterality: N/A;  PRE- ANEMIA  POST-- POLYP   • CORONARY ANGIOPLASTY WITH STENT  PLACEMENT  2009   • CORONARY ARTERY BYPASS GRAFT      single graft to the PDA   • CORONARY STENT PLACEMENT     • ENDOSCOPY N/A 9/28/2017    Procedure: ESOPHAGOGASTRODUODENOSCOPY ;  Surgeon: Kevin Davis MD;  Location: [facility] ENDOSCOPY;  Service:    • ENDOSCOPY N/A 3/16/2022    Procedure: ESOPHAGOGASTRODUODENOSCOPY AT BEDSIDE;  Surgeon: Brooke Moscoso MD;  [facility];  Service: Gastroenterology;  Laterality: N/A;  pre:  anemia  post: mild gastritis   • EYE SURGERY     • HEMORRHOIDECTOMY     • HYSTERECTOMY     • INCISION AND DRAINAGE TRUNK Right 11/27/2018    Procedure: EVACUATION OF RIGHT CHEST WALL HEMATOMA;  Surgeon: Juvencio Rodriguez MD;  Location: [facility] MAIN OR;  Service: General   • MITRAL VALVE REPLACEMENT  01/2010    #31 Epic porcine valve.   • THROMBOENDARTERECTOMY Right     carotid thromboendarterectomy    • TONSILLECTOMY      age 32   • TOTAL KNEE ARTHROPLASTY Left    • TOTAL KNEE ARTHROPLASTY REVISION Left 11/19/2019    Procedure: TOTAL KNEE ARTHROPLASTY REVISION LEFT;  Surgeon: Juvencio Pressley II, MD;  Location: [facility] MAIN OR;  Service: Orthopedics   • TOTAL SHOULDER ARTHROPLASTY W/ DISTAL CLAVICLE EXCISION Left 1/16/2018    Procedure: TOTAL SHOULDER REVERSE ARTHROPLASTY;  Surgeon: Bianka Quesada MD;  Location: [facility] OR;  Service:    • TOTAL SHOULDER ARTHROPLASTY W/ DISTAL CLAVICLE EXCISION Right 8/31/2021    Procedure: TOTAL SHOULDER REVERSE ARTHROPLASTY;  Surgeon: Juvencio Pressley II, MD;  Location: [facility] OR;  Service: Orthopedics;  Laterality: Right;        Home Meds:  (Not in a hospital admission)      Allergies:  Allergies   Allergen Reactions   • Diclofenac GI Bleeding     She had adverse effects from the medications that required hospitalization. Pt got stomach ulcers from this medication prescribed by Dr. Arango - pediatrist.       Social History:   Social History     Socioeconomic History   • Marital status:      Spouse name:  "Russ   Tobacco Use   • Smoking status: Former Smoker     Packs/day: 1.00     Years: 50.00     Pack years: 50.00     Types: Cigarettes     Quit date: 2009     Years since quittin.6   • Smokeless tobacco: Never Used   • Tobacco comment: Daily caffeine - soda   Vaping Use   • Vaping Use: Never used   Substance and Sexual Activity   • Alcohol use: Yes     Comment: 1 yearly   • Drug use: Never   • Sexual activity: Not Currently       Family History:  Family History   Problem Relation Age of Onset   • Cancer Mother    • Breast cancer Mother    • Heart disease Mother    • Hypertension Mother    • Stroke Mother    • Coronary artery disease Father    • Stroke Father    • Heart disease Father    • Hypertension Father    • Other Daughter         thiamin deficiency    • Hypertension Son    • Lung disease Other    • Hypertension Other    • Malig Hyperthermia Neg Hx        Physical Exam:  /77   Pulse 60   Temp 98.4 °F (36.9 °C) (Tympanic)   Resp 18   Ht 162.6 cm (64\")   Wt 61.9 kg (136 lb 6.4 oz)   LMP  (LMP Unknown)   SpO2 98%   BMI 23.41 kg/m²  Body mass index is 23.41 kg/m². 98% 61.9 kg (136 lb 6.4 oz)  Physical Exam  Patient is examined using the personal protective equipment as per guidelines from infection control for this particular patient as enacted.  Hand hygiene was performed before and after patient interaction.  Elderly female currently somewhat confused but following simple commands  Eyes normal conjunctivae and pupils reactive to light  ENT Mallampati between 3 and 4 normal nasal exam  Neck midline trachea no thyromegaly  Chest no labored breathing clear  CVS regular rate and rhythm no lower extremity edema  Abdomen soft nontender no hepatosplenomegaly  CNS intact normal sensory exam  Skin no rashes no nodules  Psych confused  Musculoskeletal no cyanosis no clubbing normal range of motion        LABS:  Lab Results   Component Value Date    CALCIUM 8.4 (L) 2022    PHOS 3.5 03/15/2022 "     Results from last 7 days   Lab Units 09/03/22 2151 09/03/22 2012   MAGNESIUM mg/dL  --  2.0   SODIUM mmol/L  --  141   POTASSIUM mmol/L  --  3.7   CHLORIDE mmol/L  --  108*   CO2 mmol/L  --  21.0*   BUN mg/dL  --  85*   CREATININE mg/dL  --  2.56*   GLUCOSE mg/dL  --  91   CALCIUM mg/dL  --  8.4*   WBC 10*3/mm3 5.19  --    HEMOGLOBIN g/dL 4.8*  --    PLATELETS 10*3/mm3 113*  --    ALT (SGPT) U/L  --  12   AST (SGOT) U/L  --  16     Lab Results   Component Value Date    TROPONINT 0.173 (C) 09/03/2022     Results from last 7 days   Lab Units 09/03/22 2012   TROPONIN T ng/mL 0.173*                     Results from last 7 days   Lab Units 09/03/22 2151   INR  8.95*         Lab Results   Component Value Date    TSH 2.230 09/03/2022     Estimated Creatinine Clearance: 16.8 mL/min (A) (by C-G formula based on SCr of 2.56 mg/dL (H)).  Results from last 7 days   Lab Units 09/03/22 2026   NITRITE UA  Negative        Imaging: I personally visualized the images of scans/x-rays performed within last 3 days.      Assessment:  Altered mental status  Severe anemia  Acute blood loss anemia  Acute GI bleed  Coagulopathy on Coumadin  A. fib on anticoagulation  Thrombocytopenia  Chronic kidney disease  Elevated troponin  History of combined systolic diastolic congestive heart failure      Recommendations:  CT head reviewed with no acute changes.  Possibly metabolic encephalopathy due to severe anemia.  Transfuse blood and monitor neurological status  Likely acute blood loss anemia with heme positive stool.  Coagulopathy reversed in the emergency room.  Monitor hemoglobin hematocrit every 6 hours and blood is being transfused.  Initiate Protonix drip and GI will be consulted  Kcentra vitamin K in the emergency room.  History of chronic thrombocytopenia followed by hematology as outpatient monitor  Monitor creatinine closely hopefully may improve with blood transfusion.  Elevated troponin could be from renal failure.  EKG with  paced rhythm.  No active chest pain reported  ICU core measures    Critical care time 35 minutes          William Merrill MD  9/3/2022  23:29 EDT      Much of this encounter note is an electronic transcription/translation of spoken language to printed text using Dragon Software.

## 2022-09-04 NOTE — CONSULTS
[facility]  Initial Inpatient Consult Note    Referring Provider: Dr Merrill    Reason for Consultation: Anemia    Subjective     History of present illness:    81 y.o. female presented to the emergency room complaining of confusion.  Blood work in the emergency room demonstrated a hemoglobin of 4.8 with an INR of 9. She is on warfarin at home.  She was transfused 2 units of blood overnight her hemoglobin this morning is 7.  Her coagulopathy has been reversed with vitamin K and Kcentra her INR is now 1.3.  She reports some diarrhea but no evidence of overt GI bleeding.  She was seen earlier this year for similar presentation she underwent bidirectional endoscopy in March by her service which showed no obvious source of anemia.  Follow up video capsule endoscopy showed no small bowel pathology.  Additional issues this presentation include acute kidney injury and elevated troponin.      Past Medical History:  Past Medical History:   Diagnosis Date   • Acute kidney injury (HCC)    • Anemia     on procrit   • Atrial fibrillation (HCC)    • Bruises easily    • Carotid artery stenosis    • Chronic back pain    • Chronic combined systolic and diastolic congestive heart failure (HCC)    • Chronic coronary artery disease     moderate to severe LV dysfunction.  Sees Dr. Dominguez   • Chronic kidney disease, stage 3 (HCC)    • Dysphagia    • GERD (gastroesophageal reflux disease)    • Gout    • H/O cardiac murmur    • Hematoma     LEFT LEG; DR ALONZO AWARE   • [place] (hard of hearing)     wears hearing aids   • Hyperlipidemia    • Hypertension    • Hypotension    • ICD (implantable cardioverter-defibrillator) in place    • Ischemic cardiomyopathy    • Kyphoscoliosis    • Leukopenia    • Lumbar spondylosis    • Obesity    • GEORGINA (obstructive sleep apnea) 12/01/2013    Overnight polysomnogram, weight 168 pounds.  AHI mildly abnormal at 10.3 events per hour.  Respiratory effort related arousals 9.5 events per  hour.  Total time snoring 30% and arousals associated with snoring 15 events per hour.          Bring machine DOS   • Osteoarthritis    • Osteoporosis    • Peptic ulcer    • Premature ventricular contractions    • Renal insufficiency syndrome    • Right shoulder pain 08/2021   • Scoliosis    • Shoulder fracture, left    • Shoulder pain, right    • Thrombocytopenia (HCC)    • Urine incontinence     pads   • Ventricular tachycardia (HCC)    • Vitamin B12 deficiency    • Warfarin anticoagulation      Past Surgical History:  Past Surgical History:   Procedure Laterality Date   • AV NODE ABLATION     • BREAST BIOPSY     • CARDIAC CATHETERIZATION      Showed severe mitral insufficiency and borderline coronary artery disease   • CARDIAC CATHETERIZATION      Showed an ejection fraction of 35%. She had occlusive disease of the right posterior LV branch and no other significant disease, treated medically.   • CARDIAC DEFIBRILLATOR PLACEMENT      Biventricular   • CARDIAC DEFIBRILLATOR PLACEMENT Left    • CARDIAC ELECTROPHYSIOLOGY PROCEDURE N/A 1/4/2019    Procedure: GENERATOR CHANGE BI-V ICD   [place];  Surgeon: James Hwang MD;  Location: [facility] CATH INVASIVE LOCATION;  Service: Cardiology   • CARDIAC VALVE REPLACEMENT  2009    Done with stent placement   • CARDIOVERSION      multiple electrocardioversions.   • CAROTID ARTERY ANGIOPLASTY Right    • CATARACT EXTRACTION EXTRACAPSULAR W/ INTRAOCULAR LENS IMPLANTATION Bilateral    • COLONOSCOPY N/A 9/28/2017    Procedure: COLONOSCOPY TO CECUM;  Surgeon: Kevin Davis MD;  Location: [facility] ENDOSCOPY;  Service:    • COLONOSCOPY N/A 3/17/2022    Procedure: COLONOSCOPY WITH POLYPECTOMY (HOT SNARE);  Surgeon: Brooke Moscoso MD;  Location: [facility] ENDOSCOPY;  Service: Gastroenterology;  Laterality: N/A;  PRE- ANEMIA  POST-- POLYP   • CORONARY ANGIOPLASTY WITH STENT PLACEMENT  2009   • CORONARY ARTERY BYPASS GRAFT      single graft to the PDA   • CORONARY STENT PLACEMENT      • ENDOSCOPY N/A 2017    Procedure: ESOPHAGOGASTRODUODENOSCOPY ;  Surgeon: Kevin Davis MD;  Location: [facility] ENDOSCOPY;  Service:    • ENDOSCOPY N/A 3/16/2022    Procedure: ESOPHAGOGASTRODUODENOSCOPY AT BEDSIDE;  Surgeon: Brooke Moscoso MD;  Location: [facility] ENDOSCOPY;  Service: Gastroenterology;  Laterality: N/A;  pre:  anemia  post: mild gastritis   • EYE SURGERY     • HEMORRHOIDECTOMY     • HYSTERECTOMY     • INCISION AND DRAINAGE TRUNK Right 2018    Procedure: EVACUATION OF RIGHT CHEST WALL HEMATOMA;  Surgeon: Juvencio Rodriguez MD;  Location: [facility] MAIN OR;  Service: General   • MITRAL VALVE REPLACEMENT  2010    #31 Epic porcine valve.   • THROMBOENDARTERECTOMY Right     carotid thromboendarterectomy    • TONSILLECTOMY      age 32   • TOTAL KNEE ARTHROPLASTY Left    • TOTAL KNEE ARTHROPLASTY REVISION Left 2019    Procedure: TOTAL KNEE ARTHROPLASTY REVISION LEFT;  Surgeon: Juvencio Pressley II, MD;  Location: [facility] MAIN OR;  Service: Orthopedics   • TOTAL SHOULDER ARTHROPLASTY W/ DISTAL CLAVICLE EXCISION Left 2018    Procedure: TOTAL SHOULDER REVERSE ARTHROPLASTY;  Surgeon: Bianka Quesada MD;  Location: [facility] MAIN OR;  Service:    • TOTAL SHOULDER ARTHROPLASTY W/ DISTAL CLAVICLE EXCISION Right 2021    Procedure: TOTAL SHOULDER REVERSE ARTHROPLASTY;  Surgeon: Juvencio Pressley II, MD;  Location: [facility] MAIN OR;  Service: Orthopedics;  Laterality: Right;      Social History:   Social History     Tobacco Use   • Smoking status: Former Smoker     Packs/day: 1.00     Years: 50.00     Pack years: 50.00     Types: Cigarettes     Quit date: 2009     Years since quittin.6   • Smokeless tobacco: Never Used   • Tobacco comment: Daily caffeine - soda   Substance Use Topics   • Alcohol use: Yes     Comment: 1 yearly      Family History:  Family History   Problem Relation Age of Onset   • Cancer Mother    • Breast cancer Mother    • Heart disease Mother     • Hypertension Mother    • Stroke Mother    • Coronary artery disease Father    • Stroke Father    • Heart disease Father    • Hypertension Father    • Other Daughter         thiamin deficiency    • Hypertension Son    • Lung disease Other    • Hypertension Other    • Malig Hyperthermia Neg Hx        Home Meds:  Medications Prior to Admission   Medication Sig Dispense Refill Last Dose   • alendronate (FOSAMAX) 70 MG tablet Take 70 mg by mouth.      • allopurinol (ZYLOPRIM) 100 MG tablet Take 2 tablets by mouth Daily. 60 tablet 3    • amiodarone (PACERONE) 200 MG tablet Take 1 tablet by mouth 2 (Two) Times a Day. 180 tablet 1    • aspirin 81 MG chewable tablet Chew 81 mg Daily.      • bumetanide (BUMEX) 2 MG tablet TAKE 1 TABLET TWICE DAILY 180 tablet 3    • calcitriol (ROCALTROL) 0.25 MCG capsule Take 0.25 mcg by mouth 2 (Two) Times a Day.      • carvedilol (COREG) 25 MG tablet TAKE 1 TABLET TWICE DAILY 180 tablet 1    • Epoetin Akil (PROCRIT IJ) Inject  as directed.      • ferrous sulfate 325 (65 FE) MG tablet Take 325 mg by mouth Daily With Breakfast.      • Menthol, Topical Analgesic, (BIOFREEZE EX) Apply  topically.      • multivitamin (THERAGRAN) tablet tablet Take 1 tablet by mouth Daily.      • mupirocin (BACTROBAN) 2 % ointment Apply 1 application topically to the appropriate area as directed 3 (Three) Times a Day. 22 g 3    • oxyCODONE-acetaminophen (PERCOCET) 5-325 MG per tablet Take 1 tablet by mouth Every 8 (Eight) Hours As Needed for Moderate Pain  (chronic pain meds for back pain). 30 tablet 0    • pantoprazole (PROTONIX) 40 MG EC tablet TAKE 1 TABLET TWICE DAILY 180 tablet 0    • polyethylene glycol (polyethylene glycol) 17 g packet Take 17 g by mouth Daily As Needed (Constipation).      • simvastatin (ZOCOR) 20 MG tablet TAKE 2 TABLETS EVERY NIGHT (Patient taking differently: Take 20 mg by mouth Every Night.) 180 tablet 1    • vitamin B-12 (CYANOCOBALAMIN) 1000 MCG tablet Take 1,000 mcg by mouth  Daily.      • Vitamin D, Cholecalciferol, 50 MCG (2000 UT) capsule Take 2,000 Units by mouth Daily.      • warfarin (COUMADIN) 1 MG tablet Take 1.5 tablets (1.5mg) on Sundays, Tuesdays and Thursdays and take 1 tablet (1MG) on all other days OR as directed. 105 tablet 1    • zolpidem (AMBIEN) 10 MG tablet         Current Meds:   amiodarone, 200 mg, Oral, BID  insulin regular, 0-14 Units, Subcutaneous, Q6H  sodium chloride, 10 mL, Intravenous, Q12H      Allergies:  Allergies   Allergen Reactions   • Diclofenac GI Bleeding     She had adverse effects from the medications that required hospitalization. Pt got stomach ulcers from this medication prescribed by Dr. Arango - pediatrist.     Review of Systems  All systems were reviewed and negative except for:  Constitution:  positive for fatigue     Objective     Vital Signs  Temp:  [97.5 °F (36.4 °C)-99 °F (37.2 °C)] 97.5 °F (36.4 °C)  Heart Rate:  [58-73] 60  Resp:  [15-18] 15  BP: (104-142)/(40-77) 135/50  Physical Exam:  General Appearance:     Alert, cooperative, in no acute distress, pale   Head:    Normocephalic, without obvious abnormality, atraumatic   Eyes:            Lids and lashes normal, conjunctivae and sclerae normal, no icterus   Throat:   No oral lesions, no thrush, oral mucosa moist   Neck:   No adenopathy, supple, trachea midline, no thyromegaly, no carotid bruit, no JVD   Lungs:     Clear to auscultation,respirations regular, even and            unlabored    Heart:    Regular rhythm and normal rate, normal S1 and S2, no        murmur, no gallop, no rub, no click   Chest Wall:    No abnormalities observed   Abdomen:     Normal bowel sounds, no masses, no organomegaly, soft     nontender, nondistended, no guarding, no rebound                 tenderness   Rectal:     Deferred   Extremities:   no edema, no cyanosis, no redness   Skin:   No bleeding, bruising or rash   Lymph nodes:   No palpable adenopathy   Psychiatric:  Judgement and insight: normal    Orientation to person place and time: normal   Mood and affect: normal   Results Review:   I reviewed the patient's new clinical results.    Results from last 7 days   Lab Units 09/04/22  0458 09/03/22 2151   WBC 10*3/mm3 5.47 5.19   HEMOGLOBIN g/dL 7.0* 4.8*   HEMATOCRIT % 22.7* 15.3*   PLATELETS 10*3/mm3 93* 113*     Results from last 7 days   Lab Units 09/04/22 0458 09/03/22 2012   SODIUM mmol/L 144 141   POTASSIUM mmol/L 3.7 3.7   CHLORIDE mmol/L 111* 108*   CO2 mmol/L 21.4* 21.0*   BUN mg/dL 82* 85*   CREATININE mg/dL 2.47* 2.56*   CALCIUM mg/dL 8.5* 8.4*   BILIRUBIN mg/dL  --  0.3   ALK PHOS U/L  --  33*   ALT (SGPT) U/L  --  12   AST (SGOT) U/L  --  16   GLUCOSE mg/dL 92 91     Results from last 7 days   Lab Units 09/04/22 0458 09/03/22 2151   INR  1.34* 8.95*     No results found for: LIPASE    Radiology:  XR Chest 1 View   Final Result   Cardiomegaly with suspected vascular congestion.       This report was finalized on 9/3/2022 10:23 PM by Dr. Daniella Long M.D.          CT Head Without Contrast   Final Result   No acute intracranial findings.       Radiation dose reduction techniques were utilized, including automated   exposure control and exposure modulation based on body size.       This report was finalized on 9/3/2022 10:26 PM by Dr. Daniella Long M.D.              Assessment & Plan   Assessment:   1.  Anemia  2.  Supratheraputic INR  3.  Ischemic CMP with elevated troponin  4.  MAGGIE    Plan:   81-year-old female with multiple medical problems presenting with confusion found to be anemic.  No evidence of overt GI bleeding.  Similar presentation earlier this year with extensive endoscopic evaluation including EGD colonoscopy and small bowel capsule endoscopy which were all negative.  She is heme positive but this is not at all surprising given her INR on presentation as well as her associated thrombocytopenia.  At this point in the absence of overt GI bleeding not planning to repeat  her endoscopic evaluation.  Would consider hematology evaluation if not previously embarked upon.  We will continue to follow her H&H closely and transfuse as necessary.  Will need to evaluate long term use of warfarin in this patient.      I discussed the patients findings and my recommendations with patient.         Kevin Del Angel M.D.  [facility], [place], operated by [facility]  [address] [place] [address]  Office: [phone]

## 2022-09-04 NOTE — PROGRESS NOTES
[facility]     Mar/chart reviewed  Follow up severe anemia and coagulopathy  Say she has to pee, but otherwise not complaints  No abodminal pain, no stools  Said she was having dark black and red stools intermittently with brown stools for the last 9 days    Vital Sign Min/Max for last 24 hours  Temp  Min: 97.5 °F (36.4 °C)  Max: 99 °F (37.2 °C)   BP  Min: 104/54  Max: 142/57   Pulse  Min: 58  Max: 73   Resp  Min: 15  Max: 18   SpO2  Min: 95 %  Max: 100 %   No data recorded   Weight  Min: 61.5 kg (135 lb 9.3 oz)  Max: 63.4 kg (139 lb 12.4 oz)   1012/800  Appears ill, alert and oriented times 3  perrl, eomi, normal sclera,  mmm, no jvd, trachea midline, neck supple,  chest cta bilaterally, no crackles, no wheezes,   rrr,   soft, nt, nd +bs,  no c/c/e --chronic venous stasis like changes bilateral le  Skin warm, dry no rashes    Labs: 9/4: reviewed:  inr 1.3  Wbc 5.4  hgb 7 (mcv 97)  plts 93  Glucose 92  Bun 82  Cr 2.47 (baseline 1.6?)  Bicarb 21  9/3:  Trop 0.173    A/P:  1. Encephalopathy  2. Severe anemia -- follows with CBC as outpatient has been getting procrit and iron.  Reviewed note from Dr. Centeno from 8/4.  Discussed with Dr. Eastonle has had extensive work up from gi standpoint  3. afib on anticoagulation -- rate ok, will ask cards to see, and discuss risk/benefit of continued anticoagulation  4. Coagulopathy -- INR better  5. MAGGIE on CKD - avoid nephrotoxins,monitor  6. NSTEMI  7. Chronic systolic chf  8. Thrombocytopenia -- chronic    Patient is new to me today   PAIN

## 2022-09-05 NOTE — PROGRESS NOTES
[facility]  Inpatient Progress Note    Reason for Follow Up: Anemia    Subjective     Interval History:   No bowel movement since admission.  She reports that she had 9 days of diarrhea prior to admission and its not unusual for her than to go several days without a bowel movement.  She denies abdominal pain.  Her only complaint is that she needs to urinate.    Current Facility-Administered Medications:   •  amiodarone (PACERONE) tablet 200 mg, 200 mg, Oral, BID, William Merrill MD, 200 mg at 09/04/22 2047  •  dextrose (D50W) (25 g/50 mL) IV injection 25 g, 25 g, Intravenous, Q15 Min PRN, William Merrill MD  •  dextrose (GLUTOSE) oral gel 15 g, 15 g, Oral, Q15 Min PRN, William Merrill MD  •  glucagon (human recombinant) (GLUCAGEN DIAGNOSTIC) injection 1 mg, 1 mg, Intramuscular, Q15 Min PRN, William Merrill MD  •  insulin regular (humuLIN R,novoLIN R) injection 0-14 Units, 0-14 Units, Subcutaneous, Q6H, William Merrill MD  •  melatonin tablet 5 mg, 5 mg, Oral, Nightly PRN, William Merrill MD, 5 mg at 09/05/22 0146  •  oxyCODONE-acetaminophen (PERCOCET) 5-325 MG per tablet 1 tablet, 1 tablet, Oral, Q6H PRN, William Merrill MD, 1 tablet at 09/04/22 2327  •  [COMPLETED] pantoprazole (PROTONIX) injection 80 mg, 80 mg, Intravenous, Once, 80 mg at 09/03/22 2232 **AND** pantoprazole (PROTONIX) 40 mg in 100 mL NS (VTB), 8 mg/hr, Intravenous, Continuous, William Merrill MD, Last Rate: 20 mL/hr at 09/05/22 0142, 8 mg/hr at 09/05/22 0142  •  [COMPLETED] Insert peripheral IV, , , Once **AND** sodium chloride 0.9 % flush 10 mL, 10 mL, Intravenous, PRN, William Merrill MD  •  sodium chloride 0.9 % flush 10 mL, 10 mL, Intravenous, Q12H, William Merrill MD, 10 mL at 09/04/22 2047  •  sodium chloride 0.9 % flush 10 mL, 10 mL, Intravenous, PRN, William Merrill MD  Review of Systems:    The following systems were reviewed and negative;  constitution and gastrointestinal    Objective     Vital Signs  Temp:   [97.4 °F (36.3 °C)-98.6 °F (37 °C)] 98.6 °F (37 °C)  Heart Rate:  [59-78] 60  Resp:  [16-20] 16  BP: (121-152)/(46-66) 138/58  Body mass index is 23.27 kg/m².    Intake/Output Summary (Last 24 hours) at 9/5/2022 0752  Last data filed at 9/5/2022 0400  Gross per 24 hour   Intake 1900 ml   Output 900 ml   Net 1000 ml     No intake/output data recorded.     Physical Exam:   General: patient awake, alert and cooperative, [place]   Eyes: Normal lids and lashes, no scleral icterus   Neck: supple, normal ROM   Skin: warm and dry, not jaundiced   Pulm:  regular and unlabored   Abdomen: soft, nontender, nondistended; normal bowel sounds   Psychiatric: Normal mood and behavior; memory intact     Results Review:     I reviewed the patient's new clinical results.    Results from last 7 days   Lab Units 09/05/22  0600 09/04/22  2341 09/04/22  1801 09/04/22  1201 09/04/22  0458 09/03/22  2151   WBC 10*3/mm3  --  6.98  --   --  5.47 5.19   HEMOGLOBIN g/dL 7.0* 7.1* 7.2*   < > 7.0* 4.8*   HEMATOCRIT % 22.1* 22.5* 22.0*   < > 22.7* 15.3*   PLATELETS 10*3/mm3  --  116*  --   --  93* 113*    < > = values in this interval not displayed.     Results from last 7 days   Lab Units 09/04/22 0458 09/03/22 2012   SODIUM mmol/L 144 141   POTASSIUM mmol/L 3.7 3.7   CHLORIDE mmol/L 111* 108*   CO2 mmol/L 21.4* 21.0*   BUN mg/dL 82* 85*   CREATININE mg/dL 2.47* 2.56*   CALCIUM mg/dL 8.5* 8.4*   BILIRUBIN mg/dL  --  0.3   ALK PHOS U/L  --  33*   ALT (SGPT) U/L  --  12   AST (SGOT) U/L  --  16   GLUCOSE mg/dL 92 91     Results from last 7 days   Lab Units 09/04/22  0458 09/03/22  2151   INR  1.34* 8.95*     No results found for: LIPASE    Radiology:  XR Chest 1 View   Final Result   Cardiomegaly with suspected vascular congestion.       This report was finalized on 9/3/2022 10:23 PM by Dr. Daniella Long M.D.          CT Head Without Contrast   Final Result   No acute intracranial findings.       Radiation dose reduction techniques were  utilized, including automated   exposure control and exposure modulation based on body size.       This report was finalized on 9/3/2022 10:26 PM by Dr. Daniella Long M.D.              Assessment & Plan     Patient Active Problem List   Diagnosis   • Anemia in chronic kidney disease (CKD)   • Thrombocytopenia (HCC)   • B12 deficiency   • Coronary artery disease involving coronary bypass graft of native heart without angina pectoris   • S/P MVR (porcine)   • Chronic atrial fibrillation (HCC)   • Bilateral carotid artery disease (HCC)   • Intractable low back pain   • Long-term (current) use of anticoagulants   • Low back pain   • Osteoporosis   • Murmur, heart   • GEORGINA on auto CPAP - Dr Cardona   • Hypersomnia due to medical condition - GEORGINA   • Esophageal stricture   • Dysphagia   • Closed fracture of left proximal humerus   • Essential hypertension   • Supratherapeutic INR   • Chronic combined systolic and diastolic congestive heart failure ([facility])   • Osteoporosis with pathological fracture   • Closed 3-part fracture of proximal end of left humerus   • Closed fracture of proximal end of humerus with delayed healing   • Injury of right knee   • Knee injury, left, initial encounter   • History of fall   • S/P TKR (total knee replacement) using cement, left   • Ischemic cardiomyopathy   • Intramuscular hematoma right pecotralis   • CKD (chronic kidney disease) stage 4, GFR 15-29 ml/min ([facility])   • Peripheral edema   • Inflammatory arthritis   • Ventricular tachycardia ([facility])   • S/P revision of total knee   • Idiopathic gout   • Psychophysiological insomnia   • ICD (implantable cardioverter-defibrillator) in place   • Status post reverse arthroplasty of right shoulder   • Mixed hyperlipidemia   • Right leg DVT ([facility])   • Osteoarthritis of multiple joints   • Basal cell carcinoma (BCC) of skin of face   • Squamous cell carcinoma in situ (SCCIS) of skin   • Altered mental status     All problems are new to me  today  Assessment:  1.  Anemia  2.  Supratheraputic INR  3.  elevated troponin  4.  MAGGIE  5.  thrombocytopenia      Plan:  · Hemoglobin stable post transfusion - coagulopathy corrected.  Continue to follow  · Previous extensive GI w/u with negative findings - if active bleeding noted, would proceed with tagged scan otherwise repeating endoscopic w/u likely to be low yield  · Plans to discuss discontinuation of anticoagulation with cardiology noted    I discussed the patients findings and my recommendations with patient and nursing staff.    Brooke Moscoso MD

## 2022-09-05 NOTE — CONSULTS
.     REASON FOR CONSULTATION:   Anemia, thrombocytopenia  Provide an opinion on any further workup or treatment                             REQUESTING PHYSICIAN: William Merrill MD  RECORDS OBTAINED:  Records of the patient's history including those from the electronic medical record were reviewed and summarized in detail.    HISTORY OF PRESENT ILLNESS:  The patient is a 81 y.o. year old female  who is here for follow-up with the above-mentioned history.    Patient was last seen in our office by Dr. Vasquez, 8/4/2022 for follow-up of anemia and thrombocytopenia.  Patient was taking oral iron twice a week, Mondays and Fridays.    Admitted from ER due to altered mental status, 9/3/2022.  He was asleep most of the day.  Woke up confused.  Last known normal was the day prior.  Somewhat unresponsive and difficult to arouse at home.  A. fib.  Pacemaker.  On Coumadin.  In the ER Hb was 4.8 with INR of 9.  Heme positive in the ER.    We were consulted because she receives Procrit through our office.  Follows with Dr. Vasquez.    Dr. Del Angel reported extensive GI work-up has been done.    Evaluated by  Cardiology.  They felt okay to hold Coumadin for now.  They plan to follow the patient up in the office to discuss other anticoagulation options such as Eliquis or explore options like LEISA closure device.  They report fluctuating supratherapeutic INR seems to be precipitating bleeds without a GI source identified.    She denies bleeding.  She is hoping to go home soon.    Past Medical History:   Diagnosis Date   • Acute kidney injury (HCC)    • Anemia     on procrit   • Atrial fibrillation (HCC)    • Bruises easily    • Carotid artery stenosis    • Chronic back pain    • Chronic combined systolic and diastolic congestive heart failure (HCC)    • Chronic coronary artery disease     moderate to severe LV dysfunction.  Sees Dr. Dominguez   • Chronic kidney disease, stage 3 (HCC)    • Dysphagia    • GERD (gastroesophageal  reflux disease)    • Gout    • H/O cardiac murmur    • Hematoma     LEFT LEG; DR ALONZO AWARE   • Torres Martinez (hard of hearing)     wears hearing aids   • Hyperlipidemia    • Hypertension    • Hypotension    • ICD (implantable cardioverter-defibrillator) in place    • Ischemic cardiomyopathy    • Kyphoscoliosis    • Leukopenia    • Lumbar spondylosis    • Obesity    • GEORGINA (obstructive sleep apnea) 12/01/2013    Overnight polysomnogram, weight 168 pounds.  AHI mildly abnormal at 10.3 events per hour.  Respiratory effort related arousals 9.5 events per hour.  Total time snoring 30% and arousals associated with snoring 15 events per hour.          Bring machine DOS   • Osteoarthritis    • Osteoporosis    • Peptic ulcer    • Premature ventricular contractions    • Renal insufficiency syndrome    • Right shoulder pain 08/2021   • Scoliosis    • Shoulder fracture, left    • Shoulder pain, right    • Thrombocytopenia (HCC)    • Urine incontinence     pads   • Ventricular tachycardia (HCC)    • Vitamin B12 deficiency    • Warfarin anticoagulation      Past Surgical History:   Procedure Laterality Date   • AV NODE ABLATION     • BREAST BIOPSY     • CARDIAC CATHETERIZATION      Showed severe mitral insufficiency and borderline coronary artery disease   • CARDIAC CATHETERIZATION      Showed an ejection fraction of 35%. She had occlusive disease of the right posterior LV branch and no other significant disease, treated medically.   • CARDIAC DEFIBRILLATOR PLACEMENT      Biventricular   • CARDIAC DEFIBRILLATOR PLACEMENT Left    • CARDIAC ELECTROPHYSIOLOGY PROCEDURE N/A 1/4/2019    Procedure: GENERATOR CHANGE BI-V ICD   [place];  Surgeon: James Hwang MD;  Location: [facility] INVASIVE LOCATION;  Service: Cardiology   • CARDIAC VALVE REPLACEMENT  2009    Done with stent placement   • CARDIOVERSION      multiple electrocardioversions.   • CAROTID ARTERY ANGIOPLASTY Right    • CATARACT EXTRACTION EXTRACAPSULAR W/ INTRAOCULAR LENS  IMPLANTATION Bilateral    • COLONOSCOPY N/A 9/28/2017    Procedure: COLONOSCOPY TO CECUM;  Surgeon: Kevin Davis MD;  Location[facility] END[facility];  Service:    • COLONOSCOPY N/A 3/17/2022    Procedure: COLONOSCOPY WITH POLYPECTOMY (HOT SNARE);  Surgeon: Brooke Moscoso MD;  Location: [facility] ENDOSCOPY;  Service: Gastroenterology;  Laterality: N/A;  PRE- ANEMIA  POST-- POLYP   • CORONARY ANGIOPLASTY WITH STENT PLACEMENT  2009   • CORONARY ARTERY BYPASS GRAFT      single graft to the PDA   • CORONARY STENT PLACEMENT     • ENDOSCOPY N/A 9/28/2017    Procedure: ESOPHAGOGASTRODUODENOSCOPY ;  Surgeon: Kevin Davis MD;  Location: [facility] END[facility];  Service:    • ENDOSCOPY N/A 3/16/2022    Procedure: ESOPHAGOGASTRODUODENOSCOPY AT BEDSIDE;  Surgeon: Brooke Moscoso MD;  Location: [facility] ENDOSCOPY;  Service: Gastroenterology;  Laterality: N/A;  pre:  anemia  post: mild gastritis   • EYE SURGERY     • HEMORRHOIDECTOMY     • HYSTERECTOMY     • INCISION AND DRAINAGE TRUNK Right 11/27/2018    Procedure: EVACUATION OF RIGHT CHEST WALL HEMATOMA;  Surgeon: Juvencio Rodriguez MD;  Location: [facility] MAIN OR;  Service: General   • MITRAL VALVE REPLACEMENT  01/2010    #31 Epic porcine valve.   • THROMBOENDARTERECTOMY Right     carotid thromboendarterectomy    • TONSILLECTOMY      age 32   • TOTAL KNEE ARTHROPLASTY Left    • TOTAL KNEE ARTHROPLASTY REVISION Left 11/19/2019    Procedure: TOTAL KNEE ARTHROPLASTY REVISION LEFT;  Surgeon: Juvencio Pressley II, MD;  Location: [facility] MAIN OR;  Service: Orthopedics   • TOTAL SHOULDER ARTHROPLASTY W/ DISTAL CLAVICLE EXCISION Left 1/16/2018    Procedure: TOTAL SHOULDER REVERSE ARTHROPLASTY;  Surgeon: Bianka Quesada MD;  Location: [facility] MAIN OR;  Service:    • TOTAL SHOULDER ARTHROPLASTY W/ DISTAL CLAVICLE EXCISION Right 8/31/2021    Procedure: TOTAL SHOULDER REVERSE ARTHROPLASTY;  Surgeon: Juvencio Pressley II, MD;  Location: [facility] MAIN OR;  Service: Orthopedics;   Laterality: Right;       MEDICATIONS    Current Facility-Administered Medications:   •  amiodarone (PACERONE) tablet 200 mg, 200 mg, Oral, BID, William Merrill MD, 200 mg at 09/05/22 0803  •  dextrose (D50W) (25 g/50 mL) IV injection 25 g, 25 g, Intravenous, Q15 Min PRN, William Merrill MD  •  dextrose (GLUTOSE) oral gel 15 g, 15 g, Oral, Q15 Min PRN, William Merrill MD  •  glucagon (human recombinant) (GLUCAGEN DIAGNOSTIC) injection 1 mg, 1 mg, Intramuscular, Q15 Min PRN, William Merrill MD  •  insulin regular (humuLIN R,novoLIN R) injection 0-14 Units, 0-14 Units, Subcutaneous, Q6H, William Merrill MD  •  melatonin tablet 5 mg, 5 mg, Oral, Nightly PRN, William Merrill MD, 5 mg at 09/05/22 0146  •  oxyCODONE-acetaminophen (PERCOCET) 5-325 MG per tablet 1 tablet, 1 tablet, Oral, Q6H PRN, William Merrill MD, 1 tablet at 09/04/22 2327  •  [COMPLETED] pantoprazole (PROTONIX) injection 80 mg, 80 mg, Intravenous, Once, 80 mg at 09/03/22 2232 **AND** pantoprazole (PROTONIX) 40 mg in 100 mL NS (VTB), 8 mg/hr, Intravenous, Continuous, William Merrill MD, Last Rate: 20 mL/hr at 09/05/22 0803, 8 mg/hr at 09/05/22 0803  •  [COMPLETED] Insert peripheral IV, , , Once **AND** sodium chloride 0.9 % flush 10 mL, 10 mL, Intravenous, PRN, William Merrill MD  •  sodium chloride 0.9 % flush 10 mL, 10 mL, Intravenous, Q12H, William Merrill MD, 10 mL at 09/05/22 0803  •  sodium chloride 0.9 % flush 10 mL, 10 mL, Intravenous, PRN, William Merrill MD    ALLERGIES:     Allergies   Allergen Reactions   • Diclofenac GI Bleeding     She had adverse effects from the medications that required hospitalization. Pt got stomach ulcers from this medication prescribed by Dr. Arango - pediatrist.       SOCIAL HISTORY:       Social History     Socioeconomic History   • Marital status:      Spouse name: Russ   Tobacco Use   • Smoking status: Former Smoker     Packs/day: 1.00     Years: 50.00     Pack years: 50.00     Types: Cigarettes     Quit  date: 2009     Years since quittin.6   • Smokeless tobacco: Never Used   • Tobacco comment: Daily caffeine - soda   Vaping Use   • Vaping Use: Never used   Substance and Sexual Activity   • Alcohol use: Yes     Comment: 1 yearly   • Drug use: Never   • Sexual activity: Not Currently         FAMILY HISTORY:  Family History   Problem Relation Age of Onset   • Cancer Mother    • Breast cancer Mother    • Heart disease Mother    • Hypertension Mother    • Stroke Mother    • Coronary artery disease Father    • Stroke Father    • Heart disease Father    • Hypertension Father    • Other Daughter         thiamin deficiency    • Hypertension Son    • Lung disease Other    • Hypertension Other    • Malig Hyperthermia Neg Hx        REVIEW OF SYSTEMS:  Review of Systems   Constitutional: Negative for activity change.   HENT: Negative for nosebleeds and trouble swallowing.    Respiratory: Negative for shortness of breath and wheezing.    Cardiovascular: Negative for chest pain and palpitations.   Gastrointestinal: Negative for constipation, diarrhea and nausea.   Genitourinary: Negative for dysuria and hematuria.   Musculoskeletal: Negative for arthralgias and myalgias.   Skin: Negative for rash and wound.   Neurological: Negative for seizures and syncope.   Hematological: Negative for adenopathy. Does not bruise/bleed easily.   Psychiatric/Behavioral: Negative for confusion.              Vitals:    22 0700 22 0743 22 0900 22 1138   BP: 138/58      BP Location:       Patient Position:       Pulse: 60      Resp:   18    Temp:  98.6 °F (37 °C)  97.5 °F (36.4 °C)   TempSrc:  Oral  Oral   SpO2: 97%      Weight:       Height:         Current Status 2022   ECOG score 1      PHYSICAL EXAM:    CONSTITUTIONAL:  Vital signs reviewed.  No distress, looks comfortable.  EYES:  Conjunctivae and lids unremarkable.  PERRLA  EARS, NOSE, MOUTH, THROAT:  Ears and nose appear unremarkable.  Lips, teeth, gums  appear unremarkable.  RESPIRATORY:  Normal respiratory effort.  Lungs clear to auscultation bilaterally.  CARDIOVASCULAR:  Normal S1, S2.  No murmurs, rubs or gallops.  No significant lower extremity edema.  GASTROINTESTINAL: Abdomen appears unremarkable.  Nontender.  No hepatomegaly.  No splenomegaly.  NEURO: Cranial nerves 2-12 grossly intact.  No focal deficits.  Appears to have equal strength all 4 extremities.  MUSCULOSKELETAL:  Unremarkable digits/nails.  No cyanosis or clubbing.  SKIN:  Warm.  No rashes.  PSYCHIATRIC:  Normal judgment and insight.  Normal mood and affect.         RECENT LABS:        WBC   Date Value Ref Range Status   09/04/2022 6.98 3.40 - 10.80 10*3/mm3 Final   09/04/2022 5.47 3.40 - 10.80 10*3/mm3 Final   09/03/2022 5.19 3.40 - 10.80 10*3/mm3 Final     Hemoglobin   Date Value Ref Range Status   09/05/2022 7.0 (L) 12.0 - 15.9 g/dL Final   09/04/2022 7.1 (L) 12.0 - 15.9 g/dL Final   09/04/2022 7.2 (L) 12.0 - 15.9 g/dL Final   09/04/2022 7.0 (L) 12.0 - 15.9 g/dL Final   09/04/2022 7.0 (L) 12.0 - 15.9 g/dL Final   09/03/2022 4.8 (C) 12.0 - 15.9 g/dL Final     Platelets   Date Value Ref Range Status   09/04/2022 116 (L) 140 - 450 10*3/mm3 Final   09/04/2022 93 (L) 140 - 450 10*3/mm3 Final   09/03/2022 113 (L) 140 - 450 10*3/mm3 Final       Assessment & Plan   Janel DORINA Mahoney [id number]/1     *Anemia of chronic kidney disease, stage 3a.  · Last saw Dr. Vasquez in our office, 8/4/2022 with a plan of every 3-week Procrit if Hb <10.  · Last Procrit was 8/4/2022, when Hb 9.6.  · Patient was a no-show for the 8/25/2022 labs/Procrit office visit.  (States she did not know she had the appointment)  · Admitted on 9/3/2022 with Hb 4.8 and INR 9.   · At home, she takes oral iron twice per week, Mondays and Fridays.     *Thrombocytopenia.    · Recent PLT baseline .  · PLT this admission  (within baseline).    *Atrial fibrillation.  · Cardiology reports that she tends to bleed when INR fluctuates to  supratherapeutic levels with negative GI evaluation.  Cardiology is holding Coumadin and they are going to consider other anticoagulation options such as Eliquis or explore options such as LEISA closure device when she follows up with them in the office.    PLAN:  ·  ferritin, iron panel, retic Hb, on blood before transfusion.  We we will see if she would benefit from some iron.  · B12 and serum folate in the morning  · For now, she will need to switch to weekly labs instead of her prior 3-week schedule  · When she is ready for discharge, I would recommend transfusing if Hb <8 as she would not be having daily labs as an outpatient.    My first time meeting the patient.  All issues new to me.  She had a severe treatment related problem.  Namely, Hb down to 4.8, thought to be related to Coumadin which was treatment for atrial fibrillation.  Chart reviewed and summarized including cardiology note and ICU MD note.

## 2022-09-05 NOTE — PROGRESS NOTES
[facility]      Mar/chart reviewed  Follow up severe anemia and coagulopathy  No further bowel movements  No abdominal pain  She wants to go home.     Vital Sign Min/Max for last 24 hours  Temp  Min: 97.4 °F (36.3 °C)  Max: 98.6 °F (37 °C)   BP  Min: 121/61  Max: 152/55   Pulse  Min: 59  Max: 78   Resp  Min: 16  Max: 20   SpO2  Min: 95 %  Max: 99 %   No data recorded   No data recorded     Appears ill, alert and oriented times 3  perrl, eomi, normal sclera,  mmm, no jvd, trachea midline, neck supple,  chest cta bilaterally, no crackles, no wheezes,   rrr,   soft, nt, nd +bs,  no c/c/e --chronic venous stasis like changes bilateral le  Skin warm, dry no rashes    Labs: 9/5: reviewed:  hgb 7  Wbc 6.98  plts 116    A/P:  1. Encephalopathy  2. Severe anemia -- follows with CBC as outpatient has been getting procrit and iron.  Reviewed note from Dr. Centeno from 8/4.  Discussed with Dr. Eastonle has had extensive work up from gi standpoint  3. afib on anticoagulation -- rate ok, will ask cards to see, and discuss risk/benefit of continued anticoagulation  4. Coagulopathy -- INR better  5. MAGGIE on CKD - avoid nephrotoxins,monitor  6. NSTEMI  7. Chronic systolic chf  8. Thrombocytopenia -- chronic    Await cardiology/hematology input  Appears she is stable for transfer out of unit.

## 2022-09-05 NOTE — CONSULTS
Date of Hospital Visit: 9/3/2022  Date of consult: 22  Encounter Provider: Dominick Canchola MD  Place of Service: [facility] CARDIOLOGY  Patient Name: Janel Mahoney  :1940  Referral Provider: William Merrill MD    Chief complaint:  Altered mental status    History of Present Illness    Ms Mahoney is an 81 year old patient of Dr Boucher with a history of coronary artery disease (s/p MI in  and stenting of RCA), CKD, hypertension, CVA, ventricular tachycardia (treated with ATP), atrial fibrillation/flutter (s/p cardioversion and PVI ablation in [place]).  She was also tried on Tikosyn and sotalol but continued to have issues. She underwent AVN ablation and has  ischemic cardiomyopathy (s/p BiV ICD upgrade).     She is mantained at this time on warfarin and carvedilol     She previously was followed by Dr Burchia    She was admitted on 9/3/2022 with confusion.  She was found to be anemic with Hgb 4.9 and INR 9. Her stool was heme positive.  She has been transfused. She was admitted earlier this year for a similar reason and underwent endoscopy in March which showed no small bowel pathology.  She has been seen by GI but they do not plan further endoscopy at this time. They recommend further evaluation to ssee if long term anticoagulation is needed     We have been consulted for atrial fibrillation history and anticoagulation recommendations     Previous Cardiac Testing   ECHO 2016  · Left ventricular function is mildly decreased. Calculated EF = 53%. Estimated EF was in agreement with the calculated EF. Estimated EF = 40%. Normal left ventricular wall thickness noted.  · The left ventricular cavity is mildly dilated.  · The following segments are akinetic: basal inferior, basal inferolateral, mid inferior and mid inferolateral. The following segments are hypokinetic: basal inferoseptal and mid inferoseptal.  · Left atrial volume is severely increased.  · Right  atrial cavity size is severely dilated.  · The aortic valve is abnormal in structure. The valve exhibits sclerosis.  · There is a bioprosthetic mitral valve present. The prosthetic valve is normal.  · Moderate tricuspid valve regurgitation is present. Estimated right ventricular systolic pressure from tricuspid regurgitation is moderately elevated (45-55 mmHg).  · There is mild pulmonic valve regurgitation present.  · The inferior vena cava is dilated.             Past Medical History:   Diagnosis Date   • Acute kidney injury (HCC)    • Anemia     on procrit   • Atrial fibrillation ([facility])    • Bruises easily    • Carotid artery stenosis    • Chronic back pain    • Chronic combined systolic and diastolic congestive heart failure (HCC)    • Chronic coronary artery disease     moderate to severe LV dysfunction.  Sees Dr. Dominguez   • Chronic kidney disease, stage 3 ([facility])    • Dysphagia    • GERD (gastroesophageal reflux disease)    • Gout    • H/O cardiac murmur    • Hematoma     LEFT LEG; DR ALONZO AWARE   • [place] (hard of hearing)     wears hearing aids   • Hyperlipidemia    • Hypertension    • Hypotension    • ICD (implantable cardioverter-defibrillator) in place    • Ischemic cardiomyopathy    • Kyphoscoliosis    • Leukopenia    • Lumbar spondylosis    • Obesity    • GEORGINA (obstructive sleep apnea) 12/01/2013    Overnight polysomnogram, weight 168 pounds.  AHI mildly abnormal at 10.3 events per hour.  Respiratory effort related arousals 9.5 events per hour.  Total time snoring 30% and arousals associated with snoring 15 events per hour.          Bring machine DOS   • Osteoarthritis    • Osteoporosis    • Peptic ulcer    • Premature ventricular contractions    • Renal insufficiency syndrome    • Right shoulder pain 08/2021   • Scoliosis    • Shoulder fracture, left    • Shoulder pain, right    • Thrombocytopenia ([facility])    • Urine incontinence     pads   • Ventricular tachycardia ([facility])    • Vitamin B12 deficiency    •  Warfarin anticoagulation        Past Surgical History:   Procedure Laterality Date   • AV NODE ABLATION     • BREAST BIOPSY     • CARDIAC CATHETERIZATION      Showed severe mitral insufficiency and borderline coronary artery disease   • CARDIAC CATHETERIZATION      Showed an ejection fraction of 35%. She had occlusive disease of the right posterior LV branch and no other significant disease, treated medically.   • CARDIAC DEFIBRILLATOR PLACEMENT      Biventricular   • CARDIAC DEFIBRILLATOR PLACEMENT Left    • CARDIAC ELECTROPHYSIOLOGY PROCEDURE N/A 1/4/2019    Procedure: GENERATOR CHANGE BI-V ICD   [place];  Surgeon: James Hwang MD;  Location: [facility] CATH INVASIVE LOCATION;  Service: Cardiology   • CARDIAC VALVE REPLACEMENT  2009    Done with stent placement   • CARDIOVERSION      multiple electrocardioversions.   • CAROTID ARTERY ANGIOPLASTY Right    • CATARACT EXTRACTION EXTRACAPSULAR W/ INTRAOCULAR LENS IMPLANTATION Bilateral    • COLONOSCOPY N/A 9/28/2017    Procedure: COLONOSCOPY TO CECUM;  Surgeon: Kevin Davis MD;  Location: [facility] ENDOSCOPY;  Service:    • COLONOSCOPY N/A 3/17/2022    Procedure: COLONOSCOPY WITH POLYPECTOMY (HOT SNARE);  Surgeon: Brooke Moscoso MD;  Location: [facility] ENDOSCOPY;  Service: Gastroenterology;  Laterality: N/A;  PRE- ANEMIA  POST-- POLYP   • CORONARY ANGIOPLASTY WITH STENT PLACEMENT  2009   • CORONARY ARTERY BYPASS GRAFT      single graft to the PDA   • CORONARY STENT PLACEMENT     • ENDOSCOPY N/A 9/28/2017    Procedure: ESOPHAGOGASTRODUODENOSCOPY ;  Surgeon: Kevin Davis MD;  Location: [facility] ENDOSCOPY;  Service:    • ENDOSCOPY N/A 3/16/2022    Procedure: ESOPHAGOGASTRODUODENOSCOPY AT BEDSIDE;  Surgeon: Brooke Moscoso MD;  Location: [facility] ENDOSCOPY;  Service: Gastroenterology;  Laterality: N/A;  pre:  anemia  post: mild gastritis   • EYE SURGERY     • HEMORRHOIDECTOMY     • HYSTERECTOMY     • INCISION AND DRAINAGE TRUNK Right 11/27/2018    Procedure:  EVACUATION OF RIGHT CHEST WALL HEMATOMA;  Surgeon: Juvencio Rodriguez MD;  Location: [facility] OR;  Service: General   • MITRAL VALVE REPLACEMENT  01/2010    #31 Epic porcine valve.   • THROMBOENDARTERECTOMY Right     carotid thromboendarterectomy    • TONSILLECTOMY      age 32   • TOTAL KNEE ARTHROPLASTY Left    • TOTAL KNEE ARTHROPLASTY REVISION Left 11/19/2019    Procedure: TOTAL KNEE ARTHROPLASTY REVISION LEFT;  Surgeon: Juvencio Pressley II, MD;  Location: [facility] OR;  Service: Orthopedics   • TOTAL SHOULDER ARTHROPLASTY W/ DISTAL CLAVICLE EXCISION Left 1/16/2018    Procedure: TOTAL SHOULDER REVERSE ARTHROPLASTY;  Surgeon: Bianka Quesada MD;  Location: [facility] OR;  Service:    • TOTAL SHOULDER ARTHROPLASTY W/ DISTAL CLAVICLE EXCISION Right 8/31/2021    Procedure: TOTAL SHOULDER REVERSE ARTHROPLASTY;  Surgeon: Juvencio Pressley II, MD;  Location: [facility] MAIN OR;  Service: Orthopedics;  Laterality: Right;       Medications Prior to Admission   Medication Sig Dispense Refill Last Dose   • alendronate (FOSAMAX) 70 MG tablet Take 70 mg by mouth.      • allopurinol (ZYLOPRIM) 100 MG tablet Take 2 tablets by mouth Daily. 60 tablet 3    • amiodarone (PACERONE) 200 MG tablet Take 1 tablet by mouth 2 (Two) Times a Day. 180 tablet 1    • aspirin 81 MG chewable tablet Chew 81 mg Daily.      • bumetanide (BUMEX) 2 MG tablet TAKE 1 TABLET TWICE DAILY 180 tablet 3    • calcitriol (ROCALTROL) 0.25 MCG capsule Take 0.25 mcg by mouth 2 (Two) Times a Day.      • carvedilol (COREG) 25 MG tablet TAKE 1 TABLET TWICE DAILY 180 tablet 1    • Epoetin Akil (PROCRIT IJ) Inject  as directed.      • ferrous sulfate 325 (65 FE) MG tablet Take 325 mg by mouth Daily With Breakfast.      • Menthol, Topical Analgesic, (BIOFREEZE EX) Apply  topically.      • multivitamin (THERAGRAN) tablet tablet Take 1 tablet by mouth Daily.      • mupirocin (BACTROBAN) 2 % ointment Apply 1 application topically to the  appropriate area as directed 3 (Three) Times a Day. 22 g 3    • oxyCODONE-acetaminophen (PERCOCET) 5-325 MG per tablet Take 1 tablet by mouth Every 8 (Eight) Hours As Needed for Moderate Pain  (chronic pain meds for back pain). 30 tablet 0    • pantoprazole (PROTONIX) 40 MG EC tablet TAKE 1 TABLET TWICE DAILY 180 tablet 0    • polyethylene glycol (polyethylene glycol) 17 g packet Take 17 g by mouth Daily As Needed (Constipation).      • simvastatin (ZOCOR) 20 MG tablet TAKE 2 TABLETS EVERY NIGHT (Patient taking differently: Take 20 mg by mouth Every Night.) 180 tablet 1    • vitamin B-12 (CYANOCOBALAMIN) 1000 MCG tablet Take 1,000 mcg by mouth Daily.      • Vitamin D, Cholecalciferol, 50 MCG (2000 UT) capsule Take 2,000 Units by mouth Daily.      • warfarin (COUMADIN) 1 MG tablet Take 1.5 tablets (1.5mg) on Sundays,  and  and take 1 tablet (1MG) on all other days OR as directed. 105 tablet 1    • zolpidem (AMBIEN) 10 MG tablet           Current Meds  Scheduled Meds:amiodarone, 200 mg, Oral, BID  insulin regular, 0-14 Units, Subcutaneous, Q6H  sodium chloride, 10 mL, Intravenous, Q12H      Continuous Infusions:pantoprazole, 8 mg/hr, Last Rate: 8 mg/hr (22 0803)      PRN Meds:.dextrose  •  dextrose  •  glucagon (human recombinant)  •  melatonin  •  oxyCODONE-acetaminophen  •  [COMPLETED] Insert peripheral IV **AND** sodium chloride  •  sodium chloride    Allergies as of 2022 - Reviewed 2022   Allergen Reaction Noted   • Diclofenac GI Bleeding 10/26/2017       Social History     Socioeconomic History   • Marital status:      Spouse name: Russ   Tobacco Use   • Smoking status: Former Smoker     Packs/day: 1.00     Years: 50.00     Pack years: 50.00     Types: Cigarettes     Quit date: 2009     Years since quittin.6   • Smokeless tobacco: Never Used   • Tobacco comment: Daily caffeine - soda   Vaping Use   • Vaping Use: Never used   Substance and Sexual Activity   •  "Alcohol use: Yes     Comment: 1 yearly   • Drug use: Never   • Sexual activity: Not Currently       Family History   Problem Relation Age of Onset   • Cancer Mother    • Breast cancer Mother    • Heart disease Mother    • Hypertension Mother    • Stroke Mother    • Coronary artery disease Father    • Stroke Father    • Heart disease Father    • Hypertension Father    • Other Daughter         thiamin deficiency    • Hypertension Son    • Lung disease Other    • Hypertension Other    • Malig Hyperthermia Neg Hx        REVIEW OF SYSTEMS:   All systems reviewed and pertinent positives include in HPI otherwise negative review of systems.       Objective:   Temp:  [97.4 °F (36.3 °C)-98.6 °F (37 °C)] 98.6 °F (37 °C)  Heart Rate:  [59-78] 60  Resp:  [16-20] 18  BP: (121-152)/(46-66) 138/58  Body mass index is 23.27 kg/m².  Flowsheet Rows    Flowsheet Row First Filed Value   Admission Height 162.6 cm (64\") Documented at 09/03/2022 2103   Admission Weight 63.4 kg (139 lb 12.4 oz) Documented at 09/03/2022 2103        Vitals:    09/05/22 0900   BP:    Pulse:    Resp: 18   Temp:    SpO2:        General Appearance:    Alert, cooperative, in no acute distress   Head:    Normocephalic, without obvious abnormality, atraumatic   Eyes:            Lids and lashes normal, conjunctivae and sclerae normal, no   icterus, no pallor, corneas clear, PERRLA   Ears:    Ears appear intact with no abnormalities noted   Throat:   No oral lesions, no thrush, oral mucosa moist   Neck:   No adenopathy, supple, trachea midline, no thyromegaly, no   carotid bruit, no JVD   Back:     No kyphosis present, no scoliosis present, no skin lesions, erythema or scars, no tenderness to percussion or palpation, range of motion normal   Lungs:     Clear to auscultation,respirations regular, even and unlabored    Heart:    Regular rhythm and normal rate, normal S1 and S2, no murmur, no gallop, no rub, no click   Chest Wall:    No abnormalities observed   Abdomen:  "    Normal bowel sounds, no masses, no organomegaly, soft nontender, nondistended, no guarding, no rebound  tenderness   Extremities:   Moves all extremities well, no edema, no cyanosis, no redness   Pulses:   Pulses palpable and equal bilaterally. Normal radial, carotid, femoral, dorsalis pedis and posterior tibial pulses bilaterally. Normal abdominal aorta   Skin:  Neurology:   Psychiatric:   No bleeding, bruising or rash   Normal speech and cranial nerve exam, no focal deficit   Alert and oriented x 3, normal mood and affect                 Review of Data:      Results from last 7 days   Lab Units 09/04/22 0458 09/03/22 2012   SODIUM mmol/L 144 141   POTASSIUM mmol/L 3.7 3.7   CHLORIDE mmol/L 111* 108*   CO2 mmol/L 21.4* 21.0*   BUN mg/dL 82* 85*   CREATININE mg/dL 2.47* 2.56*   CALCIUM mg/dL 8.5* 8.4*   BILIRUBIN mg/dL  --  0.3   ALK PHOS U/L  --  33*   ALT (SGPT) U/L  --  12   AST (SGOT) U/L  --  16   GLUCOSE mg/dL 92 91     Results from last 7 days   Lab Units 09/03/22 2012   TROPONIN T ng/mL 0.173*     @LABRCNTbnp@  Results from last 7 days   Lab Units 09/05/22  0600 09/04/22  2341 09/04/22  1801 09/04/22  1201 09/04/22 0458 09/03/22  2151   WBC 10*3/mm3  --  6.98  --   --  5.47 5.19   HEMOGLOBIN g/dL 7.0* 7.1* 7.2*   < > 7.0* 4.8*   HEMATOCRIT % 22.1* 22.5* 22.0*   < > 22.7* 15.3*   PLATELETS 10*3/mm3  --  116*  --   --  93* 113*    < > = values in this interval not displayed.     Results from last 7 days   Lab Units 09/04/22 0458 09/03/22  2151   INR  1.34* 8.95*     Results from last 7 days   Lab Units 09/03/22 2012   MAGNESIUM mg/dL 2.0     @LABRCNTIP(chol,trig,hdl,ldl)      CT of head  FINDINGS:  No acute intracranial hemorrhage is seen. There is diffuse atrophy.  There is periventricular and deep white matter microangiopathic change.  There is no midline shift or mass effect. Mucous retention cyst is seen  within the maxillary sinuses.     IMPRESSION:  No acute intracranial  findings.    CXR  FINDINGS:  Cardiomegaly is present. There is vascular congestion. Left-sided  pacemaker is present. No pneumothorax is identified. There are bilateral  shoulder arthroplasties. No definite effusion is seen. There is left  basilar atelectasis versus scarring.     IMPRESSION:  Cardiomegaly with suspected vascular congestion.    EKG this admission       EKG baseline      I personally viewed and interpreted the patient's EKG/Telemetry data  )  Patient Active Problem List   Diagnosis   • Anemia in chronic kidney disease (CKD)   • Thrombocytopenia ([facility])   • B12 deficiency   • Coronary artery disease involving coronary bypass graft of native heart without angina pectoris   • S/P MVR (porcine)   • Chronic atrial fibrillation ([facility])   • Bilateral carotid artery disease ([facility])   • Intractable low back pain   • Long-term (current) use of anticoagulants   • Low back pain   • Osteoporosis   • Murmur, heart   • GEORGINA on auto CPAP - Dr Cardona   • Hypersomnia due to medical condition - GEORGINA   • Esophageal stricture   • Dysphagia   • Closed fracture of left proximal humerus   • Essential hypertension   • Supratherapeutic INR   • Chronic combined systolic and diastolic congestive heart failure ([facility])   • Osteoporosis with pathological fracture   • Closed 3-part fracture of proximal end of left humerus   • Closed fracture of proximal end of humerus with delayed healing   • Injury of right knee   • Knee injury, left, initial encounter   • History of fall   • S/P TKR (total knee replacement) using cement, left   • Ischemic cardiomyopathy   • Intramuscular hematoma right pecotralis   • CKD (chronic kidney disease) stage 4, GFR 15-29 ml/min ([facility])   • Peripheral edema   • Inflammatory arthritis   • Ventricular tachycardia ([facility])   • S/P revision of total knee   • Idiopathic gout   • Psychophysiological insomnia   • ICD (implantable cardioverter-defibrillator) in place   • Status post reverse arthroplasty of right shoulder   •  Mixed hyperlipidemia   • Right leg DVT (HCC)   • Osteoarthritis of multiple joints   • Basal cell carcinoma (BCC) of skin of face   • Squamous cell carcinoma in situ (SCCIS) of skin   • Altered mental status         Assessment and Plan:    Ms. Mahoney is an 81 years old lady with past medical history of coronary artery disease and RCA stent in 2007, CVA, VT with ATP therapy, atrial fibrillation treated with cardioversion, PVI, antiarrhythmics and eventually underwent AV kita ablation and BiV ICD placement as she also had cardiomyopathy.  She has been on carvedilol and Coumadin.  Admitted with anemia with hemoglobin of 4.9 with INR of 9 and stool was heme positive.  She received packed RBC.  She was admitted with anemia and hemoglobin of 4.5 and INR of 5.44 back in March 2022.    Today hemoglobin is 7.0, platelet 116 (improved from 93 yesterday) INR 1.34      1.  Permanent atrial fibrillation/AV kita ablation currently V paced   2.  Recurrent GI bleed source unidentified despite extensive work-up during prior admission.  INRs supratherapeutic during both occasions  3.  Prior history of seizure    Okay to hold anticoagulation with  Coumadin for now.  Fluctuating supratherapeutic INR seems to be precipitating bleeds but GI source not identified.  Patient will be seen in clinic to to discuss a different anticoagulation option like Eliquis or explore other options like LEISA closure device.    Cardiology will sign off for now. Thank you for consulting with cardiology.        Dominick Canchola MD  09/05/22  10:11 EDT.  Time spent in reviewing chart, discussion and examination:

## 2022-09-05 NOTE — THERAPY EVALUATION
Patient Name: Janel Mahoney  : 1940    MRN: [id number]                              Today's Date: 2022       Admit Date: 9/3/2022    Visit Dx:     ICD-10-CM ICD-9-CM   1. Altered mental status, unspecified altered mental status type  R41.82 780.97   2. Acute blood loss anemia  D62 285.1   3. Chronic anticoagulation  Z79.01 V58.61   4. Thrombocytopenia (HCC)  D69.6 287.5   5. Chronic renal impairment, unspecified CKD stage  N18.9 585.9   6. Elevated troponin  R77.8 790.6   7. Gastrointestinal hemorrhage, unspecified gastrointestinal hemorrhage type  K92.2 578.9   8. Poisoning by warfarin sodium, accidental or unintentional, initial encounter  T45.511A 964.2     E858.2     Patient Active Problem List   Diagnosis   • Anemia in chronic kidney disease (CKD)   • Thrombocytopenia (HCC)   • B12 deficiency   • Coronary artery disease involving coronary bypass graft of native heart without angina pectoris   • S/P MVR (porcine)   • Chronic atrial fibrillation (HCC)   • Bilateral carotid artery disease (HCC)   • Intractable low back pain   • Long-term (current) use of anticoagulants   • Low back pain   • Osteoporosis   • Murmur, heart   • GEORGINA on auto CPAP - Dr Cardona   • Hypersomnia due to medical condition - GEORGINA   • Esophageal stricture   • Dysphagia   • Closed fracture of left proximal humerus   • Essential hypertension   • Supratherapeutic INR   • Chronic combined systolic and diastolic congestive heart failure (HCC)   • Osteoporosis with pathological fracture   • Closed 3-part fracture of proximal end of left humerus   • Closed fracture of proximal end of humerus with delayed healing   • Injury of right knee   • Knee injury, left, initial encounter   • History of fall   • S/P TKR (total knee replacement) using cement, left   • Ischemic cardiomyopathy   • Intramuscular hematoma right pecotralis   • CKD (chronic kidney disease) stage 4, GFR 15-29 ml/min ([facility])   • Peripheral edema   • Inflammatory arthritis    • Ventricular tachycardia (HCC)   • S/P revision of total knee   • Idiopathic gout   • Psychophysiological insomnia   • ICD (implantable cardioverter-defibrillator) in place   • Status post reverse arthroplasty of right shoulder   • Mixed hyperlipidemia   • Right leg DVT (HCC)   • Osteoarthritis of multiple joints   • Basal cell carcinoma (BCC) of skin of face   • Squamous cell carcinoma in situ (SCCIS) of skin   • Altered mental status     Past Medical History:   Diagnosis Date   • Acute kidney injury (HCC)    • Anemia     on procrit   • Atrial fibrillation (HCC)    • Bruises easily    • Carotid artery stenosis    • Chronic back pain    • Chronic combined systolic and diastolic congestive heart failure (HCC)    • Chronic coronary artery disease     moderate to severe LV dysfunction.  Sees Dr. Dominguez   • Chronic kidney disease, stage 3 (HCC)    • Dysphagia    • GERD (gastroesophageal reflux disease)    • Gout    • H/O cardiac murmur    • Hematoma     LEFT LEG; DR ALONZO AWARE   • [place] (hard of hearing)     wears hearing aids   • Hyperlipidemia    • Hypertension    • Hypotension    • ICD (implantable cardioverter-defibrillator) in place    • Ischemic cardiomyopathy    • Kyphoscoliosis    • Leukopenia    • Lumbar spondylosis    • Obesity    • GEORGINA (obstructive sleep apnea) 12/01/2013    Overnight polysomnogram, weight 168 pounds.  AHI mildly abnormal at 10.3 events per hour.  Respiratory effort related arousals 9.5 events per hour.  Total time snoring 30% and arousals associated with snoring 15 events per hour.          Bring machine DOS   • Osteoarthritis    • Osteoporosis    • Peptic ulcer    • Premature ventricular contractions    • Renal insufficiency syndrome    • Right shoulder pain 08/2021   • Scoliosis    • Shoulder fracture, left    • Shoulder pain, right    • Thrombocytopenia (HCC)    • Urine incontinence     pads   • Ventricular tachycardia (HCC)    • Vitamin B12 deficiency    • Warfarin anticoagulation       Past Surgical History:   Procedure Laterality Date   • AV NODE ABLATION     • BREAST BIOPSY     • CARDIAC CATHETERIZATION      Showed severe mitral insufficiency and borderline coronary artery disease   • CARDIAC CATHETERIZATION      Showed an ejection fraction of 35%. She had occlusive disease of the right posterior LV branch and no other significant disease, treated medically.   • CARDIAC DEFIBRILLATOR PLACEMENT      Biventricular   • CARDIAC DEFIBRILLATOR PLACEMENT Left    • CARDIAC ELECTROPHYSIOLOGY PROCEDURE N/A 1/4/2019    Procedure: GENERATOR CHANGE BI-V ICD   [place];  Surgeon: James Hwang MD;  Location: [facility] CATH INVASIVE LOCATION;  Service: Cardiology   • CARDIAC VALVE REPLACEMENT  2009    Done with stent placement   • CARDIOVERSION      multiple electrocardioversions.   • CAROTID ARTERY ANGIOPLASTY Right    • CATARACT EXTRACTION EXTRACAPSULAR W/ INTRAOCULAR LENS IMPLANTATION Bilateral    • COLONOSCOPY N/A 9/28/2017    Procedure: COLONOSCOPY TO CECUM;  Surgeon: Kevin Davis MD;  Location: [facility] ENDOSCOPY;  Service:    • COLONOSCOPY N/A 3/17/2022    Procedure: COLONOSCOPY WITH POLYPECTOMY (HOT SNARE);  Surgeon: Brooke Moscoso MD;  Location: [facility] ENDOSCOPY;  Service: Gastroenterology;  Laterality: N/A;  PRE- ANEMIA  POST-- POLYP   • CORONARY ANGIOPLASTY WITH STENT PLACEMENT  2009   • CORONARY ARTERY BYPASS GRAFT      single graft to the PDA   • CORONARY STENT PLACEMENT     • ENDOSCOPY N/A 9/28/2017    Procedure: ESOPHAGOGASTRODUODENOSCOPY ;  Surgeon: Kevin Davis MD;  Location: [facility] ENDOSCOPY;  Service:    • ENDOSCOPY N/A 3/16/2022    Procedure: ESOPHAGOGASTRODUODENOSCOPY AT BEDSIDE;  Surgeon: Brooke Moscoso MD;  Location: [facility] ENDOSCOPY;  Service: Gastroenterology;  Laterality: N/A;  pre:  anemia  post: mild gastritis   • EYE SURGERY     • HEMORRHOIDECTOMY     • HYSTERECTOMY     • INCISION AND DRAINAGE TRUNK Right 11/27/2018    Procedure: EVACUATION OF RIGHT CHEST WALL  HEMATOMA;  Surgeon: Juvencio Rodriguez MD;  Location: [facility] MAIN OR;  Service: General   • MITRAL VALVE REPLACEMENT  01/2010    #31 Epic porcine valve.   • THROMBOENDARTERECTOMY Right     carotid thromboendarterectomy    • TONSILLECTOMY      age 32   • TOTAL KNEE ARTHROPLASTY Left    • TOTAL KNEE ARTHROPLASTY REVISION Left 11/19/2019    Procedure: TOTAL KNEE ARTHROPLASTY REVISION LEFT;  Surgeon: Juvencio Pressley II, MD;  Location: [facility] MAIN OR;  Service: Orthopedics   • TOTAL SHOULDER ARTHROPLASTY W/ DISTAL CLAVICLE EXCISION Left 1/16/2018    Procedure: TOTAL SHOULDER REVERSE ARTHROPLASTY;  Surgeon: Bianka Quesada MD;  Location: [facility] MAIN OR;  Service:    • TOTAL SHOULDER ARTHROPLASTY W/ DISTAL CLAVICLE EXCISION Right 8/31/2021    Procedure: TOTAL SHOULDER REVERSE ARTHROPLASTY;  Surgeon: Juvencio Pressley II, MD;  Location: [facility] MAIN OR;  Service: Orthopedics;  Laterality: Right;      General Information     Row Name 09/05/22 1429          OT Time and Intention    Document Type evaluation  -     Mode of Treatment individual therapy;occupational therapy  -     Row Name 09/05/22 1429          General Information    Patient Profile Reviewed yes  -MW     Prior Level of Function independent:  reports Rwx at baseline,  able to assist if needed  -     Existing Precautions/Restrictions fall  -     Barriers to Rehab medically complex  -     Row Name 09/05/22 1429          Living Environment    People in Home child(lisandro), adult;spouse  -     Row Name 09/05/22 1429          Stairs Within Home, Primary    Number of Stairs, Within Home, Primary four  -MW     Stair Railings, Within Home, Primary railings safe and in good condition  -MW     Row Name 09/05/22 1429          Cognition    Orientation Status (Cognition) oriented x 3  -MW     Row Name 09/05/22 1429          Safety Issues, Functional Mobility    Safety Issues Affecting Function (Mobility) insight into  deficits/self-awareness;safety precautions follow-through/compliance;safety precaution awareness  -     Impairments Affecting Function (Mobility) balance;endurance/activity tolerance;strength  -           User Key  (r) = Recorded By, (t) = Taken By, (c) = Cosigned By    Initials Name Provider Type    MW Magnolia Hurtado OT Occupational Therapist                 Mobility/ADL's     Row Name 09/05/22 1430          Bed Mobility    Comment, (Bed Mobility) NT UIC  -     Row Name 09/05/22 1430          Transfers    Transfers sit-stand transfer;stand-sit transfer;toilet transfer  -     Sit-Stand [place] (Transfers) minimum assist (75% patient effort)  -     Stand-Sit [place] (Transfers) minimum assist (75% patient effort);1 person assist  -     [place] Level (Toilet Transfer) minimum assist (75% patient effort)  -     Assistive Device (Toilet Transfer) commode, 3-in-1;grab bars/safety frame;walker, front-wheeled  -     Row Name 09/05/22 1430          Sit-Stand Transfer    Assistive Device (Sit-Stand Transfers) walker, front-wheeled  -     Row Name 09/05/22 1430          Stand-Sit Transfer    Assistive Device (Stand-Sit Transfers) walker, front-wheeled  -     Comment, (Stand-Sit Transfer) cues for hand placement  -     Row Name 09/05/22 1430          Toilet Transfer    Type (Toilet Transfer) sit-stand;stand-sit;stand pivot/stand step  -     Comment, (Toilet Transfer) steps from recliner to BSC due to urgency  -     Row Name 09/05/22 1430          Activities of Daily Living    BADL Assessment/Intervention toileting;lower body dressing  -     Row Name 09/05/22 1430          Toileting Assessment/Training    [place] Level (Toileting) adjust/manage clothing;perform perineal hygiene;contact guard assist  -     Assistive Devices (Toileting) commode, 3-in-1  -     Position (Toileting) supported sitting;supported standing  -     Comment, (Toileting) increased time on commode to  void  -     Row Name 09/05/22 1430          Lower Body Dressing Assessment/Training    Comment, (Lower Body Dressing) adjusting socks with min A due to decreased func reach  -           User Key  (r) = Recorded By, (t) = Taken By, (c) = Cosigned By    Initials Name Provider Type    Magnolia Rico OT Occupational Therapist               Obj/Interventions     Row Name 09/05/22 1432          Sensory Assessment (Somatosensory)    Sensory Assessment (Somatosensory) UE sensation intact  -[facility] Name 09/05/22 1432          Vision Assessment/Intervention    Visual Impairment/Limitations corrective lenses full-time  -     Row Name 09/05/22 1432          Range of Motion Comprehensive    General Range of Motion upper extremity range of motion deficits identified  -     Comment, General Range of Motion RUE shoulder plane limited ~50% end range due to previous s/p 1 year ago on shoulder  -     Row Name 09/05/22 1432          Strength Comprehensive (MMT)    General Manual Muscle Testing (MMT) Assessment upper extremity strength deficits identified  -     Comment, General Manual Muscle Testing (MMT) Assessment generalized weakness, BUE grossly 3-/5  -MW     [facility] Name 09/05/22 1432          Balance    Balance Assessment sitting static balance;sitting dynamic balance;sit to stand dynamic balance;standing static balance;standing dynamic balance  -     Static Sitting Balance standby assist  -     Dynamic Sitting Balance contact guard  -     Sit to Stand Dynamic Balance minimal assist  -     Static Standing Balance contact guard  -     Dynamic Standing Balance minimal assist  -MW     Position/Device Used, Standing Balance supported;walker, front-wheeled  -     Balance Interventions sitting;standing;sit to stand;supported;static;dynamic;minimal challenge;occupation based/functional task  -     Comment, Balance no overt LOBs, pt with forward flexed posture onto walker, reports due to scoliosis  -            User Key  (r) = Recorded By, (t) = Taken By, (c) = Cosigned By    Initials Name Provider Type    Magnolia Rico, OT Occupational Therapist               Goals/Plan     Row Name 09/05/22 1439          Transfer Goal 1 (OT)    Activity/Assistive Device (Transfer Goal 1, OT) sit-to-stand/stand-to-sit;bed-to-chair/chair-to-bed;toilet  -MW     [place] Level/Cues Needed (Transfer Goal 1, OT) modified independence  -MW     Time Frame (Transfer Goal 1, OT) short term goal (STG);2 weeks  -MW     Progress/Outcome (Transfer Goal 1, OT) goal ongoing  -     Row Name 09/05/22 1439          Bathing Goal 1 (OT)    Activity/Device (Bathing Goal 1, OT) lower body bathing;upper body bathing  -MW     [place] Level/Cues Needed (Bathing Goal 1, OT) set-up required;supervision required  -MW     Time Frame (Bathing Goal 1, OT) short term goal (STG);2 weeks  -MW     Progress/Outcomes (Bathing Goal 1, OT) goal ongoing  -     Row Name 09/05/22 1439          Toileting Goal 1 (OT)    Activity/Device (Toileting Goal 1, OT) toileting skills, all  -MW     [place] Level/Cues Needed (Toileting Goal 1, OT) modified independence  -MW     Time Frame (Toileting Goal 1, OT) short term goal (STG);2 weeks  -MW     Progress/Outcome (Toileting Goal 1, OT) goal ongoing  -[facility] Name 09/05/22 1439          Grooming Goal 1 (OT)    Activity/Device (Grooming Goal 1, OT) grooming skills, all  -MW     [place] (Grooming Goal 1, OT) set-up required;supervision required  -MW     Time Frame (Grooming Goal 1, OT) short term goal (STG);2 weeks  -MW     Progress/Outcome (Grooming Goal 1, OT) goal ongoing  -     Row Name 09/05/22 1439          Therapy Assessment/Plan (OT)    Planned Therapy Interventions (OT) activity tolerance training;functional balance retraining;BADL retraining;neuromuscular control/coordination retraining;occupation/activity based interventions;strengthening exercise;transfer/mobility  retraining;patient/caregiver education/training;ROM/therapeutic exercise  -MW           User Key  (r) = Recorded By, (t) = Taken By, (c) = Cosigned By    Initials Name Provider Type    Magnolia Rico, APRIL Occupational Therapist               Clinical Impression     Row Name 09/05/22 1434          Pain Assessment    Pretreatment Pain Rating 0/10 - no pain  -MW     Posttreatment Pain Rating 0/10 - no pain  -MW     Row Name 09/05/22 1434          Plan of Care Review    Plan of Care Reviewed With patient  -MW     Progress no change  -MW     Outcome Evaluation Pt is a 82 yo female admitted to [facility] for AMS, confusion, lethargy. Pt found to be sevrely anemic. Pt seen for OT eval this date, A&Ox3, very [place]. Pt reports mod (I) with ADLs, uses walker at baseline, reports no falls, and lives with spouse and adult daughter. Today, pt presents below ADL baseline with impaired transfers, mobility, act tolerance, UE strength and generalized weakness. Pt UIC at entry, STS from chair level with minAx1, completing steps to BSC with Rwx and CGA/min A. Pt able to complete madhu care while seated on commode with s/up and CGA for dynamic sit balance. STS from commode min/modA however once up CGA for steps to return to chair. Pt will continue to benefit from skilled OT to address deficits and promote return to prior level. Anticipate pt return home at d/c with family assist. She was recently going to OP PT for R shoulder and knee.  -     Row Name 09/05/22 1434          Therapy Assessment/Plan (OT)    Rehab Potential (OT) good, to achieve stated therapy goals  -     Criteria for Skilled Therapeutic Interventions Met (OT) yes;meets criteria;skilled treatment is necessary  -     Therapy Frequency (OT) 3 times/wk  -     Row Name 09/05/22 1434          Therapy Plan Review/Discharge Plan (OT)    Anticipated Discharge Disposition (OT) home with assist  -     Row Name 09/05/22 1434          Vital Signs    O2 Delivery Pre Treatment  room air  -MW     O2 Delivery Intra Treatment room air  -MW     O2 Delivery Post Treatment room air  -MW     Pre Patient Position Sitting  -MW     Intra Patient Position Standing  -MW     Post Patient Position Sitting  -MW     Row Name 09/05/22 1434          Positioning and Restraints    Pre-Treatment Position sitting in chair/recliner  -MW     Post Treatment Position chair  -MW     In Chair notified nsg;sitting;call light within reach;encouraged to call for assist  -MW           User Key  (r) = Recorded By, (t) = Taken By, (c) = Cosigned By    Initials Name Provider Type    Magnolia Rico OT Occupational Therapist               Outcome Measures     Row Name 09/05/22 1440          How much help from another is currently needed...    Putting on and taking off regular lower body clothing? 3  -MW     Bathing (including washing, rinsing, and drying) 3  -MW     Toileting (which includes using toilet bed pan or urinal) 3  -MW     Putting on and taking off regular upper body clothing 3  -MW     Taking care of personal grooming (such as brushing teeth) 3  -MW     Eating meals 3  -MW     AM-PAC 6 Clicks Score (OT) 18  -MW     Row Name 09/05/22 1440          Modified [place]ford Scale    Modified Blackford Scale 3 - Moderate disability.  Requiring some help, but able to walk without assistance.  -MW     Row Name 09/05/22 1440          Functional Assessment    Outcome Measure Options AM-PAC 6 Clicks Daily Activity (OT);Modified [place]  -MW           User Key  (r) = Recorded By, (t) = Taken By, (c) = Cosigned By    Initials Name Provider Type    Magnolia Rico OT Occupational Therapist                Occupational Therapy Education                 Title: PT OT SLP Therapies (Done)     Topic: Occupational Therapy (Done)     Point: ADL training (Done)     Description:   Instruct learner(s) on proper safety adaptation and remediation techniques during self care or transfers.   Instruct in proper use of assistive devices.               Learning Progress Summary           Patient Acceptance, E, VU by  at 9/5/2022 1440    Comment: role of OT, d/c rec                   Point: Home exercise program (Done)     Description:   Instruct learner(s) on appropriate technique for monitoring, assisting and/or progressing therapeutic exercises/activities.              Learning Progress Summary           Patient Acceptance, E, VU by  at 9/5/2022 1440    Comment: role of OT, d/c rec                   Point: Precautions (Done)     Description:   Instruct learner(s) on prescribed precautions during self-care and functional transfers.              Learning Progress Summary           Patient Acceptance, E, VU by  at 9/5/2022 1440    Comment: role of OT, d/c rec                   Point: Body mechanics (Done)     Description:   Instruct learner(s) on proper positioning and spine alignment during self-care, functional mobility activities and/or exercises.              Learning Progress Summary           Patient Acceptance, E, VU by  at 9/5/2022 1440    Comment: role of OT, d/c rec                               User Key     Initials Effective Dates Name Provider Type Discipline     08/20/21 -  Magnolia Hurtado OT Occupational Therapist OT              OT Recommendation and Plan  Planned Therapy Interventions (OT): activity tolerance training, functional balance retraining, BADL retraining, neuromuscular control/coordination retraining, occupation/activity based interventions, strengthening exercise, transfer/mobility retraining, patient/caregiver education/training, ROM/therapeutic exercise  Therapy Frequency (OT): 3 times/wk  Plan of Care Review  Plan of Care Reviewed With: patient  Progress: no change  Outcome Evaluation: Pt is a 82 yo female admitted to [facility] for AMS, confusion, lethargy. Pt found to be sevrely anemic. Pt seen for OT eval this date, A&Ox3, very [place]. Pt reports mod (I) with ADLs, uses walker at baseline, reports no falls, and lives  with spouse and adult daughter. Today, pt presents below ADL baseline with impaired transfers, mobility, act tolerance, UE strength and generalized weakness. Pt UIC at entry, STS from chair level with minAx1, completing steps to BSC with Rwx and CGA/min A. Pt able to complete madhu care while seated on commode with s/up and CGA for dynamic sit balance. STS from commode min/modA however once up CGA for steps to return to chair. Pt will continue to benefit from skilled OT to address deficits and promote return to prior level. Anticipate pt return home at d/c with family assist. She was recently going to OP PT for R shoulder and knee.     Time Calculation:    Time Calculation- OT     Row Name 09/05/22 1440             Time Calculation- OT    OT Start Time 1330  -MW      OT Stop Time 1353  -MW      OT Time Calculation (min) 23 min  -MW      Total Timed Code Minutes- OT 16 minute(s)  -MW      OT Received On 09/05/22  -MW      OT - Next Appointment 09/07/22  -MW      OT Goal Re-Cert Due Date 09/19/22  -MW              Timed Charges    68765 - OT Self Care/Mgmt Minutes 16  -MW              Untimed Charges    OT Eval/Re-eval Minutes 7  -MW              Total Minutes    Timed Charges Total Minutes 16  -MW      Untimed Charges Total Minutes 7  -MW       Total Minutes 23  -MW            User Key  (r) = Recorded By, (t) = Taken By, (c) = Cosigned By    Initials Name Provider Type     Magnolia Hurtado OT Occupational Therapist              Therapy Charges for Today     Code Description Service Date Service Provider Modifiers Qty    20957563435 HC OT SELF CARE/MGMT/TRAIN EA 15 MIN 9/5/2022 Mganolia Hurtado OT GO 1    [id number] HC OT EVAL MOD COMPLEXITY 2 9/5/2022 Magnolia Hurtado OT GO 1               Magnolia Hurtado OT  9/5/2022

## 2022-09-05 NOTE — PLAN OF CARE
Goal Outcome Evaluation:  Plan of Care Reviewed With: patient        Progress: no change  Outcome Evaluation: Pt is a 82 yo female admitted to [facility] for AMS, confusion, lethargy. Pt found to be sevrely anemic. Pt seen for OT eval this date, A&Ox3, very [place]. Pt reports mod (I) with ADLs, uses walker at baseline, reports no falls, and lives with spouse and adult daughter. Today, pt presents below ADL baseline with impaired transfers, mobility, act tolerance, UE strength and generalized weakness. Pt UIC at entry, STS from chair level with minAx1, completing steps to BSC with Rwx and CGA/min A. Pt able to complete madhu care while seated on commode with s/up and CGA for dynamic sit balance. STS from commode min/modA however once up CGA for steps to return to chair. Pt will continue to benefit from skilled OT to address deficits and promote return to prior level. Anticipate pt return home at d/c with family assist. She was recently going to OP PT for R shoulder and knee.

## 2022-09-06 PROBLEM — I21.A1 TYPE 2 MYOCARDIAL INFARCTION (HCC): Status: ACTIVE | Noted: 2022-01-01

## 2022-09-06 PROBLEM — N18.30 CKD (CHRONIC KIDNEY DISEASE) STAGE 3, GFR 30-59 ML/MIN (HCC): Status: ACTIVE | Noted: 2022-01-01

## 2022-09-06 PROBLEM — I21.4 NSTEMI (NON-ST ELEVATED MYOCARDIAL INFARCTION) (HCC): Status: ACTIVE | Noted: 2022-01-01

## 2022-09-06 PROBLEM — D69.6 THROMBOCYTOPENIA: Status: ACTIVE | Noted: 2022-01-01

## 2022-09-06 NOTE — THERAPY EVALUATION
Patient Name: Janel Mahoney  : 1940    MRN: [id number]                              Today's Date: 2022       Admit Date: 9/3/2022    Visit Dx:     ICD-10-CM ICD-9-CM   1. Altered mental status, unspecified altered mental status type  R41.82 780.97   2. Acute blood loss anemia  D62 285.1   3. Chronic anticoagulation  Z79.01 V58.61   4. Thrombocytopenia (HCC)  D69.6 287.5   5. Chronic renal impairment, unspecified CKD stage  N18.9 585.9   6. Elevated troponin  R77.8 790.6   7. Gastrointestinal hemorrhage, unspecified gastrointestinal hemorrhage type  K92.2 578.9   8. Poisoning by warfarin sodium, accidental or unintentional, initial encounter  T45.511A 964.2     E858.2     Patient Active Problem List   Diagnosis   • Anemia in chronic kidney disease (CKD)   • Thrombocytopenia (HCC)   • B12 deficiency   • Coronary artery disease involving coronary bypass graft of native heart without angina pectoris   • S/P MVR (porcine)   • Chronic atrial fibrillation (HCC)   • Bilateral carotid artery disease (HCC)   • Intractable low back pain   • Long-term (current) use of anticoagulants   • Low back pain   • Osteoporosis   • Murmur, heart   • GEORGINA on auto CPAP - Dr Cardona   • Hypersomnia due to medical condition - GEORGINA   • Esophageal stricture   • Dysphagia   • Closed fracture of left proximal humerus   • Essential hypertension   • Supratherapeutic INR   • Chronic combined systolic and diastolic congestive heart failure (HCC)   • Osteoporosis with pathological fracture   • Closed 3-part fracture of proximal end of left humerus   • Closed fracture of proximal end of humerus with delayed healing   • Injury of right knee   • Knee injury, left, initial encounter   • History of fall   • S/P TKR (total knee replacement) using cement, left   • Ischemic cardiomyopathy   • Intramuscular hematoma right pecotralis   • CKD (chronic kidney disease) stage 4, GFR 15-29 ml/min (formerly [facility])   • Peripheral edema   • Inflammatory arthritis    • Ventricular tachycardia (HCC)   • S/P revision of total knee   • Idiopathic gout   • Psychophysiological insomnia   • ICD (implantable cardioverter-defibrillator) in place   • Status post reverse arthroplasty of right shoulder   • Mixed hyperlipidemia   • Right leg DVT (HCC)   • Osteoarthritis of multiple joints   • Basal cell carcinoma (BCC) of skin of face   • Squamous cell carcinoma in situ (SCCIS) of skin   • Altered mental status     Past Medical History:   Diagnosis Date   • Acute kidney injury (HCC)    • Anemia     on procrit   • Atrial fibrillation (HCC)    • Bruises easily    • Carotid artery stenosis    • Chronic back pain    • Chronic combined systolic and diastolic congestive heart failure (HCC)    • Chronic coronary artery disease     moderate to severe LV dysfunction.  Sees Dr. Dominguez   • Chronic kidney disease, stage 3 (HCC)    • Dysphagia    • GERD (gastroesophageal reflux disease)    • Gout    • H/O cardiac murmur    • Hematoma     LEFT LEG; DR ALONZO AWARE   • [place] (hard of hearing)     wears hearing aids   • Hyperlipidemia    • Hypertension    • Hypotension    • ICD (implantable cardioverter-defibrillator) in place    • Ischemic cardiomyopathy    • Kyphoscoliosis    • Leukopenia    • Lumbar spondylosis    • Obesity    • GEORGINA (obstructive sleep apnea) 12/01/2013    Overnight polysomnogram, weight 168 pounds.  AHI mildly abnormal at 10.3 events per hour.  Respiratory effort related arousals 9.5 events per hour.  Total time snoring 30% and arousals associated with snoring 15 events per hour.          Bring machine DOS   • Osteoarthritis    • Osteoporosis    • Peptic ulcer    • Premature ventricular contractions    • Renal insufficiency syndrome    • Right shoulder pain 08/2021   • Scoliosis    • Shoulder fracture, left    • Shoulder pain, right    • Thrombocytopenia (HCC)    • Urine incontinence     pads   • Ventricular tachycardia (HCC)    • Vitamin B12 deficiency    • Warfarin anticoagulation       Past Surgical History:   Procedure Laterality Date   • AV NODE ABLATION     • BREAST BIOPSY     • CARDIAC CATHETERIZATION      Showed severe mitral insufficiency and borderline coronary artery disease   • CARDIAC CATHETERIZATION      Showed an ejection fraction of 35%. She had occlusive disease of the right posterior LV branch and no other significant disease, treated medically.   • CARDIAC DEFIBRILLATOR PLACEMENT      Biventricular   • CARDIAC DEFIBRILLATOR PLACEMENT Left    • CARDIAC ELECTROPHYSIOLOGY PROCEDURE N/A 1/4/2019    Procedure: GENERATOR CHANGE BI-V ICD   [place];  Surgeon: James Hwang MD;  Location: [facility] CATH INVASIVE LOCATION;  Service: Cardiology   • CARDIAC VALVE REPLACEMENT  2009    Done with stent placement   • CARDIOVERSION      multiple electrocardioversions.   • CAROTID ARTERY ANGIOPLASTY Right    • CATARACT EXTRACTION EXTRACAPSULAR W/ INTRAOCULAR LENS IMPLANTATION Bilateral    • COLONOSCOPY N/A 9/28/2017    Procedure: COLONOSCOPY TO CECUM;  Surgeon: Kevin Davis MD;  Location: [facility] ENDOSCOPY;  Service:    • COLONOSCOPY N/A 3/17/2022    Procedure: COLONOSCOPY WITH POLYPECTOMY (HOT SNARE);  Surgeon: Brooke Moscoso MD;  Location: [facility] ENDOSCOPY;  Service: Gastroenterology;  Laterality: N/A;  PRE- ANEMIA  POST-- POLYP   • CORONARY ANGIOPLASTY WITH STENT PLACEMENT  2009   • CORONARY ARTERY BYPASS GRAFT      single graft to the PDA   • CORONARY STENT PLACEMENT     • ENDOSCOPY N/A 9/28/2017    Procedure: ESOPHAGOGASTRODUODENOSCOPY ;  Surgeon: Kevin Davis MD;  Location: [facility] ENDOSCOPY;  Service:    • ENDOSCOPY N/A 3/16/2022    Procedure: ESOPHAGOGASTRODUODENOSCOPY AT BEDSIDE;  Surgeon: Brooke Moscoso MD;  Location: [facility] ENDOSCOPY;  Service: Gastroenterology;  Laterality: N/A;  pre:  anemia  post: mild gastritis   • EYE SURGERY     • HEMORRHOIDECTOMY     • HYSTERECTOMY     • INCISION AND DRAINAGE TRUNK Right 11/27/2018    Procedure: EVACUATION OF RIGHT CHEST WALL  HEMATOMA;  Surgeon: Juvencio Rodriguez MD;  Location: [facility] MAIN OR;  Service: General   • MITRAL VALVE REPLACEMENT  01/2010    #31 Epic porcine valve.   • THROMBOENDARTERECTOMY Right     carotid thromboendarterectomy    • TONSILLECTOMY      age 32   • TOTAL KNEE ARTHROPLASTY Left    • TOTAL KNEE ARTHROPLASTY REVISION Left 11/19/2019    Procedure: TOTAL KNEE ARTHROPLASTY REVISION LEFT;  Surgeon: Juvencio Pressley II, MD;  Location: [facility] MAIN OR;  Service: Orthopedics   • TOTAL SHOULDER ARTHROPLASTY W/ DISTAL CLAVICLE EXCISION Left 1/16/2018    Procedure: TOTAL SHOULDER REVERSE ARTHROPLASTY;  Surgeon: Bianka Quesada MD;  Location: [facility] MAIN OR;  Service:    • TOTAL SHOULDER ARTHROPLASTY W/ DISTAL CLAVICLE EXCISION Right 8/31/2021    Procedure: TOTAL SHOULDER REVERSE ARTHROPLASTY;  Surgeon: Juvencio Pressley II, MD;  Location: [facility] MAIN OR;  Service: Orthopedics;  Laterality: Right;      General Information     Row Name 09/06/22 1449          Physical Therapy Time and Intention    Document Type evaluation  -MS     Mode of Treatment physical therapy  -MS     Row Name 09/06/22 1449          General Information    Patient Profile Reviewed yes  -MS     Prior Level of Function independent:  ambulates with walker, able to shower ind, spouse assist as needed  -MS     Existing Precautions/Restrictions fall  -MS     Barriers to Rehab previous functional deficit  -MS     Row Name 09/06/22 1449          Living Environment    People in Home child(lisandro), adult;spouse  -MS     Row Name 09/06/22 1449          Home Main Entrance    Number of Stairs, Main Entrance three  -MS     Stair Railings, Main Entrance railings safe and in good condition  -MS     Row Name 09/06/22 1449          Cognition    Orientation Status (Cognition) oriented x 3  -MS     Row Name 09/06/22 1449          Safety Issues, Functional Mobility    Impairments Affecting Function (Mobility) balance;endurance/activity tolerance;strength   -MS     Comment, Safety Issues/Impairments (Mobility) Nonskid socks and gait belt donned.  -MS           User Key  (r) = Recorded By, (t) = Taken By, (c) = Cosigned By    Initials Name Provider Type    MS MoralessKathrine, PT Physical Therapist               Mobility     Row Name 09/06/22 1451          Bed Mobility    Bed Mobility supine-sit;sit-supine  -MS     Supine-Sit [place] (Bed Mobility) standby assist  -MS     Sit-Supine [place] (Bed Mobility) standby assist  -MS     Assistive Device (Bed Mobility) bed rails;head of bed elevated  -MS     Comment, (Bed Mobility) SBA for sitting balance  -MS     Row Name 09/06/22 1451          Sit-Stand Transfer    Sit-Stand [place] (Transfers) minimum assist (75% patient effort)  Up to min-modA from toilet seat- patient reports she used handicap wc  -MS     Assistive Device (Sit-Stand Transfers) walker, front-wheeled  -MS     Row Name 09/06/22 1451          Gait/Stairs (Locomotion)    [place] Level (Gait) contact guard;verbal cues;nonverbal cues (demo/gesture)  -MS     Assistive Device (Gait) walker, front-wheeled  -MS     Distance in Feet (Gait) 25'  -MS     Deviations/Abnormal Patterns (Gait) ofe decreased;gait speed decreased  -MS     Bilateral Gait Deviations forward flexed posture  -MS     Comment, (Gait/Stairs) No overt LOB or veering noted, SOA noted post activity though O2 reading WFL.  -MS           User Key  (r) = Recorded By, (t) = Taken By, (c) = Cosigned By    Initials Name Provider Type    MS MoralessKathrine, PT Physical Therapist               Obj/Interventions     Row Name 09/06/22 1453          Range of Motion Comprehensive    Comment, General Range of Motion B LEs grossly WFL  -MS     Row Name 09/06/22 1453          Strength Comprehensive (MMT)    Comment, General Manual Muscle Testing (MMT) Assessment B LEs grossly at least 3/5, noted strength deficits in hip flexors  -MS     Row Name 09/06/22 1453          Motor Skills     Therapeutic Exercise --  Seated LAQs x 10 reps  -MS     Row Name 09/06/22 1453          Balance    Static Sitting Balance standby assist  -MS     Dynamic Sitting Balance contact guard  -MS     Static Standing Balance contact guard  -MS     Dynamic Standing Balance minimal assist  -MS     Position/Device Used, Standing Balance supported;walker, front-wheeled  -MS           User Key  (r) = Recorded By, (t) = Taken By, (c) = Cosigned By    Initials Name Provider Type    Kathrine Prado, PT Physical Therapist               Goals/Plan    No documentation.                Clinical Impression     Row Name 09/06/22 1456          Pain    Pretreatment Pain Rating 0/10 - no pain  -MS     Posttreatment Pain Rating 0/10 - no pain  -MS     Row Name 09/06/22 1456          Therapy Assessment/Plan (PT)    Rehab Potential (PT) good, to achieve stated therapy goals  -MS     Criteria for Skilled Interventions Met (PT) yes;meets criteria  -MS     Therapy Frequency (PT) 5 times/wk  -MS     Row Name 09/06/22 1456          Vital Signs    O2 Delivery Pre Treatment room air  -MS     Post SpO2 (%) 91  -MS     O2 Delivery Post Treatment room air  -MS     Row Name 09/06/22 1456          Positioning and Restraints    Pre-Treatment Position in bed  -MS     Post Treatment Position bed  -MS     In Bed notified nsg;fowlers;call light within reach;encouraged to call for assist;exit alarm on;SCD pump applied  -MS           User Key  (r) = Recorded By, (t) = Taken By, (c) = Cosigned By    Initials Name Provider Type    Kathrine Prado, PT Physical Therapist               Outcome Measures     Row Name 09/06/22 1456 09/06/22 0800       How much help from another person do you currently need...    Turning from your back to your side while in flat bed without using bedrails? 4  -MS 4  -MC    Moving from lying on back to sitting on the side of a flat bed without bedrails? 3  -MS 3  -MC    Moving to and from a bed to a chair (including a  wheelchair)? 2  -MS 2  -MC    Standing up from a chair using your arms (e.g., wheelchair, bedside chair)? 2  -MS 2  -MC    Climbing 3-5 steps with a railing? 2  -MS 2  -MC    To walk in hospital room? 3  -MS 2  -MC    AM-PAC 6 Clicks Score (PT) 16  -MS 15  -MC    Highest level of mobility 5 --> Static standing  -MS 4 --> Transferred to chair/commode  -          User Key  (r) = Recorded By, (t) = Taken By, (c) = Cosigned By    Initials Name Provider Type    MS SolerKathrine, PT Physical Therapist    Magnolia Doe, RN Registered Nurse                             Physical Therapy Education                 Title: PT OT SLP Therapies (Done)     Topic: Physical Therapy (Done)     Point: Mobility training (Done)     Learning Progress Summary           Patient Acceptance, E,TB, VU,NR by MS at 9/6/2022 1457                   Point: Home exercise program (Done)     Learning Progress Summary           Patient Acceptance, E,TB, VU,NR by MS at 9/6/2022 1457                   Point: Body mechanics (Done)     Learning Progress Summary           Patient Acceptance, E,TB, VU,NR by MS at 9/6/2022 1457                   Point: Precautions (Done)     Learning Progress Summary           Patient Acceptance, E,TB, VU,NR by MS at 9/6/2022 1457                               User Key     Initials Effective Dates Name Provider Type Discipline    MS 06/16/21 -  Kathrine Soler, PT Physical Therapist PT              PT Recommendation and Plan           Time Calculation:    PT Charges     Row Name 09/06/22 1449             Time Calculation    Start Time 1433  -MS      Stop Time 1449  -MS      Time Calculation (min) 16 min  -MS      PT Received On 09/06/22  -MS      PT - Next Appointment 09/07/22  -MS      PT Goal Re-Cert Due Date 09/13/22  -MS              Time Calculation- PT    Total Timed Code Minutes- PT 12 minute(s)  -MS            User Key  (r) = Recorded By, (t) = Taken By, (c) = Cosigned By    Initials Name Provider Type     Kathrine Prado, PT Physical Therapist              Therapy Charges for Today     Code Description Service Date Service Provider Modifiers Qty    [id number]  PT EVAL MOD COMPLEXITY 2 9/6/2022 Kathrine Soler, PT GP 1    [id number]  PT THER PROC EA 15 MIN 9/6/2022 Kathrine Soler, PT GP 1          PT G-Codes  Outcome Measure Options: AM-PAC 6 Clicks Daily Activity (OT), Modified [place]  AM-PAC 6 Clicks Score (PT): 16  AM-PAC 6 Clicks Score (OT): 18  Modified [place] Scale: 3 - Moderate disability.  Requiring some help, but able to walk without assistance.    Kathrine Soler, PT  9/6/2022

## 2022-09-06 NOTE — PROGRESS NOTES
[facility]  Inpatient Progress Note    Reason for Follow-up: Anemia    Subjective     Interval History:   Large dark brown bowel movement this morning.  Denies abdominal pain, nausea, vomiting.  Tolerating p.o. intake well.    Hemoglobin 7.4 this morning, with 7.9 at 1500 yesterday.  Platelets 114.  Normal folate and B12.    Current Facility-Administered Medications:   •  amiodarone (PACERONE) tablet 200 mg, 200 mg, Oral, BID, William Merrill MD, 200 mg at 09/06/22 0937  •  dextrose (D50W) (25 g/50 mL) IV injection 25 g, 25 g, Intravenous, Q15 Min PRN, William Merrill MD  •  dextrose (GLUTOSE) oral gel 15 g, 15 g, Oral, Q15 Min PRN, William Merrill MD  •  glucagon (human recombinant) (GLUCAGEN DIAGNOSTIC) injection 1 mg, 1 mg, Intramuscular, Q15 Min PRN, William Merrill MD  •  insulin regular (humuLIN R,novoLIN R) injection 0-14 Units, 0-14 Units, Subcutaneous, Q6H, William Merrill MD, 3 Units at 09/06/22 1240  •  melatonin tablet 5 mg, 5 mg, Oral, Nightly PRN, William Merrill MD, 5 mg at 09/05/22 2147  •  oxyCODONE-acetaminophen (PERCOCET) 5-325 MG per tablet 1 tablet, 1 tablet, Oral, Q6H PRN, William Merrill MD, 1 tablet at 09/04/22 2327  •  pantoprazole (PROTONIX) EC tablet 40 mg, 40 mg, Oral, Q AM, rBooke Moscoso MD, 40 mg at 09/06/22 0535  •  [COMPLETED] Insert peripheral IV, , , Once **AND** sodium chloride 0.9 % flush 10 mL, 10 mL, Intravenous, PRN, William Merrill MD  •  sodium chloride 0.9 % flush 10 mL, 10 mL, Intravenous, Q12H, William Merrill MD, 10 mL at 09/06/22 0937  •  sodium chloride 0.9 % flush 10 mL, 10 mL, Intravenous, PRN, William Merrill MD  Review of Systems:    The following systems were reviewed and negative;  ENT and integument    Objective     Vital Signs  Temp:  [97 °F (36.1 °C)-97.7 °F (36.5 °C)] 97.3 °F (36.3 °C)  Heart Rate:  [59-60] 60  Resp:  [16-18] 16  BP: (118-152)/(52-62) 134/62  Body mass index is 22.77 kg/m².    Intake/Output Summary (Last 24 hours)  at 9/6/2022 1421  Last data filed at 9/6/2022 1300  Gross per 24 hour   Intake 1531 ml   Output --   Net 1531 ml     I/O this shift:  In: 450 [P.O.:450]  Out: -      Physical Exam:    General: patient awake, alert and cooperative   Eyes: Normal lids and lashes, no scleral icterus   Skin: warm and dry, not jaundiced   Pulm: regular and unlabored   Abdomen: soft, nontender, nondistended; normal bowel sounds   Psychiatric: Normal mood and behavior; memory intact     Results Review:     I reviewed the patient's new clinical results.    Results from last 7 days   Lab Units 09/06/22  0800 09/05/22  2330 09/05/22  1451 09/05/22  0600 09/04/22  2341 09/04/22  1201 09/04/22 0458   WBC 10*3/mm3 6.21  --   --   --  6.98  --  5.47   HEMOGLOBIN g/dL 7.4*  7.4* 7.8* 7.9*   < > 7.1*   < > 7.0*   HEMATOCRIT % 23.3*  23.3* 23.2* 24.0*   < > 22.5*   < > 22.7*   PLATELETS 10*3/mm3 114*  --   --   --  116*  --  93*    < > = values in this interval not displayed.     Results from last 7 days   Lab Units 09/06/22  0800 09/05/22  0938 09/04/22 0458 09/03/22 2012   SODIUM mmol/L 143 143 144 141   POTASSIUM mmol/L 3.7 3.9 3.7 3.7   CHLORIDE mmol/L 113* 111* 111* 108*   CO2 mmol/L 20.0* 20.1* 21.4* 21.0*   BUN mg/dL 69* 79* 82* 85*   CREATININE mg/dL 1.94* 2.04* 2.47* 2.56*   CALCIUM mg/dL 8.6 9.1 8.5* 8.4*   BILIRUBIN mg/dL  --   --   --  0.3   ALK PHOS U/L  --   --   --  33*   ALT (SGPT) U/L  --   --   --  12   AST (SGOT) U/L  --   --   --  16   GLUCOSE mg/dL 94 104* 92 91     Results from last 7 days   Lab Units 09/04/22  0458 09/03/22  2151   INR  1.34* 8.95*     No results found for: LIPASE    Radiology:  XR Chest 1 View   Final Result   Cardiomegaly with suspected vascular congestion.       This report was finalized on 9/3/2022 10:23 PM by Dr. Daniella Long M.D.          CT Head Without Contrast   Final Result   No acute intracranial findings.       Radiation dose reduction techniques were utilized, including automated    exposure control and exposure modulation based on body size.       This report was finalized on 9/3/2022 10:26 PM by Dr. Daniella Long M.D.              Assessment & Plan     Patient Active Problem List   Diagnosis   • Anemia in chronic kidney disease (CKD)   • Thrombocytopenia (HCC)   • B12 deficiency   • Coronary artery disease involving coronary bypass graft of native heart without angina pectoris   • S/P MVR (porcine)   • Chronic atrial fibrillation (HCC)   • Bilateral carotid artery disease (HCC)   • Intractable low back pain   • Long-term (current) use of anticoagulants   • Low back pain   • Osteoporosis   • Murmur, heart   • GEORGINA on auto CPAP - Dr Cardona   • Hypersomnia due to medical condition - GEORGINA   • Esophageal stricture   • Dysphagia   • Closed fracture of left proximal humerus   • Essential hypertension   • Supratherapeutic INR   • Chronic combined systolic and diastolic congestive heart failure ([facility])   • Osteoporosis with pathological fracture   • Closed 3-part fracture of proximal end of left humerus   • Closed fracture of proximal end of humerus with delayed healing   • Injury of right knee   • Knee injury, left, initial encounter   • History of fall   • S/P TKR (total knee replacement) using cement, left   • Ischemic cardiomyopathy   • Intramuscular hematoma right pecotralis   • CKD (chronic kidney disease) stage 4, GFR 15-29 ml/min ([facility])   • Peripheral edema   • Inflammatory arthritis   • Ventricular tachycardia ([facility])   • S/P revision of total knee   • Idiopathic gout   • Psychophysiological insomnia   • ICD (implantable cardioverter-defibrillator) in place   • Status post reverse arthroplasty of right shoulder   • Mixed hyperlipidemia   • Right leg DVT ([facility])   • Osteoarthritis of multiple joints   • Basal cell carcinoma (BCC) of skin of face   • Squamous cell carcinoma in situ (SCCIS) of skin   • Altered mental status       Assessment:  1. Anemia  2. Supratherapeutic INR  3. Elevated  troponin  4. MAGGIE  5. Thrombocytopenia      Plan:  · Hemoglobin 7.4 this morning, with 7.9 at 1500 yesterday.  Continue to follow H&H and transfuse per primary hospital team.  · Previous extensive GI work-up with negative findings-if active bleed noted, would proceed with tagged red blood cell scan.  Repeating endoscopic work-up likely to be low yield.  · Supratherapeutic INR precipitating bleeds: Cardiology consult reviewed and plan to have patient follow-up in office to discuss alternate OAC options such as Eliquis versus alternate devices like LEISA closure device.    I discussed the patients findings and my recommendations with patient.    Dragon dictation used throughout this note.            Itzel Nascimento PA-C  [facility]  [address] [place] [address]  Office: [phone]

## 2022-09-06 NOTE — PLAN OF CARE
Goal Outcome Evaluation:  Plan of Care Reviewed With: patient        Progress: improving  Outcome Evaluation: No complaints this shift. Assist x2 with gait belt to BSC. Large BM this AM - no blood seen. Spouse at bedside. Monitoring blood sugars - insulin given x1. Worked with PT. SCDs on when in bed. Resting well. Will continue to monitor patient.

## 2022-09-06 NOTE — PLAN OF CARE
Goal Outcome Evaluation:  Plan of Care Reviewed With: patient          Progress: improving  Outcome Evaluation: Transfer from ICU, AxO but forgetful at times, [place]-hearing aides at bedside, SL, Monitoring blood sugars, up w/ assist and walker, room air, bed alarm in use and fall precautions maintained, H/H q8hrs

## 2022-09-06 NOTE — DISCHARGE SUMMARY
Date of Admission: 9/3/2022  Date of Discharge:  9/6/2022    Discharge Diagnosis:    Anemia  Coagulopathy on admission  MAGGIE on CKD 3A  Non-STEMI  Chronic systolic and diastolic congestive heart failure  GEORGINA  Thrombocytopenia  A. fib on anticoagulation    Hospital Course    Presenting Problem/History of Present Illness    81-year-old female present to the emergency room via EMS due to confusion.  Apparently she had been sleeping most of the day today when she woke up she was confused.  She was last known normal yesterday.  She was somewhat unresponsive and difficult to arouse also at home.  Patient was noted to have history of A. fib  on pacemaker and on Coumadin.  Also noted history of diarrhea but no active bleeding as such reported.  In the emergency room work-up revealed severe anemia with hemoglobin around 4.8 and INR 8.95.  She was noted to be heme positive in the emergency room.  She is in the process of getting 2 unit blood transfusion vitamin K and Kcentra in the emergency room.  Currently on room air.  Earlier she had complained of some abdominal pain which has improved.    Subsequent Course of Management      Patient admitted on 9/3/2022 with altered mental status.  Also acute blood loss anemia and heme positive stools.  Seen by GI on admission.  Per their evaluation patient had EGD and colonoscopy as well as small bowel She will endoscopy which were all negative.  She was given blood products.  Cardiology waiting and decided to hold anticoagulation after discussion with GI and to elect different anticoagulation option as an outpatient or perhaps a left atrial appendage closure device.  Her hemoglobin remained stable after discontinuation of anticoagulation and she was transferred out of the unit.  She was also seen by hematology.  She was given some iron.  Review of her labs showed that everything is stable and patient very keen to go home.  Cleared for discharge by all specialities below.  Follow-ups  "arranged and patient was discharged to care of .    Spoke to spouse and he feels he can care for her at home. She is very keen to go home and feels similarly. PT notes reviewed.    Per GI:  · Hemoglobin stable post transfusion - coagulopathy corrected.  Continue to follow  · Previous extensive GI w/u with negative findings - if active bleeding noted, would proceed with tagged scan otherwise repeating endoscopic w/u likely to be low yield    Per Cardiology:  Okay to hold anticoagulation with  Coumadin for now.  Fluctuating supratherapeutic INR seems to be precipitating bleeds but GI source not identified.  Patient will be seen in clinic to to discuss a different anticoagulation option like Eliquis or explore other options like LEISA closure device.    Per Hematology:  · For now, she will need to switch to weekly labs instead of her prior 3-week schedule  · I will call the office today to schedule this having her see her primary hematologist in 3 to 4 weeks.      Examination on Date of Discharge    /60 (BP Location: Left arm, Patient Position: Lying)   Pulse 59   Temp 98.1 °F (36.7 °C) (Oral)   Resp 16   Ht 160 cm (63\")   Wt 58.3 kg (128 lb 8.5 oz)   LMP  (LMP Unknown)   SpO2 100%   BMI 22.77 kg/m²     Physical Exam  Eyes:      Pupils: Pupils are equal, round, and reactive to light.   Cardiovascular:      Rate and Rhythm: Normal rate and regular rhythm.      Heart sounds: No murmur heard.  Pulmonary:      Effort: Pulmonary effort is normal.      Breath sounds: Normal breath sounds.   Abdominal:      General: Bowel sounds are normal.      Palpations: Abdomen is soft. There is no mass.      Tenderness: There is no abdominal tenderness.   Musculoskeletal:         General: No swelling.   Neurological:      Mental Status: She is alert.         Test Results    Surgical Procedures Since Admission:                 Results from last 7 days   Lab Units 09/06/22  1613 09/06/22  0800 09/05/22  2330 " 09/05/22  1451 09/05/22  0600 09/04/22  2341 09/04/22  1801 09/04/22  1201 09/04/22  0458 09/03/22 2151   WBC 10*3/mm3  --  6.21  --   --   --  6.98  --   --  5.47 5.19   HEMOGLOBIN g/dL 7.8* 7.4*  7.4* 7.8* 7.9* 7.0* 7.1* 7.2* 7.0* 7.0* 4.8*   HEMATOCRIT % 24.1* 23.3*  23.3* 23.2* 24.0* 22.1* 22.5* 22.0* 21.8* 22.7* 15.3*   PLATELETS 10*3/mm3  --  114*  --   --   --  116*  --   --  93* 113*       Results from last 7 days   Lab Units 09/06/22  0800 09/05/22  0938 09/04/22 0458 09/03/22 2012   SODIUM mmol/L 143 143 144 141   POTASSIUM mmol/L 3.7 3.9 3.7 3.7   CHLORIDE mmol/L 113* 111* 111* 108*   CO2 mmol/L 20.0* 20.1* 21.4* 21.0*   BUN mg/dL 69* 79* 82* 85*   CREATININE mg/dL 1.94* 2.04* 2.47* 2.56*       Results from last 7 days   Lab Units 09/03/22 2012   TROPONIN T ng/mL 0.173*       Microbiology Results (last 10 days)     Procedure Component Value - Date/Time    COVID PRE-OP / PRE-PROCEDURE SCREENING ORDER (NO ISOLATION) - Swab, Nasopharynx [[id number]]  (Normal) Collected: 09/03/22 2154    Lab Status: Final result Specimen: Swab from Nasopharynx Updated: 09/03/22 2243    Narrative:      The following orders were created for panel order COVID PRE-OP / PRE-PROCEDURE SCREENING ORDER (NO ISOLATION) - Swab, Nasopharynx.  Procedure                               Abnormality         Status                     ---------                               -----------         ------                     COVID-19, [facility] IN-HOUSE...[[id number]]  Normal              Final result                 Please view results for these tests on the individual orders.    COVID-19,[facility] IN-HOUSE CEPHEID/NAI NP SWAB IN TRANSPORT MEDIA 8-12 HR TAT - Swab, Nasopharynx [[id number]]  (Normal) Collected: 09/03/22 2154    Lab Status: Final result Specimen: Swab from Nasopharynx Updated: 09/03/22 2243     COVID19 Not Detected    Narrative:      Fact sheet for providers: https://www.fda.gov/media/544343/download     Fact sheet for patients:  https://www.fda.gov/media/188874/download          CT Head Without Contrast    Result Date: 9/3/2022  CT HEAD WITHOUT CONTRAST  HISTORY: Altered mental status  COMPARISON: 03/14/2022  TECHNIQUE: Axial CT imaging was obtained through the brain. No IV contrast was administered.  FINDINGS: No acute intracranial hemorrhage is seen. There is diffuse atrophy. There is periventricular and deep white matter microangiopathic change. There is no midline shift or mass effect. Mucous retention cyst is seen within the maxillary sinuses.      No acute intracranial findings.  Radiation dose reduction techniques were utilized, including automated exposure control and exposure modulation based on body size.  This report was finalized on 9/3/2022 10:26 PM by Dr. Daniella Long M.D.      XR Chest 1 View    Result Date: 9/3/2022  SINGLE VIEW OF THE CHEST  HISTORY: Shortness of breath  COMPARISON: 03/14/2022  FINDINGS: Cardiomegaly is present. There is vascular congestion. Left-sided pacemaker is present. No pneumothorax is identified. There are bilateral shoulder arthroplasties. No definite effusion is seen. There is left basilar atelectasis versus scarring.      Cardiomegaly with suspected vascular congestion.  This report was finalized on 9/3/2022 10:23 PM by Dr. Daniella Long M.D.        Consulting Physician(s)  Chat With All Active Members    Provider Relationship Specialty    Kevin Del Angel MD  Consulting Physician Gastroenterology    Beni Jose MD  Consulting Physician Hematology and Oncology    William Merrill MD  Consulting Physician Pulmonary Disease                 Discharge Instructions      Dietary Orders (From admission, onward)     Start     Ordered    09/05/22 1000  Diet Regular; Cardiac  Diet Effective 1000        Question Answer Comment   Diet Texture / Consistency Regular    Common Modifiers Cardiac        09/05/22 0910                        Your medication list      CHANGE how you take  these medications      Instructions Last Dose Given Next Dose Due   simvastatin 20 MG tablet  Commonly known as: ZOCOR  What changed: how much to take      TAKE 2 TABLETS EVERY NIGHT          CONTINUE taking these medications      Instructions Last Dose Given Next Dose Due   alendronate 70 MG tablet  Commonly known as: FOSAMAX      Take 70 mg by mouth.       allopurinol 100 MG tablet  Commonly known as: ZYLOPRIM      Take 2 tablets by mouth Daily.       amiodarone 200 MG tablet  Commonly known as: PACERONE      Take 1 tablet by mouth 2 (Two) Times a Day.       aspirin 81 MG chewable tablet      Chew 81 mg Daily.       BIOFREEZE EX      Apply  topically.       bumetanide 2 MG tablet  Commonly known as: BUMEX      TAKE 1 TABLET TWICE DAILY       calcitriol 0.25 MCG capsule  Commonly known as: ROCALTROL      Take 0.25 mcg by mouth 2 (Two) Times a Day.       carvedilol 25 MG tablet  Commonly known as: COREG      TAKE 1 TABLET TWICE DAILY       ferrous sulfate 325 (65 FE) MG tablet      Take 325 mg by mouth Daily With Breakfast.       multivitamin tablet tablet      Take 1 tablet by mouth Daily.       mupirocin 2 % ointment  Commonly known as: BACTROBAN      Apply 1 application topically to the appropriate area as directed 3 (Three) Times a Day.       oxyCODONE-acetaminophen 5-325 MG per tablet  Commonly known as: PERCOCET      Take 1 tablet by mouth Every 8 (Eight) Hours As Needed for Moderate Pain  (chronic pain meds for back pain).       pantoprazole 40 MG EC tablet  Commonly known as: PROTONIX      TAKE 1 TABLET TWICE DAILY       polyethylene glycol 17 g packet  Commonly known as: MIRALAX      Take 17 g by mouth Daily As Needed (Constipation).       PROCRIT IJ      Inject  as directed.       vitamin B-12 1000 MCG tablet  Commonly known as: CYANOCOBALAMIN      Take 1,000 mcg by mouth Daily.       Vitamin D (Cholecalciferol) 50 MCG (2000 UT) capsule      Take 2,000 Units by mouth Daily.          STOP taking these  medications    warfarin 1 MG tablet  Commonly known as: COUMADIN        zolpidem 10 MG tablet  Commonly known as: AMBIEN               Additional Instructions for the Follow-ups that You Need to Schedule     Ambulatory Referral to Home Health (Hospital)   As directed      Face to Face Visit Date: 9/6/2022    Follow-up provider for Plan of Care?: I treated the patient in an acute care facility and will not continue treatment after discharge.    Follow-up provider: RAYA LYNN [[id number]]    Reason/Clinical Findings: limited mobility    Describe mobility limitations that make leaving home difficult: age, djd spine, anemia, afib    Nursing/Therapeutic Services Requested: Skilled Nursing Physical Therapy Medical / Social Work    Skilled nursing orders: CHF management    PT orders: Strengthening Transfer training Gait Training    Weight Bearing Status: As Tolerated    Social work orders: Community resources    Frequency: 1 Week 1               Condition on Discharge:  Stable     Earnest Renner MD  09/06/22  19:02 EDT    Time: I spent over 30mins in the discharge planning of this patient.    Some of this encounter note is an electronic transcription/translation of spoken language to printed text.

## 2022-09-06 NOTE — PROGRESS NOTES
Nutrition Services    Patient Name:  Janel Mahoney  YOB: 1940  MRN: [id number]  Admit Date:  9/3/2022    Nutrition assessment triggered by MST score-3.  Visited with patient-reported appetite has improved. She does have trouble with reflux occasionally-she did today for lunch. Encouraged small bites and adequate fluids. Declined need for nutritional supplement.  RD to follow as needed.    CLINICAL NUTRITION ASSESSMENT      Reason for Assessment MST score 2+     H&P      Past Medical History:   Diagnosis Date   • Acute kidney injury (HCC)    • Anemia     on procrit   • Atrial fibrillation (HCC)    • Bruises easily    • Carotid artery stenosis    • Chronic back pain    • Chronic combined systolic and diastolic congestive heart failure (HCC)    • Chronic coronary artery disease     moderate to severe LV dysfunction.  Sees Dr. Dominguez   • Chronic kidney disease, stage 3 (HCC)    • Dysphagia    • GERD (gastroesophageal reflux disease)    • Gout    • H/O cardiac murmur    • Hematoma     LEFT LEG; DR ALONZO AWARE   • [place] (hard of hearing)     wears hearing aids   • Hyperlipidemia    • Hypertension    • Hypotension    • ICD (implantable cardioverter-defibrillator) in place    • Ischemic cardiomyopathy    • Kyphoscoliosis    • Leukopenia    • Lumbar spondylosis    • Obesity    • GEORGINA (obstructive sleep apnea) 12/01/2013    Overnight polysomnogram, weight 168 pounds.  AHI mildly abnormal at 10.3 events per hour.  Respiratory effort related arousals 9.5 events per hour.  Total time snoring 30% and arousals associated with snoring 15 events per hour.          Bring machine DOS   • Osteoarthritis    • Osteoporosis    • Peptic ulcer    • Premature ventricular contractions    • Renal insufficiency syndrome    • Right shoulder pain 08/2021   • Scoliosis    • Shoulder fracture, left    • Shoulder pain, right    • Thrombocytopenia (HCC)    • Urine incontinence     pads   • Ventricular tachycardia (HCC)    • Vitamin  B12 deficiency    • Warfarin anticoagulation        Past Surgical History:   Procedure Laterality Date   • AV NODE ABLATION     • BREAST BIOPSY     • CARDIAC CATHETERIZATION      Showed severe mitral insufficiency and borderline coronary artery disease   • CARDIAC CATHETERIZATION      Showed an ejection fraction of 35%. She had occlusive disease of the right posterior LV branch and no other significant disease, treated medically.   • CARDIAC DEFIBRILLATOR PLACEMENT      Biventricular   • CARDIAC DEFIBRILLATOR PLACEMENT Left    • CARDIAC ELECTROPHYSIOLOGY PROCEDURE N/A 1/4/2019    Procedure: GENERATOR CHANGE BI-V ICD   [place];  Surgeon: James Hwang MD;  Location: [facility] CATH INVASIVE LOCATION;  Service: Cardiology   • CARDIAC VALVE REPLACEMENT  2009    Done with stent placement   • CARDIOVERSION      multiple electrocardioversions.   • CAROTID ARTERY ANGIOPLASTY Right    • CATARACT EXTRACTION EXTRACAPSULAR W/ INTRAOCULAR LENS IMPLANTATION Bilateral    • COLONOSCOPY N/A 9/28/2017    Procedure: COLONOSCOPY TO CECUM;  Surgeon: Kevin Davis MD;  Location: [facility] ENDOSCOPY;  Service:    • COLONOSCOPY N/A 3/17/2022    Procedure: COLONOSCOPY WITH POLYPECTOMY (HOT SNARE);  Surgeon: Brooke Moscoso MD;  Location: [facility] ENDOSCOPY;  Service: Gastroenterology;  Laterality: N/A;  PRE- ANEMIA  POST-- POLYP   • CORONARY ANGIOPLASTY WITH STENT PLACEMENT  2009   • CORONARY ARTERY BYPASS GRAFT      single graft to the PDA   • CORONARY STENT PLACEMENT     • ENDOSCOPY N/A 9/28/2017    Procedure: ESOPHAGOGASTRODUODENOSCOPY ;  Surgeon: Kevin Davis MD;  Location: [facility] ENDOSCOPY;  Service:    • ENDOSCOPY N/A 3/16/2022    Procedure: ESOPHAGOGASTRODUODENOSCOPY AT BEDSIDE;  Surgeon: Brooke Moscoso MD;  Location: [facility] ENDOSCOPY;  Service: Gastroenterology;  Laterality: N/A;  pre:  anemia  post: mild gastritis   • EYE SURGERY     • HEMORRHOIDECTOMY     • HYSTERECTOMY     • INCISION AND DRAINAGE TRUNK Right 11/27/2018  "   Procedure: EVACUATION OF RIGHT CHEST WALL HEMATOMA;  Surgeon: Juvencio Rodriguez MD;  Location: [facility] OR;  Service: General   • MITRAL VALVE REPLACEMENT  01/2010    #31 Epic porcine valve.   • THROMBOENDARTERECTOMY Right     carotid thromboendarterectomy    • TONSILLECTOMY      age 32   • TOTAL KNEE ARTHROPLASTY Left    • TOTAL KNEE ARTHROPLASTY REVISION Left 11/19/2019    Procedure: TOTAL KNEE ARTHROPLASTY REVISION LEFT;  Surgeon: Juvencio Pressley II, MD;  Location: [facility] OR;  Service: Orthopedics   • TOTAL SHOULDER ARTHROPLASTY W/ DISTAL CLAVICLE EXCISION Left 1/16/2018    Procedure: TOTAL SHOULDER REVERSE ARTHROPLASTY;  Surgeon: Bianka Quesada MD;  Location: [facility] OR;  Service:    • TOTAL SHOULDER ARTHROPLASTY W/ DISTAL CLAVICLE EXCISION Right 8/31/2021    Procedure: TOTAL SHOULDER REVERSE ARTHROPLASTY;  Surgeon: Juvencio Pressley II, MD;  Location: [facility] OR;  Service: Orthopedics;  Laterality: Right;        Current Problem Anemia, MAGGIE, Thrombocytopenia       Nutritional Risk Screening       MST SCORE 3   Risk Screening Criteria Reduced oral intake over the last month     Encounter Information        Nutrition/Diet History:     Food Preferences:    Supplements:    Factors Affecting Intake: impaired cognition     Anthropometrics        Current Height  Current Weight  BMI kg/m2 Height: 160 cm (63\")  Weight: 58.3 kg (128 lb 8.5 oz) (09/06/22 0649)  Body mass index is 22.77 kg/m².       Admission Weight 58.3 kg    Ideal Body Weight (IBW) 52.4 kg    Usual Body Weight (UBW)    Weight Change/Trend        Weight History Wt Readings from Last 30 Encounters:   09/06/22 0649 58.3 kg (128 lb 8.5 oz)   09/05/22 1946 63.6 kg (140 lb 3.4 oz)   09/04/22 0015 61.5 kg (135 lb 9.3 oz)   09/03/22 2305 61.9 kg (136 lb 6.4 oz)   09/03/22 2103 63.4 kg (139 lb 12.4 oz)   08/04/22 1530 63.4 kg (139 lb 12.8 oz)   08/02/22 1056 68 kg (150 lb)   07/14/22 1548 63.7 kg (140 lb 6.4 oz) "   06/01/22 1257 62 kg (136 lb 9.6 oz)   04/26/22 1434 65.8 kg (145 lb)   04/13/22 1243 66.8 kg (147 lb 3.2 oz)   04/07/22 1329 67.1 kg (148 lb)   03/20/22 1200 61.2 kg (135 lb)   03/19/22 0500 68.3 kg (150 lb 9.6 oz)   03/18/22 0623 68.3 kg (150 lb 9.6 oz)   03/17/22 0600 69.6 kg (153 lb 7 oz)   03/16/22 0540 66 kg (145 lb 8.1 oz)   03/15/22 0553 65.2 kg (143 lb 11.8 oz)   03/14/22 1754 63.2 kg (139 lb 5.3 oz)   03/14/22 1212 63.5 kg (140 lb)   01/25/22 1415 62.9 kg (138 lb 9.6 oz)   01/10/22 1159 62.1 kg (137 lb)   12/21/21 1305 62.5 kg (137 lb 12.8 oz)   09/06/21 1735 75.6 kg (166 lb 9.6 oz)   09/01/21 0447 68 kg (150 lb)   08/18/21 1316 68.5 kg (151 lb)   08/10/21 1446 66.4 kg (146 lb 4.8 oz)   07/27/21 1123 63.5 kg (140 lb)   07/20/21 1248 64.4 kg (142 lb)   06/18/21 0948 66.2 kg (146 lb)   04/08/21 1101 68 kg (150 lb)   01/26/21 1321 66.6 kg (146 lb 14.4 oz)   12/30/20 1130 67.3 kg (148 lb 6.4 oz)   12/11/20 1053 67.6 kg (149 lb)   09/28/20 1304 65.8 kg (145 lb)   08/11/20 1427 69 kg (152 lb 1.6 oz)   07/23/20 1355 66.2 kg (146 lb)   07/02/20 0925 65.3 kg (144 lb)   06/19/20 1056 65.3 kg (144 lb)   06/05/20 1259 67.4 kg (148 lb 9.6 oz)                 Tests/Procedures        Tests/Procedures No new tests/procedures     Labs       Pertinent Labs    Results from last 7 days   Lab Units 09/06/22  0800 09/05/22  0938 09/04/22  0458 09/03/22 2012   SODIUM mmol/L 143 143 144 141   POTASSIUM mmol/L 3.7 3.9 3.7 3.7   CHLORIDE mmol/L 113* 111* 111* 108*   CO2 mmol/L 20.0* 20.1* 21.4* 21.0*   BUN mg/dL 69* 79* 82* 85*   CREATININE mg/dL 1.94* 2.04* 2.47* 2.56*   CALCIUM mg/dL 8.6 9.1 8.5* 8.4*   BILIRUBIN mg/dL  --   --   --  0.3   ALK PHOS U/L  --   --   --  33*   ALT (SGPT) U/L  --   --   --  12   AST (SGOT) U/L  --   --   --  16   GLUCOSE mg/dL 94 104* 92 91     Results from last 7 days   Lab Units 09/06/22  0800 09/03/22  2151 09/03/22 2012   MAGNESIUM mg/dL  --   --  2.0   HEMOGLOBIN g/dL 7.4*  7.4*   < >  --   "  HEMATOCRIT % 23.3*  23.3*   < >  --    WBC 10*3/mm3 6.21   < >  --     < > = values in this interval not displayed.     Results from last 7 days   Lab Units 09/06/22  0800 09/04/22  2341 09/04/22  0458 09/03/22  2151   INR   --   --  1.34* 8.95*   PLATELETS 10*3/mm3 114* 116* 93* 113*     COVID19   Date Value Ref Range Status   09/03/2022 Not Detected Not Detected - Ref. Range Final     Lab Results   Component Value Date    HGBA1C 5.20 01/16/2018          Medications           Scheduled Medications amiodarone, 200 mg, Oral, BID  insulin regular, 0-14 Units, Subcutaneous, Q6H  pantoprazole, 40 mg, Oral, Q AM  sodium chloride, 10 mL, Intravenous, Q12H       Infusions     PRN Medications dextrose  •  dextrose  •  glucagon (human recombinant)  •  melatonin  •  oxyCODONE-acetaminophen  •  [COMPLETED] Insert peripheral IV **AND** sodium chloride  •  sodium chloride     Physical Findings        Physical Appearance alert     NFPE Not applicable   --  Edema  no edema   Gastrointestinal last bowel movement:9/4; patient reported diarrhea for 9 days prior to admission   Tubes/Drains none   Oral/Mouth Cavity WNL   Skin skin intact   --  Current Nutrition Orders & Evaluation of Intake       Oral Nutrition     Food Allergies NKFA   Current PO Diet Diet Regular; Cardiac   Supplement n/a   PO Evaluation     Trending % PO Intake 100% breakfast       --  Estimated/Assessed Needs       Energy Requirements    Height for Calculation  Height: 160 cm (63\")   Weight for Calculation    Method for Estimation     EST Needs (kcal/day) 9394-0409 (25-30 kcal/kg)       Protein Requirements    Weight for Calculation 58.3 kg    EST Protein Needs (g/kg) 58-69 (1.0-1.2 gm pro/kg)   EST Daily Needs (g/day)        Fluid Requirements     Estimated Needs (mL/day) 1800 ml           PES STATEMENT / NUTRITION DIAGNOSIS      Nutrition Dx Problem  Problem: No Nutrition Diagnosis at this Time      Comment:    --    NUTRITION INTERVENTION      Intervention " Goal(s) Meet estimated needs         RD Intervention/Action Encourage intake, Follow Tx Progress and Care plan reviewed         Prescription/Orders:       PO Diet       Supplements       Enteral Nutrition       Parenteral Nutrition    New Prescription Ordered?    --      Monitor/Evaluation Per protocol   Education Will instruct as appropriate   --    RD to follow per protocol.      Electronically signed by:  Lori Pro RD  09/06/22 15:06 EDT

## 2022-09-06 NOTE — CASE MANAGEMENT/SOCIAL WORK
Discharge Planning Assessment  [facility]     Patient Name: Janel Mahoney  MRN: [id number]  Today's Date: 9/6/2022    Admit Date: 9/3/2022     Discharge Needs Assessment     Row Name 09/06/22 1136       Living Environment    People in Home child(lisandro), adult;spouse    Name(s) of People in Home Russ,     Current Living Arrangements home    Primary Care Provided by self    Provides Primary Care For no one    Family Caregiver if Needed spouse    Family Caregiver Names Garrick    Quality of Family Relationships helpful;involved;supportive    Able to Return to Prior Arrangements yes       Resource/Environmental Concerns    Resource/Environmental Concerns none    Transportation Concerns none       Transition Planning    Patient/Family Anticipates Transition to home with family    Patient/Family Anticipated Services at Transition none    Transportation Anticipated family or friend will provide       Discharge Needs Assessment    Readmission Within the Last 30 Days no previous admission in last 30 days    Equipment Currently Used at Home walker, rolling    Concerns to be Addressed no discharge needs identified;denies needs/concerns at this time    Anticipated Changes Related to Illness none    Equipment Needed After Discharge none    Provided Post Acute Provider List? N/A    N/A Provider List Comment Patient plans to discharge home    Provided Post Acute Provider Quality & Resource List? N/A    N/A Quality & Resource List Comment Patient plans to discharge home    Patient's Choice of Community Agency(s) Patient plans to discharge home               Discharge Plan     Row Name 09/06/22 1137       Plan    Plan Home with family    Patient/Family in Agreement with Plan yes    Plan Comments CCP met with patient and her  Russ at bedside with patient’s permission. CCP introduced self and explained role. Patient confirmed the demographic information on her face sheet is accurate. Patient states that she lives  at home with her  Garrick and their 50 year old independent daughter. Patient confirmed her PCP is Ariel Matute. Patient unsure if she has had HH in the past but states she is going to OP therapy on [facility] for her shoulder. Patient states she has been to [facility] in the past. Patient states she uses a walker to assist her with ambulation. Patient states her pharmacy is [facility] on [address] for short term medicine and [facility] mail order for long term prescriptions. Patient states she has 4 stairs to get in/out of her home with a handrail on the left hand side. Patient denies any stairs inside the home to maneuver. Patient denies needs and states her  can transport her home,  Garrick agreeable to transport patient home at discharge.              Continued Care and Services - Admitted Since 9/3/2022    Coordination has not been started for this encounter.          Demographic Summary     Row Name 09/06/22 1135       General Information    Admission Type inpatient    Arrived From emergency department    Reason for Consult discharge planning    Preferred Language English               Functional Status     Row Name 09/06/22 1136       Functional Status    Usual Activity Tolerance moderate    Current Activity Tolerance moderate       Functional Status, IADL    Medications assistive equipment    Meal Preparation assistive equipment and person    Housekeeping assistive equipment and person    Laundry assistive equipment and person    Shopping assistive equipment and person       Mental Status    General Appearance WDL WDL       Mental Status Summary    Recent Changes in Mental Status/Cognitive Functioning no changes       Employment/    Employment Status retired               Psychosocial    No documentation.                Abuse/Neglect    No documentation.                Legal    No documentation.                Substance Abuse    No documentation.                 Patient Forms    No documentation.

## 2022-09-06 NOTE — PROGRESS NOTES
[facility] INPATIENT PROGRESS NOTE  Subjective     CC:   Anemia, thrombocytopenia    HISTORY OF PRESENT ILLNESS:  The patient is a 81 y.o. year old female  who is here for follow-up with the above-mentioned history.     Patient was last seen in our office by Dr. Vasquez, 8/4/2022 for follow-up of anemia and thrombocytopenia.  Patient was taking oral iron twice a week, Mondays and Fridays.     Admitted from ER due to altered mental status, 9/3/2022.  He was asleep most of the day.  Woke up confused.  Last known normal was the day prior.  Somewhat unresponsive and difficult to arouse at home.  A. fib.  Pacemaker.  On Coumadin.  In the ER Hb was 4.8 with INR of 9.  Heme positive in the ER.     We were consulted because she receives Procrit through our office.  Follows with Dr. Vasquez.     Dr. Dle Angel reported extensive GI work-up has been done.     Evaluated by  Cardiology.  They felt okay to hold Coumadin for now.  They plan to follow the patient up in the office to discuss other anticoagulation options such as Eliquis or explore options like LEISA closure device.  They report fluctuating supratherapeutic INR seems to be precipitating bleeds without a GI source identified.     She denies bleeding.  She is hoping to go home soon      Interval history:    T97.3, pulse 60, respirate 16, BP 1 34/62  Patient is hopeful for discharge, discussed changing her office appointments to weekly.  H&H 7.4 and 23.3, white count of 6210, platelet count of 1 14,000, being creatinine of 69 and 1.94, additional testing included B12 greater than 2000, folate greater than 20, iron of 44, iron saturation 16%, TIBC 268, ferritin of 323.       Medications:  The current medication list was reviewed in the EMR.    Allergies:    Allergies   Allergen Reactions   • Diclofenac GI Bleeding     She had adverse effects from the medications that required hospitalization. Pt got stomach ulcers from this medication prescribed by   Conchita - pediatrist.       Objective      Vitals:    09/06/22 1008   BP: 134/62   Pulse: 60   Resp: 16   Temp: 97.3 °F (36.3 °C)   SpO2: 98%        Physical exam:   CONSTITUTIONAL:  Vital signs reviewed.  No distress, looks comfortable.  EYES:  Conjunctivae and lids unremarkable.  PERRLA  EARS, NOSE, MOUTH, THROAT:  Ears and nose appear unremarkable.  Lips, teeth, gums appear unremarkable.  RESPIRATORY:  Normal respiratory effort.  Lungs clear to auscultation bilaterally.  CARDIOVASCULAR:  Normal S1, S2.  No murmurs, rubs or gallops.  No significant lower extremity edema.  GASTROINTESTINAL: Abdomen appears unremarkable.  Nontender.  No hepatomegaly.  No splenomegaly.  NEURO: Cranial nerves 2-12 grossly intact.  No focal deficits.  Appears to have equal strength all 4 extremities.  MUSCULOSKELETAL:  Unremarkable digits/nails.  No cyanosis or clubbing.  SKIN:  Warm.  No rashes.  PSYCHIATRIC:  Normal judgment and insight.  Normal mood and affect.         RECENT LABS:    Results from last 7 days   Lab Units 09/06/22  0800 09/05/22  2330 09/05/22  1451 09/05/22  0600 09/04/22  2341 09/04/22  1201 09/04/22  0458   WBC 10*3/mm3 6.21  --   --   --  6.98  --  5.47   HEMOGLOBIN g/dL 7.4*  7.4* 7.8* 7.9*   < > 7.1*   < > 7.0*   HEMATOCRIT % 23.3*  23.3* 23.2* 24.0*   < > 22.5*   < > 22.7*   PLATELETS 10*3/mm3 114*  --   --   --  116*  --  93*    < > = values in this interval not displayed.     Results from last 7 days   Lab Units 09/06/22  0800 09/05/22  0938 09/04/22 0458 09/03/22 2012   SODIUM mmol/L 143 143 144 141   POTASSIUM mmol/L 3.7 3.9 3.7 3.7   CHLORIDE mmol/L 113* 111* 111* 108*   CO2 mmol/L 20.0* 20.1* 21.4* 21.0*   BUN mg/dL 69* 79* 82* 85*   CREATININE mg/dL 1.94* 2.04* 2.47* 2.56*   CALCIUM mg/dL 8.6 9.1 8.5* 8.4*   BILIRUBIN mg/dL  --   --   --  0.3   ALK PHOS U/L  --   --   --  33*   ALT (SGPT) U/L  --   --   --  12   AST (SGOT) U/L  --   --   --  16   GLUCOSE mg/dL 94 104* 92 91     Results from last 7  days   Lab Units 09/03/22 2012   MAGNESIUM mg/dL 2.0       Assessment & Plan   *Anemia of chronic kidney disease, stage 3a.  · Last saw Dr. Vasquez in our office, 8/4/2022 with a plan of every 3-week Procrit if Hb <10.  · Last Procrit was 8/4/2022, when Hb 9.6.  · Patient was a no-show for the 8/25/2022 labs/Procrit office visit.  (States she did not know she had the appointment)  · Admitted on 9/3/2022 with Hb 4.8 and INR 9.   · At home, she takes oral iron twice per week, Mondays and Fridays.     *Thrombocytopenia.    · Recent PLT baseline .  · PLT this admission  (within baseline).     *Atrial fibrillation.  · Cardiology reports that she tends to bleed when INR fluctuates to supratherapeutic levels with negative GI evaluation.  Cardiology is holding Coumadin and they are going to consider other anticoagulation options such as Eliquis or explore options such as LEISA closure device when she follows up with them in the office.     PLAN:  · Anticipating discharge  · For now, she will need to switch to weekly labs instead of her prior 3-week schedule  · I will call the office today to schedule this having her see her primary hematologist in 3 to 4 weeks.       Nik Rudd MD  9/6/2022  12:11 EDT

## 2022-09-06 NOTE — PLAN OF CARE
Goal Outcome Evaluation:     Patient is a pleasant 81 y.o. female admitted to [facility] for AMS, lethargy and confusion on 9/3/2022. Patient is ambulatory with a walker at baseline and lives at home with spouse and daughter. Today, patient performed bed mobility with SBA, required up to min-modA for transfers, and ambulated CGA with a RW. Strength deficits noted especially with transfers. Patient may benefit from skilled PT services acutely to address functional deficits as well as improve level of independence prior to discharge. Anticipate returning home with assist and recommend  PT to follow up upon DC. Discussed current assist level specifically transfers and patient verbalized her spouse can assist her as needed at home and denies concerns for home. PT will follow.

## 2022-09-06 NOTE — NURSING NOTE
Discharge instructions provided to patient - son at bedside. She is to stop taking coumadin and ambien. She is to follow-up with cardiology, GI, CBC and her PCP. All questions and concerns addressed.

## 2022-09-07 NOTE — CASE MANAGEMENT/SOCIAL WORK
Case Management Discharge Note      Final Note: Discharge to home with family support. JERMAINE MORALEZ CCP    Provided Post Acute Provider List?: N/A  N/A Provider List Comment: Patient plans to discharge home  Provided Post Acute Provider Quality & Resource List?: N/A  N/A Quality & Resource List Comment: Patient plans to discharge home    Selected Continued Care - Discharged on 9/6/2022 Admission date: 9/3/2022 - Discharge disposition: Home-Health Care Svc    Destination    No services have been selected for the patient.              Durable Medical Equipment    No services have been selected for the patient.              Dialysis/Infusion    No services have been selected for the patient.              Home Medical Care    No services have been selected for the patient.              Therapy    No services have been selected for the patient.              Community Resources    No services have been selected for the patient.              Community & DME    No services have been selected for the patient.                  Transportation Services  Private: Car    Final Discharge Disposition Code: 01 - home or self-care

## 2022-09-07 NOTE — OUTREACH NOTE
Prep Survey    Flowsheet Row Responses   [facility] patient discharged from? [place]   Is LACE score < 7 ? No   Emergency Room discharge w/ pulse ox? No   Eligibility [facility]   Date of Admission 09/03/22   Date of Discharge 09/06/22   Discharge Disposition Home or Self Care   Discharge diagnosis Anemia   Does the patient have one of the following disease processes/diagnoses(primary or secondary)? Other   Does the patient have Home health ordered? No   Is there a DME ordered? No   Prep survey completed? Yes          MACY OCHOA - Registered Nurse

## 2022-09-07 NOTE — OUTREACH NOTE
Call Center TCM Note    Flowsheet Row Responses   [facility] patient discharged from? [place]   Does the patient have one of the following disease processes/diagnoses(primary or secondary)? Other   TCM attempt successful? Yes   Call start time [id number]   Discharge diagnosis Anemia   Person spoke with today (if not patient) and relationship spouse   Meds reviewed with patient/caregiver? Yes   Is the patient having any side effects they believe may be caused by any medication additions or changes? No   Does the patient have all medications ordered at discharge? N/A   Is the patient taking all medications as directed (includes completed medication regime)? Yes   Comments [facility] fu appt on 9/20/22 at 2:45 PM   What is the Home health agency[facility] [place]   Psychosocial issues? No   Did the patient receive a copy of their discharge instructions? Yes   Nursing interventions Reviewed instructions with patient   What is the patient's perception of their health status since discharge? Improving   Is the patient/caregiver able to teach back signs and symptoms related to disease process for when to call PCP? Yes   Is the patient/caregiver able to teach back signs and symptoms related to disease process for when to call 911? Yes   Is the patient/caregiver able to teach back the hierarchy of who to call/visit for symptoms/problems? PCP, Specialist, Home health nurse, Urgent Care, ED, 911 Yes   If the patient is a current smoker, are they able to teach back resources for cessation? Not a smoker   TCM call completed? Yes   Wrap up additional comments Spouse states pt is doing great. Spouse verified PCP fu appt on 9/20/22. No questions/concerns.          Betzy Glaser RN    9/7/2022, 16:33 EDT

## 2022-09-07 NOTE — OUTREACH NOTE
Call Center TCM Note    Flowsheet Row Responses   [facility] patient discharged from? [place]   Does the patient have one of the following disease processes/diagnoses(primary or secondary)? Other   TCM attempt successful? No   Unsuccessful attempts Attempt 1          Caprice Wakefield MA    9/7/2022, 14:08 EDT

## 2022-09-15 NOTE — PROGRESS NOTES
Increase procrit dose to 45402 units today per Dr Vasquez.  patient had recent admission to the hospital with a hgb of 4.8.  Patient is very weak today, but does not feel that she needs to go the ED.  Reviewed Hgb of 7.3 with Dr Vasquez, and orders placed to transfuse 2 units PRBCS.  Message sent to scheduling.   Patient and spouse v/u to go to the ED should symptoms worsen.

## 2022-09-16 PROBLEM — D64.9 ANEMIA: Status: ACTIVE | Noted: 2022-01-01

## 2022-09-16 PROBLEM — D64.9 SYMPTOMATIC ANEMIA: Status: ACTIVE | Noted: 2022-01-01

## 2022-09-16 NOTE — OUTREACH NOTE
Medical Week 2 Survey    Flowsheet Row Responses   [facility] patient discharged from? [place]   Does the patient have one of the following disease processes/diagnoses(primary or secondary)? Other   Week 2 attempt successful? No   Unsuccessful attempts Attempt 1          ERIKA PICHARDO - Registered Nurse

## 2022-09-17 PROBLEM — D63.1 ANEMIA, CHRONIC RENAL FAILURE, STAGE 4 (SEVERE): Status: ACTIVE | Noted: 2022-01-01

## 2022-09-17 PROBLEM — N18.4 ANEMIA, CHRONIC RENAL FAILURE, STAGE 4 (SEVERE): Status: ACTIVE | Noted: 2022-01-01

## 2022-09-17 NOTE — OUTREACH NOTE
Medical Week 2 Survey    Flowsheet Row Responses   [facility] patient discharged from? [place]   Does the patient have one of the following disease processes/diagnoses(primary or secondary)? Other   Week 2 attempt successful? No   Unsuccessful attempts Attempt 1   Revoke Readmitted          NICOLE GARCIA - Registered Nurse

## 2022-09-20 NOTE — OUTREACH NOTE
Prep Survey    Flowsheet Row Responses   [facility] patient discharged from? [place]   Is LACE score < 7 ? No   Emergency Room discharge w/ pulse ox? No   Eligibility Not Eligible   What are the reasons patient is not eligible? [facility]   Does the patient have one of the following disease processes/diagnoses(primary or secondary)? Other   Prep survey completed? Yes          LAYLA LAWRENCE - Registered Nurse

## 2022-09-20 NOTE — TELEPHONE ENCOUNTER
DELETE AFTER REVIEWING: Telephone encounter to be sent to the clinical pool     Caller: Russ Mahoney    Relationship to patient: Emergency Contact    Best call back number: [phone]    Type of visit: 9/22 LAB & F/U    Requested date: N/A      Additional notes:PT WILL BE IN REHAB THEY WILL DO THE LAB AND GIVE HER THE INJECTION

## 2022-09-22 NOTE — TELEPHONE ENCOUNTER
Caller: MARISSA    Relationship to patient: DAUGHTER    Best call back number: [phone]    Patient is needing: TO RETURN CALL. SHE DID NOT HAVE A VM. SHE STATES HER MOTHER IS IN THE HOSPITAL RIGHT NOW. HUB DID NOT SEE A MESSAGE.

## 2022-09-28 NOTE — TELEPHONE ENCOUNTER
Penny returned my call and after reviewing patient's chart, her order for Procrit is 10,000 units SC if hemoglobin is less than 10.  Her Hemoglobin is 8.8 today, so she will receive her Procrit this week and she will see Dr. Vasquez next week.  She v/u.

## 2022-09-28 NOTE — TELEPHONE ENCOUNTER
TIMOTEO IS CALLING STATES THAT LE IS THERE AT THEIR FACILITY.  TIMOTEO STATES SHE IS WANTING AN ORDER TO GIVE  LE HER PROCRIT INJECTIONS WHILE STAYING THERE.    SHE NEEDS TO KNOW HOW MUCH AND WHEN TO GIVE INJECTIONS    PLEASE ADVISE     FAX # [phone]

## 2022-10-06 NOTE — PROGRESS NOTES
Subjective     CHIEF COMPLAINT:      Chief Complaint   Patient presents with   • Follow-up     No concerns     HISTORY OF PRESENT ILLNESS:     Janel Mahoney is a 81 y.o. female patient who returns today for follow up on her anemia.  She returns today for follow-up accompanied by her .  She was recently hospitalized at [facility] between 9/16/2022 and 9/20/2022.  She had severe anemia.  She was suspected of having GI blood loss.  She received 2 unit PRBC transfusion.  She was given Procrit 10,000 units on 9/15/2022.  She was discharged in March 2022 when hemoglobin was 8.8.    Patient is currently at subacute rehab ([facility]).  The patient's  asked about possibility of her receiving Procrit injections at that facility.    ROS:  Pertinent ROS is in the HPI.     Past medical, surgical, social and family history were reviewed.     MEDICATIONS:    Current Outpatient Medications:   •  alendronate (FOSAMAX) 70 MG tablet, Take 70 mg by mouth., Disp: , Rfl:   •  allopurinol (ZYLOPRIM) 100 MG tablet, Take 2 tablets by mouth Daily., Disp: 60 tablet, Rfl: 3  •  amiodarone (PACERONE) 200 MG tablet, Take 1 tablet by mouth 2 (Two) Times a Day., Disp: 180 tablet, Rfl: 1  •  aspirin 81 MG chewable tablet, Chew 81 mg Daily., Disp: , Rfl:   •  bumetanide (BUMEX) 2 MG tablet, TAKE 1 TABLET TWICE DAILY, Disp: 180 tablet, Rfl: 3  •  calcitriol (ROCALTROL) 0.25 MCG capsule, Take 0.25 mcg by mouth 2 (Two) Times a Day., Disp: , Rfl:   •  carvedilol (COREG) 25 MG tablet, TAKE 1 TABLET TWICE DAILY, Disp: 180 tablet, Rfl: 1  •  Epoetin Akil (PROCRIT IJ), Inject  as directed., Disp: , Rfl:   •  ferrous sulfate 325 (65 FE) MG tablet, Take 325 mg by mouth Daily With Breakfast., Disp: , Rfl:   •  Menthol, Topical Analgesic, (BIOFREEZE EX), Apply  topically., Disp: , Rfl:   •  multivitamin (THERAGRAN) tablet tablet, Take 1 tablet by mouth Daily., Disp: , Rfl:   •  mupirocin (BACTROBAN) 2 % ointment, Apply 1 application  "topically to the appropriate area as directed 3 (Three) Times a Day., Disp: 22 g, Rfl: 3  •  oxyCODONE-acetaminophen (PERCOCET) 5-325 MG per tablet, Take 1 tablet by mouth Every 8 (Eight) Hours As Needed for Moderate Pain  (chronic pain meds for back pain)., Disp: 30 tablet, Rfl: 0  •  pantoprazole (PROTONIX) 40 MG EC tablet, TAKE 1 TABLET TWICE DAILY, Disp: 180 tablet, Rfl: 0  •  polyethylene glycol (polyethylene glycol) 17 g packet, Take 17 g by mouth Daily As Needed (Constipation)., Disp: , Rfl:   •  simvastatin (ZOCOR) 20 MG tablet, TAKE 2 TABLETS EVERY NIGHT (Patient taking differently: Take 20 mg by mouth Every Night.), Disp: 180 tablet, Rfl: 1  •  vitamin B-12 (CYANOCOBALAMIN) 1000 MCG tablet, Take 1,000 mcg by mouth Daily., Disp: , Rfl:   •  Vitamin D, Cholecalciferol, 50 MCG (2000 UT) capsule, Take 2,000 Units by mouth Daily., Disp: , Rfl:   •  oxyCODONE-acetaminophen (PERCOCET) 5-325 MG per tablet, Take 1 tablet by mouth Every 8 (Eight) Hours As Needed for Moderate Pain (chronic pain meds for back pain) for up to 3 days., Disp: 9 tablet, Rfl: 0  Objective     VITAL SIGNS:     Vitals:    10/06/22 1324   BP: 122/68   Pulse: 60   Resp: 16   Temp: 97.1 °F (36.2 °C)   TempSrc: Temporal   SpO2: 100%   Height: 160 cm (62.99\")   PainSc:   7   PainLoc: Back     Body mass index is 23.92 kg/m².     Wt Readings from Last 5 Encounters:   09/28/22 61.2 kg (135 lb)   09/20/22 64.3 kg (141 lb 11.2 oz)   09/06/22 58.3 kg (128 lb 8.5 oz)   08/04/22 63.4 kg (139 lb 12.8 oz)   08/02/22 68 kg (150 lb)     PHYSICAL EXAMINATION:   GENERAL: The patient appears weak, not in acute distress.   SKIN: Extensive ecchymosis over the forearms and legs.  EYES: No jaundice. Pallor.  LYMPHATICS: No cervical lymphadenopathy.  CHEST: Normal respiratory effort.  Normal breathing sounds bilaterally.  No added sounds.  CVS: Normal S1-S2.  No murmurs.  ABDOMEN: Soft. No tenderness.   EXTREMITIES: Bilateral leg swelling, more severe on the left.  " Bilateral calf tenderness.  Distal aspect of the legs are covered with dressing bilaterally.    DIAGNOSTIC DATA:     Results from last 7 days   Lab Units 10/06/22  1300   WBC 10*3/mm3 5.60   NEUTROS ABS 10*3/mm3 4.84   HEMOGLOBIN g/dL 8.7*   HEMATOCRIT % 27.7*   PLATELETS 10*3/mm3 138*         Lab 10/06/22  1300   IRON 26*   IRON SATURATION 10*   TIBC 272   TRANSFERRIN 194*   FERRITIN 305.70*     Component      Latest Ref Rng & Units 9/20/2022   Vitamin C      0.4 - 2.0 mg/dL 2.2 (H)     Component      Latest Ref Rng & Units 9/17/2022   Copper      80 - 158 ug/dL 138   Zinc      44 - 115 ug/dL 59     Component      Latest Ref Rng & Units 9/17/2022   IgG      586 - 1602 mg/dL 400 (L)   IgA      64 - 422 mg/dL 78   IgM      26 - 217 mg/dL 83   Total Protein      6.0 - 8.5 g/dL 4.8 (L)   Albumin      2.9 - 4.4 g/dL 2.7 (L)   Alpha-1-Globulin      0.0 - 0.4 g/dL 0.3   Alpha-2-Globulin      0.4 - 1.0 g/dL 0.7   Beta Globulin      0.7 - 1.3 g/dL 0.7   Gamma Globulin      0.4 - 1.8 g/dL 0.4   M-Jace      Not Observed g/dL Not Observed   Globulin      2.2 - 3.9 g/dL 2.1 (L)   A/G Ratio      0.7 - 1.7 1.3   Immunofixation Reflex, Serum       Comment   Please note       Comment   Kappa FLC      3.3 - 19.4 mg/L 43.6 (H)   Free Lambda Light Chains      5.7 - 26.3 mg/L 39.1 (H)   Kappa/Lambda Ratio      0.26 - 1.65 1.12     Assessment & Plan    *Anemia of chronic kidney disease, stage IIIb.  · Patient is on Procrit injections currently on a weekly schedule.  · She is on ferrous sulfate 325 mg daily to maintain adequate iron stores.  · Patient had a hemoglobin of 7.8 on 9/6/2022.  · Hemoglobin decreased to 7.3 on 9/15/2022.  · She was given Procrit 10,000 units.  · She was transfused 2 units of PRBCs on 9/17/2022.  · However, hemoglobin dropped to 6.8 on 9/16/2022 when she presented to the ER after a fall.  · Patient was transfused since this admission.  · SPEP ZOIE FLC on 9/17/2022 revealed no monoclonal  gammopathy..  · Hemoglobin was 7.6 on 9/19/2022.  · She was transfused with 1 more unit of PRBCs.  · Hgb improved to 8.8 on 9/20/2022.  · Hemoglobin is 8.7 today.  · Ferritin is 305.  Transferrin saturation is 10%.     *Thrombocytopenia secondary to vitamin B12 deficiency.  · Her usual platelet count is in the 110-140,000.  · She is on vitamin B12 1000 mcg daily.  · Immature platelet fraction was 1.5% on 9/17/2022.  · No evidence of copper or zinc deficiency.  Copper was 138 and zinc was 59 on 9/17/2022.  · Platelet count improved to 103,000 on 9/20/2022.  · Platelet count is 138,000 today.     *Excessive bruising.  · Patient was previously on anticoagulation with Coumadin which was held due to her severe anemia.  · No evidence of vitamin C deficiency.    *Bilateral lower extremity swelling.  · She has history of acute DVT in the right gastrocnemius diagnosed on 9/7/2021.  · She was previously on Coumadin for cardiac indications.  Coumadin was held due to GI bleeding.  · I recommended repeating the venous Doppler before her return visit in 2 months.     PLAN:     1.  Continue Procrit 10,000 units weekly.  We will give a dose today.  2.  Due to the patient's limited mobility , we will ask the subacute nursing facility ([facility]) to do weekly CBC and to give Procrit if hemoglobin <10.0.   3.  Patient may need an increase in the dose of Procrit if she is not responding to this dose.   4.   Follow-up in 2 months.  Will obtain venous Doppler of both lower extremities before her return visit.  I explained to the patient and  that she is unable to receive anticoagulation currently due to the recent history of severe anemia and suspected GI bleeding.      Edward Vasquez MD  10/06/22

## 2022-10-07 NOTE — PROGRESS NOTES
Spoke with Oleg at [facility] and she confirmed they would check her CBC weekly and give Procrit if her hgb is below 10.0. they will fax CBC results to our office in case we need to adjust the dose.     Procrit instructions faxed to [facility] at [phone].

## 2022-10-07 NOTE — PROGRESS NOTES
PATIENT NAME:  Janel Mahoney  YOB: 1940  PATIENT'S SEX:  female    PATIENT'S ADDRESS:  [address] [place] [address]  PROVIDER:      Encounter Diagnosis   Name Primary?   • Anemia in stage 3a chronic kidney disease (HCC) Yes     Allergies   Allergen Reactions   • Diclofenac GI Bleeding     She had adverse effects from the medications that required hospitalization. Pt got stomach ulcers from this medication prescribed by Dr. Arango - pediatrist.         PROCRIT ORDERS    DATE      10/7/2022       Give Procrit if hemoglobin <10.0        Electronically Signed By: Edward Vasquez MD  (NPI: [id number])     Pt is requesting a call back from clinical staff

## 2022-10-11 NOTE — TELEPHONE ENCOUNTER
Spoke with patient's daughter, MARISSA who states patient fell twice in the same day which resulted in her last hospital admission.  She says her mom is now at [facility] and they are handling all her medical care.  She was not sure if her mom was still on warfarin, but also uncertain of her mother's discharge.  Requested her to reach out to us if we could assist on discharge.

## 2022-10-12 NOTE — ED NOTES
Pt to ED from [facility] via [facility] with c/o abnormal labs.  Per EMS BUN 92 and creatinine 3.3, hgb 8.1 today after lab draw.  Pt has hx of renal disease. Pt denies any complaints.    Pt wearing mask, staff wearing appropriate PPE.

## 2022-10-12 NOTE — ED PROVIDER NOTES
" EMERGENCY DEPARTMENT ENCOUNTER    Room Number:  36/36  Date of encounter:  10/12/2022  PCP: Ariel Matute MD  Historian: Patient and nursing home records      HPI:  Chief Complaint: Abnormal labs, cough  A complete HPI/ROS/PMH/PSH/SH/FH are unobtainable due to: Poor historian    Context: Janel Mahoney is a 81 y.o. female who presents to the ED c/o abnormal labs that were drawn in the outpatient setting today.  Patient said \"they told me my kidneys were off\" and that she needed to come to the ED.  Patient also complains of a moderate, persistent and nonproductive cough since last night with some fatigue and myalgias but no other associated symptoms.    Looking at the medical records in [facility], the patient was just seen here recently with similar complaints and found to have stable chronic kidney disease which is stage IV.  She has been making urine, it is not foul-smelling or burning, and she has been making a normal amount of it.  There is no back pain, no abdominal pain, no fever or nausea vomiting      PAST MEDICAL HISTORY  Active Ambulatory Problems     Diagnosis Date Noted   • Anemia in chronic kidney disease (CKD) 02/12/2016   • Thrombocytopenia (HCC) 05/17/2016   • B12 deficiency 05/17/2016   • Coronary artery disease involving coronary bypass graft of native heart without angina pectoris 06/08/2016   • S/P MVR (porcine) 06/08/2016   • Chronic atrial fibrillation (HCC) 06/08/2016   • Bilateral carotid artery disease ([facility]) 06/08/2016   • Intractable low back pain 08/02/2016   • Long-term (current) use of anticoagulants 08/16/2016   • Low back pain 08/18/2016   • Osteoporosis 09/01/2016   • Murmur, heart 09/01/2016   • GEORGINA on auto CPAP - Dr Cardona 09/08/2016   • Hypersomnia due to medical condition - GEORGINA 09/08/2016   • Esophageal stricture 09/06/2017   • Acute blood loss anemia 09/26/2017   • Dysphagia 09/26/2017   • Closed fracture of left proximal humerus 12/24/2017   • Essential hypertension 12/24/2017 "   • Supratherapeutic INR 12/24/2017   • Chronic combined systolic and diastolic congestive heart failure ([facility]) 12/24/2017   • Osteoporosis with pathological fracture 12/24/2017   • Closed 3-part fracture of proximal end of left humerus 01/10/2018   • Closed fracture of proximal end of humerus with delayed healing 01/16/2018   • Injury of right knee 11/12/2018   • Knee injury, left, initial encounter 11/12/2018   • History of fall 11/12/2018   • S/P TKR (total knee replacement) using cement, left 11/12/2018   • Ischemic cardiomyopathy 11/13/2018   • Intramuscular hematoma right pecotralis 11/23/2018   • CKD (chronic kidney disease) stage 4, GFR 15-29 ml/min ([facility]) 11/23/2018   • MAGGIE (acute kidney injury) ([facility]) 11/25/2018   • Peripheral edema 02/15/2019   • Inflammatory arthritis 02/20/2019   • Ventricular tachycardia 08/27/2019   • S/P revision of total knee 11/19/2019   • Idiopathic gout 06/05/2020   • Psychophysiological insomnia 07/05/2020   • ICD (implantable cardioverter-defibrillator) in place 07/23/2020   • Status post reverse arthroplasty of right shoulder 08/31/2021   • Mixed hyperlipidemia 06/24/2021   • Right leg DVT ([facility]) 09/08/2021   • Osteoarthritis of multiple joints 12/21/2021   • Basal cell carcinoma (BCC) of skin of face 06/01/2022   • Squamous cell carcinoma in situ (SCCIS) of skin 06/01/2022   • Altered mental status 09/03/2022   • CKD (chronic kidney disease) stage 3, GFR 30-59 ml/min ([facility]) 09/06/2022   • Type 2 myocardial infarction ([facility]) 09/06/2022   • Thrombocytopenia ([facility]) 09/06/2022   • Symptomatic anemia 09/16/2022   • Anemia, chronic renal failure, stage 4 (severe) ([facility]) 09/17/2022     Resolved Ambulatory Problems     Diagnosis Date Noted   • Hemorrhagic disorder due to extrinsic circulating anticoagulants ([facility]) 11/23/2018   • Acute posthemorrhagic anemia 11/23/2018   • Acute on chronic combined systolic (congestive) and diastolic (congestive) heart failure ([facility]) 02/20/2019   • Symptomatic  anemia 09/06/2021   • Acute upper GI bleed 03/14/2022     Past Medical History:   Diagnosis Date   • Acute kidney injury (HCC)    • Anemia    • Atrial fibrillation (HCC)    • Bruises easily    • Carotid artery stenosis    • Chronic back pain    • Chronic coronary artery disease    • Chronic kidney disease, stage 3 (HCC)    • GERD (gastroesophageal reflux disease)    • Gout    • H/O cardiac murmur    • Hematoma    • [place] (hard of hearing)    • Hyperlipidemia    • Hypertension    • Hypotension    • Kyphoscoliosis    • Leukopenia    • Lumbar spondylosis    • Obesity    • GEORGINA (obstructive sleep apnea) 12/01/2013   • Osteoarthritis    • Peptic ulcer    • Premature ventricular contractions    • Renal insufficiency syndrome    • Right shoulder pain 08/2021   • Scoliosis    • Shoulder fracture, left    • Shoulder pain, right    • Urine incontinence    • Vitamin B12 deficiency    • Warfarin anticoagulation          PAST SURGICAL HISTORY  Past Surgical History:   Procedure Laterality Date   • AV NODE ABLATION     • BREAST BIOPSY     • CARDIAC CATHETERIZATION      Showed severe mitral insufficiency and borderline coronary artery disease   • CARDIAC CATHETERIZATION      Showed an ejection fraction of 35%. She had occlusive disease of the right posterior LV branch and no other significant disease, treated medically.   • CARDIAC DEFIBRILLATOR PLACEMENT      Biventricular   • CARDIAC DEFIBRILLATOR PLACEMENT Left    • CARDIAC ELECTROPHYSIOLOGY PROCEDURE N/A 1/4/2019    Procedure: GENERATOR CHANGE BI-V ICD   [place];  Surgeon: James Hwang MD;  Location: [facility] INVASIVE LOCATION;  Service: Cardiology   • CARDIAC VALVE REPLACEMENT  2009    Done with stent placement   • CARDIOVERSION      multiple electrocardioversions.   • CAROTID ARTERY ANGIOPLASTY Right    • CATARACT EXTRACTION EXTRACAPSULAR W/ INTRAOCULAR LENS IMPLANTATION Bilateral    • COLONOSCOPY N/A 9/28/2017    Procedure: COLONOSCOPY TO CECUM;  Surgeon: Kevin ZENG  MD Susan;  Location: [facility] ENDOSCOPY;  Service:    • COLONOSCOPY N/A 3/17/2022    Procedure: COLONOSCOPY WITH POLYPECTOMY (HOT SNARE);  Surgeon: Brooke Moscoso MD;  Location: [facility] ENDOSCOPY;  Service: Gastroenterology;  Laterality: N/A;  PRE- ANEMIA  POST-- POLYP   • CORONARY ANGIOPLASTY WITH STENT PLACEMENT  2009   • CORONARY ARTERY BYPASS GRAFT      single graft to the PDA   • CORONARY STENT PLACEMENT     • ENDOSCOPY N/A 9/28/2017    Procedure: ESOPHAGOGASTRODUODENOSCOPY ;  Surgeon: Kevin Davis MD;  Location: [facility] ENDOSCOPY;  Service:    • ENDOSCOPY N/A 3/16/2022    Procedure: ESOPHAGOGASTRODUODENOSCOPY AT BEDSIDE;  Surgeon: Brooke Moscoso MD;  Location: [facility] ENDOSCOPY;  Service: Gastroenterology;  Laterality: N/A;  pre:  anemia  post: mild gastritis   • EYE SURGERY     • HEMORRHOIDECTOMY     • HYSTERECTOMY     • INCISION AND DRAINAGE TRUNK Right 11/27/2018    Procedure: EVACUATION OF RIGHT CHEST WALL HEMATOMA;  Surgeon: Juvencio Rodriguez MD;  Location: [facility] OR;  Service: General   • MITRAL VALVE REPLACEMENT  01/2010    #31 Epic porcine valve.   • THROMBOENDARTERECTOMY Right     carotid thromboendarterectomy    • TONSILLECTOMY      age 32   • TOTAL KNEE ARTHROPLASTY Left    • TOTAL KNEE ARTHROPLASTY REVISION Left 11/19/2019    Procedure: TOTAL KNEE ARTHROPLASTY REVISION LEFT;  Surgeon: Juvencio Pressley II, MD;  Location: [facility] OR;  Service: Orthopedics   • TOTAL SHOULDER ARTHROPLASTY W/ DISTAL CLAVICLE EXCISION Left 1/16/2018    Procedure: TOTAL SHOULDER REVERSE ARTHROPLASTY;  Surgeon: Bianka Quesada MD;  Location: [facility] OR;  Service:    • TOTAL SHOULDER ARTHROPLASTY W/ DISTAL CLAVICLE EXCISION Right 8/31/2021    Procedure: TOTAL SHOULDER REVERSE ARTHROPLASTY;  Surgeon: Juvencio Pressley II, MD;  Location: [facility] OR;  Service: Orthopedics;  Laterality: Right;         FAMILY HISTORY  Family History   Problem Relation Age of Onset   • Cancer Mother     • Breast cancer Mother    • Heart disease Mother    • Hypertension Mother    • Stroke Mother    • Coronary artery disease Father    • Stroke Father    • Heart disease Father    • Hypertension Father    • Other Daughter         thiamin deficiency    • Hypertension Son    • Lung disease Other    • Hypertension Other    • Malig Hyperthermia Neg Hx          SOCIAL HISTORY  Social History     Socioeconomic History   • Marital status:      Spouse name: Russ   Tobacco Use   • Smoking status: Former     Packs/day: 1.00     Years: 50.00     Pack years: 50.00     Types: Cigarettes     Quit date: 2009     Years since quittin.7   • Smokeless tobacco: Never   • Tobacco comments:     Daily caffeine - soda   Vaping Use   • Vaping Use: Never used   Substance and Sexual Activity   • Alcohol use: Yes     Comment: 1 yearly   • Drug use: Never   • Sexual activity: Not Currently         ALLERGIES  Diclofenac        REVIEW OF SYSTEMS  Review of Systems   Limited due to poor historian      PHYSICAL EXAM    I have reviewed the triage vital signs and nursing notes.    ED Triage Vitals [10/12/22 4415]   Temp Heart Rate Resp BP SpO2   98.3 °F (36.8 °C) 60 18 135/55 98 %      Temp src Heart Rate Source Patient Position BP Location FiO2 (%)   Oral -- -- -- --       Physical Exam  GENERAL: Elderly and chronically ill-appearing but in no acute distress  HENT: nares patent  EYES: no scleral icterus  CV: regular rhythm, regular rate  RESPIRATORY: normal effort  ABDOMEN: soft  MUSCULOSKELETAL: no deformity  NEURO: alert, moves all extremities, follows commands  SKIN: warm, dry        LAB RESULTS  Recent Results (from the past 24 hour(s))   Comprehensive Metabolic Panel    Collection Time: 10/12/22  5:22 PM    Specimen: Blood   Result Value Ref Range    Glucose 138 (H) 65 - 99 mg/dL    BUN 81 (H) 8 - 23 mg/dL    Creatinine 2.95 (H) 0.57 - 1.00 mg/dL    Sodium 141 136 - 145 mmol/L    Potassium 4.4 3.5 - 5.2 mmol/L    Chloride 103  98 - 107 mmol/L    CO2 24.0 22.0 - 29.0 mmol/L    Calcium 8.9 8.6 - 10.5 mg/dL    Total Protein 5.4 (L) 6.0 - 8.5 g/dL    Albumin 3.30 (L) 3.50 - 5.20 g/dL    ALT (SGPT) 20 1 - 33 U/L    AST (SGOT) 22 1 - 32 U/L    Alkaline Phosphatase 62 39 - 117 U/L    Total Bilirubin 0.5 0.0 - 1.2 mg/dL    Globulin 2.1 gm/dL    A/G Ratio 1.6 g/dL    BUN/Creatinine Ratio 27.5 (H) 7.0 - 25.0    Anion Gap 14.0 5.0 - 15.0 mmol/L    eGFR 15.5 (L) >60.0 mL/min/1.73   Protime-INR    Collection Time: 10/12/22  5:22 PM    Specimen: Blood   Result Value Ref Range    Protime 15.9 (H) 11.7 - 14.2 Seconds    INR 1.25 (H) 0.90 - 1.10   CBC Auto Differential    Collection Time: 10/12/22  5:22 PM    Specimen: Blood   Result Value Ref Range    WBC 5.22 3.40 - 10.80 10*3/mm3    RBC 2.76 (L) 3.77 - 5.28 10*6/mm3    Hemoglobin 8.5 (L) 12.0 - 15.9 g/dL    Hematocrit 26.9 (L) 34.0 - 46.6 %    MCV 97.5 (H) 79.0 - 97.0 fL    MCH 30.8 26.6 - 33.0 pg    MCHC 31.6 31.5 - 35.7 g/dL    RDW 15.9 (H) 12.3 - 15.4 %    RDW-SD 55.2 (H) 37.0 - 54.0 fl    MPV 10.4 6.0 - 12.0 fL    Platelets 116 (L) 140 - 450 10*3/mm3    Neutrophil % 86.9 (H) 42.7 - 76.0 %    Lymphocyte % 5.7 (L) 19.6 - 45.3 %    Monocyte % 5.4 5.0 - 12.0 %    Eosinophil % 1.0 0.3 - 6.2 %    Basophil % 0.2 0.0 - 1.5 %    Immature Grans % 0.8 (H) 0.0 - 0.5 %    Neutrophils, Absolute 4.54 1.70 - 7.00 10*3/mm3    Lymphocytes, Absolute 0.30 (L) 0.70 - 3.10 10*3/mm3    Monocytes, Absolute 0.28 0.10 - 0.90 10*3/mm3    Eosinophils, Absolute 0.05 0.00 - 0.40 10*3/mm3    Basophils, Absolute 0.01 0.00 - 0.20 10*3/mm3    Immature Grans, Absolute 0.04 0.00 - 0.05 10*3/mm3    nRBC 0.4 (H) 0.0 - 0.2 /100 WBC   Respiratory Panel PCR w/COVID-19(SARS-CoV-2) AMRITA/KILLIAN/CONCETTA/PAD/COR/MAD/EUGENE In-House, NP Swab in [facility], 3-4 HR TAT - Swab, Nasopharynx    Collection Time: 10/12/22  6:43 PM    Specimen: Nasopharynx; Swab   Result Value Ref Range    ADENOVIRUS, PCR Detected (A) Not Detected    Coronavirus 229E Not Detected  Not Detected    Coronavirus HKU1 Not Detected Not Detected    Coronavirus NL63 Not Detected Not Detected    Coronavirus OC43 Not Detected Not Detected    COVID19 Not Detected Not Detected - Ref. Range    Human Metapneumovirus Not Detected Not Detected    Human Rhinovirus/Enterovirus Not Detected Not Detected    Influenza A PCR Not Detected Not Detected    Influenza B PCR Not Detected Not Detected    Parainfluenza Virus 1 Not Detected Not Detected    Parainfluenza Virus 2 Not Detected Not Detected    Parainfluenza Virus 3 Not Detected Not Detected    Parainfluenza Virus 4 Not Detected Not Detected    RSV, PCR Not Detected Not Detected    Bordetella pertussis pcr Not Detected Not Detected    Bordetella parapertussis PCR Not Detected Not Detected    Chlamydophila pneumoniae PCR Not Detected Not Detected    Mycoplasma pneumo by PCR Not Detected Not Detected       Ordered the above labs and independently reviewed the results.        RADIOLOGY  XR Chest 1 View    Result Date: 10/12/2022  EMERGENCY PORTABLE VIEW OF THE CHEST 10/12/2022  CLINICAL HISTORY: Cough  COMPARISON: This is correlated to a prior portable chest x-ray 09/03/2022 and 03/14/2022.  FINDINGS: There are multiple sternal wires from previous cardiac surgery. There is a left subclavian transvenous pacemaker in place. Its distal leads course into the right ventricular apex and another through the coronary sinus, distal tip over the left heart border. Cardiomediastinal silhouette is moderately enlarged, pulmonary vasculature is within normal limits. There is hazy density over the right lung base that may be a small posterior layering right effusion with atelectasis of the right base. Remainder of the lungs are clear, left costophrenic angle is not optimally assessed due to overlapping cardiomediastinal silhouette.      1. There is some mild hazy increased density at the right lung base probably secondary to a small right pleural effusion with right basilar  atelectasis. No additional active disease is seen in the chest. 2. Previous median sternotomy. There is left subclavian transvenous pacemaker in place and there is moderate enlargement of the cardiomediastinal silhouette. Bilateral reverse total shoulder arthroplasty prostheses are in place.        I ordered the above noted radiological studies. Reviewed by me and discussed with radiologist.  See dictation for official radiology interpretation.      PROCEDURES    Procedures      MEDICATIONS GIVEN IN ER    Medications   sodium chloride 0.9 % flush 10 mL (has no administration in time range)   sodium chloride 0.9 % bolus 250 mL (0 mL Intravenous Stopped 10/12/22 2128)         PROGRESS, DATA ANALYSIS, CONSULTS, AND MEDICAL DECISION MAKING    All labs have been independently reviewed by me.  All radiology studies have been reviewed by me and discussed with radiologist dictating the report.   EKG's independently viewed and interpreted by me.  Discussion below represents my analysis of pertinent findings related to patient's condition, differential diagnosis, treatment plan and final disposition.        ED Course as of 10/12/22 2310   Wed Oct 12, 2022   2309 Chemistry shows a BUN of 81 with a creatinine of 2.95.  When looking back through the old records in epic, this is very similar to what it was last week, the week before and even several weeks ago. [DP]   2309 CBC shows normal white count with a hemoglobin 8.5 and that is also similar to baseline [DP]   2309 Chest x-ray shows no obvious focal infiltrate and the viral panel is positive for adenovirus.  I think this is likely the biggest contributor to her symptoms and can be treated symptomatically in the outpatient setting with no antibiotics    Again, her renal function appears to be very close to baseline and this is her set point at this time.  There is no need for any significant volume correction, she appears to be tolerating this very well and needs nothing else  from the ER tonight     [DP]      ED Course User Index  [DP] Kahlil Donaldson MD           PPE: The patient wore a surgical mask throughout the entire patient encounter. I wore an N95.    AS OF 23:10 EDT VITALS:    BP - 132/55  HR - 68  TEMP - 98.3 °F (36.8 °C) (Oral)  O2 SATS - 100%        DIAGNOSIS  Final diagnoses:   Stage 4 chronic kidney disease (HCC)   Adenovirus viremia         DISPOSITION  Discharge         Kahlil Donaldson MD  10/12/22 9463

## 2022-10-12 NOTE — PROGRESS NOTES
Clinical Pharmacy Services: Medication History    Janel Mahoney is a 81 y.o. female presenting to [facility] for   Chief Complaint   Patient presents with   • Abnormal Lab       She  has a past medical history of Acute kidney injury (HCC), Anemia, Atrial fibrillation (HCC), Bruises easily, Carotid artery stenosis, Chronic back pain, Chronic combined systolic and diastolic congestive heart failure (HCC), Chronic coronary artery disease, Chronic kidney disease, stage 3 (HCC), Dysphagia, GERD (gastroesophageal reflux disease), Gout, H/O cardiac murmur, Hematoma, [place] (hard of hearing), Hyperlipidemia, Hypertension, Hypotension, ICD (implantable cardioverter-defibrillator) in place, Ischemic cardiomyopathy, Kyphoscoliosis, Leukopenia, Lumbar spondylosis, Obesity, GEORGINA (obstructive sleep apnea) (12/01/2013), Osteoarthritis, Osteoporosis, Peptic ulcer, Premature ventricular contractions, Renal insufficiency syndrome, Right shoulder pain (08/2021), Scoliosis, Shoulder fracture, left, Shoulder pain, right, Thrombocytopenia (HCC), Urine incontinence, Ventricular tachycardia, Vitamin B12 deficiency, and Warfarin anticoagulation.    Allergies as of 10/12/2022 - Reviewed 10/12/2022   Allergen Reaction Noted   • Diclofenac GI Bleeding 10/26/2017       Medication information was obtained from: retirement Paperwork  Pharmacy and Phone Number:     Prior to Admission Medications     Prescriptions Last Dose Informant Patient Reported? Taking?    alendronate (FOSAMAX) 70 MG tablet  Nursing Home Yes Yes    Take 1 tablet by mouth Every 7 (Seven) Days. Thursday    allopurinol (ZYLOPRIM) 100 MG tablet  Nursing Home No Yes    Take 2 tablets by mouth Daily.    amiodarone (PACERONE) 200 MG tablet  Nursing Home No Yes    Take 1 tablet by mouth 2 (Two) Times a Day.    Patient taking differently:  Take 1 tablet by mouth Daily.    aspirin 81 MG chewable tablet  Nursing Home Yes Yes    Chew 81 mg Daily.    bumetanide (BUMEX) 2 MG  tablet  Nursing Home No Yes    TAKE 1 TABLET TWICE DAILY    Patient taking differently:  Take 1 tablet by mouth Daily.    calcitriol (ROCALTROL) 0.25 MCG capsule  Nursing Home Yes Yes    Take 0.25 mcg by mouth 2 (Two) Times a Day.    carvedilol (COREG) 25 MG tablet  Nursing Home No Yes    TAKE 1 TABLET TWICE DAILY    Patient taking differently:  Take 1 tablet by mouth 2 (Two) Times a Day.    dimethicone 1 % cream  Nursing Home Yes Yes    Apply 1 application topically to the appropriate area as directed 2 (Two) Times a Day As Needed for Dry Skin.    Dimethicone 1.5 % cream  Nursing Home Yes Yes    Apply 1 application topically to the appropriate area as directed 2 (Two) Times a Day. Apply to Bilateral upper and lower ext,    ferrous sulfate 325 (65 FE) MG tablet  Nursing Home Yes Yes    Take 1 tablet by mouth Daily.    melatonin 5 MG tablet tablet  Nursing Home Yes Yes    Take 1 tablet by mouth Every Night.    multivitamin (THERAGRAN) tablet tablet  Nursing Home Yes Yes    Take 1 tablet by mouth Daily.    oxyCODONE-acetaminophen (PERCOCET) 5-325 MG per tablet  Nursing Home No Yes    Take 1 tablet by mouth Every 8 (Eight) Hours As Needed for Moderate Pain  (chronic pain meds for back pain).    pantoprazole (PROTONIX) 40 MG EC tablet  Nursing Home No Yes    TAKE 1 TABLET TWICE DAILY    Patient taking differently:  Take 1 tablet by mouth 2 (Two) Times a Day.    polyethylene glycol (polyethylene glycol) 17 g packet  Nursing Home No Yes    Take 17 g by mouth Daily As Needed (Constipation).    silver sulfadiazine (SILVADENE, SSD) 1 % cream  Nursing Home Yes Yes    Apply 1 application topically to the appropriate area as directed 2 (Two) Times a Day As Needed for Wound Care.    simvastatin (ZOCOR) 40 MG tablet  Nursing Home Yes Yes    Take 1 tablet by mouth Every Night.    vitamin B-12 (CYANOCOBALAMIN) 1000 MCG tablet  Nursing Home Yes Yes    Take 1,000 mcg by mouth Daily.    Vitamin D, Cholecalciferol, 50 MCG (2000 UT)  capsule  Nursing Home Yes Yes    Take 2,000 Units by mouth Daily.            Medication notes:     This medication list is complete to the best of my knowledge as of 10/12/2022    Please call if questions.    Del Knight  Medication History Technician   [phone]    10/12/2022 18:30 EDT

## 2022-10-13 NOTE — ED NOTES
Patient's family unable to transport patient back to [facility] due to history of frequent falls and generalized weakness. [facility] paged. Report called to Lang at [facility].    1 pair

## 2022-10-17 NOTE — OUTREACH NOTE
AMBULATORY CASE MANAGEMENT NOTE    Name and Relationship of Patient/Support Person: Russ Mahoney - Emergency Contact    SNF Follow-up    Questions/Answers    Flowsheet Row Responses   Acute Facility Discharged From [facility]   Acute Discharge Date 09/28/22   Name of the Skilled Nursing Facility? [facility]   Purpose of SNF Admission SN, PT   Who is the insurance provider or payor of patient stay? Medicare   Skilled Nursing Discharge Date? --  [to be determined]        Patient Outreach    Outgoing call to the patient.  Spoke with her spouse, Russ.  He states that patient is an skilled rehab at [facility].  At this time dc date is to be determined.  He agrees with outreach for follow up and this has been scheduled.      INDIRA PICHARDO  Ambulatory Case Management    10/17/2022, 12:17 EDT

## 2022-10-27 NOTE — OUTREACH NOTE
AMBULATORY CASE MANAGEMENT NOTE    Name and Relationship of Patient/Support Person: [facility] -     SNF Follow-up    Questions/Answers    Flowsheet Row Responses   Acute Facility Discharged From [facility]   Acute Discharge Date 09/28/22   Name of the Skilled Nursing Facility? [facility]   Purpose of SNF Admission SN, PT   Who is the insurance provider or payor of patient stay? Medicare        Patient Outreach    Outgoing call to the spouse, no answer.  Spoke with [facility] and verified that patient remains in short term skilled rehab.  Outreach has been scheduled for one week follow up.    INDIRA PICHARDO  Ambulatory Case Management    10/27/2022, 13:01 EDT

## 2022-10-31 NOTE — TELEPHONE ENCOUNTER
Caller: MARISSA MUNOZ    Relationship: Emergency Contact    Best call back number:     What medication are you requesting:     zolpidem (AMBIEN) 10 MG tablet     RX NUMBER [id number]    What are your current symptoms:       Have you had these symptoms before:    [x] Yes  [] No    Have you been treated for these symptoms before:   [x] Yes  [] No    If a prescription is needed, what is your preferred pharmacy and phone number: [facility] - [facility] [address] - [phone] [facility] [phone] FX     Additional notes:

## 2022-11-03 NOTE — OUTREACH NOTE
AMBULATORY CASE MANAGEMENT NOTE    Name and Relationship of Patient/Support Person: Jameel at [facility] post acute -     SNF Follow-up    Questions/Answers    Flowsheet Row Responses   Acute Facility Discharged From [facility]   Acute Discharge Date 09/28/22   Name of the Skilled Nursing Facility? [facility]   Purpose of SNF Admission SN, PT   Who is the insurance provider or payor of patient stay? Medicare   Skilled Nursing Discharge Date? --  [TBD]        Patient Outreach    Spoke with Jameel at [facility].  Patient remains in skilled rehab.  Outreach for one week follow up has been scheduled.   INDIRA PICHARDO  Ambulatory Case Management    11/3/2022, 10:55 EDT

## 2022-11-10 NOTE — OUTREACH NOTE
AMBULATORY CASE MANAGEMENT NOTE    Name and Relationship of Patient/Support Person: [facility] -     SNF Follow-up    Questions/Answers    Flowsheet Row Responses   Acute Facility Discharged From [facility]   Acute Discharge Date 09/28/22   Name of the Skilled Nursing Facility? [facility]   Purpose of SNF Admission SN, PT   Who is the insurance provider or payor of patient stay? Medicare   Progression of Patient? ongoing   Skilled Nursing Discharge Date? --  [to be determined]        Patient Outreach    Spoke with [facility] and verified that the patient remains in skilled rehab.  Outreach has been scheduled for one week follow up.    INDIRA PICHARDO  Ambulatory Case Management    11/10/2022, 12:40 EST

## 2022-11-21 NOTE — TELEPHONE ENCOUNTER
Caller: OTILIO    Relationship to patient:     Best call back number: [phone]    Patient is needing: TO R/S 11- LAB, F/U AND INJECTION. SHE WILL BE OUT OF NURSING HOME 11-23-22.

## 2022-11-22 NOTE — TELEPHONE ENCOUNTER
Called Marcella frazier/ DARRIAN Rand containing verbal approval for physical therapy and our clinic's phone number to call if any concerns or questions.

## 2022-11-23 NOTE — OUTREACH NOTE
Prep Survey    Flowsheet Row Responses   [facility] facility patient discharged from? Non-BH   Is LACE score < 7 ? Non-BH Discharge   Emergency Room discharge w/ pulse ox? No   Eligibility [facility]   Date of Discharge 11/23/22   Discharge Disposition Home-Health Care [facility]   Discharge diagnosis Chronic Kidney disease Stage 4   Does the patient have one of the following disease processes/diagnoses(primary or secondary)? Other   Does the patient have Home health ordered? Yes   What is the Home health agency?  [facility]   Prep survey completed? Yes          DAWNA MARTINEZ - Registered Nurse

## 2022-11-25 NOTE — OUTREACH NOTE
Call Center TCM Note    Flowsheet Row Responses   [facility] patient discharged from? Non-BH   Does the patient have one of the following disease processes/diagnoses(primary or secondary)? Other   TCM attempt successful? Yes   Call start time 0845   Call end time 0847   Discharge diagnosis Chronic Kidney disease Stage 4   Is patient permission given to speak with other caregiver? Yes   List who call center can speak with OTILIO SILVAS   Person spoke with today (if not patient) and relationship OTILIO MUNOZ-    Is the patient taking all medications as directed (includes completed medication regime)? Yes   Comments  DECLINED MAKING FOLLOW UP APPOINTMENT WITH DR. LYNN DURING THIS CALL. HE STATES SHE IS TOO WEAK TO GET OUT RIGHT NOW AND IT IS DIFFICULT TRANSPORTING HER. HE STATES HE IS HAVING A WHEELCHAIR RAMP BUILT NEXT WEEK TO MAKE IT EASIER FOR HER TO GET OUT.    Does the patient have an appointment with their PCP within 7 days of discharge? No   Nursing Interventions Patient declined scheduling/rescheduling appointment at this time   What is the Home health agency?  [facility]   Has home health visited the patient within 72 hours of discharge? Call prior to 72 hours   Psychosocial issues? No   What is the patient's perception of their health status since discharge? Improving   Is the patient/caregiver able to teach back signs and symptoms related to disease process for when to call PCP? Yes   Is the patient/caregiver able to teach back signs and symptoms related to disease process for when to call 911? Yes   Is the patient/caregiver able to teach back the hierarchy of who to call/visit for symptoms/problems? PCP, Specialist, Home health nurse, Urgent Care, ED, 911 Yes   TCM call completed? Yes   Call end time 0847   Would this patient benefit from a Referral to Amb Social Work? No   Is the patient interested in additional calls from an ambulatory ?  NOTE:  applies to high risk  patients requiring additional follow-up. Estelita Lee LPN    11/25/2022, 08:51 EST

## 2022-11-28 NOTE — TELEPHONE ENCOUNTER
[facility] WITH [facility] NEEDING VERBAL ORDERS TO SEE PT FOR P.T., ONCE A WEEK FOR 9 WEEKS    ALSO NEEDING OK FOR OT AND KIRT HEARD

## 2022-11-30 NOTE — TELEPHONE ENCOUNTER
Caller: [facility]    Relationship: Home Health    Best call back number: [phone]    What orders are you requesting (i.e. lab or imaging): OCCUPATIONAL THERAPY    In what timeframe would the patient need to come in: AS SOON AS POSSIBLE    Where will you receive your lab/imaging services: AT HOME    Additional notes: [facility] CALLING TO GET OT ORDERS FOR 1 X PER WEEK FOR 4 WEEKS

## 2022-12-01 NOTE — PROGRESS NOTES
Subjective     CHIEF COMPLAINT:      Chief Complaint   Patient presents with   • Follow-up     No concerns       HISTORY OF PRESENT ILLNESS:     Janel Mahoney is a 82 y.o. female patient who returns today for follow up on her anemia.  She is accompanied by her .  She was recently discharged home from subacute rehab.  She has wounds over the legs and her  reports that she recently started having home health care to take care of of dressing changes.  She continues to bruise.  She continues aspirin.  She remains off Coumadin due to her severe anemia and concern about GI bleeding.    Patient denies having chest pain or shortness of breath today.    ROS:  Pertinent ROS is in the HPI.     Past medical, surgical, social and family history were reviewed.     MEDICATIONS:    Current Outpatient Medications:   •  alendronate (FOSAMAX) 70 MG tablet, Take 1 tablet by mouth Every 7 (Seven) Days. Thursday, Disp: , Rfl:   •  allopurinol (ZYLOPRIM) 100 MG tablet, TAKE 2 TABLETS EVERY DAY, Disp: 180 tablet, Rfl: 1  •  amiodarone (PACERONE) 200 MG tablet, Take 1 tablet by mouth 2 (Two) Times a Day. (Patient taking differently: Take 200 mg by mouth Daily.), Disp: 180 tablet, Rfl: 1  •  aspirin 81 MG chewable tablet, Chew 81 mg Daily., Disp: , Rfl:   •  bumetanide (BUMEX) 2 MG tablet, TAKE 1 TABLET TWICE DAILY (Patient taking differently: Take 2 mg by mouth Daily.), Disp: 180 tablet, Rfl: 3  •  calcitriol (ROCALTROL) 0.25 MCG capsule, Take 0.25 mcg by mouth 2 (Two) Times a Day., Disp: , Rfl:   •  carvedilol (COREG) 25 MG tablet, TAKE 1 TABLET TWICE DAILY (Patient taking differently: Take 25 mg by mouth 2 (Two) Times a Day.), Disp: 180 tablet, Rfl: 1  •  dimethicone 1 % cream, Apply 1 application topically to the appropriate area as directed 2 (Two) Times a Day As Needed for Dry Skin., Disp: , Rfl:   •  Dimethicone 1.5 % cream, Apply 1 application topically to the appropriate area as directed 2 (Two) Times a Day. Apply to  "Bilateral upper and lower ext,, Disp: , Rfl:   •  ferrous sulfate 325 (65 FE) MG tablet, Take 1 tablet by mouth Daily., Disp: , Rfl:   •  melatonin 5 MG tablet tablet, Take 1 tablet by mouth Every Night., Disp: , Rfl:   •  multivitamin (THERAGRAN) tablet tablet, Take 1 tablet by mouth Daily., Disp: , Rfl:   •  oxyCODONE-acetaminophen (PERCOCET) 5-325 MG per tablet, Take 1 tablet by mouth Every 8 (Eight) Hours As Needed for Moderate Pain  (chronic pain meds for back pain)., Disp: 30 tablet, Rfl: 0  •  pantoprazole (PROTONIX) 40 MG EC tablet, TAKE 1 TABLET TWICE DAILY (Patient taking differently: Take 40 mg by mouth 2 (Two) Times a Day.), Disp: 180 tablet, Rfl: 0  •  polyethylene glycol (polyethylene glycol) 17 g packet, Take 17 g by mouth Daily As Needed (Constipation)., Disp: , Rfl:   •  silver sulfadiazine (SILVADENE, SSD) 1 % cream, Apply 1 application topically to the appropriate area as directed 2 (Two) Times a Day As Needed for Wound Care., Disp: , Rfl:   •  simvastatin (ZOCOR) 40 MG tablet, Take 1 tablet by mouth Every Night., Disp: , Rfl:   •  vitamin B-12 (CYANOCOBALAMIN) 1000 MCG tablet, Take 1,000 mcg by mouth Daily., Disp: , Rfl:   •  Vitamin D, Cholecalciferol, 50 MCG (2000 UT) capsule, Take 2,000 Units by mouth Daily., Disp: , Rfl:   •  zolpidem (AMBIEN) 10 MG tablet, Take 1 tablet by mouth At Night As Needed for Sleep., Disp: 30 tablet, Rfl: 3  •  warfarin (COUMADIN) 1 MG tablet, TAKE 1 TABLET DAILY OR AS DIRECTED., Disp: 90 tablet, Rfl: 1  Objective     VITAL SIGNS:     Vitals:    12/01/22 1302   BP: 172/70   Pulse: 60   Temp: 97.1 °F (36.2 °C)   TempSrc: Temporal   SpO2: 96%   Weight: Comment: unable to stand   Height: 162.6 cm (64.02\")   PainSc: 7  Comment: when moving     Body mass index is 23.68 kg/m².     Wt Readings from Last 5 Encounters:   10/12/22 62.6 kg (138 lb)   09/28/22 61.2 kg (135 lb)   09/20/22 64.3 kg (141 lb 11.2 oz)   09/06/22 58.3 kg (128 lb 8.5 oz)   08/04/22 63.4 kg (139 lb 12.8 " oz)     PHYSICAL EXAMINATION:   GENERAL: The patient appears weak, not in acute distress.   SKIN: Extensive ecchymosis over the forearms and hands bilaterally.  EYES: No jaundice. Pallor.  LYMPHATICS: No cervical lymphadenopathy.  CHEST: Normal respiratory effort.  Lungs clear bilaterally.  No added sounds.  CVS: Normal S1-S2.  No murmurs.  ABDOMEN: Soft. No tenderness.   EXTREMITIES: Trace edema bilaterally.  No calf tenderness.    DIAGNOSTIC DATA:     Results from last 7 days   Lab Units 12/01/22  1250   WBC 10*3/mm3 5.76   NEUTROS ABS 10*3/mm3 4.26   HEMOGLOBIN g/dL 8.7*   HEMATOCRIT % 28.8*   PLATELETS 10*3/mm3 104*         Lab 12/01/22  1250   IRON 38   IRON SATURATION 12*   TIBC 309   TRANSFERRIN 221   FERRITIN 391.00*      Venous Doppler of both lower extremities on 11/28/2022:  · Normal bilateral lower extremity venous duplex scan.    Assessment & Plan    *Anemia of chronic kidney disease, stage IV.  · Patient is on Procrit injections currently on a weekly schedule.  · She is on ferrous sulfate 325 mg daily to maintain adequate iron stores.  · Patient had a hemoglobin of 7.8 on 9/6/2022.  · Hemoglobin decreased to 7.3 on 9/15/2022.  · She was given Procrit 10,000 units.  · She was transfused 2 units of PRBCs on 9/17/2022.  · However, hemoglobin dropped to 6.8 on 9/16/2022 when she presented to the ER after a fall.  · SPEP ZOIE FLC on 9/17/2022 revealed no monoclonal gammopathy..  · Hemoglobin was 7.6 on 9/19/2022.  · She received multiple PRBC transfusions during her hospital stay.  · Hemoglobin was 8.7 on 10/6/2022.  · Hemoglobin is 8.7 today.  Iron stores are adequate with ferritin of 391 and transferrin saturation of 12%.  · She was receiving Procrit 10,000 units weekly at the subacute nursing facility.  · Since hemoglobin is remaining in the 8 g range, recommended increasing Procrit by 25%.     *Thrombocytopenia secondary to vitamin B12 deficiency.  · Her usual platelet count is in the 110-140,000.  · She  is on vitamin B12 1000 mcg daily.  · Immature platelet fraction was 1.5% on 9/17/2022.  · No evidence of copper or zinc deficiency.  Copper was 138 and zinc was 59 on 9/17/2022.  · Platelet count is 104,000 today.     *Excessive bruising.  · Patient was previously on Coumadin which was held due to her severe anemia and concern regarding GI bleeding.  · She remains on aspirin.    *Bilateral lower extremity swelling.  · She has history of acute DVT in the right gastrocnemius diagnosed on 9/7/2021.  · She was previously on Coumadin for cardiac indications.  Coumadin was held due to GI bleeding.  · Venous Doppler on 11/28/2022 showed no evidence of DVT.     PLAN:     1.  Continue Procrit weekly.  We will increase the dose today to 13,000 units.    2.  Continue ferrous sulfate once daily.    3.  Continue aspirin but keep Coumadin on hold.  4.  Obtain folate and methylmalonic acid levels today.  5.  I will see her in follow-up in 3 months.  Ferritin iron panel will be obtained 1 week before her return visit.      Edward Vasquez MD  12/01/22

## 2022-12-02 NOTE — PROGRESS NOTES
This visit is for documentation purposes only: Patient is remaining off her anticoagulation due to GI bleeding. Per visit notes 12/1; plan to continue to hold coumadin indefinitely.  No longer following in the [facility]. It has been a pleasure being part of her care.

## 2022-12-05 NOTE — OUTREACH NOTE
AMBULATORY CASE MANAGEMENT NOTE    Name and Relationship of Patient/Support Person: Russ Mahoney - Emergency Contact    SNF Follow-up    Questions/Answers    Flowsheet Row Responses   Acute Facility Discharged From [facility]   Acute Discharge Date 09/28/22   Name of the Skilled Nursing Facility? [facility]   Purpose of SNF Admission SN, PT, WC   Who is the insurance provider or payor of patient stay? Medicare   Skilled Nursing Discharge Date? 11/24/22   Where was the patient discharged to? Home   Home Health Agency at discharge? Yes   Name of Home Health Agency? [facility]   DME Needed at Discharge? No   Are there any medication concerns at Discharge No   Does the patient have a follow-up appointment? Yes           Adult Patient Profile  Questions/Answers    Flowsheet Row Most Recent Value   Symptoms/Conditions Managed at Home hematologic, cardiovascular, peripheral/neurovascular, skin   Barriers to Managing Health age, understanding health advice   Cardiovascular Symptoms/Conditions dysrhythmia, heart failure, coronary artery disease   Cardiovascular Management Strategies medication therapy   Cardiovascular Self-Management Outcome 3 (uncertain)   Hematologic Symptoms/Conditions anemia   Hematologic Management Strategies medication therapy   Hematologic Self-Management Outcome 4 (good)   Peripheral Neurovascular Symptoms/Conditions peripheral artery disease   Peripheral Neurovascular Management Strategies adequate rest   Skin Symptoms/Conditions wound  [BLE]   Skin Management Strategies dressing changes   Skin Self-Management Outcome 4 (good)  [spouse does dressing changes and HH ordered supplies for the patient]   Barriers to Taking Medication as Prescribed none   Hearing Difficulty or Deaf yes   Wear Glasses or Blind no   Concentrating, Remembering or Making Decisions Difficulty yes   Difficulty Communicating no   Difficulty Eating/Swallowing no   Walking or Climbing Stairs Difficulty yes   [[facility] services to start this week]   Walking or Climbing Stairs transferring difficulty, requires equipment, transferring difficulty, assistance 1 person   Dressing/Bathing Difficulty yes   Dressing/Bathing dressing difficulty, assistance 1 person   Doing Errands Independently Difficulty (such as shopping) yes   Equipment Currently Used at Home wheelchair, commode, grab bar, ramp  [lift chair]        SDOH updated and reviewed with the patient during this program:  Financial Resource Strain: Low Risk    • Difficulty of Paying Living Expenses: Not hard at all      Physical Activity: Inactive   • Days of Exercise per Week: 0 days   • Minutes of Exercise per Session: 0 min      Food Insecurity: No Food Insecurity   • Worried About Running Out of Food in the Last Year: Never true   • Ran Out of Food in the Last Year: Never true      Social Connections: Moderately Integrated   • Frequency of Communication with Friends and Family: Never   • Frequency of Social Gatherings with Friends and Family: More than three times a week   • Attends [employer] Services: 1 to 4 times per year   • Active Member of Clubs or Organizations: No   • Attends Club or Organization Meetings: Never   • Marital Status:       Transportation Needs: No Transportation Needs   • Lack of Transportation (Medical): No   • Lack of Transportation (Non-Medical): No      Housing Stability: Low Risk    • Unable to Pay for Housing in the Last Year: No   • Number of Places Lived in the Last Year: 1   • Unstable Housing in the Last Year: No     Patient Outreach    Outgoing call to the patient and spoke with her spouse, Russ.  Introduced self and explained role of RN-TIFFANIE.  Discussed recent discharge from SNF.  He states that [facility] will be coming today and she will have nursing and PT.  Discussed HRCM and he prefers to engage and verbalized understanding that there is no charge for services and that they may opt out at anytime.  He prefers an outreach  in one month and this has been scheduled.   INDIRA PICHARDO  Ambulatory Case Management    12/5/2022, 10:22 EST

## 2022-12-06 NOTE — TELEPHONE ENCOUNTER
Caller: Russ Mahoney    Relationship: Emergency Contact    Best call back number: [phone]      What was the call regarding: PT MISSED CALL FROM THE OFFICE, NO MESSAGE

## 2022-12-06 NOTE — TELEPHONE ENCOUNTER
Spoke with pts  he doesn't know who called. Advised pt there is no notes to say who called. They do have a follow up later this week. Garrick confirmed date and time for apt.

## 2022-12-07 NOTE — TELEPHONE ENCOUNTER
Caller: SOFY    Relationship: Home Health    Best call back number: [phone]    What orders are you requesting (i.e. lab or imaging): VERBAL ORDERS FOR A URINE CATCH TOMORROW WHEN NURSING COMES OUT.     PATIENT HAS INCREASED CONFUSION AND WEAKNESS.

## 2022-12-08 NOTE — TELEPHONE ENCOUNTER
I called patient's  who was inquiring if patient could have lab work weekly in home and receive her Procrit weekly in home.  I called her home health agency [facility] at [phone] and spoke with Tracey who verified with her supervisor that they are unable to do Procrit in patient home.  Any Advice.

## 2022-12-08 NOTE — TELEPHONE ENCOUNTER
Caller: Russ Mahoney    Relationship: Emergency Contact    Best call back number: [phone]    What is the best time to reach you: ANYTIME    Who are you requesting to speak with (clinical staff, provider,  specific staff member): DR GALLARDO OR NURSE    What was the call regarding: RUSS WAS CALLING TO [facility] PTS LAB AND INJECTION FOR TODAY. HOME HEALTH IS THERE AND PT CAN'T MAKE IT. RUSS WOULD LIKE TO KNOW IF THERE IS ANY WAY THAT PT CAN GET WEEKLY LABS AND PROCRIT THROUGH HOME HEALTH INSTEAD OF COMING INTO THE OFFICE BECAUSE SHE IS SO WEAK.    PLEASE CALL TO ADVISE.     Do you require a callback: YES

## 2022-12-09 NOTE — TELEPHONE ENCOUNTER
Spoke with patients  who states the patient has not been consistent with her medications, he will make sure she is taking all medications as prescribed.

## 2022-12-09 NOTE — TELEPHONE ENCOUNTER
----- Message from Edward Vasquez MD sent at 12/7/2022 10:29 PM EST -----  Methylmalonic acid level is elevated. Please check if the patient has been taking vitamin B12 1000 mcg daily. If she has been taking it, I would recommend starting her on vitamin B12 IM weekly (along with the procrit).    Thank you

## 2022-12-12 NOTE — TELEPHONE ENCOUNTER
Caller: MICHELLE    Relationship: Home Health    Best call back number: [phone]    What is the best time to reach you: ANY TIME    Who are you requesting to speak with (clinical staff, provider,  specific staff member): CLINICAL STAFF    What was the call regarding: HOME HEALTH NURSE REQUESTING VERBAL ORDERS TO PUT TUBI  ON HER LOWER EXTREMITIES FOR EDEMA. THEY ARE GOING DOWN TO VISITING ONCE A WEEK AND SHE WANTS TO KEEP EDEMA UNDER CONTROL.    PLEASE ADVISE    Do you require a callback: YES

## 2022-12-12 NOTE — TELEPHONE ENCOUNTER
Pts  requesting to have procrit injections and lab given at home by [facility]. I spoke to [facility]. They state insurance will only cover one visit. Either the lab draw or injection, but not both since they will be on two different days. I dicussed this with the pts . He said he will bring her into the office for both lab and injection. Pts  V/U.

## 2022-12-12 NOTE — TELEPHONE ENCOUNTER
Caller: Russ Mahoney    Relationship: Emergency Contact    Best call back number: [phone]    What was the call regarding: RUSS CALLED REGARDING [facility] PROCRIT INJECTIONS. HE WANTS TO KNOW IF HE CAN GIVE THEM TO HER AT HOME.    Do you require a callback: YES

## 2022-12-26 PROBLEM — N18.5 CKD (CHRONIC KIDNEY DISEASE) STAGE 5, GFR LESS THAN 15 ML/MIN (HCC): Status: ACTIVE | Noted: 2018-11-23

## 2022-12-26 PROBLEM — G93.41 METABOLIC ENCEPHALOPATHY: Status: ACTIVE | Noted: 2022-01-01

## 2022-12-26 PROBLEM — N17.9 ACUTE RENAL FAILURE, UNSPECIFIED ACUTE RENAL FAILURE TYPE (HCC): Status: ACTIVE | Noted: 2022-01-01

## 2022-12-31 LAB
BACTERIA SPEC AEROBE CULT: NORMAL
BACTERIA SPEC AEROBE CULT: NORMAL

## 2023-09-28 NOTE — THERAPY EVALUATION
Acute Care - Physical Therapy Initial Evaluation  [facility]     Patient Name: Janel Mahoney  : 1940  MRN: [id number]  Today's Date: 2019   Onset of Illness/Injury or Date of Surgery: 02/15/19     Referring Physician: Tamera      Admit Date: 2/15/2019    Visit Dx:     ICD-10-CM ICD-9-CM   1. Peripheral edema R60.9 782.3   2. Coagulopathy (CMS/HCC) D68.9 286.9   3. Ischemic cardiomyopathy I25.5 414.8   4. Chronic renal failure, unspecified CKD stage N18.9 585.9   5. Chronic anemia D64.9 285.9   6. Traumatic hematoma of left upper arm, initial encounter S40.022A 923.03   7. Generalized weakness R53.1 780.79     Patient Active Problem List   Diagnosis   • Anemia in stage 3 chronic kidney disease (CMS/HCC)   • Thrombocytopenia (CMS/HCC)   • B12 deficiency   • Coronary artery disease involving coronary bypass graft of native heart without angina pectoris   • S/P MVR (mitral valve replacement)   • Chronic atrial fibrillation (CMS/HCC)   • Bilateral carotid artery disease (CMS/HCC)   • Intractable low back pain   • Long-term (current) use of anticoagulants   • Low back pain   • Osteoporosis   • Murmur, heart   • GEORGINA on auto CPAP - Dr Cardona   • Hypersomnia due to medical condition - GEORGINA   • Esophageal stricture   • Acute blood loss anemia   • Dysphagia   • Closed fracture of left proximal humerus   • Hypertension   • Supratherapeutic INR   • Chronic combined systolic and diastolic congestive heart failure (CMS/HCC)   • Osteoporosis with pathological fracture   • Closed 3-part fracture of proximal end of left humerus   • Closed fracture of proximal end of humerus with delayed healing   • Injury of right knee   • Knee injury, left, initial encounter   • History of fall   • S/P TKR (total knee replacement) using cement, left   • Ischemic cardiomyopathy   • Intramuscular hematoma right pecotralis   • Hemorrhagic disorder due to extrinsic circulating anticoagulants (CMS/HCC)   • Acute posthemorrhagic anemia    • Chronic kidney disease, stage 3 (CMS/HCC)   • Acute kidney injury (CMS/HCC)   • Peripheral edema     Past Medical History:   Diagnosis Date   • Acute kidney injury (CMS/HCC)    • Anemia    • Atrial fibrillation (CMS/HCC)    • Bruises easily    • Carotid artery stenosis    • Chronic back pain    • Chronic combined systolic and diastolic congestive heart failure (CMS/HCC)    • Chronic coronary artery disease     moderate to severe LV dysfunction.   • Chronic kidney disease, stage 3 (CMS/HCC)    • Dysphagia    • GERD (gastroesophageal reflux disease)    • H/O cardiac murmur    • [place] (hard of hearing)     wears hearing aids   • Hyperlipidemia    • Hypertension    • Hypotension    • Ischemic cardiomyopathy    • Kyphoscoliosis    • Leukopenia    • Lumbar spondylosis    • Obesity    • GEORGINA (obstructive sleep apnea)    • Osteoarthritis    • Osteoporosis    • Peptic ulcer    • Premature ventricular contractions    • Renal insufficiency syndrome    • Scoliosis    • Shoulder fracture, left    • Stroke syndrome    • Thrombocytopenia (CMS/HCC)    • Ventricular tachycardia (CMS/HCC)    • Vitamin B12 deficiency      Past Surgical History:   Procedure Laterality Date   • AV NODE ABLATION     • BREAST BIOPSY     • CARDIAC CATHETERIZATION      Showed severe mitral insufficiency and borderline coronary artery disease   • CARDIAC CATHETERIZATION      Showed an ejection fraction of 35%. She had occlusive disease of the right posterior LV branch and no other significant disease, treated medically.   • CARDIAC DEFIBRILLATOR PLACEMENT      Biventricular   • CARDIAC ELECTROPHYSIOLOGY PROCEDURE N/A 1/4/2019    Procedure: GENERATOR CHANGE BI-V ICD   [place];  Surgeon: James Hwang MD;  Location: [facility] INVASIVE LOCATION;  Service: Cardiology   • CARDIAC VALVE REPLACEMENT  2009    Done with stent placement   • CARDIOVERSION      multiple electrocardioversions.   • CAROTID ARTERY ANGIOPLASTY Right    • COLONOSCOPY N/A 9/28/2017     Procedure: COLONOSCOPY TO CECUM;  Surgeon: Kevin Davis MD;  Location: [facility] ENDOSCOPY;  Service:    • CORONARY ANGIOPLASTY WITH STENT PLACEMENT  2009   • CORONARY ARTERY BYPASS GRAFT      single graft to the PDA   • CORONARY STENT PLACEMENT     • ENDOSCOPY N/A 9/28/2017    Procedure: ESOPHAGOGASTRODUODENOSCOPY ;  Surgeon: Kevin Davis MD;  Location: [facility] ENDOSCOPY;  Service:    • HEMORRHOIDECTOMY     • HYSTERECTOMY     • INCISION AND DRAINAGE TRUNK Right 11/27/2018    Procedure: EVACUATION OF RIGHT CHEST WALL HEMATOMA;  Surgeon: Juvencio Rodriguez MD;  Location: [facility] MAIN OR;  Service: General   • MITRAL VALVE REPLACEMENT  01/2010    #31 Epic porcine valve.   • THROMBOENDARTERECTOMY Right     carotid thromboendarterectomy    • TONSILLECTOMY      age 32   • TOTAL KNEE ARTHROPLASTY Left    • TOTAL SHOULDER ARTHROPLASTY W/ DISTAL CLAVICLE EXCISION Left 1/16/2018    Procedure: TOTAL SHOULDER REVERSE ARTHROPLASTY;  Surgeon: Bianka Quesada MD;  Location: [facility] OR;  Service:         PT ASSESSMENT (last 12 hours)      Physical Therapy Evaluation     Row Name 02/18/19 1055          PT Evaluation Time/Intention    Subjective Information  complains of;weakness;fatigue;pain  -EE     Document Type  evaluation  -EE     Mode of Treatment  physical therapy  -EE     Patient Effort  good  -EE     Symptoms Noted During/After Treatment  fatigue  -EE     Row Name 02/18/19 1050          General Information    Onset of Illness/Injury or Date of Surgery  02/15/19  -EE     Referring Physician  Philip  -EE     Patient Observations  alert;cooperative;agree to therapy  -EE     Patient/Family Observations  Pt supine in bed in no acute distress  -EE     Equipment Currently Used at Home  lift device;walker, rolling lift chair  -EE     Pertinent History of Current Functional Problem  peripheral edema, acute on chronic CHF, a fib; pt reports hx of scoliosis and weakness in L UE  -EE     Existing Precautions/Restrictions   no strength deficits were identified fall  -EE     Barriers to Rehab  previous functional deficit  -EE     Row Name 02/18/19 1055          Relationship/Environment    Lives With  spouse  -EE     Row Name 02/18/19 1055          Resource/Environmental Concerns    Current Living Arrangements  home/apartment/condo  -EE     Row Name 02/18/19 1055          Cognitive Assessment/Interventions    Additional Documentation  Cognitive Assessment/Intervention (Group)  -EE     Row Name 02/18/19 1055          Cognitive Assessment/Intervention- PT/OT    Orientation Status (Cognition)  oriented x 4  -EE     Follows Commands (Cognition)  WNL  -EE     Safety Deficit (Cognitive)  mild deficit  -EE     Personal Safety Interventions  fall prevention program maintained;gait belt;muscle strengthening facilitated;nonskid shoes/slippers when out of bed;supervised activity  -EE     Row Name 02/18/19 1055          Safety Issues, Functional Mobility    Impairments Affecting Function (Mobility)  balance;endurance/activity tolerance;pain;strength  -EE     Row Name 02/18/19 1055          Bed Mobility Assessment/Treatment    Bed Mobility Assessment/Treatment  supine-sit;sit-supine  -EE     Supine-Sit [place] (Bed Mobility)  maximum assist (25% patient effort);verbal cues;nonverbal cues (demo/gesture)  -EE     Sit-Supine [place] (Bed Mobility)  moderate assist (50% patient effort);2 person assist;verbal cues;nonverbal cues (demo/gesture)  -EE     Bed Mobility, Safety Issues  decreased use of arms for pushing/pulling;decreased use of legs for bridging/pushing  -EE     Assistive Device (Bed Mobility)  bed rails;draw sheet;head of bed elevated  -EE     Row Name 02/18/19 1055          Transfer Assessment/Treatment    Comment (Transfers)  2 attempts to stand at EOB; feet sliding forward and pt unable to clear buttocks from bed; increaed back pain during attempts.  -EE     Row Name 02/18/19 1055          Gait/Stairs Assessment/Training    Comment (Gait/Stairs)  not assessed due to  "weakness and pain  -EE     Row Name 02/18/19 1055          General ROM    GENERAL ROM COMMENTS  B LEs grossly WFL  -EE     Row Name 02/18/19 1055          MMT (Manual Muscle Testing)    General MMT Comments  B LEs grossly 3- to 3/5; pt reports \"I can't move this arm at all\" and pointed to L UE, which appeared somewhat swollen.  -EE     Row Name 02/18/19 1055          Motor Assessment/Intervention    Additional Documentation  Balance (Group)  -EE     Row Name 02/18/19 1055          Balance    Balance  static sitting balance  -EE     Row Name 02/18/19 1055          Static Sitting Balance    Level of [place] (Unsupported Sitting, Static Balance)  contact guard assist  -EE     Sitting Position (Unsupported Sitting, Static Balance)  sitting on edge of bed  -EE     Time Able to Maintain Position (Unsupported Sitting, Static Balance)  more than 5 minutes  -EE     Comment (Unsupported Sitting, Static Balance)  forward flexed posture; vc's to correct  -EE     Row Name 02/18/19 1055          Pain Assessment    Additional Documentation  Pain Scale: Numbers Pre/Post-Treatment (Group)  -EE     Row Name 02/18/19 1055          Pain Scale: Numbers Pre/Post-Treatment    Pain Scale: Numbers, Pretreatment  7/10  -EE     Pain Scale: Numbers, Post-Treatment  7/10  -EE     Pain Location - Side  Bilateral  -EE     Pain Location - Orientation  lower  -EE     Pain Location  back  -EE     Pre/Post Treatment Pain Comment  Tolerated tx; pain worsened during attempts at transfer. Also reported L elbow tenderness.  -EE     Pain Intervention(s)  Repositioned;Rest  -EE     Row Name 02/18/19 1055          Plan of Care Review    Plan of Care Reviewed With  patient  -     Row Name 02/18/19 1055          Physical Therapy Clinical Impression    Patient/Family Goals Statement (PT Clinical Impression)  Get stronger, decrease back pain  -EE     Criteria for Skilled Interventions Met (PT Clinical Impression)  yes;treatment indicated  -EE     " Pathology/Pathophysiology Noted (Describe Specifically for Each System)  musculoskeletal;cardiovascular  -EE     Impairments Found (describe specific impairments)  aerobic capacity/endurance;gait, locomotion, and balance;muscle performance  -EE     Rehab Potential (PT Clinical Summary)  good, to achieve stated therapy goals  -EE     Row Name 02/18/19 1055          Physical Therapy Goals    Bed Mobility Goal Selection (PT)  bed mobility, PT goal 1  -EE     Transfer Goal Selection (PT)  transfer, PT goal 1  -EE     Gait Training Goal Selection (PT)  gait training, PT goal 1  -EE     Row Name 02/18/19 1055          Bed Mobility Goal 1 (PT)    Activity/Assistive Device (Bed Mobility Goal 1, PT)  bed mobility activities, all  -EE     [place] Level/Cues Needed (Bed Mobility Goal 1, PT)  minimum assist (75% or more patient effort)  -EE     Time Frame (Bed Mobility Goal 1, PT)  1 week;long term goal (LTG)  -EE     Progress/Outcomes (Bed Mobility Goal 1, PT)  goal ongoing  -EE     Row Name 02/18/19 1055          Transfer Goal 1 (PT)    Activity/Assistive Device (Transfer Goal 1, PT)  transfers, all  -EE     [place] Level/Cues Needed (Transfer Goal 1, PT)  minimum assist (75% or more patient effort);2 person assist  -EE     Time Frame (Transfer Goal 1, PT)  1 week;long term goal (LTG)  -EE     Progress/Outcome (Transfer Goal 1, PT)  goal ongoing  -EE     Row Name 02/18/19 1057          Gait Training Goal 1 (PT)    Activity/Assistive Device (Gait Training Goal 1, PT)  gait (walking locomotion);walker, rolling  -EE     [place] Level (Gait Training Goal 1, PT)  minimum assist (75% or more patient effort);2 person assist  -EE     Distance (Gait Goal 1, PT)  30  -EE     Time Frame (Gait Training Goal 1, PT)  1 week;long term goal (LTG)  -EE     Progress/Outcome (Gait Training Goal 1, PT)  goal ongoing  -EE     Row Name 02/18/19 1058          Positioning and Restraints    Pre-Treatment Position  in bed  -EE     Post  Treatment Position  bed  -EE     In Bed  supine;notified nsg;with nsg with nsg assistant, Vera BEJARANO     Row Name 02/18/19 1054          Living Environment    Home Accessibility  stairs to enter home  -EE       User Key  (r) = Recorded By, (t) = Taken By, (c) = Cosigned By    Initials Name Provider Type    EE Theresa Hogan, PT Physical Therapist        Physical Therapy Education     Title: PT OT SLP Therapies (In Progress)     Topic: Physical Therapy (In Progress)     Point: Mobility training (In Progress)     Learning Progress Summary           Patient Acceptance, E,TB,D, NR by EE at 2/18/2019 11:22 AM                               User Key     Initials Effective Dates Name Provider Type Discipline    EE 04/03/18 -  Theresa Hogan, PT Physical Therapist PT              PT Recommendation and Plan  Anticipated Discharge Disposition (PT): skilled nursing facility  Planned Therapy Interventions (PT Eval): balance training, bed mobility training, gait training, home exercise program, patient/family education, ROM (range of motion), strengthening, stretching, transfer training  Therapy Frequency (PT Clinical Impression): daily  Outcome Summary/Treatment Plan (PT)  Anticipated Discharge Disposition (PT): skilled nursing facility  Plan of Care Reviewed With: patient  Outcome Summary: Pt is a 79 yo female who presents from home with peripheral edema, acute on chronic CHF, history of afib and scoliosis. Upon exam, pt reports back pain and demonstrates generalized weakness, decreased endurance, and impaired balance limiting independence with mobility. Pt states he was independent with walker prior to admission, was using lift chair for transfers. Pt currently requiring max A for bed mobility, unable to perform sit to stand due to weakness and pain. Pt would benefit from continued PT to address above impairments and increase independence with functional mobility.   Outcome Measures     Row Name 02/18/19 1100             How  much help from another person do you currently need...    Turning from your back to your side while in flat bed without using bedrails?  2  -EE      Moving from lying on back to sitting on the side of a flat bed without bedrails?  2  -EE      Moving to and from a bed to a chair (including a wheelchair)?  1  -EE      Standing up from a chair using your arms (e.g., wheelchair, bedside chair)?  1  -EE      Climbing 3-5 steps with a railing?  1  -EE      To walk in hospital room?  1  -EE      AM-PAC 6 Clicks Score  8  -EE         Functional Assessment    Outcome Measure Options  AM-PAC 6 Clicks Basic Mobility (PT)  -EE        User Key  (r) = Recorded By, (t) = Taken By, (c) = Cosigned By    Initials Name Provider Type    Theresa Rader PT Physical Therapist         Time Calculation:   PT Charges     Row Name 02/18/19 1125             Time Calculation    Start Time  1055  -EE      Stop Time  1115  -EE      Time Calculation (min)  20 min  -EE      PT Received On  02/18/19  -EE      PT - Next Appointment  02/19/19  -EE      PT Goal Re-Cert Due Date  02/25/19  -EE        User Key  (r) = Recorded By, (t) = Taken By, (c) = Cosigned By    Initials Name Provider Type    Theresa Rader PT Physical Therapist        Therapy Suggested Charges     Code   Minutes Charges    None           Therapy Charges for Today     Code Description Service Date Service Provider Modifiers Qty    51372113514 HC PT EVAL MOD COMPLEXITY 2 2/18/2019 Theresa Hogan, PT GP 1          PT G-Codes  Outcome Measure Options: AM-PAC 6 Clicks Basic Mobility (PT)  AM-PAC 6 Clicks Score: 8      Theresa Hogan PT  2/18/2019

## 2024-01-01 NOTE — NURSING NOTE
Here for myelogram  
Myelogram cancelled due to PT/INR of 32.4/2.8.  Will have pt come in on Wed and recheck INR  
Pt going to cardiologist office to have routine PT/INR checked, given note to ask cardiologist if he wants pt to be on Lovenox until INR comes down enough to do myelogram(1.5)  
-Congested cough, runny eyes, or runny nose

## 2024-12-22 NOTE — TELEPHONE ENCOUNTER
I would bridge her with [email or web address]  
Ms. Mahoney has been contacted with bridging instructions. Enoxaparin has been ordered. Thank you.   
DISPLAY PLAN FREE TEXT

## (undated) DEVICE — GLV SURG BIOGEL LTX PF 8

## (undated) DEVICE — BRUSH CYTO COLONOSCOPE 7F3MM 240CM RED

## (undated) DEVICE — UNDERGLV SURG BIOGEL INDICAT PI SZ8.5 BLU

## (undated) DEVICE — PK SHLDR OPN 40

## (undated) DEVICE — KT SURG TURNOVER 050

## (undated) DEVICE — PREP IM ENCHANCED TOTAL HIP BONE                                    PREPARATION KIT: Brand: PREP-IM

## (undated) DEVICE — THE TORRENT IRRIGATION SCOPE CONNECTOR IS USED WITH THE TORRENT IRRIGATION TUBING TO PROVIDE IRRIGATION FLUIDS SUCH AS STERILE WATER DURING GASTROINTESTINAL ENDOSCOPIC PROCEDURES WHEN USED IN CONJUNCTION WITH AN IRRIGATION PUMP (OR ELECTROSURGICAL UNIT).: Brand: TORRENT

## (undated) DEVICE — PENCL EVAC ULTRAVAC SMOKE W/BLD

## (undated) DEVICE — GENESIS TROCHLEAR PIN 1/8 IN. X 5IN: Brand: GENESIS

## (undated) DEVICE — PK TOTL SHLDR 50

## (undated) DEVICE — SKIN PREP TRAY W/CHG: Brand: MEDLINE INDUSTRIES, INC.

## (undated) DEVICE — TOWEL,OR,DSP,ST,BLUE,STD,4/PK,20PK/CS: Brand: MEDLINE

## (undated) DEVICE — COAXIAL FEMORAL CANAL TIP

## (undated) DEVICE — SUT MNCRYL 3/0 PS2 18IN MCP497G

## (undated) DEVICE — SPNG LAP 18X18IN LF STRL PK/5

## (undated) DEVICE — SYR LUERLOK 20CC BX/50

## (undated) DEVICE — STPLR SKIN VISISTAT WD 35CT

## (undated) DEVICE — SUT VIC 2/0 CT2 27IN J269H

## (undated) DEVICE — DUAL CUT SAGITTAL BLADE

## (undated) DEVICE — Device

## (undated) DEVICE — CANN O2 ETCO2 FITS ALL CONN CO2 SMPL A/ 7IN DISP LF

## (undated) DEVICE — SEALANT HEMOS FLOSEAL MATRX W/MALL TP 5ML EA/6

## (undated) DEVICE — HUMERAL NOZZLE: Brand: REVOLUTION

## (undated) DEVICE — UNDYED BRAIDED (POLYGLACTIN 910), SYNTHETIC ABSORBABLE SUTURE: Brand: COATED VICRYL

## (undated) DEVICE — SPNG GZ WOVN 4X4IN 12PLY 10/BX STRL

## (undated) DEVICE — 9165 UNIVERSAL PATIENT PLATE: Brand: 3M™

## (undated) DEVICE — PICO 7 10CM X 30CM: Brand: PICO™ 7

## (undated) DEVICE — BITEBLOCK OMNI BLOC

## (undated) DEVICE — ENCORE® LATEX ORTHO SIZE 7.5, STERILE LATEX POWDER-FREE SURGICAL GLOVE: Brand: ENCORE

## (undated) DEVICE — THE SINGLE USE ETRAP – POLYP TRAP IS USED FOR SUCTION RETRIEVAL OF ENDOSCOPICALLY REMOVED POLYPS.: Brand: ETRAP

## (undated) DEVICE — ERBE NESSY®PLATE 170 SPLIT; 168CM²; CABLE 3M: Brand: ERBE

## (undated) DEVICE — GLV SURG TRIUMPH CLASSIC PF LTX 8 STRL

## (undated) DEVICE — TUBING, SUCTION, 1/4" X 10', STRAIGHT: Brand: MEDLINE

## (undated) DEVICE — GLV SURG SENSICARE PI ORTHO PF SZ7 LF STRL

## (undated) DEVICE — BIDIRECTIONAL TORQUE WRENCH NO2: Brand: MODEL 6942 BIDIRECTIONAL TORQUE WRENCH

## (undated) DEVICE — KT ORCA ORCAPOD DISP STRL

## (undated) DEVICE — CEMENT MIXING SYSTEM WITH FEMORAL BREAKWAY NOZZLE: Brand: REVOLUTION

## (undated) DEVICE — GLV SURG SENSICARE PI PF LF 8.5 GRN STRL

## (undated) DEVICE — Device: Brand: DEFENDO AIR/WATER/SUCTION AND BIOPSY VALVE

## (undated) DEVICE — SUT VIC 0 CT1 36IN J946H

## (undated) DEVICE — DRSNG PAD ABD 8X10IN STRL

## (undated) DEVICE — TOWEL,OR,DSP,ST,NATURAL,DLX,4/PK,20PK/CS: Brand: MEDLINE

## (undated) DEVICE — ANTIBACTERIAL UNDYED BRAIDED (POLYGLACTIN 910), SYNTHETIC ABSORBABLE SUTURE: Brand: COATED VICRYL

## (undated) DEVICE — DRSNG WND GZ PAD BORDERED 4X8IN STRL

## (undated) DEVICE — SENSR O2 OXIMAX FNGR A/ 18IN NONSTR

## (undated) DEVICE — APPL CHLORAPREP W/TINT 26ML ORNG

## (undated) DEVICE — 3M™ IOBAN™ 2 ANTIMICROBIAL INCISE DRAPE 6650EZ: Brand: IOBAN™ 2

## (undated) DEVICE — GENESIS TROCHLEAR PIN 1/8 X 3: Brand: GENESIS

## (undated) DEVICE — SINGLE-USE POLYPECTOMY SNARE: Brand: CAPTIVATOR II

## (undated) DEVICE — T-MAX DISPOSABLE FACE MASK 8 PER BOX

## (undated) DEVICE — MAT FLR ABSORBENT LG 4FT 10 2.5FT

## (undated) DEVICE — SUT ETHLN 3/0 PS1 18IN 1663H

## (undated) DEVICE — TOTAL TRAY, 16FR 10ML SIL FOLEY, URN: Brand: MEDLINE

## (undated) DEVICE — GLV SURG PREMIERPRO ORTHO LTX PF SZ8.5 BRN

## (undated) DEVICE — GOWN,PREVENTION PLUS,XLONG/XLARGE,STRL: Brand: MEDLINE

## (undated) DEVICE — HEWSON SUTURE RETRIEVER: Brand: HEWSON SUTURE RETRIEVER

## (undated) DEVICE — TBG 02 CRUSH RESIST LF CLR 7FT

## (undated) DEVICE — DRAPE,U/ SHT,SPLIT,PLAS,STERIL: Brand: MEDLINE

## (undated) DEVICE — CANN NASL CO2 TRULINK W/O2 A/

## (undated) DEVICE — PK KN TOTL 40

## (undated) DEVICE — TOWEL,OR,DSP,ST,WHITE,DLX,4/PK,20PK/CS: Brand: MEDLINE

## (undated) DEVICE — NDL SPINE 20G 3 1/2 YEL STRL 1P/U

## (undated) DEVICE — COVER,MAYO STAND,STERILE: Brand: MEDLINE

## (undated) DEVICE — HANDPIECE SET WITH COAXIAL HIGH FLOW TIP AND SUCTION TUBE: Brand: INTERPULSE

## (undated) DEVICE — JACKSON-PRATT 100CC BULB RESERVOIR: Brand: CARDINAL HEALTH

## (undated) DEVICE — POOLE SUCTION HANDLE: Brand: CARDINAL HEALTH

## (undated) DEVICE — LN SMPL CO2 SHTRM SD STREAM W/M LUER

## (undated) DEVICE — SOL ISO/ALC RUB 70PCT 4OZ

## (undated) DEVICE — ZIPPERED TOGA, 2X LARGE: Brand: FLYTE

## (undated) DEVICE — SUT VIC 3/0 SH 27IN J416H

## (undated) DEVICE — LOU MINOR PROCEDURE: Brand: MEDLINE INDUSTRIES, INC.

## (undated) DEVICE — BIT DRL SCRW PERIPH 2.7MM

## (undated) DEVICE — DRAPE,REIN 53X77,STERILE: Brand: MEDLINE

## (undated) DEVICE — SOL IRRIG NACL 1000ML

## (undated) DEVICE — SOLUTION,WATER,IRRIGATION,1000ML,STERILE: Brand: MEDLINE

## (undated) DEVICE — DRSNG WND BORDR/ADHS NONADHR/GZ LF 4X4IN STRL

## (undated) DEVICE — DRSNG BURN ACTICOAT FLEX 7 1X24IN

## (undated) DEVICE — PROXIMATE RH ROTATING HEAD SKIN STAPLERS (35 REGULAR) CONTAINS 35 STAINLESS STEEL STAPLES: Brand: PROXIMATE

## (undated) DEVICE — PLASMABLADE PS200-040 4.0: Brand: PLASMABLADE™

## (undated) DEVICE — DRSNG WND BORDR/ADHS NONADHR/GZ LF 4X14IN STRL

## (undated) DEVICE — 2108 SERIES SAGITTAL BLADE, NO OFFSET  (12.4 X 1.19 X 82.1MM)

## (undated) DEVICE — SHEET, DRAPE, SPLIT, STERILE: Brand: MEDLINE

## (undated) DEVICE — ZIP 16 SURGICAL SKIN CLOSURE DEVICE, PSA: Brand: ZIP 16 SURGICAL SKIN CLOSURE DEVICE

## (undated) DEVICE — DISPOSABLE BIPOLAR FORCEPS 7 3/4" (19.7CM) SCOVILLE BAYONET, INSULATED, 1.5MM TIP AND 12 FT. (3.6M) CABLE: Brand: KIRWAN

## (undated) DEVICE — ADAPT CLN BIOGUARD AIR/H2O DISP

## (undated) DEVICE — SUT VIC 1 CT1 36IN J947H

## (undated) DEVICE — ST TISSOMAT SPRY

## (undated) DEVICE — ENCORE® LATEX ORTHO SIZE 8, STERILE LATEX POWDER-FREE SURGICAL GLOVE: Brand: ENCORE

## (undated) DEVICE — CONTAINER,SPECIMEN,OR STERILE,4OZ: Brand: MEDLINE

## (undated) DEVICE — 2108 SERIES SAGITTAL BLADE (19.5 X 1.27 X 81.0MM)

## (undated) DEVICE — OCCLUSIVE GAUZE STRIP,3% BISMUTH TRIBROMOPHENATE IN PETROLATUM BLEND: Brand: XEROFORM

## (undated) DEVICE — LOU PACE DEFIB: Brand: MEDLINE INDUSTRIES, INC.